# Patient Record
Sex: MALE | Race: BLACK OR AFRICAN AMERICAN | NOT HISPANIC OR LATINO | Employment: UNEMPLOYED | URBAN - METROPOLITAN AREA
[De-identification: names, ages, dates, MRNs, and addresses within clinical notes are randomized per-mention and may not be internally consistent; named-entity substitution may affect disease eponyms.]

---

## 2019-09-09 PROCEDURE — 96372 THER/PROPH/DIAG INJ SC/IM: CPT | Mod: 59

## 2019-09-09 PROCEDURE — 96374 THER/PROPH/DIAG INJ IV PUSH: CPT

## 2019-09-09 PROCEDURE — 99285 EMERGENCY DEPT VISIT HI MDM: CPT | Mod: 25

## 2019-09-10 ENCOUNTER — HOSPITAL ENCOUNTER (INPATIENT)
Facility: HOSPITAL | Age: 59
LOS: 6 days | Discharge: HOME | DRG: 280 | End: 2019-09-16
Attending: EMERGENCY MEDICINE | Admitting: EMERGENCY MEDICINE
Payer: MEDICAID

## 2019-09-10 DIAGNOSIS — R07.9 CHEST PAIN: ICD-10-CM

## 2019-09-10 DIAGNOSIS — I50.9 ACUTE HEART FAILURE: ICD-10-CM

## 2019-09-10 DIAGNOSIS — I48.92 ATRIAL FLUTTER: ICD-10-CM

## 2019-09-10 DIAGNOSIS — I42.0 DILATED CARDIOMYOPATHY: ICD-10-CM

## 2019-09-10 DIAGNOSIS — N17.9 ACUTE RENAL FAILURE: ICD-10-CM

## 2019-09-10 DIAGNOSIS — J96.01 ACUTE RESPIRATORY FAILURE WITH HYPOXIA: ICD-10-CM

## 2019-09-10 DIAGNOSIS — N17.9 AKI (ACUTE KIDNEY INJURY): ICD-10-CM

## 2019-09-10 DIAGNOSIS — N17.9 ACUTE RENAL FAILURE, UNSPECIFIED ACUTE RENAL FAILURE TYPE: ICD-10-CM

## 2019-09-10 DIAGNOSIS — I50.9 ACUTE ON CHRONIC CONGESTIVE HEART FAILURE, UNSPECIFIED HEART FAILURE TYPE: ICD-10-CM

## 2019-09-10 DIAGNOSIS — I21.4 NSTEMI (NON-ST ELEVATED MYOCARDIAL INFARCTION): Primary | ICD-10-CM

## 2019-09-10 DIAGNOSIS — R79.89 ELEVATED TROPONIN: ICD-10-CM

## 2019-09-10 DIAGNOSIS — I48.3 TYPICAL ATRIAL FLUTTER: ICD-10-CM

## 2019-09-10 PROBLEM — R06.00 DYSPNEA: Status: ACTIVE | Noted: 2019-09-10

## 2019-09-10 LAB
ALBUMIN SERPL BCP-MCNC: 3.5 G/DL (ref 3.5–5.2)
ALBUMIN SERPL BCP-MCNC: 3.7 G/DL (ref 3.5–5.2)
ALLENS TEST: ABNORMAL
ALP SERPL-CCNC: 59 U/L (ref 55–135)
ALP SERPL-CCNC: 69 U/L (ref 55–135)
ALT SERPL W/O P-5'-P-CCNC: 21 U/L (ref 10–44)
ALT SERPL W/O P-5'-P-CCNC: 24 U/L (ref 10–44)
ANION GAP SERPL CALC-SCNC: 11 MMOL/L (ref 8–16)
ANION GAP SERPL CALC-SCNC: 14 MMOL/L (ref 8–16)
AORTIC ROOT ANNULUS: 3.21 CM
AORTIC VALVE CUSP SEPERATION: 2.1 CM
APTT BLDCRRT: 64.4 SEC (ref 21–32)
ASCENDING AORTA: 3.17 CM
AST SERPL-CCNC: 26 U/L (ref 10–40)
AST SERPL-CCNC: 33 U/L (ref 10–40)
AV INDEX (PROSTH): 0.94
AV MEAN GRADIENT: 1 MMHG
AV PEAK GRADIENT: 1 MMHG
AV VALVE AREA: 3.27 CM2
AV VELOCITY RATIO: 0.86
BACTERIA #/AREA URNS HPF: ABNORMAL /HPF
BASOPHILS # BLD AUTO: 0.01 K/UL (ref 0–0.2)
BASOPHILS # BLD AUTO: 0.02 K/UL (ref 0–0.2)
BASOPHILS # BLD AUTO: 0.03 K/UL (ref 0–0.2)
BASOPHILS NFR BLD: 0.1 % (ref 0–1.9)
BASOPHILS NFR BLD: 0.1 % (ref 0–1.9)
BASOPHILS NFR BLD: 0.2 % (ref 0–1.9)
BILIRUB SERPL-MCNC: 0.9 MG/DL (ref 0.1–1)
BILIRUB SERPL-MCNC: 1 MG/DL (ref 0.1–1)
BILIRUB UR QL STRIP: NEGATIVE
BNP SERPL-MCNC: 445 PG/ML (ref 0–99)
BSA FOR ECHO PROCEDURE: 1.89 M2
BUN SERPL-MCNC: 25 MG/DL (ref 8–23)
BUN SERPL-MCNC: 28 MG/DL (ref 8–23)
CALCIUM SERPL-MCNC: 8.5 MG/DL (ref 8.7–10.5)
CALCIUM SERPL-MCNC: 8.8 MG/DL (ref 8.7–10.5)
CHLORIDE SERPL-SCNC: 102 MMOL/L (ref 95–110)
CHLORIDE SERPL-SCNC: 103 MMOL/L (ref 95–110)
CLARITY UR: CLEAR
CO2 SERPL-SCNC: 22 MMOL/L (ref 23–29)
CO2 SERPL-SCNC: 27 MMOL/L (ref 23–29)
COLOR UR: YELLOW
CREAT SERPL-MCNC: 1.4 MG/DL (ref 0.5–1.4)
CREAT SERPL-MCNC: 1.6 MG/DL (ref 0.5–1.4)
CREAT UR-MCNC: 66.5 MG/DL (ref 23–375)
CV ECHO LV RWT: 0.51 CM
D DIMER PPP IA.FEU-MCNC: 0.64 MG/L FEU
DELSYS: ABNORMAL
DIFFERENTIAL METHOD: ABNORMAL
DOP CALC AO PEAK VEL: 0.59 M/S
DOP CALC AO VTI: 6.76 CM
DOP CALC LVOT AREA: 3.5 CM2
DOP CALC LVOT DIAMETER: 2.11 CM
DOP CALC LVOT PEAK VEL: 0.51 M/S
DOP CALC LVOT STROKE VOLUME: 22.12 CM3
DOP CALCLVOT PEAK VEL VTI: 6.33 CM
ECHO LV POSTERIOR WALL: 1.27 CM (ref 0.6–1.1)
EOSINOPHIL # BLD AUTO: 0 K/UL (ref 0–0.5)
EOSINOPHIL NFR BLD: 0 % (ref 0–8)
EOSINOPHIL NFR BLD: 0.1 % (ref 0–8)
EOSINOPHIL NFR BLD: 0.1 % (ref 0–8)
EP: 5
ERYTHROCYTE [DISTWIDTH] IN BLOOD BY AUTOMATED COUNT: 14.2 % (ref 11.5–14.5)
ERYTHROCYTE [DISTWIDTH] IN BLOOD BY AUTOMATED COUNT: 14.4 % (ref 11.5–14.5)
ERYTHROCYTE [DISTWIDTH] IN BLOOD BY AUTOMATED COUNT: 14.5 % (ref 11.5–14.5)
ERYTHROCYTE [SEDIMENTATION RATE] IN BLOOD BY WESTERGREN METHOD: 42 MM/H
ERYTHROCYTE [SEDIMENTATION RATE] IN BLOOD BY WESTERGREN METHOD: 8 MM/H
ERYTHROCYTE [SEDIMENTATION RATE] IN BLOOD BY WESTERGREN METHOD: 8 MM/H
EST. GFR  (AFRICAN AMERICAN): 53 ML/MIN/1.73 M^2
EST. GFR  (AFRICAN AMERICAN): >60 ML/MIN/1.73 M^2
EST. GFR  (NON AFRICAN AMERICAN): 46 ML/MIN/1.73 M^2
EST. GFR  (NON AFRICAN AMERICAN): 54 ML/MIN/1.73 M^2
FIO2: 40
FIO2: 40
FIO2: 45
FRACTIONAL SHORTENING: 14 % (ref 28–44)
GLUCOSE SERPL-MCNC: 121 MG/DL (ref 70–110)
GLUCOSE SERPL-MCNC: 142 MG/DL (ref 70–110)
GLUCOSE UR QL STRIP: NEGATIVE
HCO3 UR-SCNC: 19.9 MMOL/L (ref 24–28)
HCO3 UR-SCNC: 23.4 MMOL/L (ref 24–28)
HCO3 UR-SCNC: 24.1 MMOL/L (ref 24–28)
HCT VFR BLD AUTO: 43.6 % (ref 40–54)
HCT VFR BLD AUTO: 44.3 % (ref 40–54)
HCT VFR BLD AUTO: 44.7 % (ref 40–54)
HGB BLD-MCNC: 14.6 G/DL (ref 14–18)
HGB BLD-MCNC: 14.7 G/DL (ref 14–18)
HGB BLD-MCNC: 14.7 G/DL (ref 14–18)
HGB UR QL STRIP: ABNORMAL
HYALINE CASTS #/AREA URNS LPF: 2 /LPF
INR PPP: 1.8 (ref 0.8–1.2)
INTERVENTRICULAR SEPTUM: 1.3 CM (ref 0.6–1.1)
IP: 10
IP: 12
IP: 12
IVRT: 0.07 MSEC
KETONES UR QL STRIP: NEGATIVE
LA MAJOR: 6.72 CM
LA MINOR: 7.15 CM
LA WIDTH: 4.86 CM
LEFT ATRIUM SIZE: 4.91 CM
LEFT ATRIUM VOLUME INDEX: 75.5 ML/M2
LEFT ATRIUM VOLUME: 140.53 CM3
LEFT INTERNAL DIMENSION IN SYSTOLE: 4.33 CM (ref 2.1–4)
LEFT VENTRICLE DIASTOLIC VOLUME INDEX: 63.85 ML/M2
LEFT VENTRICLE DIASTOLIC VOLUME: 118.88 ML
LEFT VENTRICLE MASS INDEX: 139 G/M2
LEFT VENTRICLE SYSTOLIC VOLUME INDEX: 45.2 ML/M2
LEFT VENTRICLE SYSTOLIC VOLUME: 84.23 ML
LEFT VENTRICULAR INTERNAL DIMENSION IN DIASTOLE: 5.01 CM (ref 3.5–6)
LEFT VENTRICULAR MASS: 258.33 G
LEUKOCYTE ESTERASE UR QL STRIP: NEGATIVE
LYMPHOCYTES # BLD AUTO: 2.6 K/UL (ref 1–4.8)
LYMPHOCYTES # BLD AUTO: 3.2 K/UL (ref 1–4.8)
LYMPHOCYTES # BLD AUTO: 3.2 K/UL (ref 1–4.8)
LYMPHOCYTES NFR BLD: 18.7 % (ref 18–48)
LYMPHOCYTES NFR BLD: 21.5 % (ref 18–48)
LYMPHOCYTES NFR BLD: 22.8 % (ref 18–48)
MAGNESIUM SERPL-MCNC: 1.3 MG/DL (ref 1.6–2.6)
MCH RBC QN AUTO: 32 PG (ref 27–31)
MCH RBC QN AUTO: 32.1 PG (ref 27–31)
MCH RBC QN AUTO: 32.2 PG (ref 27–31)
MCHC RBC AUTO-ENTMCNC: 32.9 G/DL (ref 32–36)
MCHC RBC AUTO-ENTMCNC: 33 G/DL (ref 32–36)
MCHC RBC AUTO-ENTMCNC: 33.7 G/DL (ref 32–36)
MCV RBC AUTO: 96 FL (ref 82–98)
MCV RBC AUTO: 97 FL (ref 82–98)
MCV RBC AUTO: 98 FL (ref 82–98)
MICROSCOPIC COMMENT: ABNORMAL
MIN VOL: 16
MIN VOL: 28.1
MIN VOL: 49
MODE: ABNORMAL
MONOCYTES # BLD AUTO: 0.8 K/UL (ref 0.3–1)
MONOCYTES # BLD AUTO: 1.1 K/UL (ref 0.3–1)
MONOCYTES # BLD AUTO: 1.4 K/UL (ref 0.3–1)
MONOCYTES NFR BLD: 6.5 % (ref 4–15)
MONOCYTES NFR BLD: 7.4 % (ref 4–15)
MONOCYTES NFR BLD: 8.3 % (ref 4–15)
MV PEAK E VEL: 1.1 M/S
NEUTROPHILS # BLD AUTO: 10.5 K/UL (ref 1.8–7.7)
NEUTROPHILS # BLD AUTO: 12.4 K/UL (ref 1.8–7.7)
NEUTROPHILS # BLD AUTO: 8.2 K/UL (ref 1.8–7.7)
NEUTROPHILS NFR BLD: 70.8 % (ref 38–73)
NEUTROPHILS NFR BLD: 70.8 % (ref 38–73)
NEUTROPHILS NFR BLD: 73.3 % (ref 38–73)
NITRITE UR QL STRIP: NEGATIVE
PCO2 BLDA: 36.2 MMHG (ref 35–45)
PCO2 BLDA: 36.6 MMHG (ref 35–45)
PCO2 BLDA: 38.2 MMHG (ref 35–45)
PH SMN: 7.34 [PH] (ref 7.35–7.45)
PH SMN: 7.41 [PH] (ref 7.35–7.45)
PH SMN: 7.42 [PH] (ref 7.35–7.45)
PH UR STRIP: 6 [PH] (ref 5–8)
PHOSPHATE SERPL-MCNC: 4.1 MG/DL (ref 2.7–4.5)
PISA TR MAX VEL: 2.52 M/S
PLATELET # BLD AUTO: 178 K/UL (ref 150–350)
PLATELET # BLD AUTO: 191 K/UL (ref 150–350)
PLATELET # BLD AUTO: 196 K/UL (ref 150–350)
PMV BLD AUTO: 10.8 FL (ref 9.2–12.9)
PMV BLD AUTO: 10.8 FL (ref 9.2–12.9)
PMV BLD AUTO: 11.1 FL (ref 9.2–12.9)
PO2 BLDA: 76 MMHG (ref 80–100)
PO2 BLDA: 83 MMHG (ref 80–100)
PO2 BLDA: 84 MMHG (ref 80–100)
POC BE: -1 MMOL/L
POC BE: -5 MMOL/L
POC BE: 0 MMOL/L
POC SATURATED O2: 95 % (ref 95–100)
POC SATURATED O2: 96 % (ref 95–100)
POC SATURATED O2: 96 % (ref 95–100)
POC TCO2: 21 MMOL/L (ref 23–27)
POC TCO2: 25 MMOL/L (ref 23–27)
POC TCO2: 25 MMOL/L (ref 23–27)
POTASSIUM SERPL-SCNC: 3.5 MMOL/L (ref 3.5–5.1)
POTASSIUM SERPL-SCNC: 3.6 MMOL/L (ref 3.5–5.1)
PROT SERPL-MCNC: 6.7 G/DL (ref 6–8.4)
PROT SERPL-MCNC: 7.1 G/DL (ref 6–8.4)
PROT UR QL STRIP: NEGATIVE
PROTHROMBIN TIME: 18.6 SEC (ref 9–12.5)
RA MAJOR: 5.6 CM
RA PRESSURE: 8 MMHG
RA WIDTH: 3.95 CM
RBC # BLD AUTO: 4.56 M/UL (ref 4.6–6.2)
RBC # BLD AUTO: 4.56 M/UL (ref 4.6–6.2)
RBC # BLD AUTO: 4.58 M/UL (ref 4.6–6.2)
RBC #/AREA URNS HPF: 4 /HPF (ref 0–4)
RIGHT VENTRICULAR END-DIASTOLIC DIMENSION: 2.88 CM
SAMPLE: ABNORMAL
SINUS: 2.62 CM
SITE: ABNORMAL
SODIUM SERPL-SCNC: 139 MMOL/L (ref 136–145)
SODIUM SERPL-SCNC: 140 MMOL/L (ref 136–145)
SODIUM UR-SCNC: 94 MMOL/L (ref 20–250)
SP GR UR STRIP: 1.01 (ref 1–1.03)
SP02: 96
SP02: 97
SP02: 99
SPONT RATE: 32
SPONT RATE: 47
STJ: 2.5 CM
TR MAX PG: 25 MMHG
TRICUSPID ANNULAR PLANE SYSTOLIC EXCURSION: 1.39 CM
TROPONIN I SERPL DL<=0.01 NG/ML-MCNC: 0.17 NG/ML (ref 0–0.03)
TROPONIN I SERPL DL<=0.01 NG/ML-MCNC: 0.19 NG/ML (ref 0–0.03)
TROPONIN I SERPL DL<=0.01 NG/ML-MCNC: 0.2 NG/ML (ref 0–0.03)
TROPONIN I SERPL DL<=0.01 NG/ML-MCNC: 0.21 NG/ML (ref 0–0.03)
TSH SERPL DL<=0.005 MIU/L-ACNC: 1.02 UIU/ML (ref 0.4–4)
TV REST PULMONARY ARTERY PRESSURE: 33 MMHG
URN SPEC COLLECT METH UR: ABNORMAL
UROBILINOGEN UR STRIP-ACNC: NEGATIVE EU/DL
WBC # BLD AUTO: 11.6 K/UL (ref 3.9–12.7)
WBC # BLD AUTO: 14.91 K/UL (ref 3.9–12.7)
WBC # BLD AUTO: 16.99 K/UL (ref 3.9–12.7)
WBC #/AREA URNS HPF: 5 /HPF (ref 0–5)

## 2019-09-10 PROCEDURE — 93005 ELECTROCARDIOGRAM TRACING: CPT

## 2019-09-10 PROCEDURE — 83970 ASSAY OF PARATHORMONE: CPT

## 2019-09-10 PROCEDURE — 27000221 HC OXYGEN, UP TO 24 HOURS

## 2019-09-10 PROCEDURE — 85730 THROMBOPLASTIN TIME PARTIAL: CPT

## 2019-09-10 PROCEDURE — 36600 WITHDRAWAL OF ARTERIAL BLOOD: CPT

## 2019-09-10 PROCEDURE — 36415 COLL VENOUS BLD VENIPUNCTURE: CPT

## 2019-09-10 PROCEDURE — 94761 N-INVAS EAR/PLS OXIMETRY MLT: CPT

## 2019-09-10 PROCEDURE — 83735 ASSAY OF MAGNESIUM: CPT

## 2019-09-10 PROCEDURE — 63600175 PHARM REV CODE 636 W HCPCS: Performed by: INTERNAL MEDICINE

## 2019-09-10 PROCEDURE — 27000190 HC CPAP FULL FACE MASK W/VALVE

## 2019-09-10 PROCEDURE — 93010 ELECTROCARDIOGRAM REPORT: CPT | Mod: ,,, | Performed by: INTERNAL MEDICINE

## 2019-09-10 PROCEDURE — 27100092 HC HIGH FLOW DELIVERY CANNULA

## 2019-09-10 PROCEDURE — 63600175 PHARM REV CODE 636 W HCPCS: Performed by: HOSPITALIST

## 2019-09-10 PROCEDURE — 85610 PROTHROMBIN TIME: CPT

## 2019-09-10 PROCEDURE — 84100 ASSAY OF PHOSPHORUS: CPT

## 2019-09-10 PROCEDURE — 84300 ASSAY OF URINE SODIUM: CPT

## 2019-09-10 PROCEDURE — 85025 COMPLETE CBC W/AUTO DIFF WBC: CPT

## 2019-09-10 PROCEDURE — 80053 COMPREHEN METABOLIC PANEL: CPT | Mod: 91

## 2019-09-10 PROCEDURE — 99900035 HC TECH TIME PER 15 MIN (STAT)

## 2019-09-10 PROCEDURE — 99291 CRITICAL CARE FIRST HOUR: CPT | Mod: 25,,, | Performed by: INTERNAL MEDICINE

## 2019-09-10 PROCEDURE — 80053 COMPREHEN METABOLIC PANEL: CPT

## 2019-09-10 PROCEDURE — 99291 CRITICAL CARE FIRST HOUR: CPT | Mod: ,,, | Performed by: NURSE PRACTITIONER

## 2019-09-10 PROCEDURE — 82652 VIT D 1 25-DIHYDROXY: CPT

## 2019-09-10 PROCEDURE — 25000003 PHARM REV CODE 250: Performed by: INTERNAL MEDICINE

## 2019-09-10 PROCEDURE — 85379 FIBRIN DEGRADATION QUANT: CPT

## 2019-09-10 PROCEDURE — 93010 EKG 12-LEAD: ICD-10-PCS | Mod: ,,, | Performed by: INTERNAL MEDICINE

## 2019-09-10 PROCEDURE — 82570 ASSAY OF URINE CREATININE: CPT

## 2019-09-10 PROCEDURE — 63600175 PHARM REV CODE 636 W HCPCS: Performed by: EMERGENCY MEDICINE

## 2019-09-10 PROCEDURE — 99291 PR CRITICAL CARE, E/M 30-74 MINUTES: ICD-10-PCS | Mod: 25,,, | Performed by: INTERNAL MEDICINE

## 2019-09-10 PROCEDURE — 99291 PR CRITICAL CARE, E/M 30-74 MINUTES: ICD-10-PCS | Mod: ,,, | Performed by: NURSE PRACTITIONER

## 2019-09-10 PROCEDURE — 82803 BLOOD GASES ANY COMBINATION: CPT

## 2019-09-10 PROCEDURE — 81000 URINALYSIS NONAUTO W/SCOPE: CPT

## 2019-09-10 PROCEDURE — 25000003 PHARM REV CODE 250: Performed by: EMERGENCY MEDICINE

## 2019-09-10 PROCEDURE — 27100171 HC OXYGEN HIGH FLOW UP TO 24 HOURS

## 2019-09-10 PROCEDURE — 83880 ASSAY OF NATRIURETIC PEPTIDE: CPT

## 2019-09-10 PROCEDURE — 84484 ASSAY OF TROPONIN QUANT: CPT | Mod: 91

## 2019-09-10 PROCEDURE — 20000000 HC ICU ROOM

## 2019-09-10 PROCEDURE — 84443 ASSAY THYROID STIM HORMONE: CPT

## 2019-09-10 PROCEDURE — 94660 CPAP INITIATION&MGMT: CPT

## 2019-09-10 RX ORDER — ENOXAPARIN SODIUM 100 MG/ML
1 INJECTION SUBCUTANEOUS
Status: COMPLETED | OUTPATIENT
Start: 2019-09-10 | End: 2019-09-10

## 2019-09-10 RX ORDER — MELOXICAM 15 MG/1
15 TABLET ORAL DAILY
Status: ON HOLD | COMMUNITY
End: 2019-09-16 | Stop reason: HOSPADM

## 2019-09-10 RX ORDER — ACETAMINOPHEN 500 MG
500 TABLET ORAL EVERY 6 HOURS PRN
Status: DISCONTINUED | OUTPATIENT
Start: 2019-09-10 | End: 2019-09-16 | Stop reason: HOSPADM

## 2019-09-10 RX ORDER — RAMELTEON 8 MG/1
8 TABLET ORAL NIGHTLY PRN
Status: DISCONTINUED | OUTPATIENT
Start: 2019-09-10 | End: 2019-09-16 | Stop reason: HOSPADM

## 2019-09-10 RX ORDER — AMOXICILLIN 250 MG
1 CAPSULE ORAL 2 TIMES DAILY PRN
Status: DISCONTINUED | OUTPATIENT
Start: 2019-09-10 | End: 2019-09-10

## 2019-09-10 RX ORDER — MAGNESIUM SULFATE HEPTAHYDRATE 40 MG/ML
2 INJECTION, SOLUTION INTRAVENOUS ONCE
Status: COMPLETED | OUTPATIENT
Start: 2019-09-10 | End: 2019-09-10

## 2019-09-10 RX ORDER — HYDROMORPHONE HYDROCHLORIDE 2 MG/ML
0.5 INJECTION, SOLUTION INTRAMUSCULAR; INTRAVENOUS; SUBCUTANEOUS ONCE
Status: COMPLETED | OUTPATIENT
Start: 2019-09-10 | End: 2019-09-10

## 2019-09-10 RX ORDER — FUROSEMIDE 10 MG/ML
40 INJECTION INTRAMUSCULAR; INTRAVENOUS
Status: COMPLETED | OUTPATIENT
Start: 2019-09-10 | End: 2019-09-10

## 2019-09-10 RX ORDER — AMOXICILLIN 250 MG
1 CAPSULE ORAL 2 TIMES DAILY
Status: DISCONTINUED | OUTPATIENT
Start: 2019-09-10 | End: 2019-09-16 | Stop reason: HOSPADM

## 2019-09-10 RX ORDER — MORPHINE SULFATE 10 MG/ML
1 INJECTION INTRAMUSCULAR; INTRAVENOUS; SUBCUTANEOUS ONCE
Status: COMPLETED | OUTPATIENT
Start: 2019-09-10 | End: 2019-09-10

## 2019-09-10 RX ORDER — ASPIRIN 325 MG
325 TABLET ORAL
Status: COMPLETED | OUTPATIENT
Start: 2019-09-10 | End: 2019-09-10

## 2019-09-10 RX ORDER — CYCLOBENZAPRINE HCL 10 MG
10 TABLET ORAL NIGHTLY PRN
Status: ON HOLD | COMMUNITY
End: 2019-09-16 | Stop reason: HOSPADM

## 2019-09-10 RX ORDER — FAMOTIDINE 20 MG/1
20 TABLET, FILM COATED ORAL 2 TIMES DAILY
Status: CANCELLED | OUTPATIENT
Start: 2019-09-10

## 2019-09-10 RX ORDER — SODIUM CHLORIDE 900 MG/100ML
500 INJECTION INTRAVENOUS ONCE
Status: DISCONTINUED | OUTPATIENT
Start: 2019-09-10 | End: 2019-09-10

## 2019-09-10 RX ORDER — ACETAMINOPHEN 325 MG/1
650 TABLET ORAL EVERY 8 HOURS PRN
Status: DISCONTINUED | OUTPATIENT
Start: 2019-09-10 | End: 2019-09-16 | Stop reason: HOSPADM

## 2019-09-10 RX ORDER — ENOXAPARIN SODIUM 100 MG/ML
1 INJECTION SUBCUTANEOUS
Status: DISCONTINUED | OUTPATIENT
Start: 2019-09-10 | End: 2019-09-10

## 2019-09-10 RX ORDER — FUROSEMIDE 10 MG/ML
60 INJECTION INTRAMUSCULAR; INTRAVENOUS 2 TIMES DAILY
Status: DISCONTINUED | OUTPATIENT
Start: 2019-09-10 | End: 2019-09-11

## 2019-09-10 RX ORDER — HEPARIN SODIUM,PORCINE/D5W 25000/250
12 INTRAVENOUS SOLUTION INTRAVENOUS CONTINUOUS
Status: DISCONTINUED | OUTPATIENT
Start: 2019-09-10 | End: 2019-09-15

## 2019-09-10 RX ORDER — FUROSEMIDE 10 MG/ML
20 INJECTION INTRAMUSCULAR; INTRAVENOUS
Status: DISCONTINUED | OUTPATIENT
Start: 2019-09-10 | End: 2019-09-10

## 2019-09-10 RX ORDER — CARVEDILOL 6.25 MG/1
6.25 TABLET ORAL 2 TIMES DAILY
Status: DISCONTINUED | OUTPATIENT
Start: 2019-09-10 | End: 2019-09-16 | Stop reason: HOSPADM

## 2019-09-10 RX ORDER — LORAZEPAM 2 MG/ML
1 INJECTION INTRAMUSCULAR
Status: COMPLETED | OUTPATIENT
Start: 2019-09-10 | End: 2019-09-10

## 2019-09-10 RX ORDER — SODIUM CHLORIDE 9 MG/ML
500 INJECTION, SOLUTION INTRAVENOUS ONCE
Status: COMPLETED | OUTPATIENT
Start: 2019-09-10 | End: 2019-09-10

## 2019-09-10 RX ORDER — HYDRALAZINE HYDROCHLORIDE 20 MG/ML
10 INJECTION INTRAMUSCULAR; INTRAVENOUS EVERY 6 HOURS PRN
Status: DISCONTINUED | OUTPATIENT
Start: 2019-09-10 | End: 2019-09-16 | Stop reason: HOSPADM

## 2019-09-10 RX ORDER — SODIUM CHLORIDE 0.9 % (FLUSH) 0.9 %
10 SYRINGE (ML) INJECTION
Status: CANCELLED | OUTPATIENT
Start: 2019-09-10

## 2019-09-10 RX ORDER — CLONIDINE HYDROCHLORIDE 0.1 MG/1
0.1 TABLET ORAL 3 TIMES DAILY PRN
Status: DISCONTINUED | OUTPATIENT
Start: 2019-09-10 | End: 2019-09-10

## 2019-09-10 RX ORDER — LISINOPRIL 10 MG/1
10 TABLET ORAL DAILY
Status: ON HOLD | COMMUNITY
End: 2019-09-16 | Stop reason: HOSPADM

## 2019-09-10 RX ORDER — ONDANSETRON 2 MG/ML
8 INJECTION INTRAMUSCULAR; INTRAVENOUS EVERY 8 HOURS PRN
Status: DISCONTINUED | OUTPATIENT
Start: 2019-09-10 | End: 2019-09-16 | Stop reason: HOSPADM

## 2019-09-10 RX ORDER — AMIODARONE HYDROCHLORIDE 400 MG/1
400 TABLET ORAL DAILY
Status: ON HOLD | COMMUNITY
End: 2019-09-16 | Stop reason: HOSPADM

## 2019-09-10 RX ADMIN — HYDROMORPHONE HYDROCHLORIDE 0.5 MG: 2 INJECTION, SOLUTION INTRAMUSCULAR; INTRAVENOUS; SUBCUTANEOUS at 11:09

## 2019-09-10 RX ADMIN — AMIODARONE HYDROCHLORIDE 0.5 MG/MIN: 1.8 INJECTION, SOLUTION INTRAVENOUS at 02:09

## 2019-09-10 RX ADMIN — AMIODARONE HYDROCHLORIDE 1 MG/MIN: 1.8 INJECTION, SOLUTION INTRAVENOUS at 09:09

## 2019-09-10 RX ADMIN — MAGNESIUM SULFATE 2 G: 2 INJECTION INTRAVENOUS at 11:09

## 2019-09-10 RX ADMIN — ASPIRIN 325 MG ORAL TABLET 325 MG: 325 PILL ORAL at 01:09

## 2019-09-10 RX ADMIN — FUROSEMIDE 60 MG: 10 INJECTION, SOLUTION INTRAMUSCULAR; INTRAVENOUS at 11:09

## 2019-09-10 RX ADMIN — CARVEDILOL 6.25 MG: 6.25 TABLET, FILM COATED ORAL at 08:09

## 2019-09-10 RX ADMIN — AMIODARONE HYDROCHLORIDE 150 MG: 1.5 INJECTION, SOLUTION INTRAVENOUS at 08:09

## 2019-09-10 RX ADMIN — LORAZEPAM 1 MG: 2 INJECTION INTRAMUSCULAR; INTRAVENOUS at 04:09

## 2019-09-10 RX ADMIN — ENOXAPARIN SODIUM 80 MG: 100 INJECTION SUBCUTANEOUS at 02:09

## 2019-09-10 RX ADMIN — HEPARIN SODIUM 12 UNITS/KG/HR: 10000 INJECTION, SOLUTION INTRAVENOUS at 07:09

## 2019-09-10 RX ADMIN — SODIUM CHLORIDE 500 ML: 0.9 INJECTION, SOLUTION INTRAVENOUS at 06:09

## 2019-09-10 RX ADMIN — FUROSEMIDE 40 MG: 10 INJECTION, SOLUTION INTRAVENOUS at 01:09

## 2019-09-10 RX ADMIN — MORPHINE SULFATE 1 MG: 10 INJECTION INTRAVENOUS at 10:09

## 2019-09-10 NOTE — CONSULTS
"[facility]  Cardiology  Consult Note    Patient Name: Marck Madison  MRN: [id number]  Admission Date: 9/10/2019  Hospital Length of Stay: 0 days  Code Status: Full Code   Attending Provider: Seble Acevedo MD   Consulting Provider: Jonathan Lopez MD  Primary Care Physician: Primary Doctor No  Principal Problem:Acute respiratory failure with hypoxia    Patient information was obtained from patient and ER records.     Consults  Subjective:     Chief Complaint:  A-flutter RVR     HPI:   This is a 59 y.o. male, with no known PMHx, who presents to the Emergency Department with a cc of SOB that began yesterday morning. Pt reports associated cough, productive cough, and fever. Pt denies back pain, neck pain, abdominal pain, CP, leg swelling, nausea, vomiting, and difficulty lying flat. Symptoms are worsened with exertion. No alleviating factors were reported. Pt reports a SHx of smoking and denies drug/alcohol use. Patient reports a prior history of similar symptoms that occurred two months ago. He states that he was treated at [facility] and was told that he has a "bad heart". He received breathing treatments, but they did not alleviate his symptoms.     Poor historian  Denies CP  Less SOB on BiPAP  EKG Atrial flutter 2: 1 AVB  Troponin 0.18 flat pattern    Cr 1.4    History reviewed. No pertinent past medical history.    History reviewed. No pertinent surgical history.    Review of patient's allergies indicates:  No Known Allergies    No current facility-administered medications on file prior to encounter.      Current Outpatient Medications on File Prior to Encounter   Medication Sig    ibuprofen (ADVIL,MOTRIN) 600 MG tablet Take 1 tablet (600 mg total) by mouth every 6 (six) hours as needed for Pain.     Family History     None        Tobacco Use    Smoking status: Former Smoker     Packs/day: 0.50     Years: 5.00     Pack years: 2.50     Types: Cigarettes   Substance and Sexual Activity    " Alcohol use: No    Drug use: No    Sexual activity: Not on file     Review of Systems   Constitution: Negative for decreased appetite.   HENT: Negative for ear discharge.    Eyes: Negative for blurred vision.   Endocrine: Negative for polyphagia.   Skin: Negative for nail changes.   Genitourinary: Negative for bladder incontinence.   Neurological: Negative for aphonia.   Psychiatric/Behavioral: Negative for hallucinations.   Allergic/Immunologic: Negative for hives.     Objective:     Vital Signs (Most Recent):  Temp: 98.6 °F (37 °C) (09/10/19 0710)  Pulse: (!) 113 (09/10/19 0800)  Resp: (!) 35 (09/10/19 0800)  BP: (!) 153/124 (09/10/19 0800)  SpO2: 100 % (09/10/19 0800) Vital Signs (24h Range):  Temp:  [98.6 °F (37 °C)-98.7 °F (37.1 °C)] 98.6 °F (37 °C)  Pulse:  [112-116] 113  Resp:  [26-49] 35  SpO2:  [88 %-100 %] 100 %  BP: (135-183)/() 153/124     Weight: 77 kg (169 lb 12.1 oz)  Body mass index is 27.4 kg/m².    SpO2: 100 %  O2 Device (Oxygen Therapy): BiPAP      Intake/Output Summary (Last 24 hours) at 9/10/2019 0820  Last data filed at 9/10/2019 0420  Gross per 24 hour   Intake --   Output 750 ml   Net -750 ml       Lines/Drains/Airways     Peripheral Intravenous Line                 Peripheral IV - Single Lumen 09/10/19 0035 20 G Left Wrist less than 1 day         Peripheral IV - Single Lumen 09/10/19 0410 18 G Right Hand less than 1 day                Physical Exam   Constitutional: He is oriented to person, place, and time. He appears well-developed and well-nourished.   HENT:   Head: Normocephalic and atraumatic.   Eyes: Pupils are equal, round, and reactive to light. Conjunctivae are normal.   Neck: Normal range of motion. Neck supple.   Cardiovascular: Regular rhythm, normal heart sounds and intact distal pulses. Tachycardia present.   Pulmonary/Chest: Effort normal and breath sounds normal.   Abdominal: Soft. Bowel sounds are normal.   Musculoskeletal: Normal range of motion.   Neurological: He  is alert and oriented to person, place, and time.   Skin: Skin is warm and dry.       Significant Labs: All pertinent lab results from the last 24 hours have been reviewed.    Significant Imaging: Echocardiogram: 2D echo with color flow doppler: No results found for this or any previous visit.    Assessment and Plan:     * Acute respiratory failure with hypoxia  Per primary    Atrial flutter  -120 - A-flutter 2:1 AVB. Unclear if this a new Dx. Check echo. Full dose lovenox. CHADS-vasc 2 (HTN,CHF). IV amiodarone - consider ESTEBAN/CV if A-flutter persists but patient currently refusing any invasive procedures    Elevated troponin  Likely demand ischemia from tachycardia and CHF. Consider ischemic evaluation once rhythm controlled if he agrees    Acute renal failure  Per primary        VTE Risk Mitigation (From admission, onward)        Ordered     enoxaparin injection 80 mg  Every 12 hours (non-standard times)      09/10/19 0409     IP VTE HIGH RISK PATIENT  Once      09/10/19 0409        35 minutes spent with patient in ICU    Thank you for your consult. I will follow-up with patient. Please contact us if you have any additional questions.    Jonathan Lopez MD  Cardiology   [facility]

## 2019-09-10 NOTE — PROGRESS NOTES
IPAP icreased to 12 per Dr Harmon. ABG in 25 minutes.     0522: Repeat ABG's reported to Dr Harmon. MD aware of RR in 30's.

## 2019-09-10 NOTE — HPI
Mr. Marck Madison is a 59 y.o. male with a vague cardiac medical history who presents to [facility] ED with complaints of dyspnea for the past day.  He reports that the dyspnea is independent of activities and is associated with a cough that is occasionally productive of yellow/green sputum.  He denies any wheezing but does admit to smoking quite heavily.  Denies any fevers, chills, chest pain, palpitations, diaphoresis, pleurisy, hemoptysis, nor any lower extremity pain or swelling he does report some nausea with vomiting any time he would try to eat.  Denies any sick contacts nor recent travel and has not experienced any PND nor orthopnea.  He does report a history of heart disease for which he has been seen at [facility], though he is unable to specify what disease he has.  His history is otherwise limited at this time due to his respiratory distress.

## 2019-09-10 NOTE — ASSESSMENT & PLAN NOTE
Likely demand ischemia from tachycardia and CHF. Consider ischemic evaluation once rhythm controlled if he agrees

## 2019-09-10 NOTE — CARE UPDATE
Pt. Placed on BIPAP 10/+5/.40 as per order MD Wilson.  Pt. Tolerated BIPAP placement well, NARN.  Will continue to monitor.

## 2019-09-10 NOTE — SUBJECTIVE & OBJECTIVE
History reviewed. No pertinent past medical history.    History reviewed. No pertinent surgical history.    Review of patient's allergies indicates:  No Known Allergies    Family History     None        Tobacco Use    Smoking status: Former Smoker     Packs/day: 0.50     Years: 5.00     Pack years: 2.50     Types: Cigarettes   Substance and Sexual Activity    Alcohol use: No    Drug use: No    Sexual activity: Not on file         Review of Systems   Constitutional: Positive for diaphoresis and fatigue. Negative for fever.   Respiratory: Positive for cough, chest tightness and shortness of breath. Negative for wheezing.    Cardiovascular: Negative for chest pain, palpitations and leg swelling.   Gastrointestinal: Negative for abdominal distention, diarrhea and nausea.   Genitourinary: Negative for dysuria.   Musculoskeletal: Negative for back pain and myalgias.   Neurological: Negative for dizziness, weakness and numbness.   Psychiatric/Behavioral: The patient is nervous/anxious.      Objective:     Vital Signs (Most Recent):  Temp: 98 °F (36.7 °C) (09/10/19 1136)  Pulse: 100 (09/10/19 1230)  Resp: (!) 37 (09/10/19 1230)  BP: 94/67 (09/10/19 1230)  SpO2: (!) 90 % (09/10/19 1230) Vital Signs (24h Range):  Temp:  [97.7 °F (36.5 °C)-98.7 °F (37.1 °C)] 98 °F (36.7 °C)  Pulse:  [100-116] 100  Resp:  [13-70] 37  SpO2:  [77 %-100 %] 90 %  BP: ()/() 94/67     Weight: 76.7 kg (169 lb)  Body mass index is 27.28 kg/m².      Intake/Output Summary (Last 24 hours) at 9/10/2019 1331  Last data filed at 9/10/2019 1321  Gross per 24 hour   Intake 717.09 ml   Output 995 ml   Net -277.91 ml       Physical Exam   Constitutional: He appears well-developed. He is cooperative. He has a sickly appearance. He appears distressed. Nasal cannula in place.   HENT:   Head: Normocephalic and atraumatic.   Eyes: Pupils are equal, round, and reactive to light. Conjunctivae are normal. Right eye exhibits no exudate. Left eye exhibits  no exudate. Right conjunctiva has no hemorrhage. Left conjunctiva has no hemorrhage. No scleral icterus. Right eye exhibits no nystagmus. Left eye exhibits no nystagmus.   Neck: Trachea normal. No neck rigidity. No tracheal deviation present.   Cardiovascular: Regular rhythm and normal heart sounds. Tachycardia present. PMI is not displaced. Exam reveals no gallop and no friction rub.   No murmur heard.  Pulses:       Radial pulses are 2+ on the right side, and 2+ on the left side.        Dorsalis pedis pulses are 2+ on the right side, and 2+ on the left side.   No edema to note   Pulmonary/Chest: Effort normal and breath sounds normal. No accessory muscle usage. No respiratory distress. He has no wheezes. He has no rhonchi. He has no rales.   Abdominal: Soft. Normal appearance and bowel sounds are normal. There is no tenderness.   Musculoskeletal: Normal range of motion.   Neurological: He is alert. No cranial nerve deficit or sensory deficit. GCS eye subscore is 4. GCS verbal subscore is 5. GCS motor subscore is 6.   Follows commands, ROCHA 4/5, no focal deficits to note   Skin: Skin is warm. Capillary refill takes 2 to 3 seconds. He is diaphoretic. No cyanosis. Nails show no clubbing.   Psychiatric: His speech is normal. His mood appears anxious. He is agitated.   Nursing note and vitals reviewed.      Vents:  Oxygen Concentration (%): 50 (09/10/19 1215)    Lines/Drains/Airways     Drain                 Urethral Catheter 09/10/19 1257 16 Fr. less than 1 day          Peripheral Intravenous Line                 Peripheral IV - Single Lumen 09/10/19 0035 20 G Left Wrist less than 1 day         Peripheral IV - Single Lumen 09/10/19 0410 18 G Right Hand less than 1 day                Significant Labs:    CBC/Anemia Profile:  Recent Labs   Lab 09/10/19  0032 09/10/19  0656   WBC 14.91* 11.60   HGB 14.7 14.6   HCT 43.6 44.3    196   MCV 96 97   RDW 14.5 14.2        Chemistries:  Recent Labs   Lab 09/10/19  0032  09/10/19  0656    140   K 3.6 3.5    102   CO2 22* 27   BUN 28* 25*   CREATININE 1.6* 1.4   CALCIUM 8.8 8.5*   ALBUMIN 3.7 3.5   PROT 7.1 6.7   BILITOT 0.9 1.0   ALKPHOS 69 59   ALT 24 21   AST 33 26   MG  --  1.3*   PHOS  --  4.1       All pertinent labs within the past 24 hours have been reviewed.    Significant Imaging:   I have reviewed all pertinent imaging results/findings within the past 24 hours.

## 2019-09-10 NOTE — NURSING
1555- last 1 hour of urine 12mL(No kinks noted in ha tubing), Dr. Acevedo notified. Bladder scan done per her request, 42mL in bladder, reported to MD.

## 2019-09-10 NOTE — NURSING
[id number]: Patient diaphoretic, oxygen sats dropping as low as 72%(Scott GAMBOA) aware. No obvious changes to 2 lead EKG. Patient saturated in urine. Condom cath applied. Patient drowsy.    1157- ECHO tech at bedside.     1220- Asked significant other to bring medications from home. Reviewed plan of care and fragile nature of patient with significant other.     1230- Updated Dr. Lopez on patient decline and that ECHO was completed.

## 2019-09-10 NOTE — NURSING
Scott Waters, NP at bedside reassessing patient. Updated her on UOP and BP trending down. Also updated Dr. Acevedo on BP

## 2019-09-10 NOTE — NURSING
1442- Dr. Gómez, Scott Waters, NP, and Dr. Acevedo at bedside reassessing patient. BP remains low. MAP currently above 65. Notified Dr. Acevedo of one episode of incontinence, 65cc initial ha output(1320), and 10cc from [id number]. Lasix 60mg was given this morning.

## 2019-09-10 NOTE — ASSESSMENT & PLAN NOTE
-120 - A-flutter 2:1 AVB. Unclear if this a new Dx. Check echo. Full dose lovenox. CHADS-vasc 2 (HTN,CHF). IV amiodarone - consider ESTEBAN/CV if A-flutter persists but patient currently refusing any invasive procedures

## 2019-09-10 NOTE — HPI
Mr. Marck Madison is a 59 y.o. male with a vague history of heart disease (seen at [facility]) who presents to [facility] ED with complaints of dyspnea for the past day. PER H&P, his dyspnea is independent of activities and is associated with a cough that is occasionally productive of yellow/green sputum. He denies any wheezing but does admit to smoking quite heavily. Denies any fevers, chills, chest pain, palpitations, diaphoresis, pleurisy, hemoptysis, nor any lower extremity pain or swelling he does report some nausea with vomiting any time he would try to eat. Denies any sick contacts nor recent travel and has not experienced any PND nor orthopnea.     Pulmonary consulted for acute hypoxic respiratory failure requiring continuous BiPAP.    He was admitted to ICU for suspected volume overload secondary to heart failure. Lasix given in ED with unclear urine output. Patient placed on BIPAP with improvement in oxygenation and dyspnea, does not tolerate Vapotherm as he becomes hypoxic and diaphoretic. TTE completed, awaiting read. Lasix continued for additional fluid removal.

## 2019-09-10 NOTE — CARE UPDATE
Pt seen and examined by Dr. schroeder this am. Admitted with aflutter and dyspnea. Cardiology consulted and started amiodarone infusion. Initially good UOP with 40mg IV lasix and then output declined after drop in BP (SBP 80-90s), MAP 64-67. Continuous Bipap. Pulmonology and cardiology consulted. Switched from lovenox to heparin infusion. Pt received IV lasix (100mg), ativan and dilaudid today. ECHO- EF 30% and DD. Renal u/s insignificant except enlarged prostate. Troponins increased.

## 2019-09-10 NOTE — H&P
[facility] Hospital Medicine  History & Physical    Patient Name: Marck Madison  MRN: [id number]  Admission Date: 9/10/2019  Attending Physician: Leydi Murphy MD   Primary Care Provider: Primary Doctor No         Patient information was obtained from patient.     Subjective:     Principal Problem:Acute respiratory failure with hypoxia    Chief Complaint:  Shortness of breath for one day.    HPI: Mr. Marck Madison is a 59 y.o. male with a vague cardiac medical history who presents to [facility] ED with complaints of dyspnea for the past day.  He reports that the dyspnea is independent of activities and is associated with a cough that is occasionally productive of yellow/green sputum.  He denies any wheezing but does admit to smoking quite heavily.  Denies any fevers, chills, chest pain, palpitations, diaphoresis, pleurisy, hemoptysis, nor any lower extremity pain or swelling he does report some nausea with vomiting any time he would try to eat.  Denies any sick contacts nor recent travel and has not experienced any PND nor orthopnea.  He does report a history of heart disease for which he has been seen at [facility], though he is unable to specify what disease he has.  His history is otherwise limited at this time due to his respiratory distress.    Chart Review:  Patient has not had any recent hospitalizations or outpatient clinic visits within the system.    No past medical history on file.    No past surgical history on file.    Review of patient's allergies indicates:  No Known Allergies    No current facility-administered medications on file prior to encounter.      Current Outpatient Medications on File Prior to Encounter   Medication Sig    ibuprofen (ADVIL,MOTRIN) 600 MG tablet Take 1 tablet (600 mg total) by mouth every 6 (six) hours as needed for Pain.     Family History     Noncontributory        Tobacco Use    Smoking status: Former Smoker     Packs/day: 0.50     Years:  5.00     Pack years: 2.50     Types: Cigarettes   Substance and Sexual Activity    Alcohol use: No    Drug use: No    Sexual activity: Not on file     Review of Systems   Constitutional: Negative for activity change, appetite change, chills, diaphoresis, fatigue, fever and unexpected weight change.   HENT: Negative.    Eyes: Negative.    Respiratory: Positive for cough and shortness of breath. Negative for chest tightness and wheezing.    Cardiovascular: Negative for chest pain, palpitations and leg swelling.   Gastrointestinal: Negative for abdominal distention, anal bleeding, blood in stool, constipation, diarrhea, nausea and vomiting.   Genitourinary: Negative for dysuria and hematuria.   Musculoskeletal: Negative.    Skin: Negative.    Neurological: Negative for dizziness, seizures, syncope, weakness and light-headedness.   Psychiatric/Behavioral: Negative.      Objective:     Vital Signs (Most Recent):  Temp: 98.7 °F (37.1 °C) (09/10/19 0001)  Pulse: (!) 113 (09/10/19 0445)  Resp: (!) 36 (09/10/19 0445)  BP: (!) 153/110 (09/10/19 0445)  SpO2: 99 % (09/10/19 0445) Vital Signs (24h Range):  Temp:  [98.7 °F (37.1 °C)] 98.7 °F (37.1 °C)  Pulse:  [112-116] 113  Resp:  [26-49] 36  SpO2:  [88 %-100 %] 99 %  BP: (135-183)/() 153/110     Weight: 79.4 kg (175 lb)  Body mass index is 28.25 kg/m².    Physical Exam   Constitutional: He is oriented to person, place, and time. He appears well-developed and well-nourished. He appears distressed.   HENT:   Head: Normocephalic and atraumatic.   Right Ear: External ear normal.   Left Ear: External ear normal.   Nose: Nose normal.   BPAP mask in place   Eyes: Right eye exhibits no discharge. Left eye exhibits no discharge.   Neck: Normal range of motion.   Cardiovascular:   Normal rate and rhythm with a S4 gallop, no murmurs   Pulmonary/Chest:   In moderate respiratory distress with tachypnea, on BPAP with inspiratory and expiratory wheezing, mild bibasilar crackles    Abdominal: Soft. Bowel sounds are normal. He exhibits no distension. There is no tenderness. There is no rebound and no guarding.   Musculoskeletal: Normal range of motion. He exhibits no edema.   Neurological: He is alert and oriented to person, place, and time.   Skin: Skin is warm and dry. He is not diaphoretic. No erythema.   Psychiatric: He has a normal mood and affect. His behavior is normal. Judgment and thought content normal.   Nursing note and vitals reviewed.          Significant Labs: All pertinent labs within the past 24 hours have been reviewed.    Significant Imaging: I have reviewed and interpreted all pertinent imaging results/findings within the past 24 hours.    Assessment/Plan:     * Acute respiratory failure with hypoxia  Patient presented with an initial ambient air oxygen saturation 88% which improved to 99% BPAP.  He does have evidence of volume overload.  Personally reviewed his plain chest radiograph which was significant for pulmonary edema with a probable left-sided pleural effusion and without infiltrates or consolidations.  He also has a equivocal BNP of 445 but in setting of volume overload is concerning for congestive heart failure.  He also has an elevated troponin 0.199 without chest pain. He does report a vague cardiac history for which she was evaluated at [facility]--we have no access to those records.  He has been placed on BPAP for respiratory distress and will be started on intravenous diuresis with furosemide.  Will therefore admit him to the ICU for acute respiratory failure.  Will obtain a TTE and TSH in the morning consult Cardiology for further recommendations.  His symptoms are not consistent was nephrotic syndrome.    Acute renal failure  Unfortunately did have previous lab work to which to compare to chronicity of his renal disease. His BUN/creatinine today is 28/1.6; urinalysis pending.  As he is volume overloaded we will elect to diurese as  opposed to provide IV fluids.  Will check an intact PTH, vitamin-D, and bilateral renal ultrasounds.  Will also check urine studies and recheck his renal functionin the morning.    Elevated troponin  He does have an elevated troponin of 0.199 without any chest pain. I personally reviewed his EKG and although the machine reads atrial flutter, I am able to discern P waves.  He continues to deny chest pain at this time.  Think the likely cause of his troponinemia is renal failure along with acute heart failure.  He has been started on aspirin and full-dose enoxaparin.  Will follow serial troponins and consult Cardiology for further recommendations.    VTE Risk Mitigation (From admission, onward)        Ordered     enoxaparin injection 80 mg  Every 12 hours (non-standard times)      09/10/19 0409     IP VTE HIGH RISK PATIENT  Once      09/10/19 0409             Critical care time spent on the evaluation and treatment of severe organ dysfunction, review of pertinent labs and imaging studies, discussions with consulting providers and discussions with patient/family: 60 minutes.               Sj Harmon M.D.  Staff Nocturnist  Department of Hospital Medicine  [facility]  Pager: [phone]              N.B.: Portions of this note was dictated using M*Modal Fluency Direct--there may be voice recognition errors occasionally missed on review.

## 2019-09-10 NOTE — CARE UPDATE
[facility]  ICU Multidisciplinary Bedside Rounds     UPDATE     Date: 9/10/2019      Plan of care reviewed with the following, Nurse, Charge Nurse, Physician, Resp. Therapist and Infection Prevention.       Needs/ Goals for the day: Wean Bipap, echo, diuretic, replace mag    Level of Care: Critical Care

## 2019-09-10 NOTE — HPI
"This is a 59 y.o. male, with no known PMHx, who presents to the Emergency Department with a cc of SOB that began yesterday morning. Pt reports associated cough, productive cough, and fever. Pt denies back pain, neck pain, abdominal pain, CP, leg swelling, nausea, vomiting, and difficulty lying flat. Symptoms are worsened with exertion. No alleviating factors were reported. Pt reports a SHx of smoking and denies drug/alcohol use. Patient reports a prior history of similar symptoms that occurred two months ago. He states that he was treated at [facility] and was told that he has a "bad heart". He received breathing treatments, but they did not alleviate his symptoms.     Poor historian  Denies CP  Less SOB on BiPAP  EKG Atrial flutter 2: 1 AVB  Troponin 0.18 flat pattern    Cr 1.4  "

## 2019-09-10 NOTE — PROGRESS NOTES
Results for GOPI JACOBO (MRN [id number]) as of 9/10/2019 05:29   Ref. Range 9/10/2019 05:22   POC PH Latest Ref Range: 7.35 - 7.45  7.407   POC PCO2 Latest Ref Range: 35 - 45 mmHg 38.2   POC PO2 Latest Ref Range: 80 - 100 mmHg 76 (L)   POC BE Latest Ref Range: -2 to 2 mmol/L 0   POC HCO3 Latest Ref Range: 24 - 28 mmol/L 24.1   POC SATURATED O2 Latest Ref Range: 95 - 100 % 95   POC TCO2 Latest Ref Range: 23 - 27 mmol/L 25   FiO2 Unknown 40   Sample Unknown ARTERIAL   DelSys Unknown CPAP/BiPAP   Allens Test Unknown Pass   Site Unknown RR   Mode Unknown BiPAP   Rate Unknown 32

## 2019-09-10 NOTE — CARE UPDATE
[facility]  ICU Multidisciplinary Bedside Rounds   SUMMARY     Date: 9/10/2019    Prehospitalization: Home  Admit Date / LOS : 9/10/2019/ 0 days    Diagnosis: Acute respiratory failure with hypoxia    Consults:        Active: Cardio       Needed: Pulm CC and SW     Code Status: Full Code   Advanced Directive: <no information>    LDA: BIPAP and PIV       Central Lines/Site/Justification:Patient Does Not Have Central Line       Urinary Cath/Order/Justification:Patient Does Not Have Urinary Catheter      CAM ICU: Positive  Pain Management: none       Pain Controlled: n/a    Rhythm: A-Flutter    Respiratory Device: Bipap    Oxygen Concentration (%):  [40] 40            VTE Prophylaxis: Pharm and Mechanical  Mobility: Bedrest  Stress Ulcer Prophylaxis: Yes    Dietary: NPO  Tolerance: not applicable  /  Advancement: no    Isolation: No active isolations    Restraints: No    Noteworthy Labs: Trop: 0.188    CBC/Anemia Labs: Coags:    Recent Labs   Lab 09/10/19  0032   WBC 14.91*   HGB 14.7   HCT 43.6      MCV 96   RDW 14.5    No results for input(s): PT, INR, APTT in the last 168 hours.     Chemistries:   Recent Labs   Lab 09/10/19  0032      K 3.6      CO2 22*   BUN 28*   CREATININE 1.6*   CALCIUM 8.8   PROT 7.1   BILITOT 0.9   ALKPHOS 69   ALT 24   AST 33        Cardiac Enzymes: Ejection Fractions:    Recent Labs     09/10/19  0032 09/10/19  0350   TROPONINI 0.199* 0.188*    No results found for: EF     POCT Glucose: HbA1c:    No results for input(s): POCTGLUCOSE in the last 168 hours. No results found for: HGBA1C     Needs from Care Team: none     ICU LOS 1h  Level of Care: OK to Transfer

## 2019-09-10 NOTE — NURSING
"Received pt to . Placed pt on monitor. Oriented to room, call light within reach, bed wheels locked and in lowest position. Pt arrived with no belongings. ER RN stated Pt's family member's were bedside but went home because they were tired, stated family took pt's belongings home with them. Pt arrived to ICU with "muscle shirt" under gown, shirt removed and placed in drawer. RRT placed back on Bipap. Dr. Harmon at bedside to assess pt. Will continue to monitor.  "

## 2019-09-10 NOTE — ASSESSMENT & PLAN NOTE
Likely secondary to heart failure exacerbation, possible PE, less likely pneumonia  --CXR with bilateral pulmonary edema and without focal consolidation  --increased D dimer  --continue BIPAP as unable to tolerate vapotherm  --continue Lasix for diuresis BID  --strict I/Os  --on therapeutic lovenox  --follow up BLE us for DVT eval  --CXR in am

## 2019-09-10 NOTE — ED PROVIDER NOTES
"Encounter Date: 9/9/2019    SCRIBE #1 NOTE: I, Rosana Pang, am scribing for, and in the presence of,  Margarito Wilson. I have scribed the following portions of the note - Other sections scribed: HPI, ROS, PE.       History     Chief Complaint   Patient presents with    Shortness of Breath     Pt reports SOB that started on lastnight and worsened today. Pt reports SOB is exacerbated with activity. Short winded when talking. Denies CP. Triage RA O2 sats 88-90% and pt is tachycardic with .      CC: SOB    HPI:  This is a 59 y.o. male, with no known PMHx, who presents to the Emergency Department with a cc of SOB that began yesterday morning. Pt reports associated cough, productive cough, and fever. Pt denies back pain, neck pain, abdominal pain, CP, leg swelling, nausea, vomiting, and difficulty lying flat. Symptoms are worsened with exertion. No alleviating factors were reported. Pt reports a SHx of smoking and denies drug/alcohol use. Patient reports a prior history of similar symptoms that occurred two months ago. He states that he was treated at [facility] and was told that he has a "bad heart". He received breathing treatments, but they did not alleviate his symptoms.           Review of patient's allergies indicates:  No Known Allergies  No past medical history on file.  No past surgical history on file.  No family history on file.  Social History     Tobacco Use    Smoking status: Former Smoker     Packs/day: 0.50     Years: 5.00     Pack years: 2.50     Types: Cigarettes   Substance Use Topics    Alcohol use: No    Drug use: No     Review of Systems   Constitutional: Positive for fever.   HENT: Negative for sinus pain and sore throat.    Respiratory: Positive for cough (productive) and shortness of breath.    Cardiovascular: Negative for chest pain and leg swelling.   Gastrointestinal: Negative for abdominal pain, nausea and vomiting.   Genitourinary: Negative for dysuria.   Musculoskeletal: Negative for " back pain and neck pain.   Skin: Negative for rash.   Neurological: Negative for weakness.   Hematological: Does not bruise/bleed easily.       Physical Exam     Initial Vitals [09/10/19 0001]   BP Pulse Resp Temp SpO2   (!) 135/101 (!) 116 (!) 26 98.7 °F (37.1 °C) (!) 88 %      MAP       --         Physical Exam    Nursing note and vitals reviewed.  Constitutional: He appears well-developed and well-nourished. He is not diaphoretic. No distress.   HENT:   Head: Normocephalic and atraumatic.   Right Ear: External ear normal.   Left Ear: External ear normal.   Eyes: Conjunctivae and EOM are normal. Pupils are equal, round, and reactive to light.   Neck: Normal range of motion. Neck supple.   Cardiovascular: Normal rate, regular rhythm, normal heart sounds and intact distal pulses. Exam reveals no gallop and no friction rub.    No murmur heard.  Pulmonary/Chest: Breath sounds normal. No stridor. Tachypnea noted. He is in respiratory distress (mild). He has no wheezes.   Bilateral inspiratory crackles.   Abdominal: Soft. Bowel sounds are normal.   Musculoskeletal: Normal range of motion. He exhibits no edema or tenderness.        Cervical back: Normal.        Thoracic back: Normal.        Lumbar back: Normal.   Neurological: He is alert and oriented to person, place, and time. He has normal strength. No cranial nerve deficit.   Skin: Skin is warm and dry. No pallor.   Psychiatric: He has a normal mood and affect. Thought content normal.         ED Course   Procedures  Labs Reviewed   CBC W/ AUTO DIFFERENTIAL - Abnormal; Notable for the following components:       Result Value    WBC 14.91 (*)     RBC 4.56 (*)     Mean Corpuscular Hemoglobin 32.2 (*)     Gran # (ANC) 10.5 (*)     Mono # 1.1 (*)     All other components within normal limits   COMPREHENSIVE METABOLIC PANEL - Abnormal; Notable for the following components:    CO2 22 (*)     Glucose 142 (*)     BUN, Bld 28 (*)     Creatinine 1.6 (*)     eGFR if African  American 53 (*)     eGFR if non  46 (*)     All other components within normal limits   TROPONIN I - Abnormal; Notable for the following components:    Troponin I 0.199 (*)     All other components within normal limits   B-TYPE NATRIURETIC PEPTIDE - Abnormal; Notable for the following components:     (*)     All other components within normal limits   D DIMER, QUANTITATIVE - Abnormal; Notable for the following components:    D-Dimer 0.64 (*)     All other components within normal limits   ISTAT PROCEDURE - Abnormal; Notable for the following components:    POC HCO3 23.4 (*)     All other components within normal limits   TROPONIN I   COMPREHENSIVE METABOLIC PANEL   MAGNESIUM   PHOSPHORUS   CBC W/ AUTO DIFFERENTIAL   TSH   CALCITRIOL   PTH, INTACT        ECG Results          EKG 12-lead (In process)  Result time 09/10/19 06:29:55    In process by Interface, Lab In [facility] (09/10/19 06:29:55)                 Narrative:    Test Reason : R07.9,    Vent. Rate : 115 BPM     Atrial Rate : 230 BPM     P-R Int : 000 ms          QRS Dur : 096 ms      QT Int : 368 ms       P-R-T Axes : 000 -08 114 degrees     QTc Int : 509 ms    Atrial flutter with 2:1 A-V conduction  Inferior infarct (cited on or before 10-SEP-2019)  Anterior infarct (cited on or before 10-SEP-2019)  Abnormal ECG  When compared with ECG of 10-SEP-2019 01:31,  No significant change was found    Referred By: [email or web address]   SELF           Confirmed By:                   In process by Interface, Lab In [facility] (09/10/19 06:28:08)                 Narrative:    Test Reason : R07.9,    Vent. Rate : 115 BPM     Atrial Rate : 230 BPM     P-R Int : 000 ms          QRS Dur : 096 ms      QT Int : 368 ms       P-R-T Axes : 000 -08 114 degrees     QTc Int : 509 ms    Atrial flutter with 2:1 A-V conduction  Inferior infarct (cited on or before 10-SEP-2019)  Anterior infarct (cited on or before 10-SEP-2019)  Abnormal ECG  When compared with ECG of  "10-SEP-2019 01:31,  No significant change was found    Referred By: [email or web address]   SELF           Confirmed By:                             Imaging Results          X-Ray Chest AP Portable (Final result)  Result time 09/10/19 01:03:20    Final result by Douglas Marshall MD (09/10/19 01:03:20)                 Impression:      Findings suggestive of CHF exacerbation.      Electronically signed by: Douglas Marshall MD  Date:    09/10/2019  Time:    01:03             Narrative:    EXAMINATION:  XR CHEST AP PORTABLE    CLINICAL HISTORY:  Provided history is "Chest Pain;  ".    TECHNIQUE:  One view of the chest.    COMPARISON:  None.    FINDINGS:  Cardiac wires overlie the chest.  Cardiac silhouette is enlarged.  Atherosclerotic calcifications overlie the aortic arch.  There is central vascular congestion, coarsened interstitial lung markings, and mild to moderate patchy perihilar and lower lung zone airspace disease.  Small bilateral pleural effusions.  No pneumothorax.                                 Medical Decision Making:   Initial Assessment:   59-year-old male with past medical history significant for heart problems presenting with shortness of breath, tachypnea, mild respiratory distress, hypoxia.  On exam, patient tachypneic.  Has wet sounding lungs with wheezing bilaterally. No significant lower extremity edema.  Some JVD noted. Suspect CHF exacerbation.  EKG shows possible a flutter, rate 115, no significant ST segment elevation or depression concerning for acute ischemia.  Patient started on BiPAP with improved breathing.  Ativan given for anxiety with improved respiratory rate.  Patient diuresed with Lasix.  Will admit for CHF exacerbation and ACS rule out.            Scribe Attestation:   Scribe #1: I performed the above scribed service and the documentation accurately describes the services I performed. I attest to the accuracy of the note.    Scribe attestation: I, Alexi Wilson, personally performed " the services described in this documentation. All medical record entries made by the scribe were at my direction and in my presence.  I have reviewed the chart and agree that the record reflects my personal performance and is accurate and complete.           Clinical Impression:     1. NSTEMI (non-ST elevated myocardial infarction)    2. Chest pain    3. Acute respiratory failure with hypoxia    4. Acute on chronic congestive heart failure, unspecified heart failure type    5. Acute heart failure        Disposition:   Disposition: Admitted  Condition: Stable                        Alexi Wilson MD  09/10/19 0636

## 2019-09-10 NOTE — ED NOTES
ED MD notified of pt's worsening SOB with SpO2 dropping to mid-80s. Pt titrated up to 3L NC and repositioned up in bed.

## 2019-09-10 NOTE — PLAN OF CARE
"To patient's room to discuss patient managing her care at home.      TN Role Explained.  Patient identified by using 2 identifiers:  Name and date of birth    Aileen Ferrari stated that  WILL HELP AT HOME WITH his RECOVERY.       TN reviewed with patient contents of "[employer]".      TN name and contact info placed on the communication board    Preferred Pharmacy:  [facility]       09/10/19 7988   Discharge Assessment   Assessment Type Discharge Planning Assessment   Confirmed/corrected address and phone number on facesheet? Yes   Assessment information obtained from? Caregiver  (Aileen ferrari)   Expected Length of Stay (days) 3   Communicated expected length of stay with patient/caregiver yes   Prior to hospitilization cognitive status: Alert/Oriented   Prior to hospitalization functional status: Needs Assistance  (In ICU)   Current cognitive status: Alert/Oriented   Current Functional Status: Independent   Lives With other relative(s)  (Aileen sky)   Able to Return to Prior Arrangements yes   Is patient able to care for self after discharge? Unable to determine at this time (comments)   Patient's perception of discharge disposition home or selfcare   Readmission Within the Last 30 Days no previous admission in last 30 days   Patient currently being followed by outpatient case management? No   Patient currently receives any other outside agency services? No   Equipment Currently Used at Home none   Do you have any problems affording any of your prescribed medications? No   Is the patient taking medications as prescribed? yes   Does the patient have transportation home? Yes   Transportation Anticipated family or friend will provide   Does the patient receive services at the [facility]? No   Discharge Plan A Home with family   Discharge Plan B Home Health   DME Needed Upon Discharge    (tbd)   Patient/Family in Agreement with Plan yes     "

## 2019-09-10 NOTE — ASSESSMENT & PLAN NOTE
Unclear baseline   --sCr slightly elevated 1.4 and uptrending 1.6  --monitor sCr for worsening in setting of diuresis

## 2019-09-10 NOTE — CONSULTS
[facility]  Pulmonology  Consult Note    Patient Name: Marck Madison  MRN: [id number]  Admission Date: 9/10/2019  Hospital Length of Stay: 0 days  Code Status: Full Code  Attending Physician: Seble Acevedo MD  Primary Care Provider: Primary Doctor No   Principal Problem: Acute respiratory failure with hypoxia    Inpatient consult to Pulmonology  Consult performed by: Scott Waters, NP  Consult ordered by: Seble Acevedo MD        Subjective:     HPI:  Mr. Marck Madison is a 59 y.o. male with a vague history of heart disease (seen at [facility]) who presents to [facility] ED with complaints of dyspnea for the past day. PER H&P, his dyspnea is independent of activities and is associated with a cough that is occasionally productive of yellow/green sputum. He denies any wheezing but does admit to smoking quite heavily. Denies any fevers, chills, chest pain, palpitations, diaphoresis, pleurisy, hemoptysis, nor any lower extremity pain or swelling he does report some nausea with vomiting any time he would try to eat. Denies any sick contacts nor recent travel and has not experienced any PND nor orthopnea.     Pulmonary consulted for acute hypoxic respiratory failure requiring continuous BiPAP.    He was admitted to ICU for suspected volume overload secondary to heart failure. Lasix given in ED with unclear urine output. Patient placed on BIPAP with improvement in oxygenation and dyspnea, does not tolerate Vapotherm as he becomes hypoxic and diaphoretic. TTE completed, awaiting read. Lasix continued for additional fluid removal.     History reviewed. No pertinent past medical history.    History reviewed. No pertinent surgical history.    Review of patient's allergies indicates:  No Known Allergies    Family History     None        Tobacco Use    Smoking status: Former Smoker     Packs/day: 0.50     Years: 5.00     Pack years: 2.50     Types: Cigarettes   Substance and Sexual Activity    Alcohol use: No     Drug use: No    Sexual activity: Not on file         Review of Systems   Constitutional: Positive for diaphoresis and fatigue. Negative for fever.   Respiratory: Positive for cough, chest tightness and shortness of breath. Negative for wheezing.    Cardiovascular: Negative for chest pain, palpitations and leg swelling.   Gastrointestinal: Negative for abdominal distention, diarrhea and nausea.   Genitourinary: Negative for dysuria.   Musculoskeletal: Negative for back pain and myalgias.   Neurological: Negative for dizziness, weakness and numbness.   Psychiatric/Behavioral: The patient is nervous/anxious.      Objective:     Vital Signs (Most Recent):  Temp: 98 °F (36.7 °C) (09/10/19 1136)  Pulse: 100 (09/10/19 1230)  Resp: (!) 37 (09/10/19 1230)  BP: 94/67 (09/10/19 1230)  SpO2: (!) 90 % (09/10/19 1230) Vital Signs (24h Range):  Temp:  [97.7 °F (36.5 °C)-98.7 °F (37.1 °C)] 98 °F (36.7 °C)  Pulse:  [100-116] 100  Resp:  [13-70] 37  SpO2:  [77 %-100 %] 90 %  BP: ()/() 94/67     Weight: 76.7 kg (169 lb)  Body mass index is 27.28 kg/m².      Intake/Output Summary (Last 24 hours) at 9/10/2019 1331  Last data filed at 9/10/2019 1321  Gross per 24 hour   Intake 717.09 ml   Output 995 ml   Net -277.91 ml       Physical Exam   Constitutional: He appears well-developed. He is cooperative. He has a sickly appearance. He appears distressed. Nasal cannula in place.   HENT:   Head: Normocephalic and atraumatic.   Eyes: Pupils are equal, round, and reactive to light. Conjunctivae are normal. Right eye exhibits no exudate. Left eye exhibits no exudate. Right conjunctiva has no hemorrhage. Left conjunctiva has no hemorrhage. No scleral icterus. Right eye exhibits no nystagmus. Left eye exhibits no nystagmus.   Neck: Trachea normal. No neck rigidity. No tracheal deviation present.   Cardiovascular: Regular rhythm and normal heart sounds. Tachycardia present. PMI is not displaced. Exam reveals no gallop and no friction  rub.   No murmur heard.  Pulses:       Radial pulses are 2+ on the right side, and 2+ on the left side.        Dorsalis pedis pulses are 2+ on the right side, and 2+ on the left side.   No edema to note   Pulmonary/Chest: Effort normal and breath sounds normal. No accessory muscle usage. No respiratory distress. He has no wheezes. He has no rhonchi. He has no rales.   Abdominal: Soft. Normal appearance and bowel sounds are normal. There is no tenderness.   Musculoskeletal: Normal range of motion.   Neurological: He is alert. No cranial nerve deficit or sensory deficit. GCS eye subscore is 4. GCS verbal subscore is 5. GCS motor subscore is 6.   Follows commands, ROCHA 4/5, no focal deficits to note   Skin: Skin is warm. Capillary refill takes 2 to 3 seconds. He is diaphoretic. No cyanosis. Nails show no clubbing.   Psychiatric: His speech is normal. His mood appears anxious. He is agitated.   Nursing note and vitals reviewed.      Vents:  Oxygen Concentration (%): 50 (09/10/19 1215)    Lines/Drains/Airways     Drain                 Urethral Catheter 09/10/19 1257 16 Fr. less than 1 day          Peripheral Intravenous Line                 Peripheral IV - Single Lumen 09/10/19 0035 20 G Left Wrist less than 1 day         Peripheral IV - Single Lumen 09/10/19 0410 18 G Right Hand less than 1 day                Significant Labs:    CBC/Anemia Profile:  Recent Labs   Lab 09/10/19  0032 09/10/19  0656   WBC 14.91* 11.60   HGB 14.7 14.6   HCT 43.6 44.3    196   MCV 96 97   RDW 14.5 14.2        Chemistries:  Recent Labs   Lab 09/10/19  0032 09/10/19  0656    140   K 3.6 3.5    102   CO2 22* 27   BUN 28* 25*   CREATININE 1.6* 1.4   CALCIUM 8.8 8.5*   ALBUMIN 3.7 3.5   PROT 7.1 6.7   BILITOT 0.9 1.0   ALKPHOS 69 59   ALT 24 21   AST 33 26   MG  --  1.3*   PHOS  --  4.1       All pertinent labs within the past 24 hours have been reviewed.    Significant Imaging:   I have reviewed all pertinent imaging  results/findings within the past 24 hours.    Assessment/Plan:     * Acute respiratory failure with hypoxia  Likely secondary to heart failure exacerbation, possible PE, less likely pneumonia  --CXR with bilateral pulmonary edema and without focal consolidation  --increased D dimer  --continue BIPAP as unable to tolerate vapotherm  --continue Lasix for diuresis BID  --strict I/Os  --on therapeutic lovenox  --follow up BLE us for DVT eval  --CXR in am      FARHAN (acute kidney injury)  Unclear baseline   --sCr slightly elevated 1.4 and uptrending 1.6  --monitor sCr for worsening in setting of diuresis    Atrial flutter  On amiodarone  --plan per cardiology recs    Elevated troponin  Peaked on admit, down trending  --cards consulted      PPI ppx: n/a  VTE: therapeutic lovenox      Critical Care time: 45 minutes    Thank you for your consult. I will follow-up with patient. Please contact us if you have any additional questions.     Above plan discussed with Dr. Gómez and Dr. Curtis Waters NP  Pulmonology  [facility] [place]

## 2019-09-10 NOTE — SUBJECTIVE & OBJECTIVE
No past medical history on file.    No past surgical history on file.    Review of patient's allergies indicates:  No Known Allergies    No current facility-administered medications on file prior to encounter.      Current Outpatient Medications on File Prior to Encounter   Medication Sig    ibuprofen (ADVIL,MOTRIN) 600 MG tablet Take 1 tablet (600 mg total) by mouth every 6 (six) hours as needed for Pain.     Family History     Noncontributory        Tobacco Use    Smoking status: Former Smoker     Packs/day: 0.50     Years: 5.00     Pack years: 2.50     Types: Cigarettes   Substance and Sexual Activity    Alcohol use: No    Drug use: No    Sexual activity: Not on file     Review of Systems   Constitutional: Negative for activity change, appetite change, chills, diaphoresis, fatigue, fever and unexpected weight change.   HENT: Negative.    Eyes: Negative.    Respiratory: Positive for cough and shortness of breath. Negative for chest tightness and wheezing.    Cardiovascular: Negative for chest pain, palpitations and leg swelling.   Gastrointestinal: Negative for abdominal distention, anal bleeding, blood in stool, constipation, diarrhea, nausea and vomiting.   Genitourinary: Negative for dysuria and hematuria.   Musculoskeletal: Negative.    Skin: Negative.    Neurological: Negative for dizziness, seizures, syncope, weakness and light-headedness.   Psychiatric/Behavioral: Negative.      Objective:     Vital Signs (Most Recent):  Temp: 98.7 °F (37.1 °C) (09/10/19 0001)  Pulse: (!) 113 (09/10/19 0445)  Resp: (!) 36 (09/10/19 0445)  BP: (!) 153/110 (09/10/19 0445)  SpO2: 99 % (09/10/19 0445) Vital Signs (24h Range):  Temp:  [98.7 °F (37.1 °C)] 98.7 °F (37.1 °C)  Pulse:  [112-116] 113  Resp:  [26-49] 36  SpO2:  [88 %-100 %] 99 %  BP: (135-183)/() 153/110     Weight: 79.4 kg (175 lb)  Body mass index is 28.25 kg/m².    Physical Exam   Constitutional: He is oriented to person, place, and time. He appears  well-developed and well-nourished. He appears distressed.   HENT:   Head: Normocephalic and atraumatic.   Right Ear: External ear normal.   Left Ear: External ear normal.   Nose: Nose normal.   BPAP mask in place   Eyes: Right eye exhibits no discharge. Left eye exhibits no discharge.   Neck: Normal range of motion.   Cardiovascular:   Normal rate and rhythm with a S4 gallop, no murmurs   Pulmonary/Chest:   In moderate respiratory distress with tachypnea, on BPAP with inspiratory and expiratory wheezing, mild bibasilar crackles   Abdominal: Soft. Bowel sounds are normal. He exhibits no distension. There is no tenderness. There is no rebound and no guarding.   Musculoskeletal: Normal range of motion. He exhibits no edema.   Neurological: He is alert and oriented to person, place, and time.   Skin: Skin is warm and dry. He is not diaphoretic. No erythema.   Psychiatric: He has a normal mood and affect. His behavior is normal. Judgment and thought content normal.   Nursing note and vitals reviewed.          Significant Labs: All pertinent labs within the past 24 hours have been reviewed.    Significant Imaging: I have reviewed and interpreted all pertinent imaging results/findings within the past 24 hours.

## 2019-09-10 NOTE — SUBJECTIVE & OBJECTIVE
History reviewed. No pertinent past medical history.    History reviewed. No pertinent surgical history.    Review of patient's allergies indicates:  No Known Allergies    No current facility-administered medications on file prior to encounter.      Current Outpatient Medications on File Prior to Encounter   Medication Sig    ibuprofen (ADVIL,MOTRIN) 600 MG tablet Take 1 tablet (600 mg total) by mouth every 6 (six) hours as needed for Pain.     Family History     None        Tobacco Use    Smoking status: Former Smoker     Packs/day: 0.50     Years: 5.00     Pack years: 2.50     Types: Cigarettes   Substance and Sexual Activity    Alcohol use: No    Drug use: No    Sexual activity: Not on file     Review of Systems   Constitution: Negative for decreased appetite.   HENT: Negative for ear discharge.    Eyes: Negative for blurred vision.   Endocrine: Negative for polyphagia.   Skin: Negative for nail changes.   Genitourinary: Negative for bladder incontinence.   Neurological: Negative for aphonia.   Psychiatric/Behavioral: Negative for hallucinations.   Allergic/Immunologic: Negative for hives.     Objective:     Vital Signs (Most Recent):  Temp: 98.6 °F (37 °C) (09/10/19 0710)  Pulse: (!) 113 (09/10/19 0800)  Resp: (!) 35 (09/10/19 0800)  BP: (!) 153/124 (09/10/19 0800)  SpO2: 100 % (09/10/19 0800) Vital Signs (24h Range):  Temp:  [98.6 °F (37 °C)-98.7 °F (37.1 °C)] 98.6 °F (37 °C)  Pulse:  [112-116] 113  Resp:  [26-49] 35  SpO2:  [88 %-100 %] 100 %  BP: (135-183)/() 153/124     Weight: 77 kg (169 lb 12.1 oz)  Body mass index is 27.4 kg/m².    SpO2: 100 %  O2 Device (Oxygen Therapy): BiPAP      Intake/Output Summary (Last 24 hours) at 9/10/2019 0820  Last data filed at 9/10/2019 0420  Gross per 24 hour   Intake --   Output 750 ml   Net -750 ml       Lines/Drains/Airways     Peripheral Intravenous Line                 Peripheral IV - Single Lumen 09/10/19 0035 20 G Left Wrist less than 1 day          Peripheral IV - Single Lumen 09/10/19 0410 18 G Right Hand less than 1 day                Physical Exam   Constitutional: He is oriented to person, place, and time. He appears well-developed and well-nourished.   HENT:   Head: Normocephalic and atraumatic.   Eyes: Pupils are equal, round, and reactive to light. Conjunctivae are normal.   Neck: Normal range of motion. Neck supple.   Cardiovascular: Regular rhythm, normal heart sounds and intact distal pulses. Tachycardia present.   Pulmonary/Chest: Effort normal and breath sounds normal.   Abdominal: Soft. Bowel sounds are normal.   Musculoskeletal: Normal range of motion.   Neurological: He is alert and oriented to person, place, and time.   Skin: Skin is warm and dry.       Significant Labs: All pertinent lab results from the last 24 hours have been reviewed.    Significant Imaging: Echocardiogram: 2D echo with color flow doppler: No results found for this or any previous visit.

## 2019-09-10 NOTE — CARE UPDATE
Pt. Transferred to ICU bed 262 on BIPAP 10/+5/.40.  Pt. Tolerated transport well, NARN.  Report given to Josefina Steele, CRT

## 2019-09-10 NOTE — ASSESSMENT & PLAN NOTE
Patient presented with an initial ambient air oxygen saturation 88% which improved to 99% BPAP.  He does have evidence of volume overload.  Personally reviewed his plain chest radiograph which was significant for pulmonary edema with a probable left-sided pleural effusion and without infiltrates or consolidations.  He also has a equivocal BNP of 445 but in setting of volume overload is concerning for congestive heart failure.  He also has an elevated troponin 0.199 without chest pain. He does report a vague cardiac history for which she was evaluated at [facility]--we have no access to those records.  He has been placed on BPAP for respiratory distress and will be started on intravenous diuresis with furosemide.  Will therefore admit him to the ICU for acute respiratory failure.  Will obtain a TTE and TSH in the morning consult Cardiology for further recommendations.  His symptoms are not consistent was nephrotic syndrome.

## 2019-09-10 NOTE — CARE UPDATE
Results for GOPI JACOBO (MRN [id number]) as of 9/10/2019 04:21   Ref. Range 9/10/2019 04:12   POC PH Latest Ref Range: 7.35 - 7.45  7.418   POC PCO2 Latest Ref Range: 35 - 45 mmHg 36.2   POC PO2 Latest Ref Range: 80 - 100 mmHg 83   POC BE Latest Ref Range: -2 to 2 mmol/L -1   POC HCO3 Latest Ref Range: 24 - 28 mmol/L 23.4 (L)   POC SATURATED O2 Latest Ref Range: 95 - 100 % 96   POC TCO2 Latest Ref Range: 23 - 27 mmol/L 25   FiO2 Unknown 40   Sample Unknown ARTERIAL   DelSys Unknown CPAP/BiPAP   Allens Test Unknown N/A   Site Unknown RR   Mode Unknown BiPAP   Rate Unknown 42     ABG Results reported to MD Wilson

## 2019-09-10 NOTE — PLAN OF CARE
Problem: Adult Inpatient Plan of Care  Goal: Plan of Care Review  Outcome: Ongoing (interventions implemented as appropriate)  Patient remains on BiPAP, attempted nasal cannula at 5L, sats would not stay above 80%. Vapotherm started because patient refused BiPAP. Eventually became lethargic enough to place BiPAP on him. Sats improved with BiPAP. Remains stable on 50% BiPAP but remains tachypneic. Renner placed for accurate I/Os and due to lethargy. Minimal urine output, MD aware. Heparin gtt ordered. At the time of this note, aPTT has not resulted yet (lab had issues drawing it, attempted 3x, finally drawn at 1740)

## 2019-09-10 NOTE — ASSESSMENT & PLAN NOTE
Unfortunately did have previous lab work to which to compare to chronicity of his renal disease. His BUN/creatinine today is 28/1.6; urinalysis pending.  As he is volume overloaded we will elect to diurese as opposed to provide IV fluids.  Will check an intact PTH, vitamin-D, and bilateral renal ultrasounds.  Will also check urine studies and recheck his renal functionin the morning.

## 2019-09-10 NOTE — PROGRESS NOTES
Pt was received on BiPAP 12/8@40% sats 97%. Pt was taken off BiPAP and put on 5L NC. Pt sats in 80's pt did not want to put BiPAP mask on OSCAR Waters NP at bedside. Put pt on vapotherm 25L at 45% FiO2 was increased to 50%. RN put pt back on BiPAP sats maintained.

## 2019-09-10 NOTE — ASSESSMENT & PLAN NOTE
He does have an elevated troponin of 0.199 without any chest pain. I personally reviewed his EKG and although the machine reads atrial flutter, I am able to discern P waves.  He continues to deny chest pain at this time.  Think the likely cause of his troponinemia is renal failure along with acute heart failure.  He has been started on aspirin and full-dose enoxaparin.  Will follow serial troponins and consult Cardiology for further recommendations.

## 2019-09-11 ENCOUNTER — ANESTHESIA (OUTPATIENT)
Dept: CARDIOLOGY | Facility: HOSPITAL | Age: 59
DRG: 280 | End: 2019-09-11
Payer: MEDICAID

## 2019-09-11 ENCOUNTER — ANESTHESIA EVENT (OUTPATIENT)
Dept: CARDIOLOGY | Facility: HOSPITAL | Age: 59
DRG: 280 | End: 2019-09-11
Payer: MEDICAID

## 2019-09-11 LAB
1,25(OH)2D3 SERPL-MCNC: 32 PG/ML (ref 20–79)
ALBUMIN SERPL BCP-MCNC: 2.9 G/DL (ref 3.5–5.2)
ALBUMIN SERPL BCP-MCNC: 3 G/DL (ref 3.5–5.2)
ALBUMIN SERPL BCP-MCNC: 3.3 G/DL (ref 3.5–5.2)
ALP SERPL-CCNC: 67 U/L (ref 55–135)
ALT SERPL W/O P-5'-P-CCNC: 45 U/L (ref 10–44)
ANION GAP SERPL CALC-SCNC: 11 MMOL/L (ref 8–16)
ANION GAP SERPL CALC-SCNC: 12 MMOL/L (ref 8–16)
ANION GAP SERPL CALC-SCNC: 12 MMOL/L (ref 8–16)
ANION GAP SERPL CALC-SCNC: 16 MMOL/L (ref 8–16)
APTT BLDCRRT: 48.2 SEC (ref 21–32)
APTT BLDCRRT: 59.9 SEC (ref 21–32)
APTT BLDCRRT: 61.3 SEC (ref 21–32)
APTT BLDCRRT: 90.2 SEC (ref 21–32)
AST SERPL-CCNC: 74 U/L (ref 10–40)
BASOPHILS # BLD AUTO: 0.01 K/UL (ref 0–0.2)
BASOPHILS NFR BLD: 0.1 % (ref 0–1.9)
BILIRUB SERPL-MCNC: 1 MG/DL (ref 0.1–1)
BUN SERPL-MCNC: 48 MG/DL (ref 8–23)
BUN SERPL-MCNC: 53 MG/DL (ref 8–23)
BUN SERPL-MCNC: 56 MG/DL (ref 8–23)
BUN SERPL-MCNC: 56 MG/DL (ref 8–23)
CALCIUM SERPL-MCNC: 8.1 MG/DL (ref 8.7–10.5)
CALCIUM SERPL-MCNC: 8.1 MG/DL (ref 8.7–10.5)
CALCIUM SERPL-MCNC: 8.2 MG/DL (ref 8.7–10.5)
CALCIUM SERPL-MCNC: 8.5 MG/DL (ref 8.7–10.5)
CHLORIDE SERPL-SCNC: 102 MMOL/L (ref 95–110)
CHLORIDE SERPL-SCNC: 98 MMOL/L (ref 95–110)
CHLORIDE SERPL-SCNC: 98 MMOL/L (ref 95–110)
CHLORIDE SERPL-SCNC: 99 MMOL/L (ref 95–110)
CK SERPL-CCNC: 239 U/L (ref 20–200)
CO2 SERPL-SCNC: 18 MMOL/L (ref 23–29)
CO2 SERPL-SCNC: 24 MMOL/L (ref 23–29)
CO2 SERPL-SCNC: 25 MMOL/L (ref 23–29)
CO2 SERPL-SCNC: 25 MMOL/L (ref 23–29)
CREAT SERPL-MCNC: 3.4 MG/DL (ref 0.5–1.4)
CREAT SERPL-MCNC: 3.6 MG/DL (ref 0.5–1.4)
CREAT SERPL-MCNC: 3.7 MG/DL (ref 0.5–1.4)
CREAT SERPL-MCNC: 3.7 MG/DL (ref 0.5–1.4)
DIFFERENTIAL METHOD: ABNORMAL
EOSINOPHIL # BLD AUTO: 0 K/UL (ref 0–0.5)
EOSINOPHIL NFR BLD: 0.1 % (ref 0–8)
ERYTHROCYTE [DISTWIDTH] IN BLOOD BY AUTOMATED COUNT: 14.6 % (ref 11.5–14.5)
EST. GFR  (AFRICAN AMERICAN): 19 ML/MIN/1.73 M^2
EST. GFR  (AFRICAN AMERICAN): 19 ML/MIN/1.73 M^2
EST. GFR  (AFRICAN AMERICAN): 20 ML/MIN/1.73 M^2
EST. GFR  (AFRICAN AMERICAN): 21 ML/MIN/1.73 M^2
EST. GFR  (NON AFRICAN AMERICAN): 17 ML/MIN/1.73 M^2
EST. GFR  (NON AFRICAN AMERICAN): 18 ML/MIN/1.73 M^2
GLUCOSE SERPL-MCNC: 124 MG/DL (ref 70–110)
GLUCOSE SERPL-MCNC: 145 MG/DL (ref 70–110)
GLUCOSE SERPL-MCNC: 179 MG/DL (ref 70–110)
GLUCOSE SERPL-MCNC: 180 MG/DL (ref 70–110)
HCT VFR BLD AUTO: 42.4 % (ref 40–54)
HGB BLD-MCNC: 14.1 G/DL (ref 14–18)
LYMPHOCYTES # BLD AUTO: 1.9 K/UL (ref 1–4.8)
LYMPHOCYTES NFR BLD: 12.9 % (ref 18–48)
MAGNESIUM SERPL-MCNC: 1.8 MG/DL (ref 1.6–2.6)
MAGNESIUM SERPL-MCNC: 1.8 MG/DL (ref 1.6–2.6)
MCH RBC QN AUTO: 32 PG (ref 27–31)
MCHC RBC AUTO-ENTMCNC: 33.3 G/DL (ref 32–36)
MCV RBC AUTO: 96 FL (ref 82–98)
MONOCYTES # BLD AUTO: 1.1 K/UL (ref 0.3–1)
MONOCYTES NFR BLD: 7.3 % (ref 4–15)
NEUTROPHILS # BLD AUTO: 11.4 K/UL (ref 1.8–7.7)
NEUTROPHILS NFR BLD: 79.6 % (ref 38–73)
PHOSPHATE SERPL-MCNC: 3.6 MG/DL (ref 2.7–4.5)
PHOSPHATE SERPL-MCNC: 5 MG/DL (ref 2.7–4.5)
PLATELET # BLD AUTO: 166 K/UL (ref 150–350)
PMV BLD AUTO: 10.8 FL (ref 9.2–12.9)
POTASSIUM SERPL-SCNC: 4 MMOL/L (ref 3.5–5.1)
POTASSIUM SERPL-SCNC: 4 MMOL/L (ref 3.5–5.1)
POTASSIUM SERPL-SCNC: 4.2 MMOL/L (ref 3.5–5.1)
POTASSIUM SERPL-SCNC: 5 MMOL/L (ref 3.5–5.1)
PROT SERPL-MCNC: 6.7 G/DL (ref 6–8.4)
PTH-INTACT SERPL-MCNC: 172.5 PG/ML (ref 9–77)
RBC # BLD AUTO: 4.41 M/UL (ref 4.6–6.2)
SODIUM SERPL-SCNC: 134 MMOL/L (ref 136–145)
SODIUM SERPL-SCNC: 135 MMOL/L (ref 136–145)
SODIUM SERPL-SCNC: 135 MMOL/L (ref 136–145)
SODIUM SERPL-SCNC: 136 MMOL/L (ref 136–145)
WBC # BLD AUTO: 14.41 K/UL (ref 3.9–12.7)

## 2019-09-11 PROCEDURE — 85025 COMPLETE CBC W/AUTO DIFF WBC: CPT

## 2019-09-11 PROCEDURE — 86255 FLUORESCENT ANTIBODY SCREEN: CPT

## 2019-09-11 PROCEDURE — 36415 COLL VENOUS BLD VENIPUNCTURE: CPT

## 2019-09-11 PROCEDURE — 99291 PR CRITICAL CARE, E/M 30-74 MINUTES: ICD-10-PCS | Mod: 25,,, | Performed by: NURSE PRACTITIONER

## 2019-09-11 PROCEDURE — 25000242 PHARM REV CODE 250 ALT 637 W/ HCPCS: Performed by: HOSPITALIST

## 2019-09-11 PROCEDURE — 20000000 HC ICU ROOM

## 2019-09-11 PROCEDURE — 94664 DEMO&/EVAL PT USE INHALER: CPT

## 2019-09-11 PROCEDURE — 93005 ELECTROCARDIOGRAM TRACING: CPT

## 2019-09-11 PROCEDURE — 99233 SBSQ HOSP IP/OBS HIGH 50: CPT | Mod: GT,,, | Performed by: INTERNAL MEDICINE

## 2019-09-11 PROCEDURE — 80048 BASIC METABOLIC PNL TOTAL CA: CPT

## 2019-09-11 PROCEDURE — 80069 RENAL FUNCTION PANEL: CPT | Mod: 91

## 2019-09-11 PROCEDURE — 93010 ELECTROCARDIOGRAM REPORT: CPT | Mod: ,,, | Performed by: INTERNAL MEDICINE

## 2019-09-11 PROCEDURE — 27000646 HC AEROBIKA DEVICE

## 2019-09-11 PROCEDURE — 94761 N-INVAS EAR/PLS OXIMETRY MLT: CPT

## 2019-09-11 PROCEDURE — 94660 CPAP INITIATION&MGMT: CPT

## 2019-09-11 PROCEDURE — 86431 RHEUMATOID FACTOR QUANT: CPT

## 2019-09-11 PROCEDURE — 94640 AIRWAY INHALATION TREATMENT: CPT

## 2019-09-11 PROCEDURE — 93010 EKG 12-LEAD: ICD-10-PCS | Mod: ,,, | Performed by: INTERNAL MEDICINE

## 2019-09-11 PROCEDURE — 27100092 HC HIGH FLOW DELIVERY CANNULA

## 2019-09-11 PROCEDURE — 63600175 PHARM REV CODE 636 W HCPCS: Performed by: NURSE PRACTITIONER

## 2019-09-11 PROCEDURE — 86592 SYPHILIS TEST NON-TREP QUAL: CPT

## 2019-09-11 PROCEDURE — 85730 THROMBOPLASTIN TIME PARTIAL: CPT | Mod: 91

## 2019-09-11 PROCEDURE — 25000003 PHARM REV CODE 250: Performed by: INTERNAL MEDICINE

## 2019-09-11 PROCEDURE — 90945 DIALYSIS ONE EVALUATION: CPT

## 2019-09-11 PROCEDURE — 27100171 HC OXYGEN HIGH FLOW UP TO 24 HOURS

## 2019-09-11 PROCEDURE — 80074 ACUTE HEPATITIS PANEL: CPT

## 2019-09-11 PROCEDURE — 36800 INSERTION OF CANNULA: CPT | Mod: ,,, | Performed by: NURSE PRACTITIONER

## 2019-09-11 PROCEDURE — 86160 COMPLEMENT ANTIGEN: CPT | Mod: 59

## 2019-09-11 PROCEDURE — 36800 PR INSERT CANNULA,VEIN-VEIN: ICD-10-PCS | Mod: ,,, | Performed by: NURSE PRACTITIONER

## 2019-09-11 PROCEDURE — 63600175 PHARM REV CODE 636 W HCPCS: Performed by: INTERNAL MEDICINE

## 2019-09-11 PROCEDURE — 99233 PR SUBSEQUENT HOSPITAL CARE,LEVL III: ICD-10-PCS | Mod: GT,,, | Performed by: INTERNAL MEDICINE

## 2019-09-11 PROCEDURE — 99900035 HC TECH TIME PER 15 MIN (STAT)

## 2019-09-11 PROCEDURE — 86703 HIV-1/HIV-2 1 RESULT ANTBDY: CPT

## 2019-09-11 PROCEDURE — 82550 ASSAY OF CK (CPK): CPT

## 2019-09-11 PROCEDURE — 83735 ASSAY OF MAGNESIUM: CPT | Mod: 91

## 2019-09-11 PROCEDURE — 86160 COMPLEMENT ANTIGEN: CPT

## 2019-09-11 PROCEDURE — 83735 ASSAY OF MAGNESIUM: CPT

## 2019-09-11 PROCEDURE — 86038 ANTINUCLEAR ANTIBODIES: CPT

## 2019-09-11 PROCEDURE — 80053 COMPREHEN METABOLIC PANEL: CPT

## 2019-09-11 PROCEDURE — 99291 CRITICAL CARE FIRST HOUR: CPT | Mod: 25,,, | Performed by: NURSE PRACTITIONER

## 2019-09-11 RX ORDER — METOLAZONE 5 MG/1
5 TABLET ORAL DAILY
Status: DISCONTINUED | OUTPATIENT
Start: 2019-09-11 | End: 2019-09-16 | Stop reason: HOSPADM

## 2019-09-11 RX ORDER — MAGNESIUM SULFATE HEPTAHYDRATE 40 MG/ML
2 INJECTION, SOLUTION INTRAVENOUS
Status: DISPENSED | OUTPATIENT
Start: 2019-09-11 | End: 2019-09-12

## 2019-09-11 RX ORDER — SODIUM CHLORIDE 0.9 % (FLUSH) 0.9 %
10 SYRINGE (ML) INJECTION
Status: DISCONTINUED | OUTPATIENT
Start: 2019-09-11 | End: 2019-09-16 | Stop reason: HOSPADM

## 2019-09-11 RX ORDER — FUROSEMIDE 10 MG/ML
180 INJECTION INTRAMUSCULAR; INTRAVENOUS ONCE
Status: COMPLETED | OUTPATIENT
Start: 2019-09-11 | End: 2019-09-11

## 2019-09-11 RX ORDER — FAMOTIDINE 20 MG/1
20 TABLET, FILM COATED ORAL DAILY
Status: DISCONTINUED | OUTPATIENT
Start: 2019-09-12 | End: 2019-09-16 | Stop reason: HOSPADM

## 2019-09-11 RX ORDER — FUROSEMIDE 10 MG/ML
120 INJECTION INTRAMUSCULAR; INTRAVENOUS 2 TIMES DAILY
Status: DISCONTINUED | OUTPATIENT
Start: 2019-09-11 | End: 2019-09-11

## 2019-09-11 RX ORDER — LEVALBUTEROL INHALATION SOLUTION 0.63 MG/3ML
0.63 SOLUTION RESPIRATORY (INHALATION) EVERY 8 HOURS
Status: DISCONTINUED | OUTPATIENT
Start: 2019-09-11 | End: 2019-09-16 | Stop reason: HOSPADM

## 2019-09-11 RX ADMIN — AMIODARONE HYDROCHLORIDE 0.5 MG/MIN: 1.8 INJECTION, SOLUTION INTRAVENOUS at 01:09

## 2019-09-11 RX ADMIN — VANCOMYCIN HYDROCHLORIDE 1250 MG: 1.25 INJECTION, POWDER, LYOPHILIZED, FOR SOLUTION INTRAVENOUS at 04:09

## 2019-09-11 RX ADMIN — FUROSEMIDE 120 MG: 10 INJECTION, SOLUTION INTRAMUSCULAR; INTRAVENOUS at 10:09

## 2019-09-11 RX ADMIN — AMIODARONE HYDROCHLORIDE 0.5 MG/MIN: 1.8 INJECTION, SOLUTION INTRAVENOUS at 02:09

## 2019-09-11 RX ADMIN — FUROSEMIDE 180 MG: 10 INJECTION, SOLUTION INTRAMUSCULAR; INTRAVENOUS at 02:09

## 2019-09-11 RX ADMIN — LEVALBUTEROL HYDROCHLORIDE 0.63 MG: 0.63 SOLUTION RESPIRATORY (INHALATION) at 04:09

## 2019-09-11 RX ADMIN — METOLAZONE 5 MG: 5 TABLET ORAL at 02:09

## 2019-09-11 RX ADMIN — PIPERACILLIN AND TAZOBACTAM 4.5 G: 4; .5 INJECTION, POWDER, LYOPHILIZED, FOR SOLUTION INTRAVENOUS; PARENTERAL at 03:09

## 2019-09-11 NOTE — SUBJECTIVE & OBJECTIVE
"Past Medical History:   Diagnosis Date    Arrhythmia 2017    aflutter    CAD (coronary artery disease)     CHF (congestive heart failure), NYHA class I 2017    Psychiatric disorder     h/o "schizophrena/bipolar"       Past Surgical History:   Procedure Laterality Date    LIVER SURGERY      abscess drainage; 1970s       Review of patient's allergies indicates:  No Known Allergies  Current Facility-Administered Medications   Medication Frequency    acetaminophen tablet 500 mg Q6H PRN    acetaminophen tablet 650 mg Q8H PRN    amiodarone 360 mg/200 mL (1.8 mg/mL) infusion Continuous    carvedilol tablet 6.25 mg BID    heparin 25,000 units in dextrose 5% (100 units/ml) IV bolus from bag - ADDITIONAL PRN BOLUS - 30 units/kg PRN    heparin 25,000 units in dextrose 5% (100 units/ml) IV bolus from bag - ADDITIONAL PRN BOLUS - 60 units/kg PRN    heparin 25,000 units in dextrose 5% (100 units/ml) IV bolus from bag INITIAL BOLUS Once    heparin 25,000 units in dextrose 5% 250 mL (100 units/mL) infusion LOW INTENSITY nomogram - [facility] Continuous    hydrALAZINE injection 10 mg Q6H PRN    levalbuterol nebulizer solution 0.63 mg Q8H    metOLazone tablet 5 mg Daily    ondansetron injection 8 mg Q8H PRN    promethazine (PHENERGAN) 6.25 mg in dextrose 5 % 50 mL IVPB Q6H PRN    ramelteon tablet 8 mg Nightly PRN    senna-docusate 8.6-50 mg per tablet 1 tablet BID    sodium chloride 0.9% flush 10 mL PRN     Family History     None        Tobacco Use    Smoking status: Former Smoker     Packs/day: 0.50     Years: 5.00     Pack years: 2.50     Types: Cigarettes   Substance and Sexual Activity    Alcohol use: No    Drug use: No    Sexual activity: Not on file     Review of Systems   Respiratory: Positive for shortness of breath.    Cardiovascular: Negative for leg swelling.   Genitourinary: Positive for decreased urine volume. Negative for hematuria.   Musculoskeletal: Negative for arthralgias and myalgias.   Skin: " Negative for rash.   All other systems reviewed and are negative.    Objective:     Vital Signs (Most Recent):  Temp: 97.2 °F (36.2 °C) (09/11/19 1111)  Pulse: 96 (09/11/19 1215)  Resp: 19 (09/11/19 1215)  BP: 103/75 (09/11/19 1215)  SpO2: 98 % (09/11/19 1215)  O2 Device (Oxygen Therapy): Vapotherm(bipap placed on stby ) (09/11/19 1111) Vital Signs (24h Range):  Temp:  [96 °F (35.6 °C)-99.2 °F (37.3 °C)] 97.2 °F (36.2 °C)  Pulse:  [93-97] 96  Resp:  [4-62] 19  SpO2:  [93 %-100 %] 98 %  BP: ()/(56-88) 103/75     Weight: 77.7 kg (171 lb 4.8 oz) (09/11/19 0530)  Body mass index is 27.65 kg/m².  Body surface area is 1.9 meters squared.    I/O last 3 completed shifts:  In: 1140.4 [P.O.:420; I.V.:620.4; IV Piggyback:100]  Out: 1067 [Urine:1067]    Physical Exam   Constitutional: He is oriented to person, place, and time. He appears well-developed and well-nourished.   HENT:   Head: Normocephalic and atraumatic.   Mouth/Throat: Oropharynx is clear and moist and mucous membranes are normal.   Eyes: Conjunctivae and EOM are normal.   Cardiovascular: Normal rate and regular rhythm.   Pulmonary/Chest: Effort normal. No accessory muscle usage. He has decreased breath sounds.   Abdominal: Soft. He exhibits no distension.   Musculoskeletal: He exhibits no deformity. Edema: no pitting edema.   Neurological: He is alert and oriented to person, place, and time.   Skin: Skin is warm and dry. He is not diaphoretic.       Significant Labs:  ABGs:   Recent Labs   Lab 09/10/19  2202   PH 7.344*   PCO2 36.6   HCO3 19.9*   POCSATURATED 96   BE -5     CBC:   Recent Labs   Lab 09/11/19  0143   WBC 14.41*   RBC 4.41*   HGB 14.1   HCT 42.4      MCV 96   MCH 32.0*   MCHC 33.3     CMP:   Recent Labs   Lab 09/11/19  0748   *   CALCIUM 8.5*   ALBUMIN 3.3*   PROT 6.7      K 5.0   CO2 18*      BUN 48*   CREATININE 3.6*   ALKPHOS 67   ALT 45*   AST 74*   BILITOT 1.0     Recent Labs   Lab 09/10/19  0420   COLORU Yellow    SPECGRAV 1.010   PHUR 6.0   PROTEINUA Negative   BACTERIA Many*   NITRITE Negative   LEUKOCYTESUR Negative   UROBILINOGEN Negative   HYALINECASTS 2*     All labs within the past 24 hours have been reviewed.    Significant Imaging:  Labs: Reviewed  ECG: Reviewed  X-Ray: Reviewed  US: Reviewed  Echo: Reviewed

## 2019-09-11 NOTE — CARE UPDATE
Results for GOPI JACOBO (MRN [id number]) as of 9/11/2019 03:36   Ref. Range 9/10/2019 22:02   POC PH Latest Ref Range: 7.35 - 7.45  7.344 (L)   POC PCO2 Latest Ref Range: 35 - 45 mmHg 36.6   POC PO2 Latest Ref Range: 80 - 100 mmHg 84   POC BE Latest Ref Range: -2 to 2 mmol/L -5   POC HCO3 Latest Ref Range: 24 - 28 mmol/L 19.9 (L)   POC SATURATED O2 Latest Ref Range: 95 - 100 % 96   POC TCO2 Latest Ref Range: 23 - 27 mmol/L 21 (L)   FiO2 Unknown 45   Sample Unknown ARTERIAL   DelSys Unknown CPAP/BiPAP   Allens Test Unknown Pass   Site Unknown RR   Mode Unknown BiPAP   Rate Unknown 8     ABG results reported to Dr. Harmon. No changes made at this time.

## 2019-09-11 NOTE — ASSESSMENT & PLAN NOTE
Improved on amiodarone  Pt has chronic afib  Heparin infusion   Cardioversion when anesthesiology comfortable with procedure

## 2019-09-11 NOTE — NURSING
"[id number]: Dr. Kincaid at bedside with patient. Explained FARHAN, increased kidney numbers, excess fluid, and potential need for HD. Also explained attempt to use diuretics to avoid HD. Patient verbalized understanding. She then discussed risks, benefits, and alternatives to HD. Patient was agreeable to HD and signed the consent (witnessed by myself) at 1415.     1459- Heparin gtt paused per Dr. Gómez request for R IJ trialysis placement.     1522- Vicki Thomas NP at bedside discussing right IJ trialysis placement with patient. Risks, benefits, and alternatives discussed. Patient is agreeable and signed his consent at 1522 witnessed by myself.     1535- Time out completed with eICU.    1555- Line placement completed. Spoke with Dr. Gómez about restarting heparin drip. Stated to wait until 1600 aPTT is resulted and then use the nomogram.    1616- Dr. Gómez visualized patient's post line placement chest xray. Verbal order given as "okay to use."  "

## 2019-09-11 NOTE — ASSESSMENT & PLAN NOTE
Likely secondary to heart failure exacerbation; also consider PE and pneumonia  --CXR with bilateral pulmonary edema  --increased D dimer, no DVT on BLE US, on heparin infusion  --BIPAP PRN & QHS / vapotherm; wean to maintain SpO2 >90%   --unresponsive to lasix 120mg   --strict I/Os  --consider CAP coverage with elevated WBC and sputum production

## 2019-09-11 NOTE — ASSESSMENT & PLAN NOTE
Unfortunately did have previous lab work to which to compare to chronicity of his renal disease. His BUN/creatinine today is 28/1.6; urinalysis pending.  As he is volume overloaded we will elect to diurese as opposed to provide IV fluids.  Will check an intact PTH, vitamin-D, and bilateral renal ultrasounds.  Will also check urine studies and recheck his renal functionin the morning.    HD cath placed, CRRT per nephrology

## 2019-09-11 NOTE — ASSESSMENT & PLAN NOTE
Patient presented with an initial ambient air oxygen saturation 88% which improved to 99% BPAP.  He does have evidence of volume overload.  Personally reviewed his plain chest radiograph which was significant for pulmonary edema with a probable left-sided pleural effusion and without infiltrates or consolidations.  He also has a equivocal BNP of 445 but in setting of volume overload is concerning for congestive heart failure.  He also has an elevated troponin 0.199 without chest pain. He does report a vague cardiac history for which she was evaluated at [facility]--we have no access to those records.  He has been placed on BPAP for respiratory distress and will be started on intravenous diuresis with furosemide.  Will therefore admit him to the ICU for acute respiratory failure.  Will obtain a TTE and TSH in the morning consult Cardiology for further recommendations.  His symptoms are not consistent was nephrotic syndrome.    -CHF- poor UOP on diuretics - see above  -treat for CAP

## 2019-09-11 NOTE — NURSING
2214- Dr. Kincaid updated with urine output totals post further diuresis. She stated she will place orders for CRRT and already spoke with dialysis nurse who will be here approximately 8pm. Charge nurse notified to adjust staffing. House supervisor notified.

## 2019-09-11 NOTE — CARE UPDATE
[facility]  ICU Multidisciplinary Bedside Rounds   SUMMARY     Date: 9/11/2019    Prehospitalization: Home  Admit Date / LOS : 9/10/2019/ 1 days    Diagnosis: Acute respiratory failure with hypoxia    Consults:        Active: Cardio and Pulm CC       Needed: SW     Code Status: Full Code   Advanced Directive: <no information>    LDA: BIPAP, Renner and PIV       Central Lines/Site/Justification:Patient Does Not Have Central Line       Urinary Cath/Order/Justification:Critically Ill in ICU    Vasopressors/Infusions:    amiodarone in dextrose 5% 0.5 mg/min (09/11/19 0500)    heparin (porcine) in D5W 12 Units/kg/hr (09/11/19 0500)          GOALS: Volume/ Hemodynamic: N/A                     RASS: N/A    CAM ICU: Positive  Pain Management: PO       Pain Controlled: yes     Rhythm: A-Flutter    Respiratory Device: Bipap    Oxygen Concentration (%):  [40-50] 45             Most Recent SBT/ SAT: N/A     VTE Prophylaxis: Pharm and Mechanical  Mobility: Bedrest  Stress Ulcer Prophylaxis: Yes    Dietary: NPO  Tolerance: not applicable      Isolation: No active isolations    Restraints: No      Noteworthy Labs:  none    CBC/Anemia Labs: Coags:    Recent Labs   Lab 09/10/19  0656 09/10/19  1746 09/11/19  0143   WBC 11.60 16.99* 14.41*   HGB 14.6 14.7 14.1   HCT 44.3 44.7 42.4    191 166   MCV 97 98 96   RDW 14.2 14.4 14.6*    Recent Labs   Lab 09/10/19  1746 09/11/19  0143   INR 1.8*  --    APTT 64.4* 61.3*        Chemistries:   Recent Labs   Lab 09/10/19  0032 09/10/19  0656    140   K 3.6 3.5    102   CO2 22* 27   BUN 28* 25*   CREATININE 1.6* 1.4   CALCIUM 8.8 8.5*   PROT 7.1 6.7   BILITOT 0.9 1.0   ALKPHOS 69 59   ALT 24 21   AST 33 26   MG  --  1.3*   PHOS  --  4.1        Cardiac Enzymes: Ejection Fractions:    Recent Labs     09/10/19  0350 09/10/19  0656 09/10/19  1335   TROPONINI 0.188* 0.174* 0.210*    No results found for: EF     POCT Glucose: HbA1c:    No results for input(s):  POCTGLUCOSE in the last 168 hours. No results found for: HGBA1C     Needs from Care Team: none     ICU LOS 1d 1h  Level of Care: Critical Care

## 2019-09-11 NOTE — PROGRESS NOTES
Decent response to diuretics with stabilization of Cr and improvement in electrolytes; however remains with hypoxia. Will start CVVH tonight. Will plan to wean once oxygenation improved. Trend labs. Dr. Baeza to assume care of the patient in AM.      Alie Kincaid MD  Nephrology  [facility], [facility]  Office: [phone]

## 2019-09-11 NOTE — CARE UPDATE
ESTEBAN/CV canceled by anesthesia due to unstable respiratory status.  Will keep NPO tonight for possible ESTEBAN/CV tomorrow

## 2019-09-11 NOTE — HOSPITAL COURSE
9/11 Still with A-flutter 2:1 on IV amiodarone - rates .   9/12: ESTEBAN/DCCV AF->NSR    Interval Hx: pt seen in ICU, case d/w RN.  No events overnight, remains on CRRT.  Denies cp/sob/palps/LH.  Complains of back pain, reported to RN staff.    Tele: SR 60s (personally reviewed and interpreted)

## 2019-09-11 NOTE — HOSPITAL COURSE
Pt admitted with aflutter and dyspnea/acute resp failure with hypoxia. Started on amiodarone infusion and coreg. HR improved. Heparin infusion for anticoagulation due to renal function worsening.  Cardioversion delayed due to anesthesiology requesting improved pulmonary status. Pt refused bipap and tolerating vapotherm. Pulmonology/CC, placed dialysis catheter. Lasix dose increased 120mg IV + metolazone, however continued to have poor UOP.  Plan to do CRRT per nephrology.  On CAP coverage with elevated WBC and productive cough.     9/12- successful cardioversion, switched to oral amiodarone, contact precautions while r/o TB  9/13- AFB negative, dc precautions, UOP picked up, CRRT resumed, wean O2     9/15- Cr plateau 1.9, good UOP, CRRT dc'd.  Weaning O2. Vitals stable and ok fro transfer to floor. Agnieszka, #5/7.  PT/OT consulted for dispo planning.   9/16- Pt was dc home, on amiodarone and eliquis. Completed 6/7 days zosyn and improved from aspiration pneumonia. Follow up PCP, nephrology and cardiology as scheduled.

## 2019-09-11 NOTE — PROGRESS NOTES
Pharmacokinetic Initial Assessment: IV Vancomycin    Assessment/Plan:    Initiate intravenous vancomycin with loading dose of 1250 mg once followed by a maintenance dose of vancomycin 500mg IV every 24 hours  Desired empiric serum trough concentration is 10 to 20 mcg/mL  Draw vancomycin trough level 30 min prior to third dose on 9/13 at approximately 1700   Pharmacy will continue to follow and monitor vancomycin.      Please contact pharmacy at extension [phone] with any questions regarding this assessment.     Thank you for the consult,   Daryl George       Patient brief summary:  Marck Madison is a 59 y.o. male initiated on antimicrobial therapy with IV Vancomycin for treatment of suspected lower respiratory infection    Drug Allergies:   Review of patient's allergies indicates:  No Known Allergies    Actual Body Weight:   77.7kg    Renal Function:   Estimated Creatinine Clearance: 21.2 mL/min (A) (based on SCr of 3.6 mg/dL (H)).,     Dialysis Method (if applicable):  no     CBC (last 72 hours):  Recent Labs   Lab Result Units 09/10/19  0032 09/10/19  0656 09/10/19  1746 09/11/19  0143   WBC K/uL 14.91* 11.60 16.99* 14.41*   Hemoglobin g/dL 14.7 14.6 14.7 14.1   Hematocrit % 43.6 44.3 44.7 42.4   Platelets K/uL 178 196 191 166   Gran% % 70.8 70.8 73.3* 79.6*   Lymph% % 21.5 22.8 18.7 12.9*   Mono% % 7.4 6.5 8.3 7.3   Eosinophil% % 0.1 0.1 0.0 0.1   Basophil% % 0.2 0.1 0.1 0.1   Differential Method  Automated Automated Automated Automated       Metabolic Panel (last 72 hours):  Recent Labs   Lab Result Units 09/10/19  0032 09/10/19  0420 09/10/19  0656 09/11/19  0748   Sodium mmol/L 139  --  140 136   Sodium Urine Random mmol/L  --  94  --   --    Potassium mmol/L 3.6  --  3.5 5.0   Chloride mmol/L 103  --  102 102   CO2 mmol/L 22*  --  27 18*   Glucose mg/dL 142*  --  121* 124*   Glucose, UA   --  Negative  --   --    BUN, Bld mg/dL 28*  --  25* 48*   Creatinine mg/dL 1.6*  --  1.4 3.6*   Creatinine, Random Ur mg/dL   --  66.5  --   --    Albumin g/dL 3.7  --  3.5 3.3*   Total Bilirubin mg/dL 0.9  --  1.0 1.0   Alkaline Phosphatase U/L 69  --  59 67   AST U/L 33  --  26 74*   ALT U/L 24  --  21 45*   Magnesium mg/dL  --   --  1.3*  --    Phosphorus mg/dL  --   --  4.1  --        Drug levels (last 3 results):  No results for input(s): VANCOMYCINRA, VANCOMYCINPE, VANCOMYCINTR in the last 72 hours.    Microbiologic Results:  Microbiology Results (last 7 days)       ** No results found for the last 168 hours. **

## 2019-09-11 NOTE — CARE UPDATE
Pt received on continuous BiPAP, 12/5, 8bpm, 45% FiO2. Pt on V60 with a Medium full face mask. NARN at this time, will continue to monitor and wean as tolerated.
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
## 2019-09-11 NOTE — PROGRESS NOTES
[facility] Hospital Medicine  Progress Note    Patient Name: Marck Madison  MRN: [id number]  Patient Class: IP- Inpatient   Admission Date: 9/10/2019  Length of Stay: 1 days  Attending Physician: Seble Acevedo MD  Primary Care Provider: Primary Doctor No        Subjective:     Principal Problem:Acute respiratory failure with hypoxia        HPI:  Mr. Marck Madison is a 59 y.o. male with a vague cardiac medical history who presents to [facility] ED with complaints of dyspnea for the past day.  He reports that the dyspnea is independent of activities and is associated with a cough that is occasionally productive of yellow/green sputum.  He denies any wheezing but does admit to smoking quite heavily.  Denies any fevers, chills, chest pain, palpitations, diaphoresis, pleurisy, hemoptysis, nor any lower extremity pain or swelling he does report some nausea with vomiting any time he would try to eat.  Denies any sick contacts nor recent travel and has not experienced any PND nor orthopnea.  He does report a history of heart disease for which he has been seen at [facility], though he is unable to specify what disease he has.  His history is otherwise limited at this time due to his respiratory distress.    Overview/Hospital Course:  Pt admitted with aflutter and dyspnea/acute resp failure with hypoxia. Started on amiodarone infusion and coreg. HR improved. Heparin infusion for anticoagulation due to renal function worsening.  Cardioversion delayed due to anesthesiology requesting improved pulmonary status. Pt refused bipap and tolerating vapotherm. Pulmonology/CC, placed dialysis catheter. Lasix dose increased 120mg IV + metolazone, however continued to have poor UOP.  Plan to do CRRT per nephrology.  On CAP coverage with elevated WBC and productive cough.     Interval History: pt reports breathing easier, blood streaked sputum     Review of Systems   Constitutional: Negative for  activity change, appetite change, chills, diaphoresis, fatigue, fever and unexpected weight change.   HENT: Negative.    Eyes: Negative.    Respiratory: Positive for cough and shortness of breath. Negative for chest tightness and wheezing.    Cardiovascular: Negative for chest pain, palpitations and leg swelling.   Gastrointestinal: Negative for abdominal distention, anal bleeding, blood in stool, constipation, diarrhea, nausea and vomiting.   Genitourinary: Negative for dysuria and hematuria.   Musculoskeletal: Negative.    Skin: Negative.    Neurological: Negative for dizziness, seizures, syncope, weakness and light-headedness.   Psychiatric/Behavioral: Negative.      Objective:     Vital Signs (Most Recent):  Temp: 97.5 °F (36.4 °C) (09/11/19 1515)  Pulse: 96 (09/11/19 1730)  Resp: (!) 36 (09/11/19 1730)  BP: 102/73 (09/11/19 1730)  SpO2: (!) 90 % (09/11/19 1730) Vital Signs (24h Range):  Temp:  [96.7 °F (35.9 °C)-99.2 °F (37.3 °C)] 97.5 °F (36.4 °C)  Pulse:  [93-98] 96  Resp:  [10-62] 36  SpO2:  [90 %-100 %] 90 %  BP: ()/(56-91) 102/73     Weight: 77.7 kg (171 lb 4.8 oz)  Body mass index is 27.65 kg/m².    Intake/Output Summary (Last 24 hours) at 9/11/2019 1845  Last data filed at 9/11/2019 1800  Gross per 24 hour   Intake 2791.1 ml   Output 455 ml   Net 2336.1 ml      Physical Exam   Constitutional: He is oriented to person, place, and time. He appears well-developed and well-nourished. He appears distressed.   HENT:   Head: Normocephalic and atraumatic.   Right Ear: External ear normal.   Left Ear: External ear normal.   Nose: Nose normal.   BPAP mask in place   Eyes: Right eye exhibits no discharge. Left eye exhibits no discharge.   Neck: Normal range of motion.   Cardiovascular:   Normal rate and rhythm with a S4 gallop, no murmurs   Pulmonary/Chest:   In moderate respiratory distress with tachypnea,  with inspiratory and expiratory wheezing, mild bibasilar crackles   Abdominal: Soft. Bowel sounds are  normal. He exhibits no distension. There is no tenderness. There is no rebound and no guarding.   Musculoskeletal: Normal range of motion. He exhibits no edema.   Neurological: He is alert and oriented to person, place, and time.   Skin: Skin is warm and dry. He is not diaphoretic. No erythema.   Psychiatric: He has a normal mood and affect. His behavior is normal. Judgment and thought content normal.   Nursing note and vitals reviewed.      Significant Labs:   ABGs:   Recent Labs   Lab 09/10/19  2202   PH 7.344*   PCO2 36.6   HCO3 19.9*   POCSATURATED 96   BE -5     Blood Culture: No results for input(s): LABBLOO in the last 48 hours.  BMP:   Recent Labs   Lab 09/11/19  1611   *  179*   *  135*   K 4.0  4.0   CL 99  98   CO2 24  25   BUN 56*  56*   CREATININE 3.7*  3.7*   CALCIUM 8.1*  8.1*   MG 1.8     CBC:   Recent Labs   Lab 09/10/19  0656 09/10/19  1746 09/11/19  0143   WBC 11.60 16.99* 14.41*   HGB 14.6 14.7 14.1   HCT 44.3 44.7 42.4    191 166     Cardiac Markers:   Recent Labs   Lab 09/10/19  0032   *     Magnesium:   Recent Labs   Lab 09/10/19  0656 09/11/19  1611   MG 1.3* 1.8     POCT Glucose: No results for input(s): POCTGLUCOSE in the last 48 hours.  Troponin:   Recent Labs   Lab 09/10/19  0350 09/10/19  0656 09/10/19  1335   TROPONINI 0.188* 0.174* 0.210*     Urine Studies:   Recent Labs   Lab 09/10/19  0420   COLORU Yellow   APPEARANCEUA Clear   PHUR 6.0   SPECGRAV 1.010   PROTEINUA Negative   GLUCUA Negative   KETONESU Negative   BILIRUBINUA Negative   OCCULTUA 1+*   NITRITE Negative   UROBILINOGEN Negative   LEUKOCYTESUR Negative   RBCUA 4   WBCUA 5   BACTERIA Many*   HYALINECASTS 2*       Significant Imaging: I have reviewed and interpreted all pertinent imaging results/findings within the past 24 hours.      Assessment/Plan:      * Acute respiratory failure with hypoxia  Patient presented with an initial ambient air oxygen saturation 88% which improved to 99%  BPAP.  He does have evidence of volume overload.  Personally reviewed his plain chest radiograph which was significant for pulmonary edema with a probable left-sided pleural effusion and without infiltrates or consolidations.  He also has a equivocal BNP of 445 but in setting of volume overload is concerning for congestive heart failure.  He also has an elevated troponin 0.199 without chest pain. He does report a vague cardiac history for which she was evaluated at [facility]--we have no access to those records.  He has been placed on BPAP for respiratory distress and will be started on intravenous diuresis with furosemide.  Will therefore admit him to the ICU for acute respiratory failure.  Will obtain a TTE and TSH in the morning consult Cardiology for further recommendations.  His symptoms are not consistent was nephrotic syndrome.    -CHF- poor UOP on diuretics - see above  -treat for CAP    Atrial flutter  Improved on amiodarone  Pt has chronic afib  Heparin infusion   Cardioversion when anesthesiology comfortable with procedure       Elevated troponin  He does have an elevated troponin of 0.199 without any chest pain. I personally reviewed his EKG and although the machine reads atrial flutter, I am able to discern P waves.  He continues to deny chest pain at this time.  Think the likely cause of his troponinemia is renal failure along with acute heart failure.  He has been started on aspirin and full-dose enoxaparin.  Will follow serial troponins and consult Cardiology for further recommendations.    Acute renal failure  Unfortunately did have previous lab work to which to compare to chronicity of his renal disease. His BUN/creatinine today is 28/1.6; urinalysis pending.  As he is volume overloaded we will elect to diurese as opposed to provide IV fluids.  Will check an intact PTH, vitamin-D, and bilateral renal ultrasounds.  Will also check urine studies and recheck his renal functionin  the morning.    HD cath placed, CRRT per nephrology      VTE Risk Mitigation (From admission, onward)        Ordered     heparin 25,000 units in dextrose 5% (100 units/ml) IV bolus from bag INITIAL BOLUS  Once      09/10/19 1557     heparin 25,000 units in dextrose 5% 250 mL (100 units/mL) infusion LOW INTENSITY nomogram - [facility]  Continuous      09/10/19 1557     heparin 25,000 units in dextrose 5% (100 units/ml) IV bolus from bag - ADDITIONAL PRN BOLUS - 60 units/kg  As needed (PRN)      09/10/19 1557     heparin 25,000 units in dextrose 5% (100 units/ml) IV bolus from bag - ADDITIONAL PRN BOLUS - 30 units/kg  As needed (PRN)      09/10/19 1557     IP VTE HIGH RISK PATIENT  Once      09/10/19 0409          Critical care time spent on the evaluation and treatment of severe organ dysfunction, review of pertinent labs and imaging studies, discussions with consulting providers and discussions with patient/family: 40 minutes.      Seble Acevedo MD  Department of Hospital Medicine   [facility]

## 2019-09-11 NOTE — PROGRESS NOTES
[facility]  Pulmonology  Progress Note    Patient Name: Marck Madison  MRN: [id number]  Admission Date: 9/10/2019  Hospital Length of Stay: 1 days  Code Status: Full Code  Attending Provider: Seble Acevedo MD  Primary Care Provider: Primary Doctor No   Principal Problem: Acute respiratory failure with hypoxia    Subjective:     Interval History: No acute events over night. Remained on BiPAP, transitioned to vapotherm around lunch. Plan for cardioversion today.     Review of Systems   Constitutional: Positive for diaphoresis.   Respiratory: Positive for cough, sputum production and shortness of breath.    Cardiovascular: Negative for chest pain, palpitations and leg swelling.   Gastrointestinal: Negative for abdominal pain and diarrhea.   Genitourinary: Negative for dysuria.   Neurological: Negative for weakness and headaches.   Psychiatric/Behavioral: The patient is nervous/anxious.          Objective:     Vital Signs (Most Recent):  Temp: 97.2 °F (36.2 °C) (09/11/19 1111)  Pulse: 96 (09/11/19 1215)  Resp: 19 (09/11/19 1215)  BP: 103/75 (09/11/19 1215)  SpO2: 98 % (09/11/19 1215) Vital Signs (24h Range):  Temp:  [96 °F (35.6 °C)-99.2 °F (37.3 °C)] 97.2 °F (36.2 °C)  Pulse:  [93-98] 96  Resp:  [4-62] 19  SpO2:  [93 %-100 %] 98 %  BP: ()/(49-88) 103/75     Weight: 77.7 kg (171 lb 4.8 oz)  Body mass index is 27.65 kg/m².      Intake/Output Summary (Last 24 hours) at 9/11/2019 1302  Last data filed at 9/11/2019 1200  Gross per 24 hour   Intake 535.96 ml   Output 214 ml   Net 321.96 ml       Physical Exam   Constitutional: He is oriented to person, place, and time. He appears well-developed and well-nourished. He is cooperative. No distress. Face mask in place.   HENT:   Head: Normocephalic and atraumatic.   Eyes: Pupils are equal, round, and reactive to light. Conjunctivae are normal. No scleral icterus.   Neck: Normal range of motion.   Cardiovascular: Normal rate.   Pulses:       Radial pulses  are 2+ on the right side, and 2+ on the left side.        Dorsalis pedis pulses are 2+ on the right side, and 2+ on the left side.   A. Flutter rate 90's   Pulmonary/Chest: Breath sounds normal. No accessory muscle usage. Tachypnea noted. No respiratory distress. He has no decreased breath sounds. He has no wheezes. He has no rhonchi. He has no rales.   Abdominal: Soft. Bowel sounds are normal. He exhibits no distension. There is no tenderness.   Genitourinary:   Genitourinary Comments: Renner in place with minimal output   Musculoskeletal: Normal range of motion. He exhibits no edema.   Neurological: He is alert and oriented to person, place, and time. No cranial nerve deficit. GCS eye subscore is 4. GCS verbal subscore is 5. GCS motor subscore is 6.   Skin: Skin is warm and dry. Capillary refill takes less than 2 seconds. No rash noted. He is not diaphoretic. No erythema.   Psychiatric: He has a normal mood and affect. His behavior is normal.   Nursing note and vitals reviewed.      Vents:  Oxygen Concentration (%): 45 (09/11/19 1111)    Lines/Drains/Airways     Drain                 Urethral Catheter 09/10/19 1257 16 Fr. 1 day          Peripheral Intravenous Line                 Peripheral IV - Single Lumen 09/10/19 0035 20 G Left Wrist 1 day         Peripheral IV - Single Lumen 09/10/19 0410 18 G Right Hand 1 day                Significant Labs:    CBC/Anemia Profile:  Recent Labs   Lab 09/10/19  0656 09/10/19  1746 09/11/19  0143   WBC 11.60 16.99* 14.41*   HGB 14.6 14.7 14.1   HCT 44.3 44.7 42.4    191 166   MCV 97 98 96   RDW 14.2 14.4 14.6*        Chemistries:  Recent Labs   Lab 09/10/19  0032 09/10/19  0656 09/11/19  0748    140 136   K 3.6 3.5 5.0    102 102   CO2 22* 27 18*   BUN 28* 25* 48*   CREATININE 1.6* 1.4 3.6*   CALCIUM 8.8 8.5* 8.5*   ALBUMIN 3.7 3.5 3.3*   PROT 7.1 6.7 6.7   BILITOT 0.9 1.0 1.0   ALKPHOS 69 59 67   ALT 24 21 45*   AST 33 26 74*   MG  --  1.3*  --    PHOS  --   4.1  --        All pertinent labs within the past 24 hours have been reviewed.    Significant Imaging:  I have reviewed all pertinent imaging results/findings within the past 24 hours.    Assessment/Plan:     * Acute respiratory failure with hypoxia  Likely secondary to heart failure exacerbation; also consider PE and pneumonia  --CXR with bilateral pulmonary edema  --increased D dimer, no DVT on BLE US, on heparin infusion  --BIPAP PRN & QHS / vapotherm; wean to maintain SpO2 >90%   --unresponsive to lasix 120mg   --strict I/Os  --consider CAP coverage with elevated WBC and sputum production       FARHAN (acute kidney injury)  Unclear baseline   --sCr slightly elevated 1.4 and uptrending 3.6  --unresponsive to lasix 120  --Renal US WNL  --Nephrology consulted  --Repeat BMP     Atrial flutter  --On amiodarone and heparin infusions  --cardiology following, plan for cardioversion today    Elevated troponin  --cards consulted, likely demand ischemia      Plan of care discussed with Dr. Gómez.      Critical Care time 60 minutes     Vicki Thomas NP  Pulmonology  [facility]

## 2019-09-11 NOTE — PROGRESS NOTES
[facility]  Cardiology  Progress Note    Patient Name: Marck Madison  MRN: [id number]  Admission Date: 9/10/2019  Hospital Length of Stay: 1 days  Code Status: Full Code   Attending Physician: Seble Acevedo MD   Primary Care Physician: Primary Doctor No  Expected Discharge Date:   Principal Problem:Acute respiratory failure with hypoxia    Subjective:     Hospital Course:   9-11 Still with A-flutter 2:1 on IV amiodarone - rates . On BiPAP since yesterday. Some hypotension and respiratory distress yesterday. Denies CP    Echo 9/10/19  · Moderately decreased left ventricular systolic function. The estimated ejection fraction is 30%  · Concentric left ventricular hypertrophy.  · Left ventricular diastolic dysfunction.  · Normal right ventricular systolic function.  · Severe left atrial enlargement.  · Moderate right atrial enlargement.  · Mild-to-moderate mitral regurgitation.  · Mild tricuspid regurgitation.  The estimated PA systolic pressure is 33 mm Hg    Interval History:     Review of Systems   Unable to perform ROS: acuity of condition     Objective:     Vital Signs (Most Recent):  Temp: 96.7 °F (35.9 °C) (09/11/19 0730)  Pulse: 95 (09/11/19 0745)  Resp: (!) 25 (09/11/19 0745)  BP: 103/76 (09/11/19 0745)  SpO2: 97 % (09/11/19 0745) Vital Signs (24h Range):  Temp:  [96 °F (35.6 °C)-99.2 °F (37.3 °C)] 96.7 °F (35.9 °C)  Pulse:  [] 95  Resp:  [4-70] 25  SpO2:  [77 %-100 %] 97 %  BP: ()/() 103/76     Weight: 77.7 kg (171 lb 4.8 oz)  Body mass index is 27.65 kg/m².     SpO2: 97 %  O2 Device (Oxygen Therapy): BiPAP      Intake/Output Summary (Last 24 hours) at 9/11/2019 0803  Last data filed at 9/11/2019 0700  Gross per 24 hour   Intake 1140.41 ml   Output 317 ml   Net 823.41 ml       Lines/Drains/Airways     Drain                 Urethral Catheter 09/10/19 1257 16 Fr. less than 1 day          Peripheral Intravenous Line                 Peripheral IV - Single Lumen 09/10/19  0035 20 G Left Wrist 1 day         Peripheral IV - Single Lumen 09/10/19 0410 18 G Right Hand 1 day                Physical Exam   Constitutional: He is oriented to person, place, and time. He appears well-developed and well-nourished.   HENT:   Head: Normocephalic and atraumatic.   Eyes: Pupils are equal, round, and reactive to light. Conjunctivae are normal.   Neck: Normal range of motion. Neck supple.   Cardiovascular: Regular rhythm, normal heart sounds and intact distal pulses. Tachycardia present.   Pulmonary/Chest: Effort normal and breath sounds normal.   Abdominal: Soft. Bowel sounds are normal.   Musculoskeletal: Normal range of motion.   Neurological: He is alert and oriented to person, place, and time.   Skin: Skin is warm and dry.       Significant Labs: All pertinent lab results from the last 24 hours have been reviewed.    Significant Imaging: Echocardiogram: 2D echo with color flow doppler: No results found for this or any previous visit.    Assessment and Plan:     Brief HPI:     * Acute respiratory failure with hypoxia  Per primary    Atrial flutter  -120 - A-flutter 2:1 AVB. Unclear if this a new Dx. Echo with EF 30% - possible tachycardic CM. ESTEBAN/CV today    Elevated troponin  Likely demand ischemia from tachycardia and CHF. Consider ischemic evaluation once rhythm controlled if he agrees    Acute renal failure  Per primary        VTE Risk Mitigation (From admission, onward)        Ordered     heparin 25,000 units in dextrose 5% (100 units/ml) IV bolus from bag INITIAL BOLUS  Once      09/10/19 1557     heparin 25,000 units in dextrose 5% 250 mL (100 units/mL) infusion LOW INTENSITY nomogram - [facility]  Continuous      09/10/19 1557     heparin 25,000 units in dextrose 5% (100 units/ml) IV bolus from bag - ADDITIONAL PRN BOLUS - 60 units/kg  As needed (PRN)      09/10/19 1557     heparin 25,000 units in dextrose 5% (100 units/ml) IV bolus from bag - ADDITIONAL PRN BOLUS - 30 units/kg  As needed  (PRN)      09/10/19 1557     IP VTE HIGH RISK PATIENT  Once      09/10/19 0409          Jonathan Lopez MD  Cardiology  [facility]

## 2019-09-11 NOTE — NURSING
Pt remains in ICU overnight on BiPAP. Heparin and Amio gtts continued. Renner with minimal output. Pt's chest and back shaved in anticipation for cardioversion today. Pt's home meds given to house sup. POC reviewed with pt, verbalized understanding. Pt remains free of any falls, injuries, or new skin breakdown during this shift.

## 2019-09-11 NOTE — CARE UPDATE
[facility]  ICU Multidisciplinary Bedside Rounds     UPDATE     Date: 9/11/2019      Plan of care reviewed with the following, Nurse, Charge Nurse, Physician, Pulm CC, Resp. Therapist, Infection Prevention and Rehab.       Needs/ Goals for the day: Renal cinsult? (creatinine increase, elevated potassium, and decreased bicarb reported to CC MD and ICU MD). Wean BiPAP as tolerated. ESTEBAN/Cardioversion    Level of Care: Critical Care

## 2019-09-11 NOTE — SUBJECTIVE & OBJECTIVE
Interval History:     Review of Systems   Unable to perform ROS: acuity of condition     Objective:     Vital Signs (Most Recent):  Temp: 96.7 °F (35.9 °C) (09/11/19 0730)  Pulse: 95 (09/11/19 0745)  Resp: (!) 25 (09/11/19 0745)  BP: 103/76 (09/11/19 0745)  SpO2: 97 % (09/11/19 0745) Vital Signs (24h Range):  Temp:  [96 °F (35.6 °C)-99.2 °F (37.3 °C)] 96.7 °F (35.9 °C)  Pulse:  [] 95  Resp:  [4-70] 25  SpO2:  [77 %-100 %] 97 %  BP: ()/() 103/76     Weight: 77.7 kg (171 lb 4.8 oz)  Body mass index is 27.65 kg/m².     SpO2: 97 %  O2 Device (Oxygen Therapy): BiPAP      Intake/Output Summary (Last 24 hours) at 9/11/2019 0803  Last data filed at 9/11/2019 0700  Gross per 24 hour   Intake 1140.41 ml   Output 317 ml   Net 823.41 ml       Lines/Drains/Airways     Drain                 Urethral Catheter 09/10/19 1257 16 Fr. less than 1 day          Peripheral Intravenous Line                 Peripheral IV - Single Lumen 09/10/19 0035 20 G Left Wrist 1 day         Peripheral IV - Single Lumen 09/10/19 0410 18 G Right Hand 1 day                Physical Exam   Constitutional: He is oriented to person, place, and time. He appears well-developed and well-nourished.   HENT:   Head: Normocephalic and atraumatic.   Eyes: Pupils are equal, round, and reactive to light. Conjunctivae are normal.   Neck: Normal range of motion. Neck supple.   Cardiovascular: Regular rhythm, normal heart sounds and intact distal pulses. Tachycardia present.   Pulmonary/Chest: Effort normal and breath sounds normal.   Abdominal: Soft. Bowel sounds are normal.   Musculoskeletal: Normal range of motion.   Neurological: He is alert and oriented to person, place, and time.   Skin: Skin is warm and dry.       Significant Labs: All pertinent lab results from the last 24 hours have been reviewed.    Significant Imaging: Echocardiogram: 2D echo with color flow doppler: No results found for this or any previous visit.

## 2019-09-11 NOTE — SUBJECTIVE & OBJECTIVE
Interval History: No acute events over night. Remained on BiPAP, transitioned to vapotherm around lunch. Plan for cardioversion today.     Review of Systems   Constitutional: Positive for diaphoresis.   Respiratory: Positive for cough, sputum production and shortness of breath.    Cardiovascular: Negative for chest pain, palpitations and leg swelling.   Gastrointestinal: Negative for abdominal pain and diarrhea.   Genitourinary: Negative for dysuria.   Neurological: Negative for weakness and headaches.   Psychiatric/Behavioral: The patient is nervous/anxious.          Objective:     Vital Signs (Most Recent):  Temp: 97.2 °F (36.2 °C) (09/11/19 1111)  Pulse: 96 (09/11/19 1215)  Resp: 19 (09/11/19 1215)  BP: 103/75 (09/11/19 1215)  SpO2: 98 % (09/11/19 1215) Vital Signs (24h Range):  Temp:  [96 °F (35.6 °C)-99.2 °F (37.3 °C)] 97.2 °F (36.2 °C)  Pulse:  [93-98] 96  Resp:  [4-62] 19  SpO2:  [93 %-100 %] 98 %  BP: ()/(49-88) 103/75     Weight: 77.7 kg (171 lb 4.8 oz)  Body mass index is 27.65 kg/m².      Intake/Output Summary (Last 24 hours) at 9/11/2019 1302  Last data filed at 9/11/2019 1200  Gross per 24 hour   Intake 535.96 ml   Output 214 ml   Net 321.96 ml       Physical Exam   Constitutional: He is oriented to person, place, and time. He appears well-developed and well-nourished. He is cooperative. No distress. Face mask in place.   HENT:   Head: Normocephalic and atraumatic.   Eyes: Pupils are equal, round, and reactive to light. Conjunctivae are normal. No scleral icterus.   Neck: Normal range of motion.   Cardiovascular: Normal rate.   Pulses:       Radial pulses are 2+ on the right side, and 2+ on the left side.        Dorsalis pedis pulses are 2+ on the right side, and 2+ on the left side.   A. Flutter rate 90's   Pulmonary/Chest: Breath sounds normal. No accessory muscle usage. Tachypnea noted. No respiratory distress. He has no decreased breath sounds. He has no wheezes. He has no rhonchi. He has no  rales.   Abdominal: Soft. Bowel sounds are normal. He exhibits no distension. There is no tenderness.   Genitourinary:   Genitourinary Comments: Renner in place with minimal output   Musculoskeletal: Normal range of motion. He exhibits no edema.   Neurological: He is alert and oriented to person, place, and time. No cranial nerve deficit. GCS eye subscore is 4. GCS verbal subscore is 5. GCS motor subscore is 6.   Skin: Skin is warm and dry. Capillary refill takes less than 2 seconds. No rash noted. He is not diaphoretic. No erythema.   Psychiatric: He has a normal mood and affect. His behavior is normal.   Nursing note and vitals reviewed.      Vents:  Oxygen Concentration (%): 45 (09/11/19 1111)    Lines/Drains/Airways     Drain                 Urethral Catheter 09/10/19 1257 16 Fr. 1 day          Peripheral Intravenous Line                 Peripheral IV - Single Lumen 09/10/19 0035 20 G Left Wrist 1 day         Peripheral IV - Single Lumen 09/10/19 0410 18 G Right Hand 1 day                Significant Labs:    CBC/Anemia Profile:  Recent Labs   Lab 09/10/19  0656 09/10/19  1746 09/11/19  0143   WBC 11.60 16.99* 14.41*   HGB 14.6 14.7 14.1   HCT 44.3 44.7 42.4    191 166   MCV 97 98 96   RDW 14.2 14.4 14.6*        Chemistries:  Recent Labs   Lab 09/10/19  0032 09/10/19  0656 09/11/19  0748    140 136   K 3.6 3.5 5.0    102 102   CO2 22* 27 18*   BUN 28* 25* 48*   CREATININE 1.6* 1.4 3.6*   CALCIUM 8.8 8.5* 8.5*   ALBUMIN 3.7 3.5 3.3*   PROT 7.1 6.7 6.7   BILITOT 0.9 1.0 1.0   ALKPHOS 69 59 67   ALT 24 21 45*   AST 33 26 74*   MG  --  1.3*  --    PHOS  --  4.1  --        All pertinent labs within the past 24 hours have been reviewed.    Significant Imaging:  I have reviewed all pertinent imaging results/findings within the past 24 hours.

## 2019-09-11 NOTE — ASSESSMENT & PLAN NOTE
-120 - A-flutter 2:1 AVB. Unclear if this a new Dx. Echo with EF 30% - possible tachycardic CM. ESTEBAN/CV today

## 2019-09-11 NOTE — NURSING
"2030: Pt's niece, Jessica, at bedside. States that Pt lives with her. Pt verbalized it is ok to discuss care with Jessica and that in the event of an emergency, he would like us to call Jessica. Jessica's phone number and a pass word has been added to the pt's chart. Pt states he does not have an advance directive or living will and declined information at this time.    Pt's niece brought pt's medications from home as requested by day shift. The medications are as follows: Amiodarone 400mg, Flexeril 10 mg, Lisinopril 10 mg, Meloxicam 15 mg, & xeralto 20 mg. All medications have been reviewed with charge nurse and placed in pt belonging bag. Belonging bag given to house sup to be placed in safe. Bag #: [id number]    2045: pt becoming more tachypneic and has increased work of breath. Pt removed BiPap stated "I might puke". Pt given an emesis bag but did not vomit. BiPap re-placed. Explained to pt and family importance of keeping the Bipap mask on, verbalized understanding. Pt and family requesting pt have something to drink. I explained the importance of the pt's NPO status at this time and possible risks of aspiration with pt's current respiratory status, pt and family verbalized understanding. Spoke with Dr. Harmon about increased RR and WOB, ABG's and CXR ordered per MD. MD agreed with holding pt's PO meds at this time and keeping the pt NPO until respiratory status improves. Will continue to monitor.  "

## 2019-09-11 NOTE — ASSESSMENT & PLAN NOTE
Unclear baseline   --sCr slightly elevated 1.4 and uptrending 3.6  --unresponsive to lasix 120  --Renal US WNL  --Nephrology consulted  --Repeat BMP

## 2019-09-11 NOTE — PLAN OF CARE
Problem: Adult Inpatient Plan of Care  Goal: Patient-Specific Goal (Individualization)  Outcome: Ongoing (interventions implemented as appropriate)  Pt received on bipap 12/5/45%. Was able to wean to vapotherm 25L/40%. Pt tolerated well. bipap remains on stby @ bedside. Started xopenex respiratory treatments with Aerobika therapy

## 2019-09-11 NOTE — SUBJECTIVE & OBJECTIVE
Interval History: pt reports breathing easier, blood streaked sputum     Review of Systems   Constitutional: Negative for activity change, appetite change, chills, diaphoresis, fatigue, fever and unexpected weight change.   HENT: Negative.    Eyes: Negative.    Respiratory: Positive for cough and shortness of breath. Negative for chest tightness and wheezing.    Cardiovascular: Negative for chest pain, palpitations and leg swelling.   Gastrointestinal: Negative for abdominal distention, anal bleeding, blood in stool, constipation, diarrhea, nausea and vomiting.   Genitourinary: Negative for dysuria and hematuria.   Musculoskeletal: Negative.    Skin: Negative.    Neurological: Negative for dizziness, seizures, syncope, weakness and light-headedness.   Psychiatric/Behavioral: Negative.      Objective:     Vital Signs (Most Recent):  Temp: 97.5 °F (36.4 °C) (09/11/19 1515)  Pulse: 96 (09/11/19 1730)  Resp: (!) 36 (09/11/19 1730)  BP: 102/73 (09/11/19 1730)  SpO2: (!) 90 % (09/11/19 1730) Vital Signs (24h Range):  Temp:  [96.7 °F (35.9 °C)-99.2 °F (37.3 °C)] 97.5 °F (36.4 °C)  Pulse:  [93-98] 96  Resp:  [10-62] 36  SpO2:  [90 %-100 %] 90 %  BP: ()/(56-91) 102/73     Weight: 77.7 kg (171 lb 4.8 oz)  Body mass index is 27.65 kg/m².    Intake/Output Summary (Last 24 hours) at 9/11/2019 1845  Last data filed at 9/11/2019 1800  Gross per 24 hour   Intake 2791.1 ml   Output 455 ml   Net 2336.1 ml      Physical Exam   Constitutional: He is oriented to person, place, and time. He appears well-developed and well-nourished. He appears distressed.   HENT:   Head: Normocephalic and atraumatic.   Right Ear: External ear normal.   Left Ear: External ear normal.   Nose: Nose normal.   BPAP mask in place   Eyes: Right eye exhibits no discharge. Left eye exhibits no discharge.   Neck: Normal range of motion.   Cardiovascular:   Normal rate and rhythm with a S4 gallop, no murmurs   Pulmonary/Chest:   In moderate respiratory  distress with tachypnea,  with inspiratory and expiratory wheezing, mild bibasilar crackles   Abdominal: Soft. Bowel sounds are normal. He exhibits no distension. There is no tenderness. There is no rebound and no guarding.   Musculoskeletal: Normal range of motion. He exhibits no edema.   Neurological: He is alert and oriented to person, place, and time.   Skin: Skin is warm and dry. He is not diaphoretic. No erythema.   Psychiatric: He has a normal mood and affect. His behavior is normal. Judgment and thought content normal.   Nursing note and vitals reviewed.      Significant Labs:   ABGs:   Recent Labs   Lab 09/10/19  2202   PH 7.344*   PCO2 36.6   HCO3 19.9*   POCSATURATED 96   BE -5     Blood Culture: No results for input(s): LABBLOO in the last 48 hours.  BMP:   Recent Labs   Lab 09/11/19  1611   *  179*   *  135*   K 4.0  4.0   CL 99  98   CO2 24  25   BUN 56*  56*   CREATININE 3.7*  3.7*   CALCIUM 8.1*  8.1*   MG 1.8     CBC:   Recent Labs   Lab 09/10/19  0656 09/10/19  1746 09/11/19  0143   WBC 11.60 16.99* 14.41*   HGB 14.6 14.7 14.1   HCT 44.3 44.7 42.4    191 166     Cardiac Markers:   Recent Labs   Lab 09/10/19  0032   *     Magnesium:   Recent Labs   Lab 09/10/19  0656 09/11/19  1611   MG 1.3* 1.8     POCT Glucose: No results for input(s): POCTGLUCOSE in the last 48 hours.  Troponin:   Recent Labs   Lab 09/10/19  0350 09/10/19  0656 09/10/19  1335   TROPONINI 0.188* 0.174* 0.210*     Urine Studies:   Recent Labs   Lab 09/10/19  0420   COLORU Yellow   APPEARANCEUA Clear   PHUR 6.0   SPECGRAV 1.010   PROTEINUA Negative   GLUCUA Negative   KETONESU Negative   BILIRUBINUA Negative   OCCULTUA 1+*   NITRITE Negative   UROBILINOGEN Negative   LEUKOCYTESUR Negative   RBCUA 4   WBCUA 5   BACTERIA Many*   HYALINECASTS 2*       Significant Imaging: I have reviewed and interpreted all pertinent imaging results/findings within the past 24 hours.

## 2019-09-11 NOTE — PLAN OF CARE
Problem: Adult Inpatient Plan of Care  Goal: Plan of Care Review  Outcome: Ongoing (interventions implemented as appropriate)  Patient tolerating vapotherm weaned to 25L and 40%. Cr worsening, renal consulted. Consented for HD and line placement. R IJ trialysis placed and confirmed per xray. Cardioversion postponed due to respiratory status (per anesthesia). Patient started on abx due to white count and thick brown/yellow sputum production. Acapella started with breathing treatments. Positioned independently, no skin breakdown. Lasix 120mg and 180 mg given, metolazone given. Minimal urine output. Heparin drip continued. Amio gtt continued. Remains in Atrial flutter.

## 2019-09-11 NOTE — PROCEDURES
"Marck Madison is a 59 y.o. male patient.    Temp: 97.5 °F (36.4 °C) (09/11/19 1515)  Pulse: 95 (09/11/19 1542)  Resp: (!) 37 (09/11/19 1542)  BP: 114/85 (09/11/19 1530)  SpO2: 99 % (09/11/19 1542)  Weight: 77.7 kg (171 lb 4.8 oz) (09/11/19 0530)  Height: 5' 6" (167.6 cm) (09/10/19 1230)       Central Line  Date/Time: 9/11/2019 4:07 PM  Location procedure was performed: [facility] ICU  Performed by: Vicki Thomas NP  Pre-operative Diagnosis: Acute Renal Failure  Post-operative diagnosis: Acute Renal Failure  Consent Done: Yes  Time out: Immediately prior to procedure a "time out" was called to verify the correct patient, procedure, equipment, support staff and site/side marked as required.  Indications: hemodialysis and vascular access  Anesthesia: local infiltration    Anesthesia:  Local Anesthetic: lidocaine 1% without epinephrine  Anesthetic total: 3 mL  Preparation: skin prepped with ChloraPrep  Skin prep agent dried: skin prep agent completely dried prior to procedure  Sterile barriers: all five maximum sterile barriers used - cap, mask, sterile gown, sterile gloves, and large sterile sheet  Hand hygiene: hand hygiene performed prior to central venous catheter insertion  Location details: right internal jugular  Catheter type: trialysis  Catheter Length: 16cm    Ultrasound guidance: yes  Vessel Caliber: large, patent, compressibility normal  Needle advanced into vessel with real time Ultrasound guidance.  Guidewire confirmed in vessel.  Sterile sheath used.  Manometry: Yes  Number of attempts: 1  Post-procedure assessment: Xray confirmation pending   Complications: none  Estimated blood loss (mL): 5  Post-procedure: line sutured,  chlorhexidine patch,  sterile dressing applied and blood return through all ports  Complications: No          Vicki Thomas  9/11/2019  "

## 2019-09-12 LAB
ALBUMIN SERPL BCP-MCNC: 2.9 G/DL (ref 3.5–5.2)
ALBUMIN SERPL BCP-MCNC: 3 G/DL (ref 3.5–5.2)
ALBUMIN SERPL BCP-MCNC: 3.1 G/DL (ref 3.5–5.2)
ALP SERPL-CCNC: 69 U/L (ref 55–135)
ALT SERPL W/O P-5'-P-CCNC: 102 U/L (ref 10–44)
ANA SER QL IF: NORMAL
ANION GAP SERPL CALC-SCNC: 12 MMOL/L (ref 8–16)
ANION GAP SERPL CALC-SCNC: 9 MMOL/L (ref 8–16)
ANION GAP SERPL CALC-SCNC: 9 MMOL/L (ref 8–16)
APTT BLDCRRT: 42.9 SEC (ref 21–32)
AST SERPL-CCNC: 158 U/L (ref 10–40)
BASOPHILS # BLD AUTO: 0.01 K/UL (ref 0–0.2)
BASOPHILS NFR BLD: 0.1 % (ref 0–1.9)
BILIRUB SERPL-MCNC: 0.9 MG/DL (ref 0.1–1)
BSA FOR ECHO PROCEDURE: 1.89 M2
BUN SERPL-MCNC: 33 MG/DL (ref 8–23)
BUN SERPL-MCNC: 37 MG/DL (ref 8–23)
BUN SERPL-MCNC: 46 MG/DL (ref 8–23)
C3 SERPL-MCNC: 112 MG/DL (ref 50–180)
C4 SERPL-MCNC: 34 MG/DL (ref 11–44)
CALCIUM SERPL-MCNC: 8.4 MG/DL (ref 8.7–10.5)
CALCIUM SERPL-MCNC: 8.5 MG/DL (ref 8.7–10.5)
CALCIUM SERPL-MCNC: 8.6 MG/DL (ref 8.7–10.5)
CHLORIDE SERPL-SCNC: 100 MMOL/L (ref 95–110)
CHLORIDE SERPL-SCNC: 97 MMOL/L (ref 95–110)
CHLORIDE SERPL-SCNC: 99 MMOL/L (ref 95–110)
CO2 SERPL-SCNC: 27 MMOL/L (ref 23–29)
CO2 SERPL-SCNC: 28 MMOL/L (ref 23–29)
CO2 SERPL-SCNC: 29 MMOL/L (ref 23–29)
CREAT SERPL-MCNC: 2.2 MG/DL (ref 0.5–1.4)
CREAT SERPL-MCNC: 2.2 MG/DL (ref 0.5–1.4)
CREAT SERPL-MCNC: 3 MG/DL (ref 0.5–1.4)
DIFFERENTIAL METHOD: ABNORMAL
EOSINOPHIL # BLD AUTO: 0 K/UL (ref 0–0.5)
EOSINOPHIL NFR BLD: 0.1 % (ref 0–8)
ERYTHROCYTE [DISTWIDTH] IN BLOOD BY AUTOMATED COUNT: 14.5 % (ref 11.5–14.5)
EST. GFR  (AFRICAN AMERICAN): 25 ML/MIN/1.73 M^2
EST. GFR  (AFRICAN AMERICAN): 36 ML/MIN/1.73 M^2
EST. GFR  (AFRICAN AMERICAN): 36 ML/MIN/1.73 M^2
EST. GFR  (NON AFRICAN AMERICAN): 21 ML/MIN/1.73 M^2
EST. GFR  (NON AFRICAN AMERICAN): 31 ML/MIN/1.73 M^2
EST. GFR  (NON AFRICAN AMERICAN): 31 ML/MIN/1.73 M^2
FLUAV AG SPEC QL IA: NEGATIVE
FLUBV AG SPEC QL IA: NEGATIVE
GLUCOSE SERPL-MCNC: 110 MG/DL (ref 70–110)
GLUCOSE SERPL-MCNC: 114 MG/DL (ref 70–110)
GLUCOSE SERPL-MCNC: 136 MG/DL (ref 70–110)
HAV IGM SERPL QL IA: NEGATIVE
HBV CORE IGM SERPL QL IA: NEGATIVE
HBV SURFACE AG SERPL QL IA: NEGATIVE
HCT VFR BLD AUTO: 43.7 % (ref 40–54)
HCV AB SERPL QL IA: POSITIVE
HGB BLD-MCNC: 14.8 G/DL (ref 14–18)
HIV 1+2 AB+HIV1 P24 AG SERPL QL IA: NEGATIVE
LYMPHOCYTES # BLD AUTO: 1.2 K/UL (ref 1–4.8)
LYMPHOCYTES NFR BLD: 8 % (ref 18–48)
MAGNESIUM SERPL-MCNC: 1.9 MG/DL (ref 1.6–2.6)
MAGNESIUM SERPL-MCNC: 2.7 MG/DL (ref 1.6–2.6)
MCH RBC QN AUTO: 32.2 PG (ref 27–31)
MCHC RBC AUTO-ENTMCNC: 33.9 G/DL (ref 32–36)
MCV RBC AUTO: 95 FL (ref 82–98)
MONOCYTES # BLD AUTO: 0.8 K/UL (ref 0.3–1)
MONOCYTES NFR BLD: 4.9 % (ref 4–15)
NEUTROPHILS # BLD AUTO: 13.3 K/UL (ref 1.8–7.7)
NEUTROPHILS NFR BLD: 87.2 % (ref 38–73)
PHOSPHATE SERPL-MCNC: 3 MG/DL (ref 2.7–4.5)
PHOSPHATE SERPL-MCNC: 5.3 MG/DL (ref 2.7–4.5)
PLATELET # BLD AUTO: 206 K/UL (ref 150–350)
PMV BLD AUTO: 11.5 FL (ref 9.2–12.9)
POTASSIUM SERPL-SCNC: 3.6 MMOL/L (ref 3.5–5.1)
POTASSIUM SERPL-SCNC: 4.2 MMOL/L (ref 3.5–5.1)
POTASSIUM SERPL-SCNC: 4.6 MMOL/L (ref 3.5–5.1)
PROT SERPL-MCNC: 7.2 G/DL (ref 6–8.4)
RBC # BLD AUTO: 4.6 M/UL (ref 4.6–6.2)
RHEUMATOID FACT SERPL-ACNC: 13 IU/ML (ref 0–15)
RPR SER QL: NORMAL
SODIUM SERPL-SCNC: 136 MMOL/L (ref 136–145)
SODIUM SERPL-SCNC: 137 MMOL/L (ref 136–145)
SODIUM SERPL-SCNC: 137 MMOL/L (ref 136–145)
SPECIMEN SOURCE: NORMAL
WBC # BLD AUTO: 15.27 K/UL (ref 3.9–12.7)

## 2019-09-12 PROCEDURE — 63600175 PHARM REV CODE 636 W HCPCS: Performed by: INTERNAL MEDICINE

## 2019-09-12 PROCEDURE — 25000003 PHARM REV CODE 250: Performed by: INTERNAL MEDICINE

## 2019-09-12 PROCEDURE — 63600175 PHARM REV CODE 636 W HCPCS: Performed by: NURSE ANESTHETIST, CERTIFIED REGISTERED

## 2019-09-12 PROCEDURE — 37000008 HC ANESTHESIA 1ST 15 MINUTES: Performed by: INTERNAL MEDICINE

## 2019-09-12 PROCEDURE — 87116 MYCOBACTERIA CULTURE: CPT

## 2019-09-12 PROCEDURE — 87040 BLOOD CULTURE FOR BACTERIA: CPT

## 2019-09-12 PROCEDURE — 94664 DEMO&/EVAL PT USE INHALER: CPT

## 2019-09-12 PROCEDURE — 87206 SMEAR FLUORESCENT/ACID STAI: CPT

## 2019-09-12 PROCEDURE — 87205 SMEAR GRAM STAIN: CPT

## 2019-09-12 PROCEDURE — 93010 ELECTROCARDIOGRAM REPORT: CPT | Mod: ,,, | Performed by: INTERNAL MEDICINE

## 2019-09-12 PROCEDURE — 63600175 PHARM REV CODE 636 W HCPCS: Performed by: HOSPITALIST

## 2019-09-12 PROCEDURE — 86706 HEP B SURFACE ANTIBODY: CPT

## 2019-09-12 PROCEDURE — 25000242 PHARM REV CODE 250 ALT 637 W/ HCPCS: Performed by: HOSPITALIST

## 2019-09-12 PROCEDURE — D9220A PRA ANESTHESIA: ICD-10-PCS | Mod: CRNA,,, | Performed by: NURSE ANESTHETIST, CERTIFIED REGISTERED

## 2019-09-12 PROCEDURE — 36415 COLL VENOUS BLD VENIPUNCTURE: CPT

## 2019-09-12 PROCEDURE — 25000003 PHARM REV CODE 250: Performed by: NURSE ANESTHETIST, CERTIFIED REGISTERED

## 2019-09-12 PROCEDURE — 63600175 PHARM REV CODE 636 W HCPCS: Performed by: NURSE PRACTITIONER

## 2019-09-12 PROCEDURE — 83735 ASSAY OF MAGNESIUM: CPT

## 2019-09-12 PROCEDURE — 99900035 HC TECH TIME PER 15 MIN (STAT)

## 2019-09-12 PROCEDURE — 80069 RENAL FUNCTION PANEL: CPT

## 2019-09-12 PROCEDURE — 86704 HEP B CORE ANTIBODY TOTAL: CPT

## 2019-09-12 PROCEDURE — 80100008 HC CRRT DAILY MAINTENANCE

## 2019-09-12 PROCEDURE — 25000003 PHARM REV CODE 250: Performed by: EMERGENCY MEDICINE

## 2019-09-12 PROCEDURE — 20000000 HC ICU ROOM

## 2019-09-12 PROCEDURE — D9220A PRA ANESTHESIA: Mod: CRNA,,, | Performed by: NURSE ANESTHETIST, CERTIFIED REGISTERED

## 2019-09-12 PROCEDURE — 86480 TB TEST CELL IMMUN MEASURE: CPT

## 2019-09-12 PROCEDURE — 37000009 HC ANESTHESIA EA ADD 15 MINS: Performed by: INTERNAL MEDICINE

## 2019-09-12 PROCEDURE — 87070 CULTURE OTHR SPECIMN AEROBIC: CPT

## 2019-09-12 PROCEDURE — 85730 THROMBOPLASTIN TIME PARTIAL: CPT | Mod: 91

## 2019-09-12 PROCEDURE — 83735 ASSAY OF MAGNESIUM: CPT | Mod: 91

## 2019-09-12 PROCEDURE — 27100092 HC HIGH FLOW DELIVERY CANNULA

## 2019-09-12 PROCEDURE — 94761 N-INVAS EAR/PLS OXIMETRY MLT: CPT

## 2019-09-12 PROCEDURE — D9220A PRA ANESTHESIA: Mod: ANES,,, | Performed by: ANESTHESIOLOGY

## 2019-09-12 PROCEDURE — 94640 AIRWAY INHALATION TREATMENT: CPT

## 2019-09-12 PROCEDURE — 87015 SPECIMEN INFECT AGNT CONCNTJ: CPT

## 2019-09-12 PROCEDURE — 93005 ELECTROCARDIOGRAM TRACING: CPT

## 2019-09-12 PROCEDURE — 87400 INFLUENZA A/B EACH AG IA: CPT

## 2019-09-12 PROCEDURE — 25000003 PHARM REV CODE 250: Performed by: HOSPITALIST

## 2019-09-12 PROCEDURE — 93010 EKG 12-LEAD: ICD-10-PCS | Mod: ,,, | Performed by: INTERNAL MEDICINE

## 2019-09-12 PROCEDURE — 80053 COMPREHEN METABOLIC PANEL: CPT

## 2019-09-12 PROCEDURE — 87340 HEPATITIS B SURFACE AG IA: CPT

## 2019-09-12 PROCEDURE — D9220A PRA ANESTHESIA: ICD-10-PCS | Mod: ANES,,, | Performed by: ANESTHESIOLOGY

## 2019-09-12 PROCEDURE — 27100171 HC OXYGEN HIGH FLOW UP TO 24 HOURS

## 2019-09-12 PROCEDURE — 85025 COMPLETE CBC W/AUTO DIFF WBC: CPT

## 2019-09-12 PROCEDURE — 94660 CPAP INITIATION&MGMT: CPT

## 2019-09-12 RX ORDER — LIDOCAINE HCL/PF 100 MG/5ML
SYRINGE (ML) INTRAVENOUS
Status: DISCONTINUED | OUTPATIENT
Start: 2019-09-12 | End: 2019-09-12

## 2019-09-12 RX ORDER — AMIODARONE HYDROCHLORIDE 200 MG/1
200 TABLET ORAL 2 TIMES DAILY
Status: DISCONTINUED | OUTPATIENT
Start: 2019-09-12 | End: 2019-09-16 | Stop reason: HOSPADM

## 2019-09-12 RX ORDER — SODIUM CHLORIDE 0.9 % (FLUSH) 0.9 %
10 SYRINGE (ML) INJECTION
Status: DISCONTINUED | OUTPATIENT
Start: 2019-09-12 | End: 2019-09-16 | Stop reason: HOSPADM

## 2019-09-12 RX ORDER — ETOMIDATE 2 MG/ML
INJECTION INTRAVENOUS
Status: DISCONTINUED | OUTPATIENT
Start: 2019-09-12 | End: 2019-09-12

## 2019-09-12 RX ORDER — MAGNESIUM SULFATE HEPTAHYDRATE 40 MG/ML
2 INJECTION, SOLUTION INTRAVENOUS
Status: ACTIVE | OUTPATIENT
Start: 2019-09-12 | End: 2019-09-13

## 2019-09-12 RX ORDER — OLANZAPINE 10 MG/2ML
5 INJECTION, POWDER, FOR SOLUTION INTRAMUSCULAR 3 TIMES DAILY PRN
Status: DISCONTINUED | OUTPATIENT
Start: 2019-09-12 | End: 2019-09-16 | Stop reason: HOSPADM

## 2019-09-12 RX ORDER — SODIUM CHLORIDE 9 MG/ML
INJECTION, SOLUTION INTRAVENOUS CONTINUOUS PRN
Status: DISCONTINUED | OUTPATIENT
Start: 2019-09-12 | End: 2019-09-12

## 2019-09-12 RX ADMIN — LEVALBUTEROL HYDROCHLORIDE 0.63 MG: 0.63 SOLUTION RESPIRATORY (INHALATION) at 07:09

## 2019-09-12 RX ADMIN — LEVALBUTEROL HYDROCHLORIDE 0.63 MG: 0.63 SOLUTION RESPIRATORY (INHALATION) at 03:09

## 2019-09-12 RX ADMIN — HEPARIN SODIUM 9 UNITS/KG/HR: 10000 INJECTION, SOLUTION INTRAVENOUS at 03:09

## 2019-09-12 RX ADMIN — PIPERACILLIN AND TAZOBACTAM 4.5 G: 4; .5 INJECTION, POWDER, LYOPHILIZED, FOR SOLUTION INTRAVENOUS; PARENTERAL at 04:09

## 2019-09-12 RX ADMIN — METOLAZONE 5 MG: 5 TABLET ORAL at 04:09

## 2019-09-12 RX ADMIN — AMIODARONE HYDROCHLORIDE 0.5 MG/MIN: 1.8 INJECTION, SOLUTION INTRAVENOUS at 01:09

## 2019-09-12 RX ADMIN — LEVALBUTEROL HYDROCHLORIDE 0.63 MG: 0.63 SOLUTION RESPIRATORY (INHALATION) at 12:09

## 2019-09-12 RX ADMIN — PIPERACILLIN AND TAZOBACTAM 4.5 G: 4; .5 INJECTION, POWDER, LYOPHILIZED, FOR SOLUTION INTRAVENOUS; PARENTERAL at 03:09

## 2019-09-12 RX ADMIN — SODIUM PHOSPHATE, MONOBASIC, MONOHYDRATE 20.01 MMOL: 276; 142 INJECTION, SOLUTION INTRAVENOUS at 12:09

## 2019-09-12 RX ADMIN — SODIUM CHLORIDE: 0.9 INJECTION, SOLUTION INTRAVENOUS at 01:09

## 2019-09-12 RX ADMIN — SENNOSIDES, DOCUSATE SODIUM 1 TABLET: 50; 8.6 TABLET, FILM COATED ORAL at 09:09

## 2019-09-12 RX ADMIN — MAGNESIUM SULFATE 2 G: 2 INJECTION INTRAVENOUS at 11:09

## 2019-09-12 RX ADMIN — AMIODARONE HYDROCHLORIDE 200 MG: 200 TABLET ORAL at 09:09

## 2019-09-12 RX ADMIN — FAMOTIDINE 20 MG: 20 TABLET ORAL at 09:09

## 2019-09-12 RX ADMIN — VANCOMYCIN HYDROCHLORIDE 500 MG: 500 INJECTION, POWDER, LYOPHILIZED, FOR SOLUTION INTRAVENOUS at 05:09

## 2019-09-12 RX ADMIN — RAMELTEON 8 MG: 8 TABLET, FILM COATED ORAL at 09:09

## 2019-09-12 RX ADMIN — AMIODARONE HYDROCHLORIDE 200 MG: 200 TABLET ORAL at 04:09

## 2019-09-12 RX ADMIN — LIDOCAINE HYDROCHLORIDE 75 MG: 20 INJECTION, SOLUTION INTRAVENOUS at 02:09

## 2019-09-12 RX ADMIN — ETOMIDATE 6 MG: 2 INJECTION, SOLUTION INTRAVENOUS at 02:09

## 2019-09-12 RX ADMIN — HEPARIN SODIUM 9 UNITS/KG/HR: 10000 INJECTION, SOLUTION INTRAVENOUS at 12:09

## 2019-09-12 RX ADMIN — ETOMIDATE 4 MG: 2 INJECTION, SOLUTION INTRAVENOUS at 02:09

## 2019-09-12 RX ADMIN — ETOMIDATE 2 MG: 2 INJECTION, SOLUTION INTRAVENOUS at 02:09

## 2019-09-12 NOTE — PLAN OF CARE
Problem: Adult Inpatient Plan of Care  Goal: Plan of Care Review  Outcome: Ongoing (interventions implemented as appropriate)  Pt remains on Vapotherm at 30 L/M at 70% FIO2 with bipap on standby at the [facility]. Continue duoneb svn tx and aerobika therapy as ordered. Continue O2 titration as tolerated. Will continue to monitor. LAVINIA Ritchie, RRT

## 2019-09-12 NOTE — PROCEDURES
Procedures     ESTEBAN/CV   Dr Lopez  Pre-op Dx A-flutter  Post-op Dx same  Specimen none  EBL < 50 cc    9/12/19 ESTEBAN/CV - no thrombus seen on ESTEBAN. CV 200J converted A-flutter to NSR      Change amiodarone to po  Would start eliquis once no further invasive procedures planned

## 2019-09-12 NOTE — NURSING
"0100: Net UF increased to 150 ml/hr per order to increase to Net UF goal of 200 ml/hr if pt can tolerate. Pt tolerating well. UO improving and BP stable. No signs of distress. Appears to be resting comfortably. Will continue to monitor.    0500: Net UF increased to 200 ml/hr per order to increase to goal Net UF of 200 ml/hr per pt's tolerance. Pt tolerating well. BP remains stable with no signs of distress. Pt stated " I already feel better". Will continue to monitor.    "

## 2019-09-12 NOTE — ANESTHESIA PREPROCEDURE EVALUATION
"                                                                                                             09/12/2019  Marck Madison is a 59 y.o., male.    Pre-operative evaluation for Procedure(s) (LRB):  Transesophageal echo (ESTEBAN) intra-procedure log documentation (N/A)    Marck Madison is a 59 y.o. male     Denies CP/GERD/MI/CVA.  METS > 4  NPO > 8  On Bipap and CRRT    Patient Active Problem List   Diagnosis    Acute respiratory failure with hypoxia    Acute renal failure    Elevated troponin    Atrial flutter    FARHAN (acute kidney injury)       Review of patient's allergies indicates:  No Known Allergies    No current facility-administered medications on file prior to encounter.      Current Outpatient Medications on File Prior to Encounter   Medication Sig Dispense Refill    amiodarone (PACERONE) 400 MG tablet Take 400 mg by mouth once daily.      cyclobenzaprine (FLEXERIL) 10 MG tablet Take 10 mg by mouth nightly as needed for Muscle spasms ("take 1/2 - 1 tab by mouth everynight at bedtime as needed. first dose at night").      lisinopril 10 MG tablet Take 10 mg by mouth once daily.      meloxicam (MOBIC) 15 MG tablet Take 15 mg by mouth once daily. Take one tablet by mouth everyday with food      rivaroxaban (XARELTO) 20 mg Tab Take 20 mg by mouth once daily.      ibuprofen (ADVIL,MOTRIN) 600 MG tablet Take 1 tablet (600 mg total) by mouth every 6 (six) hours as needed for Pain. 20 tablet 0       Past Surgical History:   Procedure Laterality Date    LIVER SURGERY      abscess drainage; 1970s       Social History     Socioeconomic History    Marital status: Single     Spouse name: Not on file    Number of children: Not on file    Years of education: Not on file    Highest education level: Not on file   Occupational History    Not on file   Social Needs    Financial resource strain: Not on file    Food insecurity:     Worry: Not on file     Inability: Not on file    Transportation needs:     " Medical: Not on file     Non-medical: Not on file   Tobacco Use    Smoking status: Former Smoker     Packs/day: 0.50     Years: 5.00     Pack years: 2.50     Types: Cigarettes   Substance and Sexual Activity    Alcohol use: No    Drug use: No    Sexual activity: Not on file   Lifestyle    Physical activity:     Days per week: Not on file     Minutes per session: Not on file    Stress: Not on file   Relationships    Social connections:     Talks on phone: Not on file     Gets together: Not on file     Attends Gnosticism service: Not on file     Active member of club or organization: Not on file     Attends meetings of clubs or organizations: Not on file     Relationship status: Not on file   Other Topics Concern    Not on file   Social History Narrative    Not on file         CBC:   Recent Labs     09/11/19  0143 09/12/19  0503   WBC 14.41* 15.27*   RBC 4.41* 4.60   HGB 14.1 14.8   HCT 42.4 43.7    206   MCV 96 95   MCH 32.0* 32.2*   MCHC 33.3 33.9       CMP:   Recent Labs     09/11/19  0748 09/11/19  1611 09/11/19 2241 09/12/19  0503    135*  135* 134* 136   K 5.0 4.0  4.0 4.2 4.6    99  98 98 97   CO2 18* 24  25 25 27   BUN 48* 56*  56* 53* 46*   CREATININE 3.6* 3.7*  3.7* 3.4* 3.0*   * 180*  179* 145* 114*   MG  --  1.8 1.8  --    PHOS  --  5.0* 3.6  --    CALCIUM 8.5* 8.1*  8.1* 8.2* 8.4*   ALBUMIN 3.3* 2.9* 3.0* 3.1*   PROT 6.7  --   --  7.2   ALKPHOS 67  --   --  69   ALT 45*  --   --  102*   AST 74*  --   --  158*   BILITOT 1.0  --   --  0.9       INR  Recent Labs     09/10/19  1746  09/11/19  1610 09/11/19  2241 09/12/19  0503   INR 1.8*  --   --   --   --    APTT 64.4*   < > 48.2* 59.9* 42.9*    < > = values in this interval not displayed.         Vitals:    09/12/19 0747   BP:    Pulse: 101   Resp: 15   Temp:      See nursing charting for additional vital signs      Diagnostic Studies:  Results for GOPI JACOBO (MRN [id number]) as of 9/12/2019 08:54   Ref. Range  9/12/2019 05:03   WBC Latest Ref Range: 3.90 - 12.70 K/uL 15.27 (H)   RBC Latest Ref Range: 4.60 - 6.20 M/uL 4.60   Hemoglobin Latest Ref Range: 14.0 - 18.0 g/dL 14.8   Hematocrit Latest Ref Range: 40.0 - 54.0 % 43.7   MCV Latest Ref Range: 82 - 98 fL 95   MCH Latest Ref Range: 27.0 - 31.0 pg 32.2 (H)   MCHC Latest Ref Range: 32.0 - 36.0 g/dL 33.9   RDW Latest Ref Range: 11.5 - 14.5 % 14.5   Platelets Latest Ref Range: 150 - 350 K/uL 206   MPV Latest Ref Range: 9.2 - 12.9 fL 11.5   Gran% Latest Ref Range: 38.0 - 73.0 % 87.2 (H)   Gran # (ANC) Latest Ref Range: 1.8 - 7.7 K/uL 13.3 (H)   Lymph% Latest Ref Range: 18.0 - 48.0 % 8.0 (L)   Lymph # Latest Ref Range: 1.0 - 4.8 K/uL 1.2   Mono% Latest Ref Range: 4.0 - 15.0 % 4.9   Mono # Latest Ref Range: 0.3 - 1.0 K/uL 0.8   Eosinophil% Latest Ref Range: 0.0 - 8.0 % 0.1   Eos # Latest Ref Range: 0.0 - 0.5 K/uL 0.0   Basophil% Latest Ref Range: 0.0 - 1.9 % 0.1   Baso # Latest Ref Range: 0.00 - 0.20 K/uL 0.01   Differential Method Unknown Automated   aPTT Latest Ref Range: 21.0 - 32.0 sec 42.9 (H)   Sodium Latest Ref Range: 136 - 145 mmol/L 136   Potassium Latest Ref Range: 3.5 - 5.1 mmol/L 4.6   Chloride Latest Ref Range: 95 - 110 mmol/L 97   CO2 Latest Ref Range: 23 - 29 mmol/L 27   Anion Gap Latest Ref Range: 8 - 16 mmol/L 12   BUN, Bld Latest Ref Range: 8 - 23 mg/dL 46 (H)   Creatinine Latest Ref Range: 0.5 - 1.4 mg/dL 3.0 (H)   eGFR if non African American Latest Ref Range: >60 mL/min/1.73 m^2 21 (A)   eGFR if African American Latest Ref Range: >60 mL/min/1.73 m^2 25 (A)   Glucose Latest Ref Range: 70 - 110 mg/dL 114 (H)   Calcium Latest Ref Range: 8.7 - 10.5 mg/dL 8.4 (L)   Alkaline Phosphatase Latest Ref Range: 55 - 135 U/L 69   PROTEIN TOTAL Latest Ref Range: 6.0 - 8.4 g/dL 7.2   Albumin Latest Ref Range: 3.5 - 5.2 g/dL 3.1 (L)   BILIRUBIN TOTAL Latest Ref Range: 0.1 - 1.0 mg/dL 0.9   AST Latest Ref Range: 10 - 40 U/L 158 (H)   ALT Latest Ref Range: 10 - 44 U/L 102  (H)     EKG (9/11/19):  Atrial flutter with 2 to 1 block  Nonspecific intraventricular block  Abnormal QRS-T angle, consider primary T wave abnormality  Abnormal ECG    TTE (9/10/19):  · Moderately decreased left ventricular systolic function. The estimated ejection fraction is 30%  · Concentric left ventricular hypertrophy.  · Left ventricular diastolic dysfunction.  · Normal right ventricular systolic function.  · Severe left atrial enlargement.  · Moderate right atrial enlargement.  · Mild-to-moderate mitral regurgitation.  · Mild tricuspid regurgitation.  · The estimated PA systolic pressure is 33 mm Hg    CXR (9/11/19):  Right-sided central venous catheter in the SVC.  No significant changes in the cardiopulmonary status as compared to the previous study earlier today    Anesthesia Evaluation    I have reviewed the Patient Summary Reports.    I have reviewed the Nursing Notes.      Review of Systems  Anesthesia Hx:  No problems with previous Anesthesia   Denies Personal Hx of Anesthesia complications.   Social:  No Alcohol Use, Former Smoker    Cardiovascular:   Exercise tolerance: good Hypertension, well controlled CAD  Dysrhythmias  CHF ECG has been reviewed. aflutter   Pulmonary:   Acute respiratory failure, requiring Bipap to maintain SpO2. Airborne precautions for TB r/o.    Renal/:   Chronic Renal Disease, ARF CRRT ongoing   Hepatic/GI:  Hepatic/GI Normal        Physical Exam  General:  Well nourished    Airway/Jaw/Neck:   MP2, TMD >3FB, edentulous     Chest/Lungs:  Chest/Lungs Clear    Heart/Vascular:  Heart Findings: Normal            Anesthesia Plan  Type of Anesthesia, risks & benefits discussed:  Anesthesia Type:  MAC, general  Patient's Preference:   Intra-op Monitoring Plan: standard ASA monitors  Intra-op Monitoring Plan Comments:   Post Op Pain Control Plan:   Post Op Pain Control Plan Comments:   Induction:   IV  Beta Blocker:  Patient is not currently on a Beta-Blocker (No further  documentation required).       Informed Consent: Patient understands risks and agrees with Anesthesia plan.  Questions answered. Anesthesia consent signed with patient.  ASA Score: 4  emergent   Day of Surgery Review of History & Physical: I have interviewed and examined the patient. I have reviewed the patient's H&P dated:  There are no significant changes.      Anesthesia Plan Notes: Spoke with cardiologist in regards to respiratory status of patient and procedure now emergent. Will perform ESTEBAN/DCCV in ICU as patient is on CRRT and requiring bipap for acute respiratory failure likely 2/2 fluid overload        Ready For Surgery From Anesthesia Perspective.

## 2019-09-12 NOTE — CARE UPDATE
[facility]  ICU Multidisciplinary Bedside Rounds   SUMMARY     Date: 9/12/2019    Prehospitalization: Home  Admit Date / LOS : 9/10/2019/ 2 days    Diagnosis: Acute respiratory failure with hypoxia    Consults:        Active: Cardio, Nephro and Pulm CC       Needed: N/A     Code Status: Full Code   Advanced Directive: <no information>    LDA: BIPAP, Renner, PIV and Trialysis       Central Lines/Site/Justification:Trialysis place for CRRT       Urinary Cath/Order/Justification:Critically Ill in ICU    Vasopressors/Infusions:    amiodarone in dextrose 5% 0.5 mg/min (09/12/19 0500)    heparin (porcine) in D5W 8.986 Units/kg/hr (09/12/19 0500)          GOALS: Volume/ Hemodynamic: N/A                     RASS: N/A    CAM ICU: Positive  Pain Management: none       Pain Controlled: not applicable     Rhythm: A-Flutter    Respiratory Device: Bipap    Oxygen Concentration (%):  [] 45               VTE Prophylaxis: Pharm and Mechanical  Mobility: Bedrest  Stress Ulcer Prophylaxis: Yes    Dietary: NPO  Tolerance: not applicable  /  Advancement: N/A    Isolation: No active isolations    Restraints: No    Noteworthy Labs:  WBC: 15.27, BUN: 46, creat: 3.0, AST: 158, ALT: 102    CBC/Anemia Labs: Coags:    Recent Labs   Lab 09/10/19  1746 09/11/19  0143 09/12/19  0503   WBC 16.99* 14.41* 15.27*   HGB 14.7 14.1 14.8   HCT 44.7 42.4 43.7    166 206   MCV 98 96 95   RDW 14.4 14.6* 14.5    Recent Labs   Lab 09/10/19  1746  09/11/19  0748 09/11/19  1610 09/11/19  2241   INR 1.8*  --   --   --   --    APTT 64.4*   < > 90.2* 48.2* 59.9*    < > = values in this interval not displayed.        Chemistries:   Recent Labs   Lab 09/10/19  0032 09/10/19  0656 09/11/19  0748 09/11/19  1611 09/11/19  2241    140 136 135*  135* 134*   K 3.6 3.5 5.0 4.0  4.0 4.2    102 102 99  98 98   CO2 22* 27 18* 24  25 25   BUN 28* 25* 48* 56*  56* 53*   CREATININE 1.6* 1.4 3.6* 3.7*  3.7* 3.4*   CALCIUM 8.8  8.5* 8.5* 8.1*  8.1* 8.2*   PROT 7.1 6.7 6.7  --   --    BILITOT 0.9 1.0 1.0  --   --    ALKPHOS 69 59 67  --   --    ALT 24 21 45*  --   --    AST 33 26 74*  --   --    MG  --  1.3*  --  1.8 1.8   PHOS  --  4.1  --  5.0* 3.6        Cardiac Enzymes: Ejection Fractions:    Recent Labs     09/10/19  0350 09/10/19  0656 09/10/19  1335 09/11/19  1611   CPK  --   --   --  239*   TROPONINI 0.188* 0.174* 0.210*  --     No results found for: EF     POCT Glucose: HbA1c:    No results for input(s): POCTGLUCOSE in the last 168 hours. No results found for: HGBA1C     Needs from Care Team: none     ICU LOS 2d  Level of Care: Critical Care

## 2019-09-12 NOTE — TRANSFER OF CARE
"Anesthesia Transfer of Care Note    Patient: Marck Madison    Procedure(s) Performed: Procedure(s) (LRB):  Transesophageal echo (ESTEBAN) intra-procedure log documentation (N/A)    Patient location: ICU (ICU procedure not transported)    Anesthesia Type: general    Post pain: adequate analgesia    Post assessment: no apparent anesthetic complications and tolerated procedure well    Post vital signs: stable    Level of consciousness: sedated and responds to stimulation    Nausea/Vomiting: no nausea/vomiting    Complications: none    Transfer of care protocol was followed      Last vitals:   Visit Vitals  /79 (BP Location: Right arm, Patient Position: Lying)   Pulse 79   Temp 36.8 °C (98.2 °F) (Oral)   Resp (!) 25   Ht 5' 6" (1.676 m)   Wt 77.7 kg (171 lb 4.8 oz)   SpO2 96%   BMI 27.65 kg/m²     "

## 2019-09-12 NOTE — CARE UPDATE
[facility]  ICU Multidisciplinary Bedside Rounds     UPDATE     Date: 9/12/2019      Plan of care reviewed with the following, Nurse, Charge Nurse, Physician, Pulm CC and Resp. Therapist.       Needs/ Goals for the day: continue CRRT, cultures ordered, monitor respiratory status, monitor labs, possible cardioversion today, precautions until results received from labs.      Level of Care: Critical Care

## 2019-09-12 NOTE — PLAN OF CARE
Problem: Adult Inpatient Plan of Care  Goal: Plan of Care Review  Outcome: Ongoing (interventions implemented as appropriate)  Lab values improving. Pt remains on CRRT. No falls/injuries this shift. Skin intact. Breathing improved. Pt cardioverted. Xray obtained. Afebrile. Diet advanced. Plan of care reviewed with patient.

## 2019-09-12 NOTE — PROGRESS NOTES
0900 Spoke with MD Lopez, asked if desired to hold coreg prior to cardioversion, also pt has order for metalozone unsure if will affect blood pressure, md states to hold currently.  1100 called and spoke with MD Baeza concerning, currently holding metalozone due to pending cardioversion, informed of pt's intake and output, new orders received to calculate output into net UF. Orders read back and verified.  1445 called and spoke with MD Gómez, informed of xray results, new order to keep pt npo.   1500 called and spoke with MD Baeza concerning order for metalozone, md states ok to give now.  1510 Pt apprehensive, refusing all pills and iv medications and lab draws. RN remained at bedside. MD Acevedo notified and came to bedside to discuss plan of care with pt. Pt states he desires to eat. New orders received to re-order diet for patient. MD Gómez notified.   1845 MD Baeza at bedside updated on labs, urine output, CRRT orders. No new orders. Will monitor.

## 2019-09-13 PROBLEM — I42.0 DILATED CARDIOMYOPATHY: Status: ACTIVE | Noted: 2019-09-13

## 2019-09-13 LAB
ALBUMIN SERPL BCP-MCNC: 2.5 G/DL (ref 3.5–5.2)
ALBUMIN SERPL BCP-MCNC: 2.7 G/DL (ref 3.5–5.2)
ALBUMIN SERPL BCP-MCNC: 2.8 G/DL (ref 3.5–5.2)
ALBUMIN SERPL BCP-MCNC: 2.9 G/DL (ref 3.5–5.2)
ALP SERPL-CCNC: 79 U/L (ref 55–135)
ALT SERPL W/O P-5'-P-CCNC: 100 U/L (ref 10–44)
ANCA AB TITR SER IF: NORMAL TITER
ANION GAP SERPL CALC-SCNC: 10 MMOL/L (ref 8–16)
ANION GAP SERPL CALC-SCNC: 7 MMOL/L (ref 8–16)
ANION GAP SERPL CALC-SCNC: 8 MMOL/L (ref 8–16)
ANION GAP SERPL CALC-SCNC: 9 MMOL/L (ref 8–16)
APTT BLDCRRT: 49.2 SEC (ref 21–32)
AST SERPL-CCNC: 83 U/L (ref 10–40)
BASOPHILS # BLD AUTO: 0.01 K/UL (ref 0–0.2)
BASOPHILS NFR BLD: 0.1 % (ref 0–1.9)
BILIRUB SERPL-MCNC: 0.9 MG/DL (ref 0.1–1)
BUN SERPL-MCNC: 31 MG/DL (ref 8–23)
BUN SERPL-MCNC: 33 MG/DL (ref 8–23)
BUN SERPL-MCNC: 33 MG/DL (ref 8–23)
BUN SERPL-MCNC: 34 MG/DL (ref 8–23)
CALCIUM SERPL-MCNC: 8 MG/DL (ref 8.7–10.5)
CALCIUM SERPL-MCNC: 8.1 MG/DL (ref 8.7–10.5)
CALCIUM SERPL-MCNC: 8.6 MG/DL (ref 8.7–10.5)
CALCIUM SERPL-MCNC: 8.9 MG/DL (ref 8.7–10.5)
CHLORIDE SERPL-SCNC: 100 MMOL/L (ref 95–110)
CHLORIDE SERPL-SCNC: 101 MMOL/L (ref 95–110)
CO2 SERPL-SCNC: 27 MMOL/L (ref 23–29)
CO2 SERPL-SCNC: 28 MMOL/L (ref 23–29)
CO2 SERPL-SCNC: 28 MMOL/L (ref 23–29)
CO2 SERPL-SCNC: 29 MMOL/L (ref 23–29)
CREAT SERPL-MCNC: 2 MG/DL (ref 0.5–1.4)
CREAT SERPL-MCNC: 2.3 MG/DL (ref 0.5–1.4)
CREAT SERPL-MCNC: 2.4 MG/DL (ref 0.5–1.4)
CREAT SERPL-MCNC: 2.4 MG/DL (ref 0.5–1.4)
DIFFERENTIAL METHOD: ABNORMAL
EOSINOPHIL # BLD AUTO: 0 K/UL (ref 0–0.5)
EOSINOPHIL NFR BLD: 0.2 % (ref 0–8)
ERYTHROCYTE [DISTWIDTH] IN BLOOD BY AUTOMATED COUNT: 14.3 % (ref 11.5–14.5)
EST. GFR  (AFRICAN AMERICAN): 32 ML/MIN/1.73 M^2
EST. GFR  (AFRICAN AMERICAN): 32 ML/MIN/1.73 M^2
EST. GFR  (AFRICAN AMERICAN): 34 ML/MIN/1.73 M^2
EST. GFR  (AFRICAN AMERICAN): 40 ML/MIN/1.73 M^2
EST. GFR  (NON AFRICAN AMERICAN): 28 ML/MIN/1.73 M^2
EST. GFR  (NON AFRICAN AMERICAN): 28 ML/MIN/1.73 M^2
EST. GFR  (NON AFRICAN AMERICAN): 30 ML/MIN/1.73 M^2
EST. GFR  (NON AFRICAN AMERICAN): 35 ML/MIN/1.73 M^2
GLUCOSE SERPL-MCNC: 127 MG/DL (ref 70–110)
GLUCOSE SERPL-MCNC: 133 MG/DL (ref 70–110)
GLUCOSE SERPL-MCNC: 151 MG/DL (ref 70–110)
GLUCOSE SERPL-MCNC: 161 MG/DL (ref 70–110)
HBV CORE AB SERPL QL IA: POSITIVE
HBV SURFACE AG SERPL QL IA: NEGATIVE
HCT VFR BLD AUTO: 42.6 % (ref 40–54)
HGB BLD-MCNC: 14.3 G/DL (ref 14–18)
LYMPHOCYTES # BLD AUTO: 1.2 K/UL (ref 1–4.8)
LYMPHOCYTES NFR BLD: 11 % (ref 18–48)
MAGNESIUM SERPL-MCNC: 2.4 MG/DL (ref 1.6–2.6)
MAGNESIUM SERPL-MCNC: 2.5 MG/DL (ref 1.6–2.6)
MAGNESIUM SERPL-MCNC: 2.5 MG/DL (ref 1.6–2.6)
MCH RBC QN AUTO: 32.2 PG (ref 27–31)
MCHC RBC AUTO-ENTMCNC: 33.6 G/DL (ref 32–36)
MCV RBC AUTO: 96 FL (ref 82–98)
MONOCYTES # BLD AUTO: 0.9 K/UL (ref 0.3–1)
MONOCYTES NFR BLD: 8 % (ref 4–15)
NEUTROPHILS # BLD AUTO: 8.8 K/UL (ref 1.8–7.7)
NEUTROPHILS NFR BLD: 81.2 % (ref 38–73)
P-ANCA TITR SER IF: NORMAL TITER
PHOSPHATE SERPL-MCNC: 2.8 MG/DL (ref 2.7–4.5)
PHOSPHATE SERPL-MCNC: 3.1 MG/DL (ref 2.7–4.5)
PHOSPHATE SERPL-MCNC: 3.9 MG/DL (ref 2.7–4.5)
PLATELET # BLD AUTO: 171 K/UL (ref 150–350)
PMV BLD AUTO: 11.5 FL (ref 9.2–12.9)
POTASSIUM SERPL-SCNC: 3.6 MMOL/L (ref 3.5–5.1)
POTASSIUM SERPL-SCNC: 3.8 MMOL/L (ref 3.5–5.1)
POTASSIUM SERPL-SCNC: 4 MMOL/L (ref 3.5–5.1)
POTASSIUM SERPL-SCNC: 4.1 MMOL/L (ref 3.5–5.1)
PROT SERPL-MCNC: 6.8 G/DL (ref 6–8.4)
RBC # BLD AUTO: 4.44 M/UL (ref 4.6–6.2)
SODIUM SERPL-SCNC: 136 MMOL/L (ref 136–145)
SODIUM SERPL-SCNC: 136 MMOL/L (ref 136–145)
SODIUM SERPL-SCNC: 137 MMOL/L (ref 136–145)
SODIUM SERPL-SCNC: 138 MMOL/L (ref 136–145)
VANCOMYCIN TROUGH SERPL-MCNC: 4.1 UG/ML (ref 10–22)
WBC # BLD AUTO: 10.85 K/UL (ref 3.9–12.7)

## 2019-09-13 PROCEDURE — 83735 ASSAY OF MAGNESIUM: CPT

## 2019-09-13 PROCEDURE — 25000003 PHARM REV CODE 250: Performed by: HOSPITALIST

## 2019-09-13 PROCEDURE — 99291 PR CRITICAL CARE, E/M 30-74 MINUTES: ICD-10-PCS | Mod: ,,, | Performed by: INTERNAL MEDICINE

## 2019-09-13 PROCEDURE — 27000221 HC OXYGEN, UP TO 24 HOURS

## 2019-09-13 PROCEDURE — 25000242 PHARM REV CODE 250 ALT 637 W/ HCPCS: Performed by: HOSPITALIST

## 2019-09-13 PROCEDURE — 94664 DEMO&/EVAL PT USE INHALER: CPT

## 2019-09-13 PROCEDURE — 94761 N-INVAS EAR/PLS OXIMETRY MLT: CPT

## 2019-09-13 PROCEDURE — 63600175 PHARM REV CODE 636 W HCPCS: Performed by: NURSE PRACTITIONER

## 2019-09-13 PROCEDURE — 99291 CRITICAL CARE FIRST HOUR: CPT | Mod: ,,, | Performed by: INTERNAL MEDICINE

## 2019-09-13 PROCEDURE — 63600175 PHARM REV CODE 636 W HCPCS: Performed by: INTERNAL MEDICINE

## 2019-09-13 PROCEDURE — 80202 ASSAY OF VANCOMYCIN: CPT

## 2019-09-13 PROCEDURE — 27202415 HC CARTRIDGE, CRRT

## 2019-09-13 PROCEDURE — 25000003 PHARM REV CODE 250: Performed by: EMERGENCY MEDICINE

## 2019-09-13 PROCEDURE — 99900035 HC TECH TIME PER 15 MIN (STAT)

## 2019-09-13 PROCEDURE — 63600175 PHARM REV CODE 636 W HCPCS: Performed by: HOSPITALIST

## 2019-09-13 PROCEDURE — 36415 COLL VENOUS BLD VENIPUNCTURE: CPT

## 2019-09-13 PROCEDURE — 80100008 HC CRRT DAILY MAINTENANCE

## 2019-09-13 PROCEDURE — 85730 THROMBOPLASTIN TIME PARTIAL: CPT

## 2019-09-13 PROCEDURE — 20000000 HC ICU ROOM

## 2019-09-13 PROCEDURE — 25000003 PHARM REV CODE 250: Performed by: INTERNAL MEDICINE

## 2019-09-13 PROCEDURE — 83735 ASSAY OF MAGNESIUM: CPT | Mod: 91

## 2019-09-13 PROCEDURE — 27100171 HC OXYGEN HIGH FLOW UP TO 24 HOURS

## 2019-09-13 PROCEDURE — 87206 SMEAR FLUORESCENT/ACID STAI: CPT

## 2019-09-13 PROCEDURE — 94640 AIRWAY INHALATION TREATMENT: CPT

## 2019-09-13 PROCEDURE — 85025 COMPLETE CBC W/AUTO DIFF WBC: CPT

## 2019-09-13 PROCEDURE — 80069 RENAL FUNCTION PANEL: CPT | Mod: 91

## 2019-09-13 PROCEDURE — 80053 COMPREHEN METABOLIC PANEL: CPT

## 2019-09-13 PROCEDURE — 27100092 HC HIGH FLOW DELIVERY CANNULA

## 2019-09-13 PROCEDURE — 80069 RENAL FUNCTION PANEL: CPT

## 2019-09-13 RX ORDER — OXYCODONE AND ACETAMINOPHEN 5; 325 MG/1; MG/1
1 TABLET ORAL EVERY 4 HOURS PRN
Status: DISCONTINUED | OUTPATIENT
Start: 2019-09-13 | End: 2019-09-16 | Stop reason: HOSPADM

## 2019-09-13 RX ORDER — MAGNESIUM SULFATE HEPTAHYDRATE 40 MG/ML
2 INJECTION, SOLUTION INTRAVENOUS
Status: DISCONTINUED | OUTPATIENT
Start: 2019-09-14 | End: 2019-09-14

## 2019-09-13 RX ORDER — HEPARIN SODIUM 5000 [USP'U]/ML
5000 INJECTION, SOLUTION INTRAVENOUS; SUBCUTANEOUS CONTINUOUS PRN
Status: DISCONTINUED | OUTPATIENT
Start: 2019-09-13 | End: 2019-09-16 | Stop reason: HOSPADM

## 2019-09-13 RX ORDER — VANCOMYCIN HCL IN 5 % DEXTROSE 1G/250ML
1000 PLASTIC BAG, INJECTION (ML) INTRAVENOUS
Status: DISCONTINUED | OUTPATIENT
Start: 2019-09-13 | End: 2019-09-15

## 2019-09-13 RX ADMIN — LEVALBUTEROL HYDROCHLORIDE 0.63 MG: 0.63 SOLUTION RESPIRATORY (INHALATION) at 03:09

## 2019-09-13 RX ADMIN — HEPARIN SODIUM 5000 UNITS: 5000 INJECTION, SOLUTION INTRAVENOUS; SUBCUTANEOUS at 08:09

## 2019-09-13 RX ADMIN — SENNOSIDES, DOCUSATE SODIUM 1 TABLET: 50; 8.6 TABLET, FILM COATED ORAL at 08:09

## 2019-09-13 RX ADMIN — METOLAZONE 5 MG: 5 TABLET ORAL at 09:09

## 2019-09-13 RX ADMIN — AMIODARONE HYDROCHLORIDE 200 MG: 200 TABLET ORAL at 08:09

## 2019-09-13 RX ADMIN — LEVALBUTEROL HYDROCHLORIDE 0.63 MG: 0.63 SOLUTION RESPIRATORY (INHALATION) at 11:09

## 2019-09-13 RX ADMIN — PIPERACILLIN AND TAZOBACTAM 4.5 G: 4; .5 INJECTION, POWDER, LYOPHILIZED, FOR SOLUTION INTRAVENOUS; PARENTERAL at 04:09

## 2019-09-13 RX ADMIN — SODIUM PHOSPHATE, MONOBASIC, MONOHYDRATE 20.01 MMOL: 276; 142 INJECTION, SOLUTION INTRAVENOUS at 01:09

## 2019-09-13 RX ADMIN — OXYCODONE HYDROCHLORIDE AND ACETAMINOPHEN 1 TABLET: 5; 325 TABLET ORAL at 06:09

## 2019-09-13 RX ADMIN — LEVALBUTEROL HYDROCHLORIDE 0.63 MG: 0.63 SOLUTION RESPIRATORY (INHALATION) at 07:09

## 2019-09-13 RX ADMIN — FAMOTIDINE 20 MG: 20 TABLET ORAL at 09:09

## 2019-09-13 RX ADMIN — CARVEDILOL 6.25 MG: 6.25 TABLET, FILM COATED ORAL at 08:09

## 2019-09-13 RX ADMIN — VANCOMYCIN HYDROCHLORIDE 1000 MG: 1 INJECTION, POWDER, LYOPHILIZED, FOR SOLUTION INTRAVENOUS at 06:09

## 2019-09-13 RX ADMIN — LEVALBUTEROL HYDROCHLORIDE 0.63 MG: 0.63 SOLUTION RESPIRATORY (INHALATION) at 12:09

## 2019-09-13 NOTE — PROGRESS NOTES
0800 CRRT arterial line kinked with pt self repositioning. Unable to return blood. Called and spoke with MD Baeza concerning CRRT on hold, asked if order to be continued, reported urine output 69 ml last shift. -4 Liters since admit. MD states to continue CRRT.   1151 CRRT restarted.  1255 Arterial access pod not filling, alarms sensitive to pt positioning. Lines reversed. Access pressure improved with line reversal. Will monitor.

## 2019-09-13 NOTE — ANESTHESIA POSTPROCEDURE EVALUATION
Anesthesia Post Evaluation    Patient: Marck Madison    Procedure(s) Performed: Procedure(s) (LRB):  Transesophageal echo (ESTEBAN) intra-procedure log documentation (N/A)  Cardioversion or Defibrillation    Final Anesthesia Type: MAC  Patient location during evaluation: ICU  Patient participation: Yes- Able to Participate  Level of consciousness: awake and alert and oriented  Post-procedure vital signs: reviewed and stable  Pain management: adequate  Airway patency: patent  PONV status at discharge: No PONV  Anesthetic complications: no      Cardiovascular status: hemodynamically stable and blood pressure returned to baseline  Respiratory status: spontaneous ventilation, room air and unassisted  Hydration status: euvolemic  Follow-up not needed.          Vitals Value Taken Time   /73 9/13/2019  8:02 AM   Temp 36.7 °C (98.1 °F) 9/13/2019  3:15 AM   Pulse 70 9/13/2019  9:01 AM   Resp 29 9/13/2019  9:01 AM   SpO2 99 % 9/13/2019  9:01 AM   Vitals shown include unvalidated device data.      No case tracking events are documented in the log.      Pain/Khris Score: Khris Score: 7 (9/12/2019  2:20 PM)

## 2019-09-13 NOTE — ASSESSMENT & PLAN NOTE
Improved on amiodarone  Pt has chronic afib  Heparin infusion until no further procedures indicated and can switch to oral anticoagulation   Successful CV  Oral amiodarone

## 2019-09-13 NOTE — SUBJECTIVE & OBJECTIVE
Interval History: Improvement in oxygenation today.     Review of Systems   Constitutional: Negative for activity change, appetite change, chills, diaphoresis, fatigue, fever and unexpected weight change.   HENT: Negative.    Eyes: Negative.    Respiratory: Positive for cough and shortness of breath. Negative for chest tightness and wheezing.    Cardiovascular: Negative for chest pain, palpitations and leg swelling.   Gastrointestinal: Negative for abdominal distention, anal bleeding, blood in stool, constipation, diarrhea, nausea and vomiting.   Genitourinary: Negative for dysuria and hematuria.   Musculoskeletal: Negative.    Skin: Negative.    Neurological: Negative for dizziness, seizures, syncope, weakness and light-headedness.   Psychiatric/Behavioral: Negative.      Objective:     Vital Signs (Most Recent):  Temp: 96.5 °F (35.8 °C) (09/13/19 1515)  Pulse: 69 (09/13/19 1602)  Resp: (!) 25 (09/13/19 1602)  BP: 119/77 (09/13/19 1603)  SpO2: 99 % (09/13/19 1602) Vital Signs (24h Range):  Temp:  [96.5 °F (35.8 °C)-98.3 °F (36.8 °C)] 96.5 °F (35.8 °C)  Pulse:  [62-98] 69  Resp:  [10-38] 25  SpO2:  [77 %-100 %] 99 %  BP: ()/() 119/77     Weight: 73.7 kg (162 lb 7.7 oz)  Body mass index is 26.22 kg/m².    Intake/Output Summary (Last 24 hours) at 9/13/2019 1608  Last data filed at 9/13/2019 1600  Gross per 24 hour   Intake 1879.2 ml   Output 3606 ml   Net -1726.8 ml      Physical Exam   Constitutional: He is oriented to person, place, and time. He appears well-developed and well-nourished.   HENT:   Head: Normocephalic and atraumatic.   Mouth/Throat: Oropharynx is clear and moist and mucous membranes are normal.   Eyes: Conjunctivae and EOM are normal.   Cardiovascular: Normal rate and regular rhythm.   Pulmonary/Chest: Effort normal. No accessory muscle usage. He has decreased breath sounds.   Abdominal: Soft. He exhibits no distension.   Musculoskeletal: He exhibits no deformity. Edema: no pitting edema.    Neurological: He is alert and oriented to person, place, and time.   Skin: Skin is warm and dry. No rash noted. He is not diaphoretic.   Nursing note and vitals reviewed.      Significant Labs:   BMP:   Recent Labs   Lab 09/13/19  0837   *      K 4.1      CO2 28   BUN 33*   CREATININE 2.4*   CALCIUM 8.9   MG 2.5     CBC:   Recent Labs   Lab 09/12/19  0503 09/13/19  0305   WBC 15.27* 10.85   HGB 14.8 14.3   HCT 43.7 42.6    171     Lactic Acid: No results for input(s): LACTATE in the last 48 hours.  Magnesium:   Recent Labs   Lab 09/12/19  1611 09/13/19  0008 09/13/19  0837   MG 2.7* 2.4 2.5     POCT Glucose: No results for input(s): POCTGLUCOSE in the last 48 hours.    Significant Imaging: I have reviewed and interpreted all pertinent imaging results/findings within the past 24 hours.

## 2019-09-13 NOTE — ASSESSMENT & PLAN NOTE
Patient presented with an initial ambient air oxygen saturation 88% which improved to 99% BPAP.  He does have evidence of volume overload.  Personally reviewed his plain chest radiograph which was significant for pulmonary edema with a probable left-sided pleural effusion and without infiltrates or consolidations.  He also has a equivocal BNP of 445 but in setting of volume overload is concerning for congestive heart failure.  He also has an elevated troponin 0.199 without chest pain. He does report a vague cardiac history for which she was evaluated at [facility]--we have no access to those records.  He has been placed on BPAP for respiratory distress and will be started on intravenous diuresis with furosemide.  Will therefore admit him to the ICU for acute respiratory failure.  Will obtain a TTE and TSH in the morning consult Cardiology for further recommendations.  His symptoms are not consistent was nephrotic syndrome.    -CHF- poor UOP on diuretics - see above  -treat for CAP  -reported h/o TB, placed on contact precautions until ruled out   AFB pending

## 2019-09-13 NOTE — PROGRESS NOTES
[facility]  Pulmonology  Progress Note    Patient Name: Marck Madison  MRN: [id number]  Admission Date: 9/10/2019  Hospital Length of Stay: 3 days  Code Status: Full Code  Attending Provider: Seble Acevedo MD  Primary Care Provider: Primary Doctor No   Principal Problem: Acute respiratory failure with hypoxia    Subjective:     Interval History: Improvement in oxygenation today.     Review of Systems   Constitutional: Negative for activity change, appetite change, chills, diaphoresis, fatigue, fever and unexpected weight change.   HENT: Negative.    Eyes: Negative.    Respiratory: Positive for cough and shortness of breath. Negative for chest tightness and wheezing.    Cardiovascular: Negative for chest pain, palpitations and leg swelling.   Gastrointestinal: Negative for abdominal distention, anal bleeding, blood in stool, constipation, diarrhea, nausea and vomiting.   Genitourinary: Negative for dysuria and hematuria.   Musculoskeletal: Negative.    Skin: Negative.    Neurological: Negative for dizziness, seizures, syncope, weakness and light-headedness.   Psychiatric/Behavioral: Negative.      Objective:     Vital Signs (Most Recent):  Temp: 96.5 °F (35.8 °C) (09/13/19 1515)  Pulse: 69 (09/13/19 1602)  Resp: (!) 25 (09/13/19 1602)  BP: 119/77 (09/13/19 1603)  SpO2: 99 % (09/13/19 1602) Vital Signs (24h Range):  Temp:  [96.5 °F (35.8 °C)-98.3 °F (36.8 °C)] 96.5 °F (35.8 °C)  Pulse:  [62-98] 69  Resp:  [10-38] 25  SpO2:  [77 %-100 %] 99 %  BP: ()/() 119/77     Weight: 73.7 kg (162 lb 7.7 oz)  Body mass index is 26.22 kg/m².    Intake/Output Summary (Last 24 hours) at 9/13/2019 1608  Last data filed at 9/13/2019 1600  Gross per 24 hour   Intake 1879.2 ml   Output 3606 ml   Net -1726.8 ml      Physical Exam   Constitutional: He is oriented to person, place, and time. He appears well-developed and well-nourished.   HENT:   Head: Normocephalic and atraumatic.   Mouth/Throat: Oropharynx is  clear and moist and mucous membranes are normal.   Eyes: Conjunctivae and EOM are normal.   Cardiovascular: Normal rate and regular rhythm.   Pulmonary/Chest: Effort normal. No accessory muscle usage. He has decreased breath sounds.   Abdominal: Soft. He exhibits no distension.   Musculoskeletal: He exhibits no deformity. Edema: no pitting edema.   Neurological: He is alert and oriented to person, place, and time.   Skin: Skin is warm and dry. No rash noted. He is not diaphoretic.   Nursing note and vitals reviewed.      Significant Labs:   BMP:   Recent Labs   Lab 09/13/19  0837   *      K 4.1      CO2 28   BUN 33*   CREATININE 2.4*   CALCIUM 8.9   MG 2.5     CBC:   Recent Labs   Lab 09/12/19  0503 09/13/19  0305   WBC 15.27* 10.85   HGB 14.8 14.3   HCT 43.7 42.6    171     Lactic Acid: No results for input(s): LACTATE in the last 48 hours.  Magnesium:   Recent Labs   Lab 09/12/19  1611 09/13/19  0008 09/13/19  0837   MG 2.7* 2.4 2.5     POCT Glucose: No results for input(s): POCTGLUCOSE in the last 48 hours.    Significant Imaging: I have reviewed and interpreted all pertinent imaging results/findings within the past 24 hours.    Assessment/Plan:     * Acute respiratory failure with hypoxia  Likely a combination of CHF and   --CXR with bilateral pulmonary edema  --increased D dimer, no DVT on BLE US, on heparin infusion  --BIPAP PRN & QHS / vapotherm; wean to maintain SpO2 >90%   --On CRRT now      FARHAN (acute kidney injury)  Unclear baseline   --CRRT per Renal    Atrial flutter  --Cardioverted    Critical Care time: 45 minutes    Critical Care time for the evaluation and treatment for severe organ dysfunction, review of pertinent labs and imaging studies discussions with primary team/consulting services and discussions with patient/family.      Continue ICU care.          Gomez Gómez MD  Pulmonology  [facility]

## 2019-09-13 NOTE — SUBJECTIVE & OBJECTIVE
Interval History: pt agitated and wants to leave, settled down after he was told his diet would be resumed     Review of Systems   Constitutional: Negative for activity change, appetite change, chills, diaphoresis, fatigue, fever and unexpected weight change.   HENT: Negative.    Eyes: Negative.    Respiratory: Positive for cough and shortness of breath. Negative for chest tightness and wheezing.    Cardiovascular: Negative for chest pain, palpitations and leg swelling.   Gastrointestinal: Negative for abdominal distention, anal bleeding, blood in stool, constipation, diarrhea, nausea and vomiting.   Genitourinary: Negative for dysuria and hematuria.   Musculoskeletal: Negative.    Skin: Negative.    Neurological: Negative for dizziness, seizures, syncope, weakness and light-headedness.   Psychiatric/Behavioral: Negative.      Objective:     Vital Signs (Most Recent):  Temp: 98.3 °F (36.8 °C) (09/12/19 1915)  Pulse: 72 (09/12/19 2130)  Resp: (!) 28 (09/12/19 2130)  BP: 108/66 (09/12/19 2130)  SpO2: 100 % (09/12/19 2130) Vital Signs (24h Range):  Temp:  [98.2 °F (36.8 °C)-98.6 °F (37 °C)] 98.3 °F (36.8 °C)  Pulse:  [] 72  Resp:  [10-41] 28  SpO2:  [77 %-100 %] 100 %  BP: ()/() 108/66     Weight: 77.7 kg (171 lb 4.8 oz)  Body mass index is 27.65 kg/m².    Intake/Output Summary (Last 24 hours) at 9/12/2019 2151  Last data filed at 9/12/2019 2100  Gross per 24 hour   Intake 1289.4 ml   Output 6223 ml   Net -4933.6 ml      Physical Exam   Constitutional: He is oriented to person, place, and time. He appears well-developed and well-nourished.   HENT:   Head: Normocephalic and atraumatic.   Mouth/Throat: Oropharynx is clear and moist and mucous membranes are normal.   Eyes: Conjunctivae and EOM are normal.   Cardiovascular: Normal rate and regular rhythm.   Pulmonary/Chest: Effort normal. No accessory muscle usage. He has decreased breath sounds.   Abdominal: Soft. He exhibits no distension.    Musculoskeletal: He exhibits no deformity. Edema: no pitting edema.   Neurological: He is alert and oriented to person, place, and time.   Skin: Skin is warm and dry. He is not diaphoretic.       Significant Labs:   BMP:   Recent Labs   Lab 09/12/19  1611   *      K 4.2      CO2 28   BUN 33*   CREATININE 2.2*   CALCIUM 8.6*   MG 2.7*     CBC:   Recent Labs   Lab 09/11/19  0143 09/12/19  0503   WBC 14.41* 15.27*   HGB 14.1 14.8   HCT 42.4 43.7    206     Lactic Acid: No results for input(s): LACTATE in the last 48 hours.  Magnesium:   Recent Labs   Lab 09/11/19  2241 09/12/19  0949 09/12/19  1611   MG 1.8 1.9 2.7*     POCT Glucose: No results for input(s): POCTGLUCOSE in the last 48 hours.    Significant Imaging: I have reviewed and interpreted all pertinent imaging results/findings within the past 24 hours.

## 2019-09-13 NOTE — PROGRESS NOTES
"Marck Madison is a 59 y.o. male patient.  Awake and alert- on CRRT  Making more urine  On isolation now for possible TB  Scheduled Meds:   amiodarone  200 mg Oral BID    carvedilol  6.25 mg Oral BID    famotidine  20 mg Oral Daily    heparin (PORCINE)  60 Units/kg (Adjusted) Intravenous Once    levalbuterol  0.63 mg Nebulization Q8H    metOLazone  5 mg Oral Daily    piperacillin-tazobactam 4.5 g in sodium chloride 0.9% 100 mL IVPB (ready to mix system)  4.5 g Intravenous Q12H    senna-docusate 8.6-50 mg  1 tablet Oral BID    vancomycin (VANCOCIN) IVPB  500 mg Intravenous Q24H       Review of patient's allergies indicates:  No Known Allergies    Past Medical History:   Diagnosis Date    Arrhythmia 2017    aflutter    CAD (coronary artery disease)     CHF (congestive heart failure), NYHA class I 2017    Psychiatric disorder     h/o "schizophrena/bipolar"     Past Surgical History:   Procedure Laterality Date    LIVER SURGERY      abscess drainage; 1970s      reports that he has quit smoking. His smoking use included cigarettes. He has a 2.50 pack-year smoking history. He does not have any smokeless tobacco history on file. He reports that he does not drink alcohol or use drugs.   History reviewed. No pertinent family history.       Vital Signs Range (Last 24H):  Temp:  [98.2 °F (36.8 °C)-98.6 °F (37 °C)]   Pulse:  []   Resp:  [10-41]   BP: ()/()   SpO2:  [77 %-100 %]     I & O (Last 24H):    Intake/Output Summary (Last 24 hours) at 9/12/2019 2242  Last data filed at 9/12/2019 2200  Gross per 24 hour   Intake 1272.7 ml   Output 6259 ml   Net -4986.3 ml           Physical Exam:  Constitutional: He is oriented to person, place, and time. He appears well-developed and well-nourished.   HENT:   Head: Normocephalic and atraumatic.   Mouth/Throat: Oropharynx is clear and moist and mucous membranes are normal.   Eyes: Conjunctivae and EOM are normal.   Cardiovascular: Normal rate and regular " rhythm.   Pulmonary/Chest: Effort normal. No accessory muscle usage. He has decreased breath sounds.   Abdominal: Soft. He exhibits no distension.   Musculoskeletal: He exhibits no deformity. Edema: no pitting edema.   Neurological: He is alert and oriented to person, place, and time.   Skin: Skin is warm and dry. He is not diaphoretic.   Laboratory:  CBC:   Recent Labs   Lab 09/12/19  0503   WBC 15.27*   RBC 4.60   HGB 14.8   HCT 43.7      MCV 95   MCH 32.2*   MCHC 33.9     BMP:   Recent Labs   Lab 09/12/19  1611   *      K 4.2      CO2 28   BUN 33*   CREATININE 2.2*   CALCIUM 8.6*   MG 2.7*     ABGs:   Recent Labs   Lab 09/10/19  2202   PH 7.344*   PCO2 36.6   PO2 84   HCO3 19.9*   POCSATURATED 96   BE -5         Diagnostic Results:     FARHAN  - baseline Cr unclear. PTH mildly elevated however renal US with normal appearing kidneys  - Cr 1.6 on arrival and up to 3.6 today. Associated with decreased UOP and hypoxia  - likely ischemic ATN from prolonged hypoxia; however cannot rule out other process. Will check CPK, hepatitis studies, and other basic serologies  - UOP mildly improved with lasix 120mg IV; however still suboptimal response  - will trend renal labs q6h  - continue strict I/Os; continue ha catheter  - renally dose medications for GFR <10 for now  - avoid nephrotoxic agents  More UOP today- continue crrt tonight for metabolic alklaosis  Consider hold in am     Hyperkalemia + metabolic acidosis  - 2/2 FARHAN  - planning for medical diuresis vs RRT as above  - labs q6h  K better today     Hypervolemia  - 2/2 CHF + FARHAN  - see above     Miscellaneous - review of [facility] clinic note reported h/o liver abscess s/p drainage, aflutter, +PPD. Will check hepatitis panel and quant TB test.          Kota Baeza  9/12/2019

## 2019-09-13 NOTE — ASSESSMENT & PLAN NOTE
Likely a combination of CHF and   --CXR with bilateral pulmonary edema  --increased D dimer, no DVT on BLE US, on heparin infusion  --BIPAP PRN & QHS / vapotherm; wean to maintain SpO2 >90%   --On CRRT now

## 2019-09-13 NOTE — PROGRESS NOTES
[facility] Hospital Medicine  Progress Note    Patient Name: Marck Madison  MRN: [id number]  Patient Class: IP- Inpatient   Admission Date: 9/10/2019  Length of Stay: 2 days  Attending Physician: Seble Acevedo MD  Primary Care Provider: Primary Doctor No        Subjective:     Principal Problem:Acute respiratory failure with hypoxia        HPI:  Mr. Marck Madison is a 59 y.o. male with a vague cardiac medical history who presents to [facility] ED with complaints of dyspnea for the past day.  He reports that the dyspnea is independent of activities and is associated with a cough that is occasionally productive of yellow/green sputum.  He denies any wheezing but does admit to smoking quite heavily.  Denies any fevers, chills, chest pain, palpitations, diaphoresis, pleurisy, hemoptysis, nor any lower extremity pain or swelling he does report some nausea with vomiting any time he would try to eat.  Denies any sick contacts nor recent travel and has not experienced any PND nor orthopnea.  He does report a history of heart disease for which he has been seen at [facility], though he is unable to specify what disease he has.  His history is otherwise limited at this time due to his respiratory distress.    Overview/Hospital Course:  Pt admitted with aflutter and dyspnea/acute resp failure with hypoxia. Started on amiodarone infusion and coreg. HR improved. Heparin infusion for anticoagulation due to renal function worsening.  Cardioversion delayed due to anesthesiology requesting improved pulmonary status. Pt refused bipap and tolerating vapotherm. Pulmonology/CC, placed dialysis catheter. Lasix dose increased 120mg IV + metolazone, however continued to have poor UOP.  Plan to do CRRT per nephrology.  On CAP coverage with elevated WBC and productive cough.     9/12- successful cardioversion, switched to oral amiodarone, contact precautions while r/o TB    Interval History: pt agitated  and wants to leave, settled down after he was told his diet would be resumed     Review of Systems   Constitutional: Negative for activity change, appetite change, chills, diaphoresis, fatigue, fever and unexpected weight change.   HENT: Negative.    Eyes: Negative.    Respiratory: Positive for cough and shortness of breath. Negative for chest tightness and wheezing.    Cardiovascular: Negative for chest pain, palpitations and leg swelling.   Gastrointestinal: Negative for abdominal distention, anal bleeding, blood in stool, constipation, diarrhea, nausea and vomiting.   Genitourinary: Negative for dysuria and hematuria.   Musculoskeletal: Negative.    Skin: Negative.    Neurological: Negative for dizziness, seizures, syncope, weakness and light-headedness.   Psychiatric/Behavioral: Negative.      Objective:     Vital Signs (Most Recent):  Temp: 98.3 °F (36.8 °C) (09/12/19 1915)  Pulse: 72 (09/12/19 2130)  Resp: (!) 28 (09/12/19 2130)  BP: 108/66 (09/12/19 2130)  SpO2: 100 % (09/12/19 2130) Vital Signs (24h Range):  Temp:  [98.2 °F (36.8 °C)-98.6 °F (37 °C)] 98.3 °F (36.8 °C)  Pulse:  [] 72  Resp:  [10-41] 28  SpO2:  [77 %-100 %] 100 %  BP: ()/() 108/66     Weight: 77.7 kg (171 lb 4.8 oz)  Body mass index is 27.65 kg/m².    Intake/Output Summary (Last 24 hours) at 9/12/2019 2151  Last data filed at 9/12/2019 2100  Gross per 24 hour   Intake 1289.4 ml   Output 6223 ml   Net -4933.6 ml      Physical Exam   Constitutional: He is oriented to person, place, and time. He appears well-developed and well-nourished.   HENT:   Head: Normocephalic and atraumatic.   Mouth/Throat: Oropharynx is clear and moist and mucous membranes are normal.   Eyes: Conjunctivae and EOM are normal.   Cardiovascular: Normal rate and regular rhythm.   Pulmonary/Chest: Effort normal. No accessory muscle usage. He has decreased breath sounds.   Abdominal: Soft. He exhibits no distension.   Musculoskeletal: He exhibits no  deformity. Edema: no pitting edema.   Neurological: He is alert and oriented to person, place, and time.   Skin: Skin is warm and dry. He is not diaphoretic.       Significant Labs:   BMP:   Recent Labs   Lab 09/12/19  1611   *      K 4.2      CO2 28   BUN 33*   CREATININE 2.2*   CALCIUM 8.6*   MG 2.7*     CBC:   Recent Labs   Lab 09/11/19  0143 09/12/19  0503   WBC 14.41* 15.27*   HGB 14.1 14.8   HCT 42.4 43.7    206     Lactic Acid: No results for input(s): LACTATE in the last 48 hours.  Magnesium:   Recent Labs   Lab 09/11/19  2241 09/12/19  0949 09/12/19  1611   MG 1.8 1.9 2.7*     POCT Glucose: No results for input(s): POCTGLUCOSE in the last 48 hours.    Significant Imaging: I have reviewed and interpreted all pertinent imaging results/findings within the past 24 hours.      Assessment/Plan:      * Acute respiratory failure with hypoxia  Patient presented with an initial ambient air oxygen saturation 88% which improved to 99% BPAP.  He does have evidence of volume overload.  Personally reviewed his plain chest radiograph which was significant for pulmonary edema with a probable left-sided pleural effusion and without infiltrates or consolidations.  He also has a equivocal BNP of 445 but in setting of volume overload is concerning for congestive heart failure.  He also has an elevated troponin 0.199 without chest pain. He does report a vague cardiac history for which she was evaluated at [facility]--we have no access to those records.  He has been placed on BPAP for respiratory distress and will be started on intravenous diuresis with furosemide.  Will therefore admit him to the ICU for acute respiratory failure.  Will obtain a TTE and TSH in the morning consult Cardiology for further recommendations.  His symptoms are not consistent was nephrotic syndrome.    -CHF- poor UOP on diuretics - see above  -treat for CAP  -reported h/o TB, placed on contact precautions  until ruled out   AFB pending     Atrial flutter  Improved on amiodarone  Pt has chronic afib  Heparin infusion until no further procedures indicated and can switch to oral anticoagulation   Successful CV  Oral amiodarone         Elevated troponin  He does have an elevated troponin of 0.199 without any chest pain. I personally reviewed his EKG and although the machine reads atrial flutter, I am able to discern P waves.  He continues to deny chest pain at this time.  Think the likely cause of his troponinemia is renal failure along with acute heart failure.  He has been started on aspirin and full-dose enoxaparin.  Will follow serial troponins and consult Cardiology for further recommendations.    Acute renal failure  Unfortunately did have previous lab work to which to compare to chronicity of his renal disease. His BUN/creatinine today is 28/1.6; urinalysis pending.  As he is volume overloaded we will elect to diurese as opposed to provide IV fluids.  Will check an intact PTH, vitamin-D, and bilateral renal ultrasounds.  Will also check urine studies and recheck his renal functionin the morning.    HD cath placed, CRRT per nephrology  Cr 2.2      VTE Risk Mitigation (From admission, onward)        Ordered     heparin 25,000 units in dextrose 5% (100 units/ml) IV bolus from bag INITIAL BOLUS  Once      09/10/19 1557     heparin 25,000 units in dextrose 5% 250 mL (100 units/mL) infusion LOW INTENSITY nomogram - [facility]  Continuous      09/10/19 1557     heparin 25,000 units in dextrose 5% (100 units/ml) IV bolus from bag - ADDITIONAL PRN BOLUS - 60 units/kg  As needed (PRN)      09/10/19 1557     heparin 25,000 units in dextrose 5% (100 units/ml) IV bolus from bag - ADDITIONAL PRN BOLUS - 30 units/kg  As needed (PRN)      09/10/19 1557     IP VTE HIGH RISK PATIENT  Once      09/10/19 4453          Critical care time spent on the evaluation and treatment of severe organ dysfunction, review of pertinent labs and imaging  studies, discussions with consulting providers and discussions with patient/family: 30 minutes.      Seble Acevedo MD  Department of Hospital Medicine   [facility]

## 2019-09-13 NOTE — ASSESSMENT & PLAN NOTE
Unfortunately did have previous lab work to which to compare to chronicity of his renal disease. His BUN/creatinine today is 28/1.6; urinalysis pending.  As he is volume overloaded we will elect to diurese as opposed to provide IV fluids.  Will check an intact PTH, vitamin-D, and bilateral renal ultrasounds.  Will also check urine studies and recheck his renal functionin the morning.    HD cath placed, CRRT per nephrology  Cr 2.2

## 2019-09-13 NOTE — ASSESSMENT & PLAN NOTE
Cont rhythm control.  Cont coreg.  Consider ACEi (or hydrala/imdur if renal recovery but continued CKD) if BP will allow.  Volume control per neph on CRRT.  Pt appears euvolemci on exam today.

## 2019-09-13 NOTE — CARE UPDATE
[facility]  ICU Multidisciplinary Bedside Rounds     UPDATE     Date: 9/13/2019      Plan of care reviewed with the following, Nurse, Charge Nurse, Physician, Pulm CC and Resp. Therapist.       Needs/ Goals for the day: Continue CRRT, monitor electrolytes and replace as needed, heparin gtt continued, weaning oxygen as tolerated.      Level of Care: Critical Care

## 2019-09-13 NOTE — CARE UPDATE
[facility]  ICU Multidisciplinary Bedside Rounds   SUMMARY     Date: 9/13/2019    Prehospitalization: Home  Admit Date / LOS : 9/10/2019/ 3 days    Diagnosis: Acute respiratory failure with hypoxia    Consults:        Active: Cardio, Nephro and Pulm CC       Needed: N/A     Code Status: Full Code   Advanced Directive: <no information>    LDA: Renner, PIV and Trialysis       Central Lines/Site/Justification:trialysis catheter for CRRT       Urinary Cath/Order/Justification:Critically Ill in ICU    Vasopressors/Infusions:   Heparin 9 units/hour       GOALS: Volume/ Hemodynamic: N/A                     RASS: N/A    CAM ICU: N/A  Pain Management: none       Pain Controlled: not applicable     Rhythm: NSR    Respiratory Device: Vapotherm    70%       Most Recent SBT/ SAT: N/A    VTE Prophylaxis: Pharm and Mechanical  Mobility: Bedrest  Stress Ulcer Prophylaxis: Yes    Dietary: PO  Tolerance: yes  /  Advancement: @ goal    Isolation: Airborne    Restraints: No    Significant Dates:  Post Op Date: 9/12/19 Cardioversion  Rescue Date: N/A  Imaging/ Diagnostics: N/A    Noteworthy Labs:  PTT 49.2, BUN 33,  CREAT 2.4, , AST 83    CBC/Anemia Labs: Coags:    Recent Labs   Lab 09/11/19  0143 09/12/19  0503 09/13/19  0305   WBC 14.41* 15.27* 10.85   HGB 14.1 14.8 14.3   HCT 42.4 43.7 42.6    206 171   MCV 96 95 96   RDW 14.6* 14.5 14.3    Recent Labs   Lab 09/10/19  1746  09/11/19  2241 09/12/19  0503 09/13/19  0305   INR 1.8*  --   --   --   --    APTT 64.4*   < > 59.9* 42.9* 49.2*    < > = values in this interval not displayed.        Chemistries:   Recent Labs   Lab 09/11/19  0748  09/12/19  0503 09/12/19  0948 09/12/19  0949 09/12/19  1611 09/13/19  0008 09/13/19  0305      < > 136 137  --  137 137 136   K 5.0   < > 4.6 3.6  --  4.2 3.8 4.0      < > 97 99  --  100 100 100   CO2 18*   < > 27 29  --  28 27 29   BUN 48*   < > 46* 37*  --  33* 34* 33*   CREATININE 3.6*   < > 3.0* 2.2*  --   2.2* 2.3* 2.4*   CALCIUM 8.5*   < > 8.4* 8.5*  --  8.6* 8.1* 8.6*   PROT 6.7  --  7.2  --   --   --   --  6.8   BILITOT 1.0  --  0.9  --   --   --   --  0.9   ALKPHOS 67  --  69  --   --   --   --  79   ALT 45*  --  102*  --   --   --   --  100*   AST 74*  --  158*  --   --   --   --  83*   MG  --    < >  --   --  1.9 2.7* 2.4  --    PHOS  --    < >  --  3.0  --  5.3* 3.1  --     < > = values in this interval not displayed.        Cardiac Enzymes: Ejection Fractions:    Recent Labs     09/10/19  0656 09/10/19  1335 09/11/19  1611   CPK  --   --  239*   TROPONINI 0.174* 0.210*  --     No results found for: EF     POCT Glucose: HbA1c:    No results for input(s): POCTGLUCOSE in the last 168 hours. No results found for: HGBA1C     Needs from Care Team: none     ICU LOS 3d  Level of Care: Critical Care

## 2019-09-13 NOTE — PROGRESS NOTES
[facility]  Cardiology  Progress Note    Patient Name: Marck Madison  MRN: [id number]  Admission Date: 9/10/2019  Hospital Length of Stay: 3 days  Code Status: Full Code   Attending Physician: Seble Acevedo MD   Primary Care Physician: Primary Doctor No  Expected Discharge Date:   Principal Problem:Acute respiratory failure with hypoxia    Subjective:     Hospital Course:   9/11 Still with A-flutter 2:1 on IV amiodarone - rates .   9/12: ESTEBAN/DCCV AFL->NSR    Interval Hx: pt seen in ICU, case d/w RN.  No events overnight, remains on CRRT.  Denies cp/sob/palps/LH.  Complains of back pain, reported to RN staff.    Tele: SR 60s (personally reviewed and interpreted)        Review of Systems   Gastrointestinal: Negative for melena.   Genitourinary: Negative for hematuria.     Objective:     Vital Signs (Most Recent):  Temp: 98.1 °F (36.7 °C) (09/13/19 0315)  Pulse: 71 (09/13/19 0630)  Resp: 20 (09/13/19 0630)  BP: 110/75 (09/13/19 0600)  SpO2: 100 % (09/13/19 0630) Vital Signs (24h Range):  Temp:  [98.1 °F (36.7 °C)-98.6 °F (37 °C)] 98.1 °F (36.7 °C)  Pulse:  [] 71  Resp:  [10-41] 20  SpO2:  [77 %-100 %] 100 %  BP: ()/() 110/75     Weight: 73.7 kg (162 lb 7.7 oz)  Body mass index is 26.22 kg/m².     SpO2: 100 %  O2 Device (Oxygen Therapy): Vapotherm      Intake/Output Summary (Last 24 hours) at 9/13/2019 0648  Last data filed at 9/13/2019 0600  Gross per 24 hour   Intake 1379.1 ml   Output 5720 ml   Net -4340.9 ml       Lines/Drains/Airways     Central Venous Catheter Line                 Trialysis (Dialysis) Catheter 09/11/19 1555 right internal jugular 1 day          Drain                 Urethral Catheter 09/10/19 1257 16 Fr. 2 days          Peripheral Intravenous Line                 Peripheral IV - Single Lumen 09/10/19 0035 20 G Left Wrist 3 days         Peripheral IV - Single Lumen 09/10/19 0410 18 G Right Hand 3 days                Physical Exam   Constitutional: He is  oriented to person, place, and time. He appears well-developed and well-nourished.   HENT:   Head: Normocephalic and atraumatic.   Eyes: Pupils are equal, round, and reactive to light. Conjunctivae and EOM are normal. No scleral icterus.   Neck: Normal range of motion. Neck supple. No JVD present. No tracheal deviation present. No thyromegaly present.   Cardiovascular: Normal rate, regular rhythm, S1 normal and S2 normal. Exam reveals distant heart sounds. Exam reveals no gallop and no friction rub.   No murmur heard.  Pulmonary/Chest: Effort normal and breath sounds normal. No respiratory distress. He has no wheezes. He has no rales. He exhibits no tenderness.   Abdominal: Soft. He exhibits no distension.   Musculoskeletal: He exhibits no edema.   Neurological: He is alert and oriented to person, place, and time. He has normal strength. No cranial nerve deficit.   Skin: Skin is warm and dry. No rash noted.   Psychiatric: He has a normal mood and affect. His behavior is normal.       Current Medications:   amiodarone  200 mg Oral BID    carvedilol  6.25 mg Oral BID    famotidine  20 mg Oral Daily    heparin (PORCINE)  60 Units/kg (Adjusted) Intravenous Once    levalbuterol  0.63 mg Nebulization Q8H    metOLazone  5 mg Oral Daily    piperacillin-tazobactam 4.5 g in sodium chloride 0.9% 100 mL IVPB (ready to mix system)  4.5 g Intravenous Q12H    senna-docusate 8.6-50 mg  1 tablet Oral BID    vancomycin (VANCOCIN) IVPB  500 mg Intravenous Q24H      heparin (porcine) in D5W 8.986 Units/kg/hr (09/13/19 0600)     acetaminophen, acetaminophen, heparin (PORCINE), heparin (PORCINE), hydrALAZINE, magnesium sulfate IVPB, OLANZapine, ondansetron, promethazine (PHENERGAN) IVPB, ramelteon, sodium chloride 0.9%, sodium chloride 0.9%, sodium phosphate IVPB, sodium phosphate IVPB, sodium phosphate IVPB    Laboratory (all labs reviewed):  CBC:  Recent Labs   Lab 09/10/19  0656 09/10/19  1746 09/11/19  0143 09/12/19  0503  09/13/19  0305   WBC 11.60 16.99 H 14.41 H 15.27 H 10.85   Hemoglobin 14.6 14.7 14.1 14.8 14.3   Hematocrit 44.3 44.7 42.4 43.7 42.6   Platelets 196 191 166 206 171       CHEMISTRIES:  Recent Labs   Lab 09/11/19  1611 09/11/19  2241 09/12/19  0503 09/12/19  0948 09/12/19  0949 09/12/19  1611 09/13/19  0008 09/13/19  0305   Glucose 180 H  179 H 145 H 114 H 110  --  136 H 161 H 151 H   Sodium 135 L  135 L 134 L 136 137  --  137 137 136   Potassium 4.0  4.0 4.2 4.6 3.6  --  4.2 3.8 4.0   BUN, Bld 56 H  56 H 53 H 46 H 37 H  --  33 H 34 H 33 H   Creatinine 3.7 H  3.7 H 3.4 H 3.0 H 2.2 H  --  2.2 H 2.3 H 2.4 H   eGFR if  19 A  19 A 21 A 25 A 36 A  --  36 A 34 A 32 A   eGFR if non  17 A  17 A 18 A 21 A 31 A  --  31 A 30 A 28 A   Calcium 8.1 L  8.1 L 8.2 L 8.4 L 8.5 L  --  8.6 L 8.1 L 8.6 L   Magnesium 1.8 1.8  --   --  1.9 2.7 H 2.4  --        CARDIAC BIOMARKERS:  Recent Labs   Lab 09/10/19  0032 09/10/19  0350 09/10/19  0656 09/10/19  1335 09/11/19  1611   CPK  --   --   --   --  239 H   Troponin I 0.199 H 0.188 H 0.174 H 0.210 H  --        COAGS:  Recent Labs   Lab 09/10/19  1746   INR 1.8 H       LIPIDS/LFTS:  Recent Labs   Lab 09/10/19  0032 09/10/19  0656 09/11/19  0748 09/12/19  0503 09/13/19  0305   AST 33 26 74 H 158 H 83 H   ALT 24 21 45 H 102 H 100 H       BNP:  Recent Labs   Lab 09/10/19  0032    H       TSH:  Recent Labs   Lab 09/10/19  0656   TSH 1.022       Free T4:        Diagnostic Results:  ECG (personally reviewed and interpreted tracing(s)):  9/12/19 1414 SR 82, PRWP, ?IMI-age indet, lat ST abnl ?isch    ESTEBAN/DCCV 9/12/19  · Moderately decreased left ventricular systolic function. The estimated ejection fraction is 30%  · Left ventricular diastolic dysfunction.  · Normal right ventricular systolic function.  · Moderate left atrial enlargement.  · Normal appearing left atrial appendage. No thrombus is present in the appendage.  · Moderate right atrial  enlargement.  · Mild mitral regurgitation.  · Mild tricuspid regurgitation.  · A synchronized cardioversion was successfully performed with restoration of normal sinus rhythm.    Echo: 9/10/19  · Moderately decreased left ventricular systolic function. The estimated ejection fraction is 30%  · Concentric left ventricular hypertrophy.  · Left ventricular diastolic dysfunction.  · Normal right ventricular systolic function.  · Severe left atrial enlargement.  · Moderate right atrial enlargement.  · Mild-to-moderate mitral regurgitation.  · Mild tricuspid regurgitation.  · The estimated PA systolic pressure is 33 mm Hg        Assessment and Plan:     * Acute respiratory failure with hypoxia  Per IM/pulm, now off CPAP.  Seems to be improving.    Acute renal failure  Per IM/neph, on CRRT.    Atrial flutter  In 2:1 AFL on admission, now s/p ESTEBAN/DCCV 9/12/19 and in SR on amio po.  Unclear if this a new Dx. Echo with EF 30% - possible tachycardic CM.  Cont amio po.  Cont heparin gtt until no further invasive procedures (?needs vascath), then switch to NOAC (eliquis 5mg bid).    Elevated troponin  Likely demand ischemia from tachycardia and CHF.   Consider ischemic evaluation as outpatient pending clinical course.    Dilated cardiomyopathy  Cont rhythm control.  Cont coreg.  Consider ACEi (or hydrala/imdur if renal recovery but continued CKD) if BP will allow.  Volume control per neph on CRRT.  Pt appears euvolemci on exam today.        VTE Risk Mitigation (From admission, onward)        Ordered     heparin 25,000 units in dextrose 5% (100 units/ml) IV bolus from bag INITIAL BOLUS  Once      09/10/19 1557     heparin 25,000 units in dextrose 5% 250 mL (100 units/mL) infusion LOW INTENSITY nomogram - [facility]  Continuous      09/10/19 1557     heparin 25,000 units in dextrose 5% (100 units/ml) IV bolus from bag - ADDITIONAL PRN BOLUS - 60 units/kg  As needed (PRN)      09/10/19 1557     heparin 25,000 units in dextrose 5% (100  units/ml) IV bolus from bag - ADDITIONAL PRN BOLUS - 30 units/kg  As needed (PRN)      09/10/19 1557     IP VTE HIGH RISK PATIENT  Once      09/10/19 0409        Pt seen in ICU, critical care time 30min.  Cardiology will sign off, pls call with questions.  Pt to follow up with Dr. Lopez after discharge.    Trevor Schwab MD  Cardiology  [facility]

## 2019-09-13 NOTE — PLAN OF CARE
Problem: Adult Inpatient Plan of Care  Goal: Plan of Care Review  Outcome: Ongoing (interventions implemented as appropriate)  Plan of care reviewed. No falls/injuries this shift. Skin intact. Shortness of breath improved. Afebrile. Tolerating diet. Weaning oxygen. Urine output decreased. CRRT continued.

## 2019-09-13 NOTE — PROGRESS NOTES
Pharmacokinetic Assessment Follow Up: IV Vancomycin    Vancomycin serum concentration assessment(s):    The trough level was drawn correctly and can be used to guide therapy at this time. The measurement is below the desired definitive target range of 10 to 20 mcg/mL.    Vancomycin Regimen Plan:    Change regimen to Vancomycin 1000 mg IV every 24 hours with next serum trough concentration measured at 18:00 prior to 3rd  dose on 9/15/19     Drug levels (last 3 results):  Recent Labs   Lab Result Units 09/13/19  1623   Vancomycin-Trough ug/mL 4.1*       Pharmacy will continue to follow and monitor vancomycin.    Please contact pharmacy at extension [phone] for questions regarding this assessment.    Thank you for the consult,   Heather Weaver       Patient brief summary:  Marck Madison is a 59 y.o. male initiated on antimicrobial therapy with IV Vancomycin for treatment of pneumonia         Drug Allergies:   Review of patient's allergies indicates:  No Known Allergies    Actual Body Weight:   73.7 kg    Renal Function:   Estimated Creatinine Clearance: 35 mL/min (A) (based on SCr of 2 mg/dL (H)).,     Dialysis Method (if applicable):      CBC (last 72 hours):  Recent Labs   Lab Result Units 09/11/19  0143 09/12/19  0503 09/13/19  0305   WBC K/uL 14.41* 15.27* 10.85   Hemoglobin g/dL 14.1 14.8 14.3   Hematocrit % 42.4 43.7 42.6   Platelets K/uL 166 206 171   Gran% % 79.6* 87.2* 81.2*   Lymph% % 12.9* 8.0* 11.0*   Mono% % 7.3 4.9 8.0   Eosinophil% % 0.1 0.1 0.2   Basophil% % 0.1 0.1 0.1   Differential Method  Automated Automated Automated       Metabolic Panel (last 72 hours):  Recent Labs   Lab Result Units 09/11/19  0748 09/11/19  1611 09/11/19  2241 09/12/19  0503 09/12/19  0948 09/12/19  0949 09/12/19  1611 09/13/19  0008 09/13/19  0305 09/13/19  0837 09/13/19  1623   Sodium mmol/L 136 135*  135* 134* 136 137  --  137 137 136 138 136   Potassium mmol/L 5.0 4.0  4.0 4.2 4.6 3.6  --  4.2 3.8 4.0 4.1 3.6   Chloride  mmol/L 102 99  98 98 97 99  --  100 100 100 101 100   CO2 mmol/L 18* 24  25 25 27 29  --  28 27 29 28 28   Glucose mg/dL 124* 180*  179* 145* 114* 110  --  136* 161* 151* 127* 133*   BUN, Bld mg/dL 48* 56*  56* 53* 46* 37*  --  33* 34* 33* 33* 31*   Creatinine mg/dL 3.6* 3.7*  3.7* 3.4* 3.0* 2.2*  --  2.2* 2.3* 2.4* 2.4* 2.0*   Albumin g/dL 3.3* 2.9* 3.0* 3.1* 2.9*  --  3.0* 2.7* 2.8* 2.9* 2.5*   Total Bilirubin mg/dL 1.0  --   --  0.9  --   --   --   --  0.9  --   --    Alkaline Phosphatase U/L 67  --   --  69  --   --   --   --  79  --   --    AST U/L 74*  --   --  158*  --   --   --   --  83*  --   --    ALT U/L 45*  --   --  102*  --   --   --   --  100*  --   --    Magnesium mg/dL  --  1.8 1.8  --   --  1.9 2.7* 2.4  --  2.5 2.5   Phosphorus mg/dL  --  5.0* 3.6  --  3.0  --  5.3* 3.1  --  2.8 3.9       Vancomycin Administrations:  vancomycin given in the last 96 hours                     vancomycin 500 mg in dextrose 5 % 100 mL IVPB (ready to mix system) (mg) 500 mg New Bag 09/12/19 1711                      Microbiologic Results:  Microbiology Results (last 7 days)       Procedure Component Value Units Date/Time    Direct AFB stain [[id number]] Collected:  09/13/19 1544    Order Status:  Sent Specimen:  Respiratory from Sputum, Expectorated Updated:  09/13/19 1627    AFB Culture & Smear [[id number]] Collected:  09/12/19 0934    Order Status:  Completed Specimen:  Sputum, Expectorated Updated:  09/13/19 1425     AFB CULTURE STAIN No acid fast bacilli seen.    Blood culture [[id number]] Collected:  09/12/19 1106    Order Status:  Completed Specimen:  Blood Updated:  09/13/19 1303     Blood Culture, Routine No Growth to date      No Growth to date    Culture, Respiratory with Gram Stain [[id number]] Collected:  09/12/19 0934    Order Status:  Completed Specimen:  Respiratory from Sputum, Expectorated Updated:  09/13/19 1010     Respiratory Culture Normal respiratory david     Gram Stain (Respiratory) >10  epithelial cells per low power field     Gram Stain (Respiratory) Few WBC's     Gram Stain (Respiratory) Moderate Gram positive cocci in clusters     Gram Stain (Respiratory) Rare Gram negative rods     Gram Stain (Respiratory) Few Gram positive rods    Blood culture [[id number]]     Order Status:  Canceled Specimen:  Blood

## 2019-09-13 NOTE — ASSESSMENT & PLAN NOTE
In 2:1 AFL on admission, now s/p ESTEBAN/DCCV 9/12/19 and in SR on amio po.  Unclear if this a new Dx. Echo with EF 30% - possible tachycardic CM.  Cont amio po.  Cont heparin gtt until no further invasive procedures (?needs vascath), then switch to NOAC (eliquis 5mg bid).

## 2019-09-13 NOTE — ASSESSMENT & PLAN NOTE
Likely demand ischemia from tachycardia and CHF.   Consider ischemic evaluation as outpatient pending clinical course.

## 2019-09-13 NOTE — PHYSICIAN QUERY
PT Name: Marck Madison  MR #: [id number]    Physician Query Form - Atrial Flutter Specificity Clarification     CDS/: Sierra Carrasco               Contact information: [email or web address]    This form is a permanent document in the medical record.     Query Date: September 13, 2019    By submitting this query, we are merely seeking further clarification of documentation. Please utilize your independent clinical judgment when addressing the question(s) below.    The medical record contains the following:   Indicators     Supporting Clinical Findings Location in Medical Record   x Atrial Flutter Atrial flutter  In 2:1 AFL on admission, now s/p ESTEBAN/DCCV 9/12/19 and in SR on amio po.  Unclear if this a new Dx. Echo with EF 30% - possible tachycardic CM.  Cont amio po.  Cont heparin gtt until no further invasive procedures (?needs vascath), then switch to NOAC (eliquis 5mg bid). Cards note 9/13    x EKG results Atrial flutter with 2 to 1 block  Nonspecific intraventricular block  Abnormal QRS-T angle, consider primary T wave abnormality  Abnormal ECG    Normal sinus rhythm with sinus arrhythmia  Possible Left atrial enlargement  Inferior infarct ,age undetermined  Anterior infarct ,age undetermined  T wave abnormality, consider lateral ischemia  Prolonged QT  Abnormal ECG EKG 9/11             EKG 9/12    x Medication amiodarone tablet 200 mg   Dose: 200 mg  Freq: 2 times daily Route: Oral  Start: 09/12/19 1430    amiodarone in dextrose 150 mg/100 mL (1.5 mg/mL) loading dose 150 mg   Dose: 150 mg  Freq: Once Route: IV  Start: 09/10/19 0830 End: 09/10/19 0902    amiodarone 360 mg/200 mL (1.8 mg/mL) infusion   Rate: 16.7 mL/hr Dose: 0.5 mg/min  Freq: Continuous Route: IV  Start: 09/10/19 1445 End: 09/12/19 1416 MAR           MAR             MAR    x Treatment 9/12/19 ESTEBAN/CV - no thrombus seen on ESTEBAN. CV 200J converted A-flutter to NSR Procedure note 9/12     Other       Provider, please further specify the Atrial Flutter  diagnosis.    [ x  ] Atypical   [   ] Type I   [   ] Type II   [   ] Typical   [   ] Other (please specify):   [  ] Clinically Undetermined         Please document in your progress notes daily for the duration of treatment until resolved, and include in your discharge summary.

## 2019-09-13 NOTE — SUBJECTIVE & OBJECTIVE
Review of Systems   Gastrointestinal: Negative for melena.   Genitourinary: Negative for hematuria.     Objective:     Vital Signs (Most Recent):  Temp: 98.1 °F (36.7 °C) (09/13/19 0315)  Pulse: 71 (09/13/19 0630)  Resp: 20 (09/13/19 0630)  BP: 110/75 (09/13/19 0600)  SpO2: 100 % (09/13/19 0630) Vital Signs (24h Range):  Temp:  [98.1 °F (36.7 °C)-98.6 °F (37 °C)] 98.1 °F (36.7 °C)  Pulse:  [] 71  Resp:  [10-41] 20  SpO2:  [77 %-100 %] 100 %  BP: ()/() 110/75     Weight: 73.7 kg (162 lb 7.7 oz)  Body mass index is 26.22 kg/m².     SpO2: 100 %  O2 Device (Oxygen Therapy): Vapotherm      Intake/Output Summary (Last 24 hours) at 9/13/2019 0648  Last data filed at 9/13/2019 0600  Gross per 24 hour   Intake 1379.1 ml   Output 5720 ml   Net -4340.9 ml       Lines/Drains/Airways     Central Venous Catheter Line                 Trialysis (Dialysis) Catheter 09/11/19 1555 right internal jugular 1 day          Drain                 Urethral Catheter 09/10/19 1257 16 Fr. 2 days          Peripheral Intravenous Line                 Peripheral IV - Single Lumen 09/10/19 0035 20 G Left Wrist 3 days         Peripheral IV - Single Lumen 09/10/19 0410 18 G Right Hand 3 days                Physical Exam   Constitutional: He is oriented to person, place, and time. He appears well-developed and well-nourished.   HENT:   Head: Normocephalic and atraumatic.   Eyes: Pupils are equal, round, and reactive to light. Conjunctivae and EOM are normal. No scleral icterus.   Neck: Normal range of motion. Neck supple. No JVD present. No tracheal deviation present. No thyromegaly present.   Cardiovascular: Normal rate, regular rhythm, S1 normal and S2 normal. Exam reveals distant heart sounds. Exam reveals no gallop and no friction rub.   No murmur heard.  Pulmonary/Chest: Effort normal and breath sounds normal. No respiratory distress. He has no wheezes. He has no rales. He exhibits no tenderness.   Abdominal: Soft. He exhibits  no distension.   Musculoskeletal: He exhibits no edema.   Neurological: He is alert and oriented to person, place, and time. He has normal strength. No cranial nerve deficit.   Skin: Skin is warm and dry. No rash noted.   Psychiatric: He has a normal mood and affect. His behavior is normal.       Current Medications:   amiodarone  200 mg Oral BID    carvedilol  6.25 mg Oral BID    famotidine  20 mg Oral Daily    heparin (PORCINE)  60 Units/kg (Adjusted) Intravenous Once    levalbuterol  0.63 mg Nebulization Q8H    metOLazone  5 mg Oral Daily    piperacillin-tazobactam 4.5 g in sodium chloride 0.9% 100 mL IVPB (ready to mix system)  4.5 g Intravenous Q12H    senna-docusate 8.6-50 mg  1 tablet Oral BID    vancomycin (VANCOCIN) IVPB  500 mg Intravenous Q24H      heparin (porcine) in D5W 8.986 Units/kg/hr (09/13/19 0600)     acetaminophen, acetaminophen, heparin (PORCINE), heparin (PORCINE), hydrALAZINE, magnesium sulfate IVPB, OLANZapine, ondansetron, promethazine (PHENERGAN) IVPB, ramelteon, sodium chloride 0.9%, sodium chloride 0.9%, sodium phosphate IVPB, sodium phosphate IVPB, sodium phosphate IVPB    Laboratory (all labs reviewed):  CBC:  Recent Labs   Lab 09/10/19  0656 09/10/19  1746 09/11/19  0143 09/12/19  0503 09/13/19  0305   WBC 11.60 16.99 H 14.41 H 15.27 H 10.85   Hemoglobin 14.6 14.7 14.1 14.8 14.3   Hematocrit 44.3 44.7 42.4 43.7 42.6   Platelets 196 191 166 206 171       CHEMISTRIES:  Recent Labs   Lab 09/11/19  1611 09/11/19  2241 09/12/19  0503 09/12/19  0948 09/12/19  0949 09/12/19  1611 09/13/19  0008 09/13/19  0305   Glucose 180 H  179 H 145 H 114 H 110  --  136 H 161 H 151 H   Sodium 135 L  135 L 134 L 136 137  --  137 137 136   Potassium 4.0  4.0 4.2 4.6 3.6  --  4.2 3.8 4.0   BUN, Bld 56 H  56 H 53 H 46 H 37 H  --  33 H 34 H 33 H   Creatinine 3.7 H  3.7 H 3.4 H 3.0 H 2.2 H  --  2.2 H 2.3 H 2.4 H   eGFR if  19 A  19 A 21 A 25 A 36 A  --  36 A 34 A 32 A   eGFR if  non  17 A  17 A 18 A 21 A 31 A  --  31 A 30 A 28 A   Calcium 8.1 L  8.1 L 8.2 L 8.4 L 8.5 L  --  8.6 L 8.1 L 8.6 L   Magnesium 1.8 1.8  --   --  1.9 2.7 H 2.4  --        CARDIAC BIOMARKERS:  Recent Labs   Lab 09/10/19  0032 09/10/19  0350 09/10/19  0656 09/10/19  1335 09/11/19  1611   CPK  --   --   --   --  239 H   Troponin I 0.199 H 0.188 H 0.174 H 0.210 H  --        COAGS:  Recent Labs   Lab 09/10/19  1746   INR 1.8 H       LIPIDS/LFTS:  Recent Labs   Lab 09/10/19  0032 09/10/19  0656 09/11/19  0748 09/12/19  0503 09/13/19  0305   AST 33 26 74 H 158 H 83 H   ALT 24 21 45 H 102 H 100 H       BNP:  Recent Labs   Lab 09/10/19  0032    H       TSH:  Recent Labs   Lab 09/10/19  0656   TSH 1.022       Free T4:        Diagnostic Results:  ECG (personally reviewed and interpreted tracing(s)):  9/12/19 1414 SR 82, PRWP, ?IMI-age indet, lat ST abnl ?isch    ESTEBAN/DCCV 9/12/19  · Moderately decreased left ventricular systolic function. The estimated ejection fraction is 30%  · Left ventricular diastolic dysfunction.  · Normal right ventricular systolic function.  · Moderate left atrial enlargement.  · Normal appearing left atrial appendage. No thrombus is present in the appendage.  · Moderate right atrial enlargement.  · Mild mitral regurgitation.  · Mild tricuspid regurgitation.  · A synchronized cardioversion was successfully performed with restoration of normal sinus rhythm.    Echo: 9/10/19  · Moderately decreased left ventricular systolic function. The estimated ejection fraction is 30%  · Concentric left ventricular hypertrophy.  · Left ventricular diastolic dysfunction.  · Normal right ventricular systolic function.  · Severe left atrial enlargement.  · Moderate right atrial enlargement.  · Mild-to-moderate mitral regurgitation.  · Mild tricuspid regurgitation.  · The estimated PA systolic pressure is 33 mm Hg

## 2019-09-13 NOTE — PLAN OF CARE
Problem: Adult Inpatient Plan of Care  Goal: Plan of Care Review  Outcome: Ongoing (interventions implemented as appropriate)  Remains in ICU w/ CRRT w/ 4K bath  in progress via R IJ trialysis cathetr, no complications. Heparin remains infusing at 9 units/hour, no change in rate due to PTT 49.2 , next check at 0900. Urinary output decreased overnight. Tolerated Vapotherm oxygen overnight. BM X 1 this morning. Chlorhexadine bath given. Skin intact. Remains free of falls and injuries.

## 2019-09-14 LAB
ACID FAST MOD KINY STN SPEC: NORMAL
ALBUMIN SERPL BCP-MCNC: 2.3 G/DL (ref 3.5–5.2)
ALBUMIN SERPL BCP-MCNC: 2.4 G/DL (ref 3.5–5.2)
ALBUMIN SERPL BCP-MCNC: 2.5 G/DL (ref 3.5–5.2)
ALP SERPL-CCNC: 71 U/L (ref 55–135)
ALT SERPL W/O P-5'-P-CCNC: 72 U/L (ref 10–44)
ANION GAP SERPL CALC-SCNC: 7 MMOL/L (ref 8–16)
ANION GAP SERPL CALC-SCNC: 8 MMOL/L (ref 8–16)
ANION GAP SERPL CALC-SCNC: 9 MMOL/L (ref 8–16)
APTT BLDCRRT: 52 SEC (ref 21–32)
AST SERPL-CCNC: 41 U/L (ref 10–40)
BACTERIA SPEC AEROBE CULT: NORMAL
BASOPHILS # BLD AUTO: 0.03 K/UL (ref 0–0.2)
BASOPHILS NFR BLD: 0.3 % (ref 0–1.9)
BILIRUB SERPL-MCNC: 0.6 MG/DL (ref 0.1–1)
BUN SERPL-MCNC: 28 MG/DL (ref 8–23)
BUN SERPL-MCNC: 28 MG/DL (ref 8–23)
BUN SERPL-MCNC: 30 MG/DL (ref 8–23)
CALCIUM SERPL-MCNC: 8.1 MG/DL (ref 8.7–10.5)
CALCIUM SERPL-MCNC: 8.2 MG/DL (ref 8.7–10.5)
CALCIUM SERPL-MCNC: 8.4 MG/DL (ref 8.7–10.5)
CHLORIDE SERPL-SCNC: 100 MMOL/L (ref 95–110)
CHLORIDE SERPL-SCNC: 101 MMOL/L (ref 95–110)
CHLORIDE SERPL-SCNC: 102 MMOL/L (ref 95–110)
CO2 SERPL-SCNC: 25 MMOL/L (ref 23–29)
CO2 SERPL-SCNC: 28 MMOL/L (ref 23–29)
CO2 SERPL-SCNC: 28 MMOL/L (ref 23–29)
CREAT SERPL-MCNC: 1.7 MG/DL (ref 0.5–1.4)
CREAT SERPL-MCNC: 1.8 MG/DL (ref 0.5–1.4)
CREAT SERPL-MCNC: 1.8 MG/DL (ref 0.5–1.4)
DIFFERENTIAL METHOD: ABNORMAL
EOSINOPHIL # BLD AUTO: 0.1 K/UL (ref 0–0.5)
EOSINOPHIL NFR BLD: 1.2 % (ref 0–8)
ERYTHROCYTE [DISTWIDTH] IN BLOOD BY AUTOMATED COUNT: 14.2 % (ref 11.5–14.5)
EST. GFR  (AFRICAN AMERICAN): 46 ML/MIN/1.73 M^2
EST. GFR  (AFRICAN AMERICAN): 46 ML/MIN/1.73 M^2
EST. GFR  (AFRICAN AMERICAN): 49 ML/MIN/1.73 M^2
EST. GFR  (NON AFRICAN AMERICAN): 40 ML/MIN/1.73 M^2
EST. GFR  (NON AFRICAN AMERICAN): 40 ML/MIN/1.73 M^2
EST. GFR  (NON AFRICAN AMERICAN): 43 ML/MIN/1.73 M^2
GLUCOSE SERPL-MCNC: 119 MG/DL (ref 70–110)
GLUCOSE SERPL-MCNC: 123 MG/DL (ref 70–110)
GLUCOSE SERPL-MCNC: 137 MG/DL (ref 70–110)
GRAM STN SPEC: NORMAL
HCT VFR BLD AUTO: 39.5 % (ref 40–54)
HGB BLD-MCNC: 13.1 G/DL (ref 14–18)
LYMPHOCYTES # BLD AUTO: 1.9 K/UL (ref 1–4.8)
LYMPHOCYTES NFR BLD: 19.9 % (ref 18–48)
MAGNESIUM SERPL-MCNC: 2.2 MG/DL (ref 1.6–2.6)
MAGNESIUM SERPL-MCNC: 2.4 MG/DL (ref 1.6–2.6)
MCH RBC QN AUTO: 31.6 PG (ref 27–31)
MCHC RBC AUTO-ENTMCNC: 33.2 G/DL (ref 32–36)
MCV RBC AUTO: 95 FL (ref 82–98)
MONOCYTES # BLD AUTO: 0.8 K/UL (ref 0.3–1)
MONOCYTES NFR BLD: 8.1 % (ref 4–15)
NEUTROPHILS # BLD AUTO: 6.7 K/UL (ref 1.8–7.7)
NEUTROPHILS NFR BLD: 70.5 % (ref 38–73)
PHOSPHATE SERPL-MCNC: 2.5 MG/DL (ref 2.7–4.5)
PHOSPHATE SERPL-MCNC: 3.8 MG/DL (ref 2.7–4.5)
PLATELET # BLD AUTO: 145 K/UL (ref 150–350)
PMV BLD AUTO: 11.3 FL (ref 9.2–12.9)
POTASSIUM SERPL-SCNC: 3.5 MMOL/L (ref 3.5–5.1)
POTASSIUM SERPL-SCNC: 3.6 MMOL/L (ref 3.5–5.1)
POTASSIUM SERPL-SCNC: 3.7 MMOL/L (ref 3.5–5.1)
PROT SERPL-MCNC: 6.4 G/DL (ref 6–8.4)
RBC # BLD AUTO: 4.15 M/UL (ref 4.6–6.2)
SODIUM SERPL-SCNC: 136 MMOL/L (ref 136–145)
WBC # BLD AUTO: 9.63 K/UL (ref 3.9–12.7)

## 2019-09-14 PROCEDURE — 63600175 PHARM REV CODE 636 W HCPCS: Performed by: HOSPITALIST

## 2019-09-14 PROCEDURE — 27100171 HC OXYGEN HIGH FLOW UP TO 24 HOURS

## 2019-09-14 PROCEDURE — 25000003 PHARM REV CODE 250: Performed by: INTERNAL MEDICINE

## 2019-09-14 PROCEDURE — 83735 ASSAY OF MAGNESIUM: CPT | Mod: 91

## 2019-09-14 PROCEDURE — 94761 N-INVAS EAR/PLS OXIMETRY MLT: CPT

## 2019-09-14 PROCEDURE — 85730 THROMBOPLASTIN TIME PARTIAL: CPT

## 2019-09-14 PROCEDURE — 36415 COLL VENOUS BLD VENIPUNCTURE: CPT

## 2019-09-14 PROCEDURE — 25000242 PHARM REV CODE 250 ALT 637 W/ HCPCS: Performed by: HOSPITALIST

## 2019-09-14 PROCEDURE — 83735 ASSAY OF MAGNESIUM: CPT

## 2019-09-14 PROCEDURE — 63600175 PHARM REV CODE 636 W HCPCS: Performed by: INTERNAL MEDICINE

## 2019-09-14 PROCEDURE — 25000003 PHARM REV CODE 250: Performed by: HOSPITALIST

## 2019-09-14 PROCEDURE — 85025 COMPLETE CBC W/AUTO DIFF WBC: CPT

## 2019-09-14 PROCEDURE — 80053 COMPREHEN METABOLIC PANEL: CPT

## 2019-09-14 PROCEDURE — 63600175 PHARM REV CODE 636 W HCPCS: Performed by: NURSE PRACTITIONER

## 2019-09-14 PROCEDURE — 80069 RENAL FUNCTION PANEL: CPT

## 2019-09-14 PROCEDURE — 20000000 HC ICU ROOM

## 2019-09-14 PROCEDURE — 80069 RENAL FUNCTION PANEL: CPT | Mod: 91

## 2019-09-14 PROCEDURE — 94640 AIRWAY INHALATION TREATMENT: CPT

## 2019-09-14 RX ADMIN — AMIODARONE HYDROCHLORIDE 200 MG: 200 TABLET ORAL at 09:09

## 2019-09-14 RX ADMIN — METOLAZONE 5 MG: 5 TABLET ORAL at 08:09

## 2019-09-14 RX ADMIN — HEPARIN SODIUM 5000 UNITS: 5000 INJECTION, SOLUTION INTRAVENOUS; SUBCUTANEOUS at 07:09

## 2019-09-14 RX ADMIN — SODIUM PHOSPHATE, MONOBASIC, MONOHYDRATE 30 MMOL: 276; 142 INJECTION, SOLUTION INTRAVENOUS at 02:09

## 2019-09-14 RX ADMIN — OXYCODONE HYDROCHLORIDE AND ACETAMINOPHEN 1 TABLET: 5; 325 TABLET ORAL at 08:09

## 2019-09-14 RX ADMIN — HEPARIN SODIUM 9 UNITS/KG/HR: 10000 INJECTION, SOLUTION INTRAVENOUS at 02:09

## 2019-09-14 RX ADMIN — LEVALBUTEROL HYDROCHLORIDE 0.63 MG: 0.63 SOLUTION RESPIRATORY (INHALATION) at 03:09

## 2019-09-14 RX ADMIN — VANCOMYCIN HYDROCHLORIDE 1000 MG: 1 INJECTION, POWDER, LYOPHILIZED, FOR SOLUTION INTRAVENOUS at 06:09

## 2019-09-14 RX ADMIN — OXYCODONE HYDROCHLORIDE AND ACETAMINOPHEN 1 TABLET: 5; 325 TABLET ORAL at 12:09

## 2019-09-14 RX ADMIN — PIPERACILLIN AND TAZOBACTAM 4.5 G: 4; .5 INJECTION, POWDER, LYOPHILIZED, FOR SOLUTION INTRAVENOUS; PARENTERAL at 05:09

## 2019-09-14 RX ADMIN — AMIODARONE HYDROCHLORIDE 200 MG: 200 TABLET ORAL at 08:09

## 2019-09-14 RX ADMIN — LEVALBUTEROL HYDROCHLORIDE 0.63 MG: 0.63 SOLUTION RESPIRATORY (INHALATION) at 07:09

## 2019-09-14 RX ADMIN — FAMOTIDINE 20 MG: 20 TABLET ORAL at 08:09

## 2019-09-14 RX ADMIN — OXYCODONE HYDROCHLORIDE AND ACETAMINOPHEN 1 TABLET: 5; 325 TABLET ORAL at 10:09

## 2019-09-14 RX ADMIN — CARVEDILOL 6.25 MG: 6.25 TABLET, FILM COATED ORAL at 09:09

## 2019-09-14 RX ADMIN — PIPERACILLIN AND TAZOBACTAM 4.5 G: 4; .5 INJECTION, POWDER, LYOPHILIZED, FOR SOLUTION INTRAVENOUS; PARENTERAL at 04:09

## 2019-09-14 NOTE — PLAN OF CARE
Problem: Adult Inpatient Plan of Care  Goal: Plan of Care Review  Outcome: Ongoing (interventions implemented as appropriate)  Pt remains in the ICU this shift. AOX4. CRRT stopped today, per Renal to reassess tomorrow. Vapotherm weaned to 12L 25%. Up to chair with minimal assistance. Tolerating diet. No falls, injuries or new skin breakdown, precautions maintained for all.

## 2019-09-14 NOTE — PROGRESS NOTES
"Marck Madison is a 59 y.o. male patient.  Doing well  Making urine  Off CRRT  Scheduled Meds:   amiodarone  200 mg Oral BID    carvedilol  6.25 mg Oral BID    famotidine  20 mg Oral Daily    heparin (PORCINE)  60 Units/kg (Adjusted) Intravenous Once    levalbuterol  0.63 mg Nebulization Q8H    metOLazone  5 mg Oral Daily    piperacillin-tazobactam 4.5 g in sodium chloride 0.9% 100 mL IVPB (ready to mix system)  4.5 g Intravenous Q12H    senna-docusate 8.6-50 mg  1 tablet Oral BID    vancomycin (VANCOCIN) IVPB  1,000 mg Intravenous Q24H       Review of patient's allergies indicates:  No Known Allergies    Past Medical History:   Diagnosis Date    Arrhythmia 2017    aflutter    CAD (coronary artery disease)     CHF (congestive heart failure), NYHA class I 2017    Psychiatric disorder     h/o "schizophrena/bipolar"     Past Surgical History:   Procedure Laterality Date    Cardioversion or Defibrillation  9/12/2019    Performed by Jonathan Lopez MD at [facility] CATH LAB    LIVER SURGERY      abscess drainage; 1970s    Transesophageal echo (ESTEBAN) intra-procedure log documentation N/A 9/12/2019    Performed by Jonathan Lopez MD at [facility] CATH LAB      reports that he has quit smoking. His smoking use included cigarettes. He has a 2.50 pack-year smoking history. He does not have any smokeless tobacco history on file. He reports that he does not drink alcohol or use drugs.   History reviewed. No pertinent family history.       Vital Signs Range (Last 24H):  Temp:  [96.5 °F (35.8 °C)-98.2 °F (36.8 °C)]   Pulse:  [59-75]   Resp:  [10-39]   BP: ()/(58-94)   SpO2:  [89 %-100 %]     I & O (Last 24H):    Intake/Output Summary (Last 24 hours) at 9/14/2019 1144  Last data filed at 9/14/2019 1000  Gross per 24 hour   Intake 1722.2 ml   Output 2351 ml   Net -628.8 ml           Physical Exam:  General appearance: well developed, well nourished, appears older than stated age  Lungs:  clear to auscultation bilaterally, " normal respiratory effort and normal percussion bilaterally  Heart: regular rate and rhythm, S1, S2 normal, no murmur, click, rub or gallop and normal apical impulse  Abdomen: soft, non-tender non-distented; bowel sounds normal; no masses,  no organomegaly  Extremities: no cyanosis or edema, or clubbing  Ha in place  Laboratory:  CBC:   Recent Labs   Lab 09/14/19  0210   WBC 9.63   RBC 4.15*   HGB 13.1*   HCT 39.5*   *   MCV 95   MCH 31.6*   MCHC 33.2     BMP:   Recent Labs   Lab 09/14/19  0750   *      K 3.7      CO2 25   BUN 28*   CREATININE 1.8*   CALCIUM 8.1*   MG 2.2           Diagnostic Results:      FARHAN- non oliguric now  Hold CRRT  Follow daily labs  Hopefully, will not need more HD  DC ha when possible      Kota Baeza  9/14/2019

## 2019-09-14 NOTE — PLAN OF CARE
Problem: Adult Inpatient Plan of Care  Goal: Patient-Specific Goal (Individualization)  Outcome: Ongoing (interventions implemented as appropriate)  Neb treatments given as ordered. No apparent signs of respiratory distress. On high flow O2 device saturations 97%

## 2019-09-14 NOTE — ASSESSMENT & PLAN NOTE
Patient presented with an initial ambient air oxygen saturation 88% which improved to 99% BPAP.  He does have evidence of volume overload.  Personally reviewed his plain chest radiograph which was significant for pulmonary edema with a probable left-sided pleural effusion and without infiltrates or consolidations.  He also has a equivocal BNP of 445 but in setting of volume overload is concerning for congestive heart failure.  He also has an elevated troponin 0.199 without chest pain. He does report a vague cardiac history for which she was evaluated at [facility]--we have no access to those records.  He has been placed on BPAP for respiratory distress and will be started on intravenous diuresis with furosemide.  Will therefore admit him to the ICU for acute respiratory failure.  Will obtain a TTE and TSH in the morning consult Cardiology for further recommendations.  His symptoms are not consistent was nephrotic syndrome.    -CHF- poor UOP on diuretics - see above  -treat for CAP  -reported h/o TB, placed on contact precautions until ruled out   AFB negative

## 2019-09-14 NOTE — SUBJECTIVE & OBJECTIVE
Interval History: no new complaints   Review of Systems   Constitutional: Negative for activity change, appetite change, chills, diaphoresis, fatigue, fever and unexpected weight change.   HENT: Negative.    Eyes: Negative.    Respiratory: Positive for cough and shortness of breath. Negative for chest tightness and wheezing.    Cardiovascular: Negative for chest pain, palpitations and leg swelling.   Gastrointestinal: Negative for abdominal distention, anal bleeding, blood in stool, constipation, diarrhea, nausea and vomiting.   Genitourinary: Negative for dysuria and hematuria.   Musculoskeletal: Negative.    Skin: Negative.    Neurological: Negative for dizziness, seizures, syncope, weakness and light-headedness.   Psychiatric/Behavioral: Negative.      Objective:     Vital Signs (Most Recent):  Temp: 97.2 °F (36.2 °C) (09/14/19 0301)  Pulse: 69 (09/14/19 0515)  Resp: 20 (09/14/19 0515)  BP: 123/89 (09/14/19 0515)  SpO2: 98 % (09/14/19 0515) Vital Signs (24h Range):  Temp:  [96.5 °F (35.8 °C)-97.9 °F (36.6 °C)] 97.2 °F (36.2 °C)  Pulse:  [59-75] 69  Resp:  [11-39] 20  SpO2:  [89 %-100 %] 98 %  BP: ()/(58-94) 123/89     Weight: 73.7 kg (162 lb 7.7 oz)  Body mass index is 26.22 kg/m².    Intake/Output Summary (Last 24 hours) at 9/14/2019 0534  Last data filed at 9/14/2019 0500  Gross per 24 hour   Intake 1967.6 ml   Output 2348 ml   Net -380.4 ml      Physical Exam   Constitutional: He is oriented to person, place, and time. He appears well-developed and well-nourished.   HENT:   Head: Normocephalic and atraumatic.   Mouth/Throat: Oropharynx is clear and moist and mucous membranes are normal.   Eyes: Conjunctivae and EOM are normal.   Cardiovascular: Normal rate and regular rhythm.   Pulmonary/Chest: Effort normal. No accessory muscle usage. He has decreased breath sounds.   Abdominal: Soft. He exhibits no distension.   Musculoskeletal: He exhibits no deformity. Edema: no pitting edema.   Neurological: He is  alert and oriented to person, place, and time.   Skin: Skin is warm and dry. No rash noted. He is not diaphoretic.   Nursing note and vitals reviewed.      Significant Labs: All pertinent labs within the past 24 hours have been reviewed.    Significant Imaging: I have reviewed and interpreted all pertinent imaging results/findings within the past 24 hours.

## 2019-09-14 NOTE — NURSING
0350 - Pt CRRT line clotted off. Spoke with MD Baeza concerning CRRT. MD states to restart CRRT  0430 - CRRT restarted. Will continue to monitor

## 2019-09-14 NOTE — ASSESSMENT & PLAN NOTE
Unfortunately did have previous lab work to which to compare to chronicity of his renal disease. His BUN/creatinine today is 28/1.6; urinalysis pending.  As he is volume overloaded we will elect to diurese as opposed to provide IV fluids.  Will check an intact PTH, vitamin-D, and bilateral renal ultrasounds.  Will also check urine studies and recheck his renal functionin the morning.    HD cath placed, CRRT per nephrology  Cr 2.2-2.4

## 2019-09-14 NOTE — PLAN OF CARE
Problem: Adult Inpatient Plan of Care  Goal: Plan of Care Review  Outcome: Ongoing (interventions implemented as appropriate)  Pt AAO x 4. On Vapotherm at 35%. NSR on the monitor. Afebrile. Heparin infusing at 9 units/kg/hr; aPTT therapeutic. CRRT restarted this shift. Pt tolerating well. Renner in place with minimal UO. Pt c/o back pain throughout shift; prn medication given with relief. No falls, injuries, or skin breakdown this shift. Pt updated on plan of care.

## 2019-09-14 NOTE — PLAN OF CARE
Problem: Adult Inpatient Plan of Care  Goal: Plan of Care Review  Pt on Vapotherm overnight. 20LPM, FIO2 35%. Tolerated well.

## 2019-09-14 NOTE — PLAN OF CARE
Problem: Device-Related Complication Risk (CRRT (Continuous Renal Replacement Therapy))  Goal: Safe, Effective Therapy Delivery    Intervention: Optimize Device Care and Function     09/13/19 1923   Prevent and Manage Risk of Hemorrhage   Bleeding Precautions blood pressure closely monitored;coagulation study results reviewed   Optimize Blood Flow   Circuit Management air detection alarms on;circuit line warming device in use;tubing repositioned;tubing/circuit/filter adjusted         Comments: ICU daily visit/ maintenance;  Restocked with 12 bags of 4K for the remainder of the night. Also left with 2 [id number]. DFR=2.0L/hr.

## 2019-09-14 NOTE — CARE UPDATE
[facility]  ICU Multidisciplinary Bedside Rounds   SUMMARY     Date: 9/14/2019    Prehospitalization: Home  Admit Date / LOS : 9/10/2019/ 4 days    Diagnosis: Acute respiratory failure with hypoxia    Consults:        Active: Nephro and Pulm CC       Needed: N/A     Code Status: Full Code   Advanced Directive: <no information>    LDA: Renner, PIV and Trialysis       Central Lines/Site/Justification:Patient Does Not Have Central Line       Urinary Cath/Order/Justification:Critically Ill in ICU    Vasopressors/Infusions:    heparin (porcine)      heparin (porcine) in D5W 8.986 Units/kg/hr (09/14/19 0400)          GOALS: Volume/ Hemodynamic: N/A                     RASS: 0  alert and calm    CAM ICU: Negative  Pain Management: PO       Pain Controlled: yes     Rhythm: NSR    Respiratory Device: Vapotherm    Oxygen Concentration (%):  [35-70] 35             Most Recent SBT/ SAT: N/A       MOVE Screen: PASS    VTE Prophylaxis: Pharm and Mechanical  Mobility: Bedrest  Stress Ulcer Prophylaxis: Yes    Dietary: PO  Tolerance: yes  /  Advancement: @ goal    Isolation: No active isolations    Restraints: No    Significant Dates:  Post Op Date: N/A  Rescue Date: N/A  Imaging/ Diagnostics: N/A    Noteworthy Labs:  none    CBC/Anemia Labs: Coags:    Recent Labs   Lab 09/12/19  0503 09/13/19  0305 09/14/19  0210   WBC 15.27* 10.85 9.63   HGB 14.8 14.3 13.1*   HCT 43.7 42.6 39.5*    171 145*   MCV 95 96 95   RDW 14.5 14.3 14.2    Recent Labs   Lab 09/10/19  1746  09/12/19  0503 09/13/19  0305 09/14/19  0210   INR 1.8*  --   --   --   --    APTT 64.4*   < > 42.9* 49.2* 52.0*    < > = values in this interval not displayed.        Chemistries:   Recent Labs   Lab 09/12/19  0503  09/13/19  0305 09/13/19  0837 09/13/19  1623 09/14/19  0025 09/14/19  0210      < > 136 138 136 136 136   K 4.6   < > 4.0 4.1 3.6 3.5 3.6   CL 97   < > 100 101 100 100 101   CO2 27   < > 29 28 28 28 28   BUN 46*   < > 33* 33*  31* 30* 28*   CREATININE 3.0*   < > 2.4* 2.4* 2.0* 1.8* 1.7*   CALCIUM 8.4*   < > 8.6* 8.9 8.0* 8.2* 8.4*   PROT 7.2  --  6.8  --   --   --  6.4   BILITOT 0.9  --  0.9  --   --   --  0.6   ALKPHOS 69  --  79  --   --   --  71   *  --  100*  --   --   --  72*   *  --  83*  --   --   --  41*   MG  --    < >  --  2.5 2.5 2.4  --    PHOS  --    < >  --  2.8 3.9 2.5*  --     < > = values in this interval not displayed.        Cardiac Enzymes: Ejection Fractions:    Recent Labs     09/11/19  1611   *    No results found for: EF     POCT Glucose: HbA1c:    No results for input(s): POCTGLUCOSE in the last 168 hours. No results found for: HGBA1C     Needs from Care Team: Wean oxygen as tolerated     ICU LOS 4d  Level of Care: Critical Care

## 2019-09-14 NOTE — PROGRESS NOTES
[facility] Hospital Medicine  Progress Note    Patient Name: Marck Madison  MRN: [id number]  Patient Class: IP- Inpatient   Admission Date: 9/10/2019  Length of Stay: 4 days  Attending Physician: Seble Acevedo MD  Primary Care Provider: Primary Doctor No        Subjective:     Principal Problem:Acute respiratory failure with hypoxia        HPI:  Mr. Marck Madison is a 59 y.o. male with a vague cardiac medical history who presents to [facility] ED with complaints of dyspnea for the past day.  He reports that the dyspnea is independent of activities and is associated with a cough that is occasionally productive of yellow/green sputum.  He denies any wheezing but does admit to smoking quite heavily.  Denies any fevers, chills, chest pain, palpitations, diaphoresis, pleurisy, hemoptysis, nor any lower extremity pain or swelling he does report some nausea with vomiting any time he would try to eat.  Denies any sick contacts nor recent travel and has not experienced any PND nor orthopnea.  He does report a history of heart disease for which he has been seen at [facility], though he is unable to specify what disease he has.  His history is otherwise limited at this time due to his respiratory distress.    Overview/Hospital Course:  Pt admitted with aflutter and dyspnea/acute resp failure with hypoxia. Started on amiodarone infusion and coreg. HR improved. Heparin infusion for anticoagulation due to renal function worsening.  Cardioversion delayed due to anesthesiology requesting improved pulmonary status. Pt refused bipap and tolerating vapotherm. Pulmonology/CC, placed dialysis catheter. Lasix dose increased 120mg IV + metolazone, however continued to have poor UOP.  Plan to do CRRT per nephrology.  On CAP coverage with elevated WBC and productive cough.     9/12- successful cardioversion, switched to oral amiodarone, contact precautions while r/o TB  9/13- AFB negative, dc  precautions, UOP picked up, CRRT resumed, wean O2     Interval History: no new complaints   Review of Systems   Constitutional: Negative for activity change, appetite change, chills, diaphoresis, fatigue, fever and unexpected weight change.   HENT: Negative.    Eyes: Negative.    Respiratory: Positive for cough and shortness of breath. Negative for chest tightness and wheezing.    Cardiovascular: Negative for chest pain, palpitations and leg swelling.   Gastrointestinal: Negative for abdominal distention, anal bleeding, blood in stool, constipation, diarrhea, nausea and vomiting.   Genitourinary: Negative for dysuria and hematuria.   Musculoskeletal: Negative.    Skin: Negative.    Neurological: Negative for dizziness, seizures, syncope, weakness and light-headedness.   Psychiatric/Behavioral: Negative.      Objective:     Vital Signs (Most Recent):  Temp: 97.2 °F (36.2 °C) (09/14/19 0301)  Pulse: 69 (09/14/19 0515)  Resp: 20 (09/14/19 0515)  BP: 123/89 (09/14/19 0515)  SpO2: 98 % (09/14/19 0515) Vital Signs (24h Range):  Temp:  [96.5 °F (35.8 °C)-97.9 °F (36.6 °C)] 97.2 °F (36.2 °C)  Pulse:  [59-75] 69  Resp:  [11-39] 20  SpO2:  [89 %-100 %] 98 %  BP: ()/(58-94) 123/89     Weight: 73.7 kg (162 lb 7.7 oz)  Body mass index is 26.22 kg/m².    Intake/Output Summary (Last 24 hours) at 9/14/2019 0534  Last data filed at 9/14/2019 0500  Gross per 24 hour   Intake 1967.6 ml   Output 2348 ml   Net -380.4 ml      Physical Exam   Constitutional: He is oriented to person, place, and time. He appears well-developed and well-nourished.   HENT:   Head: Normocephalic and atraumatic.   Mouth/Throat: Oropharynx is clear and moist and mucous membranes are normal.   Eyes: Conjunctivae and EOM are normal.   Cardiovascular: Normal rate and regular rhythm.   Pulmonary/Chest: Effort normal. No accessory muscle usage. He has decreased breath sounds.   Abdominal: Soft. He exhibits no distension.   Musculoskeletal: He exhibits no  deformity. Edema: no pitting edema.   Neurological: He is alert and oriented to person, place, and time.   Skin: Skin is warm and dry. No rash noted. He is not diaphoretic.   Nursing note and vitals reviewed.      Significant Labs: All pertinent labs within the past 24 hours have been reviewed.    Significant Imaging: I have reviewed and interpreted all pertinent imaging results/findings within the past 24 hours.      Assessment/Plan:      * Acute respiratory failure with hypoxia  Patient presented with an initial ambient air oxygen saturation 88% which improved to 99% BPAP.  He does have evidence of volume overload.  Personally reviewed his plain chest radiograph which was significant for pulmonary edema with a probable left-sided pleural effusion and without infiltrates or consolidations.  He also has a equivocal BNP of 445 but in setting of volume overload is concerning for congestive heart failure.  He also has an elevated troponin 0.199 without chest pain. He does report a vague cardiac history for which she was evaluated at [facility]--we have no access to those records.  He has been placed on BPAP for respiratory distress and will be started on intravenous diuresis with furosemide.  Will therefore admit him to the ICU for acute respiratory failure.  Will obtain a TTE and TSH in the morning consult Cardiology for further recommendations.  His symptoms are not consistent was nephrotic syndrome.    -CHF- poor UOP on diuretics - see above  -treat for CAP  -reported h/o TB, placed on contact precautions until ruled out   AFB negative     Atrial flutter  Improved on amiodarone  Pt has chronic afib  Heparin infusion until no further procedures indicated and can switch to oral anticoagulation   Successful CV  Oral amiodarone         Elevated troponin  He does have an elevated troponin of 0.199 without any chest pain. I personally reviewed his EKG and although the machine reads atrial flutter, I  am able to discern P waves.  He continues to deny chest pain at this time.  Think the likely cause of his troponinemia is renal failure along with acute heart failure.  He has been started on aspirin and full-dose enoxaparin.  Will follow serial troponins and consult Cardiology for further recommendations.    Acute renal failure  Unfortunately did have previous lab work to which to compare to chronicity of his renal disease. His BUN/creatinine today is 28/1.6; urinalysis pending.  As he is volume overloaded we will elect to diurese as opposed to provide IV fluids.  Will check an intact PTH, vitamin-D, and bilateral renal ultrasounds.  Will also check urine studies and recheck his renal functionin the morning.    HD cath placed, CRRT per nephrology  Cr 2.2-2.4      VTE Risk Mitigation (From admission, onward)        Ordered     heparin (porcine) injection 5,000 Units  Continuous PRN      09/13/19 0808     heparin 25,000 units in dextrose 5% (100 units/ml) IV bolus from bag INITIAL BOLUS  Once      09/10/19 1557     heparin 25,000 units in dextrose 5% 250 mL (100 units/mL) infusion LOW INTENSITY nomogram - [facility]  Continuous      09/10/19 1557     heparin 25,000 units in dextrose 5% (100 units/ml) IV bolus from bag - ADDITIONAL PRN BOLUS - 60 units/kg  As needed (PRN)      09/10/19 1557     heparin 25,000 units in dextrose 5% (100 units/ml) IV bolus from bag - ADDITIONAL PRN BOLUS - 30 units/kg  As needed (PRN)      09/10/19 1557     IP VTE HIGH RISK PATIENT  Once      09/10/19 0409          Critical care time spent on the evaluation and treatment of severe organ dysfunction, review of pertinent labs and imaging studies, discussions with consulting providers and discussions with patient/family: 30 minutes.      Seble Acevedo MD  Department of Hospital Medicine   [facility]

## 2019-09-14 NOTE — NURSING
0700 - Pt CRRT clotted off after restarting at 0430. MD Baeza notified and states to hold off on restarting CRRT. VSS. Will continue to monitor.

## 2019-09-15 PROBLEM — I21.4 NSTEMI (NON-ST ELEVATED MYOCARDIAL INFARCTION): Status: ACTIVE | Noted: 2019-09-15

## 2019-09-15 PROBLEM — I21.4 NSTEMI (NON-ST ELEVATED MYOCARDIAL INFARCTION): Status: RESOLVED | Noted: 2019-09-15 | Resolved: 2019-09-15

## 2019-09-15 LAB
ALBUMIN SERPL BCP-MCNC: 2.4 G/DL (ref 3.5–5.2)
ALBUMIN SERPL BCP-MCNC: 2.4 G/DL (ref 3.5–5.2)
ALP SERPL-CCNC: 66 U/L (ref 55–135)
ALT SERPL W/O P-5'-P-CCNC: 48 U/L (ref 10–44)
ANION GAP SERPL CALC-SCNC: 9 MMOL/L (ref 8–16)
ANION GAP SERPL CALC-SCNC: 9 MMOL/L (ref 8–16)
APTT BLDCRRT: 47.3 SEC (ref 21–32)
AST SERPL-CCNC: 28 U/L (ref 10–40)
BASOPHILS # BLD AUTO: 0.03 K/UL (ref 0–0.2)
BASOPHILS NFR BLD: 0.3 % (ref 0–1.9)
BILIRUB SERPL-MCNC: 0.4 MG/DL (ref 0.1–1)
BUN SERPL-MCNC: 28 MG/DL (ref 8–23)
BUN SERPL-MCNC: 28 MG/DL (ref 8–23)
CALCIUM SERPL-MCNC: 8.6 MG/DL (ref 8.7–10.5)
CALCIUM SERPL-MCNC: 8.6 MG/DL (ref 8.7–10.5)
CHLORIDE SERPL-SCNC: 100 MMOL/L (ref 95–110)
CHLORIDE SERPL-SCNC: 100 MMOL/L (ref 95–110)
CO2 SERPL-SCNC: 26 MMOL/L (ref 23–29)
CO2 SERPL-SCNC: 26 MMOL/L (ref 23–29)
CREAT SERPL-MCNC: 1.9 MG/DL (ref 0.5–1.4)
CREAT SERPL-MCNC: 1.9 MG/DL (ref 0.5–1.4)
DIFFERENTIAL METHOD: ABNORMAL
EOSINOPHIL # BLD AUTO: 0.2 K/UL (ref 0–0.5)
EOSINOPHIL NFR BLD: 2 % (ref 0–8)
ERYTHROCYTE [DISTWIDTH] IN BLOOD BY AUTOMATED COUNT: 13.7 % (ref 11.5–14.5)
EST. GFR  (AFRICAN AMERICAN): 43 ML/MIN/1.73 M^2
EST. GFR  (AFRICAN AMERICAN): 43 ML/MIN/1.73 M^2
EST. GFR  (NON AFRICAN AMERICAN): 37 ML/MIN/1.73 M^2
EST. GFR  (NON AFRICAN AMERICAN): 37 ML/MIN/1.73 M^2
GLUCOSE SERPL-MCNC: 127 MG/DL (ref 70–110)
GLUCOSE SERPL-MCNC: 127 MG/DL (ref 70–110)
HCT VFR BLD AUTO: 35.6 % (ref 40–54)
HGB BLD-MCNC: 11.8 G/DL (ref 14–18)
LYMPHOCYTES # BLD AUTO: 1.8 K/UL (ref 1–4.8)
LYMPHOCYTES NFR BLD: 17.1 % (ref 18–48)
MAGNESIUM SERPL-MCNC: 1.9 MG/DL (ref 1.6–2.6)
MCH RBC QN AUTO: 31.3 PG (ref 27–31)
MCHC RBC AUTO-ENTMCNC: 33.1 G/DL (ref 32–36)
MCV RBC AUTO: 94 FL (ref 82–98)
MONOCYTES # BLD AUTO: 1.1 K/UL (ref 0.3–1)
MONOCYTES NFR BLD: 10.5 % (ref 4–15)
NEUTROPHILS # BLD AUTO: 7.3 K/UL (ref 1.8–7.7)
NEUTROPHILS NFR BLD: 70.1 % (ref 38–73)
PHOSPHATE SERPL-MCNC: 2.8 MG/DL (ref 2.7–4.5)
PLATELET # BLD AUTO: 159 K/UL (ref 150–350)
PMV BLD AUTO: 11.2 FL (ref 9.2–12.9)
POTASSIUM SERPL-SCNC: 3.6 MMOL/L (ref 3.5–5.1)
POTASSIUM SERPL-SCNC: 3.6 MMOL/L (ref 3.5–5.1)
PROT SERPL-MCNC: 6 G/DL (ref 6–8.4)
RBC # BLD AUTO: 3.77 M/UL (ref 4.6–6.2)
SODIUM SERPL-SCNC: 135 MMOL/L (ref 136–145)
SODIUM SERPL-SCNC: 135 MMOL/L (ref 136–145)
WBC # BLD AUTO: 10.52 K/UL (ref 3.9–12.7)

## 2019-09-15 PROCEDURE — 99233 SBSQ HOSP IP/OBS HIGH 50: CPT | Mod: ,,, | Performed by: INTERNAL MEDICINE

## 2019-09-15 PROCEDURE — 25000003 PHARM REV CODE 250: Performed by: EMERGENCY MEDICINE

## 2019-09-15 PROCEDURE — 27100092 HC HIGH FLOW DELIVERY CANNULA

## 2019-09-15 PROCEDURE — 94761 N-INVAS EAR/PLS OXIMETRY MLT: CPT

## 2019-09-15 PROCEDURE — 85025 COMPLETE CBC W/AUTO DIFF WBC: CPT

## 2019-09-15 PROCEDURE — 80053 COMPREHEN METABOLIC PANEL: CPT

## 2019-09-15 PROCEDURE — 25000003 PHARM REV CODE 250: Performed by: HOSPITALIST

## 2019-09-15 PROCEDURE — 84100 ASSAY OF PHOSPHORUS: CPT

## 2019-09-15 PROCEDURE — 25000242 PHARM REV CODE 250 ALT 637 W/ HCPCS: Performed by: HOSPITALIST

## 2019-09-15 PROCEDURE — 25000003 PHARM REV CODE 250: Performed by: INTERNAL MEDICINE

## 2019-09-15 PROCEDURE — 63600175 PHARM REV CODE 636 W HCPCS: Performed by: NURSE PRACTITIONER

## 2019-09-15 PROCEDURE — 63600175 PHARM REV CODE 636 W HCPCS: Performed by: HOSPITALIST

## 2019-09-15 PROCEDURE — 83735 ASSAY OF MAGNESIUM: CPT

## 2019-09-15 PROCEDURE — 36415 COLL VENOUS BLD VENIPUNCTURE: CPT

## 2019-09-15 PROCEDURE — 94664 DEMO&/EVAL PT USE INHALER: CPT

## 2019-09-15 PROCEDURE — 94640 AIRWAY INHALATION TREATMENT: CPT

## 2019-09-15 PROCEDURE — 85730 THROMBOPLASTIN TIME PARTIAL: CPT

## 2019-09-15 PROCEDURE — 27100171 HC OXYGEN HIGH FLOW UP TO 24 HOURS

## 2019-09-15 PROCEDURE — 99233 PR SUBSEQUENT HOSPITAL CARE,LEVL III: ICD-10-PCS | Mod: ,,, | Performed by: INTERNAL MEDICINE

## 2019-09-15 PROCEDURE — 21400001 HC TELEMETRY ROOM

## 2019-09-15 PROCEDURE — 99900035 HC TECH TIME PER 15 MIN (STAT)

## 2019-09-15 RX ADMIN — LEVALBUTEROL HYDROCHLORIDE 0.63 MG: 0.63 SOLUTION RESPIRATORY (INHALATION) at 03:09

## 2019-09-15 RX ADMIN — LEVALBUTEROL HYDROCHLORIDE 0.63 MG: 0.63 SOLUTION RESPIRATORY (INHALATION) at 07:09

## 2019-09-15 RX ADMIN — PIPERACILLIN AND TAZOBACTAM 4.5 G: 4; .5 INJECTION, POWDER, LYOPHILIZED, FOR SOLUTION INTRAVENOUS; PARENTERAL at 04:09

## 2019-09-15 RX ADMIN — SENNOSIDES, DOCUSATE SODIUM 1 TABLET: 50; 8.6 TABLET, FILM COATED ORAL at 09:09

## 2019-09-15 RX ADMIN — FAMOTIDINE 20 MG: 20 TABLET ORAL at 08:09

## 2019-09-15 RX ADMIN — APIXABAN 5 MG: 5 TABLET, FILM COATED ORAL at 12:09

## 2019-09-15 RX ADMIN — CARVEDILOL 6.25 MG: 6.25 TABLET, FILM COATED ORAL at 09:09

## 2019-09-15 RX ADMIN — SENNOSIDES, DOCUSATE SODIUM 1 TABLET: 50; 8.6 TABLET, FILM COATED ORAL at 08:09

## 2019-09-15 RX ADMIN — APIXABAN 5 MG: 5 TABLET, FILM COATED ORAL at 09:09

## 2019-09-15 RX ADMIN — AMIODARONE HYDROCHLORIDE 200 MG: 200 TABLET ORAL at 09:09

## 2019-09-15 RX ADMIN — PIPERACILLIN AND TAZOBACTAM 4.5 G: 4; .5 INJECTION, POWDER, LYOPHILIZED, FOR SOLUTION INTRAVENOUS; PARENTERAL at 05:09

## 2019-09-15 RX ADMIN — LEVALBUTEROL HYDROCHLORIDE 0.63 MG: 0.63 SOLUTION RESPIRATORY (INHALATION) at 12:09

## 2019-09-15 RX ADMIN — AMIODARONE HYDROCHLORIDE 200 MG: 200 TABLET ORAL at 08:09

## 2019-09-15 RX ADMIN — METOLAZONE 5 MG: 5 TABLET ORAL at 08:09

## 2019-09-15 RX ADMIN — OXYCODONE HYDROCHLORIDE AND ACETAMINOPHEN 1 TABLET: 5; 325 TABLET ORAL at 10:09

## 2019-09-15 RX ADMIN — OXYCODONE HYDROCHLORIDE AND ACETAMINOPHEN 1 TABLET: 5; 325 TABLET ORAL at 08:09

## 2019-09-15 RX ADMIN — OXYCODONE HYDROCHLORIDE AND ACETAMINOPHEN 1 TABLET: 5; 325 TABLET ORAL at 12:09

## 2019-09-15 RX ADMIN — OXYCODONE HYDROCHLORIDE AND ACETAMINOPHEN 1 TABLET: 5; 325 TABLET ORAL at 05:09

## 2019-09-15 RX ADMIN — CARVEDILOL 6.25 MG: 6.25 TABLET, FILM COATED ORAL at 08:09

## 2019-09-15 NOTE — NURSING TRANSFER
Nursing Transfer Note      9/15/2019     Transfer From: ICU    Transfer via wheelchair    Transfer with O2, cardiac monitoring    Transported by ICU Nurse/Transport    Medicines sent: NO    Chart send with patient: Yes    Notified: Niece    Patient reassessed at: 09/15/2019, 1620    Upon arrival to floor: cardiac monitor applied, patient oriented to room, call bell in reach and bed in lowest position

## 2019-09-15 NOTE — ASSESSMENT & PLAN NOTE
Patient presented with an initial ambient air oxygen saturation 88% which improved to 99% BPAP.  He does have evidence of volume overload.  Personally reviewed his plain chest radiograph which was significant for pulmonary edema with a probable left-sided pleural effusion and without infiltrates or consolidations.  He also has a equivocal BNP of 445 but in setting of volume overload is concerning for congestive heart failure.  He also has an elevated troponin 0.199 without chest pain. He does report a vague cardiac history for which she was evaluated at [facility]--we have no access to those records.  He has been placed on BPAP for respiratory distress and will be started on intravenous diuresis with furosemide.  Will therefore admit him to the ICU for acute respiratory failure.  Will obtain a TTE and TSH in the morning consult Cardiology for further recommendations.  His symptoms are not consistent was nephrotic syndrome.    -CHF- poor UOP on diuretics - see above  -treat for CAP initially, then with repeat CXR appeared possible aspiration - on zosyn #5/7  -reported h/o TB, placed on contact precautions until ruled out   AFB negative - off precautions  - wean O2 as tolerated

## 2019-09-15 NOTE — NURSING
Reported off to oncoming nurse, patient up in chair. AAOx4. Patient can make needs known to staff, minimal assist required for adls and transfers, no acute distress noted, safety precautions maintained.    Chart check completed.

## 2019-09-15 NOTE — SUBJECTIVE & OBJECTIVE
Interval History: pt has no new complaints, reports sitting up in chair for long periods     Review of Systems   Constitutional: Negative for activity change, appetite change, chills, diaphoresis, fatigue, fever and unexpected weight change.   HENT: Negative.    Eyes: Negative.    Respiratory: Positive for cough. Negative for chest tightness, shortness of breath and wheezing.    Cardiovascular: Negative for chest pain, palpitations and leg swelling.   Gastrointestinal: Negative for abdominal distention, anal bleeding, blood in stool, constipation, diarrhea, nausea and vomiting.   Genitourinary: Negative for dysuria and hematuria.   Musculoskeletal: Negative.    Skin: Negative.    Neurological: Negative for dizziness, seizures, syncope, weakness and light-headedness.   Psychiatric/Behavioral: Negative.      Objective:     Vital Signs (Most Recent):  Temp: 97.9 °F (36.6 °C) (09/15/19 1200)  Pulse: 64 (09/15/19 1300)  Resp: 20 (09/15/19 1300)  BP: 120/73 (09/15/19 1300)  SpO2: 98 % (09/15/19 1300) Vital Signs (24h Range):  Temp:  [97.8 °F (36.6 °C)-98.5 °F (36.9 °C)] 97.9 °F (36.6 °C)  Pulse:  [56-87] 64  Resp:  [13-30] 20  SpO2:  [87 %-100 %] 98 %  BP: (100-152)/(55-85) 120/73     Weight: 73.7 kg (162 lb 7.7 oz)  Body mass index is 26.22 kg/m².    Intake/Output Summary (Last 24 hours) at 9/15/2019 1434  Last data filed at 9/15/2019 1300  Gross per 24 hour   Intake 1216.4 ml   Output 1565 ml   Net -348.6 ml      Physical Exam   Constitutional: He is oriented to person, place, and time. He appears well-developed and well-nourished.   HENT:   Head: Normocephalic and atraumatic.   Mouth/Throat: Oropharynx is clear and moist and mucous membranes are normal.   Eyes: Conjunctivae and EOM are normal.   Cardiovascular: Normal rate and regular rhythm.   Pulmonary/Chest: Effort normal. No accessory muscle usage. He has decreased breath sounds.   Abdominal: Soft. He exhibits no distension.   Musculoskeletal: He exhibits no  deformity. Edema: no pitting edema.   Neurological: He is alert and oriented to person, place, and time.   Skin: Skin is warm and dry. No rash noted. He is not diaphoretic.   Nursing note and vitals reviewed.      Significant Labs:   BMP:   Recent Labs   Lab 09/15/19  0300   *  127*   *  135*   K 3.6  3.6     100   CO2 26  26   BUN 28*  28*   CREATININE 1.9*  1.9*   CALCIUM 8.6*  8.6*   MG 1.9     CBC:   Recent Labs   Lab 09/14/19  0210 09/15/19  0300   WBC 9.63 10.52   HGB 13.1* 11.8*   HCT 39.5* 35.6*   * 159       Significant Imaging: I have reviewed and interpreted all pertinent imaging results/findings within the past 24 hours.

## 2019-09-15 NOTE — PLAN OF CARE
Problem: Fall Injury Risk  Goal: Absence of Fall and Fall-Related Injury    Intervention: Identify and Manage Contributors to Fall Injury Risk     09/15/19 1806   Manage Acute Allergic Reaction   Medication Review/Management medications reviewed   Identify and Manage Contributors to Fall Injury Risk   Self-Care Promotion independence encouraged     Intervention: Promote Injury-Free Environment     09/15/19 1806   Optimize [place] and Functional Mobility   Environmental Safety Modification assistive device/personal items within reach;clutter free environment maintained   Optimize Balance and Safe Activity   Safety Promotion/Fall Prevention assistive device/personal item within reach;bed alarm refused;medications reviewed;nonskid shoes/socks when out of bed;room near unit station;side rails raised x 2         Problem: Adult Inpatient Plan of Care  Goal: Plan of Care Review  Outcome: Ongoing (interventions implemented as appropriate)     09/15/19 1806   Plan of Care Review   Plan of Care Reviewed With patient      09/15/19 1806   Plan of Care Review   Plan of Care Reviewed With patient

## 2019-09-15 NOTE — PROGRESS NOTES
[facility]  Pulmonology  Progress Note    Patient Name: Marck Madison  MRN: [id number]  Admission Date: 9/10/2019  Hospital Length of Stay: 5 days  Code Status: Full Code  Attending Provider: Seble Acevedo MD  Primary Care Provider: Primary Doctor No   Principal Problem: Acute respiratory failure with hypoxia    Subjective:     Interval History: Improvement in oxygenation.     Review of Systems   Constitutional: Negative for activity change, appetite change, chills, diaphoresis, fatigue, fever and unexpected weight change.   HENT: Negative.    Eyes: Negative.    Respiratory: Positive for cough and shortness of breath. Negative for chest tightness and wheezing.    Cardiovascular: Negative for chest pain, palpitations and leg swelling.   Gastrointestinal: Negative for abdominal distention, anal bleeding, blood in stool, constipation, diarrhea, nausea and vomiting.   Genitourinary: Negative for dysuria and hematuria.   Musculoskeletal: Negative.    Skin: Negative.    Neurological: Negative for dizziness, seizures, syncope, weakness and light-headedness.   Psychiatric/Behavioral: Negative.      Objective:     Vital Signs (Most Recent):  Temp: 98.2 °F (36.8 °C) (09/15/19 0727)  Pulse: 63 (09/15/19 0900)  Resp: 19 (09/15/19 0900)  BP: (!) 106/55 (09/15/19 0900)  SpO2: 96 % (09/15/19 0900) Vital Signs (24h Range):  Temp:  [97.8 °F (36.6 °C)-98.5 °F (36.9 °C)] 98.2 °F (36.8 °C)  Pulse:  [56-87] 63  Resp:  [10-39] 19  SpO2:  [87 %-100 %] 96 %  BP: ()/(55-86) 106/55     Weight: 73.7 kg (162 lb 7.7 oz)  Body mass index is 26.22 kg/m².    Intake/Output Summary (Last 24 hours) at 9/15/2019 0954  Last data filed at 9/15/2019 0900  Gross per 24 hour   Intake 1149.65 ml   Output 1405 ml   Net -255.35 ml      Physical Exam   Constitutional: He is oriented to person, place, and time. He appears well-developed and well-nourished.   HENT:   Head: Normocephalic and atraumatic.   Mouth/Throat: Oropharynx is clear  and moist and mucous membranes are normal.   Eyes: Conjunctivae and EOM are normal.   Cardiovascular: Normal rate and regular rhythm.   Pulmonary/Chest: Effort normal. No accessory muscle usage. He has decreased breath sounds.   Abdominal: Soft. He exhibits no distension.   Musculoskeletal: He exhibits no deformity. Edema: no pitting edema.   Neurological: He is alert and oriented to person, place, and time.   Skin: Skin is warm and dry. No rash noted. He is not diaphoretic.   Nursing note and vitals reviewed.      Significant Labs: All pertinent labs within the past 24 hours have been reviewed.    Significant Imaging: I have reviewed and interpreted all pertinent imaging results/findings within the past 24 hours.    Assessment/Plan:     * Acute respiratory failure with hypoxia  Likely a combination of CHF and   --CXR with bilateral pulmonary edema  Some component of aspiration. Cultures negative. Complete 5-7 days of Abx.   Will d/c Vancomycin.     FARHAN (acute kidney injury)  Unclear baseline   --Dialysis per Renal    Atrial flutter  --Cardioverted      Will sign off. Please call with questions.      Gomez óGmez MD  Pulmonology  [facility]

## 2019-09-15 NOTE — CARE UPDATE
[facility]  ICU Multidisciplinary Bedside Rounds   SUMMARY     Date: 9/15/2019    Prehospitalization: Home  Admit Date / LOS : 9/10/2019/ 5 days    Diagnosis: Acute respiratory failure with hypoxia    Consults:        Active: Nephro and Pulm CC       Needed: Nephro and Pulm CC     Code Status: Full Code   Advanced Directive: <no information>    LDA: Renner, PIV and Trialysis       Central Lines/Site/Justification:Patient Does Not Have Central Line       Urinary Cath/Order/Justification:Critically Ill in ICU    Vasopressors/Infusions:    heparin (porcine)      heparin (porcine) in D5W 8.986 Units/kg/hr (09/15/19 0500)          GOALS: Volume/ Hemodynamic: N/A                     RASS: 0  alert and calm    CAM ICU: Negative  Pain Management: PO       Pain Controlled: yes     Rhythm: NSR    Respiratory Device: Vapotherm    Oxygen Concentration (%):  [25-35] 25             Most Recent SBT/ SAT: N/A       MOVE Screen: PASS    VTE Prophylaxis: Pharm  Mobility: OOB to Chair  Stress Ulcer Prophylaxis: Yes    Dietary: PO  Tolerance: yes  /  Advancement: @ goal    Isolation: No active isolations    Restraints: No    Significant Dates:  Post Op Date: N/A  Rescue Date: N/A  Imaging/ Diagnostics: N/A    Noteworthy Labs:  none    CBC/Anemia Labs: Coags:    Recent Labs   Lab 09/13/19  0305 09/14/19  0210 09/15/19  0300   WBC 10.85 9.63 10.52   HGB 14.3 13.1* 11.8*   HCT 42.6 39.5* 35.6*    145* 159   MCV 96 95 94   RDW 14.3 14.2 13.7    Recent Labs   Lab 09/10/19  1746  09/13/19  0305 09/14/19  0210 09/15/19  0300   INR 1.8*  --   --   --   --    APTT 64.4*   < > 49.2* 52.0* 47.3*    < > = values in this interval not displayed.        Chemistries:   Recent Labs   Lab 09/13/19  0305  09/14/19  0025 09/14/19  0210 09/14/19  0750 09/15/19  0300      < > 136 136 136 135*  135*   K 4.0   < > 3.5 3.6 3.7 3.6  3.6      < > 100 101 102 100  100   CO2 29   < > 28 28 25 26  26   BUN 33*   < > 30*  28* 28* 28*  28*   CREATININE 2.4*   < > 1.8* 1.7* 1.8* 1.9*  1.9*   CALCIUM 8.6*   < > 8.2* 8.4* 8.1* 8.6*  8.6*   PROT 6.8  --   --  6.4  --  6.0   BILITOT 0.9  --   --  0.6  --  0.4   ALKPHOS 79  --   --  71  --  66   *  --   --  72*  --  48*   AST 83*  --   --  41*  --  28   MG  --    < > 2.4  --  2.2 1.9   PHOS  --    < > 2.5*  --  3.8 2.8    < > = values in this interval not displayed.        Cardiac Enzymes: Ejection Fractions:    No results for input(s): CPK, CPKMB, MB, TROPONINI in the last 72 hours. No results found for: EF     POCT Glucose: HbA1c:    No results for input(s): POCTGLUCOSE in the last 168 hours. No results found for: HGBA1C     Needs from Care Team: Wean oxygen as tolerated     ICU LOS 5d 1h  Level of Care: Critical Care

## 2019-09-15 NOTE — NURSING TRANSFER
Nursing Transfer Note      9/15/2019     Transfer To: [address]    Transfer via wheelchair    Transfer with  to O2, cardiac monitoring    Transported by NURSE AND TRANSPORT  Medicines sent: YES    Chart send with patient: YES    Notified: SOL    Patient reassessed at: 9/15/19/1200  Upon arrival to floor: ORIENT TO [facility]

## 2019-09-15 NOTE — PROGRESS NOTES
[facility] Hospital Medicine  Progress Note    Patient Name: Marck Madison  MRN: [id number]  Patient Class: IP- Inpatient   Admission Date: 9/10/2019  Length of Stay: 5 days  Attending Physician: Seble Acevedo MD  Primary Care Provider: Primary Doctor No        Subjective:     Principal Problem:Acute respiratory failure with hypoxia        HPI:  Mr. Marck Madison is a 59 y.o. male with a vague cardiac medical history who presents to [facility] ED with complaints of dyspnea for the past day.  He reports that the dyspnea is independent of activities and is associated with a cough that is occasionally productive of yellow/green sputum.  He denies any wheezing but does admit to smoking quite heavily.  Denies any fevers, chills, chest pain, palpitations, diaphoresis, pleurisy, hemoptysis, nor any lower extremity pain or swelling he does report some nausea with vomiting any time he would try to eat.  Denies any sick contacts nor recent travel and has not experienced any PND nor orthopnea.  He does report a history of heart disease for which he has been seen at [facility], though he is unable to specify what disease he has.  His history is otherwise limited at this time due to his respiratory distress.    Overview/Hospital Course:  Pt admitted with aflutter and dyspnea/acute resp failure with hypoxia. Started on amiodarone infusion and coreg. HR improved. Heparin infusion for anticoagulation due to renal function worsening.  Cardioversion delayed due to anesthesiology requesting improved pulmonary status. Pt refused bipap and tolerating vapotherm. Pulmonology/CC, placed dialysis catheter. Lasix dose increased 120mg IV + metolazone, however continued to have poor UOP.  Plan to do CRRT per nephrology.  On CAP coverage with elevated WBC and productive cough.     9/12- successful cardioversion, switched to oral amiodarone, contact precautions while r/o TB  9/13- AFB negative, dc  precautions, UOP picked up, CRRT resumed, wean O2     No new subjective & objective note has been filed under this hospital service since the last note was generated.      Assessment/Plan:      * Acute respiratory failure with hypoxia  Patient presented with an initial ambient air oxygen saturation 88% which improved to 99% BPAP.  He does have evidence of volume overload.  Personally reviewed his plain chest radiograph which was significant for pulmonary edema with a probable left-sided pleural effusion and without infiltrates or consolidations.  He also has a equivocal BNP of 445 but in setting of volume overload is concerning for congestive heart failure.  He also has an elevated troponin 0.199 without chest pain. He does report a vague cardiac history for which she was evaluated at [facility]--we have no access to those records.  He has been placed on BPAP for respiratory distress and will be started on intravenous diuresis with furosemide.  Will therefore admit him to the ICU for acute respiratory failure.  Will obtain a TTE and TSH in the morning consult Cardiology for further recommendations.  His symptoms are not consistent was nephrotic syndrome.    -CHF- poor UOP on diuretics - see above  -treat for CAP  -reported h/o TB, placed on contact precautions until ruled out   AFB negative     Atrial flutter  Improved on amiodarone  Pt has chronic afib  Heparin infusion until no further procedures indicated and can switch to oral anticoagulation   Successful CV  Oral amiodarone         Elevated troponin  He does have an elevated troponin of 0.199 without any chest pain. I personally reviewed his EKG and although the machine reads atrial flutter, I am able to discern P waves.  He continues to deny chest pain at this time.  Think the likely cause of his troponinemia is renal failure along with acute heart failure.  He has been started on aspirin and full-dose enoxaparin.  Will follow serial  troponins and consult Cardiology for further recommendations.    Acute renal failure  Unfortunately did have previous lab work to which to compare to chronicity of his renal disease. His BUN/creatinine today is 28/1.6; urinalysis pending.  As he is volume overloaded we will elect to diurese as opposed to provide IV fluids.  Will check an intact PTH, vitamin-D, and bilateral renal ultrasounds.  Will also check urine studies and recheck his renal functionin the morning.    HD cath placed, CRRT per nephrology  Cr 2.2-2.4    VTE Risk Mitigation (From admission, onward)        Ordered     heparin (porcine) injection 5,000 Units  Continuous PRN      09/13/19 0808     heparin 25,000 units in dextrose 5% (100 units/ml) IV bolus from bag INITIAL BOLUS  Once      09/10/19 1557     heparin 25,000 units in dextrose 5% 250 mL (100 units/mL) infusion LOW INTENSITY nomogram - [facility]  Continuous      09/10/19 1557     heparin 25,000 units in dextrose 5% (100 units/ml) IV bolus from bag - ADDITIONAL PRN BOLUS - 60 units/kg  As needed (PRN)      09/10/19 1557     heparin 25,000 units in dextrose 5% (100 units/ml) IV bolus from bag - ADDITIONAL PRN BOLUS - 30 units/kg  As needed (PRN)      09/10/19 1557     IP VTE HIGH RISK PATIENT  Once      09/10/19 0409          Critical care time spent on the evaluation and treatment of severe organ dysfunction, review of pertinent labs and imaging studies, discussions with consulting providers and discussions with patient/family: 30 minutes.      Seble Acevedo MD  Department of Hospital Medicine   [facility]

## 2019-09-15 NOTE — PROGRESS NOTES
[facility] Hospital Medicine  Progress Note    Patient Name: Marck Madison  MRN: [id number]  Patient Class: IP- Inpatient   Admission Date: 9/10/2019  Length of Stay: 5 days  Attending Physician: Seble Acevedo MD  Primary Care Provider: Primary Doctor No        Subjective:     Principal Problem:Acute respiratory failure with hypoxia        HPI:  Mr. Marck Madison is a 59 y.o. male with a vague cardiac medical history who presents to [facility] ED with complaints of dyspnea for the past day.  He reports that the dyspnea is independent of activities and is associated with a cough that is occasionally productive of yellow/green sputum.  He denies any wheezing but does admit to smoking quite heavily.  Denies any fevers, chills, chest pain, palpitations, diaphoresis, pleurisy, hemoptysis, nor any lower extremity pain or swelling he does report some nausea with vomiting any time he would try to eat.  Denies any sick contacts nor recent travel and has not experienced any PND nor orthopnea.  He does report a history of heart disease for which he has been seen at [place], though he is unable to specify what disease he has.  His history is otherwise limited at this time due to his respiratory distress.    Overview/Hospital Course:  Pt admitted with aflutter and dyspnea/acute resp failure with hypoxia. Started on amiodarone infusion and coreg. HR improved. Heparin infusion for anticoagulation due to renal function worsening.  Cardioversion delayed due to anesthesiology requesting improved pulmonary status. Pt refused bipap and tolerating vapotherm. Pulmonology/CC, placed dialysis catheter. Lasix dose increased 120mg IV + metolazone, however continued to have poor UOP.  Plan to do CRRT per nephrology.  On CAP coverage with elevated WBC and productive cough.     9/12- successful cardioversion, switched to oral amiodarone, contact precautions while r/o TB  9/13- AFB negative, dc  precautions, UOP picked up, CRRT resumed, wean O2     9/15- Cr plateau 1.9, good UOP, CRRT dc'd.  Weaning O2. Vitals stable and ok fro transfer to floor. Agnieszka, #5/7.  PT/OT consulted for dispo planning.     Interval History: pt has no new complaints, reports sitting up in chair for long periods     Review of Systems   Constitutional: Negative for activity change, appetite change, chills, diaphoresis, fatigue, fever and unexpected weight change.   HENT: Negative.    Eyes: Negative.    Respiratory: Positive for cough. Negative for chest tightness, shortness of breath and wheezing.    Cardiovascular: Negative for chest pain, palpitations and leg swelling.   Gastrointestinal: Negative for abdominal distention, anal bleeding, blood in stool, constipation, diarrhea, nausea and vomiting.   Genitourinary: Negative for dysuria and hematuria.   Musculoskeletal: Negative.    Skin: Negative.    Neurological: Negative for dizziness, seizures, syncope, weakness and light-headedness.   Psychiatric/Behavioral: Negative.      Objective:     Vital Signs (Most Recent):  Temp: 97.9 °F (36.6 °C) (09/15/19 1200)  Pulse: 64 (09/15/19 1300)  Resp: 20 (09/15/19 1300)  BP: 120/73 (09/15/19 1300)  SpO2: 98 % (09/15/19 1300) Vital Signs (24h Range):  Temp:  [97.8 °F (36.6 °C)-98.5 °F (36.9 °C)] 97.9 °F (36.6 °C)  Pulse:  [56-87] 64  Resp:  [13-30] 20  SpO2:  [87 %-100 %] 98 %  BP: (100-152)/(55-85) 120/73     Weight: 73.7 kg (162 lb 7.7 oz)  Body mass index is 26.22 kg/m².    Intake/Output Summary (Last 24 hours) at 9/15/2019 1434  Last data filed at 9/15/2019 1300  Gross per 24 hour   Intake 1216.4 ml   Output 1565 ml   Net -348.6 ml      Physical Exam   Constitutional: He is oriented to person, place, and time. He appears well-developed and well-nourished.   HENT:   Head: Normocephalic and atraumatic.   Mouth/Throat: Oropharynx is clear and moist and mucous membranes are normal.   Eyes: Conjunctivae and EOM are normal.   Cardiovascular:  Normal rate and regular rhythm.   Pulmonary/Chest: Effort normal. No accessory muscle usage. He has decreased breath sounds.   Abdominal: Soft. He exhibits no distension.   Musculoskeletal: He exhibits no deformity. Edema: no pitting edema.   Neurological: He is alert and oriented to person, place, and time.   Skin: Skin is warm and dry. No rash noted. He is not diaphoretic.   Nursing note and vitals reviewed.      Significant Labs:   BMP:   Recent Labs   Lab 09/15/19  0300   *  127*   *  135*   K 3.6  3.6     100   CO2 26  26   BUN 28*  28*   CREATININE 1.9*  1.9*   CALCIUM 8.6*  8.6*   MG 1.9     CBC:   Recent Labs   Lab 09/14/19  0210 09/15/19  0300   WBC 9.63 10.52   HGB 13.1* 11.8*   HCT 39.5* 35.6*   * 159       Significant Imaging: I have reviewed and interpreted all pertinent imaging results/findings within the past 24 hours.      Assessment/Plan:      * Acute respiratory failure with hypoxia  Patient presented with an initial ambient air oxygen saturation 88% which improved to 99% BPAP.  He does have evidence of volume overload.  Personally reviewed his plain chest radiograph which was significant for pulmonary edema with a probable left-sided pleural effusion and without infiltrates or consolidations.  He also has a equivocal BNP of 445 but in setting of volume overload is concerning for congestive heart failure.  He also has an elevated troponin 0.199 without chest pain. He does report a vague cardiac history for which she was evaluated at [facility]--we have no access to those records.  He has been placed on BPAP for respiratory distress and will be started on intravenous diuresis with furosemide.  Will therefore admit him to the ICU for acute respiratory failure.  Will obtain a TTE and TSH in the morning consult Cardiology for further recommendations.  His symptoms are not consistent was nephrotic syndrome.    -CHF- poor UOP on diuretics - see  above  -treat for CAP initially, then with repeat CXR appeared possible aspiration - on zosyn #5/7  -reported h/o TB, placed on contact precautions until ruled out   AFB negative - off precautions  - wean O2 as tolerated     Dilated cardiomyopathy  ECHO 9/10  · Moderately decreased left ventricular systolic function. The estimated ejection fraction is 30%  · Concentric left ventricular hypertrophy.  · Left ventricular diastolic dysfunction.  · Normal right ventricular systolic function.  · Severe left atrial enlargement.  · Moderate right atrial enlargement.  · Mild-to-moderate mitral regurgitation.  · Mild tricuspid regurgitation.  · The estimated PA systolic pressure is 33 mm Hg    Pt on metolazone and BB  Acei and Arb not given due to worsening renal function     FARHAN (acute kidney injury)  See above       Atrial flutter  Improved on amiodarone  Pt has chronic afib  Heparin infusion until no further procedures indicated and can switch to oral anticoagulation   Successful CV  Oral amiodarone   eliquis BID        Elevated troponin  He does have an elevated troponin of 0.199 without any chest pain. I personally reviewed his EKG and although the machine reads atrial flutter, I am able to discern P waves.  He continues to deny chest pain at this time.  Think the likely cause of his troponinemia is renal failure along with acute heart failure.  He has been started on aspirin and full-dose enoxaparin.  Will follow serial troponins and consult Cardiology for further recommendations.    No further procedures     Acute renal failure  Unfortunately did have previous lab work to which to compare to chronicity of his renal disease. His BUN/creatinine today is 28/1.6; urinalysis pending.  As he is volume overloaded we will elect to diurese as opposed to provide IV fluids.  Will check an intact PTH, vitamin-D, and bilateral renal ultrasounds.  Will also check urine studies and recheck his renal functionin the morning.    HD cath  placed, CRRT per nephrology - CRRT dc'd   Cr 2.2-2.4 - down to 1.9      VTE Risk Mitigation (From admission, onward)        Ordered     apixaban tablet 5 mg  2 times daily      09/15/19 1113     heparin (porcine) injection 5,000 Units  Continuous PRN      09/13/19 0808     IP VTE HIGH RISK PATIENT  Once      09/10/19 0409          Critical care time spent on the evaluation and treatment of severe organ dysfunction, review of pertinent labs and imaging studies, discussions with consulting providers and discussions with patient/family: 30 minutes.      Seble Acevedo MD  Department of Hospital Medicine   [facility]

## 2019-09-15 NOTE — ASSESSMENT & PLAN NOTE
Improved on amiodarone  Pt has chronic afib  Heparin infusion until no further procedures indicated and can switch to oral anticoagulation   Successful CV  Oral amiodarone   eliquis BID

## 2019-09-15 NOTE — SUBJECTIVE & OBJECTIVE
Interval History: Improvement in oxygenation.     Review of Systems   Constitutional: Negative for activity change, appetite change, chills, diaphoresis, fatigue, fever and unexpected weight change.   HENT: Negative.    Eyes: Negative.    Respiratory: Positive for cough and shortness of breath. Negative for chest tightness and wheezing.    Cardiovascular: Negative for chest pain, palpitations and leg swelling.   Gastrointestinal: Negative for abdominal distention, anal bleeding, blood in stool, constipation, diarrhea, nausea and vomiting.   Genitourinary: Negative for dysuria and hematuria.   Musculoskeletal: Negative.    Skin: Negative.    Neurological: Negative for dizziness, seizures, syncope, weakness and light-headedness.   Psychiatric/Behavioral: Negative.      Objective:     Vital Signs (Most Recent):  Temp: 98.2 °F (36.8 °C) (09/15/19 0727)  Pulse: 63 (09/15/19 0900)  Resp: 19 (09/15/19 0900)  BP: (!) 106/55 (09/15/19 0900)  SpO2: 96 % (09/15/19 0900) Vital Signs (24h Range):  Temp:  [97.8 °F (36.6 °C)-98.5 °F (36.9 °C)] 98.2 °F (36.8 °C)  Pulse:  [56-87] 63  Resp:  [10-39] 19  SpO2:  [87 %-100 %] 96 %  BP: ()/(55-86) 106/55     Weight: 73.7 kg (162 lb 7.7 oz)  Body mass index is 26.22 kg/m².    Intake/Output Summary (Last 24 hours) at 9/15/2019 0954  Last data filed at 9/15/2019 0900  Gross per 24 hour   Intake 1149.65 ml   Output 1405 ml   Net -255.35 ml      Physical Exam   Constitutional: He is oriented to person, place, and time. He appears well-developed and well-nourished.   HENT:   Head: Normocephalic and atraumatic.   Mouth/Throat: Oropharynx is clear and moist and mucous membranes are normal.   Eyes: Conjunctivae and EOM are normal.   Cardiovascular: Normal rate and regular rhythm.   Pulmonary/Chest: Effort normal. No accessory muscle usage. He has decreased breath sounds.   Abdominal: Soft. He exhibits no distension.   Musculoskeletal: He exhibits no deformity. Edema: no pitting edema.    Neurological: He is alert and oriented to person, place, and time.   Skin: Skin is warm and dry. No rash noted. He is not diaphoretic.   Nursing note and vitals reviewed.      Significant Labs: All pertinent labs within the past 24 hours have been reviewed.    Significant Imaging: I have reviewed and interpreted all pertinent imaging results/findings within the past 24 hours.

## 2019-09-15 NOTE — ASSESSMENT & PLAN NOTE
Unfortunately did have previous lab work to which to compare to chronicity of his renal disease. His BUN/creatinine today is 28/1.6; urinalysis pending.  As he is volume overloaded we will elect to diurese as opposed to provide IV fluids.  Will check an intact PTH, vitamin-D, and bilateral renal ultrasounds.  Will also check urine studies and recheck his renal functionin the morning.    HD cath placed, CRRT per nephrology - CRRT dc'd   Cr 2.2-2.4 - down to 1.9

## 2019-09-15 NOTE — PLAN OF CARE
Problem: Adult Inpatient Plan of Care  Goal: Plan of Care Review  Outcome: Ongoing (interventions implemented as appropriate)  Patient awake and alert up in chair at bedside. Vital signs stable and 02 weaned to 3 liters high flow nasal cannula. Patient in Normal sinus and heparin drip turned off and started on po  Anticoag. Patient had ha discontinued and has voided per urinal without any problems. Patient ate well and steady on feet ambulating to rest room. No falls or injuries this shift and no skin breakdown noted this shift. Plan of care reviewed with niece and patient at bedside.    Problem: Noninvasive Ventilation Acute  Goal: Effective Unassisted Ventilation and Oxygenation  Outcome: Ongoing (interventions implemented as appropriate)  Patient weaned down to 3 liters nasal cannula high flow

## 2019-09-15 NOTE — ASSESSMENT & PLAN NOTE
He does have an elevated troponin of 0.199 without any chest pain. I personally reviewed his EKG and although the machine reads atrial flutter, I am able to discern P waves.  He continues to deny chest pain at this time.  Think the likely cause of his troponinemia is renal failure along with acute heart failure.  He has been started on aspirin and full-dose enoxaparin.  Will follow serial troponins and consult Cardiology for further recommendations.    No further procedures

## 2019-09-15 NOTE — ASSESSMENT & PLAN NOTE
ECHO 9/10  · Moderately decreased left ventricular systolic function. The estimated ejection fraction is 30%  · Concentric left ventricular hypertrophy.  · Left ventricular diastolic dysfunction.  · Normal right ventricular systolic function.  · Severe left atrial enlargement.  · Moderate right atrial enlargement.  · Mild-to-moderate mitral regurgitation.  · Mild tricuspid regurgitation.  · The estimated PA systolic pressure is 33 mm Hg    Pt on metolazone and BB  Acei and Arb not given due to worsening renal function

## 2019-09-15 NOTE — PLAN OF CARE
Problem: Adult Inpatient Plan of Care  Goal: Plan of Care Review  Outcome: Ongoing (interventions implemented as appropriate)  Pt AAO x 4. On Vapotherm overnight at 25% O2. NSR on the monitor with frequent PVC's. Afebrile. Heparin remains at 9 units/kg/hr; aPTT therapeutic this morning at 47.3 secs. UO WNL this shift. Pt given prn medication for back pain with relief. Pt updated on plan of care. Pt free of falls, injuries, and skin breakdown.

## 2019-09-15 NOTE — ASSESSMENT & PLAN NOTE
Likely a combination of CHF and   --CXR with bilateral pulmonary edema  Some component of aspiration. Cultures negative. Complete 5-7 days of Abx.   Will d/c Vancomycin.

## 2019-09-16 VITALS
HEART RATE: 68 BPM | SYSTOLIC BLOOD PRESSURE: 110 MMHG | DIASTOLIC BLOOD PRESSURE: 73 MMHG | TEMPERATURE: 99 F | OXYGEN SATURATION: 98 % | WEIGHT: 165.13 LBS | RESPIRATION RATE: 16 BRPM | BODY MASS INDEX: 26.54 KG/M2 | HEIGHT: 66 IN

## 2019-09-16 PROBLEM — J96.01 ACUTE RESPIRATORY FAILURE WITH HYPOXIA: Status: RESOLVED | Noted: 2019-09-10 | Resolved: 2019-09-16

## 2019-09-16 LAB
ALBUMIN SERPL BCP-MCNC: 2.3 G/DL (ref 3.5–5.2)
ALBUMIN SERPL BCP-MCNC: 2.3 G/DL (ref 3.5–5.2)
ALP SERPL-CCNC: 70 U/L (ref 55–135)
ALT SERPL W/O P-5'-P-CCNC: 34 U/L (ref 10–44)
ANION GAP SERPL CALC-SCNC: 8 MMOL/L (ref 8–16)
ANION GAP SERPL CALC-SCNC: 8 MMOL/L (ref 8–16)
AST SERPL-CCNC: 23 U/L (ref 10–40)
BASOPHILS # BLD AUTO: 0.01 K/UL (ref 0–0.2)
BASOPHILS NFR BLD: 0.1 % (ref 0–1.9)
BILIRUB SERPL-MCNC: 0.3 MG/DL (ref 0.1–1)
BUN SERPL-MCNC: 30 MG/DL (ref 8–23)
BUN SERPL-MCNC: 30 MG/DL (ref 8–23)
CALCIUM SERPL-MCNC: 8.6 MG/DL (ref 8.7–10.5)
CALCIUM SERPL-MCNC: 8.6 MG/DL (ref 8.7–10.5)
CHLORIDE SERPL-SCNC: 102 MMOL/L (ref 95–110)
CHLORIDE SERPL-SCNC: 102 MMOL/L (ref 95–110)
CO2 SERPL-SCNC: 27 MMOL/L (ref 23–29)
CO2 SERPL-SCNC: 27 MMOL/L (ref 23–29)
CREAT SERPL-MCNC: 1.8 MG/DL (ref 0.5–1.4)
CREAT SERPL-MCNC: 1.8 MG/DL (ref 0.5–1.4)
DIFFERENTIAL METHOD: ABNORMAL
EOSINOPHIL # BLD AUTO: 0.2 K/UL (ref 0–0.5)
EOSINOPHIL NFR BLD: 2.2 % (ref 0–8)
ERYTHROCYTE [DISTWIDTH] IN BLOOD BY AUTOMATED COUNT: 13.9 % (ref 11.5–14.5)
EST. GFR  (AFRICAN AMERICAN): 46 ML/MIN/1.73 M^2
EST. GFR  (AFRICAN AMERICAN): 46 ML/MIN/1.73 M^2
EST. GFR  (NON AFRICAN AMERICAN): 40 ML/MIN/1.73 M^2
EST. GFR  (NON AFRICAN AMERICAN): 40 ML/MIN/1.73 M^2
GLUCOSE SERPL-MCNC: 118 MG/DL (ref 70–110)
GLUCOSE SERPL-MCNC: 118 MG/DL (ref 70–110)
HBV SURFACE AB SER QL IA: POSITIVE
HBV SURFACE AB SERPL IA-ACNC: 47 MIU/ML
HCT VFR BLD AUTO: 33.3 % (ref 40–54)
HGB BLD-MCNC: 11.2 G/DL (ref 14–18)
LYMPHOCYTES # BLD AUTO: 1.9 K/UL (ref 1–4.8)
LYMPHOCYTES NFR BLD: 22.8 % (ref 18–48)
MAGNESIUM SERPL-MCNC: 1.9 MG/DL (ref 1.6–2.6)
MCH RBC QN AUTO: 31.9 PG (ref 27–31)
MCHC RBC AUTO-ENTMCNC: 33.6 G/DL (ref 32–36)
MCV RBC AUTO: 95 FL (ref 82–98)
MONOCYTES # BLD AUTO: 0.9 K/UL (ref 0.3–1)
MONOCYTES NFR BLD: 10.7 % (ref 4–15)
NEUTROPHILS # BLD AUTO: 5.3 K/UL (ref 1.8–7.7)
NEUTROPHILS NFR BLD: 65 % (ref 38–73)
PHOSPHATE SERPL-MCNC: 3.4 MG/DL (ref 2.7–4.5)
PLATELET # BLD AUTO: 176 K/UL (ref 150–350)
PMV BLD AUTO: 10.7 FL (ref 9.2–12.9)
POTASSIUM SERPL-SCNC: 3.5 MMOL/L (ref 3.5–5.1)
POTASSIUM SERPL-SCNC: 3.5 MMOL/L (ref 3.5–5.1)
PROT SERPL-MCNC: 5.8 G/DL (ref 6–8.4)
RBC # BLD AUTO: 3.51 M/UL (ref 4.6–6.2)
SODIUM SERPL-SCNC: 137 MMOL/L (ref 136–145)
SODIUM SERPL-SCNC: 137 MMOL/L (ref 136–145)
WBC # BLD AUTO: 8.3 K/UL (ref 3.9–12.7)

## 2019-09-16 PROCEDURE — 27100092 HC HIGH FLOW DELIVERY CANNULA

## 2019-09-16 PROCEDURE — 25000003 PHARM REV CODE 250: Performed by: HOSPITALIST

## 2019-09-16 PROCEDURE — 94664 DEMO&/EVAL PT USE INHALER: CPT

## 2019-09-16 PROCEDURE — 83735 ASSAY OF MAGNESIUM: CPT

## 2019-09-16 PROCEDURE — 97161 PT EVAL LOW COMPLEX 20 MIN: CPT

## 2019-09-16 PROCEDURE — 25000242 PHARM REV CODE 250 ALT 637 W/ HCPCS: Performed by: HOSPITALIST

## 2019-09-16 PROCEDURE — 97165 OT EVAL LOW COMPLEX 30 MIN: CPT

## 2019-09-16 PROCEDURE — 94761 N-INVAS EAR/PLS OXIMETRY MLT: CPT

## 2019-09-16 PROCEDURE — 63600175 PHARM REV CODE 636 W HCPCS: Performed by: HOSPITALIST

## 2019-09-16 PROCEDURE — 80053 COMPREHEN METABOLIC PANEL: CPT

## 2019-09-16 PROCEDURE — 94640 AIRWAY INHALATION TREATMENT: CPT

## 2019-09-16 PROCEDURE — 84100 ASSAY OF PHOSPHORUS: CPT

## 2019-09-16 PROCEDURE — 99900035 HC TECH TIME PER 15 MIN (STAT)

## 2019-09-16 PROCEDURE — 99232 SBSQ HOSP IP/OBS MODERATE 35: CPT | Mod: ,,, | Performed by: INTERNAL MEDICINE

## 2019-09-16 PROCEDURE — 27100171 HC OXYGEN HIGH FLOW UP TO 24 HOURS

## 2019-09-16 PROCEDURE — 85025 COMPLETE CBC W/AUTO DIFF WBC: CPT

## 2019-09-16 PROCEDURE — 27000646 HC AEROBIKA DEVICE

## 2019-09-16 PROCEDURE — 99232 PR SUBSEQUENT HOSPITAL CARE,LEVL II: ICD-10-PCS | Mod: ,,, | Performed by: INTERNAL MEDICINE

## 2019-09-16 PROCEDURE — 36415 COLL VENOUS BLD VENIPUNCTURE: CPT

## 2019-09-16 RX ORDER — CARVEDILOL 6.25 MG/1
6.25 TABLET ORAL 2 TIMES DAILY
Qty: 60 TABLET | Refills: 11 | Status: SHIPPED | OUTPATIENT
Start: 2019-09-16 | End: 2020-01-20 | Stop reason: ALTCHOICE

## 2019-09-16 RX ORDER — AMIODARONE HYDROCHLORIDE 200 MG/1
200 TABLET ORAL 2 TIMES DAILY
Qty: 60 TABLET | Refills: 11 | Status: ON HOLD | OUTPATIENT
Start: 2019-09-16 | End: 2020-01-30 | Stop reason: SDUPTHER

## 2019-09-16 RX ORDER — METOLAZONE 5 MG/1
5 TABLET ORAL DAILY
Qty: 30 TABLET | Refills: 11 | Status: ON HOLD | OUTPATIENT
Start: 2019-09-17 | End: 2020-01-30 | Stop reason: HOSPADM

## 2019-09-16 RX ADMIN — LEVALBUTEROL HYDROCHLORIDE 0.63 MG: 0.63 SOLUTION RESPIRATORY (INHALATION) at 08:09

## 2019-09-16 RX ADMIN — AMIODARONE HYDROCHLORIDE 200 MG: 200 TABLET ORAL at 08:09

## 2019-09-16 RX ADMIN — CARVEDILOL 6.25 MG: 6.25 TABLET, FILM COATED ORAL at 08:09

## 2019-09-16 RX ADMIN — PIPERACILLIN AND TAZOBACTAM 4.5 G: 4; .5 INJECTION, POWDER, LYOPHILIZED, FOR SOLUTION INTRAVENOUS; PARENTERAL at 03:09

## 2019-09-16 RX ADMIN — SENNOSIDES, DOCUSATE SODIUM 1 TABLET: 50; 8.6 TABLET, FILM COATED ORAL at 08:09

## 2019-09-16 RX ADMIN — LEVALBUTEROL HYDROCHLORIDE 0.63 MG: 0.63 SOLUTION RESPIRATORY (INHALATION) at 05:09

## 2019-09-16 RX ADMIN — LEVALBUTEROL HYDROCHLORIDE 0.63 MG: 0.63 SOLUTION RESPIRATORY (INHALATION) at 12:09

## 2019-09-16 RX ADMIN — FAMOTIDINE 20 MG: 20 TABLET ORAL at 08:09

## 2019-09-16 RX ADMIN — METOLAZONE 5 MG: 5 TABLET ORAL at 08:09

## 2019-09-16 RX ADMIN — OXYCODONE HYDROCHLORIDE AND ACETAMINOPHEN 1 TABLET: 5; 325 TABLET ORAL at 07:09

## 2019-09-16 RX ADMIN — APIXABAN 5 MG: 5 TABLET, FILM COATED ORAL at 08:09

## 2019-09-16 NOTE — PT/OT/SLP EVAL
Physical Therapy Evaluation and Discharge Note    Patient Name:  Marck Madison   MRN:  [id number]    Recommendations:     Discharge Recommendations:  home(home w/ niece supervision)   Discharge Equipment Recommendations: none   Barriers to discharge: None    Assessment:     Marck Madison is a 59 y.o. male admitted with a medical diagnosis of Acute respiratory failure with hypoxia. .  At this time, patient is functioning at their prior level of function and does not require further acute PT services.     Recent Surgery: Procedure(s) (LRB):  Transesophageal echo (ESTEBAN) intra-procedure log documentation (N/A)  Cardioversion or Defibrillation 4 Days Post-Op    Plan:     During this hospitalization, patient does not require further acute PT services.  Please re-consult if situation changes.      Subjective     Chief Complaint: fatigue  Patient/Family Comments/goals: would like to d/c home  Pain/Comfort:  · Pain Rating 1: 0/10    Patients cultural, spiritual, Worship conflicts given the current situation: no    Living Environment:  Pt lives in the second floor of an apartment complex, 1 flight of stairs to enter w/ railing bilat. Pt lives with his niece who provides transportation to appointments/ grocery  Prior to admission, patients level of function was independent w/ ADLs and amb.  Equipment used at home: none.  DME owned (not currently used): rollator, shower chair.  Upon discharge, patient will have assistance from his niece.    Objective:     Communicated with nurse Singh prior to session.  Patient found left sidelying   upon PT entry to room.    General Precautions: Standard,     Orthopedic Precautions:N/A   Braces: N/A     Exams:  · Cognitive Exam:  Patient is oriented to Person, Place, Time and Situation  · Postural Exam:  Patient presented with the following abnormalities:    · -       Forward head  · RLE ROM: WNL  · RLE Strength: WNL  · LLE ROM: WNL  · LLE Strength: WNL    Functional Mobility:  · Bed  "Mobility:     · Supine to Sit: modified independence  · Sit to Supine: modified independence  · Transfers:     · Sit to Stand:  independence with no AD  · Gait: Pt amb 300 ft w/ no AD and supervion  · Balance: Good + in sitting, good in standing    AM-PAC 6 CLICK MOBILITY  Total Score:24       Therapeutic Activities and Exercises:   Pt instructed on ankle pumps while laying in bed    AM-PAC 6 CLICK MOBILITY  Total Score:24     Patient left supine with nsg notified.    GOALS:   Multidisciplinary Problems     Physical Therapy Goals     Not on file          Multidisciplinary Problems (Resolved)        Problem: Physical Therapy Goal    Goal Priority Disciplines Outcome Goal Variances Interventions   Physical Therapy Goal   (Resolved)     PT, PT/OT Outcome(s) achieved                     History:     Past Medical History:   Diagnosis Date    Arrhythmia 2017    aflutter    CAD (coronary artery disease)     CHF (congestive heart failure), NYHA class I 2017    Psychiatric disorder     h/o "schizophrena/bipolar"       Past Surgical History:   Procedure Laterality Date    Cardioversion or Defibrillation  9/12/2019    Performed by [facility] at [facility] C[facility] LAB    LIVER SURGERY      abscess drainage; 1970s    Transesophageal echo (ESTEBAN) intra-procedure log documentation N/A 9/12/2019    Performed by [facility] at [facility] CATH LAB       Time Tracking:     PT Received On: 09/16/19  PT Start Time: 1220     PT Stop Time: 1231  PT Total Time (min): 11 min     Billable Minutes: Evaluation 11     (Co-treat w/ OT)    Valdemar Valdivia, PT  09/16/2019  "

## 2019-09-16 NOTE — PLAN OF CARE
Problem: Occupational Therapy Goal  Goal: Occupational Therapy Goal  Outcome: Outcome(s) achieved Date Met: 09/16/19  Patient tolerated evaluation well, patient with no need for skilled OT services at this time. HIRAL Heredia, MS

## 2019-09-16 NOTE — PLAN OF CARE
Problem: Adult Inpatient Plan of Care  Goal: Patient-Specific Goal (Individualization)  Outcome: Ongoing (interventions implemented as appropriate)  Maintain patent airway coughing and deep breathing

## 2019-09-16 NOTE — PLAN OF CARE
Problem: Physical Therapy Goal  Goal: Physical Therapy Goal  Outcome: Outcome(s) achieved Date Met: 09/16/19  PT eval completed today. Pt likely at mobility PLOF, pt does not required skilled therapy at this time. Please re-consult if there is any change in functional status

## 2019-09-16 NOTE — PROGRESS NOTES
"Marck Madison is a 59 y.o. male patient.  On floor now- no complaints  Making urine  Scheduled Meds:   amiodarone  200 mg Oral BID    apixaban  5 mg Oral BID    carvedilol  6.25 mg Oral BID    famotidine  20 mg Oral Daily    levalbuterol  0.63 mg Nebulization Q8H    metOLazone  5 mg Oral Daily    piperacillin-tazobactam 4.5 g in sodium chloride 0.9% 100 mL IVPB (ready to mix system)  4.5 g Intravenous Q12H    senna-docusate 8.6-50 mg  1 tablet Oral BID       Review of patient's allergies indicates:  No Known Allergies    Past Medical History:   Diagnosis Date    Arrhythmia 2017    aflutter    CAD (coronary artery disease)     CHF (congestive heart failure), NYHA class I 2017    Psychiatric disorder     h/o "schizophrena/bipolar"     Past Surgical History:   Procedure Laterality Date    Cardioversion or Defibrillation  9/12/2019    Performed by [facility] at [facility] CATH LAB    LIVER SURGERY      abscess drainage; 1970s    Transesophageal echo (ESTEBAN) intra-procedure log documentation N/A 9/12/2019    Performed by Jonathan Lopez MD at [facility] CATH LAB      reports that he has quit smoking. His smoking use included cigarettes. He has a 2.50 pack-year smoking history. He does not have any smokeless tobacco history on file. He reports that he does not drink alcohol or use drugs.   History reviewed. No pertinent family history.       Vital Signs Range (Last 24H):  Temp:  [97.9 °F (36.6 °C)-98.7 °F (37.1 °C)]   Pulse:  [42-73]   Resp:  [14-25]   BP: (103-152)/(55-80)   SpO2:  [92 %-99 %]     I & O (Last 24H):    Intake/Output Summary (Last 24 hours) at 9/16/2019 0942  Last data filed at 9/16/2019 0316  Gross per 24 hour   Intake 452.4 ml   Output 1465 ml   Net -1012.6 ml           Physical Exam:  General appearance: well developed, well nourished, appears older than stated age  Lungs:  clear to auscultation bilaterally, normal respiratory effort and normal percussion bilaterally  Heart: regular rate and " rhythm, S1, S2 normal, no murmur, click, rub or gallop  Abdomen: soft, non-tender non-distented; bowel sounds normal; no masses,  no organomegaly  Extremities: no cyanosis or edema, or clubbing    Laboratory:  CBC:   Recent Labs   Lab 09/16/19  0541   WBC 8.30   RBC 3.51*   HGB 11.2*   HCT 33.3*      MCV 95   MCH 31.9*   MCHC 33.6     BMP:   Recent Labs   Lab 09/16/19  0541   *  118*     137   K 3.5  3.5     102   CO2 27  27   BUN 30*  30*   CREATININE 1.8*  1.8*   CALCIUM 8.6*  8.6*   MG 1.9     ABGs:   Recent Labs   Lab 09/10/19  2202   PH 7.344*   PCO2 36.6   PO2 84   HCO3 19.9*   POCSATURATED 96   BE -5         Diagnostic Results:    FARHAN- improving  UOP improving  Follow electrolytes  Metabolic acidosis- improving        Kota Baeza  9/16/2019

## 2019-09-16 NOTE — PT/OT/SLP EVAL
"Occupational Therapy   Evaluation and Discharge Note    Name: Marck Madison  MRN: [id number]  Admitting Diagnosis:  Acute respiratory failure with hypoxia 4 Days Post-Op    Recommendations:     Discharge Recommendations: home(with niece assistance)  Discharge Equipment Recommendations:  none  Barriers to discharge:  None    Assessment:     Marck Madison is a 59 y.o. male with a medical diagnosis of Acute respiratory failure with hypoxia. At this time, patient is functioning at their prior level of function and does not require further acute OT services.     Plan:     During this hospitalization, patient does not require further acute OT services.  Please re-consult if situation changes.    · Plan of Care Reviewed with: patient    Subjective     Chief Complaint: "I'm ready to go home."  Patient/Family Comments/goals: to return home    Occupational Profile:  Living Environment: lives in a 2nd story apartment with his niece; 1 flight of stair with B HR  Previous level of function: independent  Equipment Used at home:  none  Assistance upon Discharge: from niece    Pain/Comfort:  · Pain Rating 1: 0/10    Patients cultural, spiritual, Yarsanism conflicts given the current situation:      Objective:     Communicated with: nurse Singh prior to session.  Patient found supine with   upon OT entry to room.    General Precautions: Standard,     Orthopedic Precautions:N/A   Braces: N/A     Occupational Performance:    Bed Mobility:    · Patient completed Rolling/Turning to Left with  independence  · Patient completed Scooting/Bridging with independence  · Patient completed Supine to Sit with independence    Functional Mobility/Transfers:  · Patient completed Sit <> Stand Transfer with independence  with  no assistive device   · Functional Mobility: ambulated in margarita guzman with PT did not use and AD    Activities of Daily Living:  · Feeding:  independence per patient report  · Grooming: modified independence per patient " "report  · Upper Body Dressing: independence to asha robe next to the bed  · Lower Body Dressing: independence to adjust socks while seated EOB  · Toileting: independence per patient report    Cognitive/Visual Perceptual:  Cognitive/Psychosocial Skills:     -       Oriented to: Person, Place and Situation   -       Follows Commands/attention:Follows two-step commands  -       Communication: clear/fluent  -       Memory: No Deficits noted  -       Safety awareness/insight to disability: intact   -       Mood/Affect/Coping skills/emotional control: Appropriate to situation    Physical Exam:  Balance:    -       sitting and standing balance good  Postural examination/scapula alignment:    -       No postural abnormalities identified  Skin integrity: Visible skin intact  Upper Extremity Range of Motion:     -       Right Upper Extremity: WFL  -       Left Upper Extremity: WFL  Upper Extremity Strength:    -       Right Upper Extremity: WNL  -       Left Upper Extremity: WNL    AMPAC 6 Click ADL:  AMPAC Total Score: 24    Treatment & Education:  Evaluation   Education:    Patient left standing next to his bed with all lines intact, call button in reach and nurse Samantha notified    GOALS:   Multidisciplinary Problems     Occupational Therapy Goals     Not on file          Multidisciplinary Problems (Resolved)        Problem: Occupational Therapy Goal    Goal Priority Disciplines Outcome Interventions   Occupational Therapy Goal   (Resolved)     OT, PT/OT Outcome(s) achieved                    History:     Past Medical History:   Diagnosis Date    Arrhythmia 2017    aflutter    CAD (coronary artery disease)     CHF (congestive heart failure), NYHA class I 2017    Psychiatric disorder     h/o "schizophrena/bipolar"       Past Surgical History:   Procedure Laterality Date    Cardioversion or Defibrillation  9/12/2019    Performed by [facility] at [facility] CATH LAB    LIVER SURGERY      abscess drainage; 1970s    " Transesophageal echo (ESTEBAN) intra-procedure log documentation N/A 9/12/2019    [facility] C[facility]       Time Tracking:     OT Date of Treatment: 09/16/19  OT Start Time: 1222  OT Stop Time: 1230  OT Total Time (min): 8 min    Billable Minutes:Evaluation 8 minutes    HIRAL Heredia, MS  9/16/2019

## 2019-09-17 LAB — BACTERIA BLD CULT: NORMAL

## 2019-09-30 NOTE — DISCHARGE SUMMARY
[facility] Hospital Medicine  Discharge Summary      Patient Name: Marck Madison  MRN: [id number]  Admission Date: 9/10/2019  Hospital Length of Stay: 6 days  Discharge Date and Time:9/16/2019  Attending Physician: No att. providers found   Discharging Provider: Seble Acevedo MD  Primary Care Provider: Primary Doctor No      HPI:   Mr. Marck Madison is a 59 y.o. male with a vague cardiac medical history who presents to [facility] ED with complaints of dyspnea for the past day.  He reports that the dyspnea is independent of activities and is associated with a cough that is occasionally productive of yellow/green sputum.  He denies any wheezing but does admit to smoking quite heavily.  Denies any fevers, chills, chest pain, palpitations, diaphoresis, pleurisy, hemoptysis, nor any lower extremity pain or swelling he does report some nausea with vomiting any time he would try to eat.  Denies any sick contacts nor recent travel and has not experienced any PND nor orthopnea.  He does report a history of heart disease for which he has been seen at [facility], though he is unable to specify what disease he has.  His history is otherwise limited at this time due to his respiratory distress.    Procedure(s) (LRB):  Transesophageal echo (ESTEBAN) intra-procedure log documentation (N/A)  Cardioversion or Defibrillation      Hospital Course:   Pt admitted with aflutter and dyspnea/acute resp failure with hypoxia. Started on amiodarone infusion and coreg. HR improved. Heparin infusion for anticoagulation due to renal function worsening.  Cardioversion delayed due to anesthesiology requesting improved pulmonary status. Pt refused bipap and tolerating vapotherm. Pulmonology/CC, placed dialysis catheter. Lasix dose increased 120mg IV + metolazone, however continued to have poor UOP.  Plan to do CRRT per nephrology.  On CAP coverage with elevated WBC and productive cough.     9/12- successful  cardioversion, switched to oral amiodarone, contact precautions while r/o TB  9/13- AFB negative, dc precautions, UOP picked up, CRRT resumed, wean O2     9/15- Cr plateau 1.9, good UOP, CRRT dc'd.  Weaning O2. Vitals stable and ok fro transfer to floor. Zosyn, #5/7.  PT/OT consulted for dispo planning.   9/16- Pt was dc home, on amiodarone and eliquis. Completed 6/7 days zosyn and improved from aspiration pneumonia. Follow up PCP, nephrology and cardiology as scheduled.      Consults:   Consults (From admission, onward)        Status Ordering Provider     Inpatient consult to Cardiology  Once     Provider:  Jonathan Lopez MD    Completed OLIVER [facility]I     Inpatient consult to Nephrology  Once     Provider:  Adalgisa Grant MD    Completed SUKHI TIRADO     Inpatient consult to Pulmonology  Once     Provider:  Gomez Gómez MD    Completed SUKHI TIRADO          No new Assessment & Plan notes have been filed under this hospital service since the last note was generated.  Service: Hospital Medicine    Final Active Diagnoses:    Diagnosis Date Noted POA    Dilated cardiomyopathy [I42.0] 09/13/2019 Yes    Acute renal failure [N17.9] 09/10/2019 Yes    Elevated troponin [R74.8] 09/10/2019 Yes    Atrial flutter [I48.92] 09/10/2019 Yes    FARHAN (acute kidney injury) [N17.9] 09/10/2019 Yes      Problems Resolved During this Admission:    Diagnosis Date Noted Date Resolved POA    PRINCIPAL PROBLEM:  Acute respiratory failure with hypoxia [J96.01] 09/10/2019 09/16/2019 Yes    NSTEMI (non-ST elevated myocardial infarction) [I21.4] 09/15/2019 09/15/2019 Yes    NSTEMI (non-ST elevated myocardial infarction) [I21.4] 09/10/2019 09/10/2019 Yes       Discharged Condition: good    Disposition: Home or Self Care    Follow Up:  Follow-up Information     Primary Doctor No.           Jonathan Lopez MD In 1 week.    Specialty:  Cardiology  Contact information:  [address]  [address]  [place]  [address]  [phone]                 Patient Instructions:      Diet renal     Diet Cardiac     Notify your health care provider if you experience any of the following:  temperature >100.4     Notify your health care provider if you experience any of the following:  difficulty breathing or increased cough     Notify your health care provider if you experience any of the following:  increased confusion or weakness     Activity as tolerated         Pending Diagnostic Studies:     None         Medications:  Reconciled Home Medications:      Medication List      START taking these medications    apixaban 5 mg Tab  Commonly known as:  ELIQUIS  Take 1 tablet (5 mg total) by mouth 2 (two) times daily.     carvedilol 6.25 MG tablet  Commonly known as:  COREG  Take 1 tablet (6.25 mg total) by mouth 2 (two) times daily.     metOLazone 5 MG tablet  Commonly known as:  ZAROXOLYN  Take 1 tablet (5 mg total) by mouth once daily.        CHANGE how you take these medications    amiodarone 200 MG Tab  Commonly known as:  PACERONE  Take 1 tablet (200 mg total) by mouth 2 (two) times daily.  What changed:    · medication strength  · how much to take  · when to take this        STOP taking these medications    cyclobenzaprine 10 MG tablet  Commonly known as:  FLEXERIL     ibuprofen 600 MG tablet  Commonly known as:  ADVIL,MOTRIN     lisinopril 10 MG tablet     meloxicam 15 MG tablet  Commonly known as:  MOBIC     XARELTO 20 mg Tab  Generic drug:  rivaroxaban            Indwelling Lines/Drains at time of discharge:   Lines/Drains/Airways     None                 Time spent on the discharge of patient: 40 minutes  Patient was seen and examined on the date of discharge and determined to be suitable for discharge.         Seble Acevedo MD  Department of Hospital Medicine  [facility]

## 2019-11-14 LAB
ACID FAST MOD KINY STN SPEC: NORMAL
MYCOBACTERIUM SPEC QL CULT: NORMAL

## 2020-01-20 ENCOUNTER — HOSPITAL ENCOUNTER (INPATIENT)
Facility: HOSPITAL | Age: 60
LOS: 10 days | Discharge: HOME | DRG: 291 | End: 2020-01-30
Attending: EMERGENCY MEDICINE | Admitting: HOSPITALIST
Payer: MEDICAID

## 2020-01-20 DIAGNOSIS — R07.9 CHEST PAIN: ICD-10-CM

## 2020-01-20 DIAGNOSIS — I50.21 ACUTE SYSTOLIC CONGESTIVE HEART FAILURE: Primary | ICD-10-CM

## 2020-01-20 DIAGNOSIS — R06.02 SHORTNESS OF BREATH: ICD-10-CM

## 2020-01-20 DIAGNOSIS — R00.0 SINUS TACHYCARDIA: ICD-10-CM

## 2020-01-20 DIAGNOSIS — I48.92 ATRIAL FLUTTER WITH RAPID VENTRICULAR RESPONSE: ICD-10-CM

## 2020-01-20 DIAGNOSIS — Z99.11 ON MECHANICALLY ASSISTED VENTILATION: ICD-10-CM

## 2020-01-20 DIAGNOSIS — I21.4: ICD-10-CM

## 2020-01-20 DIAGNOSIS — J96.01 ACUTE HYPOXEMIC RESPIRATORY FAILURE: ICD-10-CM

## 2020-01-20 DIAGNOSIS — Z01.818 ENCOUNTER FOR INTUBATION: ICD-10-CM

## 2020-01-20 DIAGNOSIS — I46.9 CARDIAC ARREST: ICD-10-CM

## 2020-01-20 PROBLEM — N18.9 CKD (CHRONIC KIDNEY DISEASE): Status: ACTIVE | Noted: 2020-01-20

## 2020-01-20 PROBLEM — I48.91 ATRIAL FIBRILLATION WITH RVR: Status: ACTIVE | Noted: 2020-01-20

## 2020-01-20 PROBLEM — R79.89 ELEVATED TROPONIN I LEVEL: Status: ACTIVE | Noted: 2020-01-20

## 2020-01-20 LAB
ALBUMIN SERPL BCP-MCNC: 3.9 G/DL (ref 3.5–5.2)
ALLENS TEST: ABNORMAL
ALLENS TEST: ABNORMAL
ALP SERPL-CCNC: 71 U/L (ref 55–135)
ALT SERPL W/O P-5'-P-CCNC: 42 U/L (ref 10–44)
ANION GAP SERPL CALC-SCNC: 13 MMOL/L (ref 8–16)
ANION GAP SERPL CALC-SCNC: 15 MMOL/L (ref 8–16)
AST SERPL-CCNC: 50 U/L (ref 10–40)
BASOPHILS # BLD AUTO: 0.05 K/UL (ref 0–0.2)
BASOPHILS NFR BLD: 0.5 % (ref 0–1.9)
BILIRUB SERPL-MCNC: 0.8 MG/DL (ref 0.1–1)
BNP SERPL-MCNC: 697 PG/ML (ref 0–99)
BUN SERPL-MCNC: 28 MG/DL (ref 8–23)
BUN SERPL-MCNC: 37 MG/DL (ref 6–30)
CALCIUM SERPL-MCNC: 9.5 MG/DL (ref 8.7–10.5)
CHLORIDE SERPL-SCNC: 106 MMOL/L (ref 95–110)
CHLORIDE SERPL-SCNC: 106 MMOL/L (ref 95–110)
CO2 SERPL-SCNC: 22 MMOL/L (ref 23–29)
CREAT SERPL-MCNC: 1.7 MG/DL (ref 0.5–1.4)
CREAT SERPL-MCNC: 2.1 MG/DL (ref 0.5–1.4)
DELSYS: ABNORMAL
DELSYS: ABNORMAL
DIFFERENTIAL METHOD: ABNORMAL
EOSINOPHIL # BLD AUTO: 0.1 K/UL (ref 0–0.5)
EOSINOPHIL NFR BLD: 0.5 % (ref 0–8)
ERYTHROCYTE [DISTWIDTH] IN BLOOD BY AUTOMATED COUNT: 13.5 % (ref 11.5–14.5)
ERYTHROCYTE [SEDIMENTATION RATE] IN BLOOD BY WESTERGREN METHOD: 16 MM/H
ERYTHROCYTE [SEDIMENTATION RATE] IN BLOOD BY WESTERGREN METHOD: 22 MM/H
EST. GFR  (AFRICAN AMERICAN): 49 ML/MIN/1.73 M^2
EST. GFR  (NON AFRICAN AMERICAN): 43 ML/MIN/1.73 M^2
FIO2: 100
FIO2: 100
GLUCOSE SERPL-MCNC: 126 MG/DL (ref 70–110)
GLUCOSE SERPL-MCNC: 246 MG/DL (ref 70–110)
HCO3 UR-SCNC: 15.2 MMOL/L (ref 24–28)
HCO3 UR-SCNC: 23.5 MMOL/L (ref 24–28)
HCT VFR BLD AUTO: 49.1 % (ref 40–54)
HCT VFR BLD CALC: 46 %PCV (ref 36–54)
HGB BLD-MCNC: 16.3 G/DL (ref 14–18)
IMM GRANULOCYTES # BLD AUTO: 0.04 K/UL (ref 0–0.04)
IMM GRANULOCYTES NFR BLD AUTO: 0.4 % (ref 0–0.5)
INR PPP: 1.2 (ref 0.8–1.2)
LACTATE SERPL-SCNC: 5.7 MMOL/L (ref 0.5–2.2)
LYMPHOCYTES # BLD AUTO: 2.3 K/UL (ref 1–4.8)
LYMPHOCYTES NFR BLD: 24.9 % (ref 18–48)
MAGNESIUM SERPL-MCNC: 2 MG/DL (ref 1.6–2.6)
MCH RBC QN AUTO: 32.3 PG (ref 27–31)
MCHC RBC AUTO-ENTMCNC: 33.2 G/DL (ref 32–36)
MCV RBC AUTO: 97 FL (ref 82–98)
MIN VOL: 16.2
MODE: ABNORMAL
MODE: ABNORMAL
MONOCYTES # BLD AUTO: 0.8 K/UL (ref 0.3–1)
MONOCYTES NFR BLD: 8.3 % (ref 4–15)
NEUTROPHILS # BLD AUTO: 6 K/UL (ref 1.8–7.7)
NEUTROPHILS NFR BLD: 65.4 % (ref 38–73)
NRBC BLD-RTO: 0 /100 WBC
PCO2 BLDA: 33.9 MMHG (ref 35–45)
PCO2 BLDA: 43.8 MMHG (ref 35–45)
PEEP: 10
PEEP: 5
PH SMN: 7.26 [PH] (ref 7.35–7.45)
PH SMN: 7.34 [PH] (ref 7.35–7.45)
PIP: 30
PLATELET # BLD AUTO: 212 K/UL (ref 150–350)
PMV BLD AUTO: 11 FL (ref 9.2–12.9)
PO2 BLDA: 68 MMHG (ref 80–100)
PO2 BLDA: 88 MMHG (ref 80–100)
POC BE: -11 MMOL/L
POC BE: -3 MMOL/L
POC IONIZED CALCIUM: 1.24 MMOL/L (ref 1.06–1.42)
POC SATURATED O2: 92 % (ref 95–100)
POC SATURATED O2: 95 % (ref 95–100)
POC TCO2 (MEASURED): 24 MMOL/L (ref 23–29)
POC TCO2: 16 MMOL/L (ref 23–27)
POC TCO2: 25 MMOL/L (ref 23–27)
POCT GLUCOSE: 275 MG/DL (ref 70–110)
POTASSIUM BLD-SCNC: 4 MMOL/L (ref 3.5–5.1)
POTASSIUM SERPL-SCNC: 4 MMOL/L (ref 3.5–5.1)
PROCALCITONIN SERPL IA-MCNC: 0.04 NG/ML
PROT SERPL-MCNC: 7.6 G/DL (ref 6–8.4)
PROTHROMBIN TIME: 12.8 SEC (ref 9–12.5)
RBC # BLD AUTO: 5.05 M/UL (ref 4.6–6.2)
SAMPLE: ABNORMAL
SITE: ABNORMAL
SITE: ABNORMAL
SODIUM BLD-SCNC: 140 MMOL/L (ref 136–145)
SODIUM SERPL-SCNC: 141 MMOL/L (ref 136–145)
SP02: 96
TROPONIN I SERPL DL<=0.01 NG/ML-MCNC: 0.33 NG/ML (ref 0–0.03)
TSH SERPL DL<=0.005 MIU/L-ACNC: 2.3 UIU/ML (ref 0.4–4)
VT: 450
VT: 500
WBC # BLD AUTO: 9.23 K/UL (ref 3.9–12.7)

## 2020-01-20 PROCEDURE — 93010 EKG 12-LEAD: ICD-10-PCS | Mod: ,,, | Performed by: INTERNAL MEDICINE

## 2020-01-20 PROCEDURE — 63600175 PHARM REV CODE 636 W HCPCS: Performed by: HOSPITALIST

## 2020-01-20 PROCEDURE — 20000000 HC ICU ROOM

## 2020-01-20 PROCEDURE — 94002 VENT MGMT INPAT INIT DAY: CPT

## 2020-01-20 PROCEDURE — 99900035 HC TECH TIME PER 15 MIN (STAT)

## 2020-01-20 PROCEDURE — 96375 TX/PRO/DX INJ NEW DRUG ADDON: CPT

## 2020-01-20 PROCEDURE — 25000003 PHARM REV CODE 250: Performed by: EMERGENCY MEDICINE

## 2020-01-20 PROCEDURE — 83605 ASSAY OF LACTIC ACID: CPT

## 2020-01-20 PROCEDURE — 93005 ELECTROCARDIOGRAM TRACING: CPT

## 2020-01-20 PROCEDURE — 85014 HEMATOCRIT: CPT

## 2020-01-20 PROCEDURE — 85025 COMPLETE CBC W/AUTO DIFF WBC: CPT

## 2020-01-20 PROCEDURE — 80053 COMPREHEN METABOLIC PANEL: CPT | Mod: 91

## 2020-01-20 PROCEDURE — 84443 ASSAY THYROID STIM HORMONE: CPT

## 2020-01-20 PROCEDURE — 82550 ASSAY OF CK (CPK): CPT

## 2020-01-20 PROCEDURE — 83735 ASSAY OF MAGNESIUM: CPT | Mod: 91

## 2020-01-20 PROCEDURE — 37799 UNLISTED PX VASCULAR SURGERY: CPT

## 2020-01-20 PROCEDURE — 82803 BLOOD GASES ANY COMBINATION: CPT

## 2020-01-20 PROCEDURE — 80053 COMPREHEN METABOLIC PANEL: CPT

## 2020-01-20 PROCEDURE — 99291 CRITICAL CARE FIRST HOUR: CPT | Mod: 25

## 2020-01-20 PROCEDURE — 27000221 HC OXYGEN, UP TO 24 HOURS

## 2020-01-20 PROCEDURE — 84295 ASSAY OF SERUM SODIUM: CPT

## 2020-01-20 PROCEDURE — 84484 ASSAY OF TROPONIN QUANT: CPT

## 2020-01-20 PROCEDURE — 36415 COLL VENOUS BLD VENIPUNCTURE: CPT

## 2020-01-20 PROCEDURE — 82800 BLOOD PH: CPT

## 2020-01-20 PROCEDURE — 85610 PROTHROMBIN TIME: CPT

## 2020-01-20 PROCEDURE — 83520 IMMUNOASSAY QUANT NOS NONAB: CPT

## 2020-01-20 PROCEDURE — 96365 THER/PROPH/DIAG IV INF INIT: CPT

## 2020-01-20 PROCEDURE — 87040 BLOOD CULTURE FOR BACTERIA: CPT | Mod: 59

## 2020-01-20 PROCEDURE — 94761 N-INVAS EAR/PLS OXIMETRY MLT: CPT

## 2020-01-20 PROCEDURE — 84100 ASSAY OF PHOSPHORUS: CPT

## 2020-01-20 PROCEDURE — 36600 WITHDRAWAL OF ARTERIAL BLOOD: CPT

## 2020-01-20 PROCEDURE — 82330 ASSAY OF CALCIUM: CPT

## 2020-01-20 PROCEDURE — 63600175 PHARM REV CODE 636 W HCPCS: Performed by: EMERGENCY MEDICINE

## 2020-01-20 PROCEDURE — 93010 ELECTROCARDIOGRAM REPORT: CPT | Mod: ,,, | Performed by: INTERNAL MEDICINE

## 2020-01-20 PROCEDURE — 84484 ASSAY OF TROPONIN QUANT: CPT | Mod: 91

## 2020-01-20 PROCEDURE — 25000003 PHARM REV CODE 250: Performed by: HOSPITALIST

## 2020-01-20 PROCEDURE — 84145 PROCALCITONIN (PCT): CPT

## 2020-01-20 PROCEDURE — 84132 ASSAY OF SERUM POTASSIUM: CPT

## 2020-01-20 PROCEDURE — 82565 ASSAY OF CREATININE: CPT

## 2020-01-20 PROCEDURE — 83735 ASSAY OF MAGNESIUM: CPT

## 2020-01-20 PROCEDURE — 83880 ASSAY OF NATRIURETIC PEPTIDE: CPT

## 2020-01-20 RX ORDER — POTASSIUM CHLORIDE 29.8 MG/ML
80 INJECTION INTRAVENOUS
Status: DISCONTINUED | OUTPATIENT
Start: 2020-01-20 | End: 2020-01-22

## 2020-01-20 RX ORDER — ACETAMINOPHEN 650 MG/20.3ML
650 LIQUID ORAL EVERY 6 HOURS
Status: DISCONTINUED | OUTPATIENT
Start: 2020-01-21 | End: 2020-01-23

## 2020-01-20 RX ORDER — MAGNESIUM SULFATE HEPTAHYDRATE 40 MG/ML
2 INJECTION, SOLUTION INTRAVENOUS
Status: DISCONTINUED | OUTPATIENT
Start: 2020-01-20 | End: 2020-01-22

## 2020-01-20 RX ORDER — SUCCINYLCHOLINE CHLORIDE 20 MG/ML
100 INJECTION INTRAMUSCULAR; INTRAVENOUS
Status: COMPLETED | OUTPATIENT
Start: 2020-01-20 | End: 2020-01-20

## 2020-01-20 RX ORDER — FENTANYL CITRATE 50 UG/ML
50 INJECTION, SOLUTION INTRAMUSCULAR; INTRAVENOUS
Status: DISCONTINUED | OUTPATIENT
Start: 2020-01-25 | End: 2020-01-27

## 2020-01-20 RX ORDER — MAGNESIUM SULFATE HEPTAHYDRATE 40 MG/ML
2 INJECTION, SOLUTION INTRAVENOUS
Status: COMPLETED | OUTPATIENT
Start: 2020-01-20 | End: 2020-01-21

## 2020-01-20 RX ORDER — PROPOFOL 10 MG/ML
20 INJECTION, EMULSION INTRAVENOUS
Status: COMPLETED | OUTPATIENT
Start: 2020-01-20 | End: 2020-01-20

## 2020-01-20 RX ORDER — SODIUM BICARBONATE 1 MEQ/ML
SYRINGE (ML) INTRAVENOUS CODE/TRAUMA/SEDATION MEDICATION
Status: COMPLETED | OUTPATIENT
Start: 2020-01-20 | End: 2020-01-20

## 2020-01-20 RX ORDER — DOPAMINE HYDROCHLORIDE 160 MG/100ML
5 INJECTION, SOLUTION INTRAVENOUS CONTINUOUS
Status: DISCONTINUED | OUTPATIENT
Start: 2020-01-20 | End: 2020-01-21

## 2020-01-20 RX ORDER — FENTANYL CITRATE-0.9 % NACL/PF 10 MCG/ML
25 PLASTIC BAG, INJECTION (ML) INTRAVENOUS CONTINUOUS
Status: DISCONTINUED | OUTPATIENT
Start: 2020-01-20 | End: 2020-01-22

## 2020-01-20 RX ORDER — FUROSEMIDE 10 MG/ML
80 INJECTION INTRAMUSCULAR; INTRAVENOUS
Status: COMPLETED | OUTPATIENT
Start: 2020-01-20 | End: 2020-01-20

## 2020-01-20 RX ORDER — MAGNESIUM SULFATE HEPTAHYDRATE 40 MG/ML
0.5 INJECTION, SOLUTION INTRAVENOUS CONTINUOUS
Status: DISCONTINUED | OUTPATIENT
Start: 2020-01-20 | End: 2020-01-21

## 2020-01-20 RX ORDER — ENOXAPARIN SODIUM 100 MG/ML
75 INJECTION SUBCUTANEOUS
Status: DISCONTINUED | OUTPATIENT
Start: 2020-01-20 | End: 2020-01-24

## 2020-01-20 RX ORDER — ETOMIDATE 2 MG/ML
20 INJECTION INTRAVENOUS
Status: COMPLETED | OUTPATIENT
Start: 2020-01-20 | End: 2020-01-20

## 2020-01-20 RX ORDER — DEXAMETHASONE SODIUM PHOSPHATE 4 MG/ML
8 INJECTION, SOLUTION INTRA-ARTICULAR; INTRALESIONAL; INTRAMUSCULAR; INTRAVENOUS; SOFT TISSUE
Status: COMPLETED | OUTPATIENT
Start: 2020-01-20 | End: 2020-01-20

## 2020-01-20 RX ORDER — CHLORHEXIDINE GLUCONATE ORAL RINSE 1.2 MG/ML
15 SOLUTION DENTAL 2 TIMES DAILY
Status: DISCONTINUED | OUTPATIENT
Start: 2020-01-20 | End: 2020-01-20

## 2020-01-20 RX ORDER — DIPHENHYDRAMINE HYDROCHLORIDE 50 MG/ML
50 INJECTION INTRAMUSCULAR; INTRAVENOUS
Status: COMPLETED | OUTPATIENT
Start: 2020-01-20 | End: 2020-01-20

## 2020-01-20 RX ORDER — POTASSIUM CHLORIDE 14.9 MG/ML
60 INJECTION INTRAVENOUS
Status: DISCONTINUED | OUTPATIENT
Start: 2020-01-20 | End: 2020-01-22

## 2020-01-20 RX ORDER — CHLORHEXIDINE GLUCONATE ORAL RINSE 1.2 MG/ML
15 SOLUTION DENTAL 2 TIMES DAILY
Status: DISCONTINUED | OUTPATIENT
Start: 2020-01-20 | End: 2020-01-27

## 2020-01-20 RX ORDER — MAGNESIUM SULFATE HEPTAHYDRATE 40 MG/ML
4 INJECTION, SOLUTION INTRAVENOUS
Status: DISCONTINUED | OUTPATIENT
Start: 2020-01-20 | End: 2020-01-22

## 2020-01-20 RX ORDER — LORAZEPAM 2 MG/ML
3 INJECTION INTRAMUSCULAR
Status: COMPLETED | OUTPATIENT
Start: 2020-01-20 | End: 2020-01-20

## 2020-01-20 RX ORDER — LORAZEPAM 2 MG/ML
1 INJECTION INTRAMUSCULAR
Status: DISCONTINUED | OUTPATIENT
Start: 2020-01-20 | End: 2020-01-21

## 2020-01-20 RX ORDER — METHYLPREDNISOLONE SOD SUCC 125 MG
125 VIAL (EA) INJECTION ONCE
Status: COMPLETED | OUTPATIENT
Start: 2020-01-20 | End: 2020-01-20

## 2020-01-20 RX ORDER — FAMOTIDINE 10 MG/ML
20 INJECTION INTRAVENOUS DAILY
Status: DISCONTINUED | OUTPATIENT
Start: 2020-01-21 | End: 2020-01-27

## 2020-01-20 RX ORDER — FENTANYL CITRATE 50 UG/ML
50 INJECTION, SOLUTION INTRAMUSCULAR; INTRAVENOUS
Status: ACTIVE | OUTPATIENT
Start: 2020-01-20 | End: 2020-01-24

## 2020-01-20 RX ORDER — CALCIUM CHLORIDE INJECTION 100 MG/ML
INJECTION, SOLUTION INTRAVENOUS CODE/TRAUMA/SEDATION MEDICATION
Status: COMPLETED | OUTPATIENT
Start: 2020-01-20 | End: 2020-01-20

## 2020-01-20 RX ORDER — MAGNESIUM SULFATE HEPTAHYDRATE 500 MG/ML
INJECTION, SOLUTION INTRAMUSCULAR; INTRAVENOUS CODE/TRAUMA/SEDATION MEDICATION
Status: COMPLETED | OUTPATIENT
Start: 2020-01-20 | End: 2020-01-20

## 2020-01-20 RX ORDER — SODIUM CHLORIDE 0.9 % (FLUSH) 0.9 %
10 SYRINGE (ML) INJECTION
Status: DISCONTINUED | OUTPATIENT
Start: 2020-01-20 | End: 2020-01-30 | Stop reason: HOSPADM

## 2020-01-20 RX ORDER — ASPIRIN 300 MG/1
300 SUPPOSITORY RECTAL DAILY
Status: DISCONTINUED | OUTPATIENT
Start: 2020-01-20 | End: 2020-01-21

## 2020-01-20 RX ORDER — EPINEPHRINE 0.1 MG/ML
INJECTION INTRAVENOUS CODE/TRAUMA/SEDATION MEDICATION
Status: COMPLETED | OUTPATIENT
Start: 2020-01-20 | End: 2020-01-20

## 2020-01-20 RX ORDER — DILTIAZEM HYDROCHLORIDE 5 MG/ML
20 INJECTION INTRAVENOUS
Status: COMPLETED | OUTPATIENT
Start: 2020-01-20 | End: 2020-01-20

## 2020-01-20 RX ORDER — POTASSIUM CHLORIDE 29.8 MG/ML
40 INJECTION INTRAVENOUS
Status: DISCONTINUED | OUTPATIENT
Start: 2020-01-20 | End: 2020-01-22

## 2020-01-20 RX ORDER — BUSPIRONE HYDROCHLORIDE 10 MG/1
30 TABLET ORAL ONCE
Status: DISCONTINUED | OUTPATIENT
Start: 2020-01-20 | End: 2020-01-24

## 2020-01-20 RX ORDER — PANTOPRAZOLE SODIUM 40 MG/10ML
40 INJECTION, POWDER, LYOPHILIZED, FOR SOLUTION INTRAVENOUS DAILY
Status: DISCONTINUED | OUTPATIENT
Start: 2020-01-20 | End: 2020-01-20

## 2020-01-20 RX ORDER — BUSPIRONE HYDROCHLORIDE 10 MG/1
30 TABLET ORAL EVERY 8 HOURS
Status: COMPLETED | OUTPATIENT
Start: 2020-01-20 | End: 2020-01-21

## 2020-01-20 RX ORDER — DILTIAZEM HCL 1 MG/ML
5 INJECTION, SOLUTION INTRAVENOUS
Status: DISCONTINUED | OUTPATIENT
Start: 2020-01-20 | End: 2020-01-21

## 2020-01-20 RX ADMIN — EPINEPHRINE 1 MG: 0.1 INJECTION, SOLUTION ENDOTRACHEAL; INTRACARDIAC; INTRAVENOUS at 09:01

## 2020-01-20 RX ADMIN — PROPOFOL 5 MCG/KG/MIN: 10 INJECTION, EMULSION INTRAVENOUS at 11:01

## 2020-01-20 RX ADMIN — CHLORHEXIDINE GLUCONATE 0.12% ORAL RINSE 15 ML: 1.2 LIQUID ORAL at 11:01

## 2020-01-20 RX ADMIN — METHYLPREDNISOLONE SODIUM SUCCINATE 125 MG: 125 INJECTION, POWDER, FOR SOLUTION INTRAMUSCULAR; INTRAVENOUS at 09:01

## 2020-01-20 RX ADMIN — FUROSEMIDE 80 MG: 10 INJECTION, SOLUTION INTRAMUSCULAR; INTRAVENOUS at 05:01

## 2020-01-20 RX ADMIN — SUCCINYLCHOLINE CHLORIDE 100 MG: 20 INJECTION, SOLUTION INTRAMUSCULAR; INTRAVENOUS; PARENTERAL at 06:01

## 2020-01-20 RX ADMIN — DOPAMINE HYDROCHLORIDE 5 MCG/KG/MIN: 160 INJECTION, SOLUTION INTRAVENOUS at 11:01

## 2020-01-20 RX ADMIN — PROPOFOL 50 MCG/KG/MIN: 10 INJECTION, EMULSION INTRAVENOUS at 07:01

## 2020-01-20 RX ADMIN — LORAZEPAM 3 MG: 2 INJECTION INTRAMUSCULAR; INTRAVENOUS at 07:01

## 2020-01-20 RX ADMIN — AZITHROMYCIN MONOHYDRATE 500 MG: 500 INJECTION, POWDER, LYOPHILIZED, FOR SOLUTION INTRAVENOUS at 08:01

## 2020-01-20 RX ADMIN — CEFTRIAXONE 1 G: 1 INJECTION, SOLUTION INTRAVENOUS at 08:01

## 2020-01-20 RX ADMIN — Medication 25 MCG/HR: at 11:01

## 2020-01-20 RX ADMIN — ACETAMINOPHEN ORAL SOLUTION 650 MG: 650 SOLUTION ORAL at 11:01

## 2020-01-20 RX ADMIN — AMIODARONE HYDROCHLORIDE 150 MG: 1.5 INJECTION, SOLUTION INTRAVENOUS at 05:01

## 2020-01-20 RX ADMIN — CALCIUM CHLORIDE 1 G: 100 INJECTION INTRAVENOUS; INTRAVENTRICULAR at 09:01

## 2020-01-20 RX ADMIN — ASPIRIN 300 MG: 300 SUPPOSITORY RECTAL at 11:01

## 2020-01-20 RX ADMIN — DEXTROSE MONOHYDRATE 25 G: 25 INJECTION, SOLUTION INTRAVENOUS at 09:01

## 2020-01-20 RX ADMIN — ETOMIDATE 20 MG: 2 INJECTION INTRAVENOUS at 06:01

## 2020-01-20 RX ADMIN — BUSPIRONE HYDROCHLORIDE 30 MG: 10 TABLET ORAL at 11:01

## 2020-01-20 RX ADMIN — DEXAMETHASONE SODIUM PHOSPHATE 8 MG: 4 INJECTION, SOLUTION INTRA-ARTICULAR; INTRALESIONAL; INTRAMUSCULAR; INTRAVENOUS; SOFT TISSUE at 06:01

## 2020-01-20 RX ADMIN — DIPHENHYDRAMINE HYDROCHLORIDE 50 MG: 50 INJECTION INTRAMUSCULAR; INTRAVENOUS at 06:01

## 2020-01-20 RX ADMIN — DILTIAZEM HYDROCHLORIDE 20 MG: 5 INJECTION INTRAVENOUS at 06:01

## 2020-01-20 RX ADMIN — MAGNESIUM SULFATE IN WATER 0.5 G/HR: 40 INJECTION, SOLUTION INTRAVENOUS at 11:01

## 2020-01-20 RX ADMIN — SODIUM BICARBONATE 50 MEQ: 84 INJECTION, SOLUTION INTRAVENOUS at 09:01

## 2020-01-20 RX ADMIN — MAGNESIUM SULFATE HEPTAHYDRATE 1 G: 500 INJECTION, SOLUTION INTRAMUSCULAR; INTRAVENOUS at 09:01

## 2020-01-20 NOTE — ED PROVIDER NOTES
"Encounter Date: 1/20/2020    SCRIBE #1 NOTE: I, Jim Chahal, am scribing for, and in the presence of,  William Arteaga MD. I have scribed the following portions of the note - Other sections scribed: HPI/ROS.       History     Chief Complaint   Patient presents with    Shortness of Breath     States he has SOB that started yesterday.  States this has happened before      This 59 y.o. male with a medical history of A-flutter, CAD, CHF, and schizophrenia presents to the ED for an emergent evaluation of SOB x 2 days. Pt reports SOB upon exertion noting he can only ambulate for a few steps. There is associated dull, heavy chest pain and a nonproductive cough. Pt is a former smoker. Pt denies a hx of asthma and COPD. Pt's daily medications includes Coreg, Amiodarone, and Metolazone. Pt states he is out of these medications. No alleviating factors. Otherwise, pt denies fever, chills, leg swelling, vomiting, abdominal pain, and any other associated symptoms.      The history is provided by the patient. No  was used.     Review of patient's allergies indicates:  No Known Allergies  Past Medical History:   Diagnosis Date    Arrhythmia 2017    aflutter    CAD (coronary artery disease)     CHF (congestive heart failure), NYHA class I 2017    Psychiatric disorder     h/o "schizophrena/bipolar"     Past Surgical History:   Procedure Laterality Date    LIVER SURGERY      abscess drainage; 1970s    TREATMENT OF CARDIAC ARRHYTHMIA  9/12/2019    Procedure: Cardioversion or Defibrillation;  Surgeon: Jonathan Lopez MD;  Location: [facility] CATH LAB;  Service: Cardiology;;     History reviewed. No pertinent family history.  Social History     Tobacco Use    Smoking status: Current Every Day Smoker     Packs/day: 0.50     Years: 5.00     Pack years: 2.50     Types: Cigarettes    Smokeless tobacco: Never Used   Substance Use Topics    Alcohol use: No    Drug use: No     Review of Systems "   Constitutional: Negative for chills and fever.   HENT: Negative for congestion, rhinorrhea and sore throat.    Eyes: Negative for visual disturbance.   Respiratory: Positive for cough and shortness of breath.    Cardiovascular: Positive for chest pain. Negative for leg swelling.   Gastrointestinal: Negative for abdominal pain, diarrhea, nausea and vomiting.   Genitourinary: Negative for dysuria.   Musculoskeletal: Negative for gait problem.   Skin: Negative for rash.   Neurological: Negative for headaches.       Physical Exam     Initial Vitals [01/20/20 1647]   BP Pulse Resp Temp SpO2   (!) 149/99 (!) 140 20 97.8 °F (36.6 °C) (!) 94 %      MAP       --         Physical Exam  The patient was examined specifically for the following:   General:No significant distress, Good color, Warm and dry. Head and neck:Scalp atraumatic, Neck supple. Neurological:Appropriate conversation, Gross motor deficits. Eyes:Conjugate gaze, Clear corneas. ENT: No epistaxis. Cardiac: Regular rate and rhythm, Grossly normal heart tones. Pulmonary: Wheezing, Rales. Gastrointestinal: Abdominal tenderness, Abdominal distention. Musculoskeletal: Extremity deformity, Apparent pain with range of motion of the joints. Skin: Rash.   The findings on examination were normal except for the following:  The patient has rales in his bases he has mild respiratory distress. Heart tones are remarkable for regular tachycardia.  The blood pressure is 149/99.  Patient is afebrile.  There is no pedal edema.  The abdomen is soft.  ED Course   Critical Care  Date/Time: 1/20/2020 8:06 PM  Performed by: William Arteaga MD  Authorized by: William Arteaga MD   Direct patient critical care time: 22 minutes  Additional history critical care time: 11 minutes  Ordering / reviewing critical care time: 11 minutes  Documentation critical care time: 11 minutes  Consulting other physicians critical care time: 9 minutes  Consult with family critical care time: 3  minutes  Total critical care time (exclusive of procedural time) : 67 minutes  Critical care was necessary to treat or prevent imminent or life-threatening deterioration of the following conditions: cardiac failure, respiratory failure and shock.  Critical care was time spent personally by me on the following activities: development of treatment plan with patient or surrogate, discussions with consultants, discussions with primary provider, evaluation of patient's response to treatment, examination of patient, obtaining history from patient or surrogate, ordering and performing treatments and interventions, ordering and review of laboratory studies, ordering and review of radiographic studies, pulse oximetry, re-evaluation of patient's condition, review of old charts and ventilator management.        Labs Reviewed   CBC W/ AUTO DIFFERENTIAL - Abnormal; Notable for the following components:       Result Value    Mean Corpuscular Hemoglobin 32.3 (*)     All other components within normal limits   COMPREHENSIVE METABOLIC PANEL - Abnormal; Notable for the following components:    CO2 22 (*)     Glucose 126 (*)     BUN, Bld 28 (*)     Creatinine 1.7 (*)     AST 50 (*)     eGFR if  49 (*)     eGFR if non  43 (*)     All other components within normal limits   TROPONIN I - Abnormal; Notable for the following components:    Troponin I 0.329 (*)     All other components within normal limits   B-TYPE NATRIURETIC PEPTIDE - Abnormal; Notable for the following components:     (*)     All other components within normal limits   PROTIME-INR - Abnormal; Notable for the following components:    Prothrombin Time 12.8 (*)     All other components within normal limits   ISTAT PROCEDURE - Abnormal; Notable for the following components:    POC PH 7.258 (*)     POC PCO2 33.9 (*)     POC HCO3 15.2 (*)     POC TCO2 16 (*)     All other components within normal limits   CULTURE, BLOOD   CULTURE, BLOOD    CULTURE, RESPIRATORY   TSH   MAGNESIUM   LACTIC ACID, PLASMA   URINALYSIS, REFLEX TO URINE CULTURE   PROCALCITONIN          Imaging Results          X-Ray Chest AP Portable (Final result)  Result time 01/20/20 19:32:24    Final result by Hank Palomino MD (01/20/20 19:32:24)                 Impression:      Endotracheal tube 4.1 cm from the rosmery.    Slight worsening of pulmonary edema in comparison to the prior exam of the same date.    Electronically signed by resident: Iron Ha  Date:    01/20/2020  Time:    19:15    Electronically signed by: Hank Palomino MD  Date:    01/20/2020  Time:    19:32             Narrative:    EXAMINATION:  XR CHEST AP PORTABLE    CLINICAL HISTORY:  Encounter for other preprocedural examination    TECHNIQUE:  Single frontal view of the chest was performed.    COMPARISON:  01/20/2020    FINDINGS:  ETT lies 4.1 cm from the rosmery.  Enteric tube courses below level of diaphragm.    Slight interval progression of increased interstitial attenuation in comparison to the prior exam possibly reflecting patient positioning, but with worsening pulmonary edema favored.    No pneumothorax, or large pleural effusion.    Redemonstration of cardiomegaly.  Aortic atherosclerosis again noted.    Mediastinal and hilar contours are unremarkable allowing for patient rotation.                               X-Ray Chest AP Portable (Final result)  Result time 01/20/20 17:53:56    Final result by Edwin Caicedo MD (01/20/20 17:53:56)                 Impression:      Cardiomegaly with bilateral perihilar and bibasilar infiltrates.  Findings may be associated with pneumonia.  Component of edema not excluded.  Follow-up recommended.      Electronically signed by: Edwin Caicedo  Date:    01/20/2020  Time:    17:53             Narrative:    EXAMINATION:  XR CHEST AP PORTABLE    CLINICAL HISTORY:  shortness of breath;    TECHNIQUE:  Single frontal view of the chest was performed.    COMPARISON:  September  12, 2019.    FINDINGS:  Heart is mildly enlarged.    Bibasilar and perihilar infiltrates.    Suboptimal inspiration.    No effusion or pneumothorax.    No mass or consolidation.    Aortic atherosclerosis.    Trachea is midline.  No acute osseous abnormality.                              Medical decision making:  Given the above this patient presents to the emergency room with shortness of breath chest pain.  The patient has an elevated troponin.  This may be a non ST segment elevation myocardial infarction.  I will treat with full-dose Lovenox.  The patient is in atrial flutter.  He was immediately treated with amiodarone arrival for rapid ventricular response.  He developed hives at the injection site.  The patient got worse during his evaluation in the emergency room.  He was further treated with Benadryl and Decadron.  His symptoms progressed.  He was intubated.  Chest x-ray showed good tube placement.  Chest x-ray also revealed a right infiltrate.  Both failure pneumonia considered.  Blood cultures were done.  The patient was treated with antibiotics.  Consultation was obtained with hospital medicine.  The patient's heart rate came down to 100.  He remains of flutter with 2-1 block.  He is on propofol and Ativan for sedation.  I will write orders on behalf of the ICU.                Scribe Attestation:   Scribe #1: I performed the above scribed service and the documentation accurately describes the services I performed. I attest to the accuracy of the note.                        I personally performed the services described in this documentation.  All medical record  entries made by the scribe are at my direction and in my presence.  Signed, Dr. Arteaga  Clinical Impression:       ICD-10-CM ICD-9-CM   1. Acute systolic congestive heart failure I50.21 428.21     428.0   2. Shortness of breath R06.02 786.05   3. Encounter for intubation Z01.818 V72.83   4. Chest pain R07.9 786.50   5. Atrial flutter with rapid  ventricular response I48.92 427.32   6. Acute non-ST-segment elevation myocardial infarction I21.4 410.70                             William Arteaga MD  01/20/20 2016

## 2020-01-21 PROBLEM — B17.9 ACUTE HEPATITIS: Status: ACTIVE | Noted: 2020-01-21

## 2020-01-21 PROBLEM — F17.200 TOBACCO USE DISORDER, SEVERE, DEPENDENCE: Status: ACTIVE | Noted: 2020-01-21

## 2020-01-21 PROBLEM — F10.20 ALCOHOL USE DISORDER, SEVERE, DEPENDENCE: Status: ACTIVE | Noted: 2020-01-21

## 2020-01-21 LAB
ALBUMIN SERPL BCP-MCNC: 3.3 G/DL (ref 3.5–5.2)
ALBUMIN SERPL BCP-MCNC: 3.4 G/DL (ref 3.5–5.2)
ALBUMIN SERPL BCP-MCNC: 3.5 G/DL (ref 3.5–5.2)
ALBUMIN SERPL BCP-MCNC: 3.7 G/DL (ref 3.5–5.2)
ALBUMIN SERPL BCP-MCNC: 3.8 G/DL (ref 3.5–5.2)
ALLENS TEST: ABNORMAL
ALP SERPL-CCNC: 62 U/L (ref 55–135)
ALP SERPL-CCNC: 64 U/L (ref 55–135)
ALP SERPL-CCNC: 66 U/L (ref 55–135)
ALP SERPL-CCNC: 66 U/L (ref 55–135)
ALP SERPL-CCNC: 70 U/L (ref 55–135)
ALT SERPL W/O P-5'-P-CCNC: 258 U/L (ref 10–44)
ALT SERPL W/O P-5'-P-CCNC: 265 U/L (ref 10–44)
ALT SERPL W/O P-5'-P-CCNC: 267 U/L (ref 10–44)
ALT SERPL W/O P-5'-P-CCNC: 269 U/L (ref 10–44)
ALT SERPL W/O P-5'-P-CCNC: 286 U/L (ref 10–44)
ANION GAP SERPL CALC-SCNC: 10 MMOL/L (ref 8–16)
ANION GAP SERPL CALC-SCNC: 11 MMOL/L (ref 8–16)
ANION GAP SERPL CALC-SCNC: 12 MMOL/L (ref 8–16)
ANION GAP SERPL CALC-SCNC: 12 MMOL/L (ref 8–16)
ANION GAP SERPL CALC-SCNC: 13 MMOL/L (ref 8–16)
ANION GAP SERPL CALC-SCNC: 14 MMOL/L (ref 8–16)
ANION GAP SERPL CALC-SCNC: 14 MMOL/L (ref 8–16)
AORTIC ROOT ANNULUS: 3.09 CM
AORTIC VALVE CUSP SEPERATION: 1.81 CM
APTT BLDCRRT: 38.2 SEC (ref 21–32)
ASCENDING AORTA: 3.01 CM
AST SERPL-CCNC: 312 U/L (ref 10–40)
AST SERPL-CCNC: 323 U/L (ref 10–40)
AST SERPL-CCNC: 331 U/L (ref 10–40)
AST SERPL-CCNC: 367 U/L (ref 10–40)
AST SERPL-CCNC: 370 U/L (ref 10–40)
AV INDEX (PROSTH): 1.09
AV MEAN GRADIENT: 2 MMHG
AV PEAK GRADIENT: 3 MMHG
AV VALVE AREA: 3.59 CM2
AV VELOCITY RATIO: 1.11
BASOPHILS # BLD AUTO: 0.05 K/UL (ref 0–0.2)
BASOPHILS NFR BLD: 0.4 % (ref 0–1.9)
BILIRUB SERPL-MCNC: 1.2 MG/DL (ref 0.1–1)
BILIRUB SERPL-MCNC: 1.3 MG/DL (ref 0.1–1)
BILIRUB SERPL-MCNC: 1.4 MG/DL (ref 0.1–1)
BILIRUB UR QL STRIP: NEGATIVE
BSA FOR ECHO PROCEDURE: 1.92 M2
BUN SERPL-MCNC: 27 MG/DL (ref 8–23)
BUN SERPL-MCNC: 29 MG/DL (ref 8–23)
BUN SERPL-MCNC: 30 MG/DL (ref 8–23)
CALCIUM SERPL-MCNC: 10.1 MG/DL (ref 8.7–10.5)
CALCIUM SERPL-MCNC: 10.2 MG/DL (ref 8.7–10.5)
CALCIUM SERPL-MCNC: 9.2 MG/DL (ref 8.7–10.5)
CALCIUM SERPL-MCNC: 9.2 MG/DL (ref 8.7–10.5)
CALCIUM SERPL-MCNC: 9.5 MG/DL (ref 8.7–10.5)
CALCIUM SERPL-MCNC: 9.6 MG/DL (ref 8.7–10.5)
CALCIUM SERPL-MCNC: 9.9 MG/DL (ref 8.7–10.5)
CHLORIDE SERPL-SCNC: 105 MMOL/L (ref 95–110)
CHLORIDE SERPL-SCNC: 106 MMOL/L (ref 95–110)
CHLORIDE SERPL-SCNC: 106 MMOL/L (ref 95–110)
CHLORIDE SERPL-SCNC: 107 MMOL/L (ref 95–110)
CHLORIDE SERPL-SCNC: 110 MMOL/L (ref 95–110)
CHLORIDE SERPL-SCNC: 112 MMOL/L (ref 95–110)
CHLORIDE SERPL-SCNC: 112 MMOL/L (ref 95–110)
CK SERPL-CCNC: 873 U/L (ref 20–200)
CLARITY UR: CLEAR
CO2 SERPL-SCNC: 18 MMOL/L (ref 23–29)
CO2 SERPL-SCNC: 18 MMOL/L (ref 23–29)
CO2 SERPL-SCNC: 19 MMOL/L (ref 23–29)
CO2 SERPL-SCNC: 20 MMOL/L (ref 23–29)
CO2 SERPL-SCNC: 22 MMOL/L (ref 23–29)
CO2 SERPL-SCNC: 23 MMOL/L (ref 23–29)
CO2 SERPL-SCNC: 23 MMOL/L (ref 23–29)
COLOR UR: ABNORMAL
CREAT SERPL-MCNC: 1.7 MG/DL (ref 0.5–1.4)
CREAT SERPL-MCNC: 1.8 MG/DL (ref 0.5–1.4)
CREAT SERPL-MCNC: 1.9 MG/DL (ref 0.5–1.4)
CREAT SERPL-MCNC: 2.1 MG/DL (ref 0.5–1.4)
CREAT SERPL-MCNC: 2.1 MG/DL (ref 0.5–1.4)
CV ECHO LV RWT: 1.03 CM
DELSYS: ABNORMAL
DIFFERENTIAL METHOD: ABNORMAL
DOP CALC AO PEAK VEL: 0.91 M/S
DOP CALC AO VTI: 14.68 CM
DOP CALC LVOT AREA: 3.3 CM2
DOP CALC LVOT DIAMETER: 2.05 CM
DOP CALC LVOT PEAK VEL: 1.01 M/S
DOP CALC LVOT STROKE VOLUME: 52.75 CM3
DOP CALCLVOT PEAK VEL VTI: 15.99 CM
E/E' RATIO: 12.91 M/S
ECHO LV POSTERIOR WALL: 1.76 CM (ref 0.6–1.1)
EOSINOPHIL # BLD AUTO: 0.2 K/UL (ref 0–0.5)
EOSINOPHIL NFR BLD: 1.5 % (ref 0–8)
ERYTHROCYTE [DISTWIDTH] IN BLOOD BY AUTOMATED COUNT: 13.2 % (ref 11.5–14.5)
ERYTHROCYTE [SEDIMENTATION RATE] IN BLOOD BY WESTERGREN METHOD: 22 MM/H
EST. GFR  (AFRICAN AMERICAN): 38 ML/MIN/1.73 M^2
EST. GFR  (AFRICAN AMERICAN): 38 ML/MIN/1.73 M^2
EST. GFR  (AFRICAN AMERICAN): 43 ML/MIN/1.73 M^2
EST. GFR  (AFRICAN AMERICAN): 46 ML/MIN/1.73 M^2
EST. GFR  (AFRICAN AMERICAN): 49 ML/MIN/1.73 M^2
EST. GFR  (NON AFRICAN AMERICAN): 33 ML/MIN/1.73 M^2
EST. GFR  (NON AFRICAN AMERICAN): 33 ML/MIN/1.73 M^2
EST. GFR  (NON AFRICAN AMERICAN): 37 ML/MIN/1.73 M^2
EST. GFR  (NON AFRICAN AMERICAN): 40 ML/MIN/1.73 M^2
EST. GFR  (NON AFRICAN AMERICAN): 43 ML/MIN/1.73 M^2
FIO2: 100
FRACTIONAL SHORTENING: 12 % (ref 28–44)
GLUCOSE SERPL-MCNC: 146 MG/DL (ref 70–110)
GLUCOSE SERPL-MCNC: 146 MG/DL (ref 70–110)
GLUCOSE SERPL-MCNC: 164 MG/DL (ref 70–110)
GLUCOSE SERPL-MCNC: 223 MG/DL (ref 70–110)
GLUCOSE SERPL-MCNC: 223 MG/DL (ref 70–110)
GLUCOSE SERPL-MCNC: 273 MG/DL (ref 70–110)
GLUCOSE SERPL-MCNC: 273 MG/DL (ref 70–110)
GLUCOSE SERPL-MCNC: 293 MG/DL (ref 70–110)
GLUCOSE SERPL-MCNC: 293 MG/DL (ref 70–110)
GLUCOSE SERPL-MCNC: 299 MG/DL (ref 70–110)
GLUCOSE SERPL-MCNC: 299 MG/DL (ref 70–110)
GLUCOSE UR QL STRIP: NEGATIVE
HCO3 UR-SCNC: 26.4 MMOL/L (ref 24–28)
HCT VFR BLD AUTO: 40.6 % (ref 40–54)
HGB BLD-MCNC: 13.9 G/DL (ref 14–18)
HGB UR QL STRIP: ABNORMAL
IMM GRANULOCYTES # BLD AUTO: 0.1 K/UL (ref 0–0.04)
IMM GRANULOCYTES NFR BLD AUTO: 0.8 % (ref 0–0.5)
INR PPP: 1.4 (ref 0.8–1.2)
INTERVENTRICULAR SEPTUM: 1.66 CM (ref 0.6–1.1)
IVRT: 0.07 MSEC
KETONES UR QL STRIP: NEGATIVE
LA MAJOR: 6.1 CM
LA MINOR: 6 CM
LA WIDTH: 3.1 CM
LEFT ATRIUM SIZE: 3.3 CM
LEFT ATRIUM VOLUME INDEX: 27.8 ML/M2
LEFT ATRIUM VOLUME: 52.6 CM3
LEFT INTERNAL DIMENSION IN SYSTOLE: 2.99 CM (ref 2.1–4)
LEFT VENTRICLE DIASTOLIC VOLUME INDEX: 25.18 ML/M2
LEFT VENTRICLE DIASTOLIC VOLUME: 47.62 ML
LEFT VENTRICLE MASS INDEX: 123 G/M2
LEFT VENTRICLE SYSTOLIC VOLUME INDEX: 18.3 ML/M2
LEFT VENTRICLE SYSTOLIC VOLUME: 34.7 ML
LEFT VENTRICULAR INTERNAL DIMENSION IN DIASTOLE: 3.41 CM (ref 3.5–6)
LEFT VENTRICULAR MASS: 232.69 G
LEUKOCYTE ESTERASE UR QL STRIP: NEGATIVE
LV LATERAL E/E' RATIO: 11.83 M/S
LV SEPTAL E/E' RATIO: 14.2 M/S
LYMPHOCYTES # BLD AUTO: 0.4 K/UL (ref 1–4.8)
LYMPHOCYTES NFR BLD: 2.9 % (ref 18–48)
MAGNESIUM SERPL-MCNC: 2.1 MG/DL (ref 1.6–2.6)
MAGNESIUM SERPL-MCNC: 2.3 MG/DL (ref 1.6–2.6)
MAGNESIUM SERPL-MCNC: 2.4 MG/DL (ref 1.6–2.6)
MAGNESIUM SERPL-MCNC: 3 MG/DL (ref 1.6–2.6)
MAGNESIUM SERPL-MCNC: 3.2 MG/DL (ref 1.6–2.6)
MAGNESIUM SERPL-MCNC: 3.3 MG/DL (ref 1.6–2.6)
MCH RBC QN AUTO: 32.9 PG (ref 27–31)
MCHC RBC AUTO-ENTMCNC: 34.2 G/DL (ref 32–36)
MCV RBC AUTO: 96 FL (ref 82–98)
MICROSCOPIC COMMENT: NORMAL
MODE: ABNORMAL
MONOCYTES # BLD AUTO: 0.2 K/UL (ref 0.3–1)
MONOCYTES NFR BLD: 1.1 % (ref 4–15)
MV PEAK E VEL: 0.71 M/S
NEUTROPHILS # BLD AUTO: 12.3 K/UL (ref 1.8–7.7)
NEUTROPHILS NFR BLD: 93.3 % (ref 38–73)
NITRITE UR QL STRIP: NEGATIVE
NRBC BLD-RTO: 0 /100 WBC
PCO2 BLDA: 51.1 MMHG (ref 35–45)
PEEP: 10
PH SMN: 7.32 [PH] (ref 7.35–7.45)
PH UR STRIP: 6 [PH] (ref 5–8)
PHOSPHATE SERPL-MCNC: 1.4 MG/DL (ref 2.7–4.5)
PHOSPHATE SERPL-MCNC: 1.5 MG/DL (ref 2.7–4.5)
PHOSPHATE SERPL-MCNC: 2 MG/DL (ref 2.7–4.5)
PHOSPHATE SERPL-MCNC: 2.3 MG/DL (ref 2.7–4.5)
PHOSPHATE SERPL-MCNC: 2.8 MG/DL (ref 2.7–4.5)
PHOSPHATE SERPL-MCNC: 3.9 MG/DL (ref 2.7–4.5)
PISA TR MAX VEL: 2.46 M/S
PLATELET # BLD AUTO: 155 K/UL (ref 150–350)
PMV BLD AUTO: 10.8 FL (ref 9.2–12.9)
PO2 BLDA: 76 MMHG (ref 80–100)
POC BE: -1 MMOL/L
POC SATURATED O2: 94 % (ref 95–100)
POC TCO2: 28 MMOL/L (ref 23–27)
POTASSIUM SERPL-SCNC: 3.2 MMOL/L (ref 3.5–5.1)
POTASSIUM SERPL-SCNC: 3.4 MMOL/L (ref 3.5–5.1)
POTASSIUM SERPL-SCNC: 3.4 MMOL/L (ref 3.5–5.1)
POTASSIUM SERPL-SCNC: 3.6 MMOL/L (ref 3.5–5.1)
POTASSIUM SERPL-SCNC: 4 MMOL/L (ref 3.5–5.1)
POTASSIUM SERPL-SCNC: 4.3 MMOL/L (ref 3.5–5.1)
POTASSIUM SERPL-SCNC: 4.7 MMOL/L (ref 3.5–5.1)
PROT SERPL-MCNC: 6.4 G/DL (ref 6–8.4)
PROT SERPL-MCNC: 6.8 G/DL (ref 6–8.4)
PROT SERPL-MCNC: 6.9 G/DL (ref 6–8.4)
PROT SERPL-MCNC: 7.2 G/DL (ref 6–8.4)
PROT SERPL-MCNC: 7.7 G/DL (ref 6–8.4)
PROT UR QL STRIP: NEGATIVE
PROTHROMBIN TIME: 15.1 SEC (ref 9–12.5)
PV PEAK VELOCITY: 0.87 CM/S
RA MAJOR: 5.46 CM
RA WIDTH: 2.43 CM
RBC # BLD AUTO: 4.23 M/UL (ref 4.6–6.2)
RBC #/AREA URNS HPF: 3 /HPF (ref 0–4)
RIGHT VENTRICULAR END-DIASTOLIC DIMENSION: 2.81 CM
RV TISSUE DOPPLER FREE WALL SYSTOLIC VELOCITY 1 (APICAL 4 CHAMBER VIEW): 11.46 CM/S
SAMPLE: ABNORMAL
SINUS: 3.07 CM
SITE: ABNORMAL
SODIUM SERPL-SCNC: 137 MMOL/L (ref 136–145)
SODIUM SERPL-SCNC: 140 MMOL/L (ref 136–145)
SODIUM SERPL-SCNC: 141 MMOL/L (ref 136–145)
SODIUM SERPL-SCNC: 142 MMOL/L (ref 136–145)
SODIUM SERPL-SCNC: 143 MMOL/L (ref 136–145)
SP GR UR STRIP: 1 (ref 1–1.03)
SQUAMOUS #/AREA URNS HPF: NORMAL /HPF
STJ: 2.22 CM
TDI LATERAL: 0.06 M/S
TDI SEPTAL: 0.05 M/S
TDI: 0.06 M/S
TR MAX PG: 24 MMHG
TRICUSPID ANNULAR PLANE SYSTOLIC EXCURSION: 1.03 CM
TROPONIN I SERPL DL<=0.01 NG/ML-MCNC: 0.31 NG/ML (ref 0–0.03)
TROPONIN I SERPL DL<=0.01 NG/ML-MCNC: 0.44 NG/ML (ref 0–0.03)
TROPONIN I SERPL DL<=0.01 NG/ML-MCNC: 0.45 NG/ML (ref 0–0.03)
URN SPEC COLLECT METH UR: ABNORMAL
UROBILINOGEN UR STRIP-ACNC: NEGATIVE EU/DL
VT: 450
WBC # BLD AUTO: 13.2 K/UL (ref 3.9–12.7)
WBC #/AREA URNS HPF: 2 /HPF (ref 0–5)

## 2020-01-21 PROCEDURE — 84484 ASSAY OF TROPONIN QUANT: CPT

## 2020-01-21 PROCEDURE — 99233 PR SUBSEQUENT HOSPITAL CARE,LEVL III: ICD-10-PCS | Mod: 25,,, | Performed by: INTERNAL MEDICINE

## 2020-01-21 PROCEDURE — 99291 PR CRITICAL CARE, E/M 30-74 MINUTES: ICD-10-PCS | Mod: ,,, | Performed by: NURSE PRACTITIONER

## 2020-01-21 PROCEDURE — 99900035 HC TECH TIME PER 15 MIN (STAT)

## 2020-01-21 PROCEDURE — 99233 SBSQ HOSP IP/OBS HIGH 50: CPT | Mod: 25,,, | Performed by: INTERNAL MEDICINE

## 2020-01-21 PROCEDURE — 25000003 PHARM REV CODE 250: Performed by: HOSPITALIST

## 2020-01-21 PROCEDURE — 37799 UNLISTED PX VASCULAR SURGERY: CPT

## 2020-01-21 PROCEDURE — 80053 COMPREHEN METABOLIC PANEL: CPT

## 2020-01-21 PROCEDURE — 99291 CRITICAL CARE FIRST HOUR: CPT | Mod: ,,, | Performed by: NURSE PRACTITIONER

## 2020-01-21 PROCEDURE — 82330 ASSAY OF CALCIUM: CPT

## 2020-01-21 PROCEDURE — 84100 ASSAY OF PHOSPHORUS: CPT | Mod: 91

## 2020-01-21 PROCEDURE — 36415 COLL VENOUS BLD VENIPUNCTURE: CPT

## 2020-01-21 PROCEDURE — S0028 INJECTION, FAMOTIDINE, 20 MG: HCPCS | Performed by: HOSPITALIST

## 2020-01-21 PROCEDURE — 87070 CULTURE OTHR SPECIMN AEROBIC: CPT

## 2020-01-21 PROCEDURE — 80048 BASIC METABOLIC PNL TOTAL CA: CPT

## 2020-01-21 PROCEDURE — 85025 COMPLETE CBC W/AUTO DIFF WBC: CPT

## 2020-01-21 PROCEDURE — 20000000 HC ICU ROOM

## 2020-01-21 PROCEDURE — 82803 BLOOD GASES ANY COMBINATION: CPT

## 2020-01-21 PROCEDURE — 87205 SMEAR GRAM STAIN: CPT

## 2020-01-21 PROCEDURE — 99900026 HC AIRWAY MAINTENANCE (STAT)

## 2020-01-21 PROCEDURE — 83735 ASSAY OF MAGNESIUM: CPT

## 2020-01-21 PROCEDURE — 63600175 PHARM REV CODE 636 W HCPCS: Performed by: INTERNAL MEDICINE

## 2020-01-21 PROCEDURE — 81000 URINALYSIS NONAUTO W/SCOPE: CPT

## 2020-01-21 PROCEDURE — 63600175 PHARM REV CODE 636 W HCPCS: Performed by: HOSPITALIST

## 2020-01-21 PROCEDURE — 63600175 PHARM REV CODE 636 W HCPCS: Performed by: EMERGENCY MEDICINE

## 2020-01-21 PROCEDURE — 25000003 PHARM REV CODE 250: Performed by: INTERNAL MEDICINE

## 2020-01-21 PROCEDURE — 94761 N-INVAS EAR/PLS OXIMETRY MLT: CPT

## 2020-01-21 PROCEDURE — 85730 THROMBOPLASTIN TIME PARTIAL: CPT

## 2020-01-21 PROCEDURE — 85610 PROTHROMBIN TIME: CPT

## 2020-01-21 RX ORDER — PROPOFOL 10 MG/ML
5 INJECTION, EMULSION INTRAVENOUS CONTINUOUS
Status: DISCONTINUED | OUTPATIENT
Start: 2020-01-20 | End: 2020-01-22

## 2020-01-21 RX ORDER — DOPAMINE HYDROCHLORIDE 160 MG/100ML
2 INJECTION, SOLUTION INTRAVENOUS CONTINUOUS
Status: DISCONTINUED | OUTPATIENT
Start: 2020-01-21 | End: 2020-01-24

## 2020-01-21 RX ORDER — INSULIN ASPART 100 [IU]/ML
1-10 INJECTION, SOLUTION INTRAVENOUS; SUBCUTANEOUS EVERY 6 HOURS PRN
Status: DISCONTINUED | OUTPATIENT
Start: 2020-01-21 | End: 2020-01-22 | Stop reason: ALTCHOICE

## 2020-01-21 RX ORDER — GLUCAGON 1 MG
1 KIT INJECTION
Status: DISCONTINUED | OUTPATIENT
Start: 2020-01-21 | End: 2020-01-22 | Stop reason: ALTCHOICE

## 2020-01-21 RX ORDER — ATROPINE SULFATE 0.1 MG/ML
INJECTION INTRAVENOUS
Status: COMPLETED
Start: 2020-01-21 | End: 2020-01-21

## 2020-01-21 RX ORDER — NAPROXEN SODIUM 220 MG/1
81 TABLET, FILM COATED ORAL DAILY
Status: DISCONTINUED | OUTPATIENT
Start: 2020-01-21 | End: 2020-01-30 | Stop reason: HOSPADM

## 2020-01-21 RX ADMIN — POTASSIUM CHLORIDE 20 MEQ: 14.9 INJECTION, SOLUTION INTRAVENOUS at 06:01

## 2020-01-21 RX ADMIN — PROPOFOL 20 MCG/KG/MIN: 10 INJECTION, EMULSION INTRAVENOUS at 12:01

## 2020-01-21 RX ADMIN — VANCOMYCIN HYDROCHLORIDE 2000 MG: 100 INJECTION, POWDER, LYOPHILIZED, FOR SOLUTION INTRAVENOUS at 06:01

## 2020-01-21 RX ADMIN — POTASSIUM CHLORIDE 40 MEQ: 29.8 INJECTION, SOLUTION INTRAVENOUS at 05:01

## 2020-01-21 RX ADMIN — ACETAMINOPHEN ORAL SOLUTION 650 MG: 650 SOLUTION ORAL at 11:01

## 2020-01-21 RX ADMIN — ACETAMINOPHEN ORAL SOLUTION 650 MG: 650 SOLUTION ORAL at 05:01

## 2020-01-21 RX ADMIN — CHLORHEXIDINE GLUCONATE 0.12% ORAL RINSE 15 ML: 1.2 LIQUID ORAL at 08:01

## 2020-01-21 RX ADMIN — PROPOFOL 50 MCG/KG/MIN: 10 INJECTION, EMULSION INTRAVENOUS at 01:01

## 2020-01-21 RX ADMIN — PIPERACILLIN AND TAZOBACTAM 4.5 G: 4; .5 INJECTION, POWDER, LYOPHILIZED, FOR SOLUTION INTRAVENOUS; PARENTERAL at 09:01

## 2020-01-21 RX ADMIN — ENOXAPARIN SODIUM 80 MG: 100 INJECTION SUBCUTANEOUS at 12:01

## 2020-01-21 RX ADMIN — ACETAMINOPHEN ORAL SOLUTION 650 MG: 650 SOLUTION ORAL at 12:01

## 2020-01-21 RX ADMIN — PIPERACILLIN AND TAZOBACTAM 4.5 G: 4; .5 INJECTION, POWDER, LYOPHILIZED, FOR SOLUTION INTRAVENOUS; PARENTERAL at 05:01

## 2020-01-21 RX ADMIN — SODIUM PHOSPHATE, MONOBASIC, MONOHYDRATE 30 MMOL: 276; 142 INJECTION, SOLUTION INTRAVENOUS at 12:01

## 2020-01-21 RX ADMIN — ASPIRIN 81 MG CHEWABLE TABLET 81 MG: 81 TABLET CHEWABLE at 10:01

## 2020-01-21 RX ADMIN — FAMOTIDINE 20 MG: 10 INJECTION INTRAVENOUS at 09:01

## 2020-01-21 RX ADMIN — DOPAMINE HYDROCHLORIDE IN DEXTROSE 4 MCG/KG/MIN: 1.6 INJECTION, SOLUTION INTRAVENOUS at 01:01

## 2020-01-21 RX ADMIN — ENOXAPARIN SODIUM 80 MG: 100 INJECTION SUBCUTANEOUS at 09:01

## 2020-01-21 RX ADMIN — CHLORHEXIDINE GLUCONATE 0.12% ORAL RINSE 15 ML: 1.2 LIQUID ORAL at 09:01

## 2020-01-21 RX ADMIN — BUSPIRONE HYDROCHLORIDE 30 MG: 10 TABLET ORAL at 01:01

## 2020-01-21 RX ADMIN — MAGNESIUM SULFATE 2 G: 2 INJECTION INTRAVENOUS at 12:01

## 2020-01-21 RX ADMIN — PIPERACILLIN AND TAZOBACTAM 4.5 G: 4; .5 INJECTION, POWDER, LYOPHILIZED, FOR SOLUTION INTRAVENOUS; PARENTERAL at 11:01

## 2020-01-21 RX ADMIN — Medication 125 MCG/HR: at 06:01

## 2020-01-21 RX ADMIN — PROPOFOL 50 MCG/KG/MIN: 10 INJECTION, EMULSION INTRAVENOUS at 06:01

## 2020-01-21 RX ADMIN — BUSPIRONE HYDROCHLORIDE 30 MG: 10 TABLET ORAL at 05:01

## 2020-01-21 NOTE — PLAN OF CARE
Recommendations     1. If unable to extubate pt w/in 24-48 hrs, recommend TF initiation of Glucerna 1.5 @ 10ml/hr, advance 10ml/hr Q4 hrs with goal rate of 50 ml/hr. Free water flush per MD discretion. TF to provide: 1800 kcal, 99 g pro, 911 ml free fl.       2. Additional free water per MD discretion at this time      Goals: TF initiation or diet advancement w/in 48 hrs  Nutrition Goal Status: new

## 2020-01-21 NOTE — PROGRESS NOTES
Pharmacokinetic Initial Assessment: IV Vancomycin    Assessment/Plan:    Initiate intravenous vancomycin with loading dose of 2000 mg once followed by a maintenance dose of vancomycin 1250 mg IV every 24 hours  Desired empiric serum trough concentration is 10 to 20 mcg/mL  Draw vancomycin trough level 30 min prior to third dose on 1/23 at approximately 0600   Pharmacy will continue to follow and monitor vancomycin.      Please contact pharmacy at extension [phone] with any questions regarding this assessment.     Thank you for the consult,   Ezequiel DOMITILA Marshal       Patient brief summary:  Marck Madison is a 59 y.o. male initiated on antimicrobial therapy with IV Vancomycin for treatment of suspected lower respiratory infection    Drug Allergies:   Review of patient's allergies indicates:  No Known Allergies    Actual Body Weight:   79.5 kg    Renal Function:   Estimated Creatinine Clearance: 40.5 mL/min (A) (based on SCr of 1.9 mg/dL (H)).,     Dialysis Method (if applicable):  N/A    CBC (last 72 hours):  Recent Labs   Lab Result Units 01/20/20  1731 01/21/20  0400   WBC K/uL 9.23 13.20*   Hemoglobin g/dL 16.3 13.9*   Hematocrit % 49.1 40.6   Platelets K/uL 212 155   Gran% % 65.4 93.3*   Lymph% % 24.9 2.9*   Mono% % 8.3 1.1*   Eosinophil% % 0.5 1.5   Basophil% % 0.5 0.4   Differential Method  Automated Automated       Metabolic Panel (last 72 hours):  Recent Labs   Lab Result Units 01/20/20  1731 01/20/20  2322 01/21/20  0046 01/21/20  0400   Sodium mmol/L 141 142  142  --  143   Potassium mmol/L 4.0 3.4*  3.4*  --  3.2*   Chloride mmol/L 106 112*  112*  --  106   CO2 mmol/L 22* 18*  18*  --  23   Glucose mg/dL 126* 164*  164*  164*  164*  --  146*  146*   Glucose, UA   --   --  Negative  --    BUN, Bld mg/dL 28* 30*  30*  --  30*   Creatinine mg/dL 1.7* 2.1*  2.1*  --  1.9*   Albumin g/dL 3.9 3.3*  --   --    Total Bilirubin mg/dL 0.8 1.4*  --   --    Alkaline Phosphatase U/L 71 70  --   --    AST U/L 50*  331*  --   --    ALT U/L 42 267*  --   --    Magnesium mg/dL 2.0 2.1  --  3.0*   Phosphorus mg/dL  --  2.3*  --  2.0*       Drug levels (last 3 results):  No results for input(s): VANCOMYCINRA, VANCOMYCINPE, VANCOMYCINTR in the last 72 hours.    Microbiologic Results:  Microbiology Results (last 7 days)       Procedure Component Value Units Date/Time    Culture, Respiratory with Gram Stain [[id number]] Collected:  01/21/20 0514    Order Status:  Sent Specimen:  Respiratory from Tracheal Aspirate Updated:  01/21/20 0517    Blood culture [[id number]] Collected:  01/20/20 1959    Order Status:  Completed Specimen:  Blood from Peripheral, Hand, Right Updated:  01/21/20 0312     Blood Culture, Routine No Growth to date    Blood culture [[id number]] Collected:  01/20/20 2004    Order Status:  Completed Specimen:  Blood from Peripheral, Hand, Right Updated:  01/21/20 0312     Blood Culture, Routine No Growth to date

## 2020-01-21 NOTE — CARE UPDATE
[facility]  ICU Multidisciplinary Bedside Rounds   SUMMARY     Date: 1/21/2020    Prehospitalization: Home  Admit Date / LOS : 1/20/2020/ 1 days    Diagnosis: Cardiac arrest    Consults:        Active: Cardio       Needed: N/A     Code Status: Full Code   Advanced Directive: <no information>    LDA: Art [place], Cooling [place], [place], TLC and Vent       Central Lines/Site/Justification:Pressors       Urinary Cath/Order/Justification:Critically Ill in ICU    Vasopressors/Infusions:    DOPamine 5 mcg/kg/min (01/21/20 0509)    fentanyl 100 mcg/hr (01/21/20 0230)    magnesium sulfate in water Stopped (01/21/20 0406)    propofol 50 mcg/kg/min (01/21/20 0150)          GOALS: Volume/ Hemodynamic: MAP > 65                     RASS: -5 unarousable, no response to voice or physical stimulation    CAM ICU: Negative  Pain Management: none       Pain Controlled: not applicable     Rhythm: A-Flutter    Respiratory Device: Vent    Vent Mode: PRVC  Oxygen Concentration (%):  [100] 100  Resp Rate Total:  [21 br/min-38 br/min] 22 br/min  Vt Set:  [450 mL-500 mL] 450 mL  PEEP/CPAP:  [5 cmH20-10 cmH20] 10 cmH20  Mean Airway Pressure:  [10.8 uyO68-60.4 cmH20] 14.5 cmH20             Most Recent SBT/ SAT: Did not perform       MOVE Screen: FAIL    VTE Prophylaxis: Pharm and Mechanical  Mobility: Bedrest  Stress Ulcer Prophylaxis: Yes    Dietary: NPO  Tolerance: no  /  Advancement: no    Isolation: No active isolations    Restraints: Yes    Significant Dates:  Post Op Date: N/A  Rescue Date: code 1/20/20  Imaging/ Diagnostics: N/A    Noteworthy Labs:   K+ 3.2    CBC/Anemia Labs: Coags:    Recent Labs   Lab 01/20/20  1731 01/20/20  2207 01/21/20  0400   WBC 9.23  --  13.20*   HGB 16.3  --  13.9*   HCT 49.1 46 40.6     --  155   MCV 97  --  96   RDW 13.5  --  13.2    Recent Labs   Lab 01/20/20  1731 01/21/20  0400   INR 1.2 1.4*   APTT  --  38.2*        Chemistries:   Recent Labs   Lab 01/20/20  1730  01/20/20 2322 01/21/20  0400    142  142 143   K 4.0 3.4*  3.4* 3.2*    112*  112* 106   CO2 22* 18*  18* 23   BUN 28* 30*  30* 30*   CREATININE 1.7* 2.1*  2.1* 1.9*   CALCIUM 9.5 9.2  9.2 10.2   PROT 7.6 6.4  --    BILITOT 0.8 1.4*  --    ALKPHOS 71 70  --    ALT 42 267*  --    AST 50* 331*  --    MG 2.0 2.1 3.0*   PHOS  --  2.3* 2.0*        Cardiac Enzymes: Ejection Fractions:    Recent Labs     01/20/20 2322 01/21/20  0400   *  --    TROPONINI 0.308* 0.446*    No results found for: EF     POCT Glucose: HbA1c:    Recent Labs   Lab 01/20/20  2157   POCTGLUCOSE 275*    No results found for: HGBA1C     Needs from Care Team: echo, cardiology to see     ICU LOS 9h  Level of Care: Critical Care

## 2020-01-21 NOTE — HPI
Marck Madison is a 59 y.o. male that (in part)  has a past medical history of Arrhythmia, CAD (coronary artery disease), CHF (congestive heart failure), NYHA class I, and Psychiatric disorder.  has a past surgical history that includes Liver surgery and Treatment of cardiac arrhythmia (9/12/2019). Presents to [facility] Emergency Department complaining of shortness of breath.  Subacute onset with progressive worsening.  Associated palpitations.  Severe dyspnea with exertion.  Denied fever chills.  Had a cough.  History of asthma and COPD.  Reports compliance with home medication regimen.  History is limited due to the current condition of the patient was intubated and sedated    In the emergency department routine laboratory studies and chest x-ray was obtained.  In EKG and cardiac enzymes as well.  He was found to be in atrial fibrillation rapid ventricular response.  Amiodarone infusion was initiated.  He became more hypoxemic and hypotensive.  It was noted he had erythema developing around the amiodarone infusion site.  This was discontinued.  He was started on diltiazem alternatively.  Remained hypoxemic despite increasing oxygen supplementation.  He underwent rapid sequence intubation.  He was brought to the ICU.      Shortly after transferring into the ICU bed he became bradycardic and pulse was lost.  ACLS protocol was initiated.  We performed approximately 6 rounds with epinephrine and started on a dobutamine infusion before obtaining return of spontaneous circulation.  Please see code sheet for details.  Central line and arterial lines were placed.  Bedside echo was performed.  Case was discussed with Cardiology in detail.  Dr. Brunner to see the patient in the morning.  Cooling protocol initiated.    Hospital medicine has been asked to admit to inpatient for further evaluation and treatment.

## 2020-01-21 NOTE — PROGRESS NOTES
0930  Called lab re: unresulted 0800 labs.  Per lab, speciment is hemolyzed,  at bedside for redraw.    1000  Rec'd call from lab, specimen is hemolyzed.  Redraw #2 done by RN.    1040 Called lab re: outstanding results.    1046  Lab will check machines for specimens and return call to RN.    1100 Rec'd call from lab; labs are running; will be complete in ~ 10 minutes per lab.    1200 Converted to Sinus Willi in 40's.  MD aware.  Atropine at bedside.    1245  Called lab to re-time Q4 labs.

## 2020-01-21 NOTE — PROGRESS NOTES
"eICU Note    Subjective: Starte seeing patient on video while CPR in progress and code in progress with bedside team managing. History notable for bradycardia on ICU arrival from ED. Had hives with amiodarone injection at site of IV? given for Aflutter with 2:1 block. Intubated in ED. CHF on XR and ? Pneumonia.   Got epi in code with ROSC.and  exited camera at that time. D/w Dr Romero on camera; to consider dopamine if bradycardia trigger. Workup with echo suggested; labs pending.       Objective:BP (!) 150/66   Pulse (!) 193   Temp 97.8 °F (36.6 °C) (Oral)   Resp (!) 34   Ht 5' 6" (1.676 m)   Wt 79.5 kg (175 lb 4.3 oz)   SpO2 (!) 81%   BMI 28.29 kg/m²     Data review done     Video review done intubated    Assessment:Postcode ; A flutter; initial SOB     Plan:  1 Supportive vent care   2Hemodynamic support- dopamine  3 Assess labs; assess for infection; assess for new MI     DVT prophylaxis enoxaparin full dose  GI prophylaxis ppi  Ventilator settings ac  Chlorhexidine ordered    Communication with RN, MD    4910 HR ok ; on dopamine- BP dropped when weaned per RN; may need to reaseess if levophed can be used this AM if needed in place of dopamine    "

## 2020-01-21 NOTE — CONSULTS
"[facility]  Pulmonology  Consult Note    Patient Name: Marck Madison  MRN: [id number]  Admission Date: 1/20/2020  Hospital Length of Stay: 1 days  Code Status: Full Code  Attending Physician: Winifred Lopez MD  Primary Care Provider: Primary Doctor No   Principal Problem: Cardiac arrest    Consults  Subjective:     HPI:  Per H&P, Mr Madison is a 59 yr old male with PMH arrhythmia, asthm COPD, CAD, and CHF who presented to [facility] with SOB and palpitations. He was found to be in a-fib RVR and started on amiodarone. His hypoxia and work of breathing continued to worsen and he was subsequently intubated. Amiodarone infusion switched to cardizem  when "hives" were observed near the IV infusion site and infiltration was suspected. Upon arrival to ICU, he became bradycardic, then pulseless and suffered a PEA arrest that lasted approximately 22 minutes. Vital sign review does not report an O2 sat with otherwise stable VS just prior to his arrest; however, CXR clear. He was placed on a dopamine gtt and TTM was initiated with goal temp of 33C. Broad spectrum abx were started and Pulmonary consulted for vent management.             Past Medical History:   Diagnosis Date    Arrhythmia 2017    aflutter    CAD (coronary artery disease)     CHF (congestive heart failure), NYHA class I 2017    Psychiatric disorder     h/o "schizophrena/bipolar"       Past Surgical History:   Procedure Laterality Date    LIVER SURGERY      abscess drainage; 1970s    TREATMENT OF CARDIAC ARRHYTHMIA  9/12/2019    Procedure: Cardioversion or Defibrillation;  Surgeon: Jonathan Lopez MD;  Location: [facility] CATH LAB;  Service: Cardiology;;       Review of patient's allergies indicates:  No Known Allergies    Family History     None        Tobacco Use    Smoking status: Current Every Day Smoker     Packs/day: 0.50     Years: 5.00     Pack years: 2.50     Types: Cigarettes    Smokeless tobacco: Never Used   Substance and Sexual " "Activity    Alcohol use: Yes     Alcohol/week: 14.0 standard drinks     Types: 14 Shots of liquor per week     Comment: "[employer]" everyday    Drug use: No    Sexual activity: Not on file         Review of Systems   Unable to perform ROS: Intubated     Objective:     Vital Signs (Most Recent):  Temp: (!) 91 °F (32.8 °C) (01/21/20 1600)  Pulse: (!) 57 (01/21/20 1600)  Resp: (!) 22 (01/21/20 1600)  BP: (!) 153/95 (01/21/20 1600)  SpO2: 99 % (01/21/20 1600) Vital Signs (24h Range):  Temp:  [89.4 °F (31.9 °C)-97.8 °F (36.6 °C)] 91 °F (32.8 °C)  Pulse:  [] 57  Resp:  [14-40] 22  SpO2:  [78 %-100 %] 99 %  BP: ()/() 153/95  Arterial Line BP: ()/() 144/89     Weight: 79.5 kg (175 lb 4.3 oz)  Body mass index is 28.29 kg/m².      Intake/Output Summary (Last 24 hours) at 1/21/2020 1629  Last data filed at 1/21/2020 1600  Gross per 24 hour   Intake 1426.78 ml   Output 3150 ml   Net -1723.22 ml       Physical Exam   Constitutional: He appears well-developed. He has a sickly appearance. No distress. He is sedated, intubated and restrained.   Sudhir inplace   HENT:   Head: Normocephalic and atraumatic.   Eyes: Pupils are equal, round, and reactive to light. Conjunctivae are normal. Right eye exhibits no exudate. Left eye exhibits no exudate. Right conjunctiva has no hemorrhage. Left conjunctiva has no hemorrhage. No scleral icterus. Right eye exhibits no nystagmus. Left eye exhibits no nystagmus.   Neck: Trachea normal. No neck rigidity. No tracheal deviation present.   Cardiovascular: Normal rate, regular rhythm and normal heart sounds. PMI is not displaced. Exam reveals no gallop and no friction rub.   No murmur heard.  Pulses:       Radial pulses are 1+ on the right side, and 1+ on the left side.        Dorsalis pedis pulses are 1+ on the right side, and 1+ on the left side.   Pulmonary/Chest: Effort normal and breath sounds normal. No accessory muscle usage. He is intubated. No " respiratory distress. He has no wheezes. He has no rhonchi. He has no rales.   Abdominal:   Wrapped in [employer] temperature management equipment   Musculoskeletal: Normal range of motion.   Neurological: He is unresponsive. A cranial nerve deficit and sensory deficit is present. GCS eye subscore is 1. GCS verbal subscore is 1. GCS motor subscore is 1.   Pupillary reflex reactive. No cough or gag reflex noted, limited neuro exam in setting of hypothermia protocol in process   Skin: Skin is warm and dry. Capillary refill takes 2 to 3 seconds. He is not diaphoretic. No cyanosis. Nails show no clubbing.   Nursing note and vitals reviewed.      Vents:  Vent Mode: PRVC (01/21/20 1528)  Ventilator Initiated: Yes (01/20/20 2100)  Set Rate: 22 bmp (01/21/20 1528)  Vt Set: 450 mL (01/21/20 1528)  PEEP/CPAP: 8 cmH20 (01/21/20 1528)  Oxygen Concentration (%): 50 (01/21/20 1600)  Peak Airway Pressure: 21.8 cmH2O (01/21/20 1528)  Total Ve: 10.3 mL (01/21/20 1528)  F/VT Ratio<105 (RSBI): (!) 47.41 (01/21/20 1528)    Lines/Drains/Airways     Central Venous Catheter Line                 Percutaneous Central Line Insertion/Assessment - triple lumen  01/20/20 2330 right femoral vein;right femoral less than 1 day          Drain                 NG/OG Tube 01/20/20 1906 [place] sump 18 Fr. Right mouth less than 1 day         Urethral Catheter 01/20/20 1925 Coude 16 Fr. less than 1 day          Airway                 Airway - Non-Surgical 01/20/20 1857 Endotracheal Tube less than 1 day          Arterial Line                 Arterial Line 01/20/20 2345 Left Radial less than 1 day          Peripheral Intravenous Line                 Peripheral IV - Single Lumen 01/20/20 1710 20 G Right Antecubital less than 1 day         Peripheral IV - Single Lumen 01/20/20 1730 20 G Left Antecubital less than 1 day                Significant Labs:    CBC/Anemia Profile:  Recent Labs   Lab 01/20/20  1731 01/20/20  2207 01/21/20  0400   WBC 9.23  --  13.20*    HGB 16.3  --  13.9*   HCT 49.1 46 40.6     --  155   MCV 97  --  96   RDW 13.5  --  13.2        Chemistries:  Recent Labs   Lab 01/20/20  2322 01/21/20  0400 01/21/20  1000 01/21/20  1143     142 143 137 140   K 3.4*  3.4* 3.2* 4.0 4.3   *  112* 106 105 106   CO2 18*  18* 23 19* 20*   BUN 30*  30* 30* 29* 27*   CREATININE 2.1*  2.1* 1.9* 1.9* 1.9*   CALCIUM 9.2  9.2 10.2 9.9 10.1   ALBUMIN 3.3*  --  3.7 3.8   PROT 6.4  --  7.2 7.7   BILITOT 1.4*  --  1.2* 1.3*   ALKPHOS 70  --  64 66   *  --  258* 269*   *  --  367* 370*   MG 2.1 3.0* 2.3 2.4   PHOS 2.3* 2.0* 1.4* 1.5*       All pertinent labs within the past 24 hours have been reviewed.    Significant Imaging:   I have reviewed all pertinent imaging results/findings within the past 24 hours.    Assessment/Plan:     * Cardiac arrest  Suspect a result of hypoxia as no SpO2 reading available with otherwise stable vital signs just prior to arrest   --ROSC following 22 min ACLS  --currently under TTM  --rewarming planned of 1/22  --low dose dopamine gtt post arrest to keep MAP >65  --cardiology consulted    Atrial fibrillation with RVR  On cardizem  --mgmt per cardiology    Acute systolic congestive heart failure  Echo from 1/21 with EF 35% and moderate dysfunction in mitral and tricuspid valves with LV hypertrophy  --continue diuresis as needed      Acute hypoxemic respiratory failure  Suspect secondary to volume overload in setting of a-fib RVR vs possible pneumonia  --CXR with pulmonary edema, no focal consolidation, procal 0.04  --intubated in ED for hypoxia and work of breathing  --sputum culture with gram positive rods and yeast...  --weaning FiO2 as patient diuresed well following lasix administration  --continue lung protective ventilation and SpO2 88-92%  --continue broad spectrum abx for now        PPI ppx: famotidine  VTE ppx: enoxaparin    Above plan discussed with Dr. Garcia and Dr. Lopez    Critical Care time  50 minutes    Thank you for your consult. I will follow-up with patient. Please contact us if you have any additional questions.     Scott Waters NP  Pulmonology  [facility]

## 2020-01-21 NOTE — HPI
Marck Madison is a 59 y.o. male who presented with shorntess of breath. Progressive in nature. EKG showed AFL c RVR. Patient was started on amiodarone. Patients hypoxemia persisted. Patient b/c hypotensive. According to notes his IV infiltrated. His respiratory status worsened. Subsequently required intubation. In transfer patient b/c bradycardic, PEA. ACLS initiated. Prior EF noted to be 30%. Echo today 35%. Moderate MR. EKG does not appear to be ischemic. LFTs elevated. Troponin abnormal.    Echo 1/21/2020:    · Concentric left ventricular hypertrophy.  · Atrial fibrillation observed.  · Moderately decreased left ventricular systolic function. The estimated ejection fraction is 35%.  · Normal right ventricular systolic function.  · Moderate mitral regurgitation.  · Moderate tricuspid regurgitation.  · No pulmonary hypertension present.     Echo 9/12/19:    · Moderately decreased left ventricular systolic function. The estimated ejection fraction is 30%  · Left ventricular diastolic dysfunction.  · Normal right ventricular systolic function.  · Moderate left atrial enlargement.  · Normal appearing left atrial appendage. No thrombus is present in the appendage.  · Moderate right atrial enlargement.  · Mild mitral regurgitation.  · Mild tricuspid regurgitation.  · A synchronized cardioversion was successfully performed with restoration of normal sinus rhythm.

## 2020-01-21 NOTE — PROGRESS NOTES
At this time, the following changes were made to the Ventilator settings:  PEEP was increased to 10; Rate was increased to 22; and Tidal Volume was decreased to 450 per Dr. Romero.

## 2020-01-21 NOTE — ASSESSMENT & PLAN NOTE
2/2 non compliance and/or alcohol use??  Rate controlled currently  Is on dopamine infusion  Cardiac monitoring  Cardiology on board  High CHADS VASc score. On anticoagulation

## 2020-01-21 NOTE — SUBJECTIVE & OBJECTIVE
"Past Medical History:   Diagnosis Date    Arrhythmia 2017    aflutter    CAD (coronary artery disease)     CHF (congestive heart failure), NYHA class I 2017    Psychiatric disorder     h/o "schizophrena/bipolar"       Past Surgical History:   Procedure Laterality Date    LIVER SURGERY      abscess drainage; 1970s    TREATMENT OF CARDIAC ARRHYTHMIA  9/12/2019    Procedure: Cardioversion or Defibrillation;  Surgeon: Jonathan Lopez MD;  Location: [facility] CATH LAB;  Service: Cardiology;;       Review of patient's allergies indicates:  No Known Allergies    No current facility-administered medications on file prior to encounter.      Current Outpatient Medications on File Prior to Encounter   Medication Sig    amiodarone (PACERONE) 200 MG Tab Take 1 tablet (200 mg total) by mouth 2 (two) times daily.    metOLazone (ZAROXOLYN) 5 MG tablet Take 1 tablet (5 mg total) by mouth once daily.     Family History     None        Tobacco Use    Smoking status: Current Every Day Smoker     Packs/day: 0.50     Years: 5.00     Pack years: 2.50     Types: Cigarettes    Smokeless tobacco: Never Used   Substance and Sexual Activity    Alcohol use: Yes     Alcohol/week: 14.0 standard drinks     Types: 14 Shots of liquor per week     Comment: "[employer]" everyday    Drug use: No    Sexual activity: Not on file     Review of Systems   Unable to perform ROS: intubated     Objective:     Vital Signs (Most Recent):  Temp: (!) 90.3 °F (32.4 °C) (01/21/20 1220)  Pulse: (!) 49 (01/21/20 1148)  Resp: (!) 22 (01/21/20 1148)  BP: (!) 150/96 (01/21/20 0500)  SpO2: 100 % (01/21/20 1148) Vital Signs (24h Range):  Temp:  [89.4 °F (31.9 °C)-97.8 °F (36.6 °C)] 90.3 °F (32.4 °C)  Pulse:  [] 49  Resp:  [14-40] 22  SpO2:  [78 %-100 %] 100 %  BP: ()/() 150/96  Arterial Line BP: ()/() 121/89     Weight: 79.5 kg (175 lb 4.3 oz)  Body mass index is 28.29 kg/m².    SpO2: 100 %  O2 Device (Oxygen Therapy): " ventilator      Intake/Output Summary (Last 24 hours) at 1/21/2020 1248  Last data filed at 1/21/2020 0607  Gross per 24 hour   Intake 370 ml   Output 3150 ml   Net -2780 ml       Lines/Drains/Airways     Central Venous Catheter Line                 Percutaneous Central Line Insertion/Assessment - triple lumen  01/20/20 2330 right femoral vein;right femoral less than 1 day          Drain                 NG/OG Tube 01/20/20 1906 [place] sump 18 Fr. Right mouth less than 1 day         Urethral Catheter 01/20/20 1925 Coude 16 Fr. less than 1 day          Airway                 Airway - Non-Surgical 01/20/20 1857 Endotracheal Tube less than 1 day          Arterial Line                 Arterial Line 01/20/20 2345 Left Radial less than 1 day          Peripheral Intravenous Line                 Peripheral IV - Single Lumen 01/20/20 1710 20 G Right Antecubital less than 1 day         Peripheral IV - Single Lumen 01/20/20 1730 20 G Left Antecubital less than 1 day                Physical Exam   Constitutional: He is sedated and intubated.   Cardiovascular: Tachycardia present.   Murmur heard.  High-pitched blowing holosystolic murmur is present with a grade of 2/6 at the apex.  Pulmonary/Chest: Breath sounds normal. He is intubated.   Abdominal: Soft.   Musculoskeletal:        Right lower leg: He exhibits no edema.        Left lower leg: He exhibits no edema.   Vitals reviewed.      Significant Labs:   CMP   Recent Labs   Lab 01/20/20  2322 01/21/20  0400 01/21/20  1000 01/21/20  1143     142 143 137 140   K 3.4*  3.4* 3.2* 4.0 4.3   *  112* 106 105 106   CO2 18*  18* 23 19* 20*   *  164*  164*  164* 146*  146* 299*  299* 273*  273*   BUN 30*  30* 30* 29* 27*   CREATININE 2.1*  2.1* 1.9* 1.9* 1.9*   CALCIUM 9.2  9.2 10.2 9.9 10.1   PROT 6.4  --  7.2 7.7   ALBUMIN 3.3*  --  3.7 3.8   BILITOT 1.4*  --  1.2* 1.3*   ALKPHOS 70  --  64 66   *  --  367* 370*   *  --  258* 269*   ANIONGAP  12  12 14 13 14   ESTGFRAFRICA 38*  38* 43* 43* 43*   EGFRNONAA 33*  33* 37* 37* 37*   , CBC   Recent Labs   Lab 01/20/20  1731  01/21/20  0400   WBC 9.23  --  13.20*   HGB 16.3  --  13.9*   HCT 49.1   < > 40.6     --  155    < > = values in this interval not displayed.   , Lipid Panel No results for input(s): CHOL, HDL, LDLCALC, TRIG, CHOLHDL in the last 48 hours. and Troponin   Recent Labs   Lab 01/20/20  2322 01/21/20  0400 01/21/20  1000   TROPONINI 0.308* 0.446* 0.444*       Significant Imaging: Echocardiogram:   Transthoracic echo (TTE) complete (Cupid Only):   Results for orders placed or performed during the hospital encounter of 01/20/20   Echo Color Flow Doppler? Yes   Result Value Ref Range    BSA 1.92 m2    TDI SEPTAL 0.05 m/s    LV LATERAL E/E' RATIO 11.83 m/s    LV SEPTAL E/E' RATIO 14.20 m/s    LA WIDTH 3.10 cm    AORTIC VALVE CUSP SEPERATION 1.81 cm    TDI LATERAL 0.06 m/s    PV PEAK VELOCITY 0.87 cm/s    LVIDD 3.41 (A) 3.5 - 6.0 cm    IVS 1.66 (A) 0.6 - 1.1 cm    PW 1.76 (A) 0.6 - 1.1 cm    Ao root annulus 3.09 cm    LVIDS 2.99 2.1 - 4.0 cm    FS 12 28 - 44 %    LA volume 52.60 cm3    Sinus 3.07 cm    STJ 2.22 cm    Ascending aorta 3.01 cm    LV mass 232.69 g    LA size 3.30 cm    RVDD 2.81 cm    TAPSE 1.03 cm    RV S' 11.46 cm/s    Left Ventricle Relative Wall Thickness 1.03 cm    AV mean gradient 2 mmHg    AV valve area 3.59 cm2    AV Velocity Ratio 1.11     AV index (prosthetic) 1.09     Mean e' 0.06 m/s    IVRT 0.07 msec    LVOT diameter 2.05 cm    LVOT area 3.3 cm2    LVOT peak chinmay 1.01 m/s    LVOT peak VTI 15.99 cm    Ao peak chinmay 0.91 m/s    Ao VTI 14.68 cm    LVOT stroke volume 52.75 cm3    AV peak gradient 3 mmHg    E/E' ratio 12.91 m/s    MV Peak E Chinmay 0.71 m/s    TR Max Chinmay 2.46 m/s    LV Systolic Volume 34.70 mL    LV Systolic Volume Index 18.3 mL/m2    LV Diastolic Volume 47.62 mL    LV Diastolic Volume Index 25.18 mL/m2    LA Volume Index 27.8 mL/m2    LV Mass Index 123 g/m2     RA Major Axis 5.46 cm    Left Atrium Minor Axis 6.00 cm    Left Atrium Major Axis 6.10 cm    Triscuspid Valve Regurgitation Peak Gradient 24 mmHg    RA Width 2.43 cm    Narrative    · Concentric left ventricular hypertrophy.  · Atrial fibrillation observed.  · Moderately decreased left ventricular systolic function. The estimated   ejection fraction is 35%.  · Normal right ventricular systolic function.  · Moderate mitral regurgitation.  · Moderate tricuspid regurgitation.  · No pulmonary hypertension present.

## 2020-01-21 NOTE — HPI
"Per H&P, Mr Madison is a 59 yr old male with PMH arrhythmia, asthm COPD, CAD, and CHF who presented to [facility] with SOB and palpitations. He was found to be in a-fib RVR and started on amiodarone. His hypoxia and work of breathing continued to worsen and he was subsequently intubated. Amiodarone infusion switched to cardizem  when "hives" were observed near the IV infusion site and infiltration was suspected. Upon arrival to ICU, he became bradycardic, then pulseless and suffered a PEA arrest that lasted approximately 22 minutes. Vital sign review does not report an O2 sat with otherwise stable VS just prior to his arrest; however, CXR clear. He was placed on a dopamine gtt and TTM was initiated with goal temp of 33C. Broad spectrum abx were started and Pulmonary consulted for vent management.           "

## 2020-01-21 NOTE — PROCEDURES
Arterial line placement    Emergency consent was obtained; failed attempt to contact family. This is an emergent procedure. The patient is hypotensive requires careful titration of vasopressor medications.    A timeout was performed.    The left volar aspect of the patient's wrist was cleaned with Chloroprep and dressed in sterile towels. Local anesthetic was given. Ultrasound was used to identify the radial artery. A fine gauge needle was placed into the radial artery using ultrasound guidance.  A wire was placed into the radial artery and a catheter was placed over the wire and inserted into the artery using the Seldinger technique . The aterial line was then connected to the transducer. It was sutured in place and covered with a sterile dressing. Armboard was placed over the wrist.  A good waveform was observed.    The patient had good circulation in their hand post procedure with less than 2 second capillary refill.   No complications occurred.     Trevor Romero MD, MPH  [facility] Medicine

## 2020-01-21 NOTE — PROCEDURES
Central Venous Catheter Insertion Procedure Note    Procedure: Insertion of Central Venous Catheter    Indications:  Cardiac arrest     Procedure Details   Informed consent was obtained for the procedure, including sedation.  Risks of lung perforation, hemorrhage, arrhythmia, and adverse drug reaction were discussed.     Maximum sterile technique was used including full drape, personal protective gear for everyone in room, and chlorprep application x 2.    Under sterile conditions the skin above the on the right femoralvein was prepped with chlorhexidine x 2 and covered with a sterile drape. Local anesthesia was applied to the skin and subcutaneous tissues. . An 18-gauge needle was then inserted into the vein. A guide wire was then passed easily through the catheter. There were no arrhythmias. The catheter was then withdrawn. A 8.0 Danish triple-lumen was then inserted into the vessel over the guide wire. The catheter was sutured into place.  Biopatch and sterile adhesive dressing were applied.    Findings:  There were no changes to vital signs. Catheter was flushed easily with 30 cc NS. Patient tolerated the procedure well.    Recommendations:  KUB ordered to verify OG placement; tip of central catheter visualized as well.  Personally viewed.   Okay to use.    Trevor Romero MD, MPH  Hospital Medicine  Pager: [phone]

## 2020-01-21 NOTE — NURSING
House supervisor, Nick, attempted to updated pt's family on decline in condition. Called home number and sister's cell number. No answer on either number and could not leave a message

## 2020-01-21 NOTE — PROGRESS NOTES
2125 Patient received from ER via stretcher intubated [facility] with FIO2 100%, rate 30,  and peep 5. Hands and feet cold to touch. O2 sat difficult to obtain. He is on propofol at 10mcg. Cough and gag reflex intact and patient withdraws to pain.  Skin is intact. Renner with minimal output at this time. He is in atrial flutter with rate .    2145 Patient HR dropped to 45 and we were unable to obtain a pulse. Chest compressions were started and code blue initiated. Patient was given epi, bicarb, calcium and solumedrol during code.    2157 ROSC at this time.  2230 Dr. Romero at bedside and preparing to put in TLC and art line. Changes made to the ventilator settings. Patient now putting out large quantity of urine.    2315 Right femoral TLC inserted by Dr. Romero. Propfol and fentanyl started. Dopamine started.    2330 left arterial line inserted with good blood return noted. BP stable. CVP reading 12. Cooling blanket applied and start for target temp of 33 degrees. Continuous mag sulfate drip started.  Labs obtained from art line. CXR and Abdominal xray done to confirm placement of OG and ET tube placement.

## 2020-01-21 NOTE — PT/OT/SLP PROGRESS
Physical Therapy      Patient Name:  Marck Madison   MRN:  [id number]    Patient not seen today for PT eval for ROM while intubated secondary to (hold per nurse Nancy, pt not appropriate for ROM protocol at this time). Will follow-up as appropriate.    Sarai Austin, PT

## 2020-01-21 NOTE — PLAN OF CARE
1730 VSS, converted to sinus bradycardia at 1200, withdraws to pain, pupils sluggish but reactive, no shivering noted on cooling protocol, BP stable on dopamine, electrolytes replaced as ordered per protocol, skin intact, voiding adequate clear yellow urine to Renner catheter to gravity, no BM this shift, turned Q2, bed in low, locked position, in view of nurses station, no needs at this time.

## 2020-01-21 NOTE — PLAN OF CARE
Patient remains in ICU on ventilator PRVC with 100% FIO2, 10 peep,  and rate 22. Dopamine drip on hold for BP map of 115. Propofol is going at 50mcg and Fentanyl at 100mg for sedation and shivering due to targeted temperature management in progress. Patient reached target temp at 0300 of 33 degrees C.  Patient has art line to left radial and TLC to right femoral. K+ was 3.2 and potassium chloride 60meq started. Patient also started on vancomycin IVPB. CVP pressures running 9-12. Renner with large amount of clear yellow urine since admission. Patient has remained in atrial flutter post cardiac arrest with rate in the  range. He withdraws to painful stimuli and cough and gag intact.

## 2020-01-21 NOTE — ED NOTES
Dr Arteaga called to bedside. Pt in visible distress with O2 sats dropping to 70's. Pt diaphoretic and restless, refusing to keep non-re breather mask on. Hives noted to left arm where IV amiodarone bolus was administered, MD made aware. Respiratory contacted for initiation of BiPAP.

## 2020-01-21 NOTE — SUBJECTIVE & OBJECTIVE
Interval History: undergoing hypothermia protocol    Review of Systems   Unable to perform ROS: Intubated     Objective:     Vital Signs (Most Recent):  Temp: (!) 91 °F (32.8 °C) (01/21/20 1600)  Pulse: (!) 57 (01/21/20 1600)  Resp: (!) 22 (01/21/20 1600)  BP: (!) 153/95 (01/21/20 1600)  SpO2: 99 % (01/21/20 1600) Vital Signs (24h Range):  Temp:  [89.4 °F (31.9 °C)-97.8 °F (36.6 °C)] 91 °F (32.8 °C)  Pulse:  [] 57  Resp:  [14-40] 22  SpO2:  [78 %-100 %] 99 %  BP: ()/() 153/95  Arterial Line BP: ()/() 144/89     Weight: 79.5 kg (175 lb 4.3 oz)  Body mass index is 28.29 kg/m².    Intake/Output Summary (Last 24 hours) at 1/21/2020 1643  Last data filed at 1/21/2020 1600  Gross per 24 hour   Intake 1426.78 ml   Output 3150 ml   Net -1723.22 ml      Physical Exam   Constitutional: He appears well-developed. No distress.   HENT:   ETT in place   Cardiovascular: Regular rhythm and intact distal pulses.   Sinus bradycardia 50s  Goes up to 60s   Pulmonary/Chest:   On mechanical ventilation   Abdominal: Soft. He exhibits no distension.   Neurological:   sedated   Skin: He is not diaphoretic.   cooled   Nursing note and vitals reviewed.      Significant Labs: All pertinent labs within the past 24 hours have been reviewed.    Significant Imaging: I have reviewed all pertinent imaging results/findings within the past 24 hours.  I have reviewed and interpreted all pertinent imaging results/findings within the past 24 hours.

## 2020-01-21 NOTE — SIGNIFICANT EVENT
Code Blue called.  Upon arrival to patient's room, CPR was in progress.  Patient was taken of of the Servo I Ventilator and AMBU bagged with 100% oxygen.  ROSC was obtained at 2157, at that time, patient was placed back on the Servo I Ventilator with previous settings.

## 2020-01-21 NOTE — ASSESSMENT & PLAN NOTE
Trop 0.4 with flat pattern  Echo pending  On ASA  Cannot do ACE-I, BB or statin due to hypotension, bradycardia and transaminitis respectively  Is on full dose lovenox  Cardiology on board for co-management

## 2020-01-21 NOTE — ASSESSMENT & PLAN NOTE
Suspect secondary to volume overload in setting of a-fib RVR vs possible pneumonia  --CXR with pulmonary edema, no focal consolidation, procal 0.04  --intubated in ED for hypoxia and work of breathing  --sputum culture with gram positive rods and yeast...  --weaning FiO2 as patient diuresed well following lasix administration  --continue lung protective ventilation and SpO2 88-92%  --continue broad spectrum abx for now

## 2020-01-21 NOTE — ASSESSMENT & PLAN NOTE
Likely shock liver vs alcoholic hepatitis vs both  Will consider viral hep panel and imaging if not better by tomorrow

## 2020-01-21 NOTE — PROGRESS NOTES
[facility] Hospital Medicine  Progress Note    Patient Name: Marck Madison  MRN: [id number]  Patient Class: IP- Inpatient   Admission Date: 1/20/2020  Length of Stay: 1 days  Attending Physician: Winifred Lopez MD  Primary Care Provider: Primary Doctor No        Subjective:     Principal Problem:Cardiac arrest        HPI:  Marck Madison is a 59 y.o. male that (in part)  has a past medical history of Arrhythmia, CAD (coronary artery disease), CHF (congestive heart failure), NYHA class I, and Psychiatric disorder.  has a past surgical history that includes Liver surgery and Treatment of cardiac arrhythmia (9/12/2019). Presents to [facility] Emergency Department complaining of shortness of breath.  Subacute onset with progressive worsening.  Associated palpitations.  Severe dyspnea with exertion.  Denied fever chills.  Had a cough.  History of asthma and COPD.  Reports compliance with home medication regimen.  History is limited due to the current condition of the patient was intubated and sedated    In the emergency department routine laboratory studies and chest x-ray was obtained.  In EKG and cardiac enzymes as well.  He was found to be in atrial fibrillation rapid ventricular response.  Amiodarone infusion was initiated.  He became more hypoxemic and hypotensive.  It was noted he had erythema developing around the amiodarone infusion site.  This was discontinued.  He was started on diltiazem alternatively.  Remained hypoxemic despite increasing oxygen supplementation.  He underwent rapid sequence intubation.  He was brought to the ICU.      Shortly after transferring into the ICU bed he became bradycardic and pulse was lost.  ACLS protocol was initiated.  We performed approximately 6 rounds with epinephrine and started on a dobutamine infusion before obtaining return of spontaneous circulation.  Please see code sheet for details.  Central line and arterial lines were  placed.  Bedside echo was performed.  Case was discussed with Cardiology in detail.  Dr. Brunner to see the patient in the morning.  Cooling protocol initiated.    Hospital medicine has been asked to admit to inpatient for further evaluation and treatment.     Overview/Hospital Course:  No notes on file    Interval History: undergoing hypothermia protocol    Review of Systems   Unable to perform ROS: Intubated     Objective:     Vital Signs (Most Recent):  Temp: (!) 91 °F (32.8 °C) (01/21/20 1600)  Pulse: (!) 57 (01/21/20 1600)  Resp: (!) 22 (01/21/20 1600)  BP: (!) 153/95 (01/21/20 1600)  SpO2: 99 % (01/21/20 1600) Vital Signs (24h Range):  Temp:  [89.4 °F (31.9 °C)-97.8 °F (36.6 °C)] 91 °F (32.8 °C)  Pulse:  [] 57  Resp:  [14-40] 22  SpO2:  [78 %-100 %] 99 %  BP: ()/() 153/95  Arterial Line BP: ()/() 144/89     Weight: 79.5 kg (175 lb 4.3 oz)  Body mass index is 28.29 kg/m².    Intake/Output Summary (Last 24 hours) at 1/21/2020 1643  Last data filed at 1/21/2020 1600  Gross per 24 hour   Intake 1426.78 ml   Output 3150 ml   Net -1723.22 ml      Physical Exam   Constitutional: He appears well-developed. No distress.   HENT:   ETT in place   Cardiovascular: Regular rhythm and intact distal pulses.   Sinus bradycardia 50s  Goes up to 60s   Pulmonary/Chest:   On mechanical ventilation   Abdominal: Soft. He exhibits no distension.   Neurological:   sedated   Skin: He is not diaphoretic.   cooled   Nursing note and vitals reviewed.      Significant Labs: All pertinent labs within the past 24 hours have been reviewed.    Significant Imaging: I have reviewed all pertinent imaging results/findings within the past 24 hours.  I have reviewed and interpreted all pertinent imaging results/findings within the past 24 hours.      Assessment/Plan:      * Cardiac arrest  Unknown cause but suspected to be due to profound hypoxia although not documented  ROSC achieved after 20 minutes of ACLS  Anoxic  brain injury highly suspected  Currently under hypothermia protocol  Reassess neuro status after rewarmed  Frequent labs and monitor for bleeding  Pulm on board for vent co-management      Acute hepatitis  Likely shock liver vs alcoholic hepatitis vs both  Will consider viral hep panel and imaging if not better by tomorrow      Alcohol use disorder, severe, dependence  Reported by niece  States he drinks alcohol on a daily basis  Will discuss with patient when appropriate  Watch for seizures/withdrawals      Tobacco use disorder, severe, dependence  As reported by his niece and main caregiver  Will discuss with patient when appropriate      Atrial fibrillation with RVR  2/2 non compliance and/or alcohol use??  Rate controlled currently  Is on dopamine infusion  Cardiac monitoring  Cardiology on board  High CHADS VASc score. On anticoagulation       CKD (chronic kidney disease)  Surprisingly at baseline  Monitor closely   Strict I/Os      Elevated troponin I level  Trop 0.4 with flat pattern  Echo pending  On ASA  Cannot do ACE-I, BB or statin due to hypotension, bradycardia and transaminitis respectively  Is on full dose lovenox  Cardiology on board for co-management    Atrial flutter        Acute hypoxemic respiratory failure  2/2 cardiac arrest        VTE Risk Mitigation (From admission, onward)         Ordered     enoxaparin injection 80 mg  Every 12 hours (non-standard times)      01/20/20 2119     IP VTE HIGH RISK PATIENT  Once      01/20/20 2119                Critical care time spent on the evaluation and treatment of severe organ dysfunction, review of pertinent labs and imaging studies, discussions with consulting providers and discussions with patient/family: > 35 minutes.      Winifred Vences MD  Department of Hospital Medicine   [facility]

## 2020-01-21 NOTE — PROGRESS NOTES
"[facility]  Adult Nutrition  Progress Note    SUMMARY       Recommendations    1. If unable to extubate pt w/in 24-48 hrs, recommend TF initiation of Glucerna 1.5 @ 10ml/hr, advance 10ml/hr Q4 hrs with goal rate of 50 ml/hr. Free water flush per MD discretion. TF to provide: 1800 kcal, 99 g pro, 911 ml free fl.      2. Additional free water per MD discretion at this time     Goals: TF initiation or diet advancement w/in 48 hrs  Nutrition Goal Status: new  Communication of ELEANOR Recs: discussed on rounds    Reason for Assessment    Reason For Assessment: consult  Diagnosis: other (see comments)(cardiac arrest)  Relevant Medical History: CAD, CHF, arrhythmia  Interdisciplinary Rounds: attended  General Information Comments: Pt seen for consult s/p cardiac arrest. Pt intubated, sedated, & on pressor suppor. NPO x 1 day. NFPE not performed as pt appears well nourished w no evidence of wt loss.  Nutrition Discharge Planning: Too soon to determine    Nutrition Risk Screen    Nutrition Risk Screen: no indicators present    Nutrition/Diet History    Food Allergies: NKFA  Factors Affecting Nutritional Intake: NPO, on mechanical ventilation    Anthropometrics    Temp: (!) 90.1 °F (32.3 °C)  Height Method: Stated  Height: 5' 6" (167.6 cm)  Height (inches): 66 in  Weight Method: Bed Scale  Weight: 79.5 kg (175 lb 4.3 oz)  Weight (lb): 175.27 lb  Ideal Body Weight (IBW), Male: 142 lb  % Ideal Body Weight, Male (lb): 123.43 %  BMI (Calculated): 28.3  BMI Grade: 25 - 29.9 - overweight       Lab/Procedures/Meds    Pertinent Labs Reviewed: reviewed  Pertinent Labs Comments: K 3.2, BUN 30, Crt 1.9, GLu 146, Phos 2.0, Mg 3.0  Pertinent Medications Reviewed: reviewed  Pertinent Medications Comments: enoxaparin, famotidine, propofol      Estimated/Assessed Needs    Weight Used For Calorie Calculations: 79.5 kg (175 lb 4.3 oz)  Energy Calorie Requirements (kcal): 1687  Energy Need Method: [facility]  Protein Requirements: " 80-96 g (1-1.2 g/kg)(1.3g/kg)  Weight Used For Protein Calculations: 79.5 kg (175 lb 4.3 oz)  Fluid Requirements (mL): 1764ml or per MD  Estimated Fluid Requirement Method: RDA Method  RDA Method (mL): 1687         Nutrition Prescription Ordered    Current Diet Order: NPO    Evaluation of Received Nutrient/Fluid Intake    I/O: 370/3150  Energy Calories Required: not meeting needs  Protein Required: not meeting needs  Fluid Required: (per MD)  Comments: LBM 1/21  Tolerance: (pt NPO)  % Intake of Estimated Energy Needs: 0 - 25 %  % Meal Intake: NPO    Nutrition Risk    Level of Risk/Frequency of Follow-up: (f/u 2x/ weekly)     Assessment and Plan    Nutrition Problem  Predicted Inadequate Energy Intake    Related to (etiology):   Mechanical ventilation    Signs and Symptoms (as evidenced by):   NPO    Interventions:  Collaboration with other providers    Nutrition Diagnosis Status:   New        Monitor and Evaluation    Food and Nutrient Intake: energy intake  Food and Nutrient Adminstration: enteral and parenteral nutrition administration, diet order  Anthropometric Measurements: weight, weight change, body mass index  Biochemical Data, Medical Tests and Procedures: electrolyte and renal panel, lipid profile, glucose/endocrine profile  Nutrition-Focused Physical Findings: overall appearance                   Nutrition Follow-Up    RD Follow-up?: Yes

## 2020-01-21 NOTE — ASSESSMENT & PLAN NOTE
Unknown cause but suspected to be due to profound hypoxia although not documented  ROSC achieved after 20 minutes of ACLS  Anoxic brain injury highly suspected  Currently under hypothermia protocol  Reassess neuro status after rewarmed  Frequent labs and monitor for bleeding  Pulm on board for vent co-management

## 2020-01-21 NOTE — NURSING
Called patient contact( Niece) Heidy for update on her uncle [phone]. Let message on voicemail.

## 2020-01-21 NOTE — ASSESSMENT & PLAN NOTE
Echo from 1/21 with EF 35% and moderate dysfunction in mitral and tricuspid valves with LV hypertrophy  --continue diuresis as needed

## 2020-01-21 NOTE — ASSESSMENT & PLAN NOTE
Suspect a result of hypoxia as no SpO2 reading available with otherwise stable vital signs just prior to arrest   --ROSC following 22 min ACLS  --currently under TTM  --rewarming planned of 1/22  --low dose dopamine gtt post arrest to keep MAP >65  --cardiology consulted

## 2020-01-21 NOTE — PROGRESS NOTES
Propofol and fentanyl maxed out and patient continues to shiver. Dr. Romero notified and target temperature changed to 35 degrees. Sarahbree is now at bedside and Dr. Romero informed.

## 2020-01-21 NOTE — CARE UPDATE
Pt was intubated by Dr. Arteaga 7.5 ett 23@ lip and put on vent settings PRVC 16/500/ +5/ FIO2 50%.All alarms are set and working properly. Ambu bag is at HOB.

## 2020-01-21 NOTE — ASSESSMENT & PLAN NOTE
Rate better today. His EF in 35%. Requiring dopamine right now. If HR b/c elevated again would use amiodarone.

## 2020-01-21 NOTE — ASSESSMENT & PLAN NOTE
Reported by vega  States he drinks alcohol on a daily basis  Will discuss with patient when appropriate  Watch for seizures/withdrawals

## 2020-01-21 NOTE — PLAN OF CARE
01/21/20 1128   Discharge Assessment   Assessment Type Discharge Planning Assessment   Confirmed/corrected address and phone number on facesheet? Yes   Assessment information obtained from? Caregiver  (Jessica)   Prior to hospitilization cognitive status: Coma/Sedated/Intubated   Prior to hospitalization functional status: Independent   Current cognitive status: Coma/Sedated/Intubated   Facility Arrived From: home   Lives With other relative(s)   Able to Return to Prior Arrangements no   Is patient able to care for self after discharge? Yes   Who are your caregiver(s) and their phone number(s)? Jessica [phone]   Patient's perception of discharge disposition home or selfcare   Readmission Within the Last 30 Days no previous admission in last 30 days   Patient currently being followed by outpatient case management? No   Patient currently receives any other outside agency services? No   Equipment Currently Used at Home none   Do you have any problems affording any of your prescribed medications? No   Is the patient taking medications as prescribed? yes   Does the patient have transportation home? Yes   Transportation Anticipated family or friend will provide   Dialysis Name and Scheduled days N/A   Does the patient receive services at the Coumadin Clinic? No   Discharge Plan A Home with family   Discharge Plan B Home with family;Home Health   DME Needed Upon Discharge  none   Patient/Family in Agreement with Plan yes         [id number] - [place] - [address] AT [facility] [address]  [address]  [place] [address]  Phone: [phone] Fax: [phone]

## 2020-01-21 NOTE — PROGRESS NOTES
Received patient orally intubated and on a Servo I Ventilator with settings as follows:  PRVC 16/ 500/ +5 PEEP/ 100%.  Patient has a size 7.5 ET tube in place which is secured at the 23 cm lydia at the lips.  The Ventilator alarms are set and functional and the AMBU bag is at the HOB.

## 2020-01-21 NOTE — SUBJECTIVE & OBJECTIVE
"Past Medical History:   Diagnosis Date    Arrhythmia 2017    aflutter    CAD (coronary artery disease)     CHF (congestive heart failure), NYHA class I 2017    Psychiatric disorder     h/o "schizophrena/bipolar"       Past Surgical History:   Procedure Laterality Date    LIVER SURGERY      abscess drainage; 1970s    TREATMENT OF CARDIAC ARRHYTHMIA  9/12/2019    Procedure: Cardioversion or Defibrillation;  Surgeon: Jonathan Lopez MD;  Location: [facility] CATH LAB;  Service: Cardiology;;       Review of patient's allergies indicates:  No Known Allergies    Family History     None        Tobacco Use    Smoking status: Current Every Day Smoker     Packs/day: 0.50     Years: 5.00     Pack years: 2.50     Types: Cigarettes    Smokeless tobacco: Never Used   Substance and Sexual Activity    Alcohol use: Yes     Alcohol/week: 14.0 standard drinks     Types: 14 Shots of liquor per week     Comment: "[employer]" everyday    Drug use: No    Sexual activity: Not on file         Review of Systems   Unable to perform ROS: Intubated     Objective:     Vital Signs (Most Recent):  Temp: (!) 91 °F (32.8 °C) (01/21/20 1600)  Pulse: (!) 57 (01/21/20 1600)  Resp: (!) 22 (01/21/20 1600)  BP: (!) 153/95 (01/21/20 1600)  SpO2: 99 % (01/21/20 1600) Vital Signs (24h Range):  Temp:  [89.4 °F (31.9 °C)-97.8 °F (36.6 °C)] 91 °F (32.8 °C)  Pulse:  [] 57  Resp:  [14-40] 22  SpO2:  [78 %-100 %] 99 %  BP: ()/() 153/95  Arterial Line BP: ()/() 144/89     Weight: 79.5 kg (175 lb 4.3 oz)  Body mass index is 28.29 kg/m².      Intake/Output Summary (Last 24 hours) at 1/21/2020 1629  Last data filed at 1/21/2020 1600  Gross per 24 hour   Intake 1426.78 ml   Output 3150 ml   Net -1723.22 ml       Physical Exam   Constitutional: He appears well-developed. He has a sickly appearance. No distress. He is sedated, intubated and restrained.   [place] inplace   HENT:   Head: Normocephalic and atraumatic.   Eyes: Pupils " are equal, round, and reactive to light. Conjunctivae are normal. Right eye exhibits no exudate. Left eye exhibits no exudate. Right conjunctiva has no hemorrhage. Left conjunctiva has no hemorrhage. No scleral icterus. Right eye exhibits no nystagmus. Left eye exhibits no nystagmus.   Neck: Trachea normal. No neck rigidity. No tracheal deviation present.   Cardiovascular: Normal rate, regular rhythm and normal heart sounds. PMI is not displaced. Exam reveals no gallop and no friction rub.   No murmur heard.  Pulses:       Radial pulses are 1+ on the right side, and 1+ on the left side.        Dorsalis pedis pulses are 1+ on the right side, and 1+ on the left side.   Pulmonary/Chest: Effort normal and breath sounds normal. No accessory muscle usage. He is intubated. No respiratory distress. He has no wheezes. He has no rhonchi. He has no rales.   Abdominal:   Wrapped in [employer] temperature management equipment   Musculoskeletal: Normal range of motion.   Neurological: He is unresponsive. A cranial nerve deficit and sensory deficit is present. GCS eye subscore is 1. GCS verbal subscore is 1. GCS motor subscore is 1.   Pupillary reflex reactive. No cough or gag reflex noted, limited neuro exam in setting of hypothermia protocol in process   Skin: Skin is warm and dry. Capillary refill takes 2 to 3 seconds. He is not diaphoretic. No cyanosis. Nails show no clubbing.   Nursing note and vitals reviewed.      Vents:  Vent Mode: PRVC (01/21/20 1528)  Ventilator Initiated: Yes (01/20/20 2100)  Set Rate: 22 bmp (01/21/20 1528)  Vt Set: 450 mL (01/21/20 1528)  PEEP/CPAP: 8 cmH20 (01/21/20 1528)  Oxygen Concentration (%): 50 (01/21/20 1600)  Peak Airway Pressure: 21.8 cmH2O (01/21/20 1528)  Total Ve: 10.3 mL (01/21/20 1528)  F/VT Ratio<105 (RSBI): (!) 47.41 (01/21/20 1528)    Lines/Drains/Airways     Central Venous Catheter Line                 Percutaneous Central Line Insertion/Assessment - triple lumen  01/20/20 2330 right  femoral vein;right femoral less than 1 day          Drain                 NG/OG Tube 01/20/20 1906 [place] sump 18 Fr. Right mouth less than 1 day         Urethral Catheter 01/20/20 1925 [place] 16 Fr. less than 1 day          Airway                 Airway - Non-Surgical 01/20/20 1857 Endotracheal Tube less than 1 day          Arterial Line                 Arterial Line 01/20/20 2345 Left Radial less than 1 day          Peripheral Intravenous Line                 Peripheral IV - Single Lumen 01/20/20 1710 20 G Right Antecubital less than 1 day         Peripheral IV - Single Lumen 01/20/20 1730 20 G Left Antecubital less than 1 day                Significant Labs:    CBC/Anemia Profile:  Recent Labs   Lab 01/20/20  1731 01/20/20  2207 01/21/20  0400   WBC 9.23  --  13.20*   HGB 16.3  --  13.9*   HCT 49.1 46 40.6     --  155   MCV 97  --  96   RDW 13.5  --  13.2        Chemistries:  Recent Labs   Lab 01/20/20  2322 01/21/20  0400 01/21/20  1000 01/21/20  1143     142 143 137 140   K 3.4*  3.4* 3.2* 4.0 4.3   *  112* 106 105 106   CO2 18*  18* 23 19* 20*   BUN 30*  30* 30* 29* 27*   CREATININE 2.1*  2.1* 1.9* 1.9* 1.9*   CALCIUM 9.2  9.2 10.2 9.9 10.1   ALBUMIN 3.3*  --  3.7 3.8   PROT 6.4  --  7.2 7.7   BILITOT 1.4*  --  1.2* 1.3*   ALKPHOS 70  --  64 66   *  --  258* 269*   *  --  367* 370*   MG 2.1 3.0* 2.3 2.4   PHOS 2.3* 2.0* 1.4* 1.5*       All pertinent labs within the past 24 hours have been reviewed.    Significant Imaging:   I have reviewed all pertinent imaging results/findings within the past 24 hours.

## 2020-01-21 NOTE — H&P
[facility] Hospital Medicine  History & Physical    Patient Name: Marck Madison  MRN: [id number]  Admission Date: 01/20/2020  Attending Physician: Trevor Romero MD, MPH      PCP:     Primary Doctor No    CC:     Chief Complaint   Patient presents with    Shortness of Breath     States he has SOB that started yesterday.  States this has happened before        HISTORY OF PRESENT ILLNESS:     Marck Madison is a 59 y.o. male that (in part)  has a past medical history of Arrhythmia, CAD (coronary artery disease), CHF (congestive heart failure), NYHA class I, and Psychiatric disorder.  has a past surgical history that includes Liver surgery and Treatment of cardiac arrhythmia (9/12/2019). Presents to [facility] Emergency Department complaining of shortness of breath.  Subacute onset with progressive worsening.  Associated palpitations.  Severe dyspnea with exertion.  Denied fever chills.  Had a cough.  History of asthma and COPD.  Reports compliance with home medication regimen.  History is limited due to the current condition of the patient was intubated and sedated    In the emergency department routine laboratory studies and chest x-ray was obtained.  In EKG and cardiac enzymes as well.  He was found to be in atrial fibrillation rapid ventricular response.  Amiodarone infusion was initiated.  He became more hypoxemic and hypotensive.  It was noted he had erythema developing around the amiodarone infusion site.  This was discontinued.  He was started on diltiazem alternatively.  Remained hypoxemic despite increasing oxygen supplementation.  He underwent rapid sequence intubation.  He was brought to the ICU.      Shortly after transferring into the ICU bed he became bradycardic and pulse was lost.  ACLS protocol was initiated.  We performed approximately 6 rounds with epinephrine and started on a dobutamine infusion before obtaining return of spontaneous circulation.  Please  "see code sheet for details.  Central line and arterial lines were placed.  Bedside echo was performed.  Case was discussed with Cardiology in detail.  Dr. Brunner to see the patient in the morning.  Cooling protocol initiated.    Hospital medicine has been asked to admit to inpatient for further evaluation and treatment.       REVIEW OF SYSTEMS:     Unable to obtain due to being intubated      PAST MEDICAL / SURGICAL HISTORY:     Past Medical History:   Diagnosis Date    Arrhythmia 2017    aflutter    CAD (coronary artery disease)     CHF (congestive heart failure), NYHA class I 2017    Psychiatric disorder     h/o "schizophrena/bipolar"     Past Surgical History:   Procedure Laterality Date    LIVER SURGERY      abscess drainage; 1970s    TREATMENT OF CARDIAC ARRHYTHMIA  9/12/2019    Procedure: Cardioversion or Defibrillation;  Surgeon: Jonathan Lopez MD;  Location: [facility] CATH LAB;  Service: Cardiology;;         FAMILY HISTORY:     Unable to obtain due to being intubated and sedated      SOCIAL HISTORY:     Social History     Socioeconomic History    Marital status: Single     Spouse name: Not on file    Number of children: Not on file    Years of education: Not on file    Highest education level: Not on file   Occupational History    Not on file   Social Needs    Financial resource strain: Not on file    Food insecurity:     Worry: Not on file     Inability: Not on file    Transportation needs:     Medical: Not on file     Non-medical: Not on file   Tobacco Use    Smoking status: Current Every Day Smoker     Packs/day: 0.50     Years: 5.00     Pack years: 2.50     Types: Cigarettes    Smokeless tobacco: Never Used   Substance and Sexual Activity    Alcohol use: No    Drug use: No    Sexual activity: Not on file   Lifestyle    Physical activity:     Days per week: Not on file     Minutes per session: Not on file    Stress: Not on file   Relationships    Social connections:     Talks on phone: " Not on file     Gets together: Not on file     Attends Yarsanism service: Not on file     Active member of club or organization: Not on file     Attends meetings of clubs or organizations: Not on file     Relationship status: Not on file   Other Topics Concern    Not on file   Social History Narrative    Not on file         ALLERGIES:       Review of patient's allergies indicates:  No Known Allergies        HOME MEDICATIONS:     Prior to Admission medications    Medication Sig Start Date End Date Taking? Authorizing Provider   amiodarone (PACERONE) 200 MG Tab Take 1 tablet (200 mg total) by mouth 2 (two) times daily. 9/16/19 9/15/20 Yes Seble Acevedo MD   metOLazone (ZAROXOLYN) 5 MG tablet Take 1 tablet (5 mg total) by mouth once daily. 9/17/19 9/16/20 Yes Seble Acevedo MD   carvedilol (COREG) 6.25 MG tablet Take 1 tablet (6.25 mg total) by mouth 2 (two) times daily. 9/16/19 1/20/20 Yes Seble Acevedo MD          HOSPITAL MEDICATIONS:     Scheduled Meds:    [START ON 1/21/2020] acetaminophen  650 mg Per NG tube Q6H    aspirin  300 mg Rectal Daily    busPIRone  30 mg Per NG tube Once    busPIRone  30 mg Per NG tube Q8H    chlorhexidine  15 mL Mouth/Throat BID    dilTIAZem  5 mg/hr Intravenous ED 1 Time    enoxparin  80 mg Subcutaneous Q12H    [START ON 1/21/2020] famotidine (PF)  20 mg Intravenous Daily    LORazepam  1 mg Intravenous ED 1 Time     Continuous Infusions:    DOPamine      fentanyl      magnesium sulfate in water 0.5 g/hr (01/20/20 2308)     PRN Meds: calcium chloride IVPB, calcium chloride IVPB, calcium chloride IVPB, calcium chloride, calcium gluconate IVPB, calcium gluconate IVPB, calcium gluconate IVPB, dextrose 50%, EPINEPHrine, fentaNYL **FOLLOWED BY** [START ON 1/25/2020] fentaNYL, magnesium sulfate IVPB, magnesium sulfate IVPB, magnesium sulfate IVPB, magnesium sulfate, potassium chloride in water **AND** potassium chloride in water **AND** potassium chloride in water,  sodium bicarbonate, sodium chloride 0.9%, sodium phosphate IVPB, sodium phosphate IVPB, sodium phosphate IVPB      PHYSICAL EXAM:     Wt Readings from Last 1 Encounters:   01/20/20 2126 79.5 kg (175 lb 4.3 oz)   01/20/20 1647 74.8 kg (165 lb)     Body mass index is 28.29 kg/m².  Vitals:    01/20/20 2158 01/20/20 2219 01/20/20 2246 01/20/20 2307   BP:  (!) 150/66 (!) 124/59 (!) 114/59   BP Location:       Patient Position:       Pulse: 95 (!) 193 98 103   Resp:  (!) 34 (!) 25 (!) 25   Temp:       TempSrc:       SpO2:  (!) 81% (!) 91% (!) 90%   Weight:       Height:              -- General appearance:  Critically ill-appearing male who is lying in bed.  well developed. appears stated age   -- Head: normocephalic, atraumatic   -- Eyes: conjunctivae clear.  No spontaneous eye opening  -- Nose: Nares normal. Septum midline.   -- Mouth/Throat:  ET tube in place and secured.  Positive gag reflex.    -- Neck: supple, symmetrical, trachea midline, no JVD and thyroid not grossly enlarged, appears symmetric  -- Lungs:  Crackles to auscultation bilaterally. normal respiratory effort. No use of accessory muscles.   -- Chest wall: no tenderness. equal bilateral chest rise   -- Heart:  Rapid irregularly irregular heart  rate and rhythm. S1, S2 normal.  no click, rub or gallop   -- Abdomen: soft, non-tender, non-distended, non-tympanic; bowel sounds normal; no masses  -- Extremities: no cyanosis, clubbing or edema.   -- Pulses: 2+ and symmetric   -- Skin:  Turgor normal. Color normal. Texture normal. No rashes or lesions.   -- Neurologic: Normal strength and tone. No focal numbness or weakness. CNII-XII intact. [place] coma scale: eyes open spontaneously-4, oriented & converses-5, obeys commands-6.      LABORATORY STUDIES:     Recent Results (from the past 36 hour(s))   CBC auto differential    Collection Time: 01/20/20  5:31 PM   Result Value Ref Range    WBC 9.23 3.90 - 12.70 K/uL    RBC 5.05 4.60 - 6.20 M/uL    Hemoglobin 16.3  14.0 - 18.0 g/dL    Hematocrit 49.1 40.0 - 54.0 %    Mean Corpuscular Volume 97 82 - 98 fL    Mean Corpuscular Hemoglobin 32.3 (H) 27.0 - 31.0 pg    Mean Corpuscular Hemoglobin Conc 33.2 32.0 - 36.0 g/dL    RDW 13.5 11.5 - 14.5 %    Platelets 212 150 - 350 K/uL    MPV 11.0 9.2 - 12.9 fL    Immature Granulocytes 0.4 0.0 - 0.5 %    Gran # (ANC) 6.0 1.8 - 7.7 K/uL    Immature Grans (Abs) 0.04 0.00 - 0.04 K/uL    Lymph # 2.3 1.0 - 4.8 K/uL    Mono # 0.8 0.3 - 1.0 K/uL    Eos # 0.1 0.0 - 0.5 K/uL    Baso # 0.05 0.00 - 0.20 K/uL    nRBC 0 0 /100 WBC    Gran% 65.4 38.0 - 73.0 %    Lymph% 24.9 18.0 - 48.0 %    Mono% 8.3 4.0 - 15.0 %    Eosinophil% 0.5 0.0 - 8.0 %    Basophil% 0.5 0.0 - 1.9 %    Differential Method Automated    Comprehensive metabolic panel    Collection Time: 01/20/20  5:31 PM   Result Value Ref Range    Sodium 141 136 - 145 mmol/L    Potassium 4.0 3.5 - 5.1 mmol/L    Chloride 106 95 - 110 mmol/L    CO2 22 (L) 23 - 29 mmol/L    Glucose 126 (H) 70 - 110 mg/dL    BUN, Bld 28 (H) 8 - 23 mg/dL    Creatinine 1.7 (H) 0.5 - 1.4 mg/dL    Calcium 9.5 8.7 - 10.5 mg/dL    Total Protein 7.6 6.0 - 8.4 g/dL    Albumin 3.9 3.5 - 5.2 g/dL    Total Bilirubin 0.8 0.1 - 1.0 mg/dL    Alkaline Phosphatase 71 55 - 135 U/L    AST 50 (H) 10 - 40 U/L    ALT 42 10 - 44 U/L    Anion Gap 13 8 - 16 mmol/L    eGFR if African American 49 (A) >60 mL/min/1.73 m^2    eGFR if non African American 43 (A) >60 mL/min/1.73 m^2   Troponin I    Collection Time: 01/20/20  5:31 PM   Result Value Ref Range    Troponin I 0.329 (H) 0.000 - 0.026 ng/mL   Brain natriuretic peptide    Collection Time: 01/20/20  5:31 PM   Result Value Ref Range     (H) 0 - 99 pg/mL   Protime-INR    Collection Time: 01/20/20  5:31 PM   Result Value Ref Range    Prothrombin Time 12.8 (H) 9.0 - 12.5 sec    INR 1.2 0.8 - 1.2   TSH    Collection Time: 01/20/20  5:31 PM   Result Value Ref Range    TSH 2.296 0.400 - 4.000 uIU/mL   Magnesium    Collection Time: 01/20/20   5:31 PM   Result Value Ref Range    Magnesium 2.0 1.6 - 2.6 mg/dL   ISTAT PROCEDURE    Collection Time: 01/20/20  7:50 PM   Result Value Ref Range    POC PH 7.258 (LL) 7.35 - 7.45    POC PCO2 33.9 (L) 35 - 45 mmHg    POC PO2 88 80 - 100 mmHg    POC HCO3 15.2 (L) 24 - 28 mmol/L    POC BE -11 -2 to 2 mmol/L    POC SATURATED O2 95 95 - 100 %    POC TCO2 16 (L) 23 - 27 mmol/L    Rate 16     Sample ARTERIAL     Site LR     Allens Test Pass     DelSys Adult Vent     Mode AC/PRVC     Vt 500     PEEP 5     PiP 30     FiO2 100     Min Vol 16.2     Sp02 96    Lactic acid, plasma    Collection Time: 01/20/20  8:05 PM   Result Value Ref Range    Lactate (Lactic Acid) 5.7 (HH) 0.5 - 2.2 mmol/L   Procalcitonin    Collection Time: 01/20/20  8:10 PM   Result Value Ref Range    Procalcitonin 0.04 <0.25 ng/mL   POCT glucose    Collection Time: 01/20/20  9:57 PM   Result Value Ref Range    POCT Glucose 275 (H) 70 - 110 mg/dL   ISTAT PROCEDURE    Collection Time: 01/20/20 10:07 PM   Result Value Ref Range    POC Glucose 246 (H) 70 - 110 mg/dL    POC BUN 37 (H) 6 - 30 mg/dL    POC Creatinine 2.1 (H) 0.5 - 1.4 mg/dL    POC Sodium 140 136 - 145 mmol/L    POC Potassium 4.0 3.5 - 5.1 mmol/L    POC Chloride 106 95 - 110 mmol/L    POC TCO2 (MEASURED) 24 23 - 29 mmol/L    POC Anion Gap 15 8 - 16 mmol/L    POC Ionized Calcium 1.24 1.06 - 1.42 mmol/L    POC Hematocrit 46 36 - 54 %PCV    Sample VENOUS    ISTAT PROCEDURE    Collection Time: 01/20/20 11:07 PM   Result Value Ref Range    POC PH 7.337 (L) 7.35 - 7.45    POC PCO2 43.8 35 - 45 mmHg    POC PO2 68 (L) 80 - 100 mmHg    POC HCO3 23.5 (L) 24 - 28 mmol/L    POC BE -3 -2 to 2 mmol/L    POC SATURATED O2 92 (L) 95 - 100 %    POC TCO2 25 23 - 27 mmol/L    Rate 22     Sample ARTERIAL     Site Martina/UAC     Allens Test N/A     DelSys Adult Vent     Mode AC/PRVC     Vt 450     PEEP 10     FiO2 100        Lab Results   Component Value Date    INR 1.2 01/20/2020    INR 1.8 (H) 09/10/2019     No  results found for: HGBA1C  Recent Labs     01/20/20  2157   POCTGLUCOSE 275*           MICROBIOLOGY DATA:     AFB Culture & Smear   Date Value Ref Range Status   09/12/2019 No growth after 8 weeks.   Final       Microbiology x 7d:   Microbiology Results (last 7 days)     Procedure Component Value Units Date/Time    Blood culture [[id number]] Collected:  01/20/20 1959    Order Status:  Sent Specimen:  Blood from Peripheral, Hand, Right Updated:  01/20/20 2012    Blood culture [[id number]] Collected:  01/20/20 2004    Order Status:  Sent Specimen:  Blood from Peripheral, Hand, Right Updated:  01/20/20 2011    Culture, Respiratory with Gram Stain [[id number]]     Order Status:  No result Specimen:  Respiratory from Tracheal Aspirate             IMAGING:     Imaging Results          X-Ray Chest AP Portable (Final result)  Result time 01/20/20 19:32:24    Final result by Hank Palomino MD (01/20/20 19:32:24)                 Impression:      Endotracheal tube 4.1 cm from the rosmery.    Slight worsening of pulmonary edema in comparison to the prior exam of the same date.    Electronically signed by resident: Iron Ha  Date:    01/20/2020  Time:    19:15    Electronically signed by: Hank Palomino MD  Date:    01/20/2020  Time:    19:32             Narrative:    EXAMINATION:  XR CHEST AP PORTABLE    CLINICAL HISTORY:  Encounter for other preprocedural examination    TECHNIQUE:  Single frontal view of the chest was performed.    COMPARISON:  01/20/2020    FINDINGS:  ETT lies 4.1 cm from the rosmery.  Enteric tube courses below level of diaphragm.    Slight interval progression of increased interstitial attenuation in comparison to the prior exam possibly reflecting patient positioning, but with worsening pulmonary edema favored.    No pneumothorax, or large pleural effusion.    Redemonstration of cardiomegaly.  Aortic atherosclerosis again noted.    Mediastinal and hilar contours are unremarkable allowing for patient  rotation.                               X-Ray Chest AP Portable (Final result)  Result time 01/20/20 17:53:56    Final result by Edwin Caicedo MD (01/20/20 17:53:56)                 Impression:      Cardiomegaly with bilateral perihilar and bibasilar infiltrates.  Findings may be associated with pneumonia.  Component of edema not excluded.  Follow-up recommended.      Electronically signed by: Edwin Caicedo  Date:    01/20/2020  Time:    17:53             Narrative:    EXAMINATION:  XR CHEST AP PORTABLE    CLINICAL HISTORY:  shortness of breath;    TECHNIQUE:  Single frontal view of the chest was performed.    COMPARISON:  September 12, 2019.    FINDINGS:  Heart is mildly enlarged.    Bibasilar and perihilar infiltrates.    Suboptimal inspiration.    No effusion or pneumothorax.    No mass or consolidation.    Aortic atherosclerosis.    Trachea is midline.  No acute osseous abnormality.                                  CONSULTS:     IP CONSULT TO REGISTERED DIETITIAN/NUTRITIONIST       ASSESSMENT & PLAN:     Primary Diagnosis:  Cardiac arrest    Active Hospital Problems    Diagnosis  POA    *Cardiac arrest [I46.9]  Yes     Priority: 1 - High    Atrial fibrillation with RVR [I48.91]  Yes     Priority: 2     Atrial flutter [I48.92]  Yes     Priority: 3     Elevated troponin I level [R79.89]  Yes    CKD (chronic kidney disease) [N18.9]  Yes    Acute systolic congestive heart failure [I50.21]  Yes      Resolved Hospital Problems   No resolved problems to display.         Bradycardia with Cardiac arrest after treatment for atrial fibrillation with rapid ventricular response  Patient also has evidence of heart block.  Concern about underlying cardiovascular disease. Patient was persistently hypoxemic in the ED most likely due to volume overload and pulmonary edema, although pneumonia still remains the differential based upon a chest x-ray.  However he does not have a fever, leukocytosis, and the procalcitonin  level is negative.  · Currently patient is on dobutamine due to the episode of bradycardia and arterial hypotension  · Maintain mean arterial pressure greater than 65 mm Hg  · Maintain with beta-blocker with hold parameters  · Monitor on telemetry  · Maintain magnesium around 2.0  · Maintain potassium around 4.0  · Cardiology consult    Acute hypoxemic respiratory failure  Likely due to pulmonary edema secondary to heart failure and atrial fibrillation with RVR as noted above  Intubated and sedated. Will continue with bolus IV sedation.  · Daily sedation vacation.  · Goal = RASS -1 to 0.    · Ventilator bundle orders utilized  · Attempt to wean PEEP    Probable Acute CHF exacerbation  · Evidenced by history, elevated BNP, pulmonary edema, peripheral edema  · Provide diuresis w/ IV medication  · ACE inhibitor or ARB if GFR allows and remains stable  · If 1) Patient cannot tolerate ACEi or 2) NYHA class III or above, add spironolactone (or eplerenone) and hydralazine-isosorbide dinitrate   · Daily Weights  · Strict I/O  · Fluid restriction to 1,500cc daily  · Low-sodium cardiac diet  · 2D echocardiogram pending  · Chest X-ray shows evidence of pulmonary edema  · Check TSH, albumin, UA, and renal function  · EKG and cardiac enzymes PRN  · DVT prophylaxis w/ pharmacological and/or mechanical measures  · Oxygen supplementation support PRN    Instructions given to patient/family:  Monitor daily weight.  Regular activity within patient's limitations.  Low salt, low fat and low choleterol diet and restrict fluid < 2L per day.  Call MD if SOB, chest pain, weight gain > 2-3 lbs per day and/or 5-6 lbs per week.   No smoking. Annual influenza vaccine required.      Chronic Kidney Disease  · Renal dose medications  · Avoid nephrotoxic agents  · Maintain euvolemic state        VTE Risk Mitigation (From admission, onward)         Ordered     enoxaparin injection 80 mg  Every 12 hours (non-standard times)      01/20/20 2119     IP  VTE HIGH RISK PATIENT  Once      01/20/20 2119                  Adult PRN medications available   DVT prophylaxis given       DISPOSITION:     Will admit to the Hospital Medicine service for further evaluation and treatment.    Chart reviewed and updated where applicable.    High Risk Conditions:  Patient has a condition that poses threat to life and bodily function:  Bradycardia with Cardiac arrest status post atrial fibrillation with RVR      ===============================================================    Trevor Romero MD, MPH  Department of Hospital Medicine   [facility]  [phone] pg  (7pm - 6am)    Critical care was given for Marck Madison who has a condition that poses threat to life and bodily function, including:  Cardiac arrest, atrial fibrillation with RVR     Critical care was time spent personally by me on the following activities: development of treatment plan with patient or surrogate and bedside caregivers, discussions with consultants, evaluation of patient's response to treatment, examination of patient, ordering and performing treatments and interventions, ordering and review of laboratory studies, ordering and review of radiographic studies, pulse oximetry, re-evaluation of patient's condition. This critical care time did not overlap with that of any other provider or involve time for any procedures.    Reviewed: previous chart and vitals  Reviewed previous: labs, x-ray, EKG  Interpretation: labs, x-ray, EKG, ABG    Total Critical Care time: 95 minutes. This excludes time spent performing separately reportable procedures and services.  Counseling/Conference Time: 25 minutes      This note is dictated using [employer] voice recognition software.  There are word recognition mistakes that are occasionally missed on review.

## 2020-01-22 LAB
ALBUMIN SERPL BCP-MCNC: 3.1 G/DL (ref 3.5–5.2)
ALBUMIN SERPL BCP-MCNC: 3.2 G/DL (ref 3.5–5.2)
ALBUMIN SERPL BCP-MCNC: 3.2 G/DL (ref 3.5–5.2)
ALBUMIN SERPL BCP-MCNC: 3.3 G/DL (ref 3.5–5.2)
ALBUMIN SERPL BCP-MCNC: 3.4 G/DL (ref 3.5–5.2)
ALLENS TEST: ABNORMAL
ALP SERPL-CCNC: 52 U/L (ref 55–135)
ALP SERPL-CCNC: 52 U/L (ref 55–135)
ALP SERPL-CCNC: 57 U/L (ref 55–135)
ALP SERPL-CCNC: 59 U/L (ref 55–135)
ALP SERPL-CCNC: 60 U/L (ref 55–135)
ALT SERPL W/O P-5'-P-CCNC: 256 U/L (ref 10–44)
ALT SERPL W/O P-5'-P-CCNC: 261 U/L (ref 10–44)
ALT SERPL W/O P-5'-P-CCNC: 278 U/L (ref 10–44)
ALT SERPL W/O P-5'-P-CCNC: 303 U/L (ref 10–44)
ALT SERPL W/O P-5'-P-CCNC: 305 U/L (ref 10–44)
ANION GAP SERPL CALC-SCNC: 11 MMOL/L (ref 8–16)
ANION GAP SERPL CALC-SCNC: 12 MMOL/L (ref 8–16)
ANION GAP SERPL CALC-SCNC: 13 MMOL/L (ref 8–16)
APTT BLDCRRT: 45.1 SEC (ref 21–32)
AST SERPL-CCNC: 156 U/L (ref 10–40)
AST SERPL-CCNC: 186 U/L (ref 10–40)
AST SERPL-CCNC: 245 U/L (ref 10–40)
AST SERPL-CCNC: 305 U/L (ref 10–40)
AST SERPL-CCNC: 327 U/L (ref 10–40)
BASOPHILS # BLD AUTO: 0.02 K/UL (ref 0–0.2)
BASOPHILS NFR BLD: 0.1 % (ref 0–1.9)
BILIRUB SERPL-MCNC: 0.7 MG/DL (ref 0.1–1)
BILIRUB SERPL-MCNC: 0.7 MG/DL (ref 0.1–1)
BILIRUB SERPL-MCNC: 1 MG/DL (ref 0.1–1)
BILIRUB SERPL-MCNC: 1.1 MG/DL (ref 0.1–1)
BILIRUB SERPL-MCNC: 1.2 MG/DL (ref 0.1–1)
BUN SERPL-MCNC: 28 MG/DL (ref 8–23)
BUN SERPL-MCNC: 29 MG/DL (ref 8–23)
BUN SERPL-MCNC: 29 MG/DL (ref 8–23)
BUN SERPL-MCNC: 30 MG/DL (ref 8–23)
BUN SERPL-MCNC: 32 MG/DL (ref 8–23)
CA-I BLDV-SCNC: 1.19 MMOL/L (ref 1.06–1.42)
CALCIUM SERPL-MCNC: 8.6 MG/DL (ref 8.7–10.5)
CALCIUM SERPL-MCNC: 8.7 MG/DL (ref 8.7–10.5)
CALCIUM SERPL-MCNC: 8.7 MG/DL (ref 8.7–10.5)
CALCIUM SERPL-MCNC: 9.2 MG/DL (ref 8.7–10.5)
CALCIUM SERPL-MCNC: 9.5 MG/DL (ref 8.7–10.5)
CHLORIDE SERPL-SCNC: 107 MMOL/L (ref 95–110)
CHLORIDE SERPL-SCNC: 109 MMOL/L (ref 95–110)
CHLORIDE SERPL-SCNC: 109 MMOL/L (ref 95–110)
CHLORIDE SERPL-SCNC: 110 MMOL/L (ref 95–110)
CHLORIDE SERPL-SCNC: 111 MMOL/L (ref 95–110)
CO2 SERPL-SCNC: 20 MMOL/L (ref 23–29)
CO2 SERPL-SCNC: 21 MMOL/L (ref 23–29)
CO2 SERPL-SCNC: 22 MMOL/L (ref 23–29)
CO2 SERPL-SCNC: 22 MMOL/L (ref 23–29)
CO2 SERPL-SCNC: 23 MMOL/L (ref 23–29)
CREAT SERPL-MCNC: 1.8 MG/DL (ref 0.5–1.4)
CREAT SERPL-MCNC: 1.8 MG/DL (ref 0.5–1.4)
CREAT SERPL-MCNC: 1.9 MG/DL (ref 0.5–1.4)
CREAT SERPL-MCNC: 2 MG/DL (ref 0.5–1.4)
CREAT SERPL-MCNC: 2 MG/DL (ref 0.5–1.4)
DELSYS: ABNORMAL
DIFFERENTIAL METHOD: ABNORMAL
EOSINOPHIL # BLD AUTO: 0 K/UL (ref 0–0.5)
EOSINOPHIL NFR BLD: 0 % (ref 0–8)
ERYTHROCYTE [DISTWIDTH] IN BLOOD BY AUTOMATED COUNT: 13.2 % (ref 11.5–14.5)
ERYTHROCYTE [SEDIMENTATION RATE] IN BLOOD BY WESTERGREN METHOD: 22 MM/H
EST. GFR  (AFRICAN AMERICAN): 40 ML/MIN/1.73 M^2
EST. GFR  (AFRICAN AMERICAN): 40 ML/MIN/1.73 M^2
EST. GFR  (AFRICAN AMERICAN): 43 ML/MIN/1.73 M^2
EST. GFR  (AFRICAN AMERICAN): 46 ML/MIN/1.73 M^2
EST. GFR  (AFRICAN AMERICAN): 46 ML/MIN/1.73 M^2
EST. GFR  (NON AFRICAN AMERICAN): 35 ML/MIN/1.73 M^2
EST. GFR  (NON AFRICAN AMERICAN): 35 ML/MIN/1.73 M^2
EST. GFR  (NON AFRICAN AMERICAN): 37 ML/MIN/1.73 M^2
EST. GFR  (NON AFRICAN AMERICAN): 40 ML/MIN/1.73 M^2
EST. GFR  (NON AFRICAN AMERICAN): 40 ML/MIN/1.73 M^2
FIO2: 50
GLUCOSE SERPL-MCNC: 124 MG/DL (ref 70–110)
GLUCOSE SERPL-MCNC: 124 MG/DL (ref 70–110)
GLUCOSE SERPL-MCNC: 132 MG/DL (ref 70–110)
GLUCOSE SERPL-MCNC: 132 MG/DL (ref 70–110)
GLUCOSE SERPL-MCNC: 135 MG/DL (ref 70–110)
GLUCOSE SERPL-MCNC: 135 MG/DL (ref 70–110)
GLUCOSE SERPL-MCNC: 204 MG/DL (ref 70–110)
HCO3 UR-SCNC: 23.4 MMOL/L (ref 24–28)
HCT VFR BLD AUTO: 45.6 % (ref 40–54)
HGB BLD-MCNC: 15.7 G/DL (ref 14–18)
IMM GRANULOCYTES # BLD AUTO: 0.12 K/UL (ref 0–0.04)
IMM GRANULOCYTES NFR BLD AUTO: 0.7 % (ref 0–0.5)
INR PPP: 1.4 (ref 0.8–1.2)
LYMPHOCYTES # BLD AUTO: 0.6 K/UL (ref 1–4.8)
LYMPHOCYTES NFR BLD: 3.4 % (ref 18–48)
MAGNESIUM SERPL-MCNC: 2.7 MG/DL (ref 1.6–2.6)
MAGNESIUM SERPL-MCNC: 2.8 MG/DL (ref 1.6–2.6)
MAGNESIUM SERPL-MCNC: 2.9 MG/DL (ref 1.6–2.6)
MAGNESIUM SERPL-MCNC: 3.1 MG/DL (ref 1.6–2.6)
MAGNESIUM SERPL-MCNC: 3.2 MG/DL (ref 1.6–2.6)
MCH RBC QN AUTO: 32.5 PG (ref 27–31)
MCHC RBC AUTO-ENTMCNC: 34.4 G/DL (ref 32–36)
MCV RBC AUTO: 94 FL (ref 82–98)
MIN VOL: 10.2
MODE: ABNORMAL
MONOCYTES # BLD AUTO: 0.4 K/UL (ref 0.3–1)
MONOCYTES NFR BLD: 2 % (ref 4–15)
NEUTROPHILS # BLD AUTO: 17 K/UL (ref 1.8–7.7)
NEUTROPHILS NFR BLD: 93.8 % (ref 38–73)
NRBC BLD-RTO: 0 /100 WBC
NSE SERPL-MCNC: NORMAL UG/L
PCO2 BLDA: 36.4 MMHG (ref 35–45)
PEEP: 8
PH SMN: 7.42 [PH] (ref 7.35–7.45)
PHOSPHATE SERPL-MCNC: 3.1 MG/DL (ref 2.7–4.5)
PHOSPHATE SERPL-MCNC: 5.1 MG/DL (ref 2.7–4.5)
PHOSPHATE SERPL-MCNC: 5.9 MG/DL (ref 2.7–4.5)
PHOSPHATE SERPL-MCNC: 6.1 MG/DL (ref 2.7–4.5)
PHOSPHATE SERPL-MCNC: 6.2 MG/DL (ref 2.7–4.5)
PIP: 24
PLATELET # BLD AUTO: 180 K/UL (ref 150–350)
PMV BLD AUTO: 11.1 FL (ref 9.2–12.9)
PO2 BLDA: 116 MMHG (ref 80–100)
POC BE: -1 MMOL/L
POC SATURATED O2: 99 % (ref 95–100)
POC TCO2: 24 MMOL/L (ref 23–27)
POCT GLUCOSE: 210 MG/DL (ref 70–110)
POCT GLUCOSE: 238 MG/DL (ref 70–110)
POCT GLUCOSE: 248 MG/DL (ref 70–110)
POTASSIUM SERPL-SCNC: 4 MMOL/L (ref 3.5–5.1)
POTASSIUM SERPL-SCNC: 4.2 MMOL/L (ref 3.5–5.1)
POTASSIUM SERPL-SCNC: 4.3 MMOL/L (ref 3.5–5.1)
POTASSIUM SERPL-SCNC: 4.4 MMOL/L (ref 3.5–5.1)
POTASSIUM SERPL-SCNC: 4.4 MMOL/L (ref 3.5–5.1)
PROT SERPL-MCNC: 6.1 G/DL (ref 6–8.4)
PROT SERPL-MCNC: 6.2 G/DL (ref 6–8.4)
PROT SERPL-MCNC: 6.3 G/DL (ref 6–8.4)
PROT SERPL-MCNC: 6.8 G/DL (ref 6–8.4)
PROT SERPL-MCNC: 6.8 G/DL (ref 6–8.4)
PROTHROMBIN TIME: 14.9 SEC (ref 9–12.5)
RBC # BLD AUTO: 4.83 M/UL (ref 4.6–6.2)
SAMPLE: ABNORMAL
SITE: ABNORMAL
SODIUM SERPL-SCNC: 140 MMOL/L (ref 136–145)
SODIUM SERPL-SCNC: 141 MMOL/L (ref 136–145)
SODIUM SERPL-SCNC: 143 MMOL/L (ref 136–145)
SODIUM SERPL-SCNC: 144 MMOL/L (ref 136–145)
SODIUM SERPL-SCNC: 144 MMOL/L (ref 136–145)
SP02: 100
VT: 450
WBC # BLD AUTO: 18.11 K/UL (ref 3.9–12.7)

## 2020-01-22 PROCEDURE — 99900035 HC TECH TIME PER 15 MIN (STAT)

## 2020-01-22 PROCEDURE — 99900026 HC AIRWAY MAINTENANCE (STAT)

## 2020-01-22 PROCEDURE — 63600175 PHARM REV CODE 636 W HCPCS: Performed by: HOSPITALIST

## 2020-01-22 PROCEDURE — 83735 ASSAY OF MAGNESIUM: CPT | Mod: 91

## 2020-01-22 PROCEDURE — S0028 INJECTION, FAMOTIDINE, 20 MG: HCPCS | Performed by: HOSPITALIST

## 2020-01-22 PROCEDURE — 99291 CRITICAL CARE FIRST HOUR: CPT | Mod: ,,, | Performed by: NURSE PRACTITIONER

## 2020-01-22 PROCEDURE — 25000003 PHARM REV CODE 250: Performed by: INTERNAL MEDICINE

## 2020-01-22 PROCEDURE — 99232 PR SUBSEQUENT HOSPITAL CARE,LEVL II: ICD-10-PCS | Mod: ,,, | Performed by: INTERNAL MEDICINE

## 2020-01-22 PROCEDURE — 85025 COMPLETE CBC W/AUTO DIFF WBC: CPT

## 2020-01-22 PROCEDURE — 85730 THROMBOPLASTIN TIME PARTIAL: CPT

## 2020-01-22 PROCEDURE — 99291 PR CRITICAL CARE, E/M 30-74 MINUTES: ICD-10-PCS | Mod: ,,, | Performed by: NURSE PRACTITIONER

## 2020-01-22 PROCEDURE — 83520 IMMUNOASSAY QUANT NOS NONAB: CPT

## 2020-01-22 PROCEDURE — 63600175 PHARM REV CODE 636 W HCPCS: Performed by: INTERNAL MEDICINE

## 2020-01-22 PROCEDURE — 80053 COMPREHEN METABOLIC PANEL: CPT | Mod: 91

## 2020-01-22 PROCEDURE — 31500 INSERT EMERGENCY AIRWAY: CPT

## 2020-01-22 PROCEDURE — 82803 BLOOD GASES ANY COMBINATION: CPT

## 2020-01-22 PROCEDURE — 94003 VENT MGMT INPAT SUBQ DAY: CPT

## 2020-01-22 PROCEDURE — 84100 ASSAY OF PHOSPHORUS: CPT

## 2020-01-22 PROCEDURE — 20000000 HC ICU ROOM

## 2020-01-22 PROCEDURE — 25000003 PHARM REV CODE 250: Performed by: HOSPITALIST

## 2020-01-22 PROCEDURE — 99232 SBSQ HOSP IP/OBS MODERATE 35: CPT | Mod: ,,, | Performed by: INTERNAL MEDICINE

## 2020-01-22 PROCEDURE — 37799 UNLISTED PX VASCULAR SURGERY: CPT

## 2020-01-22 PROCEDURE — 51702 INSERT TEMP BLADDER CATH: CPT

## 2020-01-22 PROCEDURE — 27200188 HC TRANSDUCER, ART ADULT/PEDS

## 2020-01-22 PROCEDURE — 36556 INSERT NON-TUNNEL CV CATH: CPT

## 2020-01-22 PROCEDURE — 36620 INSERTION CATHETER ARTERY: CPT

## 2020-01-22 PROCEDURE — 36415 COLL VENOUS BLD VENIPUNCTURE: CPT

## 2020-01-22 PROCEDURE — 94761 N-INVAS EAR/PLS OXIMETRY MLT: CPT

## 2020-01-22 PROCEDURE — 85610 PROTHROMBIN TIME: CPT

## 2020-01-22 PROCEDURE — 27000221 HC OXYGEN, UP TO 24 HOURS

## 2020-01-22 RX ORDER — DEXMEDETOMIDINE HYDROCHLORIDE 4 UG/ML
0.2 INJECTION, SOLUTION INTRAVENOUS CONTINUOUS
Status: DISCONTINUED | OUTPATIENT
Start: 2020-01-22 | End: 2020-01-27

## 2020-01-22 RX ADMIN — ENOXAPARIN SODIUM 80 MG: 100 INJECTION SUBCUTANEOUS at 10:01

## 2020-01-22 RX ADMIN — DEXMEDETOMIDINE HYDROCHLORIDE 1 MCG/KG/HR: 4 INJECTION, SOLUTION INTRAVENOUS at 06:01

## 2020-01-22 RX ADMIN — DEXMEDETOMIDINE HYDROCHLORIDE 1.2 MCG/KG/HR: 4 INJECTION, SOLUTION INTRAVENOUS at 10:01

## 2020-01-22 RX ADMIN — CHLORHEXIDINE GLUCONATE 0.12% ORAL RINSE 15 ML: 1.2 LIQUID ORAL at 08:01

## 2020-01-22 RX ADMIN — PROPOFOL 20 MCG/KG/MIN: 10 INJECTION, EMULSION INTRAVENOUS at 03:01

## 2020-01-22 RX ADMIN — CHLORHEXIDINE GLUCONATE 0.12% ORAL RINSE 15 ML: 1.2 LIQUID ORAL at 10:01

## 2020-01-22 RX ADMIN — PROPOFOL 15 MCG/KG/MIN: 10 INJECTION, EMULSION INTRAVENOUS at 11:01

## 2020-01-22 RX ADMIN — ENOXAPARIN SODIUM 80 MG: 100 INJECTION SUBCUTANEOUS at 08:01

## 2020-01-22 RX ADMIN — ACETAMINOPHEN ORAL SOLUTION 650 MG: 650 SOLUTION ORAL at 05:01

## 2020-01-22 RX ADMIN — PIPERACILLIN AND TAZOBACTAM 4.5 G: 4; .5 INJECTION, POWDER, LYOPHILIZED, FOR SOLUTION INTRAVENOUS; PARENTERAL at 08:01

## 2020-01-22 RX ADMIN — ASPIRIN 81 MG CHEWABLE TABLET 81 MG: 81 TABLET CHEWABLE at 08:01

## 2020-01-22 RX ADMIN — PIPERACILLIN AND TAZOBACTAM 4.5 G: 4; .5 INJECTION, POWDER, LYOPHILIZED, FOR SOLUTION INTRAVENOUS; PARENTERAL at 05:01

## 2020-01-22 RX ADMIN — VANCOMYCIN HYDROCHLORIDE 1250 MG: 1.25 INJECTION, POWDER, LYOPHILIZED, FOR SOLUTION INTRAVENOUS at 05:01

## 2020-01-22 RX ADMIN — FAMOTIDINE 20 MG: 10 INJECTION INTRAVENOUS at 08:01

## 2020-01-22 RX ADMIN — DOPAMINE HYDROCHLORIDE IN DEXTROSE 4 MCG/KG/MIN: 1.6 INJECTION, SOLUTION INTRAVENOUS at 11:01

## 2020-01-22 NOTE — PHYSICIAN QUERY
PT Name: Marck Madison  MR #: [id number]    Physician Query Form - Atrial Flutter Specificity Clarification     CDS/: Cesia Ulloa               Contact information:  This form is a permanent document in the medical record.     Query Date: January 22, 2020    By submitting this query, we are merely seeking further clarification of documentation. Please utilize your independent clinical judgment when addressing the question(s) below.    The medical record contains the following:   Indicators     Supporting Clinical Findings Location in Medical Record   x Atrial Flutter The patient is in atrial flutter. He was immediately treated with amiodarone arrival for rapid ventricular response. He developed hives at the injection site.   ED Provider note 1/20   x EKG results Atrial flutter with variable A-V block with premature ventricular or  aberrantly conducted complexes  Possible Inferior infarct (cited on or before 12-SEP-2019)  Cannot rule out Anterior infarct (cited on or before 12-SEP-2019)  Abnormal ECG    EKG 1/20   x Medication Atrial flutter   Rate better today. His EF in 35%. Requiring dopamine right now. If HR b/c elevated again would use amiodarone.    Consult 1/21       Cardiology    Treatment     x Other Atrial flutter   In sinus rhythm.    Progress Note 1/22       Cardiology     Provider, please further specify the Atrial Flutter diagnosis.    [   ] Atypical     [ x ] Typical     [   ] Other (please specify):     [  ] Clinically Undetermined         Please document in your progress notes daily for the duration of treatment until resolved, and include in your discharge summary.

## 2020-01-22 NOTE — PHYSICIAN QUERY
PT Name: Marck Madison  MR #: [id number]    Physician Query Form - Atrial Fibrillation Specificity     CDS/: Cesia Ulloa RN              Contact information:[phone]     This form is a permanent document in the medical record.     Query Date: January 22, 2020    By submitting this query, we are merely seeking further clarification of documentation. Please utilize your independent clinical judgment when addressing the question(s) below.    The medical record contains the following:   Indicators     Supporting Clinical Findings Location in Medical Record   x Atrial Fibrillation Bradycardia with Cardiac arrest after treatment for atrial fibrillation with rapid ventricular response     Atrial fibrillation with RVR   On cardizem   --mgmt per cardiology     H&P 1/20      Consult 1/21       Pulmonology   x EKG results Atrial flutter with variable A-V block with premature ventricular or  aberrantly conducted complexes  Possible Inferior infarct (cited on or before 12-SEP-2019)  Cannot rule out Anterior infarct (cited on or before 12-SEP-2019)  Abnormal ECG      Concentric left ventricular hypertrophy.  · Atrial fibrillation observed.  · Moderately decreased left ventricular systolic function.The estimated ejection fraction is 35%.  · Normal right ventricular systolic function.  · Moderate mitral regurgitation.  · Moderate tricuspid regurgitation.  · No pulmonary hypertension present.     EKG 1/20                  ECHO 1/21   x Medication On cardizem  Consult 1/21       Pulmonology    Treatment     x Other 1/22/2020: Patient in sinus rhythm    Progress Note 1/22       Cardioloyg       Provider, please further specify the Atrial Fibrillation diagnosis.    [  ]  Paroxysmal atrial fibrillation     [  ]  Other (please specify):     [  ]  Clinically Undetermined         Please document in your progress notes daily for the duration of treatment until resolved, and include in your discharge summary.    Please send this query  to Cardiologist

## 2020-01-22 NOTE — PHYSICIAN QUERY
PT Name: Marck Madison  MR #: [id number]  Physician Query Form - CKD Clarification     CDS/: Cesia Ulloa RN             Contact information:[phone]  This form is a permanent document in the medical record.     Query Date: January 22, 2020    By submitting this query, we are merely seeking further clarification of documentation. Please utilize your independent clinical judgment when addressing the question(s) below.    The Medical record contains the following:     Indicators   Supporting Clinical Findings   Location in Medical Record   x CKD or Chronic Kidney (Renal) Failure / Disease Active Hospital Problems:  CKD (chronic kidney disease)   H&P 1/20     Hospital Medicine   x BUN/Creatinine                          GFR BUN   28--> 30--> 29    Cr      1.7-->1.9-->1.8    GFR   49-->43--> 46   Lab 1/20, 1/21, 1/22    Dehydration      Nausea / Vomiting      Dialysis / CRRT      Medication     x Treatment Chronic Kidney Disease    Renal dose medications    Avoid nephrotoxic agents    Maintain euvolemic state    H&P 1/20     Hospital Medicine    Other Chronic Conditions      Other       Provider, please further specify the stage of CKD.    [   ] Chronic Kidney Disease (CKD) (please specify stage* below)      [employer] Definitions     Stage Description eGFR (mL/min)   [   ]    I Slight kidney damage with normal or increased filtration 90+   [   ]   II Mildly reduced kidney function 60-89   [ x  ]    III Moderately reduced kidney function 30-59   [   ] Other (please specify): ____________    [   ]  Clinically Undetermined          Please document in your progress notes daily for the duration of treatment until resolved and include in your discharge summary.

## 2020-01-22 NOTE — CARE UPDATE
[facility]  ICU Multidisciplinary Bedside Rounds     UPDATE     Date: 1/22/2020      Plan of care reviewed with the following, Nurse, Charge Nurse, Physician, Pulm CC, Resp. Therapist, Infection Prevention and Dietician.       Needs/ Goals for the day: + neuro, + abx for g+ rods, + diflucan for yeast, + free H20 flush, change labs to daily, warm, monitor neuro status, wean dopamine and sedatives.      Level of Care: Critical Care

## 2020-01-22 NOTE — PHYSICIAN QUERY
PT Name: Marck Madison  MR #: [id number]     Physician Query Form - Diagnosis Clarification      CDS/: Cesia Ulloa RN             Contact information:[phone]    This form is a permanent document in the medical record.     Query Date: January 22, 2020    By submitting this query, we are merely seeking further clarification of documentation.  Please utilize your independent clinical judgment when addressing the question(s) below.     The medical record contains the following:      Findings Supporting Clinical Information Location in Medical Record   Acute non-ST-segment elevation myocardial infarction    Clinical Impression:  6.Acute non-ST-segment elevation myocardial infarction       Elevated troponin I level   In light of hypotension. LFTs elevated. Troponins appear flat.      Troponin I  0.329-> 0.308-> 0.446-> 0.444   ED Provider Note 1/20          Consult 1/21        Cardiology        Lab 1/20, 1/20, 1/21, 1/21     Please clarify if the _Acute non-ST-segment elevation myocardial infarction __ diagnosis has been:    [  ] Ruled In     [x  ] Ruled Out     [  ] Other/Clarification of findings (please specify):       [  ] Clinically undetermined     Please document in your progress notes daily for the duration of treatment, until resolved, and include in your discharge summary.

## 2020-01-22 NOTE — PROGRESS NOTES
[facility]  Cardiology  Progress Note    Patient Name: Marck Madison  MRN: [id number]  Admission Date: 1/20/2020  Hospital Length of Stay: 2 days  Code Status: Full Code   Attending Physician: Winifred Lopez MD   Primary Care Physician: Primary Doctor No  Expected Discharge Date:   Principal Problem:Cardiac arrest    Subjective:     Hospital Course:   1/22/2020: Patient in sinus rhythm        Review of Systems   Unable to perform ROS: intubated     Objective:     Vital Signs (Most Recent):  Temp: (!) 95.4 °F (35.2 °C) (01/22/20 1000)  Pulse: 75 (01/22/20 1000)  Resp: (!) 22 (01/22/20 1000)  BP: 123/83 (01/22/20 0900)  SpO2: 97 % (01/22/20 1000) Vital Signs (24h Range):  Temp:  [88.7 °F (31.5 °C)-95.4 °F (35.2 °C)] 95.4 °F (35.2 °C)  Pulse:  [47-81] 75  Resp:  [22-37] 22  SpO2:  [85 %-100 %] 97 %  BP: (111-177)/() 123/83  Arterial Line BP: (103-148)/(68-98) 120/81     Weight: 79.5 kg (175 lb 4.3 oz)  Body mass index is 28.29 kg/m².     SpO2: 97 %  O2 Device (Oxygen Therapy): ventilator      Intake/Output Summary (Last 24 hours) at 1/22/2020 1012  Last data filed at 1/22/2020 0900  Gross per 24 hour   Intake 1777.87 ml   Output 1810 ml   Net -32.13 ml       Lines/Drains/Airways     Central Venous Catheter Line                 Percutaneous Central Line Insertion/Assessment - triple lumen  01/20/20 2330 right femoral vein;right femoral 1 day          Drain                 NG/OG Tube 01/20/20 1906 [place] sump 18 Fr. Right mouth 1 day         Urethral Catheter 01/20/20 1925 [place] 16 Fr. 1 day          Airway                 Airway - Non-Surgical 01/20/20 1857 Endotracheal Tube 1 day          Arterial Line                 Arterial Line 01/20/20 2345 Left Radial 1 day          Peripheral Intravenous Line                 Peripheral IV - Single Lumen 01/20/20 1710 20 G Right Antecubital 1 day         Peripheral IV - Single Lumen 01/20/20 1730 20 G Left Antecubital 1 day         Peripheral IV - Single  Lumen 01/21/20 0600 20 G Posterior;Right Hand 1 day                Physical Exam   Constitutional: He is sedated and intubated.   Cardiovascular: Normal rate and regular rhythm.   Pulmonary/Chest: Breath sounds normal. He is intubated.   Abdominal: Soft.   Musculoskeletal:        Right lower leg: He exhibits no edema.        Left lower leg: He exhibits no edema.   Vitals reviewed.      Significant Labs:   BMP:   Recent Labs   Lab 01/21/20  2344 01/22/20  0345 01/22/20  0738   *  204* 124*  124* 204*  204*    144 140   K 4.4 4.2 4.0    111* 107   CO2 21* 22* 22*   BUN 28* 29* 29*   CREATININE 1.8* 1.8* 1.9*   CALCIUM 9.5 9.2 8.7   MG 3.2* 3.1* 2.8*   , CBC   Recent Labs   Lab 01/20/20  1731  01/21/20  0400 01/22/20  0345   WBC 9.23  --  13.20* 18.11*   HGB 16.3  --  13.9* 15.7   HCT 49.1   < > 40.6 45.6     --  155 180    < > = values in this interval not displayed.    and Troponin   Recent Labs   Lab 01/20/20  2322 01/21/20  0400 01/21/20  1000   TROPONINI 0.308* 0.446* 0.444*       Significant Imaging: Echocardiogram:   Transthoracic echo (TTE) complete (Cupid Only):   Results for orders placed or performed during the hospital encounter of 01/20/20   Echo Color Flow Doppler? Yes   Result Value Ref Range    BSA 1.92 m2    TDI SEPTAL 0.05 m/s    LV LATERAL E/E' RATIO 11.83 m/s    LV SEPTAL E/E' RATIO 14.20 m/s    LA WIDTH 3.10 cm    AORTIC VALVE CUSP SEPERATION 1.81 cm    TDI LATERAL 0.06 m/s    PV PEAK VELOCITY 0.87 cm/s    LVIDD 3.41 (A) 3.5 - 6.0 cm    IVS 1.66 (A) 0.6 - 1.1 cm    PW 1.76 (A) 0.6 - 1.1 cm    Ao root annulus 3.09 cm    LVIDS 2.99 2.1 - 4.0 cm    FS 12 28 - 44 %    LA volume 52.60 cm3    Sinus 3.07 cm    STJ 2.22 cm    Ascending aorta 3.01 cm    LV mass 232.69 g    LA size 3.30 cm    RVDD 2.81 cm    TAPSE 1.03 cm    RV S' 11.46 cm/s    Left Ventricle Relative Wall Thickness 1.03 cm    AV mean gradient 2 mmHg    AV valve area 3.59 cm2    AV Velocity Ratio 1.11     AV  index (prosthetic) 1.09     Mean e' 0.06 m/s    IVRT 0.07 msec    LVOT diameter 2.05 cm    LVOT area 3.3 cm2    LVOT peak [place] 1.01 m/s    LVOT peak VTI 15.99 cm    Ao peak [place] 0.91 m/s    Ao VTI 14.68 cm    LVOT stroke volume 52.75 cm3    AV peak gradient 3 mmHg    E/E' ratio 12.91 m/s    MV Peak E [place] 0.71 m/s    TR Max [place] 2.46 m/s    LV Systolic Volume 34.70 mL    LV Systolic Volume Index 18.3 mL/m2    LV Diastolic Volume 47.62 mL    LV Diastolic Volume Index 25.18 mL/m2    LA Volume Index 27.8 mL/m2    LV Mass Index 123 g/m2    RA Major Axis 5.46 cm    Left Atrium Minor Axis 6.00 cm    Left Atrium Major Axis 6.10 cm    Triscuspid Valve Regurgitation Peak Gradient 24 mmHg    RA Width 2.43 cm    Narrative    · Concentric left ventricular hypertrophy.  · Atrial fibrillation observed.  · Moderately decreased left ventricular systolic function. The estimated   ejection fraction is 35%.  · Normal right ventricular systolic function.  · Moderate mitral regurgitation.  · Moderate tricuspid regurgitation.  · No pulmonary hypertension present.        Assessment and Plan:         * Cardiac arrest   Report of hypoxemia, bradycardia leading to PEA. Hypothermia - rewarming    Alcohol use disorder, severe, dependence  Monitor for withdrawal, tx as per primary    Acute systolic congestive heart failure  Requiring dopamine. Wean as tolerated.     CKD (chronic kidney disease)  Creatinine 1.9. Monitor in light of hypotension, PEA.    Elevated troponin I level  In light of hypotension. RVR.    Dilated cardiomyopathy  EF 35%. Long hx of EtOH. Reported remote hx of CAD. Supportive care. Current on dopamine.    Atrial flutter  In sinus rhythm.       VTE Risk Mitigation (From admission, onward)         Ordered     enoxaparin injection 80 mg  Every 12 hours (non-standard times)      01/20/20 2119     IP VTE HIGH RISK PATIENT  Once      01/20/20 2119                Patrice Brunner MD  Cardiology  [facility]

## 2020-01-22 NOTE — PROGRESS NOTES
[facility]  Pulmonology  Progress Note    Patient Name: Marck Madison  MRN: [id number]  Admission Date: 1/20/2020  Hospital Length of Stay: 2 days  Code Status: Full Code  Attending Provider: Winifred Lopez MD  Primary Care Provider: Primary Doctor No   Principal Problem: Cardiac arrest    Subjective:     Interval History: rewarming currently, plan to be normothermic this afternoon. Will assess neurologic function following. Plan for SAT/SBT in am if neurologic recovery noted    Objective:     Vital Signs (Most Recent):  Temp: 96.4 °F (35.8 °C) (01/22/20 1300)  Pulse: 80 (01/22/20 1300)  Resp: (!) 22 (01/22/20 1300)  BP: 122/72 (01/22/20 1300)  SpO2: 98 % (01/22/20 1300) Vital Signs (24h Range):  Temp:  [88.7 °F (31.5 °C)-96.6 °F (35.9 °C)] 96.4 °F (35.8 °C)  Pulse:  [51-81] 80  Resp:  [22-37] 22  SpO2:  [85 %-100 %] 98 %  BP: ()/() 122/72  Arterial Line BP: ()/(66-98) 105/72     Weight: 79.5 kg (175 lb 4.3 oz)  Body mass index is 28.29 kg/m².      Intake/Output Summary (Last 24 hours) at 1/22/2020 1647  Last data filed at 1/22/2020 1300  Gross per 24 hour   Intake 1482.09 ml   Output 860 ml   Net 622.09 ml       Physical Exam   Constitutional: He appears well-developed. He has a sickly appearance. No distress. He is sedated, intubated and restrained.   [place] inplace   HENT:   Head: Normocephalic and atraumatic.   Eyes: Pupils are equal, round, and reactive to light. Conjunctivae are normal. Right eye exhibits no exudate. Left eye exhibits no exudate. Right conjunctiva has no hemorrhage. Left conjunctiva has no hemorrhage. No scleral icterus. Right eye exhibits no nystagmus. Left eye exhibits no nystagmus.   Neck: Trachea normal. No neck rigidity. No tracheal deviation present.   Cardiovascular: Normal rate, regular rhythm and normal heart sounds. PMI is not displaced. Exam reveals no gallop and no friction rub.   No murmur heard.  Pulses:       Radial pulses are 1+ on the right  side, and 1+ on the left side.        Dorsalis pedis pulses are 1+ on the right side, and 1+ on the left side.   Pulmonary/Chest: Effort normal and breath sounds normal. No accessory muscle usage. He is intubated. No respiratory distress. He has no wheezes. He has no rhonchi. He has no rales.   Abdominal:   Wrapped in [employer] temperature management equipment   Musculoskeletal: Normal range of motion.   Neurological: He is unresponsive. A cranial nerve deficit and sensory deficit is present. GCS eye subscore is 1. GCS verbal subscore is 1. GCS motor subscore is 1.   Pupillary reflex reactive. No cough or gag reflex noted, limited neuro exam in setting of hypothermia protocol in process   Skin: Skin is warm and dry. Capillary refill takes 2 to 3 seconds. He is not diaphoretic. No cyanosis. Nails show no clubbing.   Nursing note and vitals reviewed.      Vents:  Vent Mode: PRVC (01/22/20 1513)  Ventilator Initiated: Yes (01/20/20 2100)  Set Rate: 22 bmp (01/22/20 1513)  Vt Set: 450 mL (01/22/20 1513)  PEEP/CPAP: 8 cmH20 (01/22/20 1513)  Oxygen Concentration (%): 40 (01/22/20 1513)  Peak Airway Pressure: 23.5 cmH2O (01/22/20 1513)  Total Ve: 10.2 mL (01/22/20 1513)  F/VT Ratio<105 (RSBI): (!) 46.91 (01/22/20 0827)    Lines/Drains/Airways     Central Venous Catheter Line                 Percutaneous Central Line Insertion/Assessment - triple lumen  01/20/20 2330 right femoral vein;right femoral 1 day          Drain                 NG/OG Tube 01/20/20 1906 [place] sump 18 Fr. Right mouth 1 day         Urethral Catheter 01/20/20 1925 [place] 16 Fr. 1 day          Airway                 Airway - Non-Surgical 01/20/20 1857 Endotracheal Tube 1 day          Arterial Line                 Arterial Line 01/20/20 2345 Left Radial 1 day          Peripheral Intravenous Line                 Peripheral IV - Single Lumen 01/20/20 1710 20 G Right Antecubital 1 day         Peripheral IV - Single Lumen 01/20/20 1730 20 G Left Antecubital 1  day         Peripheral IV - Single Lumen 01/21/20 0600 20 G Posterior;Right Hand 1 day                Significant Labs:    CBC/Anemia Profile:  Recent Labs   Lab 01/20/20  1731 01/20/20  2207 01/21/20  0400 01/22/20  0345   WBC 9.23  --  13.20* 18.11*   HGB 16.3  --  13.9* 15.7   HCT 49.1 46 40.6 45.6     --  155 180   MCV 97  --  96 94   RDW 13.5  --  13.2 13.2        Chemistries:  Recent Labs   Lab 01/22/20  0345 01/22/20  0738 01/22/20  1301    140 143   K 4.2 4.0 4.4   * 107 110   CO2 22* 22* 20*   BUN 29* 29* 30*   CREATININE 1.8* 1.9* 2.0*   CALCIUM 9.2 8.7 8.6*   ALBUMIN 3.3* 3.2* 3.1*   PROT 6.8 6.2 6.1   BILITOT 1.1* 1.0 0.7   ALKPHOS 59 52* 52*   * 278* 261*   * 245* 186*   MG 3.1* 2.8* 2.7*   PHOS 6.2* 5.1* 6.1*       All pertinent labs within the past 24 hours have been reviewed.    Significant Imaging:  I have reviewed all pertinent imaging results/findings within the past 24 hours.    Assessment/Plan:     * Cardiac arrest  Suspect a result of hypoxia as no SpO2 reading available with otherwise stable vital signs just prior to arrest   --ROSC following 22 min ACLS  --TTM rewarming to be completed this afternoon  --low dose dopamine gtt post arrest to keep MAP >65  --cardiology consulted    Atrial fibrillation with RVR  On cardizem  --mgmt per cardiology    Acute systolic congestive heart failure  Echo from 1/21 with EF 35% and moderate dysfunction in mitral and tricuspid valves with LV hypertrophy  --continue diuresis as needed      Acute hypoxemic respiratory failure  Suspect secondary to volume overload in setting of a-fib RVR vs possible pneumonia  --CXR with pulmonary edema, no focal consolidation, procal 0.04  --intubated in ED for hypoxia and work of breathing  --sputum culture with gram positive rods and yeast...  --weaning FiO2 as patient diuresed well following lasix administration  --continue lung protective ventilation and SpO2 88-92%  --continue broad  spectrum abx for now  --SAT/SBT following return to normothermia  --extubation dependent on neurologic prognostication        PPI ppx: famotidine  VTE ppx: enoxaparin    Above plan discussed with Dr. Garcia    Critical Care time 40 minutes         Scott Waters NP  Pulmonology  [facility]

## 2020-01-22 NOTE — PHYSICIAN QUERY
PT Name: Marck Madison  MR #: [id number]     PHYSICIAN QUERY -  ELECTROLYTE CLARIFICATION      CDS/: Cesia Ulloa RN              Contact information:[phone]  This form is a permanent document in the medical record.     Query Date: January 22, 2020    By submitting this query, we are merely seeking further clarification of documentation to reflect the severity of illness of your patient. Please utilize your independent clinical judgment when addressing the question(s) below.    The Medical record reflects the following:     Indicators   Supporting Clinical Findings Location in Medical Record   x Lab Value(s) Potassium 3.4-> 3.2-> 4.0    Phosphorus 2.0->1.4-> 3.9 Labs 1/20, 1/21, 1/21    Labs 1/21, 1/21, 1/21     x Treatment                                 Medication Potassium chloride 40 mEq IV     Sodium phosphate 30 mmol IV MAR 1/21    MAR 1/21    Other       Provider, please specify the diagnosis or diagnoses that correspond(s) to the above indicators.     [place] all that apply:    [x   ] Hypokalemia     [  x ] Hypophosphatemia     [   ] Other electrolyte disturbance (please specify): _______     [   ]  Clinically Undetermined       Please document in your progress notes daily for the duration of treatment until resolved, and include in your discharge summary.

## 2020-01-22 NOTE — ASSESSMENT & PLAN NOTE
Suspect secondary to volume overload in setting of a-fib RVR vs possible pneumonia  --CXR with pulmonary edema, no focal consolidation, procal 0.04  --intubated in ED for hypoxia and work of breathing  --sputum culture with gram positive rods and yeast...  --weaning FiO2 as patient diuresed well following lasix administration  --continue lung protective ventilation and SpO2 88-92%  --continue broad spectrum abx for now  --SAT/SBT following return to normothermia  --extubation dependent on neurologic prognostication

## 2020-01-22 NOTE — SUBJECTIVE & OBJECTIVE
Interval History: rewarming currently, plan to be normothermic this afternoon. Will assess neurologic function following.    Objective:     Vital Signs (Most Recent):  Temp: 96.4 °F (35.8 °C) (01/22/20 1300)  Pulse: 80 (01/22/20 1300)  Resp: (!) 22 (01/22/20 1300)  BP: 122/72 (01/22/20 1300)  SpO2: 98 % (01/22/20 1300) Vital Signs (24h Range):  Temp:  [88.7 °F (31.5 °C)-96.6 °F (35.9 °C)] 96.4 °F (35.8 °C)  Pulse:  [51-81] 80  Resp:  [22-37] 22  SpO2:  [85 %-100 %] 98 %  BP: ()/() 122/72  Arterial Line BP: ()/(66-98) 105/72     Weight: 79.5 kg (175 lb 4.3 oz)  Body mass index is 28.29 kg/m².      Intake/Output Summary (Last 24 hours) at 1/22/2020 1647  Last data filed at 1/22/2020 1300  Gross per 24 hour   Intake 1482.09 ml   Output 860 ml   Net 622.09 ml       Physical Exam   Constitutional: He appears well-developed. He has a sickly appearance. No distress. He is sedated, intubated and restrained.   [place] inplace   HENT:   Head: Normocephalic and atraumatic.   Eyes: Pupils are equal, round, and reactive to light. Conjunctivae are normal. Right eye exhibits no exudate. Left eye exhibits no exudate. Right conjunctiva has no hemorrhage. Left conjunctiva has no hemorrhage. No scleral icterus. Right eye exhibits no nystagmus. Left eye exhibits no nystagmus.   Neck: Trachea normal. No neck rigidity. No tracheal deviation present.   Cardiovascular: Normal rate, regular rhythm and normal heart sounds. PMI is not displaced. Exam reveals no gallop and no friction rub.   No murmur heard.  Pulses:       Radial pulses are 1+ on the right side, and 1+ on the left side.        Dorsalis pedis pulses are 1+ on the right side, and 1+ on the left side.   Pulmonary/Chest: Effort normal and breath sounds normal. No accessory muscle usage. He is intubated. No respiratory distress. He has no wheezes. He has no rhonchi. He has no rales.   Abdominal:   Wrapped in [employer] temperature management equipment    Musculoskeletal: Normal range of motion.   Neurological: He is unresponsive. A cranial nerve deficit and sensory deficit is present. GCS eye subscore is 1. GCS verbal subscore is 1. GCS motor subscore is 1.   Pupillary reflex reactive. No cough or gag reflex noted, limited neuro exam in setting of hypothermia protocol in process   Skin: Skin is warm and dry. Capillary refill takes 2 to 3 seconds. He is not diaphoretic. No cyanosis. Nails show no clubbing.   Nursing note and vitals reviewed.      Vents:  Vent Mode: PRVC (01/22/20 1513)  Ventilator Initiated: Yes (01/20/20 2100)  Set Rate: 22 bmp (01/22/20 1513)  Vt Set: 450 mL (01/22/20 1513)  PEEP/CPAP: 8 cmH20 (01/22/20 1513)  Oxygen Concentration (%): 40 (01/22/20 1513)  Peak Airway Pressure: 23.5 cmH2O (01/22/20 1513)  Total Ve: 10.2 mL (01/22/20 1513)  F/VT Ratio<105 (RSBI): (!) 46.91 (01/22/20 0827)    Lines/Drains/Airways     Central Venous Catheter Line                 Percutaneous Central Line Insertion/Assessment - triple lumen  01/20/20 2330 right femoral vein;right femoral 1 day          Drain                 NG/OG Tube 01/20/20 1906 [place] sump 18 Fr. Right mouth 1 day         Urethral Catheter 01/20/20 1925 [place] 16 Fr. 1 day          Airway                 Airway - Non-Surgical 01/20/20 1857 Endotracheal Tube 1 day          Arterial Line                 Arterial Line 01/20/20 2345 Left Radial 1 day          Peripheral Intravenous Line                 Peripheral IV - Single Lumen 01/20/20 1710 20 G Right Antecubital 1 day         Peripheral IV - Single Lumen 01/20/20 1730 20 G Left Antecubital 1 day         Peripheral IV - Single Lumen 01/21/20 0600 20 G Posterior;Right Hand 1 day                Significant Labs:    CBC/Anemia Profile:  Recent Labs   Lab 01/20/20  1731 01/20/20  2207 01/21/20  0400 01/22/20  0345   WBC 9.23  --  13.20* 18.11*   HGB 16.3  --  13.9* 15.7   HCT 49.1 46 40.6 45.6     --  155 180   MCV 97  --  96 94   RDW 13.5  --   13.2 13.2        Chemistries:  Recent Labs   Lab 01/22/20  0345 01/22/20  0738 01/22/20  1301    140 143   K 4.2 4.0 4.4   * 107 110   CO2 22* 22* 20*   BUN 29* 29* 30*   CREATININE 1.8* 1.9* 2.0*   CALCIUM 9.2 8.7 8.6*   ALBUMIN 3.3* 3.2* 3.1*   PROT 6.8 6.2 6.1   BILITOT 1.1* 1.0 0.7   ALKPHOS 59 52* 52*   * 278* 261*   * 245* 186*   MG 3.1* 2.8* 2.7*   PHOS 6.2* 5.1* 6.1*       All pertinent labs within the past 24 hours have been reviewed.    Significant Imaging:  I have reviewed all pertinent imaging results/findings within the past 24 hours.

## 2020-01-22 NOTE — CARE UPDATE
[facility]  ICU Multidisciplinary Bedside Rounds   SUMMARY     Date: 1/22/2020    Prehospitalization: Home  Admit Date / LOS : 1/20/2020/ 2 days    Diagnosis: Cardiac arrest    Consults:        Active: Cardio and Pulm CC       Needed: Neuro     Code Status: Full Code   Advanced Directive: <no information>    LDA: Art Line, Cooling [place], Renner, OG, PIV, TLC and Vent       Central Lines/Site/Justification:Pressors       Urinary Cath/Order/Justification:Critically Ill in ICU    Vasopressors/Infusions:    DOPamine 3.992 mcg/kg/min (01/22/20 0400)    fentanyl 125 mcg/hr (01/22/20 0400)    propofol 19.916 mcg/kg/min (01/22/20 0400)          GOALS: Volume/ Hemodynamic: MAP >65                     RASS: -4 deep sedation; no response to physical stimulation    CAM ICU: N/A  Pain Management: none       Pain Controlled: not applicable     Rhythm: SB    Respiratory Device: Vent    Vent Mode: PRVC  Oxygen Concentration (%):  [] 50  Resp Rate Total:  [22 br/min-29 br/min] 23 br/min  Vt Set:  [450 mL] 450 mL  PEEP/CPAP:  [8 cmH20-10 cmH20] 8 cmH20  Mean Airway Pressure:  [11.8 gsY19-18.5 cmH20] 13.5 cmH20             Most Recent SBT/ SAT: Did not perform       MOVE Screen: FAIL    VTE Prophylaxis: Pharm  Mobility: Bedrest  Stress Ulcer Prophylaxis: Yes    Dietary: TF  Tolerance: yes  /  Advancement:Just started    Isolation: No active isolations    Restraints: No    Significant Dates:  Post Op Date: N/A  Rescue Date: N/A  Imaging/ Diagnostics: N/A    Noteworthy Labs:  None    CBC/Anemia Labs: Coags:    Recent Labs   Lab 01/21/20  0400 01/22/20  0345   WBC 13.20* 18.11*   HGB 13.9* 15.7   HCT 40.6 45.6    180   MCV 96 94   RDW 13.2 13.2    Recent Labs   Lab 01/21/20  0400 01/22/20  0345   INR 1.4* 1.4*   APTT 38.2* 45.1*        Chemistries:   Recent Labs   Lab 01/21/20  2344 01/22/20  0345    144   K 4.4 4.2    111*   CO2 21* 22*   BUN 28* 29*   CREATININE 1.8* 1.8*   CALCIUM 9.5  9.2   PROT 6.8 6.8   BILITOT 1.2* 1.1*   ALKPHOS 60 59   * 305*   * 305*   MG 3.2* 3.1*   PHOS 3.1 6.2*        Cardiac Enzymes: Ejection Fractions:    Recent Labs     01/20/20  2322 01/21/20  0400 01/21/20  1000   *  --   --    TROPONINI 0.308* 0.446* 0.444*    No results found for: EF     POCT Glucose: HbA1c:    Recent Labs   Lab 01/20/20  2157   POCTGLUCOSE 275*    No results found for: HGBA1C     Needs from Care Team: Consult neuro     ICU LOS 1d 7h  Level of Care: Critical Care

## 2020-01-22 NOTE — SUBJECTIVE & OBJECTIVE
Review of Systems   Unable to perform ROS: intubated     Objective:     Vital Signs (Most Recent):  Temp: (!) 95.4 °F (35.2 °C) (01/22/20 1000)  Pulse: 75 (01/22/20 1000)  Resp: (!) 22 (01/22/20 1000)  BP: 123/83 (01/22/20 0900)  SpO2: 97 % (01/22/20 1000) Vital Signs (24h Range):  Temp:  [88.7 °F (31.5 °C)-95.4 °F (35.2 °C)] 95.4 °F (35.2 °C)  Pulse:  [47-81] 75  Resp:  [22-37] 22  SpO2:  [85 %-100 %] 97 %  BP: (111-177)/() 123/83  Arterial Line BP: (103-148)/(68-98) 120/81     Weight: 79.5 kg (175 lb 4.3 oz)  Body mass index is 28.29 kg/m².     SpO2: 97 %  O2 Device (Oxygen Therapy): ventilator      Intake/Output Summary (Last 24 hours) at 1/22/2020 1012  Last data filed at 1/22/2020 0900  Gross per 24 hour   Intake 1777.87 ml   Output 1810 ml   Net -32.13 ml       Lines/Drains/Airways     Central Venous Catheter Line                 Percutaneous Central Line Insertion/Assessment - triple lumen  01/20/20 2330 right femoral vein;right femoral 1 day          Drain                 NG/OG Tube 01/20/20 1906 Damon sump 18 Fr. Right mouth 1 day         Urethral Catheter 01/20/20 1925 Coude 16 Fr. 1 day          Airway                 Airway - Non-Surgical 01/20/20 1857 Endotracheal Tube 1 day          Arterial Line                 Arterial Line 01/20/20 2345 Left Radial 1 day          Peripheral Intravenous Line                 Peripheral IV - Single Lumen 01/20/20 1710 20 G Right Antecubital 1 day         Peripheral IV - Single Lumen 01/20/20 1730 20 G Left Antecubital 1 day         Peripheral IV - Single Lumen 01/21/20 0600 20 G Posterior;Right Hand 1 day                Physical Exam   Constitutional: He is sedated and intubated.   Cardiovascular: Normal rate and regular rhythm.   Pulmonary/Chest: Breath sounds normal. He is intubated.   Abdominal: Soft.   Musculoskeletal:        Right lower leg: He exhibits no edema.        Left lower leg: He exhibits no edema.   Vitals reviewed.      Significant Labs:    BMP:   Recent Labs   Lab 01/21/20  2344 01/22/20  0345 01/22/20  0738   *  204* 124*  124* 204*  204*    144 140   K 4.4 4.2 4.0    111* 107   CO2 21* 22* 22*   BUN 28* 29* 29*   CREATININE 1.8* 1.8* 1.9*   CALCIUM 9.5 9.2 8.7   MG 3.2* 3.1* 2.8*   , CBC   Recent Labs   Lab 01/20/20  1731  01/21/20  0400 01/22/20  0345   WBC 9.23  --  13.20* 18.11*   HGB 16.3  --  13.9* 15.7   HCT 49.1   < > 40.6 45.6     --  155 180    < > = values in this interval not displayed.    and Troponin   Recent Labs   Lab 01/20/20  2322 01/21/20  0400 01/21/20  1000   TROPONINI 0.308* 0.446* 0.444*       Significant Imaging: Echocardiogram:   Transthoracic echo (TTE) complete (Cupid Only):   Results for orders placed or performed during the hospital encounter of 01/20/20   Echo Color Flow Doppler? Yes   Result Value Ref Range    BSA 1.92 m2    TDI SEPTAL 0.05 m/s    LV LATERAL E/E' RATIO 11.83 m/s    LV SEPTAL E/E' RATIO 14.20 m/s    LA WIDTH 3.10 cm    AORTIC VALVE CUSP SEPERATION 1.81 cm    TDI LATERAL 0.06 m/s    PV PEAK VELOCITY 0.87 cm/s    LVIDD 3.41 (A) 3.5 - 6.0 cm    IVS 1.66 (A) 0.6 - 1.1 cm    PW 1.76 (A) 0.6 - 1.1 cm    Ao root annulus 3.09 cm    LVIDS 2.99 2.1 - 4.0 cm    FS 12 28 - 44 %    LA volume 52.60 cm3    Sinus 3.07 cm    STJ 2.22 cm    Ascending aorta 3.01 cm    LV mass 232.69 g    LA size 3.30 cm    RVDD 2.81 cm    TAPSE 1.03 cm    RV S' 11.46 cm/s    Left Ventricle Relative Wall Thickness 1.03 cm    AV mean gradient 2 mmHg    AV valve area 3.59 cm2    AV Velocity Ratio 1.11     AV index (prosthetic) 1.09     Mean e' 0.06 m/s    IVRT 0.07 msec    LVOT diameter 2.05 cm    LVOT area 3.3 cm2    LVOT peak chinmay 1.01 m/s    LVOT peak VTI 15.99 cm    Ao peak chinmay 0.91 m/s    Ao VTI 14.68 cm    LVOT stroke volume 52.75 cm3    AV peak gradient 3 mmHg    E/E' ratio 12.91 m/s    MV Peak E Chinmay 0.71 m/s    TR Max Chinmay 2.46 m/s    LV Systolic Volume 34.70 mL    LV Systolic Volume Index 18.3 mL/m2    LV  Diastolic Volume 47.62 mL    LV Diastolic Volume Index 25.18 mL/m2    LA Volume Index 27.8 mL/m2    LV Mass Index 123 g/m2    RA Major Axis 5.46 cm    Left Atrium Minor Axis 6.00 cm    Left Atrium Major Axis 6.10 cm    Triscuspid Valve Regurgitation Peak Gradient 24 mmHg    RA Width 2.43 cm    Narrative    · Concentric left ventricular hypertrophy.  · Atrial fibrillation observed.  · Moderately decreased left ventricular systolic function. The estimated   ejection fraction is 35%.  · Normal right ventricular systolic function.  · Moderate mitral regurgitation.  · Moderate tricuspid regurgitation.  · No pulmonary hypertension present.

## 2020-01-22 NOTE — PROGRESS NOTES
1605  Approach pt, touched lightly and chest, spoke his name, pt opened eyes, followed commands, squeezing hand, wiggling toes, nodding appropriately.  Paused sedation; no significant change in VS.  Alerted Dr. Vences, COOPER Lee, and Dr. Garcia; no plans to extubated today; Dr. Vences will change sedation to precedex.  RN attempted to call vega Moody no working numbers on file.

## 2020-01-22 NOTE — PLAN OF CARE
Pt received on Servo I as documented. All alarms set and functional. Ambu bag and mask at bedside. 7.5 ETT secured at 23cm with BBS heard. Will wean as tolerated.

## 2020-01-22 NOTE — PLAN OF CARE
Remains in ICU on vent, dopamine, fentanyl, and propofol.  Re-warming started at 3 AM; two core temps monitored throughout process.  BMP q 4 hours per orders.  Renner draining adequate urine.  Pupils 2+, sluggish; pulses 2+.  Withdraws from pain.  Art line in place with appropriate wave-form; line leveled and zeroed. BP labile 120s-150s SBP. HR 40s-70s.  TF initiated per orders.  POC reviewed with family; expressed understanding.    Problem: Adult Inpatient Plan of Care  Goal: Plan of Care Review  Outcome: Ongoing, Not Progressing     Problem: Adult Inpatient Plan of Care  Goal: Patient-Specific Goal (Individualization)  Outcome: Ongoing, Not Progressing

## 2020-01-22 NOTE — PROGRESS NOTES
Pt. Remains on vent. At this time.  Fi02 was wean to 40%.  Vent. Settings remain the same and listed as followed.  PRVC rate 22,  Vt. 450 peep +8,  Fi02 40. No adverse reactions noticed at this time.

## 2020-01-22 NOTE — ASSESSMENT & PLAN NOTE
Suspect a result of hypoxia as no SpO2 reading available with otherwise stable vital signs just prior to arrest   --ROSC following 22 min ACLS  --TTM rewarming to be completed this afternoon  --low dose dopamine gtt post arrest to keep MAP >65  --cardiology consulted

## 2020-01-22 NOTE — PLAN OF CARE
Pt is on vent settings PRVC 22/450/+8/FIO2 50% Ambu bag is at HOB and all alarms set and working properly. Will continue to monitor.

## 2020-01-23 LAB
ALBUMIN SERPL BCP-MCNC: 2.9 G/DL (ref 3.5–5.2)
ALLENS TEST: ABNORMAL
ALLENS TEST: ABNORMAL
ALP SERPL-CCNC: 51 U/L (ref 55–135)
ALT SERPL W/O P-5'-P-CCNC: 193 U/L (ref 10–44)
ANION GAP SERPL CALC-SCNC: 8 MMOL/L (ref 8–16)
APTT BLDCRRT: 36 SEC (ref 21–32)
AST SERPL-CCNC: 77 U/L (ref 10–40)
BACTERIA SPEC AEROBE CULT: NORMAL
BASOPHILS # BLD AUTO: 0.03 K/UL (ref 0–0.2)
BASOPHILS NFR BLD: 0.2 % (ref 0–1.9)
BILIRUB SERPL-MCNC: 0.5 MG/DL (ref 0.1–1)
BUN SERPL-MCNC: 38 MG/DL (ref 8–23)
CALCIUM SERPL-MCNC: 8.7 MG/DL (ref 8.7–10.5)
CHLORIDE SERPL-SCNC: 107 MMOL/L (ref 95–110)
CO2 SERPL-SCNC: 28 MMOL/L (ref 23–29)
CREAT SERPL-MCNC: 2.1 MG/DL (ref 0.5–1.4)
DELSYS: ABNORMAL
DELSYS: ABNORMAL
DIFFERENTIAL METHOD: ABNORMAL
EOSINOPHIL # BLD AUTO: 0 K/UL (ref 0–0.5)
EOSINOPHIL NFR BLD: 0 % (ref 0–8)
ERYTHROCYTE [DISTWIDTH] IN BLOOD BY AUTOMATED COUNT: 14.6 % (ref 11.5–14.5)
ERYTHROCYTE [SEDIMENTATION RATE] IN BLOOD BY WESTERGREN METHOD: 22 MM/H
ERYTHROCYTE [SEDIMENTATION RATE] IN BLOOD BY WESTERGREN METHOD: 22 MM/H
EST. GFR  (AFRICAN AMERICAN): 38 ML/MIN/1.73 M^2
EST. GFR  (NON AFRICAN AMERICAN): 33 ML/MIN/1.73 M^2
FIO2: 30
FIO2: 40
GLUCOSE SERPL-MCNC: 173 MG/DL (ref 70–110)
GRAM STN SPEC: NORMAL
HCO3 UR-SCNC: 24.1 MMOL/L (ref 24–28)
HCO3 UR-SCNC: 25.6 MMOL/L (ref 24–28)
HCT VFR BLD AUTO: 42.4 % (ref 40–54)
HGB BLD-MCNC: 14.4 G/DL (ref 14–18)
IMM GRANULOCYTES # BLD AUTO: 0.29 K/UL (ref 0–0.04)
IMM GRANULOCYTES NFR BLD AUTO: 1.5 % (ref 0–0.5)
INR PPP: 1.3 (ref 0.8–1.2)
LYMPHOCYTES # BLD AUTO: 1.1 K/UL (ref 1–4.8)
LYMPHOCYTES NFR BLD: 6 % (ref 18–48)
MCH RBC QN AUTO: 32.8 PG (ref 27–31)
MCHC RBC AUTO-ENTMCNC: 34 G/DL (ref 32–36)
MCV RBC AUTO: 97 FL (ref 82–98)
MIN VOL: 10.3
MODE: ABNORMAL
MODE: ABNORMAL
MONOCYTES # BLD AUTO: 0.8 K/UL (ref 0.3–1)
MONOCYTES NFR BLD: 4.3 % (ref 4–15)
NEUTROPHILS # BLD AUTO: 16.7 K/UL (ref 1.8–7.7)
NEUTROPHILS NFR BLD: 88 % (ref 38–73)
NRBC BLD-RTO: 0 /100 WBC
NSE SERPL-MCNC: NORMAL UG/L
PCO2 BLDA: 31.4 MMHG (ref 35–45)
PCO2 BLDA: 35.5 MMHG (ref 35–45)
PEEP: 5
PEEP: 5
PH SMN: 7.44 [PH] (ref 7.35–7.45)
PH SMN: 7.52 [PH] (ref 7.35–7.45)
PIP: 21
PLATELET # BLD AUTO: 175 K/UL (ref 150–350)
PMV BLD AUTO: 12.9 FL (ref 9.2–12.9)
PO2 BLDA: 60 MMHG (ref 80–100)
PO2 BLDA: 68 MMHG (ref 80–100)
POC BE: 0 MMOL/L
POC BE: 3 MMOL/L
POC SATURATED O2: 92 % (ref 95–100)
POC SATURATED O2: 95 % (ref 95–100)
POC TCO2: 25 MMOL/L (ref 23–27)
POC TCO2: 27 MMOL/L (ref 23–27)
POTASSIUM SERPL-SCNC: 4.5 MMOL/L (ref 3.5–5.1)
PROT SERPL-MCNC: 6 G/DL (ref 6–8.4)
PROTHROMBIN TIME: 13.6 SEC (ref 9–12.5)
RBC # BLD AUTO: 4.39 M/UL (ref 4.6–6.2)
SAMPLE: ABNORMAL
SAMPLE: ABNORMAL
SITE: ABNORMAL
SITE: ABNORMAL
SODIUM SERPL-SCNC: 143 MMOL/L (ref 136–145)
SP02: 91
SP02: 96
VANCOMYCIN TROUGH SERPL-MCNC: 15.1 UG/ML (ref 10–22)
VT: 450
VT: 450
WBC # BLD AUTO: 18.93 K/UL (ref 3.9–12.7)

## 2020-01-23 PROCEDURE — 63600175 PHARM REV CODE 636 W HCPCS: Performed by: HOSPITALIST

## 2020-01-23 PROCEDURE — 99291 PR CRITICAL CARE, E/M 30-74 MINUTES: ICD-10-PCS | Mod: ,,, | Performed by: NURSE PRACTITIONER

## 2020-01-23 PROCEDURE — 80053 COMPREHEN METABOLIC PANEL: CPT

## 2020-01-23 PROCEDURE — S0028 INJECTION, FAMOTIDINE, 20 MG: HCPCS | Performed by: HOSPITALIST

## 2020-01-23 PROCEDURE — 25000003 PHARM REV CODE 250: Performed by: INTERNAL MEDICINE

## 2020-01-23 PROCEDURE — 85025 COMPLETE CBC W/AUTO DIFF WBC: CPT

## 2020-01-23 PROCEDURE — 99232 PR SUBSEQUENT HOSPITAL CARE,LEVL II: ICD-10-PCS | Mod: ,,, | Performed by: INTERNAL MEDICINE

## 2020-01-23 PROCEDURE — 80202 ASSAY OF VANCOMYCIN: CPT

## 2020-01-23 PROCEDURE — 63600175 PHARM REV CODE 636 W HCPCS: Performed by: INTERNAL MEDICINE

## 2020-01-23 PROCEDURE — 83520 IMMUNOASSAY QUANT NOS NONAB: CPT

## 2020-01-23 PROCEDURE — 37799 UNLISTED PX VASCULAR SURGERY: CPT

## 2020-01-23 PROCEDURE — 25000003 PHARM REV CODE 250: Performed by: HOSPITALIST

## 2020-01-23 PROCEDURE — 85610 PROTHROMBIN TIME: CPT

## 2020-01-23 PROCEDURE — 27000221 HC OXYGEN, UP TO 24 HOURS

## 2020-01-23 PROCEDURE — 99900035 HC TECH TIME PER 15 MIN (STAT)

## 2020-01-23 PROCEDURE — 27200966 HC CLOSED SUCTION SYSTEM

## 2020-01-23 PROCEDURE — 85730 THROMBOPLASTIN TIME PARTIAL: CPT

## 2020-01-23 PROCEDURE — 99232 SBSQ HOSP IP/OBS MODERATE 35: CPT | Mod: ,,, | Performed by: INTERNAL MEDICINE

## 2020-01-23 PROCEDURE — 20000000 HC ICU ROOM

## 2020-01-23 PROCEDURE — 99900026 HC AIRWAY MAINTENANCE (STAT)

## 2020-01-23 PROCEDURE — 94761 N-INVAS EAR/PLS OXIMETRY MLT: CPT

## 2020-01-23 PROCEDURE — 99291 CRITICAL CARE FIRST HOUR: CPT | Mod: ,,, | Performed by: NURSE PRACTITIONER

## 2020-01-23 PROCEDURE — 94003 VENT MGMT INPAT SUBQ DAY: CPT

## 2020-01-23 PROCEDURE — 36415 COLL VENOUS BLD VENIPUNCTURE: CPT

## 2020-01-23 PROCEDURE — 82803 BLOOD GASES ANY COMBINATION: CPT

## 2020-01-23 RX ORDER — LORAZEPAM 2 MG/ML
1 INJECTION INTRAMUSCULAR
Status: DISCONTINUED | OUTPATIENT
Start: 2020-01-23 | End: 2020-01-27

## 2020-01-23 RX ORDER — LORAZEPAM 2 MG/ML
1 CONCENTRATE ORAL
Status: DISCONTINUED | OUTPATIENT
Start: 2020-01-23 | End: 2020-01-23

## 2020-01-23 RX ADMIN — PIPERACILLIN AND TAZOBACTAM 4.5 G: 4; .5 INJECTION, POWDER, LYOPHILIZED, FOR SOLUTION INTRAVENOUS; PARENTERAL at 12:01

## 2020-01-23 RX ADMIN — ENOXAPARIN SODIUM 80 MG: 100 INJECTION SUBCUTANEOUS at 08:01

## 2020-01-23 RX ADMIN — PIPERACILLIN AND TAZOBACTAM 4.5 G: 4; .5 INJECTION, POWDER, LYOPHILIZED, FOR SOLUTION INTRAVENOUS; PARENTERAL at 08:01

## 2020-01-23 RX ADMIN — ASPIRIN 81 MG CHEWABLE TABLET 81 MG: 81 TABLET CHEWABLE at 08:01

## 2020-01-23 RX ADMIN — DEXMEDETOMIDINE HYDROCHLORIDE 1.4 MCG/KG/HR: 4 INJECTION, SOLUTION INTRAVENOUS at 01:01

## 2020-01-23 RX ADMIN — DEXMEDETOMIDINE HYDROCHLORIDE 1.4 MCG/KG/HR: 4 INJECTION, SOLUTION INTRAVENOUS at 11:01

## 2020-01-23 RX ADMIN — FAMOTIDINE 20 MG: 10 INJECTION INTRAVENOUS at 08:01

## 2020-01-23 RX ADMIN — DEXMEDETOMIDINE HYDROCHLORIDE 1.4 MCG/KG/HR: 4 INJECTION, SOLUTION INTRAVENOUS at 08:01

## 2020-01-23 RX ADMIN — ACETAMINOPHEN ORAL SOLUTION 650 MG: 650 SOLUTION ORAL at 12:01

## 2020-01-23 RX ADMIN — CHLORHEXIDINE GLUCONATE 0.12% ORAL RINSE 15 ML: 1.2 LIQUID ORAL at 08:01

## 2020-01-23 RX ADMIN — DOPAMINE HYDROCHLORIDE IN DEXTROSE 0.5 MCG/KG/MIN: 1.6 INJECTION, SOLUTION INTRAVENOUS at 09:01

## 2020-01-23 RX ADMIN — DOPAMINE HYDROCHLORIDE IN DEXTROSE 2 MCG/KG/MIN: 1.6 INJECTION, SOLUTION INTRAVENOUS at 08:01

## 2020-01-23 RX ADMIN — LORAZEPAM 1 MG: 2 INJECTION INTRAMUSCULAR at 03:01

## 2020-01-23 RX ADMIN — ENOXAPARIN SODIUM 80 MG: 100 INJECTION SUBCUTANEOUS at 09:01

## 2020-01-23 RX ADMIN — LORAZEPAM 1 MG: 2 INJECTION INTRAMUSCULAR at 06:01

## 2020-01-23 RX ADMIN — DEXMEDETOMIDINE HYDROCHLORIDE 1 MCG/KG/HR: 4 INJECTION, SOLUTION INTRAVENOUS at 04:01

## 2020-01-23 RX ADMIN — LORAZEPAM 1 MG: 2 INJECTION INTRAMUSCULAR at 08:01

## 2020-01-23 RX ADMIN — VANCOMYCIN HYDROCHLORIDE 1250 MG: 1.25 INJECTION, POWDER, LYOPHILIZED, FOR SOLUTION INTRAVENOUS at 06:01

## 2020-01-23 RX ADMIN — DOPAMINE HYDROCHLORIDE IN DEXTROSE 2 MCG/KG/MIN: 1.6 INJECTION, SOLUTION INTRAVENOUS at 09:01

## 2020-01-23 RX ADMIN — PIPERACILLIN AND TAZOBACTAM 4.5 G: 4; .5 INJECTION, POWDER, LYOPHILIZED, FOR SOLUTION INTRAVENOUS; PARENTERAL at 04:01

## 2020-01-23 RX ADMIN — DEXMEDETOMIDINE HYDROCHLORIDE 1.4 MCG/KG/HR: 4 INJECTION, SOLUTION INTRAVENOUS at 04:01

## 2020-01-23 NOTE — SUBJECTIVE & OBJECTIVE
Interval History: Following return to normothermia, he is more alert and following commands. SAT/SBT today, however, patient became severely hypertensive (SBP 230s/120s), tachy 100s, and slightly hypoxia ~89%. He was placed back on a rate and will reattempt tomorrow. Continuing to wean dopamine as well.     Objective:     Vital Signs (Most Recent):  Temp: 98.7 °F (37.1 °C) (01/23/20 1200)  Pulse: 88 (01/23/20 1400)  Resp: (!) 22 (01/23/20 1400)  BP: (!) 144/98 (01/23/20 1400)  SpO2: (!) 90 % (01/23/20 1400) Vital Signs (24h Range):  Temp:  [96.6 °F (35.9 °C)-98.7 °F (37.1 °C)] 98.7 °F (37.1 °C)  Pulse:  [] 88  Resp:  [21-34] 22  SpO2:  [90 %-100 %] 90 %  BP: ()/() 144/98  Arterial Line BP: ()/() 167/99     Weight: 79.5 kg (175 lb 4.3 oz)  Body mass index is 28.29 kg/m².      Intake/Output Summary (Last 24 hours) at 1/23/2020 1508  Last data filed at 1/23/2020 1400  Gross per 24 hour   Intake 1740.37 ml   Output 545 ml   Net 1195.37 ml       Physical Exam   Constitutional: He appears well-developed. He appears lethargic. He has a sickly appearance. No distress. He is sedated, intubated and restrained.   HENT:   Head: Normocephalic and atraumatic.   Eyes: Pupils are equal, round, and reactive to light. Conjunctivae are normal. Right eye exhibits no exudate. Left eye exhibits no exudate. Right conjunctiva has no hemorrhage. Left conjunctiva has no hemorrhage. No scleral icterus. Right eye exhibits no nystagmus. Left eye exhibits no nystagmus.   Neck: Trachea normal. No neck rigidity. No tracheal deviation present.   Cardiovascular: Normal rate, regular rhythm and normal heart sounds. PMI is not displaced. Exam reveals no gallop and no friction rub.   No murmur heard.  Pulses:       Radial pulses are 1+ on the right side, and 1+ on the left side.        Dorsalis pedis pulses are 1+ on the right side, and 1+ on the left side.   Pulmonary/Chest: Effort normal and breath sounds normal. No  accessory muscle usage. He is intubated. No respiratory distress. He has no wheezes. He has no rhonchi. He has no rales.   Abdominal: Soft. Normal appearance and bowel sounds are normal. There is no tenderness.   Musculoskeletal: Normal range of motion.   Neurological: He appears lethargic. A cranial nerve deficit and sensory deficit is present. GCS eye subscore is 3. GCS verbal subscore is 1. GCS motor subscore is 6.   Follows commands, answers Y/N questions, spontaneously moves BUE 3/5, BLE 2/5, no focal deficits to note.      Skin: Skin is warm and dry. Capillary refill takes 2 to 3 seconds. He is not diaphoretic. No cyanosis. Nails show no clubbing.   Nursing note and vitals reviewed.      Vents:  Vent Mode: PS/CPAP (01/23/20 1321)  Ventilator Initiated: Yes (01/20/20 2100)  Set Rate: 22 bmp (01/23/20 1141)  Vt Set: 450 mL (01/23/20 1141)  Pressure Support: 5 cmH20 (01/23/20 1321)  PEEP/CPAP: 5 cmH20 (01/23/20 1321)  Oxygen Concentration (%): 30 (01/23/20 1400)  Peak Airway Pressure: 10.6 cmH2O (01/23/20 1321)  Total Ve: 8.2 mL (01/23/20 1321)  F/VT Ratio<105 (RSBI): (!) 51.34 (01/23/20 1321)    Lines/Drains/Airways     Central Venous Catheter Line                 Percutaneous Central Line Insertion/Assessment - triple lumen  01/20/20 2330 right femoral vein;right femoral 2 days          Drain                 NG/OG Tube 01/20/20 1906 Brent sump 18 Fr. Right mouth 2 days         Urethral Catheter 01/20/20 1925 Coude 16 Fr. 2 days          Airway                 Airway - Non-Surgical 01/20/20 1857 Endotracheal Tube 2 days          Arterial Line                 Arterial Line 01/20/20 2345 Left Radial 2 days          Peripheral Intravenous Line                 Peripheral IV - Single Lumen 01/20/20 1710 20 G Right Antecubital 2 days         Peripheral IV - Single Lumen 01/21/20 0600 20 G Posterior;Right Hand 2 days                Significant Labs:    CBC/Anemia Profile:  Recent Labs   Lab 01/22/20  2620  01/23/20  0529   WBC 18.11* 18.93*   HGB 15.7 14.4   HCT 45.6 42.4    175   MCV 94 97   RDW 13.2 14.6*        Chemistries:  Recent Labs   Lab 01/22/20  0738 01/22/20  1301 01/22/20  1643 01/23/20  0551    143 144 143   K 4.0 4.4 4.3 4.5    110 109 107   CO2 22* 20* 23 28   BUN 29* 30* 32* 38*   CREATININE 1.9* 2.0* 2.0* 2.1*   CALCIUM 8.7 8.6* 8.7 8.7   ALBUMIN 3.2* 3.1* 3.2* 2.9*   PROT 6.2 6.1 6.3 6.0   BILITOT 1.0 0.7 0.7 0.5   ALKPHOS 52* 52* 57 51*   * 261* 256* 193*   * 186* 156* 77*   MG 2.8* 2.7* 2.9*  --    PHOS 5.1* 6.1* 5.9*  --        All pertinent labs within the past 24 hours have been reviewed.    Significant Imaging:  I have reviewed all pertinent imaging results/findings within the past 24 hours.

## 2020-01-23 NOTE — PLAN OF CARE
1800 VSS, comfortable on precedex,responds to voice and touch, follows commands,  breath sounds clear in upper fields, diminished in lower fields,  tolerating AC mode on vent well, BS active and audible, warm, normothermic, voiding minimally adequate yellow urine to Renner catheter to gravity, no BM this shift, turned Q2, bed in low, locked position, in view of nurses station, no needs at this time.

## 2020-01-23 NOTE — ASSESSMENT & PLAN NOTE
Suspect a result of hypoxia as no SpO2 reading available with otherwise stable vital signs just prior to arrest   --ROSC following 22 min ACLS  --TTM completed and now awake and alert on SAT  --low dose dopamine gtt post arrest to keep MAP >65  --cardiology following

## 2020-01-23 NOTE — PLAN OF CARE
Pt failed SBT today due to increased agitation and inability to calm down/follow commands. Continue mechanical ventilation. Repeat SBT tomorrow. Pt remains on PRVC rt 22, vt 450, +5, 30% FIO2. 7.5 ETT/23 at the [facility]. AMBU Bag and mask at the [facility]. LAVINIA Ritchie, RRT

## 2020-01-23 NOTE — PROGRESS NOTES
[facility]  Cardiology  Progress Note    Patient Name: Marck Madison  MRN: [id number]  Admission Date: 1/20/2020  Hospital Length of Stay: 3 days  Code Status: Full Code   Attending Physician: Winifred Lopez MD   Primary Care Physician: Primary Doctor No  Expected Discharge Date:   Principal Problem:Cardiac arrest    Subjective:     Hospital Course:   1/22/2020: Patient in sinus rhythm  1/23/2020: ST        Review of Systems   Unable to perform ROS: intubated     Objective:     Vital Signs (Most Recent):  Temp: 97.5 °F (36.4 °C) (01/23/20 0301)  Pulse: 85 (01/23/20 0750)  Resp: (!) 22 (01/23/20 0750)  BP: (!) 150/111 (01/23/20 0820)  SpO2: 96 % (01/23/20 0750) Vital Signs (24h Range):  Temp:  [94.6 °F (34.8 °C)-97.7 °F (36.5 °C)] 97.5 °F (36.4 °C)  Pulse:  [] 85  Resp:  [22-34] 22  SpO2:  [93 %-100 %] 96 %  BP: ()/() 150/111  Arterial Line BP: ()/() 168/102     Weight: 79.5 kg (175 lb 4.3 oz)  Body mass index is 28.29 kg/m².     SpO2: 96 %  O2 Device (Oxygen Therapy): ventilator      Intake/Output Summary (Last 24 hours) at 1/23/2020 0837  Last data filed at 1/23/2020 0730  Gross per 24 hour   Intake 1462.79 ml   Output 615 ml   Net 847.79 ml       Lines/Drains/Airways     Central Venous Catheter Line                 Percutaneous Central Line Insertion/Assessment - triple lumen  01/20/20 2330 right femoral vein;right femoral 2 days          Drain                 NG/OG Tube 01/20/20 1906 Harnett sump 18 Fr. Right mouth 2 days         Urethral Catheter 01/20/20 1925 Coude 16 Fr. 2 days          Airway                 Airway - Non-Surgical 01/20/20 1857 Endotracheal Tube 2 days          Arterial Line                 Arterial Line 01/20/20 2345 Left Radial 2 days          Peripheral Intravenous Line                 Peripheral IV - Single Lumen 01/20/20 1710 20 G Right Antecubital 2 days         Peripheral IV - Single Lumen 01/21/20 0600 20 G Posterior;Right Hand 2 days                 Physical Exam   Constitutional: He is sedated and intubated.   Cardiovascular: Tachycardia present.   Pulmonary/Chest: Breath sounds normal. He is intubated.   Abdominal: Soft.   Musculoskeletal:        Right lower leg: He exhibits no edema.        Left lower leg: He exhibits no edema.   Vitals reviewed.      Significant Labs:   BMP:   Recent Labs   Lab 01/22/20  0738 01/22/20  1301 01/22/20  1643 01/23/20  0551   *  204* 132*  132* 135*  135* 173*    143 144 143   K 4.0 4.4 4.3 4.5    110 109 107   CO2 22* 20* 23 28   BUN 29* 30* 32* 38*   CREATININE 1.9* 2.0* 2.0* 2.1*   CALCIUM 8.7 8.6* 8.7 8.7   MG 2.8* 2.7* 2.9*  --     and CBC   Recent Labs   Lab 01/22/20  0345 01/23/20  0529   WBC 18.11* 18.93*   HGB 15.7 14.4   HCT 45.6 42.4    175       Significant Imaging: Echocardiogram:   Transthoracic echo (TTE) complete (Cupid Only):   Results for orders placed or performed during the hospital encounter of 01/20/20   Echo Color Flow Doppler? Yes   Result Value Ref Range    BSA 1.92 m2    TDI SEPTAL 0.05 m/s    LV LATERAL E/E' RATIO 11.83 m/s    LV SEPTAL E/E' RATIO 14.20 m/s    LA WIDTH 3.10 cm    AORTIC VALVE CUSP SEPERATION 1.81 cm    TDI LATERAL 0.06 m/s    PV PEAK VELOCITY 0.87 cm/s    LVIDD 3.41 (A) 3.5 - 6.0 cm    IVS 1.66 (A) 0.6 - 1.1 cm    PW 1.76 (A) 0.6 - 1.1 cm    Ao root annulus 3.09 cm    LVIDS 2.99 2.1 - 4.0 cm    FS 12 28 - 44 %    LA volume 52.60 cm3    Sinus 3.07 cm    STJ 2.22 cm    Ascending aorta 3.01 cm    LV mass 232.69 g    LA size 3.30 cm    RVDD 2.81 cm    TAPSE 1.03 cm    RV S' 11.46 cm/s    Left Ventricle Relative Wall Thickness 1.03 cm    AV mean gradient 2 mmHg    AV valve area 3.59 cm2    AV Velocity Ratio 1.11     AV index (prosthetic) 1.09     Mean e' 0.06 m/s    IVRT 0.07 msec    LVOT diameter 2.05 cm    LVOT area 3.3 cm2    LVOT peak lita 1.01 m/s    LVOT peak VTI 15.99 cm    Ao peak lita 0.91 m/s    Ao VTI 14.68 cm    LVOT stroke volume 52.75 cm3     AV peak gradient 3 mmHg    E/E' ratio 12.91 m/s    MV Peak E Chinmay 0.71 m/s    TR Max Chinmay 2.46 m/s    LV Systolic Volume 34.70 mL    LV Systolic Volume Index 18.3 mL/m2    LV Diastolic Volume 47.62 mL    LV Diastolic Volume Index 25.18 mL/m2    LA Volume Index 27.8 mL/m2    LV Mass Index 123 g/m2    RA Major Axis 5.46 cm    Left Atrium Minor Axis 6.00 cm    Left Atrium Major Axis 6.10 cm    Triscuspid Valve Regurgitation Peak Gradient 24 mmHg    RA Width 2.43 cm    Narrative    · Concentric left ventricular hypertrophy.  · Atrial fibrillation observed.  · Moderately decreased left ventricular systolic function. The estimated   ejection fraction is 35%.  · Normal right ventricular systolic function.  · Moderate mitral regurgitation.  · Moderate tricuspid regurgitation.  · No pulmonary hypertension present.        Assessment and Plan:         * Cardiac arrest  Wean vent as tolerated. Patient was reportedly awake.    Alcohol use disorder, severe, dependence  Monitor for withdrawal, tx as per primary    Acute systolic congestive heart failure  Requiring dopamine. Wean as tolerated.     CKD (chronic kidney disease)  Monitor.    Elevated troponin I level  In light of hypotension. RVR.    Dilated cardiomyopathy  EF 35%. Long hx of EtOH. Reported remote hx of CAD. Supportive care.     Atrial flutter  SR/ST      VTE Risk Mitigation (From admission, onward)         Ordered     enoxaparin injection 80 mg  Every 12 hours (non-standard times)      01/20/20 2119     IP VTE HIGH RISK PATIENT  Once      01/20/20 2119                Patrice Brunner MD  Cardiology  [facility]

## 2020-01-23 NOTE — PLAN OF CARE
Remains in ICU on vent, precedex, and dopamine.  A-line to left radial, leveled and zeroed.  Femoral TLC in place.  Pt maintaining temp on own.  T-max 97.8.  Alert and follows commands.  Pt noted anxious and attempting to talk over ETT. Attempt to wean dopamine, but MAP drops below 60, remains at 2 mcg.  Nods head appropriately.  POC reviewed with pt; expressed understanding.   Problem: Adult Inpatient Plan of Care  Goal: Plan of Care Review  Outcome: Ongoing, Progressing     Problem: Adult Inpatient Plan of Care  Goal: Patient-Specific Goal (Individualization)  Outcome: Ongoing, Progressing

## 2020-01-23 NOTE — NURSING
Scott GAMBOA and Dr. Vences at bedside, pt placed back on ventilator rate 22, , FiO2 30%, PEEP 5; RASS +3, precedex drip restarted.         Implemented All Universal Safety Interventions:  [place] to call system. Call bell, personal items and telephone within reach. Instruct patient to call for assistance. Room bathroom lighting operational. Non-slip footwear when patient is off stretcher. Physically safe environment: no spills, clutter or unnecessary equipment. Stretcher in lowest position, wheels locked, appropriate side rails in place.

## 2020-01-23 NOTE — ASSESSMENT & PLAN NOTE
Unknown cause but suspected to be due to profound hypoxia although not documented  ROSC achieved after 20 minutes of ACLS  Anoxic brain injury highly suspected  Currently under hypothermia protocol  Reassess neuro status after rewarmed, which is today  Frequent labs and monitor for bleeding  Pulm on board for vent co-management

## 2020-01-23 NOTE — ASSESSMENT & PLAN NOTE
Suspect secondary to volume overload in setting of a-fib RVR vs possible pneumonia  --CXR with pulmonary edema, no focal consolidation, procal 0.04  --intubated in ED for hypoxia and work of breathing  --sputum culture with normal respiratory david and few WBCs  --weaning FiO2 as patient diuresed well following lasix administration  --continue lung protective ventilation and SpO2 88-92%  --continue broad spectrum abx for now  --failed SAT/SBT SAT/SBT 1/23, as patient became severely hypertensive (SBP 230s/120s), tachy 100s, and slightly hypoxia ~89%. He was placed back on a rate and will reattempt tomorrow

## 2020-01-23 NOTE — PROGRESS NOTES
Pharmacokinetic Assessment Follow Up: IV Vancomycin    Vancomycin serum concentration assessment(s):    The trough level was drawn correctly and can be used to guide therapy at this time. The measurement is within the desired definitive target range of 10 to 20 mcg/mL.    Vancomycin Regimen Plan:    Continue regimen to Vancomycin 1250 mg IV every 24 hours with next serum trough concentration measured at 0600 prior to 3rd dose on 1/25/2020     Drug levels (last 3 results):  Recent Labs   Lab Result Units 01/23/20  0551   Vancomycin-Trough ug/mL 15.1       Pharmacy will continue to follow and monitor vancomycin.    Please contact pharmacy at extension [phone] for questions regarding this assessment.    Thank you for the consult,   Luis Mendez Jr       Patient brief summary:  Marck Madison is a 59 y.o. male initiated on antimicrobial therapy with IV Vancomycin for treatment of lower respiratory infection    Drug Allergies:   Review of patient's allergies indicates:  No Known Allergies    Actual Body Weight:   79.5 kg    Renal Function:   Estimated Creatinine Clearance: 36.6 mL/min (A) (based on SCr of 2.1 mg/dL (H)).,     Dialysis Method (if applicable):  N/A    CBC (last 72 hours):  Recent Labs   Lab Result Units 01/20/20  1731 01/21/20  0400 01/22/20  0345 01/23/20  0529   WBC K/uL 9.23 13.20* 18.11* 18.93*   Hemoglobin g/dL 16.3 13.9* 15.7 14.4   Hematocrit % 49.1 40.6 45.6 42.4   Platelets K/uL 212 155 180 175   Gran% % 65.4 93.3* 93.8* 88.0*   Lymph% % 24.9 2.9* 3.4* 6.0*   Mono% % 8.3 1.1* 2.0* 4.3   Eosinophil% % 0.5 1.5 0.0 0.0   Basophil% % 0.5 0.4 0.1 0.2   Differential Method  Automated Automated Automated Automated       Metabolic Panel (last 72 hours):  Recent Labs   Lab Result Units 01/20/20  1731 01/20/20  2322 01/21/20  0046 01/21/20  0400 01/21/20  1000 01/21/20  1143 01/21/20  1613 01/21/20  2002 01/21/20  2344 01/22/20  0345 01/22/20  0738 01/22/20  1301 01/22/20  1643 01/23/20  0551   Sodium  mmol/L 141 142  142  --  143 137 140 141 142 141 144 140 143 144 143   Potassium mmol/L 4.0 3.4*  3.4*  --  3.2* 4.0 4.3 3.6 4.7 4.4 4.2 4.0 4.4 4.3 4.5   Chloride mmol/L 106 112*  112*  --  106 105 106 107 110 109 111* 107 110 109 107   CO2 mmol/L 22* 18*  18*  --  23 19* 20* 23 22* 21* 22* 22* 20* 23 28   Glucose mg/dL 126* 164*  164*  164*  164*  --  146*  146* 299*  299* 273*  273* 293*  293* 223*  223* 204*  204* 124*  124* 204*  204* 132*  132* 135*  135* 173*   Glucose, UA   --   --  Negative  --   --   --   --   --   --   --   --   --   --   --    BUN, Bld mg/dL 28* 30*  30*  --  30* 29* 27* 27* 27* 28* 29* 29* 30* 32* 38*   Creatinine mg/dL 1.7* 2.1*  2.1*  --  1.9* 1.9* 1.9* 1.8* 1.7* 1.8* 1.8* 1.9* 2.0* 2.0* 2.1*   Albumin g/dL 3.9 3.3*  --   --  3.7 3.8 3.5 3.4* 3.4* 3.3* 3.2* 3.1* 3.2* 2.9*   Total Bilirubin mg/dL 0.8 1.4*  --   --  1.2* 1.3* 1.2* 1.2* 1.2* 1.1* 1.0 0.7 0.7 0.5   Alkaline Phosphatase U/L 71 70  --   --  64 66 66 62 60 59 52* 52* 57 51*   AST U/L 50* 331*  --   --  367* 370* 312* 323* 327* 305* 245* 186* 156* 77*   ALT U/L 42 267*  --   --  258* 269* 265* 286* 303* 305* 278* 261* 256* 193*   Magnesium mg/dL 2.0 2.1  --  3.0* 2.3 2.4 3.2* 3.3* 3.2* 3.1* 2.8* 2.7* 2.9*  --    Phosphorus mg/dL  --  2.3*  --  2.0* 1.4* 1.5* 3.9 2.8 3.1 6.2* 5.1* 6.1* 5.9*  --        Vancomycin Administrations:  vancomycin given in the last 96 hours                     vancomycin 1.25 g in dextrose 5% 250 mL IVPB (ready to mix) (mg) 1,250 mg New Bag 01/23/20 0654     1,250 mg New Bag 01/22/20 0530    vancomycin (VANCOCIN) 2,000 mg in dextrose 5 % 500 mL IVPB (mg) 2,000 mg New Bag 01/21/20 0622                      Microbiologic Results:  Microbiology Results (last 7 days)       Procedure Component Value Units Date/Time    Blood culture [[id number]] Collected:  01/20/20 1959    Order Status:  Completed Specimen:  Blood from Peripheral, Hand, Right Updated:  01/22/20 2103     Blood Culture,  Routine No Growth to date      No Growth to date      No Growth to date    Blood culture [[id number]] Collected:  01/20/20 2004    Order Status:  Completed Specimen:  Blood from Peripheral, Hand, Right Updated:  01/22/20 2103     Blood Culture, Routine No Growth to date      No Growth to date      No Growth to date    Culture, Respiratory with Gram Stain [[id number]] Collected:  01/21/20 0514    Order Status:  Completed Specimen:  Respiratory from Tracheal Aspirate Updated:  01/22/20 1043     Respiratory Culture Normal respiratory david     Gram Stain (Respiratory) <10 epithelial cells per low power field.     Gram Stain (Respiratory) Few WBC's     Gram Stain (Respiratory) Gram positive rods     Gram Stain (Respiratory) yeast

## 2020-01-23 NOTE — SUBJECTIVE & OBJECTIVE
Review of Systems   Unable to perform ROS: intubated     Objective:     Vital Signs (Most Recent):  Temp: 97.5 °F (36.4 °C) (01/23/20 0301)  Pulse: 85 (01/23/20 0750)  Resp: (!) 22 (01/23/20 0750)  BP: (!) 150/111 (01/23/20 0820)  SpO2: 96 % (01/23/20 0750) Vital Signs (24h Range):  Temp:  [94.6 °F (34.8 °C)-97.7 °F (36.5 °C)] 97.5 °F (36.4 °C)  Pulse:  [] 85  Resp:  [22-34] 22  SpO2:  [93 %-100 %] 96 %  BP: ()/() 150/111  Arterial Line BP: ()/() 168/102     Weight: 79.5 kg (175 lb 4.3 oz)  Body mass index is 28.29 kg/m².     SpO2: 96 %  O2 Device (Oxygen Therapy): ventilator      Intake/Output Summary (Last 24 hours) at 1/23/2020 0837  Last data filed at 1/23/2020 0730  Gross per 24 hour   Intake 1462.79 ml   Output 615 ml   Net 847.79 ml       Lines/Drains/Airways     Central Venous Catheter Line                 Percutaneous Central Line Insertion/Assessment - triple lumen  01/20/20 2330 right femoral vein;right femoral 2 days          Drain                 NG/OG Tube 01/20/20 1906 [place] sump 18 Fr. Right mouth 2 days         Urethral Catheter 01/20/20 1925 Coude 16 Fr. 2 days          Airway                 Airway - Non-Surgical 01/20/20 1857 Endotracheal Tube 2 days          Arterial Line                 Arterial Line 01/20/20 2345 Left Radial 2 days          Peripheral Intravenous Line                 Peripheral IV - Single Lumen 01/20/20 1710 20 G Right Antecubital 2 days         Peripheral IV - Single Lumen 01/21/20 0600 20 G Posterior;Right Hand 2 days                Physical Exam   Constitutional: He is sedated and intubated.   Cardiovascular: Tachycardia present.   Pulmonary/Chest: Breath sounds normal. He is intubated.   Abdominal: Soft.   Musculoskeletal:        Right lower leg: He exhibits no edema.        Left lower leg: He exhibits no edema.   Vitals reviewed.      Significant Labs:   BMP:   Recent Labs   Lab 01/22/20  0738 01/22/20  1301 01/22/20  1643 01/23/20  0551    *  204* 132*  132* 135*  135* 173*    143 144 143   K 4.0 4.4 4.3 4.5    110 109 107   CO2 22* 20* 23 28   BUN 29* 30* 32* 38*   CREATININE 1.9* 2.0* 2.0* 2.1*   CALCIUM 8.7 8.6* 8.7 8.7   MG 2.8* 2.7* 2.9*  --     and CBC   Recent Labs   Lab 01/22/20  0345 01/23/20  0529   WBC 18.11* 18.93*   HGB 15.7 14.4   HCT 45.6 42.4    175       Significant Imaging: Echocardiogram:   Transthoracic echo (TTE) complete (Cupid Only):   Results for orders placed or performed during the hospital encounter of 01/20/20   Echo Color Flow Doppler? Yes   Result Value Ref Range    BSA 1.92 m2    TDI SEPTAL 0.05 m/s    LV LATERAL E/E' RATIO 11.83 m/s    LV SEPTAL E/E' RATIO 14.20 m/s    LA WIDTH 3.10 cm    AORTIC VALVE CUSP SEPERATION 1.81 cm    TDI LATERAL 0.06 m/s    PV PEAK VELOCITY 0.87 cm/s    LVIDD 3.41 (A) 3.5 - 6.0 cm    IVS 1.66 (A) 0.6 - 1.1 cm    PW 1.76 (A) 0.6 - 1.1 cm    Ao root annulus 3.09 cm    LVIDS 2.99 2.1 - 4.0 cm    FS 12 28 - 44 %    LA volume 52.60 cm3    Sinus 3.07 cm    STJ 2.22 cm    Ascending aorta 3.01 cm    LV mass 232.69 g    LA size 3.30 cm    RVDD 2.81 cm    TAPSE 1.03 cm    RV S' 11.46 cm/s    Left Ventricle Relative Wall Thickness 1.03 cm    AV mean gradient 2 mmHg    AV valve area 3.59 cm2    AV Velocity Ratio 1.11     AV index (prosthetic) 1.09     Mean e' 0.06 m/s    IVRT 0.07 msec    LVOT diameter 2.05 cm    LVOT area 3.3 cm2    LVOT peak chinmay 1.01 m/s    LVOT peak VTI 15.99 cm    Ao peak chinmay 0.91 m/s    Ao VTI 14.68 cm    LVOT stroke volume 52.75 cm3    AV peak gradient 3 mmHg    E/E' ratio 12.91 m/s    MV Peak E Chinmay 0.71 m/s    TR Max Chinmay 2.46 m/s    LV Systolic Volume 34.70 mL    LV Systolic Volume Index 18.3 mL/m2    LV Diastolic Volume 47.62 mL    LV Diastolic Volume Index 25.18 mL/m2    LA Volume Index 27.8 mL/m2    LV Mass Index 123 g/m2    RA Major Axis 5.46 cm    Left Atrium Minor Axis 6.00 cm    Left Atrium Major Axis 6.10 cm    Triscuspid Valve Regurgitation  Peak Gradient 24 mmHg    RA Width 2.43 cm    Narrative    · Concentric left ventricular hypertrophy.  · Atrial fibrillation observed.  · Moderately decreased left ventricular systolic function. The estimated   ejection fraction is 35%.  · Normal right ventricular systolic function.  · Moderate mitral regurgitation.  · Moderate tricuspid regurgitation.  · No pulmonary hypertension present.

## 2020-01-23 NOTE — PROGRESS NOTES
[facility] Hospital Medicine  Progress Note    Patient Name: Marck Madison  MRN: [id number]  Patient Class: IP- Inpatient   Admission Date: 1/20/2020  Length of Stay: 2 days  Attending Physician: Winifred Lopez MD  Primary Care Provider: Primary Doctor No        Subjective:     Principal Problem:Cardiac arrest        HPI:  Marck Madison is a 59 y.o. male that (in part)  has a past medical history of Arrhythmia, CAD (coronary artery disease), CHF (congestive heart failure), NYHA class I, and Psychiatric disorder.  has a past surgical history that includes Liver surgery and Treatment of cardiac arrhythmia (9/12/2019). Presents to [facility] Emergency Department complaining of shortness of breath.  Subacute onset with progressive worsening.  Associated palpitations.  Severe dyspnea with exertion.  Denied fever chills.  Had a cough.  History of asthma and COPD.  Reports compliance with home medication regimen.  History is limited due to the current condition of the patient was intubated and sedated    In the emergency department routine laboratory studies and chest x-ray was obtained.  In EKG and cardiac enzymes as well.  He was found to be in atrial fibrillation rapid ventricular response.  Amiodarone infusion was initiated.  He became more hypoxemic and hypotensive.  It was noted he had erythema developing around the amiodarone infusion site.  This was discontinued.  He was started on diltiazem alternatively.  Remained hypoxemic despite increasing oxygen supplementation.  He underwent rapid sequence intubation.  He was brought to the ICU.      Shortly after transferring into the ICU bed he became bradycardic and pulse was lost.  ACLS protocol was initiated.  We performed approximately 6 rounds with epinephrine and started on a dobutamine infusion before obtaining return of spontaneous circulation.  Please see code sheet for details.  Central line and arterial lines were  placed.  Bedside echo was performed.  Case was discussed with Cardiology in detail.  Dr. Brunner to see the patient in the morning.  Cooling protocol initiated.    Hospital medicine has been asked to admit to inpatient for further evaluation and treatment.     Overview/Hospital Course:  No notes on file    Interval History: rewarming today    Review of Systems   Unable to perform ROS: Intubated     Objective:     Vital Signs (Most Recent):  Temp: 96.4 °F (35.8 °C) (01/22/20 1300)  Pulse: 80 (01/22/20 1300)  Resp: (!) 22 (01/22/20 1300)  BP: 122/72 (01/22/20 1300)  SpO2: 98 % (01/22/20 1300) Vital Signs (24h Range):  Temp:  [88.7 °F (31.5 °C)-96.6 °F (35.9 °C)] 96.4 °F (35.8 °C)  Pulse:  [51-81] 80  Resp:  [22-37] 22  SpO2:  [85 %-100 %] 98 %  BP: ()/() 122/72  Arterial Line BP: ()/(66-98) 105/72     Weight: 79.5 kg (175 lb 4.3 oz)  Body mass index is 28.29 kg/m².    Intake/Output Summary (Last 24 hours) at 1/22/2020 1818  Last data filed at 1/22/2020 1800  Gross per 24 hour   Intake 1439.89 ml   Output 785 ml   Net 654.89 ml      Physical Exam   Constitutional: He appears well-developed. No distress.   HENT:   ETT in place   Cardiovascular: Normal rate, regular rhythm and intact distal pulses.   Pulmonary/Chest:   On mechanical ventilation   Abdominal: Soft. He exhibits no distension.   Neurological:   sedated   Skin: Skin is warm and dry. Capillary refill takes less than 2 seconds. He is not diaphoretic.   Nursing note and vitals reviewed.      Significant Labs: All pertinent labs within the past 24 hours have been reviewed.    Significant Imaging: I have reviewed all pertinent imaging results/findings within the past 24 hours.  I have reviewed and interpreted all pertinent imaging results/findings within the past 24 hours.      Assessment/Plan:      * Cardiac arrest  Unknown cause but suspected to be due to profound hypoxia although not documented  ROSC achieved after 20 minutes of ACLS  Anoxic  brain injury highly suspected  Currently under hypothermia protocol  Reassess neuro status after rewarmed, which is today  Frequent labs and monitor for bleeding  Pulm on board for vent co-management      Acute hepatitis  Likely shock liver vs alcoholic hepatitis vs both  Improving  Will consider viral hep panel and imaging if not better by tomorrow      Alcohol use disorder, severe, dependence  Reported by niece  States he drinks alcohol on a daily basis  Will discuss with patient when appropriate  Watch for seizures/withdrawals      Tobacco use disorder, severe, dependence  As reported by his niece and main caregiver  Will discuss with patient when appropriate      Atrial fibrillation with RVR  2/2 non compliance and/or alcohol use??  Rate controlled currently  Is on dopamine infusion  Cardiac monitoring  Cardiology on board  High CHADS VASc score. On anticoagulation       CKD (chronic kidney disease)  Surprisingly at baseline  Monitor closely   Strict I/Os      Elevated troponin I level  Trop 0.4 with flat pattern  Echo pending  On ASA  Cannot do ACE-I, BB or statin due to hypotension, bradycardia and transaminitis respectively  Is on full dose lovenox  Cardiology on board for co-management    Atrial flutter        Acute hypoxemic respiratory failure  2/2 cardiac arrest      VTE Risk Mitigation (From admission, onward)         Ordered     enoxaparin injection 80 mg  Every 12 hours (non-standard times)      01/20/20 2119     IP VTE HIGH RISK PATIENT  Once      01/20/20 2119                Critical care time spent on the evaluation and treatment of severe organ dysfunction, review of pertinent labs and imaging studies, discussions with consulting providers and discussions with patient/family: > 35 minutes.      Winifred Vences MD  Department of Hospital Medicine   [facility]

## 2020-01-23 NOTE — PROGRESS NOTES
[facility]  Pulmonology  Progress Note    Patient Name: Marck Madison  MRN: [id number]  Admission Date: 1/20/2020  Hospital Length of Stay: 3 days  Code Status: Full Code  Attending Provider: Winifred Lopez MD  Primary Care Provider: Primary Doctor No   Principal Problem: Cardiac arrest    Subjective:     Interval History: Following return to normothermia, he is more alert and following commands. SAT/SBT today, however, patient became severely hypertensive (SBP 230s/120s), tachy 100s, and slightly hypoxia ~89%. He was placed back on a rate and will reattempt tomorrow. Continuing to wean dopamine as well.     Objective:     Vital Signs (Most Recent):  Temp: 98.7 °F (37.1 °C) (01/23/20 1200)  Pulse: 88 (01/23/20 1400)  Resp: (!) 22 (01/23/20 1400)  BP: (!) 144/98 (01/23/20 1400)  SpO2: (!) 90 % (01/23/20 1400) Vital Signs (24h Range):  Temp:  [96.6 °F (35.9 °C)-98.7 °F (37.1 °C)] 98.7 °F (37.1 °C)  Pulse:  [] 88  Resp:  [21-34] 22  SpO2:  [90 %-100 %] 90 %  BP: ()/() 144/98  Arterial Line BP: ()/() 167/99     Weight: 79.5 kg (175 lb 4.3 oz)  Body mass index is 28.29 kg/m².      Intake/Output Summary (Last 24 hours) at 1/23/2020 1508  Last data filed at 1/23/2020 1400  Gross per 24 hour   Intake 1740.37 ml   Output 545 ml   Net 1195.37 ml       Physical Exam   Constitutional: He appears well-developed. He appears lethargic. He has a sickly appearance. No distress. He is sedated, intubated and restrained.   HENT:   Head: Normocephalic and atraumatic.   Eyes: Pupils are equal, round, and reactive to light. Conjunctivae are normal. Right eye exhibits no exudate. Left eye exhibits no exudate. Right conjunctiva has no hemorrhage. Left conjunctiva has no hemorrhage. No scleral icterus. Right eye exhibits no nystagmus. Left eye exhibits no nystagmus.   Neck: Trachea normal. No neck rigidity. No tracheal deviation present.   Cardiovascular: Normal rate, regular rhythm and normal  heart sounds. PMI is not displaced. Exam reveals no gallop and no friction rub.   No murmur heard.  Pulses:       Radial pulses are 1+ on the right side, and 1+ on the left side.        Dorsalis pedis pulses are 1+ on the right side, and 1+ on the left side.   Pulmonary/Chest: Effort normal and breath sounds normal. No accessory muscle usage. He is intubated. No respiratory distress. He has no wheezes. He has no rhonchi. He has no rales.   Abdominal: Soft. Normal appearance and bowel sounds are normal. There is no tenderness.   Musculoskeletal: Normal range of motion.   Neurological: He appears lethargic. A cranial nerve deficit and sensory deficit is present. GCS eye subscore is 3. GCS verbal subscore is 1. GCS motor subscore is 6.   Follows commands, answers Y/N questions, spontaneously moves BUE 3/5, BLE 2/5, no focal deficits to note.      Skin: Skin is warm and dry. Capillary refill takes 2 to 3 seconds. He is not diaphoretic. No cyanosis. Nails show no clubbing.   Nursing note and vitals reviewed.      Vents:  Vent Mode: PS/CPAP (01/23/20 1321)  Ventilator Initiated: Yes (01/20/20 2100)  Set Rate: 22 bmp (01/23/20 1141)  Vt Set: 450 mL (01/23/20 1141)  Pressure Support: 5 cmH20 (01/23/20 1321)  PEEP/CPAP: 5 cmH20 (01/23/20 1321)  Oxygen Concentration (%): 30 (01/23/20 1400)  Peak Airway Pressure: 10.6 cmH2O (01/23/20 1321)  Total Ve: 8.2 mL (01/23/20 1321)  F/VT Ratio<105 (RSBI): (!) 51.34 (01/23/20 1321)    Lines/Drains/Airways     Central Venous Catheter Line                 Percutaneous Central Line Insertion/Assessment - triple lumen  01/20/20 2330 right femoral vein;right femoral 2 days          Drain                 NG/OG Tube 01/20/20 1906 [place] sump 18 Fr. Right mouth 2 days         Urethral Catheter 01/20/20 1925 Coude 16 Fr. 2 days          Airway                 Airway - Non-Surgical 01/20/20 1857 Endotracheal Tube 2 days          Arterial Line                 Arterial Line 01/20/20 5316 Left Radial  2 days          Peripheral Intravenous Line                 Peripheral IV - Single Lumen 01/20/20 1710 20 G Right Antecubital 2 days         Peripheral IV - Single Lumen 01/21/20 0600 20 G Posterior;Right Hand 2 days                Significant Labs:    CBC/Anemia Profile:  Recent Labs   Lab 01/22/20  0345 01/23/20  0529   WBC 18.11* 18.93*   HGB 15.7 14.4   HCT 45.6 42.4    175   MCV 94 97   RDW 13.2 14.6*        Chemistries:  Recent Labs   Lab 01/22/20  0738 01/22/20  1301 01/22/20  1643 01/23/20  0551    143 144 143   K 4.0 4.4 4.3 4.5    110 109 107   CO2 22* 20* 23 28   BUN 29* 30* 32* 38*   CREATININE 1.9* 2.0* 2.0* 2.1*   CALCIUM 8.7 8.6* 8.7 8.7   ALBUMIN 3.2* 3.1* 3.2* 2.9*   PROT 6.2 6.1 6.3 6.0   BILITOT 1.0 0.7 0.7 0.5   ALKPHOS 52* 52* 57 51*   * 261* 256* 193*   * 186* 156* 77*   MG 2.8* 2.7* 2.9*  --    PHOS 5.1* 6.1* 5.9*  --        All pertinent labs within the past 24 hours have been reviewed.    Significant Imaging:  I have reviewed all pertinent imaging results/findings within the past 24 hours.    Assessment/Plan:     * Cardiac arrest  Suspect a result of hypoxia as no SpO2 reading available with otherwise stable vital signs just prior to arrest   --ROSC following 22 min ACLS  --TTM completed and now awake and alert on SAT  --low dose dopamine gtt post arrest to keep MAP >65  --cardiology following    Atrial fibrillation with RVR  --mgmt per cardiology    Acute systolic congestive heart failure  Echo from 1/21 with EF 35% and moderate dysfunction in mitral and tricuspid valves with LV hypertrophy  --continue diuresis as needed      Acute hypoxemic respiratory failure  Suspect secondary to volume overload in setting of a-fib RVR vs possible pneumonia  --CXR with pulmonary edema, no focal consolidation, procal 0.04  --intubated in ED for hypoxia and work of breathing  --sputum culture with normal respiratory david and few WBCs  --weaning FiO2 as patient diuresed  well following lasix administration  --continue lung protective ventilation and SpO2 88-92%  --continue broad spectrum abx for now  --failed SAT/SBT SAT/SBT 1/23, as patient became severely hypertensive (SBP 230s/120s), tachy 100s, and slightly hypoxia ~89%. He was placed back on a rate and will reattempt tomorrow    PPI ppx: famotidine  VTE ppx: enoxaparin    Above plan discussed with Dr. Garcia    Critical Care time 45 minutes         Scott Waters NP  Pulmonology  [facility]

## 2020-01-23 NOTE — SUBJECTIVE & OBJECTIVE
Interval History: rewarming today    Review of Systems   Unable to perform ROS: Intubated     Objective:     Vital Signs (Most Recent):  Temp: 96.4 °F (35.8 °C) (01/22/20 1300)  Pulse: 80 (01/22/20 1300)  Resp: (!) 22 (01/22/20 1300)  BP: 122/72 (01/22/20 1300)  SpO2: 98 % (01/22/20 1300) Vital Signs (24h Range):  Temp:  [88.7 °F (31.5 °C)-96.6 °F (35.9 °C)] 96.4 °F (35.8 °C)  Pulse:  [51-81] 80  Resp:  [22-37] 22  SpO2:  [85 %-100 %] 98 %  BP: ()/() 122/72  Arterial Line BP: ()/(66-98) 105/72     Weight: 79.5 kg (175 lb 4.3 oz)  Body mass index is 28.29 kg/m².    Intake/Output Summary (Last 24 hours) at 1/22/2020 1818  Last data filed at 1/22/2020 1800  Gross per 24 hour   Intake 1439.89 ml   Output 785 ml   Net 654.89 ml      Physical Exam   Constitutional: He appears well-developed. No distress.   HENT:   ETT in place   Cardiovascular: Normal rate, regular rhythm and intact distal pulses.   Pulmonary/Chest:   On mechanical ventilation   Abdominal: Soft. He exhibits no distension.   Neurological:   sedated   Skin: Skin is warm and dry. Capillary refill takes less than 2 seconds. He is not diaphoretic.   Nursing note and vitals reviewed.      Significant Labs: All pertinent labs within the past 24 hours have been reviewed.    Significant Imaging: I have reviewed all pertinent imaging results/findings within the past 24 hours.  I have reviewed and interpreted all pertinent imaging results/findings within the past 24 hours.

## 2020-01-23 NOTE — NURSING
Pt keeps attempting to talk over vent and is very restless.  Redirection attempted multiple times, but pt is anxious. Precedex is maxed @1.4 mcg/hr.  Dr. Romero paged, awaiting return call.

## 2020-01-23 NOTE — NURSING
Attempted to wean off dopamine infusion; a-line 84/58 (64), cuff 80/55 (63); infusion rate increased to 1.5 mcg/kg/min; will continue to monitor BP and titrate dopamine infusion as ordered to maintain MAP >65.

## 2020-01-23 NOTE — ASSESSMENT & PLAN NOTE
Likely shock liver vs alcoholic hepatitis vs both  Improving  Will consider viral hep panel and imaging if not better by tomorrow

## 2020-01-23 NOTE — CARE UPDATE
[facility]  ICU Multidisciplinary Bedside Rounds   SUMMARY     Date: 1/23/2020    Prehospitalization: Home  Admit Date / LOS : 1/20/2020/ 3 days    Diagnosis: Cardiac arrest    Consults:        Active: Cardio and Pulm CC       Needed: Nephro     Code Status: Full Code   Advanced Directive: <no information>    LDA: Art Line, Renner, OG, PIV, TLC and Vent       Central Lines/Site/Justification:Pressors       Urinary Cath/Order/Justification:Critically Ill in ICU    Vasopressors/Infusions:    dexmedetomidine (PRECEDEX) infusion 1.399 mcg/kg/hr (01/23/20 0600)    DOPamine 2 mcg/kg/min (01/23/20 0600)          GOALS: Volume/ Hemodynamic: MAP > 65                     RASS: -2  light sedation, briefly awakes to voice (eye opening)    CAM ICU: Positive  Pain Management: none       Pain Controlled: yes     Rhythm: NSR    Respiratory Device: Vent    Vent Mode: PRVC  Oxygen Concentration (%):  [] 40  Resp Rate Total:  [22 br/min-36 br/min] 22 br/min  Vt Set:  [450 mL] 450 mL  PEEP/CPAP:  [5 cmH20-8 cmH20] 5 cmH20  Mean Airway Pressure:  [9.1 gkO67-30.2 cmH20] 14.2 cmH20             Most Recent SBT/ SAT: Pass       MOVE Screen: PASS    VTE Prophylaxis: Pharm and Mechanical  Mobility: Bedrest  Stress Ulcer Prophylaxis: Yes    Dietary: NPO  Tolerance: not applicable      Isolation: No active isolations    Restraints: Yes    Significant Dates:  Post Op Date: N/A  Rescue Date: Code 1/20/20  Imaging/ Diagnostics: N/A    Noteworthy Labs:  WBC 18.93,     CBC/Anemia Labs: Coags:    Recent Labs   Lab 01/22/20  0345 01/23/20  0529   WBC 18.11* 18.93*   HGB 15.7 14.4   HCT 45.6 42.4    175   MCV 94 97   RDW 13.2 14.6*    Recent Labs   Lab 01/22/20  0345 01/23/20  0529   INR 1.4* 1.3*   APTT 45.1* 36.0*        Chemistries:   Recent Labs   Lab 01/22/20  1301 01/22/20  1643    144   K 4.4 4.3    109   CO2 20* 23   BUN 30* 32*   CREATININE 2.0* 2.0*   CALCIUM 8.6* 8.7   PROT 6.1 6.3   BILITOT 0.7  0.7   ALKPHOS 52* 57   * 256*   * 156*   MG 2.7* 2.9*   PHOS 6.1* 5.9*        Cardiac Enzymes: Ejection Fractions:    Recent Labs     01/20/20  2322 01/21/20  0400 01/21/20  1000   *  --   --    TROPONINI 0.308* 0.446* 0.444*    No results found for: EF     POCT Glucose: HbA1c:    Recent Labs   Lab 01/21/20  1103 01/21/20  1615 01/21/20  1753   POCTGLUCOSE 238* 248* 210*    No results found for: HGBA1C     Needs from Care Team: UOP cont to be less than adequate, Nephro consult?      ICU LOS 2d 9h  Level of Care: Critical Care

## 2020-01-23 NOTE — ASSESSMENT & PLAN NOTE
Unknown cause but suspected to be due to profound hypoxia although not documented  ROSC achieved after 20 minutes of ACLS  Anoxic brain injury highly suspected  Completed hypothermia protocol and seems to have acceptable neuro recovery  Sedation vacation and SBT in [facility] on board for vent co-management

## 2020-01-23 NOTE — PROGRESS NOTES
[facility] Hospital Medicine  Progress Note    Patient Name: Marck Madison  MRN: [id number]  Patient Class: IP- Inpatient   Admission Date: 1/20/2020  Length of Stay: 3 days  Attending Physician: Winifred Lopez MD  Primary Care Provider: Primary Doctor No        Subjective:     Principal Problem:Cardiac arrest        HPI:  Marck Madison is a 59 y.o. male that (in part)  has a past medical history of Arrhythmia, CAD (coronary artery disease), CHF (congestive heart failure), NYHA class I, and Psychiatric disorder.  has a past surgical history that includes Liver surgery and Treatment of cardiac arrhythmia (9/12/2019). Presents to [facility] Emergency Department complaining of shortness of breath.  Subacute onset with progressive worsening.  Associated palpitations.  Severe dyspnea with exertion.  Denied fever chills.  Had a cough.  History of asthma and COPD.  Reports compliance with home medication regimen.  History is limited due to the current condition of the patient was intubated and sedated    In the emergency department routine laboratory studies and chest x-ray was obtained.  In EKG and cardiac enzymes as well.  He was found to be in atrial fibrillation rapid ventricular response.  Amiodarone infusion was initiated.  He became more hypoxemic and hypotensive.  It was noted he had erythema developing around the amiodarone infusion site.  This was discontinued.  He was started on diltiazem alternatively.  Remained hypoxemic despite increasing oxygen supplementation.  He underwent rapid sequence intubation.  He was brought to the ICU.      Shortly after transferring into the ICU bed he became bradycardic and pulse was lost.  ACLS protocol was initiated.  We performed approximately 6 rounds with epinephrine and started on a dobutamine infusion before obtaining return of spontaneous circulation.  Please see code sheet for details.  Central line and arterial lines were  placed.  Bedside echo was performed.  Case was discussed with Cardiology in detail.  Dr. Brunner to see the patient in the morning.  Cooling protocol initiated.    Hospital medicine has been asked to admit to inpatient for further evaluation and treatment.     Overview/Hospital Course:  No notes on file    Interval History: following commands but got too hypertensive and then hypoxic during CPAP trial. Placed back on sedation and rate    Review of Systems   Unable to perform ROS: Intubated     Objective:     Vital Signs (Most Recent):  Temp: 98 °F (36.7 °C) (01/23/20 1600)  Pulse: 76 (01/23/20 1732)  Resp: (!) 22 (01/23/20 1732)  BP: 92/69 (01/23/20 1732)  SpO2: 97 % (01/23/20 1732) Vital Signs (24h Range):  Temp:  [96.6 °F (35.9 °C)-98.7 °F (37.1 °C)] 98 °F (36.7 °C)  Pulse:  [] 76  Resp:  [21-34] 22  SpO2:  [90 %-100 %] 97 %  BP: ()/() 92/69  Arterial Line BP: ()/() 123/83     Weight: 79.5 kg (175 lb 4.3 oz)  Body mass index is 28.29 kg/m².    Intake/Output Summary (Last 24 hours) at 1/23/2020 1757  Last data filed at 1/23/2020 1700  Gross per 24 hour   Intake 1879.46 ml   Output 595 ml   Net 1284.46 ml      Physical Exam   Constitutional: He appears well-developed. No distress.   HENT:   ETT in place   Cardiovascular: Normal rate, regular rhythm and intact distal pulses.   Pulmonary/Chest:   On mechanical ventilation   Abdominal: Soft. He exhibits no distension.   Neurological:   sedated   Skin: Skin is warm and dry. Capillary refill takes less than 2 seconds. He is not diaphoretic.   Nursing note and vitals reviewed.      Significant Labs: All pertinent labs within the past 24 hours have been reviewed.    Significant Imaging: I have reviewed all pertinent imaging results/findings within the past 24 hours.  I have reviewed and interpreted all pertinent imaging results/findings within the past 24 hours.      Assessment/Plan:      * Cardiac arrest  Unknown cause but suspected to be due  to profound hypoxia although not documented  ROSC achieved after 20 minutes of ACLS  Anoxic brain injury highly suspected  Completed hypothermia protocol and seems to have acceptable neuro recovery  Sedation vacation and SBT in AM  Pulm on board for vent co-management      Acute hepatitis  Likely shock liver vs alcoholic hepatitis vs both  Improving  Will consider viral hep panel and imaging if not better by tomorrow      Alcohol use disorder, severe, dependence  Reported by niece  States he drinks alcohol on a daily basis  Will discuss with patient when appropriate  Watch for seizures/withdrawals      Tobacco use disorder, severe, dependence  As reported by his niece and main caregiver  Will discuss with patient when appropriate      Atrial fibrillation with RVR  2/2 non compliance and/or alcohol use??  Rate controlled currently  Is on dopamine infusion  Cardiac monitoring  Cardiology on board  High CHADS VASc score. On anticoagulation       CKD (chronic kidney disease)  Surprisingly at baseline  Monitor closely   Strict I/Os      Elevated troponin I level  Trop 0.4 with flat pattern  Echo pending  On ASA  Cannot do ACE-I, BB or statin due to hypotension, bradycardia and transaminitis respectively  Is on full dose lovenox  Cardiology on board for co-management    Atrial flutter        Acute hypoxemic respiratory failure  2/2 cardiac arrest      VTE Risk Mitigation (From admission, onward)         Ordered     enoxaparin injection 80 mg  Every 12 hours (non-standard times)      01/20/20 2119     IP VTE HIGH RISK PATIENT  Once      01/20/20 2119                Critical care time spent on the evaluation and treatment of severe organ dysfunction, review of pertinent labs and imaging studies, discussions with consulting providers and discussions with patient/family: > 35 minutes.      Winifred Vences MD  Department of Hospital Medicine   [facility]

## 2020-01-24 LAB
ALBUMIN SERPL BCP-MCNC: 2.8 G/DL (ref 3.5–5.2)
ALLENS TEST: ABNORMAL
ALP SERPL-CCNC: 41 U/L (ref 55–135)
ALT SERPL W/O P-5'-P-CCNC: 125 U/L (ref 10–44)
ANION GAP SERPL CALC-SCNC: 7 MMOL/L (ref 8–16)
AST SERPL-CCNC: 36 U/L (ref 10–40)
BASOPHILS # BLD AUTO: 0.02 K/UL (ref 0–0.2)
BASOPHILS NFR BLD: 0.1 % (ref 0–1.9)
BILIRUB SERPL-MCNC: 0.5 MG/DL (ref 0.1–1)
BUN SERPL-MCNC: 46 MG/DL (ref 8–23)
CALCIUM SERPL-MCNC: 8.1 MG/DL (ref 8.7–10.5)
CHLORIDE SERPL-SCNC: 111 MMOL/L (ref 95–110)
CO2 SERPL-SCNC: 28 MMOL/L (ref 23–29)
CREAT SERPL-MCNC: 2.5 MG/DL (ref 0.5–1.4)
DELSYS: ABNORMAL
DIFFERENTIAL METHOD: ABNORMAL
EOSINOPHIL # BLD AUTO: 0 K/UL (ref 0–0.5)
EOSINOPHIL NFR BLD: 0 % (ref 0–8)
ERYTHROCYTE [DISTWIDTH] IN BLOOD BY AUTOMATED COUNT: 14.5 % (ref 11.5–14.5)
ERYTHROCYTE [SEDIMENTATION RATE] IN BLOOD BY WESTERGREN METHOD: 22 MM/H
EST. GFR  (AFRICAN AMERICAN): 31 ML/MIN/1.73 M^2
EST. GFR  (NON AFRICAN AMERICAN): 27 ML/MIN/1.73 M^2
FIO2: 40
GLUCOSE SERPL-MCNC: 144 MG/DL (ref 70–110)
HCO3 UR-SCNC: 20.6 MMOL/L (ref 24–28)
HCT VFR BLD AUTO: 41.9 % (ref 40–54)
HGB BLD-MCNC: 13.6 G/DL (ref 14–18)
IMM GRANULOCYTES # BLD AUTO: 0.21 K/UL (ref 0–0.04)
IMM GRANULOCYTES NFR BLD AUTO: 1.4 % (ref 0–0.5)
LYMPHOCYTES # BLD AUTO: 2.1 K/UL (ref 1–4.8)
LYMPHOCYTES NFR BLD: 13.8 % (ref 18–48)
MCH RBC QN AUTO: 32.5 PG (ref 27–31)
MCHC RBC AUTO-ENTMCNC: 32.5 G/DL (ref 32–36)
MCV RBC AUTO: 100 FL (ref 82–98)
MIN VOL: 10.3
MODE: ABNORMAL
MONOCYTES # BLD AUTO: 1 K/UL (ref 0.3–1)
MONOCYTES NFR BLD: 6.7 % (ref 4–15)
NEUTROPHILS # BLD AUTO: 12 K/UL (ref 1.8–7.7)
NEUTROPHILS NFR BLD: 78 % (ref 38–73)
NRBC BLD-RTO: 1 /100 WBC
PCO2 BLDA: 28.9 MMHG (ref 35–45)
PEEP: 5
PH SMN: 7.46 [PH] (ref 7.35–7.45)
PIP: 22
PLATELET # BLD AUTO: 155 K/UL (ref 150–350)
PMV BLD AUTO: 12.1 FL (ref 9.2–12.9)
PO2 BLDA: 66 MMHG (ref 80–100)
POC BE: -2 MMOL/L
POC SATURATED O2: 94 % (ref 95–100)
POC TCO2: 21 MMOL/L (ref 23–27)
POTASSIUM SERPL-SCNC: 4.1 MMOL/L (ref 3.5–5.1)
PROT SERPL-MCNC: 5.8 G/DL (ref 6–8.4)
RBC # BLD AUTO: 4.19 M/UL (ref 4.6–6.2)
SAMPLE: ABNORMAL
SITE: ABNORMAL
SODIUM SERPL-SCNC: 146 MMOL/L (ref 136–145)
SP02: 97
VT: 450
WBC # BLD AUTO: 15.44 K/UL (ref 3.9–12.7)

## 2020-01-24 PROCEDURE — 25000003 PHARM REV CODE 250: Performed by: HOSPITALIST

## 2020-01-24 PROCEDURE — 80053 COMPREHEN METABOLIC PANEL: CPT

## 2020-01-24 PROCEDURE — 99900026 HC AIRWAY MAINTENANCE (STAT)

## 2020-01-24 PROCEDURE — 94003 VENT MGMT INPAT SUBQ DAY: CPT

## 2020-01-24 PROCEDURE — 99291 PR CRITICAL CARE, E/M 30-74 MINUTES: ICD-10-PCS | Mod: ,,, | Performed by: CLINICAL NURSE SPECIALIST

## 2020-01-24 PROCEDURE — 99233 SBSQ HOSP IP/OBS HIGH 50: CPT | Mod: ,,, | Performed by: INTERNAL MEDICINE

## 2020-01-24 PROCEDURE — 63600175 PHARM REV CODE 636 W HCPCS: Performed by: INTERNAL MEDICINE

## 2020-01-24 PROCEDURE — 20000000 HC ICU ROOM

## 2020-01-24 PROCEDURE — 99900035 HC TECH TIME PER 15 MIN (STAT)

## 2020-01-24 PROCEDURE — 63600175 PHARM REV CODE 636 W HCPCS: Performed by: HOSPITALIST

## 2020-01-24 PROCEDURE — 85025 COMPLETE CBC W/AUTO DIFF WBC: CPT

## 2020-01-24 PROCEDURE — 36600 WITHDRAWAL OF ARTERIAL BLOOD: CPT

## 2020-01-24 PROCEDURE — 37799 UNLISTED PX VASCULAR SURGERY: CPT

## 2020-01-24 PROCEDURE — 99233 PR SUBSEQUENT HOSPITAL CARE,LEVL III: ICD-10-PCS | Mod: ,,, | Performed by: INTERNAL MEDICINE

## 2020-01-24 PROCEDURE — 82803 BLOOD GASES ANY COMBINATION: CPT

## 2020-01-24 PROCEDURE — 25000003 PHARM REV CODE 250: Performed by: INTERNAL MEDICINE

## 2020-01-24 PROCEDURE — 99291 CRITICAL CARE FIRST HOUR: CPT | Mod: ,,, | Performed by: CLINICAL NURSE SPECIALIST

## 2020-01-24 PROCEDURE — S0028 INJECTION, FAMOTIDINE, 20 MG: HCPCS | Performed by: HOSPITALIST

## 2020-01-24 RX ORDER — ENOXAPARIN SODIUM 100 MG/ML
75 INJECTION SUBCUTANEOUS
Status: DISCONTINUED | OUTPATIENT
Start: 2020-01-25 | End: 2020-01-27

## 2020-01-24 RX ORDER — FUROSEMIDE 10 MG/ML
80 INJECTION INTRAMUSCULAR; INTRAVENOUS ONCE
Status: COMPLETED | OUTPATIENT
Start: 2020-01-24 | End: 2020-01-24

## 2020-01-24 RX ORDER — HYDRALAZINE HYDROCHLORIDE 20 MG/ML
10 INJECTION INTRAMUSCULAR; INTRAVENOUS EVERY 8 HOURS PRN
Status: DISCONTINUED | OUTPATIENT
Start: 2020-01-25 | End: 2020-01-30 | Stop reason: HOSPADM

## 2020-01-24 RX ORDER — ACETAMINOPHEN 325 MG/1
650 TABLET ORAL EVERY 6 HOURS PRN
Status: DISCONTINUED | OUTPATIENT
Start: 2020-01-25 | End: 2020-01-30 | Stop reason: HOSPADM

## 2020-01-24 RX ADMIN — FAMOTIDINE 20 MG: 10 INJECTION INTRAVENOUS at 10:01

## 2020-01-24 RX ADMIN — LORAZEPAM 1 MG: 2 INJECTION INTRAMUSCULAR at 08:01

## 2020-01-24 RX ADMIN — DEXMEDETOMIDINE HYDROCHLORIDE 1.4 MCG/KG/HR: 4 INJECTION, SOLUTION INTRAVENOUS at 02:01

## 2020-01-24 RX ADMIN — CHLORHEXIDINE GLUCONATE 0.12% ORAL RINSE 15 ML: 1.2 LIQUID ORAL at 10:01

## 2020-01-24 RX ADMIN — DEXMEDETOMIDINE HYDROCHLORIDE 1.3 MCG/KG/HR: 4 INJECTION, SOLUTION INTRAVENOUS at 11:01

## 2020-01-24 RX ADMIN — DEXMEDETOMIDINE HYDROCHLORIDE 1.3 MCG/KG/HR: 4 INJECTION, SOLUTION INTRAVENOUS at 05:01

## 2020-01-24 RX ADMIN — DEXMEDETOMIDINE HYDROCHLORIDE 1.4 MCG/KG/HR: 4 INJECTION, SOLUTION INTRAVENOUS at 03:01

## 2020-01-24 RX ADMIN — LORAZEPAM 1 MG: 2 INJECTION INTRAMUSCULAR at 12:01

## 2020-01-24 RX ADMIN — LORAZEPAM 1 MG: 2 INJECTION INTRAMUSCULAR at 05:01

## 2020-01-24 RX ADMIN — DEXMEDETOMIDINE HYDROCHLORIDE 1.4 MCG/KG/HR: 4 INJECTION, SOLUTION INTRAVENOUS at 06:01

## 2020-01-24 RX ADMIN — DEXMEDETOMIDINE HYDROCHLORIDE 1.3 MCG/KG/HR: 4 INJECTION, SOLUTION INTRAVENOUS at 07:01

## 2020-01-24 RX ADMIN — CHLORHEXIDINE GLUCONATE 0.12% ORAL RINSE 15 ML: 1.2 LIQUID ORAL at 08:01

## 2020-01-24 RX ADMIN — DEXMEDETOMIDINE HYDROCHLORIDE 1.4 MCG/KG/HR: 4 INJECTION, SOLUTION INTRAVENOUS at 09:01

## 2020-01-24 RX ADMIN — FUROSEMIDE 80 MG: 10 INJECTION, SOLUTION INTRAVENOUS at 10:01

## 2020-01-24 RX ADMIN — ENOXAPARIN SODIUM 80 MG: 100 INJECTION SUBCUTANEOUS at 10:01

## 2020-01-24 RX ADMIN — ASPIRIN 81 MG CHEWABLE TABLET 81 MG: 81 TABLET CHEWABLE at 10:01

## 2020-01-24 NOTE — NURSING
Assumed care at this time pt  Pulse ox 86%-87% pt suction and given 100% O2 at this time pt now has sat's of 91. Will continue to monitor.

## 2020-01-24 NOTE — ASSESSMENT & PLAN NOTE
Unknown cause but suspected to be due to profound hypoxia although not documented  ROSC achieved after 20 minutes of ACLS  Anoxic brain injury highly suspected  Completed hypothermia protocol and seems to have acceptable neuro recovery  Better assessment once extubated   Dose of IV lasix for pulmonary edema  Sedation vacation and SBT in AM  Pulm on board for vent co-management

## 2020-01-24 NOTE — PROGRESS NOTES
[facility] Hospital Medicine  Progress Note    Patient Name: Marck Madison  MRN: [id number]  Patient Class: IP- Inpatient   Admission Date: 1/20/2020  Length of Stay: 4 days  Attending Physician: Winifred Lopez MD  Primary Care Provider: Primary Doctor No        Subjective:     Principal Problem:Cardiac arrest        HPI:  Marck Madison is a 59 y.o. male that (in part)  has a past medical history of Arrhythmia, CAD (coronary artery disease), CHF (congestive heart failure), NYHA class I, and Psychiatric disorder.  has a past surgical history that includes Liver surgery and Treatment of cardiac arrhythmia (9/12/2019). Presents to [facility] Emergency Department complaining of shortness of breath.  Subacute onset with progressive worsening.  Associated palpitations.  Severe dyspnea with exertion.  Denied fever chills.  Had a cough.  History of asthma and COPD.  Reports compliance with home medication regimen.  History is limited due to the current condition of the patient was intubated and sedated    In the emergency department routine laboratory studies and chest x-ray was obtained.  In EKG and cardiac enzymes as well.  He was found to be in atrial fibrillation rapid ventricular response.  Amiodarone infusion was initiated.  He became more hypoxemic and hypotensive.  It was noted he had erythema developing around the amiodarone infusion site.  This was discontinued.  He was started on diltiazem alternatively.  Remained hypoxemic despite increasing oxygen supplementation.  He underwent rapid sequence intubation.  He was brought to the ICU.      Shortly after transferring into the ICU bed he became bradycardic and pulse was lost.  ACLS protocol was initiated.  We performed approximately 6 rounds with epinephrine and started on a dobutamine infusion before obtaining return of spontaneous circulation.  Please see code sheet for details.  Central line and arterial lines were  placed.  Bedside echo was performed.  Case was discussed with Cardiology in detail.  Dr. Brunner to see the patient in the morning.  Cooling protocol initiated.    Hospital medicine has been asked to admit to inpatient for further evaluation and treatment.     Overview/Hospital Course:  No notes on file    Interval History: following commands. Failed SBT due to hypoxia and tachypnea.     Review of Systems   Unable to perform ROS: Intubated     Objective:     Vital Signs (Most Recent):  Temp: 99.4 °F (37.4 °C) (01/24/20 1200)  Pulse: 72 (01/24/20 1700)  Resp: (!) 22 (01/24/20 1700)  BP: (!) 169/111 (01/24/20 1700)  SpO2: 99 % (01/24/20 1700) Vital Signs (24h Range):  Temp:  [98.7 °F (37.1 °C)-99.4 °F (37.4 °C)] 99.4 °F (37.4 °C)  Pulse:  [68-93] 72  Resp:  [4-44] 22  SpO2:  [80 %-100 %] 99 %  BP: ()/() 169/111  Arterial Line BP: ()/() 175/102     Weight: 79.5 kg (175 lb 4.3 oz)  Body mass index is 28.29 kg/m².    Intake/Output Summary (Last 24 hours) at 1/24/2020 1738  Last data filed at 1/24/2020 1700  Gross per 24 hour   Intake 993.62 ml   Output 2465 ml   Net -1471.38 ml      Physical Exam   Constitutional: He appears well-developed. No distress.   HENT:   ETT in place   Cardiovascular: Normal rate, regular rhythm and intact distal pulses.   Pulmonary/Chest:   On mechanical ventilation   Abdominal: Soft. He exhibits no distension.   Neurological:   sedated   Skin: Skin is warm and dry. Capillary refill takes less than 2 seconds. He is not diaphoretic.   Nursing note and vitals reviewed.      Significant Labs: All pertinent labs within the past 24 hours have been reviewed.    Significant Imaging: I have reviewed all pertinent imaging results/findings within the past 24 hours.  I have reviewed and interpreted all pertinent imaging results/findings within the past 24 hours.      Assessment/Plan:      * Cardiac arrest  Unknown cause but suspected to be due to profound hypoxia although not  documented  ROSC achieved after 20 minutes of ACLS  Anoxic brain injury highly suspected  Completed hypothermia protocol and seems to have acceptable neuro recovery  Better assessment once extubated   Dose of IV lasix for pulmonary edema  Sedation vacation and SBT in AM  Pulm on board for vent co-management      Acute hepatitis  Likely shock liver vs alcoholic hepatitis vs both  Improving        Alcohol use disorder, severe, dependence  Reported by niece  States he drinks alcohol on a daily basis  Will discuss with patient when appropriate  Watch for seizures/withdrawals      Tobacco use disorder, severe, dependence  As reported by his niece and main caregiver  Will discuss with patient when appropriate      Atrial fibrillation with RVR  2/2 non compliance and/or alcohol use??  Rate controlled currently  Is on dopamine infusion  Cardiac monitoring  Cardiology on board  High CHADS VASc score. On anticoagulation       Acute systolic congestive heart failure  Trial of IV furosemide to prepare for extubation      CKD (chronic kidney disease)  Surprisingly at baseline  Monitor closely   Strict I/Os      Elevated troponin I level  Trop 0.4 with flat pattern  Echo pending  On ASA  Cannot do ACE-I, BB or statin due to hypotension, bradycardia and transaminitis respectively  Is on full dose lovenox  Cardiology on board for co-management    Atrial flutter        Acute hypoxemic respiratory failure  2/2 cardiac arrest      VTE Risk Mitigation (From admission, onward)         Ordered     enoxaparin injection 80 mg  Every 12 hours (non-standard times)      01/20/20 2119     IP VTE HIGH RISK PATIENT  Once      01/20/20 2119              Discussed with patient's niece at bedside.     Critical care time spent on the evaluation and treatment of severe organ dysfunction, review of pertinent labs and imaging studies, discussions with consulting providers and discussions with patient/family: > 35 minutes.      Winifred Vences,  MD  Department of Hospital Medicine   [facility]

## 2020-01-24 NOTE — NURSING
Pt's /99  with noted RASS +1, precedex infusing at 1.4 mcg/kg/min, administered 1mg ativan.    1305 Notified Dr. Garcia of pt's SBP 160s-170s, no new orders at this time.

## 2020-01-24 NOTE — PLAN OF CARE
Pt remains on Precedex at 1.2mcg/kg/hr will wean slowly. Pt gets very anxious if weaned to fast . Pt was wide awake trying to get out of bed last night on 1.4mcg. TF off at this time anticipating extubation this am. If this will not occur pt needs TF restarted at 40cc/hr which is to goal.

## 2020-01-24 NOTE — PLAN OF CARE
Recommendations     1. Continue w/ current TF order of Glucerna 1.5 w/ a goal rate of 50 mL/hr as this is appropriate to meet EEN/EPN; additional free fl per MD discretion at this time    2. Consider ordering folic acid, thiamine, MVI daily considering h/o EtOH abuse pta     Goals: TF initiation or diet advancement w/in 48 hrs  Nutrition Goal Status: goal met

## 2020-01-24 NOTE — ASSESSMENT & PLAN NOTE
Wean vent as tolerated. Patient was reportedly awake. Consider ischemic evaluation if he makes a neurologic recovery.

## 2020-01-24 NOTE — ASSESSMENT & PLAN NOTE
Suspect a result of hypoxia as no SpO2 reading available with otherwise stable vital signs just prior to arrest   --ROSC following 22 min ACLS  --TTM completed and now awake and alert on SAT  --low dose dopamine dc'd  --cardiology following

## 2020-01-24 NOTE — SUBJECTIVE & OBJECTIVE
Interval History:     Review of Systems   Unable to perform ROS: intubated     Objective:     Vital Signs (Most Recent):  Temp: 99.1 °F (37.3 °C) (01/24/20 0330)  Pulse: 73 (01/24/20 0724)  Resp: (!) 22 (01/24/20 0724)  BP: 125/89 (01/24/20 0724)  SpO2: (!) 94 % (01/24/20 0724) Vital Signs (24h Range):  Temp:  [98 °F (36.7 °C)-99.2 °F (37.3 °C)] 99.1 °F (37.3 °C)  Pulse:  [69-93] 73  Resp:  [4-44] 22  SpO2:  [80 %-98 %] 94 %  BP: ()/() 125/89  Arterial Line BP: ()/() 134/86     Weight: 79.5 kg (175 lb 4.3 oz)  Body mass index is 28.29 kg/m².     SpO2: (!) 94 %  O2 Device (Oxygen Therapy): ventilator      Intake/Output Summary (Last 24 hours) at 1/24/2020 0830  Last data filed at 1/24/2020 0700  Gross per 24 hour   Intake 886.59 ml   Output 765 ml   Net 121.59 ml       Lines/Drains/Airways     Central Venous Catheter Line                 Percutaneous Central Line Insertion/Assessment - triple lumen  01/20/20 2330 right femoral vein;right femoral 3 days          Drain                 NG/OG Tube 01/20/20 1906 Holladay sump 18 Fr. Right mouth 3 days         Urethral Catheter 01/20/20 1925 Coude 16 Fr. 3 days          Airway                 Airway - Non-Surgical 01/20/20 1857 Endotracheal Tube 3 days          Arterial Line                 Arterial Line 01/20/20 2345 Left Radial 3 days          Peripheral Intravenous Line                 Peripheral IV - Single Lumen 01/21/20 0600 20 G Posterior;Right Hand 3 days                Physical Exam   Constitutional: He is sedated and intubated.   Cardiovascular: Tachycardia present.   Pulmonary/Chest: Breath sounds normal. He is intubated.   Abdominal: Soft.   Musculoskeletal:        Right lower leg: He exhibits no edema.        Left lower leg: He exhibits no edema.   Vitals reviewed.      Significant Labs: All pertinent lab results from the last 24 hours have been reviewed.    Significant Imaging: Echocardiogram: 2D echo with color flow doppler: No results  found for this or any previous visit.

## 2020-01-24 NOTE — PROGRESS NOTES
[facility]  Cardiology  Progress Note    Patient Name: Marck Madison  MRN: [id number]  Admission Date: 1/20/2020  Hospital Length of Stay: 4 days  Code Status: Full Code   Attending Physician: Winifred Lopez MD   Primary Care Physician: Primary Doctor No  Expected Discharge Date:   Principal Problem:Cardiac arrest    Subjective:     Hospital Course:   1/22/2020: Patient in sinus rhythm  1/23/2020: ST  1/24 Intubated. NSR    Interval History:     Review of Systems   Unable to perform ROS: intubated     Objective:     Vital Signs (Most Recent):  Temp: 99.1 °F (37.3 °C) (01/24/20 0330)  Pulse: 73 (01/24/20 0724)  Resp: (!) 22 (01/24/20 0724)  BP: 125/89 (01/24/20 0724)  SpO2: (!) 94 % (01/24/20 0724) Vital Signs (24h Range):  Temp:  [98 °F (36.7 °C)-99.2 °F (37.3 °C)] 99.1 °F (37.3 °C)  Pulse:  [69-93] 73  Resp:  [4-44] 22  SpO2:  [80 %-98 %] 94 %  BP: ()/() 125/89  Arterial Line BP: ()/() 134/86     Weight: 79.5 kg (175 lb 4.3 oz)  Body mass index is 28.29 kg/m².     SpO2: (!) 94 %  O2 Device (Oxygen Therapy): ventilator      Intake/Output Summary (Last 24 hours) at 1/24/2020 0830  Last data filed at 1/24/2020 0700  Gross per 24 hour   Intake 886.59 ml   Output 765 ml   Net 121.59 ml       Lines/Drains/Airways     Central Venous Catheter Line                 Percutaneous Central Line Insertion/Assessment - triple lumen  01/20/20 2330 right femoral vein;right femoral 3 days          Drain                 NG/OG Tube 01/20/20 1906 [place] sump 18 Fr. Right mouth 3 days         Urethral Catheter 01/20/20 1925 Coude 16 Fr. 3 days          Airway                 Airway - Non-Surgical 01/20/20 1857 Endotracheal Tube 3 days          Arterial Line                 Arterial Line 01/20/20 2345 Left Radial 3 days          Peripheral Intravenous Line                 Peripheral IV - Single Lumen 01/21/20 0600 20 G Posterior;Right Hand 3 days                Physical Exam   Constitutional: He is  sedated and intubated.   Cardiovascular: Tachycardia present.   Pulmonary/Chest: Breath sounds normal. He is intubated.   Abdominal: Soft.   Musculoskeletal:        Right lower leg: He exhibits no edema.        Left lower leg: He exhibits no edema.   Vitals reviewed.      Significant Labs: All pertinent lab results from the last 24 hours have been reviewed.    Significant Imaging: Echocardiogram: 2D echo with color flow doppler: No results found for this or any previous visit.    Assessment and Plan:     Brief HPI:     * Cardiac arrest  Wean vent as tolerated. Patient was reportedly awake. Consider ischemic evaluation if he makes a neurologic recovery.     Alcohol use disorder, severe, dependence  Monitor for withdrawal, tx as per primary    Acute systolic congestive heart failure  Requiring dopamine. Wean as tolerated.     CKD (chronic kidney disease)  Monitor.    Elevated troponin I level  In light of hypotension. RVR.    Dilated cardiomyopathy  EF 35%. Long hx of EtOH. Reported remote hx of CAD. Supportive care.     Atrial flutter  SR/ST        VTE Risk Mitigation (From admission, onward)         Ordered     enoxaparin injection 80 mg  Every 12 hours (non-standard times)      01/20/20 2119     IP VTE HIGH RISK PATIENT  Once      01/20/20 2119                Will f/u prn    Jonathan Lopez MD  Cardiology  [facility]

## 2020-01-24 NOTE — SUBJECTIVE & OBJECTIVE
Interval History: No acute events overnight. Failed SBT today due to tachpnea    Objective:     Vital Signs (Most Recent):  Temp: 99.4 °F (37.4 °C) (01/24/20 1200)  Pulse: 84 (01/24/20 1416)  Resp: (!) 22 (01/24/20 1416)  BP: (!) 160/105 (01/24/20 1200)  SpO2: (!) 93 % (01/24/20 1416) Vital Signs (24h Range):  Temp:  [98 °F (36.7 °C)-99.4 °F (37.4 °C)] 99.4 °F (37.4 °C)  Pulse:  [68-93] 84  Resp:  [4-44] 22  SpO2:  [80 %-98 %] 93 %  BP: ()/() 160/105  Arterial Line BP: ()/() 172/99     Weight: 79.5 kg (175 lb 4.3 oz)  Body mass index is 28.29 kg/m².      Intake/Output Summary (Last 24 hours) at 1/24/2020 1434  Last data filed at 1/24/2020 1239  Gross per 24 hour   Intake 915.01 ml   Output 1315 ml   Net -399.99 ml       Physical Exam   Constitutional: He appears well-developed. He is cooperative. He is intubated.   HENT:   Head: Normocephalic.   Eyes: Pupils are equal, round, and reactive to light.   Cardiovascular: Normal rate and intact distal pulses.   Pulmonary/Chest: Tachypnea noted. He is intubated.   Abdominal: Soft. Bowel sounds are normal. There is no tenderness.   Neurological: GCS eye subscore is 4. GCS verbal subscore is 5. GCS motor subscore is 6.   Skin: Skin is warm and dry.   Nursing note and vitals reviewed.      Vents:  Vent Mode: PRVC (01/24/20 1416)  Ventilator Initiated: Yes (01/20/20 2100)  Set Rate: 22 bmp (01/24/20 1416)  Vt Set: 450 mL (01/24/20 1416)  Pressure Support: 5 cmH20 (01/23/20 1321)  PEEP/CPAP: 5 cmH20 (01/24/20 1416)  Oxygen Concentration (%): 40 (01/24/20 1416)  Peak Airway Pressure: 20.5 cmH2O (01/24/20 1416)  Total Ve: 10.3 mL (01/24/20 1416)  F/VT Ratio<105 (RSBI): (!) 47.21 (01/24/20 1416)    Lines/Drains/Airways     Central Venous Catheter Line                 Percutaneous Central Line Insertion/Assessment - triple lumen  01/20/20 2330 right femoral vein;right femoral 3 days          Drain                 NG/OG Tube 01/20/20 1906 [place] sump 18 Fr.  Right mouth 3 days         Urethral Catheter 01/20/20 1925 [place] 16 Fr. 3 days          Airway                 Airway - Non-Surgical 01/20/20 1857 Endotracheal Tube 3 days          Arterial Line                 Arterial Line 01/20/20 2345 Left Radial 3 days          Peripheral Intravenous Line                 Peripheral IV - Single Lumen 01/21/20 0600 20 G Posterior;Right Hand 3 days                Significant Labs:    CBC/Anemia Profile:  Recent Labs   Lab 01/23/20  0529 01/24/20  0330   WBC 18.93* 15.44*   HGB 14.4 13.6*   HCT 42.4 41.9    155   MCV 97 100*   RDW 14.6* 14.5        Chemistries:  Recent Labs   Lab 01/22/20  1643 01/23/20  0551 01/24/20  0330    143 146*   K 4.3 4.5 4.1    107 111*   CO2 23 28 28   BUN 32* 38* 46*   CREATININE 2.0* 2.1* 2.5*   CALCIUM 8.7 8.7 8.1*   ALBUMIN 3.2* 2.9* 2.8*   PROT 6.3 6.0 5.8*   BILITOT 0.7 0.5 0.5   ALKPHOS 57 51* 41*   * 193* 125*   * 77* 36   MG 2.9*  --   --    PHOS 5.9*  --   --        All pertinent labs within the past 24 hours have been reviewed.    Significant Imaging:  I have reviewed and interpreted all pertinent imaging results/findings within the past 24 hours.

## 2020-01-24 NOTE — PT/OT/SLP PROGRESS
Physical Therapy      Patient Name:  Marck Madison   MRN:  [id number]    Patient not seen today for PT eval for ROM while intubated secondary to (hold per nurse Boni pt with agitation off sedation and increased BP). Will follow-up on Monday 1/27/20.    Sarai Austin, PT

## 2020-01-24 NOTE — CARE UPDATE
Pt received orally intubated, 7.5 ET tube, secured at 23 cm at the lips. Pt on servo I ventilator on documented settings. Alarms are set and functional, Ambu bag and mask at the bedside. NARN at this time, will continue to monitor and wean as tolerated.

## 2020-01-24 NOTE — NURSING
Sedation paused for SBT, RT at bedside.    0920 Pt failed SBT, Dr. Garcia at bedside, pt placed back on [facility] vent rate 22, FiO2 40%, , PEEP 5; pt with RASS +3, precedex restarted at previous rate of 1.3 mcg/kg/hr.

## 2020-01-24 NOTE — SUBJECTIVE & OBJECTIVE
Interval History: following commands. Failed SBT due to hypoxia and tachypnea.     Review of Systems   Unable to perform ROS: Intubated     Objective:     Vital Signs (Most Recent):  Temp: 99.4 °F (37.4 °C) (01/24/20 1200)  Pulse: 72 (01/24/20 1700)  Resp: (!) 22 (01/24/20 1700)  BP: (!) 169/111 (01/24/20 1700)  SpO2: 99 % (01/24/20 1700) Vital Signs (24h Range):  Temp:  [98.7 °F (37.1 °C)-99.4 °F (37.4 °C)] 99.4 °F (37.4 °C)  Pulse:  [68-93] 72  Resp:  [4-44] 22  SpO2:  [80 %-100 %] 99 %  BP: ()/() 169/111  Arterial Line BP: ()/() 175/102     Weight: 79.5 kg (175 lb 4.3 oz)  Body mass index is 28.29 kg/m².    Intake/Output Summary (Last 24 hours) at 1/24/2020 1738  Last data filed at 1/24/2020 1700  Gross per 24 hour   Intake 993.62 ml   Output 2465 ml   Net -1471.38 ml      Physical Exam   Constitutional: He appears well-developed. No distress.   HENT:   ETT in place   Cardiovascular: Normal rate, regular rhythm and intact distal pulses.   Pulmonary/Chest:   On mechanical ventilation   Abdominal: Soft. He exhibits no distension.   Neurological:   sedated   Skin: Skin is warm and dry. Capillary refill takes less than 2 seconds. He is not diaphoretic.   Nursing note and vitals reviewed.      Significant Labs: All pertinent labs within the past 24 hours have been reviewed.    Significant Imaging: I have reviewed all pertinent imaging results/findings within the past 24 hours.  I have reviewed and interpreted all pertinent imaging results/findings within the past 24 hours.

## 2020-01-24 NOTE — NURSING
Pt very agitated and is currently at the max limit of prece dex. Pt threw both legs over side rails and raising up in bed trying to get out of bed.  Lorazepam 1mg IVP given at this time. Pt calmed down significantly.

## 2020-01-24 NOTE — NURSING
[facility]  ICU Multidisciplinary Bedside Rounds   SUMMARY     Date: 1/24/2020    Prehospitalization: {[facility] PreHosp:17721}  Admit Date / LOS : 1/20/2020/ 4 days    Diagnosis: Cardiac arrest    Consults:        Active: Cardio       Needed: Cardio     Code Status: Full Code   Advanced Directive: <no information>    LDA: Art Line       Central Lines/Site/Justification:Pressors       Urinary Cath/Order/Justification:Critically Ill in ICU    Vasopressors/Infusions:    dexmedetomidine (PRECEDEX) infusion 1.3 mcg/kg/hr (01/24/20 0537)    DOPamine Stopped (01/23/20 2107)          GOALS: Volume/ Hemodynamic: MAP >                     RASS: -2  light sedation, briefly awakes to voice (eye opening)    CAM ICU: Positive  Pain Management: IV       Pain Controlled: yes     Rhythm: NSR    Respiratory Device: Vent    Vent Mode: PRVC  Oxygen Concentration (%):  [] 40  Resp Rate Total:  [19 br/min-31 br/min] 22 br/min  Vt Set:  [450 mL] 450 mL  PEEP/CPAP:  [5 cmH20] 5 cmH20  Pressure Support:  [5 cmH20] 5 cmH20  Mean Airway Pressure:  [6.6 cmH20-10.2 cmH20] 9.8 cmH20             Most Recent SBT/ SAT: Did not perform       MOVE Screen: {MOVE Screen:95294}    VTE Prophylaxis: Pharm  Mobility: Bedrest  Stress Ulcer Prophylaxis: No    Dietary: TF  Tolerance: yes  /  Advancement: {yes/no/goal:25247}    Isolation: No active isolations    Restraints: Yes    Significant Dates:  Post Op Date: N/A  Rescue Date: N/A  Imaging/ Diagnostics: N/A    Noteworthy Labs:  {NONE:11402}    CBC/Anemia Labs: Coags:    Recent Labs   Lab 01/23/20  0529 01/24/20  0330   WBC 18.93* 15.44*   HGB 14.4 13.6*   HCT 42.4 41.9    155   MCV 97 100*   RDW 14.6* 14.5    Recent Labs   Lab 01/22/20  0345 01/23/20  0529   INR 1.4* 1.3*   APTT 45.1* 36.0*        Chemistries:   Recent Labs   Lab 01/22/20  1301 01/22/20  1643 01/23/20  0551 01/24/20  0330    144 143 146*   K 4.4 4.3 4.5 4.1    109 107 111*   CO2 20* 23 28 28   BUN  30* 32* 38* 46*   CREATININE 2.0* 2.0* 2.1* 2.5*   CALCIUM 8.6* 8.7 8.7 8.1*   PROT 6.1 6.3 6.0 5.8*   BILITOT 0.7 0.7 0.5 0.5   ALKPHOS 52* 57 51* 41*   * 256* 193* 125*   * 156* 77* 36   MG 2.7* 2.9*  --   --    PHOS 6.1* 5.9*  --   --         Cardiac Enzymes: Ejection Fractions:    Recent Labs     01/21/20  1000   TROPONINI 0.444*    No results found for: EF     POCT Glucose: HbA1c:    Recent Labs   Lab 01/21/20  1103 01/21/20  1615 01/21/20  1753   POCTGLUCOSE 238* 248* 210*    No results found for: HGBA1C     Needs from Care Team: {NONE:15919}     ICU LOS 3d 8h  Level of Care: Critical Care

## 2020-01-24 NOTE — PROGRESS NOTES
"[facility]  Adult Nutrition  Progress Note    SUMMARY       Recommendations    1. Continue w/ current TF order of Glucerna 1.5 w/ a goal rate of 50 mL/hr as this is appropriate to meet EEN/EPN; additional free fl per MD discretion at this time    2. Consider ordering folic acid, thiamine, MVI daily considering h/o EtOH abuse pta    Goals: TF initiation or diet advancement w/in 48 hrs  Nutrition Goal Status: goal met  Communication of RD Recs: discussed on rounds    Reason for Assessment    Reason For Assessment: RD follow-up  Diagnosis: other (see comments)(cardiac arrest)  Relevant Medical History: CAD, CHF, arrhythmia  Interdisciplinary Rounds: attended    General Information Comments: Pt remains intubated & sedated. Failed SBT this AM. No longer requiring pressor support. Pt had been tolerating TF well @ a rate of 45 mL/hr (goal rate is 50 mL). TF to run @ 40 mL/hr for today only per MD request as they are trying to diuresis pt; lasix ordered today. H/o EtOH use disorder noted. NFPE performed today & is wnl. Per wt hx review in Epic, pt w/ no wt loss pta    Nutrition Discharge Planning: Too soon to determine    Nutrition Risk Screen    Nutrition Risk Screen: no indicators present    Nutrition/Diet History    Spiritual, Cultural Beliefs, Taoism Practices, Values that Affect Care: no  Food Allergies: NKFA  Factors Affecting Nutritional Intake: NPO, on mechanical ventilation    Anthropometrics    Temp: 99.1 °F (37.3 °C)  Height Method: Stated  Height: 5' 6" (167.6 cm)  Height (inches): 66 in  Weight Method: Bed Scale  Weight: 79.5 kg (175 lb 4.3 oz)  Weight (lb): 175.27 lb  Ideal Body Weight (IBW), Male: 142 lb  % Ideal Body Weight, Male (lb): 123.43 %  BMI (Calculated): 28.3  BMI Grade: 25 - 29.9 - overweight       Lab/Procedures/Meds    Pertinent Labs Reviewed: reviewed  Pertinent Labs Comments: Na 146, Cl 111, BUN 46, Crt 2.5, Glu 144, Alb 2.8  Pertinent Medications Reviewed: " reviewed  Pertinent Medications Comments: precedex, lasix, famotidine    Estimated/Assessed Needs    Weight Used For Calorie Calculations: 79.5 kg (175 lb 4.3 oz)  Energy Calorie Requirements (kcal): 1826  Energy Need Method: [facility]     Protein Requirements: 80-96 g (1-1.2 g/kg)  Weight Used For Protein Calculations: 79.5 kg (175 lb 4.3 oz)     Fluid Requirements (mL): 1764ml or per MD  Estimated Fluid Requirement Method: RDA Method  RDA Method (mL): 1826       Nutrition Prescription Ordered    Current Diet Order: NPO  Current Nutrition Support Formula Ordered: Glucerna 1.5(to run @ 40 mL/hr x 1 day)  Current Nutrition Support Rate Ordered: 50 (ml)  Current Nutrition Support Frequency Ordered: mL/hr    Evaluation of Received Nutrient/Fluid Intake    Enteral Calories (kcal): 1800  Enteral Protein (gm): 99  Enteral (Free Water) Fluid (mL): 910  % Kcal Needs: 99  % Protein Needs: 97  I/O: +594 mL x 24 hrs; -2.385 L since admit  Energy Calories Required: meeting needs  Protein Required: meeting needs  Fluid Required: (per MD)  Comments: LBM 1/23; good uop  Tolerance: tolerating  % Intake of Estimated Energy Needs: 75 - 100 % when TF is at goal  % Meal Intake: NPO    Nutrition Risk    Level of Risk/Frequency of Follow-up: (f/u 2x/ weekly)     Assessment and Plan    Nutrition Problem  Predicted Inadequate Energy Intake     Related to (etiology):   Mechanical ventilation     Signs and Symptoms (as evidenced by):   NPO     Interventions:  Collaboration with other providers  Enteral nutrition      Nutrition Diagnosis Status:   Resolved    Monitor and Evaluation    Food and Nutrient Intake: energy intake, enteral nutrition intake  Food and Nutrient Adminstration: enteral and parenteral nutrition administration, diet order  Anthropometric Measurements: weight, weight change, body mass index  Biochemical Data, Medical Tests and Procedures: electrolyte and renal panel, lipid profile, glucose/endocrine  profile  Nutrition-Focused Physical Findings: overall appearance     Malnutrition Assessment     Skin (Micronutrient): none                   Edema (Fluid Accumulation): 0-->no edema present   Subcutaneous Fat Loss (Final Summary): well nourished  Muscle Loss Evaluation (Final Summary): well nourished         Nutrition Follow-Up    RD Follow-up?: Yes

## 2020-01-24 NOTE — NURSING
Large Bm noted pt saturated the bed. Hibiclens bath given at this time. Site care to right groin TLC site completed because dressing was moist and soiled with old blood to area. Tube feedings restarted at this time also.

## 2020-01-24 NOTE — PROGRESS NOTES
[facility]  Pulmonology  Progress Note    Patient Name: Marck Madison  MRN: [id number]  Admission Date: 1/20/2020  Hospital Length of Stay: 4 days  Code Status: Full Code  Attending Provider: Winifred Lopez MD  Primary Care Provider: Primary Doctor No   Principal Problem: Cardiac arrest    Subjective:     Interval History: No acute events overnight. Failed SBT today due to tachpnea    Objective:     Vital Signs (Most Recent):  Temp: 99.4 °F (37.4 °C) (01/24/20 1200)  Pulse: 84 (01/24/20 1416)  Resp: (!) 22 (01/24/20 1416)  BP: (!) 160/105 (01/24/20 1200)  SpO2: (!) 93 % (01/24/20 1416) Vital Signs (24h Range):  Temp:  [98 °F (36.7 °C)-99.4 °F (37.4 °C)] 99.4 °F (37.4 °C)  Pulse:  [68-93] 84  Resp:  [4-44] 22  SpO2:  [80 %-98 %] 93 %  BP: ()/() 160/105  Arterial Line BP: ()/() 172/99     Weight: 79.5 kg (175 lb 4.3 oz)  Body mass index is 28.29 kg/m².      Intake/Output Summary (Last 24 hours) at 1/24/2020 1434  Last data filed at 1/24/2020 1239  Gross per 24 hour   Intake 915.01 ml   Output 1315 ml   Net -399.99 ml       Physical Exam   Constitutional: He appears well-developed. He is cooperative. He is intubated.   HENT:   Head: Normocephalic.   Eyes: Pupils are equal, round, and reactive to light.   Cardiovascular: Normal rate and intact distal pulses.   Pulmonary/Chest: Tachypnea noted. He is intubated.   Abdominal: Soft. Bowel sounds are normal. There is no tenderness.   Neurological: GCS eye subscore is 4. GCS verbal subscore is 5. GCS motor subscore is 6.   Skin: Skin is warm and dry.   Nursing note and vitals reviewed.      Vents:  Vent Mode: PRVC (01/24/20 1416)  Ventilator Initiated: Yes (01/20/20 2100)  Set Rate: 22 bmp (01/24/20 1416)  Vt Set: 450 mL (01/24/20 1416)  Pressure Support: 5 cmH20 (01/23/20 1321)  PEEP/CPAP: 5 cmH20 (01/24/20 1416)  Oxygen Concentration (%): 40 (01/24/20 1416)  Peak Airway Pressure: 20.5 cmH2O (01/24/20 1416)  Total Ve: 10.3 mL  (01/24/20 1416)  F/VT Ratio<105 (RSBI): (!) 47.21 (01/24/20 1416)    Lines/Drains/Airways     Central Venous Catheter Line                 Percutaneous Central Line Insertion/Assessment - triple lumen  01/20/20 2330 right femoral vein;right femoral 3 days          Drain                 NG/OG Tube 01/20/20 1906 [place] sump 18 Fr. Right mouth 3 days         Urethral Catheter 01/20/20 1925 Coude 16 Fr. 3 days          Airway                 Airway - Non-Surgical 01/20/20 1857 Endotracheal Tube 3 days          Arterial Line                 Arterial Line 01/20/20 2345 Left Radial 3 days          Peripheral Intravenous Line                 Peripheral IV - Single Lumen 01/21/20 0600 20 G Posterior;Right Hand 3 days                Significant Labs:    CBC/Anemia Profile:  Recent Labs   Lab 01/23/20  0529 01/24/20  0330   WBC 18.93* 15.44*   HGB 14.4 13.6*   HCT 42.4 41.9    155   MCV 97 100*   RDW 14.6* 14.5        Chemistries:  Recent Labs   Lab 01/22/20  1643 01/23/20  0551 01/24/20  0330    143 146*   K 4.3 4.5 4.1    107 111*   CO2 23 28 28   BUN 32* 38* 46*   CREATININE 2.0* 2.1* 2.5*   CALCIUM 8.7 8.7 8.1*   ALBUMIN 3.2* 2.9* 2.8*   PROT 6.3 6.0 5.8*   BILITOT 0.7 0.5 0.5   ALKPHOS 57 51* 41*   * 193* 125*   * 77* 36   MG 2.9*  --   --    PHOS 5.9*  --   --        All pertinent labs within the past 24 hours have been reviewed.    Significant Imaging:  I have reviewed and interpreted all pertinent imaging results/findings within the past 24 hours.    Assessment/Plan:     * Cardiac arrest  Suspect a result of hypoxia as no SpO2 reading available with otherwise stable vital signs just prior to arrest   --ROSC following 22 min ACLS  --TTM completed and now awake and alert on SAT  --low dose dopamine dc'd  --cardiology following    Atrial fibrillation with RVR  --mgmt per cardiology    Acute systolic congestive heart failure  Echo from 1/21 with EF 35% and moderate dysfunction in mitral and  tricuspid valves with LV hypertrophy  --continue diuresis as needed      Acute hypoxemic respiratory failure  Suspect secondary to volume overload in setting of a-fib RVR    --CXR with pulmonary edema, no focal consolidation, procal 0.04  --intubated in ED for hypoxia and work of breathing  --sputum culture with normal respiratory david and few WBCs  --weaning FiO2 as patient diuresed well following lasix administration  --continue lung protective ventilation and SpO2 88-92%  --continue broad spectrum abx for now  --failed SAT/SBT 1/23, as patient became severely hypertensive (SBP 230s/120s), tachy 100s, and slightly hypoxia ~89%.   --failed SAT/SBT 1/24, as patient became tachypneic and agitated  --Will reattempt tomorrow      Critical Care Time:   35  minutes  Critical care was time spent personally by me on the following activities: development of treatment plan with patient or surrogate and bedside caregivers, discussions with consultants, evaluation of patient's response to treatment, examination of patient, ordering and performing treatments and interventions, ordering and review of laboratory studies, ordering and review of radiographic studies, pulse oximetry, re-evaluation of patient's condition. This critical care time did not overlap with that of any other provider or involve time for any procedures.    Fiona Winterbottom APRN, CNS  Critical Care Medicine  [phone]         Fiona Winterbottom, APRN, CNS  Pulmonology  [facility]

## 2020-01-24 NOTE — ASSESSMENT & PLAN NOTE
Suspect secondary to volume overload in setting of a-fib RVR    --CXR with pulmonary edema, no focal consolidation, procal 0.04  --intubated in ED for hypoxia and work of breathing  --sputum culture with normal respiratory david and few WBCs  --weaning FiO2 as patient diuresed well following lasix administration  --continue lung protective ventilation and SpO2 88-92%  --continue broad spectrum abx for now  --failed SAT/SBT 1/23, as patient became severely hypertensive (SBP 230s/120s), tachy 100s, and slightly hypoxia ~89%.   --failed SAT/SBT 1/24, as patient became tachypneic and agitated  --Will reattempt tomorrow

## 2020-01-24 NOTE — PHYSICIAN QUERY
PT Name: Marck Madison  MR #: [id number]    Physician Query Form - Atrial Fibrillation Specificity     CDS/: Cesia Ulloa RN              Contact information:[phone]     This form is a permanent document in the medical record.     Query Date: January 24, 2020    By submitting this query, we are merely seeking further clarification of documentation. Please utilize your independent clinical judgment when addressing the question(s) below.    The medical record contains the following:   Indicators     Supporting Clinical Findings Location in Medical Record   x Atrial Fibrillation Bradycardia with Cardiac arrest after treatment for atrial fibrillation with rapid ventricular response     Atrial fibrillation with RVR   On cardizem   --mgmt per cardiology     H&P 1/20      Consult 1/21       Pulmonology   x EKG results Atrial flutter with variable A-V block with premature ventricular or  aberrantly conducted complexes  Possible Inferior infarct (cited on or before 12-SEP-2019)  Cannot rule out Anterior infarct (cited on or before 12-SEP-2019)  Abnormal ECG      Concentric left ventricular hypertrophy.  · Atrial fibrillation observed.  · Moderately decreased left ventricular systolic function.The estimated ejection fraction is 35%.  · Normal right ventricular systolic function.  · Moderate mitral regurgitation.  · Moderate tricuspid regurgitation.  · No pulmonary hypertension present.     EKG 1/20                  ECHO 1/21   x Medication On cardizem  Consult 1/21       Pulmonology    Treatment     x Other 1/22/2020: Patient in sinus rhythm    Progress Note 1/22       Cardioloyg       Provider, please further specify the Atrial Fibrillation diagnosis.    [x  ]  Paroxysmal atrial fibrillation     [  ]  Other (please specify):     [  ]  Clinically Undetermined         Please document in your progress notes daily for the duration of treatment until resolved, and include in your discharge summary.

## 2020-01-25 LAB
ALBUMIN SERPL BCP-MCNC: 3 G/DL (ref 3.5–5.2)
ALLENS TEST: ABNORMAL
ALP SERPL-CCNC: 47 U/L (ref 55–135)
ALT SERPL W/O P-5'-P-CCNC: 90 U/L (ref 10–44)
ANION GAP SERPL CALC-SCNC: 11 MMOL/L (ref 8–16)
AST SERPL-CCNC: 36 U/L (ref 10–40)
BACTERIA BLD CULT: NORMAL
BACTERIA BLD CULT: NORMAL
BASOPHILS # BLD AUTO: 0.03 K/UL (ref 0–0.2)
BASOPHILS NFR BLD: 0.2 % (ref 0–1.9)
BILIRUB SERPL-MCNC: 0.6 MG/DL (ref 0.1–1)
BUN SERPL-MCNC: 46 MG/DL (ref 8–23)
CALCIUM SERPL-MCNC: 8.5 MG/DL (ref 8.7–10.5)
CHLORIDE SERPL-SCNC: 113 MMOL/L (ref 95–110)
CO2 SERPL-SCNC: 24 MMOL/L (ref 23–29)
CREAT SERPL-MCNC: 2.1 MG/DL (ref 0.5–1.4)
DELSYS: ABNORMAL
DIFFERENTIAL METHOD: ABNORMAL
EOSINOPHIL # BLD AUTO: 0 K/UL (ref 0–0.5)
EOSINOPHIL NFR BLD: 0.1 % (ref 0–8)
ERYTHROCYTE [DISTWIDTH] IN BLOOD BY AUTOMATED COUNT: 13.7 % (ref 11.5–14.5)
ERYTHROCYTE [SEDIMENTATION RATE] IN BLOOD BY WESTERGREN METHOD: 22 MM/H
EST. GFR  (AFRICAN AMERICAN): 38 ML/MIN/1.73 M^2
EST. GFR  (NON AFRICAN AMERICAN): 33 ML/MIN/1.73 M^2
FIO2: 40
GLUCOSE SERPL-MCNC: 193 MG/DL (ref 70–110)
HCO3 UR-SCNC: 26.5 MMOL/L (ref 24–28)
HCT VFR BLD AUTO: 42.9 % (ref 40–54)
HGB BLD-MCNC: 14.2 G/DL (ref 14–18)
IMM GRANULOCYTES # BLD AUTO: 0.15 K/UL (ref 0–0.04)
IMM GRANULOCYTES NFR BLD AUTO: 1.2 % (ref 0–0.5)
LYMPHOCYTES # BLD AUTO: 2.2 K/UL (ref 1–4.8)
LYMPHOCYTES NFR BLD: 17.3 % (ref 18–48)
MCH RBC QN AUTO: 32.6 PG (ref 27–31)
MCHC RBC AUTO-ENTMCNC: 33.1 G/DL (ref 32–36)
MCV RBC AUTO: 99 FL (ref 82–98)
MIN VOL: 15.1
MODE: ABNORMAL
MONOCYTES # BLD AUTO: 1.1 K/UL (ref 0.3–1)
MONOCYTES NFR BLD: 8.5 % (ref 4–15)
NEUTROPHILS # BLD AUTO: 9 K/UL (ref 1.8–7.7)
NEUTROPHILS NFR BLD: 72.7 % (ref 38–73)
NRBC BLD-RTO: 1 /100 WBC
NSE SERPL-MCNC: 9.4 NG/ML
PCO2 BLDA: 34.5 MMHG (ref 35–45)
PEEP: 5
PH SMN: 7.49 [PH] (ref 7.35–7.45)
PIP: 26
PLATELET # BLD AUTO: 148 K/UL (ref 150–350)
PMV BLD AUTO: 12.1 FL (ref 9.2–12.9)
PO2 BLDA: 105 MMHG (ref 80–100)
POC BE: 3 MMOL/L
POC SATURATED O2: 99 % (ref 95–100)
POC TCO2: 28 MMOL/L (ref 23–27)
POTASSIUM SERPL-SCNC: 3.7 MMOL/L (ref 3.5–5.1)
PROT SERPL-MCNC: 6.2 G/DL (ref 6–8.4)
RBC # BLD AUTO: 4.35 M/UL (ref 4.6–6.2)
SAMPLE: ABNORMAL
SITE: ABNORMAL
SODIUM SERPL-SCNC: 148 MMOL/L (ref 136–145)
SP02: 100
VT: 450
WBC # BLD AUTO: 12.41 K/UL (ref 3.9–12.7)

## 2020-01-25 PROCEDURE — 20000000 HC ICU ROOM

## 2020-01-25 PROCEDURE — 63600175 PHARM REV CODE 636 W HCPCS: Performed by: INTERNAL MEDICINE

## 2020-01-25 PROCEDURE — 99900035 HC TECH TIME PER 15 MIN (STAT)

## 2020-01-25 PROCEDURE — 80053 COMPREHEN METABOLIC PANEL: CPT

## 2020-01-25 PROCEDURE — 82803 BLOOD GASES ANY COMBINATION: CPT

## 2020-01-25 PROCEDURE — S0028 INJECTION, FAMOTIDINE, 20 MG: HCPCS | Performed by: HOSPITALIST

## 2020-01-25 PROCEDURE — 37799 UNLISTED PX VASCULAR SURGERY: CPT

## 2020-01-25 PROCEDURE — 25000003 PHARM REV CODE 250: Performed by: INTERNAL MEDICINE

## 2020-01-25 PROCEDURE — 27200966 HC CLOSED SUCTION SYSTEM

## 2020-01-25 PROCEDURE — 25000003 PHARM REV CODE 250: Performed by: HOSPITALIST

## 2020-01-25 PROCEDURE — 85025 COMPLETE CBC W/AUTO DIFF WBC: CPT

## 2020-01-25 PROCEDURE — 63600175 PHARM REV CODE 636 W HCPCS: Performed by: HOSPITALIST

## 2020-01-25 PROCEDURE — 94003 VENT MGMT INPAT SUBQ DAY: CPT

## 2020-01-25 RX ORDER — FUROSEMIDE 10 MG/ML
60 INJECTION INTRAMUSCULAR; INTRAVENOUS 2 TIMES DAILY
Status: COMPLETED | OUTPATIENT
Start: 2020-01-25 | End: 2020-01-26

## 2020-01-25 RX ORDER — FUROSEMIDE 10 MG/ML
40 INJECTION INTRAMUSCULAR; INTRAVENOUS ONCE
Status: COMPLETED | OUTPATIENT
Start: 2020-01-25 | End: 2020-01-25

## 2020-01-25 RX ADMIN — HYDRALAZINE HYDROCHLORIDE 10 MG: 20 INJECTION INTRAMUSCULAR; INTRAVENOUS at 09:01

## 2020-01-25 RX ADMIN — ENOXAPARIN SODIUM 80 MG: 100 INJECTION SUBCUTANEOUS at 10:01

## 2020-01-25 RX ADMIN — DEXMEDETOMIDINE HYDROCHLORIDE 0.9 MCG/KG/HR: 4 INJECTION, SOLUTION INTRAVENOUS at 08:01

## 2020-01-25 RX ADMIN — DEXMEDETOMIDINE HYDROCHLORIDE 0.2 MCG/KG/HR: 4 INJECTION, SOLUTION INTRAVENOUS at 08:01

## 2020-01-25 RX ADMIN — LORAZEPAM 1 MG: 2 INJECTION INTRAMUSCULAR at 07:01

## 2020-01-25 RX ADMIN — LORAZEPAM 1 MG: 2 INJECTION INTRAMUSCULAR at 11:01

## 2020-01-25 RX ADMIN — FENTANYL CITRATE 50 MCG: 50 INJECTION, SOLUTION INTRAMUSCULAR; INTRAVENOUS at 09:01

## 2020-01-25 RX ADMIN — CHLORHEXIDINE GLUCONATE 0.12% ORAL RINSE 15 ML: 1.2 LIQUID ORAL at 08:01

## 2020-01-25 RX ADMIN — FUROSEMIDE 60 MG: 10 INJECTION, SOLUTION INTRAMUSCULAR; INTRAVENOUS at 05:01

## 2020-01-25 RX ADMIN — LORAZEPAM 1 MG: 2 INJECTION INTRAMUSCULAR at 02:01

## 2020-01-25 RX ADMIN — FAMOTIDINE 20 MG: 10 INJECTION INTRAVENOUS at 08:01

## 2020-01-25 RX ADMIN — LORAZEPAM 1 MG: 2 INJECTION INTRAMUSCULAR at 05:01

## 2020-01-25 RX ADMIN — DEXMEDETOMIDINE HYDROCHLORIDE 0.6 MCG/KG/HR: 4 INJECTION, SOLUTION INTRAVENOUS at 03:01

## 2020-01-25 RX ADMIN — ASPIRIN 81 MG CHEWABLE TABLET 81 MG: 81 TABLET CHEWABLE at 08:01

## 2020-01-25 RX ADMIN — DEXMEDETOMIDINE HYDROCHLORIDE 1.4 MCG/KG/HR: 4 INJECTION, SOLUTION INTRAVENOUS at 04:01

## 2020-01-25 RX ADMIN — HYDRALAZINE HYDROCHLORIDE 10 MG: 20 INJECTION INTRAMUSCULAR; INTRAVENOUS at 02:01

## 2020-01-25 RX ADMIN — FUROSEMIDE 40 MG: 10 INJECTION, SOLUTION INTRAMUSCULAR; INTRAVENOUS at 10:01

## 2020-01-25 NOTE — CARE UPDATE
[facility]  ICU Multidisciplinary Bedside Rounds   SUMMARY     Date: 1/25/2020    Prehospitalization: Home  Admit Date / LOS : 1/20/2020/ 5 days    Diagnosis: Cardiac arrest    Consults:        Active: Cardio and Pulm CC       Needed: Nephro     Code Status: Full Code   Advanced Directive: <no information>    LDA: Art Line, Renner, PIV, TLC and Vent       Central Lines/Site/Justification:Patient Does Not Have Central Line       Urinary Cath/Order/Justification:Critically Ill in ICU    Vasopressors/Infusions:    dexmedetomidine (PRECEDEX) infusion 1.4 mcg/kg/hr (01/25/20 0500)          GOALS: Volume/ Hemodynamic: SBP < 200                     RASS: -2  light sedation, briefly awakes to voice (eye opening)    CAM ICU: Positive  Pain Management: none       Pain Controlled: not applicable     Rhythm: NSR    Respiratory Device: Vent    Vent Mode: PRVC  Oxygen Concentration (%):  [40] 40  Resp Rate Total:  [22 br/min-35 br/min] 22 br/min  Vt Set:  [450 mL] 450 mL  PEEP/CPAP:  [5 cmH20] 5 cmH20  Mean Airway Pressure:  [9.2 ngP44-89.4 cmH20] 13.4 cmH20             Most Recent SBT/ SAT: Fail       MOVE Screen:     VTE Prophylaxis: Mechanical  Mobility: Bedrest  Stress Ulcer Prophylaxis: Yes    Dietary: TF  Tolerance: yes  /  Advancement: yes goal at 50ml/hr    Isolation: No active isolations    Restraints: Yes    Significant Dates:  Post Op Date: N/A  Rescue Date: N/A  Imaging/ Diagnostics: N/A    Noteworthy Labs:     CBC/Anemia Labs: Coags:    Recent Labs   Lab 01/24/20  0330 01/25/20  0335   WBC 15.44* 12.41   HGB 13.6* 14.2   HCT 41.9 42.9    148*   * 99*   RDW 14.5 13.7    Recent Labs   Lab 01/22/20  0345 01/23/20  0529   INR 1.4* 1.3*   APTT 45.1* 36.0*        Chemistries:   Recent Labs   Lab 01/22/20  1301 01/22/20  1643  01/24/20  0330 01/25/20  0335    144   < > 146* 148*   K 4.4 4.3   < > 4.1 3.7    109   < > 111* 113*   CO2 20* 23   < > 28 24   BUN 30* 32*   < > 46* 46*    CREATININE 2.0* 2.0*   < > 2.5* 2.1*   CALCIUM 8.6* 8.7   < > 8.1* 8.5*   PROT 6.1 6.3   < > 5.8* 6.2   BILITOT 0.7 0.7   < > 0.5 0.6   ALKPHOS 52* 57   < > 41* 47*   * 256*   < > 125* 90*   * 156*   < > 36 36   MG 2.7* 2.9*  --   --   --    PHOS 6.1* 5.9*  --   --   --     < > = values in this interval not displayed.        Cardiac Enzymes: Ejection Fractions:    No results for input(s): CPK, CPKMB, MB, TROPONINI in the last 72 hours. No results found for: EF     POCT Glucose: HbA1c:    Recent Labs   Lab 01/21/20  1103 01/21/20  1615 01/21/20  1753   POCTGLUCOSE 238* 248* 210*    No results found for: HGBA1C     Needs from Care Team: neuro and psych     ICU LOS 4d 8h  Level of Care: Critical Care

## 2020-01-25 NOTE — PLAN OF CARE
Pt's niece that he lives with at bedside, reviewed today's POC with her.  Awaiting physician rounds.

## 2020-01-25 NOTE — SUBJECTIVE & OBJECTIVE
Interval History: following commands but easily agitated. Still with pulmonary edema.     Review of Systems   Unable to perform ROS: Intubated     Objective:     Vital Signs (Most Recent):  Temp: 100.1 °F (37.8 °C) (01/25/20 0715)  Pulse: 75 (01/25/20 1100)  Resp: (!) 22 (01/25/20 1100)  BP: 117/63 (01/25/20 1100)  SpO2: 99 % (01/25/20 1100) Vital Signs (24h Range):  Temp:  [99 °F (37.2 °C)-100.5 °F (38.1 °C)] 100.1 °F (37.8 °C)  Pulse:  [64-84] 75  Resp:  [0-24] 22  SpO2:  [91 %-100 %] 99 %  BP: (116-182)/() 117/63  Arterial Line BP: ()/() 95/48     Weight: 77.5 kg (170 lb 13.7 oz)  Body mass index is 27.58 kg/m².    Intake/Output Summary (Last 24 hours) at 1/25/2020 1129  Last data filed at 1/25/2020 1059  Gross per 24 hour   Intake 1474.21 ml   Output 2530 ml   Net -1055.79 ml      Physical Exam   Constitutional: He appears well-developed. No distress.   HENT:   ETT in place   Cardiovascular: Normal rate, regular rhythm and intact distal pulses.   Pulmonary/Chest: He has no rales.   On mechanical ventilation   Abdominal: Soft. He exhibits no distension.   Neurological:   Drowsy but easily awakens with verbal command and following commands   Skin: Skin is warm and dry. Capillary refill takes less than 2 seconds. He is not diaphoretic.   Nursing note and vitals reviewed.      Significant Labs: All pertinent labs within the past 24 hours have been reviewed.    Significant Imaging: I have reviewed all pertinent imaging results/findings within the past 24 hours.  I have reviewed and interpreted all pertinent imaging results/findings within the past 24 hours.

## 2020-01-25 NOTE — NURSING
"Patient temperature is currently 100.5 and blood pressure reading from arterial line readings are consistently abnormal and increasing SBP range from 169-181. Called EICU doctor for interventions. EICU nurse Luana stated, "usually cultures are done before tylenol and art line blood pressures seems ok."    Dr. Bernard camera in on the patient and observed blood pressure readings and medical interventions implemented. See orders. No cultures needed at this time.   "

## 2020-01-25 NOTE — ASSESSMENT & PLAN NOTE
Unknown cause but suspected to be due to profound hypoxia although not documented  ROSC achieved after 20 minutes of ACLS  Anoxic brain injury highly suspected at the time  Completed hypothermia protocol and seems to have acceptable neuro recovery  Better assessment once extubated   Still with pulmonary edema. Will continue with IV furosemide to hopefully extubate soon.   Monitor renal function closely  Sedation vacation and SBT in AM  Pulm on board for vent co-management

## 2020-01-25 NOTE — PLAN OF CARE
Pt remains on vent settings PRVC 22/450/+5/ FIO2 40% Ambu bag is at HOB and all alarms are set and working properly. Will continue to monitor.

## 2020-01-25 NOTE — EICU
"Call received from bedside RN to notify Dr Bernard of Temperature 100.5 F and "no parameters for temperature and no prn med for temp".  RN also said she called to notify Dr Bernard of pt's SBP "anywhere from 160's to 180's and also no prn med".  RN was asked if art line correlating w/ NIBP due to about  20 points difference between NIBP and art line at the time.  RN replied that pt had just been turned "his BP is higher because I just turned him but I need parameters and prn med for blood pressure too".   RN also was asked about whether or not  blood cultures were drawn and she was also informed  that usually blood cultures are drawn prior to tylenol order is given.  RN informed that Dr Bernard would be notified as soon as he becomes available and that RN was also requesting a call back from MD.  "

## 2020-01-25 NOTE — PROGRESS NOTES
[facility] Hospital Medicine  Progress Note    Patient Name: Marck Madison  MRN: [id number]  Patient Class: IP- Inpatient   Admission Date: 1/20/2020  Length of Stay: 5 days  Attending Physician: Winifred Lopez MD  Primary Care Provider: Primary Doctor No        Subjective:     Principal Problem:Cardiac arrest        HPI:  Marck Madison is a 59 y.o. male that (in part)  has a past medical history of Arrhythmia, CAD (coronary artery disease), CHF (congestive heart failure), NYHA class I, and Psychiatric disorder.  has a past surgical history that includes Liver surgery and Treatment of cardiac arrhythmia (9/12/2019). Presents to [facility] Emergency Department complaining of shortness of breath.  Subacute onset with progressive worsening.  Associated palpitations.  Severe dyspnea with exertion.  Denied fever chills.  Had a cough.  History of asthma and COPD.  Reports compliance with home medication regimen.  History is limited due to the current condition of the patient was intubated and sedated    In the emergency department routine laboratory studies and chest x-ray was obtained.  In EKG and cardiac enzymes as well.  He was found to be in atrial fibrillation rapid ventricular response.  Amiodarone infusion was initiated.  He became more hypoxemic and hypotensive.  It was noted he had erythema developing around the amiodarone infusion site.  This was discontinued.  He was started on diltiazem alternatively.  Remained hypoxemic despite increasing oxygen supplementation.  He underwent rapid sequence intubation.  He was brought to the ICU.      Shortly after transferring into the ICU bed he became bradycardic and pulse was lost.  ACLS protocol was initiated.  We performed approximately 6 rounds with epinephrine and started on a dobutamine infusion before obtaining return of spontaneous circulation.  Please see code sheet for details.  Central line and arterial lines were  placed.  Bedside echo was performed.  Case was discussed with Cardiology in detail.  Dr. Brunner to see the patient in the morning.  Cooling protocol initiated.    Hospital medicine has been asked to admit to inpatient for further evaluation and treatment.     Overview/Hospital Course:  No notes on file    Interval History: following commands but easily agitated. Still with pulmonary edema.     Review of Systems   Unable to perform ROS: Intubated     Objective:     Vital Signs (Most Recent):  Temp: 100.1 °F (37.8 °C) (01/25/20 0715)  Pulse: 75 (01/25/20 1100)  Resp: (!) 22 (01/25/20 1100)  BP: 117/63 (01/25/20 1100)  SpO2: 99 % (01/25/20 1100) Vital Signs (24h Range):  Temp:  [99 °F (37.2 °C)-100.5 °F (38.1 °C)] 100.1 °F (37.8 °C)  Pulse:  [64-84] 75  Resp:  [0-24] 22  SpO2:  [91 %-100 %] 99 %  BP: (116-182)/() 117/63  Arterial Line BP: ()/() 95/48     Weight: 77.5 kg (170 lb 13.7 oz)  Body mass index is 27.58 kg/m².    Intake/Output Summary (Last 24 hours) at 1/25/2020 1129  Last data filed at 1/25/2020 1059  Gross per 24 hour   Intake 1474.21 ml   Output 2530 ml   Net -1055.79 ml      Physical Exam   Constitutional: He appears well-developed. No distress.   HENT:   ETT in place   Cardiovascular: Normal rate, regular rhythm and intact distal pulses.   Pulmonary/Chest: He has no rales.   On mechanical ventilation   Abdominal: Soft. He exhibits no distension.   Neurological:   Drowsy but easily awakens with verbal command and following commands   Skin: Skin is warm and dry. Capillary refill takes less than 2 seconds. He is not diaphoretic.   Nursing note and vitals reviewed.      Significant Labs: All pertinent labs within the past 24 hours have been reviewed.    Significant Imaging: I have reviewed all pertinent imaging results/findings within the past 24 hours.  I have reviewed and interpreted all pertinent imaging results/findings within the past 24 hours.      Assessment/Plan:      * Cardiac  arrest  Unknown cause but suspected to be due to profound hypoxia although not documented  ROSC achieved after 20 minutes of ACLS  Anoxic brain injury highly suspected at the time  Completed hypothermia protocol and seems to have acceptable neuro recovery  Better assessment once extubated   Still with pulmonary edema. Will continue with IV furosemide to hopefully extubate soon.   Monitor renal function closely  Sedation vacation and SBT in AM  Pulm on board for vent co-management      Acute hepatitis  Likely shock liver vs alcoholic hepatitis vs both  Improving        Alcohol use disorder, severe, dependence  Reported by niece  States he drinks alcohol on a daily basis  Will discuss with patient when appropriate  Watch for seizures/withdrawals      Tobacco use disorder, severe, dependence  As reported by his niece and main caregiver  Will discuss with patient when appropriate      Atrial fibrillation with RVR  2/2 non compliance and/or alcohol use??  Rate controlled currently  Is on dopamine infusion  Cardiac monitoring  Cardiology on board  High CHADS VASc score. On anticoagulation       Acute systolic congestive heart failure  Trial of IV furosemide to prepare for extubation      CKD (chronic kidney disease)  Surprisingly at baseline  Monitor closely   Strict I/Os      Elevated troponin I level  Trop 0.4 with flat pattern  Echo pending  On ASA  Cannot do ACE-I, BB or statin due to hypotension, bradycardia and transaminitis respectively  Is on full dose lovenox  Cardiology on board for co-management    Atrial flutter        Acute hypoxemic respiratory failure  2/2 cardiac arrest      VTE Risk Mitigation (From admission, onward)         Ordered     enoxaparin injection 80 mg  Every 24 hours (non-standard times)      01/24/20 1757     IP VTE HIGH RISK PATIENT  Once      01/20/20 8828                Critical care time spent on the evaluation and treatment of severe organ dysfunction, review of pertinent labs and  imaging studies, discussions with consulting providers and discussions with patient/family: > 35 minutes.      Winifred Vences MD  Department of Hospital Medicine   [facility]

## 2020-01-26 LAB
ALBUMIN SERPL BCP-MCNC: 3 G/DL (ref 3.5–5.2)
ALLENS TEST: ABNORMAL
ALLENS TEST: ABNORMAL
ALP SERPL-CCNC: 68 U/L (ref 55–135)
ALT SERPL W/O P-5'-P-CCNC: 72 U/L (ref 10–44)
ANION GAP SERPL CALC-SCNC: 10 MMOL/L (ref 8–16)
AST SERPL-CCNC: 31 U/L (ref 10–40)
BASOPHILS # BLD AUTO: 0.03 K/UL (ref 0–0.2)
BASOPHILS NFR BLD: 0.3 % (ref 0–1.9)
BILIRUB SERPL-MCNC: 0.5 MG/DL (ref 0.1–1)
BUN SERPL-MCNC: 38 MG/DL (ref 8–23)
CALCIUM SERPL-MCNC: 8.9 MG/DL (ref 8.7–10.5)
CHLORIDE SERPL-SCNC: 110 MMOL/L (ref 95–110)
CO2 SERPL-SCNC: 30 MMOL/L (ref 23–29)
CREAT SERPL-MCNC: 1.9 MG/DL (ref 0.5–1.4)
DELSYS: ABNORMAL
DELSYS: ABNORMAL
DIFFERENTIAL METHOD: ABNORMAL
EOSINOPHIL # BLD AUTO: 0.2 K/UL (ref 0–0.5)
EOSINOPHIL NFR BLD: 1.6 % (ref 0–8)
ERYTHROCYTE [DISTWIDTH] IN BLOOD BY AUTOMATED COUNT: 14 % (ref 11.5–14.5)
ERYTHROCYTE [SEDIMENTATION RATE] IN BLOOD BY WESTERGREN METHOD: 18 MM/H
ERYTHROCYTE [SEDIMENTATION RATE] IN BLOOD BY WESTERGREN METHOD: 8 MM/H
EST. GFR  (AFRICAN AMERICAN): 43 ML/MIN/1.73 M^2
EST. GFR  (NON AFRICAN AMERICAN): 37 ML/MIN/1.73 M^2
FIO2: 30
FIO2: 38
FLOW: 8
GLUCOSE SERPL-MCNC: 170 MG/DL (ref 70–110)
HCO3 UR-SCNC: 28.7 MMOL/L (ref 24–28)
HCO3 UR-SCNC: 29.8 MMOL/L (ref 24–28)
HCT VFR BLD AUTO: 42.8 % (ref 40–54)
HGB BLD-MCNC: 13.9 G/DL (ref 14–18)
IMM GRANULOCYTES # BLD AUTO: 0.13 K/UL (ref 0–0.04)
IMM GRANULOCYTES NFR BLD AUTO: 1.4 % (ref 0–0.5)
LYMPHOCYTES # BLD AUTO: 1.2 K/UL (ref 1–4.8)
LYMPHOCYTES NFR BLD: 12.5 % (ref 18–48)
MCH RBC QN AUTO: 32.6 PG (ref 27–31)
MCHC RBC AUTO-ENTMCNC: 32.5 G/DL (ref 32–36)
MCV RBC AUTO: 100 FL (ref 82–98)
MIN VOL: 8.5
MODE: ABNORMAL
MODE: ABNORMAL
MONOCYTES # BLD AUTO: 0.9 K/UL (ref 0.3–1)
MONOCYTES NFR BLD: 9.5 % (ref 4–15)
NEUTROPHILS # BLD AUTO: 7.1 K/UL (ref 1.8–7.7)
NEUTROPHILS NFR BLD: 74.7 % (ref 38–73)
NRBC BLD-RTO: 1 /100 WBC
PCO2 BLDA: 35.4 MMHG (ref 35–45)
PCO2 BLDA: 40.8 MMHG (ref 35–45)
PEEP: 5
PH SMN: 7.47 [PH] (ref 7.35–7.45)
PH SMN: 7.52 [PH] (ref 7.35–7.45)
PIP: 17
PLATELET # BLD AUTO: 139 K/UL (ref 150–350)
PMV BLD AUTO: 12.6 FL (ref 9.2–12.9)
PO2 BLDA: 71 MMHG (ref 80–100)
PO2 BLDA: 91 MMHG (ref 80–100)
POC BE: 6 MMOL/L
POC BE: 6 MMOL/L
POC SATURATED O2: 96 % (ref 95–100)
POC SATURATED O2: 98 % (ref 95–100)
POC TCO2: 30 MMOL/L (ref 23–27)
POC TCO2: 31 MMOL/L (ref 23–27)
POTASSIUM SERPL-SCNC: 3.5 MMOL/L (ref 3.5–5.1)
PROT SERPL-MCNC: 6.2 G/DL (ref 6–8.4)
RBC # BLD AUTO: 4.27 M/UL (ref 4.6–6.2)
SAMPLE: ABNORMAL
SAMPLE: ABNORMAL
SITE: ABNORMAL
SITE: ABNORMAL
SODIUM SERPL-SCNC: 150 MMOL/L (ref 136–145)
SP02: 98
SP02: 99
VT: 450
WBC # BLD AUTO: 9.51 K/UL (ref 3.9–12.7)

## 2020-01-26 PROCEDURE — 25000003 PHARM REV CODE 250: Performed by: INTERNAL MEDICINE

## 2020-01-26 PROCEDURE — 82803 BLOOD GASES ANY COMBINATION: CPT

## 2020-01-26 PROCEDURE — 99900035 HC TECH TIME PER 15 MIN (STAT)

## 2020-01-26 PROCEDURE — 99291 CRITICAL CARE FIRST HOUR: CPT | Mod: ,,, | Performed by: INTERNAL MEDICINE

## 2020-01-26 PROCEDURE — 94761 N-INVAS EAR/PLS OXIMETRY MLT: CPT

## 2020-01-26 PROCEDURE — 63600175 PHARM REV CODE 636 W HCPCS: Performed by: INTERNAL MEDICINE

## 2020-01-26 PROCEDURE — 25000003 PHARM REV CODE 250: Performed by: HOSPITALIST

## 2020-01-26 PROCEDURE — 27000221 HC OXYGEN, UP TO 24 HOURS

## 2020-01-26 PROCEDURE — 63600175 PHARM REV CODE 636 W HCPCS: Performed by: HOSPITALIST

## 2020-01-26 PROCEDURE — 99291 PR CRITICAL CARE, E/M 30-74 MINUTES: ICD-10-PCS | Mod: ,,, | Performed by: INTERNAL MEDICINE

## 2020-01-26 PROCEDURE — 80053 COMPREHEN METABOLIC PANEL: CPT

## 2020-01-26 PROCEDURE — 37799 UNLISTED PX VASCULAR SURGERY: CPT

## 2020-01-26 PROCEDURE — 20000000 HC ICU ROOM

## 2020-01-26 PROCEDURE — S0028 INJECTION, FAMOTIDINE, 20 MG: HCPCS | Performed by: HOSPITALIST

## 2020-01-26 PROCEDURE — 85025 COMPLETE CBC W/AUTO DIFF WBC: CPT

## 2020-01-26 PROCEDURE — 94003 VENT MGMT INPAT SUBQ DAY: CPT

## 2020-01-26 PROCEDURE — 36415 COLL VENOUS BLD VENIPUNCTURE: CPT

## 2020-01-26 RX ADMIN — DEXMEDETOMIDINE HYDROCHLORIDE 1.2 MCG/KG/HR: 4 INJECTION, SOLUTION INTRAVENOUS at 04:01

## 2020-01-26 RX ADMIN — DEXMEDETOMIDINE HYDROCHLORIDE 1.4 MCG/KG/HR: 4 INJECTION, SOLUTION INTRAVENOUS at 07:01

## 2020-01-26 RX ADMIN — FUROSEMIDE 60 MG: 10 INJECTION, SOLUTION INTRAMUSCULAR; INTRAVENOUS at 08:01

## 2020-01-26 RX ADMIN — FENTANYL CITRATE 50 MCG: 50 INJECTION, SOLUTION INTRAMUSCULAR; INTRAVENOUS at 07:01

## 2020-01-26 RX ADMIN — FENTANYL CITRATE 50 MCG: 50 INJECTION, SOLUTION INTRAMUSCULAR; INTRAVENOUS at 04:01

## 2020-01-26 RX ADMIN — FAMOTIDINE 20 MG: 10 INJECTION INTRAVENOUS at 08:01

## 2020-01-26 RX ADMIN — FENTANYL CITRATE 50 MCG: 50 INJECTION, SOLUTION INTRAMUSCULAR; INTRAVENOUS at 02:01

## 2020-01-26 RX ADMIN — LORAZEPAM 1 MG: 2 INJECTION INTRAMUSCULAR at 03:01

## 2020-01-26 RX ADMIN — LORAZEPAM 1 MG: 2 INJECTION INTRAMUSCULAR at 06:01

## 2020-01-26 RX ADMIN — CHLORHEXIDINE GLUCONATE 0.12% ORAL RINSE 15 ML: 1.2 LIQUID ORAL at 08:01

## 2020-01-26 RX ADMIN — ENOXAPARIN SODIUM 80 MG: 100 INJECTION SUBCUTANEOUS at 10:01

## 2020-01-26 RX ADMIN — DEXMEDETOMIDINE HYDROCHLORIDE 1.4 MCG/KG/HR: 4 INJECTION, SOLUTION INTRAVENOUS at 10:01

## 2020-01-26 RX ADMIN — DEXMEDETOMIDINE HYDROCHLORIDE 1.3 MCG/KG/HR: 4 INJECTION, SOLUTION INTRAVENOUS at 12:01

## 2020-01-26 RX ADMIN — ASPIRIN 81 MG CHEWABLE TABLET 81 MG: 81 TABLET CHEWABLE at 08:01

## 2020-01-26 RX ADMIN — DEXMEDETOMIDINE HYDROCHLORIDE 1.1 MCG/KG/HR: 4 INJECTION, SOLUTION INTRAVENOUS at 04:01

## 2020-01-26 RX ADMIN — LORAZEPAM 1 MG: 2 INJECTION INTRAMUSCULAR at 09:01

## 2020-01-26 RX ADMIN — DEXMEDETOMIDINE HYDROCHLORIDE 0.8 MCG/KG/HR: 4 INJECTION, SOLUTION INTRAVENOUS at 10:01

## 2020-01-26 NOTE — SUBJECTIVE & OBJECTIVE
Interval History: failed T piece during SBT    Review of Systems   Unable to perform ROS: Intubated     Objective:     Vital Signs (Most Recent):  Temp: 98 °F (36.7 °C) (01/26/20 1511)  Pulse: 80 (01/26/20 1615)  Resp: (!) 21 (01/26/20 1615)  BP: (!) 150/83 (01/26/20 1500)  SpO2: 100 % (01/26/20 1615) Vital Signs (24h Range):  Temp:  [98 °F (36.7 °C)-99.2 °F (37.3 °C)] 98 °F (36.7 °C)  Pulse:  [57-83] 80  Resp:  [16-29] 21  SpO2:  [94 %-100 %] 100 %  BP: (101-150)/(58-87) 150/83  Arterial Line BP: ()/(47-81) 161/81     Weight: 77.5 kg (170 lb 13.7 oz)  Body mass index is 27.58 kg/m².    Intake/Output Summary (Last 24 hours) at 1/26/2020 1646  Last data filed at 1/26/2020 1617  Gross per 24 hour   Intake 1407.8 ml   Output 3910 ml   Net -2502.2 ml      Physical Exam   Constitutional: He appears well-developed. No distress.   HENT:   ETT in place   Cardiovascular: Normal rate, regular rhythm and intact distal pulses.   Pulmonary/Chest: He has no rales.   On mechanical ventilation   Abdominal: Soft. He exhibits no distension.   Neurological:   Drowsy but easily awakens with verbal command and following commands   Skin: Skin is warm and dry. Capillary refill takes less than 2 seconds. He is not diaphoretic.   Nursing note and vitals reviewed.      Significant Labs: All pertinent labs within the past 24 hours have been reviewed.    Significant Imaging: I have reviewed all pertinent imaging results/findings within the past 24 hours.  I have reviewed and interpreted all pertinent imaging results/findings within the past 24 hours.

## 2020-01-26 NOTE — PLAN OF CARE
Pt is on vent settings PRVC 18/450/+5/ FIO2 30% Ambu bag is at HOB and all alarms are set and working properly. Will continue to monitor.

## 2020-01-26 NOTE — PROGRESS NOTES
[facility] Hospital Medicine  Progress Note    Patient Name: Marck Madison  MRN: [id number]  Patient Class: IP- Inpatient   Admission Date: 1/20/2020  Length of Stay: 6 days  Attending Physician: Winifred Lopez MD  Primary Care Provider: Primary Doctor No        Subjective:     Principal Problem:Cardiac arrest        HPI:  Marck Madison is a 59 y.o. male that (in part)  has a past medical history of Arrhythmia, CAD (coronary artery disease), CHF (congestive heart failure), NYHA class I, and Psychiatric disorder.  has a past surgical history that includes Liver surgery and Treatment of cardiac arrhythmia (9/12/2019). Presents to [facility] Emergency Department complaining of shortness of breath.  Subacute onset with progressive worsening.  Associated palpitations.  Severe dyspnea with exertion.  Denied fever chills.  Had a cough.  History of asthma and COPD.  Reports compliance with home medication regimen.  History is limited due to the current condition of the patient was intubated and sedated    In the emergency department routine laboratory studies and chest x-ray was obtained.  In EKG and cardiac enzymes as well.  He was found to be in atrial fibrillation rapid ventricular response.  Amiodarone infusion was initiated.  He became more hypoxemic and hypotensive.  It was noted he had erythema developing around the amiodarone infusion site.  This was discontinued.  He was started on diltiazem alternatively.  Remained hypoxemic despite increasing oxygen supplementation.  He underwent rapid sequence intubation.  He was brought to the ICU.      Shortly after transferring into the ICU bed he became bradycardic and pulse was lost.  ACLS protocol was initiated.  We performed approximately 6 rounds with epinephrine and started on a dobutamine infusion before obtaining return of spontaneous circulation.  Please see code sheet for details.  Central line and arterial lines were  placed.  Bedside echo was performed.  Case was discussed with Cardiology in detail.  Dr. Brunner to see the patient in the morning.  Cooling protocol initiated.    Hospital medicine has been asked to admit to inpatient for further evaluation and treatment.     Overview/Hospital Course:  No notes on file    Interval History: failed T piece during SBT    Review of Systems   Unable to perform ROS: Intubated     Objective:     Vital Signs (Most Recent):  Temp: 98 °F (36.7 °C) (01/26/20 1511)  Pulse: 80 (01/26/20 1615)  Resp: (!) 21 (01/26/20 1615)  BP: (!) 150/83 (01/26/20 1500)  SpO2: 100 % (01/26/20 1615) Vital Signs (24h Range):  Temp:  [98 °F (36.7 °C)-99.2 °F (37.3 °C)] 98 °F (36.7 °C)  Pulse:  [57-83] 80  Resp:  [16-29] 21  SpO2:  [94 %-100 %] 100 %  BP: (101-150)/(58-87) 150/83  Arterial Line BP: ()/(47-81) 161/81     Weight: 77.5 kg (170 lb 13.7 oz)  Body mass index is 27.58 kg/m².    Intake/Output Summary (Last 24 hours) at 1/26/2020 1646  Last data filed at 1/26/2020 1617  Gross per 24 hour   Intake 1407.8 ml   Output 3910 ml   Net -2502.2 ml      Physical Exam   Constitutional: He appears well-developed. No distress.   HENT:   ETT in place   Cardiovascular: Normal rate, regular rhythm and intact distal pulses.   Pulmonary/Chest: He has no rales.   On mechanical ventilation   Abdominal: Soft. He exhibits no distension.   Neurological:   Drowsy but easily awakens with verbal command and following commands   Skin: Skin is warm and dry. Capillary refill takes less than 2 seconds. He is not diaphoretic.   Nursing note and vitals reviewed.      Significant Labs: All pertinent labs within the past 24 hours have been reviewed.    Significant Imaging: I have reviewed all pertinent imaging results/findings within the past 24 hours.  I have reviewed and interpreted all pertinent imaging results/findings within the past 24 hours.      Assessment/Plan:      * Cardiac arrest  Unknown cause but suspected to be due to  profound hypoxia although not documented  ROSC achieved after 20 minutes of ACLS  Anoxic brain injury highly suspected at the time  Completed hypothermia protocol and seems to have acceptable neuro recovery  Better assessment once extubated   Pulmonary edema better and net negative after aggressive diuresis  Failed T-piece during SBT this AM  Sedation vacation and SBT in AM  Resume tube feeds and add free water for hypernatremia  Pulm on board for vent co-management      Acute hepatitis  Likely shock liver vs alcoholic hepatitis vs both  Improving        Alcohol use disorder, severe, dependence  Reported by niece  States he drinks alcohol on a daily basis  Will discuss with patient when appropriate  Watch for seizures/withdrawals      Tobacco use disorder, severe, dependence  As reported by his niece and main caregiver  Will discuss with patient when appropriate      Atrial fibrillation with RVR  2/2 non compliance and/or alcohol use??  Rate controlled currently  Is on dopamine infusion  Cardiac monitoring  Cardiology on board  High CHADS VASc score. On anticoagulation       Acute systolic congestive heart failure  Trial of IV furosemide to prepare for extubation      CKD (chronic kidney disease)  Surprisingly at baseline  Monitor closely   Strict I/Os      Elevated troponin I level  Trop 0.4 with flat pattern  Echo pending  On ASA  Cannot do ACE-I, BB or statin due to hypotension, bradycardia and transaminitis respectively  Is on full dose lovenox  Cardiology on board for co-management    Atrial flutter        Acute hypoxemic respiratory failure  2/2 cardiac arrest      VTE Risk Mitigation (From admission, onward)         Ordered     enoxaparin injection 80 mg  Every 24 hours (non-standard times)      01/24/20 1757     IP VTE HIGH RISK PATIENT  Once      01/20/20 8098                Critical care time spent on the evaluation and treatment of severe organ dysfunction, review of pertinent labs and imaging studies,  discussions with consulting providers and discussions with patient/family: > 35 minutes.      Winifred Vences MD  Department of Hospital Medicine   [facility]

## 2020-01-26 NOTE — PLAN OF CARE
Plan of care reviewed. No falls/injuires this shift. Skin assessed. T-piece breathing trial attempted, pt failed. Education provided to family at bedside. Sedation titrated for agitation. Precedex at 1 mcg/kg/h, prn medication also provided. Tolerating Tube feeding. H2O flushes added. Tubings changed. X2 piv's intact. Afebrile.

## 2020-01-26 NOTE — ASSESSMENT & PLAN NOTE
Unknown cause but suspected to be due to profound hypoxia although not documented  ROSC achieved after 20 minutes of ACLS  Anoxic brain injury highly suspected at the time  Completed hypothermia protocol and seems to have acceptable neuro recovery  Better assessment once extubated   Pulmonary edema better and net negative after aggressive diuresis  Failed T-piece during SBT this AM  Sedation vacation and SBT in AM  Resume tube feeds and add free water for hypernatremia  Pulm on board for vent co-management

## 2020-01-26 NOTE — SUBJECTIVE & OBJECTIVE
Interval History: ***    Review of Systems  Objective:     Vital Signs (Most Recent):  Temp: 98.2 °F (36.8 °C) (01/26/20 1115)  Pulse: 75 (01/26/20 1333)  Resp: 20 (01/26/20 1333)  BP: (!) 147/81 (01/26/20 1300)  SpO2: 97 % (01/26/20 1333) Vital Signs (24h Range):  Temp:  [98 °F (36.7 °C)-99.9 °F (37.7 °C)] 98.2 °F (36.8 °C)  Pulse:  [57-83] 75  Resp:  [18-29] 20  SpO2:  [94 %-100 %] 97 %  BP: (101-147)/(58-87) 147/81  Arterial Line BP: ()/(47-81) 135/74     Weight: 77.5 kg (170 lb 13.7 oz)  Body mass index is 27.58 kg/m².    Intake/Output Summary (Last 24 hours) at 1/26/2020 1410  Last data filed at 1/26/2020 1300  Gross per 24 hour   Intake 808.49 ml   Output 4630 ml   Net -3821.51 ml      Physical Exam    Significant Labs: {Results:56036}    Significant Imaging: {Imaging Review:41040}

## 2020-01-26 NOTE — PROGRESS NOTES
1300 SAT/SBT initiated on t-piece. PT became Tachypnic rr 40's and then RR began to decrease to 7 rr/min, period of apnea, and then improved to 7 then 11 rr/min. RT and RN remained at bedside throughout trial. RR shallow and using abdominal muscles. Niece at bedside as well for trial, education provided on breathing patterns.  1350 MD Leroy on unit and updated of results of trial and ABG.

## 2020-01-26 NOTE — CARE UPDATE
[facility]  ICU Multidisciplinary Bedside Rounds   SUMMARY     Date: 1/26/2020    Prehospitalization: Home  Admit Date / LOS : 1/20/2020/ 6 days    Diagnosis: Cardiac arrest    Consults:        Active: Cardio, Pulm CC and RD       Needed: SW     Code Status: Full Code   Advanced Directive: <no information>    LDA: Renner, OG, PICC and Vent       Central Lines/Site/Justification:Patient Does Not Have Central Line       Urinary Cath/Order/Justification:Critically Ill in ICU    Vasopressors/Infusions:    dexmedetomidine (PRECEDEX) infusion 1.203 mcg/kg/hr (01/26/20 0600)          GOALS: Volume/ Hemodynamic: SBP <200                     RASS: -2  light sedation, briefly awakes to voice (eye opening)    CAM ICU: Positive  Pain Management: none       Pain Controlled: not applicable     Rhythm: NSR    Respiratory Device: Vent    Vent Mode: PRVC  Oxygen Concentration (%):  [] 30  Resp Rate Total:  [17 br/min-43 br/min] 18 br/min  Vt Set:  [450 mL] 450 mL  PEEP/CPAP:  [5 cmH20] 5 cmH20  Pressure Support:  [10 cmH20] 10 cmH20  Mean Airway Pressure:  [7.9 bmD22-58.3 cmH20] 7.9 cmH20             Most Recent SBT/ SAT: Did not perform       MOVE Screen: PASS    VTE Prophylaxis: Pharm and Mechanical  Mobility: Bedrest  Stress Ulcer Prophylaxis: Yes    Dietary: TF  Tolerance: yes  /  Advancement: @ goal    Isolation: No active isolations    Restraints: Yes    Noteworthy Labs:  BUN 38, Creat 1.9, ALT 72, Na+ 150    CBC/Anemia Labs: Coags:    Recent Labs   Lab 01/25/20  0335 01/26/20  0430   WBC 12.41 9.51   HGB 14.2 13.9*   HCT 42.9 42.8   * 139*   MCV 99* 100*   RDW 13.7 14.0    Recent Labs   Lab 01/22/20  0345 01/23/20  0529   INR 1.4* 1.3*   APTT 45.1* 36.0*        Chemistries:   Recent Labs   Lab 01/22/20  1301 01/22/20  1643  01/25/20  0335 01/26/20  0424    144   < > 148* 150*   K 4.4 4.3   < > 3.7 3.5    109   < > 113* 110   CO2 20* 23   < > 24 30*   BUN 30* 32*   < > 46* 38*    CREATININE 2.0* 2.0*   < > 2.1* 1.9*   CALCIUM 8.6* 8.7   < > 8.5* 8.9   PROT 6.1 6.3   < > 6.2 6.2   BILITOT 0.7 0.7   < > 0.6 0.5   ALKPHOS 52* 57   < > 47* 68   * 256*   < > 90* 72*   * 156*   < > 36 31   MG 2.7* 2.9*  --   --   --    PHOS 6.1* 5.9*  --   --   --     < > = values in this interval not displayed.        Cardiac Enzymes: Ejection Fractions:    No results for input(s): CPK, CPKMB, MB, TROPONINI in the last 72 hours. No results found for: EF     POCT Glucose: HbA1c:    Recent Labs   Lab 01/21/20  1103 01/21/20  1615 01/21/20  1753   POCTGLUCOSE 238* 248* 210*    No results found for: HGBA1C     Needs from Care Team: SAT/SBT every AM, SW consult, LAPOST when extubated?, Repeat CXR? Free water flushes?     ICU LOS 5d 9h  Level of Care: Critical Care

## 2020-01-26 NOTE — CARE UPDATE
[facility]  ICU Multidisciplinary Bedside Rounds     UPDATE     Date: 1/26/2020      Plan of care reviewed with the following, Nurse, Charge Nurse, Physician and Resp. Therapist.       Needs/ Goals for the day: Breathing trial today. Goals of care discussion in future once pt extubated. Na 150.       Level of Care: Critical Care

## 2020-01-27 PROBLEM — E87.0 HYPERNATREMIA: Status: ACTIVE | Noted: 2020-01-27

## 2020-01-27 LAB
ALBUMIN SERPL BCP-MCNC: 3 G/DL (ref 3.5–5.2)
ALLENS TEST: ABNORMAL
ALP SERPL-CCNC: 67 U/L (ref 55–135)
ALT SERPL W/O P-5'-P-CCNC: 55 U/L (ref 10–44)
ANION GAP SERPL CALC-SCNC: 9 MMOL/L (ref 8–16)
AST SERPL-CCNC: 28 U/L (ref 10–40)
BASOPHILS # BLD AUTO: 0.02 K/UL (ref 0–0.2)
BASOPHILS NFR BLD: 0.2 % (ref 0–1.9)
BILIRUB SERPL-MCNC: 0.4 MG/DL (ref 0.1–1)
BUN SERPL-MCNC: 32 MG/DL (ref 8–23)
CALCIUM SERPL-MCNC: 9 MG/DL (ref 8.7–10.5)
CHLORIDE SERPL-SCNC: 111 MMOL/L (ref 95–110)
CO2 SERPL-SCNC: 30 MMOL/L (ref 23–29)
CREAT SERPL-MCNC: 1.6 MG/DL (ref 0.5–1.4)
DELSYS: ABNORMAL
DIFFERENTIAL METHOD: ABNORMAL
EOSINOPHIL # BLD AUTO: 0.2 K/UL (ref 0–0.5)
EOSINOPHIL NFR BLD: 1.7 % (ref 0–8)
ERYTHROCYTE [DISTWIDTH] IN BLOOD BY AUTOMATED COUNT: 14.1 % (ref 11.5–14.5)
ERYTHROCYTE [SEDIMENTATION RATE] IN BLOOD BY WESTERGREN METHOD: 18 MM/H
EST. GFR  (AFRICAN AMERICAN): 53 ML/MIN/1.73 M^2
EST. GFR  (NON AFRICAN AMERICAN): 46 ML/MIN/1.73 M^2
FIO2: 30
GLUCOSE SERPL-MCNC: 181 MG/DL (ref 70–110)
HCO3 UR-SCNC: 29.3 MMOL/L (ref 24–28)
HCT VFR BLD AUTO: 40.9 % (ref 40–54)
HGB BLD-MCNC: 13.1 G/DL (ref 14–18)
IMM GRANULOCYTES # BLD AUTO: 0.09 K/UL (ref 0–0.04)
IMM GRANULOCYTES NFR BLD AUTO: 1 % (ref 0–0.5)
LYMPHOCYTES # BLD AUTO: 1.2 K/UL (ref 1–4.8)
LYMPHOCYTES NFR BLD: 12.9 % (ref 18–48)
MCH RBC QN AUTO: 32.4 PG (ref 27–31)
MCHC RBC AUTO-ENTMCNC: 32 G/DL (ref 32–36)
MCV RBC AUTO: 101 FL (ref 82–98)
MIN VOL: 9
MODE: ABNORMAL
MONOCYTES # BLD AUTO: 1 K/UL (ref 0.3–1)
MONOCYTES NFR BLD: 10.2 % (ref 4–15)
NEUTROPHILS # BLD AUTO: 6.9 K/UL (ref 1.8–7.7)
NEUTROPHILS NFR BLD: 74 % (ref 38–73)
NRBC BLD-RTO: 0 /100 WBC
PCO2 BLDA: 35.1 MMHG (ref 35–45)
PEEP: 5
PH SMN: 7.53 [PH] (ref 7.35–7.45)
PIP: 19
PLATELET # BLD AUTO: 138 K/UL (ref 150–350)
PMV BLD AUTO: 12.5 FL (ref 9.2–12.9)
PO2 BLDA: 84 MMHG (ref 80–100)
POC BE: 6 MMOL/L
POC SATURATED O2: 97 % (ref 95–100)
POC TCO2: 30 MMOL/L (ref 23–27)
POTASSIUM SERPL-SCNC: 3.5 MMOL/L (ref 3.5–5.1)
PROT SERPL-MCNC: 6.2 G/DL (ref 6–8.4)
RBC # BLD AUTO: 4.04 M/UL (ref 4.6–6.2)
SAMPLE: ABNORMAL
SITE: ABNORMAL
SODIUM SERPL-SCNC: 150 MMOL/L (ref 136–145)
SP02: 96
VT: 450
WBC # BLD AUTO: 9.31 K/UL (ref 3.9–12.7)

## 2020-01-27 PROCEDURE — 20000000 HC ICU ROOM

## 2020-01-27 PROCEDURE — 63600175 PHARM REV CODE 636 W HCPCS: Performed by: INTERNAL MEDICINE

## 2020-01-27 PROCEDURE — 99900017 HC EXTUBATION W/PARAMETERS (STAT)

## 2020-01-27 PROCEDURE — 63600175 PHARM REV CODE 636 W HCPCS: Performed by: HOSPITALIST

## 2020-01-27 PROCEDURE — 94660 CPAP INITIATION&MGMT: CPT

## 2020-01-27 PROCEDURE — 25000003 PHARM REV CODE 250: Performed by: INTERNAL MEDICINE

## 2020-01-27 PROCEDURE — 37799 UNLISTED PX VASCULAR SURGERY: CPT

## 2020-01-27 PROCEDURE — S0028 INJECTION, FAMOTIDINE, 20 MG: HCPCS | Performed by: HOSPITALIST

## 2020-01-27 PROCEDURE — 25000003 PHARM REV CODE 250: Performed by: HOSPITALIST

## 2020-01-27 PROCEDURE — 27000221 HC OXYGEN, UP TO 24 HOURS

## 2020-01-27 PROCEDURE — 36415 COLL VENOUS BLD VENIPUNCTURE: CPT

## 2020-01-27 PROCEDURE — 99291 CRITICAL CARE FIRST HOUR: CPT | Mod: ,,, | Performed by: PHYSICIAN ASSISTANT

## 2020-01-27 PROCEDURE — 94761 N-INVAS EAR/PLS OXIMETRY MLT: CPT

## 2020-01-27 PROCEDURE — 85025 COMPLETE CBC W/AUTO DIFF WBC: CPT

## 2020-01-27 PROCEDURE — 82803 BLOOD GASES ANY COMBINATION: CPT

## 2020-01-27 PROCEDURE — 99900035 HC TECH TIME PER 15 MIN (STAT)

## 2020-01-27 PROCEDURE — 99291 PR CRITICAL CARE, E/M 30-74 MINUTES: ICD-10-PCS | Mod: ,,, | Performed by: PHYSICIAN ASSISTANT

## 2020-01-27 PROCEDURE — 80053 COMPREHEN METABOLIC PANEL: CPT

## 2020-01-27 RX ORDER — ENOXAPARIN SODIUM 100 MG/ML
75 INJECTION SUBCUTANEOUS
Status: DISCONTINUED | OUTPATIENT
Start: 2020-01-27 | End: 2020-01-30

## 2020-01-27 RX ORDER — NICARDIPINE HYDROCHLORIDE 0.2 MG/ML
2.5 INJECTION INTRAVENOUS CONTINUOUS
Status: DISCONTINUED | OUTPATIENT
Start: 2020-01-27 | End: 2020-01-28

## 2020-01-27 RX ADMIN — ASPIRIN 81 MG CHEWABLE TABLET 81 MG: 81 TABLET CHEWABLE at 10:01

## 2020-01-27 RX ADMIN — NICARDIPINE HYDROCHLORIDE 2.5 MG/HR: 0.2 INJECTION, SOLUTION INTRAVENOUS at 11:01

## 2020-01-27 RX ADMIN — DEXMEDETOMIDINE HYDROCHLORIDE 1.4 MCG/KG/HR: 4 INJECTION, SOLUTION INTRAVENOUS at 02:01

## 2020-01-27 RX ADMIN — ENOXAPARIN SODIUM 80 MG: 100 INJECTION SUBCUTANEOUS at 11:01

## 2020-01-27 RX ADMIN — NICARDIPINE HYDROCHLORIDE 5 MG/HR: 0.2 INJECTION, SOLUTION INTRAVENOUS at 11:01

## 2020-01-27 RX ADMIN — FENTANYL CITRATE 50 MCG: 50 INJECTION, SOLUTION INTRAMUSCULAR; INTRAVENOUS at 01:01

## 2020-01-27 RX ADMIN — LORAZEPAM 1 MG: 2 INJECTION INTRAMUSCULAR at 02:01

## 2020-01-27 RX ADMIN — CHLORHEXIDINE GLUCONATE 0.12% ORAL RINSE 15 ML: 1.2 LIQUID ORAL at 10:01

## 2020-01-27 RX ADMIN — DEXMEDETOMIDINE HYDROCHLORIDE 1.4 MCG/KG/HR: 4 INJECTION, SOLUTION INTRAVENOUS at 05:01

## 2020-01-27 RX ADMIN — DEXMEDETOMIDINE HYDROCHLORIDE 0.7 MCG/KG/HR: 4 INJECTION, SOLUTION INTRAVENOUS at 10:01

## 2020-01-27 RX ADMIN — FAMOTIDINE 20 MG: 10 INJECTION INTRAVENOUS at 10:01

## 2020-01-27 NOTE — PLAN OF CARE
Pt recieved intubated on Servo i ventilator with the following settings;. PRVC/ 450 TV/ 18 RR/ +5 Peep/ 30% FI02. Alarms are set and functioning, Ambu bag at bedside, Pt without distress RT will continue to monitor and wean as tolerated.     10:11 Pt placed on CPAP 5/5 30% FI02 spontaneous breathing trial    12:12 RT called to extubate Pt to BiPap 15/5 30% FI02    15:45 RT called to place pt on NC

## 2020-01-27 NOTE — PT/OT/SLP PROGRESS
Physical Therapy      Patient Name:  Marck Madison   MRN:  [id number]    PT orders for ROM protocol while intubated cancelled per MD.     Sarai Austin, PT

## 2020-01-27 NOTE — HOSPITAL COURSE
1/26 =>  Mr. Madison remains critically ill and requires ongoing life support for acute respiratory failure.  He is responsive and gestures toward the endotracheal tube to request extubation.  He is tolerating decreased levels of ventilatory support, but on CPAP he develops bradypnea/apnea that triggers the ventilator to resume control ventilation.  To further assess his readiness (or not) for extubation, we attempted T piece without ventilatory support.  After about 26 minutes, he developed apnea and some distress.  POCT arterial blood gas showed acceptable gas exchange nonetheless.  Given these findings, I elected to continue with SIMV (5) and PSV 10 in hopes of preparing for near-term extubation.  I was notified by the bedside team of ventilator alarming due to low minute ventilation.  We will resume PRVC support overnight.    Ventilator settings => On sedation with dexmedetomidine (Precedex) at 0.8 mcg/kg/hour.    Intubation Date Vent Day PBW Mode Set Rate Pt Rate TV (ml/kg) FiO2 PEEP Ppeak Pplat   1/20 at 19:00 pm 6 63.8 kg PRVC 18  450 (7.1) 30% 5         Cumulative Fluid Balance is - 7.0 liters since ICU admission.    Culture Results:  Negative    · Present CXR shows some haziness in the right base consistent with edema, atelectasis, or infiltrate.

## 2020-01-27 NOTE — ASSESSMENT & PLAN NOTE
Suspect a result of hypoxia as no SpO2 reading available with otherwise stable vital signs just prior to arrest     · ROSC following 22 min ACLS on 1/20/2020 around 19:00 pm  · Targeted temperature management completed and now awake and alert on spontaneous awakening trials.

## 2020-01-27 NOTE — ASSESSMENT & PLAN NOTE
Suspect a result of hypoxia as no SpO2 reading available with otherwise stable vital signs just prior to arrest     --ROSC following 22 min ACLS on 1/20/2020 around 19:00 pm  --Targeted temperature management completed and now awake and alert on spontaneous awakening trials.

## 2020-01-27 NOTE — PROGRESS NOTES
[facility] Hospital Medicine  Progress Note    Patient Name: Marck Madison  MRN: [id number]  Patient Class: IP- Inpatient   Admission Date: 1/20/2020  Length of Stay: 7 days  Attending Physician: Winifred Lopez MD  Primary Care Provider: Primary Doctor No        Subjective:     Principal Problem:Cardiac arrest        HPI:  Marck Madison is a 59 y.o. male that (in part)  has a past medical history of Arrhythmia, CAD (coronary artery disease), CHF (congestive heart failure), NYHA class I, and Psychiatric disorder.  has a past surgical history that includes Liver surgery and Treatment of cardiac arrhythmia (9/12/2019). Presents to [facility] Emergency Department complaining of shortness of breath.  Subacute onset with progressive worsening.  Associated palpitations.  Severe dyspnea with exertion.  Denied fever chills.  Had a cough.  History of asthma and COPD.  Reports compliance with home medication regimen.  History is limited due to the current condition of the patient was intubated and sedated    In the emergency department routine laboratory studies and chest x-ray was obtained.  In EKG and cardiac enzymes as well.  He was found to be in atrial fibrillation rapid ventricular response.  Amiodarone infusion was initiated.  He became more hypoxemic and hypotensive.  It was noted he had erythema developing around the amiodarone infusion site.  This was discontinued.  He was started on diltiazem alternatively.  Remained hypoxemic despite increasing oxygen supplementation.  He underwent rapid sequence intubation.  He was brought to the ICU.      Shortly after transferring into the ICU bed he became bradycardic and pulse was lost.  ACLS protocol was initiated.  We performed approximately 6 rounds with epinephrine and started on a dobutamine infusion before obtaining return of spontaneous circulation.  Please see code sheet for details.  Central line and arterial lines were  placed.  Bedside echo was performed.  Case was discussed with Cardiology in detail.  Dr. Brunner to see the patient in the morning.  Cooling protocol initiated.    Hospital medicine has been asked to admit to inpatient for further evaluation and treatment.     Overview/Hospital Course:  Mr Madison presented with acute hypoxemic respiratory failure and atrial fibrillation with RVR which is a recurrent issue for patient. He was initiated on amiodarone infusion in the emergency department but was dc'd due to erythema around infusion site. Diltiazem infusion initiated instead. Patient became progressively more hypoxemic despite adjusting O2 delivery. Required rapid sequence intubation and transferred to ICU. Shortly after arriving to ICU, patient became bradycardic which progressed to cardiac arrest. Required 6 rounds of epinephrine and about 20 minutes of ACLS prior to achieving ROSC. Had poor neuro response therefore initiated on hypothermia protocol. Further laboratory studies revealed acute hepatitis, lactic acid 5.7, acute renal failure and new systolic heart failure with pulmonary edema. Patient's TBil and liver enzymes improved with supportive treatment. Based on niece's reports, this is likely alcohol induced since patient drink heavy alcohol on a daily basis. Renal function also improved with IV diuresis. Patient alert, awake and following commands once rewarmed. Extubation difficult due to  increased work of breathing during spontaneous trials previously.  Currently with evidence of periodic breathing (not quite typical for [place]) with episodes of bradypnea/apnea that limit weaning efforts. Failed T-piece trial on 1/26.     Interval History: renal function better. Good UOP. Sodium still 150 however on just a bit of water via OG tube. Is net negative 6.6 L. Hypertensive, especially during awakening trials.     Review of Systems   Unable to perform ROS: Intubated     Objective:     Vital Signs (Most  Recent):  Temp: 99.5 °F (37.5 °C) (01/27/20 0305)  Pulse: 68 (01/27/20 0900)  Resp: 18 (01/27/20 0900)  BP: (!) 163/93 (01/27/20 0900)  SpO2: 100 % (01/27/20 0900) Vital Signs (24h Range):  Temp:  [98 °F (36.7 °C)-99.5 °F (37.5 °C)] 99.5 °F (37.5 °C)  Pulse:  [64-80] 68  Resp:  [14-26] 18  SpO2:  [91 %-100 %] 100 %  BP: (116-163)/(67-93) 163/93  Arterial Line BP: (128-168)/(66-83) 155/75     Weight: 77.5 kg (170 lb 13.7 oz)  Body mass index is 27.58 kg/m².    Intake/Output Summary (Last 24 hours) at 1/27/2020 1037  Last data filed at 1/27/2020 0957  Gross per 24 hour   Intake 1917.11 ml   Output 1765 ml   Net 152.11 ml      Physical Exam   Constitutional: He appears well-developed. No distress.   HENT:   ETT in place   Cardiovascular: Normal rate, regular rhythm and intact distal pulses.   Pulmonary/Chest: He has no rales.   On mechanical ventilation   Abdominal: Soft. He exhibits no distension.   Neurological:   Drowsy but easily awakens with verbal command and following commands   Skin: Skin is warm and dry. Capillary refill takes less than 2 seconds. He is not diaphoretic.   Nursing note and vitals reviewed.      Significant Labs: All pertinent labs within the past 24 hours have been reviewed.    Significant Imaging: I have reviewed all pertinent imaging results/findings within the past 24 hours.  I have reviewed and interpreted all pertinent imaging results/findings within the past 24 hours.      Assessment/Plan:      * Cardiac arrest  Unknown cause but suspected to be due to profound hypoxia although not documented  ROSC achieved after 20 minutes of ACLS  Anoxic brain injury highly suspected at the time  Completed hypothermia protocol and seems to have acceptable neuro recovery  Better assessment once extubated   Pulmonary edema better and about 6.6L net negative after aggressive diuresis   Failed T-piece during SBT yesterday in AM  Sedation vacation and SBT today  Resume tube feeds and increase free water for  hypernatremia if fails SBT again today  Pulm on board for vent co-management      Atrial fibrillation with RVR  2/2 non compliance and/or alcohol use??  Rate controlled currently  Is on dopamine infusion  Cardiac monitoring  Cardiology on board  High CHADS VASc score. On anticoagulation       Acute systolic congestive heart failure  Net negative 6.6 L with aggressive intermittent IV diuresis  Good UOP on his own and renal function better  On minimal O2 requirements and no rales on exam today  Cardene drip for better BP control as HTN can results with flash pulm edema      Acute hypoxemic respiratory failure  2/2 cardiac arrest      On mechanically assisted ventilation  On day # 8      Elevated troponin I level  Trop 0.4 with flat pattern  Echo with systolic HF which is new for patient  On ASA  Cannot do ACE-I due to renal dysfunction, BB due to medication induced bradycardia (precedex) or statin due to transaminitis (improving)  Is on full dose lovenox  Cardiology on board for co-management    Acute hepatitis  Likely shock liver vs alcoholic hepatitis vs both  Improving        Alcohol use disorder, severe, dependence  Reported by niece  States he drinks alcohol on a daily basis  Will discuss with patient when appropriate  Watch for seizures/withdrawals      Tobacco use disorder, severe, dependence  As reported by his niece and main caregiver  Will discuss with patient when appropriate      CKD (chronic kidney disease)  Surprisingly at baseline  Monitor closely   Strict I/Os      VTE Risk Mitigation (From admission, onward)         Ordered     enoxaparin injection 80 mg  Every 12 hours (non-standard times)      01/27/20 1023     IP VTE HIGH RISK PATIENT  Once      01/20/20 4719                Critical care time spent on the evaluation and treatment of severe organ dysfunction, review of pertinent labs and imaging studies, discussions with consulting providers and discussions with patient/family: > 35  minutes.      Winifred Vences MD  Department of Hospital Medicine   [facility]

## 2020-01-27 NOTE — CARE UPDATE
[facility]  ICU Multidisciplinary Bedside Rounds   SUMMARY     Date: 1/27/2020    Prehospitalization: Home  Admit Date / LOS : 1/20/2020/ 7 days    Diagnosis: Cardiac arrest    Consults:        Active: Cardio, Pulm CC and RD       Needed: SW     Code Status: Full Code   Advanced Directive: <no information>    LDA: Renner, OG, PIV and Vent       Central Lines/Site/Justification:Patient Does Not Have Central Line       Urinary Cath/Order/Justification:Critically Ill in ICU    Vasopressors/Infusions:    dexmedetomidine (PRECEDEX) infusion 1.399 mcg/kg/hr (01/27/20 0400)          GOALS: Volume/ Hemodynamic: SBP <200                     RASS: -2  light sedation, briefly awakes to voice (eye opening)    CAM ICU: Positive  Pain Management: IV       Pain Controlled: yes     Rhythm: NSR    Respiratory Device: Vent    Vent Mode: PRVC  Oxygen Concentration (%):  [30] 30  Resp Rate Total:  [9 br/min-29 br/min] 18 br/min  Vt Set:  [370 mL-450 mL] 450 mL  PEEP/CPAP:  [5 cmH20] 5 cmH20  Pressure Support:  [10 cmH20] 10 cmH20  Mean Airway Pressure:  [2.6 cmH20-9.6 cmH20] 8.2 cmH20             Most Recent SBT/ SAT: Did not perform       MOVE Screen: PASS    VTE Prophylaxis: Pharm and Mechanical  Mobility: Bedrest  Stress Ulcer Prophylaxis: Yes    Dietary: TF  Tolerance: yes  /  Advancement: @ goal    Isolation: No active isolations    Restraints: Yes    Noteworthy Labs:  Na 150, Glucose 181    CBC/Anemia Labs: Coags:    Recent Labs   Lab 01/26/20  0430 01/27/20  0325   WBC 9.51 9.31   HGB 13.9* 13.1*   HCT 42.8 40.9   * 138*   * 101*   RDW 14.0 14.1    Recent Labs   Lab 01/22/20  0345 01/23/20  0529   INR 1.4* 1.3*   APTT 45.1* 36.0*        Chemistries:   Recent Labs   Lab 01/22/20  1301 01/22/20  1643  01/26/20  0424 01/27/20  0325    144   < > 150* 150*   K 4.4 4.3   < > 3.5 3.5    109   < > 110 111*   CO2 20* 23   < > 30* 30*   BUN 30* 32*   < > 38* 32*   CREATININE 2.0* 2.0*   < >  1.9* 1.6*   CALCIUM 8.6* 8.7   < > 8.9 9.0   PROT 6.1 6.3   < > 6.2 6.2   BILITOT 0.7 0.7   < > 0.5 0.4   ALKPHOS 52* 57   < > 68 67   * 256*   < > 72* 55*   * 156*   < > 31 28   MG 2.7* 2.9*  --   --   --    PHOS 6.1* 5.9*  --   --   --     < > = values in this interval not displayed.        Cardiac Enzymes: Ejection Fractions:    No results for input(s): CPK, CPKMB, MB, TROPONINI in the last 72 hours. No results found for: EF     POCT Glucose: HbA1c:    Recent Labs   Lab 01/21/20  1103 01/21/20  1615 01/21/20  1753   POCTGLUCOSE 238* 248* 210*    No results found for: HGBA1C     Needs from Care Team: accu checks/ SSI, Daily SAT/SBT, wean precedex, wean vent     ICU LOS 6d 7h  Level of Care: Critical Care

## 2020-01-27 NOTE — ASSESSMENT & PLAN NOTE
Unknown cause but suspected to be due to profound hypoxia although not documented  ROSC achieved after 20 minutes of ACLS  Anoxic brain injury highly suspected at the time  Completed hypothermia protocol and seems to have acceptable neuro recovery  Better assessment once extubated   Pulmonary edema better and about 6.6L net negative after aggressive diuresis   Failed T-piece during SBT yesterday in AM  Sedation vacation and SBT today  Resume tube feeds and increase free water for hypernatremia if fails SBT again today  Pulm on board for vent co-management

## 2020-01-27 NOTE — PROGRESS NOTES
[facility]  Pulmonology  Progress Note    Patient Name: Marck Madison  MRN: [id number]  Admission Date: 1/20/2020  Hospital Length of Stay: 7 days  Code Status: Full Code  Attending Provider: Winifred Lopez MD  Primary Care Provider: Primary Doctor No   Principal Problem: Cardiac arrest    Subjective:     Interval History: No acute events overnight. Remains on minimal mechanical ventilator support. Sedated with precedex 1.4 mcg/kg/min. Passed SAT/SBT and extubated to BIPAP this AM.     Objective:     Vital Signs (Most Recent):  Temp: 99.5 °F (37.5 °C) (01/27/20 0305)  Pulse: 68 (01/27/20 0900)  Resp: 18 (01/27/20 0900)  BP: (!) 163/93 (01/27/20 0900)  SpO2: 100 % (01/27/20 0900) Vital Signs (24h Range):  Temp:  [98 °F (36.7 °C)-99.5 °F (37.5 °C)] 99.5 °F (37.5 °C)  Pulse:  [64-80] 68  Resp:  [14-26] 18  SpO2:  [91 %-100 %] 100 %  BP: (116-163)/(67-93) 163/93  Arterial Line BP: (128-168)/(66-83) 155/75     Weight: 77.5 kg (170 lb 13.7 oz)  Body mass index is 27.58 kg/m².      Intake/Output Summary (Last 24 hours) at 1/27/2020 1020  Last data filed at 1/27/2020 0957  Gross per 24 hour   Intake 1917.11 ml   Output 1765 ml   Net 152.11 ml       Physical Exam   Constitutional: He appears well-developed and well-nourished. He is intubated and restrained.   HENT:   Head: Normocephalic and atraumatic.   Eyes: Pupils are equal, round, and reactive to light. EOM are normal.   Neck: No tracheal deviation present. No thyromegaly present.   Cardiovascular: Normal rate and regular rhythm. Exam reveals no gallop and no friction rub.   No murmur heard.  Pulmonary/Chest: Effort normal and breath sounds normal. He is intubated. He has no decreased breath sounds. He has no wheezes. He has no rhonchi. He has no rales.   Abdominal: Soft. Bowel sounds are normal. There is no tenderness.   Musculoskeletal: Normal range of motion.   Neurological: No sensory deficit.   Spontaneously opens eyes to voice. Following commands.  Intermittently answering yes/no questions. PERRLA. Non-focal.    Skin: Skin is warm and dry.       Vents:  Vent Mode: PRVC (01/27/20 0842)  Ventilator Initiated: Yes (01/20/20 2100)  Set Rate: 18 bmp (01/27/20 0842)  Vt Set: 450 mL (01/27/20 0842)  Pressure Support: 10 cmH20 (01/26/20 1721)  PEEP/CPAP: 5 cmH20 (01/27/20 0842)  Oxygen Concentration (%): 30 (01/27/20 0900)  Peak Airway Pressure: 18.9 cmH2O (01/27/20 0842)  Total Ve: 8.5 mL (01/27/20 0842)  F/VT Ratio<105 (RSBI): (!) 38.14 (01/27/20 0842)    Lines/Drains/Airways     Drain                 NG/OG Tube 01/20/20 1906 Collier sump 18 Fr. Right mouth 6 days         Urethral Catheter 01/20/20 1925 Coude 16 Fr. 6 days          Airway                 Airway - Non-Surgical 01/20/20 1857 Endotracheal Tube 6 days          Arterial Line                 Arterial Line 01/20/20 2345 Left Radial 6 days          Peripheral Intravenous Line                 Peripheral IV - Single Lumen 01/25/20 1800 20 G Posterior;Right Forearm 1 day         Peripheral IV - Single Lumen 01/26/20 1500 20 G Left;Posterior Hand less than 1 day                Significant Labs:    CBC/Anemia Profile:  Recent Labs   Lab 01/26/20  0430 01/27/20  0325   WBC 9.51 9.31   HGB 13.9* 13.1*   HCT 42.8 40.9   * 138*   * 101*   RDW 14.0 14.1        Chemistries:  Recent Labs   Lab 01/26/20  0424 01/27/20  0325   * 150*   K 3.5 3.5    111*   CO2 30* 30*   BUN 38* 32*   CREATININE 1.9* 1.6*   CALCIUM 8.9 9.0   ALBUMIN 3.0* 3.0*   PROT 6.2 6.2   BILITOT 0.5 0.4   ALKPHOS 68 67   ALT 72* 55*   AST 31 28       All pertinent labs within the past 24 hours have been reviewed.    Significant Imaging:  I have reviewed and interpreted all pertinent imaging results/findings within the past 24 hours.    Assessment/Plan:     * Cardiac arrest  Suspect a result of hypoxia as no SpO2 reading available with otherwise stable vital signs just prior to arrest     --ROSC following 22 min ACLS on  1/20/2020 around 19:00 pm  --Targeted temperature management completed and now awake and alert on spontaneous awakening trials.    On mechanically assisted ventilation  See respiratory failure    Acute systolic congestive heart failure  Echo from 1/21 with EF 35% and moderate dysfunction in mitral and tricuspid valves with LV hypertrophy    --Continue diuresis as needed    CKD (chronic kidney disease)  Renal function is returning to normal baseline of creatinine 1.8    Acute hypoxemic respiratory failure  Intubated for worsening hypoxia on admission followed by PEA arrest. CXR with persistent bilateral haziness of the lower lung fields. Patient with a mildly elevated BNP in 600s on admission with known HFrEF (35%).    --Received aggressive diuresis; net negative 7L  --Passed SAT/SBT this AM  --Extubated to bipap 1/27/2020  --Bipap qhs and PRN for respiratory distress      Discussed with Dr. Pleitez.     Family and patient updated at the bedside.     Critical Care Time: 45 minutes  Critical care was time spent personally by me on the following activities: evaluating this patient's organ dysfunction, development of treatment plan, discussing treatment plan with patient or surrogate and bedside caregivers, discussions with consultants, evaluation of patient's response to treatment, examination of patient, ordering and performing treatments and interventions, ordering and review of laboratory studies, ordering and review of radiographic studies, re-evaluation of patient's condition. This critical care time did not overlap with that of any other provider or involve time for any procedures.       Kell Rivera PA-C  Pulmonology  [facility]

## 2020-01-27 NOTE — HOSPITAL COURSE
Mr. Madison presented with acute hypoxemic respiratory failure and atrial fibrillation with RVR which is a recurrent issue for the patient. He was initiated on amiodarone infusion in the emergency department but this was dc'd due to erythema around infusion site. Diltiazem infusion initiated instead. Patient became progressively more hypoxemic despite adjusting O2 delivery. Required rapid sequence intubation and transferred to ICU. Shortly after arriving to ICU, patient became bradycardic which progressed to cardiac arrest. Required 6 rounds of epinephrine and about 20 minutes of ACLS prior to achieving ROSC. Had poor neurological response therefore initiated on hypothermia protocol. Further laboratory studies revealed acute hepatitis, lactic acid 5.7, acute renal failure and new systolic heart failure with pulmonary edema. Patient's TBil and liver enzymes improved with supportive treatment. Based on niece's reports, this is likely alcohol induced since patient drinks heavy alcohol on a daily basis. Renal function also improved with IV diuresis. Patient was alert, awake and following commands once rewarmed. Extubation was difficult due to increased work of breathing during spontaneous trials previously.  Extubated on 1/27/20 to NC.    Transferred to the Med/Surg floor on 1/28/2020  Remained stable overnight   Continued treatment for Afib, Cardiomyopathy  Worked with PT and OT

## 2020-01-27 NOTE — SUBJECTIVE & OBJECTIVE
Interval History: renal function better. Good UOP. Sodium still 150 however on just a bit of water via OG tube. Is net negative 6.6 L. Hypertensive, especially during awakening trials.     Review of Systems   Unable to perform ROS: Intubated     Objective:     Vital Signs (Most Recent):  Temp: 99.5 °F (37.5 °C) (01/27/20 0305)  Pulse: 68 (01/27/20 0900)  Resp: 18 (01/27/20 0900)  BP: (!) 163/93 (01/27/20 0900)  SpO2: 100 % (01/27/20 0900) Vital Signs (24h Range):  Temp:  [98 °F (36.7 °C)-99.5 °F (37.5 °C)] 99.5 °F (37.5 °C)  Pulse:  [64-80] 68  Resp:  [14-26] 18  SpO2:  [91 %-100 %] 100 %  BP: (116-163)/(67-93) 163/93  Arterial Line BP: (128-168)/(66-83) 155/75     Weight: 77.5 kg (170 lb 13.7 oz)  Body mass index is 27.58 kg/m².    Intake/Output Summary (Last 24 hours) at 1/27/2020 1037  Last data filed at 1/27/2020 0957  Gross per 24 hour   Intake 1917.11 ml   Output 1765 ml   Net 152.11 ml      Physical Exam   Constitutional: He appears well-developed. No distress.   HENT:   ETT in place   Cardiovascular: Normal rate, regular rhythm and intact distal pulses.   Pulmonary/Chest: He has no rales.   On mechanical ventilation   Abdominal: Soft. He exhibits no distension.   Neurological:   Drowsy but easily awakens with verbal command and following commands   Skin: Skin is warm and dry. Capillary refill takes less than 2 seconds. He is not diaphoretic.   Nursing note and vitals reviewed.      Significant Labs: All pertinent labs within the past 24 hours have been reviewed.    Significant Imaging: I have reviewed all pertinent imaging results/findings within the past 24 hours.  I have reviewed and interpreted all pertinent imaging results/findings within the past 24 hours.

## 2020-01-27 NOTE — SUBJECTIVE & OBJECTIVE
Interval History:   1/26 =>  Mr. Madison remains critically ill and requires ongoing life support for acute respiratory failure.  He is responsive and gestures toward the endotracheal tube to request extubation.  He is tolerating decreased levels of ventilatory support, but on CPAP he develops bradypnea/apnea that triggers the ventilator to resume control ventilation.  To further assess his readiness (or not) for extubation, we attempted T piece without ventilatory support.  After about 26 minutes, he developed apnea and some distress.  POCT arterial blood gas showed acceptable gas exchange nonetheless.  Given these findings, I elected to continue with SIMV (5) and PSV 10 in hopes of preparing for near-term extubation.  I was notified by the bedside team of ventilator alarming due to low minute ventilation.  We will resume PRVC support overnight.    Ventilator settings => On sedation with dexmedetomidine (Precedex) at 0.8 mcg/kg/hour.    Intubation Date Vent Day PBW Mode Set Rate Pt Rate TV (ml/kg) FiO2 PEEP Ppeak Pplat   1/20 at 19:00 pm 6 63.8 kg PRVC 18  450 (7.1) 30% 5         Cumulative Fluid Balance is - 7.0 liters since ICU admission.    Culture Results:  Negative    · Present CXR shows some haziness in the right base consistent with edema, atelectasis, or infiltrate.         Objective:     Vital Signs (Most Recent):  Temp: 98.6 °F (37 °C) (01/26/20 1915)  Pulse: 66 (01/26/20 2000)  Resp: 18 (01/26/20 2000)  BP: (!) 156/84 (01/26/20 2000)  SpO2: 100 % (01/26/20 2000) Vital Signs (24h Range):  Temp:  [98 °F (36.7 °C)-99.2 °F (37.3 °C)] 98.6 °F (37 °C)  Pulse:  [57-83] 66  Resp:  [14-29] 18  SpO2:  [94 %-100 %] 100 %  BP: (115-162)/(65-87) 156/84  Arterial Line BP: (105-168)/(61-83) 149/76     Weight: 77.5 kg (170 lb 13.7 oz)  Body mass index is 27.58 kg/m².      Intake/Output Summary (Last 24 hours) at 1/26/2020 2041  Last data filed at 1/26/2020 2000  Gross per 24 hour   Intake 1823.08 ml   Output 2820 ml   Net  -996.92 ml       Physical Exam   HENT:   Orally intubated   Cardiovascular: Normal rate, regular rhythm and normal heart sounds. Exam reveals no gallop.   No murmur heard.  Pulmonary/Chest: No respiratory distress. He has no wheezes. He has no rales.   Abdominal: Soft. Bowel sounds are normal. He exhibits no distension. There is no tenderness. There is no rebound.   Musculoskeletal: He exhibits no edema.   Neurological: He is alert.   On sedation.   Nursing note and vitals reviewed.      Vents:  Vent Mode: PRVC (01/26/20 1938)  Ventilator Initiated: Yes (01/20/20 2100)  Set Rate: 18 bmp (01/26/20 1938)  Vt Set: 450 mL (01/26/20 1938)  Pressure Support: 10 cmH20 (01/26/20 1721)  PEEP/CPAP: 5 cmH20 (01/26/20 1938)  Oxygen Concentration (%): 30 (01/26/20 2000)  Peak Airway Pressure: 18.3 cmH2O (01/26/20 1938)  Total Ve: 8.4 mL (01/26/20 1938)  F/VT Ratio<105 (RSBI): (!) 38.22 (01/26/20 1938)    Lines/Drains/Airways     Drain                 NG/OG Tube 01/20/20 1906 [place] sump 18 Fr. Right mouth 6 days         Urethral Catheter 01/20/20 1925 [place] 16 Fr. 6 days          Airway                 Airway - Non-Surgical 01/20/20 1857 Endotracheal Tube 6 days          Arterial Line                 Arterial Line 01/20/20 2345 Left Radial 5 days          Peripheral Intravenous Line                 Peripheral IV - Single Lumen 01/25/20 1800 20 G Posterior;Right Forearm 1 day         Peripheral IV - Single Lumen 01/26/20 1500 20 G Left;Posterior Hand less than 1 day                Significant Labs:    CBC/Anemia Profile:  Recent Labs   Lab 01/25/20  0335 01/26/20  0430   WBC 12.41 9.51   HGB 14.2 13.9*   HCT 42.9 42.8   * 139*   MCV 99* 100*   RDW 13.7 14.0        Chemistries:  Recent Labs   Lab 01/25/20  0335 01/26/20  0424   * 150*   K 3.7 3.5   * 110   CO2 24 30*   BUN 46* 38*   CREATININE 2.1* 1.9*   CALCIUM 8.5* 8.9   ALBUMIN 3.0* 3.0*   PROT 6.2 6.2   BILITOT 0.6 0.5   ALKPHOS 47* 68   ALT 90* 72*   AST 36  31       Results for GOPI JACOBO (MRN [id number])    1/25/2020 04:40 1/26/2020 05:00 1/26/2020 13:26   POC pH 7.493 (H) 7.471 (H) 7.516 (H)   POC PCO2 34.5 (L) 40.8 35.4   POC PO2 105 (H) 91 71 (L)   POC BE 3 6 6   POC HCO3 26.5 29.8 (H) 28.7 (H)   POC SAO2 99 98 96   POC TCO2 28 (H) 31 (H) 30 (H)   FiO2 40 30 38   Vt 450 450    PiP 26 17    PEEP 5 5    Flow   8   Mode AC/PRVC AC/PRVC T-Piece   Rate 22 18 8       All pertinent labs within the past 24 hours have been reviewed.    Significant Imaging:  I have reviewed all pertinent imaging results/findings within the past 24 hours.

## 2020-01-27 NOTE — NURSING
Spoke to Bruce Guzmán RN informed Dr. Kaufman of pt's Troponin of 1.4. Md says no cards consult needed at this time. Repeat Lactic added for AM labs. Will continue to monitor pt

## 2020-01-27 NOTE — PLAN OF CARE
Recommendations     1. Increase free water flushes to 250 mL QID to provide a total of 1910 mL free fl b/w free water in TF & flushes.    2. Continue w/ Glucerna 1.5 @ 50 mL/hr    3. Advance diet when medically able s/p extubation (if no swallowing difficulty detected) to Cardiac/Consistent Carbohydrate     Goals: TF initiation or diet advancement w/in 48 hrs  Nutrition Goal Status: goal met

## 2020-01-27 NOTE — ASSESSMENT & PLAN NOTE
Continued ventilator dependence with increased work of breathing during spontaneous trials previously.  Currently with evidence of periodic breathing (not quite typical for Cheyne-Dixon) with episodes of bradypnea/apnea that limit weaning efforts.  T-piece trial today with acceptable gas exchange but inconsistent respiratory effort that resulted in early termination of the trial after 26 minutes.    · Continue diuresis efforts.  · Full support ventilation overnight with goal of spontaneous trial in am.  · Consider repeat T-piece of longer duration with direct Pulmonary supervision.

## 2020-01-27 NOTE — PROGRESS NOTES
[facility]  Pulmonology  Progress Note    Patient Name: Marck Madison  MRN: [id number]  Admission Date: 1/20/2020  Hospital Length of Stay: 6 days  Code Status: Full Code  Attending Provider: Winifred Lopez MD  Primary Care Provider: Primary Doctor No   Principal Problem: Cardiac arrest    Subjective:     Interval History:   1/26 =>  Mr. Madison remains critically ill and requires ongoing life support for acute respiratory failure.  He is responsive and gestures toward the endotracheal tube to request extubation.  He is tolerating decreased levels of ventilatory support, but on CPAP he develops bradypnea/apnea that triggers the ventilator to resume control ventilation.  To further assess his readiness (or not) for extubation, we attempted T piece without ventilatory support.  After about 26 minutes, he developed apnea and some distress.  POCT arterial blood gas showed acceptable gas exchange nonetheless.  Given these findings, I elected to continue with SIMV (5) and PSV 10 in hopes of preparing for near-term extubation.  I was notified by the bedside team of ventilator alarming due to low minute ventilation.  We will resume PRVC support overnight.    Ventilator settings => On sedation with dexmedetomidine (Precedex) at 0.8 mcg/kg/hour.    Intubation Date Vent Day PBW Mode Set Rate Pt Rate TV (ml/kg) FiO2 PEEP Ppeak Pplat   1/20 at 19:00 pm 6 63.8 kg PRVC 18  450 (7.1) 30% 5         Cumulative Fluid Balance is - 7.0 liters since ICU admission.    Culture Results:  Negative    · Present CXR shows some haziness in the right base consistent with edema, atelectasis, or infiltrate.         Objective:     Vital Signs (Most Recent):  Temp: 98.6 °F (37 °C) (01/26/20 1915)  Pulse: 66 (01/26/20 2000)  Resp: 18 (01/26/20 2000)  BP: (!) 156/84 (01/26/20 2000)  SpO2: 100 % (01/26/20 2000) Vital Signs (24h Range):  Temp:  [98 °F (36.7 °C)-99.2 °F (37.3 °C)] 98.6 °F (37 °C)  Pulse:  [57-83] 66  Resp:  [14-29]  18  SpO2:  [94 %-100 %] 100 %  BP: (115-162)/(65-87) 156/84  Arterial Line BP: (105-168)/(61-83) 149/76     Weight: 77.5 kg (170 lb 13.7 oz)  Body mass index is 27.58 kg/m².      Intake/Output Summary (Last 24 hours) at 1/26/2020 2041  Last data filed at 1/26/2020 2000  Gross per 24 hour   Intake 1823.08 ml   Output 2820 ml   Net -996.92 ml       Physical Exam   HENT:   Orally intubated   Cardiovascular: Normal rate, regular rhythm and normal heart sounds. Exam reveals no gallop.   No murmur heard.  Pulmonary/Chest: No respiratory distress. He has no wheezes. He has no rales.   Abdominal: Soft. Bowel sounds are normal. He exhibits no distension. There is no tenderness. There is no rebound.   Musculoskeletal: He exhibits no edema.   Neurological: He is alert.   On sedation.   Nursing note and vitals reviewed.      Vents:  Vent Mode: PRVC (01/26/20 1938)  Ventilator Initiated: Yes (01/20/20 2100)  Set Rate: 18 bmp (01/26/20 1938)  Vt Set: 450 mL (01/26/20 1938)  Pressure Support: 10 cmH20 (01/26/20 1721)  PEEP/CPAP: 5 cmH20 (01/26/20 1938)  Oxygen Concentration (%): 30 (01/26/20 2000)  Peak Airway Pressure: 18.3 cmH2O (01/26/20 1938)  Total Ve: 8.4 mL (01/26/20 1938)  F/VT Ratio<105 (RSBI): (!) 38.22 (01/26/20 1938)    Lines/Drains/Airways     Drain                 NG/OG Tube 01/20/20 1906 [place] sump 18 Fr. Right mouth 6 days         Urethral Catheter 01/20/20 1925 Coude 16 Fr. 6 days          Airway                 Airway - Non-Surgical 01/20/20 1857 Endotracheal Tube 6 days          Arterial Line                 Arterial Line 01/20/20 2345 Left Radial 5 days          Peripheral Intravenous Line                 Peripheral IV - Single Lumen 01/25/20 1800 20 G Posterior;Right Forearm 1 day         Peripheral IV - Single Lumen 01/26/20 1500 20 G Left;Posterior Hand less than 1 day                Significant Labs:    CBC/Anemia Profile:  Recent Labs   Lab 01/25/20  0335 01/26/20  0430   WBC 12.41 9.51   HGB 14.2 13.9*    HCT 42.9 42.8   * 139*   MCV 99* 100*   RDW 13.7 14.0        Chemistries:  Recent Labs   Lab 01/25/20  0335 01/26/20  0424   * 150*   K 3.7 3.5   * 110   CO2 24 30*   BUN 46* 38*   CREATININE 2.1* 1.9*   CALCIUM 8.5* 8.9   ALBUMIN 3.0* 3.0*   PROT 6.2 6.2   BILITOT 0.6 0.5   ALKPHOS 47* 68   ALT 90* 72*   AST 36 31       Results for GOPI JACOBO (MRN [id number])    1/25/2020 04:40 1/26/2020 05:00 1/26/2020 13:26   POC pH 7.493 (H) 7.471 (H) 7.516 (H)   POC PCO2 34.5 (L) 40.8 35.4   POC PO2 105 (H) 91 71 (L)   POC BE 3 6 6   POC HCO3 26.5 29.8 (H) 28.7 (H)   POC SAO2 99 98 96   POC TCO2 28 (H) 31 (H) 30 (H)   FiO2 40 30 38   Vt 450 450    PiP 26 17    PEEP 5 5    Flow   8   Mode AC/PRVC AC/PRVC T-Piece   Rate 22 18 8       All pertinent labs within the past 24 hours have been reviewed.    Significant Imaging:  I have reviewed all pertinent imaging results/findings within the past 24 hours.    Assessment/Plan:     * Cardiac arrest  Suspect a result of hypoxia as no SpO2 reading available with otherwise stable vital signs just prior to arrest     · ROSC following 22 min ACLS on 1/20/2020 around 19:00 pm  · Targeted temperature management completed and now awake and alert on spontaneous awakening trials.    Acute systolic congestive heart failure  Echo from 1/21 with EF 35% and moderate dysfunction in mitral and tricuspid valves with LV hypertrophy    · Continue diuresis as needed      CKD (chronic kidney disease)  Renal function is returning to normal baseline of creatinine 1.8    Acute hypoxemic respiratory failure  Continued ventilator dependence with increased work of breathing during spontaneous trials previously.  Currently with evidence of periodic breathing (not quite typical for Cheyne-Dixon) with episodes of bradypnea/apnea that limit weaning efforts.  T-piece trial today with acceptable gas exchange but inconsistent respiratory effort that resulted in early termination of the trial after  26 minutes.    · Continue diuresis efforts.  · Full support ventilation overnight with goal of spontaneous trial in am.  · Consider repeat T-piece of longer duration with direct Pulmonary supervision.    Discussed with the patient's niece and Dr. Vences on ICU rounds.        Time spent providing bedside critical care for life-threatening illness (not including procedure time) = 35 minutes.    Thang Leroy MD  Pulmonology  [facility]

## 2020-01-27 NOTE — ASSESSMENT & PLAN NOTE
Trop 0.4 with flat pattern  Echo with systolic HF which is new for patient  On ASA  Cannot do ACE-I due to renal dysfunction, BB due to medication induced bradycardia (precedex) or statin due to transaminitis (improving)  Is on full dose lovenox  Cardiology on board for co-management

## 2020-01-27 NOTE — ASSESSMENT & PLAN NOTE
Echo from 1/21 with EF 35% and moderate dysfunction in mitral and tricuspid valves with LV hypertrophy    · Continue diuresis as needed

## 2020-01-27 NOTE — SUBJECTIVE & OBJECTIVE
Interval History: No acute events overnight. Remains on minimal mechanical ventilator support. Sedated with precedex 1.4 mcg/kg/min. Passed SAT/SBT and extubated to BIPAP this AM.     Objective:     Vital Signs (Most Recent):  Temp: 99.5 °F (37.5 °C) (01/27/20 0305)  Pulse: 68 (01/27/20 0900)  Resp: 18 (01/27/20 0900)  BP: (!) 163/93 (01/27/20 0900)  SpO2: 100 % (01/27/20 0900) Vital Signs (24h Range):  Temp:  [98 °F (36.7 °C)-99.5 °F (37.5 °C)] 99.5 °F (37.5 °C)  Pulse:  [64-80] 68  Resp:  [14-26] 18  SpO2:  [91 %-100 %] 100 %  BP: (116-163)/(67-93) 163/93  Arterial Line BP: (128-168)/(66-83) 155/75     Weight: 77.5 kg (170 lb 13.7 oz)  Body mass index is 27.58 kg/m².      Intake/Output Summary (Last 24 hours) at 1/27/2020 1020  Last data filed at 1/27/2020 0957  Gross per 24 hour   Intake 1917.11 ml   Output 1765 ml   Net 152.11 ml       Physical Exam   Constitutional: He appears well-developed and well-nourished. He is intubated and restrained.   HENT:   Head: Normocephalic and atraumatic.   Eyes: Pupils are equal, round, and reactive to light. EOM are normal.   Neck: No tracheal deviation present. No thyromegaly present.   Cardiovascular: Normal rate and regular rhythm. Exam reveals no gallop and no friction rub.   No murmur heard.  Pulmonary/Chest: Effort normal and breath sounds normal. He is intubated. He has no decreased breath sounds. He has no wheezes. He has no rhonchi. He has no rales.   Abdominal: Soft. Bowel sounds are normal. There is no tenderness.   Musculoskeletal: Normal range of motion.   Neurological: No sensory deficit.   Spontaneously opens eyes to voice. Following commands. Intermittently answering yes/no questions. PERRLA. Non-focal.    Skin: Skin is warm and dry.       Vents:  Vent Mode: PRVC (01/27/20 0842)  Ventilator Initiated: Yes (01/20/20 2100)  Set Rate: 18 bmp (01/27/20 0842)  Vt Set: 450 mL (01/27/20 0842)  Pressure Support: 10 cmH20 (01/26/20 1721)  PEEP/CPAP: 5 cmH20 (01/27/20  0842)  Oxygen Concentration (%): 30 (01/27/20 0900)  Peak Airway Pressure: 18.9 cmH2O (01/27/20 0842)  Total Ve: 8.5 mL (01/27/20 0842)  F/VT Ratio<105 (RSBI): (!) 38.14 (01/27/20 0842)    Lines/Drains/Airways     Drain                 NG/OG Tube 01/20/20 1906 [place] sump 18 Fr. Right mouth 6 days         Urethral Catheter 01/20/20 1925 Coude 16 Fr. 6 days          Airway                 Airway - Non-Surgical 01/20/20 1857 Endotracheal Tube 6 days          Arterial Line                 Arterial Line 01/20/20 2345 Left Radial 6 days          Peripheral Intravenous Line                 Peripheral IV - Single Lumen 01/25/20 1800 20 G Posterior;Right Forearm 1 day         Peripheral IV - Single Lumen 01/26/20 1500 20 G Left;Posterior Hand less than 1 day                Significant Labs:    CBC/Anemia Profile:  Recent Labs   Lab 01/26/20  0430 01/27/20  0325   WBC 9.51 9.31   HGB 13.9* 13.1*   HCT 42.8 40.9   * 138*   * 101*   RDW 14.0 14.1        Chemistries:  Recent Labs   Lab 01/26/20  0424 01/27/20  0325   * 150*   K 3.5 3.5    111*   CO2 30* 30*   BUN 38* 32*   CREATININE 1.9* 1.6*   CALCIUM 8.9 9.0   ALBUMIN 3.0* 3.0*   PROT 6.2 6.2   BILITOT 0.5 0.4   ALKPHOS 68 67   ALT 72* 55*   AST 31 28       All pertinent labs within the past 24 hours have been reviewed.    Significant Imaging:  I have reviewed and interpreted all pertinent imaging results/findings within the past 24 hours.

## 2020-01-27 NOTE — ASSESSMENT & PLAN NOTE
Intubated for worsening hypoxia on admission followed by PEA arrest. CXR with persistent bilateral haziness of the lower lung fields. Patient with a mildly elevated BNP in 600s on admission with known HFrEF (35%).    --Received aggressive diuresis; net negative 7L  --Passed SAT/SBT this AM  --Extubated to bipap 1/27/2020  --Bipap qhs and PRN for respiratory distress

## 2020-01-27 NOTE — CARE UPDATE
Pt was found on vent settings simv/ps  rr 6 vt 370 peep 5 ps 10 [id number]%.  Pt did not tolerate vent settings and dr garnett was called and pt was placed back on previous settings prvc rr 18 vt 450 peep 5 fio2 30%.  Pt is now without distress and tolerating vent settings fine.  All alarms are on and working.  Ambu bag and mask at [facility].  Will continue to monitor.

## 2020-01-27 NOTE — ASSESSMENT & PLAN NOTE
Net negative 6.6 L with aggressive intermittent IV diuresis  Good UOP on his own and renal function better  On minimal O2 requirements and no rales on exam today  Cardene drip for better BP control as HTN can results with flash pulm edema

## 2020-01-27 NOTE — PROGRESS NOTES
[place]  [place] Note    SUMMARY       Recommendations    1. Increase free water flushes to 250 mL QID to provide a total of 1910 mL free fl b/w free water in TF & flushes.    2. Continue w/ Glucerna 1.5 @ 50 mL/hr    3. Advance diet when medically able s/p extubation (if no swallowing difficulty detected) to Cardiac/Consistent Carbohydrate    Goals: TF initiation or diet advancement w/in 48 hrs  Nutrition Goal Status: goal met  Communication of RD Recs: discussed on rounds    Reason for Assessment    Reason For Assessment: RD follow-up  Diagnosis: other (see comments)(cardiac arrest)  Relevant Medical History: CAD, CHF, arrhythmia  Interdisciplinary Rounds: attended    General Information Comments: Pt remains intubated, sedated. TF held this AM for possible extubation today. Pt developed hypernatremia s/p diruesis. Free water flushes initiated over the wkend & will be increased today per my recommendation to MD. H/o EtOH abuse noted; pt on appropriate vitamin supplementation. NFPE performed 1/24 & was wnl.     Nutrition Discharge Planning: Too soon to determine    Nutrition Risk Screen    Nutrition Risk Screen: no indicators present    Nutrition/Diet History    Spiritual, Cultural Beliefs, Nondenominational Practices, Values that Affect Care: no  Food Allergies: NKFA  Factors Affecting Nutritional Intake: on mechanical ventilation    Anthropometrics    Temp: 99.5 °F (37.5 °C)  Height Method: Stated  Height: 5' 6" (167.6 cm)  Height (inches): 66 in  Weight Method: Bed Scale  Weight: 77.5 kg (170 lb 13.7 oz)  Weight (lb): 170.86 lb  Ideal Body Weight (IBW), Male: 142 lb  % Ideal Body Weight, Male (lb): 123.43 %  BMI (Calculated): 27.6  BMI Grade: 25 - 29.9 - overweight       Lab/Procedures/Meds    Pertinent Labs Reviewed: reviewed  Pertinent Labs Comments: Na 150, Cl 111, CO2 30, Crt 1.6, BUN 32, Glu 181, Alb 3.0  Pertinent Medications Reviewed: reviewed  Pertinent Medications " Comments: famotidine, precedex, lasix    Estimated/Assessed Needs    Weight Used For Calorie Calculations: 77.5 kg (170 lb 13.7 oz)  Energy Calorie Requirements (kcal): 1776  Energy Need Method: [place]     Protein Requirements: 78-93 g (1-1.2 g/kg)  Weight Used For Protein Calculations: 77.5 kg (170 lb 13.7 oz)     Fluid Requirements (mL): 1764ml or per MD  Estimated Fluid Requirement Method: RDA Method  RDA Method (mL): 1776       Nutrition Prescription Ordered    Current Diet Order: NPO  Current Nutrition Support Formula Ordered: Glucerna 1.5(to run @ 40 mL/hr x 1 day)  Current Nutrition Support Rate Ordered: 50 (ml)  Current Nutrition Support Frequency Ordered: mL/hr    Evaluation of Received Nutrient/Fluid Intake    Enteral Calories (kcal): 1800  Enteral Protein (gm): 99  Enteral (Free Water) Fluid (mL): 910  Free Water Flush Fluid (mL): 600(200 mL TID)  % Kcal Needs: 101  % Protein Needs: 103  Total Fluid Intake (mL): 1510  I/O: -6.48L since admit; - 81mL x 24 hrs  Energy Calories Required: meeting needs  Protein Required: meeting needs  Fluid Required: not meeting needs  Comments: LBM 1/27; good uop  Tolerance: tolerating  % Intake of Estimated Energy Needs: 75 - 100 %  % Meal Intake: NPO    Nutrition Risk    Level of Risk/Frequency of Follow-up: (f/u 2x/ weekly)     Assessment and Plan    Nutrition Problem  Predicted Inadequate Energy Intake     Related to (etiology):   Mechanical ventilation     Signs and Symptoms (as evidenced by):   NPO     Interventions:  Collaboration with other providers  Enteral nutrition      Nutrition Diagnosis Status:   Resolved     Monitor and Evaluation    Food and Nutrient Intake: energy intake, enteral nutrition intake  Food and Nutrient Adminstration: enteral and parenteral nutrition administration, diet order  Anthropometric Measurements: weight, weight change, body mass index  Biochemical Data, Medical Tests and Procedures: electrolyte and renal panel, lipid profile,  glucose/endocrine profile  Nutrition-Focused Physical Findings: overall appearance     Malnutrition Assessment     Skin (Micronutrient): none                   Edema (Fluid Accumulation): 1-->trace   Subcutaneous Fat Loss (Final Summary): well nourished  Muscle Loss Evaluation (Final Summary): well nourished         Nutrition Follow-Up    RD Follow-up?: Yes

## 2020-01-28 PROBLEM — E87.6 HYPOKALEMIA: Status: ACTIVE | Noted: 2020-01-28

## 2020-01-28 LAB
ALBUMIN SERPL BCP-MCNC: 3.4 G/DL (ref 3.5–5.2)
ALP SERPL-CCNC: 62 U/L (ref 55–135)
ALT SERPL W/O P-5'-P-CCNC: 57 U/L (ref 10–44)
ANION GAP SERPL CALC-SCNC: 15 MMOL/L (ref 8–16)
AST SERPL-CCNC: 33 U/L (ref 10–40)
BASOPHILS # BLD AUTO: 0.04 K/UL (ref 0–0.2)
BASOPHILS NFR BLD: 0.3 % (ref 0–1.9)
BILIRUB SERPL-MCNC: 1.2 MG/DL (ref 0.1–1)
BUN SERPL-MCNC: 21 MG/DL (ref 8–23)
CALCIUM SERPL-MCNC: 9.6 MG/DL (ref 8.7–10.5)
CHLORIDE SERPL-SCNC: 108 MMOL/L (ref 95–110)
CO2 SERPL-SCNC: 24 MMOL/L (ref 23–29)
CREAT SERPL-MCNC: 1.2 MG/DL (ref 0.5–1.4)
DIFFERENTIAL METHOD: ABNORMAL
EOSINOPHIL # BLD AUTO: 0.2 K/UL (ref 0–0.5)
EOSINOPHIL NFR BLD: 1.2 % (ref 0–8)
ERYTHROCYTE [DISTWIDTH] IN BLOOD BY AUTOMATED COUNT: 13.5 % (ref 11.5–14.5)
EST. GFR  (AFRICAN AMERICAN): >60 ML/MIN/1.73 M^2
EST. GFR  (NON AFRICAN AMERICAN): >60 ML/MIN/1.73 M^2
GLUCOSE SERPL-MCNC: 107 MG/DL (ref 70–110)
HCT VFR BLD AUTO: 44.9 % (ref 40–54)
HGB BLD-MCNC: 14.7 G/DL (ref 14–18)
IMM GRANULOCYTES # BLD AUTO: 0.12 K/UL (ref 0–0.04)
IMM GRANULOCYTES NFR BLD AUTO: 0.8 % (ref 0–0.5)
LYMPHOCYTES # BLD AUTO: 2.1 K/UL (ref 1–4.8)
LYMPHOCYTES NFR BLD: 14.5 % (ref 18–48)
MAGNESIUM SERPL-MCNC: 1.9 MG/DL (ref 1.6–2.6)
MCH RBC QN AUTO: 32.6 PG (ref 27–31)
MCHC RBC AUTO-ENTMCNC: 32.7 G/DL (ref 32–36)
MCV RBC AUTO: 100 FL (ref 82–98)
MONOCYTES # BLD AUTO: 1 K/UL (ref 0.3–1)
MONOCYTES NFR BLD: 7 % (ref 4–15)
NEUTROPHILS # BLD AUTO: 11.1 K/UL (ref 1.8–7.7)
NEUTROPHILS NFR BLD: 76.2 % (ref 38–73)
NRBC BLD-RTO: 0 /100 WBC
PLATELET # BLD AUTO: 141 K/UL (ref 150–350)
PMV BLD AUTO: 12.7 FL (ref 9.2–12.9)
POTASSIUM SERPL-SCNC: 3 MMOL/L (ref 3.5–5.1)
PROT SERPL-MCNC: 7.2 G/DL (ref 6–8.4)
RBC # BLD AUTO: 4.51 M/UL (ref 4.6–6.2)
SODIUM SERPL-SCNC: 147 MMOL/L (ref 136–145)
WBC # BLD AUTO: 14.61 K/UL (ref 3.9–12.7)

## 2020-01-28 PROCEDURE — 83735 ASSAY OF MAGNESIUM: CPT

## 2020-01-28 PROCEDURE — 25000003 PHARM REV CODE 250: Performed by: HOSPITALIST

## 2020-01-28 PROCEDURE — 94660 CPAP INITIATION&MGMT: CPT

## 2020-01-28 PROCEDURE — 36415 COLL VENOUS BLD VENIPUNCTURE: CPT

## 2020-01-28 PROCEDURE — 94761 N-INVAS EAR/PLS OXIMETRY MLT: CPT

## 2020-01-28 PROCEDURE — 21400001 HC TELEMETRY ROOM

## 2020-01-28 PROCEDURE — 80053 COMPREHEN METABOLIC PANEL: CPT

## 2020-01-28 PROCEDURE — 25000003 PHARM REV CODE 250: Performed by: INTERNAL MEDICINE

## 2020-01-28 PROCEDURE — 63600175 PHARM REV CODE 636 W HCPCS: Performed by: INTERNAL MEDICINE

## 2020-01-28 PROCEDURE — 27000221 HC OXYGEN, UP TO 24 HOURS

## 2020-01-28 PROCEDURE — 99900035 HC TECH TIME PER 15 MIN (STAT)

## 2020-01-28 PROCEDURE — 85025 COMPLETE CBC W/AUTO DIFF WBC: CPT

## 2020-01-28 RX ORDER — AMIODARONE HYDROCHLORIDE 200 MG/1
200 TABLET ORAL 2 TIMES DAILY
Status: DISCONTINUED | OUTPATIENT
Start: 2020-01-28 | End: 2020-01-30 | Stop reason: HOSPADM

## 2020-01-28 RX ORDER — TALC
9 POWDER (GRAM) TOPICAL NIGHTLY PRN
Status: DISCONTINUED | OUTPATIENT
Start: 2020-01-28 | End: 2020-01-30 | Stop reason: HOSPADM

## 2020-01-28 RX ORDER — POTASSIUM CHLORIDE 20 MEQ/15ML
20 SOLUTION ORAL ONCE
Status: COMPLETED | OUTPATIENT
Start: 2020-01-28 | End: 2020-01-28

## 2020-01-28 RX ORDER — AMLODIPINE BESYLATE 5 MG/1
5 TABLET ORAL DAILY
Status: DISCONTINUED | OUTPATIENT
Start: 2020-01-28 | End: 2020-01-30 | Stop reason: HOSPADM

## 2020-01-28 RX ORDER — POTASSIUM CHLORIDE 20 MEQ/15ML
40 SOLUTION ORAL ONCE
Status: COMPLETED | OUTPATIENT
Start: 2020-01-28 | End: 2020-01-28

## 2020-01-28 RX ADMIN — POTASSIUM CHLORIDE 40 MEQ: 20 SOLUTION ORAL at 06:01

## 2020-01-28 RX ADMIN — AMIODARONE HYDROCHLORIDE 200 MG: 200 TABLET ORAL at 09:01

## 2020-01-28 RX ADMIN — Medication 9 MG: at 11:01

## 2020-01-28 RX ADMIN — ENOXAPARIN SODIUM 80 MG: 100 INJECTION SUBCUTANEOUS at 09:01

## 2020-01-28 RX ADMIN — ASPIRIN 81 MG CHEWABLE TABLET 81 MG: 81 TABLET CHEWABLE at 09:01

## 2020-01-28 RX ADMIN — POTASSIUM CHLORIDE 20 MEQ: 20 SOLUTION ORAL at 05:01

## 2020-01-28 RX ADMIN — ENOXAPARIN SODIUM 80 MG: 100 INJECTION SUBCUTANEOUS at 10:01

## 2020-01-28 RX ADMIN — AMLODIPINE BESYLATE 5 MG: 5 TABLET ORAL at 09:01

## 2020-01-28 RX ADMIN — POTASSIUM CHLORIDE 40 MEQ: 20 SOLUTION ORAL at 10:01

## 2020-01-28 NOTE — ASSESSMENT & PLAN NOTE
Secondary to cardiac arrest  Extubated on 1/27 to nasal cannula  Appreciated pulmonary input  Continue to wean off oxygen, almost to room air  Resolved

## 2020-01-28 NOTE — PLAN OF CARE
01/28/20 1039   Discharge Reassessment   Assessment Type Discharge Planning Reassessment   Discharge Plan A Home with family   Discharge Plan B   (TBD)   Patient choice form signed by patient/caregiver N/A   Anticipated Discharge Disposition Home   Can the patient answer the patient profile reliably? Yes, cognitively intact   How does the patient rate their overall health at the present time? Fair   How often would a person be available to care for the patient? Whenever needed

## 2020-01-28 NOTE — CARE UPDATE
[facility]  ICU Multidisciplinary Bedside Rounds     UPDATE     Date: 1/28/2020      Plan of care reviewed with the following, Nurse, Charge Nurse, Physician, Pulm CC, , Resp. Therapist and Infection Prevention.       Needs/ Goals for the day: Patient up in chair eating breakfast. Cardene gtt weaned off, normal sats and WOB on RA. PT/OT & SW to be consulted. Okay to transfer.     Level of Care: OK to Transfer

## 2020-01-28 NOTE — CARE UPDATE
[facility]  ICU Multidisciplinary Bedside Rounds   SUMMARY     Date: 1/28/2020    Prehospitalization: Home  Admit Date / LOS : 1/20/2020/ 8 days    Diagnosis: Cardiac arrest    Consults:        Active: Cardio and Pulm CC       Needed: SW     Code Status: Full Code   Advanced Directive: <no information>    LDA: BIPAP and PIV       Central Lines/Site/Justification:Patient Does Not Have Central Line       Urinary Cath/Order/Justification:Patient Does Not Have Urinary Catheter    Vasopressors/Infusions:    niCARdipine Stopped (01/28/20 0600)          GOALS: Volume/ Hemodynamic: Map <120                     RASS: 0  alert and calm    CAM ICU: Positive  Pain Management: none       Pain Controlled: not applicable     Rhythm: NSR    Respiratory Device: Nasal Cannula and Bipap    Vent Mode: NIV PS  Oxygen Concentration (%):  [30] 30  Resp Rate Total:  [7 br/min-37 br/min] 13 br/min  Vt Set:  [450 mL] 450 mL  PEEP/CPAP:  [5 cmH20] 5 cmH20  Pressure Support:  [5 cmH20-10 cmH20] 10 cmH20  Mean Airway Pressure:  [3.2 cmH20-8.2 cmH20] 6.1 cmH20            VTE Prophylaxis: Pharm and Mechanical  Mobility: Bedrest  Stress Ulcer Prophylaxis: Yes    Dietary: PO  Tolerance: yes  /  Advancement: @ goal    Isolation: No active isolations    Restraints: No    Noteworthy Labs:  K+ 3, ALT 57    CBC/Anemia Labs: Coags:    Recent Labs   Lab 01/27/20  0325 01/28/20  0258   WBC 9.31 14.61*   HGB 13.1* 14.7   HCT 40.9 44.9   * 141*   * 100*   RDW 14.1 13.5    Recent Labs   Lab 01/22/20  0345 01/23/20  0529   INR 1.4* 1.3*   APTT 45.1* 36.0*        Chemistries:   Recent Labs   Lab 01/22/20  1301 01/22/20  1643  01/27/20  0325 01/28/20  0258    144   < > 150* 147*   K 4.4 4.3   < > 3.5 3.0*    109   < > 111* 108   CO2 20* 23   < > 30* 24   BUN 30* 32*   < > 32* 21   CREATININE 2.0* 2.0*   < > 1.6* 1.2   CALCIUM 8.6* 8.7   < > 9.0 9.6   PROT 6.1 6.3   < > 6.2 7.2   BILITOT 0.7 0.7   < > 0.4 1.2*    ALKPHOS 52* 57   < > 67 62   * 256*   < > 55* 57*   * 156*   < > 28 33   MG 2.7* 2.9*  --   --   --    PHOS 6.1* 5.9*  --   --   --     < > = values in this interval not displayed.        Cardiac Enzymes: Ejection Fractions:    No results for input(s): CPK, CPKMB, MB, TROPONINI in the last 72 hours. No results found for: EF     POCT Glucose: HbA1c:    Recent Labs   Lab 01/21/20  1103 01/21/20  1615 01/21/20  1753   POCTGLUCOSE 238* 248* 210*    No results found for: HGBA1C     Needs from Care Team: SW consult, Maintain BP WNL with gtt off     ICU LOS 7d 8h  Level of Care: OK to Transfer

## 2020-01-28 NOTE — ASSESSMENT & PLAN NOTE
Likely shock liver vs alcoholic hepatitis vs both  Improving with supportive care alone  Will continue monitor

## 2020-01-28 NOTE — NURSING
1110 Received patient from ICU. Alert and oriented times 4. Skin warm and dry. Respirations unlabored on room air. Denies any pain. Room orientation.

## 2020-01-28 NOTE — PROGRESS NOTES
PT orders received. Uptrending troponins, ECGs indicate ischemia, Cardiology note indicates ongoing w/u for ischemia, pending stress test in a.m. Will f/u tomorrow.

## 2020-01-28 NOTE — ASSESSMENT & PLAN NOTE
Surprisingly was at baseline  Monitor closely and strict I/Os  Creatinine better than baseline   Will continue monitor

## 2020-01-28 NOTE — PT/OT/SLP PROGRESS
Occupational Therapy      Patient Name:  Marck Madison   MRN:  [id number]    Patient not seen today secondary to Other (Comment)(The patient is s/p ICU transfer. OT will hold today pending possible cardiac testing. ). Will follow-up tomorrow.    Lauren Gabriel, OT  1/28/2020

## 2020-01-28 NOTE — ASSESSMENT & PLAN NOTE
Trop 0.4 with flat pattern  Echo with systolic CHF which is new for patient  On ASA, but could not ACE-I due to renal dysfunction, BB due to medication induced bradycardia (precedex) or statin due to transaminitis (improving)  Continue full dose Lovenox for anticoagulation  Cardiology following and plans on stress test in a.m.

## 2020-01-28 NOTE — ASSESSMENT & PLAN NOTE
Unknown cause but suspected to be due to profound hypoxia although not documented  ROSC achieved after 20 minutes of ACLS  Anoxic brain injury was highly suspected at the time  Completed hypothermia protocol and seems to have acceptable neurological recovery  Pulmonary edema better and approximately 6.6L net negative after aggressive diuresis   2D echo with new onset CHF with EF of 35%  Successfully extubated on 1/27 to nasal cannula, now almost stable on room air  Appreciated pulmonary input  Cardiology following with plans for stress test in the morning  Vitals stable for transfer to the floor today  Will consult PT and OT, and  for discharge planning

## 2020-01-28 NOTE — CARE UPDATE
Extubated yesterday - up in chair - alert. Denies CP    Echo 1/21/18  · Concentric left ventricular hypertrophy.  · Atrial fibrillation observed.  · Moderately decreased left ventricular systolic function. The estimated ejection fraction is 35%.  · Normal right ventricular systolic function.  · Moderate mitral regurgitation.  · Moderate tricuspid regurgitation.  · No pulmonary hypertension present.    Stress test in AM if stable

## 2020-01-28 NOTE — SUBJECTIVE & OBJECTIVE
Interval History:  No acute events.  Doing well on nasal cannula.  Reports feeling much better.    Review of Systems   Constitutional: Negative for chills and fever.   Respiratory: Negative for shortness of breath.    Cardiovascular: Negative for chest pain.     Objective:     Vital Signs (Most Recent):  Temp: 97.9 °F (36.6 °C) (01/28/20 1143)  Pulse: 100 (01/28/20 1143)  Resp: 18 (01/28/20 1143)  BP: (!) 139/92 (01/28/20 1143)  SpO2: 96 % (01/28/20 1143) Vital Signs (24h Range):  Temp:  [97.9 °F (36.6 °C)-98.8 °F (37.1 °C)] 97.9 °F (36.6 °C)  Pulse:  [] 100  Resp:  [10-63] 18  SpO2:  [95 %-100 %] 96 %  BP: (128-196)/(63-99) 139/92     Weight: 77.5 kg (170 lb 13.7 oz)  Body mass index is 27.58 kg/m².    Intake/Output Summary (Last 24 hours) at 1/28/2020 1313  Last data filed at 1/28/2020 0700  Gross per 24 hour   Intake 264.8 ml   Output --   Net 264.8 ml      Physical Exam   Constitutional: He is oriented to person, place, and time. He appears well-developed. No distress.   Sitting up in chair   HENT:   Head: Normocephalic and atraumatic.   Eyes: Conjunctivae are normal.   Neck: Normal range of motion. Neck supple.   Cardiovascular: Normal rate, regular rhythm and intact distal pulses.   Pulmonary/Chest: Effort normal. He has no rales.   Coarse breath sounds with slightly prolonged expiratory time, no appreciable wheezing   Abdominal: Soft. Bowel sounds are normal. He exhibits no distension and no mass. There is no tenderness. There is no guarding.   Musculoskeletal: Normal range of motion. He exhibits no edema.   Neurological: He is alert and oriented to person, place, and time.   Following commands   Skin: Skin is warm and dry. Capillary refill takes less than 2 seconds. He is not diaphoretic.   Psychiatric: He has a normal mood and affect. His behavior is normal. Thought content normal.   Nursing note and vitals reviewed.      Significant Labs: All pertinent labs within the past 24 hours have been  reviewed.    Significant Imaging: I have reviewed all pertinent imaging results/findings within the past 24 hours.

## 2020-01-28 NOTE — ASSESSMENT & PLAN NOTE
As reported by his niece and main caregiver  Counseled for greater than 3 min on smoking cessation  Currently declines nicotine patch at this time

## 2020-01-28 NOTE — ASSESSMENT & PLAN NOTE
Secondary to non compliance and/or alcohol use  Rate controlled currently  Dopamine infusion stopped  High CHADS VASc score. On anticoagulation with full-dose Lovenox  Will resume home regimen of p.o. amiodarone 200 mg b.i.d. today

## 2020-01-28 NOTE — ASSESSMENT & PLAN NOTE
Net negative 6.6 L with aggressive intermittent IV diuresis  Good UOP on his own and renal function better  On minimal O2 requirements, almost stable on room air  New Echo with EF = 35%  Cardene drip for better BP control as HTN   Has been weaned off Cardene drip and started on p.o. regiment with amlodipine 5 p.o. Daily  Continue hydralazine p.r.n.  Will consider further titration of blood pressure medication as needed  Cardiology plans for stress test in a.m.

## 2020-01-28 NOTE — PROGRESS NOTES
[facility] Hospital Medicine  Progress Note    Patient Name: Marck Madison  MRN: [id number]  Patient Class: IP- Inpatient   Admission Date: 1/20/2020  Length of Stay: 8 days  Attending Physician: Leydi Murphy MD  Primary Care Provider: Primary Doctor No        Subjective:     Principal Problem:Cardiac arrest        HPI:  Marck Madison is a 59 y.o. male that (in part)  has a past medical history of Arrhythmia, CAD (coronary artery disease), CHF (congestive heart failure), NYHA class I, and Psychiatric disorder.  has a past surgical history that includes Liver surgery and Treatment of cardiac arrhythmia (9/12/2019). Presents to [facility] Emergency Department complaining of shortness of breath.  Subacute onset with progressive worsening.  Associated palpitations.  Severe dyspnea with exertion.  Denied fever chills.  Had a cough.  History of asthma and COPD.  Reports compliance with home medication regimen.  History is limited due to the current condition of the patient was intubated and sedated    In the emergency department routine laboratory studies and chest x-ray was obtained.  In EKG and cardiac enzymes as well.  He was found to be in atrial fibrillation rapid ventricular response.  Amiodarone infusion was initiated.  He became more hypoxemic and hypotensive.  It was noted he had erythema developing around the amiodarone infusion site.  This was discontinued.  He was started on diltiazem alternatively.  Remained hypoxemic despite increasing oxygen supplementation.  He underwent rapid sequence intubation.  He was brought to the ICU.      Shortly after transferring into the ICU bed he became bradycardic and pulse was lost.  ACLS protocol was initiated.  We performed approximately 6 rounds with epinephrine and started on a dobutamine infusion before obtaining return of spontaneous circulation.  Please see code sheet for details.  Central line and arterial lines were placed.   Bedside echo was performed.  Case was discussed with Cardiology in detail.  Dr. Brunner to see the patient in the morning.  Cooling protocol initiated.    Hospital medicine has been asked to admit to inpatient for further evaluation and treatment.     Overview/Hospital Course:  Mr. Madison presented with acute hypoxemic respiratory failure and atrial fibrillation with RVR which is a recurrent issue for the patient. He was initiated on amiodarone infusion in the emergency department but this was dc'd due to erythema around infusion site. Diltiazem infusion initiated instead. Patient became progressively more hypoxemic despite adjusting O2 delivery. Required rapid sequence intubation and transferred to ICU. Shortly after arriving to ICU, patient became bradycardic which progressed to cardiac arrest. Required 6 rounds of epinephrine and about 20 minutes of ACLS prior to achieving ROSC. Had poor neurological response therefore initiated on hypothermia protocol. Further laboratory studies revealed acute hepatitis, lactic acid 5.7, acute renal failure and new systolic heart failure with pulmonary edema. Patient's TBil and liver enzymes improved with supportive treatment. Based on niece's reports, this is likely alcohol induced since patient drinks heavy alcohol on a daily basis. Renal function also improved with IV diuresis. Patient was alert, awake and following commands once rewarmed. Extubation was difficult due to increased work of breathing during spontaneous trials previously.   Extubated on 1/27/20 to NC.    Interval History:  No acute events.  Doing well on nasal cannula.  Reports feeling much better.    Review of Systems   Constitutional: Negative for chills and fever.   Respiratory: Negative for shortness of breath.    Cardiovascular: Negative for chest pain.     Objective:     Vital Signs (Most Recent):  Temp: 97.9 °F (36.6 °C) (01/28/20 1143)  Pulse: 100 (01/28/20 1143)  Resp: 18 (01/28/20 1143)  BP: (!) 139/92  (01/28/20 1143)  SpO2: 96 % (01/28/20 1143) Vital Signs (24h Range):  Temp:  [97.9 °F (36.6 °C)-98.8 °F (37.1 °C)] 97.9 °F (36.6 °C)  Pulse:  [] 100  Resp:  [10-63] 18  SpO2:  [95 %-100 %] 96 %  BP: (128-196)/(63-99) 139/92     Weight: 77.5 kg (170 lb 13.7 oz)  Body mass index is 27.58 kg/m².    Intake/Output Summary (Last 24 hours) at 1/28/2020 1313  Last data filed at 1/28/2020 0700  Gross per 24 hour   Intake 264.8 ml   Output --   Net 264.8 ml      Physical Exam   Constitutional: He is oriented to person, place, and time. He appears well-developed. No distress.   Sitting up in chair   HENT:   Head: Normocephalic and atraumatic.   Eyes: Conjunctivae are normal.   Neck: Normal range of motion. Neck supple.   Cardiovascular: Normal rate, regular rhythm and intact distal pulses.   Pulmonary/Chest: Effort normal. He has no rales.   Coarse breath sounds with slightly prolonged expiratory time, no appreciable wheezing   Abdominal: Soft. Bowel sounds are normal. He exhibits no distension and no mass. There is no tenderness. There is no guarding.   Musculoskeletal: Normal range of motion. He exhibits no edema.   Neurological: He is alert and oriented to person, place, and time.   Following commands   Skin: Skin is warm and dry. Capillary refill takes less than 2 seconds. He is not diaphoretic.   Psychiatric: He has a normal mood and affect. His behavior is normal. Thought content normal.   Nursing note and vitals reviewed.      Significant Labs: All pertinent labs within the past 24 hours have been reviewed.    Significant Imaging: I have reviewed all pertinent imaging results/findings within the past 24 hours.        Assessment/Plan:      * Cardiac arrest  Unknown cause but suspected to be due to profound hypoxia although not documented  ROSC achieved after 20 minutes of ACLS  Anoxic brain injury was highly suspected at the time  Completed hypothermia protocol and seems to have acceptable neurological  recovery  Pulmonary edema better and approximately 6.6L net negative after aggressive diuresis   2D echo with new onset CHF with EF of 35%  Successfully extubated on 1/27 to nasal cannula, now almost stable on room air  Appreciated pulmonary input  Cardiology following with plans for stress test in the morning  Vitals stable for transfer to the floor today  Will consult PT and OT, and  for discharge planning    Atrial fibrillation with RVR  Secondary to non compliance and/or alcohol use  Rate controlled currently  Dopamine infusion stopped  High CHADS VASc score. On anticoagulation with full-dose Lovenox  Will resume home regimen of p.o. amiodarone 200 mg b.i.d. today    Acute systolic congestive heart failure  Net negative 6.6 L with aggressive intermittent IV diuresis  Good UOP on his own and renal function better  On minimal O2 requirements, almost stable on room air  New Echo with EF = 35%  Cardene drip for better BP control as HTN   Has been weaned off Cardene drip and started on p.o. regiment with amlodipine 5 p.o. Daily  Continue hydralazine p.r.n.  Will consider further titration of blood pressure medication as needed  Cardiology plans for stress test in a.m.    Acute hypoxemic respiratory failure  Secondary to cardiac arrest  Extubated on 1/27 to nasal cannula  Appreciated pulmonary input  Continue to wean off oxygen, almost to room air  Resolved    Acute hepatitis  Likely shock liver vs alcoholic hepatitis vs both  Improving with supportive care alone  Will continue monitor    Hypokalemia  Will replete as needed and monitor    Hypernatremia  Due to aggressive diuresis  Currently improving   Will continue monitor    On mechanically assisted ventilation  Extubated on 1/27  Resolved    Alcohol use disorder, severe, dependence  Reported by niece  States he drinks alcohol on a daily basis  No signs for seizures/withdrawals  Will supplement with folic acid and vitamin B12  Counseled on  cessation    Tobacco use disorder, severe, dependence  As reported by his niece and main caregiver  Counseled for greater than 3 min on smoking cessation  Currently declines nicotine patch at this time    CKD (chronic kidney disease)  Surprisingly was at baseline  Monitor closely and strict I/Os  Creatinine better than baseline   Will continue monitor    Elevated troponin I level  Trop 0.4 with flat pattern  Echo with systolic CHF which is new for patient  On ASA, but could not ACE-I due to renal dysfunction, BB due to medication induced bradycardia (precedex) or statin due to transaminitis (improving)  Continue full dose Lovenox for anticoagulation  Cardiology following and plans on stress test in a.m.    Dilated cardiomyopathy  Seen on echo  Workup as discussed above per Cardiology    VTE Risk Mitigation (From admission, onward)         Ordered     enoxaparin injection 80 mg  Every 12 hours (non-standard times)      01/27/20 1023     IP VTE HIGH RISK PATIENT  Once      01/20/20 2119                Critical care time spent on the evaluation and treatment of severe organ dysfunction, review of pertinent labs and imaging studies, discussions with consulting providers and discussions with patient/family: > 45 minutes      Leydi Murphy MD  Department of Hospital Medicine   [facility]

## 2020-01-28 NOTE — ASSESSMENT & PLAN NOTE
Reported by niece  States he drinks alcohol on a daily basis  No signs for seizures/withdrawals  Will supplement with folic acid and vitamin B12  Counseled on cessation

## 2020-01-29 PROBLEM — N17.9 ACUTE RENAL FAILURE: Status: RESOLVED | Noted: 2019-09-10 | Resolved: 2020-01-29

## 2020-01-29 PROBLEM — E87.0 HYPERNATREMIA: Status: RESOLVED | Noted: 2020-01-27 | Resolved: 2020-01-29

## 2020-01-29 PROBLEM — N18.9 CKD (CHRONIC KIDNEY DISEASE): Status: RESOLVED | Noted: 2020-01-20 | Resolved: 2020-01-29

## 2020-01-29 PROBLEM — J96.01 ACUTE HYPOXEMIC RESPIRATORY FAILURE: Status: RESOLVED | Noted: 2019-09-10 | Resolved: 2020-01-29

## 2020-01-29 PROBLEM — F10.20 ALCOHOL USE DISORDER, SEVERE, DEPENDENCE: Status: RESOLVED | Noted: 2020-01-21 | Resolved: 2020-01-29

## 2020-01-29 PROBLEM — N17.9 AKI (ACUTE KIDNEY INJURY): Status: RESOLVED | Noted: 2019-09-10 | Resolved: 2020-01-29

## 2020-01-29 PROBLEM — F17.200 TOBACCO USE DISORDER, SEVERE, DEPENDENCE: Status: RESOLVED | Noted: 2020-01-21 | Resolved: 2020-01-29

## 2020-01-29 PROBLEM — R79.89 ELEVATED TROPONIN: Status: RESOLVED | Noted: 2019-09-10 | Resolved: 2020-01-29

## 2020-01-29 PROBLEM — E87.6 HYPOKALEMIA: Status: RESOLVED | Noted: 2020-01-28 | Resolved: 2020-01-29

## 2020-01-29 PROBLEM — R79.89 ELEVATED TROPONIN I LEVEL: Status: RESOLVED | Noted: 2020-01-20 | Resolved: 2020-01-29

## 2020-01-29 PROBLEM — I50.21 ACUTE SYSTOLIC CONGESTIVE HEART FAILURE: Status: RESOLVED | Noted: 2020-01-20 | Resolved: 2020-01-29

## 2020-01-29 LAB
ALBUMIN SERPL BCP-MCNC: 3.2 G/DL (ref 3.5–5.2)
ALP SERPL-CCNC: 63 U/L (ref 55–135)
ALT SERPL W/O P-5'-P-CCNC: 46 U/L (ref 10–44)
ANION GAP SERPL CALC-SCNC: 11 MMOL/L (ref 8–16)
AST SERPL-CCNC: 33 U/L (ref 10–40)
BASOPHILS # BLD AUTO: 0.05 K/UL (ref 0–0.2)
BASOPHILS NFR BLD: 0.3 % (ref 0–1.9)
BILIRUB SERPL-MCNC: 0.8 MG/DL (ref 0.1–1)
BUN SERPL-MCNC: 17 MG/DL (ref 8–23)
CALCIUM SERPL-MCNC: 9.4 MG/DL (ref 8.7–10.5)
CHLORIDE SERPL-SCNC: 108 MMOL/L (ref 95–110)
CO2 SERPL-SCNC: 23 MMOL/L (ref 23–29)
CREAT SERPL-MCNC: 1.2 MG/DL (ref 0.5–1.4)
CV STRESS BASE HR: 102 BPM
DIASTOLIC BLOOD PRESSURE: 99 MMHG
DIFFERENTIAL METHOD: ABNORMAL
EOSINOPHIL # BLD AUTO: 0.2 K/UL (ref 0–0.5)
EOSINOPHIL NFR BLD: 1.1 % (ref 0–8)
ERYTHROCYTE [DISTWIDTH] IN BLOOD BY AUTOMATED COUNT: 13.4 % (ref 11.5–14.5)
EST. GFR  (AFRICAN AMERICAN): >60 ML/MIN/1.73 M^2
EST. GFR  (NON AFRICAN AMERICAN): >60 ML/MIN/1.73 M^2
GLUCOSE SERPL-MCNC: 125 MG/DL (ref 70–110)
HCT VFR BLD AUTO: 43.1 % (ref 40–54)
HGB BLD-MCNC: 14 G/DL (ref 14–18)
IMM GRANULOCYTES # BLD AUTO: 0.09 K/UL (ref 0–0.04)
IMM GRANULOCYTES NFR BLD AUTO: 0.6 % (ref 0–0.5)
LYMPHOCYTES # BLD AUTO: 2.1 K/UL (ref 1–4.8)
LYMPHOCYTES NFR BLD: 13.3 % (ref 18–48)
MCH RBC QN AUTO: 32.3 PG (ref 27–31)
MCHC RBC AUTO-ENTMCNC: 32.5 G/DL (ref 32–36)
MCV RBC AUTO: 100 FL (ref 82–98)
MONOCYTES # BLD AUTO: 1.1 K/UL (ref 0.3–1)
MONOCYTES NFR BLD: 7.2 % (ref 4–15)
NEUTROPHILS # BLD AUTO: 12.3 K/UL (ref 1.8–7.7)
NEUTROPHILS NFR BLD: 77.5 % (ref 38–73)
NRBC BLD-RTO: 0 /100 WBC
OHS CV CPX 85 PERCENT MAX PREDICTED HEART RATE MALE: 135
OHS CV CPX MAX PREDICTED HEART RATE: 159
OHS CV CPX PATIENT IS FEMALE: 0
OHS CV CPX PATIENT IS MALE: 1
OHS CV CPX PEAK DIASTOLIC BLOOD PRESSURE: 89 MMHG
OHS CV CPX PEAK HEAR RATE: 116 BPM
OHS CV CPX PEAK RATE PRESSURE PRODUCT: NORMAL
OHS CV CPX PEAK SYSTOLIC BLOOD PRESSURE: 139 MMHG
OHS CV CPX PERCENT MAX PREDICTED HEART RATE ACHIEVED: 73
OHS CV CPX RATE PRESSURE PRODUCT PRESENTING: NORMAL
PLATELET # BLD AUTO: 155 K/UL (ref 150–350)
PMV BLD AUTO: 12.9 FL (ref 9.2–12.9)
POTASSIUM SERPL-SCNC: 3.8 MMOL/L (ref 3.5–5.1)
PROT SERPL-MCNC: 6.8 G/DL (ref 6–8.4)
RBC # BLD AUTO: 4.33 M/UL (ref 4.6–6.2)
SODIUM SERPL-SCNC: 142 MMOL/L (ref 136–145)
STRESS ECHO TARGET HR: 135 BPM
SYSTOLIC BLOOD PRESSURE: 141 MMHG
WBC # BLD AUTO: 15.79 K/UL (ref 3.9–12.7)

## 2020-01-29 PROCEDURE — 21400001 HC TELEMETRY ROOM

## 2020-01-29 PROCEDURE — 25000003 PHARM REV CODE 250: Performed by: INTERNAL MEDICINE

## 2020-01-29 PROCEDURE — 94660 CPAP INITIATION&MGMT: CPT

## 2020-01-29 PROCEDURE — 85025 COMPLETE CBC W/AUTO DIFF WBC: CPT

## 2020-01-29 PROCEDURE — 63600175 PHARM REV CODE 636 W HCPCS: Performed by: INTERNAL MEDICINE

## 2020-01-29 PROCEDURE — 36415 COLL VENOUS BLD VENIPUNCTURE: CPT

## 2020-01-29 PROCEDURE — 99900035 HC TECH TIME PER 15 MIN (STAT)

## 2020-01-29 PROCEDURE — 97165 OT EVAL LOW COMPLEX 30 MIN: CPT

## 2020-01-29 PROCEDURE — 93010 ELECTROCARDIOGRAM REPORT: CPT | Mod: ,,, | Performed by: INTERNAL MEDICINE

## 2020-01-29 PROCEDURE — 93005 ELECTROCARDIOGRAM TRACING: CPT

## 2020-01-29 PROCEDURE — 25000003 PHARM REV CODE 250: Performed by: HOSPITALIST

## 2020-01-29 PROCEDURE — 93010 EKG 12-LEAD: ICD-10-PCS | Mod: ,,, | Performed by: INTERNAL MEDICINE

## 2020-01-29 PROCEDURE — 80053 COMPREHEN METABOLIC PANEL: CPT

## 2020-01-29 PROCEDURE — 97161 PT EVAL LOW COMPLEX 20 MIN: CPT

## 2020-01-29 RX ORDER — REGADENOSON 0.08 MG/ML
0.4 INJECTION, SOLUTION INTRAVENOUS ONCE
Status: COMPLETED | OUTPATIENT
Start: 2020-01-29 | End: 2020-01-29

## 2020-01-29 RX ADMIN — AMLODIPINE BESYLATE 5 MG: 5 TABLET ORAL at 11:01

## 2020-01-29 RX ADMIN — AMIODARONE HYDROCHLORIDE 200 MG: 200 TABLET ORAL at 11:01

## 2020-01-29 RX ADMIN — REGADENOSON 0.4 MG: 0.08 INJECTION, SOLUTION INTRAVENOUS at 09:01

## 2020-01-29 RX ADMIN — ENOXAPARIN SODIUM 80 MG: 100 INJECTION SUBCUTANEOUS at 11:01

## 2020-01-29 RX ADMIN — AMIODARONE HYDROCHLORIDE 200 MG: 200 TABLET ORAL at 09:01

## 2020-01-29 RX ADMIN — ASPIRIN 81 MG CHEWABLE TABLET 81 MG: 81 TABLET CHEWABLE at 11:01

## 2020-01-29 RX ADMIN — Medication 9 MG: at 09:01

## 2020-01-29 RX ADMIN — ENOXAPARIN SODIUM 80 MG: 100 INJECTION SUBCUTANEOUS at 09:01

## 2020-01-29 NOTE — PT/OT/SLP EVAL
Occupational Therapy   Evaluation    Name: Marck Maidson  MRN: [id number]  Admitting Diagnosis:  Cardiac arrest      Recommendations:     Discharge Recommendations: home(with family)  Discharge Equipment Recommendations:  cane, straight  Barriers to discharge:       Assessment:     Marck Madison is a 59 y.o. male with a medical diagnosis of Cardiac arrest.   Performance deficits affecting function: weakness, impaired endurance, impaired self care skills, impaired functional mobilty, decreased safety awareness, impaired balance, impaired cardiopulmonary response to activity.    The patient was able to perform self care and functional mobility with SBA/CGA. The patient with slight LOB while amb without AD. The patient's HR was 102 with activity with no SOB. The patient will benefit from OT to address functional deficits.    Rehab Prognosis: Good; patient would benefit from acute skilled OT services to address these deficits and reach maximum level of function.       Plan:     Patient to be seen 2 x/week to address the above listed problems via self-care/home management, therapeutic activities, therapeutic exercises  · Plan of Care Expires: 02/12/20  · Plan of Care Reviewed with: patient(niece and nephew)    Subjective     Chief Complaint: none  Patient/Family Comments/goals: agreeable to OT    Occupational Profile:  Living Environment: The patient lives with his niece and nephew and 5 children in a 1st floor apt with no steps.  Previous level of function: (I) with self care and amb without AD  Roles and Routines: The patient is able to drive but only drives occasionally.  Equipment Used at Home:  none  Assistance upon Discharge: family    Pain/Comfort:  · Pain Rating 1: 0/10    Patients cultural, spiritual, Restorationist conflicts given the current situation: no    Objective:     Communicated with: Clemencia khanna prior to session.  Patient found HOB elevated with peripheral IV, telemetry upon OT entry to room.    General  Precautions: Standard, fall   Orthopedic Precautions:N/A   Braces: N/A     Occupational Performance:    Bed Mobility:    · Patient completed Scooting/Bridging with stand by assistance  · Patient completed Supine to Sit with stand by assistance    Functional Mobility/Transfers:  · Patient completed Sit <> Stand Transfer with stand by assistance and contact guard assistance  with  no assistive device and from the bed and chair   · Functional Mobility: The patient amb without AD from chair<>sink with SBA/CGA. The patient was able to retrieve dropped item from the floor x3 with CGA and no LOB.    Activities of Daily Living:  · Grooming: stand by assistance to stand at the sink to brush his teeth and wash his hands and face  · Upper Body Dressing: contact guard assistance to don back gown    Cognitive/Visual Perceptual:  Cognitive/Psychosocial Skills:     -       Oriented to: Person, Place and Situation   -       Follows Commands/attention:Follows multistep  commands  -       Communication: clear/fluent  -       Memory: No Deficits noted  -       Safety awareness/insight to disability: impaired   -       Mood/Affect/Coping skills/emotional control: Appropriate to situation    Physical Exam:  Balance: -       fair+/good  Postural examination/scapula alignment:    -       No postural abnormalities identified  Skin integrity: Visible skin intact  Edema:  None noted  Upper Extremity Range of Motion:     -       Right Upper Extremity: WFL  -       Left Upper Extremity: WFL  Upper Extremity Strength:    -       Right Upper Extremity: WFL  -       Left Upper Extremity: WFL    AMPAC 6 Click ADL:  AMPAC Total Score: 24    Treatment & Education:  · The patient participated in OT eval and grooming at the sink. Pt educated on OT role/POC.   · Importance of OOB activity with staff assistance.  · Importance of sitting up in the chair throughout the day as tolerated, especially for meals   · Safety during functional t/f and mobility  "with nursing (S)  · White board updated   · Multiple self-care tasks/functional mobility completed- assistance level noted above     Education:    Patient left up in chair with all lines intact, call button in reach, nurseClemencia notified and family present    GOALS:   Multidisciplinary Problems     Occupational Therapy Goals        Problem: Occupational Therapy Goal    Goal Priority Disciplines Outcome Interventions   Occupational Therapy Goal     OT, PT/OT Ongoing, Progressing    Description:  Goals to be met by: 2/12/20     Patient will increase functional independence with ADLs by performing:    UE Dressing with Modified [place].  LE Dressing with Modified [place].  Grooming while standing at sink with Modified [place] and Assistive Devices as needed.  Supine to sit with Modified [place].  Step transfer with Modified [place]  Toilet transfer to toilet with Modified [place].  Upper extremity exercise program x15 reps per handout, with assistance as needed.  Educate the patient re: home safety and Energy Conservation Techniques                      History:     Past Medical History:   Diagnosis Date    Arrhythmia 2017    aflutter    CAD (coronary artery disease)     CHF (congestive heart failure), NYHA class I 2017    Psychiatric disorder     h/o "schizophrena/bipolar"       Past Surgical History:   Procedure Laterality Date    LIVER SURGERY      abscess drainage; 1970s    TREATMENT OF CARDIAC ARRHYTHMIA  9/12/2019    Procedure: Cardioversion or Defibrillation;  Surgeon: Jonathan Lopez MD;  Location: [facility] CATH LAB;  Service: Cardiology;;       Time Tracking:     OT Date of Treatment: 01/29/20  OT Start Time: 1344  OT Stop Time: 1403  OT Total Time (min): 19 min    Billable Minutes:Evaluation 19 (with PT)    Lauren Gabriel, OT  1/29/2020    "

## 2020-01-29 NOTE — PLAN OF CARE
Problem: Physical Therapy Goal  Goal: Physical Therapy Goal  Description  Goals to be met by: /     Patient will increase functional independence with mobility by performin. Supine to sit with Modified [place]  2. Sit to supine with Modified [place]  3. Sit to stand transfer with Modified [place]  4. Bed to chair transfer with Modified [place]   5. Gait  x 300 feet with Modified [place] using Single-point Cane .   6. Lower extremity exercise program x15 reps per handout, with supervision (standing)     Outcome: Ongoing, Progressing    Recommend single point cane for d/c (pt inquired and PT agrees) for balance impairment. Cont 5x/week. Home c/ family assist prn.      79

## 2020-01-29 NOTE — NURSING
Pt has elevated heart rate 130s- 170s. ST/SVT on monitor. Pt denies any chest pain or discomfort. MD notified and  STAT12 lead EKG ordered. Will continue to monitor.

## 2020-01-29 NOTE — PLAN OF CARE
Problem: Occupational Therapy Goal  Goal: Occupational Therapy Goal  Description  Goals to be met by: 2/12/20     Patient will increase functional independence with ADLs by performing:    UE Dressing with Modified [place].  LE Dressing with Modified [place].  Grooming while standing at sink with Modified [place] and Assistive Devices as needed.  Supine to sit with Modified [place].  Step transfer with Modified [place]  Toilet transfer to toilet with Modified [place].  Upper extremity exercise program x15 reps per handout, with assistance as needed.  Educate the patient re: home safety and Energy Conservation Techniques     Outcome: Ongoing, Progressing   The patient tolerated OT eval and was able to amb to the sink for grooming tasks with CGA/SBA. The patient will benefit from OT to address the above goals.

## 2020-01-29 NOTE — PROVIDER TRANSFER
58 y/o M presented with acute hypoxemic respiratory failure and atrial fibrillation with RVR. He was initiated on amiodarone infusion in the emergency department but this was dc'd due to erythema around infusion site. Diltiazem infusion initiated instead. Patient became progressively more hypoxemic and required rapid sequence intubation and transferred to ICU. Shortly after arriving to ICU, patient became bradycardic which progressed to cardiac arrest. Required 6 rounds of epinephrine and about 20 minutes of ACLS prior to achieving ROSC. Had poor neurological response therefore initiated on hypothermia protocol. Further laboratory studies revealed acute hepatitis, lactic acid 5.7, acute renal failure and new systolic heart failure with pulmonary edema. Patient's TBil and liver enzymes improved with supportive treatment. Based on niece's reports, this is likely alcohol induced since patient drinks heavy alcohol on a daily basis. Renal function also improved with IV diuresis. Patient was alert, awake and following commands once rewarmed. Extubation was difficult due to increased work of breathing during spontaneous trials previously, but he was able to be extubated on 1/27/20 to NC. ECHO with new CHF with EF=35%, Cardiology plans on stress test in am for further workup. Will need to consider change to DOAC for anticoagulation from full dose lovenox for Afib after discussion with Cardiology. PT/OT with increase activity and assess for needs. Possible d/c in 1-2 days depending on Cardiology recs and needs assessed by therapy.    KELSEA Murphy MD

## 2020-01-30 VITALS
BODY MASS INDEX: 25.9 KG/M2 | SYSTOLIC BLOOD PRESSURE: 134 MMHG | HEART RATE: 77 BPM | DIASTOLIC BLOOD PRESSURE: 96 MMHG | OXYGEN SATURATION: 97 % | RESPIRATION RATE: 18 BRPM | WEIGHT: 161.19 LBS | TEMPERATURE: 98 F | HEIGHT: 66 IN

## 2020-01-30 LAB
ALBUMIN SERPL BCP-MCNC: 3.2 G/DL (ref 3.5–5.2)
ALP SERPL-CCNC: 73 U/L (ref 55–135)
ALT SERPL W/O P-5'-P-CCNC: 42 U/L (ref 10–44)
ANION GAP SERPL CALC-SCNC: 10 MMOL/L (ref 8–16)
AST SERPL-CCNC: 29 U/L (ref 10–40)
BASOPHILS # BLD AUTO: 0.05 K/UL (ref 0–0.2)
BASOPHILS NFR BLD: 0.4 % (ref 0–1.9)
BILIRUB SERPL-MCNC: 0.5 MG/DL (ref 0.1–1)
BUN SERPL-MCNC: 18 MG/DL (ref 8–23)
CALCIUM SERPL-MCNC: 9.3 MG/DL (ref 8.7–10.5)
CHLORIDE SERPL-SCNC: 106 MMOL/L (ref 95–110)
CO2 SERPL-SCNC: 23 MMOL/L (ref 23–29)
CREAT SERPL-MCNC: 1.2 MG/DL (ref 0.5–1.4)
DIFFERENTIAL METHOD: ABNORMAL
EOSINOPHIL # BLD AUTO: 0.2 K/UL (ref 0–0.5)
EOSINOPHIL NFR BLD: 1.1 % (ref 0–8)
ERYTHROCYTE [DISTWIDTH] IN BLOOD BY AUTOMATED COUNT: 13.1 % (ref 11.5–14.5)
EST. GFR  (AFRICAN AMERICAN): >60 ML/MIN/1.73 M^2
EST. GFR  (NON AFRICAN AMERICAN): >60 ML/MIN/1.73 M^2
GLUCOSE SERPL-MCNC: 139 MG/DL (ref 70–110)
HCT VFR BLD AUTO: 42.3 % (ref 40–54)
HGB BLD-MCNC: 13.7 G/DL (ref 14–18)
IMM GRANULOCYTES # BLD AUTO: 0.07 K/UL (ref 0–0.04)
IMM GRANULOCYTES NFR BLD AUTO: 0.5 % (ref 0–0.5)
LYMPHOCYTES # BLD AUTO: 2.9 K/UL (ref 1–4.8)
LYMPHOCYTES NFR BLD: 20.3 % (ref 18–48)
MCH RBC QN AUTO: 32 PG (ref 27–31)
MCHC RBC AUTO-ENTMCNC: 32.4 G/DL (ref 32–36)
MCV RBC AUTO: 99 FL (ref 82–98)
MONOCYTES # BLD AUTO: 1 K/UL (ref 0.3–1)
MONOCYTES NFR BLD: 7.2 % (ref 4–15)
NEUTROPHILS # BLD AUTO: 10.1 K/UL (ref 1.8–7.7)
NEUTROPHILS NFR BLD: 70.5 % (ref 38–73)
NRBC BLD-RTO: 0 /100 WBC
PLATELET # BLD AUTO: 183 K/UL (ref 150–350)
PMV BLD AUTO: 12.7 FL (ref 9.2–12.9)
POTASSIUM SERPL-SCNC: 3.7 MMOL/L (ref 3.5–5.1)
PROT SERPL-MCNC: 6.7 G/DL (ref 6–8.4)
RBC # BLD AUTO: 4.28 M/UL (ref 4.6–6.2)
SODIUM SERPL-SCNC: 139 MMOL/L (ref 136–145)
WBC # BLD AUTO: 14.26 K/UL (ref 3.9–12.7)

## 2020-01-30 PROCEDURE — 25000003 PHARM REV CODE 250: Performed by: HOSPITALIST

## 2020-01-30 PROCEDURE — 99900035 HC TECH TIME PER 15 MIN (STAT)

## 2020-01-30 PROCEDURE — 36415 COLL VENOUS BLD VENIPUNCTURE: CPT

## 2020-01-30 PROCEDURE — 25000003 PHARM REV CODE 250: Performed by: INTERNAL MEDICINE

## 2020-01-30 PROCEDURE — 80053 COMPREHEN METABOLIC PANEL: CPT

## 2020-01-30 PROCEDURE — 63600175 PHARM REV CODE 636 W HCPCS: Performed by: INTERNAL MEDICINE

## 2020-01-30 PROCEDURE — 97110 THERAPEUTIC EXERCISES: CPT

## 2020-01-30 PROCEDURE — 85025 COMPLETE CBC W/AUTO DIFF WBC: CPT

## 2020-01-30 PROCEDURE — 97530 THERAPEUTIC ACTIVITIES: CPT

## 2020-01-30 RX ORDER — AMIODARONE HYDROCHLORIDE 200 MG/1
200 TABLET ORAL 2 TIMES DAILY
Qty: 180 TABLET | Refills: 3 | Status: SHIPPED | OUTPATIENT
Start: 2020-01-30 | End: 2021-01-29

## 2020-01-30 RX ORDER — NAPROXEN SODIUM 220 MG/1
81 TABLET, FILM COATED ORAL DAILY
Qty: 90 TABLET | Refills: 3 | Status: SHIPPED | OUTPATIENT
Start: 2020-01-31 | End: 2021-01-30

## 2020-01-30 RX ORDER — AMLODIPINE BESYLATE 5 MG/1
5 TABLET ORAL DAILY
Qty: 90 TABLET | Refills: 3 | Status: ON HOLD | OUTPATIENT
Start: 2020-01-31 | End: 2021-07-05 | Stop reason: HOSPADM

## 2020-01-30 RX ADMIN — ASPIRIN 81 MG CHEWABLE TABLET 81 MG: 81 TABLET CHEWABLE at 08:01

## 2020-01-30 RX ADMIN — AMLODIPINE BESYLATE 5 MG: 5 TABLET ORAL at 08:01

## 2020-01-30 RX ADMIN — AMIODARONE HYDROCHLORIDE 200 MG: 200 TABLET ORAL at 08:01

## 2020-01-30 RX ADMIN — ENOXAPARIN SODIUM 80 MG: 100 INJECTION SUBCUTANEOUS at 10:01

## 2020-01-30 NOTE — PLAN OF CARE
Problem: Fall Injury Risk  Goal: Absence of Fall and Fall-Related Injury  Outcome: Ongoing, Progressing     Problem: Restraint, Nonbehavioral (Nonviolent)  Goal: Discontinuation Criteria Achieved  Outcome: Ongoing, Progressing  Goal: Personal Dignity and Safety Maintained  Outcome: Ongoing, Progressing     Problem: Adult Inpatient Plan of Care  Goal: Patient-Specific Goal (Individualization)  Outcome: Ongoing, Progressing  Goal: Absence of Hospital-Acquired Illness or Injury  Outcome: Ongoing, Progressing  Goal: Optimal Comfort and Wellbeing  Outcome: Ongoing, Progressing  Goal: Readiness for Transition of Care  Outcome: Ongoing, Progressing  Goal: Rounds/Family Conference  Outcome: Ongoing, Progressing     Problem: Infection  Goal: Infection Symptom Resolution  Outcome: Ongoing, Progressing     Problem: Communication Impairment (Mechanical Ventilation, Invasive)  Goal: Effective Communication  Outcome: Ongoing, Progressing     Problem: Device-Related Complication Risk (Mechanical Ventilation, Invasive)  Goal: Optimal Device Function  Outcome: Ongoing, Progressing     Problem: Inability to Wean (Mechanical Ventilation, Invasive)  Goal: Mechanical Ventilation Liberation  Outcome: Ongoing, Progressing     Problem: Nutrition Impairment (Mechanical Ventilation, Invasive)  Goal: Optimal Nutrition Delivery  Outcome: Ongoing, Progressing     Problem: Skin and Tissue Injury (Mechanical Ventilation, Invasive)  Goal: Absence of Device-Related Skin and Tissue Injury  Outcome: Ongoing, Progressing     Problem: Ventilator-Induced Lung Injury (Mechanical Ventilation, Invasive)  Goal: Absence of Ventilator-Induced Lung Injury  Outcome: Ongoing, Progressing     Problem: Communication Impairment (Artificial Airway)  Goal: Effective Communication  Outcome: Ongoing, Progressing     Problem: Device-Related Complication Risk (Artificial Airway)  Goal: Optimal Device Function  Outcome: Ongoing, Progressing     Problem: Skin and Tissue  Injury (Artificial Airway)  Goal: Absence of Device-Related Skin or Tissue Injury  Outcome: Ongoing, Progressing     Problem: Noninvasive Ventilation Acute  Goal: Effective Unassisted Ventilation and Oxygenation  Outcome: Ongoing, Progressing     Problem: Skin Injury Risk Increased  Goal: Skin Health and Integrity  Outcome: Ongoing, Progressing     Problem: Adjustment to Illness (Therapeutic Hypothermia)  Goal: Optimal Response to Life-Threatening Event  Outcome: Ongoing, Progressing     Problem: Arrhythmia/Dysrhythmia (Therapeutic Hypothermia)  Goal: Stable Cardiac Rate and Rhythm  Outcome: Ongoing, Progressing     Problem: Bleeding (Therapeutic Hypothermia)  Goal: Absence of Bleeding  Outcome: Ongoing, Progressing     Problem: Body Temperature Regulation (Therapeutic Hypothermia)  Goal: Target Body Temperature Maintained  Outcome: Ongoing, Progressing     Problem: Hemodynamic Instability (Therapeutic Hypothermia)  Goal: Effective Tissue Perfusion  Outcome: Ongoing, Progressing     Problem: Infection (Therapeutic Hypothermia)  Goal: Absence of Infection Signs/Symptoms  Outcome: Ongoing, Progressing     Problem: Pain (Therapeutic Hypothermia)  Goal: Acceptable Pain Control  Outcome: Ongoing, Progressing     Problem: Seizure Risk (Therapeutic Hypothermia)  Goal: Absence of Seizures/Seizure-Related Injury  Outcome: Ongoing, Progressing     Problem: Wound  Goal: Optimal Wound Healing  Outcome: Ongoing, Progressing

## 2020-01-30 NOTE — ASSESSMENT & PLAN NOTE
Secondary to non compliance and/or alcohol use  Rate controlled with Amiodarone; BB were on hold in the ICU for bradycardia.   High CHADS VASc score.  On anticoagulation with full-dose Lovenox transition to oral anticoagulant, can be started on Apixaban since studied in CKD with better outcomes; awaiting cardiology recommendations.   Continue amiodarone 200 mg b.i.d. Today

## 2020-01-30 NOTE — PROGRESS NOTES
[facility] CASE MANAGEMENT                  WRITTEN DISCHARGE INFORMATION      APPOINTMENTS AND RESOURCES TO HELP YOU MANAGE YOUR CARE AT HOME BASED ON YOUR PREFERENCES:  (If an appointment is not scheduled for you when you leave the hospital, call your doctor to schedule a follow up visit within a week)    Follow-up Information     [facility] In 1 week.    Why:  for Cardiology follow up  Contact information:  [address] [address]  [phone]                   Healthy Living Instructions to HELP MANAGE YOUR CARE AT HOME:  Things You are responsible for:  1.    Getting your prescriptions filled   2.    Taking your medications as directed, DO NOT MISS ANY DOSES!  3.    Following the diet and exercise recommended by your doctor  4.    Going to your follow-up doctor appointment. This is important because it allows the doctor to monitor your progress and determine if any changes need to made to your treatment plan.  5. If you have any questions about MANAGING YOUR CARE AT HOME Call the Nurse Care Line for 24/7 Assistance [phone]       Please answer any calls you may receive from [facility]. We want to continue to support you as you manage your healthcare needs. [facility] is happy to have the opportunity to serve you.      Thank you for choosing [facility] for your healthcare needs!  Your [facility] Case Management Team,

## 2020-01-30 NOTE — PT/OT/SLP DISCHARGE
Physical Therapy Discharge Summary    Name: Marck Madison  MRN: [id number]   Principal Problem: Cardiac arrest     Patient Discharged from acute Physical Therapy on .  Please refer to prior PT noted date on  for functional status.     Assessment:     Goals partially met.    Objective:     GOALS:   Multidisciplinary Problems     Physical Therapy Goals     Not on file          Multidisciplinary Problems (Resolved)        Problem: Physical Therapy Goal    Goal Priority Disciplines Outcome Goal Variances Interventions   Physical Therapy Goal   (Resolved)     PT, PT/OT Met     Description:  Goals to be met by: /     Patient will increase functional independence with mobility by performin. Supine to sit with Modified [place]  2. Sit to supine with Modified [place]  3. Sit to stand transfer with Modified [place]  4. Bed to chair transfer with Modified [place]   5. Gait  x 300 feet with Modified [place] using Single-point Cane .   6. Lower extremity exercise program x15 reps per handout, with supervision (standing)                      Reasons for Discontinuation of Therapy Services  Transfer to alternate level of care.      Plan:     Patient Discharged to: Home no PT services needed.    Arabella Fletcher, PT  2020

## 2020-01-30 NOTE — PLAN OF CARE
Problem: Occupational Therapy Goal  Goal: Occupational Therapy Goal  Description  Goals to be met by: 2/12/20     Patient will increase functional independence with ADLs by performing:    UE Dressing with Modified [place].  LE Dressing with Modified [place].  Grooming while standing at sink with Modified [place] and Assistive Devices as needed.  Supine to sit with Modified [place].  Step transfer with Modified [place]  Toilet transfer to toilet with Modified [place].  Upper extremity exercise program x15 reps per handout, with assistance as needed.  Educate the patient re: home safety and Energy Conservation Techniques     Outcome: Met     Patient was pleasant and actively participated in OT treatment today. Patient amb w/ no AD SBA ~15 ft x2 chair>bathroom>sink. Patient has met all goals and is ready for D/C.

## 2020-01-30 NOTE — NURSING
Written and verbal instructions given to patient and niece. Both verbalized understanding. Also instructions to take apixaban 10 pm tonight and starting tomorrow 1 tablet each morning and 1 each night. Instructed not take any aspirin products and to report any bleeding.

## 2020-01-30 NOTE — SUBJECTIVE & OBJECTIVE
Interval History: Transferred out of the ICU after extubated on 1/27/2020  The patient denied CP,SOB or palpitations this morning.       Review of Systems   Constitutional: Positive for chills.   HENT: Negative.    Respiratory: Negative.    Cardiovascular: Negative.      Objective:     Vital Signs (Most Recent):  Temp: 97.9 °F (36.6 °C) (01/29/20 1551)  Pulse: 85 (01/29/20 1551)  Resp: 16 (01/29/20 1551)  BP: 139/79 (01/29/20 1551)  SpO2: (!) 94 % (01/29/20 1551) Vital Signs (24h Range):  Temp:  [97.7 °F (36.5 °C)-99.1 °F (37.3 °C)] 97.9 °F (36.6 °C)  Pulse:  [] 85  Resp:  [16-18] 16  SpO2:  [92 %-97 %] 94 %  BP: (139-179)/(79-99) 139/79     Weight: 77.5 kg (170 lb 13.7 oz)  Body mass index is 27.58 kg/m².  No intake or output data in the 24 hours ending 01/29/20 1818   Physical Exam   Constitutional: He is oriented to person, place, and time. He appears well-developed and well-nourished.   HENT:   Head: Normocephalic and atraumatic.   Nose: Nose normal.   Eyes: Pupils are equal, round, and reactive to light. Conjunctivae are normal. Right eye exhibits no discharge. Left eye exhibits no discharge. No scleral icterus.   Neck: Normal range of motion. Neck supple. No JVD present. No thyromegaly present.   Cardiovascular: Normal rate. Exam reveals no friction rub.   No murmur heard.  Irregular rhythm    Pulmonary/Chest: Effort normal and breath sounds normal.   Abdominal: Soft. Bowel sounds are normal. He exhibits no distension and no mass. No hernia.   Musculoskeletal: Normal range of motion. He exhibits no tenderness or deformity.   Lymphadenopathy:     He has no cervical adenopathy.   Neurological: He is alert and oriented to person, place, and time.   Skin: Skin is warm and dry. Capillary refill takes less than 2 seconds. No erythema. No pallor.   Psychiatric: He has a normal mood and affect. Judgment and thought content normal.       Significant Labs:   BMP:   Recent Labs   Lab 01/28/20  0258 01/29/20  0340     125*   * 142   K 3.0* 3.8    108   CO2 24 23   BUN 21 17   CREATININE 1.2 1.2   CALCIUM 9.6 9.4   MG 1.9  --      CBC:   Recent Labs   Lab 01/28/20  0258 01/29/20  0349   WBC 14.61* 15.79*   HGB 14.7 14.0   HCT 44.9 43.1   * 155       Significant Imaging: I have reviewed all pertinent imaging results/findings within the past 24 hours.

## 2020-01-30 NOTE — PT/OT/SLP PROGRESS
Occupational Therapy   Treatment/Discharge    Name: Marck Madison  MRN: [id number]  Admitting Diagnosis:  Cardiac arrest       Recommendations:     Discharge Recommendations: home  Discharge Equipment Recommendations:  cane, straight  Barriers to discharge:  None    Assessment:     Marck Madison is a 59 y.o. male with a medical diagnosis of Cardiac arrest. Performance deficits affecting function are gait instability, impaired endurance, decreased safety awareness. Patient was pleasant and actively participated in OT treatment today. Patient has met all goals and is ready for D/C.     Rehab Prognosis:  Good; patient would benefit from acute skilled OT services to address these deficits and reach maximum level of function.       Plan:     Patient to be seen 2 x/week to address the above listed problems via self-care/home management, therapeutic activities, therapeutic exercises  · Plan of Care Expires: 02/12/20  · Plan of Care Reviewed with: patient    Subjective     Pain/Comfort:  · Pain Rating 1: 0/10    Objective:     Communicated with: Clemencia khanna prior to session.  Patient found up in chair with telemetry upon OT entry to room.    General Precautions: Standard, fall   Orthopedic Precautions:N/A   Braces: N/A     Occupational Performance:     Bed Mobility:    · Not tested today 2* patient being in chair upon arrival      Functional Mobility/Transfers:  · Patient completed Sit <> Stand Transfer with stand by assistance  with  no assistive device   · Patient completed Toilet Transfer Step Transfer technique with stand by assistance with  no AD  · Functional Mobility: Patient amb w/ no AD SBA ~15 ft x2 chair>bathroom>sink.     Activities of Daily Living:  · Grooming: stand by assistance for hand washing while standing at sink  · Lower Body Dressing: stand by assistance to don socks      [facility] 6 Click ADL: 24    Treatment & Education:  Patient completed functional mobility and self-care tasks SBA as noted above. Patient  educated on OT role and POC. Patient educated on home safety and energy conservation techniques via handout. Patient verbalized understanding. Handouts placed in blue folder.      Patient completed BUE exercises x10 with yellow theraband:  -shoulder flex/ext   -shoulder horizontal ab/adduction   -shoulder ab/adduction   -elbow flex  -elbow ext   Patient educated to continue BUE exercises at home 10-15x with yellow theraband 2x a day and take rest breaks as needed. BUE exercises handout and theraband placed in blue folder.       Patient left up in chair with all lines intact, call button in reach and nurseClemencia notifiedEducation:      GOALS:   Multidisciplinary Problems     Occupational Therapy Goals     Not on file          Multidisciplinary Problems (Resolved)        Problem: Occupational Therapy Goal    Goal Priority Disciplines Outcome Interventions   Occupational Therapy Goal   (Resolved)     OT, PT/OT Met    Description:  Goals to be met by: 2/12/20     Patient will increase functional independence with ADLs by performing:    UE Dressing with Modified [place].  LE Dressing with Modified [place].  Grooming while standing at sink with Modified [place] and Assistive Devices as needed.  Supine to sit with Modified [place].  Step transfer with Modified [place]  Toilet transfer to toilet with Modified [place].  Upper extremity exercise program x15 reps per handout, with assistance as needed.  Educate the patient re: home safety and Energy Conservation Techniques                      Time Tracking:     OT Date of Treatment: 01/30/20  OT Start Time: 1116  OT Stop Time: 1140  OT Total Time (min): 24 min    Billable Minutes:Therapeutic Activity 14  Therapeutic Exercise 10  Total Time 24    KATRINA Rivera  1/30/2020

## 2020-01-30 NOTE — DISCHARGE SUMMARY
[facility] Hospital Medicine  Discharge Summary      Patient Name: Marck Madison  MRN: [id number]  Admission Date: 1/20/2020  Hospital Length of Stay: 10 days  Discharge Date and Time: 1/30/2020  2:01 PM  Attending Physician: Jeanine att. providers found   Discharging Provider: Maciej Gómez MD  Primary Care Provider: Primary Doctor Jeanine        HPI:   Marck Madison is a 59 y.o. male that (in part)  has a past medical history of Arrhythmia, CAD (coronary artery disease), CHF (congestive heart failure), NYHA class I, and Psychiatric disorder.  has a past surgical history that includes Liver surgery and Treatment of cardiac arrhythmia (9/12/2019). Presents to [facility] Emergency Department complaining of shortness of breath.  Subacute onset with progressive worsening.  Associated palpitations.  Severe dyspnea with exertion.  Denied fever chills.  Had a cough.  History of asthma and COPD.  Reports compliance with home medication regimen.  History is limited due to the current condition of the patient was intubated and sedated     In the emergency department routine laboratory studies and chest x-ray was obtained.  In EKG and cardiac enzymes as well.  He was found to be in atrial fibrillation rapid ventricular response.  Amiodarone infusion was initiated.  He became more hypoxemic and hypotensive.  It was noted he had erythema developing around the amiodarone infusion site.  This was discontinued.  He was started on diltiazem alternatively.  Remained hypoxemic despite increasing oxygen supplementation.  He underwent rapid sequence intubation.  He was brought to the ICU.       Shortly after transferring into the ICU bed he became bradycardic and pulse was lost.  ACLS protocol was initiated.  We performed approximately 6 rounds with epinephrine and started on a dobutamine infusion before obtaining return of spontaneous circulation.  Please see code sheet for details.  Central line and  arterial lines were placed.  Bedside echo was performed.  Case was discussed with Cardiology in detail.  Dr. Brunner to see the patient in the morning.  Cooling protocol initiated.     Hospital medicine has been asked to admit to inpatient for further evaluation and treatment.        * No surgery found *      Hospital Course: ICU   Mr. Madison presented with acute hypoxemic respiratory failure and atrial fibrillation with RVR which is a recurrent issue for the patient. He was initiated on amiodarone infusion in the emergency department but this was dc'd due to erythema around infusion site. Diltiazem infusion initiated instead. Patient became progressively more hypoxemic despite adjusting O2 delivery. Required rapid sequence intubation and transferred to ICU. Shortly after arriving to ICU, patient became bradycardic which progressed to cardiac arrest. Required 6 rounds of epinephrine and about 20 minutes of ACLS prior to achieving ROSC. Had poor neurological response therefore initiated on hypothermia protocol. Further laboratory studies revealed acute hepatitis, lactic acid 5.7, acute renal failure and new systolic heart failure with pulmonary edema. Patient's TBil and liver enzymes improved with supportive treatment. Based on niece's reports, this is likely alcohol induced since patient drinks heavy alcohol on a daily basis. Renal function also improved with IV diuresis. Patient was alert, awake and following commands once rewarmed. Extubation was difficult due to increased work of breathing during spontaneous trials previously.   Extubated on 1/27/20 to NC.  MED/SURG Floor     Remained stable,  Worked with PT, Full dose anticoagulation was transitioned to oral   Follow up with Cardiologist for CV as well as life vest.     Consults:   Consults (From admission, onward)        Status Ordering Provider     Inpatient consult to Cardiology  Once     Provider:  Patrice Brunner MD    Completed ALTHEA TURNER      Inpatient consult to Pulmonology  Once     Provider:  Michael Garcia MD    Completed TONY MUNOZ     Inpatient consult to Social Work/Case Management  Once     Provider:  (Not yet assigned)    DELORES Lester     IP consult to dietary  Once     Provider:  (Not yet assigned)    Completed ALTHEA TURNER          Final Active Diagnoses:    Diagnosis Date Noted POA    PRINCIPAL PROBLEM:  Cardiac arrest [I46.9] 01/20/2020 Yes    Acute hepatitis [B17.9] 01/21/2020 Yes    Atrial fibrillation with RVR [I48.91] 01/20/2020 Yes    Dilated cardiomyopathy [I42.0] 09/13/2019 Yes      Problems Resolved During this Admission:    Diagnosis Date Noted Date Resolved POA    Elevated troponin I level [R79.89] 01/20/2020 01/29/2020 Yes    CKD (chronic kidney disease) [N18.9] 01/20/2020 01/29/2020 Yes    Acute hypoxemic respiratory failure [J96.01] 09/10/2019 01/29/2020 Yes      Discharged Condition: stable    Disposition: Home or Self Care    Follow Up:  Follow-up Information     [facility].    Why:  for Cardiology follow up  Contact information:  [address] [place] [address]  [phone]                 Patient Instructions:      Diet Cardiac     Notify your health care provider if you experience any of the following:  difficulty breathing or increased cough     Notify your health care provider if you experience any of the following:  persistent nausea and vomiting or diarrhea     Notify your health care provider if you experience any of the following:  persistent dizziness, light-headedness, or visual disturbances     Activity as tolerated     Medications:  Reconciled Home Medications:      Medication List      START taking these medications    amLODIPine 5 MG tablet  Commonly known as:  NORVASC  Take 1 tablet (5 mg total) by mouth once daily.  Start taking on:  January 31, 2020     apixaban 5 mg Tab  Commonly known as:  ELIQUIS  Take 1 tablet (5 mg total) by mouth 2  (two) times daily.     aspirin 81 MG Chew  Take 1 tablet (81 mg total) by mouth once daily.  Start taking on:  January 31, 2020        CONTINUE taking these medications    amiodarone 200 MG Tab  Commonly known as:  PACERONE  Take 1 tablet (200 mg total) by mouth 2 (two) times daily.        STOP taking these medications    metOLazone 5 MG tablet  Commonly known as:  ZAROXOLYN            Significant Diagnostic Studies: Labs:   CMP   Recent Labs   Lab 01/29/20  0349 01/30/20  0321    139   K 3.8 3.7    106   CO2 23 23   * 139*   BUN 17 18   CREATININE 1.2 1.2   CALCIUM 9.4 9.3   PROT 6.8 6.7   ALBUMIN 3.2* 3.2*   BILITOT 0.8 0.5   ALKPHOS 63 73   AST 33 29   ALT 46* 42   ANIONGAP 11 10   ESTGFRAFRICA >60 >60   EGFRNONAA >60 >60       Pending Diagnostic Studies:     None        Indwelling Lines/Drains at time of discharge:   Lines/Drains/Airways     None                 Time spent on the discharge of patient: 30 minutes  Patient was seen and examined on the date of discharge and determined to be suitable for discharge.         Maciej Gómez MD  Department of Hospital Medicine  [facility]

## 2020-01-30 NOTE — ASSESSMENT & PLAN NOTE
Latest echo 35 % as of recent echo.  Newly diagnosed  Ischemia work up with chemical stress test negative for ischemia.   Continue management per cardiology recommendations.   -Will start on unloading agent, Lisinopril 10 mg and up-titrate as per card. Improved renal fxn,   -Can be initiated on low dose beta blockers as well.   -Will be discussing the case with cardiology

## 2020-01-30 NOTE — PROGRESS NOTES
"[facility] [place]  Adult Nutrition  Progress Note    SUMMARY       Recommendations    1. Encourage continued adequate intake as tolerated.     2. If meal intake <50%, recommend Boost Plus TID    Goals: Maintain meal intake >/=75% daily  Nutrition Goal Status: new  Communication of RD Recs: other (comment)(POC)    Reason for Assessment    Reason For Assessment: RD follow-up  Diagnosis: other (see comments)(cardiac arrest)  Relevant Medical History: CAD, CHF, arrhythmia  Interdisciplinary Rounds: did not attend  General Information Comments: Pt seen for f/u eval. Pt extubated, diet advanced. Pt reports good appetite and 100% intake of breakfast. Pt denies N/V/C/D. NFPE performed 1/24: WNL for muscle and fat stores  Nutrition Discharge Planning: Discharge on cardiac diet    Nutrition Risk Screen    Nutrition Risk Screen: no indicators present    Nutrition/Diet History    Spiritual, Cultural Beliefs, Sikhism Practices, Values that Affect Care: no  Food Allergies: NKFA  Factors Affecting Nutritional Intake: None identified at this time    Anthropometrics    Temp: 98 °F (36.7 °C)  Height Method: Stated  Height: 5' 6" (167.6 cm)  Height (inches): 66 in  Weight Method: Bed Scale  Weight: 73.1 kg (161 lb 2.5 oz)  Weight (lb): 161.16 lb  Ideal Body Weight (IBW), Male: 142 lb  % Ideal Body Weight, Male (lb): 113.49 %  BMI (Calculated): 26  BMI Grade: 25 - 29.9 - overweight       Lab/Procedures/Meds    Pertinent Labs Reviewed: reviewed  Pertinent Labs Comments: Glu 139, lytes WNL  Pertinent Medications Reviewed: reviewed  Pertinent Medications Comments: amiodarone, amlodipine, enoxaparin      Estimated/Assessed Needs    Weight Used For Calorie Calculations: 73.1 kg (161 lb 2.5 oz)  Energy Calorie Requirements (kcal): 1920kcal/d  Energy Need Method: Stanley-St Jeor(x PAL 1.3)  Protein Requirements: 73-87g/d(1.0-1.2g/kg)  Weight Used For Protein Calculations: 73.1 kg (161 lb 2.5 oz)  Fluid Requirements (mL): 1920ml " or per MD  Estimated Fluid Requirement Method: RDA Method  RDA Method (mL): 1920         Nutrition Prescription Ordered    Current Diet Order: Cardiac  Current Nutrition Support Formula Ordered: Glucerna 1.5(to run @ 40 mL/hr x 1 day)  Current Nutrition Support Rate Ordered: 50 (ml)  Current Nutrition Support Frequency Ordered: mL/hr    Evaluation of Received Nutrient/Fluid Intake    Enteral Calories (kcal): 0  Enteral Protein (gm): 0  Enteral (Free Water) Fluid (mL): 0  Free Water Flush Fluid (mL): 0  % Kcal Needs: 0  % Protein Needs: 0  Total Fluid Intake (mL): 0  I/O: -400  Energy Calories Required: meeting needs  Protein Required: meeting needs  Fluid Required: meeting needs  Comments: LBM 1/28  Tolerance: tolerating  % Intake of Estimated Energy Needs: 75 - 100 %  % Meal Intake: 75 - 100 %    Nutrition Risk    Level of Risk/Frequency of Follow-up: (f/u 1x/ weekly)     Assessment and Plan    Nutrition Problem  Predicted Inadequate Energy Intake     Related to (etiology):   Mechanical ventilation     Signs and Symptoms (as evidenced by):   NPO     Interventions:  Collaboration with other providers  Enteral nutrition      Nutrition Diagnosis Status:   Resolved        Monitor and Evaluation    Food and Nutrient Intake: energy intake, food and beverage intake  Food and Nutrient Adminstration: diet order  Anthropometric Measurements: weight, weight change, body mass index  Biochemical Data, Medical Tests and Procedures: electrolyte and renal panel, glucose/endocrine profile, lipid profile  Nutrition-Focused Physical Findings: overall appearance     Malnutrition Assessment     Skin (Micronutrient): none                   Edema (Fluid Accumulation): 1-->trace   Subcutaneous Fat Loss (Final Summary): well nourished  Muscle Loss Evaluation (Final Summary): well nourished         Nutrition Follow-Up    RD Follow-up?: Yes

## 2020-01-30 NOTE — PROGRESS NOTES
[facility] Hospital Medicine  Progress Note    Patient Name: Marck Madison  MRN: [id number]  Patient Class: IP- Inpatient   Admission Date: 1/20/2020  Length of Stay: 9 days  Attending Physician: Maciej Gómez MD  Primary Care Provider: Primary Doctor No        Subjective:     Principal Problem:Cardiac arrest        HPI:  Marck Madison is a 59 y.o. male that (in part)  has a past medical history of Arrhythmia, CAD (coronary artery disease), CHF (congestive heart failure), NYHA class I, and Psychiatric disorder.  has a past surgical history that includes Liver surgery and Treatment of cardiac arrhythmia (9/12/2019). Presents to [facility] Emergency Department complaining of shortness of breath.  Subacute onset with progressive worsening.  Associated palpitations.  Severe dyspnea with exertion.  Denied fever chills.  Had a cough.  History of asthma and COPD.  Reports compliance with home medication regimen.  History is limited due to the current condition of the patient was intubated and sedated    In the emergency department routine laboratory studies and chest x-ray was obtained.  In EKG and cardiac enzymes as well.  He was found to be in atrial fibrillation rapid ventricular response.  Amiodarone infusion was initiated.  He became more hypoxemic and hypotensive.  It was noted he had erythema developing around the amiodarone infusion site.  This was discontinued.  He was started on diltiazem alternatively.  Remained hypoxemic despite increasing oxygen supplementation.  He underwent rapid sequence intubation.  He was brought to the ICU.      Shortly after transferring into the ICU bed he became bradycardic and pulse was lost.  ACLS protocol was initiated.  We performed approximately 6 rounds with epinephrine and started on a dobutamine infusion before obtaining return of spontaneous circulation.  Please see code sheet for details.  Central line and arterial lines were placed.   Bedside echo was performed.  Case was discussed with Cardiology in detail.  Dr. Brunner to see the patient in the morning.  Cooling protocol initiated.    Hospital medicine has been asked to admit to inpatient for further evaluation and treatment.     Overview/Hospital Course:  Mr. Madison presented with acute hypoxemic respiratory failure and atrial fibrillation with RVR which is a recurrent issue for the patient. He was initiated on amiodarone infusion in the emergency department but this was dc'd due to erythema around infusion site. Diltiazem infusion initiated instead. Patient became progressively more hypoxemic despite adjusting O2 delivery. Required rapid sequence intubation and transferred to ICU. Shortly after arriving to ICU, patient became bradycardic which progressed to cardiac arrest. Required 6 rounds of epinephrine and about 20 minutes of ACLS prior to achieving ROSC. Had poor neurological response therefore initiated on hypothermia protocol. Further laboratory studies revealed acute hepatitis, lactic acid 5.7, acute renal failure and new systolic heart failure with pulmonary edema. Patient's TBil and liver enzymes improved with supportive treatment. Based on niece's reports, this is likely alcohol induced since patient drinks heavy alcohol on a daily basis. Renal function also improved with IV diuresis. Patient was alert, awake and following commands once rewarmed. Extubation was difficult due to increased work of breathing during spontaneous trials previously.   Extubated on 1/27/20 to NC.    Transferred to the Med/Surg floor on 1/28/2020  Remained stable overnight   Continued treatment for Afib, Cardiomyopathy  Worked with PT and OT      Interval History: Transferred out of the ICU after extubated on 1/27/2020  The patient denied CP,SOB or palpitations this morning.       Review of Systems   Constitutional: Positive for chills.   HENT: Negative.    Respiratory: Negative.    Cardiovascular: Negative.       Objective:     Vital Signs (Most Recent):  Temp: 97.9 °F (36.6 °C) (01/29/20 1551)  Pulse: 85 (01/29/20 1551)  Resp: 16 (01/29/20 1551)  BP: 139/79 (01/29/20 1551)  SpO2: (!) 94 % (01/29/20 1551) Vital Signs (24h Range):  Temp:  [97.7 °F (36.5 °C)-99.1 °F (37.3 °C)] 97.9 °F (36.6 °C)  Pulse:  [] 85  Resp:  [16-18] 16  SpO2:  [92 %-97 %] 94 %  BP: (139-179)/(79-99) 139/79     Weight: 77.5 kg (170 lb 13.7 oz)  Body mass index is 27.58 kg/m².  No intake or output data in the 24 hours ending 01/29/20 1818   Physical Exam   Constitutional: He is oriented to person, place, and time. He appears well-developed and well-nourished.   HENT:   Head: Normocephalic and atraumatic.   Nose: Nose normal.   Eyes: Pupils are equal, round, and reactive to light. Conjunctivae are normal. Right eye exhibits no discharge. Left eye exhibits no discharge. No scleral icterus.   Neck: Normal range of motion. Neck supple. No JVD present. No thyromegaly present.   Cardiovascular: Normal rate. Exam reveals no friction rub.   No murmur heard.  Irregular rhythm    Pulmonary/Chest: Effort normal and breath sounds normal.   Abdominal: Soft. Bowel sounds are normal. He exhibits no distension and no mass. No hernia.   Musculoskeletal: Normal range of motion. He exhibits no tenderness or deformity.   Lymphadenopathy:     He has no cervical adenopathy.   Neurological: He is alert and oriented to person, place, and time.   Skin: Skin is warm and dry. Capillary refill takes less than 2 seconds. No erythema. No pallor.   Psychiatric: He has a normal mood and affect. Judgment and thought content normal.       Significant Labs:   BMP:   Recent Labs   Lab 01/28/20  0258 01/29/20  0349    125*   * 142   K 3.0* 3.8    108   CO2 24 23   BUN 21 17   CREATININE 1.2 1.2   CALCIUM 9.6 9.4   MG 1.9  --      CBC:   Recent Labs   Lab 01/28/20  0258 01/29/20  0349   WBC 14.61* 15.79*   HGB 14.7 14.0   HCT 44.9 43.1   * 155        Significant Imaging: I have reviewed all pertinent imaging results/findings within the past 24 hours.      Assessment/Plan:      * Cardiac arrest  Cause unknown, EF down to 35 %, may need a repeat echo prior to discharge considering no past cardiac history   Ischemia work up upto the chemical stress level - negative thus far.   Currently doing well.  Worked with PT/OT   Discharge home with follow up with Cardiology for systolic HF in the post cardiac arrest.     Acute hepatitis  Likely in the setting of congestive hepatopathy  Resolving.     Atrial fibrillation with RVR  Secondary to non compliance and/or alcohol use  Rate controlled with Amiodarone; BB were on hold in the ICU for bradycardia.   High CHADS VASc score.  On anticoagulation with full-dose Lovenox transition to oral anticoagulant, can be started on Apixaban since studied in CKD with better outcomes; awaiting cardiology recommendations.   Continue amiodarone 200 mg b.i.d. Today      Dilated cardiomyopathy  Latest echo 35 % as of recent echo.  Newly diagnosed  Ischemia work up with chemical stress test negative for ischemia.   Continue management per cardiology recommendations.   -Will start on unloading agent, Lisinopril 10 mg and up-titrate as per card. Improved renal fxn,   -Can be initiated on low dose beta blockers as well.   -Will be discussing the case with cardiology         VTE Risk Mitigation (From admission, onward)         Ordered     enoxaparin injection 80 mg  Every 12 hours (non-standard times)      01/27/20 1023     IP VTE HIGH RISK PATIENT  Once      01/20/20 0200                      Maciej Gómez MD  Department of Hospital Medicine   [facility] [place]

## 2020-01-30 NOTE — ASSESSMENT & PLAN NOTE
Cause unknown, EF down to 35 %, may need a repeat echo prior to discharge considering no past cardiac history   Ischemia work up upto the chemical stress level - negative thus far.   Currently doing well.  Worked with PT/OT   Discharge home with follow up with Cardiology for systolic HF in the post cardiac arrest.

## 2020-01-30 NOTE — PLAN OF CARE
"   01/30/20 1418   Final Note   Assessment Type Final Discharge Note   Anticipated Discharge Disposition Home   Hospital Follow Up  Appt(s) scheduled? Yes   Discharge plans and expectations educations in teach back method with documentation complete? Yes   Right Care Referral Info   Post Acute Recommendation No Care   Post-Acute Status   Post-Acute Authorization Other   Other Status No Post-Acute Service Needs   Discharge Delays None known at this time   Patient requested a cane but did not want to wait for the order to be written and processing.  He stated he will get one out in the community.  EDUCATION:  Mr Madison provided with educational information on CHF.  Information reviewed and placed in :My Healthcare Packet" to be brought home for him to use as resource after discharge.  Information included:  signs and symptoms to look for and call the doctor if experiencing, and symptoms that may indicate a medical emergency: CALL 911.      All questions answered.  Teach back method used.    Patient stated, "CP, SOB call 911".    Michael BRUCE notified that all AM needs ae met        "

## 2020-02-03 ENCOUNTER — PATIENT OUTREACH (OUTPATIENT)
Dept: ADMINISTRATIVE | Facility: CLINIC | Age: 60
End: 2020-02-03

## 2020-02-03 DIAGNOSIS — I46.9 CARDIAC ARREST: Primary | ICD-10-CM

## 2020-02-03 NOTE — PATIENT INSTRUCTIONS
Apixaban oral tablets  What is this medicine?  APIXABAN (a PIX a ban) is an anticoagulant (blood thinner). It is used to lower the chance of stroke in people with a medical condition called atrial fibrillation. It is also used to treat or prevent blood clots in the lungs or in the veins.  How should I use this medicine?  Take this medicine by mouth with a glass of water. Follow the directions on the prescription label. You can take it with or without food. If it upsets your stomach, take it with food. Take your medicine at regular intervals. Do not take it more often than directed. Do not stop taking except on your doctor's advice. Stopping this medicine may increase your risk of a blot clot. Be sure to refill your prescription before you run out of medicine.  Talk to your pediatrician regarding the use of this medicine in children. Special care may be needed.  What side effects may I notice from receiving this medicine?  Side effects that you should report to your doctor or health care professional as soon as possible:  · allergic reactions like skin rash, itching or hives, swelling of the face, lips, or tongue  · signs and symptoms of bleeding such as bloody or black, tarry stools; red or dark-brown urine; spitting up blood or brown material that looks like coffee grounds; red spots on the skin; unusual bruising or bleeding from the eye, gums, or nose  What may interact with this medicine?  This medicine may interact with the following:  · aspirin and aspirin-like medicines  · certain medicines for fungal infections like ketoconazole and itraconazole  · certain medicines for seizures like carbamazepine and phenytoin  · certain medicines that treat or prevent blood clots like warfarin, enoxaparin, and dalteparin  · clarithromycin  · NSAIDs, medicines for pain and inflammation, like ibuprofen or naproxen  · rifampin  · ritonavir  · [employer]'s wort  What if I miss a dose?  If you miss a dose, take it as soon as you  can. If it is almost time for your next dose, take only that dose. Do not take double or extra doses.  Where should I keep my medicine?  Keep out of the reach of children.  Store at room temperature between 20 and 25 degrees C (68 and 77 degrees F). Throw away any unused medicine after the expiration date.  What should I tell my health care provider before I take this medicine?  They need to know if you have any of these conditions:  · bleeding disorders  · bleeding in the brain  · blood in your stools (black or tarry stools) or if you have blood in your vomit  · history of stomach bleeding  · kidney disease  · liver disease  · mechanical heart valve  · an unusual or allergic reaction to apixaban, other medicines, foods, dyes, or preservatives  · pregnant or trying to get pregnant  · breast-feeding  What should I watch for while using this medicine?  Notify your doctor or health care professional and seek emergency treatment if you develop breathing problems; changes in vision; chest pain; severe, sudden headache; pain, swelling, warmth in the leg; trouble speaking; sudden numbness or weakness of the face, arm, or leg. These can be signs that your condition has gotten worse.  If you are going to have surgery, tell your doctor or health care professional that you are taking this medicine.  Tell your health care professional that you use this medicine before you have a spinal or epidural procedure. Sometimes people who take this medicine have bleeding problems around the spine when they have a spinal or epidural procedure. This bleeding is very rare. If you have a spinal or epidural procedure while on this medicine, call your health care professional immediately if you have back pain, numbness or tingling (especially in your legs and feet), muscle weakness, paralysis, or loss of bladder or bowel control.  Avoid sports and activities that might cause injury while you are using this medicine. Severe falls or injuries  can cause unseen bleeding. Be careful when using sharp tools or knives. Consider using an electric razor. Take special care brushing or flossing your teeth. Report any injuries, bruising, or red spots on the skin to your doctor or health care professional.  NOTE:This sheet is a summary. It may not cover all possible information. If you have questions about this medicine, talk to your doctor, pharmacist, or health care provider. Copyright© 2017 [employer]

## 2020-02-11 ENCOUNTER — CARE AT HOME (OUTPATIENT)
Dept: HOME HEALTH SERVICES | Facility: CLINIC | Age: 60
End: 2020-02-11
Payer: MEDICAID

## 2020-02-11 VITALS
DIASTOLIC BLOOD PRESSURE: 104 MMHG | OXYGEN SATURATION: 98 % | RESPIRATION RATE: 16 BRPM | SYSTOLIC BLOOD PRESSURE: 174 MMHG | TEMPERATURE: 98 F | HEART RATE: 77 BPM

## 2020-02-11 DIAGNOSIS — I42.0 DILATED CARDIOMYOPATHY: Primary | ICD-10-CM

## 2020-02-11 DIAGNOSIS — Z87.891 SMOKER WITHIN LAST 12 MONTHS: ICD-10-CM

## 2020-02-11 DIAGNOSIS — I11.0 HYPERTENSIVE HEART DISEASE WITH CHRONIC DIASTOLIC CONGESTIVE HEART FAILURE: ICD-10-CM

## 2020-02-11 DIAGNOSIS — I50.32 HYPERTENSIVE HEART DISEASE WITH CHRONIC DIASTOLIC CONGESTIVE HEART FAILURE: ICD-10-CM

## 2020-02-11 DIAGNOSIS — I48.20 CHRONIC A-FIB: ICD-10-CM

## 2020-02-11 PROCEDURE — 99495 TCM SERVICES (MODERATE COMPLEXITY): ICD-10-PCS | Mod: S$GLB,,, | Performed by: NURSE PRACTITIONER

## 2020-02-11 PROCEDURE — 99495 TRANSJ CARE MGMT MOD F2F 14D: CPT | Mod: S$GLB,,, | Performed by: NURSE PRACTITIONER

## 2020-02-11 NOTE — PROGRESS NOTES
[facility]  Transition of Care Home Visit    Visit Date: 2/11/2020  Encounter Provider: Katerina Garcia NP  PCP:  Primary Doctor No    PRESENTING HISTORY      Patient ID: Marck Madison is a 60 y.o. male.    Consult Requested By:  Katerina Garcia  Reason for Consult:  Hospital Follow Up    Marck is being seen at home due to transportation issues    Chief Complaint: Transitional Care; Shortness of Breath; and Hypertension      History of Present Illness: Mr. Marck Madison is a 60 y.o. male who was recently admitted to the hospital.  Admit: 1/20/2020    Discharge: 1/30/2020  Hospital Course: ICU   Mr. Madison presented with acute hypoxemic respiratory failure and atrial fibrillation with RVR which is a recurrent issue for the patient. He was initiated on amiodarone infusion in the emergency department but this was dc'd due to erythema around infusion site. Diltiazem infusion initiated instead. Patient became progressively more hypoxemic despite adjusting O2 delivery. Required rapid sequence intubation and transferred to ICU. Shortly after arriving to ICU, patient became bradycardic which progressed to cardiac arrest. Required 6 rounds of epinephrine and about 20 minutes of ACLS prior to achieving ROSC. Had poor neurological response therefore initiated on hypothermia protocol. Further laboratory studies revealed acute hepatitis, lactic acid 5.7, acute renal failure and new systolic heart failure with pulmonary edema. Patient's TBil and liver enzymes improved with supportive treatment. Based on niece's reports, this is likely alcohol induced since patient drinks heavy alcohol on a daily basis. Renal function also improved with IV diuresis. Patient was alert, awake and following commands once rewarmed. Extubation was difficult due to increased work of breathing during spontaneous trials previously.   Extubated on 1/27/20 to NC.  MED/SURG Floor      Remained stable,  Worked with PT, Full dose anticoagulation was  "transitioned to oral   Follow up with Cardiologist for CV as well as life vest.  ___________________________________________________________________    Today:    HPI:  Today, pt is being seen for hospital discharge after a recent admit. See hospital course.  He denies any complaints today and reports compliance with his medications.      Review of Systems   Constitutional: Negative.    HENT: Negative.    Eyes: Negative.    Respiratory: Negative.    Cardiovascular: Negative.    Gastrointestinal: Negative.    Endocrine: Negative.    Genitourinary: Negative.    Musculoskeletal: Negative.    Skin: Negative.    Neurological: Negative.    Psychiatric/Behavioral: Negative.        Assessments:  · Environmental: first floor apartment, clean, minimal furniture, mattress on floor, adequate lighting and temp  · Functional Status: independent  · Safety: no issues noted  · Nutritional: adequate food in home  · Home Health/DME/Supplies: none    PAST HISTORY:     Past Medical History:   Diagnosis Date    Arrhythmia 2017    aflutter    CAD (coronary artery disease)     CHF (congestive heart failure), NYHA class I 2017    Psychiatric disorder     h/o "schizophrena/bipolar"       Past Surgical History:   Procedure Laterality Date    LIVER SURGERY      abscess drainage; 1970s    TREATMENT OF CARDIAC ARRHYTHMIA  9/12/2019    Procedure: Cardioversion or Defibrillation;  Surgeon: Jonathan Lopez MD;  Location: [facility] CATH LAB;  Service: Cardiology;;       No family history on file.    Social History     Socioeconomic History    Marital status: Single     Spouse name: Not on file    Number of children: Not on file    Years of education: Not on file    Highest education level: Not on file   Occupational History    Not on file   Social Needs    Financial resource strain: Not on file    Food insecurity:     Worry: Not on file     Inability: Not on file    Transportation needs:     Medical: Not on file     Non-medical: Not on file " "  Tobacco Use    Smoking status: Former Smoker     Packs/day: 0.50     Years: 5.00     Pack years: 2.50     Types: Cigarettes    Smokeless tobacco: Never Used   Substance and Sexual Activity    Alcohol use: Not Currently     Alcohol/week: 14.0 standard drinks     Types: 14 Shots of liquor per week     Comment: "[employer]" everyday    Drug use: No    Sexual activity: Not on file   Lifestyle    Physical activity:     Days per week: Not on file     Minutes per session: Not on file    Stress: Not on file   Relationships    Social connections:     Talks on phone: Not on file     Gets together: Not on file     Attends Jew service: Not on file     Active member of club or organization: Not on file     Attends meetings of clubs or organizations: Not on file     Relationship status: Not on file   Other Topics Concern    Not on file   Social History Narrative    Not on file       MEDICATIONS & ALLERGIES:     Current Outpatient Medications on File Prior to Visit   Medication Sig Dispense Refill    amiodarone (PACERONE) 200 MG Tab Take 1 tablet (200 mg total) by mouth 2 (two) times daily. 180 tablet 3    amLODIPine (NORVASC) 5 MG tablet Take 1 tablet (5 mg total) by mouth once daily. 90 tablet 3    apixaban (ELIQUIS) 5 mg Tab Take 1 tablet (5 mg total) by mouth 2 (two) times daily. 60 tablet 2    aspirin 81 MG Chew Take 1 tablet (81 mg total) by mouth once daily. 90 tablet 3     No current facility-administered medications on file prior to visit.         Review of patient's allergies indicates:  No Known Allergies    OBJECTIVE:     Vital Signs:  Vitals:    02/11/20 1543   BP: (!) 174/104   Pulse: 77   Resp: 16   Temp: 97.8 °F (36.6 °C)     There is no height or weight on file to calculate BMI.     Physical Exam:  Physical Exam   Constitutional: He is oriented to person, place, and time. He appears well-developed and well-nourished. No distress.   HENT:   Head: Normocephalic and atraumatic.   Eyes: " Pupils are equal, round, and reactive to light. EOM are normal.   Neck: Normal range of motion. Neck supple.   Cardiovascular: Normal rate, regular rhythm and normal heart sounds.   Pulmonary/Chest: Effort normal and breath sounds normal.   Abdominal: Soft. Bowel sounds are normal.   Musculoskeletal: Normal range of motion. He exhibits no edema.   Neurological: He is alert and oriented to person, place, and time. No cranial nerve deficit.   Skin: Skin is warm and dry.   Psychiatric: He has a normal mood and affect. His behavior is normal. Judgment and thought content normal.   Vitals reviewed.      Laboratory  Lab Results   Component Value Date    WBC 14.26 (H) 01/30/2020    HGB 13.7 (L) 01/30/2020    HCT 42.3 01/30/2020    MCV 99 (H) 01/30/2020     01/30/2020     Lab Results   Component Value Date    INR 1.3 (H) 01/23/2020    INR 1.4 (H) 01/22/2020    INR 1.4 (H) 01/21/2020     No results found for: HGBA1C  No results for input(s): POCTGLUCOSE in the last 72 hours.    Diagnostic Results:  Results for orders placed during the hospital encounter of 01/20/20   Echo Color Flow Doppler? Yes    Narrative · Concentric left ventricular hypertrophy.  · Atrial fibrillation observed.  · Moderately decreased left ventricular systolic function. The estimated   ejection fraction is 35%.  · Normal right ventricular systolic function.  · Moderate mitral regurgitation.  · Moderate tricuspid regurgitation.  · No pulmonary hypertension present.            TRANSITION OF CARE:     [facility] Contact Note: 2/3/2020    Family and/or Caretaker present at visit?  Yes.  Diagnostic tests reviewed/disposition: No diagnosic tests pending after this hospitalization.  Disease/illness education: Cardiomyopathy, CHF, Low sodium diet  Home health/community services discussion/referrals: Patient does not have home health established from hospital visit.  They do need home health.  If needed, we will set up home health for the patient.    Establishment or re-establishment of referral orders for community resources: No other necessary community resources.   Discussion with other health care providers: No discussion with other health care providers necessary.     Transition of Care Visit:     I have reviewed and updated the history and problem list.  I have reconciled the medication list.  I have discussed the hospitalization and current medical issues, prognosis and plans with the patient/family.  I  spent more than 50% of time discussing the care with the patient/family.  Total Face-to-Face Encounter: 60 minutes.    Medications Reconciliation:   I have reconciled the patient's home medications and discharge medications with the patient/family. I have updated all changes.  Refer to After-Visit Medication List.    ASSESSMENT & PLAN:     HIGH RISK CONDITION(S):  CHF/Afib/Cardiomyopathy    Marck was seen today for transitional care, shortness of breath and hypertension.    Diagnoses and all orders for this visit:    Dilated cardiomyopathy  -     Ambulatory referral/consult to Home Health; Future  Stable, no edema or SOB today.  Reviewed with pt the importance of keeping b/p WNL to avoid further damage as well as continued smoking cessation.  Pt does not have a scale to weigh daily-niece will get him one this week.  Reviewed 2 gram NA diet with pt and niece.    Special Patient Instructions: The following was discussed with the patient/family. Instructions were given to patient/family.    CHF Instructions    Weigh daily and record.  Regular activity within patient's limitations.  Low salt, low fat and low choleterol diet and restrict fluid < 2L per day.  Contact for SOB, chest pain, weight gain > 2-3 lbs per day and/or 5-6 lbs per week.   No smoking. Annual influenza vaccine required. Take all medications as prescribed.  Reinforced importance of medication and diet compliance.    Chronic a-fib  -     Ambulatory referral/consult to Home Health;  Future  Stable, RRR today. Denies any SOB or palpitations  Amiodarone and Eliquis in place  Pepito has scheduled a cardiology appt next week for pt on 2/19-encouraged compliance with appt.  Continue to monitor  Hypertensive heart disease with chronic diastolic congestive heart failure  B/P elevated today. Reports he has taken his meds.  He has no means to check his b/p daily.  Instructed to increase Norvasc from 5mg daily to 10mg daily.  Pt took extra dose during visit.    Home health orders sent for nursing to assist with vital sign monitoring and disease education on meds and diet    Smoker within last 12 months  Has not smoked since discharge. Encouraged continued cessation    Time allowed for questions, all questions answered. Had pt review instructions and he understands all instructions today  Contact info given for any questions, changes or concerns      Were controlled substances prescribed?  No    Instructions for the patient:  Increase Norvasc to tablets every morning  Adhere to Low Sodium diet  Take all medications as prescribed  Continued smoking and alcohol cessation    Scheduled Follow-up :  No future appointments.    After Visit Medication List :     Medication List           Accurate as of February 11, 2020  3:50 PM. If you have any questions, ask your nurse or doctor.               CONTINUE taking these medications    amiodarone 200 MG Tab  Commonly known as:  PACERONE  Take 1 tablet (200 mg total) by mouth 2 (two) times daily.     amLODIPine 5 MG tablet  Commonly known as:  NORVASC  Take 1 tablet (5 mg total) by mouth once daily.     apixaban 5 mg Tab  Commonly known as:  ELIQUIS  Take 1 tablet (5 mg total) by mouth 2 (two) times daily.     aspirin 81 MG Chew  Take 1 tablet (81 mg total) by mouth once daily.            Signature:  Katerina Garcia NP    Patient consent obtained prior to treatment

## 2020-06-03 ENCOUNTER — HOSPITAL ENCOUNTER (EMERGENCY)
Facility: HOSPITAL | Age: 60
Discharge: HOME | End: 2020-06-03
Attending: EMERGENCY MEDICINE
Payer: MEDICAID

## 2020-06-03 VITALS
RESPIRATION RATE: 18 BRPM | BODY MASS INDEX: 28.12 KG/M2 | TEMPERATURE: 98 F | DIASTOLIC BLOOD PRESSURE: 107 MMHG | HEIGHT: 66 IN | HEART RATE: 78 BPM | WEIGHT: 175 LBS | OXYGEN SATURATION: 96 % | SYSTOLIC BLOOD PRESSURE: 211 MMHG

## 2020-06-03 DIAGNOSIS — M79.606 LEG PAIN: ICD-10-CM

## 2020-06-03 DIAGNOSIS — M79.604 ACUTE PAIN OF RIGHT LOWER EXTREMITY: Primary | ICD-10-CM

## 2020-06-03 DIAGNOSIS — Z76.0 MEDICATION REFILL: ICD-10-CM

## 2020-06-03 PROCEDURE — 25000003 PHARM REV CODE 250: Performed by: PHYSICIAN ASSISTANT

## 2020-06-03 PROCEDURE — 99284 EMERGENCY DEPT VISIT MOD MDM: CPT | Mod: 25

## 2020-06-03 RX ORDER — AMIODARONE HYDROCHLORIDE 200 MG/1
200 TABLET ORAL DAILY
Qty: 30 TABLET | Refills: 0 | Status: ON HOLD | OUTPATIENT
Start: 2020-06-03 | End: 2021-07-05 | Stop reason: HOSPADM

## 2020-06-03 RX ORDER — DICLOFENAC SODIUM 10 MG/G
2 GEL TOPICAL 3 TIMES DAILY
Qty: 100 G | Refills: 0 | Status: ON HOLD | OUTPATIENT
Start: 2020-06-03 | End: 2021-06-15 | Stop reason: HOSPADM

## 2020-06-03 RX ORDER — AMLODIPINE BESYLATE 5 MG/1
5 TABLET ORAL
Status: COMPLETED | OUTPATIENT
Start: 2020-06-03 | End: 2020-06-03

## 2020-06-03 RX ORDER — HYDROCODONE BITARTRATE AND ACETAMINOPHEN 5; 325 MG/1; MG/1
1 TABLET ORAL
Status: COMPLETED | OUTPATIENT
Start: 2020-06-03 | End: 2020-06-03

## 2020-06-03 RX ORDER — NAPROXEN SODIUM 220 MG/1
81 TABLET, FILM COATED ORAL DAILY
Qty: 30 TABLET | Refills: 0 | Status: SHIPPED | OUTPATIENT
Start: 2020-06-03 | End: 2022-06-30

## 2020-06-03 RX ORDER — AMLODIPINE BESYLATE 5 MG/1
5 TABLET ORAL DAILY
Qty: 30 TABLET | Refills: 0 | Status: SHIPPED | OUTPATIENT
Start: 2020-06-03 | End: 2020-07-03

## 2020-06-03 RX ADMIN — HYDROCODONE BITARTRATE AND ACETAMINOPHEN 1 TABLET: 5; 325 TABLET ORAL at 05:06

## 2020-06-03 RX ADMIN — AMLODIPINE BESYLATE 5 MG: 5 TABLET ORAL at 06:06

## 2020-06-03 NOTE — ED TRIAGE NOTES
Pt presents to the ED via personal transportation reporting right leg pain x 3 days. Denies any recent trauma or falls. Also denies hx of blood clots.

## 2020-06-03 NOTE — PROVIDER PROGRESS NOTES - EMERGENCY DEPT.
Emergency Department TeleTRIAGE Encounter Note      CHIEF COMPLAINT    Chief Complaint   Patient presents with    Leg Pain     right leg pain x 3 days.denies fall or trauma       VITAL SIGNS   Initial Vitals [06/03/20 1458]   BP Pulse Resp Temp SpO2   (!) 181/99 85 18 98 °F (36.7 °C) 98 %      MAP       --            ALLERGIES    Review of patient's allergies indicates:  No Known Allergies    PROVIDER TRIAGE NOTE  Patient with past medical history CHF, arrhythmia, CAD presents to the ED for evaluation of right lower leg pain.  Patient reports pain from his knee to his ankle. He reports swelling and tenderness to the posterior calf. He denies erythema, trauma or injury.  Pain is been constant for 3 days.  He has not taken any medications for the pain today.        ORDERS  Labs Reviewed - No data to display    ED Orders (720h ago, onward)    Start Ordered     Status Ordering Provider    06/03/20 1504 06/03/20 1503  US Lower Extremity Veins Right  1 time imaging      Ordered KACEY GREGORY            Virtual Visit Note: The provider triage portion of this emergency department evaluation and documentation was performed via [employer], a HIPAA-compliant telemedicine application, in concert with a tele-presenter in the room. A face to face patient evaluation with one of my colleagues will occur once the patient is placed in an emergency department room.      DISCLAIMER: This note was prepared with Qeexo voice recognition transcription software. Garbled syntax, mangled pronouns, and other bizarre constructions may be attributed to that software system.

## 2020-06-04 NOTE — ED PROVIDER NOTES
"Encounter Date: 6/3/2020    SCRIBE #1 NOTE: I, Jim Chahal, am scribing for, and in the presence of,  Maged Garg PA-C. I have scribed the following portions of the note - Other sections scribed: HPI/ROS.       History     Chief Complaint   Patient presents with    Leg Pain     right leg pain x 3 days.denies fall or trauma     Pt seen by provider at 5:37PM    This 60 y.o. male with a medical history of CAD and CHF presents to the ED for an emergent evaluation of R, anterior leg pain x 3 days. No recent falls, injuries, or traumas. No recent changes to daily activities or routines. Leg pain is exacerbated with LE movement and ambulation. No alleviating factors. No hx of cancer. Otherwise, pt denies fever, chills, SOB, numbness, weakness, and any other associated symptoms. No further complaints at this time.     The history is provided by the patient. No  was used.     Review of patient's allergies indicates:  No Known Allergies  Past Medical History:   Diagnosis Date    Arrhythmia 2017    aflutter    CAD (coronary artery disease)     CHF (congestive heart failure), NYHA class I 2017    Psychiatric disorder     h/o "schizophrena/bipolar"     Past Surgical History:   Procedure Laterality Date    LIVER SURGERY      abscess drainage; 1970s    TREATMENT OF CARDIAC ARRHYTHMIA  9/12/2019    Procedure: Cardioversion or Defibrillation;  Surgeon: Jonathan Lopez MD;  Location: [facility] CATH LAB;  Service: Cardiology;;     History reviewed. No pertinent family history.  Social History     Tobacco Use    Smoking status: Former Smoker     Packs/day: 0.50     Years: 5.00     Pack years: 2.50     Types: Cigarettes    Smokeless tobacco: Never Used   Substance Use Topics    Alcohol use: Not Currently     Alcohol/week: 14.0 standard drinks     Types: 14 Shots of liquor per week     Comment: "[employer]" everyday    Drug use: No     Review of Systems   Constitutional: Negative for chills and " fever.   Respiratory: Negative for cough and shortness of breath.    Cardiovascular: Negative for chest pain.   Gastrointestinal: Negative for nausea and vomiting.   Musculoskeletal: Positive for myalgias. Negative for arthralgias and gait problem.   Skin: Negative for color change, rash and wound.   Neurological: Negative for weakness and numbness.   All other systems reviewed and are negative.      Physical Exam     Initial Vitals [06/03/20 1458]   BP Pulse Resp Temp SpO2   (!) 181/99 85 18 98 °F (36.7 °C) 98 %      MAP       --         Physical Exam    Nursing note and vitals reviewed.  Constitutional: He appears well-developed and well-nourished. He is not diaphoretic. No distress.   HENT:   Head: Normocephalic and atraumatic.   Nose: Nose normal.   Eyes: Conjunctivae and EOM are normal. Pupils are equal, round, and reactive to light. Right eye exhibits no discharge. Left eye exhibits no discharge.   Neck: Normal range of motion. No tracheal deviation present. No JVD present.   Cardiovascular: Normal rate, regular rhythm and normal heart sounds. Exam reveals no friction rub.    No murmur heard.  Pulmonary/Chest: Breath sounds normal. No stridor. No respiratory distress. He has no wheezes. He has no rhonchi. He has no rales. He exhibits no tenderness.   Musculoskeletal: Normal range of motion.   Very mild tenderness with associated swelling over the right shin.  There is also milder reproducible tenderness to the right calf.  No popliteal space tenderness.  No hip, knee, or ankle tenderness.  No erythema.  Distal lower extremity pulses 2+ and equal.  Full ROM of lower extremities.  Ambulatory.   Neurological: He is alert and oriented to person, place, and time. He has normal strength. He displays no tremor. He displays no seizure activity. Coordination and gait normal.   Skin: Skin is warm and dry. No rash and no abscess noted. No erythema. No pallor.         ED Course   Procedures  Labs Reviewed - No data to  display       Imaging Results          US Lower Extremity Veins Right (Final result)  Result time 06/03/20 16:16:21    Final result by Edwin Caicedo MD (06/03/20 16:16:21)                 Impression:      No evidence of deep venous thrombosis in the right lower extremity.      Electronically signed by: Edwin Caicedo  Date:    06/03/2020  Time:    16:16             Narrative:    EXAMINATION:  US LOWER EXTREMITY VEINS RIGHT    CLINICAL HISTORY:  Pain in leg, unspecified    TECHNIQUE:  Duplex and color flow Doppler evaluation and graded compression of the right lower extremity veins was performed.    COMPARISON:  None    FINDINGS:  Right thigh veins: The common femoral, femoral, popliteal, upper greater saphenous, and deep femoral veins are patent and free of thrombus. The veins are normally compressible and have normal phasic flow and augmentation response.    Right calf veins: The visualized calf veins are patent.    Contralateral CFV: The contralateral (left) common femoral vein is patent and free of thrombus.    Miscellaneous: None                                 Medical Decision Making:   History:   Old Medical Records: I decided to obtain old medical records.  Clinical Tests:   Radiological Study: Ordered and Reviewed  ED Management:  I am unsure of the etiology of this patient's symptoms, however, I do not believe they are of emergent/life threatening etiology at this time.  No DVT, septic joint, or vascular compromise. No compartment syndrome. No overlying cellulitis.  No history of trauma.  No shortness of breath or other symptoms suggestive of heart failure.  Will send home with supportive care and advised follow-up with PCP for re-evaluation.  Also refill patient's routine medications at his request.  Strict return precautions discussed with patient who is agreeable to the plan.            Scribe Attestation:   Scribe #1: I performed the above scribed service and the documentation accurately describes the  services I performed. I attest to the accuracy of the note.                          Clinical Impression:       ICD-10-CM ICD-9-CM   1. Acute pain of right lower extremity M79.604 729.5   2. Leg pain M79.606 729.5   3. Medication refill Z76.0 V68.1     Scribe Attestation: I, Maged Garg PA-C, personally performed the services described in this documentation. All medical record entries made by the scribe were at my direction and in my presence. I have reviewed the chart and agree that the record reflects my personal performance and is accurate and complete.        ED Disposition Condition    Discharge Stable        ED Prescriptions     Medication Sig Dispense Start Date End Date Auth. Provider    diclofenac sodium (VOLTAREN) 1 % Gel Apply 2 g topically 3 (three) times daily. for 10 days 100 g 6/3/2020 6/13/2020 Maged Garg PA-C    amLODIPine (NORVASC) 5 MG tablet Take 1 tablet (5 mg total) by mouth once daily. 30 tablet 6/3/2020 7/3/2020 Maged Garg PA-C    amiodarone (PACERONE) 200 MG Tab Take 1 tablet (200 mg total) by mouth once daily. 30 tablet 6/3/2020 6/3/2021 Maged Garg PA-C    aspirin 81 MG Chew Take 1 tablet (81 mg total) by mouth once daily. 30 tablet 6/3/2020 6/3/2021 Maged Garg PA-C        Follow-up Information     Follow up With Specialties Details Why Contact [facility]  Schedule an appointment as soon as possible for a visit in 1 day For reevaluation [address] [place] [address]  [phone]      [facility] Surgical Oncology, Orthopedic Surgery, Genetics, Physical Medicine and Rehabilitation, Occupational Therapy, Radiology Go in 1 day For further evaluation, For more definitive management of your symptoms [address] [facility] [address]  [phone]      [facility] Emergency Medicine Go to  If symptoms worsen [address]  [facility] [address] [phone]                                      Maged Garg PA-C  06/03/20 1918

## 2020-10-27 NOTE — ED TRIAGE NOTES
"Can you write an "ok to return to work" note for patient?  " Pt arrived ED via personal with c/o SOB, chest tightness and cough beginning last night. Pt denies fever or chills. He is AAO x 4

## 2021-06-09 ENCOUNTER — HOSPITAL ENCOUNTER (INPATIENT)
Facility: HOSPITAL | Age: 61
LOS: 6 days | Discharge: HOME | DRG: 177 | End: 2021-06-15
Attending: EMERGENCY MEDICINE | Admitting: EMERGENCY MEDICINE
Payer: MEDICAID

## 2021-06-09 DIAGNOSIS — I48.92 ATRIAL FLUTTER WITH RAPID VENTRICULAR RESPONSE: Primary | ICD-10-CM

## 2021-06-09 DIAGNOSIS — N17.9 ACUTE RENAL FAILURE, UNSPECIFIED ACUTE RENAL FAILURE TYPE: ICD-10-CM

## 2021-06-09 DIAGNOSIS — R06.02 SHORTNESS OF BREATH: ICD-10-CM

## 2021-06-09 DIAGNOSIS — U07.1 COVID-19: ICD-10-CM

## 2021-06-09 DIAGNOSIS — Z20.822 SUSPECTED COVID-19 VIRUS INFECTION: ICD-10-CM

## 2021-06-09 DIAGNOSIS — I48.91 A-FIB: ICD-10-CM

## 2021-06-09 DIAGNOSIS — E87.5 HYPERKALEMIA: ICD-10-CM

## 2021-06-09 PROBLEM — Z72.0 TOBACCO ABUSE: Status: ACTIVE | Noted: 2021-06-09

## 2021-06-09 PROBLEM — I50.43 ACUTE ON CHRONIC COMBINED SYSTOLIC AND DIASTOLIC HEART FAILURE: Status: ACTIVE | Noted: 2021-06-09

## 2021-06-09 PROBLEM — I10 ESSENTIAL HYPERTENSION: Status: ACTIVE | Noted: 2021-06-09

## 2021-06-09 PROBLEM — J12.82 PNEUMONIA DUE TO COVID-19 VIRUS: Status: ACTIVE | Noted: 2021-06-09

## 2021-06-09 LAB
ALBUMIN SERPL BCP-MCNC: 3.9 G/DL (ref 3.5–5.2)
ALP SERPL-CCNC: 67 U/L (ref 55–135)
ALT SERPL W/O P-5'-P-CCNC: 42 U/L (ref 10–44)
ANION GAP SERPL CALC-SCNC: 12 MMOL/L (ref 8–16)
AST SERPL-CCNC: 48 U/L (ref 10–40)
BACTERIA #/AREA URNS HPF: NORMAL /HPF
BASOPHILS # BLD AUTO: 0.04 K/UL (ref 0–0.2)
BASOPHILS NFR BLD: 0.4 % (ref 0–1.9)
BILIRUB SERPL-MCNC: 0.8 MG/DL (ref 0.1–1)
BILIRUB UR QL STRIP: NEGATIVE
BNP SERPL-MCNC: 232 PG/ML (ref 0–99)
BUN SERPL-MCNC: 18 MG/DL (ref 8–23)
CALCIUM SERPL-MCNC: 8.8 MG/DL (ref 8.7–10.5)
CHLORIDE SERPL-SCNC: 107 MMOL/L (ref 95–110)
CK SERPL-CCNC: 733 U/L (ref 20–200)
CLARITY UR: CLEAR
CO2 SERPL-SCNC: 21 MMOL/L (ref 23–29)
COLOR UR: YELLOW
CREAT SERPL-MCNC: 1.2 MG/DL (ref 0.5–1.4)
CRP SERPL-MCNC: 3.3 MG/L (ref 0–8.2)
CTP QC/QA: YES
D DIMER PPP IA.FEU-MCNC: 0.69 MG/L FEU
DIFFERENTIAL METHOD: ABNORMAL
EOSINOPHIL # BLD AUTO: 0 K/UL (ref 0–0.5)
EOSINOPHIL NFR BLD: 0.2 % (ref 0–8)
ERYTHROCYTE [DISTWIDTH] IN BLOOD BY AUTOMATED COUNT: 14.8 % (ref 11.5–14.5)
ERYTHROCYTE [SEDIMENTATION RATE] IN BLOOD BY WESTERGREN METHOD: 3 MM/HR (ref 0–10)
EST. GFR  (AFRICAN AMERICAN): >60 ML/MIN/1.73 M^2
EST. GFR  (NON AFRICAN AMERICAN): >60 ML/MIN/1.73 M^2
FERRITIN SERPL-MCNC: 152 NG/ML (ref 20–300)
GLUCOSE SERPL-MCNC: 114 MG/DL (ref 70–110)
GLUCOSE UR QL STRIP: NEGATIVE
HCT VFR BLD AUTO: 43.5 % (ref 40–54)
HGB BLD-MCNC: 15.3 G/DL (ref 14–18)
HGB UR QL STRIP: NEGATIVE
HYALINE CASTS #/AREA URNS LPF: 0 /LPF
IMM GRANULOCYTES # BLD AUTO: 0.04 K/UL (ref 0–0.04)
IMM GRANULOCYTES NFR BLD AUTO: 0.4 % (ref 0–0.5)
KETONES UR QL STRIP: NEGATIVE
LACTATE SERPL-SCNC: 1.6 MMOL/L (ref 0.5–2.2)
LDH SERPL L TO P-CCNC: 449 U/L (ref 110–260)
LEUKOCYTE ESTERASE UR QL STRIP: NEGATIVE
LYMPHOCYTES # BLD AUTO: 2.6 K/UL (ref 1–4.8)
LYMPHOCYTES NFR BLD: 29.2 % (ref 18–48)
MCH RBC QN AUTO: 33 PG (ref 27–31)
MCHC RBC AUTO-ENTMCNC: 35.2 G/DL (ref 32–36)
MCV RBC AUTO: 94 FL (ref 82–98)
MICROSCOPIC COMMENT: NORMAL
MONOCYTES # BLD AUTO: 0.7 K/UL (ref 0.3–1)
MONOCYTES NFR BLD: 8.1 % (ref 4–15)
NEUTROPHILS # BLD AUTO: 5.5 K/UL (ref 1.8–7.7)
NEUTROPHILS NFR BLD: 61.7 % (ref 38–73)
NITRITE UR QL STRIP: NEGATIVE
NRBC BLD-RTO: 0 /100 WBC
PH UR STRIP: 6 [PH] (ref 5–8)
PLATELET # BLD AUTO: 194 K/UL (ref 150–450)
PMV BLD AUTO: 10.7 FL (ref 9.2–12.9)
POTASSIUM SERPL-SCNC: 4.3 MMOL/L (ref 3.5–5.1)
PROCALCITONIN SERPL IA-MCNC: 0.02 NG/ML
PROT SERPL-MCNC: 6.9 G/DL (ref 6–8.4)
PROT UR QL STRIP: ABNORMAL
RBC # BLD AUTO: 4.63 M/UL (ref 4.6–6.2)
RBC #/AREA URNS HPF: 1 /HPF (ref 0–4)
SARS-COV-2 RDRP RESP QL NAA+PROBE: NEGATIVE
SODIUM SERPL-SCNC: 140 MMOL/L (ref 136–145)
SP GR UR STRIP: 1.02 (ref 1–1.03)
SQUAMOUS #/AREA URNS HPF: 2 /HPF
TROPONIN I SERPL DL<=0.01 NG/ML-MCNC: 0.23 NG/ML (ref 0–0.03)
TROPONIN I SERPL DL<=0.01 NG/ML-MCNC: 0.26 NG/ML (ref 0–0.03)
URN SPEC COLLECT METH UR: ABNORMAL
UROBILINOGEN UR STRIP-ACNC: ABNORMAL EU/DL
WBC # BLD AUTO: 8.9 K/UL (ref 3.9–12.7)
WBC #/AREA URNS HPF: 5 /HPF (ref 0–5)

## 2021-06-09 PROCEDURE — 87040 BLOOD CULTURE FOR BACTERIA: CPT | Performed by: PHYSICIAN ASSISTANT

## 2021-06-09 PROCEDURE — 93005 ELECTROCARDIOGRAM TRACING: CPT

## 2021-06-09 PROCEDURE — 96365 THER/PROPH/DIAG IV INF INIT: CPT

## 2021-06-09 PROCEDURE — 11000001 HC ACUTE MED/SURG PRIVATE ROOM

## 2021-06-09 PROCEDURE — U0002 COVID-19 LAB TEST NON-CDC: HCPCS | Performed by: EMERGENCY MEDICINE

## 2021-06-09 PROCEDURE — 63600175 PHARM REV CODE 636 W HCPCS: Performed by: PHYSICIAN ASSISTANT

## 2021-06-09 PROCEDURE — 82728 ASSAY OF FERRITIN: CPT | Performed by: PHYSICIAN ASSISTANT

## 2021-06-09 PROCEDURE — 25000003 PHARM REV CODE 250: Performed by: EMERGENCY MEDICINE

## 2021-06-09 PROCEDURE — 96376 TX/PRO/DX INJ SAME DRUG ADON: CPT

## 2021-06-09 PROCEDURE — 85652 RBC SED RATE AUTOMATED: CPT | Performed by: PHYSICIAN ASSISTANT

## 2021-06-09 PROCEDURE — 82550 ASSAY OF CK (CPK): CPT | Performed by: PHYSICIAN ASSISTANT

## 2021-06-09 PROCEDURE — 93010 EKG 12-LEAD: ICD-10-PCS | Mod: 76,,, | Performed by: INTERNAL MEDICINE

## 2021-06-09 PROCEDURE — 86140 C-REACTIVE PROTEIN: CPT | Performed by: PHYSICIAN ASSISTANT

## 2021-06-09 PROCEDURE — 99291 CRITICAL CARE FIRST HOUR: CPT | Mod: 25

## 2021-06-09 PROCEDURE — 25000003 PHARM REV CODE 250: Performed by: PHYSICIAN ASSISTANT

## 2021-06-09 PROCEDURE — 84484 ASSAY OF TROPONIN QUANT: CPT | Mod: 91 | Performed by: PHYSICIAN ASSISTANT

## 2021-06-09 PROCEDURE — 82306 VITAMIN D 25 HYDROXY: CPT | Performed by: PHYSICIAN ASSISTANT

## 2021-06-09 PROCEDURE — 93010 ELECTROCARDIOGRAM REPORT: CPT | Mod: ,,, | Performed by: INTERNAL MEDICINE

## 2021-06-09 PROCEDURE — 96375 TX/PRO/DX INJ NEW DRUG ADDON: CPT

## 2021-06-09 PROCEDURE — 25000242 PHARM REV CODE 250 ALT 637 W/ HCPCS: Performed by: PHYSICIAN ASSISTANT

## 2021-06-09 PROCEDURE — 94640 AIRWAY INHALATION TREATMENT: CPT

## 2021-06-09 PROCEDURE — 99214 PR OFFICE/OUTPT VISIT, EST, LEVL IV, 30-39 MIN: ICD-10-PCS | Mod: 25,,, | Performed by: INTERNAL MEDICINE

## 2021-06-09 PROCEDURE — 83615 LACTATE (LD) (LDH) ENZYME: CPT | Performed by: PHYSICIAN ASSISTANT

## 2021-06-09 PROCEDURE — 99214 OFFICE O/P EST MOD 30 MIN: CPT | Mod: 25,,, | Performed by: INTERNAL MEDICINE

## 2021-06-09 PROCEDURE — 85379 FIBRIN DEGRADATION QUANT: CPT | Performed by: PHYSICIAN ASSISTANT

## 2021-06-09 PROCEDURE — 84145 PROCALCITONIN (PCT): CPT | Performed by: PHYSICIAN ASSISTANT

## 2021-06-09 PROCEDURE — 80053 COMPREHEN METABOLIC PANEL: CPT | Performed by: PHYSICIAN ASSISTANT

## 2021-06-09 PROCEDURE — 81000 URINALYSIS NONAUTO W/SCOPE: CPT | Performed by: PHYSICIAN ASSISTANT

## 2021-06-09 PROCEDURE — 83605 ASSAY OF LACTIC ACID: CPT | Performed by: PHYSICIAN ASSISTANT

## 2021-06-09 PROCEDURE — 85025 COMPLETE CBC W/AUTO DIFF WBC: CPT | Performed by: PHYSICIAN ASSISTANT

## 2021-06-09 PROCEDURE — 93010 ELECTROCARDIOGRAM REPORT: CPT | Mod: 76,,, | Performed by: INTERNAL MEDICINE

## 2021-06-09 PROCEDURE — 83880 ASSAY OF NATRIURETIC PEPTIDE: CPT | Performed by: EMERGENCY MEDICINE

## 2021-06-09 PROCEDURE — 84484 ASSAY OF TROPONIN QUANT: CPT | Performed by: PHYSICIAN ASSISTANT

## 2021-06-09 RX ORDER — AMOXICILLIN 250 MG
1 CAPSULE ORAL DAILY PRN
Status: DISCONTINUED | OUTPATIENT
Start: 2021-06-09 | End: 2021-06-15 | Stop reason: HOSPADM

## 2021-06-09 RX ORDER — HYDRALAZINE HYDROCHLORIDE 25 MG/1
25 TABLET, FILM COATED ORAL EVERY 8 HOURS PRN
Status: DISCONTINUED | OUTPATIENT
Start: 2021-06-09 | End: 2021-06-13

## 2021-06-09 RX ORDER — FUROSEMIDE 10 MG/ML
20 INJECTION INTRAMUSCULAR; INTRAVENOUS ONCE
Status: COMPLETED | OUTPATIENT
Start: 2021-06-09 | End: 2021-06-09

## 2021-06-09 RX ORDER — AMLODIPINE BESYLATE 5 MG/1
5 TABLET ORAL DAILY
Status: DISCONTINUED | OUTPATIENT
Start: 2021-06-10 | End: 2021-06-10

## 2021-06-09 RX ORDER — IBUPROFEN 200 MG
1 TABLET ORAL DAILY
Status: DISCONTINUED | OUTPATIENT
Start: 2021-06-10 | End: 2021-06-15 | Stop reason: HOSPADM

## 2021-06-09 RX ORDER — SODIUM CHLORIDE 0.9 % (FLUSH) 0.9 %
10 SYRINGE (ML) INJECTION
Status: DISCONTINUED | OUTPATIENT
Start: 2021-06-09 | End: 2021-06-15 | Stop reason: HOSPADM

## 2021-06-09 RX ORDER — IBUPROFEN 200 MG
24 TABLET ORAL
Status: DISCONTINUED | OUTPATIENT
Start: 2021-06-09 | End: 2021-06-15 | Stop reason: HOSPADM

## 2021-06-09 RX ORDER — DILTIAZEM HYDROCHLORIDE 5 MG/ML
20 INJECTION INTRAVENOUS
Status: COMPLETED | OUTPATIENT
Start: 2021-06-09 | End: 2021-06-09

## 2021-06-09 RX ORDER — TALC
6 POWDER (GRAM) TOPICAL NIGHTLY PRN
Status: DISCONTINUED | OUTPATIENT
Start: 2021-06-09 | End: 2021-06-15 | Stop reason: HOSPADM

## 2021-06-09 RX ORDER — ALBUTEROL SULFATE 90 UG/1
2 AEROSOL, METERED RESPIRATORY (INHALATION) EVERY 6 HOURS
Status: DISCONTINUED | OUTPATIENT
Start: 2021-06-09 | End: 2021-06-10

## 2021-06-09 RX ORDER — ASCORBIC ACID 500 MG
500 TABLET ORAL 2 TIMES DAILY
Status: DISCONTINUED | OUTPATIENT
Start: 2021-06-09 | End: 2021-06-15 | Stop reason: HOSPADM

## 2021-06-09 RX ORDER — DILTIAZEM HYDROCHLORIDE 30 MG/1
60 TABLET, FILM COATED ORAL
Status: COMPLETED | OUTPATIENT
Start: 2021-06-09 | End: 2021-06-09

## 2021-06-09 RX ORDER — ACETAMINOPHEN 500 MG
500 TABLET ORAL EVERY 6 HOURS PRN
Status: DISCONTINUED | OUTPATIENT
Start: 2021-06-09 | End: 2021-06-09

## 2021-06-09 RX ORDER — ACETAMINOPHEN 500 MG
500 TABLET ORAL EVERY 6 HOURS PRN
Status: DISCONTINUED | OUTPATIENT
Start: 2021-06-09 | End: 2021-06-14

## 2021-06-09 RX ORDER — BENZONATATE 100 MG/1
100 CAPSULE ORAL 3 TIMES DAILY PRN
Status: DISCONTINUED | OUTPATIENT
Start: 2021-06-09 | End: 2021-06-15 | Stop reason: HOSPADM

## 2021-06-09 RX ORDER — GLUCAGON 1 MG
1 KIT INJECTION
Status: DISCONTINUED | OUTPATIENT
Start: 2021-06-09 | End: 2021-06-15 | Stop reason: HOSPADM

## 2021-06-09 RX ORDER — IBUPROFEN 200 MG
16 TABLET ORAL
Status: DISCONTINUED | OUTPATIENT
Start: 2021-06-09 | End: 2021-06-15 | Stop reason: HOSPADM

## 2021-06-09 RX ADMIN — FUROSEMIDE 20 MG: 10 INJECTION, SOLUTION INTRAMUSCULAR; INTRAVENOUS at 05:06

## 2021-06-09 RX ADMIN — BENZONATATE 100 MG: 100 CAPSULE ORAL at 10:06

## 2021-06-09 RX ADMIN — REMDESIVIR 200 MG: 100 INJECTION, POWDER, LYOPHILIZED, FOR SOLUTION INTRAVENOUS at 06:06

## 2021-06-09 RX ADMIN — DILTIAZEM HYDROCHLORIDE 60 MG: 30 TABLET, FILM COATED ORAL at 01:06

## 2021-06-09 RX ADMIN — DILTIAZEM HYDROCHLORIDE 20 MG: 5 INJECTION INTRAVENOUS at 12:06

## 2021-06-09 RX ADMIN — ACETAMINOPHEN 500 MG: 500 TABLET, FILM COATED ORAL at 04:06

## 2021-06-09 RX ADMIN — ACETAMINOPHEN 500 MG: 500 TABLET, FILM COATED ORAL at 11:06

## 2021-06-09 RX ADMIN — HYDRALAZINE HYDROCHLORIDE 25 MG: 25 TABLET, FILM COATED ORAL at 10:06

## 2021-06-09 RX ADMIN — OXYCODONE HYDROCHLORIDE AND ACETAMINOPHEN 500 MG: 500 TABLET ORAL at 10:06

## 2021-06-09 RX ADMIN — APIXABAN 5 MG: 5 TABLET, FILM COATED ORAL at 10:06

## 2021-06-09 RX ADMIN — ALBUTEROL SULFATE 2 PUFF: 90 AEROSOL, METERED RESPIRATORY (INHALATION) at 08:06

## 2021-06-09 RX ADMIN — DEXAMETHASONE 6 MG: 4 TABLET ORAL at 04:06

## 2021-06-09 RX ADMIN — DILTIAZEM HYDROCHLORIDE 20 MG: 5 INJECTION INTRAVENOUS at 02:06

## 2021-06-10 LAB
25(OH)D3+25(OH)D2 SERPL-MCNC: 13 NG/ML (ref 30–96)
ALBUMIN SERPL BCP-MCNC: 3.6 G/DL (ref 3.5–5.2)
ALP SERPL-CCNC: 67 U/L (ref 55–135)
ALT SERPL W/O P-5'-P-CCNC: 31 U/L (ref 10–44)
ANION GAP SERPL CALC-SCNC: 12 MMOL/L (ref 8–16)
AST SERPL-CCNC: 31 U/L (ref 10–40)
BASOPHILS # BLD AUTO: 0.01 K/UL (ref 0–0.2)
BASOPHILS NFR BLD: 0.1 % (ref 0–1.9)
BILIRUB SERPL-MCNC: 0.7 MG/DL (ref 0.1–1)
BUN SERPL-MCNC: 22 MG/DL (ref 8–23)
CALCIUM SERPL-MCNC: 8.6 MG/DL (ref 8.7–10.5)
CHLORIDE SERPL-SCNC: 106 MMOL/L (ref 95–110)
CO2 SERPL-SCNC: 21 MMOL/L (ref 23–29)
CREAT SERPL-MCNC: 1.3 MG/DL (ref 0.5–1.4)
DIFFERENTIAL METHOD: ABNORMAL
EOSINOPHIL # BLD AUTO: 0 K/UL (ref 0–0.5)
EOSINOPHIL NFR BLD: 0 % (ref 0–8)
ERYTHROCYTE [DISTWIDTH] IN BLOOD BY AUTOMATED COUNT: 14.8 % (ref 11.5–14.5)
EST. GFR  (AFRICAN AMERICAN): >60 ML/MIN/1.73 M^2
EST. GFR  (NON AFRICAN AMERICAN): 58 ML/MIN/1.73 M^2
GLUCOSE SERPL-MCNC: 138 MG/DL (ref 70–110)
HCT VFR BLD AUTO: 42.5 % (ref 40–54)
HGB BLD-MCNC: 14.6 G/DL (ref 14–18)
IMM GRANULOCYTES # BLD AUTO: 0.05 K/UL (ref 0–0.04)
IMM GRANULOCYTES NFR BLD AUTO: 0.6 % (ref 0–0.5)
LYMPHOCYTES # BLD AUTO: 0.9 K/UL (ref 1–4.8)
LYMPHOCYTES NFR BLD: 11.5 % (ref 18–48)
MAGNESIUM SERPL-MCNC: 1.5 MG/DL (ref 1.6–2.6)
MCH RBC QN AUTO: 32.5 PG (ref 27–31)
MCHC RBC AUTO-ENTMCNC: 34.4 G/DL (ref 32–36)
MCV RBC AUTO: 95 FL (ref 82–98)
MONOCYTES # BLD AUTO: 0.3 K/UL (ref 0.3–1)
MONOCYTES NFR BLD: 3.5 % (ref 4–15)
NEUTROPHILS # BLD AUTO: 6.7 K/UL (ref 1.8–7.7)
NEUTROPHILS NFR BLD: 84.3 % (ref 38–73)
NRBC BLD-RTO: 0 /100 WBC
PHOSPHATE SERPL-MCNC: 1.6 MG/DL (ref 2.7–4.5)
PLATELET # BLD AUTO: 203 K/UL (ref 150–450)
PMV BLD AUTO: 11.5 FL (ref 9.2–12.9)
POTASSIUM SERPL-SCNC: 4 MMOL/L (ref 3.5–5.1)
PROT SERPL-MCNC: 6.6 G/DL (ref 6–8.4)
RBC # BLD AUTO: 4.49 M/UL (ref 4.6–6.2)
SODIUM SERPL-SCNC: 139 MMOL/L (ref 136–145)
T4 FREE SERPL-MCNC: 1.02 NG/DL (ref 0.71–1.51)
TROPONIN I SERPL DL<=0.01 NG/ML-MCNC: 0.2 NG/ML (ref 0–0.03)
TSH SERPL DL<=0.005 MIU/L-ACNC: 0.18 UIU/ML (ref 0.4–4)
WBC # BLD AUTO: 7.9 K/UL (ref 3.9–12.7)

## 2021-06-10 PROCEDURE — 83735 ASSAY OF MAGNESIUM: CPT | Performed by: PHYSICIAN ASSISTANT

## 2021-06-10 PROCEDURE — 94640 AIRWAY INHALATION TREATMENT: CPT

## 2021-06-10 PROCEDURE — 63600175 PHARM REV CODE 636 W HCPCS

## 2021-06-10 PROCEDURE — 84443 ASSAY THYROID STIM HORMONE: CPT | Performed by: PHYSICIAN ASSISTANT

## 2021-06-10 PROCEDURE — 25000003 PHARM REV CODE 250: Performed by: INTERNAL MEDICINE

## 2021-06-10 PROCEDURE — 85025 COMPLETE CBC W/AUTO DIFF WBC: CPT | Performed by: PHYSICIAN ASSISTANT

## 2021-06-10 PROCEDURE — 25000003 PHARM REV CODE 250: Performed by: PHYSICIAN ASSISTANT

## 2021-06-10 PROCEDURE — 84100 ASSAY OF PHOSPHORUS: CPT | Performed by: PHYSICIAN ASSISTANT

## 2021-06-10 PROCEDURE — 36415 COLL VENOUS BLD VENIPUNCTURE: CPT | Performed by: PHYSICIAN ASSISTANT

## 2021-06-10 PROCEDURE — 80053 COMPREHEN METABOLIC PANEL: CPT | Performed by: PHYSICIAN ASSISTANT

## 2021-06-10 PROCEDURE — 25000242 PHARM REV CODE 250 ALT 637 W/ HCPCS: Performed by: PHYSICIAN ASSISTANT

## 2021-06-10 PROCEDURE — 84439 ASSAY OF FREE THYROXINE: CPT | Performed by: PHYSICIAN ASSISTANT

## 2021-06-10 PROCEDURE — 63600175 PHARM REV CODE 636 W HCPCS: Performed by: INTERNAL MEDICINE

## 2021-06-10 PROCEDURE — 94761 N-INVAS EAR/PLS OXIMETRY MLT: CPT

## 2021-06-10 PROCEDURE — S4991 NICOTINE PATCH NONLEGEND: HCPCS | Performed by: PHYSICIAN ASSISTANT

## 2021-06-10 PROCEDURE — 20000000 HC ICU ROOM

## 2021-06-10 PROCEDURE — 63600175 PHARM REV CODE 636 W HCPCS: Performed by: PHYSICIAN ASSISTANT

## 2021-06-10 RX ORDER — METOPROLOL TARTRATE 1 MG/ML
5 INJECTION, SOLUTION INTRAVENOUS ONCE
Status: COMPLETED | OUTPATIENT
Start: 2021-06-10 | End: 2021-06-10

## 2021-06-10 RX ORDER — METOPROLOL TARTRATE 1 MG/ML
5 INJECTION, SOLUTION INTRAVENOUS EVERY 4 HOURS PRN
Status: DISCONTINUED | OUTPATIENT
Start: 2021-06-10 | End: 2021-06-14

## 2021-06-10 RX ORDER — METOPROLOL SUCCINATE 50 MG/1
50 TABLET, EXTENDED RELEASE ORAL 2 TIMES DAILY
Status: DISCONTINUED | OUTPATIENT
Start: 2021-06-10 | End: 2021-06-11

## 2021-06-10 RX ORDER — METOPROLOL SUCCINATE 25 MG/1
25 TABLET, EXTENDED RELEASE ORAL DAILY
Status: DISCONTINUED | OUTPATIENT
Start: 2021-06-10 | End: 2021-06-10

## 2021-06-10 RX ORDER — DILTIAZEM HYDROCHLORIDE 5 MG/ML
10 INJECTION INTRAVENOUS ONCE
Status: COMPLETED | OUTPATIENT
Start: 2021-06-10 | End: 2021-06-10

## 2021-06-10 RX ORDER — DIGOXIN 125 MCG
0.12 TABLET ORAL DAILY
Status: DISCONTINUED | OUTPATIENT
Start: 2021-06-10 | End: 2021-06-12

## 2021-06-10 RX ORDER — METOPROLOL SUCCINATE 25 MG/1
25 TABLET, EXTENDED RELEASE ORAL ONCE
Status: COMPLETED | OUTPATIENT
Start: 2021-06-10 | End: 2021-06-10

## 2021-06-10 RX ADMIN — THERA TABS 1 TABLET: TAB at 09:06

## 2021-06-10 RX ADMIN — METOPROLOL SUCCINATE 50 MG: 50 TABLET, EXTENDED RELEASE ORAL at 08:06

## 2021-06-10 RX ADMIN — DILTIAZEM HYDROCHLORIDE 10 MG: 5 INJECTION INTRAVENOUS at 06:06

## 2021-06-10 RX ADMIN — METOROPROLOL TARTRATE 5 MG: 5 INJECTION, SOLUTION INTRAVENOUS at 05:06

## 2021-06-10 RX ADMIN — REMDESIVIR 100 MG: 100 INJECTION, POWDER, LYOPHILIZED, FOR SOLUTION INTRAVENOUS at 05:06

## 2021-06-10 RX ADMIN — METOPROLOL SUCCINATE 25 MG: 25 TABLET, EXTENDED RELEASE ORAL at 09:06

## 2021-06-10 RX ADMIN — AMIODARONE HYDROCHLORIDE 0.5 MG/MIN: 1.8 INJECTION, SOLUTION INTRAVENOUS at 09:06

## 2021-06-10 RX ADMIN — OXYCODONE HYDROCHLORIDE AND ACETAMINOPHEN 500 MG: 500 TABLET ORAL at 08:06

## 2021-06-10 RX ADMIN — ALBUTEROL SULFATE 2 PUFF: 90 AEROSOL, METERED RESPIRATORY (INHALATION) at 09:06

## 2021-06-10 RX ADMIN — METOPROLOL SUCCINATE 12.5 MG: 25 TABLET, EXTENDED RELEASE ORAL at 12:06

## 2021-06-10 RX ADMIN — METOROPROLOL TARTRATE 5 MG: 5 INJECTION, SOLUTION INTRAVENOUS at 10:06

## 2021-06-10 RX ADMIN — DIGOXIN 0.12 MG: 125 TABLET ORAL at 10:06

## 2021-06-10 RX ADMIN — OXYCODONE HYDROCHLORIDE AND ACETAMINOPHEN 500 MG: 500 TABLET ORAL at 09:06

## 2021-06-10 RX ADMIN — METOPROLOL SUCCINATE 25 MG: 25 TABLET, EXTENDED RELEASE ORAL at 10:06

## 2021-06-10 RX ADMIN — ALBUTEROL SULFATE 2 PUFF: 90 AEROSOL, METERED RESPIRATORY (INHALATION) at 12:06

## 2021-06-10 RX ADMIN — APIXABAN 5 MG: 5 TABLET, FILM COATED ORAL at 08:06

## 2021-06-10 RX ADMIN — NICOTINE 1 PATCH: 21 PATCH, EXTENDED RELEASE TRANSDERMAL at 09:06

## 2021-06-10 RX ADMIN — DEXAMETHASONE 6 MG: 4 TABLET ORAL at 09:06

## 2021-06-10 RX ADMIN — ALBUTEROL SULFATE 2 PUFF: 90 AEROSOL, METERED RESPIRATORY (INHALATION) at 01:06

## 2021-06-10 RX ADMIN — Medication 6 MG: at 10:06

## 2021-06-10 RX ADMIN — APIXABAN 5 MG: 5 TABLET, FILM COATED ORAL at 09:06

## 2021-06-10 RX ADMIN — METOROPROLOL TARTRATE 5 MG: 5 INJECTION, SOLUTION INTRAVENOUS at 02:06

## 2021-06-11 PROBLEM — R06.03 SIGNS AND SYMPTOMS OF SEVERE RESPIRATORY DISTRESS: Status: ACTIVE | Noted: 2021-06-11

## 2021-06-11 LAB
ALBUMIN SERPL BCP-MCNC: 3.4 G/DL (ref 3.5–5.2)
ALBUMIN SERPL BCP-MCNC: 3.8 G/DL (ref 3.5–5.2)
ALLENS TEST: ABNORMAL
ALLENS TEST: ABNORMAL
ALP SERPL-CCNC: 51 U/L (ref 55–135)
ALP SERPL-CCNC: 81 U/L (ref 55–135)
ALT SERPL W/O P-5'-P-CCNC: 38 U/L (ref 10–44)
ALT SERPL W/O P-5'-P-CCNC: 41 U/L (ref 10–44)
AMMONIA PLAS-SCNC: 65 UMOL/L (ref 10–50)
ANION GAP SERPL CALC-SCNC: 14 MMOL/L (ref 8–16)
ANION GAP SERPL CALC-SCNC: 19 MMOL/L (ref 8–16)
AST SERPL-CCNC: 44 U/L (ref 10–40)
AST SERPL-CCNC: 49 U/L (ref 10–40)
AV INDEX (PROSTH): 0.5
AV MEAN GRADIENT: 3 MMHG
AV PEAK GRADIENT: 5 MMHG
AV VALVE AREA: 1.21 CM2
AV VELOCITY RATIO: 0.51
BACTERIA #/AREA URNS HPF: ABNORMAL /HPF
BASOPHILS # BLD AUTO: 0.02 K/UL (ref 0–0.2)
BASOPHILS # BLD AUTO: 0.07 K/UL (ref 0–0.2)
BASOPHILS NFR BLD: 0.1 % (ref 0–1.9)
BASOPHILS NFR BLD: 0.3 % (ref 0–1.9)
BILIRUB SERPL-MCNC: 0.5 MG/DL (ref 0.1–1)
BILIRUB SERPL-MCNC: 0.6 MG/DL (ref 0.1–1)
BILIRUB UR QL STRIP: NEGATIVE
BNP SERPL-MCNC: 585 PG/ML (ref 0–99)
BSA FOR ECHO PROCEDURE: 1.95 M2
BUN SERPL-MCNC: 31 MG/DL (ref 8–23)
BUN SERPL-MCNC: 35 MG/DL (ref 8–23)
CALCIUM SERPL-MCNC: 7.7 MG/DL (ref 8.7–10.5)
CALCIUM SERPL-MCNC: 8.6 MG/DL (ref 8.7–10.5)
CHLORIDE SERPL-SCNC: 102 MMOL/L (ref 95–110)
CHLORIDE SERPL-SCNC: 103 MMOL/L (ref 95–110)
CLARITY UR: CLEAR
CO2 SERPL-SCNC: 13 MMOL/L (ref 23–29)
CO2 SERPL-SCNC: 19 MMOL/L (ref 23–29)
COLOR UR: YELLOW
CREAT SERPL-MCNC: 1.7 MG/DL (ref 0.5–1.4)
CREAT SERPL-MCNC: 2.3 MG/DL (ref 0.5–1.4)
CRP SERPL-MCNC: 4.2 MG/L (ref 0–8.2)
CV ECHO LV RWT: 0.71 CM
D DIMER PPP IA.FEU-MCNC: 0.88 MG/L FEU
DELSYS: ABNORMAL
DELSYS: ABNORMAL
DIFFERENTIAL METHOD: ABNORMAL
DIFFERENTIAL METHOD: ABNORMAL
DOP CALC AO PEAK VEL: 1.08 M/S
DOP CALC AO VTI: 17.2 CM
DOP CALC LVOT AREA: 2.4 CM2
DOP CALC LVOT DIAMETER: 1.76 CM
DOP CALC LVOT PEAK VEL: 0.55 M/S
DOP CALC LVOT STROKE VOLUME: 20.74 CM3
DOP CALCLVOT PEAK VEL VTI: 8.53 CM
ECHO LV POSTERIOR WALL: 1.54 CM (ref 0.6–1.1)
EJECTION FRACTION: 50 %
EOSINOPHIL # BLD AUTO: 0 K/UL (ref 0–0.5)
EOSINOPHIL # BLD AUTO: 0 K/UL (ref 0–0.5)
EOSINOPHIL NFR BLD: 0 % (ref 0–8)
EOSINOPHIL NFR BLD: 0 % (ref 0–8)
EP: 7
ERYTHROCYTE [DISTWIDTH] IN BLOOD BY AUTOMATED COUNT: 15.3 % (ref 11.5–14.5)
ERYTHROCYTE [DISTWIDTH] IN BLOOD BY AUTOMATED COUNT: 15.5 % (ref 11.5–14.5)
ERYTHROCYTE [SEDIMENTATION RATE] IN BLOOD BY WESTERGREN METHOD: 8 MM/H
EST. GFR  (AFRICAN AMERICAN): 34 ML/MIN/1.73 M^2
EST. GFR  (AFRICAN AMERICAN): 49 ML/MIN/1.73 M^2
EST. GFR  (NON AFRICAN AMERICAN): 29 ML/MIN/1.73 M^2
EST. GFR  (NON AFRICAN AMERICAN): 42 ML/MIN/1.73 M^2
FIO2: 0.35
FIO2: 40
FLOW: 5
FRACTIONAL SHORTENING: 34 % (ref 28–44)
GLUCOSE SERPL-MCNC: 163 MG/DL (ref 70–110)
GLUCOSE SERPL-MCNC: 240 MG/DL (ref 70–110)
GLUCOSE UR QL STRIP: NEGATIVE
HCO3 UR-SCNC: 10.8 MMOL/L (ref 24–28)
HCO3 UR-SCNC: 19.6 MMOL/L (ref 24–28)
HCT VFR BLD AUTO: 45.7 % (ref 40–54)
HCT VFR BLD AUTO: 47.3 % (ref 40–54)
HGB BLD-MCNC: 15.2 G/DL (ref 14–18)
HGB BLD-MCNC: 15.2 G/DL (ref 14–18)
HGB UR QL STRIP: NEGATIVE
HYALINE CASTS #/AREA URNS LPF: 20 /LPF
IMM GRANULOCYTES # BLD AUTO: 0.17 K/UL (ref 0–0.04)
IMM GRANULOCYTES # BLD AUTO: 0.26 K/UL (ref 0–0.04)
IMM GRANULOCYTES NFR BLD AUTO: 0.9 % (ref 0–0.5)
IMM GRANULOCYTES NFR BLD AUTO: 1.3 % (ref 0–0.5)
INTERVENTRICULAR SEPTUM: 1.34 CM (ref 0.6–1.1)
IP: 12
IVRT: 106.11 MSEC
KETONES UR QL STRIP: ABNORMAL
LA MAJOR: 5.99 CM
LA MINOR: 6.45 CM
LA WIDTH: 4.37 CM
LEFT ATRIUM SIZE: 4.57 CM
LEFT ATRIUM VOLUME INDEX: 55.2 ML/M2
LEFT ATRIUM VOLUME: 105.44 CM3
LEFT INTERNAL DIMENSION IN SYSTOLE: 2.86 CM (ref 2.1–4)
LEFT VENTRICLE DIASTOLIC VOLUME INDEX: 45.03 ML/M2
LEFT VENTRICLE DIASTOLIC VOLUME: 86 ML
LEFT VENTRICLE MASS INDEX: 130 G/M2
LEFT VENTRICLE SYSTOLIC VOLUME INDEX: 16.3 ML/M2
LEFT VENTRICLE SYSTOLIC VOLUME: 31.12 ML
LEFT VENTRICULAR INTERNAL DIMENSION IN DIASTOLE: 4.36 CM (ref 3.5–6)
LEFT VENTRICULAR MASS: 247.39 G
LEUKOCYTE ESTERASE UR QL STRIP: NEGATIVE
LYMPHOCYTES # BLD AUTO: 2.1 K/UL (ref 1–4.8)
LYMPHOCYTES # BLD AUTO: 3 K/UL (ref 1–4.8)
LYMPHOCYTES NFR BLD: 10.4 % (ref 18–48)
LYMPHOCYTES NFR BLD: 15.4 % (ref 18–48)
MAGNESIUM SERPL-MCNC: 1.8 MG/DL (ref 1.6–2.6)
MCH RBC QN AUTO: 32.6 PG (ref 27–31)
MCH RBC QN AUTO: 32.8 PG (ref 27–31)
MCHC RBC AUTO-ENTMCNC: 32.1 G/DL (ref 32–36)
MCHC RBC AUTO-ENTMCNC: 33.3 G/DL (ref 32–36)
MCV RBC AUTO: 102 FL (ref 82–98)
MCV RBC AUTO: 98 FL (ref 82–98)
MICROSCOPIC COMMENT: ABNORMAL
MIN VOL: 8.9
MODE: ABNORMAL
MODE: ABNORMAL
MONOCYTES # BLD AUTO: 1.2 K/UL (ref 0.3–1)
MONOCYTES # BLD AUTO: 1.4 K/UL (ref 0.3–1)
MONOCYTES NFR BLD: 6 % (ref 4–15)
MONOCYTES NFR BLD: 7.2 % (ref 4–15)
MV MEAN GRADIENT: 0 MMHG
MV PEAK GRADIENT: 5 MMHG
NEUTROPHILS # BLD AUTO: 14.7 K/UL (ref 1.8–7.7)
NEUTROPHILS # BLD AUTO: 16.7 K/UL (ref 1.8–7.7)
NEUTROPHILS NFR BLD: 76.4 % (ref 38–73)
NEUTROPHILS NFR BLD: 82 % (ref 38–73)
NITRITE UR QL STRIP: NEGATIVE
NRBC BLD-RTO: 0 /100 WBC
NRBC BLD-RTO: 0 /100 WBC
PCO2 BLDA: 17.2 MMHG (ref 35–45)
PCO2 BLDA: 37.8 MMHG (ref 35–45)
PH SMN: 7.32 [PH] (ref 7.35–7.45)
PH SMN: 7.41 [PH] (ref 7.35–7.45)
PH UR STRIP: 6 [PH] (ref 5–8)
PHOSPHATE SERPL-MCNC: 2.6 MG/DL (ref 2.7–4.5)
PISA MRMAX VEL: 0.03 M/S
PISA TR MAX VEL: 3.21 M/S
PLATELET # BLD AUTO: 187 K/UL (ref 150–450)
PLATELET # BLD AUTO: 200 K/UL (ref 150–450)
PMV BLD AUTO: 11 FL (ref 9.2–12.9)
PMV BLD AUTO: 11.6 FL (ref 9.2–12.9)
PO2 BLDA: 165 MMHG (ref 80–100)
PO2 BLDA: 91 MMHG (ref 80–100)
POC BE: -11 MMOL/L
POC BE: -6 MMOL/L
POC SATURATED O2: 100 % (ref 95–100)
POC SATURATED O2: 96 % (ref 95–100)
POC TCO2: 11 MMOL/L (ref 23–27)
POC TCO2: 21 MMOL/L (ref 23–27)
POTASSIUM SERPL-SCNC: 3.8 MMOL/L (ref 3.5–5.1)
POTASSIUM SERPL-SCNC: 4.6 MMOL/L (ref 3.5–5.1)
PROT SERPL-MCNC: 6.3 G/DL (ref 6–8.4)
PROT SERPL-MCNC: 7 G/DL (ref 6–8.4)
PROT UR QL STRIP: ABNORMAL
RA MAJOR: 5.73 CM
RA PRESSURE: 8 MMHG
RA WIDTH: 4.72 CM
RBC # BLD AUTO: 4.63 M/UL (ref 4.6–6.2)
RBC # BLD AUTO: 4.66 M/UL (ref 4.6–6.2)
RBC #/AREA URNS HPF: 1 /HPF (ref 0–4)
RV TISSUE DOPPLER FREE WALL SYSTOLIC VELOCITY 1 (APICAL 4 CHAMBER VIEW): 8.13 CM/S
SAMPLE: ABNORMAL
SAMPLE: ABNORMAL
SINUS: 2.88 CM
SITE: ABNORMAL
SITE: ABNORMAL
SODIUM SERPL-SCNC: 135 MMOL/L (ref 136–145)
SODIUM SERPL-SCNC: 135 MMOL/L (ref 136–145)
SP GR UR STRIP: 1.02 (ref 1–1.03)
SP02: 97
SPONT RATE: 8
SQUAMOUS #/AREA URNS HPF: 2 /HPF
STJ: 2.56 CM
TDI LATERAL: 0.08 M/S
TDI SEPTAL: 0.05 M/S
TDI: 0.07 M/S
TR MAX PG: 41 MMHG
TRICUSPID ANNULAR PLANE SYSTOLIC EXCURSION: 1.91 CM
TROPONIN I SERPL DL<=0.01 NG/ML-MCNC: 0.14 NG/ML (ref 0–0.03)
TV REST PULMONARY ARTERY PRESSURE: 49 MMHG
URN SPEC COLLECT METH UR: ABNORMAL
UROBILINOGEN UR STRIP-ACNC: NEGATIVE EU/DL
WBC # BLD AUTO: 19.27 K/UL (ref 3.9–12.7)
WBC # BLD AUTO: 20.36 K/UL (ref 3.9–12.7)
WBC #/AREA URNS HPF: 3 /HPF (ref 0–5)
WBC CLUMPS URNS QL MICRO: ABNORMAL

## 2021-06-11 PROCEDURE — 63600175 PHARM REV CODE 636 W HCPCS: Performed by: INTERNAL MEDICINE

## 2021-06-11 PROCEDURE — 99233 PR SUBSEQUENT HOSPITAL CARE,LEVL III: ICD-10-PCS | Mod: ,,, | Performed by: INTERNAL MEDICINE

## 2021-06-11 PROCEDURE — 99900035 HC TECH TIME PER 15 MIN (STAT)

## 2021-06-11 PROCEDURE — 82803 BLOOD GASES ANY COMBINATION: CPT

## 2021-06-11 PROCEDURE — 87899 AGENT NOS ASSAY W/OPTIC: CPT | Performed by: INTERNAL MEDICINE

## 2021-06-11 PROCEDURE — 27000221 HC OXYGEN, UP TO 24 HOURS

## 2021-06-11 PROCEDURE — 99499 UNLISTED E&M SERVICE: CPT | Mod: ,,, | Performed by: INTERNAL MEDICINE

## 2021-06-11 PROCEDURE — 25000003 PHARM REV CODE 250: Performed by: INTERNAL MEDICINE

## 2021-06-11 PROCEDURE — 87205 SMEAR GRAM STAIN: CPT | Performed by: INTERNAL MEDICINE

## 2021-06-11 PROCEDURE — 87449 NOS EACH ORGANISM AG IA: CPT | Performed by: INTERNAL MEDICINE

## 2021-06-11 PROCEDURE — 25000003 PHARM REV CODE 250: Performed by: PHYSICIAN ASSISTANT

## 2021-06-11 PROCEDURE — 85379 FIBRIN DEGRADATION QUANT: CPT | Performed by: INTERNAL MEDICINE

## 2021-06-11 PROCEDURE — 20000000 HC ICU ROOM

## 2021-06-11 PROCEDURE — 84100 ASSAY OF PHOSPHORUS: CPT | Performed by: INTERNAL MEDICINE

## 2021-06-11 PROCEDURE — 84484 ASSAY OF TROPONIN QUANT: CPT | Performed by: INTERNAL MEDICINE

## 2021-06-11 PROCEDURE — 82140 ASSAY OF AMMONIA: CPT | Performed by: INTERNAL MEDICINE

## 2021-06-11 PROCEDURE — 99233 SBSQ HOSP IP/OBS HIGH 50: CPT | Mod: ,,, | Performed by: INTERNAL MEDICINE

## 2021-06-11 PROCEDURE — 87070 CULTURE OTHR SPECIMN AEROBIC: CPT | Performed by: INTERNAL MEDICINE

## 2021-06-11 PROCEDURE — 80053 COMPREHEN METABOLIC PANEL: CPT | Performed by: INTERNAL MEDICINE

## 2021-06-11 PROCEDURE — 83735 ASSAY OF MAGNESIUM: CPT | Performed by: INTERNAL MEDICINE

## 2021-06-11 PROCEDURE — 25000242 PHARM REV CODE 250 ALT 637 W/ HCPCS: Performed by: PHYSICIAN ASSISTANT

## 2021-06-11 PROCEDURE — 86140 C-REACTIVE PROTEIN: CPT | Performed by: PHYSICIAN ASSISTANT

## 2021-06-11 PROCEDURE — 27000190 HC CPAP FULL FACE MASK W/VALVE

## 2021-06-11 PROCEDURE — 36415 COLL VENOUS BLD VENIPUNCTURE: CPT | Performed by: INTERNAL MEDICINE

## 2021-06-11 PROCEDURE — 36415 COLL VENOUS BLD VENIPUNCTURE: CPT | Performed by: PHYSICIAN ASSISTANT

## 2021-06-11 PROCEDURE — 85025 COMPLETE CBC W/AUTO DIFF WBC: CPT | Mod: 91 | Performed by: INTERNAL MEDICINE

## 2021-06-11 PROCEDURE — C1751 CATH, INF, PER/CENT/MIDLINE: HCPCS

## 2021-06-11 PROCEDURE — 83880 ASSAY OF NATRIURETIC PEPTIDE: CPT | Performed by: INTERNAL MEDICINE

## 2021-06-11 PROCEDURE — 36569 INSJ PICC 5 YR+ W/O IMAGING: CPT

## 2021-06-11 PROCEDURE — 99499 NO LOS: ICD-10-PCS | Mod: ,,, | Performed by: INTERNAL MEDICINE

## 2021-06-11 PROCEDURE — 94660 CPAP INITIATION&MGMT: CPT

## 2021-06-11 PROCEDURE — 85025 COMPLETE CBC W/AUTO DIFF WBC: CPT | Performed by: INTERNAL MEDICINE

## 2021-06-11 PROCEDURE — 36600 WITHDRAWAL OF ARTERIAL BLOOD: CPT

## 2021-06-11 PROCEDURE — 80053 COMPREHEN METABOLIC PANEL: CPT | Mod: 91 | Performed by: INTERNAL MEDICINE

## 2021-06-11 PROCEDURE — 63600175 PHARM REV CODE 636 W HCPCS: Performed by: PHYSICIAN ASSISTANT

## 2021-06-11 PROCEDURE — 81000 URINALYSIS NONAUTO W/SCOPE: CPT | Performed by: INTERNAL MEDICINE

## 2021-06-11 PROCEDURE — 94761 N-INVAS EAR/PLS OXIMETRY MLT: CPT

## 2021-06-11 PROCEDURE — S4991 NICOTINE PATCH NONLEGEND: HCPCS | Performed by: PHYSICIAN ASSISTANT

## 2021-06-11 RX ORDER — FUROSEMIDE 10 MG/ML
40 INJECTION INTRAMUSCULAR; INTRAVENOUS DAILY
Status: DISCONTINUED | OUTPATIENT
Start: 2021-06-11 | End: 2021-06-11

## 2021-06-11 RX ORDER — MUPIROCIN 20 MG/G
OINTMENT TOPICAL 2 TIMES DAILY
Status: DISCONTINUED | OUTPATIENT
Start: 2021-06-11 | End: 2021-06-15 | Stop reason: HOSPADM

## 2021-06-11 RX ORDER — ENOXAPARIN SODIUM 100 MG/ML
1 INJECTION SUBCUTANEOUS
Status: DISCONTINUED | OUTPATIENT
Start: 2021-06-11 | End: 2021-06-12

## 2021-06-11 RX ORDER — LORAZEPAM 2 MG/ML
1 INJECTION INTRAMUSCULAR EVERY 4 HOURS PRN
Status: DISCONTINUED | OUTPATIENT
Start: 2021-06-11 | End: 2021-06-15 | Stop reason: HOSPADM

## 2021-06-11 RX ORDER — CEFEPIME HYDROCHLORIDE 1 G/50ML
1 INJECTION, SOLUTION INTRAVENOUS
Status: DISCONTINUED | OUTPATIENT
Start: 2021-06-11 | End: 2021-06-12

## 2021-06-11 RX ORDER — DEXMEDETOMIDINE HYDROCHLORIDE 4 UG/ML
0-1.4 INJECTION, SOLUTION INTRAVENOUS CONTINUOUS
Status: DISCONTINUED | OUTPATIENT
Start: 2021-06-11 | End: 2021-06-12

## 2021-06-11 RX ORDER — EPINEPHRINE 0.1 MG/ML
INJECTION INTRAVENOUS
Status: DISPENSED
Start: 2021-06-11 | End: 2021-06-12

## 2021-06-11 RX ORDER — SODIUM CHLORIDE 0.9 % (FLUSH) 0.9 %
10 SYRINGE (ML) INJECTION EVERY 6 HOURS
Status: DISCONTINUED | OUTPATIENT
Start: 2021-06-12 | End: 2021-06-15 | Stop reason: HOSPADM

## 2021-06-11 RX ORDER — METOPROLOL SUCCINATE 50 MG/1
50 TABLET, EXTENDED RELEASE ORAL 2 TIMES DAILY
Status: DISCONTINUED | OUTPATIENT
Start: 2021-06-11 | End: 2021-06-12

## 2021-06-11 RX ORDER — METOPROLOL SUCCINATE 50 MG/1
100 TABLET, EXTENDED RELEASE ORAL 2 TIMES DAILY
Status: DISCONTINUED | OUTPATIENT
Start: 2021-06-11 | End: 2021-06-11

## 2021-06-11 RX ORDER — DILTIAZEM HYDROCHLORIDE 5 MG/ML
10 INJECTION INTRAVENOUS 4 TIMES DAILY PRN
Status: DISCONTINUED | OUTPATIENT
Start: 2021-06-11 | End: 2021-06-14

## 2021-06-11 RX ORDER — SODIUM CHLORIDE 0.9 % (FLUSH) 0.9 %
10 SYRINGE (ML) INJECTION
Status: DISCONTINUED | OUTPATIENT
Start: 2021-06-12 | End: 2021-06-15 | Stop reason: HOSPADM

## 2021-06-11 RX ORDER — METOPROLOL TARTRATE 1 MG/ML
10 INJECTION, SOLUTION INTRAVENOUS ONCE
Status: COMPLETED | OUTPATIENT
Start: 2021-06-11 | End: 2021-06-11

## 2021-06-11 RX ORDER — ONDANSETRON 4 MG/1
4 TABLET, ORALLY DISINTEGRATING ORAL EVERY 6 HOURS PRN
Status: DISCONTINUED | OUTPATIENT
Start: 2021-06-11 | End: 2021-06-15 | Stop reason: HOSPADM

## 2021-06-11 RX ORDER — ASPIRIN 300 MG/1
300 SUPPOSITORY RECTAL ONCE
Status: COMPLETED | OUTPATIENT
Start: 2021-06-11 | End: 2021-06-11

## 2021-06-11 RX ADMIN — DEXAMETHASONE 6 MG: 4 TABLET ORAL at 08:06

## 2021-06-11 RX ADMIN — MUPIROCIN: 20 OINTMENT TOPICAL at 09:06

## 2021-06-11 RX ADMIN — PIPERACILLIN SODIUM AND TAZOBACTAM SODIUM 4.5 G: 4; .5 INJECTION, POWDER, FOR SOLUTION INTRAVENOUS at 03:06

## 2021-06-11 RX ADMIN — FUROSEMIDE 40 MG: 10 INJECTION, SOLUTION INTRAVENOUS at 06:06

## 2021-06-11 RX ADMIN — REMDESIVIR 100 MG: 100 INJECTION, POWDER, LYOPHILIZED, FOR SOLUTION INTRAVENOUS at 05:06

## 2021-06-11 RX ADMIN — METOROPROLOL TARTRATE 10 MG: 5 INJECTION, SOLUTION INTRAVENOUS at 12:06

## 2021-06-11 RX ADMIN — FUROSEMIDE 40 MG: 10 INJECTION, SOLUTION INTRAVENOUS at 11:06

## 2021-06-11 RX ADMIN — MUPIROCIN: 20 OINTMENT TOPICAL at 08:06

## 2021-06-11 RX ADMIN — DEXMEDETOMIDINE HYDROCHLORIDE 0.2 MCG/KG/HR: 4 INJECTION, SOLUTION INTRAVENOUS at 07:06

## 2021-06-11 RX ADMIN — ONDANSETRON 4 MG: 4 TABLET, ORALLY DISINTEGRATING ORAL at 08:06

## 2021-06-11 RX ADMIN — APIXABAN 5 MG: 5 TABLET, FILM COATED ORAL at 08:06

## 2021-06-11 RX ADMIN — AMIODARONE HYDROCHLORIDE 0.5 MG/MIN: 1.8 INJECTION, SOLUTION INTRAVENOUS at 10:06

## 2021-06-11 RX ADMIN — OXYCODONE HYDROCHLORIDE AND ACETAMINOPHEN 500 MG: 500 TABLET ORAL at 08:06

## 2021-06-11 RX ADMIN — DEXMEDETOMIDINE HYDROCHLORIDE 0.6 MCG/KG/HR: 4 INJECTION, SOLUTION INTRAVENOUS at 05:06

## 2021-06-11 RX ADMIN — DEXMEDETOMIDINE HYDROCHLORIDE 1 MCG/KG/HR: 4 INJECTION, SOLUTION INTRAVENOUS at 10:06

## 2021-06-11 RX ADMIN — ASPIRIN 300 MG: 300 SUPPOSITORY RECTAL at 09:06

## 2021-06-11 RX ADMIN — AMIODARONE HYDROCHLORIDE 0.5 MG/MIN: 1.8 INJECTION, SOLUTION INTRAVENOUS at 07:06

## 2021-06-11 RX ADMIN — PIPERACILLIN SODIUM AND TAZOBACTAM SODIUM 4.5 G: 4; .5 INJECTION, POWDER, FOR SOLUTION INTRAVENOUS at 11:06

## 2021-06-11 RX ADMIN — METOPROLOL SUCCINATE 100 MG: 50 TABLET, EXTENDED RELEASE ORAL at 08:06

## 2021-06-11 RX ADMIN — VANCOMYCIN HYDROCHLORIDE 1750 MG: 10 INJECTION, POWDER, LYOPHILIZED, FOR SOLUTION INTRAVENOUS at 03:06

## 2021-06-11 RX ADMIN — THERA TABS 1 TABLET: TAB at 08:06

## 2021-06-11 RX ADMIN — DIGOXIN 0.12 MG: 125 TABLET ORAL at 08:06

## 2021-06-11 RX ADMIN — NICOTINE 1 PATCH: 21 PATCH, EXTENDED RELEASE TRANSDERMAL at 08:06

## 2021-06-11 RX ADMIN — CEFEPIME HYDROCHLORIDE 1 G: 1 INJECTION, SOLUTION INTRAVENOUS at 11:06

## 2021-06-11 RX ADMIN — CEFEPIME HYDROCHLORIDE 1 G: 1 INJECTION, SOLUTION INTRAVENOUS at 03:06

## 2021-06-11 RX ADMIN — LORAZEPAM 1 MG: 2 INJECTION INTRAMUSCULAR; INTRAVENOUS at 01:06

## 2021-06-11 RX ADMIN — LORAZEPAM 1 MG: 2 INJECTION INTRAMUSCULAR; INTRAVENOUS at 07:06

## 2021-06-11 RX ADMIN — ENOXAPARIN SODIUM 80 MG: 80 INJECTION SUBCUTANEOUS at 09:06

## 2021-06-12 PROBLEM — E87.20 METABOLIC ACIDOSIS: Status: ACTIVE | Noted: 2021-06-12

## 2021-06-12 PROBLEM — D72.829 LEUKOCYTOSIS: Status: ACTIVE | Noted: 2021-06-12

## 2021-06-12 PROBLEM — R79.89 ELEVATED LFTS: Status: ACTIVE | Noted: 2021-06-12

## 2021-06-12 PROBLEM — E87.5 HYPERKALEMIA: Status: ACTIVE | Noted: 2021-06-12

## 2021-06-12 LAB
ALBUMIN SERPL BCP-MCNC: 3.1 G/DL (ref 3.5–5.2)
ALBUMIN SERPL BCP-MCNC: 3.2 G/DL (ref 3.5–5.2)
ALBUMIN SERPL BCP-MCNC: 3.4 G/DL (ref 3.5–5.2)
ALLENS TEST: ABNORMAL
ALP SERPL-CCNC: 49 U/L (ref 55–135)
ALT SERPL W/O P-5'-P-CCNC: 293 U/L (ref 10–44)
ANION GAP SERPL CALC-SCNC: 15 MMOL/L (ref 8–16)
ANION GAP SERPL CALC-SCNC: 15 MMOL/L (ref 8–16)
ANION GAP SERPL CALC-SCNC: 16 MMOL/L (ref 8–16)
ANION GAP SERPL CALC-SCNC: 28 MMOL/L (ref 8–16)
APTT BLDCRRT: 36.9 SEC (ref 21–32)
AST SERPL-CCNC: 405 U/L (ref 10–40)
BASOPHILS # BLD AUTO: 0.1 K/UL (ref 0–0.2)
BASOPHILS NFR BLD: 0.7 % (ref 0–1.9)
BILIRUB SERPL-MCNC: 1.5 MG/DL (ref 0.1–1)
BNP SERPL-MCNC: 774 PG/ML (ref 0–99)
BUN SERPL-MCNC: 57 MG/DL (ref 8–23)
BUN SERPL-MCNC: 62 MG/DL (ref 8–23)
BUN SERPL-MCNC: 62 MG/DL (ref 8–23)
BUN SERPL-MCNC: 64 MG/DL (ref 8–23)
CALCIUM SERPL-MCNC: 6.9 MG/DL (ref 8.7–10.5)
CALCIUM SERPL-MCNC: 6.9 MG/DL (ref 8.7–10.5)
CALCIUM SERPL-MCNC: 7.4 MG/DL (ref 8.7–10.5)
CALCIUM SERPL-MCNC: 8.9 MG/DL (ref 8.7–10.5)
CHLORIDE SERPL-SCNC: 100 MMOL/L (ref 95–110)
CHLORIDE SERPL-SCNC: 98 MMOL/L (ref 95–110)
CO2 SERPL-SCNC: 10 MMOL/L (ref 23–29)
CO2 SERPL-SCNC: 21 MMOL/L (ref 23–29)
CO2 SERPL-SCNC: 21 MMOL/L (ref 23–29)
CO2 SERPL-SCNC: 24 MMOL/L (ref 23–29)
CREAT SERPL-MCNC: 2.9 MG/DL (ref 0.5–1.4)
CREAT SERPL-MCNC: 3.3 MG/DL (ref 0.5–1.4)
CREAT SERPL-MCNC: 3.3 MG/DL (ref 0.5–1.4)
CREAT SERPL-MCNC: 4 MG/DL (ref 0.5–1.4)
DELSYS: ABNORMAL
DIFFERENTIAL METHOD: ABNORMAL
EOSINOPHIL # BLD AUTO: 0 K/UL (ref 0–0.5)
EOSINOPHIL NFR BLD: 0 % (ref 0–8)
ERYTHROCYTE [DISTWIDTH] IN BLOOD BY AUTOMATED COUNT: 16.1 % (ref 11.5–14.5)
EST. GFR  (AFRICAN AMERICAN): 17 ML/MIN/1.73 M^2
EST. GFR  (AFRICAN AMERICAN): 22 ML/MIN/1.73 M^2
EST. GFR  (AFRICAN AMERICAN): 22 ML/MIN/1.73 M^2
EST. GFR  (AFRICAN AMERICAN): 25 ML/MIN/1.73 M^2
EST. GFR  (NON AFRICAN AMERICAN): 15 ML/MIN/1.73 M^2
EST. GFR  (NON AFRICAN AMERICAN): 19 ML/MIN/1.73 M^2
EST. GFR  (NON AFRICAN AMERICAN): 19 ML/MIN/1.73 M^2
EST. GFR  (NON AFRICAN AMERICAN): 22 ML/MIN/1.73 M^2
FLOW: 2
GLUCOSE SERPL-MCNC: 165 MG/DL (ref 70–110)
GLUCOSE SERPL-MCNC: 258 MG/DL (ref 70–110)
GLUCOSE SERPL-MCNC: 258 MG/DL (ref 70–110)
GLUCOSE SERPL-MCNC: 37 MG/DL (ref 70–110)
HCO3 UR-SCNC: 6.6 MMOL/L (ref 24–28)
HCT VFR BLD AUTO: 49.4 % (ref 40–54)
HGB BLD-MCNC: 15.8 G/DL (ref 14–18)
IMM GRANULOCYTES # BLD AUTO: 0.37 K/UL (ref 0–0.04)
IMM GRANULOCYTES NFR BLD AUTO: 2.4 % (ref 0–0.5)
INR PPP: 2.5 (ref 0.8–1.2)
LACTATE SERPL-SCNC: 11 MMOL/L (ref 0.5–2.2)
LACTATE SERPL-SCNC: 8.8 MMOL/L (ref 0.5–2.2)
LYMPHOCYTES # BLD AUTO: 1.3 K/UL (ref 1–4.8)
LYMPHOCYTES NFR BLD: 8.8 % (ref 18–48)
MAGNESIUM SERPL-MCNC: 2.2 MG/DL (ref 1.6–2.6)
MCH RBC QN AUTO: 33.5 PG (ref 27–31)
MCHC RBC AUTO-ENTMCNC: 32 G/DL (ref 32–36)
MCV RBC AUTO: 105 FL (ref 82–98)
MODE: ABNORMAL
MONOCYTES # BLD AUTO: 1 K/UL (ref 0.3–1)
MONOCYTES NFR BLD: 6.7 % (ref 4–15)
NEUTROPHILS # BLD AUTO: 12.4 K/UL (ref 1.8–7.7)
NEUTROPHILS NFR BLD: 81.4 % (ref 38–73)
NRBC BLD-RTO: 1 /100 WBC
PCO2 BLDA: 21 MMHG (ref 35–45)
PH SMN: 7.1 [PH] (ref 7.35–7.45)
PHOSPHATE SERPL-MCNC: 13.4 MG/DL (ref 2.7–4.5)
PHOSPHATE SERPL-MCNC: 4.8 MG/DL (ref 2.7–4.5)
PHOSPHATE SERPL-MCNC: 7.1 MG/DL (ref 2.7–4.5)
PLATELET # BLD AUTO: 166 K/UL (ref 150–450)
PMV BLD AUTO: 11.5 FL (ref 9.2–12.9)
PO2 BLDA: 117 MMHG (ref 80–100)
POC BE: -21 MMOL/L
POC SATURATED O2: 97 % (ref 95–100)
POC TCO2: 7 MMOL/L (ref 23–27)
POCT GLUCOSE: 203 MG/DL (ref 70–110)
POCT GLUCOSE: 381 MG/DL (ref 70–110)
POTASSIUM SERPL-SCNC: 3.9 MMOL/L (ref 3.5–5.1)
POTASSIUM SERPL-SCNC: 4 MMOL/L (ref 3.5–5.1)
POTASSIUM SERPL-SCNC: 4 MMOL/L (ref 3.5–5.1)
POTASSIUM SERPL-SCNC: 6.6 MMOL/L (ref 3.5–5.1)
PROCALCITONIN SERPL IA-MCNC: 1.2 NG/ML
PROT SERPL-MCNC: 6.1 G/DL (ref 6–8.4)
PROTHROMBIN TIME: 24.7 SEC (ref 9–12.5)
RBC # BLD AUTO: 4.72 M/UL (ref 4.6–6.2)
SAMPLE: ABNORMAL
SITE: ABNORMAL
SODIUM SERPL-SCNC: 136 MMOL/L (ref 136–145)
SODIUM SERPL-SCNC: 136 MMOL/L (ref 136–145)
SODIUM SERPL-SCNC: 138 MMOL/L (ref 136–145)
SODIUM SERPL-SCNC: 138 MMOL/L (ref 136–145)
TROPONIN I SERPL DL<=0.01 NG/ML-MCNC: 0.25 NG/ML (ref 0–0.03)
TSH SERPL DL<=0.005 MIU/L-ACNC: 2.25 UIU/ML (ref 0.4–4)
WBC # BLD AUTO: 15.22 K/UL (ref 3.9–12.7)

## 2021-06-12 PROCEDURE — 84100 ASSAY OF PHOSPHORUS: CPT | Performed by: PHYSICIAN ASSISTANT

## 2021-06-12 PROCEDURE — 83605 ASSAY OF LACTIC ACID: CPT | Performed by: INTERNAL MEDICINE

## 2021-06-12 PROCEDURE — 27000221 HC OXYGEN, UP TO 24 HOURS

## 2021-06-12 PROCEDURE — 80069 RENAL FUNCTION PANEL: CPT | Performed by: HOSPITALIST

## 2021-06-12 PROCEDURE — 25000003 PHARM REV CODE 250: Performed by: INTERNAL MEDICINE

## 2021-06-12 PROCEDURE — 99900035 HC TECH TIME PER 15 MIN (STAT)

## 2021-06-12 PROCEDURE — A4216 STERILE WATER/SALINE, 10 ML: HCPCS | Performed by: INTERNAL MEDICINE

## 2021-06-12 PROCEDURE — 80053 COMPREHEN METABOLIC PANEL: CPT | Performed by: PHYSICIAN ASSISTANT

## 2021-06-12 PROCEDURE — 63600175 PHARM REV CODE 636 W HCPCS: Performed by: INTERNAL MEDICINE

## 2021-06-12 PROCEDURE — 80069 RENAL FUNCTION PANEL: CPT | Mod: 91 | Performed by: INTERNAL MEDICINE

## 2021-06-12 PROCEDURE — 85730 THROMBOPLASTIN TIME PARTIAL: CPT | Performed by: INTERNAL MEDICINE

## 2021-06-12 PROCEDURE — 99499 UNLISTED E&M SERVICE: CPT | Mod: ,,, | Performed by: INTERNAL MEDICINE

## 2021-06-12 PROCEDURE — 84443 ASSAY THYROID STIM HORMONE: CPT | Performed by: INTERNAL MEDICINE

## 2021-06-12 PROCEDURE — 99233 PR SUBSEQUENT HOSPITAL CARE,LEVL III: ICD-10-PCS | Mod: ,,, | Performed by: INTERNAL MEDICINE

## 2021-06-12 PROCEDURE — 36600 WITHDRAWAL OF ARTERIAL BLOOD: CPT

## 2021-06-12 PROCEDURE — 83735 ASSAY OF MAGNESIUM: CPT | Performed by: PHYSICIAN ASSISTANT

## 2021-06-12 PROCEDURE — 25000003 PHARM REV CODE 250: Performed by: HOSPITALIST

## 2021-06-12 PROCEDURE — 99233 SBSQ HOSP IP/OBS HIGH 50: CPT | Mod: ,,, | Performed by: INTERNAL MEDICINE

## 2021-06-12 PROCEDURE — 85025 COMPLETE CBC W/AUTO DIFF WBC: CPT | Performed by: PHYSICIAN ASSISTANT

## 2021-06-12 PROCEDURE — 25000242 PHARM REV CODE 250 ALT 637 W/ HCPCS: Performed by: PHYSICIAN ASSISTANT

## 2021-06-12 PROCEDURE — 99499 NO LOS: ICD-10-PCS | Mod: ,,, | Performed by: INTERNAL MEDICINE

## 2021-06-12 PROCEDURE — 82803 BLOOD GASES ANY COMBINATION: CPT

## 2021-06-12 PROCEDURE — 20000000 HC ICU ROOM

## 2021-06-12 PROCEDURE — S4991 NICOTINE PATCH NONLEGEND: HCPCS | Performed by: PHYSICIAN ASSISTANT

## 2021-06-12 PROCEDURE — 83605 ASSAY OF LACTIC ACID: CPT | Mod: 91 | Performed by: INTERNAL MEDICINE

## 2021-06-12 PROCEDURE — 84145 PROCALCITONIN (PCT): CPT | Performed by: INTERNAL MEDICINE

## 2021-06-12 PROCEDURE — 94660 CPAP INITIATION&MGMT: CPT

## 2021-06-12 PROCEDURE — 25000003 PHARM REV CODE 250: Performed by: PHYSICIAN ASSISTANT

## 2021-06-12 PROCEDURE — 84484 ASSAY OF TROPONIN QUANT: CPT | Performed by: INTERNAL MEDICINE

## 2021-06-12 PROCEDURE — 63600175 PHARM REV CODE 636 W HCPCS: Performed by: PHYSICIAN ASSISTANT

## 2021-06-12 PROCEDURE — 83880 ASSAY OF NATRIURETIC PEPTIDE: CPT | Performed by: INTERNAL MEDICINE

## 2021-06-12 PROCEDURE — 85610 PROTHROMBIN TIME: CPT | Performed by: INTERNAL MEDICINE

## 2021-06-12 RX ORDER — HEPARIN SODIUM,PORCINE/D5W 25000/250
0-40 INTRAVENOUS SOLUTION INTRAVENOUS CONTINUOUS
Status: DISCONTINUED | OUTPATIENT
Start: 2021-06-12 | End: 2021-06-12

## 2021-06-12 RX ORDER — SODIUM CHLORIDE 9 MG/ML
INJECTION, SOLUTION INTRAVENOUS CONTINUOUS
Status: DISCONTINUED | OUTPATIENT
Start: 2021-06-12 | End: 2021-06-15 | Stop reason: HOSPADM

## 2021-06-12 RX ORDER — LACTULOSE 10 G/15ML
20 SOLUTION ORAL 3 TIMES DAILY
Status: DISCONTINUED | OUTPATIENT
Start: 2021-06-12 | End: 2021-06-13

## 2021-06-12 RX ORDER — INDOMETHACIN 25 MG/1
50 CAPSULE ORAL ONCE
Status: COMPLETED | OUTPATIENT
Start: 2021-06-12 | End: 2021-06-12

## 2021-06-12 RX ORDER — CEFEPIME HYDROCHLORIDE 1 G/50ML
1 INJECTION, SOLUTION INTRAVENOUS
Status: DISCONTINUED | OUTPATIENT
Start: 2021-06-12 | End: 2021-06-15 | Stop reason: HOSPADM

## 2021-06-12 RX ORDER — SODIUM CHLORIDE 9 MG/ML
INJECTION, SOLUTION INTRAVENOUS CONTINUOUS
Status: DISCONTINUED | OUTPATIENT
Start: 2021-06-12 | End: 2021-06-13

## 2021-06-12 RX ORDER — METOPROLOL SUCCINATE 25 MG/1
25 TABLET, EXTENDED RELEASE ORAL DAILY
Status: DISCONTINUED | OUTPATIENT
Start: 2021-06-13 | End: 2021-06-13

## 2021-06-12 RX ORDER — ASPIRIN 81 MG/1
81 TABLET ORAL DAILY
Status: DISCONTINUED | OUTPATIENT
Start: 2021-06-12 | End: 2021-06-15 | Stop reason: HOSPADM

## 2021-06-12 RX ORDER — AMIODARONE HYDROCHLORIDE 200 MG/1
200 TABLET ORAL DAILY
Status: DISCONTINUED | OUTPATIENT
Start: 2021-06-12 | End: 2021-06-15 | Stop reason: HOSPADM

## 2021-06-12 RX ADMIN — MUPIROCIN: 20 OINTMENT TOPICAL at 08:06

## 2021-06-12 RX ADMIN — LORAZEPAM 1 MG: 2 INJECTION INTRAMUSCULAR; INTRAVENOUS at 12:06

## 2021-06-12 RX ADMIN — Medication 10 ML: at 06:06

## 2021-06-12 RX ADMIN — MUPIROCIN: 20 OINTMENT TOPICAL at 09:06

## 2021-06-12 RX ADMIN — DEXAMETHASONE 6 MG: 4 TABLET ORAL at 09:06

## 2021-06-12 RX ADMIN — THERA TABS 1 TABLET: TAB at 09:06

## 2021-06-12 RX ADMIN — OXYCODONE HYDROCHLORIDE AND ACETAMINOPHEN 500 MG: 500 TABLET ORAL at 08:06

## 2021-06-12 RX ADMIN — CALCIUM GLUCONATE 1 G: 98 INJECTION, SOLUTION INTRAVENOUS at 06:06

## 2021-06-12 RX ADMIN — INSULIN HUMAN 8.16 UNITS: 100 INJECTION, SOLUTION PARENTERAL at 06:06

## 2021-06-12 RX ADMIN — SODIUM CHLORIDE: 0.9 INJECTION, SOLUTION INTRAVENOUS at 09:06

## 2021-06-12 RX ADMIN — DEXTROSE MONOHYDRATE 25 G: 25 INJECTION, SOLUTION INTRAVENOUS at 06:06

## 2021-06-12 RX ADMIN — DEXTROSE MONOHYDRATE 25 G: 25 INJECTION, SOLUTION INTRAVENOUS at 04:06

## 2021-06-12 RX ADMIN — SODIUM CHLORIDE: 0.9 INJECTION, SOLUTION INTRAVENOUS at 10:06

## 2021-06-12 RX ADMIN — VANCOMYCIN HYDROCHLORIDE 1250 MG: 1.25 INJECTION, POWDER, LYOPHILIZED, FOR SOLUTION INTRAVENOUS at 04:06

## 2021-06-12 RX ADMIN — OXYCODONE HYDROCHLORIDE AND ACETAMINOPHEN 500 MG: 500 TABLET ORAL at 09:06

## 2021-06-12 RX ADMIN — CEFEPIME HYDROCHLORIDE 1 G: 1 INJECTION, SOLUTION INTRAVENOUS at 11:06

## 2021-06-12 RX ADMIN — LACTULOSE 20 G: 10 SOLUTION ORAL at 08:06

## 2021-06-12 RX ADMIN — CALCIUM CHLORIDE 2 G: 100 INJECTION, SOLUTION INTRAVENOUS at 03:06

## 2021-06-12 RX ADMIN — SODIUM BICARBONATE: 84 INJECTION, SOLUTION INTRAVENOUS at 06:06

## 2021-06-12 RX ADMIN — SODIUM BICARBONATE 50 MEQ: 84 INJECTION, SOLUTION INTRAVENOUS at 05:06

## 2021-06-12 RX ADMIN — ASPIRIN 81 MG: 81 TABLET, COATED ORAL at 11:06

## 2021-06-12 RX ADMIN — Medication 10 ML: at 11:06

## 2021-06-12 RX ADMIN — SODIUM CHLORIDE 500 ML: 0.9 INJECTION, SOLUTION INTRAVENOUS at 05:06

## 2021-06-12 RX ADMIN — SODIUM BICARBONATE: 84 INJECTION, SOLUTION INTRAVENOUS at 09:06

## 2021-06-12 RX ADMIN — AMIODARONE HYDROCHLORIDE 200 MG: 200 TABLET ORAL at 11:06

## 2021-06-12 RX ADMIN — NICOTINE 1 PATCH: 21 PATCH, EXTENDED RELEASE TRANSDERMAL at 09:06

## 2021-06-12 RX ADMIN — LACTULOSE 20 G: 10 SOLUTION ORAL at 03:06

## 2021-06-13 LAB
ALBUMIN SERPL BCP-MCNC: 2.7 G/DL (ref 3.5–5.2)
ALP SERPL-CCNC: 101 U/L (ref 55–135)
ALT SERPL W/O P-5'-P-CCNC: 1308 U/L (ref 10–44)
ANION GAP SERPL CALC-SCNC: 11 MMOL/L (ref 8–16)
AST SERPL-CCNC: 1766 U/L (ref 10–40)
BACTERIA SPEC AEROBE CULT: NORMAL
BASOPHILS # BLD AUTO: 0.04 K/UL (ref 0–0.2)
BASOPHILS NFR BLD: 0.3 % (ref 0–1.9)
BILIRUB SERPL-MCNC: 0.6 MG/DL (ref 0.1–1)
BUN SERPL-MCNC: 58 MG/DL (ref 8–23)
CALCIUM SERPL-MCNC: 7.9 MG/DL (ref 8.7–10.5)
CHLORIDE SERPL-SCNC: 102 MMOL/L (ref 95–110)
CO2 SERPL-SCNC: 24 MMOL/L (ref 23–29)
CREAT SERPL-MCNC: 2.3 MG/DL (ref 0.5–1.4)
CRP SERPL-MCNC: 22.6 MG/L (ref 0–8.2)
DIFFERENTIAL METHOD: ABNORMAL
EOSINOPHIL # BLD AUTO: 0 K/UL (ref 0–0.5)
EOSINOPHIL NFR BLD: 0 % (ref 0–8)
ERYTHROCYTE [DISTWIDTH] IN BLOOD BY AUTOMATED COUNT: 14.7 % (ref 11.5–14.5)
EST. GFR  (AFRICAN AMERICAN): 34 ML/MIN/1.73 M^2
EST. GFR  (NON AFRICAN AMERICAN): 29 ML/MIN/1.73 M^2
GLUCOSE SERPL-MCNC: 243 MG/DL (ref 70–110)
GRAM STN SPEC: NORMAL
HCT VFR BLD AUTO: 38.7 % (ref 40–54)
HGB BLD-MCNC: 13.6 G/DL (ref 14–18)
IMM GRANULOCYTES # BLD AUTO: 0.21 K/UL (ref 0–0.04)
IMM GRANULOCYTES NFR BLD AUTO: 1.4 % (ref 0–0.5)
INR PPP: 1.7 (ref 0.8–1.2)
LYMPHOCYTES # BLD AUTO: 0.5 K/UL (ref 1–4.8)
LYMPHOCYTES NFR BLD: 2.9 % (ref 18–48)
MAGNESIUM SERPL-MCNC: 1.6 MG/DL (ref 1.6–2.6)
MCH RBC QN AUTO: 33.3 PG (ref 27–31)
MCHC RBC AUTO-ENTMCNC: 35.1 G/DL (ref 32–36)
MCV RBC AUTO: 95 FL (ref 82–98)
MONOCYTES # BLD AUTO: 0.8 K/UL (ref 0.3–1)
MONOCYTES NFR BLD: 5 % (ref 4–15)
NEUTROPHILS # BLD AUTO: 13.8 K/UL (ref 1.8–7.7)
NEUTROPHILS NFR BLD: 90.4 % (ref 38–73)
NRBC BLD-RTO: 5 /100 WBC
PHOSPHATE SERPL-MCNC: 2.8 MG/DL (ref 2.7–4.5)
PLATELET # BLD AUTO: 153 K/UL (ref 150–450)
PMV BLD AUTO: 11.9 FL (ref 9.2–12.9)
POTASSIUM SERPL-SCNC: 3.6 MMOL/L (ref 3.5–5.1)
PROT SERPL-MCNC: 5 G/DL (ref 6–8.4)
PROTHROMBIN TIME: 18 SEC (ref 9–12.5)
RBC # BLD AUTO: 4.09 M/UL (ref 4.6–6.2)
SODIUM SERPL-SCNC: 137 MMOL/L (ref 136–145)
WBC # BLD AUTO: 15.29 K/UL (ref 3.9–12.7)

## 2021-06-13 PROCEDURE — 20000000 HC ICU ROOM

## 2021-06-13 PROCEDURE — 63600175 PHARM REV CODE 636 W HCPCS: Performed by: PHYSICIAN ASSISTANT

## 2021-06-13 PROCEDURE — 86140 C-REACTIVE PROTEIN: CPT | Performed by: PHYSICIAN ASSISTANT

## 2021-06-13 PROCEDURE — 84100 ASSAY OF PHOSPHORUS: CPT | Performed by: HOSPITALIST

## 2021-06-13 PROCEDURE — 25000003 PHARM REV CODE 250: Performed by: INTERNAL MEDICINE

## 2021-06-13 PROCEDURE — 25000003 PHARM REV CODE 250: Performed by: HOSPITALIST

## 2021-06-13 PROCEDURE — 63600175 PHARM REV CODE 636 W HCPCS: Performed by: HOSPITALIST

## 2021-06-13 PROCEDURE — 85610 PROTHROMBIN TIME: CPT | Performed by: INTERNAL MEDICINE

## 2021-06-13 PROCEDURE — S4991 NICOTINE PATCH NONLEGEND: HCPCS | Performed by: PHYSICIAN ASSISTANT

## 2021-06-13 PROCEDURE — 85025 COMPLETE CBC W/AUTO DIFF WBC: CPT | Performed by: HOSPITALIST

## 2021-06-13 PROCEDURE — 25000242 PHARM REV CODE 250 ALT 637 W/ HCPCS: Performed by: PHYSICIAN ASSISTANT

## 2021-06-13 PROCEDURE — 94761 N-INVAS EAR/PLS OXIMETRY MLT: CPT

## 2021-06-13 PROCEDURE — 99499 UNLISTED E&M SERVICE: CPT | Mod: ,,, | Performed by: INTERNAL MEDICINE

## 2021-06-13 PROCEDURE — 63600175 PHARM REV CODE 636 W HCPCS: Performed by: INTERNAL MEDICINE

## 2021-06-13 PROCEDURE — 99900035 HC TECH TIME PER 15 MIN (STAT)

## 2021-06-13 PROCEDURE — 99499 NO LOS: ICD-10-PCS | Mod: ,,, | Performed by: INTERNAL MEDICINE

## 2021-06-13 PROCEDURE — 80053 COMPREHEN METABOLIC PANEL: CPT | Performed by: HOSPITALIST

## 2021-06-13 PROCEDURE — 83735 ASSAY OF MAGNESIUM: CPT | Performed by: HOSPITALIST

## 2021-06-13 PROCEDURE — A4216 STERILE WATER/SALINE, 10 ML: HCPCS | Performed by: INTERNAL MEDICINE

## 2021-06-13 PROCEDURE — 25000003 PHARM REV CODE 250: Performed by: PHYSICIAN ASSISTANT

## 2021-06-13 RX ORDER — MAGNESIUM SULFATE 1 G/100ML
1 INJECTION INTRAVENOUS ONCE
Status: COMPLETED | OUTPATIENT
Start: 2021-06-13 | End: 2021-06-13

## 2021-06-13 RX ORDER — ENOXAPARIN SODIUM 100 MG/ML
80 INJECTION SUBCUTANEOUS
Status: DISCONTINUED | OUTPATIENT
Start: 2021-06-13 | End: 2021-06-13

## 2021-06-13 RX ORDER — HYDRALAZINE HYDROCHLORIDE 20 MG/ML
10 INJECTION INTRAMUSCULAR; INTRAVENOUS EVERY 8 HOURS PRN
Status: DISCONTINUED | OUTPATIENT
Start: 2021-06-13 | End: 2021-06-15 | Stop reason: HOSPADM

## 2021-06-13 RX ORDER — ENOXAPARIN SODIUM 100 MG/ML
80 INJECTION SUBCUTANEOUS
Status: DISCONTINUED | OUTPATIENT
Start: 2021-06-13 | End: 2021-06-14

## 2021-06-13 RX ORDER — METOPROLOL SUCCINATE 50 MG/1
50 TABLET, EXTENDED RELEASE ORAL DAILY
Status: DISCONTINUED | OUTPATIENT
Start: 2021-06-14 | End: 2021-06-15 | Stop reason: HOSPADM

## 2021-06-13 RX ADMIN — DEXAMETHASONE 6 MG: 4 TABLET ORAL at 08:06

## 2021-06-13 RX ADMIN — Medication 10 ML: at 05:06

## 2021-06-13 RX ADMIN — OXYCODONE HYDROCHLORIDE AND ACETAMINOPHEN 500 MG: 500 TABLET ORAL at 08:06

## 2021-06-13 RX ADMIN — MUPIROCIN: 20 OINTMENT TOPICAL at 08:06

## 2021-06-13 RX ADMIN — CEFEPIME HYDROCHLORIDE 1 G: 1 INJECTION, SOLUTION INTRAVENOUS at 11:06

## 2021-06-13 RX ADMIN — ENOXAPARIN SODIUM 80 MG: 80 INJECTION SUBCUTANEOUS at 12:06

## 2021-06-13 RX ADMIN — Medication 10 ML: at 11:06

## 2021-06-13 RX ADMIN — SODIUM CHLORIDE: 0.9 INJECTION, SOLUTION INTRAVENOUS at 10:06

## 2021-06-13 RX ADMIN — NICOTINE 1 PATCH: 21 PATCH, EXTENDED RELEASE TRANSDERMAL at 08:06

## 2021-06-13 RX ADMIN — HYDRALAZINE HYDROCHLORIDE 10 MG: 20 INJECTION INTRAMUSCULAR; INTRAVENOUS at 07:06

## 2021-06-13 RX ADMIN — ENOXAPARIN SODIUM 80 MG: 80 INJECTION SUBCUTANEOUS at 11:06

## 2021-06-13 RX ADMIN — THERA TABS 1 TABLET: TAB at 08:06

## 2021-06-13 RX ADMIN — ASPIRIN 81 MG: 81 TABLET, COATED ORAL at 08:06

## 2021-06-13 RX ADMIN — METOPROLOL SUCCINATE 25 MG: 25 TABLET, EXTENDED RELEASE ORAL at 08:06

## 2021-06-13 RX ADMIN — MAGNESIUM SULFATE 1 G: 1 INJECTION INTRAVENOUS at 01:06

## 2021-06-13 RX ADMIN — AMIODARONE HYDROCHLORIDE 200 MG: 200 TABLET ORAL at 08:06

## 2021-06-14 PROBLEM — E87.20 METABOLIC ACIDOSIS: Status: RESOLVED | Noted: 2021-06-12 | Resolved: 2021-06-14

## 2021-06-14 PROBLEM — E87.5 HYPERKALEMIA: Status: RESOLVED | Noted: 2021-06-12 | Resolved: 2021-06-14

## 2021-06-14 LAB
ALBUMIN SERPL BCP-MCNC: 3.2 G/DL (ref 3.5–5.2)
ALLENS TEST: ABNORMAL
ALP SERPL-CCNC: 87 U/L (ref 55–135)
ALT SERPL W/O P-5'-P-CCNC: 986 U/L (ref 10–44)
ANION GAP SERPL CALC-SCNC: 8 MMOL/L (ref 8–16)
AST SERPL-CCNC: 553 U/L (ref 10–40)
BACTERIA BLD CULT: NORMAL
BACTERIA BLD CULT: NORMAL
BASOPHILS # BLD AUTO: 0.07 K/UL (ref 0–0.2)
BASOPHILS NFR BLD: 0.4 % (ref 0–1.9)
BILIRUB SERPL-MCNC: 0.5 MG/DL (ref 0.1–1)
BUN SERPL-MCNC: 33 MG/DL (ref 8–23)
CALCIUM SERPL-MCNC: 8.1 MG/DL (ref 8.7–10.5)
CHLORIDE SERPL-SCNC: 102 MMOL/L (ref 95–110)
CO2 SERPL-SCNC: 28 MMOL/L (ref 23–29)
CREAT SERPL-MCNC: 1.2 MG/DL (ref 0.5–1.4)
DELSYS: ABNORMAL
DIFFERENTIAL METHOD: ABNORMAL
EOSINOPHIL # BLD AUTO: 0 K/UL (ref 0–0.5)
EOSINOPHIL NFR BLD: 0 % (ref 0–8)
ERYTHROCYTE [DISTWIDTH] IN BLOOD BY AUTOMATED COUNT: 14.9 % (ref 11.5–14.5)
EST. GFR  (AFRICAN AMERICAN): >60 ML/MIN/1.73 M^2
EST. GFR  (NON AFRICAN AMERICAN): >60 ML/MIN/1.73 M^2
GLUCOSE SERPL-MCNC: 192 MG/DL (ref 70–110)
HCO3 UR-SCNC: 30.7 MMOL/L (ref 24–28)
HCT VFR BLD AUTO: 40.2 % (ref 40–54)
HGB BLD-MCNC: 14.2 G/DL (ref 14–18)
IMM GRANULOCYTES # BLD AUTO: 0.38 K/UL (ref 0–0.04)
IMM GRANULOCYTES NFR BLD AUTO: 2.2 % (ref 0–0.5)
L PNEUMO AG UR QL IA: NEGATIVE
LACTATE SERPL-SCNC: 1.9 MMOL/L (ref 0.5–2.2)
LYMPHOCYTES # BLD AUTO: 0.9 K/UL (ref 1–4.8)
LYMPHOCYTES NFR BLD: 5.2 % (ref 18–48)
MAGNESIUM SERPL-MCNC: 2 MG/DL (ref 1.6–2.6)
MCH RBC QN AUTO: 33.6 PG (ref 27–31)
MCHC RBC AUTO-ENTMCNC: 35.3 G/DL (ref 32–36)
MCV RBC AUTO: 95 FL (ref 82–98)
MONOCYTES # BLD AUTO: 0.8 K/UL (ref 0.3–1)
MONOCYTES NFR BLD: 4.7 % (ref 4–15)
NEUTROPHILS # BLD AUTO: 14.9 K/UL (ref 1.8–7.7)
NEUTROPHILS NFR BLD: 87.5 % (ref 38–73)
NRBC BLD-RTO: 1 /100 WBC
PCO2 BLDA: 45.1 MMHG (ref 35–45)
PH SMN: 7.44 [PH] (ref 7.35–7.45)
PHOSPHATE SERPL-MCNC: 2 MG/DL (ref 2.7–4.5)
PLATELET # BLD AUTO: 158 K/UL (ref 150–450)
PMV BLD AUTO: 11.4 FL (ref 9.2–12.9)
PO2 BLDA: 34 MMHG (ref 40–60)
POC BE: 6 MMOL/L
POC SATURATED O2: 67 % (ref 95–100)
POC TCO2: 32 MMOL/L (ref 24–29)
POTASSIUM SERPL-SCNC: 3.8 MMOL/L (ref 3.5–5.1)
PROT SERPL-MCNC: 5.9 G/DL (ref 6–8.4)
RBC # BLD AUTO: 4.23 M/UL (ref 4.6–6.2)
SAMPLE: ABNORMAL
SITE: ABNORMAL
SODIUM SERPL-SCNC: 138 MMOL/L (ref 136–145)
WBC # BLD AUTO: 17.08 K/UL (ref 3.9–12.7)

## 2021-06-14 PROCEDURE — A4216 STERILE WATER/SALINE, 10 ML: HCPCS | Performed by: HOSPITALIST

## 2021-06-14 PROCEDURE — 25000003 PHARM REV CODE 250: Performed by: HOSPITALIST

## 2021-06-14 PROCEDURE — 85025 COMPLETE CBC W/AUTO DIFF WBC: CPT | Performed by: HOSPITALIST

## 2021-06-14 PROCEDURE — 63600175 PHARM REV CODE 636 W HCPCS: Performed by: PHYSICIAN ASSISTANT

## 2021-06-14 PROCEDURE — 25000003 PHARM REV CODE 250: Performed by: INTERNAL MEDICINE

## 2021-06-14 PROCEDURE — A4216 STERILE WATER/SALINE, 10 ML: HCPCS | Performed by: INTERNAL MEDICINE

## 2021-06-14 PROCEDURE — 63600175 PHARM REV CODE 636 W HCPCS: Performed by: HOSPITALIST

## 2021-06-14 PROCEDURE — 83735 ASSAY OF MAGNESIUM: CPT | Performed by: HOSPITALIST

## 2021-06-14 PROCEDURE — 94761 N-INVAS EAR/PLS OXIMETRY MLT: CPT

## 2021-06-14 PROCEDURE — 11000001 HC ACUTE MED/SURG PRIVATE ROOM

## 2021-06-14 PROCEDURE — 25000242 PHARM REV CODE 250 ALT 637 W/ HCPCS: Performed by: PHYSICIAN ASSISTANT

## 2021-06-14 PROCEDURE — 25000003 PHARM REV CODE 250: Performed by: PHYSICIAN ASSISTANT

## 2021-06-14 PROCEDURE — 82803 BLOOD GASES ANY COMBINATION: CPT

## 2021-06-14 PROCEDURE — 63600175 PHARM REV CODE 636 W HCPCS: Performed by: INTERNAL MEDICINE

## 2021-06-14 PROCEDURE — 83605 ASSAY OF LACTIC ACID: CPT | Performed by: INTERNAL MEDICINE

## 2021-06-14 PROCEDURE — 80053 COMPREHEN METABOLIC PANEL: CPT | Performed by: HOSPITALIST

## 2021-06-14 PROCEDURE — 99900035 HC TECH TIME PER 15 MIN (STAT)

## 2021-06-14 PROCEDURE — 84100 ASSAY OF PHOSPHORUS: CPT | Performed by: HOSPITALIST

## 2021-06-14 PROCEDURE — S4991 NICOTINE PATCH NONLEGEND: HCPCS | Performed by: PHYSICIAN ASSISTANT

## 2021-06-14 RX ORDER — HYDRALAZINE HYDROCHLORIDE 25 MG/1
25 TABLET, FILM COATED ORAL EVERY 8 HOURS
Status: DISCONTINUED | OUTPATIENT
Start: 2021-06-14 | End: 2021-06-15 | Stop reason: HOSPADM

## 2021-06-14 RX ORDER — ACETAMINOPHEN 325 MG/1
650 TABLET ORAL EVERY 4 HOURS PRN
Status: DISCONTINUED | OUTPATIENT
Start: 2021-06-14 | End: 2021-06-15 | Stop reason: HOSPADM

## 2021-06-14 RX ORDER — SODIUM,POTASSIUM PHOSPHATES 280-250MG
2 POWDER IN PACKET (EA) ORAL ONCE
Status: COMPLETED | OUTPATIENT
Start: 2021-06-14 | End: 2021-06-14

## 2021-06-14 RX ADMIN — MUPIROCIN: 20 OINTMENT TOPICAL at 09:06

## 2021-06-14 RX ADMIN — DEXAMETHASONE 6 MG: 4 TABLET ORAL at 08:06

## 2021-06-14 RX ADMIN — THERA TABS 1 TABLET: TAB at 08:06

## 2021-06-14 RX ADMIN — HYDRALAZINE HYDROCHLORIDE 25 MG: 25 TABLET, FILM COATED ORAL at 03:06

## 2021-06-14 RX ADMIN — APIXABAN 5 MG: 5 TABLET, FILM COATED ORAL at 09:06

## 2021-06-14 RX ADMIN — HYDRALAZINE HYDROCHLORIDE 25 MG: 25 TABLET, FILM COATED ORAL at 09:06

## 2021-06-14 RX ADMIN — Medication 10 ML: at 12:06

## 2021-06-14 RX ADMIN — MUPIROCIN: 20 OINTMENT TOPICAL at 08:06

## 2021-06-14 RX ADMIN — ASPIRIN 81 MG: 81 TABLET, COATED ORAL at 08:06

## 2021-06-14 RX ADMIN — Medication 10 ML: at 05:06

## 2021-06-14 RX ADMIN — NICOTINE 1 PATCH: 21 PATCH, EXTENDED RELEASE TRANSDERMAL at 08:06

## 2021-06-14 RX ADMIN — CEFEPIME HYDROCHLORIDE 1 G: 1 INJECTION, SOLUTION INTRAVENOUS at 11:06

## 2021-06-14 RX ADMIN — HYDRALAZINE HYDROCHLORIDE 10 MG: 20 INJECTION INTRAMUSCULAR; INTRAVENOUS at 06:06

## 2021-06-14 RX ADMIN — CEFEPIME HYDROCHLORIDE 1 G: 1 INJECTION, SOLUTION INTRAVENOUS at 10:06

## 2021-06-14 RX ADMIN — APIXABAN 5 MG: 5 TABLET, FILM COATED ORAL at 10:06

## 2021-06-14 RX ADMIN — AMIODARONE HYDROCHLORIDE 200 MG: 200 TABLET ORAL at 08:06

## 2021-06-14 RX ADMIN — HYDRALAZINE HYDROCHLORIDE 25 MG: 25 TABLET, FILM COATED ORAL at 05:06

## 2021-06-14 RX ADMIN — SODIUM CHLORIDE: 0.9 INJECTION, SOLUTION INTRAVENOUS at 11:06

## 2021-06-14 RX ADMIN — HYDRALAZINE HYDROCHLORIDE 10 MG: 20 INJECTION INTRAMUSCULAR; INTRAVENOUS at 12:06

## 2021-06-14 RX ADMIN — Medication 10 ML: at 11:06

## 2021-06-14 RX ADMIN — OXYCODONE HYDROCHLORIDE AND ACETAMINOPHEN 500 MG: 500 TABLET ORAL at 08:06

## 2021-06-14 RX ADMIN — OXYCODONE HYDROCHLORIDE AND ACETAMINOPHEN 500 MG: 500 TABLET ORAL at 09:06

## 2021-06-14 RX ADMIN — POTASSIUM & SODIUM PHOSPHATES POWDER PACK 280-160-250 MG 2 PACKET: 280-160-250 PACK at 10:06

## 2021-06-14 RX ADMIN — HYDRALAZINE HYDROCHLORIDE 25 MG: 25 TABLET, FILM COATED ORAL at 12:06

## 2021-06-14 RX ADMIN — METOPROLOL SUCCINATE 50 MG: 50 TABLET, EXTENDED RELEASE ORAL at 08:06

## 2021-06-14 RX ADMIN — Medication 10 ML: at 06:06

## 2021-06-15 VITALS
HEIGHT: 66 IN | HEART RATE: 86 BPM | SYSTOLIC BLOOD PRESSURE: 176 MMHG | BODY MASS INDEX: 30.16 KG/M2 | TEMPERATURE: 98 F | OXYGEN SATURATION: 93 % | RESPIRATION RATE: 17 BRPM | WEIGHT: 187.63 LBS | DIASTOLIC BLOOD PRESSURE: 107 MMHG

## 2021-06-15 LAB
ALBUMIN SERPL BCP-MCNC: 3.2 G/DL (ref 3.5–5.2)
ALP SERPL-CCNC: 97 U/L (ref 55–135)
ALT SERPL W/O P-5'-P-CCNC: 629 U/L (ref 10–44)
ANION GAP SERPL CALC-SCNC: 9 MMOL/L (ref 8–16)
AST SERPL-CCNC: 129 U/L (ref 10–40)
BASOPHILS # BLD AUTO: 0.09 K/UL (ref 0–0.2)
BASOPHILS NFR BLD: 0.6 % (ref 0–1.9)
BILIRUB SERPL-MCNC: 0.7 MG/DL (ref 0.1–1)
BUN SERPL-MCNC: 32 MG/DL (ref 8–23)
CALCIUM SERPL-MCNC: 8.4 MG/DL (ref 8.7–10.5)
CHLORIDE SERPL-SCNC: 102 MMOL/L (ref 95–110)
CO2 SERPL-SCNC: 29 MMOL/L (ref 23–29)
CREAT SERPL-MCNC: 1.1 MG/DL (ref 0.5–1.4)
DIFFERENTIAL METHOD: ABNORMAL
EOSINOPHIL # BLD AUTO: 0 K/UL (ref 0–0.5)
EOSINOPHIL NFR BLD: 0 % (ref 0–8)
ERYTHROCYTE [DISTWIDTH] IN BLOOD BY AUTOMATED COUNT: 15.1 % (ref 11.5–14.5)
EST. GFR  (AFRICAN AMERICAN): >60 ML/MIN/1.73 M^2
EST. GFR  (NON AFRICAN AMERICAN): >60 ML/MIN/1.73 M^2
GLUCOSE SERPL-MCNC: 272 MG/DL (ref 70–110)
HCT VFR BLD AUTO: 39.6 % (ref 40–54)
HGB BLD-MCNC: 13.5 G/DL (ref 14–18)
IMM GRANULOCYTES # BLD AUTO: 0.47 K/UL (ref 0–0.04)
IMM GRANULOCYTES NFR BLD AUTO: 3.1 % (ref 0–0.5)
LYMPHOCYTES # BLD AUTO: 1 K/UL (ref 1–4.8)
LYMPHOCYTES NFR BLD: 6.4 % (ref 18–48)
MAGNESIUM SERPL-MCNC: 1.9 MG/DL (ref 1.6–2.6)
MCH RBC QN AUTO: 32.5 PG (ref 27–31)
MCHC RBC AUTO-ENTMCNC: 34.1 G/DL (ref 32–36)
MCV RBC AUTO: 95 FL (ref 82–98)
MONOCYTES # BLD AUTO: 1 K/UL (ref 0.3–1)
MONOCYTES NFR BLD: 6.4 % (ref 4–15)
NEUTROPHILS # BLD AUTO: 12.6 K/UL (ref 1.8–7.7)
NEUTROPHILS NFR BLD: 83.5 % (ref 38–73)
NRBC BLD-RTO: 1 /100 WBC
PHOSPHATE SERPL-MCNC: 1.7 MG/DL (ref 2.7–4.5)
PLATELET # BLD AUTO: 146 K/UL (ref 150–450)
PMV BLD AUTO: 12.5 FL (ref 9.2–12.9)
POTASSIUM SERPL-SCNC: 4 MMOL/L (ref 3.5–5.1)
PROT SERPL-MCNC: 5.9 G/DL (ref 6–8.4)
RBC # BLD AUTO: 4.16 M/UL (ref 4.6–6.2)
SODIUM SERPL-SCNC: 140 MMOL/L (ref 136–145)
WBC # BLD AUTO: 15.11 K/UL (ref 3.9–12.7)

## 2021-06-15 PROCEDURE — 85025 COMPLETE CBC W/AUTO DIFF WBC: CPT | Performed by: HOSPITALIST

## 2021-06-15 PROCEDURE — 83735 ASSAY OF MAGNESIUM: CPT | Performed by: HOSPITALIST

## 2021-06-15 PROCEDURE — 25000003 PHARM REV CODE 250: Performed by: HOSPITALIST

## 2021-06-15 PROCEDURE — 63600175 PHARM REV CODE 636 W HCPCS: Performed by: HOSPITALIST

## 2021-06-15 PROCEDURE — A4216 STERILE WATER/SALINE, 10 ML: HCPCS | Performed by: HOSPITALIST

## 2021-06-15 PROCEDURE — 25000003 PHARM REV CODE 250: Performed by: PHYSICIAN ASSISTANT

## 2021-06-15 PROCEDURE — S4991 NICOTINE PATCH NONLEGEND: HCPCS | Performed by: HOSPITALIST

## 2021-06-15 PROCEDURE — 84100 ASSAY OF PHOSPHORUS: CPT | Performed by: HOSPITALIST

## 2021-06-15 PROCEDURE — 80053 COMPREHEN METABOLIC PANEL: CPT | Performed by: HOSPITALIST

## 2021-06-15 PROCEDURE — 25000242 PHARM REV CODE 250 ALT 637 W/ HCPCS: Performed by: HOSPITALIST

## 2021-06-15 RX ORDER — HYDRALAZINE HYDROCHLORIDE 25 MG/1
50 TABLET, FILM COATED ORAL EVERY 8 HOURS
Qty: 180 TABLET | Refills: 11 | Status: ON HOLD | OUTPATIENT
Start: 2021-06-15 | End: 2021-07-05 | Stop reason: HOSPADM

## 2021-06-15 RX ORDER — DEXAMETHASONE 6 MG/1
6 TABLET ORAL DAILY
Qty: 3 TABLET | Refills: 0 | Status: SHIPPED | OUTPATIENT
Start: 2021-06-16 | End: 2021-06-19

## 2021-06-15 RX ORDER — METOPROLOL SUCCINATE 50 MG/1
50 TABLET, EXTENDED RELEASE ORAL DAILY
Qty: 30 TABLET | Refills: 11 | Status: ON HOLD | OUTPATIENT
Start: 2021-06-16 | End: 2021-07-05 | Stop reason: HOSPADM

## 2021-06-15 RX ADMIN — THERA TABS 1 TABLET: TAB at 09:06

## 2021-06-15 RX ADMIN — Medication 10 ML: at 11:06

## 2021-06-15 RX ADMIN — ASPIRIN 81 MG: 81 TABLET, COATED ORAL at 09:06

## 2021-06-15 RX ADMIN — OXYCODONE HYDROCHLORIDE AND ACETAMINOPHEN 500 MG: 500 TABLET ORAL at 09:06

## 2021-06-15 RX ADMIN — METOPROLOL SUCCINATE 50 MG: 50 TABLET, EXTENDED RELEASE ORAL at 09:06

## 2021-06-15 RX ADMIN — SODIUM PHOSPHATE, MONOBASIC, MONOHYDRATE 20.01 MMOL: 276; 142 INJECTION, SOLUTION INTRAVENOUS at 11:06

## 2021-06-15 RX ADMIN — HYDRALAZINE HYDROCHLORIDE 10 MG: 20 INJECTION INTRAMUSCULAR; INTRAVENOUS at 11:06

## 2021-06-15 RX ADMIN — APIXABAN 5 MG: 5 TABLET, FILM COATED ORAL at 09:06

## 2021-06-15 RX ADMIN — NICOTINE 1 PATCH: 21 PATCH, EXTENDED RELEASE TRANSDERMAL at 09:06

## 2021-06-15 RX ADMIN — HYDRALAZINE HYDROCHLORIDE 25 MG: 25 TABLET, FILM COATED ORAL at 05:06

## 2021-06-15 RX ADMIN — MUPIROCIN: 20 OINTMENT TOPICAL at 09:06

## 2021-06-15 RX ADMIN — DEXAMETHASONE 6 MG: 4 TABLET ORAL at 09:06

## 2021-06-15 RX ADMIN — AMIODARONE HYDROCHLORIDE 200 MG: 200 TABLET ORAL at 09:06

## 2021-06-15 RX ADMIN — Medication 10 ML: at 05:06

## 2021-06-16 ENCOUNTER — NURSE TRIAGE (OUTPATIENT)
Dept: ADMINISTRATIVE | Facility: CLINIC | Age: 61
End: 2021-06-16

## 2021-06-16 ENCOUNTER — PATIENT OUTREACH (OUTPATIENT)
Dept: ADMINISTRATIVE | Facility: CLINIC | Age: 61
End: 2021-06-16

## 2021-06-17 LAB — S PNEUM AG UR QL: NOT DETECTED

## 2021-06-30 ENCOUNTER — HOSPITAL ENCOUNTER (INPATIENT)
Facility: HOSPITAL | Age: 61
LOS: 5 days | Discharge: HOME | DRG: 871 | End: 2021-07-05
Attending: EMERGENCY MEDICINE | Admitting: HOSPITALIST
Payer: MEDICAID

## 2021-06-30 DIAGNOSIS — I48.91 A-FIB: ICD-10-CM

## 2021-06-30 DIAGNOSIS — R53.83 FATIGUE: ICD-10-CM

## 2021-06-30 DIAGNOSIS — R00.0 TACHYCARDIA: ICD-10-CM

## 2021-06-30 DIAGNOSIS — A41.9 SEPSIS, DUE TO UNSPECIFIED ORGANISM, UNSPECIFIED WHETHER ACUTE ORGAN DYSFUNCTION PRESENT: Primary | ICD-10-CM

## 2021-06-30 DIAGNOSIS — I48.92 ATRIAL FLUTTER: ICD-10-CM

## 2021-06-30 DIAGNOSIS — E13.10 DIABETIC KETOACIDOSIS WITHOUT COMA ASSOCIATED WITH OTHER SPECIFIED DIABETES MELLITUS: ICD-10-CM

## 2021-06-30 DIAGNOSIS — R10.11 RIGHT UPPER QUADRANT PAIN: ICD-10-CM

## 2021-06-30 PROBLEM — E11.10 DKA (DIABETIC KETOACIDOSES): Status: ACTIVE | Noted: 2021-06-30

## 2021-06-30 LAB
ALBUMIN SERPL BCP-MCNC: 2.9 G/DL (ref 3.5–5.2)
ALLENS TEST: ABNORMAL
ALP SERPL-CCNC: 140 U/L (ref 55–135)
ALT SERPL W/O P-5'-P-CCNC: 24 U/L (ref 10–44)
ANION GAP SERPL CALC-SCNC: 14 MMOL/L (ref 8–16)
ANION GAP SERPL CALC-SCNC: 14 MMOL/L (ref 8–16)
AST SERPL-CCNC: 16 U/L (ref 10–40)
B-OH-BUTYR BLD STRIP-SCNC: 2.1 MMOL/L (ref 0–0.5)
BACTERIA #/AREA URNS HPF: NORMAL /HPF
BASOPHILS # BLD AUTO: 0.05 K/UL (ref 0–0.2)
BASOPHILS NFR BLD: 0.4 % (ref 0–1.9)
BILIRUB SERPL-MCNC: 0.9 MG/DL (ref 0.1–1)
BILIRUB UR QL STRIP: NEGATIVE
BUN SERPL-MCNC: 22 MG/DL (ref 8–23)
BUN SERPL-MCNC: 25 MG/DL (ref 8–23)
CALCIUM SERPL-MCNC: 9.6 MG/DL (ref 8.7–10.5)
CALCIUM SERPL-MCNC: 9.9 MG/DL (ref 8.7–10.5)
CHLORIDE SERPL-SCNC: 107 MMOL/L (ref 95–110)
CHLORIDE SERPL-SCNC: 98 MMOL/L (ref 95–110)
CLARITY UR: CLEAR
CO2 SERPL-SCNC: 21 MMOL/L (ref 23–29)
CO2 SERPL-SCNC: 21 MMOL/L (ref 23–29)
COLOR UR: YELLOW
CREAT SERPL-MCNC: 1.8 MG/DL (ref 0.5–1.4)
CREAT SERPL-MCNC: 2 MG/DL (ref 0.5–1.4)
CTP QC/QA: YES
CTP QC/QA: YES
DIFFERENTIAL METHOD: ABNORMAL
EOSINOPHIL # BLD AUTO: 0 K/UL (ref 0–0.5)
EOSINOPHIL NFR BLD: 0.3 % (ref 0–8)
ERYTHROCYTE [DISTWIDTH] IN BLOOD BY AUTOMATED COUNT: 13.2 % (ref 11.5–14.5)
EST. GFR  (AFRICAN AMERICAN): 40 ML/MIN/1.73 M^2
EST. GFR  (AFRICAN AMERICAN): 45 ML/MIN/1.73 M^2
EST. GFR  (NON AFRICAN AMERICAN): 34 ML/MIN/1.73 M^2
EST. GFR  (NON AFRICAN AMERICAN): 39 ML/MIN/1.73 M^2
GLUCOSE SERPL-MCNC: 123 MG/DL (ref 70–110)
GLUCOSE SERPL-MCNC: 171 MG/DL (ref 70–110)
GLUCOSE SERPL-MCNC: 172 MG/DL (ref 70–110)
GLUCOSE SERPL-MCNC: 227 MG/DL (ref 70–110)
GLUCOSE SERPL-MCNC: 458 MG/DL (ref 70–110)
GLUCOSE SERPL-MCNC: 773 MG/DL (ref 70–110)
GLUCOSE UR QL STRIP: ABNORMAL
HCO3 UR-SCNC: 29.8 MMOL/L (ref 24–28)
HCT VFR BLD AUTO: 46.6 % (ref 40–54)
HGB BLD-MCNC: 15.8 G/DL (ref 14–18)
HGB UR QL STRIP: ABNORMAL
IMM GRANULOCYTES # BLD AUTO: 0.15 K/UL (ref 0–0.04)
IMM GRANULOCYTES NFR BLD AUTO: 1.2 % (ref 0–0.5)
INR PPP: 1.2 (ref 0.8–1.2)
KETONES UR QL STRIP: ABNORMAL
LACTATE SERPL-SCNC: 5.1 MMOL/L (ref 0.5–2.2)
LEUKOCYTE ESTERASE UR QL STRIP: NEGATIVE
LYMPHOCYTES # BLD AUTO: 1.4 K/UL (ref 1–4.8)
LYMPHOCYTES NFR BLD: 11.6 % (ref 18–48)
MCH RBC QN AUTO: 32.5 PG (ref 27–31)
MCHC RBC AUTO-ENTMCNC: 33.9 G/DL (ref 32–36)
MCV RBC AUTO: 96 FL (ref 82–98)
MICROSCOPIC COMMENT: NORMAL
MONOCYTES # BLD AUTO: 0.9 K/UL (ref 0.3–1)
MONOCYTES NFR BLD: 7.3 % (ref 4–15)
NEUTROPHILS # BLD AUTO: 9.6 K/UL (ref 1.8–7.7)
NEUTROPHILS NFR BLD: 79.2 % (ref 38–73)
NITRITE UR QL STRIP: NEGATIVE
NON-SQ EPI CELLS #/AREA URNS HPF: 0 /HPF
NRBC BLD-RTO: 0 /100 WBC
PCO2 BLDA: 58.4 MMHG (ref 35–45)
PH SMN: 7.32 [PH] (ref 7.35–7.45)
PH UR STRIP: 6 [PH] (ref 5–8)
PLATELET # BLD AUTO: 178 K/UL (ref 150–450)
PMV BLD AUTO: 11.9 FL (ref 9.2–12.9)
PO2 BLDA: 16 MMHG (ref 40–60)
POC BE: 2 MMOL/L
POC MOLECULAR INFLUENZA A AGN: NEGATIVE
POC MOLECULAR INFLUENZA B AGN: NEGATIVE
POC SATURATED O2: 18 % (ref 95–100)
POC TCO2: 32 MMOL/L (ref 24–29)
POCT GLUCOSE: 123 MG/DL (ref 70–110)
POCT GLUCOSE: 154 MG/DL (ref 70–110)
POCT GLUCOSE: 172 MG/DL (ref 70–110)
POCT GLUCOSE: 227 MG/DL (ref 70–110)
POCT GLUCOSE: 451 MG/DL (ref 70–110)
POCT GLUCOSE: 458 MG/DL (ref 70–110)
POCT GLUCOSE: >500 MG/DL (ref 70–110)
POTASSIUM SERPL-SCNC: 3.6 MMOL/L (ref 3.5–5.1)
POTASSIUM SERPL-SCNC: 5 MMOL/L (ref 3.5–5.1)
PROCALCITONIN SERPL IA-MCNC: 13.6 NG/ML
PROT SERPL-MCNC: 7.7 G/DL (ref 6–8.4)
PROT UR QL STRIP: ABNORMAL
PROTHROMBIN TIME: 12.8 SEC (ref 9–12.5)
RBC # BLD AUTO: 4.86 M/UL (ref 4.6–6.2)
RBC #/AREA URNS HPF: 4 /HPF (ref 0–4)
SAMPLE: ABNORMAL
SARS-COV-2 RDRP RESP QL NAA+PROBE: NEGATIVE
SITE: ABNORMAL
SODIUM SERPL-SCNC: 133 MMOL/L (ref 136–145)
SODIUM SERPL-SCNC: 142 MMOL/L (ref 136–145)
SP GR UR STRIP: >1.03 (ref 1–1.03)
SQUAMOUS #/AREA URNS HPF: 2 /HPF
TROPONIN I SERPL DL<=0.01 NG/ML-MCNC: 0.08 NG/ML (ref 0–0.03)
TROPONIN I SERPL DL<=0.01 NG/ML-MCNC: 0.33 NG/ML (ref 0–0.03)
URN SPEC COLLECT METH UR: ABNORMAL
UROBILINOGEN UR STRIP-ACNC: NEGATIVE EU/DL
WBC # BLD AUTO: 12.18 K/UL (ref 3.9–12.7)
YEAST URNS QL MICRO: NORMAL

## 2021-06-30 PROCEDURE — 12000002 HC ACUTE/MED SURGE SEMI-PRIVATE ROOM

## 2021-06-30 PROCEDURE — 83605 ASSAY OF LACTIC ACID: CPT | Performed by: INTERNAL MEDICINE

## 2021-06-30 PROCEDURE — 83036 HEMOGLOBIN GLYCOSYLATED A1C: CPT | Performed by: HOSPITALIST

## 2021-06-30 PROCEDURE — 84484 ASSAY OF TROPONIN QUANT: CPT | Mod: 91 | Performed by: INTERNAL MEDICINE

## 2021-06-30 PROCEDURE — 96365 THER/PROPH/DIAG IV INF INIT: CPT | Mod: 59

## 2021-06-30 PROCEDURE — 85610 PROTHROMBIN TIME: CPT | Performed by: INTERNAL MEDICINE

## 2021-06-30 PROCEDURE — 36569 INSJ PICC 5 YR+ W/O IMAGING: CPT

## 2021-06-30 PROCEDURE — 99900035 HC TECH TIME PER 15 MIN (STAT)

## 2021-06-30 PROCEDURE — 80048 BASIC METABOLIC PNL TOTAL CA: CPT | Performed by: HOSPITALIST

## 2021-06-30 PROCEDURE — 25000003 PHARM REV CODE 250: Performed by: HOSPITALIST

## 2021-06-30 PROCEDURE — 81000 URINALYSIS NONAUTO W/SCOPE: CPT | Performed by: PHYSICIAN ASSISTANT

## 2021-06-30 PROCEDURE — 25000003 PHARM REV CODE 250: Performed by: INTERNAL MEDICINE

## 2021-06-30 PROCEDURE — 80053 COMPREHEN METABOLIC PANEL: CPT | Performed by: PHYSICIAN ASSISTANT

## 2021-06-30 PROCEDURE — 85025 COMPLETE CBC W/AUTO DIFF WBC: CPT | Performed by: PHYSICIAN ASSISTANT

## 2021-06-30 PROCEDURE — 96366 THER/PROPH/DIAG IV INF ADDON: CPT | Mod: 59

## 2021-06-30 PROCEDURE — 84484 ASSAY OF TROPONIN QUANT: CPT | Performed by: PHYSICIAN ASSISTANT

## 2021-06-30 PROCEDURE — 93010 ELECTROCARDIOGRAM REPORT: CPT | Mod: ,,, | Performed by: INTERNAL MEDICINE

## 2021-06-30 PROCEDURE — 25000003 PHARM REV CODE 250: Performed by: PHYSICIAN ASSISTANT

## 2021-06-30 PROCEDURE — 93010 EKG 12-LEAD: ICD-10-PCS | Mod: ,,, | Performed by: INTERNAL MEDICINE

## 2021-06-30 PROCEDURE — 82010 KETONE BODYS QUAN: CPT | Performed by: PHYSICIAN ASSISTANT

## 2021-06-30 PROCEDURE — C1751 CATH, INF, PER/CENT/MIDLINE: HCPCS

## 2021-06-30 PROCEDURE — 82272 OCCULT BLD FECES 1-3 TESTS: CPT

## 2021-06-30 PROCEDURE — 82803 BLOOD GASES ANY COMBINATION: CPT

## 2021-06-30 PROCEDURE — 82962 GLUCOSE BLOOD TEST: CPT

## 2021-06-30 PROCEDURE — 93005 ELECTROCARDIOGRAM TRACING: CPT

## 2021-06-30 PROCEDURE — 63600175 PHARM REV CODE 636 W HCPCS: Performed by: INTERNAL MEDICINE

## 2021-06-30 PROCEDURE — U0002 COVID-19 LAB TEST NON-CDC: HCPCS | Performed by: PHYSICIAN ASSISTANT

## 2021-06-30 PROCEDURE — S5010 5% DEXTROSE AND 0.45% SALINE: HCPCS | Performed by: HOSPITALIST

## 2021-06-30 PROCEDURE — 96375 TX/PRO/DX INJ NEW DRUG ADDON: CPT

## 2021-06-30 PROCEDURE — 63600175 PHARM REV CODE 636 W HCPCS: Performed by: PHYSICIAN ASSISTANT

## 2021-06-30 PROCEDURE — 20000000 HC ICU ROOM

## 2021-06-30 PROCEDURE — 87502 INFLUENZA DNA AMP PROBE: CPT

## 2021-06-30 PROCEDURE — 84145 PROCALCITONIN (PCT): CPT | Performed by: INTERNAL MEDICINE

## 2021-06-30 PROCEDURE — 99285 EMERGENCY DEPT VISIT HI MDM: CPT | Mod: 25

## 2021-06-30 PROCEDURE — 87040 BLOOD CULTURE FOR BACTERIA: CPT | Performed by: INTERNAL MEDICINE

## 2021-06-30 PROCEDURE — 63600175 PHARM REV CODE 636 W HCPCS: Performed by: HOSPITALIST

## 2021-06-30 RX ORDER — DILTIAZEM HYDROCHLORIDE 5 MG/ML
10 INJECTION INTRAVENOUS ONCE
Status: COMPLETED | OUTPATIENT
Start: 2021-06-30 | End: 2021-06-30

## 2021-06-30 RX ORDER — DEXTROSE MONOHYDRATE AND SODIUM CHLORIDE 5; .45 G/100ML; G/100ML
125 INJECTION, SOLUTION INTRAVENOUS CONTINUOUS PRN
Status: DISCONTINUED | OUTPATIENT
Start: 2021-06-30 | End: 2021-07-01

## 2021-06-30 RX ORDER — ACETAMINOPHEN 325 MG/1
650 TABLET ORAL EVERY 4 HOURS PRN
Status: DISCONTINUED | OUTPATIENT
Start: 2021-06-30 | End: 2021-07-05 | Stop reason: HOSPADM

## 2021-06-30 RX ORDER — ACETAMINOPHEN 500 MG
500 TABLET ORAL
Status: COMPLETED | OUTPATIENT
Start: 2021-06-30 | End: 2021-06-30

## 2021-06-30 RX ORDER — METOPROLOL SUCCINATE 50 MG/1
50 TABLET, EXTENDED RELEASE ORAL DAILY
Status: DISCONTINUED | OUTPATIENT
Start: 2021-07-01 | End: 2021-07-02

## 2021-06-30 RX ORDER — HEPARIN SODIUM 5000 [USP'U]/ML
5000 INJECTION, SOLUTION INTRAVENOUS; SUBCUTANEOUS
Status: DISCONTINUED | OUTPATIENT
Start: 2021-06-30 | End: 2021-06-30

## 2021-06-30 RX ORDER — SODIUM CHLORIDE 0.9 % (FLUSH) 0.9 %
10 SYRINGE (ML) INJECTION
Status: DISCONTINUED | OUTPATIENT
Start: 2021-06-30 | End: 2021-07-05 | Stop reason: HOSPADM

## 2021-06-30 RX ORDER — ONDANSETRON 2 MG/ML
4 INJECTION INTRAMUSCULAR; INTRAVENOUS EVERY 6 HOURS PRN
Status: DISCONTINUED | OUTPATIENT
Start: 2021-06-30 | End: 2021-07-05 | Stop reason: HOSPADM

## 2021-06-30 RX ORDER — NAPROXEN SODIUM 220 MG/1
81 TABLET, FILM COATED ORAL DAILY
Status: DISCONTINUED | OUTPATIENT
Start: 2021-07-01 | End: 2021-07-05 | Stop reason: HOSPADM

## 2021-06-30 RX ORDER — LORAZEPAM 2 MG/ML
1 INJECTION INTRAMUSCULAR
Status: COMPLETED | OUTPATIENT
Start: 2021-06-30 | End: 2021-06-30

## 2021-06-30 RX ORDER — ACETAMINOPHEN 500 MG
1000 TABLET ORAL
Status: COMPLETED | OUTPATIENT
Start: 2021-06-30 | End: 2021-06-30

## 2021-06-30 RX ORDER — SODIUM CHLORIDE 9 MG/ML
125 INJECTION, SOLUTION INTRAVENOUS CONTINUOUS
Status: DISCONTINUED | OUTPATIENT
Start: 2021-06-30 | End: 2021-07-01

## 2021-06-30 RX ORDER — AMOXICILLIN 250 MG
1 CAPSULE ORAL 2 TIMES DAILY
Status: DISCONTINUED | OUTPATIENT
Start: 2021-06-30 | End: 2021-07-02

## 2021-06-30 RX ORDER — TALC
6 POWDER (GRAM) TOPICAL NIGHTLY PRN
Status: DISCONTINUED | OUTPATIENT
Start: 2021-06-30 | End: 2021-07-05 | Stop reason: HOSPADM

## 2021-06-30 RX ADMIN — ACETAMINOPHEN 1000 MG: 500 TABLET, FILM COATED ORAL at 06:06

## 2021-06-30 RX ADMIN — AMIODARONE HYDROCHLORIDE 1 MG/MIN: 1.8 INJECTION, SOLUTION INTRAVENOUS at 11:06

## 2021-06-30 RX ADMIN — VANCOMYCIN HYDROCHLORIDE 1500 MG: 1.5 INJECTION, POWDER, LYOPHILIZED, FOR SOLUTION INTRAVENOUS at 08:06

## 2021-06-30 RX ADMIN — AMIODARONE HYDROCHLORIDE 150 MG: 1.5 INJECTION, SOLUTION INTRAVENOUS at 11:06

## 2021-06-30 RX ADMIN — INSULIN HUMAN 10 UNITS: 100 INJECTION, SOLUTION PARENTERAL at 04:06

## 2021-06-30 RX ADMIN — DEXTROSE AND SODIUM CHLORIDE 125 ML/HR: 5; .45 INJECTION, SOLUTION INTRAVENOUS at 09:06

## 2021-06-30 RX ADMIN — LORAZEPAM 1 MG: 2 INJECTION INTRAMUSCULAR; INTRAVENOUS at 06:06

## 2021-06-30 RX ADMIN — SODIUM CHLORIDE 125 ML/HR: 0.9 INJECTION, SOLUTION INTRAVENOUS at 06:06

## 2021-06-30 RX ADMIN — SODIUM CHLORIDE 7 UNITS/HR: 9 INJECTION, SOLUTION INTRAVENOUS at 06:06

## 2021-06-30 RX ADMIN — DOCUSATE SODIUM 50 MG AND SENNOSIDES 8.6 MG 1 TABLET: 8.6; 5 TABLET, FILM COATED ORAL at 11:06

## 2021-06-30 RX ADMIN — DILTIAZEM HYDROCHLORIDE 10 MG: 5 INJECTION INTRAVENOUS at 06:06

## 2021-06-30 RX ADMIN — ACETAMINOPHEN 500 MG: 500 TABLET, FILM COATED ORAL at 08:06

## 2021-06-30 RX ADMIN — CEFTRIAXONE 2 G: 2 INJECTION, SOLUTION INTRAVENOUS at 04:06

## 2021-06-30 RX ADMIN — HEPARIN SODIUM 5000 UNITS: 5000 INJECTION INTRAVENOUS; SUBCUTANEOUS at 06:06

## 2021-06-30 RX ADMIN — APIXABAN 5 MG: 5 TABLET, FILM COATED ORAL at 11:06

## 2021-06-30 RX ADMIN — SODIUM CHLORIDE 1000 ML: 9 INJECTION, SOLUTION INTRAVENOUS at 04:06

## 2021-07-01 ENCOUNTER — ANESTHESIA EVENT (OUTPATIENT)
Dept: CARDIOLOGY | Facility: HOSPITAL | Age: 61
DRG: 871 | End: 2021-07-01
Payer: MEDICAID

## 2021-07-01 ENCOUNTER — ANESTHESIA (OUTPATIENT)
Dept: CARDIOLOGY | Facility: HOSPITAL | Age: 61
DRG: 871 | End: 2021-07-01
Payer: MEDICAID

## 2021-07-01 PROBLEM — A41.9 SEVERE SEPSIS: Status: ACTIVE | Noted: 2021-07-01

## 2021-07-01 PROBLEM — N17.9 ARF (ACUTE RENAL FAILURE): Status: RESOLVED | Noted: 2019-09-10 | Resolved: 2021-07-01

## 2021-07-01 PROBLEM — I15.0 RENOVASCULAR HYPERTENSION: Status: ACTIVE | Noted: 2021-06-09

## 2021-07-01 PROBLEM — R06.03 SIGNS AND SYMPTOMS OF SEVERE RESPIRATORY DISTRESS: Status: RESOLVED | Noted: 2021-06-11 | Resolved: 2021-07-01

## 2021-07-01 PROBLEM — B17.9 ACUTE HEPATITIS: Status: RESOLVED | Noted: 2020-01-21 | Resolved: 2021-07-01

## 2021-07-01 PROBLEM — R53.83 FATIGUE: Status: ACTIVE | Noted: 2021-07-01

## 2021-07-01 PROBLEM — R65.10 SIRS (SYSTEMIC INFLAMMATORY RESPONSE SYNDROME): Status: ACTIVE | Noted: 2021-07-01

## 2021-07-01 PROBLEM — R00.0 TACHYCARDIA: Status: RESOLVED | Noted: 2021-07-01 | Resolved: 2021-07-01

## 2021-07-01 PROBLEM — R65.20 SEVERE SEPSIS: Status: ACTIVE | Noted: 2021-07-01

## 2021-07-01 PROBLEM — I21.4 NSTEMI (NON-ST ELEVATION MYOCARDIAL INFARCTION): Status: ACTIVE | Noted: 2019-09-10

## 2021-07-01 PROBLEM — D72.829 LEUKOCYTOSIS: Status: RESOLVED | Noted: 2021-06-12 | Resolved: 2021-07-01

## 2021-07-01 PROBLEM — I48.92 ATRIAL FLUTTER WITH RAPID VENTRICULAR RESPONSE: Status: RESOLVED | Noted: 2021-06-09 | Resolved: 2021-07-01

## 2021-07-01 PROBLEM — R53.83 FATIGUE: Status: RESOLVED | Noted: 2021-07-01 | Resolved: 2021-07-01

## 2021-07-01 PROBLEM — R00.0 TACHYCARDIA: Status: ACTIVE | Noted: 2021-07-01

## 2021-07-01 PROBLEM — R79.89 ELEVATED LFTS: Status: RESOLVED | Noted: 2021-06-12 | Resolved: 2021-07-01

## 2021-07-01 PROBLEM — I21.4 NSTEMI (NON-ST ELEVATED MYOCARDIAL INFARCTION): Status: RESOLVED | Noted: 2019-09-15 | Resolved: 2021-07-01

## 2021-07-01 PROBLEM — A41.9 SEPSIS: Status: RESOLVED | Noted: 2021-06-30 | Resolved: 2021-07-01

## 2021-07-01 PROBLEM — I50.32 CHRONIC DIASTOLIC HEART FAILURE: Status: ACTIVE | Noted: 2021-06-09

## 2021-07-01 LAB
ALBUMIN SERPL BCP-MCNC: 2.2 G/DL (ref 3.5–5.2)
ALLENS TEST: ABNORMAL
ALP SERPL-CCNC: 99 U/L (ref 55–135)
ALT SERPL W/O P-5'-P-CCNC: 28 U/L (ref 10–44)
ANION GAP SERPL CALC-SCNC: 8 MMOL/L (ref 8–16)
ANION GAP SERPL CALC-SCNC: 9 MMOL/L (ref 8–16)
ANISOCYTOSIS BLD QL SMEAR: SLIGHT
ASCENDING AORTA: 2.85 CM
AST SERPL-CCNC: 45 U/L (ref 10–40)
AV INDEX (PROSTH): 0.88
AV MEAN GRADIENT: 3 MMHG
AV PEAK GRADIENT: 6 MMHG
AV VALVE AREA: 2.83 CM2
AV VELOCITY RATIO: 0.81
B-OH-BUTYR BLD STRIP-SCNC: 0.1 MMOL/L (ref 0–0.5)
BASOPHILS # BLD AUTO: ABNORMAL K/UL (ref 0–0.2)
BASOPHILS NFR BLD: 0 % (ref 0–1.9)
BILIRUB DIRECT SERPL-MCNC: 0.3 MG/DL (ref 0.1–0.3)
BILIRUB SERPL-MCNC: 0.3 MG/DL (ref 0.1–1)
BNP SERPL-MCNC: 481 PG/ML (ref 0–99)
BSA FOR ECHO PROCEDURE: 1.89 M2
BSA FOR ECHO PROCEDURE: 1.89 M2
BUN SERPL-MCNC: 26 MG/DL (ref 8–23)
BUN SERPL-MCNC: 28 MG/DL (ref 8–23)
C DIFF GDH STL QL: NEGATIVE
C DIFF TOX A+B STL QL IA: NEGATIVE
CALCIUM SERPL-MCNC: 8.9 MG/DL (ref 8.7–10.5)
CALCIUM SERPL-MCNC: 9 MG/DL (ref 8.7–10.5)
CHLORIDE SERPL-SCNC: 106 MMOL/L (ref 95–110)
CHLORIDE SERPL-SCNC: 107 MMOL/L (ref 95–110)
CO2 SERPL-SCNC: 21 MMOL/L (ref 23–29)
CO2 SERPL-SCNC: 22 MMOL/L (ref 23–29)
CREAT SERPL-MCNC: 2.1 MG/DL (ref 0.5–1.4)
CREAT SERPL-MCNC: 2.2 MG/DL (ref 0.5–1.4)
CREAT UR-MCNC: 350.5 MG/DL (ref 23–375)
CV ECHO LV RWT: 0.74 CM
DACRYOCYTES BLD QL SMEAR: ABNORMAL
DELSYS: ABNORMAL
DIFFERENTIAL METHOD: ABNORMAL
DOP CALC AO PEAK VEL: 1.18 M/S
DOP CALC AO VTI: 14.13 CM
DOP CALC LVOT AREA: 3.2 CM2
DOP CALC LVOT DIAMETER: 2.03 CM
DOP CALC LVOT PEAK VEL: 0.95 M/S
DOP CALC LVOT STROKE VOLUME: 40.02 CM3
DOP CALCLVOT PEAK VEL VTI: 12.37 CM
E WAVE DECELERATION TIME: 145.52 MSEC
E/A RATIO: 2.18
E/E' RATIO: 8.71 M/S
ECHO LV POSTERIOR WALL: 1.4 CM (ref 0.6–1.1)
EJECTION FRACTION: 50 %
EJECTION FRACTION: 55 %
EOSINOPHIL # BLD AUTO: ABNORMAL K/UL (ref 0–0.5)
EOSINOPHIL NFR BLD: 0 % (ref 0–8)
ERYTHROCYTE [DISTWIDTH] IN BLOOD BY AUTOMATED COUNT: 13.2 % (ref 11.5–14.5)
EST. GFR  (AFRICAN AMERICAN): 36 ML/MIN/1.73 M^2
EST. GFR  (AFRICAN AMERICAN): 38 ML/MIN/1.73 M^2
EST. GFR  (NON AFRICAN AMERICAN): 31 ML/MIN/1.73 M^2
EST. GFR  (NON AFRICAN AMERICAN): 33 ML/MIN/1.73 M^2
ESTIMATED AVG GLUCOSE: 255 MG/DL (ref 68–131)
FLOW: 3
FRACTIONAL SHORTENING: 24 % (ref 28–44)
GLUCOSE SERPL-MCNC: 216 MG/DL (ref 70–110)
GLUCOSE SERPL-MCNC: 290 MG/DL (ref 70–110)
HBA1C MFR BLD: 10.5 % (ref 4–5.6)
HCO3 UR-SCNC: 25 MMOL/L (ref 24–28)
HCT VFR BLD AUTO: 40 % (ref 40–54)
HGB BLD-MCNC: 13.7 G/DL (ref 14–18)
IMM GRANULOCYTES # BLD AUTO: ABNORMAL K/UL (ref 0–0.04)
IMM GRANULOCYTES NFR BLD AUTO: ABNORMAL % (ref 0–0.5)
INTERVENTRICULAR SEPTUM: 1.48 CM (ref 0.6–1.1)
IVRT: 102.76 MSEC
LA MAJOR: 6.51 CM
LA MINOR: 5.82 CM
LA WIDTH: 5.52 CM
LACTATE SERPL-SCNC: 2.1 MMOL/L (ref 0.5–2.2)
LACTATE SERPL-SCNC: 2.3 MMOL/L (ref 0.5–2.2)
LEFT ATRIUM SIZE: 3.18 CM
LEFT ATRIUM VOLUME INDEX: 49 ML/M2
LEFT ATRIUM VOLUME: 91.7 CM3
LEFT INTERNAL DIMENSION IN SYSTOLE: 2.88 CM (ref 2.1–4)
LEFT VENTRICLE DIASTOLIC VOLUME INDEX: 32.46 ML/M2
LEFT VENTRICLE DIASTOLIC VOLUME: 60.7 ML
LEFT VENTRICLE MASS INDEX: 107 G/M2
LEFT VENTRICLE SYSTOLIC VOLUME INDEX: 16.9 ML/M2
LEFT VENTRICLE SYSTOLIC VOLUME: 31.66 ML
LEFT VENTRICULAR INTERNAL DIMENSION IN DIASTOLE: 3.77 CM (ref 3.5–6)
LEFT VENTRICULAR MASS: 200.69 G
LV LATERAL E/E' RATIO: 8.71 M/S
LV SEPTAL E/E' RATIO: 8.71 M/S
LYMPHOCYTES # BLD AUTO: ABNORMAL K/UL (ref 1–4.8)
LYMPHOCYTES NFR BLD: 17 % (ref 18–48)
MAGNESIUM SERPL-MCNC: 1.9 MG/DL (ref 1.6–2.6)
MAGNESIUM SERPL-MCNC: 1.9 MG/DL (ref 1.6–2.6)
MCH RBC QN AUTO: 32.6 PG (ref 27–31)
MCHC RBC AUTO-ENTMCNC: 34.3 G/DL (ref 32–36)
MCV RBC AUTO: 95 FL (ref 82–98)
MODE: ABNORMAL
MONOCYTES # BLD AUTO: ABNORMAL K/UL (ref 0.3–1)
MONOCYTES NFR BLD: 3 % (ref 4–15)
MV PEAK A VEL: 0.28 M/S
MV PEAK E VEL: 0.61 M/S
MV STENOSIS PRESSURE HALF TIME: 49.23 MS
MV VALVE AREA P 1/2 METHOD: 4.47 CM2
NEUTROPHILS NFR BLD: 75 % (ref 38–73)
NEUTS BAND NFR BLD MANUAL: 5 %
NRBC BLD-RTO: 0 /100 WBC
PCO2 BLDA: 44.5 MMHG (ref 35–45)
PH SMN: 7.36 [PH] (ref 7.35–7.45)
PHOSPHATE SERPL-MCNC: 2.3 MG/DL (ref 2.7–4.5)
PISA TR MAX VEL: 3.34 M/S
PLATELET # BLD AUTO: 193 K/UL (ref 150–450)
PLATELET BLD QL SMEAR: ABNORMAL
PMV BLD AUTO: 12.1 FL (ref 9.2–12.9)
PO2 BLDA: 25 MMHG (ref 40–60)
POC BE: -1 MMOL/L
POC SATURATED O2: 43 % (ref 95–100)
POC TCO2: 26 MMOL/L (ref 24–29)
POCT GLUCOSE: 196 MG/DL (ref 70–110)
POCT GLUCOSE: 212 MG/DL (ref 70–110)
POCT GLUCOSE: 235 MG/DL (ref 70–110)
POCT GLUCOSE: 297 MG/DL (ref 70–110)
POCT GLUCOSE: 318 MG/DL (ref 70–110)
POCT GLUCOSE: 354 MG/DL (ref 70–110)
POTASSIUM SERPL-SCNC: 3.8 MMOL/L (ref 3.5–5.1)
POTASSIUM SERPL-SCNC: 4.5 MMOL/L (ref 3.5–5.1)
PROT SERPL-MCNC: 6 G/DL (ref 6–8.4)
PV PEAK VELOCITY: 1.17 CM/S
RA MAJOR: 5.9 CM
RA PRESSURE: 3 MMHG
RA WIDTH: 4.81 CM
RBC # BLD AUTO: 4.2 M/UL (ref 4.6–6.2)
RIGHT VENTRICULAR END-DIASTOLIC DIMENSION: 4.12 CM
RV TISSUE DOPPLER FREE WALL SYSTOLIC VELOCITY 1 (APICAL 4 CHAMBER VIEW): 8.58 CM/S
SAMPLE: ABNORMAL
SINUS: 3 CM
SITE: ABNORMAL
SODIUM SERPL-SCNC: 136 MMOL/L (ref 136–145)
SODIUM SERPL-SCNC: 137 MMOL/L (ref 136–145)
SODIUM UR-SCNC: 33 MMOL/L (ref 20–250)
SP02: 95
STJ: 2.61 CM
TDI LATERAL: 0.07 M/S
TDI SEPTAL: 0.07 M/S
TDI: 0.07 M/S
TR MAX PG: 45 MMHG
TRICUSPID ANNULAR PLANE SYSTOLIC EXCURSION: 1.24 CM
TROPONIN I SERPL DL<=0.01 NG/ML-MCNC: 0.35 NG/ML (ref 0–0.03)
TV REST PULMONARY ARTERY PRESSURE: 48 MMHG
WBC # BLD AUTO: 9.17 K/UL (ref 3.9–12.7)

## 2021-07-01 PROCEDURE — 99291 CRITICAL CARE FIRST HOUR: CPT | Mod: 25,,, | Performed by: INTERNAL MEDICINE

## 2021-07-01 PROCEDURE — C9399 UNCLASSIFIED DRUGS OR BIOLOG: HCPCS | Performed by: INTERNAL MEDICINE

## 2021-07-01 PROCEDURE — 87324 CLOSTRIDIUM AG IA: CPT | Performed by: INTERNAL MEDICINE

## 2021-07-01 PROCEDURE — 84300 ASSAY OF URINE SODIUM: CPT | Performed by: HOSPITALIST

## 2021-07-01 PROCEDURE — 25000003 PHARM REV CODE 250: Performed by: HOSPITALIST

## 2021-07-01 PROCEDURE — 87070 CULTURE OTHR SPECIMN AEROBIC: CPT | Performed by: HOSPITALIST

## 2021-07-01 PROCEDURE — 27000221 HC OXYGEN, UP TO 24 HOURS

## 2021-07-01 PROCEDURE — 84100 ASSAY OF PHOSPHORUS: CPT | Performed by: HOSPITALIST

## 2021-07-01 PROCEDURE — D9220A PRA ANESTHESIA: ICD-10-PCS | Mod: CRNA,,, | Performed by: NURSE ANESTHETIST, CERTIFIED REGISTERED

## 2021-07-01 PROCEDURE — 25000003 PHARM REV CODE 250: Performed by: INTERNAL MEDICINE

## 2021-07-01 PROCEDURE — D9220A PRA ANESTHESIA: Mod: CRNA,,, | Performed by: NURSE ANESTHETIST, CERTIFIED REGISTERED

## 2021-07-01 PROCEDURE — 63600175 PHARM REV CODE 636 W HCPCS: Performed by: HOSPITALIST

## 2021-07-01 PROCEDURE — 92960 PR CARDIOVERSION, ELECTIVE;EXTERN: ICD-10-PCS | Mod: ,,, | Performed by: INTERNAL MEDICINE

## 2021-07-01 PROCEDURE — 83605 ASSAY OF LACTIC ACID: CPT | Performed by: INTERNAL MEDICINE

## 2021-07-01 PROCEDURE — 92960 CARDIOVERSION ELECTRIC EXT: CPT | Mod: ,,, | Performed by: INTERNAL MEDICINE

## 2021-07-01 PROCEDURE — 82803 BLOOD GASES ANY COMBINATION: CPT

## 2021-07-01 PROCEDURE — 80048 BASIC METABOLIC PNL TOTAL CA: CPT | Performed by: HOSPITALIST

## 2021-07-01 PROCEDURE — 83605 ASSAY OF LACTIC ACID: CPT | Mod: 91 | Performed by: INTERNAL MEDICINE

## 2021-07-01 PROCEDURE — 94761 N-INVAS EAR/PLS OXIMETRY MLT: CPT

## 2021-07-01 PROCEDURE — 25000003 PHARM REV CODE 250: Performed by: NURSE ANESTHETIST, CERTIFIED REGISTERED

## 2021-07-01 PROCEDURE — 87205 SMEAR GRAM STAIN: CPT | Performed by: HOSPITALIST

## 2021-07-01 PROCEDURE — 85007 BL SMEAR W/DIFF WBC COUNT: CPT | Performed by: HOSPITALIST

## 2021-07-01 PROCEDURE — 84484 ASSAY OF TROPONIN QUANT: CPT | Performed by: INTERNAL MEDICINE

## 2021-07-01 PROCEDURE — 92960 CARDIOVERSION ELECTRIC EXT: CPT | Performed by: INTERNAL MEDICINE

## 2021-07-01 PROCEDURE — D9220A PRA ANESTHESIA: ICD-10-PCS | Mod: ANES,,, | Performed by: ANESTHESIOLOGY

## 2021-07-01 PROCEDURE — 63600175 PHARM REV CODE 636 W HCPCS: Performed by: INTERNAL MEDICINE

## 2021-07-01 PROCEDURE — D9220A PRA ANESTHESIA: Mod: ANES,,, | Performed by: ANESTHESIOLOGY

## 2021-07-01 PROCEDURE — 20000000 HC ICU ROOM

## 2021-07-01 PROCEDURE — 37000009 HC ANESTHESIA EA ADD 15 MINS: Performed by: ANESTHESIOLOGY

## 2021-07-01 PROCEDURE — 83880 ASSAY OF NATRIURETIC PEPTIDE: CPT | Performed by: INTERNAL MEDICINE

## 2021-07-01 PROCEDURE — 87449 NOS EACH ORGANISM AG IA: CPT | Performed by: INTERNAL MEDICINE

## 2021-07-01 PROCEDURE — 63600175 PHARM REV CODE 636 W HCPCS: Performed by: NURSE ANESTHETIST, CERTIFIED REGISTERED

## 2021-07-01 PROCEDURE — 99291 PR CRITICAL CARE, E/M 30-74 MINUTES: ICD-10-PCS | Mod: 25,,, | Performed by: INTERNAL MEDICINE

## 2021-07-01 PROCEDURE — 85027 COMPLETE CBC AUTOMATED: CPT | Performed by: HOSPITALIST

## 2021-07-01 PROCEDURE — 83735 ASSAY OF MAGNESIUM: CPT | Performed by: HOSPITALIST

## 2021-07-01 PROCEDURE — 82570 ASSAY OF URINE CREATININE: CPT | Performed by: HOSPITALIST

## 2021-07-01 PROCEDURE — 37000008 HC ANESTHESIA 1ST 15 MINUTES: Performed by: ANESTHESIOLOGY

## 2021-07-01 PROCEDURE — 80076 HEPATIC FUNCTION PANEL: CPT | Performed by: INTERNAL MEDICINE

## 2021-07-01 PROCEDURE — 99900035 HC TECH TIME PER 15 MIN (STAT)

## 2021-07-01 PROCEDURE — 82010 KETONE BODYS QUAN: CPT | Performed by: INTERNAL MEDICINE

## 2021-07-01 RX ORDER — INSULIN ASPART 100 [IU]/ML
0-5 INJECTION, SOLUTION INTRAVENOUS; SUBCUTANEOUS EVERY 6 HOURS PRN
Status: DISCONTINUED | OUTPATIENT
Start: 2021-07-01 | End: 2021-07-02

## 2021-07-01 RX ORDER — GLUCAGON 1 MG
1 KIT INJECTION
Status: DISCONTINUED | OUTPATIENT
Start: 2021-07-01 | End: 2021-07-03

## 2021-07-01 RX ORDER — LIDOCAINE HYDROCHLORIDE 20 MG/ML
INJECTION INTRAVENOUS
Status: DISCONTINUED | OUTPATIENT
Start: 2021-07-01 | End: 2021-07-01

## 2021-07-01 RX ORDER — ATORVASTATIN CALCIUM 40 MG/1
40 TABLET, FILM COATED ORAL NIGHTLY
Status: DISCONTINUED | OUTPATIENT
Start: 2021-07-01 | End: 2021-07-05 | Stop reason: HOSPADM

## 2021-07-01 RX ORDER — PROPOFOL 10 MG/ML
VIAL (ML) INTRAVENOUS
Status: DISCONTINUED | OUTPATIENT
Start: 2021-07-01 | End: 2021-07-01

## 2021-07-01 RX ORDER — PHENYLEPHRINE HYDROCHLORIDE 10 MG/ML
INJECTION INTRAVENOUS
Status: DISCONTINUED | OUTPATIENT
Start: 2021-07-01 | End: 2021-07-01

## 2021-07-01 RX ADMIN — DOCUSATE SODIUM 50 MG AND SENNOSIDES 8.6 MG 1 TABLET: 8.6; 5 TABLET, FILM COATED ORAL at 08:07

## 2021-07-01 RX ADMIN — AMIODARONE HYDROCHLORIDE 0.5 MG/MIN: 1.8 INJECTION, SOLUTION INTRAVENOUS at 05:07

## 2021-07-01 RX ADMIN — APIXABAN 5 MG: 5 TABLET, FILM COATED ORAL at 08:07

## 2021-07-01 RX ADMIN — PROPOFOL 80 MG: 10 INJECTION, EMULSION INTRAVENOUS at 11:07

## 2021-07-01 RX ADMIN — PHENYLEPHRINE HYDROCHLORIDE 200 MCG: 10 INJECTION INTRAVENOUS at 11:07

## 2021-07-01 RX ADMIN — INSULIN DETEMIR 20 UNITS: 100 INJECTION, SOLUTION SUBCUTANEOUS at 04:07

## 2021-07-01 RX ADMIN — SODIUM CHLORIDE: 0.9 INJECTION, SOLUTION INTRAVENOUS at 11:07

## 2021-07-01 RX ADMIN — ATORVASTATIN CALCIUM 40 MG: 40 TABLET, FILM COATED ORAL at 09:07

## 2021-07-01 RX ADMIN — INSULIN ASPART 5 UNITS: 100 INJECTION, SOLUTION INTRAVENOUS; SUBCUTANEOUS at 12:07

## 2021-07-01 RX ADMIN — SODIUM CHLORIDE 500 ML: 0.9 INJECTION, SOLUTION INTRAVENOUS at 12:07

## 2021-07-01 RX ADMIN — INSULIN ASPART 4 UNITS: 100 INJECTION, SOLUTION INTRAVENOUS; SUBCUTANEOUS at 06:07

## 2021-07-01 RX ADMIN — METOPROLOL SUCCINATE 50 MG: 50 TABLET, EXTENDED RELEASE ORAL at 08:07

## 2021-07-01 RX ADMIN — PIPERACILLIN SODIUM AND TAZOBACTAM SODIUM 4.5 G: 4; .5 INJECTION, POWDER, FOR SOLUTION INTRAVENOUS at 04:07

## 2021-07-01 RX ADMIN — SODIUM CHLORIDE 1000 ML: 0.9 INJECTION, SOLUTION INTRAVENOUS at 06:07

## 2021-07-01 RX ADMIN — ASPIRIN 81 MG CHEWABLE TABLET 81 MG: 81 TABLET CHEWABLE at 08:07

## 2021-07-01 RX ADMIN — APIXABAN 5 MG: 5 TABLET, FILM COATED ORAL at 09:07

## 2021-07-01 RX ADMIN — SODIUM CHLORIDE 1000 ML: 0.9 INJECTION, SOLUTION INTRAVENOUS at 03:07

## 2021-07-01 RX ADMIN — VANCOMYCIN HYDROCHLORIDE 1250 MG: 1.25 INJECTION, POWDER, LYOPHILIZED, FOR SOLUTION INTRAVENOUS at 09:07

## 2021-07-01 RX ADMIN — INSULIN ASPART 2 UNITS: 100 INJECTION, SOLUTION INTRAVENOUS; SUBCUTANEOUS at 06:07

## 2021-07-01 RX ADMIN — PIPERACILLIN SODIUM AND TAZOBACTAM SODIUM 4.5 G: 4; .5 INJECTION, POWDER, FOR SOLUTION INTRAVENOUS at 06:07

## 2021-07-01 RX ADMIN — PIPERACILLIN SODIUM AND TAZOBACTAM SODIUM 4.5 G: 4; .5 INJECTION, POWDER, FOR SOLUTION INTRAVENOUS at 12:07

## 2021-07-01 RX ADMIN — LIDOCAINE HYDROCHLORIDE 100 MG: 20 INJECTION, SOLUTION INTRAVENOUS at 11:07

## 2021-07-02 PROBLEM — A41.9 SEPSIS: Status: ACTIVE | Noted: 2021-07-02

## 2021-07-02 PROBLEM — K80.10 CHOLELITHIASIS WITH CHRONIC CHOLECYSTITIS: Status: ACTIVE | Noted: 2021-07-02

## 2021-07-02 LAB
ALBUMIN SERPL BCP-MCNC: 2 G/DL (ref 3.5–5.2)
ALP SERPL-CCNC: 66 U/L (ref 55–135)
ALT SERPL W/O P-5'-P-CCNC: 20 U/L (ref 10–44)
ANION GAP SERPL CALC-SCNC: 8 MMOL/L (ref 8–16)
AST SERPL-CCNC: 21 U/L (ref 10–40)
BASOPHILS # BLD AUTO: 0.09 K/UL (ref 0–0.2)
BASOPHILS NFR BLD: 1.1 % (ref 0–1.9)
BILIRUB SERPL-MCNC: 0.3 MG/DL (ref 0.1–1)
BUN SERPL-MCNC: 33 MG/DL (ref 8–23)
CALCIUM SERPL-MCNC: 8.5 MG/DL (ref 8.7–10.5)
CHLORIDE SERPL-SCNC: 108 MMOL/L (ref 95–110)
CO2 SERPL-SCNC: 22 MMOL/L (ref 23–29)
CREAT SERPL-MCNC: 1.9 MG/DL (ref 0.5–1.4)
DIFFERENTIAL METHOD: ABNORMAL
EOSINOPHIL # BLD AUTO: 0.3 K/UL (ref 0–0.5)
EOSINOPHIL NFR BLD: 4 % (ref 0–8)
ERYTHROCYTE [DISTWIDTH] IN BLOOD BY AUTOMATED COUNT: 13.9 % (ref 11.5–14.5)
EST. GFR  (AFRICAN AMERICAN): 42 ML/MIN/1.73 M^2
EST. GFR  (NON AFRICAN AMERICAN): 37 ML/MIN/1.73 M^2
GLUCOSE SERPL-MCNC: 137 MG/DL (ref 70–110)
HCT VFR BLD AUTO: 37.3 % (ref 40–54)
HGB BLD-MCNC: 12.8 G/DL (ref 14–18)
IMM GRANULOCYTES # BLD AUTO: 0.12 K/UL (ref 0–0.04)
IMM GRANULOCYTES NFR BLD AUTO: 1.5 % (ref 0–0.5)
LYMPHOCYTES # BLD AUTO: 1.1 K/UL (ref 1–4.8)
LYMPHOCYTES NFR BLD: 13 % (ref 18–48)
MAGNESIUM SERPL-MCNC: 2 MG/DL (ref 1.6–2.6)
MCH RBC QN AUTO: 32.7 PG (ref 27–31)
MCHC RBC AUTO-ENTMCNC: 34.3 G/DL (ref 32–36)
MCV RBC AUTO: 95 FL (ref 82–98)
MONOCYTES # BLD AUTO: 0.8 K/UL (ref 0.3–1)
MONOCYTES NFR BLD: 9.1 % (ref 4–15)
NEUTROPHILS # BLD AUTO: 5.9 K/UL (ref 1.8–7.7)
NEUTROPHILS NFR BLD: 71.3 % (ref 38–73)
NRBC BLD-RTO: 0 /100 WBC
PHOSPHATE SERPL-MCNC: 2.8 MG/DL (ref 2.7–4.5)
PLATELET # BLD AUTO: 189 K/UL (ref 150–450)
PMV BLD AUTO: 11.7 FL (ref 9.2–12.9)
POCT GLUCOSE: 141 MG/DL (ref 70–110)
POCT GLUCOSE: 149 MG/DL (ref 70–110)
POCT GLUCOSE: 156 MG/DL (ref 70–110)
POCT GLUCOSE: 209 MG/DL (ref 70–110)
POCT GLUCOSE: 272 MG/DL (ref 70–110)
POTASSIUM SERPL-SCNC: 3.6 MMOL/L (ref 3.5–5.1)
PROT SERPL-MCNC: 5.6 G/DL (ref 6–8.4)
RBC # BLD AUTO: 3.92 M/UL (ref 4.6–6.2)
SODIUM SERPL-SCNC: 138 MMOL/L (ref 136–145)
VANCOMYCIN TROUGH SERPL-MCNC: 11.9 UG/ML (ref 10–22)
WBC # BLD AUTO: 8.25 K/UL (ref 3.9–12.7)

## 2021-07-02 PROCEDURE — 63600175 PHARM REV CODE 636 W HCPCS: Performed by: STUDENT IN AN ORGANIZED HEALTH CARE EDUCATION/TRAINING PROGRAM

## 2021-07-02 PROCEDURE — 84100 ASSAY OF PHOSPHORUS: CPT | Performed by: HOSPITALIST

## 2021-07-02 PROCEDURE — 80053 COMPREHEN METABOLIC PANEL: CPT | Performed by: HOSPITALIST

## 2021-07-02 PROCEDURE — 25000003 PHARM REV CODE 250: Performed by: INTERNAL MEDICINE

## 2021-07-02 PROCEDURE — 80202 ASSAY OF VANCOMYCIN: CPT | Performed by: HOSPITALIST

## 2021-07-02 PROCEDURE — 63600175 PHARM REV CODE 636 W HCPCS: Performed by: INTERNAL MEDICINE

## 2021-07-02 PROCEDURE — 21400001 HC TELEMETRY ROOM

## 2021-07-02 PROCEDURE — 85025 COMPLETE CBC W/AUTO DIFF WBC: CPT | Performed by: HOSPITALIST

## 2021-07-02 PROCEDURE — 99232 SBSQ HOSP IP/OBS MODERATE 35: CPT | Mod: ,,, | Performed by: INTERNAL MEDICINE

## 2021-07-02 PROCEDURE — 25000003 PHARM REV CODE 250: Performed by: HOSPITALIST

## 2021-07-02 PROCEDURE — 63600175 PHARM REV CODE 636 W HCPCS: Performed by: HOSPITALIST

## 2021-07-02 PROCEDURE — 83735 ASSAY OF MAGNESIUM: CPT | Performed by: HOSPITALIST

## 2021-07-02 PROCEDURE — A4216 STERILE WATER/SALINE, 10 ML: HCPCS | Performed by: HOSPITALIST

## 2021-07-02 PROCEDURE — 99232 PR SUBSEQUENT HOSPITAL CARE,LEVL II: ICD-10-PCS | Mod: ,,, | Performed by: INTERNAL MEDICINE

## 2021-07-02 RX ORDER — SODIUM CHLORIDE 9 MG/ML
INJECTION, SOLUTION INTRAVENOUS
Status: DISCONTINUED | OUTPATIENT
Start: 2021-07-02 | End: 2021-07-05 | Stop reason: HOSPADM

## 2021-07-02 RX ORDER — INSULIN ASPART 100 [IU]/ML
0-5 INJECTION, SOLUTION INTRAVENOUS; SUBCUTANEOUS
Status: DISCONTINUED | OUTPATIENT
Start: 2021-07-02 | End: 2021-07-03

## 2021-07-02 RX ORDER — METOPROLOL SUCCINATE 50 MG/1
100 TABLET, EXTENDED RELEASE ORAL DAILY
Status: DISCONTINUED | OUTPATIENT
Start: 2021-07-02 | End: 2021-07-05 | Stop reason: HOSPADM

## 2021-07-02 RX ORDER — MORPHINE SULFATE 4 MG/ML
4 INJECTION, SOLUTION INTRAMUSCULAR; INTRAVENOUS
Status: DISCONTINUED | OUTPATIENT
Start: 2021-07-02 | End: 2021-07-04

## 2021-07-02 RX ADMIN — VANCOMYCIN HYDROCHLORIDE 1250 MG: 1.25 INJECTION, POWDER, LYOPHILIZED, FOR SOLUTION INTRAVENOUS at 09:07

## 2021-07-02 RX ADMIN — Medication 10 ML: at 10:07

## 2021-07-02 RX ADMIN — ATORVASTATIN CALCIUM 40 MG: 40 TABLET, FILM COATED ORAL at 08:07

## 2021-07-02 RX ADMIN — MORPHINE SULFATE 3 MG: 4 INJECTION INTRAVENOUS at 11:07

## 2021-07-02 RX ADMIN — SODIUM CHLORIDE: 0.9 INJECTION, SOLUTION INTRAVENOUS at 08:07

## 2021-07-02 RX ADMIN — METOPROLOL SUCCINATE 100 MG: 50 TABLET, EXTENDED RELEASE ORAL at 10:07

## 2021-07-02 RX ADMIN — APIXABAN 5 MG: 5 TABLET, FILM COATED ORAL at 08:07

## 2021-07-02 RX ADMIN — PIPERACILLIN SODIUM AND TAZOBACTAM SODIUM 4.5 G: 4; .5 INJECTION, POWDER, FOR SOLUTION INTRAVENOUS at 02:07

## 2021-07-02 RX ADMIN — INSULIN ASPART 3 UNITS: 100 INJECTION, SOLUTION INTRAVENOUS; SUBCUTANEOUS at 08:07

## 2021-07-02 RX ADMIN — PIPERACILLIN SODIUM AND TAZOBACTAM SODIUM 4.5 G: 4; .5 INJECTION, POWDER, FOR SOLUTION INTRAVENOUS at 08:07

## 2021-07-02 RX ADMIN — PIPERACILLIN SODIUM AND TAZOBACTAM SODIUM 4.5 G: 4; .5 INJECTION, POWDER, FOR SOLUTION INTRAVENOUS at 12:07

## 2021-07-02 RX ADMIN — INSULIN ASPART 1 UNITS: 100 INJECTION, SOLUTION INTRAVENOUS; SUBCUTANEOUS at 12:07

## 2021-07-03 PROBLEM — R53.81 DEBILITY: Status: ACTIVE | Noted: 2021-07-03

## 2021-07-03 PROBLEM — A41.9 SEPSIS: Status: RESOLVED | Noted: 2021-07-02 | Resolved: 2021-07-03

## 2021-07-03 LAB
ALBUMIN SERPL BCP-MCNC: 2 G/DL (ref 3.5–5.2)
ALP SERPL-CCNC: 78 U/L (ref 55–135)
ALT SERPL W/O P-5'-P-CCNC: 21 U/L (ref 10–44)
ANION GAP SERPL CALC-SCNC: 8 MMOL/L (ref 8–16)
AST SERPL-CCNC: 18 U/L (ref 10–40)
BACTERIA SPEC AEROBE CULT: NORMAL
BASOPHILS # BLD AUTO: 0.04 K/UL (ref 0–0.2)
BASOPHILS NFR BLD: 0.7 % (ref 0–1.9)
BILIRUB SERPL-MCNC: 0.2 MG/DL (ref 0.1–1)
BUN SERPL-MCNC: 32 MG/DL (ref 8–23)
CALCIUM SERPL-MCNC: 8.2 MG/DL (ref 8.7–10.5)
CHLORIDE SERPL-SCNC: 107 MMOL/L (ref 95–110)
CO2 SERPL-SCNC: 24 MMOL/L (ref 23–29)
CREAT SERPL-MCNC: 1.6 MG/DL (ref 0.5–1.4)
DIFFERENTIAL METHOD: ABNORMAL
EOSINOPHIL # BLD AUTO: 0.3 K/UL (ref 0–0.5)
EOSINOPHIL NFR BLD: 4.7 % (ref 0–8)
ERYTHROCYTE [DISTWIDTH] IN BLOOD BY AUTOMATED COUNT: 14 % (ref 11.5–14.5)
EST. GFR  (AFRICAN AMERICAN): 52 ML/MIN/1.73 M^2
EST. GFR  (NON AFRICAN AMERICAN): 45 ML/MIN/1.73 M^2
GLUCOSE SERPL-MCNC: 285 MG/DL (ref 70–110)
GRAM STN SPEC: NORMAL
HCT VFR BLD AUTO: 36.2 % (ref 40–54)
HGB BLD-MCNC: 12.1 G/DL (ref 14–18)
IMM GRANULOCYTES # BLD AUTO: 0.11 K/UL (ref 0–0.04)
IMM GRANULOCYTES NFR BLD AUTO: 1.8 % (ref 0–0.5)
LYMPHOCYTES # BLD AUTO: 1.2 K/UL (ref 1–4.8)
LYMPHOCYTES NFR BLD: 20.4 % (ref 18–48)
MAGNESIUM SERPL-MCNC: 1.8 MG/DL (ref 1.6–2.6)
MCH RBC QN AUTO: 32.3 PG (ref 27–31)
MCHC RBC AUTO-ENTMCNC: 33.4 G/DL (ref 32–36)
MCV RBC AUTO: 97 FL (ref 82–98)
MONOCYTES # BLD AUTO: 0.8 K/UL (ref 0.3–1)
MONOCYTES NFR BLD: 13.9 % (ref 4–15)
NEUTROPHILS # BLD AUTO: 3.5 K/UL (ref 1.8–7.7)
NEUTROPHILS NFR BLD: 58.5 % (ref 38–73)
NRBC BLD-RTO: 0 /100 WBC
PHOSPHATE SERPL-MCNC: 3.3 MG/DL (ref 2.7–4.5)
PLATELET # BLD AUTO: 208 K/UL (ref 150–450)
PMV BLD AUTO: 12.6 FL (ref 9.2–12.9)
POCT GLUCOSE: 233 MG/DL (ref 70–110)
POCT GLUCOSE: 284 MG/DL (ref 70–110)
POCT GLUCOSE: 324 MG/DL (ref 70–110)
POCT GLUCOSE: 363 MG/DL (ref 70–110)
POTASSIUM SERPL-SCNC: 3.7 MMOL/L (ref 3.5–5.1)
PROT SERPL-MCNC: 5.6 G/DL (ref 6–8.4)
RBC # BLD AUTO: 3.75 M/UL (ref 4.6–6.2)
SODIUM SERPL-SCNC: 139 MMOL/L (ref 136–145)
WBC # BLD AUTO: 5.97 K/UL (ref 3.9–12.7)

## 2021-07-03 PROCEDURE — 25000003 PHARM REV CODE 250: Performed by: INTERNAL MEDICINE

## 2021-07-03 PROCEDURE — 80053 COMPREHEN METABOLIC PANEL: CPT | Performed by: HOSPITALIST

## 2021-07-03 PROCEDURE — 99232 SBSQ HOSP IP/OBS MODERATE 35: CPT | Mod: ,,, | Performed by: INTERNAL MEDICINE

## 2021-07-03 PROCEDURE — 63600175 PHARM REV CODE 636 W HCPCS: Performed by: HOSPITALIST

## 2021-07-03 PROCEDURE — 94761 N-INVAS EAR/PLS OXIMETRY MLT: CPT

## 2021-07-03 PROCEDURE — 99232 PR SUBSEQUENT HOSPITAL CARE,LEVL II: ICD-10-PCS | Mod: ,,, | Performed by: INTERNAL MEDICINE

## 2021-07-03 PROCEDURE — 83735 ASSAY OF MAGNESIUM: CPT | Performed by: HOSPITALIST

## 2021-07-03 PROCEDURE — 25000003 PHARM REV CODE 250: Performed by: HOSPITALIST

## 2021-07-03 PROCEDURE — 63600175 PHARM REV CODE 636 W HCPCS: Performed by: INTERNAL MEDICINE

## 2021-07-03 PROCEDURE — 85025 COMPLETE CBC W/AUTO DIFF WBC: CPT | Performed by: HOSPITALIST

## 2021-07-03 PROCEDURE — 84100 ASSAY OF PHOSPHORUS: CPT | Performed by: HOSPITALIST

## 2021-07-03 PROCEDURE — 21400001 HC TELEMETRY ROOM

## 2021-07-03 RX ORDER — HYDRALAZINE HYDROCHLORIDE 20 MG/ML
15 INJECTION INTRAMUSCULAR; INTRAVENOUS EVERY 4 HOURS PRN
Status: DISCONTINUED | OUTPATIENT
Start: 2021-07-03 | End: 2021-07-05 | Stop reason: HOSPADM

## 2021-07-03 RX ORDER — IBUPROFEN 200 MG
24 TABLET ORAL
Status: DISCONTINUED | OUTPATIENT
Start: 2021-07-03 | End: 2021-07-05 | Stop reason: HOSPADM

## 2021-07-03 RX ORDER — INSULIN ASPART 100 [IU]/ML
1-10 INJECTION, SOLUTION INTRAVENOUS; SUBCUTANEOUS
Status: DISCONTINUED | OUTPATIENT
Start: 2021-07-03 | End: 2021-07-05 | Stop reason: HOSPADM

## 2021-07-03 RX ORDER — IBUPROFEN 200 MG
16 TABLET ORAL
Status: DISCONTINUED | OUTPATIENT
Start: 2021-07-03 | End: 2021-07-05 | Stop reason: HOSPADM

## 2021-07-03 RX ORDER — GLUCAGON 1 MG
1 KIT INJECTION
Status: DISCONTINUED | OUTPATIENT
Start: 2021-07-03 | End: 2021-07-05 | Stop reason: HOSPADM

## 2021-07-03 RX ORDER — INSULIN ASPART 100 [IU]/ML
8 INJECTION, SOLUTION INTRAVENOUS; SUBCUTANEOUS
Status: DISCONTINUED | OUTPATIENT
Start: 2021-07-03 | End: 2021-07-04

## 2021-07-03 RX ORDER — HYDRALAZINE HYDROCHLORIDE 25 MG/1
25 TABLET, FILM COATED ORAL EVERY 12 HOURS
Status: DISCONTINUED | OUTPATIENT
Start: 2021-07-03 | End: 2021-07-04

## 2021-07-03 RX ORDER — L. ACIDOPHILUS/L.BULGARICUS 100MM CELL
1 GRANULES IN PACKET (EA) ORAL 2 TIMES DAILY
Status: DISCONTINUED | OUTPATIENT
Start: 2021-07-03 | End: 2021-07-05 | Stop reason: HOSPADM

## 2021-07-03 RX ORDER — MUPIROCIN 20 MG/G
OINTMENT TOPICAL 2 TIMES DAILY
Status: DISCONTINUED | OUTPATIENT
Start: 2021-07-03 | End: 2021-07-05 | Stop reason: HOSPADM

## 2021-07-03 RX ADMIN — LACTOBACILLUS ACIDOPHILUS / LACTOBACILLUS BULGARICUS 1 EACH: 100 MILLION CFU STRENGTH GRANULES at 09:07

## 2021-07-03 RX ADMIN — APIXABAN 5 MG: 5 TABLET, FILM COATED ORAL at 10:07

## 2021-07-03 RX ADMIN — PIPERACILLIN SODIUM AND TAZOBACTAM SODIUM 4.5 G: 4; .5 INJECTION, POWDER, FOR SOLUTION INTRAVENOUS at 10:07

## 2021-07-03 RX ADMIN — HYDRALAZINE HYDROCHLORIDE 25 MG: 25 TABLET, FILM COATED ORAL at 09:07

## 2021-07-03 RX ADMIN — ASPIRIN 81 MG CHEWABLE TABLET 81 MG: 81 TABLET CHEWABLE at 10:07

## 2021-07-03 RX ADMIN — ATORVASTATIN CALCIUM 40 MG: 40 TABLET, FILM COATED ORAL at 09:07

## 2021-07-03 RX ADMIN — INSULIN ASPART 8 UNITS: 100 INJECTION, SOLUTION INTRAVENOUS; SUBCUTANEOUS at 05:07

## 2021-07-03 RX ADMIN — METOPROLOL SUCCINATE 100 MG: 50 TABLET, EXTENDED RELEASE ORAL at 10:07

## 2021-07-03 RX ADMIN — MUPIROCIN: 20 OINTMENT TOPICAL at 09:07

## 2021-07-03 RX ADMIN — PIPERACILLIN SODIUM AND TAZOBACTAM SODIUM 4.5 G: 4; .5 INJECTION, POWDER, FOR SOLUTION INTRAVENOUS at 06:07

## 2021-07-03 RX ADMIN — INSULIN ASPART 10 UNITS: 100 INJECTION, SOLUTION INTRAVENOUS; SUBCUTANEOUS at 05:07

## 2021-07-03 RX ADMIN — PIPERACILLIN SODIUM AND TAZOBACTAM SODIUM 4.5 G: 4; .5 INJECTION, POWDER, FOR SOLUTION INTRAVENOUS at 02:07

## 2021-07-03 RX ADMIN — APIXABAN 5 MG: 5 TABLET, FILM COATED ORAL at 09:07

## 2021-07-03 RX ADMIN — INSULIN ASPART 4 UNITS: 100 INJECTION, SOLUTION INTRAVENOUS; SUBCUTANEOUS at 09:07

## 2021-07-04 PROBLEM — I47.29 NSVT (NONSUSTAINED VENTRICULAR TACHYCARDIA): Status: ACTIVE | Noted: 2021-07-04

## 2021-07-04 LAB
ALBUMIN SERPL BCP-MCNC: 2.3 G/DL (ref 3.5–5.2)
ALP SERPL-CCNC: 95 U/L (ref 55–135)
ALT SERPL W/O P-5'-P-CCNC: 18 U/L (ref 10–44)
ANION GAP SERPL CALC-SCNC: 9 MMOL/L (ref 8–16)
AST SERPL-CCNC: 18 U/L (ref 10–40)
BASOPHILS # BLD AUTO: 0.08 K/UL (ref 0–0.2)
BASOPHILS NFR BLD: 1.4 % (ref 0–1.9)
BILIRUB SERPL-MCNC: 0.2 MG/DL (ref 0.1–1)
BUN SERPL-MCNC: 22 MG/DL (ref 8–23)
CALCIUM SERPL-MCNC: 8.9 MG/DL (ref 8.7–10.5)
CHLORIDE SERPL-SCNC: 106 MMOL/L (ref 95–110)
CO2 SERPL-SCNC: 24 MMOL/L (ref 23–29)
CREAT SERPL-MCNC: 1.3 MG/DL (ref 0.5–1.4)
DIFFERENTIAL METHOD: ABNORMAL
EOSINOPHIL # BLD AUTO: 0.3 K/UL (ref 0–0.5)
EOSINOPHIL NFR BLD: 5.3 % (ref 0–8)
ERYTHROCYTE [DISTWIDTH] IN BLOOD BY AUTOMATED COUNT: 13.6 % (ref 11.5–14.5)
EST. GFR  (AFRICAN AMERICAN): >60 ML/MIN/1.73 M^2
EST. GFR  (NON AFRICAN AMERICAN): 58 ML/MIN/1.73 M^2
GLUCOSE SERPL-MCNC: 243 MG/DL (ref 70–110)
HCT VFR BLD AUTO: 36.6 % (ref 40–54)
HGB BLD-MCNC: 12.3 G/DL (ref 14–18)
IMM GRANULOCYTES # BLD AUTO: 0.21 K/UL (ref 0–0.04)
IMM GRANULOCYTES NFR BLD AUTO: 3.6 % (ref 0–0.5)
LYMPHOCYTES # BLD AUTO: 1.8 K/UL (ref 1–4.8)
LYMPHOCYTES NFR BLD: 29.8 % (ref 18–48)
MAGNESIUM SERPL-MCNC: 1.3 MG/DL (ref 1.6–2.6)
MCH RBC QN AUTO: 31.8 PG (ref 27–31)
MCHC RBC AUTO-ENTMCNC: 33.6 G/DL (ref 32–36)
MCV RBC AUTO: 95 FL (ref 82–98)
MONOCYTES # BLD AUTO: 0.8 K/UL (ref 0.3–1)
MONOCYTES NFR BLD: 13.2 % (ref 4–15)
NEUTROPHILS # BLD AUTO: 2.8 K/UL (ref 1.8–7.7)
NEUTROPHILS NFR BLD: 46.7 % (ref 38–73)
NRBC BLD-RTO: 0 /100 WBC
PHOSPHATE SERPL-MCNC: 2.9 MG/DL (ref 2.7–4.5)
PLATELET # BLD AUTO: 222 K/UL (ref 150–450)
PMV BLD AUTO: 12.4 FL (ref 9.2–12.9)
POCT GLUCOSE: 301 MG/DL (ref 70–110)
POCT GLUCOSE: 308 MG/DL (ref 70–110)
POCT GLUCOSE: 327 MG/DL (ref 70–110)
POCT GLUCOSE: 354 MG/DL (ref 70–110)
POTASSIUM SERPL-SCNC: 3.3 MMOL/L (ref 3.5–5.1)
PROT SERPL-MCNC: 6 G/DL (ref 6–8.4)
RBC # BLD AUTO: 3.87 M/UL (ref 4.6–6.2)
SODIUM SERPL-SCNC: 139 MMOL/L (ref 136–145)
WBC # BLD AUTO: 5.9 K/UL (ref 3.9–12.7)

## 2021-07-04 PROCEDURE — 25000003 PHARM REV CODE 250: Performed by: HOSPITALIST

## 2021-07-04 PROCEDURE — 83735 ASSAY OF MAGNESIUM: CPT | Performed by: HOSPITALIST

## 2021-07-04 PROCEDURE — 25000003 PHARM REV CODE 250: Performed by: INTERNAL MEDICINE

## 2021-07-04 PROCEDURE — 21400001 HC TELEMETRY ROOM

## 2021-07-04 PROCEDURE — C9399 UNCLASSIFIED DRUGS OR BIOLOG: HCPCS | Performed by: HOSPITALIST

## 2021-07-04 PROCEDURE — 99232 PR SUBSEQUENT HOSPITAL CARE,LEVL II: ICD-10-PCS | Mod: ,,, | Performed by: INTERNAL MEDICINE

## 2021-07-04 PROCEDURE — 80053 COMPREHEN METABOLIC PANEL: CPT | Performed by: HOSPITALIST

## 2021-07-04 PROCEDURE — 63600175 PHARM REV CODE 636 W HCPCS: Performed by: INTERNAL MEDICINE

## 2021-07-04 PROCEDURE — 85025 COMPLETE CBC W/AUTO DIFF WBC: CPT | Performed by: HOSPITALIST

## 2021-07-04 PROCEDURE — 97165 OT EVAL LOW COMPLEX 30 MIN: CPT

## 2021-07-04 PROCEDURE — 99232 SBSQ HOSP IP/OBS MODERATE 35: CPT | Mod: ,,, | Performed by: INTERNAL MEDICINE

## 2021-07-04 PROCEDURE — 97110 THERAPEUTIC EXERCISES: CPT | Performed by: PHYSICAL THERAPIST

## 2021-07-04 PROCEDURE — 84100 ASSAY OF PHOSPHORUS: CPT | Performed by: HOSPITALIST

## 2021-07-04 PROCEDURE — 97161 PT EVAL LOW COMPLEX 20 MIN: CPT | Performed by: PHYSICAL THERAPIST

## 2021-07-04 PROCEDURE — 63600175 PHARM REV CODE 636 W HCPCS: Performed by: HOSPITALIST

## 2021-07-04 RX ORDER — LABETALOL HYDROCHLORIDE 5 MG/ML
5 INJECTION, SOLUTION INTRAVENOUS ONCE
Status: COMPLETED | OUTPATIENT
Start: 2021-07-04 | End: 2021-07-04

## 2021-07-04 RX ORDER — HYDRALAZINE HYDROCHLORIDE 25 MG/1
100 TABLET, FILM COATED ORAL EVERY 12 HOURS
Status: DISCONTINUED | OUTPATIENT
Start: 2021-07-04 | End: 2021-07-05 | Stop reason: HOSPADM

## 2021-07-04 RX ORDER — INSULIN ASPART 100 [IU]/ML
12 INJECTION, SOLUTION INTRAVENOUS; SUBCUTANEOUS
Status: DISCONTINUED | OUTPATIENT
Start: 2021-07-04 | End: 2021-07-05 | Stop reason: HOSPADM

## 2021-07-04 RX ORDER — MAGNESIUM SULFATE HEPTAHYDRATE 40 MG/ML
2 INJECTION, SOLUTION INTRAVENOUS ONCE
Status: COMPLETED | OUTPATIENT
Start: 2021-07-04 | End: 2021-07-04

## 2021-07-04 RX ORDER — INSULIN ASPART 100 [IU]/ML
10 INJECTION, SOLUTION INTRAVENOUS; SUBCUTANEOUS
Status: DISCONTINUED | OUTPATIENT
Start: 2021-07-04 | End: 2021-07-04

## 2021-07-04 RX ORDER — POTASSIUM CHLORIDE 20 MEQ/1
40 TABLET, EXTENDED RELEASE ORAL ONCE
Status: COMPLETED | OUTPATIENT
Start: 2021-07-04 | End: 2021-07-04

## 2021-07-04 RX ADMIN — LABETALOL HYDROCHLORIDE 5 MG: 5 INJECTION INTRAVENOUS at 03:07

## 2021-07-04 RX ADMIN — POTASSIUM CHLORIDE 40 MEQ: 1500 TABLET, EXTENDED RELEASE ORAL at 11:07

## 2021-07-04 RX ADMIN — LACTOBACILLUS ACIDOPHILUS / LACTOBACILLUS BULGARICUS 1 EACH: 100 MILLION CFU STRENGTH GRANULES at 09:07

## 2021-07-04 RX ADMIN — ASPIRIN 81 MG CHEWABLE TABLET 81 MG: 81 TABLET CHEWABLE at 11:07

## 2021-07-04 RX ADMIN — MAGNESIUM SULFATE 2 G: 2 INJECTION INTRAVENOUS at 11:07

## 2021-07-04 RX ADMIN — APIXABAN 5 MG: 5 TABLET, FILM COATED ORAL at 09:07

## 2021-07-04 RX ADMIN — ATORVASTATIN CALCIUM 40 MG: 40 TABLET, FILM COATED ORAL at 09:07

## 2021-07-04 RX ADMIN — PIPERACILLIN SODIUM AND TAZOBACTAM SODIUM 4.5 G: 4; .5 INJECTION, POWDER, FOR SOLUTION INTRAVENOUS at 01:07

## 2021-07-04 RX ADMIN — INSULIN ASPART 8 UNITS: 100 INJECTION, SOLUTION INTRAVENOUS; SUBCUTANEOUS at 04:07

## 2021-07-04 RX ADMIN — LACTOBACILLUS ACIDOPHILUS / LACTOBACILLUS BULGARICUS 1 EACH: 100 MILLION CFU STRENGTH GRANULES at 11:07

## 2021-07-04 RX ADMIN — PIPERACILLIN SODIUM AND TAZOBACTAM SODIUM 4.5 G: 4; .5 INJECTION, POWDER, FOR SOLUTION INTRAVENOUS at 07:07

## 2021-07-04 RX ADMIN — INSULIN ASPART 12 UNITS: 100 INJECTION, SOLUTION INTRAVENOUS; SUBCUTANEOUS at 04:07

## 2021-07-04 RX ADMIN — METOPROLOL SUCCINATE 100 MG: 50 TABLET, EXTENDED RELEASE ORAL at 11:07

## 2021-07-04 RX ADMIN — MUPIROCIN: 20 OINTMENT TOPICAL at 09:07

## 2021-07-04 RX ADMIN — INSULIN DETEMIR 10 UNITS: 100 INJECTION, SOLUTION SUBCUTANEOUS at 11:07

## 2021-07-04 RX ADMIN — INSULIN ASPART 4 UNITS: 100 INJECTION, SOLUTION INTRAVENOUS; SUBCUTANEOUS at 09:07

## 2021-07-04 RX ADMIN — INSULIN ASPART 10 UNITS: 100 INJECTION, SOLUTION INTRAVENOUS; SUBCUTANEOUS at 11:07

## 2021-07-04 RX ADMIN — APIXABAN 5 MG: 5 TABLET, FILM COATED ORAL at 11:07

## 2021-07-04 RX ADMIN — PIPERACILLIN SODIUM AND TAZOBACTAM SODIUM 4.5 G: 4; .5 INJECTION, POWDER, FOR SOLUTION INTRAVENOUS at 03:07

## 2021-07-04 RX ADMIN — HYDRALAZINE HYDROCHLORIDE 100 MG: 25 TABLET, FILM COATED ORAL at 09:07

## 2021-07-05 VITALS
TEMPERATURE: 99 F | WEIGHT: 177.5 LBS | SYSTOLIC BLOOD PRESSURE: 151 MMHG | RESPIRATION RATE: 18 BRPM | HEART RATE: 84 BPM | DIASTOLIC BLOOD PRESSURE: 75 MMHG | BODY MASS INDEX: 28.53 KG/M2 | OXYGEN SATURATION: 96 % | HEIGHT: 66 IN

## 2021-07-05 LAB
ALBUMIN SERPL BCP-MCNC: 2.4 G/DL (ref 3.5–5.2)
ALP SERPL-CCNC: 101 U/L (ref 55–135)
ALT SERPL W/O P-5'-P-CCNC: 25 U/L (ref 10–44)
ANION GAP SERPL CALC-SCNC: 11 MMOL/L (ref 8–16)
AST SERPL-CCNC: 27 U/L (ref 10–40)
BASOPHILS # BLD AUTO: 0.11 K/UL (ref 0–0.2)
BASOPHILS NFR BLD: 1.7 % (ref 0–1.9)
BILIRUB SERPL-MCNC: 0.3 MG/DL (ref 0.1–1)
BUN SERPL-MCNC: 17 MG/DL (ref 8–23)
CALCIUM SERPL-MCNC: 8.9 MG/DL (ref 8.7–10.5)
CHLORIDE SERPL-SCNC: 106 MMOL/L (ref 95–110)
CO2 SERPL-SCNC: 25 MMOL/L (ref 23–29)
CREAT SERPL-MCNC: 1.2 MG/DL (ref 0.5–1.4)
DIFFERENTIAL METHOD: ABNORMAL
EOSINOPHIL # BLD AUTO: 0.4 K/UL (ref 0–0.5)
EOSINOPHIL NFR BLD: 5.6 % (ref 0–8)
ERYTHROCYTE [DISTWIDTH] IN BLOOD BY AUTOMATED COUNT: 13.5 % (ref 11.5–14.5)
EST. GFR  (AFRICAN AMERICAN): >60 ML/MIN/1.73 M^2
EST. GFR  (NON AFRICAN AMERICAN): >60 ML/MIN/1.73 M^2
GLUCOSE SERPL-MCNC: 332 MG/DL (ref 70–110)
HCT VFR BLD AUTO: 38.9 % (ref 40–54)
HGB BLD-MCNC: 13.2 G/DL (ref 14–18)
IMM GRANULOCYTES # BLD AUTO: 0.31 K/UL (ref 0–0.04)
IMM GRANULOCYTES NFR BLD AUTO: 4.7 % (ref 0–0.5)
LYMPHOCYTES # BLD AUTO: 2.3 K/UL (ref 1–4.8)
LYMPHOCYTES NFR BLD: 34.6 % (ref 18–48)
MAGNESIUM SERPL-MCNC: 1.5 MG/DL (ref 1.6–2.6)
MCH RBC QN AUTO: 31.9 PG (ref 27–31)
MCHC RBC AUTO-ENTMCNC: 33.9 G/DL (ref 32–36)
MCV RBC AUTO: 94 FL (ref 82–98)
MONOCYTES # BLD AUTO: 1 K/UL (ref 0.3–1)
MONOCYTES NFR BLD: 14.5 % (ref 4–15)
NEUTROPHILS # BLD AUTO: 2.6 K/UL (ref 1.8–7.7)
NEUTROPHILS NFR BLD: 38.9 % (ref 38–73)
NRBC BLD-RTO: 0 /100 WBC
PHOSPHATE SERPL-MCNC: 2.9 MG/DL (ref 2.7–4.5)
PLATELET # BLD AUTO: 260 K/UL (ref 150–450)
PMV BLD AUTO: 12 FL (ref 9.2–12.9)
POCT GLUCOSE: 345 MG/DL (ref 70–110)
POCT GLUCOSE: 350 MG/DL (ref 70–110)
POCT GLUCOSE: 480 MG/DL (ref 70–110)
POTASSIUM SERPL-SCNC: 3.9 MMOL/L (ref 3.5–5.1)
PROT SERPL-MCNC: 6.2 G/DL (ref 6–8.4)
RBC # BLD AUTO: 4.14 M/UL (ref 4.6–6.2)
SODIUM SERPL-SCNC: 142 MMOL/L (ref 136–145)
WBC # BLD AUTO: 6.57 K/UL (ref 3.9–12.7)

## 2021-07-05 PROCEDURE — 85025 COMPLETE CBC W/AUTO DIFF WBC: CPT | Performed by: HOSPITALIST

## 2021-07-05 PROCEDURE — 25000003 PHARM REV CODE 250: Performed by: INTERNAL MEDICINE

## 2021-07-05 PROCEDURE — 80053 COMPREHEN METABOLIC PANEL: CPT | Performed by: HOSPITALIST

## 2021-07-05 PROCEDURE — C9399 UNCLASSIFIED DRUGS OR BIOLOG: HCPCS | Performed by: HOSPITALIST

## 2021-07-05 PROCEDURE — 84100 ASSAY OF PHOSPHORUS: CPT | Performed by: HOSPITALIST

## 2021-07-05 PROCEDURE — 83735 ASSAY OF MAGNESIUM: CPT | Performed by: HOSPITALIST

## 2021-07-05 PROCEDURE — 63600175 PHARM REV CODE 636 W HCPCS: Performed by: INTERNAL MEDICINE

## 2021-07-05 PROCEDURE — 25000003 PHARM REV CODE 250: Performed by: HOSPITALIST

## 2021-07-05 PROCEDURE — 63600175 PHARM REV CODE 636 W HCPCS: Performed by: HOSPITALIST

## 2021-07-05 RX ORDER — HYDRALAZINE HYDROCHLORIDE 100 MG/1
100 TABLET, FILM COATED ORAL EVERY 8 HOURS
Qty: 90 TABLET | Refills: 1 | Status: ON HOLD | OUTPATIENT
Start: 2021-07-05 | End: 2022-07-02 | Stop reason: SDUPTHER

## 2021-07-05 RX ORDER — AMOXICILLIN AND CLAVULANATE POTASSIUM 875; 125 MG/1; MG/1
1 TABLET, FILM COATED ORAL EVERY 12 HOURS
Status: DISCONTINUED | OUTPATIENT
Start: 2021-07-05 | End: 2021-07-05 | Stop reason: HOSPADM

## 2021-07-05 RX ORDER — NIFEDIPINE 30 MG/1
30 TABLET, EXTENDED RELEASE ORAL DAILY
Status: DISCONTINUED | OUTPATIENT
Start: 2021-07-05 | End: 2021-07-05 | Stop reason: HOSPADM

## 2021-07-05 RX ORDER — INSULIN ASPART 100 [IU]/ML
10 INJECTION, SOLUTION INTRAVENOUS; SUBCUTANEOUS ONCE
Status: COMPLETED | OUTPATIENT
Start: 2021-07-05 | End: 2021-07-05

## 2021-07-05 RX ORDER — INSULIN ASPART 100 [IU]/ML
13 INJECTION, SOLUTION INTRAVENOUS; SUBCUTANEOUS 3 TIMES DAILY
Qty: 11.7 ML | Refills: 1 | Status: ON HOLD | OUTPATIENT
Start: 2021-07-05 | End: 2022-02-05 | Stop reason: SDUPTHER

## 2021-07-05 RX ORDER — ATORVASTATIN CALCIUM 40 MG/1
40 TABLET, FILM COATED ORAL NIGHTLY
Qty: 30 TABLET | Refills: 1 | Status: SHIPPED | OUTPATIENT
Start: 2021-07-05 | End: 2022-07-05

## 2021-07-05 RX ORDER — INSULIN PUMP SYRINGE, 3 ML
EACH MISCELLANEOUS
Qty: 1 EACH | Refills: 0 | Status: SHIPPED | OUTPATIENT
Start: 2021-07-05 | End: 2022-07-05

## 2021-07-05 RX ORDER — AMOXICILLIN AND CLAVULANATE POTASSIUM 875; 125 MG/1; MG/1
1 TABLET, FILM COATED ORAL EVERY 12 HOURS
Qty: 6 TABLET | Refills: 0 | Status: SHIPPED | OUTPATIENT
Start: 2021-07-05 | End: 2021-07-08

## 2021-07-05 RX ORDER — NIFEDIPINE 30 MG/1
30 TABLET, EXTENDED RELEASE ORAL DAILY
Qty: 30 TABLET | Refills: 1 | Status: SHIPPED | OUTPATIENT
Start: 2021-07-05 | End: 2022-06-30 | Stop reason: CLARIF

## 2021-07-05 RX ORDER — METOPROLOL SUCCINATE 100 MG/1
100 TABLET, EXTENDED RELEASE ORAL DAILY
Qty: 30 TABLET | Refills: 1 | Status: ON HOLD | OUTPATIENT
Start: 2021-07-05 | End: 2022-07-02 | Stop reason: SDUPTHER

## 2021-07-05 RX ORDER — HYDRALAZINE HYDROCHLORIDE 100 MG/1
100 TABLET, FILM COATED ORAL EVERY 12 HOURS
Qty: 60 TABLET | Refills: 1 | Status: CANCELLED | OUTPATIENT
Start: 2021-07-05 | End: 2022-07-05

## 2021-07-05 RX ORDER — L. ACIDOPHILUS/L.BULGARICUS 100MM CELL
1 GRANULES IN PACKET (EA) ORAL 2 TIMES DAILY
Qty: 20 PACKET | Refills: 0 | Status: SHIPPED | OUTPATIENT
Start: 2021-07-05 | End: 2021-07-15

## 2021-07-05 RX ADMIN — INSULIN ASPART 12 UNITS: 100 INJECTION, SOLUTION INTRAVENOUS; SUBCUTANEOUS at 09:07

## 2021-07-05 RX ADMIN — INSULIN DETEMIR 10 UNITS: 100 INJECTION, SOLUTION SUBCUTANEOUS at 01:07

## 2021-07-05 RX ADMIN — AMOXICILLIN AND CLAVULANATE POTASSIUM 1 TABLET: 875; 125 TABLET, FILM COATED ORAL at 09:07

## 2021-07-05 RX ADMIN — APIXABAN 5 MG: 5 TABLET, FILM COATED ORAL at 09:07

## 2021-07-05 RX ADMIN — NIFEDIPINE 30 MG: 30 TABLET, FILM COATED, EXTENDED RELEASE ORAL at 09:07

## 2021-07-05 RX ADMIN — HYDRALAZINE HYDROCHLORIDE 15 MG: 20 INJECTION INTRAMUSCULAR; INTRAVENOUS at 05:07

## 2021-07-05 RX ADMIN — METOPROLOL SUCCINATE 100 MG: 50 TABLET, EXTENDED RELEASE ORAL at 09:07

## 2021-07-05 RX ADMIN — ASPIRIN 81 MG CHEWABLE TABLET 81 MG: 81 TABLET CHEWABLE at 09:07

## 2021-07-05 RX ADMIN — PIPERACILLIN SODIUM AND TAZOBACTAM SODIUM 4.5 G: 4; .5 INJECTION, POWDER, FOR SOLUTION INTRAVENOUS at 03:07

## 2021-07-05 RX ADMIN — LACTOBACILLUS ACIDOPHILUS / LACTOBACILLUS BULGARICUS 1 EACH: 100 MILLION CFU STRENGTH GRANULES at 09:07

## 2021-07-05 RX ADMIN — HYDRALAZINE HYDROCHLORIDE 100 MG: 25 TABLET, FILM COATED ORAL at 09:07

## 2021-07-05 RX ADMIN — INSULIN ASPART 10 UNITS: 100 INJECTION, SOLUTION INTRAVENOUS; SUBCUTANEOUS at 01:07

## 2021-07-05 RX ADMIN — INSULIN ASPART 12 UNITS: 100 INJECTION, SOLUTION INTRAVENOUS; SUBCUTANEOUS at 11:07

## 2021-07-06 ENCOUNTER — PATIENT OUTREACH (OUTPATIENT)
Dept: ADMINISTRATIVE | Facility: CLINIC | Age: 61
End: 2021-07-06

## 2021-07-06 LAB
BACTERIA BLD CULT: NORMAL
BACTERIA BLD CULT: NORMAL

## 2021-07-28 ENCOUNTER — HOSPITAL ENCOUNTER (EMERGENCY)
Facility: HOSPITAL | Age: 61
Discharge: HOME | End: 2021-07-29
Attending: EMERGENCY MEDICINE
Payer: MEDICAID

## 2021-07-28 DIAGNOSIS — I48.92 ATRIAL FLUTTER WITH RAPID VENTRICULAR RESPONSE: Primary | ICD-10-CM

## 2021-07-28 DIAGNOSIS — E83.42 HYPOMAGNESEMIA: ICD-10-CM

## 2021-07-28 DIAGNOSIS — R06.02 SHORTNESS OF BREATH: ICD-10-CM

## 2021-07-28 DIAGNOSIS — R00.0 TACHYCARDIA: ICD-10-CM

## 2021-07-28 LAB
ALBUMIN SERPL BCP-MCNC: 4.1 G/DL (ref 3.5–5.2)
ALP SERPL-CCNC: 86 U/L (ref 55–135)
ALT SERPL W/O P-5'-P-CCNC: 16 U/L (ref 10–44)
ANION GAP SERPL CALC-SCNC: 12 MMOL/L (ref 8–16)
AST SERPL-CCNC: 24 U/L (ref 10–40)
BASOPHILS # BLD AUTO: 0.03 K/UL (ref 0–0.2)
BASOPHILS NFR BLD: 0.4 % (ref 0–1.9)
BILIRUB SERPL-MCNC: 0.3 MG/DL (ref 0.1–1)
BNP SERPL-MCNC: 94 PG/ML (ref 0–99)
BUN SERPL-MCNC: 22 MG/DL (ref 8–23)
CALCIUM SERPL-MCNC: 9.6 MG/DL (ref 8.7–10.5)
CHLORIDE SERPL-SCNC: 109 MMOL/L (ref 95–110)
CO2 SERPL-SCNC: 24 MMOL/L (ref 23–29)
CREAT SERPL-MCNC: 1.3 MG/DL (ref 0.5–1.4)
CTP QC/QA: YES
DIFFERENTIAL METHOD: ABNORMAL
EOSINOPHIL # BLD AUTO: 0.1 K/UL (ref 0–0.5)
EOSINOPHIL NFR BLD: 0.9 % (ref 0–8)
ERYTHROCYTE [DISTWIDTH] IN BLOOD BY AUTOMATED COUNT: 12.7 % (ref 11.5–14.5)
EST. GFR  (AFRICAN AMERICAN): >60 ML/MIN/1.73 M^2
EST. GFR  (NON AFRICAN AMERICAN): 58 ML/MIN/1.73 M^2
GLUCOSE SERPL-MCNC: 86 MG/DL (ref 70–110)
HCT VFR BLD AUTO: 40.2 % (ref 40–54)
HGB BLD-MCNC: 13.9 G/DL (ref 14–18)
IMM GRANULOCYTES # BLD AUTO: 0.03 K/UL (ref 0–0.04)
IMM GRANULOCYTES NFR BLD AUTO: 0.4 % (ref 0–0.5)
LYMPHOCYTES # BLD AUTO: 3.5 K/UL (ref 1–4.8)
LYMPHOCYTES NFR BLD: 43.7 % (ref 18–48)
MAGNESIUM SERPL-MCNC: 1.1 MG/DL (ref 1.6–2.6)
MCH RBC QN AUTO: 32 PG (ref 27–31)
MCHC RBC AUTO-ENTMCNC: 34.6 G/DL (ref 32–36)
MCV RBC AUTO: 93 FL (ref 82–98)
MONOCYTES # BLD AUTO: 0.9 K/UL (ref 0.3–1)
MONOCYTES NFR BLD: 11.4 % (ref 4–15)
NEUTROPHILS # BLD AUTO: 3.5 K/UL (ref 1.8–7.7)
NEUTROPHILS NFR BLD: 43.2 % (ref 38–73)
NRBC BLD-RTO: 0 /100 WBC
PLATELET # BLD AUTO: 155 K/UL (ref 150–450)
PMV BLD AUTO: 10.3 FL (ref 9.2–12.9)
POTASSIUM SERPL-SCNC: 3.6 MMOL/L (ref 3.5–5.1)
PROT SERPL-MCNC: 7.5 G/DL (ref 6–8.4)
RBC # BLD AUTO: 4.34 M/UL (ref 4.6–6.2)
SARS-COV-2 RDRP RESP QL NAA+PROBE: NEGATIVE
SODIUM SERPL-SCNC: 145 MMOL/L (ref 136–145)
TROPONIN I SERPL DL<=0.01 NG/ML-MCNC: 0.07 NG/ML (ref 0–0.03)
TSH SERPL DL<=0.005 MIU/L-ACNC: 0.83 UIU/ML (ref 0.4–4)
WBC # BLD AUTO: 8.08 K/UL (ref 3.9–12.7)

## 2021-07-28 PROCEDURE — 80053 COMPREHEN METABOLIC PANEL: CPT | Performed by: EMERGENCY MEDICINE

## 2021-07-28 PROCEDURE — 99285 EMERGENCY DEPT VISIT HI MDM: CPT | Mod: 25

## 2021-07-28 PROCEDURE — 93010 ELECTROCARDIOGRAM REPORT: CPT | Mod: ,,, | Performed by: INTERNAL MEDICINE

## 2021-07-28 PROCEDURE — U0002 COVID-19 LAB TEST NON-CDC: HCPCS | Performed by: EMERGENCY MEDICINE

## 2021-07-28 PROCEDURE — 83735 ASSAY OF MAGNESIUM: CPT | Performed by: EMERGENCY MEDICINE

## 2021-07-28 PROCEDURE — 84484 ASSAY OF TROPONIN QUANT: CPT | Performed by: EMERGENCY MEDICINE

## 2021-07-28 PROCEDURE — 84443 ASSAY THYROID STIM HORMONE: CPT | Performed by: EMERGENCY MEDICINE

## 2021-07-28 PROCEDURE — 93005 ELECTROCARDIOGRAM TRACING: CPT

## 2021-07-28 PROCEDURE — 93010 EKG 12-LEAD: ICD-10-PCS | Mod: ,,, | Performed by: INTERNAL MEDICINE

## 2021-07-28 PROCEDURE — 63600175 PHARM REV CODE 636 W HCPCS: Performed by: EMERGENCY MEDICINE

## 2021-07-28 PROCEDURE — 96375 TX/PRO/DX INJ NEW DRUG ADDON: CPT

## 2021-07-28 PROCEDURE — 96376 TX/PRO/DX INJ SAME DRUG ADON: CPT

## 2021-07-28 PROCEDURE — 25000003 PHARM REV CODE 250: Performed by: EMERGENCY MEDICINE

## 2021-07-28 PROCEDURE — 96365 THER/PROPH/DIAG IV INF INIT: CPT

## 2021-07-28 PROCEDURE — 83880 ASSAY OF NATRIURETIC PEPTIDE: CPT | Performed by: EMERGENCY MEDICINE

## 2021-07-28 PROCEDURE — 85025 COMPLETE CBC W/AUTO DIFF WBC: CPT | Performed by: EMERGENCY MEDICINE

## 2021-07-28 RX ORDER — METOPROLOL SUCCINATE 50 MG/1
50 TABLET, EXTENDED RELEASE ORAL
Status: COMPLETED | OUTPATIENT
Start: 2021-07-28 | End: 2021-07-29

## 2021-07-28 RX ORDER — MAGNESIUM SULFATE 1 G/100ML
1 INJECTION INTRAVENOUS
Status: COMPLETED | OUTPATIENT
Start: 2021-07-28 | End: 2021-07-28

## 2021-07-28 RX ORDER — METOPROLOL TARTRATE 1 MG/ML
5 INJECTION, SOLUTION INTRAVENOUS
Status: COMPLETED | OUTPATIENT
Start: 2021-07-28 | End: 2021-07-28

## 2021-07-28 RX ADMIN — MAGNESIUM SULFATE 1 G: 1 INJECTION INTRAVENOUS at 10:07

## 2021-07-28 RX ADMIN — METOROPROLOL TARTRATE 5 MG: 5 INJECTION, SOLUTION INTRAVENOUS at 09:07

## 2021-07-28 RX ADMIN — METOROPROLOL TARTRATE 5 MG: 5 INJECTION, SOLUTION INTRAVENOUS at 11:07

## 2021-07-29 VITALS
HEART RATE: 91 BPM | DIASTOLIC BLOOD PRESSURE: 89 MMHG | OXYGEN SATURATION: 96 % | SYSTOLIC BLOOD PRESSURE: 129 MMHG | WEIGHT: 175 LBS | RESPIRATION RATE: 19 BRPM | BODY MASS INDEX: 28.25 KG/M2 | TEMPERATURE: 99 F

## 2021-07-29 PROCEDURE — 25000003 PHARM REV CODE 250: Performed by: EMERGENCY MEDICINE

## 2021-07-29 RX ADMIN — METOPROLOL SUCCINATE 50 MG: 50 TABLET, EXTENDED RELEASE ORAL at 12:07

## 2021-11-20 PROCEDURE — 96365 THER/PROPH/DIAG IV INF INIT: CPT

## 2021-11-20 PROCEDURE — 96375 TX/PRO/DX INJ NEW DRUG ADDON: CPT

## 2021-11-20 PROCEDURE — 99285 EMERGENCY DEPT VISIT HI MDM: CPT | Mod: 25

## 2021-11-20 PROCEDURE — 96366 THER/PROPH/DIAG IV INF ADDON: CPT

## 2021-11-21 ENCOUNTER — HOSPITAL ENCOUNTER (EMERGENCY)
Facility: HOSPITAL | Age: 61
Discharge: LEFT BEFORE DISCHARGE | DRG: 189 | End: 2021-11-21
Attending: EMERGENCY MEDICINE | Admitting: INTERNAL MEDICINE
Payer: MEDICAID

## 2021-11-21 VITALS
DIASTOLIC BLOOD PRESSURE: 85 MMHG | OXYGEN SATURATION: 97 % | WEIGHT: 175 LBS | BODY MASS INDEX: 28.25 KG/M2 | HEART RATE: 95 BPM | SYSTOLIC BLOOD PRESSURE: 139 MMHG | TEMPERATURE: 98 F | RESPIRATION RATE: 20 BRPM

## 2021-11-21 DIAGNOSIS — R06.02 SHORTNESS OF BREATH: ICD-10-CM

## 2021-11-21 DIAGNOSIS — R07.9 CHEST PAIN: ICD-10-CM

## 2021-11-21 DIAGNOSIS — I21.4 NSTEMI (NON-ST ELEVATED MYOCARDIAL INFARCTION): Primary | ICD-10-CM

## 2021-11-21 PROBLEM — F17.210 MODERATE CIGARETTE SMOKER: Status: ACTIVE | Noted: 2021-11-21

## 2021-11-21 PROBLEM — A41.9 SEVERE SEPSIS: Status: RESOLVED | Noted: 2021-07-01 | Resolved: 2021-11-21

## 2021-11-21 PROBLEM — J44.9 COPD (CHRONIC OBSTRUCTIVE PULMONARY DISEASE): Status: ACTIVE | Noted: 2021-11-21

## 2021-11-21 PROBLEM — R65.20 SEVERE SEPSIS: Status: RESOLVED | Noted: 2021-07-01 | Resolved: 2021-11-21

## 2021-11-21 PROBLEM — J20.9 ACUTE BRONCHITIS WITH BRONCHOSPASM: Status: ACTIVE | Noted: 2021-11-21

## 2021-11-21 PROBLEM — Z86.79 PERSONAL HISTORY OF ATRIAL FLUTTER: Status: ACTIVE | Noted: 2021-06-09

## 2021-11-21 LAB
ALBUMIN SERPL BCP-MCNC: 3.6 G/DL (ref 3.5–5.2)
ALBUMIN SERPL BCP-MCNC: 3.7 G/DL (ref 3.5–5.2)
ALP SERPL-CCNC: 74 U/L (ref 55–135)
ALP SERPL-CCNC: 82 U/L (ref 55–135)
ALT SERPL W/O P-5'-P-CCNC: 25 U/L (ref 10–44)
ALT SERPL W/O P-5'-P-CCNC: 26 U/L (ref 10–44)
ANION GAP SERPL CALC-SCNC: 11 MMOL/L (ref 8–16)
ANION GAP SERPL CALC-SCNC: 12 MMOL/L (ref 8–16)
AST SERPL-CCNC: 39 U/L (ref 10–40)
AST SERPL-CCNC: 39 U/L (ref 10–40)
BASOPHILS # BLD AUTO: 0.02 K/UL (ref 0–0.2)
BASOPHILS # BLD AUTO: 0.07 K/UL (ref 0–0.2)
BASOPHILS NFR BLD: 0.2 % (ref 0–1.9)
BASOPHILS NFR BLD: 0.6 % (ref 0–1.9)
BILIRUB SERPL-MCNC: 0.4 MG/DL (ref 0.1–1)
BILIRUB SERPL-MCNC: 0.5 MG/DL (ref 0.1–1)
BNP SERPL-MCNC: 390 PG/ML (ref 0–99)
BUN SERPL-MCNC: 26 MG/DL (ref 8–23)
BUN SERPL-MCNC: 26 MG/DL (ref 8–23)
CALCIUM SERPL-MCNC: 9.2 MG/DL (ref 8.7–10.5)
CALCIUM SERPL-MCNC: 9.4 MG/DL (ref 8.7–10.5)
CHLORIDE SERPL-SCNC: 106 MMOL/L (ref 95–110)
CHLORIDE SERPL-SCNC: 109 MMOL/L (ref 95–110)
CO2 SERPL-SCNC: 19 MMOL/L (ref 23–29)
CO2 SERPL-SCNC: 22 MMOL/L (ref 23–29)
CREAT SERPL-MCNC: 1.4 MG/DL (ref 0.5–1.4)
CREAT SERPL-MCNC: 1.5 MG/DL (ref 0.5–1.4)
CTP QC/QA: YES
DIFFERENTIAL METHOD: ABNORMAL
DIFFERENTIAL METHOD: ABNORMAL
EOSINOPHIL # BLD AUTO: 0 K/UL (ref 0–0.5)
EOSINOPHIL # BLD AUTO: 0.1 K/UL (ref 0–0.5)
EOSINOPHIL NFR BLD: 0 % (ref 0–8)
EOSINOPHIL NFR BLD: 0.5 % (ref 0–8)
ERYTHROCYTE [DISTWIDTH] IN BLOOD BY AUTOMATED COUNT: 13.6 % (ref 11.5–14.5)
ERYTHROCYTE [DISTWIDTH] IN BLOOD BY AUTOMATED COUNT: 13.6 % (ref 11.5–14.5)
EST. GFR  (AFRICAN AMERICAN): 56 ML/MIN/1.73 M^2
EST. GFR  (AFRICAN AMERICAN): >60 ML/MIN/1.73 M^2
EST. GFR  (NON AFRICAN AMERICAN): 49 ML/MIN/1.73 M^2
EST. GFR  (NON AFRICAN AMERICAN): 53 ML/MIN/1.73 M^2
GLUCOSE SERPL-MCNC: 136 MG/DL (ref 70–110)
GLUCOSE SERPL-MCNC: 244 MG/DL (ref 70–110)
GLUCOSE SERPL-MCNC: 266 MG/DL (ref 70–110)
HCT VFR BLD AUTO: 41.6 % (ref 40–54)
HCT VFR BLD AUTO: 43 % (ref 40–54)
HGB BLD-MCNC: 14.4 G/DL (ref 14–18)
HGB BLD-MCNC: 14.5 G/DL (ref 14–18)
IMM GRANULOCYTES # BLD AUTO: 0.04 K/UL (ref 0–0.04)
IMM GRANULOCYTES # BLD AUTO: 0.04 K/UL (ref 0–0.04)
IMM GRANULOCYTES NFR BLD AUTO: 0.3 % (ref 0–0.5)
IMM GRANULOCYTES NFR BLD AUTO: 0.4 % (ref 0–0.5)
LYMPHOCYTES # BLD AUTO: 0.4 K/UL (ref 1–4.8)
LYMPHOCYTES # BLD AUTO: 3.7 K/UL (ref 1–4.8)
LYMPHOCYTES NFR BLD: 29.6 % (ref 18–48)
LYMPHOCYTES NFR BLD: 4.6 % (ref 18–48)
MAGNESIUM SERPL-MCNC: 2.1 MG/DL (ref 1.6–2.6)
MCH RBC QN AUTO: 33.3 PG (ref 27–31)
MCH RBC QN AUTO: 33.3 PG (ref 27–31)
MCHC RBC AUTO-ENTMCNC: 33.7 G/DL (ref 32–36)
MCHC RBC AUTO-ENTMCNC: 34.6 G/DL (ref 32–36)
MCV RBC AUTO: 96 FL (ref 82–98)
MCV RBC AUTO: 99 FL (ref 82–98)
MONOCYTES # BLD AUTO: 0.2 K/UL (ref 0.3–1)
MONOCYTES # BLD AUTO: 1 K/UL (ref 0.3–1)
MONOCYTES NFR BLD: 2.3 % (ref 4–15)
MONOCYTES NFR BLD: 8.2 % (ref 4–15)
NEUTROPHILS # BLD AUTO: 7.5 K/UL (ref 1.8–7.7)
NEUTROPHILS # BLD AUTO: 8.9 K/UL (ref 1.8–7.7)
NEUTROPHILS NFR BLD: 60.8 % (ref 38–73)
NEUTROPHILS NFR BLD: 92.5 % (ref 38–73)
NRBC BLD-RTO: 0 /100 WBC
NRBC BLD-RTO: 0 /100 WBC
PHOSPHATE SERPL-MCNC: 1.8 MG/DL (ref 2.7–4.5)
PLATELET # BLD AUTO: 201 K/UL (ref 150–450)
PLATELET # BLD AUTO: 219 K/UL (ref 150–450)
PMV BLD AUTO: 10.1 FL (ref 9.2–12.9)
PMV BLD AUTO: 10.7 FL (ref 9.2–12.9)
POCT GLUCOSE: 266 MG/DL (ref 70–110)
POTASSIUM SERPL-SCNC: 3.6 MMOL/L (ref 3.5–5.1)
POTASSIUM SERPL-SCNC: 3.7 MMOL/L (ref 3.5–5.1)
PROT SERPL-MCNC: 6.7 G/DL (ref 6–8.4)
PROT SERPL-MCNC: 6.7 G/DL (ref 6–8.4)
RBC # BLD AUTO: 4.32 M/UL (ref 4.6–6.2)
RBC # BLD AUTO: 4.35 M/UL (ref 4.6–6.2)
SARS-COV-2 RDRP RESP QL NAA+PROBE: NEGATIVE
SODIUM SERPL-SCNC: 139 MMOL/L (ref 136–145)
SODIUM SERPL-SCNC: 140 MMOL/L (ref 136–145)
TROPONIN I SERPL DL<=0.01 NG/ML-MCNC: 0.15 NG/ML (ref 0–0.03)
TROPONIN I SERPL DL<=0.01 NG/ML-MCNC: 0.16 NG/ML (ref 0–0.03)
WBC # BLD AUTO: 12.37 K/UL (ref 3.9–12.7)
WBC # BLD AUTO: 9.57 K/UL (ref 3.9–12.7)

## 2021-11-21 PROCEDURE — 25000003 PHARM REV CODE 250: Performed by: EMERGENCY MEDICINE

## 2021-11-21 PROCEDURE — 93010 ELECTROCARDIOGRAM REPORT: CPT | Mod: ,,, | Performed by: INTERNAL MEDICINE

## 2021-11-21 PROCEDURE — 12000002 HC ACUTE/MED SURGE SEMI-PRIVATE ROOM

## 2021-11-21 PROCEDURE — 94640 AIRWAY INHALATION TREATMENT: CPT

## 2021-11-21 PROCEDURE — 85025 COMPLETE CBC W/AUTO DIFF WBC: CPT | Mod: 91 | Performed by: INTERNAL MEDICINE

## 2021-11-21 PROCEDURE — 83880 ASSAY OF NATRIURETIC PEPTIDE: CPT | Performed by: EMERGENCY MEDICINE

## 2021-11-21 PROCEDURE — 85025 COMPLETE CBC W/AUTO DIFF WBC: CPT | Performed by: EMERGENCY MEDICINE

## 2021-11-21 PROCEDURE — 25000003 PHARM REV CODE 250: Performed by: INTERNAL MEDICINE

## 2021-11-21 PROCEDURE — 63600175 PHARM REV CODE 636 W HCPCS: Performed by: EMERGENCY MEDICINE

## 2021-11-21 PROCEDURE — 93010 EKG 12-LEAD: ICD-10-PCS | Mod: ,,, | Performed by: INTERNAL MEDICINE

## 2021-11-21 PROCEDURE — 80053 COMPREHEN METABOLIC PANEL: CPT | Mod: 91 | Performed by: INTERNAL MEDICINE

## 2021-11-21 PROCEDURE — 84100 ASSAY OF PHOSPHORUS: CPT | Performed by: INTERNAL MEDICINE

## 2021-11-21 PROCEDURE — U0002 COVID-19 LAB TEST NON-CDC: HCPCS | Performed by: EMERGENCY MEDICINE

## 2021-11-21 PROCEDURE — 80053 COMPREHEN METABOLIC PANEL: CPT | Performed by: EMERGENCY MEDICINE

## 2021-11-21 PROCEDURE — 84484 ASSAY OF TROPONIN QUANT: CPT | Mod: 91 | Performed by: INTERNAL MEDICINE

## 2021-11-21 PROCEDURE — 84484 ASSAY OF TROPONIN QUANT: CPT | Performed by: EMERGENCY MEDICINE

## 2021-11-21 PROCEDURE — 25000242 PHARM REV CODE 250 ALT 637 W/ HCPCS: Performed by: EMERGENCY MEDICINE

## 2021-11-21 PROCEDURE — 83735 ASSAY OF MAGNESIUM: CPT | Performed by: INTERNAL MEDICINE

## 2021-11-21 PROCEDURE — 63600175 PHARM REV CODE 636 W HCPCS: Performed by: INTERNAL MEDICINE

## 2021-11-21 PROCEDURE — 93005 ELECTROCARDIOGRAM TRACING: CPT

## 2021-11-21 RX ORDER — ACETAMINOPHEN 325 MG/1
650 TABLET ORAL EVERY 4 HOURS PRN
Status: DISCONTINUED | OUTPATIENT
Start: 2021-11-21 | End: 2021-11-21 | Stop reason: HOSPADM

## 2021-11-21 RX ORDER — IPRATROPIUM BROMIDE AND ALBUTEROL SULFATE 2.5; .5 MG/3ML; MG/3ML
3 SOLUTION RESPIRATORY (INHALATION)
Status: COMPLETED | OUTPATIENT
Start: 2021-11-21 | End: 2021-11-21

## 2021-11-21 RX ORDER — GLUCAGON 1 MG
1 KIT INJECTION
Status: DISCONTINUED | OUTPATIENT
Start: 2021-11-21 | End: 2021-11-21 | Stop reason: HOSPADM

## 2021-11-21 RX ORDER — MAG HYDROX/ALUMINUM HYD/SIMETH 200-200-20
30 SUSPENSION, ORAL (FINAL DOSE FORM) ORAL 4 TIMES DAILY PRN
Status: DISCONTINUED | OUTPATIENT
Start: 2021-11-21 | End: 2021-11-21 | Stop reason: HOSPADM

## 2021-11-21 RX ORDER — PROMETHAZINE HYDROCHLORIDE 25 MG/1
25 TABLET ORAL EVERY 6 HOURS PRN
Status: DISCONTINUED | OUTPATIENT
Start: 2021-11-21 | End: 2021-11-21 | Stop reason: HOSPADM

## 2021-11-21 RX ORDER — HYDROCODONE BITARTRATE AND ACETAMINOPHEN 5; 325 MG/1; MG/1
1 TABLET ORAL EVERY 6 HOURS PRN
Status: DISCONTINUED | OUTPATIENT
Start: 2021-11-21 | End: 2021-11-21 | Stop reason: HOSPADM

## 2021-11-21 RX ORDER — ATORVASTATIN CALCIUM 10 MG/1
40 TABLET, FILM COATED ORAL NIGHTLY
Status: DISCONTINUED | OUTPATIENT
Start: 2021-11-21 | End: 2021-11-21 | Stop reason: HOSPADM

## 2021-11-21 RX ORDER — PREDNISONE 20 MG/1
40 TABLET ORAL DAILY
Status: DISCONTINUED | OUTPATIENT
Start: 2021-11-21 | End: 2021-11-21 | Stop reason: HOSPADM

## 2021-11-21 RX ORDER — ENOXAPARIN SODIUM 100 MG/ML
1 INJECTION SUBCUTANEOUS ONCE
Status: COMPLETED | OUTPATIENT
Start: 2021-11-21 | End: 2021-11-21

## 2021-11-21 RX ORDER — AMOXICILLIN 250 MG
1 CAPSULE ORAL 2 TIMES DAILY PRN
Status: DISCONTINUED | OUTPATIENT
Start: 2021-11-21 | End: 2021-11-21 | Stop reason: HOSPADM

## 2021-11-21 RX ORDER — HYDRALAZINE HYDROCHLORIDE 25 MG/1
100 TABLET, FILM COATED ORAL
Status: COMPLETED | OUTPATIENT
Start: 2021-11-21 | End: 2021-11-21

## 2021-11-21 RX ORDER — NAPROXEN SODIUM 220 MG/1
81 TABLET, FILM COATED ORAL DAILY
Status: DISCONTINUED | OUTPATIENT
Start: 2021-11-21 | End: 2021-11-21 | Stop reason: HOSPADM

## 2021-11-21 RX ORDER — FUROSEMIDE 10 MG/ML
20 INJECTION INTRAMUSCULAR; INTRAVENOUS ONCE
Status: COMPLETED | OUTPATIENT
Start: 2021-11-21 | End: 2021-11-21

## 2021-11-21 RX ORDER — CLONIDINE HYDROCHLORIDE 0.1 MG/1
0.2 TABLET ORAL EVERY 4 HOURS PRN
Status: DISCONTINUED | OUTPATIENT
Start: 2021-11-21 | End: 2021-11-21 | Stop reason: HOSPADM

## 2021-11-21 RX ORDER — METOPROLOL TARTRATE 1 MG/ML
5 INJECTION, SOLUTION INTRAVENOUS
Status: COMPLETED | OUTPATIENT
Start: 2021-11-21 | End: 2021-11-21

## 2021-11-21 RX ORDER — HYDRALAZINE HYDROCHLORIDE 25 MG/1
100 TABLET, FILM COATED ORAL EVERY 8 HOURS
Status: DISCONTINUED | OUTPATIENT
Start: 2021-11-21 | End: 2021-11-21 | Stop reason: HOSPADM

## 2021-11-21 RX ORDER — INSULIN ASPART 100 [IU]/ML
0-5 INJECTION, SOLUTION INTRAVENOUS; SUBCUTANEOUS EVERY 6 HOURS PRN
Status: DISCONTINUED | OUTPATIENT
Start: 2021-11-21 | End: 2021-11-21 | Stop reason: HOSPADM

## 2021-11-21 RX ORDER — MAGNESIUM SULFATE HEPTAHYDRATE 40 MG/ML
2 INJECTION, SOLUTION INTRAVENOUS ONCE
Status: COMPLETED | OUTPATIENT
Start: 2021-11-21 | End: 2021-11-21

## 2021-11-21 RX ORDER — LEVALBUTEROL INHALATION SOLUTION 0.63 MG/3ML
0.63 SOLUTION RESPIRATORY (INHALATION) EVERY 8 HOURS
Status: DISCONTINUED | OUTPATIENT
Start: 2021-11-21 | End: 2021-11-21 | Stop reason: HOSPADM

## 2021-11-21 RX ORDER — METOPROLOL SUCCINATE 50 MG/1
100 TABLET, EXTENDED RELEASE ORAL
Status: COMPLETED | OUTPATIENT
Start: 2021-11-21 | End: 2021-11-21

## 2021-11-21 RX ORDER — NIFEDIPINE 30 MG/1
30 TABLET, EXTENDED RELEASE ORAL DAILY
Status: DISCONTINUED | OUTPATIENT
Start: 2021-11-21 | End: 2021-11-21 | Stop reason: HOSPADM

## 2021-11-21 RX ORDER — METOPROLOL SUCCINATE 50 MG/1
100 TABLET, EXTENDED RELEASE ORAL DAILY
Status: DISCONTINUED | OUTPATIENT
Start: 2021-11-21 | End: 2021-11-21 | Stop reason: HOSPADM

## 2021-11-21 RX ORDER — GUAIFENESIN/DEXTROMETHORPHAN 100-10MG/5
10 SYRUP ORAL EVERY 6 HOURS
Status: DISCONTINUED | OUTPATIENT
Start: 2021-11-21 | End: 2021-11-21 | Stop reason: HOSPADM

## 2021-11-21 RX ORDER — SODIUM CHLORIDE 0.9 % (FLUSH) 0.9 %
10 SYRINGE (ML) INJECTION EVERY 12 HOURS PRN
Status: DISCONTINUED | OUTPATIENT
Start: 2021-11-21 | End: 2021-11-21 | Stop reason: HOSPADM

## 2021-11-21 RX ORDER — LEVOFLOXACIN 500 MG/1
500 TABLET, FILM COATED ORAL DAILY
Status: DISCONTINUED | OUTPATIENT
Start: 2021-11-21 | End: 2021-11-21 | Stop reason: HOSPADM

## 2021-11-21 RX ORDER — BENZONATATE 100 MG/1
100 CAPSULE ORAL 3 TIMES DAILY PRN
Status: DISCONTINUED | OUTPATIENT
Start: 2021-11-21 | End: 2021-11-21 | Stop reason: HOSPADM

## 2021-11-21 RX ORDER — IPRATROPIUM BROMIDE 0.5 MG/2.5ML
0.5 SOLUTION RESPIRATORY (INHALATION) EVERY 8 HOURS
Status: DISCONTINUED | OUTPATIENT
Start: 2021-11-21 | End: 2021-11-21 | Stop reason: HOSPADM

## 2021-11-21 RX ORDER — ONDANSETRON 2 MG/ML
4 INJECTION INTRAMUSCULAR; INTRAVENOUS EVERY 8 HOURS PRN
Status: DISCONTINUED | OUTPATIENT
Start: 2021-11-21 | End: 2021-11-21 | Stop reason: HOSPADM

## 2021-11-21 RX ORDER — NALOXONE HCL 0.4 MG/ML
0.02 VIAL (ML) INJECTION
Status: DISCONTINUED | OUTPATIENT
Start: 2021-11-21 | End: 2021-11-21 | Stop reason: HOSPADM

## 2021-11-21 RX ORDER — SIMETHICONE 80 MG
1 TABLET,CHEWABLE ORAL 4 TIMES DAILY PRN
Status: DISCONTINUED | OUTPATIENT
Start: 2021-11-21 | End: 2021-11-21 | Stop reason: HOSPADM

## 2021-11-21 RX ORDER — ASPIRIN 325 MG
325 TABLET ORAL
Status: COMPLETED | OUTPATIENT
Start: 2021-11-21 | End: 2021-11-21

## 2021-11-21 RX ORDER — METHYLPREDNISOLONE SOD SUCC 125 MG
125 VIAL (EA) INJECTION
Status: COMPLETED | OUTPATIENT
Start: 2021-11-21 | End: 2021-11-21

## 2021-11-21 RX ORDER — TALC
6 POWDER (GRAM) TOPICAL NIGHTLY PRN
Status: DISCONTINUED | OUTPATIENT
Start: 2021-11-21 | End: 2021-11-21 | Stop reason: HOSPADM

## 2021-11-21 RX ADMIN — ENOXAPARIN SODIUM 80 MG: 80 INJECTION SUBCUTANEOUS at 04:11

## 2021-11-21 RX ADMIN — NITROGLYCERIN 1 INCH: 20 OINTMENT TOPICAL at 01:11

## 2021-11-21 RX ADMIN — MAGNESIUM SULFATE 2 G: 2 INJECTION INTRAVENOUS at 12:11

## 2021-11-21 RX ADMIN — GUAIFENESIN AND DEXTROMETHORPHAN 10 ML: 100; 10 SYRUP ORAL at 04:11

## 2021-11-21 RX ADMIN — FUROSEMIDE 20 MG: 10 INJECTION, SOLUTION INTRAMUSCULAR; INTRAVENOUS at 04:11

## 2021-11-21 RX ADMIN — IPRATROPIUM BROMIDE AND ALBUTEROL SULFATE 3 ML: 2.5; .5 SOLUTION RESPIRATORY (INHALATION) at 12:11

## 2021-11-21 RX ADMIN — HYDRALAZINE HYDROCHLORIDE 100 MG: 25 TABLET ORAL at 12:11

## 2021-11-21 RX ADMIN — LEVOFLOXACIN 500 MG: 500 TABLET, FILM COATED ORAL at 04:11

## 2021-11-21 RX ADMIN — METOPROLOL SUCCINATE 100 MG: 50 TABLET, EXTENDED RELEASE ORAL at 02:11

## 2021-11-21 RX ADMIN — HYDRALAZINE HYDROCHLORIDE 100 MG: 25 TABLET ORAL at 05:11

## 2021-11-21 RX ADMIN — POTASSIUM BICARBONATE 25 MEQ: 978 TABLET, EFFERVESCENT ORAL at 04:11

## 2021-11-21 RX ADMIN — METHYLPREDNISOLONE SODIUM SUCCINATE 125 MG: 125 INJECTION, POWDER, FOR SOLUTION INTRAMUSCULAR; INTRAVENOUS at 12:11

## 2021-11-21 RX ADMIN — METOROPROLOL TARTRATE 5 MG: 5 INJECTION, SOLUTION INTRAVENOUS at 02:11

## 2021-11-21 RX ADMIN — ASPIRIN 325 MG ORAL TABLET 325 MG: 325 PILL ORAL at 01:11

## 2022-01-26 ENCOUNTER — HOSPITAL ENCOUNTER (INPATIENT)
Facility: HOSPITAL | Age: 62
LOS: 10 days | Discharge: HOME | DRG: 871 | End: 2022-02-05
Attending: EMERGENCY MEDICINE | Admitting: INTERNAL MEDICINE
Payer: MEDICAID

## 2022-01-26 DIAGNOSIS — I48.91 A-FIB: ICD-10-CM

## 2022-01-26 DIAGNOSIS — I48.92 ATRIAL FLUTTER WITH RAPID VENTRICULAR RESPONSE: Primary | ICD-10-CM

## 2022-01-26 DIAGNOSIS — J96.01 ACUTE RESPIRATORY FAILURE WITH HYPOXIA: ICD-10-CM

## 2022-01-26 DIAGNOSIS — I48.91 ATRIAL FIBRILLATION WITH RVR: ICD-10-CM

## 2022-01-26 DIAGNOSIS — A41.9 SEPSIS: ICD-10-CM

## 2022-01-26 DIAGNOSIS — R06.02 SOB (SHORTNESS OF BREATH): ICD-10-CM

## 2022-01-26 DIAGNOSIS — J96.01 ACUTE HYPOXEMIC RESPIRATORY FAILURE: ICD-10-CM

## 2022-01-26 DIAGNOSIS — R00.0 TACHYCARDIA: ICD-10-CM

## 2022-01-26 DIAGNOSIS — R07.9 CHEST PAIN: ICD-10-CM

## 2022-01-26 PROBLEM — I50.33 ACUTE ON CHRONIC DIASTOLIC CONGESTIVE HEART FAILURE: Status: ACTIVE | Noted: 2021-06-09

## 2022-01-26 PROBLEM — J43.1 PANLOBULAR EMPHYSEMA: Status: ACTIVE | Noted: 2022-01-26

## 2022-01-26 PROBLEM — Z79.4 TYPE 2 DIABETES MELLITUS, WITH LONG-TERM CURRENT USE OF INSULIN: Status: ACTIVE | Noted: 2022-01-26

## 2022-01-26 PROBLEM — E11.9 TYPE 2 DIABETES MELLITUS, WITH LONG-TERM CURRENT USE OF INSULIN: Status: ACTIVE | Noted: 2022-01-26

## 2022-01-26 LAB
ALBUMIN SERPL BCP-MCNC: 3.8 G/DL (ref 3.5–5.2)
ALP SERPL-CCNC: 83 U/L (ref 55–135)
ALT SERPL W/O P-5'-P-CCNC: 32 U/L (ref 10–44)
AMPHET+METHAMPHET UR QL: ABNORMAL
ANION GAP SERPL CALC-SCNC: 11 MMOL/L (ref 8–16)
AORTIC ROOT ANNULUS: 2.99 CM
AORTIC VALVE CUSP SEPERATION: 1.33 CM
ASCENDING AORTA: 3.23 CM
AST SERPL-CCNC: 47 U/L (ref 10–40)
AV INDEX (PROSTH): 1.08
AV MEAN GRADIENT: 2 MMHG
AV PEAK GRADIENT: 2 MMHG
AV VALVE AREA: 3.29 CM2
AV VELOCITY RATIO: 0.96
BARBITURATES UR QL SCN>200 NG/ML: NEGATIVE
BASOPHILS # BLD AUTO: 0.05 K/UL (ref 0–0.2)
BASOPHILS NFR BLD: 0.4 % (ref 0–1.9)
BENZODIAZ UR QL SCN>200 NG/ML: NEGATIVE
BILIRUB SERPL-MCNC: 0.9 MG/DL (ref 0.1–1)
BILIRUB UR QL STRIP: NEGATIVE
BNP SERPL-MCNC: 434 PG/ML (ref 0–99)
BSA FOR ECHO PROCEDURE: 1.91 M2
BUN SERPL-MCNC: 37 MG/DL (ref 8–23)
BZE UR QL SCN: NEGATIVE
CALCIUM SERPL-MCNC: 9.4 MG/DL (ref 8.7–10.5)
CANNABINOIDS UR QL SCN: NEGATIVE
CHLORIDE SERPL-SCNC: 104 MMOL/L (ref 95–110)
CLARITY UR: CLEAR
CO2 SERPL-SCNC: 24 MMOL/L (ref 23–29)
COLOR UR: YELLOW
CREAT SERPL-MCNC: 1.9 MG/DL (ref 0.5–1.4)
CREAT UR-MCNC: 60.9 MG/DL (ref 23–375)
CREAT UR-MCNC: 60.9 MG/DL (ref 23–375)
CTP QC/QA: YES
CTP QC/QA: YES
CV ECHO LV RWT: 0.46 CM
DIFFERENTIAL METHOD: ABNORMAL
DOP CALC AO PEAK VEL: 0.76 M/S
DOP CALC AO VTI: 9.81 CM
DOP CALC LVOT AREA: 3 CM2
DOP CALC LVOT DIAMETER: 1.97 CM
DOP CALC LVOT PEAK VEL: 0.73 M/S
DOP CALC LVOT STROKE VOLUME: 32.29 CM3
DOP CALCLVOT PEAK VEL VTI: 10.6 CM
ECHO LV POSTERIOR WALL: 1.13 CM (ref 0.6–1.1)
EJECTION FRACTION: 60 %
EOSINOPHIL # BLD AUTO: 0 K/UL (ref 0–0.5)
EOSINOPHIL NFR BLD: 0.2 % (ref 0–8)
ERYTHROCYTE [DISTWIDTH] IN BLOOD BY AUTOMATED COUNT: 13 % (ref 11.5–14.5)
EST. GFR  (AFRICAN AMERICAN): 42 ML/MIN/1.73 M^2
EST. GFR  (NON AFRICAN AMERICAN): 37 ML/MIN/1.73 M^2
FRACTIONAL SHORTENING: 32 % (ref 28–44)
GLUCOSE SERPL-MCNC: 130 MG/DL (ref 70–110)
GLUCOSE UR QL STRIP: NEGATIVE
HCT VFR BLD AUTO: 46.3 % (ref 40–54)
HGB BLD-MCNC: 15.4 G/DL (ref 14–18)
HGB UR QL STRIP: NEGATIVE
IMM GRANULOCYTES # BLD AUTO: 0.07 K/UL (ref 0–0.04)
IMM GRANULOCYTES NFR BLD AUTO: 0.6 % (ref 0–0.5)
INTERVENTRICULAR SEPTUM: 1.03 CM (ref 0.6–1.1)
IVRT: 115.34 MSEC
KETONES UR QL STRIP: NEGATIVE
LA MAJOR: 6.77 CM
LA MINOR: 6.34 CM
LA WIDTH: 4.94 CM
LACTATE SERPL-SCNC: 3.2 MMOL/L (ref 0.5–2.2)
LACTATE SERPL-SCNC: 6.6 MMOL/L (ref 0.5–2.2)
LACTATE SERPL-SCNC: 6.8 MMOL/L (ref 0.5–2.2)
LEFT ATRIUM SIZE: 5.3 CM
LEFT ATRIUM VOLUME INDEX: 77.9 ML/M2
LEFT ATRIUM VOLUME: 145.72 CM3
LEFT INTERNAL DIMENSION IN SYSTOLE: 3.38 CM (ref 2.1–4)
LEFT VENTRICLE DIASTOLIC VOLUME INDEX: 61.53 ML/M2
LEFT VENTRICLE DIASTOLIC VOLUME: 115.06 ML
LEFT VENTRICLE MASS INDEX: 106 G/M2
LEFT VENTRICLE SYSTOLIC VOLUME INDEX: 25 ML/M2
LEFT VENTRICLE SYSTOLIC VOLUME: 46.79 ML
LEFT VENTRICULAR INTERNAL DIMENSION IN DIASTOLE: 4.94 CM (ref 3.5–6)
LEFT VENTRICULAR MASS: 198.08 G
LEUKOCYTE ESTERASE UR QL STRIP: NEGATIVE
LYMPHOCYTES # BLD AUTO: 2.5 K/UL (ref 1–4.8)
LYMPHOCYTES NFR BLD: 20.9 % (ref 18–48)
MCH RBC QN AUTO: 32.9 PG (ref 27–31)
MCHC RBC AUTO-ENTMCNC: 33.3 G/DL (ref 32–36)
MCV RBC AUTO: 99 FL (ref 82–98)
METHADONE UR QL SCN>300 NG/ML: NEGATIVE
MONOCYTES # BLD AUTO: 1.2 K/UL (ref 0.3–1)
MONOCYTES NFR BLD: 9.5 % (ref 4–15)
NEUTROPHILS # BLD AUTO: 8.3 K/UL (ref 1.8–7.7)
NEUTROPHILS NFR BLD: 68.4 % (ref 38–73)
NITRITE UR QL STRIP: NEGATIVE
NRBC BLD-RTO: 0 /100 WBC
OPIATES UR QL SCN: NEGATIVE
PCP UR QL SCN>25 NG/ML: NEGATIVE
PH UR STRIP: 7 [PH] (ref 5–8)
PISA TR MAX VEL: 2.56 M/S
PLATELET # BLD AUTO: 188 K/UL (ref 150–450)
PMV BLD AUTO: 10.7 FL (ref 9.2–12.9)
POC MOLECULAR INFLUENZA A AGN: NEGATIVE
POC MOLECULAR INFLUENZA B AGN: NEGATIVE
POCT GLUCOSE: 151 MG/DL (ref 70–110)
POCT GLUCOSE: 165 MG/DL (ref 70–110)
POTASSIUM SERPL-SCNC: 4.6 MMOL/L (ref 3.5–5.1)
PROCALCITONIN SERPL IA-MCNC: 0.06 NG/ML
PROT SERPL-MCNC: 7 G/DL (ref 6–8.4)
PROT UR QL STRIP: ABNORMAL
PV PEAK VELOCITY: 0.76 CM/S
RA MAJOR: 6.16 CM
RA PRESSURE: 8 MMHG
RA WIDTH: 4.63 CM
RBC # BLD AUTO: 4.68 M/UL (ref 4.6–6.2)
RIGHT VENTRICULAR END-DIASTOLIC DIMENSION: 4.07 CM
RV TISSUE DOPPLER FREE WALL SYSTOLIC VELOCITY 1 (APICAL 4 CHAMBER VIEW): 8.4 CM/S
SARS-COV-2 RDRP RESP QL NAA+PROBE: NEGATIVE
SARS-COV-2 RNA RESP QL NAA+PROBE: NOT DETECTED
SARS-COV-2- CYCLE NUMBER: NORMAL
SINUS: 3.04 CM
SODIUM SERPL-SCNC: 139 MMOL/L (ref 136–145)
SODIUM UR-SCNC: 114 MMOL/L (ref 20–250)
SP GR UR STRIP: 1.01 (ref 1–1.03)
STJ: 2.62 CM
TDI LATERAL: 0.09 M/S
TDI SEPTAL: 0.01 M/S
TDI: 0.05 M/S
TOXICOLOGY INFORMATION: ABNORMAL
TR MAX PG: 26 MMHG
TRICUSPID ANNULAR PLANE SYSTOLIC EXCURSION: 1.73 CM
TROPONIN I SERPL DL<=0.01 NG/ML-MCNC: 0.17 NG/ML (ref 0–0.03)
TSH SERPL DL<=0.005 MIU/L-ACNC: 1.09 UIU/ML (ref 0.4–4)
TV REST PULMONARY ARTERY PRESSURE: 34 MMHG
URN SPEC COLLECT METH UR: ABNORMAL
UROBILINOGEN UR STRIP-ACNC: NEGATIVE EU/DL
WBC # BLD AUTO: 12.07 K/UL (ref 3.9–12.7)

## 2022-01-26 PROCEDURE — 84145 PROCALCITONIN (PCT): CPT | Performed by: EMERGENCY MEDICINE

## 2022-01-26 PROCEDURE — 94645 CONT INHLJ TX EACH ADDL HOUR: CPT

## 2022-01-26 PROCEDURE — 93010 EKG 12-LEAD: ICD-10-PCS | Mod: ,,, | Performed by: INTERNAL MEDICINE

## 2022-01-26 PROCEDURE — 25000242 PHARM REV CODE 250 ALT 637 W/ HCPCS: Performed by: EMERGENCY MEDICINE

## 2022-01-26 PROCEDURE — U0003 INFECTIOUS AGENT DETECTION BY NUCLEIC ACID (DNA OR RNA); SEVERE ACUTE RESPIRATORY SYNDROME CORONAVIRUS 2 (SARS-COV-2) (CORONAVIRUS DISEASE [COVID-19]), AMPLIFIED PROBE TECHNIQUE, MAKING USE OF HIGH THROUGHPUT TECHNOLOGIES AS DESCRIBED BY CMS-2020-01-R: HCPCS | Performed by: EMERGENCY MEDICINE

## 2022-01-26 PROCEDURE — 94761 N-INVAS EAR/PLS OXIMETRY MLT: CPT

## 2022-01-26 PROCEDURE — U0002 COVID-19 LAB TEST NON-CDC: HCPCS | Performed by: EMERGENCY MEDICINE

## 2022-01-26 PROCEDURE — 63600175 PHARM REV CODE 636 W HCPCS: Performed by: INTERNAL MEDICINE

## 2022-01-26 PROCEDURE — 94660 CPAP INITIATION&MGMT: CPT

## 2022-01-26 PROCEDURE — C9399 UNCLASSIFIED DRUGS OR BIOLOG: HCPCS | Performed by: INTERNAL MEDICINE

## 2022-01-26 PROCEDURE — 93005 ELECTROCARDIOGRAM TRACING: CPT

## 2022-01-26 PROCEDURE — 99900035 HC TECH TIME PER 15 MIN (STAT)

## 2022-01-26 PROCEDURE — 63600175 PHARM REV CODE 636 W HCPCS: Performed by: EMERGENCY MEDICINE

## 2022-01-26 PROCEDURE — 85025 COMPLETE CBC W/AUTO DIFF WBC: CPT | Performed by: PHYSICIAN ASSISTANT

## 2022-01-26 PROCEDURE — 84300 ASSAY OF URINE SODIUM: CPT | Performed by: INTERNAL MEDICINE

## 2022-01-26 PROCEDURE — 96375 TX/PRO/DX INJ NEW DRUG ADDON: CPT

## 2022-01-26 PROCEDURE — 27000190 HC CPAP FULL FACE MASK W/VALVE

## 2022-01-26 PROCEDURE — 94640 AIRWAY INHALATION TREATMENT: CPT

## 2022-01-26 PROCEDURE — 82962 GLUCOSE BLOOD TEST: CPT | Mod: 59

## 2022-01-26 PROCEDURE — 99900031 HC PATIENT EDUCATION (STAT)

## 2022-01-26 PROCEDURE — 94644 CONT INHLJ TX 1ST HOUR: CPT

## 2022-01-26 PROCEDURE — 87502 INFLUENZA DNA AMP PROBE: CPT

## 2022-01-26 PROCEDURE — 84443 ASSAY THYROID STIM HORMONE: CPT | Performed by: PHYSICIAN ASSISTANT

## 2022-01-26 PROCEDURE — 96366 THER/PROPH/DIAG IV INF ADDON: CPT

## 2022-01-26 PROCEDURE — 27100171 HC OXYGEN HIGH FLOW UP TO 24 HOURS

## 2022-01-26 PROCEDURE — 99291 CRITICAL CARE FIRST HOUR: CPT | Mod: 25,,, | Performed by: INTERNAL MEDICINE

## 2022-01-26 PROCEDURE — 99291 PR CRITICAL CARE, E/M 30-74 MINUTES: ICD-10-PCS | Mod: 25,,, | Performed by: INTERNAL MEDICINE

## 2022-01-26 PROCEDURE — 84484 ASSAY OF TROPONIN QUANT: CPT | Performed by: PHYSICIAN ASSISTANT

## 2022-01-26 PROCEDURE — 93010 ELECTROCARDIOGRAM REPORT: CPT | Mod: ,,, | Performed by: INTERNAL MEDICINE

## 2022-01-26 PROCEDURE — 80307 DRUG TEST PRSMV CHEM ANLYZR: CPT | Performed by: INTERNAL MEDICINE

## 2022-01-26 PROCEDURE — 12000002 HC ACUTE/MED SURGE SEMI-PRIVATE ROOM

## 2022-01-26 PROCEDURE — 87040 BLOOD CULTURE FOR BACTERIA: CPT | Mod: 59 | Performed by: EMERGENCY MEDICINE

## 2022-01-26 PROCEDURE — S4991 NICOTINE PATCH NONLEGEND: HCPCS | Performed by: INTERNAL MEDICINE

## 2022-01-26 PROCEDURE — A4216 STERILE WATER/SALINE, 10 ML: HCPCS | Performed by: INTERNAL MEDICINE

## 2022-01-26 PROCEDURE — 80053 COMPREHEN METABOLIC PANEL: CPT | Performed by: PHYSICIAN ASSISTANT

## 2022-01-26 PROCEDURE — 25000003 PHARM REV CODE 250: Performed by: INTERNAL MEDICINE

## 2022-01-26 PROCEDURE — U0005 INFEC AGEN DETEC AMPLI PROBE: HCPCS | Performed by: EMERGENCY MEDICINE

## 2022-01-26 PROCEDURE — 83605 ASSAY OF LACTIC ACID: CPT | Performed by: EMERGENCY MEDICINE

## 2022-01-26 PROCEDURE — 81003 URINALYSIS AUTO W/O SCOPE: CPT | Mod: 59 | Performed by: INTERNAL MEDICINE

## 2022-01-26 PROCEDURE — 96365 THER/PROPH/DIAG IV INF INIT: CPT

## 2022-01-26 PROCEDURE — 84540 ASSAY OF URINE/UREA-N: CPT | Performed by: INTERNAL MEDICINE

## 2022-01-26 PROCEDURE — 99291 CRITICAL CARE FIRST HOUR: CPT | Mod: 25

## 2022-01-26 PROCEDURE — 83880 ASSAY OF NATRIURETIC PEPTIDE: CPT | Performed by: PHYSICIAN ASSISTANT

## 2022-01-26 PROCEDURE — 25000003 PHARM REV CODE 250: Performed by: EMERGENCY MEDICINE

## 2022-01-26 RX ORDER — NALOXONE HCL 0.4 MG/ML
0.02 VIAL (ML) INJECTION
Status: DISCONTINUED | OUTPATIENT
Start: 2022-01-26 | End: 2022-02-05 | Stop reason: HOSPADM

## 2022-01-26 RX ORDER — IBUPROFEN 200 MG
24 TABLET ORAL
Status: DISCONTINUED | OUTPATIENT
Start: 2022-01-26 | End: 2022-02-05 | Stop reason: HOSPADM

## 2022-01-26 RX ORDER — ALBUTEROL SULFATE 2.5 MG/.5ML
2.5 SOLUTION RESPIRATORY (INHALATION) EVERY 4 HOURS PRN
Status: DISCONTINUED | OUTPATIENT
Start: 2022-01-26 | End: 2022-01-26

## 2022-01-26 RX ORDER — ALBUTEROL SULFATE 2.5 MG/.5ML
10 SOLUTION RESPIRATORY (INHALATION)
Status: COMPLETED | OUTPATIENT
Start: 2022-01-26 | End: 2022-01-26

## 2022-01-26 RX ORDER — METOPROLOL SUCCINATE 50 MG/1
100 TABLET, EXTENDED RELEASE ORAL 2 TIMES DAILY
Status: DISCONTINUED | OUTPATIENT
Start: 2022-01-26 | End: 2022-01-27

## 2022-01-26 RX ORDER — INSULIN ASPART 100 [IU]/ML
0-5 INJECTION, SOLUTION INTRAVENOUS; SUBCUTANEOUS
Status: DISCONTINUED | OUTPATIENT
Start: 2022-01-26 | End: 2022-02-05 | Stop reason: HOSPADM

## 2022-01-26 RX ORDER — HYDROMORPHONE HYDROCHLORIDE 2 MG/ML
0.5 INJECTION, SOLUTION INTRAMUSCULAR; INTRAVENOUS; SUBCUTANEOUS
Status: COMPLETED | OUTPATIENT
Start: 2022-01-26 | End: 2022-01-26

## 2022-01-26 RX ORDER — IPRATROPIUM BROMIDE AND ALBUTEROL SULFATE 2.5; .5 MG/3ML; MG/3ML
3 SOLUTION RESPIRATORY (INHALATION)
Status: COMPLETED | OUTPATIENT
Start: 2022-01-26 | End: 2022-01-26

## 2022-01-26 RX ORDER — METOPROLOL SUCCINATE 50 MG/1
100 TABLET, EXTENDED RELEASE ORAL 2 TIMES DAILY
Status: DISCONTINUED | OUTPATIENT
Start: 2022-01-26 | End: 2022-01-26

## 2022-01-26 RX ORDER — IBUPROFEN 200 MG
1 TABLET ORAL DAILY
Status: DISCONTINUED | OUTPATIENT
Start: 2022-01-26 | End: 2022-02-05 | Stop reason: HOSPADM

## 2022-01-26 RX ORDER — METOPROLOL SUCCINATE 50 MG/1
100 TABLET, EXTENDED RELEASE ORAL DAILY
Status: DISCONTINUED | OUTPATIENT
Start: 2022-01-26 | End: 2022-01-26

## 2022-01-26 RX ORDER — IBUPROFEN 200 MG
16 TABLET ORAL
Status: DISCONTINUED | OUTPATIENT
Start: 2022-01-26 | End: 2022-02-05 | Stop reason: HOSPADM

## 2022-01-26 RX ORDER — ATORVASTATIN CALCIUM 40 MG/1
40 TABLET, FILM COATED ORAL NIGHTLY
Status: DISCONTINUED | OUTPATIENT
Start: 2022-01-26 | End: 2022-01-28

## 2022-01-26 RX ORDER — IPRATROPIUM BROMIDE 0.5 MG/2.5ML
500 SOLUTION RESPIRATORY (INHALATION)
Status: COMPLETED | OUTPATIENT
Start: 2022-01-26 | End: 2022-01-26

## 2022-01-26 RX ORDER — DULAGLUTIDE 1.5 MG/.5ML
INJECTION, SOLUTION SUBCUTANEOUS
COMMUNITY
Start: 2021-11-13 | End: 2022-06-30 | Stop reason: CLARIF

## 2022-01-26 RX ORDER — METFORMIN HYDROCHLORIDE 1000 MG/1
1000 TABLET ORAL 2 TIMES DAILY
Status: ON HOLD | COMMUNITY
Start: 2022-01-24 | End: 2022-02-05 | Stop reason: HOSPADM

## 2022-01-26 RX ORDER — GLUCAGON 1 MG
1 KIT INJECTION
Status: DISCONTINUED | OUTPATIENT
Start: 2022-01-26 | End: 2022-02-05 | Stop reason: HOSPADM

## 2022-01-26 RX ORDER — METHYLPREDNISOLONE SOD SUCC 125 MG
125 VIAL (EA) INJECTION
Status: COMPLETED | OUTPATIENT
Start: 2022-01-26 | End: 2022-01-26

## 2022-01-26 RX ORDER — NAPROXEN SODIUM 220 MG/1
81 TABLET, FILM COATED ORAL DAILY
Status: DISCONTINUED | OUTPATIENT
Start: 2022-01-27 | End: 2022-02-05 | Stop reason: HOSPADM

## 2022-01-26 RX ORDER — SODIUM CHLORIDE 0.9 % (FLUSH) 0.9 %
10 SYRINGE (ML) INJECTION EVERY 8 HOURS
Status: DISCONTINUED | OUTPATIENT
Start: 2022-01-26 | End: 2022-01-31

## 2022-01-26 RX ORDER — TALC
6 POWDER (GRAM) TOPICAL NIGHTLY PRN
Status: DISCONTINUED | OUTPATIENT
Start: 2022-01-26 | End: 2022-02-05 | Stop reason: HOSPADM

## 2022-01-26 RX ORDER — AMOXICILLIN 250 MG
1 CAPSULE ORAL 2 TIMES DAILY
Status: DISCONTINUED | OUTPATIENT
Start: 2022-01-26 | End: 2022-02-05 | Stop reason: HOSPADM

## 2022-01-26 RX ORDER — METHYLPREDNISOLONE SOD SUCC 125 MG
40 VIAL (EA) INJECTION EVERY 6 HOURS
Status: DISCONTINUED | OUTPATIENT
Start: 2022-01-26 | End: 2022-01-27

## 2022-01-26 RX ADMIN — Medication 1 PATCH: at 06:01

## 2022-01-26 RX ADMIN — IPRATROPIUM BROMIDE 500 MCG: 0.5 SOLUTION RESPIRATORY (INHALATION) at 07:01

## 2022-01-26 RX ADMIN — IPRATROPIUM BROMIDE AND ALBUTEROL SULFATE 3 ML: 2.5; .5 SOLUTION RESPIRATORY (INHALATION) at 12:01

## 2022-01-26 RX ADMIN — ALBUTEROL SULFATE 10 MG: 2.5 SOLUTION RESPIRATORY (INHALATION) at 07:01

## 2022-01-26 RX ADMIN — SODIUM CHLORIDE 1000 ML: 0.9 INJECTION, SOLUTION INTRAVENOUS at 12:01

## 2022-01-26 RX ADMIN — HYDROMORPHONE HYDROCHLORIDE 0.5 MG: 2 INJECTION, SOLUTION INTRAMUSCULAR; INTRAVENOUS; SUBCUTANEOUS at 11:01

## 2022-01-26 RX ADMIN — AMIODARONE HYDROCHLORIDE 1 MG/MIN: 1.8 INJECTION, SOLUTION INTRAVENOUS at 05:01

## 2022-01-26 RX ADMIN — METHYLPREDNISOLONE SODIUM SUCCINATE 125 MG: 125 INJECTION, POWDER, FOR SOLUTION INTRAMUSCULAR; INTRAVENOUS at 12:01

## 2022-01-26 RX ADMIN — SODIUM CHLORIDE, PRESERVATIVE FREE 10 ML: 5 INJECTION INTRAVENOUS at 10:01

## 2022-01-26 RX ADMIN — SENNOSIDES AND DOCUSATE SODIUM 1 TABLET: 50; 8.6 TABLET ORAL at 09:01

## 2022-01-26 RX ADMIN — METOPROLOL SUCCINATE 100 MG: 50 TABLET, EXTENDED RELEASE ORAL at 04:01

## 2022-01-26 RX ADMIN — METOPROLOL SUCCINATE 100 MG: 50 TABLET, EXTENDED RELEASE ORAL at 09:01

## 2022-01-26 RX ADMIN — VANCOMYCIN HYDROCHLORIDE 1500 MG: 1.5 INJECTION, POWDER, LYOPHILIZED, FOR SOLUTION INTRAVENOUS at 03:01

## 2022-01-26 RX ADMIN — SODIUM CHLORIDE 1000 ML: 9 INJECTION, SOLUTION INTRAVENOUS at 02:01

## 2022-01-26 RX ADMIN — AMIODARONE HYDROCHLORIDE 150 MG: 1.5 INJECTION, SOLUTION INTRAVENOUS at 04:01

## 2022-01-26 RX ADMIN — INSULIN DETEMIR 5 UNITS: 100 INJECTION, SOLUTION SUBCUTANEOUS at 04:01

## 2022-01-26 RX ADMIN — ATORVASTATIN CALCIUM 40 MG: 40 TABLET, FILM COATED ORAL at 09:01

## 2022-01-26 RX ADMIN — METHYLPREDNISOLONE SODIUM SUCCINATE 40 MG: 125 INJECTION, POWDER, FOR SOLUTION INTRAMUSCULAR; INTRAVENOUS at 06:01

## 2022-01-26 RX ADMIN — SODIUM CHLORIDE, PRESERVATIVE FREE 10 ML: 5 INJECTION INTRAVENOUS at 04:01

## 2022-01-26 NOTE — ASSESSMENT & PLAN NOTE
Patient is currently in atrial fibrillation with RVR. Has history of atrial flutter. Cardiology on board. Echo with mitral/tricuspid valve regurg but no systolic dysfunction. Amiodarone infusion and oral metoprolol. [id number] Score: 3. Anticoagulation indicated. Anticoagulation done with enoxaparin while NPO. Cardiac monitoring. Waiting for ICU bed    TSH WNL. Tox screen positive for amphetamine. Patient admits to taking Adderall. This is not prescribed.

## 2022-01-26 NOTE — HPI
61 year old male with diastolic heart failure, atrial flutter, pulmonary hypertension, mitral and tricuspid valve regurgitation, emphysema and current every day smoker who presented with shortness of breath, body aches, bilateral flank pain and weakness since last night. Associated symptoms include cough. Admits to smoking. Denied nausea, vomiting, abdominal pain, dysuria, diarrhea. In the ED, patient was hypotensive, tachycardic to 120s, diffuse expiratory wheezing and with a lactic acid of 3. Given one liter of fluids, continuous bronchodilators, methylprednisolone and broad spectrum antibiotics. O2 sats stable at room air. COVID and flu negative. Procalcitonin negative. Remains tachycardic. Cardiology consulted. BP stable. Admit to ICU.

## 2022-01-26 NOTE — ED NOTES
Still unable to get an oral temp. Pt. Provided with a few more warm blankets. Provider was informed. Will order bear peter.

## 2022-01-26 NOTE — ED TRIAGE NOTES
Pt. Complains of shortness of breath, body aches, bilateral flank pain, and weakness that started last night. Pt. Complains of chest soreness from coughing. Pt. Rates his pain at a 10/10 on the pain scale. Pt. Is coughing white sputum.

## 2022-01-26 NOTE — Clinical Note
Is this patient a high probability for COVID-19?: No   Diagnosis: A-fib [443406]   Admitting Provider:: TONY MUNOZ [[id number]]   Future Attending Provider: TONY MUNOZ [[id number]]   Reason for IP Medical Treatment  (Clinical interventions that can only be accomplished in the IP setting? ) :: afib   Estimated Length of Stay:: 2 midnights   I certify that Inpatient services for greater than or equal to 2 midnights are medically necessary:: Yes   Plans for Post-Acute care--if anticipated (pick the single best option):: A. No post acute care anticipated at this time

## 2022-01-26 NOTE — ASSESSMENT & PLAN NOTE
With wheezing, AFib with RVR and elevated BNP  Got 1 L of fluids. Will hold off on any further fluids for now and ok to hold off on lasix since he is ok at room air  Echocardiogram pending. Amiodarone infusion for AFib  Cardiac diet. Continue metoprolol and statin. BP on lower end of normal therefore hold on antihypertensives

## 2022-01-26 NOTE — CONSULTS
[facility] - Emergency Dept  Cardiology  Consult Note    Patient Name: Marck Madison  MRN: [id number]  Admission Date: 1/26/2022  Hospital Length of Stay: 0 days  Code Status: Full Code   Attending Provider: Gabrielle Arroyo MD   Consulting Provider: Jonathan Lopez MD  Primary Care Physician: [facility]  Principal Problem:<principal problem not specified>    Patient information was obtained from patient and ER records.     Consults  Subjective:     Chief Complaint:  A-fib, elevated troponin     HPI:   61 y.o. male with past medical history of COPD, DM, and CHF presenting with 1 day history of shortness of breath with associated chest pain, cough and generalized weakness. Patient reports he has been out of his medications for weeks. He denies fever, chills, vomiting, diarrhea or further complaints. He has no known drug allergies.        Denies CP, mild SOB  EKG A-fib 100 PRWP. Currently A-flutter 126   Troponin 0.17 - similar to previous    Cr 1.9    Echo 7/1/21  · The estimated ejection fraction is 55%.  · The left ventricle is normal in size with moderate concentric hypertrophy and normal systolic function.  · Grade III left ventricular diastolic dysfunction.  · Severe left atrial enlargement.  · Moderate mitral regurgitation.  · Mild pulmonic regurgitation.  · Normal right ventricular size with normal right ventricular systolic function.  · Mild right atrial enlargement.  · Normal central venous pressure (3 mmHg).  · The estimated PA systolic pressure is 48 mmHg.  · There is pulmonary hypertension.  · Moderate tricuspid regurgitation.     Admitted 11/21/21  Mr. Madison is a 62yo man well known to me from admitting him twice in the recent past.  He has a past medical history of CAD, COVID, tobacco abuse, COPD, DCHF, DM2, Aflutter on Eliquis, and chronic cholelithiasis.  He has been covid vaccinated, but is overdue for his booster.  Last echo showed EF 55%, grade III diastolic dysfunction, severe  "LAE, mod MR, and mod TR.     He was admitted 6/9/2021 to 6/15/2021, initially to MICU for iv amiodarone.  At discharge, Dr. Ritchie noted, "Mr. Madison was admitted for Afib w/ RVR and COVID. He was put on an amiodarone drip with subsequent cardioversion. He required BiPAP briefly which was later weaned to nasal cannula and then room air. His hospital course was complicated by episodes of hypotension leading to FARHAN and shock liver. His creatinine returned to baseline prior to discharge, and his liver function was steadily improving. By discharge he was on room air at rest. He desaturated to 88% with ambulation, but refused home oxygen. He requested discharge and that wish was granted. He was enrolled in the COVID surveillance program and will follow up with his PCP in the outpatient setting."     He was most recently admitted again on 6/30/21 for 5 days again with Aflutter with RVR, severe sepsis thought due to cholecystitis, and DKA.    On that stay he was admitted to ICU and was treated with on IV Amio and Eliquis.  Cardiology was consulted and he underwent successful ESTEBAN/DCCV on 7/2. CHADS-VASc score of 2.  He was continued on  Eliquis and Metoprolol for rate control at discharge.     He now comes to the ED with acute SOB while smoking.  This led to paroxysms of coughing and post-tussive episode of emesis.  He denies any chest pain.  He has been out of all of his medications as well for the past 2 weeks.  On arrival to triage, he was found to have an oxygen saturation of 74% on RA.  He tells me he was sitting outside smoking, "when the weather changed and it got a little cold.  That set me off to coughing and I couldn't catch my breath.  I feel great now."  He states his nephew "who is a little mental" threw all of his medicines in the trash 2 weeks ago, and he has not been able to get them filled.     In the ED his VS's showed RR 34, /128, P 120 with no fever on arrival.  He was initially 74% on RA, placed on " "15L NRB at 100%, then weaned to 2L at 97% currently.  Labs showed WBC 12.3, Cr 1.4, Gluc 136, , Trop 0.161 and negative covid.  CXR showed increased interstitial attenuation with bilateral airspace opacification.  Findings are nonspecific but may reflect moderate pulmonary edema.  Atypical pneumonia could present with similar appearance in the right clinical setting.     In the ED he was treated with duonebs, ASA 325mg, hydralazine 100mg, solumedrol 125mg iv x 1, NTG 1" to CW, Lopressor 5mg iv x 1, MgSO4 2g iv x 1.  At admission he was given Lasix 20mg iv x 1, xopenex, atrovent nebs, and Levaquin 500mg po qday.       Hospital Course:   Mr. Madison is a 60yo man well known to me from admitting him twice in the recent past.  He has a past medical history of CAD, COVID, tobacco abuse, COPD, DCHF, DM2, Aflutter on Eliquis, and chronic cholelithiasis.  He has been covid vaccinated, but is overdue for his booster.  Last echo showed EF 55%, grade III diastolic dysfunction, severe LAE, mod MR, and mod TR.     He was admitted 6/9/2021 to 6/15/2021, initially to MICU for iv amiodarone.  At discharge, Dr. Ritchie noted, "Mr. Madison was admitted for Afib w/ RVR and COVID. He was put on an amiodarone drip with subsequent cardioversion. He required BiPAP briefly which was later weaned to nasal cannula and then room air. His hospital course was complicated by episodes of hypotension leading to FARHAN and shock liver. His creatinine returned to baseline prior to discharge, and his liver function was steadily improving. By discharge he was on room air at rest. He desaturated to 88% with ambulation, but refused home oxygen. He requested discharge and that wish was granted. He was enrolled in the COVID surveillance program and will follow up with his PCP in the outpatient setting."     He was most recently admitted again on 6/30/21 for 5 days again with Aflutter with RVR, severe sepsis thought due to cholecystitis, and DKA.    On that " "stay he was admitted to ICU and was treated with on IV Amio and Eliquis.  Cardiology was consulted and he underwent successful ESTEBAN/DCCV on 7/2. CHADS-VASc score of 2.  He was continued on  Eliquis and Metoprolol for rate control at discharge.     He now comes to the ED with acute SOB while smoking.  This led to paroxysms of coughing and post-tussive episode of emesis.  He denies any chest pain.  He has been out of all of his medications as well for the past 2 weeks.  On arrival to triage, he was found to have an oxygen saturation of 74% on RA.  He tells me he was sitting outside smoking, "when the weather changed and it got a little cold.  That set me off to coughing and I couldn't catch my breath.  I feel great now."  He states his nephew "who is a little mental" threw all of his medicines in the trash 2 weeks ago, and he has not been able to get them filled.     In the ED his VS's showed RR 34, /128, P 120 with no fever on arrival.  He was initially 74% on RA, placed on 15L NRB at 100%, then weaned to 2L at 97% currently.  Labs showed WBC 12.3, Cr 1.4, Gluc 136, , Trop 0.161 and negative covid.  CXR showed increased interstitial attenuation with bilateral airspace opacification.  Findings are nonspecific but may reflect moderate pulmonary edema.  Atypical pneumonia could present with similar appearance in the right clinical setting.     In the ED he was treated with duonebs, ASA 325mg, hydralazine 100mg, solumedrol 125mg iv x 1, NTG 1" to CW, Lopressor 5mg iv x 1, MgSO4 2g iv x 1.  At admission he was given Lasix 20mg iv x 1, xopenex, atrovent nebs, and Levaquin 500mg po qday.  Unfortunately,patient decided leave AMA.         Past Medical History:   Diagnosis Date    Arrhythmia 2017    aflutter    Atrial flutter     CAD (coronary artery disease)     CHF (congestive heart failure), NYHA class I 2017    Cholelithiasis with chronic cholecystitis     Chronic diastolic heart failure     Cigarette " "smoker     COPD (chronic obstructive pulmonary disease)     COVID-19 06/01/2021    DKA (diabetic ketoacidosis)     NSTEMI (non-ST elevation myocardial infarction)     NSVT (nonsustained ventricular tachycardia)     Psychiatric disorder     h/o "schizophrena/bipolar"    Schizoaffective disorder     Severe sepsis        Past Surgical History:   Procedure Laterality Date    LIVER SURGERY      abscess drainage; 1970s    TREATMENT OF CARDIAC ARRHYTHMIA  9/12/2019    Procedure: Cardioversion or Defibrillation;  Surgeon: Jonathan Lopez MD;  Location: [facility] CATH LAB;  Service: Cardiology;;       Review of patient's allergies indicates:  No Known Allergies    No current facility-administered medications on file prior to encounter.     Current Outpatient Medications on File Prior to Encounter   Medication Sig    apixaban (ELIQUIS) 5 mg Tab Take 1 tablet (5 mg total) by mouth 2 (two) times daily.    aspirin 81 MG Chew Take 1 tablet (81 mg total) by mouth once daily.    atorvastatin (LIPITOR) 40 MG tablet Take 1 tablet (40 mg total) by mouth every evening.    blood-glucose meter kit Use as instructed    hydrALAZINE (APRESOLINE) 100 MG tablet Take 1 tablet (100 mg total) by mouth every 8 (eight) hours.    insulin aspart U-100 (NOVOLOG) 100 unit/mL (3 mL) InPn pen Inject 13 Units into the skin 3 (three) times daily.    insulin detemir U-100 (LEVEMIR FLEXTOUCH) 100 unit/mL (3 mL) SubQ InPn pen Inject 40 Units into the skin once daily.    metoprolol succinate (TOPROL-XL) 100 MG 24 hr tablet Take 1 tablet (100 mg total) by mouth once daily.    NIFEdipine (PROCARDIA-XL) 30 MG (OSM) 24 hr tablet Take 1 tablet (30 mg total) by mouth once daily.     Family History    None       Tobacco Use    Smoking status: Current Every Day Smoker     Packs/day: 0.50     Years: 5.00     Pack years: 2.50     Types: Cigarettes    Smokeless tobacco: Never Used   Substance and Sexual Activity    Alcohol use: Not Currently     " "Alcohol/week: 14.0 standard drinks     Types: 14 Shots of liquor per week     Comment: 6/9/21:  "Crystal Rk" everyday, last drank on 6/8/21     Drug use: No    Sexual activity: Not on file     Review of Systems   Constitutional: Negative for decreased appetite.   HENT: Negative for ear discharge.    Eyes: Negative for blurred vision.   Endocrine: Negative for polyphagia.   Skin: Negative for nail changes.   Genitourinary: Negative for bladder incontinence.   Neurological: Negative for aphonia.   Psychiatric/Behavioral: Negative for hallucinations.   Allergic/Immunologic: Negative for hives.     Objective:     Vital Signs (Most Recent):  Temp: 96.4 °F (35.8 °C) (01/26/22 1125)  Pulse: (!) 120 (01/26/22 1306)  Resp: (!) 27 (01/26/22 1306)  BP: 104/81 (01/26/22 1306)  SpO2: 96 % (01/26/22 1232) Vital Signs (24h Range):  Temp:  [96.4 °F (35.8 °C)] 96.4 °F (35.8 °C)  Pulse:  [] 120  Resp:  [19-31] 27  SpO2:  [88 %-100 %] 96 %  BP: ()/(66-81) 104/81     Weight: 79.4 kg (175 lb)  Body mass index is 29.12 kg/m².    SpO2: 96 %  O2 Device (Oxygen Therapy): room air    No intake or output data in the 24 hours ending 01/26/22 1428    Lines/Drains/Airways     Peripheral Intravenous Line                 Peripheral IV - Single Lumen 01/26/22 1212 Anterior;Left;Proximal Forearm <1 day                Physical Exam  Constitutional:       Appearance: He is well-developed and well-nourished.   HENT:      Head: Normocephalic and atraumatic.   Eyes:      Conjunctiva/sclera: Conjunctivae normal.      Pupils: Pupils are equal, round, and reactive to light.   Cardiovascular:      Rate and Rhythm: Regular rhythm. Tachycardia present.      Pulses: Intact distal pulses.      Heart sounds: Normal heart sounds.   Pulmonary:      Effort: Pulmonary effort is normal.      Breath sounds: Normal breath sounds.   Abdominal:      General: Bowel sounds are normal.      Palpations: Abdomen is soft.   Musculoskeletal:         General: " Normal range of motion.      Cervical back: Normal range of motion and neck supple.   Skin:     General: Skin is warm and dry.   Neurological:      Mental Status: He is alert and oriented to person, place, and time.         Significant Labs: All pertinent lab results from the last 24 hours have been reviewed.    Significant Imaging: Echocardiogram: 2D echo with color flow doppler: No results found for this or any previous visit.    Assessment and Plan:     Acute on chronic diastolic congestive heart failure  Diuresis and afterload reduction as tolerated    Echo Saline Bubble? No    Interpretation Summary  · The estimated ejection fraction is 55%.  · The left ventricle is normal in size with moderate concentric hypertrophy and normal systolic function.  · Grade III left ventricular diastolic dysfunction.  · Severe left atrial enlargement.  · Moderate mitral regurgitation.  · Mild pulmonic regurgitation.  · Normal right ventricular size with normal right ventricular systolic function.  · Mild right atrial enlargement.  · Normal central venous pressure (3 mmHg).  · The estimated PA systolic pressure is 48 mmHg.  · There is pulmonary hypertension.  · Moderate tricuspid regurgitation.    Recent Labs   Lab 01/26/22  1225   *   .      Atrial fibrillation with RVR  Currently in A-flutter 126. Admits to medical noncompliance. Start IV amiodarone. Consider ESTEBAN/CV if A-fib/flutter persists. Repeat echo    Elevated troponin  Suspect demand ischemia from tachycardia. Trend troponin - if stable then ok for out patient ischemic evaluation        VTE Risk Mitigation (From admission, onward)         Ordered     apixaban tablet 5 mg  2 times daily         01/26/22 1409     IP VTE HIGH RISK PATIENT  Once         01/26/22 1409     Place sequential compression device  Until discontinued         01/26/22 1409              35 minutes spent with patient in ICU    Thank you for your consult. I will follow-up with patient. Please  contact us if you have any additional questions.    Jonathan Lopez MD  Cardiology   [facility] - Emergency Dept

## 2022-01-26 NOTE — ED PROVIDER NOTES
"Encounter Date: 1/26/2022    SCRIBE #1 NOTE: I, Adrienneroopa Yancey, am scribing for, and in the presence of, Gabrielle Arroyo MD.       History     Chief Complaint   Patient presents with    flu like symptoms     The patient reports sob, a cough, generalized weakness, and back pain x 2 days.      61 y.o. male with past medical history of COPD, DM, and CHF presenting with 1 day history of shortness of breath with associated chest pain, cough and generalized weakness. Patient reports he has been out of his medications for weeks. He denies fever, chills, vomiting, diarrhea or further complaints. He has no known drug allergies.       The history is provided by the patient. No  was used.     Review of patient's allergies indicates:  No Known Allergies  Past Medical History:   Diagnosis Date    Arrhythmia 2017    aflutter    Atrial flutter     CAD (coronary artery disease)     CHF (congestive heart failure), NYHA class I 2017    Cholelithiasis with chronic cholecystitis     Chronic diastolic heart failure     Cigarette smoker     COPD (chronic obstructive pulmonary disease)     COVID-19 06/01/2021    DKA (diabetic ketoacidosis)     NSTEMI (non-ST elevation myocardial infarction)     NSVT (nonsustained ventricular tachycardia)     Psychiatric disorder     h/o "schizophrena/bipolar"    Schizoaffective disorder     Severe sepsis      Past Surgical History:   Procedure Laterality Date    LIVER SURGERY      abscess drainage; 1970s    TREATMENT OF CARDIAC ARRHYTHMIA  9/12/2019    Procedure: Cardioversion or Defibrillation;  Surgeon: Jonathan Lopez MD;  Location: [facility] CATH LAB;  Service: Cardiology;;     History reviewed. No pertinent family history.  Social History     Tobacco Use    Smoking status: Current Every Day Smoker     Packs/day: 0.50     Years: 5.00     Pack years: 2.50     Types: Cigarettes    Smokeless tobacco: Never Used   Substance Use Topics    Alcohol use: Not Currently     " "Alcohol/week: 14.0 standard drinks     Types: 14 Shots of liquor per week     Comment: 6/9/21:  "[employer]" everyday, last drank on 6/8/21     Drug use: No     Review of Systems   Constitutional: Negative for chills and fever.   HENT: Negative for congestion and sore throat.    Eyes: Negative for visual disturbance.   Respiratory: Positive for cough and shortness of breath.    Cardiovascular: Positive for chest pain.   Gastrointestinal: Negative for abdominal pain, nausea and vomiting.   Genitourinary: Negative for dysuria.   Skin: Negative for rash.   Neurological: Positive for weakness. Negative for headaches.   Psychiatric/Behavioral: Negative for confusion.       Physical Exam     Initial Vitals   BP Pulse Resp Temp SpO2   01/26/22 1009 01/26/22 1009 01/26/22 1009 01/26/22 1125 01/26/22 1009   93/68 90 20 96.4 °F (35.8 °C) 95 %      MAP       --                Physical Exam    Nursing note and vitals reviewed.  Constitutional: He appears well-developed.   HENT:   Head: Normocephalic.   Eyes: Conjunctivae are normal.   Neck: Neck supple.   Cardiovascular: Normal heart sounds. An irregularly irregular rhythm present.  Tachycardia present.    Pulmonary/Chest: Breath sounds normal. Tachypnea noted. He has no wheezes.   Mild respiratory distress.   Abdominal: He exhibits no distension.   Musculoskeletal:         General: Normal range of motion.      Cervical back: Neck supple.      Comments: No lower extremity edema.      Neurological: He is alert.   Skin: Skin is warm and dry.   Psychiatric: He has a normal mood and affect.         ED Course   Critical Care    Date/Time: 1/26/2022 3:17 PM  Performed by: Gabrielle Arroyo MD  Authorized by: Gabrielle Arroyo MD   Direct patient critical care time: 30 minutes  Additional history critical care time: 10 minutes  Ordering / reviewing critical care time: 8 minutes  Documentation critical care time: 7 minutes  Consulting other physicians critical care time: 10 minutes  Total " critical care time (exclusive of procedural time) : 65 minutes  Critical care time was exclusive of separately billable procedures and treating other patients and teaching time.  Critical care was necessary to treat or prevent imminent or life-threatening deterioration of the following conditions: respiratory failure and sepsis.  Critical care was time spent personally by me on the following activities: blood draw for specimens, discussions with primary provider, evaluation of patient's response to treatment, ordering and performing treatments and interventions, pulse oximetry, examination of patient, development of treatment plan with patient or surrogate, ordering and review of laboratory studies, re-evaluation of patient's condition, discussions with consultants, obtaining history from patient or surrogate, ordering and review of radiographic studies and review of old charts.        Labs Reviewed   CBC W/ AUTO DIFFERENTIAL - Abnormal; Notable for the following components:       Result Value    MCV 99 (*)     MCH 32.9 (*)     Immature Granulocytes 0.6 (*)     Gran # (ANC) 8.3 (*)     Immature Grans (Abs) 0.07 (*)     Mono # 1.2 (*)     All other components within normal limits   COMPREHENSIVE METABOLIC PANEL - Abnormal; Notable for the following components:    Glucose 130 (*)     BUN 37 (*)     Creatinine 1.9 (*)     AST 47 (*)     eGFR if  42 (*)     eGFR if non  37 (*)     All other components within normal limits   TROPONIN I - Abnormal; Notable for the following components:    Troponin I 0.172 (*)     All other components within normal limits   B-TYPE NATRIURETIC PEPTIDE - Abnormal; Notable for the following components:     (*)     All other components within normal limits   LACTIC ACID, PLASMA - Abnormal; Notable for the following components:    Lactate (Lactic Acid) 3.2 (*)     All other components within normal limits   POCT GLUCOSE - Abnormal; Notable for the  following components:    POCT Glucose 151 (*)     All other components within normal limits   CULTURE, BLOOD   CULTURE, BLOOD   PROCALCITONIN   SARS-COV-2 (COVID-19) QUALITATIVE PCR   URINALYSIS, REFLEX TO URINE CULTURE   LACTIC ACID, PLASMA   DRUG SCREEN PANEL, URINE EMERGENCY   ALCOHOL,URINE MEDICAL (ETHANOL)   SODIUM, URINE, RANDOM   CREATININE, URINE, RANDOM   UREA NITROGEN, URINE, RANDOM   POCT INFLUENZA A/B MOLECULAR   SARS-COV-2 RDRP GENE          Imaging Results          X-Ray Chest 1 View (Final result)  Result time 01/26/22 11:14:31    Final result by Trudy Boyer MD (01/26/22 11:14:31)                 Impression:      Cardiomegaly with mild increased pulmonary venous pressure, overall better aerated compared to the prior exam.      Electronically signed by: Trudy Boyer MD  Date:    01/26/2022  Time:    11:14             Narrative:    EXAMINATION:  XR CHEST 1 VIEW    CLINICAL HISTORY:  shortness of breath;    TECHNIQUE:  Single frontal view of the chest was performed.    COMPARISON:  11/21/2021    FINDINGS:  The cardiac silhouette is enlarged.  The pulmonary vascularity is mildly increased centrally.    The lungs are overall better aerated compared to the prior exam.  No acute focal area of airspace consolidation.  No pleural effusion.  No pneumothorax.  The osseous structures appear within normal limits.                                 Medications   piperacillin-tazobactam 4.5 g in dextrose 5 % 100 mL IVPB (ready to mix system) (4.5 g Intravenous New Bag 1/26/22 1227)   vancomycin 1.5 g in dextrose 5 % 250 mL IVPB (ready to mix) (has no administration in time range)   apixaban tablet 5 mg (has no administration in time range)   atorvastatin tablet 40 mg (has no administration in time range)   sodium chloride 0.9% flush 10 mL (has no administration in time range)   senna-docusate 8.6-50 mg per tablet 1 tablet (has no administration in time range)   glucose chewable tablet 16 g (has no  administration in time range)   glucose chewable tablet 24 g (has no administration in time range)   dextrose 50% injection 12.5 g (has no administration in time range)   dextrose 50% injection 25 g (has no administration in time range)   glucagon (human recombinant) injection 1 mg (has no administration in time range)   melatonin tablet 6 mg (has no administration in time range)   naloxone 0.4 mg/mL injection 0.02 mg (has no administration in time range)   insulin aspart U-100 pen 0-5 Units (has no administration in time range)   nicotine 21 mg/24 hr 1 patch (has no administration in time range)   aspirin chewable tablet 81 mg (has no administration in time range)   metoprolol succinate (TOPROL-XL) 24 hr tablet 100 mg (has no administration in time range)   methylPREDNISolone sodium succinate injection 40 mg (has no administration in time range)   amiodarone in dextrose 150 mg/100 mL (1.5 mg/mL) loading dose 150 mg (has no administration in time range)   amiodarone 360 mg/200 mL (1.8 mg/mL) infusion (has no administration in time range)   amiodarone 360 mg/200 mL (1.8 mg/mL) infusion (has no administration in time range)   insulin detemir U-100 pen 5 Units (has no administration in time range)   albuterol-ipratropium 2.5 mg-0.5 mg/3 mL nebulizer solution 3 mL (3 mLs Nebulization Given 1/26/22 1231)   methylPREDNISolone sodium succinate injection 125 mg (125 mg Intravenous Given 1/26/22 1223)   sodium chloride 0.9% bolus 1,000 mL (1,000 mLs Intravenous New Bag 1/26/22 1227)     Medical Decision Making:   History:   Old Medical Records: I decided to obtain old medical records.  Initial Assessment:   This is an emergent evaluation of a 61-year-old man who presented to the emergency department today secondary to shortness of breath and weakness.   Differential Diagnosis:   Differential diagnosis includes COPD exacerbation, ACS, CHF exacerbation, sepsis, amongst others.  Independently Interpreted Test(s):   I have  ordered and independently interpreted EKG Reading(s) - see prior notes  Clinical Tests:   Lab Tests: Ordered and Reviewed  Radiological Study: Ordered and Reviewed  Medical Tests: Reviewed  ED Management:  On physical examination, patient was initially dyspneic and tachypneic with significant wheezing on examination.  He was given DuoNebs and Solu-Medrol.  His blood pressure was hypotensive.  He does have a history of congestive heart failure however review of records shows that he had an EF of 55 percent on his last echocardiogram.  I therefore ordered IV fluids and reassessed him in between each fluid bolus.  He did not develop crackles or signs of fluid overload.  His blood pressure improved significantly from a systolic of 88 to 104.  He did remain persistently tachycardic however.  Lactic was 3.2.  White blood cell count was 12.07.-blood cultures were obtained and vancomycin and Zosyn were ordered for presumed sepsis.  Chest x-ray however showed no obvious infiltrates.  He has not produced urine yet.  BUN and creatinine are elevated at 37 and 1.9.  Troponin is 0.172 however he has had a significantly elevated troponin in the past.  EKG initially showed atrial fibrillation however repeat tracing on monitor appears more consistent with a sinus tachycardia with PVCs.  On re-evaluation, patient has continued wheezing.  A repeat albuterol inhalation treatment has been ordered.  Repeat EKG has also been ordered.  Patient has been consulted to Dr. Lopez with Cardiology as well as Dr. Vences with Internal Medicine who agreed to admit this patient at this time.    Gabrielle Arroyo MD  3:17 PM  1/26/2022           Scribe Attestation:   Scribe #1: I performed the above scribed service and the documentation accurately describes the services I performed. I attest to the accuracy of the note.                 Clinical Impression:   Final diagnoses:  [R06.02] SOB (shortness of breath)  [R00.0] Tachycardia  [A41.9] Sepsis  [I48.91]  A-fib          ED Disposition Condition    Admit             I, Gabrielle Addison , personally performed the services described in this documentation. All medical record entries made by the scribe were at my direction and in my presence. I have reviewed the chart and agree that the record reflects my personal performance and is accurate and complete.     Gabrielle Addison MD  01/26/22 [id number]

## 2022-01-26 NOTE — ASSESSMENT & PLAN NOTE
Patient is currently in atrial fibrillation with RVR. Has history of atrial flutter. Cardiology on board. Echo pending. Amiodarone infusion ordered. [id number] Score: 3. Anticoagulation indicated. Anticoagulation done with apixaban. Cardiac monitoring. Admit to ICU

## 2022-01-26 NOTE — ASSESSMENT & PLAN NOTE
Suspect demand ischemia from tachycardia. Trend troponin - if stable then ok for out patient ischemic evaluation

## 2022-01-26 NOTE — HPI
"61 y.o. male with past medical history of COPD, DM, and CHF presenting with 1 day history of shortness of breath with associated chest pain, cough and generalized weakness. Patient reports he has been out of his medications for weeks. He denies fever, chills, vomiting, diarrhea or further complaints. He has no known drug allergies.        Denies CP, mild SOB  EKG A-fib 100 PRWP. Currently A-flutter 126   Troponin 0.17 - similar to previous    Cr 1.9    Echo 7/1/21  The estimated ejection fraction is 55%.  The left ventricle is normal in size with moderate concentric hypertrophy and normal systolic function.  Grade III left ventricular diastolic dysfunction.  Severe left atrial enlargement.  Moderate mitral regurgitation.  Mild pulmonic regurgitation.  Normal right ventricular size with normal right ventricular systolic function.  Mild right atrial enlargement.  Normal central venous pressure (3 mmHg).  The estimated PA systolic pressure is 48 mmHg.  There is pulmonary hypertension.  Moderate tricuspid regurgitation.     Admitted 11/21/21  Mr. Madison is a 60yo man well known to me from admitting him twice in the recent past.  He has a past medical history of CAD, COVID, tobacco abuse, COPD, DCHF, DM2, Aflutter on Eliquis, and chronic cholelithiasis.  He has been covid vaccinated, but is overdue for his booster.  Last echo showed EF 55%, grade III diastolic dysfunction, severe LAE, mod MR, and mod TR.     He was admitted 6/9/2021 to 6/15/2021, initially to MICU for iv amiodarone.  At discharge, Dr. Ritchie noted, "Mr. Madison was admitted for Afib w/ RVR and COVID. He was put on an amiodarone drip with subsequent cardioversion. He required BiPAP briefly which was later weaned to nasal cannula and then room air. His hospital course was complicated by episodes of hypotension leading to FARHAN and shock liver. His creatinine returned to baseline prior to discharge, and his liver function was steadily improving. By " "discharge he was on room air at rest. He desaturated to 88% with ambulation, but refused home oxygen. He requested discharge and that wish was granted. He was enrolled in the COVID surveillance program and will follow up with his PCP in the outpatient setting."     He was most recently admitted again on 6/30/21 for 5 days again with Aflutter with RVR, severe sepsis thought due to cholecystitis, and DKA.    On that stay he was admitted to ICU and was treated with on IV Amio and Eliquis.  Cardiology was consulted and he underwent successful ESTEBAN/DCCV on 7/2. CHADS-VASc score of 2.  He was continued on  Eliquis and Metoprolol for rate control at discharge.     He now comes to the ED with acute SOB while smoking.  This led to paroxysms of coughing and post-tussive episode of emesis.  He denies any chest pain.  He has been out of all of his medications as well for the past 2 weeks.  On arrival to triage, he was found to have an oxygen saturation of 74% on RA.  He tells me he was sitting outside smoking, "when the weather changed and it got a little cold.  That set me off to coughing and I couldn't catch my breath.  I feel great now."  He states his nephew "who is a little mental" threw all of his medicines in the trash 2 weeks ago, and he has not been able to get them filled.     In the ED his VS's showed RR 34, /128, P 120 with no fever on arrival.  He was initially 74% on RA, placed on 15L NRB at 100%, then weaned to 2L at 97% currently.  Labs showed WBC 12.3, Cr 1.4, Gluc 136, , Trop 0.161 and negative covid.  CXR showed increased interstitial attenuation with bilateral airspace opacification.  Findings are nonspecific but may reflect moderate pulmonary edema.  Atypical pneumonia could present with similar appearance in the right clinical setting.     In the ED he was treated with duonebs, ASA 325mg, hydralazine 100mg, solumedrol 125mg iv x 1, NTG 1" to CW, Lopressor 5mg iv x 1, MgSO4 2g iv x 1.  At " "admission he was given Lasix 20mg iv x 1, xopenex, atrovent nebs, and Levaquin 500mg po qday.       Hospital Course:   Mr. Madison is a 62yo man well known to me from admitting him twice in the recent past.  He has a past medical history of CAD, COVID, tobacco abuse, COPD, DCHF, DM2, Aflutter on Eliquis, and chronic cholelithiasis.  He has been covid vaccinated, but is overdue for his booster.  Last echo showed EF 55%, grade III diastolic dysfunction, severe LAE, mod MR, and mod TR.     He was admitted 6/9/2021 to 6/15/2021, initially to MICU for iv amiodarone.  At discharge, Dr. Ritchie noted, "Mr. Madison was admitted for Afib w/ RVR and COVID. He was put on an amiodarone drip with subsequent cardioversion. He required BiPAP briefly which was later weaned to nasal cannula and then room air. His hospital course was complicated by episodes of hypotension leading to FARHAN and shock liver. His creatinine returned to baseline prior to discharge, and his liver function was steadily improving. By discharge he was on room air at rest. He desaturated to 88% with ambulation, but refused home oxygen. He requested discharge and that wish was granted. He was enrolled in the COVID surveillance program and will follow up with his PCP in the outpatient setting."     He was most recently admitted again on 6/30/21 for 5 days again with Aflutter with RVR, severe sepsis thought due to cholecystitis, and DKA.    On that stay he was admitted to ICU and was treated with on IV Amio and Eliquis.  Cardiology was consulted and he underwent successful ESETBAN/DCCV on 7/2. CHADS-VASc score of 2.  He was continued on  Eliquis and Metoprolol for rate control at discharge.     He now comes to the ED with acute SOB while smoking.  This led to paroxysms of coughing and post-tussive episode of emesis.  He denies any chest pain.  He has been out of all of his medications as well for the past 2 weeks.  On arrival to triage, he was found to have an oxygen " "saturation of 74% on RA.  He tells me he was sitting outside smoking, "when the weather changed and it got a little cold.  That set me off to coughing and I couldn't catch my breath.  I feel great now."  He states his nephew "who is a little mental" threw all of his medicines in the trash 2 weeks ago, and he has not been able to get them filled.     In the ED his VS's showed RR 34, /128, P 120 with no fever on arrival.  He was initially 74% on RA, placed on 15L NRB at 100%, then weaned to 2L at 97% currently.  Labs showed WBC 12.3, Cr 1.4, Gluc 136, , Trop 0.161 and negative covid.  CXR showed increased interstitial attenuation with bilateral airspace opacification.  Findings are nonspecific but may reflect moderate pulmonary edema.  Atypical pneumonia could present with similar appearance in the right clinical setting.     In the ED he was treated with duonebs, ASA 325mg, hydralazine 100mg, solumedrol 125mg iv x 1, NTG 1" to CW, Lopressor 5mg iv x 1, MgSO4 2g iv x 1.  At admission he was given Lasix 20mg iv x 1, xopenex, atrovent nebs, and Levaquin 500mg po qday.  Unfortunately,patient decided leave AMA.     "

## 2022-01-26 NOTE — ASSESSMENT & PLAN NOTE
Diuresis and afterload reduction as tolerated    Echo Saline Bubble? No    Interpretation Summary  · The estimated ejection fraction is 55%.  · The left ventricle is normal in size with moderate concentric hypertrophy and normal systolic function.  · Grade III left ventricular diastolic dysfunction.  · Severe left atrial enlargement.  · Moderate mitral regurgitation.  · Mild pulmonic regurgitation.  · Normal right ventricular size with normal right ventricular systolic function.  · Mild right atrial enlargement.  · Normal central venous pressure (3 mmHg).  · The estimated PA systolic pressure is 48 mmHg.  · There is pulmonary hypertension.  · Moderate tricuspid regurgitation.    Recent Labs   Lab 01/26/22  1225   *   .

## 2022-01-26 NOTE — ASSESSMENT & PLAN NOTE
Is wheezing but unsure if this is from cardiogenic edema. No consolidation. No fever and no leukocytosis. Hold off on antibiotics for now. Agree with steroids. Hold off on albuterol given AFib with RVR. Speaking full sentences at room air

## 2022-01-26 NOTE — SUBJECTIVE & OBJECTIVE
"Past Medical History:   Diagnosis Date    Arrhythmia 2017    aflutter    Atrial flutter     CAD (coronary artery disease)     CHF (congestive heart failure), NYHA class I 2017    Cholelithiasis with chronic cholecystitis     Chronic diastolic heart failure     Cigarette smoker     COPD (chronic obstructive pulmonary disease)     COVID-19 06/01/2021    DKA (diabetic ketoacidosis)     NSTEMI (non-ST elevation myocardial infarction)     NSVT (nonsustained ventricular tachycardia)     Psychiatric disorder     h/o "schizophrena/bipolar"    Schizoaffective disorder     Severe sepsis        Past Surgical History:   Procedure Laterality Date    LIVER SURGERY      abscess drainage; 1970s    TREATMENT OF CARDIAC ARRHYTHMIA  9/12/2019    Procedure: Cardioversion or Defibrillation;  Surgeon: Jonathan Lopez MD;  Location: [facility] CATH LAB;  Service: Cardiology;;       Review of patient's allergies indicates:  No Known Allergies    No current facility-administered medications on file prior to encounter.     Current Outpatient Medications on File Prior to Encounter   Medication Sig    apixaban (ELIQUIS) 5 mg Tab Take 1 tablet (5 mg total) by mouth 2 (two) times daily.    aspirin 81 MG Chew Take 1 tablet (81 mg total) by mouth once daily.    atorvastatin (LIPITOR) 40 MG tablet Take 1 tablet (40 mg total) by mouth every evening.    blood-glucose meter kit Use as instructed    hydrALAZINE (APRESOLINE) 100 MG tablet Take 1 tablet (100 mg total) by mouth every 8 (eight) hours.    insulin aspart U-100 (NOVOLOG) 100 unit/mL (3 mL) InPn pen Inject 13 Units into the skin 3 (three) times daily.    insulin detemir U-100 (LEVEMIR FLEXTOUCH) 100 unit/mL (3 mL) SubQ InPn pen Inject 40 Units into the skin once daily.    metoprolol succinate (TOPROL-XL) 100 MG 24 hr tablet Take 1 tablet (100 mg total) by mouth once daily.    NIFEdipine (PROCARDIA-XL) 30 MG (OSM) 24 hr tablet Take 1 tablet (30 mg total) by mouth once " "daily.     Family History    None       Tobacco Use    Smoking status: Current Every Day Smoker     Packs/day: 0.50     Years: 5.00     Pack years: 2.50     Types: Cigarettes    Smokeless tobacco: Never Used   Substance and Sexual Activity    Alcohol use: Not Currently     Alcohol/week: 14.0 standard drinks     Types: 14 Shots of liquor per week     Comment: 6/9/21:  "[employer]" everyday, last drank on 6/8/21     Drug use: No    Sexual activity: Not on file     Review of Systems   Constitutional: Negative for decreased appetite.   HENT: Negative for ear discharge.    Eyes: Negative for blurred vision.   Endocrine: Negative for polyphagia.   Skin: Negative for nail changes.   Genitourinary: Negative for bladder incontinence.   Neurological: Negative for aphonia.   Psychiatric/Behavioral: Negative for hallucinations.   Allergic/Immunologic: Negative for hives.     Objective:     Vital Signs (Most Recent):  Temp: 96.4 °F (35.8 °C) (01/26/22 1125)  Pulse: (!) 120 (01/26/22 1306)  Resp: (!) 27 (01/26/22 1306)  BP: 104/81 (01/26/22 1306)  SpO2: 96 % (01/26/22 1232) Vital Signs (24h Range):  Temp:  [96.4 °F (35.8 °C)] 96.4 °F (35.8 °C)  Pulse:  [] 120  Resp:  [19-31] 27  SpO2:  [88 %-100 %] 96 %  BP: ()/(66-81) 104/81     Weight: 79.4 kg (175 lb)  Body mass index is 29.12 kg/m².    SpO2: 96 %  O2 Device (Oxygen Therapy): room air    No intake or output data in the 24 hours ending 01/26/22 1428    Lines/Drains/Airways     Peripheral Intravenous Line                 Peripheral IV - Single Lumen 01/26/22 1212 Anterior;Left;Proximal Forearm <1 day                Physical Exam  Constitutional:       Appearance: He is well-developed and well-nourished.   HENT:      Head: Normocephalic and atraumatic.   Eyes:      Conjunctiva/sclera: Conjunctivae normal.      Pupils: Pupils are equal, round, and reactive to light.   Cardiovascular:      Rate and Rhythm: Regular rhythm. Tachycardia present.      Pulses: " Intact distal pulses.      Heart sounds: Normal heart sounds.   Pulmonary:      Effort: Pulmonary effort is normal.      Breath sounds: Normal breath sounds.   Abdominal:      General: Bowel sounds are normal.      Palpations: Abdomen is soft.   Musculoskeletal:         General: Normal range of motion.      Cervical back: Normal range of motion and neck supple.   Skin:     General: Skin is warm and dry.   Neurological:      Mental Status: He is alert and oriented to person, place, and time.         Significant Labs: All pertinent lab results from the last 24 hours have been reviewed.    Significant Imaging: Echocardiogram: 2D echo with color flow doppler: No results found for this or any previous visit.

## 2022-01-26 NOTE — ASSESSMENT & PLAN NOTE
Patient with acute kidney injury likely d/t Pre-renal azotemia . Urine electrolytes pending. S/p fluids. Repeat BMP in AM. Monitor urine output and adjust therapy as needed. Avoid nephrotoxins and renally dose meds for GFR listed above.

## 2022-01-26 NOTE — ASSESSMENT & PLAN NOTE
Currently in A-flutter 126. Admits to medical noncompliance. Start IV amiodarone. Consider ESTEBAN/CV if A-fib/flutter persists. Repeat echo

## 2022-01-26 NOTE — SUBJECTIVE & OBJECTIVE
"Past Medical History:   Diagnosis Date    Arrhythmia 2017    aflutter    Atrial flutter     CAD (coronary artery disease)     CHF (congestive heart failure), NYHA class I 2017    Cholelithiasis with chronic cholecystitis     Chronic diastolic heart failure     Cigarette smoker     COPD (chronic obstructive pulmonary disease)     COVID-19 06/01/2021    DKA (diabetic ketoacidosis)     NSTEMI (non-ST elevation myocardial infarction)     NSVT (nonsustained ventricular tachycardia)     Psychiatric disorder     h/o "schizophrena/bipolar"    Schizoaffective disorder     Severe sepsis        Past Surgical History:   Procedure Laterality Date    LIVER SURGERY      abscess drainage; 1970s    TREATMENT OF CARDIAC ARRHYTHMIA  9/12/2019    Procedure: Cardioversion or Defibrillation;  Surgeon: Jonathan Lopez MD;  Location: [facility] CATH LAB;  Service: Cardiology;;       Review of patient's allergies indicates:  No Known Allergies    No current facility-administered medications on file prior to encounter.     Current Outpatient Medications on File Prior to Encounter   Medication Sig    apixaban (ELIQUIS) 5 mg Tab Take 1 tablet (5 mg total) by mouth 2 (two) times daily.    aspirin 81 MG Chew Take 1 tablet (81 mg total) by mouth once daily.    atorvastatin (LIPITOR) 40 MG tablet Take 1 tablet (40 mg total) by mouth every evening.    blood-glucose meter kit Use as instructed    hydrALAZINE (APRESOLINE) 100 MG tablet Take 1 tablet (100 mg total) by mouth every 8 (eight) hours.    insulin aspart U-100 (NOVOLOG) 100 unit/mL (3 mL) InPn pen Inject 13 Units into the skin 3 (three) times daily.    insulin detemir U-100 (LEVEMIR FLEXTOUCH) 100 unit/mL (3 mL) SubQ InPn pen Inject 40 Units into the skin once daily.    metoprolol succinate (TOPROL-XL) 100 MG 24 hr tablet Take 1 tablet (100 mg total) by mouth once daily.    NIFEdipine (PROCARDIA-XL) 30 MG (OSM) 24 hr tablet Take 1 tablet (30 mg total) by mouth once " "daily.     Family History    None       Tobacco Use    Smoking status: Current Every Day Smoker     Packs/day: 0.50     Years: 5.00     Pack years: 2.50     Types: Cigarettes    Smokeless tobacco: Never Used   Substance and Sexual Activity    Alcohol use: Not Currently     Alcohol/week: 14.0 standard drinks     Types: 14 Shots of liquor per week     Comment: 6/9/21:  "[employer]" everyday, last drank on 6/8/21     Drug use: No    Sexual activity: Not on file     Review of Systems   Constitutional: Negative.    HENT: Negative.    Respiratory: Positive for cough and shortness of breath.    Cardiovascular: Negative.    Gastrointestinal: Negative.    Endocrine: Negative.    Genitourinary: Negative.    Musculoskeletal: Negative.    Skin: Negative.    Neurological: Positive for weakness.   Hematological: Negative.    Psychiatric/Behavioral: Negative.      Objective:     Vital Signs (Most Recent):  Temp: 96.4 °F (35.8 °C) (01/26/22 1125)  Pulse: (!) 120 (01/26/22 1306)  Resp: (!) 27 (01/26/22 1306)  BP: 104/81 (01/26/22 1306)  SpO2: 96 % (01/26/22 1232) Vital Signs (24h Range):  Temp:  [96.4 °F (35.8 °C)] 96.4 °F (35.8 °C)  Pulse:  [] 120  Resp:  [19-31] 27  SpO2:  [88 %-100 %] 96 %  BP: ()/(66-81) 104/81     Weight: 79.4 kg (175 lb)  Body mass index is 29.12 kg/m².    Physical Exam  Vitals and nursing note reviewed.   Constitutional:       General: He is not in acute distress.     Appearance: He is not toxic-appearing.   HENT:      Head: Normocephalic.      Mouth/Throat:      Mouth: Mucous membranes are moist.   Eyes:      Extraocular Movements: Extraocular movements intact.   Cardiovascular:      Rate and Rhythm: Tachycardia present. Rhythm irregular.   Pulmonary:      Effort: Pulmonary effort is normal.      Breath sounds: Wheezing present. No rales.      Comments: Speaking full sentences at room air  Musculoskeletal:      Cervical back: Normal range of motion.      Right lower leg: No edema.      " Left lower leg: No edema.   Skin:     General: Skin is warm.      Capillary Refill: Capillary refill takes less than 2 seconds.   Neurological:      Mental Status: He is alert and oriented to person, place, and time.   Psychiatric:         Mood and Affect: Mood normal.         Behavior: Behavior normal.         Thought Content: Thought content normal.         Judgment: Judgment normal.             Significant Labs: All pertinent labs within the past 24 hours have been reviewed.    Significant Imaging: I have reviewed all pertinent imaging results/findings within the past 24 hours.  I have reviewed and interpreted all pertinent imaging results/findings within the past 24 hours.

## 2022-01-26 NOTE — FIRST PROVIDER EVALUATION
" Emergency Department TeleTriage Encounter Note      CHIEF COMPLAINT    Chief Complaint   Patient presents with    flu like symptoms     The patient reports sob, a cough, generalized weakness, and back pain x 2 days.        VITAL SIGNS   Initial Vitals [01/26/22 1009]   BP Pulse Resp Temp SpO2   93/68 90 20 -- 95 %      MAP       --            ALLERGIES    Review of patient's allergies indicates:  No Known Allergies    PROVIDER TRIAGE NOTE  This is a teletriage evaluation of a 61 y.o. male presenting to the ED complaining of chest pain. Patient reports chest pain and shortness of breath with a  cough for the past 2 days. He also states "my kidneys hurt" and he has frequent urination.     Initial orders will be placed and care will be transferred to an alternate provider when patient is roomed for a full evaluation. Any additional orders and the final disposition will be determined by that provider.           ORDERS  Labs Reviewed   SARS-COV-2 (COVID-19) QUALITATIVE PCR   CBC W/ AUTO DIFFERENTIAL   COMPREHENSIVE METABOLIC PANEL   TROPONIN I   B-TYPE NATRIURETIC PEPTIDE   URINALYSIS, REFLEX TO URINE CULTURE   POCT INFLUENZA A/B MOLECULAR       ED Orders (720h ago, onward)    Start Ordered     Status Ordering Provider    01/26/22 1020 01/26/22 1019  CBC Auto Differential  STAT         Ordered KACEY GREGORY    01/26/22 1020 01/26/22 1019  Comprehensive Metabolic Panel  STAT         Ordered KACEY GREGORY    01/26/22 1020 01/26/22 1019  Pulse Oximetry Continuous  Continuous         Ordered KACEY GREGORY    01/26/22 1020 01/26/22 1019  Cardiac Monitoring - Adult  Continuous         Ordered KACEY GREGORY    01/26/22 1020 01/26/22 1019  X-Ray Chest 1 View  1 time imaging         Ordered KACEY GREGORY    01/26/22 1020 01/26/22 1019  Troponin I  STAT         Ordered KACEY GREGORY    01/26/22 1020 01/26/22 1019  Brain Natriuretic Peptide  STAT         Ordered KACEY GREGORY    01/26/22 1020 01/26/22 1019  " Urinalysis, Reflex to Urine Culture Urine, Clean Catch  STAT         Ordered KACEY GREGORY.    01/26/22 1019 01/26/22 1019  Airborne and Contact and Droplet Isolation Status  Continuous         Ordered KACEY GREGORY.    01/26/22 1013 01/26/22 1013  EKG 12-lead  Once         Ordered NELSON ASH.    01/26/22 1013 01/26/22 1013  COVID-19 Routine Screening  STAT         Ordered NELSON ASH.    01/26/22 1013 01/26/22 1013  POCT Influenza A/B Molecular  Once         Ordered NELSON ASH            Virtual Visit Note: The provider triage portion of this emergency department evaluation and documentation was performed via NPM, a HIPAA-compliant telemedicine application, in concert with a tele-presenter in the room. A face to face patient evaluation with one of my colleagues will occur once the patient is placed in an emergency department room.      DISCLAIMER: This note was prepared with [employer] voice recognition transcription software. Garbled syntax, mangled pronouns, and other bizarre constructions may be attributed to that software system.

## 2022-01-26 NOTE — H&P
"[facility] [place] Emergency Dept  [facility] Medicine  History & Physical    Patient Name: Marck Madison  MRN: [id number]  Patient Class: IP- Inpatient  Admission Date: 1/26/2022  Attending Physician: Gabrielle Arroyo MD   Primary Care Provider: [facility]         Patient information was obtained from patient, past medical records and ER records.     Subjective:     Principal Problem:Atrial fibrillation with RVR    Chief Complaint:   Chief Complaint   Patient presents with    flu like symptoms     The patient reports sob, a cough, generalized weakness, and back pain x 2 days.         HPI: 61 year old male with diastolic heart failure, atrial flutter, pulmonary hypertension, mitral and tricuspid valve regurgitation, emphysema and current every day smoker who presented with shortness of breath, body aches, bilateral flank pain and weakness since last night. Associated symptoms include cough. Admits to smoking. Denied nausea, vomiting, abdominal pain, dysuria, diarrhea. In the ED, patient was hypotensive, tachycardic to 120s, diffuse expiratory wheezing and with a lactic acid of 3. Given one liter of fluids, continuous bronchodilators, methylprednisolone and broad spectrum antibiotics. O2 sats stable at room air. COVID and flu negative. Procalcitonin negative. Remains tachycardic. Cardiology consulted. BP stable. Admit to ICU.       Past Medical History:   Diagnosis Date    Arrhythmia 2017    aflutter    Atrial flutter     CAD (coronary artery disease)     CHF (congestive heart failure), NYHA class I 2017    Cholelithiasis with chronic cholecystitis     Chronic diastolic heart failure     Cigarette smoker     COPD (chronic obstructive pulmonary disease)     COVID-19 06/01/2021    DKA (diabetic ketoacidosis)     NSTEMI (non-ST elevation myocardial infarction)     NSVT (nonsustained ventricular tachycardia)     Psychiatric disorder     h/o "schizophrena/bipolar"    Schizoaffective disorder     Severe " "sepsis        Past Surgical History:   Procedure Laterality Date    LIVER SURGERY      abscess drainage; 1970s    TREATMENT OF CARDIAC ARRHYTHMIA  9/12/2019    Procedure: Cardioversion or Defibrillation;  Surgeon: Jonathan Lopez MD;  Location: [facility] CATH LAB;  Service: Cardiology;;       Review of patient's allergies indicates:  No Known Allergies    No current facility-administered medications on file prior to encounter.     Current Outpatient Medications on File Prior to Encounter   Medication Sig    apixaban (ELIQUIS) 5 mg Tab Take 1 tablet (5 mg total) by mouth 2 (two) times daily.    aspirin 81 MG Chew Take 1 tablet (81 mg total) by mouth once daily.    atorvastatin (LIPITOR) 40 MG tablet Take 1 tablet (40 mg total) by mouth every evening.    blood-glucose meter kit Use as instructed    hydrALAZINE (APRESOLINE) 100 MG tablet Take 1 tablet (100 mg total) by mouth every 8 (eight) hours.    insulin aspart U-100 (NOVOLOG) 100 unit/mL (3 mL) InPn pen Inject 13 Units into the skin 3 (three) times daily.    insulin detemir U-100 (LEVEMIR FLEXTOUCH) 100 unit/mL (3 mL) SubQ InPn pen Inject 40 Units into the skin once daily.    metoprolol succinate (TOPROL-XL) 100 MG 24 hr tablet Take 1 tablet (100 mg total) by mouth once daily.    NIFEdipine (PROCARDIA-XL) 30 MG (OSM) 24 hr tablet Take 1 tablet (30 mg total) by mouth once daily.     Family History    None       Tobacco Use    Smoking status: Current Every Day Smoker     Packs/day: 0.50     Years: 5.00     Pack years: 2.50     Types: Cigarettes    Smokeless tobacco: Never Used   Substance and Sexual Activity    Alcohol use: Not Currently     Alcohol/week: 14.0 standard drinks     Types: 14 Shots of liquor per week     Comment: 6/9/21:  "[employer]" everyday, last drank on 6/8/21     Drug use: No    Sexual activity: Not on file     Review of Systems   Constitutional: Negative.    HENT: Negative.    Respiratory: Positive for cough and shortness of " breath.    Cardiovascular: Negative.    Gastrointestinal: Negative.    Endocrine: Negative.    Genitourinary: Negative.    Musculoskeletal: Negative.    Skin: Negative.    Neurological: Positive for weakness.   Hematological: Negative.    Psychiatric/Behavioral: Negative.      Objective:     Vital Signs (Most Recent):  Temp: 96.4 °F (35.8 °C) (01/26/22 1125)  Pulse: (!) 120 (01/26/22 1306)  Resp: (!) 27 (01/26/22 1306)  BP: 104/81 (01/26/22 1306)  SpO2: 96 % (01/26/22 1232) Vital Signs (24h Range):  Temp:  [96.4 °F (35.8 °C)] 96.4 °F (35.8 °C)  Pulse:  [] 120  Resp:  [19-31] 27  SpO2:  [88 %-100 %] 96 %  BP: ()/(66-81) 104/81     Weight: 79.4 kg (175 lb)  Body mass index is 29.12 kg/m².    Physical Exam  Vitals and nursing note reviewed.   Constitutional:       General: He is not in acute distress.     Appearance: He is not toxic-appearing.   HENT:      Head: Normocephalic.      Mouth/Throat:      Mouth: Mucous membranes are moist.   Eyes:      Extraocular Movements: Extraocular movements intact.   Cardiovascular:      Rate and Rhythm: Tachycardia present. Rhythm irregular.   Pulmonary:      Effort: Pulmonary effort is normal.      Breath sounds: Wheezing present. No rales.      Comments: Speaking full sentences at room air  Musculoskeletal:      Cervical back: Normal range of motion.      Right lower leg: No edema.      Left lower leg: No edema.   Skin:     General: Skin is warm.      Capillary Refill: Capillary refill takes less than 2 seconds.   Neurological:      Mental Status: He is alert and oriented to person, place, and time.   Psychiatric:         Mood and Affect: Mood normal.         Behavior: Behavior normal.         Thought Content: Thought content normal.         Judgment: Judgment normal.             Significant Labs: All pertinent labs within the past 24 hours have been reviewed.    Significant Imaging: I have reviewed all pertinent imaging results/findings within the past 24 hours.  I  have reviewed and interpreted all pertinent imaging results/findings within the past 24 hours.    Assessment/Plan:     * Atrial fibrillation with RVR   Patient is currently in atrial fibrillation with RVR. Has history of atrial flutter. Cardiology on board. Echo pending. Amiodarone infusion ordered. [id number] Score: 3. Anticoagulation indicated. Anticoagulation done with apixaban. Cardiac monitoring. Admit to ICU    Add TSH and urine drug screen to labwork        Acute on chronic diastolic congestive heart failure  With wheezing, AFib with RVR and elevated BNP  Got 1 L of fluids. Will hold off on any further fluids for now and ok to hold off on lasix since he is ok at room air  Echocardiogram pending. Amiodarone infusion for AFib  Cardiac diet. Continue metoprolol and statin. BP on lower end of normal therefore hold on antihypertensives        Elevated troponin  Likely demand. Echo pending. Aspirin and statin. Rate control      Type 2 diabetes mellitus, with long-term current use of insulin  HgbA1c pending. Start insulin and adjust as needed for goal glucose 140-180      Panlobular emphysema  Is wheezing but unsure if this is from cardiogenic edema. No consolidation. No fever and no leukocytosis. Hold off on antibiotics for now. Agree with steroids. Hold off on albuterol given AFib with RVR. Speaking full sentences at room air      Tobacco use disorder, severe, dependence  Spent > 5 minutes discussing cessation.   Wants nicotine patch. Ordered.       FARHAN (acute kidney injury)  Patient with acute kidney injury likely d/t Pre-renal azotemia . Urine electrolytes pending. S/p fluids. Repeat BMP in AM. Monitor urine output and adjust therapy as needed. Avoid nephrotoxins and renally dose meds for GFR listed above.       VTE Risk Mitigation (From admission, onward)         Ordered     apixaban tablet 5 mg  2 times daily         01/26/22 1409     IP VTE HIGH RISK PATIENT  Once         01/26/22 1409     Place sequential  compression device  Until discontinued         01/26/22 1400               Time spent: > 35 minutes in patient's care    Winifred Vences MD  Department of Hospital Medicine   [facility] - Emergency Dept

## 2022-01-27 ENCOUNTER — ANESTHESIA (OUTPATIENT)
Dept: INTENSIVE CARE | Facility: HOSPITAL | Age: 62
DRG: 871 | End: 2022-01-27
Payer: MEDICAID

## 2022-01-27 ENCOUNTER — ANESTHESIA EVENT (OUTPATIENT)
Dept: INTENSIVE CARE | Facility: HOSPITAL | Age: 62
DRG: 871 | End: 2022-01-27
Payer: MEDICAID

## 2022-01-27 PROBLEM — R57.9 SHOCK, UNSPECIFIED: Status: ACTIVE | Noted: 2022-01-27

## 2022-01-27 LAB
ALBUMIN SERPL BCP-MCNC: 3.7 G/DL (ref 3.5–5.2)
ALLENS TEST: ABNORMAL
ALP SERPL-CCNC: 70 U/L (ref 55–135)
ALT SERPL W/O P-5'-P-CCNC: 33 U/L (ref 10–44)
ANION GAP SERPL CALC-SCNC: 15 MMOL/L (ref 8–16)
ANION GAP SERPL CALC-SCNC: 17 MMOL/L (ref 8–16)
AST SERPL-CCNC: 40 U/L (ref 10–40)
BASOPHILS # BLD AUTO: 0.02 K/UL (ref 0–0.2)
BASOPHILS NFR BLD: 0.1 % (ref 0–1.9)
BILIRUB SERPL-MCNC: 0.4 MG/DL (ref 0.1–1)
BUN SERPL-MCNC: 50 MG/DL (ref 8–23)
BUN SERPL-MCNC: 64 MG/DL (ref 8–23)
CALCIUM SERPL-MCNC: 8.4 MG/DL (ref 8.7–10.5)
CALCIUM SERPL-MCNC: 9.3 MG/DL (ref 8.7–10.5)
CHLORIDE SERPL-SCNC: 101 MMOL/L (ref 95–110)
CHLORIDE SERPL-SCNC: 105 MMOL/L (ref 95–110)
CO2 SERPL-SCNC: 16 MMOL/L (ref 23–29)
CO2 SERPL-SCNC: 20 MMOL/L (ref 23–29)
CREAT SERPL-MCNC: 2.8 MG/DL (ref 0.5–1.4)
CREAT SERPL-MCNC: 3.8 MG/DL (ref 0.5–1.4)
DELSYS: ABNORMAL
DIFFERENTIAL METHOD: ABNORMAL
EOSINOPHIL # BLD AUTO: 0 K/UL (ref 0–0.5)
EOSINOPHIL NFR BLD: 0 % (ref 0–8)
EP: 10
ERYTHROCYTE [DISTWIDTH] IN BLOOD BY AUTOMATED COUNT: 13.3 % (ref 11.5–14.5)
ERYTHROCYTE [SEDIMENTATION RATE] IN BLOOD BY WESTERGREN METHOD: 12 MM/H
ERYTHROCYTE [SEDIMENTATION RATE] IN BLOOD BY WESTERGREN METHOD: 24 MM/H
ERYTHROCYTE [SEDIMENTATION RATE] IN BLOOD BY WESTERGREN METHOD: 35 MM/H
ERYTHROCYTE [SEDIMENTATION RATE] IN BLOOD BY WESTERGREN METHOD: 35 MM/H
EST. GFR  (AFRICAN AMERICAN): 18 ML/MIN/1.73 M^2
EST. GFR  (AFRICAN AMERICAN): 27 ML/MIN/1.73 M^2
EST. GFR  (NON AFRICAN AMERICAN): 16 ML/MIN/1.73 M^2
EST. GFR  (NON AFRICAN AMERICAN): 23 ML/MIN/1.73 M^2
ETHANOL UR-MCNC: <10 MG/DL
FIO2: 100
GLUCOSE SERPL-MCNC: 173 MG/DL (ref 70–110)
GLUCOSE SERPL-MCNC: 196 MG/DL (ref 70–110)
HCO3 UR-SCNC: 13.3 MMOL/L (ref 24–28)
HCO3 UR-SCNC: 18 MMOL/L (ref 24–28)
HCO3 UR-SCNC: 20 MMOL/L (ref 24–28)
HCO3 UR-SCNC: 21.4 MMOL/L (ref 24–28)
HCO3 UR-SCNC: 25.2 MMOL/L (ref 24–28)
HCT VFR BLD AUTO: 46.5 % (ref 40–54)
HGB BLD-MCNC: 15.5 G/DL (ref 14–18)
IMM GRANULOCYTES # BLD AUTO: 0.1 K/UL (ref 0–0.04)
IMM GRANULOCYTES NFR BLD AUTO: 0.7 % (ref 0–0.5)
IP: 16
LACTATE SERPL-SCNC: 2.5 MMOL/L (ref 0.5–2.2)
LYMPHOCYTES # BLD AUTO: 1.1 K/UL (ref 1–4.8)
LYMPHOCYTES NFR BLD: 7.4 % (ref 18–48)
MCH RBC QN AUTO: 32.7 PG (ref 27–31)
MCHC RBC AUTO-ENTMCNC: 33.3 G/DL (ref 32–36)
MCV RBC AUTO: 98 FL (ref 82–98)
MIN VOL: 26.8
MODE: ABNORMAL
MONOCYTES # BLD AUTO: 0.4 K/UL (ref 0.3–1)
MONOCYTES NFR BLD: 3 % (ref 4–15)
NEUTROPHILS # BLD AUTO: 12.8 K/UL (ref 1.8–7.7)
NEUTROPHILS NFR BLD: 88.8 % (ref 38–73)
NRBC BLD-RTO: 0 /100 WBC
PCO2 BLDA: 24.4 MMHG (ref 35–45)
PCO2 BLDA: 34.7 MMHG (ref 35–45)
PCO2 BLDA: 67.3 MMHG (ref 35–45)
PCO2 BLDA: 74.6 MMHG (ref 35–45)
PCO2 BLDA: 76.2 MMHG (ref 35–45)
PEEP: 15
PEEP: 18
PEEP: 20
PH SMN: 7.07 [PH] (ref 7.35–7.45)
PH SMN: 7.08 [PH] (ref 7.35–7.45)
PH SMN: 7.13 [PH] (ref 7.35–7.45)
PH SMN: 7.32 [PH] (ref 7.35–7.45)
PH SMN: 7.34 [PH] (ref 7.35–7.45)
PIP: 34
PLATELET # BLD AUTO: 192 K/UL (ref 150–450)
PMV BLD AUTO: 10.7 FL (ref 9.2–12.9)
PO2 BLDA: 121 MMHG (ref 80–100)
PO2 BLDA: 194 MMHG (ref 80–100)
PO2 BLDA: 50 MMHG (ref 40–60)
PO2 BLDA: 52 MMHG (ref 80–100)
PO2 BLDA: 67 MMHG (ref 80–100)
POC BE: -10 MMOL/L
POC BE: -11 MMOL/L
POC BE: -12 MMOL/L
POC BE: -6 MMOL/L
POC BE: -7 MMOL/L
POC SATURATED O2: 71 % (ref 95–100)
POC SATURATED O2: 84 % (ref 95–100)
POC SATURATED O2: 92 % (ref 95–100)
POC SATURATED O2: 96 % (ref 95–100)
POC SATURATED O2: 99 % (ref 95–100)
POC TCO2: 14 MMOL/L (ref 23–27)
POC TCO2: 19 MMOL/L (ref 23–27)
POC TCO2: 22 MMOL/L (ref 23–27)
POC TCO2: 24 MMOL/L (ref 23–27)
POC TCO2: 27 MMOL/L (ref 24–29)
POCT GLUCOSE: 191 MG/DL (ref 70–110)
POCT GLUCOSE: 192 MG/DL (ref 70–110)
POCT GLUCOSE: 337 MG/DL (ref 70–110)
POTASSIUM SERPL-SCNC: 5.1 MMOL/L (ref 3.5–5.1)
POTASSIUM SERPL-SCNC: 7.4 MMOL/L (ref 3.5–5.1)
PROT SERPL-MCNC: 7 G/DL (ref 6–8.4)
RBC # BLD AUTO: 4.74 M/UL (ref 4.6–6.2)
SAMPLE: ABNORMAL
SITE: ABNORMAL
SODIUM SERPL-SCNC: 136 MMOL/L (ref 136–145)
SODIUM SERPL-SCNC: 138 MMOL/L (ref 136–145)
SPONT RATE: 24
UUN UR-MCNC: 288 MG/DL (ref 140–1050)
VT: 300
VT: 340
VT: 400
VT: 400
WBC # BLD AUTO: 14.41 K/UL (ref 3.9–12.7)

## 2022-01-27 PROCEDURE — 94799 UNLISTED PULMONARY SVC/PX: CPT

## 2022-01-27 PROCEDURE — 36569 INSJ PICC 5 YR+ W/O IMAGING: CPT

## 2022-01-27 PROCEDURE — 63600175 PHARM REV CODE 636 W HCPCS

## 2022-01-27 PROCEDURE — 63600175 PHARM REV CODE 636 W HCPCS: Performed by: INTERNAL MEDICINE

## 2022-01-27 PROCEDURE — 94761 N-INVAS EAR/PLS OXIMETRY MLT: CPT

## 2022-01-27 PROCEDURE — 25000003 PHARM REV CODE 250: Performed by: INTERNAL MEDICINE

## 2022-01-27 PROCEDURE — 99900035 HC TECH TIME PER 15 MIN (STAT)

## 2022-01-27 PROCEDURE — 94640 AIRWAY INHALATION TREATMENT: CPT

## 2022-01-27 PROCEDURE — 99291 CRITICAL CARE FIRST HOUR: CPT | Mod: 25,,, | Performed by: NURSE PRACTITIONER

## 2022-01-27 PROCEDURE — 99233 PR SUBSEQUENT HOSPITAL CARE,LEVL III: ICD-10-PCS | Mod: ,,, | Performed by: INTERNAL MEDICINE

## 2022-01-27 PROCEDURE — 83605 ASSAY OF LACTIC ACID: CPT | Performed by: INTERNAL MEDICINE

## 2022-01-27 PROCEDURE — 63600175 PHARM REV CODE 636 W HCPCS: Performed by: NURSE PRACTITIONER

## 2022-01-27 PROCEDURE — 25000003 PHARM REV CODE 250: Performed by: NURSE PRACTITIONER

## 2022-01-27 PROCEDURE — 36600 WITHDRAWAL OF ARTERIAL BLOOD: CPT

## 2022-01-27 PROCEDURE — 25000242 PHARM REV CODE 250 ALT 637 W/ HCPCS: Performed by: INTERNAL MEDICINE

## 2022-01-27 PROCEDURE — 82803 BLOOD GASES ANY COMBINATION: CPT

## 2022-01-27 PROCEDURE — 99292 CRITICAL CARE ADDL 30 MIN: CPT | Mod: 25,,, | Performed by: NURSE PRACTITIONER

## 2022-01-27 PROCEDURE — 99233 SBSQ HOSP IP/OBS HIGH 50: CPT | Mod: ,,, | Performed by: INTERNAL MEDICINE

## 2022-01-27 PROCEDURE — 51702 INSERT TEMP BLADDER CATH: CPT

## 2022-01-27 PROCEDURE — 25000003 PHARM REV CODE 250

## 2022-01-27 PROCEDURE — 36556 CENTRAL LINE: ICD-10-PCS | Mod: ,,, | Performed by: ANESTHESIOLOGY

## 2022-01-27 PROCEDURE — 94660 CPAP INITIATION&MGMT: CPT

## 2022-01-27 PROCEDURE — 27100092 HC HIGH FLOW DELIVERY CANNULA

## 2022-01-27 PROCEDURE — 99900031 HC PATIENT EDUCATION (STAT)

## 2022-01-27 PROCEDURE — 27000249 HC VAPOTHERM CIRCUIT

## 2022-01-27 PROCEDURE — 20000000 HC ICU ROOM

## 2022-01-27 PROCEDURE — 31500 INTUBATION: ICD-10-PCS | Mod: ,,, | Performed by: INTERNAL MEDICINE

## 2022-01-27 PROCEDURE — 31500 INSERT EMERGENCY AIRWAY: CPT | Mod: ,,, | Performed by: INTERNAL MEDICINE

## 2022-01-27 PROCEDURE — 36415 COLL VENOUS BLD VENIPUNCTURE: CPT | Performed by: NURSE PRACTITIONER

## 2022-01-27 PROCEDURE — 27100171 HC OXYGEN HIGH FLOW UP TO 24 HOURS

## 2022-01-27 PROCEDURE — 76937 CENTRAL LINE: ICD-10-PCS | Mod: 26,,, | Performed by: ANESTHESIOLOGY

## 2022-01-27 PROCEDURE — 80053 COMPREHEN METABOLIC PANEL: CPT | Performed by: INTERNAL MEDICINE

## 2022-01-27 PROCEDURE — 99292 PR CRITICAL CARE, ADDL 30 MIN: ICD-10-PCS | Mod: 25,,, | Performed by: NURSE PRACTITIONER

## 2022-01-27 PROCEDURE — C1751 CATH, INF, PER/CENT/MIDLINE: HCPCS

## 2022-01-27 PROCEDURE — A4216 STERILE WATER/SALINE, 10 ML: HCPCS | Performed by: INTERNAL MEDICINE

## 2022-01-27 PROCEDURE — S4991 NICOTINE PATCH NONLEGEND: HCPCS | Performed by: INTERNAL MEDICINE

## 2022-01-27 PROCEDURE — 94760 N-INVAS EAR/PLS OXIMETRY 1: CPT

## 2022-01-27 PROCEDURE — 80048 BASIC METABOLIC PNL TOTAL CA: CPT | Performed by: NURSE PRACTITIONER

## 2022-01-27 PROCEDURE — 76937 US GUIDE VASCULAR ACCESS: CPT | Performed by: ANESTHESIOLOGY

## 2022-01-27 PROCEDURE — 85025 COMPLETE CBC W/AUTO DIFF WBC: CPT | Performed by: INTERNAL MEDICINE

## 2022-01-27 PROCEDURE — 99291 PR CRITICAL CARE, E/M 30-74 MINUTES: ICD-10-PCS | Mod: 25,,, | Performed by: NURSE PRACTITIONER

## 2022-01-27 PROCEDURE — 94002 VENT MGMT INPAT INIT DAY: CPT

## 2022-01-27 PROCEDURE — 36556 INSERT NON-TUNNEL CV CATH: CPT | Mod: ,,, | Performed by: ANESTHESIOLOGY

## 2022-01-27 RX ORDER — FUROSEMIDE 10 MG/ML
120 INJECTION INTRAMUSCULAR; INTRAVENOUS ONCE
Status: COMPLETED | OUTPATIENT
Start: 2022-01-27 | End: 2022-01-27

## 2022-01-27 RX ORDER — ROCURONIUM BROMIDE 10 MG/ML
INJECTION, SOLUTION INTRAVENOUS
Status: COMPLETED
Start: 2022-01-27 | End: 2022-01-27

## 2022-01-27 RX ORDER — PHENYLEPHRINE HCL IN 0.9% NACL 1 MG/10 ML
100 SYRINGE (ML) INTRAVENOUS ONCE AS NEEDED
Status: DISCONTINUED | OUTPATIENT
Start: 2022-01-27 | End: 2022-01-31

## 2022-01-27 RX ORDER — HYDROCODONE BITARTRATE AND ACETAMINOPHEN 500; 5 MG/1; MG/1
TABLET ORAL CONTINUOUS
Status: ACTIVE | OUTPATIENT
Start: 2022-01-27 | End: 2022-01-28

## 2022-01-27 RX ORDER — FUROSEMIDE 10 MG/ML
120 INJECTION INTRAMUSCULAR; INTRAVENOUS EVERY 6 HOURS
Status: DISCONTINUED | OUTPATIENT
Start: 2022-01-28 | End: 2022-01-29

## 2022-01-27 RX ORDER — INDOMETHACIN 25 MG/1
CAPSULE ORAL
Status: COMPLETED
Start: 2022-01-27 | End: 2022-01-27

## 2022-01-27 RX ORDER — ETOMIDATE 2 MG/ML
INJECTION INTRAVENOUS
Status: COMPLETED
Start: 2022-01-27 | End: 2022-01-27

## 2022-01-27 RX ORDER — FENTANYL CITRATE-0.9 % NACL/PF 10 MCG/ML
0-200 PLASTIC BAG, INJECTION (ML) INTRAVENOUS CONTINUOUS
Status: DISCONTINUED | OUTPATIENT
Start: 2022-01-27 | End: 2022-01-29

## 2022-01-27 RX ORDER — SODIUM CHLORIDE 0.9 % (FLUSH) 0.9 %
10 SYRINGE (ML) INJECTION
Status: DISCONTINUED | OUTPATIENT
Start: 2022-01-27 | End: 2022-02-05 | Stop reason: HOSPADM

## 2022-01-27 RX ORDER — INDOMETHACIN 25 MG/1
50 CAPSULE ORAL ONCE
Status: COMPLETED | OUTPATIENT
Start: 2022-01-27 | End: 2022-01-27

## 2022-01-27 RX ORDER — PROPOFOL 10 MG/ML
0-50 INJECTION, EMULSION INTRAVENOUS CONTINUOUS
Status: DISCONTINUED | OUTPATIENT
Start: 2022-01-27 | End: 2022-01-29

## 2022-01-27 RX ORDER — HEPARIN SODIUM 5000 [USP'U]/ML
5000 INJECTION, SOLUTION INTRAVENOUS; SUBCUTANEOUS
Status: DISCONTINUED | OUTPATIENT
Start: 2022-01-27 | End: 2022-01-27

## 2022-01-27 RX ORDER — LORAZEPAM 2 MG/ML
2 INJECTION INTRAMUSCULAR EVERY 4 HOURS PRN
Status: DISCONTINUED | OUTPATIENT
Start: 2022-01-27 | End: 2022-01-27

## 2022-01-27 RX ORDER — ALBUTEROL SULFATE 2.5 MG/.5ML
10 SOLUTION RESPIRATORY (INHALATION) ONCE
Status: COMPLETED | OUTPATIENT
Start: 2022-01-27 | End: 2022-01-27

## 2022-01-27 RX ORDER — MAGNESIUM SULFATE HEPTAHYDRATE 40 MG/ML
2 INJECTION, SOLUTION INTRAVENOUS
Status: ACTIVE | OUTPATIENT
Start: 2022-01-27 | End: 2022-01-28

## 2022-01-27 RX ORDER — LORAZEPAM 2 MG/ML
1 INJECTION INTRAMUSCULAR EVERY 4 HOURS PRN
Status: DISCONTINUED | OUTPATIENT
Start: 2022-01-27 | End: 2022-02-05 | Stop reason: HOSPADM

## 2022-01-27 RX ORDER — ETOMIDATE 2 MG/ML
25 INJECTION INTRAVENOUS ONCE
Status: COMPLETED | OUTPATIENT
Start: 2022-01-27 | End: 2022-01-27

## 2022-01-27 RX ORDER — SODIUM CHLORIDE 0.9 % (FLUSH) 0.9 %
10 SYRINGE (ML) INJECTION EVERY 6 HOURS
Status: DISCONTINUED | OUTPATIENT
Start: 2022-01-28 | End: 2022-02-05 | Stop reason: HOSPADM

## 2022-01-27 RX ORDER — IPRATROPIUM BROMIDE AND ALBUTEROL SULFATE 2.5; .5 MG/3ML; MG/3ML
3 SOLUTION RESPIRATORY (INHALATION) EVERY 6 HOURS PRN
Status: DISCONTINUED | OUTPATIENT
Start: 2022-01-27 | End: 2022-02-05 | Stop reason: HOSPADM

## 2022-01-27 RX ORDER — ROCURONIUM BROMIDE 10 MG/ML
80 INJECTION, SOLUTION INTRAVENOUS ONCE
Status: COMPLETED | OUTPATIENT
Start: 2022-01-27 | End: 2022-01-27

## 2022-01-27 RX ORDER — NOREPINEPHRINE BITARTRATE/D5W 4MG/250ML
0-3 PLASTIC BAG, INJECTION (ML) INTRAVENOUS CONTINUOUS PRN
Status: DISCONTINUED | OUTPATIENT
Start: 2022-01-27 | End: 2022-01-27

## 2022-01-27 RX ORDER — INDOMETHACIN 25 MG/1
100 CAPSULE ORAL ONCE
Status: DISCONTINUED | OUTPATIENT
Start: 2022-01-27 | End: 2022-01-27

## 2022-01-27 RX ORDER — CHLORHEXIDINE GLUCONATE ORAL RINSE 1.2 MG/ML
15 SOLUTION DENTAL 2 TIMES DAILY
Status: DISCONTINUED | OUTPATIENT
Start: 2022-01-27 | End: 2022-02-01

## 2022-01-27 RX ORDER — FAMOTIDINE 10 MG/ML
20 INJECTION INTRAVENOUS DAILY
Status: DISCONTINUED | OUTPATIENT
Start: 2022-01-27 | End: 2022-02-04

## 2022-01-27 RX ORDER — ENOXAPARIN SODIUM 100 MG/ML
1 INJECTION SUBCUTANEOUS EVERY 24 HOURS
Status: DISCONTINUED | OUTPATIENT
Start: 2022-01-28 | End: 2022-01-30

## 2022-01-27 RX ORDER — FUROSEMIDE 10 MG/ML
INJECTION INTRAMUSCULAR; INTRAVENOUS
Status: COMPLETED
Start: 2022-01-27 | End: 2022-01-27

## 2022-01-27 RX ORDER — HEPARIN SODIUM 5000 [USP'U]/ML
5000 INJECTION, SOLUTION INTRAVENOUS; SUBCUTANEOUS
Status: DISCONTINUED | OUTPATIENT
Start: 2022-01-27 | End: 2022-02-05 | Stop reason: HOSPADM

## 2022-01-27 RX ORDER — FUROSEMIDE 10 MG/ML
60 INJECTION INTRAMUSCULAR; INTRAVENOUS ONCE
Status: COMPLETED | OUTPATIENT
Start: 2022-01-27 | End: 2022-01-27

## 2022-01-27 RX ORDER — DEXMEDETOMIDINE HYDROCHLORIDE 4 UG/ML
0-1.4 INJECTION, SOLUTION INTRAVENOUS CONTINUOUS
Status: DISCONTINUED | OUTPATIENT
Start: 2022-01-27 | End: 2022-01-27

## 2022-01-27 RX ORDER — PROPOFOL 10 MG/ML
INJECTION, EMULSION INTRAVENOUS
Status: COMPLETED
Start: 2022-01-27 | End: 2022-01-27

## 2022-01-27 RX ORDER — LORAZEPAM 2 MG/ML
1 INJECTION INTRAMUSCULAR ONCE
Status: COMPLETED | OUTPATIENT
Start: 2022-01-27 | End: 2022-01-27

## 2022-01-27 RX ADMIN — AMIODARONE HYDROCHLORIDE 0.5 MG/MIN: 1.8 INJECTION, SOLUTION INTRAVENOUS at 11:01

## 2022-01-27 RX ADMIN — ATORVASTATIN CALCIUM 40 MG: 40 TABLET, FILM COATED ORAL at 08:01

## 2022-01-27 RX ADMIN — PROPOFOL 40 MCG/KG/MIN: 10 INJECTION, EMULSION INTRAVENOUS at 09:01

## 2022-01-27 RX ADMIN — LORAZEPAM 1 MG: 2 INJECTION INTRAMUSCULAR; INTRAVENOUS at 03:01

## 2022-01-27 RX ADMIN — PHENYLEPHRINE HYDROCHLORIDE 2 MCG/KG/MIN: 10 INJECTION INTRAVENOUS at 10:01

## 2022-01-27 RX ADMIN — Medication 0.02 MCG/KG/MIN: at 04:01

## 2022-01-27 RX ADMIN — CEFTRIAXONE 1 G: 1 INJECTION, SOLUTION INTRAVENOUS at 08:01

## 2022-01-27 RX ADMIN — INSULIN HUMAN 7.94 UNITS: 100 INJECTION, SOLUTION PARENTERAL at 09:01

## 2022-01-27 RX ADMIN — FAMOTIDINE 20 MG: 10 INJECTION INTRAVENOUS at 08:01

## 2022-01-27 RX ADMIN — SODIUM BICARBONATE 50 MEQ: 84 INJECTION, SOLUTION INTRAVENOUS at 08:01

## 2022-01-27 RX ADMIN — DEXMEDETOMIDINE HYDROCHLORIDE IN 0.9% SODIUM CHLORIDE 0.4 MCG/KG/HR: 4 INJECTION INTRAVENOUS at 01:01

## 2022-01-27 RX ADMIN — FUROSEMIDE 120 MG: 10 INJECTION INTRAMUSCULAR; INTRAVENOUS at 04:01

## 2022-01-27 RX ADMIN — HEPARIN SODIUM 5000 UNITS: 5000 INJECTION, SOLUTION INTRAVENOUS; SUBCUTANEOUS at 09:01

## 2022-01-27 RX ADMIN — ETOMIDATE INJECTION 25 MG: 2 SOLUTION INTRAVENOUS at 04:01

## 2022-01-27 RX ADMIN — INSULIN DETEMIR 5 UNITS: 100 INJECTION, SOLUTION SUBCUTANEOUS at 08:01

## 2022-01-27 RX ADMIN — FUROSEMIDE 60 MG: 10 INJECTION, SOLUTION INTRAMUSCULAR; INTRAVENOUS at 09:01

## 2022-01-27 RX ADMIN — ETOMIDATE 25 MG: 2 INJECTION INTRAVENOUS at 04:01

## 2022-01-27 RX ADMIN — PROPOFOL 15 MCG/KG/MIN: 10 INJECTION, EMULSION INTRAVENOUS at 04:01

## 2022-01-27 RX ADMIN — METHYLPREDNISOLONE SODIUM SUCCINATE 40 MG: 125 INJECTION, POWDER, FOR SOLUTION INTRAMUSCULAR; INTRAVENOUS at 12:01

## 2022-01-27 RX ADMIN — FENTANYL CITRATE 25 MCG/HR: 50 INJECTION, SOLUTION INTRAMUSCULAR; INTRAVENOUS at 05:01

## 2022-01-27 RX ADMIN — METHYLPREDNISOLONE SODIUM SUCCINATE 40 MG: 125 INJECTION, POWDER, FOR SOLUTION INTRAMUSCULAR; INTRAVENOUS at 06:01

## 2022-01-27 RX ADMIN — HEPARIN SODIUM 5000 UNITS: 5000 INJECTION, SOLUTION INTRAVENOUS; SUBCUTANEOUS at 10:01

## 2022-01-27 RX ADMIN — ROCURONIUM BROMIDE 80 MG: 10 INJECTION, SOLUTION INTRAVENOUS at 04:01

## 2022-01-27 RX ADMIN — DEXMEDETOMIDINE HYDROCHLORIDE IN 0.9% SODIUM CHLORIDE 0.2 MCG/KG/HR: 4 INJECTION INTRAVENOUS at 11:01

## 2022-01-27 RX ADMIN — METOPROLOL SUCCINATE 100 MG: 50 TABLET, EXTENDED RELEASE ORAL at 08:01

## 2022-01-27 RX ADMIN — Medication 1 PATCH: at 08:01

## 2022-01-27 RX ADMIN — CALCIUM GLUCONATE 1 G: 98 INJECTION, SOLUTION INTRAVENOUS at 09:01

## 2022-01-27 RX ADMIN — AMIODARONE HYDROCHLORIDE 0.5 MG/MIN: 1.8 INJECTION, SOLUTION INTRAVENOUS at 10:01

## 2022-01-27 RX ADMIN — AZITHROMYCIN MONOHYDRATE 500 MG: 500 INJECTION, POWDER, LYOPHILIZED, FOR SOLUTION INTRAVENOUS at 09:01

## 2022-01-27 RX ADMIN — ASPIRIN 81 MG CHEWABLE TABLET 81 MG: 81 TABLET CHEWABLE at 08:01

## 2022-01-27 RX ADMIN — SODIUM ZIRCONIUM CYCLOSILICATE 10 G: 10 POWDER, FOR SUSPENSION ORAL at 09:01

## 2022-01-27 RX ADMIN — CISATRACURIUM BESYLATE 3 MCG/KG/MIN: 200 INJECTION INTRAVENOUS at 07:01

## 2022-01-27 RX ADMIN — INDOMETHACIN 50 MEQ: 25 CAPSULE ORAL at 08:01

## 2022-01-27 RX ADMIN — APIXABAN 5 MG: 5 TABLET, FILM COATED ORAL at 02:01

## 2022-01-27 RX ADMIN — SENNOSIDES AND DOCUSATE SODIUM 1 TABLET: 50; 8.6 TABLET ORAL at 08:01

## 2022-01-27 RX ADMIN — AMIODARONE HYDROCHLORIDE 0.5 MG/MIN: 1.8 INJECTION, SOLUTION INTRAVENOUS at 12:01

## 2022-01-27 RX ADMIN — SODIUM CHLORIDE, PRESERVATIVE FREE 10 ML: 5 INJECTION INTRAVENOUS at 10:01

## 2022-01-27 RX ADMIN — IPRATROPIUM BROMIDE AND ALBUTEROL SULFATE 3 ML: 2.5; .5 SOLUTION RESPIRATORY (INHALATION) at 05:01

## 2022-01-27 RX ADMIN — DEXMEDETOMIDINE HYDROCHLORIDE IN 0.9% SODIUM CHLORIDE 0.3 MCG/KG/HR: 4 INJECTION INTRAVENOUS at 12:01

## 2022-01-27 RX ADMIN — METHYLPREDNISOLONE SODIUM SUCCINATE 40 MG: 40 INJECTION, POWDER, FOR SOLUTION INTRAMUSCULAR; INTRAVENOUS at 03:01

## 2022-01-27 RX ADMIN — NOREPINEPHRINE BITARTRATE 0.02 MCG/KG/MIN: 1 INJECTION, SOLUTION, CONCENTRATE INTRAVENOUS at 10:01

## 2022-01-27 RX ADMIN — PHENYLEPHRINE HYDROCHLORIDE 0.5 MCG/KG/MIN: 10 INJECTION INTRAVENOUS at 07:01

## 2022-01-27 RX ADMIN — FUROSEMIDE 120 MG: 10 INJECTION, SOLUTION INTRAVENOUS at 04:01

## 2022-01-27 RX ADMIN — APIXABAN 5 MG: 5 TABLET, FILM COATED ORAL at 09:01

## 2022-01-27 RX ADMIN — CHLORHEXIDINE GLUCONATE 0.12% ORAL RINSE 15 ML: 1.2 LIQUID ORAL at 08:01

## 2022-01-27 RX ADMIN — SODIUM BICARBONATE 50 MEQ: 84 INJECTION, SOLUTION INTRAVENOUS at 07:01

## 2022-01-27 RX ADMIN — ALBUTEROL SULFATE 10 MG: 2.5 SOLUTION RESPIRATORY (INHALATION) at 11:01

## 2022-01-27 NOTE — CONSULTS
Reason for consultation:  Acute renal failure    HPI:  62 yo AA man with h/o HTN, DM type 2, COPD, A flutter, pulmonary HTN presented with h/o SOB, cough, back pain for 2 days.  He was found with A fib w/ RVR and hypotensive.  He was admitted to the hospital.  His serum creatinine is also elevated, nephrology is consulted for evaluation of acute renal failure.    PMH:  As above.    Scheduled Meds:   apixaban  5 mg Oral BID    aspirin  81 mg Oral Daily    atorvastatin  40 mg Oral QHS    insulin detemir U-100  5 Units Subcutaneous Daily    methylPREDNISolone sodium succinate injection  40 mg Intravenous Q6H    metoprolol succinate  100 mg Oral BID    nicotine  1 patch Transdermal Daily    senna-docusate 8.6-50 mg  1 tablet Oral BID    sodium chloride 0.9%  10 mL Intravenous Q8H       Review of patient's allergies indicates:  No Known Allergies    Family history:  Non contirbutory    Social history:  (+) tobacco, no alcohol    Vital Signs Range (Last 24H):  Temp:  [96.4 °F (35.8 °C)-97.8 °F (36.6 °C)]   Pulse:  [103-129]   Resp:  [18-45]   BP: ()/(66-94)   SpO2:  [80 %-100 %]     I & O (Last 24H):    Intake/Output Summary (Last 24 hours) at 1/27/2022 1054  Last data filed at 1/27/2022 0828  Gross per 24 hour   Intake 2400.14 ml   Output 300 ml   Net 2100.14 ml           Physical Exam:  General appearance: well developed, well nourished, mild distress  Lungs:  diminished breath sounds bilaterally and wheezes bilaterally  Heart: irregularly irregular rhythm  Abdomen: soft, non-tender non-distented; bowel sounds normal; no masses,  no organomegaly  Extremities: edema (+)    Laboratory:  I have reviewed all pertinent lab results within the past 24 hours.  CBC:   Recent Labs   Lab 01/27/22  0615   WBC 14.41*   RBC 4.74   HGB 15.5   HCT 46.5      MCV 98   MCH 32.7*   MCHC 33.3     CMP:   Recent Labs   Lab 01/27/22  0615   *   CALCIUM 9.3   ALBUMIN 3.7   PROT 7.0      K 5.1   CO2 16*   CL  105   BUN 50*   CREATININE 2.8*   ALKPHOS 70   ALT 33   AST 40   BILITOT 0.4     ABGs:   Recent Labs   Lab 01/27/22  0839   PH 7.324*   PCO2 34.7*   PO2 52*   HCO3 18.0*   POCSATURATED 84*   BE -7     Recent Labs   Lab 01/26/22  1606   COLORU Yellow   SPECGRAV 1.015   PHUR 7.0   PROTEINUA Trace*   NITRITE Negative   LEUKOCYTESUR Negative   UROBILINOGEN Negative       Impression:    FARHAN - ATN related to hypotension, cardiac decompensation   A fib with RVR - cardiology following  HTN  DM type 2  COPD    Discussion/Recommendations:    Renal function should improve with restoration if circulation and BP.  Patient at this time appears euvolumic.  Will not give IVF or diuretic.  Continue present Rx.  Watch renal function/electrolytes.  We'll follow.    Thank you for the courtesy of the consultation      Marivel Recinos  1/27/2022

## 2022-01-27 NOTE — ED NOTES
resp distress noted Sat's dropping to low 80's macular degeneration at bedside  Non Rebreather replaced incresing sat's to 90-92%.  RT called fpr Bi-pap

## 2022-01-27 NOTE — PROCEDURES
"Marck Madison is a 61 y.o. male patient.    Temp: 99.8 °F (37.7 °C) (01/27/22 1436)  Pulse: 94 (01/27/22 1646)  Resp: (!) 23 (01/27/22 1646)  BP: (!) 129/91 (01/27/22 1646)  SpO2: (!) 94 % (01/27/22 1631)  Weight: 79.4 kg (175 lb) (01/26/22 1009)  Height: 5' 5" (165.1 cm) (01/26/22 1009)       Intubation    Date/Time: 1/27/2022 4:48 PM  Location procedure was performed: [facility] ICU  Performed by: Bartolome Callaway MD  Authorized by: Bartolome Callaway MD   Assisting provider: Vicki Thomas  Consent Done: Emergent Situation  Indications: respiratory distress  Intubation method: video-assisted  Patient status: paralyzed (RSI)  Preoxygenation: bag valve mask  Sedatives: etomidate  Paralytic: rocuronium  Laryngoscope size: Glide 4  Tube size: 7.5 mm  Tube type: cuffed  Number of attempts: 1  Cricoid pressure: yes  Cords visualized: yes  Post-procedure assessment: CO2 detector  Breath sounds: clear  Cuff inflated: yes  ETT to lip: 22 cm  Tube secured with: ETT wiley  Chest x-ray interpreted by me.  Chest x-ray findings: endotracheal tube in appropriate position  Complications: No  Specimens: No  Implants: No          1/27/2022  "

## 2022-01-27 NOTE — ED NOTES
Pt moved from ER 14 to ER room #4 for better visual view.  Belonging are w patient and placed in plastic bag w name tag

## 2022-01-27 NOTE — ASSESSMENT & PLAN NOTE
Urine lytes are pending. Renal function is worse despite fluids. Will need lasix challenge. Nephrology consulted. Will order a ha. Strict I/Os

## 2022-01-27 NOTE — SUBJECTIVE & OBJECTIVE
Interval History: O2 requirements increased overnight. Renal function is worse. Patient states he wants to take BiPAP off.     Review of Systems   Constitutional: Negative.    Respiratory: Positive for shortness of breath. Negative for cough.    Cardiovascular: Negative.    Gastrointestinal: Negative.      Objective:     Vital Signs (Most Recent):  Temp: 97.8 °F (36.6 °C) (01/26/22 1552)  Pulse: 105 (01/27/22 1102)  Resp: (!) 29 (01/27/22 1102)  BP: (!) 125/90 (01/27/22 1102)  SpO2: (!) 92 % (01/27/22 1102) Vital Signs (24h Range):  Temp:  [97.8 °F (36.6 °C)] 97.8 °F (36.6 °C)  Pulse:  [103-129] 105  Resp:  [18-45] 29  SpO2:  [80 %-100 %] 92 %  BP: (100-133)/(70-94) 125/90     Weight: 79.4 kg (175 lb)  Body mass index is 29.12 kg/m².    Intake/Output Summary (Last 24 hours) at 1/27/2022 1348  Last data filed at 1/27/2022 0828  Gross per 24 hour   Intake 1301.14 ml   Output 300 ml   Net 1001.14 ml      Physical Exam  Vitals and nursing note reviewed.   Constitutional:       Appearance: He is ill-appearing. He is not toxic-appearing.   Cardiovascular:      Rate and Rhythm: Tachycardia present. Rhythm irregular.   Pulmonary:      Breath sounds: Rales present. No wheezing.      Comments: On BiPAP  Abdominal:      Palpations: Abdomen is soft.   Musculoskeletal:      Right lower leg: No edema.      Left lower leg: No edema.   Skin:     Capillary Refill: Capillary refill takes less than 2 seconds.   Neurological:      Mental Status: He is alert and oriented to person, place, and time.         Significant Labs: All pertinent labs within the past 24 hours have been reviewed.    Significant Imaging: I have reviewed all pertinent imaging results/findings within the past 24 hours.  I have reviewed and interpreted all pertinent imaging results/findings within the past 24 hours.

## 2022-01-27 NOTE — HOSPITAL COURSE
Mr Madison presented with acute exacerbation of diastolic heart failure and atrial fibrillation with RVR. Also with acute renal failure. He was given fluids in the ED. O2 requirements increased significantly. Tox screen positive for amphetamines. Placed on BiPAP but non compliant with this. He is AAO x 3 and with no tremors. Required precedex to comply with BiPAP. Amiodarone infusion and anticoagulation. Nephrology, cardiology and pulmonology consulted. While still in the ED waiting for an ICU bed patient experienced alcohol withdrawals. Give lorazepam and more diuresis but poor UOP and remained in respiratory distress despite lorazepam. Patient was intubated. Required significant vent support. Renal function continued to deteriorate. Started on CRRT.   Pt initially poorly responsive to CRRT, but eventually improved, hypoxia decreased. Sedation was stopped but he was poorly responsive. His mental status improved and he was extubated to nasal cannula. Slowly improved  in mentation but heart rate difficult to control.was on IV amiodarone and metoprolol and still with aflutter to 130s at time. Cardiology consulted, patient underwent ESTEBAN/CV on 2/3/22. Back to sinus,was started on PO amiodarone.transfered to Tele.  Removed leland,PT,OT is consulted,alert and oriented,respiratory status is much improved.did well with PT,OT,did not want rehab.he was stable on RA,patient was discharged home with out patient PT,OT and DME and follow up with PCP as out patient.

## 2022-01-27 NOTE — PHARMACY MED REC
"    Admission Medication History     The home medication history was taken by Jaz Leach CPhT.      You may go to "Admission" then "Reconcile Home Medications" tabs to review and/or act upon these items.      The home medication list has been updated by the Pharmacy department.    Please read ALL comments highlighted in yellow.    Please address this information as you see fit.     Feel free to contact us if you have any questions or require assistance.      The medications listed below were removed from the home medication list. Please reorder if appropriate:  Patient reports no longer taking the following medication(s):               Potential issues to be addressed PRIOR TO DISCHARGE  Patient requires education regarding drug therapies        Patient verified that he took Hydralazine, Toprolol XL, Metformin and Procardia but has not taken insulin in 2 months.    Patient is no longer taking Eliquis which was d/c on 11-21-21.  Novolog was d/c on 11-21-21 also due to patient discharge.           Jaz Leach CPhT.  [phone]              .          "

## 2022-01-27 NOTE — SUBJECTIVE & OBJECTIVE
Interval History:     Review of Systems   Constitutional: Negative for decreased appetite.   HENT: Negative for ear discharge.    Eyes: Negative for blurred vision.   Endocrine: Negative for polyphagia.   Skin: Negative for nail changes.   Genitourinary: Negative for bladder incontinence.   Neurological: Negative for aphonia.   Psychiatric/Behavioral: Negative for hallucinations.   Allergic/Immunologic: Negative for hives.     Objective:     Vital Signs (Most Recent):  Temp: 97.8 °F (36.6 °C) (01/26/22 1552)  Pulse: 104 (01/27/22 0832)  Resp: (!) 34 (01/27/22 0832)  BP: 128/89 (01/27/22 0836)  SpO2: (!) 87 % (01/27/22 0832) Vital Signs (24h Range):  Temp:  [96.4 °F (35.8 °C)-97.8 °F (36.6 °C)] 97.8 °F (36.6 °C)  Pulse:  [] 104  Resp:  [18-45] 34  SpO2:  [80 %-100 %] 87 %  BP: ()/(66-94) 128/89     Weight: 79.4 kg (175 lb)  Body mass index is 29.12 kg/m².     SpO2: (!) 87 %  O2 Device (Oxygen Therapy): BiPAP      Intake/Output Summary (Last 24 hours) at 1/27/2022 0859  Last data filed at 1/27/2022 0828  Gross per 24 hour   Intake 2400.14 ml   Output 300 ml   Net 2100.14 ml       Lines/Drains/Airways     Peripheral Intravenous Line                 Peripheral IV - Single Lumen 01/26/22 1000 Left;Posterior Hand <1 day         Peripheral IV - Single Lumen 01/26/22 1212 Anterior;Left;Proximal Forearm <1 day         Peripheral IV - Single Lumen 01/26/22 1300 20 G Posterior;Right Hand <1 day                Physical Exam  Constitutional:       Appearance: He is well-developed.   HENT:      Head: Normocephalic and atraumatic.   Eyes:      Conjunctiva/sclera: Conjunctivae normal.      Pupils: Pupils are equal, round, and reactive to light.   Cardiovascular:      Rate and Rhythm: Regular rhythm. Tachycardia present.      Pulses: Intact distal pulses.      Heart sounds: Normal heart sounds.   Pulmonary:      Effort: Pulmonary effort is normal.      Breath sounds: Normal breath sounds.   Abdominal:      General: Bowel  sounds are normal.      Palpations: Abdomen is soft.   Musculoskeletal:         General: Normal range of motion.      Cervical back: Normal range of motion and neck supple.   Skin:     General: Skin is warm and dry.   Neurological:      Mental Status: He is alert and oriented to person, place, and time.         Significant Labs: All pertinent lab results from the last 24 hours have been reviewed.    Significant Imaging: Echocardiogram: 2D echo with color flow doppler: No results found for this or any previous visit.

## 2022-01-27 NOTE — ASSESSMENT & PLAN NOTE
No consolidation. No fever and no leukocytosis. Hold off on antibiotics for now. Agree with steroids. Hold off on albuterol given AFib with RVR. Is now requiring BiPAP. Furosemide challenge. Pulmonology consulted for BiPAP

## 2022-01-27 NOTE — PROGRESS NOTES
[facility] Emergency Dept  [facility] Medicine  Progress Note    Patient Name: Marck Madison  MRN: [id number]  Patient Class: IP- Inpatient   Admission Date: 1/26/2022  Length of Stay: 1 days  Attending Physician: Winifred Lopez MD  Primary Care Provider: [facility]        Subjective:     Principal Problem:Atrial fibrillation with RVR        HPI:  61 year old male with diastolic heart failure, atrial flutter, pulmonary hypertension, mitral and tricuspid valve regurgitation, emphysema and current every day smoker who presented with shortness of breath, body aches, bilateral flank pain and weakness since last night. Associated symptoms include cough. Admits to smoking. Denied nausea, vomiting, abdominal pain, dysuria, diarrhea. In the ED, patient was hypotensive, tachycardic to 120s, diffuse expiratory wheezing and with a lactic acid of 3. Given one liter of fluids, continuous bronchodilators, methylprednisolone and broad spectrum antibiotics. O2 sats stable at room air. COVID and flu negative. Procalcitonin negative. Remains tachycardic. Cardiology consulted. BP stable. Admit to ICU.       Overview/Hospital Course:  Mr Madison presented with acute exacerbation of diastolic heart failure and atrial fibrillation with RVR. Also with acute renal failure. He was given fluids in the ED. O2 requirements increased significantly. Tox screen positive for amphetamines. Placed on BiPAP but non compliant with this. He is AAO x 3 and with no tremors. Required precedex to comply with BiPAP. Amiodarone infusion and anticoagulation. Nephrology, cardiology and pulmonology consulted.       Interval History: O2 requirements increased overnight. Renal function is worse. Patient states he wants to take BiPAP off.     Review of Systems   Constitutional: Negative.    Respiratory: Positive for shortness of breath. Negative for cough.    Cardiovascular: Negative.    Gastrointestinal: Negative.      Objective:     Vital Signs  (Most Recent):  Temp: 97.8 °F (36.6 °C) (01/26/22 1552)  Pulse: 105 (01/27/22 1102)  Resp: (!) 29 (01/27/22 1102)  BP: (!) 125/90 (01/27/22 1102)  SpO2: (!) 92 % (01/27/22 1102) Vital Signs (24h Range):  Temp:  [97.8 °F (36.6 °C)] 97.8 °F (36.6 °C)  Pulse:  [103-129] 105  Resp:  [18-45] 29  SpO2:  [80 %-100 %] 92 %  BP: (100-133)/(70-94) 125/90     Weight: 79.4 kg (175 lb)  Body mass index is 29.12 kg/m².    Intake/Output Summary (Last 24 hours) at 1/27/2022 1348  Last data filed at 1/27/2022 0828  Gross per 24 hour   Intake 1301.14 ml   Output 300 ml   Net 1001.14 ml      Physical Exam  Vitals and nursing note reviewed.   Constitutional:       Appearance: He is ill-appearing. He is not toxic-appearing.   Cardiovascular:      Rate and Rhythm: Tachycardia present. Rhythm irregular.   Pulmonary:      Breath sounds: Rales present. No wheezing.      Comments: On BiPAP  Abdominal:      Palpations: Abdomen is soft.   Musculoskeletal:      Right lower leg: No edema.      Left lower leg: No edema.   Skin:     Capillary Refill: Capillary refill takes less than 2 seconds.   Neurological:      Mental Status: He is alert and oriented to person, place, and time.         Significant Labs: All pertinent labs within the past 24 hours have been reviewed.    Significant Imaging: I have reviewed all pertinent imaging results/findings within the past 24 hours.  I have reviewed and interpreted all pertinent imaging results/findings within the past 24 hours.      Assessment/Plan:      * Atrial fibrillation with RVR  Patient is currently in atrial fibrillation with RVR. Has history of atrial flutter. Cardiology on board. Echo with mitral/tricuspid valve regurg but no systolic dysfunction. Amiodarone infusion and oral metoprolol. [id number] Score: 3. Anticoagulation indicated. Anticoagulation done with enoxaparin while NPO. Cardiac monitoring. Waiting for ICU bed    TSH WNL. Tox screen positive for amphetamine. Patient admits to taking  Adderall. This is not prescribed.         Acute on chronic diastolic congestive heart failure  No wheezing but with rales post fluids yesterday. Renal function is worse. Will need furosemide challenge as he is on high O2 requirements on BiPAP. Discussed with nephrology. Amiodarone infusion for AFib. NPO while on BiPAP. Will need precedex to comply with BiPAP as he continues to take it off. Continue metoprolol and statin.         Elevated troponin  Likely demand. Echo pending. Aspirin and statin. Rate control      Type 2 diabetes mellitus, with long-term current use of insulin  HgbA1c pending. Start insulin and adjust as needed for goal glucose 140-180      Panlobular emphysema  No consolidation. No fever and no leukocytosis. Hold off on antibiotics for now. Agree with steroids. Hold off on albuterol given AFib with RVR. Is now requiring BiPAP. Furosemide challenge. Pulmonology consulted for BiPAP      Tobacco use disorder, severe, dependence  Spent > 5 minutes discussing cessation.   Wants nicotine patch. Ordered.       FARHAN (acute kidney injury)  Urine lytes are pending. Renal function is worse despite fluids. Will need lasix challenge. Nephrology consulted. Will order a ha. Strict I/Os      VTE Risk Mitigation (From admission, onward)         Ordered     enoxaparin injection 80 mg  Daily         01/27/22 1350     IP VTE HIGH RISK PATIENT  Once         01/26/22 1409     Place sequential compression device  Until discontinued         01/26/22 1409                Discharge Planning   BALDO:      Code Status: Full Code   Is the patient medically ready for discharge?:     Reason for patient still in hospital (select all that apply): Treatment           Time spent: > 45 minutes in patient's care  Discussed with patient at bedside.     Discussed with Jessica lópez) who is emergency contact for patient. Questions answered.     Winifred Vences MD  Department of Hospital Medicine   [facility] [place] - Emergency Dept

## 2022-01-27 NOTE — ED NOTES
Live chat sent to Dr. Arroyo who was informed about the pt's low O2 sats and is his struggling to breath. She is going to order breathing treatments. Pt. Placed on 4L of O2 at this time.l

## 2022-01-27 NOTE — PROGRESS NOTES
[facility] Emergency Dept  Cardiology  Progress Note    Patient Name: Marck Madison  MRN: [id number]  Admission Date: 1/26/2022  Hospital Length of Stay: 1 days  Code Status: Full Code   Attending Physician: Winifred Lopez MD   Primary Care Physician: [facility] [place]  Expected Discharge Date:   Principal Problem:Atrial fibrillation with RVR    Subjective:     Hospital Course:   1/27/22 On BiPAP. Still with A-flutter 2:1  at 102 bpm on IV amioarone. SOB improved. Denies CP. Cr 2.8. Lactate 6.8 to 2.5    Echo 1/26/22  · The left ventricle is normal in size with concentric hypertrophy and normal systolic function.  · The estimated ejection fraction is 60%.  · Indeterminate left ventricular diastolic function.  · Normal right ventricular size with normal right ventricular systolic function.  · Moderate left atrial enlargement.  · Moderate right atrial enlargement.  · Moderate mitral regurgitation.  · Moderate tricuspid regurgitation.  · Intermediate central venous pressure (8 mmHg).  · The estimated PA systolic pressure is 34 mmHg.        Interval History:     Review of Systems   Constitutional: Negative for decreased appetite.   HENT: Negative for ear discharge.    Eyes: Negative for blurred vision.   Endocrine: Negative for polyphagia.   Skin: Negative for nail changes.   Genitourinary: Negative for bladder incontinence.   Neurological: Negative for aphonia.   Psychiatric/Behavioral: Negative for hallucinations.   Allergic/Immunologic: Negative for hives.     Objective:     Vital Signs (Most Recent):  Temp: 97.8 °F (36.6 °C) (01/26/22 1552)  Pulse: 104 (01/27/22 0832)  Resp: (!) 34 (01/27/22 0832)  BP: 128/89 (01/27/22 0836)  SpO2: (!) 87 % (01/27/22 0832) Vital Signs (24h Range):  Temp:  [96.4 °F (35.8 °C)-97.8 °F (36.6 °C)] 97.8 °F (36.6 °C)  Pulse:  [] 104  Resp:  [18-45] 34  SpO2:  [80 %-100 %] 87 %  BP: ()/(66-94) 128/89     Weight: 79.4 kg (175 lb)  Body mass index is 29.12 kg/m².      SpO2: (!) 87 %  O2 Device (Oxygen Therapy): BiPAP      Intake/Output Summary (Last 24 hours) at 1/27/2022 0859  Last data filed at 1/27/2022 0828  Gross per 24 hour   Intake 2400.14 ml   Output 300 ml   Net 2100.14 ml       Lines/Drains/Airways     Peripheral Intravenous Line                 Peripheral IV - Single Lumen 01/26/22 1000 Left;Posterior Hand <1 day         Peripheral IV - Single Lumen 01/26/22 1212 Anterior;Left;Proximal Forearm <1 day         Peripheral IV - Single Lumen 01/26/22 1300 20 G Posterior;Right Hand <1 day                Physical Exam  Constitutional:       Appearance: He is well-developed.   HENT:      Head: Normocephalic and atraumatic.   Eyes:      Conjunctiva/sclera: Conjunctivae normal.      Pupils: Pupils are equal, round, and reactive to light.   Cardiovascular:      Rate and Rhythm: Regular rhythm. Tachycardia present.      Pulses: Intact distal pulses.      Heart sounds: Normal heart sounds.   Pulmonary:      Effort: Pulmonary effort is normal.      Breath sounds: Normal breath sounds.   Abdominal:      General: Bowel sounds are normal.      Palpations: Abdomen is soft.   Musculoskeletal:         General: Normal range of motion.      Cervical back: Normal range of motion and neck supple.   Skin:     General: Skin is warm and dry.   Neurological:      Mental Status: He is alert and oriented to person, place, and time.         Significant Labs: All pertinent lab results from the last 24 hours have been reviewed.    Significant Imaging: Echocardiogram: 2D echo with color flow doppler: No results found for this or any previous visit.    Assessment and Plan:     Brief HPI:     * Atrial fibrillation with RVR  Currently in A-flutter 126. Admits to medical noncompliance. Start IV amiodarone. Consider ESTEBAN/CV if A-fib/flutter persists. Repeat echo    1/27/22 Echo with EF 55% and moderate MR. HR improved on IV amiodarone but still with A-flutter. Will hold off on ESTEBAN/CV until respiratory  status improved - on BiPAP this AM. NPO after MN    Acute on chronic diastolic congestive heart failure  Diuresis and afterload reduction as tolerated    Echo Saline Bubble? No    Interpretation Summary  · The estimated ejection fraction is 55%.  · The left ventricle is normal in size with moderate concentric hypertrophy and normal systolic function.  · Grade III left ventricular diastolic dysfunction.  · Severe left atrial enlargement.  · Moderate mitral regurgitation.  · Mild pulmonic regurgitation.  · Normal right ventricular size with normal right ventricular systolic function.  · Mild right atrial enlargement.  · Normal central venous pressure (3 mmHg).  · The estimated PA systolic pressure is 48 mmHg.  · There is pulmonary hypertension.  · Moderate tricuspid regurgitation.    Recent Labs   Lab 01/26/22  1225   *   .      Elevated troponin  Suspect demand ischemia from tachycardia. Trend troponin - if stable then ok for out patient ischemic evaluation        VTE Risk Mitigation (From admission, onward)         Ordered     apixaban tablet 5 mg  2 times daily         01/26/22 1409     IP VTE HIGH RISK PATIENT  Once         01/26/22 1409     Place sequential compression device  Until discontinued         01/26/22 1409                Jonathan Lopez MD  Cardiology  [facility] [place] - Emergency Dept

## 2022-01-27 NOTE — CARE UPDATE
Pt intubated with 7.5 ETT secured 23 cm @ gums. Pt has chest rise, bilateral BS on ascultation and positive color change. Pt placed on PRVC 24/400/+10/100%. CXR ordered.

## 2022-01-27 NOTE — ED NOTES
Pt request to be on n/c and take mask off.  MD notified and pt can try N/C.  Pt given sandwich and milk per request.  Pt states he feel much better on the N/C instead of mask for his O2.  O2 sat's 85-88% on 10/L N/C

## 2022-01-27 NOTE — HOSPITAL COURSE
Interval Hx: pt seen in ICU, case d/w Dr. Nesbitt.  MS improved.  Will plan DCCV today.  RN at bedside to cosign consent.    Tele: AFL 60s, last documented in SR 1/30/22 at 1922 on tele (personally reviewed and interpreted)

## 2022-01-27 NOTE — CARE UPDATE
I evaluated patient again this afternoon. Is developing anxiety, high blood pressure and fine tremors despite precedex. He now admits to drinking daily and last drink was recently. ETOH level is negative. I am concerned about withdrawals. Discussed with nurse. Will increase dose of precedex and give a dose of lorazepam. Awaiting ICU bed. Discussed with patient and niece at bedside.

## 2022-01-27 NOTE — ASSESSMENT & PLAN NOTE
No wheezing but with rales post fluids yesterday. Renal function is worse. Will need furosemide challenge as he is on high O2 requirements on BiPAP. Discussed with nephrology. Amiodarone infusion for AFib. NPO while on BiPAP. Will need precedex to comply with BiPAP as he continues to take it off. Continue metoprolol and statin.

## 2022-01-27 NOTE — HPI
Mr. Marck Madison is a 61 year old male with diastolic heart failure, atrial flutter, pulmonary hypertension, mitral and tricuspid valve regurgitation, emphysema and current every day smoker who presented with shortness of breath, body aches, bilateral flank pain and weakness since last night. Associated symptoms include cough. Admits to smoking. Denied nausea, vomiting, abdominal pain, dysuria, diarrhea. In the ED, patient was hypotensive, tachycardic to 120s, diffuse expiratory wheezing and with a lactic acid of 3. Given one liter of fluids, continuous bronchodilators, methylprednisolone and broad spectrum antibiotics. COVID and flu negative. Procalcitonin negative. Remains tachycardic. Cardiology consulted.     He was admitted for acute exacerbation of diastolic heart failure and atrial fibrillation with RVR. Also with acute renal failure. He was given additional fluids in the ED and his O2 requirements increased significantly. Tox screen positive for amphetamines. Placed on BiPAP but required precedex to comply with BiPAP. Started on Amiodarone infusion and anticoagulation. Nephrology, cardiology and pulmonology consulted.

## 2022-01-27 NOTE — ASSESSMENT & PLAN NOTE
Currently in A-flutter 126. Admits to medical noncompliance. Start IV amiodarone. Consider ESTEBAN/CV if A-fib/flutter persists. Repeat echo    1/27/22 Echo with EF 55% and moderate MR. HR improved on IV amiodarone but still with A-flutter. Will hold off on ESTEBAN/CV until respiratory status improved - on BiPAP this AM. NPO after MN

## 2022-01-27 NOTE — ED NOTES
"Pt restless taking off bi-pap attempt to get out of bed, Stating his and abd is  hurting and needs something for abd pain Sat's 84% RA. Resp 40's. MD"s at bedside. New orders noted.  Assist pt to put non rebreather back on. Pt anxious.  SR up x2 Pt placed back in bed. CB in easy reach  "

## 2022-01-27 NOTE — ED NOTES
"Pt taking bipap mask off. States "too much oxygen" and doesn't want to wear mask. Pt placed on 15L NC and RT aware. RT at bedside and bipap placed back on. COT Dr Vences.   "

## 2022-01-28 ENCOUNTER — ANESTHESIA (OUTPATIENT)
Dept: INTENSIVE CARE | Facility: HOSPITAL | Age: 62
DRG: 871 | End: 2022-01-28
Payer: MEDICAID

## 2022-01-28 ENCOUNTER — ANESTHESIA EVENT (OUTPATIENT)
Dept: INTENSIVE CARE | Facility: HOSPITAL | Age: 62
DRG: 871 | End: 2022-01-28
Payer: MEDICAID

## 2022-01-28 PROBLEM — F10.931 ALCOHOL WITHDRAWAL SYNDROME, WITH DELIRIUM: Status: ACTIVE | Noted: 2022-01-28

## 2022-01-28 PROBLEM — Z71.89 GOALS OF CARE, COUNSELING/DISCUSSION: Status: ACTIVE | Noted: 2022-01-28

## 2022-01-28 LAB
ALBUMIN SERPL BCP-MCNC: 2.7 G/DL (ref 3.5–5.2)
ALBUMIN SERPL BCP-MCNC: 2.9 G/DL (ref 3.5–5.2)
ALBUMIN SERPL BCP-MCNC: 3 G/DL (ref 3.5–5.2)
ALBUMIN SERPL BCP-MCNC: 3 G/DL (ref 3.5–5.2)
ALLENS TEST: ABNORMAL
ALLENS TEST: ABNORMAL
ALP SERPL-CCNC: 66 U/L (ref 55–135)
ALT SERPL W/O P-5'-P-CCNC: 150 U/L (ref 10–44)
ANION GAP SERPL CALC-SCNC: 14 MMOL/L (ref 8–16)
ANION GAP SERPL CALC-SCNC: 16 MMOL/L (ref 8–16)
ANION GAP SERPL CALC-SCNC: 18 MMOL/L (ref 8–16)
ANION GAP SERPL CALC-SCNC: 19 MMOL/L (ref 8–16)
ANION GAP SERPL CALC-SCNC: 19 MMOL/L (ref 8–16)
ANION GAP SERPL CALC-SCNC: 22 MMOL/L (ref 8–16)
AST SERPL-CCNC: 217 U/L (ref 10–40)
AV INDEX (PROSTH): 0.96
AV MEAN GRADIENT: 5 MMHG
AV PEAK GRADIENT: 11 MMHG
AV VALVE AREA: 3.16 CM2
AV VELOCITY RATIO: 0.82
BASOPHILS # BLD AUTO: 0.07 K/UL (ref 0–0.2)
BASOPHILS NFR BLD: 0.4 % (ref 0–1.9)
BILIRUB SERPL-MCNC: 0.7 MG/DL (ref 0.1–1)
BSA FOR ECHO PROCEDURE: 1.93 M2
BUN SERPL-MCNC: 39 MG/DL (ref 8–23)
BUN SERPL-MCNC: 41 MG/DL (ref 8–23)
BUN SERPL-MCNC: 43 MG/DL (ref 8–23)
BUN SERPL-MCNC: 44 MG/DL (ref 8–23)
BUN SERPL-MCNC: 48 MG/DL (ref 8–23)
BUN SERPL-MCNC: 51 MG/DL (ref 8–23)
BUN SERPL-MCNC: 66 MG/DL (ref 8–23)
CALCIUM SERPL-MCNC: 7.8 MG/DL (ref 8.7–10.5)
CALCIUM SERPL-MCNC: 8.1 MG/DL (ref 8.7–10.5)
CALCIUM SERPL-MCNC: 8.2 MG/DL (ref 8.7–10.5)
CALCIUM SERPL-MCNC: 8.3 MG/DL (ref 8.7–10.5)
CALCIUM SERPL-MCNC: 8.4 MG/DL (ref 8.7–10.5)
CALCIUM SERPL-MCNC: 8.7 MG/DL (ref 8.7–10.5)
CHLORIDE SERPL-SCNC: 100 MMOL/L (ref 95–110)
CHLORIDE SERPL-SCNC: 101 MMOL/L (ref 95–110)
CHLORIDE SERPL-SCNC: 101 MMOL/L (ref 95–110)
CHLORIDE SERPL-SCNC: 98 MMOL/L (ref 95–110)
CHLORIDE SERPL-SCNC: 99 MMOL/L (ref 95–110)
CHLORIDE SERPL-SCNC: 99 MMOL/L (ref 95–110)
CO2 SERPL-SCNC: 13 MMOL/L (ref 23–29)
CO2 SERPL-SCNC: 13 MMOL/L (ref 23–29)
CO2 SERPL-SCNC: 14 MMOL/L (ref 23–29)
CO2 SERPL-SCNC: 18 MMOL/L (ref 23–29)
CO2 SERPL-SCNC: 19 MMOL/L (ref 23–29)
CO2 SERPL-SCNC: 19 MMOL/L (ref 23–29)
CO2 SERPL-SCNC: 20 MMOL/L (ref 23–29)
CREAT SERPL-MCNC: 2.9 MG/DL (ref 0.5–1.4)
CREAT SERPL-MCNC: 3 MG/DL (ref 0.5–1.4)
CREAT SERPL-MCNC: 3.1 MG/DL (ref 0.5–1.4)
CREAT SERPL-MCNC: 3.2 MG/DL (ref 0.5–1.4)
CREAT SERPL-MCNC: 3.2 MG/DL (ref 0.5–1.4)
CREAT SERPL-MCNC: 3.3 MG/DL (ref 0.5–1.4)
CREAT SERPL-MCNC: 4.1 MG/DL (ref 0.5–1.4)
CV ECHO LV RWT: 0.53 CM
DELSYS: ABNORMAL
DELSYS: ABNORMAL
DIFFERENTIAL METHOD: ABNORMAL
DOP CALC AO PEAK VEL: 1.68 M/S
DOP CALC AO VTI: 19.15 CM
DOP CALC LVOT AREA: 3.3 CM2
DOP CALC LVOT DIAMETER: 2.05 CM
DOP CALC LVOT PEAK VEL: 1.37 M/S
DOP CALC LVOT STROKE VOLUME: 60.6 CM3
DOP CALCLVOT PEAK VEL VTI: 18.37 CM
E WAVE DECELERATION TIME: 155.5 MSEC
E/A RATIO: 1.68
E/E' RATIO: 8.12 M/S
ECHO LV POSTERIOR WALL: 1.11 CM (ref 0.6–1.1)
EJECTION FRACTION: 55 %
EOSINOPHIL # BLD AUTO: 0 K/UL (ref 0–0.5)
EOSINOPHIL NFR BLD: 0 % (ref 0–8)
ERYTHROCYTE [DISTWIDTH] IN BLOOD BY AUTOMATED COUNT: 13.7 % (ref 11.5–14.5)
ERYTHROCYTE [SEDIMENTATION RATE] IN BLOOD BY WESTERGREN METHOD: 35 MM/H
ERYTHROCYTE [SEDIMENTATION RATE] IN BLOOD BY WESTERGREN METHOD: 35 MM/H
EST. GFR  (AFRICAN AMERICAN): 17 ML/MIN/1.73 M^2
EST. GFR  (AFRICAN AMERICAN): 22 ML/MIN/1.73 M^2
EST. GFR  (AFRICAN AMERICAN): 23 ML/MIN/1.73 M^2
EST. GFR  (AFRICAN AMERICAN): 24 ML/MIN/1.73 M^2
EST. GFR  (AFRICAN AMERICAN): 25 ML/MIN/1.73 M^2
EST. GFR  (NON AFRICAN AMERICAN): 14 ML/MIN/1.73 M^2
EST. GFR  (NON AFRICAN AMERICAN): 19 ML/MIN/1.73 M^2
EST. GFR  (NON AFRICAN AMERICAN): 20 ML/MIN/1.73 M^2
EST. GFR  (NON AFRICAN AMERICAN): 21 ML/MIN/1.73 M^2
EST. GFR  (NON AFRICAN AMERICAN): 22 ML/MIN/1.73 M^2
FIO2: 40
FIO2: 70
FRACTIONAL SHORTENING: 31 % (ref 28–44)
GLUCOSE SERPL-MCNC: 111 MG/DL (ref 70–110)
GLUCOSE SERPL-MCNC: 111 MG/DL (ref 70–110)
GLUCOSE SERPL-MCNC: 112 MG/DL (ref 70–110)
GLUCOSE SERPL-MCNC: 119 MG/DL (ref 70–110)
GLUCOSE SERPL-MCNC: 145 MG/DL (ref 70–110)
GLUCOSE SERPL-MCNC: 214 MG/DL (ref 70–110)
GLUCOSE SERPL-MCNC: 220 MG/DL (ref 70–110)
GLUCOSE SERPL-MCNC: 250 MG/DL (ref 70–110)
GLUCOSE SERPL-MCNC: 87 MG/DL (ref 70–110)
HCO3 UR-SCNC: 18.5 MMOL/L (ref 24–28)
HCO3 UR-SCNC: 21.7 MMOL/L (ref 24–28)
HCT VFR BLD AUTO: 49.9 % (ref 40–54)
HGB BLD-MCNC: 16.1 G/DL (ref 14–18)
IMM GRANULOCYTES # BLD AUTO: 0.19 K/UL (ref 0–0.04)
IMM GRANULOCYTES NFR BLD AUTO: 1 % (ref 0–0.5)
INTERVENTRICULAR SEPTUM: 1.17 CM (ref 0.6–1.1)
IVRT: 102.76 MSEC
LA MAJOR: 7.12 CM
LACTATE SERPL-SCNC: 7.7 MMOL/L (ref 0.5–2.2)
LEFT ATRIUM SIZE: 3.51 CM
LEFT INTERNAL DIMENSION IN SYSTOLE: 2.93 CM (ref 2.1–4)
LEFT VENTRICLE DIASTOLIC VOLUME INDEX: 42.15 ML/M2
LEFT VENTRICLE DIASTOLIC VOLUME: 79.67 ML
LEFT VENTRICLE MASS INDEX: 88 G/M2
LEFT VENTRICLE SYSTOLIC VOLUME INDEX: 17.5 ML/M2
LEFT VENTRICLE SYSTOLIC VOLUME: 33.09 ML
LEFT VENTRICULAR INTERNAL DIMENSION IN DIASTOLE: 4.22 CM (ref 3.5–6)
LEFT VENTRICULAR MASS: 166.56 G
LV LATERAL E/E' RATIO: 6.9 M/S
LV SEPTAL E/E' RATIO: 9.86 M/S
LYMPHOCYTES # BLD AUTO: 0.9 K/UL (ref 1–4.8)
LYMPHOCYTES NFR BLD: 4.7 % (ref 18–48)
MAGNESIUM SERPL-MCNC: 1.7 MG/DL (ref 1.6–2.6)
MAGNESIUM SERPL-MCNC: 1.7 MG/DL (ref 1.6–2.6)
MAGNESIUM SERPL-MCNC: 1.8 MG/DL (ref 1.6–2.6)
MAGNESIUM SERPL-MCNC: 2.3 MG/DL (ref 1.6–2.6)
MCH RBC QN AUTO: 33.6 PG (ref 27–31)
MCHC RBC AUTO-ENTMCNC: 32.3 G/DL (ref 32–36)
MCV RBC AUTO: 104 FL (ref 82–98)
MIN VOL: 12.3
MIN VOL: 15.1
MODE: ABNORMAL
MODE: ABNORMAL
MONOCYTES # BLD AUTO: 0.4 K/UL (ref 0.3–1)
MONOCYTES NFR BLD: 2.2 % (ref 4–15)
MV PEAK A VEL: 0.41 M/S
MV PEAK E VEL: 0.69 M/S
MV STENOSIS PRESSURE HALF TIME: 45.09 MS
MV VALVE AREA P 1/2 METHOD: 4.88 CM2
NEUTROPHILS # BLD AUTO: 16.7 K/UL (ref 1.8–7.7)
NEUTROPHILS NFR BLD: 91.7 % (ref 38–73)
NRBC BLD-RTO: 2 /100 WBC
PCO2 BLDA: 64.3 MMHG (ref 35–45)
PCO2 BLDA: 77.4 MMHG (ref 35–45)
PEEP: 12
PEEP: 18
PH SMN: 7.05 [PH] (ref 7.35–7.45)
PH SMN: 7.07 [PH] (ref 7.35–7.45)
PHOSPHATE SERPL-MCNC: 4.9 MG/DL (ref 2.7–4.5)
PHOSPHATE SERPL-MCNC: 5.4 MG/DL (ref 2.7–4.5)
PHOSPHATE SERPL-MCNC: 5.8 MG/DL (ref 2.7–4.5)
PHOSPHATE SERPL-MCNC: 7 MG/DL (ref 2.7–4.5)
PIP: 29
PIP: 33
PISA TR MAX VEL: 3.52 M/S
PLATELET # BLD AUTO: 104 K/UL (ref 150–450)
PLATELET BLD QL SMEAR: ABNORMAL
PMV BLD AUTO: 12.1 FL (ref 9.2–12.9)
PO2 BLDA: 232 MMHG (ref 80–100)
PO2 BLDA: 58 MMHG (ref 40–60)
POC BE: -11 MMOL/L
POC BE: -13 MMOL/L
POC SATURATED O2: 76 % (ref 95–100)
POC SATURATED O2: 99 % (ref 95–100)
POC TCO2: 20 MMOL/L (ref 24–29)
POC TCO2: 24 MMOL/L (ref 23–27)
POCT GLUCOSE: 118 MG/DL (ref 70–110)
POCT GLUCOSE: 129 MG/DL (ref 70–110)
POCT GLUCOSE: 196 MG/DL (ref 70–110)
POCT GLUCOSE: 223 MG/DL (ref 70–110)
POCT GLUCOSE: 245 MG/DL (ref 70–110)
POCT GLUCOSE: 84 MG/DL (ref 70–110)
POTASSIUM SERPL-SCNC: 5.2 MMOL/L (ref 3.5–5.1)
POTASSIUM SERPL-SCNC: 5.3 MMOL/L (ref 3.5–5.1)
POTASSIUM SERPL-SCNC: 5.4 MMOL/L (ref 3.5–5.1)
POTASSIUM SERPL-SCNC: 5.6 MMOL/L (ref 3.5–5.1)
POTASSIUM SERPL-SCNC: 7.3 MMOL/L (ref 3.5–5.1)
POTASSIUM SERPL-SCNC: 7.3 MMOL/L (ref 3.5–5.1)
PROT SERPL-MCNC: 7.2 G/DL (ref 6–8.4)
PV PEAK VELOCITY: 1.08 CM/S
RA MAJOR: 5.44 CM
RA WIDTH: 5.46 CM
RBC # BLD AUTO: 4.79 M/UL (ref 4.6–6.2)
RIGHT VENTRICULAR END-DIASTOLIC DIMENSION: 4.68 CM
RV TISSUE DOPPLER FREE WALL SYSTOLIC VELOCITY 1 (APICAL 4 CHAMBER VIEW): 7.82 CM/S
SAMPLE: ABNORMAL
SAMPLE: ABNORMAL
SINUS: 3.3 CM
SITE: ABNORMAL
SITE: ABNORMAL
SODIUM SERPL-SCNC: 131 MMOL/L (ref 136–145)
SODIUM SERPL-SCNC: 132 MMOL/L (ref 136–145)
SODIUM SERPL-SCNC: 134 MMOL/L (ref 136–145)
SODIUM SERPL-SCNC: 135 MMOL/L (ref 136–145)
SODIUM SERPL-SCNC: 135 MMOL/L (ref 136–145)
SODIUM SERPL-SCNC: 137 MMOL/L (ref 136–145)
SP02: 100
SP02: 98
STJ: 2.41 CM
TDI LATERAL: 0.1 M/S
TDI SEPTAL: 0.07 M/S
TDI: 0.09 M/S
TR MAX PG: 50 MMHG
TRICUSPID ANNULAR PLANE SYSTOLIC EXCURSION: 1.56 CM
TROPONIN I SERPL DL<=0.01 NG/ML-MCNC: 0.16 NG/ML (ref 0–0.03)
VANCOMYCIN SERPL-MCNC: 7.5 UG/ML
VT: 360
VT: 450
WBC # BLD AUTO: 18.22 K/UL (ref 3.9–12.7)

## 2022-01-28 PROCEDURE — 36620 ARTERIAL: ICD-10-PCS | Mod: ,,, | Performed by: ANESTHESIOLOGY

## 2022-01-28 PROCEDURE — 85025 COMPLETE CBC W/AUTO DIFF WBC: CPT | Performed by: INTERNAL MEDICINE

## 2022-01-28 PROCEDURE — 82803 BLOOD GASES ANY COMBINATION: CPT

## 2022-01-28 PROCEDURE — 25000003 PHARM REV CODE 250: Performed by: NURSE PRACTITIONER

## 2022-01-28 PROCEDURE — 80202 ASSAY OF VANCOMYCIN: CPT | Performed by: INTERNAL MEDICINE

## 2022-01-28 PROCEDURE — 27000221 HC OXYGEN, UP TO 24 HOURS

## 2022-01-28 PROCEDURE — 63600175 PHARM REV CODE 636 W HCPCS: Performed by: NURSE PRACTITIONER

## 2022-01-28 PROCEDURE — 99291 PR CRITICAL CARE, E/M 30-74 MINUTES: ICD-10-PCS | Mod: ,,, | Performed by: NURSE PRACTITIONER

## 2022-01-28 PROCEDURE — 84100 ASSAY OF PHOSPHORUS: CPT | Performed by: STUDENT IN AN ORGANIZED HEALTH CARE EDUCATION/TRAINING PROGRAM

## 2022-01-28 PROCEDURE — 83735 ASSAY OF MAGNESIUM: CPT | Mod: 91 | Performed by: INTERNAL MEDICINE

## 2022-01-28 PROCEDURE — 36620 INSERTION CATHETER ARTERY: CPT

## 2022-01-28 PROCEDURE — 83605 ASSAY OF LACTIC ACID: CPT | Performed by: NURSE PRACTITIONER

## 2022-01-28 PROCEDURE — 80069 RENAL FUNCTION PANEL: CPT | Mod: 91 | Performed by: NURSE PRACTITIONER

## 2022-01-28 PROCEDURE — 76937 ARTERIAL: ICD-10-PCS | Mod: 26,,, | Performed by: ANESTHESIOLOGY

## 2022-01-28 PROCEDURE — 99292 PR CRITICAL CARE, ADDL 30 MIN: ICD-10-PCS | Mod: ,,, | Performed by: NURSE PRACTITIONER

## 2022-01-28 PROCEDURE — 80074 ACUTE HEPATITIS PANEL: CPT | Performed by: INTERNAL MEDICINE

## 2022-01-28 PROCEDURE — 99900026 HC AIRWAY MAINTENANCE (STAT)

## 2022-01-28 PROCEDURE — 80069 RENAL FUNCTION PANEL: CPT | Performed by: INTERNAL MEDICINE

## 2022-01-28 PROCEDURE — 99900035 HC TECH TIME PER 15 MIN (STAT)

## 2022-01-28 PROCEDURE — 83735 ASSAY OF MAGNESIUM: CPT | Performed by: INTERNAL MEDICINE

## 2022-01-28 PROCEDURE — 36620 INSERTION CATHETER ARTERY: CPT | Mod: ,,, | Performed by: ANESTHESIOLOGY

## 2022-01-28 PROCEDURE — 90945 DIALYSIS ONE EVALUATION: CPT

## 2022-01-28 PROCEDURE — 80048 BASIC METABOLIC PNL TOTAL CA: CPT | Performed by: INTERNAL MEDICINE

## 2022-01-28 PROCEDURE — 76937 US GUIDE VASCULAR ACCESS: CPT | Performed by: ANESTHESIOLOGY

## 2022-01-28 PROCEDURE — A4216 STERILE WATER/SALINE, 10 ML: HCPCS | Performed by: INTERNAL MEDICINE

## 2022-01-28 PROCEDURE — 80048 BASIC METABOLIC PNL TOTAL CA: CPT | Mod: 91 | Performed by: STUDENT IN AN ORGANIZED HEALTH CARE EDUCATION/TRAINING PROGRAM

## 2022-01-28 PROCEDURE — 25000003 PHARM REV CODE 250: Performed by: INTERNAL MEDICINE

## 2022-01-28 PROCEDURE — 83735 ASSAY OF MAGNESIUM: CPT | Mod: 91 | Performed by: STUDENT IN AN ORGANIZED HEALTH CARE EDUCATION/TRAINING PROGRAM

## 2022-01-28 PROCEDURE — 94761 N-INVAS EAR/PLS OXIMETRY MLT: CPT

## 2022-01-28 PROCEDURE — 25000003 PHARM REV CODE 250

## 2022-01-28 PROCEDURE — 37799 UNLISTED PX VASCULAR SURGERY: CPT

## 2022-01-28 PROCEDURE — 99292 CRITICAL CARE ADDL 30 MIN: CPT | Mod: ,,, | Performed by: NURSE PRACTITIONER

## 2022-01-28 PROCEDURE — 25000242 PHARM REV CODE 250 ALT 637 W/ HCPCS: Performed by: STUDENT IN AN ORGANIZED HEALTH CARE EDUCATION/TRAINING PROGRAM

## 2022-01-28 PROCEDURE — 63600175 PHARM REV CODE 636 W HCPCS: Performed by: STUDENT IN AN ORGANIZED HEALTH CARE EDUCATION/TRAINING PROGRAM

## 2022-01-28 PROCEDURE — 99291 CRITICAL CARE FIRST HOUR: CPT | Mod: ,,, | Performed by: INTERNAL MEDICINE

## 2022-01-28 PROCEDURE — 99291 PR CRITICAL CARE, E/M 30-74 MINUTES: ICD-10-PCS | Mod: ,,, | Performed by: INTERNAL MEDICINE

## 2022-01-28 PROCEDURE — 80053 COMPREHEN METABOLIC PANEL: CPT | Performed by: INTERNAL MEDICINE

## 2022-01-28 PROCEDURE — 43752 NASAL/OROGASTRIC W/TUBE PLMT: CPT

## 2022-01-28 PROCEDURE — 25000003 PHARM REV CODE 250: Performed by: STUDENT IN AN ORGANIZED HEALTH CARE EDUCATION/TRAINING PROGRAM

## 2022-01-28 PROCEDURE — 94640 AIRWAY INHALATION TREATMENT: CPT

## 2022-01-28 PROCEDURE — 63600175 PHARM REV CODE 636 W HCPCS: Performed by: INTERNAL MEDICINE

## 2022-01-28 PROCEDURE — 84484 ASSAY OF TROPONIN QUANT: CPT | Performed by: NURSE PRACTITIONER

## 2022-01-28 PROCEDURE — 20000000 HC ICU ROOM

## 2022-01-28 PROCEDURE — 99291 CRITICAL CARE FIRST HOUR: CPT | Mod: ,,, | Performed by: NURSE PRACTITIONER

## 2022-01-28 PROCEDURE — 80048 BASIC METABOLIC PNL TOTAL CA: CPT | Mod: 91 | Performed by: INTERNAL MEDICINE

## 2022-01-28 RX ORDER — CEFEPIME HYDROCHLORIDE 1 G/50ML
2 INJECTION, SOLUTION INTRAVENOUS
Status: COMPLETED | OUTPATIENT
Start: 2022-01-28 | End: 2022-02-01

## 2022-01-28 RX ORDER — INDOMETHACIN 25 MG/1
CAPSULE ORAL
Status: COMPLETED
Start: 2022-01-28 | End: 2022-01-28

## 2022-01-28 RX ORDER — EPINEPHRINE 1 MG/ML
INJECTION, SOLUTION INTRACARDIAC; INTRAMUSCULAR; INTRAVENOUS; SUBCUTANEOUS
Status: DISPENSED
Start: 2022-01-28 | End: 2022-01-28

## 2022-01-28 RX ORDER — INDOMETHACIN 25 MG/1
50 CAPSULE ORAL ONCE
Status: COMPLETED | OUTPATIENT
Start: 2022-01-28 | End: 2022-01-28

## 2022-01-28 RX ORDER — ALBUTEROL SULFATE 2.5 MG/.5ML
10 SOLUTION RESPIRATORY (INHALATION) ONCE
Status: COMPLETED | OUTPATIENT
Start: 2022-01-28 | End: 2022-01-28

## 2022-01-28 RX ADMIN — INDOMETHACIN 50 MEQ: 25 CAPSULE ORAL at 01:01

## 2022-01-28 RX ADMIN — Medication 10 ML: at 06:01

## 2022-01-28 RX ADMIN — EPINEPHRINE 0.02 MCG/KG/MIN: 1 INJECTION INTRAMUSCULAR; INTRAVENOUS; SUBCUTANEOUS at 03:01

## 2022-01-28 RX ADMIN — FAMOTIDINE 20 MG: 10 INJECTION INTRAVENOUS at 08:01

## 2022-01-28 RX ADMIN — FUROSEMIDE 120 MG: 10 INJECTION, SOLUTION INTRAVENOUS at 12:01

## 2022-01-28 RX ADMIN — ALBUTEROL SULFATE 10 MG: 2.5 SOLUTION RESPIRATORY (INHALATION) at 01:01

## 2022-01-28 RX ADMIN — CEFEPIME HYDROCHLORIDE 2 G: 2 INJECTION, SOLUTION INTRAVENOUS at 10:01

## 2022-01-28 RX ADMIN — PROPOFOL 40 MCG/KG/MIN: 10 INJECTION, EMULSION INTRAVENOUS at 02:01

## 2022-01-28 RX ADMIN — PROPOFOL 40 MCG/KG/MIN: 10 INJECTION, EMULSION INTRAVENOUS at 07:01

## 2022-01-28 RX ADMIN — PROPOFOL 40 MCG/KG/MIN: 10 INJECTION, EMULSION INTRAVENOUS at 10:01

## 2022-01-28 RX ADMIN — FUROSEMIDE 120 MG: 10 INJECTION, SOLUTION INTRAVENOUS at 05:01

## 2022-01-28 RX ADMIN — HYDROCORTISONE SODIUM SUCCINATE 100 MG: 100 INJECTION, POWDER, FOR SOLUTION INTRAMUSCULAR; INTRAVENOUS at 10:01

## 2022-01-28 RX ADMIN — PROPOFOL 40 MCG/KG/MIN: 10 INJECTION, EMULSION INTRAVENOUS at 12:01

## 2022-01-28 RX ADMIN — CHLORHEXIDINE GLUCONATE 0.12% ORAL RINSE 15 ML: 1.2 LIQUID ORAL at 08:01

## 2022-01-28 RX ADMIN — SODIUM CHLORIDE, PRESERVATIVE FREE 10 ML: 5 INJECTION INTRAVENOUS at 10:01

## 2022-01-28 RX ADMIN — ENOXAPARIN SODIUM 80 MG: 80 INJECTION SUBCUTANEOUS at 09:01

## 2022-01-28 RX ADMIN — CALCIUM GLUCONATE 1 G: 98 INJECTION, SOLUTION INTRAVENOUS at 12:01

## 2022-01-28 RX ADMIN — METHYLPREDNISOLONE SODIUM SUCCINATE 40 MG: 40 INJECTION, POWDER, FOR SOLUTION INTRAMUSCULAR; INTRAVENOUS at 12:01

## 2022-01-28 RX ADMIN — Medication 10 ML: at 12:01

## 2022-01-28 RX ADMIN — INSULIN HUMAN 7.94 UNITS: 100 INJECTION, SOLUTION PARENTERAL at 01:01

## 2022-01-28 RX ADMIN — Medication 10 ML: at 11:01

## 2022-01-28 RX ADMIN — SODIUM CHLORIDE, PRESERVATIVE FREE 10 ML: 5 INJECTION INTRAVENOUS at 06:01

## 2022-01-28 RX ADMIN — EPINEPHRINE 0.18 MCG/KG/MIN: 1 INJECTION INTRAMUSCULAR; INTRAVENOUS; SUBCUTANEOUS at 11:01

## 2022-01-28 RX ADMIN — EPINEPHRINE 0.18 MCG/KG/MIN: 1 INJECTION INTRAMUSCULAR; INTRAVENOUS; SUBCUTANEOUS at 05:01

## 2022-01-28 RX ADMIN — EPINEPHRINE 0.18 MCG/KG/MIN: 1 INJECTION INTRAMUSCULAR; INTRAVENOUS; SUBCUTANEOUS at 10:01

## 2022-01-28 RX ADMIN — AZITHROMYCIN MONOHYDRATE 500 MG: 500 INJECTION, POWDER, LYOPHILIZED, FOR SOLUTION INTRAVENOUS at 08:01

## 2022-01-28 RX ADMIN — CISATRACURIUM BESYLATE 4 MCG/KG/MIN: 200 INJECTION INTRAVENOUS at 02:01

## 2022-01-28 RX ADMIN — PROPOFOL 40 MCG/KG/MIN: 10 INJECTION, EMULSION INTRAVENOUS at 05:01

## 2022-01-28 RX ADMIN — SODIUM BICARBONATE 50 MEQ: 84 INJECTION, SOLUTION INTRAVENOUS at 01:01

## 2022-01-28 RX ADMIN — ASPIRIN 81 MG CHEWABLE TABLET 81 MG: 81 TABLET CHEWABLE at 08:01

## 2022-01-28 RX ADMIN — FUROSEMIDE 120 MG: 10 INJECTION, SOLUTION INTRAVENOUS at 11:01

## 2022-01-28 RX ADMIN — Medication 10 ML: at 05:01

## 2022-01-28 RX ADMIN — SENNOSIDES AND DOCUSATE SODIUM 1 TABLET: 50; 8.6 TABLET ORAL at 08:01

## 2022-01-28 RX ADMIN — NOREPINEPHRINE BITARTRATE 0.16 MCG/KG/MIN: 1 INJECTION, SOLUTION, CONCENTRATE INTRAVENOUS at 05:01

## 2022-01-28 RX ADMIN — SODIUM CHLORIDE, PRESERVATIVE FREE 10 ML: 5 INJECTION INTRAVENOUS at 01:01

## 2022-01-28 RX ADMIN — METHYLPREDNISOLONE SODIUM SUCCINATE 40 MG: 40 INJECTION, POWDER, FOR SOLUTION INTRAMUSCULAR; INTRAVENOUS at 05:01

## 2022-01-28 RX ADMIN — HYDROCORTISONE SODIUM SUCCINATE 100 MG: 100 INJECTION, POWDER, FOR SOLUTION INTRAMUSCULAR; INTRAVENOUS at 09:01

## 2022-01-28 NOTE — ASSESSMENT & PLAN NOTE
Suspect this is 2/2 cardiogenic pulmonary edema. He was initially admitted on room air with no respiratory distress and decompensated to the point of needing intubation the following day. BNP elevated at 439 prior to receiving several liters of fluid. CXR with worsening bilateral opacities; significant amount of pink frothy sputum noted on intubation. Known diastolic dysfunction at baseline and a history of systolic dysfunction with an EF of 35% which has since recovered. Requiring 100% FiO2 and a PEEP of 20+ to maintain adequate saturations. Known COPD with wheezing reported though no obstruction evident on ventilator.     -- aggressive diuresis     -- Elevated WBC but procal WNL. Will culture and start abx given need for vasopressor support  -- continue nebs + steroids   -- Maintain LPV. Goal plateau pressure <30  -- Wean FiO2 and PEEP for SpO2 >92  -- daily assessment for SAT/SBT

## 2022-01-28 NOTE — PLAN OF CARE
TN spoke with patient's primary caregiver, vega Pandya.  She stated that the doctor has updated her on patient's prognosis.  She is open to hospice if suggested.  [facility] - Intensive Care  Initial Discharge Assessment       Primary Care Provider: [facility]    Admission Diagnosis: A-fib [I48.91]  SOB (shortness of breath) [R06.02]  Tachycardia [R00.0]  Chest pain [R07.9]  Sepsis [A41.9]    Admission Date: 1/26/2022  Expected Discharge Date:     Discharge Barriers Identified: None    Payor: MEDICAID / Plan: [facility] COMMUNITY PLAN [facility] [employer] ([place] MEDICAID) / Product Type: Managed Medicaid /     Extended Emergency Contact Information  Primary Emergency Contact: HarveyShaeJessica  Mobile Phone: [phone]  Relation: Relative  Preferred language: English   needed? No    Discharge Plan A:  (tbd)  Discharge Plan B:  (tbd)      [employer] [id number] [place] [facility] [address] [facility]  [address]  [address]  [facility] [address]  Phone: [phone] Fax: [phone]      Initial Assessment (most recent)     Adult Discharge Assessment - 01/28/22 1700        Discharge Assessment    Assessment Type Discharge Planning Assessment     Confirmed/corrected address, phone number and insurance Yes     Confirmed Demographics Correct on Facesheet     Source of Information family     If unable to respond/provide information was family/caregiver contacted? Yes     Contact Name/Number vega Pandya     When was your last doctors appointment? --   Niece does not recall because patient starting to refuse to go    Reason For Admission side pain, breathing too heavy     Lives With other (see comments)   vega Pandya    Do you expect to return to your current living situation? Yes     Do you have help at home or someone to help you manage your care at home? Yes     Who are your caregiver(s) and their phone number(s)? vega Pandya  (and her children assist)      Prior to hospitilization cognitive status: Alert/Oriented     Current cognitive status: --   unable to assess, asleep    Walking or Climbing Stairs Difficulty none     Dressing/Bathing Difficulty none     Home Layout Able to live on 1st floor     Equipment Currently Used at Home none     Readmission within 30 days? No     Patient currently being followed by outpatient case management? No     Do you currently have service(s) that help you manage your care at home? No     Do you take prescription medications? Yes     Do you have prescription coverage? Yes     Do you have any problems affording any of your prescribed medications? No     Is the patient taking medications as prescribed? yes     Who is going to help you get home at discharge? vega Pandya     How do you get to doctors appointments? family or friend will provide     Are you on dialysis? No     Do you take coumadin? No     Discharge Plan A --   tbd    Discharge Plan B --   tbd    DME Needed Upon Discharge  --   tbd    Discharge Plan discussed with: --   vega Pandya    Discharge Barriers Identified None

## 2022-01-28 NOTE — ASSESSMENT & PLAN NOTE
Suspect demand ischemia from tachycardia.   EF preserved  Consider outpat isch eval when he recovers from current presentation

## 2022-01-28 NOTE — SUBJECTIVE & OBJECTIVE
Interval History: continued to decline overnight. Not marcelo despite CRRT.     Review of Systems   Unable to perform ROS: Intubated     Objective:     Vital Signs (Most Recent):  Temp: 97.16 °F (36.2 °C) (01/28/22 1700)  Pulse: 72 (01/28/22 1700)  Resp: (!) 35 (01/28/22 1700)  BP: 135/60 (01/28/22 1700)  SpO2: 99 % (01/28/22 1700) Vital Signs (24h Range):  Temp:  [91.04 °F (32.8 °C)-98.2 °F (36.8 °C)] 97.16 °F (36.2 °C)  Pulse:  [39-94] 72  Resp:  [21-35] 35  SpO2:  [88 %-100 %] 99 %  BP: ()/() 135/60  Arterial Line BP: ()/() 120/63     Weight: 81.2 kg (179 lb)  Body mass index is 29.79 kg/m².    Intake/Output Summary (Last 24 hours) at 1/28/2022 1754  Last data filed at 1/28/2022 1700  Gross per 24 hour   Intake 2344.27 ml   Output 2386 ml   Net -41.73 ml      Physical Exam  Vitals and nursing note reviewed.   Constitutional:       Appearance: He is ill-appearing and toxic-appearing.      Comments: Sedate    Cardiovascular:      Rate and Rhythm: Normal rate and regular rhythm.   Pulmonary:      Breath sounds: No wheezing or rales.      Comments: On vent support   Abdominal:      General: There is no distension.   Musculoskeletal:      Right lower leg: No edema.      Left lower leg: No edema.   Skin:     Capillary Refill: Capillary refill takes 2 to 3 seconds.   Neurological:      Comments: Sedated    Psychiatric:      Comments: Unable to assess          Significant Labs: All pertinent labs within the past 24 hours have been reviewed.    Significant Imaging: I have reviewed all pertinent imaging results/findings within the past 24 hours.  I have reviewed and interpreted all pertinent imaging results/findings within the past 24 hours.

## 2022-01-28 NOTE — SUBJECTIVE & OBJECTIVE
Interval History: Continued decompensation overnight; now on multiple vasopressors and CRRT with persistent severe acidosis. NMB discontinued. Antibiotics broadened. Trajectory and prognosis is very poor.     Review of Systems   Unable to perform ROS: Intubated     Objective:     Vital Signs (Most Recent):  Temp: (!) 94.64 °F (34.8 °C) (22 1133)  Pulse: 65 (22 1200)  Resp: (!) 35 (22 1200)  BP: (!) 119/58 (22 1200)  SpO2: 97 % (22 1200) Vital Signs (24h Range):  Temp:  [91.04 °F (32.8 °C)-99.8 °F (37.7 °C)] 94.64 °F (34.8 °C)  Pulse:  [] 65  Resp:  [10-75] 35  SpO2:  [74 %-100 %] 97 %  BP: ()/() 119/58  Arterial Line BP: ()/() 113/61     Weight: 81.6 kg (179 lb 14.3 oz)  Body mass index is 29.94 kg/m².      Intake/Output Summary (Last 24 hours) at 2022 1338  Last data filed at 2022 1300  Gross per 24 hour   Intake 2194.98 ml   Output 2397 ml   Net -202.02 ml       Physical Exam  Vitals and nursing note reviewed.   Constitutional:       Appearance: He is ill-appearing and toxic-appearing.      Interventions: He is sedated and intubated.   HENT:      Head: Normocephalic and atraumatic.   Eyes:      General: No scleral icterus.     Pupils: Pupils are equal, round, and reactive to light.   Cardiovascular:      Rate and Rhythm: Regular rhythm. Bradycardia present.      Pulses: Decreased pulses.   Pulmonary:      Effort: No tachypnea, accessory muscle usage or respiratory distress. He is intubated.      Breath sounds: Rales (diffuse, inspiratory ) present. No wheezing or rhonchi.   Abdominal:      General: Bowel sounds are normal. There is no distension.      Palpations: Abdomen is soft.   Genitourinary:     Comments: Renner in place   Musculoskeletal:      Cervical back: Normal range of motion. No rigidity.      Right lower le+ Edema present.      Left lower le+ Edema present.   Skin:     General: Skin is cool.      Capillary Refill: Capillary  refill takes more than 3 seconds.   Neurological:      Comments: Sedated and paralyzed on my exam this morning.        Vents:  Vent Mode: PRVC (01/28/22 1133)  Ventilator Initiated: Yes (01/27/22 1631)  Set Rate: 35 BPM (01/28/22 1133)  Vt Set: 450 mL (01/28/22 1133)  PEEP/CPAP: 12 cmH20 (01/28/22 1133)  Oxygen Concentration (%): 40 (01/28/22 1200)  Peak Airway Pressure: 28.8 cmH2O (01/28/22 1133)  Total Ve: 15.11 mL (01/28/22 1133)  F/VT Ratio<105 (RSBI): (!) 80.61 (01/28/22 1133)    Lines/Drains/Airways     Peripherally Inserted Central Catheter Line            PICC Triple Lumen 01/27/22 1915 right brachial <1 day          Central Venous Catheter Line            Percutaneous Central Line Insertion/Assessment - Triple Lumen  01/27/22 2130 right internal jugular <1 day          Drain                 NG/OG Tube 01/27/22 Left mouth 1 day         Urethral Catheter 01/27/22 0916 Double-lumen 18 Fr. 1 day          Airway                 Airway - Non-Surgical 01/27/22 1632 Endotracheal Tube <1 day          Arterial Line            Arterial Line 01/28/22 0015 Left Radial <1 day          Peripheral Intravenous Line                 Peripheral IV - Single Lumen 01/26/22 1000 Left;Posterior Hand 2 days         Peripheral IV - Single Lumen 01/27/22 20 G Anterior;Right Upper Arm 1 day                Significant Labs:    CBC/Anemia Profile:  Recent Labs   Lab 01/27/22  0615 01/28/22  0344   WBC 14.41* 18.22*   HGB 15.5 16.1   HCT 46.5 49.9    104*   MCV 98 104*   RDW 13.3 13.7        Chemistries:  Recent Labs   Lab 01/27/22  0615 01/27/22  1948 01/28/22  0017 01/28/22  0017 01/28/22  0344 01/28/22  0803 01/28/22  1150      < > 134*   < > 134*  134*  135* 137 135*   K 5.1   < > 7.3*  7.3*   < > 5.2*  5.2*  5.2*  5.2*  5.2* 5.3*  5.3*  5.3* 5.6*      < > 99   < > 100  100  101 101 100   CO2 16*   < > 19*   < > 18*  18*  20* 18* 19*   BUN 50*   < > 66*   < > 51*  51*  51* 48* 44*   CREATININE 2.8*    < > 4.1*   < > 2.9*  2.9*  3.0* 2.9* 3.1*   CALCIUM 9.3   < > 8.7   < > 8.2*  8.2*  8.2* 8.4* 8.3*   ALBUMIN 3.7  --   --   --  2.9*  3.0*  --   --    PROT 7.0  --   --   --  7.2  --   --    BILITOT 0.4  --   --   --  0.7  --   --    ALKPHOS 70  --   --   --  66  --   --    ALT 33  --   --   --  150*  --   --    AST 40  --   --   --  217*  --   --    MG  --   --  2.3  --   --  1.8  --    PHOS  --   --   --   --  7.0*  --   --     < > = values in this interval not displayed.       All pertinent labs within the past 24 hours have been reviewed.    Significant Imaging:  I have reviewed all pertinent imaging results/findings within the past 24 hours.

## 2022-01-28 NOTE — ASSESSMENT & PLAN NOTE
"1/27 - Dr. Callaway spoke with niece, Jessica Gabriel. Per Ms. Gabriel, patient does not have spouse or children.  Ms. Gabriel is in agreement with current POC.  She does not want CPR or cardioversion in the case of cardiac arrest.  Code status to DNR.       1/28 - Along with Dr. Vences, I updated Ms. Gabriel by phone regarding Mr. Madison's continued decline. She verbalized understanding the severity of his condition and realizes that he may not survive through today. She stated that she is not ready to "take him off of the machines until tomorrow". I assured her that we would continue with the current plan of care and continue conversations tomorrow if he survives until then.   "

## 2022-01-28 NOTE — ASSESSMENT & PLAN NOTE
A fib RVR noted on admission, possibly 2/2 amphetamines as his tox screen was positive. RVR noted on multiple previous admissions      -- was on amiodarone infusion, but now bradycardic. Hold for now  -- full dose AC  -- cardiology following, appreciate assistance

## 2022-01-28 NOTE — ASSESSMENT & PLAN NOTE
FARHAN noted on admission and worsening. Cardiorenal vs iATN     -- Avoid ACEI/ARB/NSAIDS/Nephrotoxins.   -- Renally dose all meds  -- Renner in place, strict I&Os  -- Nephrology consulted, appreciate assistance

## 2022-01-28 NOTE — ANESTHESIA PROCEDURE NOTES
Arterial    Diagnosis: hypotension    Patient location during procedure: ICU  Procedure start time: 1/28/2022 12:15 AM  Procedure end time: 1/28/2022 12:20 AM    Staffing  Authorizing Provider: Dwain Olivai MD  Performing Provider: Dwain Olivia MD    Anesthesiologist was present at the time of the procedure.    Preanesthetic Checklist  Completed: patient identified, IV checked and monitors and equipment checkedArterial  Skin Prep: chlorhexidine gluconate  Local Infiltration: lidocaine  Orientation: left  Location: radial    Catheter Size: 20 G  Catheter placement by Ultrasound guidance. Heme positive aspiration all ports.  Vessel Caliber: small, patent, compressibility normal  Vascular Doppler:  not done  Needle advanced into vessel with real time Ultrasound guidance.  Guidewire confirmed in vessel.  Sterile sheath used.  Image recorded and saved.Insertion Attempts: 1  Assessment  Dressing: secured with tape and tegaderm  Patient: Tolerated well

## 2022-01-28 NOTE — ASSESSMENT & PLAN NOTE
A fib RVR noted on admission, possibly 2/2 amphetamines as his tox screen was positive. RVR noted on multiple previous admissions      -- continue amiodarone infusion  -- full dose AC  -- cardiology following, appreciate assistance

## 2022-01-28 NOTE — PROGRESS NOTES
Date of Admission:1/26/2022    SUBJECTIVE: sedated, on vent    Current Facility-Administered Medications   Medication    0.9%  NaCl infusion (CRRT USE ONLY)    albuterol sulfate nebulizer solution 10 mg    albuterol-ipratropium 2.5 mg-0.5 mg/3 mL nebulizer solution 3 mL    amiodarone 360 mg/200 mL (1.8 mg/mL) infusion    aspirin chewable tablet 81 mg    atorvastatin tablet 40 mg    azithromycin 500 mg in dextrose 5 % 250 mL IVPB (ready to mix system)    calcium gluconate 1g in dextrose 5% 100mL (ready to mix system)    cefTRIAXone (ROCEPHIN) 1 g/50 mL D5W IVPB    chlorhexidine 0.12 % solution 15 mL    cisatracurium (NIMBEX) 200 mg in dextrose 5 % 100 mL infusion    dextrose 10% bolus 250 mL    And    dextrose 10% bolus 250 mL    dextrose 50% injection 12.5 g    dextrose 50% injection 25 g    [START ON 1/28/2022] enoxaparin injection 80 mg    famotidine (PF) injection 20 mg    fentaNYL 2500 mcg in 0.9% sodium chloride 250 mL infusion premix (titrating)    [START ON 1/28/2022] furosemide injection 120 mg    glucagon (human recombinant) injection 1 mg    glucose chewable tablet 16 g    glucose chewable tablet 24 g    insulin aspart U-100 pen 0-5 Units    insulin detemir U-100 pen 5 Units    lorazepam injection 1 mg    magnesium sulfate 2g in water 50mL IVPB (premix)    melatonin tablet 6 mg    methylPREDNISolone sodium succinate injection 40 mg    naloxone 0.4 mg/mL injection 0.02 mg    nicotine 21 mg/24 hr 1 patch    NORepinephrine 4 mg in dextrose 5% 250 mL infusion (premix) (titrating)    phenylephrine (PRIYA-SYNEPHRINE) 20 mg in sodium chloride 0.9% 250 mL infusion    phenylephrine HCl in 0.9% NaCl 1 mg/10 mL (100 mcg/mL) syringe 100 mcg    propofol (DIPRIVAN) 10 mg/mL infusion    senna-docusate 8.6-50 mg per tablet 1 tablet    sodium chloride 0.9% flush 10 mL    [START ON 1/28/2022] sodium chloride 0.9% flush 10 mL    And    sodium chloride 0.9% flush 10 mL    sodium phosphate  20.01 mmol in dextrose 5 % 250 mL IVPB    sodium phosphate 30 mmol in dextrose 5 % 250 mL IVPB    sodium phosphate 39.99 mmol in dextrose 5 % 250 mL IVPB       Wt Readings from Last 3 Encounters:   01/26/22 79.4 kg (175 lb)   11/21/21 79.4 kg (175 lb)   07/28/21 79.4 kg (175 lb)     Temp Readings from Last 3 Encounters:   01/27/22 98.2 °F (36.8 °C) (Oral)   11/21/21 98.2 °F (36.8 °C) (Oral)   07/29/21 98.7 °F (37.1 °C)     BP Readings from Last 3 Encounters:   01/27/22 (!) 86/69   11/21/21 139/85   07/29/21 129/89     Pulse Readings from Last 3 Encounters:   01/27/22 94   11/21/21 95   07/29/21 91       Intake/Output Summary (Last 24 hours) at 1/27/2022 2146  Last data filed at 1/27/2022 2130  Gross per 24 hour   Intake 945.37 ml   Output 510 ml   Net 435.37 ml       PE:  GEN:wd  Male in nad  HEENT:ncat,eyes closed, ett  CVS:s1s2 tachy  PULM:no rales  ABD:soft, nd  EXT:no leg edema  NEURO:sedated    Recent Labs   Lab 01/27/22 1948   *      K 7.4*      CO2 20*   BUN 64*   CREATININE 3.8*   CALCIUM 8.4*       Lab Results   Component Value Date    .5 (H) 09/10/2019    CALCIUM 8.4 (L) 01/27/2022    CAION 1.19 01/21/2020    PHOS 1.8 (L) 11/21/2021       Recent Labs   Lab 01/27/22  0615   WBC 14.41*   RBC 4.74   HGB 15.5   HCT 46.5      MCV 98   MCH 32.7*   MCHC 33.3           A/P:  1.alireza.  K worse. Pt worse. Consented by family. Start emergent hd. CRRT to start. BP poor despite pressors. Will concentrate gtts.   2.hyperkalemia. 2k bath with crrt.   3.lactic acidosis.  Bicarb with hd.  4.acute resp failure. Not doing well. Try to pull. Oxygenation poor. Cont paralytics. ?covid??   5.hypophospatemia. lamar level.  6.afib. rate up. Following.  7.acute on diast hf. Volume an issue. Try uf some with crrt.  Pt is dnr. Pt is unstable. Try crrt. Poor prognosis.  Discussed with pulm. 50mins critical care time. Discussed plan with nurse and family done.

## 2022-01-28 NOTE — NURSING
[facility]  Nurses Note -- 4 Eyes    Patient Name: Marck Madison  MRN: [id number]  Attending Provider:  Winifred Lopez MD  1/28/2022       Wounds on : Q Shift    [x] No   [x]Prevention Measures Documented    [] Yes    []LDA's Charted   []Wound Care Consulted (optional)   []Photo Taken (optional)   []MD Notified    Attending RN:  Carmita Vital RN     Second RN:  Amy Granger RN

## 2022-01-28 NOTE — CONSULTS
[facility] - Intensive Care  Adult Nutrition  Consult Note    SUMMARY     Recommendations    1) Initiate Novasource @ 10 mL adv by 10 mL until goal rate of 30 mL/hr is met, Hold for residuals > 400 mL.  mL H20 q4h, additional fluids to be determined by MD   Provides 1440 kcal, 65g P, 132g C, 1716 mL total H20  Propofol providing 504 kcal     2) Monitor TF tolerance  3) Hyperkalemia, Renal Labs    Goals:   1) Pt to have means of nutrition initiated within 48 hours.  2) Pt to tolerate TF initiation and advancement to meet 74% EEN/61% EPN by next visit  3) Monitored labs to trend toward target ranges    Nutrition Goal Status: new  Communication of RD Recs:  (POC)    Assessment and Plan  Nutrition Problem  Inadequate Energy Intake     Related to (etiology):   Intubated, Sedated    Signs and Symptoms (as evidenced by):   NPO    Interventions/Recommendations (treatment strategy):  Enteral Nutrition (Novasource)   Collaboration of care with other providers    Nutrition Diagnosis Status:   New    Reason for Assessment    Reason For Assessment: consult,new tube feeding  Diagnosis:  (Acute Respiratory Failure)  Relevant Medical History: FARHAN, T2DM, CHF, Afib w/RVR, HTN, NSTEMI, COPD, ETOH Abuse, Substance Abuse    General Information Comments: Pt reported to ED with SOB, cough,  generalized weakness, body aches and back pain x 2 days. Tox-screen was positive for amphetamines and pt at one point started showing some signs of anxiety and elevated BP with tremors of which he admitted to being a daily drinker.  Upon admit to ICU pt was intubated and is sedated.  Nutrition Discharge Planning: Pending Medical Course    Nutrition Risk Screen    Nutrition Risk Screen: tube feeding or parenteral nutrition    Nutrition/Diet History    Spiritual, Cultural Beliefs, Restorationist Practices, Values that Affect Care: no  Food Allergies: NKFA  Factors Affecting Nutritional Intake: NPO,on mechanical ventilation    Anthropometrics    Temp:  "(!) 95.36 °F (35.2 °C)  Height Method: Stated  Height: 5' 5" (165.1 cm)  Height (inches): 65 in  Weight Method: Bed Scale  Weight: 81.6 kg (179 lb 14.3 oz)  Weight (lb): 179.9 lb  Ideal Body Weight (IBW), Male: 136 lb  % Ideal Body Weight, Male (lb): 128.68 %  BMI (Calculated): 29.9  BMI Grade: 25 - 29.9 - overweight       Lab/Procedures/Meds    Pertinent Labs Reviewed: reviewed  CBC:  Recent Labs   Lab 01/28/22 0344   WBC 18.22*   HGB 16.1   HCT 49.9   *     CMP:  Recent Labs   Lab 01/28/22 0344 01/28/22 0344 01/28/22  0803   CALCIUM 8.2*  8.2*  8.2*   < > 8.4*   ALBUMIN 2.9*  3.0*  --   --    PROT 7.2  --   --    *  134*  135*   < > 137   K 5.2*  5.2*  5.2*  5.2*  5.2*   < > 5.3*  5.3*  5.3*   CO2 18*  18*  20*   < > 18*     100  101   < > 101   BUN 51*  51*  51*   < > 48*   CREATININE 2.9*  2.9*  3.0*   < > 2.9*   ALKPHOS 66  --   --    *  --   --    *  --   --    BILITOT 0.7  --   --     < > = values in this interval not displayed.     Pertinent Medications Reviewed: reviewed  Scheduled Meds:   aspirin  81 mg Oral Daily    atorvastatin  40 mg Oral QHS    azithromycin  500 mg Intravenous Q24H    ceFEPime (MAXIPIME) IVPB  2 g Intravenous Q12H    chlorhexidine  15 mL Mouth/Throat BID    dextrose 10%  25 g Intravenous Once    enoxaparin  1 mg/kg Subcutaneous Daily    EPINEPHrine        EPINEPHrine        famotidine (PF)  20 mg Intravenous Daily    furosemide (LASIX) injection  120 mg Intravenous Q6H    hydrocortisone sodium succinate  100 mg Intravenous Q8H    insulin detemir U-100  5 Units Subcutaneous Daily    nicotine  1 patch Transdermal Daily    senna-docusate 8.6-50 mg  1 tablet Oral BID    sodium chloride 0.9%  10 mL Intravenous Q8H    sodium chloride 0.9%  10 mL Intravenous Q6H     Continuous Infusions:   sodium chloride 0.9%      amiodarone in dextrose 5% Stopped (01/27/22 9473)    EPINEPHrine 0.18 mcg/kg/min (01/28/22 1200)    " fentanyl 25 mcg/hr (01/28/22 1200)    NORepinephrine bitartrate-D5W 0.16 mcg/kg/min (01/28/22 1200)    propofoL 40 mcg/kg/min (01/28/22 1200)    vasopressin Stopped (01/28/22 1115)     PRN Meds:.albuterol-ipratropium, [COMPLETED] calcium gluconate IVPB **AND** calcium gluconate IVPB, dextrose 10%, dextrose 10% **AND** dextrose 10% **AND** [COMPLETED] insulin regular, dextrose 10%, dextrose 50%, dextrose 50%, glucagon (human recombinant), glucose, glucose, heparin (porcine), heparin (porcine), heparin (porcine), insulin aspart U-100, lorazepam, magnesium sulfate IVPB, melatonin, naloxone, NORepinephrine bitartrate-D5W, phenylephrine HCl in 0.9% NaCl, Flushing PICC Protocol **AND** sodium chloride 0.9% **AND** sodium chloride 0.9%, sodium phosphate IVPB, sodium phosphate IVPB, sodium phosphate IVPB, Pharmacy to dose Vancomycin consult **AND** vancomycin - pharmacy to dose      Estimated/Assessed Needs    Weight Used For Calorie Calculations: 81.6 kg (179 lb 14.3 oz)  Energy Calorie Requirements (kcal): 1947  Energy Need Method: [place] (per CC non-obese ventilated BMI < 30)  Protein Requirements: 106 - 164g (1.2 - 2.0g/kg per cc, ventilated BMI < 30)  Weight Used For Protein Calculations: 81.6 kg (179 lb 14.3 oz)     Estimated Fluid Requirement Method: RDA Method (or per MD)  RDA Method (mL): 1947  CHO Requirement: 243g      Nutrition Prescription Ordered    Current Diet Order: NPO    Evaluation of Received Nutrient/Fluid Intake    Other Calories (kcal): 504 (Propofol drip @ 19.1 mL/hr)  Energy Calories Required: not meeting needs  Protein Required: not meeting needs  Fluid Required: not meeting needs  % Intake of Estimated Energy Needs: 25 - 50 %  % Meal Intake: NPO    Nutrition Risk    Level of Risk/Frequency of Follow-up:  (2x/week)     Monitor and Evaluation    Food and Nutrient Intake: enteral nutrition intake  Food and Nutrient Adminstration: enteral and parenteral nutrition administration  Anthropometric  Measurements: weight,weight change,body mass index  Biochemical Data, Medical Tests and Procedures: electrolyte and renal panel,gastrointestinal profile,glucose/endocrine profile,inflammatory profile,lipid profile  Nutrition-Focused Physical Findings: overall appearance,extremities, muscles and bones,head and eyes,skin     Nutrition Follow-Up    RD Follow-up?: Yes

## 2022-01-28 NOTE — PLAN OF CARE
Patient remains in ICU intubated and sedated.  Paralytics stopped today this afternoon.  SB/accel ventricular rhythm on monitor.  EKG done and MD aware of fluctuating rhythm.  Art line in place with goal MAP>65, on epi and levo, vasopressin ordered but yet started.  Propofol and fentanyl infusing for sedation.  No urine output this shift, physician aware.  CRRT running with no issues to R IJ trialysis catheter.  Hypothermic this shift, CRRT warmer on and arsalan hugger in place with temp now increased to 97 with core rectal monitor.  Pepito Lopez visited at bedside and updated by physicians.  Addressed questions/concerns.

## 2022-01-28 NOTE — PROGRESS NOTES
[facility] - Intensive Care  Cardiology  Progress Note    Patient Name: Marck Madison  MRN: [id number]  Admission Date: 1/26/2022  Hospital Length of Stay: 2 days  Code Status: DNR   Attending Physician: Winifred Lopez MD   Primary Care Physician: [facility]  Expected Discharge Date:   Principal Problem:Acute hypoxemic respiratory failure    Subjective:     Interval Hx: pt seen in ICU, case d/w RN and OSCAR Thomas NP.  Intubated and sedated.  Hypothermic.    Tele: SR 60s (personally reviewed and interpreted)          Review of Systems   Unable to perform ROS: intubated     Objective:     Vital Signs (Most Recent):  Temp: (!) 95.72 °F (35.4 °C) (01/28/22 0930)  Pulse: (!) 58 (01/28/22 1045)  Resp: (!) 35 (01/28/22 1045)  BP: 108/75 (01/28/22 1000)  SpO2: 100 % (01/28/22 1045) Vital Signs (24h Range):  Temp:  [91.04 °F (32.8 °C)-99.8 °F (37.7 °C)] 95.72 °F (35.4 °C)  Pulse:  [] 58  Resp:  [10-75] 35  SpO2:  [74 %-100 %] 100 %  BP: ()/() 108/75  Arterial Line BP: ()/() 111/65     Weight: 81.6 kg (179 lb 14.3 oz)  Body mass index is 29.94 kg/m².     SpO2: 100 %  O2 Device (Oxygen Therapy): ventilator      Intake/Output Summary (Last 24 hours) at 1/28/2022 1054  Last data filed at 1/28/2022 1000  Gross per 24 hour   Intake 1954.46 ml   Output 1965 ml   Net -10.54 ml       Lines/Drains/Airways     Peripherally Inserted Central Catheter Line            PICC Triple Lumen 01/27/22 1915 right brachial <1 day          Central Venous Catheter Line            Percutaneous Central Line Insertion/Assessment - Triple Lumen  01/27/22 2130 right internal jugular <1 day          Drain                 NG/OG Tube 01/27/22 Left mouth 1 day         Urethral Catheter 01/27/22 0916 Double-lumen 18 Fr. 1 day          Airway                 Airway - Non-Surgical 01/27/22 1632 Endotracheal Tube <1 day          Arterial Line            Arterial Line 01/28/22 0015 Left Radial <1 day          Peripheral  Intravenous Line                 Peripheral IV - Single Lumen 01/26/22 1000 Left;Posterior Hand 2 days         Peripheral IV - Single Lumen 01/27/22 20 G Anterior;Right Upper Arm 1 day                Physical Exam  Constitutional:       General: He is not in acute distress.     Appearance: He is not ill-appearing, toxic-appearing or diaphoretic.   HENT:      Head: Normocephalic and atraumatic.   Eyes:      General:         Right eye: No discharge.         Left eye: No discharge.   Cardiovascular:      Rate and Rhythm: Normal rate and regular rhythm.      Heart sounds: S1 normal and S2 normal. Heart sounds are distant. No murmur heard.      Pulmonary:      Effort: No respiratory distress.      Breath sounds: No stridor.      Comments: intubated  Abdominal:      General: There is no distension.      Palpations: Abdomen is soft.   Musculoskeletal:         General: No swelling or tenderness.      Cervical back: No rigidity.      Right lower leg: No edema.      Left lower leg: No edema.   Skin:     General: Skin is warm and dry.      Coloration: Skin is not jaundiced.   Neurological:      Comments: intubated   Psychiatric:      Comments: intubated         Current Medications:   aspirin  81 mg Oral Daily    atorvastatin  40 mg Oral QHS    azithromycin  500 mg Intravenous Q24H    ceFEPime (MAXIPIME) IVPB  2 g Intravenous Q12H    chlorhexidine  15 mL Mouth/Throat BID    dextrose 10%  25 g Intravenous Once    enoxaparin  1 mg/kg Subcutaneous Daily    EPINEPHrine        EPINEPHrine        famotidine (PF)  20 mg Intravenous Daily    furosemide (LASIX) injection  120 mg Intravenous Q6H    hydrocortisone sodium succinate  100 mg Intravenous Q8H    insulin detemir U-100  5 Units Subcutaneous Daily    nicotine  1 patch Transdermal Daily    senna-docusate 8.6-50 mg  1 tablet Oral BID    sodium chloride 0.9%  10 mL Intravenous Q8H    sodium chloride 0.9%  10 mL Intravenous Q6H      sodium chloride 0.9%       amiodarone in dextrose 5% Stopped (01/27/22 2243)    cisatracurium (NIMBEX) infusion 4 mcg/kg/min (01/28/22 1000)    EPINEPHrine      fentanyl 25 mcg/hr (01/28/22 1000)    NORepinephrine bitartrate-D5W 0.12 mcg/kg/min (01/28/22 1000)    propofoL 40 mcg/kg/min (01/28/22 1000)    vasopressin Stopped (01/28/22 1115)     albuterol-ipratropium, [COMPLETED] calcium gluconate IVPB **AND** calcium gluconate IVPB, dextrose 10%, dextrose 10% **AND** dextrose 10% **AND** [COMPLETED] insulin regular, dextrose 10%, dextrose 50%, dextrose 50%, glucagon (human recombinant), glucose, glucose, heparin (porcine), heparin (porcine), heparin (porcine), insulin aspart U-100, lorazepam, magnesium sulfate IVPB, melatonin, naloxone, NORepinephrine bitartrate-D5W, phenylephrine HCl in 0.9% NaCl, Flushing PICC Protocol **AND** sodium chloride 0.9% **AND** sodium chloride 0.9%, sodium phosphate IVPB, sodium phosphate IVPB, sodium phosphate IVPB, Pharmacy to dose Vancomycin consult **AND** vancomycin - pharmacy to dose    Laboratory (all labs reviewed):  CBC:  Recent Labs   Lab 11/21/21  0024 11/21/21  0418 01/26/22  1225 01/27/22  0615 01/28/22  0344   WBC 12.37 9.57 12.07 14.41 H 18.22 H   Hemoglobin 14.4 14.5 15.4 15.5 16.1   Hematocrit 41.6 43.0 46.3 46.5 49.9   Platelets 219 201 188 192 104 L       CHEMISTRIES:  Recent Labs   Lab 07/05/21  0400 07/05/21  0400 07/28/21  2105 11/21/21  0024 11/21/21  0418 01/26/22  1225 01/27/22  0615 01/27/22  1948 01/28/22  0017 01/28/22  0344 01/28/22  0803   Glucose 332 H   < > 86   < > 244 H   < > 173 H 196 H 119 H 111 H  111 H  112 H 87   Sodium 142   < > 145   < > 139   < > 138 136 134 L 134 L  134 L  135 L 137   Potassium 3.9   < > 3.6   < > 3.7   < > 5.1 7.4 HH 7.3 HH  7.3 HH 5.2 H  5.2 H  5.2 H  5.2 H  5.2 H 5.3 H  5.3 H  5.3 H   BUN 17   < > 22   < > 26 H   < > 50 H 64 H 66 H 51 H  51 H  51 H 48 H   Creatinine 1.2   < > 1.3   < > 1.5 H   < > 2.8 H 3.8 H 4.1 H 2.9 H  2.9 H   3.0 H 2.9 H   eGFR if  >60   < > >60   < > 56 A   < > 27 A 18 A 17 A 25 A  25 A  24 A 25 A   eGFR if non  >60   < > 58 A   < > 49 A   < > 23 A 16 A 14 A 22 A  22 A  21 A 22 A   Calcium 8.9   < > 9.6   < > 9.4   < > 9.3 8.4 L 8.7 8.2 L  8.2 L  8.2 L 8.4 L   Magnesium 1.5 L  --  1.1 L  --  2.1  --   --   --  2.3  --  1.8    < > = values in this interval not displayed.       CARDIAC BIOMARKERS:  Recent Labs   Lab 09/11/19  1611 01/20/20  1731 01/20/20  2322 01/21/20  0400 06/09/21  1220 06/09/21  1748 07/01/21  0208 07/28/21  2105 11/21/21  0024 11/21/21  0418 01/26/22  1225    H  --  873 H  --  733 H  --   --   --   --   --   --    Troponin I  --    < > 0.308 H   < > 0.235 H   < > 0.347 H 0.068 H 0.161 H 0.149 H 0.172 H    < > = values in this interval not displayed.       COAGS:  Recent Labs   Lab 01/22/20  0345 01/23/20  0529 06/12/21  1201 06/13/21  0159 06/30/21  2207   INR 1.4 H 1.3 H 2.5 H 1.7 H 1.2       LIPIDS/LFTS:  Recent Labs   Lab 11/21/21  0024 11/21/21  0418 01/26/22  1225 01/27/22  0615 01/28/22  0344   AST 39 39 47 H 40 217 H   ALT 26 25 32 33 150 H       BNP:  Recent Labs   Lab 06/12/21  0538 07/01/21  0608 07/28/21  2105 11/21/21  0024 01/26/22  1225    H 481 H 94 390 H 434 H       TSH:  Recent Labs   Lab 01/20/20  1731 06/10/21  0433 06/12/21  0326 07/28/21  2105 01/26/22  1225   TSH 2.296 0.176 L 2.253 0.831 1.086       Free T4:  Recent Labs   Lab 06/10/21  0433   Free T4 1.02       Diagnostic Results:  ECG (personally reviewed and interpreted tracing(s)):  1/27/22 1838 SR 93, ?inf isch    Echo: 1/26/22  · The left ventricle is normal in size with concentric hypertrophy and normal systolic function.  · The estimated ejection fraction is 60%.  · Indeterminate left ventricular diastolic function.  · Normal right ventricular size with normal right ventricular systolic function.  · Moderate left atrial enlargement.  · Moderate right atrial  enlargement.  · Moderate mitral regurgitation.  · Moderate tricuspid regurgitation.  · Intermediate central venous pressure (8 mmHg).  · The estimated PA systolic pressure is 34 mmHg.    Stress Test: L MPI 1/29/20    The perfusion scan is free of evidence from myocardial ischemia or injury.    There is a  mild intensity fixed defect in the inferior wall of the left ventricle secondary to diaphragm attenuation.    Visually estimated ejection fraction is mildly depressed at stress.    The EKG portion of this study is negative for ischemia.    The patient reported no chest pain during the stress test.    Arrhythmias during stress: occasional PACs, occasional PVCs.    The blood pressure response to stress was normal.          Assessment and Plan:     * Acute hypoxemic respiratory failure  Mgmt per IM    Acute on chronic diastolic congestive heart failure  Appears euvolemic    Atrial fibrillation with RVR  In SR  On amio gtt, transition to PO when able to take meds  OK to stop if LFTs continue to rise  Eventual DOAC when able to take PO meds    Elevated troponin  Suspect demand ischemia from tachycardia.   EF preserved  Consider outpat isch eval when he recovers from current presentation    FARHAN (acute kidney injury)  Mgmt per IM/renal        VTE Risk Mitigation (From admission, onward)         Ordered     enoxaparin injection 80 mg  Daily         01/27/22 1350     heparin (porcine) injection 5,000 Units  Use PRN         01/27/22 2149     heparin (porcine) injection 5,000 Units  Use PRN         01/27/22 2158     heparin (porcine) injection 5,000 Units  Use PRN         01/27/22 2149     IP VTE HIGH RISK PATIENT  Once         01/26/22 1409     Place sequential compression device  Until discontinued         01/26/22 1409              Pt seen in ICU, critical care time 35min  Cardiology will sign off, pls call with questions.    Trevor Schwab MD  Cardiology  [facility] - Intensive Care

## 2022-01-28 NOTE — PROGRESS NOTES
[facility]  Pulmonology  Progress Note    Patient Name: Marck Madison  MRN: [id number]  Admission Date: 1/26/2022  Hospital Length of Stay: 2 days  Code Status: DNR  Attending Provider: Winifred Lopez MD  Primary Care Provider: [facility]   Principal Problem: Acute hypoxemic respiratory failure    Subjective:     Interval History: Continued decompensation overnight; now on multiple vasopressors and CRRT with persistent severe acidosis. NMB discontinued. Antibiotics broadened. Trajectory and prognosis are very poor.     Review of Systems   Unable to perform ROS: Intubated     Objective:     Vital Signs (Most Recent):  Temp: (!) 94.64 °F (34.8 °C) (01/28/22 1133)  Pulse: 65 (01/28/22 1200)  Resp: (!) 35 (01/28/22 1200)  BP: (!) 119/58 (01/28/22 1200)  SpO2: 97 % (01/28/22 1200) Vital Signs (24h Range):  Temp:  [91.04 °F (32.8 °C)-99.8 °F (37.7 °C)] 94.64 °F (34.8 °C)  Pulse:  [] 65  Resp:  [10-75] 35  SpO2:  [74 %-100 %] 97 %  BP: ()/() 119/58  Arterial Line BP: ()/() 113/61     Weight: 81.6 kg (179 lb 14.3 oz)  Body mass index is 29.94 kg/m².      Intake/Output Summary (Last 24 hours) at 1/28/2022 1338  Last data filed at 1/28/2022 1300  Gross per 24 hour   Intake 2194.98 ml   Output 2397 ml   Net -202.02 ml       Physical Exam  Vitals and nursing note reviewed.   Constitutional:       Appearance: He is ill-appearing and toxic-appearing.      Interventions: He is sedated and intubated.   HENT:      Head: Normocephalic and atraumatic.   Eyes:      General: No scleral icterus.     Pupils: Pupils are equal, round, and reactive to light.   Cardiovascular:      Rate and Rhythm: Regular rhythm. Bradycardia present.      Pulses: Decreased pulses.   Pulmonary:      Effort: No tachypnea, accessory muscle usage or respiratory distress. He is intubated.      Breath sounds: Rales (diffuse, inspiratory ) present. No wheezing or rhonchi.   Abdominal:      General: Bowel  sounds are normal. There is no distension.      Palpations: Abdomen is soft.   Genitourinary:     Comments: Renner in place   Musculoskeletal:      Cervical back: Normal range of motion. No rigidity.      Right lower le+ Edema present.      Left lower le+ Edema present.   Skin:     General: Skin is cool.      Capillary Refill: Capillary refill takes more than 3 seconds.   Neurological:      Comments: Sedated and paralyzed on my exam this morning.        Vents:  Vent Mode: PRVC (22 1133)  Ventilator Initiated: Yes (22 1631)  Set Rate: 35 BPM (22 1133)  Vt Set: 450 mL (22 1133)  PEEP/CPAP: 12 cmH20 (22 1133)  Oxygen Concentration (%): 40 (22 1200)  Peak Airway Pressure: 28.8 cmH2O (22 1133)  Total Ve: 15.11 mL (22 1133)  F/VT Ratio<105 (RSBI): (!) 80.61 (22 1133)    Lines/Drains/Airways     Peripherally Inserted Central Catheter Line            PICC Triple Lumen 22 1915 right brachial <1 day          Central Venous Catheter Line            Percutaneous Central Line Insertion/Assessment - Triple Lumen  22 2130 right internal jugular <1 day          Drain                 NG/OG Tube 22 Left mouth 1 day         Urethral Catheter 22 0916 Double-lumen 18 Fr. 1 day          Airway                 Airway - Non-Surgical 22 1632 Endotracheal Tube <1 day          Arterial Line            Arterial Line 22 0015 Left Radial <1 day          Peripheral Intravenous Line                 Peripheral IV - Single Lumen 22 1000 Left;Posterior Hand 2 days         Peripheral IV - Single Lumen 22 20 G Anterior;Right Upper Arm 1 day                Significant Labs:    CBC/Anemia Profile:  Recent Labs   Lab 22  0615 22  0344   WBC 14.41* 18.22*   HGB 15.5 16.1   HCT 46.5 49.9    104*   MCV 98 104*   RDW 13.3 13.7        Chemistries:  Recent Labs   Lab 22  0615 22  1948 22  0017 22  0017  01/28/22  0344 01/28/22  0803 01/28/22  1150      < > 134*   < > 134*  134*  135* 137 135*   K 5.1   < > 7.3*  7.3*   < > 5.2*  5.2*  5.2*  5.2*  5.2* 5.3*  5.3*  5.3* 5.6*      < > 99   < > 100  100  101 101 100   CO2 16*   < > 19*   < > 18*  18*  20* 18* 19*   BUN 50*   < > 66*   < > 51*  51*  51* 48* 44*   CREATININE 2.8*   < > 4.1*   < > 2.9*  2.9*  3.0* 2.9* 3.1*   CALCIUM 9.3   < > 8.7   < > 8.2*  8.2*  8.2* 8.4* 8.3*   ALBUMIN 3.7  --   --   --  2.9*  3.0*  --   --    PROT 7.0  --   --   --  7.2  --   --    BILITOT 0.4  --   --   --  0.7  --   --    ALKPHOS 70  --   --   --  66  --   --    ALT 33  --   --   --  150*  --   --    AST 40  --   --   --  217*  --   --    MG  --   --  2.3  --   --  1.8  --    PHOS  --   --   --   --  7.0*  --   --     < > = values in this interval not displayed.       All pertinent labs within the past 24 hours have been reviewed.    Significant Imaging:  I have reviewed all pertinent imaging results/findings within the past 24 hours.      [facility]  Recent Labs   Lab 01/28/22  1339   PH 7.066*   PO2 58   PCO2 64.3*   HCO3 18.5*   BE -13     Assessment/Plan:     * Acute hypoxemic respiratory failure  Suspect this is 2/2 cardiogenic pulmonary edema. He was initially admitted on room air with no respiratory distress and decompensated to the point of needing intubation the following day. BNP elevated at 439 prior to receiving several liters of fluid. CXR with worsening bilateral opacities; significant amount of pink frothy sputum noted on intubation. Known diastolic dysfunction at baseline and a history of systolic dysfunction with an EF of 35% which has since recovered. Requiring 100% FiO2 and a PEEP of 20+ to maintain adequate saturations; extremely difficult to ventilate and oxygenate. Known COPD with wheezing reported though no obstruction evident on ventilator.     -- Repeat TTE pending   -- Fluid removal with RRT     -- Elevated WBC but procal WNL. Will  "culture and start broad spectrum abx given need for vasopressor support  -- continue nebs + steroids   -- Maintain LPV. Goal plateau pressure <30  -- Wean FiO2 and PEEP for SpO2 >92  -- daily assessment for SAT/SBT    Goals of care, counseling/discussion  1/27 - Dr. Callaway spoke with niece, Jessica Gabriel. Per Ms. Gabriel, patient does not have spouse or children.  Ms. Gabriel is in agreement with current POC.  She does not want CPR or cardioversion in the case of cardiac arrest.  Code status to DNR.       1/28 - Along with Dr. Vences, I updated Ms. Gabriel by phone regarding Mr. Madison's continued decline. She verbalized understanding the severity of his condition and realizes that he may not survive through today. She stated that she is not ready to "take him off of the machines until tomorrow". I assured her that we would continue with the current plan of care and continue conversations tomorrow if he survives until then.     Shock, unspecified  Unclear cause. Suspect cardiogenic vs septic. No source of infection identified    -- repeat TTE  -- pan cultured, f/u results  -- continue broad spectrum abx  -- too unstable at this point for CT imaging. Consider pan scan if/when able. RUQ US for now  -- titrate vasopressors for MAP >65  -- Stress dose steroids      Panlobular emphysema  See respiratory failure     Acute on chronic diastolic congestive heart failure  -- fluid removal with RRT      Atrial fibrillation with RVR  A fib RVR noted on admission, possibly 2/2 amphetamines as his tox screen was positive. RVR noted on multiple previous admissions      -- was on amiodarone infusion, but now bradycardic. Hold for now  -- full dose AC  -- cardiology following, appreciate assistance      FARHAN (acute kidney injury)  FARHAN noted on admission and worsening. Cardiorenal vs iATN     -- Avoid ACEI/ARB/NSAIDS/Nephrotoxins.   -- Renally dose all meds  -- Renner in place, strict I&Os  -- Nephrology consulted, appreciate assistance   -- " Emergent RRT started overnight for severe acidosis and hyperkalemia     Plan discussed with Dr. Callaway.  Pepito, Jessica Gabriel, updated by phone.     Critical Care Time: 85 minutes  Critical care was time spent personally by me on the following activities: development of treatment plan with patient or surrogate and bedside caregivers, discussions with consultants, evaluation of patient's response to treatment, examination of patient, ordering and performing treatments and interventions, ordering and review of laboratory studies, ordering and review of radiographic studies, pulse oximetry, re-evaluation of patient's condition. This critical care time did not overlap with that of any other provider or involve time for any procedures.    Vicki Thomas, COOPER  Pulmonology  [facility] [place] - Intensive Care

## 2022-01-28 NOTE — SUBJECTIVE & OBJECTIVE
"Past Medical History:   Diagnosis Date    Arrhythmia 2017    aflutter    Atrial flutter     CAD (coronary artery disease)     CHF (congestive heart failure), NYHA class I 2017    Cholelithiasis with chronic cholecystitis     Chronic diastolic heart failure     Cigarette smoker     COPD (chronic obstructive pulmonary disease)     COVID-19 06/01/2021    DKA (diabetic ketoacidosis)     NSTEMI (non-ST elevation myocardial infarction)     NSVT (nonsustained ventricular tachycardia)     Psychiatric disorder     h/o "schizophrena/bipolar"    Schizoaffective disorder     Severe sepsis        Past Surgical History:   Procedure Laterality Date    LIVER SURGERY      abscess drainage; 1970s    TREATMENT OF CARDIAC ARRHYTHMIA  9/12/2019    Procedure: Cardioversion or Defibrillation;  Surgeon: Jonathan Lopez MD;  Location: [facility] CATH LAB;  Service: Cardiology;;       Review of patient's allergies indicates:  No Known Allergies    Family History    None       Tobacco Use    Smoking status: Current Every Day Smoker     Packs/day: 0.50     Years: 5.00     Pack years: 2.50     Types: Cigarettes    Smokeless tobacco: Never Used   Substance and Sexual Activity    Alcohol use: Not Currently     Alcohol/week: 14.0 standard drinks     Types: 14 Shots of liquor per week     Comment: 6/9/21:  "[employer]" everyday, last drank on 6/8/21     Drug use: No    Sexual activity: Not on file         Review of Systems   Unable to perform ROS: Acuity of condition     Objective:     Vital Signs (Most Recent):  Temp: 98 °F (36.7 °C) (01/27/22 1620)  Pulse: 88 (01/27/22 1830)  Resp: (!) 35 (01/27/22 1830)  BP: (!) 139/101 (01/27/22 1830)  SpO2: 97 % (01/27/22 1830) Vital Signs (24h Range):  Temp:  [98 °F (36.7 °C)-99.8 °F (37.7 °C)] 98 °F (36.7 °C)  Pulse:  [] 88  Resp:  [10-75] 35  SpO2:  [74 %-100 %] 97 %  BP: ()/() 139/101     Weight: 79.4 kg (175 lb)  Body mass index is 29.12 kg/m².      Intake/Output " Summary (Last 24 hours) at 2022 192  Last data filed at 2022 1800  Gross per 24 hour   Intake 622.17 ml   Output 500 ml   Net 122.17 ml       Physical Exam  Vitals and nursing note reviewed.   Constitutional:       Appearance: He is ill-appearing. He is not toxic-appearing.   HENT:      Head: Normocephalic and atraumatic.   Eyes:      General: No scleral icterus.     Pupils: Pupils are equal, round, and reactive to light.   Cardiovascular:      Rate and Rhythm: Normal rate. Rhythm irregular.      Pulses: Decreased pulses.   Pulmonary:      Effort: Tachypnea, accessory muscle usage and respiratory distress present.      Breath sounds: Rales (diffuse ) present. No wheezing or rhonchi.   Abdominal:      General: Bowel sounds are normal. There is no distension.      Palpations: Abdomen is soft.   Genitourinary:     Comments: Renner in place   Musculoskeletal:      Cervical back: Normal range of motion. No rigidity.      Right lower le+ Edema present.      Left lower le+ Edema present.   Skin:     General: Skin is cool.      Capillary Refill: Capillary refill takes more than 3 seconds.   Neurological:      Mental Status: He is lethargic.      Comments: Lethargic on my exam, though shortly after ativan administration. Able to nod yes and no appropriately          Vents:  Vent Mode: PRVC (22)  Ventilator Initiated: Yes (22)  Set Rate: 35 BPM (22)  Vt Set: 300 mL (22)  PEEP/CPAP: 20 cmH20 (22)  Oxygen Concentration (%): 100 (22)  Peak Airway Pressure: 35.4 cmH2O (22)  Total Ve: 10.45 mL (22)  F/VT Ratio<105 (RSBI): 118.72 (22)    Lines/Drains/Airways     Drain                 Urethral Catheter 22 0916 Double-lumen 18 Fr. <1 day          Airway                 Airway - Non-Surgical 22 163 Endotracheal Tube <1 day          Peripheral Intravenous Line                 Peripheral IV - Single Lumen  01/26/22 1000 Left;Posterior Hand 1 day         Peripheral IV - Single Lumen 01/26/22 1212 Anterior;Left;Proximal Forearm 1 day         Peripheral IV - Single Lumen 01/26/22 1300 20 G Posterior;Right Hand 1 day                Significant Labs:    CBC/Anemia Profile:  Recent Labs   Lab 01/26/22  1225 01/27/22  0615   WBC 12.07 14.41*   HGB 15.4 15.5   HCT 46.3 46.5    192   MCV 99* 98   RDW 13.0 13.3        Chemistries:  Recent Labs   Lab 01/26/22  1225 01/27/22  0615    138   K 4.6 5.1    105   CO2 24 16*   BUN 37* 50*   CREATININE 1.9* 2.8*   CALCIUM 9.4 9.3   ALBUMIN 3.8 3.7   PROT 7.0 7.0   BILITOT 0.9 0.4   ALKPHOS 83 70   ALT 32 33   AST 47* 40       All pertinent labs within the past 24 hours have been reviewed.    Significant Imaging:   I have reviewed all pertinent imaging results/findings within the past 24 hours.

## 2022-01-28 NOTE — PLAN OF CARE
Recommendations    1) Initiate Novasource @ 10 mL adv by 10 mL until goal rate of 30 mL/hr is met, Hold for residuals > 400 mL.  mL H20 q4h, additional fluids to be determined by MD   Provides 1440 kcal, 65g P, 132g C, 1716 mL total H20  Propofol providing 504 kcal     2) Monitor TF tolerance  3) Hyperkalemia, Renal Labs    Goals:   1) Pt to have means of nutrition initiated within 48 hours.  2) Pt to tolerate TF initiation and advancement to meet 74% EEN/61% EPN by next visit  3) Monitored labs to trend toward target ranges    Nutrition Goal Status: new  Communication of RD Recs:  (POC)

## 2022-01-28 NOTE — ASSESSMENT & PLAN NOTE
Suspect this is 2/2 cardiogenic pulmonary edema. He was initially admitted on room air with no respiratory distress and decompensated to the point of needing intubation the following day. BNP elevated at 439 prior to receiving several liters of fluid. CXR with worsening bilateral opacities; significant amount of pink frothy sputum noted on intubation. Known diastolic dysfunction at baseline and a history of systolic dysfunction with an EF of 35% which has since recovered. Requiring 100% FiO2 and a PEEP of 20+ to maintain adequate saturations; extremely difficult to ventilate and oxygenate. Known COPD with wheezing reported though no obstruction evident on ventilator.     -- Repeat TTE pending   -- Fluid removal with RRT     -- Elevated WBC but procal WNL. Will culture and start broad spectrum abx given need for vasopressor support  -- continue nebs + steroids   -- Maintain LPV. Goal plateau pressure <30  -- Wean FiO2 and PEEP for SpO2 >92  -- daily assessment for SAT/SBT

## 2022-01-28 NOTE — NURSING
Admit from ED to . Dr. Callaway and Vicki Thomas with Pulmonary Critical Care at bedside for intubation on admit to ICU. Premedicated with Etomidate and Rocuronium.  Intubated with  #7.5 ETT secured 22cm at the lip. Positive color change. BBS diminished but present. Stat portable CXR to confirm placement. OGT placed and connected to low wall intermittent suction with small amount brown bile returned. NSR noted on cardiac monitor. Amiodarone gtt at 0.5 mg/min to (L)hand 20g PIV. (L) forearm PIV infiltrated and removed. (R) upper arm 20g PIV placed under ultrasound. PICC team consulted for line placement. Propofol and Fentanyl gtts initiated. See Critical Care flowsheet for complete assessment, VS and I/o's.

## 2022-01-28 NOTE — EICU
Rounding (Video Assessment):  Yes  Intervention Initiated From:  [facility] / EICU    Doctor Notified:  Yes  Comments: Pt's HR on webviewer noted to be 49 & SBP in the low 80's. Notified Dr Kaufman about pt's VS & requested for her to check on pt & replied she will review.

## 2022-01-28 NOTE — ASSESSMENT & PLAN NOTE
Unclear cause. Suspect cardiogenic vs septic. No source of infection identified    -- repeat TTE  -- pan cultured, f/u results  -- continue broad spectrum abx  -- too unstable at this point for CT imaging. Consider pan scan if/when able. RUQ US for now  -- titrate vasopressors for MAP >65  -- Stress dose steroids

## 2022-01-28 NOTE — ASSESSMENT & PLAN NOTE
In SR  On amio gtt, transition to PO when able to take meds  OK to stop if LFTs continue to rise  Eventual DOAC when able to take PO meds

## 2022-01-28 NOTE — ANESTHESIA PROCEDURE NOTES
Central Line    Diagnosis: hyperkalemia  Doctor requesting consult: mir  Patient location during procedure: ICU  Procedure start time: 1/27/2022 9:30 PM  Timeout: 1/27/2022 9:30 PM  Procedure end time: 1/27/2022 9:50 PM    Staffing  Authorizing Provider: Dwain Olivia MD  Performing Provider: Dwain Olivia MD    Staffing  Performed: anesthesiologist   Anesthesiologist: Dwain Olivia MD  Anesthesiologist was present at the time of the procedure.  Preanesthetic Checklist  Completed: patient identified, IV checked, risks and benefits discussed, monitors and equipment checked, pre-op evaluation, timeout performed and anesthesia consent given  Indication   Indication: hemodialysis     Anesthesia   local infiltration    Central Line   Skin Prep: skin prepped with ChloraPrep, skin prep agent completely dried prior to procedure  Sterile Barriers Followed: Yes    All five maximal barriers used- gloves, gown, cap, mask, and large sterile sheet    hand hygiene performed prior to central venous catheter insertion  Location: right internal jugular.   Catheter type: triple lumen  Catheter Size: 12 Fr  Ultrasound: vascular probe with ultrasound  Vessel Caliber: large, patent  Vascular Doppler:  not done, compressibility normal  Needle advanced into vessel with real time Ultrasound guidance.  Guidewire confirmed in vessel.  Image recorded and saved.  sterile gel and probe cover used in ultrasound-guided central venous catheter insertion  Manometry: none  Insertion Attempts: 1   Securement:line sutured, chlorhexidine patch, sterile dressing applied and blood return through all ports    Post-Procedure   X-Ray: no pneumothorax on x-ray, tip termination and successful placement  Tip termination comments: just above right atrium   Adverse Events:none      Guidewire Guidewire removed intact, verified with nurse.  Medications:  Medication Administration Time: 1/27/2022 9:30 PM

## 2022-01-28 NOTE — ASSESSMENT & PLAN NOTE
FARHAN noted on admission and worsening. Cardiorenal vs iATN     -- Avoid ACEI/ARB/NSAIDS/Nephrotoxins.   -- Renally dose all meds  -- Renner in place, strict I&Os  -- Nephrology consulted, appreciate assistance   -- Emergent RRT started overnight for severe acidosis and hyperkalemia

## 2022-01-28 NOTE — PROGRESS NOTES
Pharmacokinetic Initial Assessment: IV Vancomycin    Assessment/Plan:    Initiate intravenous vancomycin with loading dose of 750 mg once with subsequent doses when random concentrations are less than 20 mcg/mL  Desired empiric serum trough concentration is 10 to 20 mcg/mL  Draw vancomycin random level on 11/29 at 0300.  Pharmacy will continue to follow and monitor vancomycin.      Please contact pharmacy at extension [phone] with any questions regarding this assessment.     Thank you for the consult,   Hugo YEIMY Suresh       Patient brief summary:  Marck Madison is a 61 y.o. male initiated on antimicrobial therapy with IV Vancomycin for treatment of suspected sepsis    Drug Allergies:   Review of patient's allergies indicates:  No Known Allergies    Actual Body Weight:   81.6    Renal Function:   Estimated Creatinine Clearance: 24 mL/min (A) (based on SCr of 3.1 mg/dL (H)).,     Dialysis Method (if applicable):  CRRT    CBC (last 72 hours):  Recent Labs   Lab Result Units 01/26/22  1225 01/27/22  0615 01/28/22  0344   WBC K/uL 12.07 14.41* 18.22*   Hemoglobin g/dL 15.4 15.5 16.1   Hematocrit % 46.3 46.5 49.9   Platelets K/uL 188 192 104*   Gran % % 68.4 88.8* 91.7*   Lymph % % 20.9 7.4* 4.7*   Mono % % 9.5 3.0* 2.2*   Eosinophil % % 0.2 0.0 0.0   Basophil % % 0.4 0.1 0.4   Differential Method  Automated Automated Automated       Metabolic Panel (last 72 hours):  Recent Labs   Lab Result Units 01/26/22  1225 01/26/22  1606 01/27/22  0615 01/27/22  1948 01/28/22  0017 01/28/22  0344 01/28/22  0803 01/28/22  1150   Sodium mmol/L 139  --  138 136 134* 134*  134*  135* 137 135*   Sodium, Urine mmol/L  --  114  --   --   --   --   --   --    Potassium mmol/L 4.6  --  5.1 7.4* 7.3*  7.3* 5.2*  5.2*  5.2*  5.2*  5.2* 5.3*  5.3*  5.3* 5.6*   Chloride mmol/L 104  --  105 101 99 100  100  101 101 100   CO2 mmol/L 24  --  16* 20* 19* 18*  18*  20* 18* 19*   Glucose mg/dL 130*  --  173* 196* 119* 111*  111*  112* 87  145*   Glucose, UA   --  Negative  --   --   --   --   --   --    BUN mg/dL 37*  --  50* 64* 66* 51*  51*  51* 48* 44*   Creatinine mg/dL 1.9*  --  2.8* 3.8* 4.1* 2.9*  2.9*  3.0* 2.9* 3.1*   Creatinine, Urine mg/dL  --  60.9  60.9  --   --   --   --   --   --    Albumin g/dL 3.8  --  3.7  --   --  2.9*  3.0*  --   --    Total Bilirubin mg/dL 0.9  --  0.4  --   --  0.7  --   --    Alkaline Phosphatase U/L 83  --  70  --   --  66  --   --    AST U/L 47*  --  40  --   --  217*  --   --    ALT U/L 32  --  33  --   --  150*  --   --    Magnesium mg/dL  --   --   --   --  2.3  --  1.8  --    Phosphorus mg/dL  --   --   --   --   --  7.0*  --   --        Drug levels (last 3 results):  Recent Labs   Lab Result Units 01/28/22  1031   Vancomycin, Random ug/mL 7.5       Microbiologic Results:  Microbiology Results (last 7 days)       Procedure Component Value Units Date/Time    Blood culture x two cultures. Draw prior to antibiotics. [[id number]] Collected: 01/26/22 1221    Order Status: Completed Specimen: Blood from Peripheral, Forearm, Left Updated: 01/28/22 1303     Blood Culture, Routine No Growth to date      No Growth to date      No Growth to date    Narrative:      Aerobic and anaerobic    Blood culture x two cultures. Draw prior to antibiotics. [[id number]] Collected: 01/26/22 1225    Order Status: Completed Specimen: Blood from Peripheral, Forearm, Left Updated: 01/28/22 1303     Blood Culture, Routine No Growth to date      No Growth to date      No Growth to date    Narrative:      Aerobic and anaerobic    Culture, Respiratory with Gram Stain [[id number]]     Order Status: No result Specimen: Respiratory from Endotracheal Aspirate

## 2022-01-28 NOTE — PROCEDURES
"Marck Madison is a 61 y.o. male patient.    Temp: 98 °F (36.7 °C) (01/27/22 1620)  Pulse: 88 (01/27/22 1830)  Resp: (!) 35 (01/27/22 1830)  BP: (!) 139/101 (01/27/22 1830)  SpO2: 97 % (01/27/22 1830)  Weight: 79.4 kg (175 lb) (01/26/22 1009)  Height: 5' 5" (165.1 cm) (01/26/22 1009)    PICC  Date/Time: 1/27/2022 7:15 PM  Performed by: Bean Lynch RN  Consent Done: Yes  Time out: Immediately prior to procedure a time out was called to verify the correct patient, procedure, equipment, support staff and site/side marked as required  Indications: med administration and vascular access  Anesthesia: local infiltration  Local anesthetic: lidocaine 1% without epinephrine  Anesthetic Total (mL): 2  Preparation: skin prepped with chlorhexidine (without alcohol)  Skin prep agent dried: skin prep agent completely dried prior to procedure  Sterile barriers: all five maximum sterile barriers used - cap, mask, sterile gown, sterile gloves, and large sterile sheet  Hand hygiene: hand hygiene performed prior to central venous catheter insertion  Location details: right brachial  Catheter type: triple lumen  Catheter size: 5 Fr  Catheter Length: 35cm    Ultrasound guidance: yes  Vessel Caliber: large and patent, compressibility normal  Vascular Doppler: not done  Needle advanced into vessel with real time Ultrasound guidance.  Guidewire confirmed in vessel.  Sterile sheath used.  no esophageal manometryNumber of attempts: 1  Post-procedure: blood return through all ports, chlorhexidine patch and sterile dressing applied            Name Bean Lynch RN  1/27/2022  "

## 2022-01-28 NOTE — EICU
Notified of hypotension and bradycardia    K 7.4 (slightly hemolyzed)  Nephrology made aware and was given calcium, bicarb, D50/Insulin  CRRT to be iniitated      Called again for hypotension and bradycardia  Maximum on vineet and levo  CRRT placed on hold    · Ordered calcium gluconate, bicarb push, D50/insulin, albuterol  · CRRT initiated and is so far tolerating  · Epinephrine drip ordered for back up but at this time able to come down on pressors

## 2022-01-28 NOTE — SUBJECTIVE & OBJECTIVE
Review of Systems   Unable to perform ROS: intubated     Objective:     Vital Signs (Most Recent):  Temp: (!) 95.72 °F (35.4 °C) (01/28/22 0930)  Pulse: (!) 58 (01/28/22 1045)  Resp: (!) 35 (01/28/22 1045)  BP: 108/75 (01/28/22 1000)  SpO2: 100 % (01/28/22 1045) Vital Signs (24h Range):  Temp:  [91.04 °F (32.8 °C)-99.8 °F (37.7 °C)] 95.72 °F (35.4 °C)  Pulse:  [] 58  Resp:  [10-75] 35  SpO2:  [74 %-100 %] 100 %  BP: ()/() 108/75  Arterial Line BP: ()/() 111/65     Weight: 81.6 kg (179 lb 14.3 oz)  Body mass index is 29.94 kg/m².     SpO2: 100 %  O2 Device (Oxygen Therapy): ventilator      Intake/Output Summary (Last 24 hours) at 1/28/2022 1054  Last data filed at 1/28/2022 1000  Gross per 24 hour   Intake 1954.46 ml   Output 1965 ml   Net -10.54 ml       Lines/Drains/Airways     Peripherally Inserted Central Catheter Line            PICC Triple Lumen 01/27/22 1915 right brachial <1 day          Central Venous Catheter Line            Percutaneous Central Line Insertion/Assessment - Triple Lumen  01/27/22 2130 right internal jugular <1 day          Drain                 NG/OG Tube 01/27/22 Left mouth 1 day         Urethral Catheter 01/27/22 0916 Double-lumen 18 Fr. 1 day          Airway                 Airway - Non-Surgical 01/27/22 1632 Endotracheal Tube <1 day          Arterial Line            Arterial Line 01/28/22 0015 Left Radial <1 day          Peripheral Intravenous Line                 Peripheral IV - Single Lumen 01/26/22 1000 Left;Posterior Hand 2 days         Peripheral IV - Single Lumen 01/27/22 20 G Anterior;Right Upper Arm 1 day                Physical Exam  Constitutional:       General: He is not in acute distress.     Appearance: He is not ill-appearing, toxic-appearing or diaphoretic.   HENT:      Head: Normocephalic and atraumatic.   Eyes:      General:         Right eye: No discharge.         Left eye: No discharge.   Cardiovascular:      Rate and Rhythm: Normal  rate and regular rhythm.      Heart sounds: S1 normal and S2 normal. Heart sounds are distant. No murmur heard.      Pulmonary:      Effort: No respiratory distress.      Breath sounds: No stridor.      Comments: intubated  Abdominal:      General: There is no distension.      Palpations: Abdomen is soft.   Musculoskeletal:         General: No swelling or tenderness.      Cervical back: No rigidity.      Right lower leg: No edema.      Left lower leg: No edema.   Skin:     General: Skin is warm and dry.      Coloration: Skin is not jaundiced.   Neurological:      Comments: intubated   Psychiatric:      Comments: intubated         Current Medications:   aspirin  81 mg Oral Daily    atorvastatin  40 mg Oral QHS    azithromycin  500 mg Intravenous Q24H    ceFEPime (MAXIPIME) IVPB  2 g Intravenous Q12H    chlorhexidine  15 mL Mouth/Throat BID    dextrose 10%  25 g Intravenous Once    enoxaparin  1 mg/kg Subcutaneous Daily    EPINEPHrine        EPINEPHrine        famotidine (PF)  20 mg Intravenous Daily    furosemide (LASIX) injection  120 mg Intravenous Q6H    hydrocortisone sodium succinate  100 mg Intravenous Q8H    insulin detemir U-100  5 Units Subcutaneous Daily    nicotine  1 patch Transdermal Daily    senna-docusate 8.6-50 mg  1 tablet Oral BID    sodium chloride 0.9%  10 mL Intravenous Q8H    sodium chloride 0.9%  10 mL Intravenous Q6H      sodium chloride 0.9%      amiodarone in dextrose 5% Stopped (01/27/22 2243)    cisatracurium (NIMBEX) infusion 4 mcg/kg/min (01/28/22 1000)    EPINEPHrine      fentanyl 25 mcg/hr (01/28/22 1000)    NORepinephrine bitartrate-D5W 0.12 mcg/kg/min (01/28/22 1000)    propofoL 40 mcg/kg/min (01/28/22 1000)    vasopressin Stopped (01/28/22 1115)     albuterol-ipratropium, [COMPLETED] calcium gluconate IVPB **AND** calcium gluconate IVPB, dextrose 10%, dextrose 10% **AND** dextrose 10% **AND** [COMPLETED] insulin regular, dextrose 10%, dextrose 50%, dextrose  50%, glucagon (human recombinant), glucose, glucose, heparin (porcine), heparin (porcine), heparin (porcine), insulin aspart U-100, lorazepam, magnesium sulfate IVPB, melatonin, naloxone, NORepinephrine bitartrate-D5W, phenylephrine HCl in 0.9% NaCl, Flushing PICC Protocol **AND** sodium chloride 0.9% **AND** sodium chloride 0.9%, sodium phosphate IVPB, sodium phosphate IVPB, sodium phosphate IVPB, Pharmacy to dose Vancomycin consult **AND** vancomycin - pharmacy to dose    Laboratory (all labs reviewed):  CBC:  Recent Labs   Lab 11/21/21  0024 11/21/21  0418 01/26/22  1225 01/27/22  0615 01/28/22  0344   WBC 12.37 9.57 12.07 14.41 H 18.22 H   Hemoglobin 14.4 14.5 15.4 15.5 16.1   Hematocrit 41.6 43.0 46.3 46.5 49.9   Platelets 219 201 188 192 104 L       CHEMISTRIES:  Recent Labs   Lab 07/05/21  0400 07/05/21  0400 07/28/21  2105 11/21/21  0024 11/21/21  0418 01/26/22  1225 01/27/22  0615 01/27/22  1948 01/28/22  0017 01/28/22  0344 01/28/22  0803   Glucose 332 H   < > 86   < > 244 H   < > 173 H 196 H 119 H 111 H  111 H  112 H 87   Sodium 142   < > 145   < > 139   < > 138 136 134 L 134 L  134 L  135 L 137   Potassium 3.9   < > 3.6   < > 3.7   < > 5.1 7.4 HH 7.3 HH  7.3 HH 5.2 H  5.2 H  5.2 H  5.2 H  5.2 H 5.3 H  5.3 H  5.3 H   BUN 17   < > 22   < > 26 H   < > 50 H 64 H 66 H 51 H  51 H  51 H 48 H   Creatinine 1.2   < > 1.3   < > 1.5 H   < > 2.8 H 3.8 H 4.1 H 2.9 H  2.9 H  3.0 H 2.9 H   eGFR if  >60   < > >60   < > 56 A   < > 27 A 18 A 17 A 25 A  25 A  24 A 25 A   eGFR if non  >60   < > 58 A   < > 49 A   < > 23 A 16 A 14 A 22 A  22 A  21 A 22 A   Calcium 8.9   < > 9.6   < > 9.4   < > 9.3 8.4 L 8.7 8.2 L  8.2 L  8.2 L 8.4 L   Magnesium 1.5 L  --  1.1 L  --  2.1  --   --   --  2.3  --  1.8    < > = values in this interval not displayed.       CARDIAC BIOMARKERS:  Recent Labs   Lab 09/11/19  1611 01/20/20  1731 01/20/20  2322 01/21/20  0400 06/09/21  1220  06/09/21  1748 07/01/21  0208 07/28/21  2105 11/21/21  0024 11/21/21  0418 01/26/22  1225    H  --  873 H  --  733 H  --   --   --   --   --   --    Troponin I  --    < > 0.308 H   < > 0.235 H   < > 0.347 H 0.068 H 0.161 H 0.149 H 0.172 H    < > = values in this interval not displayed.       COAGS:  Recent Labs   Lab 01/22/20  0345 01/23/20  0529 06/12/21  1201 06/13/21  0159 06/30/21  2207   INR 1.4 H 1.3 H 2.5 H 1.7 H 1.2       LIPIDS/LFTS:  Recent Labs   Lab 11/21/21  0024 11/21/21  0418 01/26/22  1225 01/27/22  0615 01/28/22  0344   AST 39 39 47 H 40 217 H   ALT 26 25 32 33 150 H       BNP:  Recent Labs   Lab 06/12/21  0538 07/01/21  0608 07/28/21  2105 11/21/21  0024 01/26/22  1225    H 481 H 94 390 H 434 H       TSH:  Recent Labs   Lab 01/20/20  1731 06/10/21  0433 06/12/21  0326 07/28/21  2105 01/26/22  1225   TSH 2.296 0.176 L 2.253 0.831 1.086       Free T4:  Recent Labs   Lab 06/10/21  0433   Free T4 1.02       Diagnostic Results:  ECG (personally reviewed and interpreted tracing(s)):  1/27/22 1838 SR 93, ?inf isch    Echo: 1/26/22  · The left ventricle is normal in size with concentric hypertrophy and normal systolic function.  · The estimated ejection fraction is 60%.  · Indeterminate left ventricular diastolic function.  · Normal right ventricular size with normal right ventricular systolic function.  · Moderate left atrial enlargement.  · Moderate right atrial enlargement.  · Moderate mitral regurgitation.  · Moderate tricuspid regurgitation.  · Intermediate central venous pressure (8 mmHg).  · The estimated PA systolic pressure is 34 mmHg.    Stress Test: L MPI 1/29/20    The perfusion scan is free of evidence from myocardial ischemia or injury.    There is a  mild intensity fixed defect in the inferior wall of the left ventricle secondary to diaphragm attenuation.    Visually estimated ejection fraction is mildly depressed at stress.    The EKG portion of this study is negative for  ischemia.    The patient reported no chest pain during the stress test.    Arrhythmias during stress: occasional PACs, occasional PVCs.    The blood pressure response to stress was normal.

## 2022-01-28 NOTE — CARE UPDATE
[facility]  ICU Shift Summary  Date: 1/28/2022      Prehospitalization: Home  Admit Date / LOS : 1/26/2022/ 2 days    Diagnosis: Acute hypoxemic respiratory failure    Consults:        Active: Cardio, Nephro, Palliative and Pulm CC       Needed: N/A     Code Status: DNR   Advanced Directive: <no information>    LDA: Art Line, Blaze Hugger, Renner, PICC, PIV, Trialysis and Vent       Central Lines/Site/Justification:Pressors       Urinary Cath/Order/Justification:Critically Ill in the ICU and requiring intensive monitoring    Vasopressors/Infusions:    sodium chloride 0.9%      amiodarone in dextrose 5% Stopped (01/27/22 2243)    EPINEPHrine 0.18 mcg/kg/min (01/28/22 1700)    fentanyl 25 mcg/hr (01/28/22 1700)    NORepinephrine bitartrate-D5W 0.16 mcg/kg/min (01/28/22 1700)    propofoL 40 mcg/kg/min (01/28/22 1700)    vasopressin Stopped (01/28/22 1115)          GOALS: Volume/ Hemodynamic: MAP >                     RASS: -5 unarousable, no response to voice or physical stimulation    Pain Management: IV       Pain Controlled: yes     Rhythm: SB    Respiratory Device: Vent    Vent Mode: PRVC  Oxygen Concentration (%):  [] 40  Vt Set:  [300 mL-500 mL] 500 mL  PEEP/CPAP:  [12 sfM83-50 cmH20] 12 cmH20  Mean Airway Pressure:  [18.1 sbQ27-12.3 cmH20] 19.3 cmH20             Most Recent SBT/ SAT: Did not perform       MOVE Screen: FAIL  ICU Liberation: no    VTE Prophylaxis: Pharm  Mobility: Bedrest  Stress Ulcer Prophylaxis: Yes    Isolation: No active isolations  Dietary: NPO  Tolerance: not applicable  Advancement: no    I & O (24h):    Intake/Output Summary (Last 24 hours) at 1/28/2022 1709  Last data filed at 1/28/2022 1700  Gross per 24 hour   Intake 2344.27 ml   Output 1897 ml   Net 447.27 ml        Restraints: Yes    Significant Dates:  Post Op Date: N/A  Rescue Date: N/A  Imaging/ Diagnostics: ECHO, Abd US    Noteworthy Labs:  Q8 Renal panels see below, ABG    COVID Test: (--)  CBC/Anemia  Labs: Coags:    Recent Labs   Lab 01/27/22  0615 01/28/22  0344   WBC 14.41* 18.22*   HGB 15.5 16.1   HCT 46.5 49.9    104*   MCV 98 104*   RDW 13.3 13.7    No results for input(s): PT, INR, APTT in the last 168 hours.     Chemistries:   Recent Labs   Lab 01/27/22  0615 01/27/22  1948 01/28/22  0344 01/28/22  0344 01/28/22  0803 01/28/22  0803 01/28/22  1150 01/28/22  1531      < > 134*  134*  135*   < > 137   < > 135* 134*   K 5.1   < > 5.2*  5.2*  5.2*  5.2*  5.2*   < > 5.3*  5.3*  5.3*   < > 5.6* 5.4*      < > 100  100  101   < > 101   < > 100 98   CO2 16*   < > 18*  18*  20*   < > 18*   < > 19* 14*   BUN 50*   < > 51*  51*  51*   < > 48*   < > 44* 43*   CREATININE 2.8*   < > 2.9*  2.9*  3.0*   < > 2.9*   < > 3.1* 3.2*   CALCIUM 9.3   < > 8.2*  8.2*  8.2*   < > 8.4*   < > 8.3* 8.2*   PROT 7.0  --  7.2  --   --   --   --   --    BILITOT 0.4  --  0.7  --   --   --   --   --    ALKPHOS 70  --  66  --   --   --   --   --    ALT 33  --  150*  --   --   --   --   --    AST 40  --  217*  --   --   --   --   --    MG  --    < >  --    < > 1.8  --   --  1.7   PHOS  --   --  7.0*  --   --   --   --  5.8*    < > = values in this interval not displayed.        Cardiac Enzymes: Ejection Fractions:    Recent Labs     01/26/22  1225 01/28/22  1150   TROPONINI 0.172* 0.161*    EF   Date Value Ref Range Status   01/28/2022 55 % Final        POCT Glucose: HbA1c:    Recent Labs   Lab 01/28/22  0214 01/28/22  0348 01/28/22  0809   POCTGLUCOSE 118* 196* 84    Hemoglobin A1C   Date Value Ref Range Status   06/30/2021 10.5 (H) 4.0 - 5.6 % Final     Comment:     ADA Screening Guidelines:  5.7-6.4%  Consistent with prediabetes  >or=6.5%  Consistent with diabetes    High levels of fetal hemoglobin interfere with the HbA1C  assay. Heterozygous hemoglobin variants (HbS, HgC, etc)do  not significantly interfere with this assay.   However, presence of multiple variants may affect accuracy.             ICU  LOS 1d  Level of Care: Critical Care    Chart Check: 12 HR Done  Shift Summary/Plan for the shift: See care plan note

## 2022-01-28 NOTE — PROGRESS NOTES
Marck Madison is a 61 y.o. male patient.    Follow for FARHAN    Overnight event noted  Patient seen on CRRT  Sedated, intubated on vent support    Scheduled Meds:   aspirin  81 mg Oral Daily    atorvastatin  40 mg Oral QHS    azithromycin  500 mg Intravenous Q24H    cefTRIAXone (ROCEPHIN) IVPB  1 g Intravenous Q24H    chlorhexidine  15 mL Mouth/Throat BID    dextrose 10%  25 g Intravenous Once    enoxaparin  1 mg/kg Subcutaneous Daily    EPINEPHrine        EPINEPHrine        famotidine (PF)  20 mg Intravenous Daily    furosemide (LASIX) injection  120 mg Intravenous Q6H    insulin detemir U-100  5 Units Subcutaneous Daily    methylPREDNISolone sodium succinate injection  40 mg Intravenous Q6H    nicotine  1 patch Transdermal Daily    senna-docusate 8.6-50 mg  1 tablet Oral BID    sodium chloride 0.9%  10 mL Intravenous Q8H    sodium chloride 0.9%  10 mL Intravenous Q6H       Review of patient's allergies indicates:  No Known Allergies      Vital Signs Range (Last 24H):  Temp:  [91.04 °F (32.8 °C)-99.8 °F (37.7 °C)]   Pulse:  []   Resp:  [10-75]   BP: ()/()   SpO2:  [74 %-100 %]   Arterial Line BP: ()/()     I & O (Last 24H):    Intake/Output Summary (Last 24 hours) at 1/28/2022 0933  Last data filed at 1/28/2022 0900  Gross per 24 hour   Intake 1876.15 ml   Output 1311 ml   Net 565.15 ml           Physical Exam:  General appearance: well developed, no distress  Lungs:  rhonchi bilaterally  Heart: regular rhythm and rate  Abdomen: distended, hypoactive bowel sounds  Extremities: no cyanosis or edema, or clubbing    Laboratory:  I have reviewed all pertinent lab results within the past 24 hours.  CBC:   Recent Labs   Lab 01/28/22  0344   WBC 18.22*   RBC 4.79   HGB 16.1   HCT 49.9   *   *   MCH 33.6*   MCHC 32.3     CMP:   Recent Labs   Lab 01/28/22  0344 01/28/22  0344 01/28/22  0803   *  111*  112*   < > 87   CALCIUM 8.2*  8.2*  8.2*   < >  8.4*   ALBUMIN 2.9*  3.0*  --   --    PROT 7.2  --   --    *  134*  135*   < > 137   K 5.2*  5.2*  5.2*  5.2*  5.2*   < > 5.3*  5.3*  5.3*   CO2 18*  18*  20*   < > 18*     100  101   < > 101   BUN 51*  51*  51*   < > 48*   CREATININE 2.9*  2.9*  3.0*   < > 2.9*   ALKPHOS 66  --   --    *  --   --    *  --   --    BILITOT 0.7  --   --     < > = values in this interval not displayed.     ABGs:   Recent Labs   Lab 01/27/22  1950   PH 7.127*   PCO2 76.2*   PO2 50   HCO3 25.2   POCSATURATED 71*   BE -6       Imp/Plan    FARHAN - on CRRT, tolerated well  Hyperkalemia  A fib with RVR - NSR on monitor at this time  HTN  Acute respiratory failure  DM type 2  COPD    Continue CRRT as ordered  We'll follow    Marivel Recinos  1/28/2022

## 2022-01-29 LAB
ALBUMIN SERPL BCP-MCNC: 2.7 G/DL (ref 3.5–5.2)
ALBUMIN SERPL BCP-MCNC: 2.8 G/DL (ref 3.5–5.2)
ALBUMIN SERPL BCP-MCNC: 2.8 G/DL (ref 3.5–5.2)
ALBUMIN SERPL BCP-MCNC: 2.9 G/DL (ref 3.5–5.2)
ALLENS TEST: ABNORMAL
ALP SERPL-CCNC: 63 U/L (ref 55–135)
ALT SERPL W/O P-5'-P-CCNC: 1621 U/L (ref 10–44)
ANION GAP SERPL CALC-SCNC: 12 MMOL/L (ref 8–16)
ANION GAP SERPL CALC-SCNC: 13 MMOL/L (ref 8–16)
ANION GAP SERPL CALC-SCNC: 16 MMOL/L (ref 8–16)
ANION GAP SERPL CALC-SCNC: 16 MMOL/L (ref 8–16)
AST SERPL-CCNC: 3307 U/L (ref 10–40)
BASOPHILS # BLD AUTO: 0.03 K/UL (ref 0–0.2)
BASOPHILS NFR BLD: 0.2 % (ref 0–1.9)
BILIRUB SERPL-MCNC: 0.9 MG/DL (ref 0.1–1)
BUN SERPL-MCNC: 40 MG/DL (ref 8–23)
BUN SERPL-MCNC: 41 MG/DL (ref 8–23)
BUN SERPL-MCNC: 46 MG/DL (ref 8–23)
BUN SERPL-MCNC: 46 MG/DL (ref 8–23)
CALCIUM SERPL-MCNC: 8 MG/DL (ref 8.7–10.5)
CALCIUM SERPL-MCNC: 8 MG/DL (ref 8.7–10.5)
CALCIUM SERPL-MCNC: 8.1 MG/DL (ref 8.7–10.5)
CALCIUM SERPL-MCNC: 8.3 MG/DL (ref 8.7–10.5)
CHLORIDE SERPL-SCNC: 100 MMOL/L (ref 95–110)
CHLORIDE SERPL-SCNC: 101 MMOL/L (ref 95–110)
CHLORIDE SERPL-SCNC: 101 MMOL/L (ref 95–110)
CHLORIDE SERPL-SCNC: 102 MMOL/L (ref 95–110)
CO2 SERPL-SCNC: 16 MMOL/L (ref 23–29)
CO2 SERPL-SCNC: 16 MMOL/L (ref 23–29)
CO2 SERPL-SCNC: 19 MMOL/L (ref 23–29)
CO2 SERPL-SCNC: 19 MMOL/L (ref 23–29)
CREAT SERPL-MCNC: 3 MG/DL (ref 0.5–1.4)
CREAT SERPL-MCNC: 3.1 MG/DL (ref 0.5–1.4)
CREAT SERPL-MCNC: 3.7 MG/DL (ref 0.5–1.4)
CREAT SERPL-MCNC: 3.7 MG/DL (ref 0.5–1.4)
DELSYS: ABNORMAL
DIFFERENTIAL METHOD: ABNORMAL
EOSINOPHIL # BLD AUTO: 0 K/UL (ref 0–0.5)
EOSINOPHIL NFR BLD: 0 % (ref 0–8)
ERYTHROCYTE [DISTWIDTH] IN BLOOD BY AUTOMATED COUNT: 13 % (ref 11.5–14.5)
ERYTHROCYTE [SEDIMENTATION RATE] IN BLOOD BY WESTERGREN METHOD: 35 MM/H
EST. GFR  (AFRICAN AMERICAN): 19 ML/MIN/1.73 M^2
EST. GFR  (AFRICAN AMERICAN): 19 ML/MIN/1.73 M^2
EST. GFR  (AFRICAN AMERICAN): 23 ML/MIN/1.73 M^2
EST. GFR  (AFRICAN AMERICAN): 24 ML/MIN/1.73 M^2
EST. GFR  (NON AFRICAN AMERICAN): 16 ML/MIN/1.73 M^2
EST. GFR  (NON AFRICAN AMERICAN): 16 ML/MIN/1.73 M^2
EST. GFR  (NON AFRICAN AMERICAN): 20 ML/MIN/1.73 M^2
EST. GFR  (NON AFRICAN AMERICAN): 21 ML/MIN/1.73 M^2
FIO2: 40
GLUCOSE SERPL-MCNC: 113 MG/DL (ref 70–110)
GLUCOSE SERPL-MCNC: 119 MG/DL (ref 70–110)
GLUCOSE SERPL-MCNC: 119 MG/DL (ref 70–110)
GLUCOSE SERPL-MCNC: 177 MG/DL (ref 70–110)
HCO3 UR-SCNC: 21.8 MMOL/L (ref 24–28)
HCT VFR BLD AUTO: 42.1 % (ref 40–54)
HGB BLD-MCNC: 14.6 G/DL (ref 14–18)
IMM GRANULOCYTES # BLD AUTO: 0.15 K/UL (ref 0–0.04)
IMM GRANULOCYTES NFR BLD AUTO: 0.8 % (ref 0–0.5)
LACTATE SERPL-SCNC: 2.3 MMOL/L (ref 0.5–2.2)
LYMPHOCYTES # BLD AUTO: 0.7 K/UL (ref 1–4.8)
LYMPHOCYTES NFR BLD: 4 % (ref 18–48)
MAGNESIUM SERPL-MCNC: 1.7 MG/DL (ref 1.6–2.6)
MAGNESIUM SERPL-MCNC: 1.8 MG/DL (ref 1.6–2.6)
MAGNESIUM SERPL-MCNC: 1.9 MG/DL (ref 1.6–2.6)
MCH RBC QN AUTO: 33.6 PG (ref 27–31)
MCHC RBC AUTO-ENTMCNC: 34.7 G/DL (ref 32–36)
MCV RBC AUTO: 97 FL (ref 82–98)
MIN VOL: 16.7
MODE: ABNORMAL
MONOCYTES # BLD AUTO: 0.6 K/UL (ref 0.3–1)
MONOCYTES NFR BLD: 3.1 % (ref 4–15)
NEUTROPHILS # BLD AUTO: 17 K/UL (ref 1.8–7.7)
NEUTROPHILS NFR BLD: 91.9 % (ref 38–73)
NRBC BLD-RTO: 8 /100 WBC
PCO2 BLDA: 36.6 MMHG (ref 35–45)
PEEP: 12
PH SMN: 7.38 [PH] (ref 7.35–7.45)
PHOSPHATE SERPL-MCNC: 4.3 MG/DL (ref 2.7–4.5)
PHOSPHATE SERPL-MCNC: 4.7 MG/DL (ref 2.7–4.5)
PIP: 32
PLATELET # BLD AUTO: 84 K/UL (ref 150–450)
PMV BLD AUTO: 11.1 FL (ref 9.2–12.9)
PO2 BLDA: 180 MMHG (ref 80–100)
POC BE: -3 MMOL/L
POC SATURATED O2: 100 % (ref 95–100)
POC TCO2: 23 MMOL/L (ref 23–27)
POCT GLUCOSE: 130 MG/DL (ref 70–110)
POTASSIUM SERPL-SCNC: 3.8 MMOL/L (ref 3.5–5.1)
POTASSIUM SERPL-SCNC: 4 MMOL/L (ref 3.5–5.1)
POTASSIUM SERPL-SCNC: 5.1 MMOL/L (ref 3.5–5.1)
POTASSIUM SERPL-SCNC: 5.1 MMOL/L (ref 3.5–5.1)
PROT SERPL-MCNC: 6.2 G/DL (ref 6–8.4)
RBC # BLD AUTO: 4.35 M/UL (ref 4.6–6.2)
SAMPLE: ABNORMAL
SITE: ABNORMAL
SODIUM SERPL-SCNC: 132 MMOL/L (ref 136–145)
SODIUM SERPL-SCNC: 133 MMOL/L (ref 136–145)
SP02: 98
VANCOMYCIN SERPL-MCNC: 18.2 UG/ML
VT: 500
WBC # BLD AUTO: 18.53 K/UL (ref 3.9–12.7)

## 2022-01-29 PROCEDURE — 80202 ASSAY OF VANCOMYCIN: CPT | Performed by: INTERNAL MEDICINE

## 2022-01-29 PROCEDURE — 63600175 PHARM REV CODE 636 W HCPCS: Performed by: INTERNAL MEDICINE

## 2022-01-29 PROCEDURE — 99291 PR CRITICAL CARE, E/M 30-74 MINUTES: ICD-10-PCS | Mod: ,,, | Performed by: INTERNAL MEDICINE

## 2022-01-29 PROCEDURE — 83605 ASSAY OF LACTIC ACID: CPT | Performed by: INTERNAL MEDICINE

## 2022-01-29 PROCEDURE — 83735 ASSAY OF MAGNESIUM: CPT | Mod: 91 | Performed by: INTERNAL MEDICINE

## 2022-01-29 PROCEDURE — 94003 VENT MGMT INPAT SUBQ DAY: CPT

## 2022-01-29 PROCEDURE — 20000000 HC ICU ROOM

## 2022-01-29 PROCEDURE — 99900026 HC AIRWAY MAINTENANCE (STAT)

## 2022-01-29 PROCEDURE — 80100008 HC CRRT DAILY MAINTENANCE

## 2022-01-29 PROCEDURE — 25000003 PHARM REV CODE 250: Performed by: INTERNAL MEDICINE

## 2022-01-29 PROCEDURE — 80053 COMPREHEN METABOLIC PANEL: CPT | Performed by: STUDENT IN AN ORGANIZED HEALTH CARE EDUCATION/TRAINING PROGRAM

## 2022-01-29 PROCEDURE — 99900035 HC TECH TIME PER 15 MIN (STAT)

## 2022-01-29 PROCEDURE — A4216 STERILE WATER/SALINE, 10 ML: HCPCS | Performed by: INTERNAL MEDICINE

## 2022-01-29 PROCEDURE — 94761 N-INVAS EAR/PLS OXIMETRY MLT: CPT

## 2022-01-29 PROCEDURE — 85025 COMPLETE CBC W/AUTO DIFF WBC: CPT | Performed by: STUDENT IN AN ORGANIZED HEALTH CARE EDUCATION/TRAINING PROGRAM

## 2022-01-29 PROCEDURE — 25000003 PHARM REV CODE 250: Performed by: NURSE PRACTITIONER

## 2022-01-29 PROCEDURE — C9399 UNCLASSIFIED DRUGS OR BIOLOG: HCPCS | Performed by: INTERNAL MEDICINE

## 2022-01-29 PROCEDURE — 27000221 HC OXYGEN, UP TO 24 HOURS

## 2022-01-29 PROCEDURE — 25000003 PHARM REV CODE 250: Performed by: STUDENT IN AN ORGANIZED HEALTH CARE EDUCATION/TRAINING PROGRAM

## 2022-01-29 PROCEDURE — 37799 UNLISTED PX VASCULAR SURGERY: CPT

## 2022-01-29 PROCEDURE — 99291 CRITICAL CARE FIRST HOUR: CPT | Mod: ,,, | Performed by: INTERNAL MEDICINE

## 2022-01-29 PROCEDURE — 80069 RENAL FUNCTION PANEL: CPT | Performed by: NURSE PRACTITIONER

## 2022-01-29 PROCEDURE — 82803 BLOOD GASES ANY COMBINATION: CPT

## 2022-01-29 PROCEDURE — S4991 NICOTINE PATCH NONLEGEND: HCPCS | Performed by: INTERNAL MEDICINE

## 2022-01-29 PROCEDURE — 63600175 PHARM REV CODE 636 W HCPCS: Performed by: NURSE PRACTITIONER

## 2022-01-29 RX ORDER — MUPIROCIN 20 MG/G
OINTMENT TOPICAL 2 TIMES DAILY
Status: COMPLETED | OUTPATIENT
Start: 2022-01-29 | End: 2022-02-02

## 2022-01-29 RX ORDER — FUROSEMIDE 10 MG/ML
120 INJECTION INTRAMUSCULAR; INTRAVENOUS
Status: DISCONTINUED | OUTPATIENT
Start: 2022-01-29 | End: 2022-01-29

## 2022-01-29 RX ORDER — DEXMEDETOMIDINE HYDROCHLORIDE 4 UG/ML
0-1.4 INJECTION, SOLUTION INTRAVENOUS CONTINUOUS
Status: DISCONTINUED | OUTPATIENT
Start: 2022-01-29 | End: 2022-01-30

## 2022-01-29 RX ADMIN — DEXMEDETOMIDINE HYDROCHLORIDE IN 0.9% SODIUM CHLORIDE 1 MCG/KG/HR: 4 INJECTION INTRAVENOUS at 04:01

## 2022-01-29 RX ADMIN — PROPOFOL 10 MCG/KG/MIN: 10 INJECTION, EMULSION INTRAVENOUS at 06:01

## 2022-01-29 RX ADMIN — SENNOSIDES AND DOCUSATE SODIUM 1 TABLET: 50; 8.6 TABLET ORAL at 08:01

## 2022-01-29 RX ADMIN — CHLORHEXIDINE GLUCONATE 0.12% ORAL RINSE 15 ML: 1.2 LIQUID ORAL at 08:01

## 2022-01-29 RX ADMIN — CEFEPIME HYDROCHLORIDE 2 G: 2 INJECTION, SOLUTION INTRAVENOUS at 10:01

## 2022-01-29 RX ADMIN — MUPIROCIN: 20 OINTMENT TOPICAL at 10:01

## 2022-01-29 RX ADMIN — FUROSEMIDE 120 MG: 10 INJECTION, SOLUTION INTRAVENOUS at 05:01

## 2022-01-29 RX ADMIN — FAMOTIDINE 20 MG: 10 INJECTION INTRAVENOUS at 08:01

## 2022-01-29 RX ADMIN — ENOXAPARIN SODIUM 80 MG: 80 INJECTION SUBCUTANEOUS at 04:01

## 2022-01-29 RX ADMIN — Medication 10 ML: at 12:01

## 2022-01-29 RX ADMIN — Medication 10 ML: at 05:01

## 2022-01-29 RX ADMIN — DEXMEDETOMIDINE HYDROCHLORIDE IN 0.9% SODIUM CHLORIDE 0.4 MCG/KG/HR: 4 INJECTION INTRAVENOUS at 07:01

## 2022-01-29 RX ADMIN — SODIUM CHLORIDE, PRESERVATIVE FREE 10 ML: 5 INJECTION INTRAVENOUS at 10:01

## 2022-01-29 RX ADMIN — INSULIN DETEMIR 5 UNITS: 100 INJECTION, SOLUTION SUBCUTANEOUS at 08:01

## 2022-01-29 RX ADMIN — Medication 1 PATCH: at 08:01

## 2022-01-29 RX ADMIN — HYDROCORTISONE SODIUM SUCCINATE 50 MG: 100 INJECTION, POWDER, FOR SOLUTION INTRAMUSCULAR; INTRAVENOUS at 09:01

## 2022-01-29 RX ADMIN — SODIUM CHLORIDE, PRESERVATIVE FREE 10 ML: 5 INJECTION INTRAVENOUS at 06:01

## 2022-01-29 RX ADMIN — HYDROCORTISONE SODIUM SUCCINATE 100 MG: 100 INJECTION, POWDER, FOR SOLUTION INTRAMUSCULAR; INTRAVENOUS at 05:01

## 2022-01-29 RX ADMIN — MUPIROCIN: 20 OINTMENT TOPICAL at 08:01

## 2022-01-29 RX ADMIN — HYDROCORTISONE SODIUM SUCCINATE 50 MG: 100 INJECTION, POWDER, FOR SOLUTION INTRAMUSCULAR; INTRAVENOUS at 01:01

## 2022-01-29 RX ADMIN — ASPIRIN 81 MG CHEWABLE TABLET 81 MG: 81 TABLET CHEWABLE at 08:01

## 2022-01-29 RX ADMIN — DEXMEDETOMIDINE HYDROCHLORIDE IN 0.9% SODIUM CHLORIDE 0.8 MCG/KG/HR: 4 INJECTION INTRAVENOUS at 11:01

## 2022-01-29 RX ADMIN — Medication 10 ML: at 11:01

## 2022-01-29 RX ADMIN — DEXMEDETOMIDINE HYDROCHLORIDE IN 0.9% SODIUM CHLORIDE 0.6 MCG/KG/HR: 4 INJECTION INTRAVENOUS at 05:01

## 2022-01-29 RX ADMIN — Medication 10 ML: at 06:01

## 2022-01-29 RX ADMIN — AZITHROMYCIN MONOHYDRATE 500 MG: 500 INJECTION, POWDER, LYOPHILIZED, FOR SOLUTION INTRAVENOUS at 08:01

## 2022-01-29 NOTE — ASSESSMENT & PLAN NOTE
Patient with Hypercapnic and Hypoxic Respiratory failure which is Acute.  he is not on home oxygen. Supplemental oxygen was provided and noted- Vent Mode: PRVC  Oxygen Concentration (%):  [] 40  Vt Set:  [300 mL-500 mL] 500 mL  PEEP/CPAP:  [12 ikT73-55 cmH20] 12 cmH20  Mean Airway Pressure:  [18.1 kjM17-16.3 cmH20] 19.3 cmH20.   Signs/symptoms of respiratory failure include- increased work of breathing, respiratory distress and wheezing. Contributing diagnoses includes - CHF Labs and images were reviewed. Patient Has recent ABG, which has been reviewed. Will treat underlying causes and adjust management of respiratory failure as follows. Continue CRRT, ventilatory support and empiric antibiotics for now. Is not clinically improving despite life support. Discussed with niece (Jessica). Currently DNR and prognosis is poor. Would like to pan-scan but too unstable to do so. US liver suggests cirrhosis. Has heart failure, valvular disease and renal failure. Amphetamine use, tobacco use and alcohol use disorder with withdrawals. Appreciate further pulm recs

## 2022-01-29 NOTE — ASSESSMENT & PLAN NOTE
Likely exacerbated further by fluids received in the ED for lactic acidosis. Did not respond to IV furosemide and renal function is worse. Now on CRRT and vasopressor support. Repeat echo with preserved EF

## 2022-01-29 NOTE — ASSESSMENT & PLAN NOTE
Unclear cause. Suspect cardiogenic vs septic. No source of infection identified    -- repeat TTE  -- pan cultured, f/u results  -- continue broad spectrum abx  -- too unstable at this point for CT imaging. Consider pan scan if/when able. RUQ US for now  -- titrate vasopressors for MAP >65  -- Stress dose steroids 1/28/22.  Will wean to off

## 2022-01-29 NOTE — ASSESSMENT & PLAN NOTE
Improved lactate likely due to CRRT. Unclear etiology of acidosis - did not improve after improved oxygenation, no refractory shock, no culprit medications.   - ctm  - CRRT

## 2022-01-29 NOTE — ASSESSMENT & PLAN NOTE
Patient is currently in atrial fibrillation with RVR. Has history of atrial flutter. Cardiology on board. Echo with mitral/tricuspid valve regurg but no systolic dysfunction. Amiodarone infusion and vasopressor support for shock. [id number] Score: 3. Anticoagulation indicated. Anticoagulation done with enoxaparin. Watch for bleeding. Cardiac monitoring. Waiting for ICU bed    TSH WNL. Tox screen positive for amphetamine. Patient admits to taking Adderall and drinking daily.

## 2022-01-29 NOTE — ASSESSMENT & PLAN NOTE
-etiology unclear - may relate to hypoperfusion state a/w respiratory failure and shock.     -abdominal exam benight  -acid status are improving.  Will repeat lactic today

## 2022-01-29 NOTE — ASSESSMENT & PLAN NOTE
"1/27 - I spoke with niece, Jessica Gabriel. Per Ms. Gabriel, patient does not have spouse or children.  Ms. Gabriel is in agreement with current POC.  She does not want CPR or cardioversion in the case of cardiac arrest.  Code status to DNR.       1/28 - Vicki Thomas "Along with Dr. Vences, I updated Ms. Gabriel by phone regarding Mr. Madison's continued decline. She verbalized understanding the severity of his condition and realizes that he may not survive through today. She stated that she is not ready to "take him off of the machines until tomorrow". I assured her that we would continue with the current plan of care and continue conversations tomorrow if he survives until then."  "

## 2022-01-29 NOTE — SUBJECTIVE & OBJECTIVE
Interval History: Improving w/ time and CRRT    Review of Systems   Unable to perform ROS: Intubated     Objective:     Vital Signs (Most Recent):  Temp: 98 °F (36.7 °C) (01/29/22 1100)  Pulse: 69 (01/29/22 1300)  Resp: (!) 35 (01/29/22 1300)  BP: 130/79 (01/29/22 1300)  SpO2: 95 % (01/29/22 1300) Vital Signs (24h Range):  Temp:  [96.08 °F (35.6 °C)-98 °F (36.7 °C)] 98 °F (36.7 °C)  Pulse:  [49-77] 69  Resp:  [35-36] 35  SpO2:  [95 %-100 %] 95 %  BP: ()/(53-96) 130/79  Arterial Line BP: ()/(58-86) 117/68     Weight: 80.8 kg (178 lb 2.1 oz)  Body mass index is 29.64 kg/m².    Intake/Output Summary (Last 24 hours) at 1/29/2022 1406  Last data filed at 1/29/2022 1300  Gross per 24 hour   Intake 1274.2 ml   Output 2759 ml   Net -1484.8 ml      Physical Exam  Constitutional:       General: He is not in acute distress.     Appearance: He is ill-appearing and toxic-appearing. He is not diaphoretic.   Cardiovascular:      Rate and Rhythm: Normal rate and regular rhythm.      Heart sounds: No murmur heard.  No gallop.    Pulmonary:      Comments: Diffuse rales, symmetric expansion, no wheezing, spontaneous respirations  Abdominal:      General: Bowel sounds are normal. There is no distension.      Palpations: Abdomen is soft.      Tenderness: There is no abdominal tenderness.         Significant Labs: All pertinent labs within the past 24 hours have been reviewed.    Significant Imaging: I have reviewed all pertinent imaging results/findings within the past 24 hours.

## 2022-01-29 NOTE — PLAN OF CARE
Problem: Infection  Goal: Absence of Infection Signs and Symptoms  Outcome: Ongoing, Progressing     Problem: Adult Inpatient Plan of Care  Goal: Plan of Care Review  Outcome: Ongoing, Progressing  Goal: Optimal Comfort and Wellbeing  Outcome: Ongoing, Progressing     Problem: Restraint, Nonbehavioral (Nonviolent)  Goal: Absence of Harm or Injury  Outcome: Ongoing, Progressing     Problem: Communication Impairment (Mechanical Ventilation, Invasive)  Goal: Effective Communication  Outcome: Ongoing, Not Progressing     Problem: Nutrition Impairment (Mechanical Ventilation, Invasive)  Goal: Optimal Nutrition Delivery  Outcome: Ongoing, Not Progressing

## 2022-01-29 NOTE — SUBJECTIVE & OBJECTIVE
Interval History: acidosis improved.  Bradycardia also improved.  No acute issue.      Review of Systems   Unable to perform ROS: Intubated     Objective:     Vital Signs (Most Recent):  Temp: 98 °F (36.7 °C) (22 1100)  Pulse: 70 (22 1100)  Resp: (!) 35 (22 1100)  BP: 106/76 (22 1100)  SpO2: 99 % (22) Vital Signs (24h Range):  Temp:  [94.64 °F (34.8 °C)-98 °F (36.7 °C)] 98 °F (36.7 °C)  Pulse:  [49-77] 70  Resp:  [35-36] 35  SpO2:  [97 %-100 %] 99 %  BP: ()/(53-96) 106/76  Arterial Line BP: ()/(55-86) 105/61     Weight: 80.8 kg (178 lb 2.1 oz)  Body mass index is 29.64 kg/m².      Intake/Output Summary (Last 24 hours) at 2022 1110  Last data filed at 2022 1100  Gross per 24 hour   Intake 1343.9 ml   Output 2945 ml   Net -1601.1 ml       Physical Exam  Vitals and nursing note reviewed.   Constitutional:       Appearance: He is ill-appearing and toxic-appearing.      Interventions: He is sedated and intubated.   HENT:      Head: Normocephalic and atraumatic.   Eyes:      General: No scleral icterus.     Pupils: Pupils are equal, round, and reactive to light.   Cardiovascular:      Rate and Rhythm: Regular rhythm.      Pulses: Decreased pulses.   Pulmonary:      Effort: No tachypnea, accessory muscle usage or respiratory distress. He is intubated.      Breath sounds: Rales (diffuse, inspiratory ) present. No wheezing or rhonchi.   Abdominal:      General: Bowel sounds are normal. There is no distension.      Palpations: Abdomen is soft.   Genitourinary:     Comments: Renner in place   Musculoskeletal:      Cervical back: Normal range of motion. No rigidity.      Right lower le+ Edema present.      Left lower le+ Edema present.   Skin:     General: Skin is cool.      Capillary Refill: Capillary refill takes more than 3 seconds.   Neurological:      Comments: Sedated and paralyzed on my exam this morning.        Vents:  Vent Mode: PRVC (22  0759)  Ventilator Initiated: Yes (01/27/22 1631)  Set Rate: 35 BPM (01/29/22 0759)  Vt Set: 500 mL (01/29/22 0759)  PEEP/CPAP: 12 cmH20 (01/29/22 0759)  Oxygen Concentration (%): 40 (01/29/22 1100)  Peak Airway Pressure: 32 cmH2O (01/29/22 0759)  Total Ve: 16.86 mL (01/29/22 0759)  F/VT Ratio<105 (RSBI): (!) 72.73 (01/29/22 0759)    Lines/Drains/Airways     Peripherally Inserted Central Catheter Line            PICC Triple Lumen 01/27/22 1915 right brachial 1 day          Central Venous Catheter Line            Percutaneous Central Line Insertion/Assessment - Triple Lumen  01/27/22 2130 right internal jugular 1 day          Drain                 NG/OG Tube 01/27/22 Left mouth 2 days         Urethral Catheter 01/27/22 0916 Double-lumen 18 Fr. 2 days          Airway                 Airway - Non-Surgical 01/27/22 1632 Endotracheal Tube 1 day          Arterial Line            Arterial Line 01/28/22 0015 Left Radial 1 day          Peripheral Intravenous Line                 Peripheral IV - Single Lumen 01/26/22 1000 Left;Posterior Hand 3 days         Peripheral IV - Single Lumen 01/27/22 20 G Anterior;Right Upper Arm 2 days                Significant Labs:    CBC/Anemia Profile:  Recent Labs   Lab 01/28/22 0344 01/29/22 0312   WBC 18.22* 18.53*   HGB 16.1 14.6   HCT 49.9 42.1   * 84*   * 97   RDW 13.7 13.0        Chemistries:  Recent Labs   Lab 01/28/22  0344 01/28/22  0803 01/28/22  1531 01/28/22  1531 01/28/22  1947 01/28/22  2104 01/28/22  2353 01/29/22  0312 01/29/22  0828   *  134*  135*   < > 134*   < > 132* 131*  --  133*  133*  --    K 5.2*  5.2*  5.2*  5.2*  5.2*   < > 5.4*   < > 5.4* 5.4*  --  5.1  5.1  --      100  101   < > 98   < > 100 99  --  101  101  --    CO2 18*  18*  20*   < > 14*   < > 13* 13*  --  16*  16*  --    BUN 51*  51*  51*   < > 43*   < > 41* 39*  --  46*  46*  --    CREATININE 2.9*  2.9*  3.0*   < > 3.2*   < > 3.3* 3.2*  --  3.7*  3.7*  --     CALCIUM 8.2*  8.2*  8.2*   < > 8.2*   < > 8.1* 7.8*  --  8.0*  8.0*  --    ALBUMIN 2.9*  3.0*   < > 3.0*  --   --  2.7*  --  2.8*  2.8*  --    PROT 7.2  --   --   --   --   --   --  6.2  --    BILITOT 0.7  --   --   --   --   --   --  0.9  --    ALKPHOS 66  --   --   --   --   --   --  63  --    *  --   --   --   --   --   --  1,621*  --    *  --   --   --   --   --   --  3,307*  --    MG  --    < > 1.7   < > 1.7  --  1.7  --  1.8   PHOS 7.0*   < > 5.8*   < > 5.4* 4.9*  --  4.7*  4.7*  --     < > = values in this interval not displayed.       All pertinent labs within the past 24 hours have been reviewed.    Significant Imaging:  I have reviewed all pertinent imaging results/findings within the past 24 hours.

## 2022-01-29 NOTE — ASSESSMENT & PLAN NOTE
[facility] 1st Floor Internal Medicine    [address]    [address]    [facility] [address]    Phone:  [phone]    Fax:  [phone]       Thank You for choosing us for your health care visit. We are glad to serve you and happy to provide you with this summary of your visit. Please help us to ensure we have accurate records. If you find anything that needs to be changed, please let our staff know as soon as possible.          Your Demographic Information     Patient Name Sex Leandra Zapata Female 1960       Ethnic Group Patient Race    Not of  or  Origin White      Your Visit Details     Date & Time Provider Department    2017 1:30 PM Jo Sanchez DO [address] Internal Medicine      Your Upcoming Appointment*(Max 10)     Friday May 05, 2017  2:00 PM CDT   General Diagnostic X-Ray with AMB XRAY 1ST FLOOR   [facility] 1st Floor Radiology ([facility] PROF OFFICE BLDG)    [address]  [address]  [facility] [address] [phone]            2017  7:30 AM CDT   Complete Physical Exam with Jo Sanchez DO   [facility] 1st Floor Internal Medicine ([facility] PROF OFFICE BLDG)    [address]  [address]  [facility] [address] [phone]              Your To Do List     Future Orders Please Complete On or Around Expires    XR FINGER MIN 2 VW LEFT  May 05, 2017 Aug 03, 2017    Follow-Up    Return if symptoms worsen or fail to improve.      Conditions Discussed Today or Order-Related Diagnoses        Comments    Pain in finger of left hand    -  Primary       Your Vitals Were     BP Pulse Height Weight BMI Smoking Status    100/62 80 5' 4\" (1.626 m) 120 lb 3.2 oz (54.5 kg) 20.63 kg/m2 Never Smoker      Medications Prescribed or Re-Ordered Today     None      Your Current Medications Are        Disp Refills Start End    fluticasone (FLONASE) 50 MCG/ACT nasal spray 1 Bottle 1 2016     Sig -  Is now on CRRT  - nephrology     Route: Spray 1 spray in each nostril 2 times daily. - Nasal    Class: Eprescribe    alendronate (FOSAMAX) 70 MG tablet 12 tablet 3 10/17/2016     Sig: TAKE 1 TABLET BY MOUTH EVERY  7 DAYS. TAKE IN THE AM 30 MIN BEFORE EATI NG WITH A FULL GLASS OF WATER.    Class: Eprescribe      Allergies     Levaquin Other (See Comments)      Immunizations History as of 5/5/2017     Name Date    Influenza 10/4/2016, 11/10/2014    Pneumococcal Conjugate 13 Valent 5/14/2014    Pneumococcal Polysaccharide Adult 9/24/2014    Td:Adult type tetanus/diphtheria 1/1/2006    Tdap 7/12/2016      Problem List as of 5/5/2017     Interstitial pneumonitis- nonspecific            Patient Instructions     None

## 2022-01-29 NOTE — ASSESSMENT & PLAN NOTE
FARHAN noted on admission and worsening. Cardiorenal vs iATN     -- Avoid ACEI/ARB/NSAIDS/Nephrotoxins.   -- Renally dose all meds  -- Renner in place, strict I&Os  -- Nephrology consulted, appreciate assistance   -- Emergent RRT started overnight for severe acidosis and hyperkalemia   -- numbers are better

## 2022-01-29 NOTE — PROGRESS NOTES
Renal ICU Progress Note    Date of Admission:  1/26/2022 11:00 AM    Length of Stay: 3  Days    Subjective: n/a    Objective:    Current Facility-Administered Medications   Medication    albuterol-ipratropium 2.5 mg-0.5 mg/3 mL nebulizer solution 3 mL    amiodarone 360 mg/200 mL (1.8 mg/mL) infusion    aspirin chewable tablet 81 mg    azithromycin 500 mg in dextrose 5 % 250 mL IVPB (ready to mix system)    calcium gluconate 1g in dextrose 5% 100mL (ready to mix system)    cefepime in dextrose 5 % IVPB 2 g    chlorhexidine 0.12 % solution 15 mL    dextrose 10% bolus 125 mL    dextrose 10% bolus 250 mL    dextrose 10% bolus 250 mL    dextrose 50% injection 12.5 g    dextrose 50% injection 25 g    enoxaparin injection 80 mg    EPINEPHrine (ADRENALIN) 10 mg in dextrose 5 % 250 mL infusion    famotidine (PF) injection 20 mg    fentaNYL 2500 mcg in 0.9% sodium chloride 250 mL infusion premix (titrating)    furosemide injection 120 mg    glucagon (human recombinant) injection 1 mg    glucose chewable tablet 16 g    glucose chewable tablet 24 g    heparin (porcine) injection 5,000 Units    heparin (porcine) injection 5,000 Units    heparin (porcine) injection 5,000 Units    hydrocortisone sodium succinate injection 100 mg    insulin aspart U-100 pen 0-5 Units    insulin detemir U-100 pen 5 Units    lorazepam injection 1 mg    melatonin tablet 6 mg    naloxone 0.4 mg/mL injection 0.02 mg    nicotine 21 mg/24 hr 1 patch    NORepinephrine 32 mg in dextrose 5 % 250 mL infusion    phenylephrine HCl in 0.9% NaCl 1 mg/10 mL (100 mcg/mL) syringe 100 mcg    propofol (DIPRIVAN) 10 mg/mL infusion    senna-docusate 8.6-50 mg per tablet 1 tablet    sodium chloride 0.9% flush 10 mL    sodium chloride 0.9% flush 10 mL    And    sodium chloride 0.9% flush 10 mL    vancomycin - pharmacy to dose    vasopressin (PITRESSIN) 0.2 Units/mL in dextrose 5 % 100 mL infusion       Vitals:     01/29/22 0430 01/29/22 0445 01/29/22 0500 01/29/22 0515   BP:   117/71    BP Location:       Patient Position:       Pulse: 63 62 61 62   Resp: (!) 35 (!) 35 (!) 35 (!) 35   Temp:       TempSrc:       SpO2: 100% 99% 99% 99%   Weight:       Height:           I/O last 3 completed shifts:  In: 2833.9 [I.V.:1984; IV Piggyback:849.9]  Out: 3122 [Urine:510; Other:2612]  I/O this shift:  In: 574.8 [I.V.:288.4; IV Piggyback:286.4]  Out: 1214 [Other:1214]      Physical Exam:     General: On the Vent.  Neck: supple  Heart: RRR  Lungs: unlabored breathing  Abdomen: n/a  Limbs: n/a  Neurologic: Sedated      Laboratories:    Recent Labs   Lab 01/29/22 0312   WBC 18.53*   RBC 4.35*   HGB 14.6   HCT 42.1   PLT 84*   MCV 97   MCH 33.6*   MCHC 34.7       Recent Labs   Lab 01/29/22 0312   CALCIUM 8.0*  8.0*   PROT 6.2   *  133*   K 5.1  5.1   CO2 16*  16*     101   BUN 46*  46*   CREATININE 3.7*  3.7*   ALKPHOS 63   ALT 1,621*   AST 3,307*   BILITOT 0.9       No results for input(s): COLORU, CLARITYU, SPECGRAV, PHUR, PROTEINUA, GLUCOSEU, BLOODU, WBCU, RBCU, BACTERIA, MUCUS in the last 24 hours.    Invalid input(s):  BILIRUBINCON    Microbiology Results (last 7 days)     Procedure Component Value Units Date/Time    Blood culture x two cultures. Draw prior to antibiotics. [[id number]] Collected: 01/26/22 1221    Order Status: Completed Specimen: Blood from Peripheral, Forearm, Left Updated: 01/28/22 1303     Blood Culture, Routine No Growth to date      No Growth to date      No Growth to date    Narrative:      Aerobic and anaerobic    Blood culture x two cultures. Draw prior to antibiotics. [[id number]] Collected: 01/26/22 1225    Order Status: Completed Specimen: Blood from Peripheral, Forearm, Left Updated: 01/28/22 1303     Blood Culture, Routine No Growth to date      No Growth to date      No Growth to date    Narrative:      Aerobic and anaerobic    Culture, Respiratory with Gram Stain [[id number]]      Order Status: No result Specimen: Respiratory from Endotracheal Aspirate             Diagnostic Tests:     CXR:    FINDINGS:   Right IJ central venous catheter is visualized with distal tip over the SVC.  Remaining tubes and lines are in stable position.  No significant change in cardiopulmonary status from earlier exam.  No pneumothorax.    Impression:       As above.       Electronically signed by: Dimas Barlow MD   Date: 01/27/2022   Time: 22:02             Assessment:    60 y/o male admitted with:    Acute hypoxemic respiratory failure on the Vent.  Acute on chronic diastolic congestive heart failure  Atrial fibrillation with RVR  Alcohol withdrawal syndrome, with delirium  Acute renal failure on CRRT  Elevated troponin likely S/D  Type 2 diabetes mellitus  Cholelithiasis  Hypoalbuminemia  Panlobular emphysema  Tobacco abuse        Plan:    - Cont CRRT  - Vent. Support  - Pressors  - Stop Lasix while on CRRT  - Antibiotics per admitting  - Will follow Adr-nftsl-zkjsf-acid/base  - Other problems per admitting      30 min ICU time spent in Pte.'s care.

## 2022-01-29 NOTE — PROGRESS NOTES
Pharmacokinetic Assessment Follow Up: IV Vancomycin    Vancomycin serum concentration assessment(s):    The random level was drawn correctly and can be used to guide therapy at this time. The measurement is within the desired definitive target range of 10 to 20 mcg/mL.    Vancomycin Regimen Plan:    Dose 750 mg x 1 today 1/29    Re-dose when the random level is less than 20 mcg/mL, next level to be drawn at 0400 on 1/30    Drug levels (last 3 results):  Recent Labs   Lab Result Units 01/28/22  1031 01/29/22  0312   Vancomycin, Random ug/mL 7.5 18.2       Pharmacy will continue to follow and monitor vancomycin.    Please contact pharmacy at extension [phone] for questions regarding this assessment.    Thank you for the consult,   Ezequiel Araya       Patient brief summary:  Marck Madison is a 61 y.o. male initiated on antimicrobial therapy with IV Vancomycin for treatment of sepsis      Drug Allergies:   Review of patient's allergies indicates:  No Known Allergies    Actual Body Weight:   80.8 kg    Renal Function:   Estimated Creatinine Clearance: 20 mL/min (A) (based on SCr of 3.7 mg/dL (H)).,     Dialysis Method (if applicable):  CRRT    CBC (last 72 hours):  Recent Labs   Lab Result Units 01/26/22  1225 01/27/22  0615 01/28/22  0344 01/29/22  0312   WBC K/uL 12.07 14.41* 18.22* 18.53*   Hemoglobin g/dL 15.4 15.5 16.1 14.6   Hematocrit % 46.3 46.5 49.9 42.1   Platelets K/uL 188 192 104* 84*   Gran % % 68.4 88.8* 91.7* 91.9*   Lymph % % 20.9 7.4* 4.7* 4.0*   Mono % % 9.5 3.0* 2.2* 3.1*   Eosinophil % % 0.2 0.0 0.0 0.0   Basophil % % 0.4 0.1 0.4 0.2   Differential Method  Automated Automated Automated Automated       Metabolic Panel (last 72 hours):  Recent Labs   Lab Result Units 01/26/22  1225 01/26/22  1606 01/27/22  0615 01/27/22  1948 01/28/22  0017 01/28/22  0344 01/28/22  0803 01/28/22  1150 01/28/22  1531 01/28/22  1947 01/28/22  2104 01/28/22  2353 01/29/22  0312   Sodium mmol/L 139  --  138 136 134* 134*   134*  135* 137 135* 134* 132* 131*  --  133*  133*   Sodium, Urine mmol/L  --  114  --   --   --   --   --   --   --   --   --   --   --    Potassium mmol/L 4.6  --  5.1 7.4* 7.3*  7.3* 5.2*  5.2*  5.2*  5.2*  5.2* 5.3*  5.3*  5.3* 5.6* 5.4* 5.4* 5.4*  --  5.1  5.1   Chloride mmol/L 104  --  105 101 99 100  100  101 101 100 98 100 99  --  101  101   CO2 mmol/L 24  --  16* 20* 19* 18*  18*  20* 18* 19* 14* 13* 13*  --  16*  16*   Glucose mg/dL 130*  --  173* 196* 119* 111*  111*  112* 87 145* 220* 214* 250*  --  119*  119*   Glucose, UA   --  Negative  --   --   --   --   --   --   --   --   --   --   --    BUN mg/dL 37*  --  50* 64* 66* 51*  51*  51* 48* 44* 43* 41* 39*  --  46*  46*   Creatinine mg/dL 1.9*  --  2.8* 3.8* 4.1* 2.9*  2.9*  3.0* 2.9* 3.1* 3.2* 3.3* 3.2*  --  3.7*  3.7*   Creatinine, Urine mg/dL  --  60.9  60.9  --   --   --   --   --   --   --   --   --   --   --    Albumin g/dL 3.8  --  3.7  --   --  2.9*  3.0*  --   --  3.0*  --  2.7*  --  2.8*  2.8*   Total Bilirubin mg/dL 0.9  --  0.4  --   --  0.7  --   --   --   --   --   --  0.9   Alkaline Phosphatase U/L 83  --  70  --   --  66  --   --   --   --   --   --  63   AST U/L 47*  --  40  --   --  217*  --   --   --   --   --   --  3,307*   ALT U/L 32  --  33  --   --  150*  --   --   --   --   --   --  1,621*   Magnesium mg/dL  --   --   --   --  2.3  --  1.8  --  1.7 1.7  --  1.7  --    Phosphorus mg/dL  --   --   --   --   --  7.0*  --   --  5.8* 5.4* 4.9*  --  4.7*  4.7*       Vancomycin Administrations:  vancomycin given in the last 96 hours                     vancomycin 750 mg in dextrose 5 % 250 mL IVPB (ready to mix system) (mg) 750 mg New Bag 01/28/22 1324    vancomycin 1.5 g in dextrose 5 % 250 mL IVPB (ready to mix) (mg) 1,500 mg New Bag 01/26/22 1500                    Microbiologic Results:  Microbiology Results (last 7 days)       Procedure Component Value Units Date/Time    Blood culture x two cultures.  Draw prior to antibiotics. [[id number]] Collected: 01/26/22 1221    Order Status: Completed Specimen: Blood from Peripheral, Forearm, Left Updated: 01/28/22 1303     Blood Culture, Routine No Growth to date      No Growth to date      No Growth to date    Narrative:      Aerobic and anaerobic    Blood culture x two cultures. Draw prior to antibiotics. [[id number]] Collected: 01/26/22 1225    Order Status: Completed Specimen: Blood from Peripheral, Forearm, Left Updated: 01/28/22 1303     Blood Culture, Routine No Growth to date      No Growth to date      No Growth to date    Narrative:      Aerobic and anaerobic    Culture, Respiratory with Gram Stain [[id number]]     Order Status: No result Specimen: Respiratory from Endotracheal Aspirate

## 2022-01-29 NOTE — ASSESSMENT & PLAN NOTE
Pt w/ acute onset pulmonary edema, likely cardiogenic. Significant difficulties oxygenating patient initially despite MV, however now improved.   - CRRT for volume removal  - vent management per pulmonology

## 2022-01-29 NOTE — ASSESSMENT & PLAN NOTE
-Suspect this is 2/2 cardiogenic pulmonary edema. He was initially admitted on room air with no respiratory distress and decompensated to the point of needing intubation the following day. BNP elevated at 439 prior to receiving several liters of fluid. CXR with worsening bilateral opacities; significant amount of pink frothy sputum noted on intubation. Known diastolic dysfunction at baseline and a history of systolic dysfunction with an EF of 35% which has since recovered. Requiring 100% FiO2 and a PEEP of 20+ to maintain adequate saturations; extremely difficult to ventilate and oxygenate. Known COPD with wheezing reported though no obstruction evident on ventilator.     -- Repeat TTE with EF 55% and hypokinesis of inferior wall.  -- Fluid removal with RRT     -- Elevated WBC but procal WNL.   -- empiric cefepime and vanco 1/28/22  -- continue nebs + steroids   -- Maintain LPV. Goal plateau pressure <30  -- Wean FiO2 and PEEP for SpO2 >92  -- will hold sedation for sat and sbt

## 2022-01-29 NOTE — PROGRESS NOTES
[facility] [place] [facility]ensive [facility]  Pulmonology  Progress Note    Patient Name: Marck Madison  MRN: [id number]  Admission Date: 1/26/2022  Hospital Length of Stay: 3 days  Code Status: DNR  Attending Provider: Virgil Ritchie MD  Primary Care Provider: [facility]   Principal Problem: Acute hypoxemic respiratory failure    Subjective:     Interval History: acidosis improved.  Bradycardia also improved.  No acute issue.      Review of Systems   Unable to perform ROS: Intubated     Objective:     Vital Signs (Most Recent):  Temp: 98 °F (36.7 °C) (01/29/22 1100)  Pulse: 70 (01/29/22 1100)  Resp: (!) 35 (01/29/22 1100)  BP: 106/76 (01/29/22 1100)  SpO2: 99 % (01/29/22 1100) Vital Signs (24h Range):  Temp:  [94.64 °F (34.8 °C)-98 °F (36.7 °C)] 98 °F (36.7 °C)  Pulse:  [49-77] 70  Resp:  [35-36] 35  SpO2:  [97 %-100 %] 99 %  BP: ()/(53-96) 106/76  Arterial Line BP: ()/(55-86) 105/61     Weight: 80.8 kg (178 lb 2.1 oz)  Body mass index is 29.64 kg/m².      Intake/Output Summary (Last 24 hours) at 1/29/2022 1110  Last data filed at 1/29/2022 1100  Gross per 24 hour   Intake 1343.9 ml   Output 2945 ml   Net -1601.1 ml       Physical Exam  Vitals and nursing note reviewed.   Constitutional:       Appearance: He is ill-appearing and toxic-appearing.      Interventions: He is sedated and intubated.   HENT:      Head: Normocephalic and atraumatic.   Eyes:      General: No scleral icterus.     Pupils: Pupils are equal, round, and reactive to light.   Cardiovascular:      Rate and Rhythm: Regular rhythm.      Pulses: Decreased pulses.   Pulmonary:      Effort: No tachypnea, accessory muscle usage or respiratory distress. He is intubated.      Breath sounds: Rales (diffuse, inspiratory ) present. No wheezing or rhonchi.   Abdominal:      General: Bowel sounds are normal. There is no distension.      Palpations: Abdomen is soft.   Genitourinary:     Comments: Renner in place   Musculoskeletal:      Cervical back:  Normal range of motion. No rigidity.      Right lower le+ Edema present.      Left lower le+ Edema present.   Skin:     General: Skin is cool.      Capillary Refill: Capillary refill takes more than 3 seconds.   Neurological:      Comments: Sedated and paralyzed on my exam this morning.        Vents:  Vent Mode: PRVC (22 075)  Ventilator Initiated: Yes (22 1631)  Set Rate: 35 BPM (22 075)  Vt Set: 500 mL (22)  PEEP/CPAP: 12 cmH20 (22)  Oxygen Concentration (%): 40 (22 1100)  Peak Airway Pressure: 32 cmH2O (22)  Total Ve: 16.86 mL (22)  F/VT Ratio<105 (RSBI): (!) 72.73 (22)    Lines/Drains/Airways     Peripherally Inserted Central Catheter Line            PICC Triple Lumen 22 1915 right brachial 1 day          Central Venous Catheter Line            Percutaneous Central Line Insertion/Assessment - Triple Lumen  22 2130 right internal jugular 1 day          Drain                 NG/OG Tube 22 Left mouth 2 days         Urethral Catheter 22 0916 Double-lumen 18 Fr. 2 days          Airway                 Airway - Non-Surgical 22 1632 Endotracheal Tube 1 day          Arterial Line            Arterial Line 22 0015 Left Radial 1 day          Peripheral Intravenous Line                 Peripheral IV - Single Lumen 22 1000 Left;Posterior Hand 3 days         Peripheral IV - Single Lumen 22 20 G Anterior;Right Upper Arm 2 days                Significant Labs:    CBC/Anemia Profile:  Recent Labs   Lab 22  0344 22   WBC 18.22* 18.53*   HGB 16.1 14.6   HCT 49.9 42.1   * 84*   * 97   RDW 13.7 13.0        Chemistries:  Recent Labs   Lab 22  0344 22  0803 22  1531 22  1531 22  1947 22  2104 22  2353 22  0312 22  0828   *  134*  135*   < > 134*   < > 132* 131*  --  133*  133*  --    K 5.2*  5.2*  5.2*   5.2*  5.2*   < > 5.4*   < > 5.4* 5.4*  --  5.1  5.1  --      100  101   < > 98   < > 100 99  --  101  101  --    CO2 18*  18*  20*   < > 14*   < > 13* 13*  --  16*  16*  --    BUN 51*  51*  51*   < > 43*   < > 41* 39*  --  46*  46*  --    CREATININE 2.9*  2.9*  3.0*   < > 3.2*   < > 3.3* 3.2*  --  3.7*  3.7*  --    CALCIUM 8.2*  8.2*  8.2*   < > 8.2*   < > 8.1* 7.8*  --  8.0*  8.0*  --    ALBUMIN 2.9*  3.0*   < > 3.0*  --   --  2.7*  --  2.8*  2.8*  --    PROT 7.2  --   --   --   --   --   --  6.2  --    BILITOT 0.7  --   --   --   --   --   --  0.9  --    ALKPHOS 66  --   --   --   --   --   --  63  --    *  --   --   --   --   --   --  1,621*  --    *  --   --   --   --   --   --  3,307*  --    MG  --    < > 1.7   < > 1.7  --  1.7  --  1.8   PHOS 7.0*   < > 5.8*   < > 5.4* 4.9*  --  4.7*  4.7*  --     < > = values in this interval not displayed.       All pertinent labs within the past 24 hours have been reviewed.    Significant Imaging:  I have reviewed all pertinent imaging results/findings within the past 24 hours.      [facility]  Recent Labs   Lab 01/29/22  0948   PH 7.384   PO2 180*   PCO2 36.6   HCO3 21.8*   BE -3     Assessment/Plan:     * Acute hypoxemic respiratory failure  -Suspect this is 2/2 cardiogenic pulmonary edema. He was initially admitted on room air with no respiratory distress and decompensated to the point of needing intubation the following day. BNP elevated at 439 prior to receiving several liters of fluid. CXR with worsening bilateral opacities; significant amount of pink frothy sputum noted on intubation. Known diastolic dysfunction at baseline and a history of systolic dysfunction with an EF of 35% which has since recovered. Requiring 100% FiO2 and a PEEP of 20+ to maintain adequate saturations; extremely difficult to ventilate and oxygenate. Known COPD with wheezing reported though no obstruction evident on ventilator.     -- Repeat TTE with EF 55% and  "hypokinesis of inferior wall.  -- Fluid removal with RRT     -- Elevated WBC but procal WNL.   -- empiric cefepime and vanco 1/28/22  -- continue nebs + steroids   -- Maintain LPV. Goal plateau pressure <30  -- Wean FiO2 and PEEP for SpO2 >92  -- will hold sedation for sat and sbt    Goals of care, counseling/discussion  1/27 - I spoke with vega Jessica Gabriel. Per Ms. Gabriel, patient does not have spouse or children.  Ms. Gabriel is in agreement with current POC.  She does not want CPR or cardioversion in the case of cardiac arrest.  Code status to DNR.       1/28 - Vicki Thomas "Along with Dr. Vences, I updated Ms. Gabriel by phone regarding Mr. Madison's continued decline. She verbalized understanding the severity of his condition and realizes that he may not survive through today. She stated that she is not ready to "take him off of the machines until tomorrow". I assured her that we would continue with the current plan of care and continue conversations tomorrow if he survives until then."    Shock, unspecified  Unclear cause. Suspect cardiogenic vs septic. No source of infection identified    -- repeat TTE  -- pan cultured, f/u results  -- continue broad spectrum abx  -- too unstable at this point for CT imaging. Consider pan scan if/when able. RUQ US for now  -- titrate vasopressors for MAP >65  -- Stress dose steroids 1/28/22.  Will wean to off    Panlobular emphysema  See respiratory failure     Lactic acidosis  -etiology unclear - may relate to hypoperfusion state a/w respiratory failure and shock.   Along with transaminitis  -abdominal exam benight  -acid status are improving.  Will repeat lactic today    Acute on chronic diastolic congestive heart failure  -- fluid removal with RRT      Atrial fibrillation with RVR  A fib RVR noted on admission, possibly 2/2 amphetamines as his tox screen was positive. RVR noted on multiple previous admissions      -- was on amiodarone infusion, but now bradycardic. Hold for " now  -- full dose AC  -- cardiology following, appreciate assistance      Acute renal failure  FARHAN noted on admission and worsening. Cardiorenal vs iATN     -- Avoid ACEI/ARB/NSAIDS/Nephrotoxins.   -- Renally dose all meds  -- Renner in place, strict I&Os  -- Nephrology consulted, appreciate assistance   -- Emergent RRT started overnight for severe acidosis and hyperkalemia   -- numbers are better           Bartolome Callaway MD  Pulmonology  [facility] [place] - Intensive Care    Critical Care Time: 45  minutes  Critical secondary to respiratory failure and shock     Critical care was time spent personally by me on the following activities: development of treatment plan with patient or surrogate and bedside caregivers, discussions with consultants, evaluation of patient's response to treatment, examination of patient, ordering and performing treatments and interventions, ordering and review of laboratory studies, ordering and review of radiographic studies, pulse oximetry, re-evaluation of patient's condition.  This critical care time did not overlap with that of any other provider or involve time for any procedures.

## 2022-01-29 NOTE — PROGRESS NOTES
[facility] Medicine  Progress Note    Patient Name: Marck Madison  MRN: [id number]  Patient Class: IP- Inpatient   Admission Date: 1/26/2022  Length of Stay: 3 days  Attending Physician: Virgil Ritchie MD  Primary Care Provider: [facility]        Subjective:     Principal Problem:Acute hypoxemic respiratory failure        HPI:  61 year old male with diastolic heart failure, atrial flutter, pulmonary hypertension, mitral and tricuspid valve regurgitation, emphysema and current every day smoker who presented with shortness of breath, body aches, bilateral flank pain and weakness since last night. Associated symptoms include cough. Admits to smoking. Denied nausea, vomiting, abdominal pain, dysuria, diarrhea. In the ED, patient was hypotensive, tachycardic to 120s, diffuse expiratory wheezing and with a lactic acid of 3. Given one liter of fluids, continuous bronchodilators, methylprednisolone and broad spectrum antibiotics. O2 sats stable at room air. COVID and flu negative. Procalcitonin negative. Remains tachycardic. Cardiology consulted. BP stable. Admit to ICU.       Overview/Hospital Course:  Mr Madison presented with acute exacerbation of diastolic heart failure and atrial fibrillation with RVR. Also with acute renal failure. He was given fluids in the ED. O2 requirements increased significantly. Tox screen positive for amphetamines. Placed on BiPAP but non compliant with this. He is AAO x 3 and with no tremors. Required precedex to comply with BiPAP. Amiodarone infusion and anticoagulation. Nephrology, cardiology and pulmonology consulted. While still in the ED waiting for an ICU bed patient experienced alcohol withdrawals. Give lorazepam and more diuresis but poor UOP and remained in respiratory distress despite lorazepam. Patient was intubated. Required significant vent support. Renal function continued to deteriorate. Started on CRRT.       Interval History: Improving  w/ time and CRRT    Review of Systems   Unable to perform ROS: Intubated     Objective:     Vital Signs (Most Recent):  Temp: 98 °F (36.7 °C) (01/29/22 1100)  Pulse: 69 (01/29/22 1300)  Resp: (!) 35 (01/29/22 1300)  BP: 130/79 (01/29/22 1300)  SpO2: 95 % (01/29/22 1300) Vital Signs (24h Range):  Temp:  [96.08 °F (35.6 °C)-98 °F (36.7 °C)] 98 °F (36.7 °C)  Pulse:  [49-77] 69  Resp:  [35-36] 35  SpO2:  [95 %-100 %] 95 %  BP: ()/(53-96) 130/79  Arterial Line BP: ()/(58-86) 117/68     Weight: 80.8 kg (178 lb 2.1 oz)  Body mass index is 29.64 kg/m².    Intake/Output Summary (Last 24 hours) at 1/29/2022 1406  Last data filed at 1/29/2022 1300  Gross per 24 hour   Intake 1274.2 ml   Output 2759 ml   Net -1484.8 ml      Physical Exam  Constitutional:       General: He is not in acute distress.     Appearance: He is ill-appearing and toxic-appearing. He is not diaphoretic.   Cardiovascular:      Rate and Rhythm: Normal rate and regular rhythm.      Heart sounds: No murmur heard.  No gallop.    Pulmonary:      Comments: Diffuse rales, symmetric expansion, no wheezing, spontaneous respirations  Abdominal:      General: Bowel sounds are normal. There is no distension.      Palpations: Abdomen is soft.      Tenderness: There is no abdominal tenderness.         Significant Labs: All pertinent labs within the past 24 hours have been reviewed.    Significant Imaging: I have reviewed all pertinent imaging results/findings within the past 24 hours.      Assessment/Plan:      * Acute hypoxemic respiratory failure  Pt w/ acute onset pulmonary edema, likely cardiogenic. Significant difficulties oxygenating patient initially despite MV, however now improved.   - CRRT for volume removal  - vent management per pulmonology    Alcohol withdrawal syndrome, with delirium  Controlled on sedation for vent support    Goals of care, counseling/discussion  Pt DNR status (see prior notes for details). Continuing current care for  now.    Shock, unspecified  Unclear etiology of shock, TTE essentially rules out obstructive and cardiogenic shock, clinical picture argues strongly against hypovolemic shock, no evidence of hemorrhage. C/f septic shock however no clear source of infection noted  - weaned from pressors, continue to monitor      Type 2 diabetes mellitus, with long-term current use of insulin  HgbA1c pending. Start insulin and adjust as needed for goal glucose 140-180      Panlobular emphysema  Now on vent support      Lactic acidosis  Improved lactate likely due to CRRT. Unclear etiology of acidosis - did not improve after improved oxygenation, no refractory shock, no culprit medications.   - ctm  - CRRT      Acute on chronic diastolic congestive heart failure  Initially non-responsive to lasix, now improving w/ CRRT  - volume removal w/ CRRT      Tobacco use disorder, severe, dependence  Spent > 5 minutes discussing cessation.   Wants nicotine patch. Ordered.       Atrial fibrillation with RVR  Hx of flutter, presented in Afib w/ RVR. Now in NSR after amiodarone  - on enoxaparin      Elevated troponin  Likely demand. Echo pending. Aspirin and statin. Rate control      Acute renal failure  Is now on CRRT  - nephrology        VTE Risk Mitigation (From admission, onward)         Ordered     enoxaparin injection 80 mg  Daily         01/27/22 1350     heparin (porcine) injection 5,000 Units  Use PRN         01/27/22 2149     heparin (porcine) injection 5,000 Units  Use PRN         01/27/22 2158     heparin (porcine) injection 5,000 Units  Use PRN         01/27/22 2149     IP VTE HIGH RISK PATIENT  Once         01/26/22 1409     Place sequential compression device  Until discontinued         01/26/22 1409                Discharge Planning   BALDO:      Code Status: DNR   Is the patient medically ready for discharge?:     Reason for patient still in hospital (select all that apply): Patient trending condition, Laboratory test, Treatment,  Consult recommendations and Pending disposition  Discharge Plan A:  (tbd)            Critical care time spent on the evaluation and treatment of severe organ dysfunction, review of pertinent labs and imaging studies, discussions with consulting providers and discussions with patient/family: 45 minutes.      Virgil Ritchie MD  Department of Hospital Medicine   [facility] [place] - Intensive Care

## 2022-01-29 NOTE — ASSESSMENT & PLAN NOTE
Unclear etiology of shock, TTE essentially rules out obstructive and cardiogenic shock, clinical picture argues strongly against hypovolemic shock, no evidence of hemorrhage. C/f septic shock however no clear source of infection noted  - weaned from pressors, continue to monitor

## 2022-01-29 NOTE — PROGRESS NOTES
[facility] Medicine  Progress Note    Patient Name: Marck Madison  MRN: [id number]  Patient Class: IP- Inpatient   Admission Date: 1/26/2022  Length of Stay: 2 days  Attending Physician: Winifred Lopez MD  Primary Care Provider: [facility]        Subjective:     Principal Problem:Acute hypoxemic respiratory failure        HPI:  61 year old male with diastolic heart failure, atrial flutter, pulmonary hypertension, mitral and tricuspid valve regurgitation, emphysema and current every day smoker who presented with shortness of breath, body aches, bilateral flank pain and weakness since last night. Associated symptoms include cough. Admits to smoking. Denied nausea, vomiting, abdominal pain, dysuria, diarrhea. In the ED, patient was hypotensive, tachycardic to 120s, diffuse expiratory wheezing and with a lactic acid of 3. Given one liter of fluids, continuous bronchodilators, methylprednisolone and broad spectrum antibiotics. O2 sats stable at room air. COVID and flu negative. Procalcitonin negative. Remains tachycardic. Cardiology consulted. BP stable. Admit to ICU.       Overview/Hospital Course:  Mr Madison presented with acute exacerbation of diastolic heart failure and atrial fibrillation with RVR. Also with acute renal failure. He was given fluids in the ED. O2 requirements increased significantly. Tox screen positive for amphetamines. Placed on BiPAP but non compliant with this. He is AAO x 3 and with no tremors. Required precedex to comply with BiPAP. Amiodarone infusion and anticoagulation. Nephrology, cardiology and pulmonology consulted. While still in the ED waiting for an ICU bed patient experienced alcohol withdrawals. Give lorazepam and more diuresis but poor UOP and remained in respiratory distress despite lorazepam. Patient was intubated. Required significant vent support. Renal function continued to deteriorate. Started on CRRT.       Interval History: continued  to decline overnight. Not marcelo despite CRRT.     Review of Systems   Unable to perform ROS: Intubated     Objective:     Vital Signs (Most Recent):  Temp: 97.16 °F (36.2 °C) (01/28/22 1700)  Pulse: 72 (01/28/22 1700)  Resp: (!) 35 (01/28/22 1700)  BP: 135/60 (01/28/22 1700)  SpO2: 99 % (01/28/22 1700) Vital Signs (24h Range):  Temp:  [91.04 °F (32.8 °C)-98.2 °F (36.8 °C)] 97.16 °F (36.2 °C)  Pulse:  [39-94] 72  Resp:  [21-35] 35  SpO2:  [88 %-100 %] 99 %  BP: ()/() 135/60  Arterial Line BP: ()/() 120/63     Weight: 81.2 kg (179 lb)  Body mass index is 29.79 kg/m².    Intake/Output Summary (Last 24 hours) at 1/28/2022 1754  Last data filed at 1/28/2022 1700  Gross per 24 hour   Intake 2344.27 ml   Output 2386 ml   Net -41.73 ml      Physical Exam  Vitals and nursing note reviewed.   Constitutional:       Appearance: He is ill-appearing and toxic-appearing.      Comments: Sedate    Cardiovascular:      Rate and Rhythm: Normal rate and regular rhythm.   Pulmonary:      Breath sounds: No wheezing or rales.      Comments: On vent support   Abdominal:      General: There is no distension.   Musculoskeletal:      Right lower leg: No edema.      Left lower leg: No edema.   Skin:     Capillary Refill: Capillary refill takes 2 to 3 seconds.   Neurological:      Comments: Sedated    Psychiatric:      Comments: Unable to assess          Significant Labs: All pertinent labs within the past 24 hours have been reviewed.    Significant Imaging: I have reviewed all pertinent imaging results/findings within the past 24 hours.  I have reviewed and interpreted all pertinent imaging results/findings within the past 24 hours.      Assessment/Plan:      * Acute hypoxemic respiratory failure  Patient with Hypercapnic and Hypoxic Respiratory failure which is Acute.  he is not on home oxygen. Supplemental oxygen was provided and noted- Vent Mode: PRVC  Oxygen Concentration (%):  [] 40  Vt Set:  [300 mL-500 mL]  500 mL  PEEP/CPAP:  [12 lfF08-45 cmH20] 12 cmH20  Mean Airway Pressure:  [18.1 lcR86-84.3 cmH20] 19.3 cmH20.   Signs/symptoms of respiratory failure include- increased work of breathing, respiratory distress and wheezing. Contributing diagnoses includes - CHF Labs and images were reviewed. Patient Has recent ABG, which has been reviewed. Will treat underlying causes and adjust management of respiratory failure as follows. Continue CRRT, ventilatory support and empiric antibiotics for now. Is not clinically improving despite life support. Discussed with niece (Jessica). Currently DNR and prognosis is poor. Would like to pan-scan but too unstable to do so. US liver suggests cirrhosis. Has heart failure, valvular disease and renal failure. Amphetamine use, tobacco use and alcohol use disorder with withdrawals. Appreciate further pulm recs    Acute on chronic diastolic congestive heart failure  Likely exacerbated further by fluids received in the ED for lactic acidosis. Did not respond to IV furosemide and renal function is worse. Now on CRRT and vasopressor support. Repeat echo with preserved EF      Atrial fibrillation with RVR  Patient is currently in atrial fibrillation with RVR. Has history of atrial flutter. Cardiology on board. Echo with mitral/tricuspid valve regurg but no systolic dysfunction. Amiodarone infusion and vasopressor support for shock. [id number] Score: 3. Anticoagulation indicated. Anticoagulation done with enoxaparin. Watch for bleeding. Cardiac monitoring. Waiting for ICU bed    TSH WNL. Tox screen positive for amphetamine. Patient admits to taking Adderall and drinking daily.         Elevated troponin  Likely demand. Echo pending. Aspirin and statin. Rate control      Alcohol withdrawal syndrome, with delirium  Controlled on sedation for vent support    Type 2 diabetes mellitus, with long-term current use of insulin  HgbA1c pending. Start insulin and adjust as needed for goal glucose  140-180      Panlobular emphysema  Now on vent support      Tobacco use disorder, severe, dependence  Spent > 5 minutes discussing cessation.   Wants nicotine patch. Ordered.       Acute renal failure  Is now on CRRT. Nephrology on board.       VTE Risk Mitigation (From admission, onward)         Ordered     enoxaparin injection 80 mg  Daily         01/27/22 1350     heparin (porcine) injection 5,000 Units  Use PRN         01/27/22 2149     heparin (porcine) injection 5,000 Units  Use PRN         01/27/22 2158     heparin (porcine) injection 5,000 Units  Use PRN         01/27/22 2149     IP VTE HIGH RISK PATIENT  Once         01/26/22 1409     Place sequential compression device  Until discontinued         01/26/22 1409                Discharge Planning   BALDO:      Code Status: DNR   Is the patient medically ready for discharge?:     Reason for patient still in hospital (select all that apply): Treatment  Discharge Plan A:  (tbd)      Discussed with Jessica over the phone. Prognosis is poor      Critical care time spent on the evaluation and treatment of severe organ dysfunction, review of pertinent labs and imaging studies, discussions with consulting providers and discussions with patient/family: > 35 minutes.      Winifred Vences MD  Department of Hospital Medicine   [facility] [place] [facility] Int[place] [facility]

## 2022-01-30 PROBLEM — G93.41 ENCEPHALOPATHY, METABOLIC: Status: ACTIVE | Noted: 2022-01-30

## 2022-01-30 LAB
ALBUMIN SERPL BCP-MCNC: 2.7 G/DL (ref 3.5–5.2)
ALBUMIN SERPL BCP-MCNC: 2.7 G/DL (ref 3.5–5.2)
ALLENS TEST: ABNORMAL
ALP SERPL-CCNC: 61 U/L (ref 55–135)
ALT SERPL W/O P-5'-P-CCNC: 1207 U/L (ref 10–44)
ANION GAP SERPL CALC-SCNC: 13 MMOL/L (ref 8–16)
ANION GAP SERPL CALC-SCNC: 13 MMOL/L (ref 8–16)
AST SERPL-CCNC: 956 U/L (ref 10–40)
BACTERIA BLD CULT: NORMAL
BACTERIA BLD CULT: NORMAL
BASOPHILS # BLD AUTO: 0.02 K/UL (ref 0–0.2)
BASOPHILS NFR BLD: 0.1 % (ref 0–1.9)
BILIRUB SERPL-MCNC: 0.8 MG/DL (ref 0.1–1)
BUN SERPL-MCNC: 36 MG/DL (ref 8–23)
BUN SERPL-MCNC: 36 MG/DL (ref 8–23)
CALCIUM SERPL-MCNC: 8.5 MG/DL (ref 8.7–10.5)
CALCIUM SERPL-MCNC: 8.5 MG/DL (ref 8.7–10.5)
CHLORIDE SERPL-SCNC: 102 MMOL/L (ref 95–110)
CHLORIDE SERPL-SCNC: 102 MMOL/L (ref 95–110)
CO2 SERPL-SCNC: 20 MMOL/L (ref 23–29)
CO2 SERPL-SCNC: 20 MMOL/L (ref 23–29)
CREAT SERPL-MCNC: 2.7 MG/DL (ref 0.5–1.4)
CREAT SERPL-MCNC: 2.7 MG/DL (ref 0.5–1.4)
DELSYS: ABNORMAL
DIFFERENTIAL METHOD: ABNORMAL
EOSINOPHIL # BLD AUTO: 0 K/UL (ref 0–0.5)
EOSINOPHIL NFR BLD: 0 % (ref 0–8)
ERYTHROCYTE [DISTWIDTH] IN BLOOD BY AUTOMATED COUNT: 13.2 % (ref 11.5–14.5)
ERYTHROCYTE [SEDIMENTATION RATE] IN BLOOD BY WESTERGREN METHOD: 16 MM/H
EST. GFR  (AFRICAN AMERICAN): 28 ML/MIN/1.73 M^2
EST. GFR  (AFRICAN AMERICAN): 28 ML/MIN/1.73 M^2
EST. GFR  (NON AFRICAN AMERICAN): 24 ML/MIN/1.73 M^2
EST. GFR  (NON AFRICAN AMERICAN): 24 ML/MIN/1.73 M^2
FIO2: 40
GLUCOSE SERPL-MCNC: 143 MG/DL (ref 70–110)
GLUCOSE SERPL-MCNC: 143 MG/DL (ref 70–110)
HCO3 UR-SCNC: 24.2 MMOL/L (ref 24–28)
HCT VFR BLD AUTO: 40.9 % (ref 40–54)
HGB BLD-MCNC: 14.2 G/DL (ref 14–18)
IMM GRANULOCYTES # BLD AUTO: 0.23 K/UL (ref 0–0.04)
IMM GRANULOCYTES NFR BLD AUTO: 1.4 % (ref 0–0.5)
LYMPHOCYTES # BLD AUTO: 0.7 K/UL (ref 1–4.8)
LYMPHOCYTES NFR BLD: 4.5 % (ref 18–48)
MAGNESIUM SERPL-MCNC: 1.9 MG/DL (ref 1.6–2.6)
MAGNESIUM SERPL-MCNC: 1.9 MG/DL (ref 1.6–2.6)
MCH RBC QN AUTO: 32.8 PG (ref 27–31)
MCHC RBC AUTO-ENTMCNC: 34.7 G/DL (ref 32–36)
MCV RBC AUTO: 95 FL (ref 82–98)
MIN VOL: 11.7
MODE: ABNORMAL
MONOCYTES # BLD AUTO: 0.4 K/UL (ref 0.3–1)
MONOCYTES NFR BLD: 2.7 % (ref 4–15)
MPV, BLUE TOP: ABNORMAL FL (ref 9.2–12.9)
MPV, BLUE TOP: NORMAL FL (ref 9.2–12.9)
NEUTROPHILS # BLD AUTO: 14.8 K/UL (ref 1.8–7.7)
NEUTROPHILS NFR BLD: 91.3 % (ref 38–73)
NRBC BLD-RTO: 1 /100 WBC
PCO2 BLDA: 38.9 MMHG (ref 35–45)
PEEP: 8
PH SMN: 7.4 [PH] (ref 7.35–7.45)
PHOSPHATE SERPL-MCNC: 4.4 MG/DL (ref 2.7–4.5)
PHOSPHATE SERPL-MCNC: 4.4 MG/DL (ref 2.7–4.5)
PIP: 25
PLATELET # BLD AUTO: 55 K/UL (ref 150–450)
PLATELET, BLUE TOP: 51 K/UL (ref 150–450)
PLATELET, BLUE TOP: NORMAL K/UL (ref 150–450)
PMV BLD AUTO: 12.8 FL (ref 9.2–12.9)
PO2 BLDA: 116 MMHG (ref 80–100)
POC BE: 0 MMOL/L
POC SATURATED O2: 99 % (ref 95–100)
POC TCO2: 25 MMOL/L (ref 23–27)
POCT GLUCOSE: 105 MG/DL (ref 70–110)
POCT GLUCOSE: 189 MG/DL (ref 70–110)
POTASSIUM SERPL-SCNC: 3.8 MMOL/L (ref 3.5–5.1)
POTASSIUM SERPL-SCNC: 3.8 MMOL/L (ref 3.5–5.1)
PROT SERPL-MCNC: 5.8 G/DL (ref 6–8.4)
RBC # BLD AUTO: 4.33 M/UL (ref 4.6–6.2)
SAMPLE: ABNORMAL
SITE: ABNORMAL
SODIUM SERPL-SCNC: 135 MMOL/L (ref 136–145)
SODIUM SERPL-SCNC: 135 MMOL/L (ref 136–145)
SP02: 96
VANCOMYCIN SERPL-MCNC: 14.3 UG/ML
VT: 500
WBC # BLD AUTO: 16.27 K/UL (ref 3.9–12.7)

## 2022-01-30 PROCEDURE — 85025 COMPLETE CBC W/AUTO DIFF WBC: CPT | Performed by: STUDENT IN AN ORGANIZED HEALTH CARE EDUCATION/TRAINING PROGRAM

## 2022-01-30 PROCEDURE — 25000003 PHARM REV CODE 250: Performed by: INTERNAL MEDICINE

## 2022-01-30 PROCEDURE — 37799 UNLISTED PX VASCULAR SURGERY: CPT

## 2022-01-30 PROCEDURE — 99222 1ST HOSP IP/OBS MODERATE 55: CPT | Mod: ,,, | Performed by: PSYCHIATRY & NEUROLOGY

## 2022-01-30 PROCEDURE — 63600175 PHARM REV CODE 636 W HCPCS: Performed by: STUDENT IN AN ORGANIZED HEALTH CARE EDUCATION/TRAINING PROGRAM

## 2022-01-30 PROCEDURE — 99291 CRITICAL CARE FIRST HOUR: CPT | Mod: ,,, | Performed by: INTERNAL MEDICINE

## 2022-01-30 PROCEDURE — A4216 STERILE WATER/SALINE, 10 ML: HCPCS | Performed by: INTERNAL MEDICINE

## 2022-01-30 PROCEDURE — 83735 ASSAY OF MAGNESIUM: CPT | Performed by: STUDENT IN AN ORGANIZED HEALTH CARE EDUCATION/TRAINING PROGRAM

## 2022-01-30 PROCEDURE — 63600175 PHARM REV CODE 636 W HCPCS: Performed by: INTERNAL MEDICINE

## 2022-01-30 PROCEDURE — 85049 AUTOMATED PLATELET COUNT: CPT | Mod: 91 | Performed by: STUDENT IN AN ORGANIZED HEALTH CARE EDUCATION/TRAINING PROGRAM

## 2022-01-30 PROCEDURE — 25000003 PHARM REV CODE 250: Performed by: STUDENT IN AN ORGANIZED HEALTH CARE EDUCATION/TRAINING PROGRAM

## 2022-01-30 PROCEDURE — 99900017 HC EXTUBATION W/PARAMETERS (STAT)

## 2022-01-30 PROCEDURE — 80053 COMPREHEN METABOLIC PANEL: CPT | Performed by: STUDENT IN AN ORGANIZED HEALTH CARE EDUCATION/TRAINING PROGRAM

## 2022-01-30 PROCEDURE — 99900035 HC TECH TIME PER 15 MIN (STAT)

## 2022-01-30 PROCEDURE — 99291 PR CRITICAL CARE, E/M 30-74 MINUTES: ICD-10-PCS | Mod: ,,, | Performed by: INTERNAL MEDICINE

## 2022-01-30 PROCEDURE — 80069 RENAL FUNCTION PANEL: CPT | Performed by: NURSE PRACTITIONER

## 2022-01-30 PROCEDURE — 27000221 HC OXYGEN, UP TO 24 HOURS

## 2022-01-30 PROCEDURE — 82803 BLOOD GASES ANY COMBINATION: CPT

## 2022-01-30 PROCEDURE — 94761 N-INVAS EAR/PLS OXIMETRY MLT: CPT

## 2022-01-30 PROCEDURE — 99222 PR INITIAL HOSPITAL CARE,LEVL II: ICD-10-PCS | Mod: ,,, | Performed by: PSYCHIATRY & NEUROLOGY

## 2022-01-30 PROCEDURE — S4991 NICOTINE PATCH NONLEGEND: HCPCS | Performed by: INTERNAL MEDICINE

## 2022-01-30 PROCEDURE — 80202 ASSAY OF VANCOMYCIN: CPT | Performed by: INTERNAL MEDICINE

## 2022-01-30 PROCEDURE — 25000003 PHARM REV CODE 250: Performed by: NURSE PRACTITIONER

## 2022-01-30 PROCEDURE — 94003 VENT MGMT INPAT SUBQ DAY: CPT

## 2022-01-30 PROCEDURE — 99900026 HC AIRWAY MAINTENANCE (STAT)

## 2022-01-30 PROCEDURE — 63600175 PHARM REV CODE 636 W HCPCS: Performed by: NURSE PRACTITIONER

## 2022-01-30 PROCEDURE — 20000000 HC ICU ROOM

## 2022-01-30 RX ADMIN — CHLORHEXIDINE GLUCONATE 0.12% ORAL RINSE 15 ML: 1.2 LIQUID ORAL at 09:01

## 2022-01-30 RX ADMIN — Medication 10 ML: at 12:01

## 2022-01-30 RX ADMIN — AZITHROMYCIN MONOHYDRATE 500 MG: 500 INJECTION, POWDER, LYOPHILIZED, FOR SOLUTION INTRAVENOUS at 08:01

## 2022-01-30 RX ADMIN — HEPARIN SODIUM 5000 UNITS: 5000 INJECTION, SOLUTION INTRAVENOUS; SUBCUTANEOUS at 10:01

## 2022-01-30 RX ADMIN — SODIUM CHLORIDE, PRESERVATIVE FREE 10 ML: 5 INJECTION INTRAVENOUS at 10:01

## 2022-01-30 RX ADMIN — MUPIROCIN: 20 OINTMENT TOPICAL at 08:01

## 2022-01-30 RX ADMIN — Medication 10 ML: at 05:01

## 2022-01-30 RX ADMIN — INSULIN DETEMIR 5 UNITS: 100 INJECTION, SOLUTION SUBCUTANEOUS at 09:01

## 2022-01-30 RX ADMIN — CEFEPIME HYDROCHLORIDE 2 G: 2 INJECTION, SOLUTION INTRAVENOUS at 10:01

## 2022-01-30 RX ADMIN — SODIUM CHLORIDE, PRESERVATIVE FREE 10 ML: 5 INJECTION INTRAVENOUS at 05:01

## 2022-01-30 RX ADMIN — MUPIROCIN: 20 OINTMENT TOPICAL at 09:01

## 2022-01-30 RX ADMIN — DEXMEDETOMIDINE HYDROCHLORIDE IN 0.9% SODIUM CHLORIDE 0.8 MCG/KG/HR: 4 INJECTION INTRAVENOUS at 05:01

## 2022-01-30 RX ADMIN — LORAZEPAM 1 MG: 2 INJECTION INTRAMUSCULAR; INTRAVENOUS at 03:01

## 2022-01-30 RX ADMIN — ASPIRIN 81 MG CHEWABLE TABLET 81 MG: 81 TABLET CHEWABLE at 09:01

## 2022-01-30 RX ADMIN — SENNOSIDES AND DOCUSATE SODIUM 1 TABLET: 50; 8.6 TABLET ORAL at 08:01

## 2022-01-30 RX ADMIN — LORAZEPAM 1 MG: 2 INJECTION INTRAMUSCULAR; INTRAVENOUS at 11:01

## 2022-01-30 RX ADMIN — AMIODARONE HYDROCHLORIDE 0.5 MG/MIN: 50 INJECTION, SOLUTION INTRAVENOUS at 08:01

## 2022-01-30 RX ADMIN — SENNOSIDES AND DOCUSATE SODIUM 1 TABLET: 50; 8.6 TABLET ORAL at 09:01

## 2022-01-30 RX ADMIN — HYDROCORTISONE SODIUM SUCCINATE 50 MG: 100 INJECTION, POWDER, FOR SOLUTION INTRAMUSCULAR; INTRAVENOUS at 05:01

## 2022-01-30 RX ADMIN — CHLORHEXIDINE GLUCONATE 0.12% ORAL RINSE 15 ML: 1.2 LIQUID ORAL at 08:01

## 2022-01-30 RX ADMIN — FAMOTIDINE 20 MG: 10 INJECTION INTRAVENOUS at 09:01

## 2022-01-30 RX ADMIN — Medication 1 PATCH: at 09:01

## 2022-01-30 RX ADMIN — CEFEPIME HYDROCHLORIDE 2 G: 2 INJECTION, SOLUTION INTRAVENOUS at 11:01

## 2022-01-30 NOTE — ASSESSMENT & PLAN NOTE
Pt w/ acute onset pulmonary edema, likely cardiogenic. Significant difficulties oxygenating patient initially despite MV, however now improved w/ cRRT  - vent management per pulmonology   - mental status is the barrier to ventilator liberation, holding sedation

## 2022-01-30 NOTE — SUBJECTIVE & OBJECTIVE
Interval History:  Off pressors.  Grimace to nailbed pressure    Review of Systems   Unable to perform ROS: Intubated     Objective:     Vital Signs (Most Recent):  Temp: 96.1 °F (35.6 °C) (22 0800)  Pulse: (!) 51 (22 0830)  Resp: (!) 30 (22 08)  BP: (!) 156/87 (22 0809)  SpO2: 100 % (22) Vital Signs (24h Range):  Temp:  [96.1 °F (35.6 °C)-98.7 °F (37.1 °C)] 96.1 °F (35.6 °C)  Pulse:  [48-81] 51  Resp:  [29-35] 30  SpO2:  [95 %-100 %] 100 %  BP: (106-173)/() 156/87  Arterial Line BP: ()/(58-94) 149/92     Weight: 78.8 kg (173 lb 11.6 oz)  Body mass index is 28.91 kg/m².      Intake/Output Summary (Last 24 hours) at 2022 0938  Last data filed at 2022 0800  Gross per 24 hour   Intake 832.06 ml   Output 2651 ml   Net -1818.94 ml       Physical Exam  Vitals and nursing note reviewed.   Constitutional:       Appearance: He is ill-appearing and toxic-appearing.      Interventions: He is sedated and intubated.   HENT:      Head: Normocephalic and atraumatic.   Eyes:      General: No scleral icterus.     Comments: Pupils nonreactive.   Cardiovascular:      Rate and Rhythm: Regular rhythm.      Pulses: Decreased pulses.   Pulmonary:      Effort: No tachypnea, accessory muscle usage or respiratory distress. He is intubated.      Breath sounds: Rales (diffuse, inspiratory ) present. No wheezing or rhonchi.   Abdominal:      General: Bowel sounds are normal. There is no distension.      Palpations: Abdomen is soft.   Genitourinary:     Comments: Renner in place   Musculoskeletal:      Cervical back: Normal range of motion. No rigidity.      Right lower le+ Edema present.      Left lower le+ Edema present.   Skin:     General: Skin is cool.      Capillary Refill: Capillary refill takes more than 3 seconds.   Neurological:      Comments: Sedated and non-responsive.  Grimace to nailbed pressure.       Vents:  Vent Mode: [facility] (22 0809)  Ventilator Initiated:  Yes (01/27/22 1631)  Set Rate: 30 BPM (01/30/22 0809)  Vt Set: 500 mL (01/30/22 0809)  PEEP/CPAP: 12 cmH20 (01/30/22 0809)  Oxygen Concentration (%): 40 (01/30/22 0830)  Peak Airway Pressure: 30.1 cmH2O (01/30/22 0809)  Total Ve: 14.58 mL (01/30/22 0809)  F/VT Ratio<105 (RSBI): (!) 63.42 (01/30/22 0809)    Lines/Drains/Airways     Peripherally Inserted Central Catheter Line            PICC Triple Lumen 01/27/22 1915 right brachial 2 days          Central Venous Catheter Line            Percutaneous Central Line Insertion/Assessment - Triple Lumen  01/27/22 2130 right internal jugular 2 days          Drain                 NG/OG Tube 01/27/22 Left mouth 3 days         Urethral Catheter 01/27/22 0916 Double-lumen 18 Fr. 3 days          Airway                 Airway - Non-Surgical 01/27/22 1632 Endotracheal Tube 2 days          Arterial Line            Arterial Line 01/28/22 0015 Left Radial 2 days          Peripheral Intravenous Line                 Peripheral IV - Single Lumen 01/26/22 1000 Left;Posterior Hand 3 days         Peripheral IV - Single Lumen 01/27/22 20 G Anterior;Right Upper Arm 3 days                Significant Labs:    CBC/Anemia Profile:  Recent Labs   Lab 01/29/22 0312 01/30/22  0301   WBC 18.53* 16.27*   HGB 14.6 14.2   HCT 42.1 40.9   PLT 84* 55*   MCV 97 95   RDW 13.0 13.2        Chemistries:  Recent Labs   Lab 01/29/22  0312 01/29/22  0828 01/29/22  1522 01/29/22  2120 01/29/22  2330 01/30/22  0301   *  133*   < > 133* 132*  --  135*  135*   K 5.1  5.1   < > 4.0 3.8  --  3.8  3.8     101   < > 102 100  --  102  102   CO2 16*  16*   < > 19* 19*  --  20*  20*   BUN 46*  46*   < > 41* 40*  --  36*  36*   CREATININE 3.7*  3.7*   < > 3.1* 3.0*  --  2.7*  2.7*   CALCIUM 8.0*  8.0*   < > 8.3* 8.1*  --  8.5*  8.5*   ALBUMIN 2.8*  2.8*   < > 2.9* 2.7*  --  2.7*  2.7*   PROT 6.2  --   --   --   --  5.8*   BILITOT 0.9  --   --   --   --  0.8   ALKPHOS 63  --   --   --   --  61    ALT 1,621*  --   --   --   --  1,207*   AST 3,307*  --   --   --   --  956*   MG  --    < > 1.9  --  1.9 1.9   PHOS 4.7*  4.7*   < > 4.7* 4.3  --  4.4  4.4    < > = values in this interval not displayed.       All pertinent labs within the past 24 hours have been reviewed.    Significant Imaging:  I have reviewed all pertinent imaging results/findings within the past 24 hours.

## 2022-01-30 NOTE — PROGRESS NOTES
[facility] Intensive Care  Pulmonology  Progress Note    Patient Name: Marck Madison  MRN: [id number]  Admission Date: 1/26/2022  Hospital Length of Stay: 4 days  Code Status: DNR  Attending Provider: Virgil Ritchie MD  Primary Care Provider: [facility]   Principal Problem: Acute hypoxemic respiratory failure    Subjective:     Interval History:  Off pressors.  Grimace to nailbed pressure    Review of Systems   Unable to perform ROS: Intubated     Objective:     Vital Signs (Most Recent):  Temp: 96.1 °F (35.6 °C) (01/30/22 0800)  Pulse: (!) 51 (01/30/22 0830)  Resp: (!) 30 (01/30/22 0830)  BP: (!) 156/87 (01/30/22 0809)  SpO2: 100 % (01/30/22 0830) Vital Signs (24h Range):  Temp:  [96.1 °F (35.6 °C)-98.7 °F (37.1 °C)] 96.1 °F (35.6 °C)  Pulse:  [48-81] 51  Resp:  [29-35] 30  SpO2:  [95 %-100 %] 100 %  BP: (106-173)/() 156/87  Arterial Line BP: ()/(58-94) 149/92     Weight: 78.8 kg (173 lb 11.6 oz)  Body mass index is 28.91 kg/m².      Intake/Output Summary (Last 24 hours) at 1/30/2022 0938  Last data filed at 1/30/2022 0800  Gross per 24 hour   Intake 832.06 ml   Output 2651 ml   Net -1818.94 ml       Physical Exam  Vitals and nursing note reviewed.   Constitutional:       Appearance: He is ill-appearing and toxic-appearing.      Interventions: He is sedated and intubated.   HENT:      Head: Normocephalic and atraumatic.   Eyes:      General: No scleral icterus.     Comments: Pupils nonreactive.   Cardiovascular:      Rate and Rhythm: Regular rhythm.      Pulses: Decreased pulses.   Pulmonary:      Effort: No tachypnea, accessory muscle usage or respiratory distress. He is intubated.      Breath sounds: Rales (diffuse, inspiratory ) present. No wheezing or rhonchi.   Abdominal:      General: Bowel sounds are normal. There is no distension.      Palpations: Abdomen is soft.   Genitourinary:     Comments: Rennre in place   Musculoskeletal:      Cervical back: Normal range of motion. No  rigidity.      Right lower le+ Edema present.      Left lower le+ Edema present.   Skin:     General: Skin is cool.      Capillary Refill: Capillary refill takes more than 3 seconds.   Neurological:      Comments: Sedated and non-responsive.  Grimace to nailbed pressure.       Vents:  Vent Mode: PRVC (22)  Ventilator Initiated: Yes (22 1631)  Set Rate: 30 BPM (22)  Vt Set: 500 mL (22)  PEEP/CPAP: 12 cmH20 (22)  Oxygen Concentration (%): 40 (22)  Peak Airway Pressure: 30.1 cmH2O (22)  Total Ve: 14.58 mL (22)  F/VT Ratio<105 (RSBI): (!) 63.42 (22)    Lines/Drains/Airways     Peripherally Inserted Central Catheter Line            PICC Triple Lumen 22 1915 right brachial 2 days          Central Venous Catheter Line            Percutaneous Central Line Insertion/Assessment - Triple Lumen  22 2130 right internal jugular 2 days          Drain                 NG/OG Tube 22 Left mouth 3 days         Urethral Catheter 22 0916 Double-lumen 18 Fr. 3 days          Airway                 Airway - Non-Surgical 22 1632 Endotracheal Tube 2 days          Arterial Line            Arterial Line 22 0015 Left Radial 2 days          Peripheral Intravenous Line                 Peripheral IV - Single Lumen 22 1000 Left;Posterior Hand 3 days         Peripheral IV - Single Lumen 22 20 G Anterior;Right Upper Arm 3 days                Significant Labs:    CBC/Anemia Profile:  Recent Labs   Lab 22  0312 22  0301   WBC 18.53* 16.27*   HGB 14.6 14.2   HCT 42.1 40.9   PLT 84* 55*   MCV 97 95   RDW 13.0 13.2        Chemistries:  Recent Labs   Lab 22  0312 22  0828 22  1522 22  2120 22  2330 22  0301   *  133*   < > 133* 132*  --  135*  135*   K 5.1  5.1   < > 4.0 3.8  --  3.8  3.8     101   < > 102 100  --  102  102   CO2 16*  16*   < >  19* 19*  --  20*  20*   BUN 46*  46*   < > 41* 40*  --  36*  36*   CREATININE 3.7*  3.7*   < > 3.1* 3.0*  --  2.7*  2.7*   CALCIUM 8.0*  8.0*   < > 8.3* 8.1*  --  8.5*  8.5*   ALBUMIN 2.8*  2.8*   < > 2.9* 2.7*  --  2.7*  2.7*   PROT 6.2  --   --   --   --  5.8*   BILITOT 0.9  --   --   --   --  0.8   ALKPHOS 63  --   --   --   --  61   ALT 1,621*  --   --   --   --  1,207*   AST 3,307*  --   --   --   --  956*   MG  --    < > 1.9  --  1.9 1.9   PHOS 4.7*  4.7*   < > 4.7* 4.3  --  4.4  4.4    < > = values in this interval not displayed.       All pertinent labs within the past 24 hours have been reviewed.    Significant Imaging:  I have reviewed all pertinent imaging results/findings within the past 24 hours.      ABG  Recent Labs   Lab 01/30/22  0923   PH 7.401   PO2 116*   PCO2 38.9   HCO3 24.2   BE 0     Assessment/Plan:     * Acute hypoxemic respiratory failure  -Suspect this is 2/2 cardiogenic pulmonary edema. He was initially admitted on room air with no respiratory distress and decompensated to the point of needing intubation the following day. BNP elevated at 439 prior to receiving several liters of fluid. CXR with worsening bilateral opacities; significant amount of pink frothy sputum noted on intubation. Known diastolic dysfunction at baseline and a history of systolic dysfunction with an EF of 35% which has since recovered.     --Requiring 100% FiO2 and a PEEP of 20+ to maintain adequate saturations; extremely difficult to ventilate and oxygenate. Known COPD with wheezing reported though no obstruction evident on ventilator.   --doing much better with oxygenation and acidosis with crrt  -- Repeat TTE with EF 55% and hypokinesis of inferior wall.  -- Fluid removal with RRT     -- Elevated WBC but procal WNL.   -- empiric cefepime and vanco 1/28/22.  Culture ngtd.  Still with leukocytosis.    -- will repeat cxr.    -- tolerated sbt  -- main barrier is mentation    Encephalopathy,  metabolic  -minimally responsive off sedation   -will hold all sedation  -await neuro inputs.      Shock, unspecified  Unclear cause. Suspect cardiogenic vs septic. No source of infection identified    --  TTE with normal EF  -- pan cultured, f/u results  -- continue broad spectrum abx  -- was on levophed and epinephrine drip for MAP >65; off pressors since 1/29/22  -- Stress dose steroids 1/28/22.  Will wean to off 1/30/22    Panlobular emphysema  -s/p solucortef and hydrocotisone  -off steroid since 1/30/21    Lactic acidosis  -etiology unclear - may relate to hypoperfusion state a/w respiratory failure and shock.     -abdominal exam benight  -acid status improved    Acute on chronic diastolic congestive heart failure  -- fluid removal with RRT      Atrial fibrillation with RVR  A fib RVR noted on admission, possibly 2/2 amphetamines as his tox screen was positive. RVR noted on multiple previous admissions      -- was on amiodarone infusion, but now bradycardic. Hold for now  -- full dose AC until 1/30/21.  Held due to altered mentation with non-reactive pupils  -- cardiology following, appreciate assistance             Bartolome Callaway MD  Pulmonology  [facility] - [place] - Intensive Care    Critical Care Time: 45  minutes  Critical secondary to respiratory failure     Critical care was time spent personally by me on the following activities: development of treatment plan with patient or surrogate and bedside caregivers, discussions with consultants, evaluation of patient's response to treatment, examination of patient, ordering and performing treatments and interventions, ordering and review of laboratory studies, ordering and review of radiographic studies, pulse oximetry, re-evaluation of patient's condition.  This critical care time did not overlap with that of any other provider or involve time for any procedures.

## 2022-01-30 NOTE — ASSESSMENT & PLAN NOTE
Hx of flutter, presented in Afib w/ RVR. Now in NSR after amiodarone  - holding anticoagulation due to thrombocytopenia

## 2022-01-30 NOTE — ASSESSMENT & PLAN NOTE
-Suspect this is 2/2 cardiogenic pulmonary edema. He was initially admitted on room air with no respiratory distress and decompensated to the point of needing intubation the following day. BNP elevated at 439 prior to receiving several liters of fluid. CXR with worsening bilateral opacities; significant amount of pink frothy sputum noted on intubation. Known diastolic dysfunction at baseline and a history of systolic dysfunction with an EF of 35% which has since recovered.     --Requiring 100% FiO2 and a PEEP of 20+ to maintain adequate saturations; extremely difficult to ventilate and oxygenate. Known COPD with wheezing reported though no obstruction evident on ventilator.   --doing much better with oxygenation and acidosis with crrt  -- Repeat TTE with EF 55% and hypokinesis of inferior wall.  -- Fluid removal with RRT     -- Elevated WBC but procal WNL.   -- empiric cefepime and vanco 1/28/22.  Culture ngtd.  Still with leukocytosis.    -- will repeat cxr.    -- tolerated sbt  -- main barrier is mentation

## 2022-01-30 NOTE — ASSESSMENT & PLAN NOTE
-etiology unclear - may relate to hypoperfusion state a/w respiratory failure and shock.     -abdominal exam benight  -acid status improved

## 2022-01-30 NOTE — PROGRESS NOTES
Renal ICU Progress Note    Date of Admission:  1/26/2022 11:00 AM    Length of Stay: 4  Days    Subjective: n/a    Objective:    Current Facility-Administered Medications   Medication    albuterol-ipratropium 2.5 mg-0.5 mg/3 mL nebulizer solution 3 mL    amiodarone 360 mg/200 mL (1.8 mg/mL) infusion    aspirin chewable tablet 81 mg    azithromycin 500 mg in dextrose 5 % 250 mL IVPB (ready to mix system)    calcium gluconate 1g in dextrose 5% 100mL (ready to mix system)    cefepime in dextrose 5 % IVPB 2 g    chlorhexidine 0.12 % solution 15 mL    dexmedetomidine (PRECEDEX) 400mcg/100mL 0.9% NaCL infusion    dextrose 10% bolus 125 mL    dextrose 10% bolus 250 mL    dextrose 10% bolus 250 mL    dextrose 50% injection 12.5 g    dextrose 50% injection 25 g    famotidine (PF) injection 20 mg    glucagon (human recombinant) injection 1 mg    glucose chewable tablet 16 g    glucose chewable tablet 24 g    heparin (porcine) injection 5,000 Units    heparin (porcine) injection 5,000 Units    heparin (porcine) injection 5,000 Units    hydrocortisone sodium succinate injection 50 mg    insulin aspart U-100 pen 0-5 Units    insulin detemir U-100 pen 5 Units    lorazepam injection 1 mg    melatonin tablet 6 mg    mupirocin 2 % ointment    naloxone 0.4 mg/mL injection 0.02 mg    nicotine 21 mg/24 hr 1 patch    NORepinephrine 32 mg in dextrose 5 % 250 mL infusion    phenylephrine HCl in 0.9% NaCl 1 mg/10 mL (100 mcg/mL) syringe 100 mcg    senna-docusate 8.6-50 mg per tablet 1 tablet    sodium chloride 0.9% flush 10 mL    sodium chloride 0.9% flush 10 mL    And    sodium chloride 0.9% flush 10 mL    vancomycin - pharmacy to dose    vancomycin 750 mg in dextrose 5 % 250 mL IVPB (ready to mix system)       Vitals:    01/30/22 0809 01/30/22 0830 01/30/22 0900 01/30/22 0930   BP: (!) 156/87      BP Location:       Patient Position:       Pulse: (!) 57 (!) 51 (!) 54 (!) 55    Resp: (!) 30 (!) 30 20 (!) 21   Temp:       TempSrc:       SpO2: 99% 100% 99% 99%   Weight:       Height:           I/O last 3 completed shifts:  In: 1436.7 [I.V.:559.9; IV Piggyback:876.8]  Out: 4351 [Urine:145; Other:4206]  I/O this shift:  In: 22 [I.V.:22]  Out: -       Physical Exam:     General: On the Vent.  Neck: supple  Heart: RRR  Lungs: unlabored breathing  Abdomen: n/a  Limbs: n/a  Neurologic: Sedated      Laboratories:    Recent Labs   Lab 01/30/22  0301   WBC 16.27*   RBC 4.33*   HGB 14.2   HCT 40.9   PLT 55*   MCV 95   MCH 32.8*   MCHC 34.7       Recent Labs   Lab 01/30/22  0301   CALCIUM 8.5*  8.5*   PROT 5.8*   *  135*   K 3.8  3.8   CO2 20*  20*     102   BUN 36*  36*   CREATININE 2.7*  2.7*   ALKPHOS 61   ALT 1,207*   *   BILITOT 0.8       No results for input(s): COLORU, CLARITYU, SPECGRAV, PHUR, PROTEINUA, GLUCOSEU, BLOODU, WBCU, RBCU, BACTERIA, MUCUS in the last 24 hours.    Invalid input(s):  BILIRUBINCON    Microbiology Results (last 7 days)     Procedure Component Value Units Date/Time    Blood culture x two cultures. Draw prior to antibiotics. [[id number]] Collected: 01/26/22 1221    Order Status: Completed Specimen: Blood from Peripheral, Forearm, Left Updated: 01/29/22 1303     Blood Culture, Routine No Growth to date      No Growth to date      No Growth to date      No Growth to date    Narrative:      Aerobic and anaerobic    Blood culture x two cultures. Draw prior to antibiotics. [[id number]] Collected: 01/26/22 1225    Order Status: Completed Specimen: Blood from Peripheral, Forearm, Left Updated: 01/29/22 1303     Blood Culture, Routine No Growth to date      No Growth to date      No Growth to date      No Growth to date    Narrative:      Aerobic and anaerobic    Culture, Respiratory with Gram Stain [[id number]]     Order Status: No result Specimen: Respiratory from Endotracheal Aspirate             Diagnostic Tests:     CXR:    FINDINGS:   Right IJ  central venous catheter is visualized with distal tip over the SVC.  Remaining tubes and lines are in stable position.  No significant change in cardiopulmonary status from earlier exam.  No pneumothorax.    Impression:       As above.       Electronically signed by: Dimas Barlow MD   Date: 01/27/2022   Time: 22:02             Assessment:    60 y/o male admitted with:    Acute hypoxemic respiratory failure on the Vent.  Acute on chronic diastolic congestive heart failure  Atrial fibrillation with RVR  Alcohol withdrawal syndrome, with delirium  Acute renal failure w/ improving UO  Elevated troponin likely S/D  Type 2 diabetes mellitus  Cholelithiasis  Hypoalbuminemia  Panlobular emphysema  Tobacco abuse        Plan:    - Pte. Hemodynamically stable with controlled potassium and acidosis; will switch to PRN intermittent Dialysis as needed  - STOP CRRT  - Vent. Support  - Pressors  - Renner to gravity  - Antibiotics per admitting  - Will follow Wmb-fsbmc-fowwo-acid/base  - Other problems per admitting

## 2022-01-30 NOTE — ASSESSMENT & PLAN NOTE
Improved lactate likely due to CRRT. Unclear etiology of acidosis - did not improve after improved oxygenation, no refractory shock, no culprit medications.   - monitor while off CRRT

## 2022-01-30 NOTE — CONSULTS
"[facility] - Intensive Care  Neurology  Consult Note    Patient Name: Marck Madison  MRN: [id number]  Admission Date: 1/26/2022  Hospital Length of Stay: 4 days  Code Status: DNR   Attending Provider: Virgil Ritchie MD   Consulting Provider: Jonathan Huang MD  Primary Care Physician: [facility]  Principal Problem:Acute hypoxemic respiratory failure    Inpatient consult to Neurology  Consult performed by: Jonathan Huang MD  Consult ordered by: Bartolome Callaway MD        Subjective:     Chief Complaint: AMS     HPI: 61 year old male with medical Hx of atrial flutter, pulmonary hypertension, emphysema (still smokes daily)  presented with shortness of breath, body aches, bilateral flank pain and weakness since last night.  In the ED patient found to be hypotensive, tachycardic (120's,bpm).  Admitted for acute CHF exacerbation, afib/RVR. Pt has HX of alcohol abuseand had symptoms of withdrawal in ED. He was ultimately intubated. No off sedation he is reported to be unresponsive. No seizures seen.    Past Medical History:   Diagnosis Date    Arrhythmia 2017    aflutter    Atrial flutter     CAD (coronary artery disease)     CHF (congestive heart failure), NYHA class I 2017    Cholelithiasis with chronic cholecystitis     Chronic diastolic heart failure     Cigarette smoker     COPD (chronic obstructive pulmonary disease)     COVID-19 06/01/2021    DKA (diabetic ketoacidosis)     NSTEMI (non-ST elevation myocardial infarction)     NSVT (nonsustained ventricular tachycardia)     Psychiatric disorder     h/o "schizophrena/bipolar"    Schizoaffective disorder     Severe sepsis        Past Surgical History:   Procedure Laterality Date    LIVER SURGERY      abscess drainage; 1970s    TREATMENT OF CARDIAC ARRHYTHMIA  9/12/2019    Procedure: Cardioversion or Defibrillation;  Surgeon: Jonathan Lopez MD;  Location: [facility] CATH LAB;  Service: Cardiology;;       Review of patient's allergies " "indicates:  No Known Allergies    Current Neurological Medications:     No current facility-administered medications on file prior to encounter.     Current Outpatient Medications on File Prior to Encounter   Medication Sig    hydrALAZINE (APRESOLINE) 100 MG tablet Take 1 tablet (100 mg total) by mouth every 8 (eight) hours.    metFORMIN (GLUCOPHAGE) 1000 MG tablet Take 1,000 mg by mouth 2 (two) times daily.    metoprolol succinate (TOPROL-XL) 100 MG 24 hr tablet Take 1 tablet (100 mg total) by mouth once daily.    NIFEdipine (PROCARDIA-XL) 30 MG (OSM) 24 hr tablet Take 1 tablet (30 mg total) by mouth once daily.    apixaban (ELIQUIS) 5 mg Tab Take 1 tablet (5 mg total) by mouth 2 (two) times daily.    aspirin 81 MG Chew Take 1 tablet (81 mg total) by mouth once daily.    atorvastatin (LIPITOR) 40 MG tablet Take 1 tablet (40 mg total) by mouth every evening.    blood-glucose meter kit Use as instructed    insulin aspart U-100 (NOVOLOG) 100 unit/mL (3 mL) InPn pen Inject 13 Units into the skin 3 (three) times daily.    insulin detemir U-100 (LEVEMIR FLEXTOUCH) 100 unit/mL (3 mL) SubQ InPn pen Inject 40 Units into the skin once daily.    TRULICITY 1.5 mg/0.5 mL pen injector Inject into the skin.      Family History    None       Tobacco Use    Smoking status: Current Every Day Smoker     Packs/day: 0.50     Years: 5.00     Pack years: 2.50     Types: Cigarettes    Smokeless tobacco: Never Used   Substance and Sexual Activity    Alcohol use: Not Currently     Alcohol/week: 14.0 standard drinks     Types: 14 Shots of liquor per week     Comment: 6/9/21:  "[employer]" everyday, last drank on 6/8/21     Drug use: No    Sexual activity: Not on file     Review of Systems   Unable to perform ROS: Intubated     Objective:     Vital Signs (Most Recent):  Temp: 96.2 °F (35.7 °C) (01/30/22 1200)  Pulse: 70 (01/30/22 1300)  Resp: 14 (01/30/22 1300)  BP: (!) 156/87 (01/30/22 0809)  SpO2: 98 % (01/30/22 1300) " Vital Signs (24h Range):  Temp:  [96.1 °F (35.6 °C)-98.7 °F (37.1 °C)] 96.2 °F (35.7 °C)  Pulse:  [48-81] 70  Resp:  [11-35] 14  SpO2:  [93 %-100 %] 98 %  BP: (113-173)/() 156/87  Arterial Line BP: ()/(58-94) 148/79     Weight: 78.8 kg (173 lb 11.6 oz)  Body mass index is 28.91 kg/m².    Physical Exam  Constitutional:       General: He is not in acute distress.  HENT:      Head: Normocephalic.      Right Ear: External ear normal.      Left Ear: External ear normal.   Eyes:      General:         Right eye: No discharge.         Left eye: No discharge.   Cardiovascular:      Rate and Rhythm: Normal rate.   Pulmonary:      Breath sounds: Normal breath sounds.   Abdominal:      Palpations: Abdomen is soft.   Musculoskeletal:         General: No tenderness.      Cervical back: Neck supple.   Skin:     General: Skin is warm.         NEUROLOGICAL EXAMINATION:     MENTAL STATUS        Opening eyes on noxious stimuli     CRANIAL NERVES     CN III, IV, VI   Right pupil: Size: 2 mm.   Left pupil: Size: 2 mm.   Nystagmus: none   Conjugate gaze: present    CN V   Right corneal reflex: normal  Left corneal reflex: normal    CN VII   Right facial weakness: none       Intact gag reflex  Intact cough reflex     MOTOR EXAM        Wtithdrawal of UE's on noxious stimulation     SENSORY EXAM        Grimacing on noxious stimuli       Significant Labs:   CBC:   Recent Labs   Lab 01/29/22  0312 01/30/22  0301   WBC 18.53* 16.27*   HGB 14.6 14.2   HCT 42.1 40.9   PLT 84* 55*     CMP:   Recent Labs   Lab 01/29/22  0312 01/29/22  0828 01/29/22  1522 01/29/22  2120 01/29/22  2330 01/30/22  0301   *  119*   < > 113* 177*  --  143*  143*   *  133*   < > 133* 132*  --  135*  135*   K 5.1  5.1   < > 4.0 3.8  --  3.8  3.8     101   < > 102 100  --  102  102   CO2 16*  16*   < > 19* 19*  --  20*  20*   BUN 46*  46*   < > 41* 40*  --  36*  36*   CREATININE 3.7*  3.7*   < > 3.1* 3.0*  --  2.7*  2.7*    CALCIUM 8.0*  8.0*   < > 8.3* 8.1*  --  8.5*  8.5*   MG  --    < > 1.9  --  1.9 1.9   PROT 6.2  --   --   --   --  5.8*   ALBUMIN 2.8*  2.8*   < > 2.9* 2.7*  --  2.7*  2.7*   BILITOT 0.9  --   --   --   --  0.8   ALKPHOS 63  --   --   --   --  61   AST 3,307*  --   --   --   --  956*   ALT 1,621*  --   --   --   --  1,207*   ANIONGAP 16  16   < > 12 13  --  13  13   EGFRNONAA 16*  16*   < > 20* 21*  --  24*  24*    < > = values in this interval not displayed.       Significant Imaging: I have reviewed all pertinent imaging results/findings within the past 24 hours.       Assessment and Plan:     60 y/o male consulted for AMS    1. Encephalopathy: multifactorial given acute renal failure and hepatic failure.   Pt has been on sedation as well.   -Exam is limited but he seems to be slowly responding to external stimuli. No concerning findings on neurological assessment.   -Avoid sedation as possible.   -Will monitor.    Active Diagnoses:    Diagnosis Date Noted POA    PRINCIPAL PROBLEM:  Acute hypoxemic respiratory failure [J96.01] 09/10/2019 Yes    Encephalopathy, metabolic [G93.41] 01/30/2022 Unknown    Goals of care, counseling/discussion [Z71.89] 01/28/2022 Not Applicable    Alcohol withdrawal syndrome, with delirium [F10.231] 01/28/2022 Yes    Shock, unspecified [R57.9] 01/27/2022 No    Panlobular emphysema [J43.1] 01/26/2022 Yes    Type 2 diabetes mellitus, with long-term current use of insulin [E11.9, Z79.4] 01/26/2022 Not Applicable    Lactic acidosis [E87.2] 06/12/2021 Unknown    Acute on chronic diastolic congestive heart failure [I50.33] 06/09/2021 Yes    Tobacco use disorder, severe, dependence [F17.200] 01/21/2020 Yes    Atrial fibrillation with RVR [I48.91] 01/20/2020 Yes    Elevated troponin [R77.8] 01/20/2020 Yes    Acute renal failure [N17.9] 09/10/2019 Yes      Problems Resolved During this Admission:       VTE Risk Mitigation (From admission, onward)         Ordered     heparin  (porcine) injection 5,000 Units  Use PRN         01/27/22 2149     heparin (porcine) injection 5,000 Units  Use PRN         01/27/22 2158     heparin (porcine) injection 5,000 Units  Use PRN         01/27/22 2149     IP VTE HIGH RISK PATIENT  Once         01/26/22 1409     Place sequential compression device  Until discontinued         01/26/22 1409                Thank you for your consult. I will follow-up with patient. Please contact us if you have any additional questions.    Jonathan Huang MD  Neurology  [facility] [place] - Intensive Care

## 2022-01-30 NOTE — CARE UPDATE
[facility] [place] - Intensive Care  ICU Multidisciplinary Bedside Rounds     UPDATE     Date: 1/30/2022      Plan of care reviewed with the following, Nurse, Charge Nurse, Physician and Pulm CC.     Needs/ Goals for the day: Sedation vacation- placed on CPAP- check ABG, stop CRRT per Nephro, Neuro consult      Level of Care: Critical Care

## 2022-01-30 NOTE — PLAN OF CARE
Problem: Adult Inpatient Plan of Care  Goal: Plan of Care Review  Outcome: Ongoing, Progressing  Flowsheets (Taken 1/29/2022 8539)  Plan of Care Reviewed With: family     Problem: Skin Injury Risk Increased  Goal: Skin Health and Integrity  Outcome: Ongoing, Progressing     Problem: Diabetes Comorbidity  Goal: Blood Glucose Level Within Targeted Range  Outcome: Ongoing, Progressing     Problem: Fall Injury Risk  Goal: Absence of Fall and Fall-Related Injury  Outcome: Ongoing, Progressing

## 2022-01-30 NOTE — ASSESSMENT & PLAN NOTE
Unclear etiology of shock, TTE essentially rules out obstructive and cardiogenic shock, clinical picture argues strongly against hypovolemic shock, no evidence of hemorrhage. C/f septic shock however no clear source of infection noted.  - weaned from pressors, continue to monitor

## 2022-01-30 NOTE — PROGRESS NOTES
Pharmacokinetic Assessment Follow Up: IV Vancomycin    Vancomycin serum concentration assessment(s):    The random level was drawn correctly and can be used to guide therapy at this time. The measurement is within the desired definitive target range of 10 to 20 mcg/mL.    Vancomycin Regimen Plan:    Patient will obtain 750 mg x 1 dose and a random will be re-checked tomorrow. Dosed if level is less than 20.     Drug levels (last 3 results):  Recent Labs   Lab Result Units 01/28/22  1031 01/29/22  0312 01/30/22  0301   Vancomycin, Random ug/mL 7.5 18.2 14.3       Pharmacy will continue to follow and monitor vancomycin.    Please contact pharmacy at extension [phone] for questions regarding this assessment.    Thank you for the consult,   Anson Arreola       Patient brief summary:  Marck Madison is a 61 y.o. male initiated on antimicrobial therapy with IV Vancomycin for treatment of sepsis    The patient's current regimen is 750 mg x 1.     Drug Allergies:   Review of patient's allergies indicates:  No Known Allergies    Actual Body Weight:   78.8 kg.    Renal Function:   Estimated Creatinine Clearance: 27.1 mL/min (A) (based on SCr of 2.7 mg/dL (H)).,     Dialysis Method (if applicable):  CRRT    CBC (last 72 hours):  Recent Labs   Lab Result Units 01/28/22  0344 01/29/22  0312 01/30/22  0301   WBC K/uL 18.22* 18.53* 16.27*   Hemoglobin g/dL 16.1 14.6 14.2   Hematocrit % 49.9 42.1 40.9   Platelets K/uL 104* 84* 55*   Gran % % 91.7* 91.9* 91.3*   Lymph % % 4.7* 4.0* 4.5*   Mono % % 2.2* 3.1* 2.7*   Eosinophil % % 0.0 0.0 0.0   Basophil % % 0.4 0.2 0.1   Differential Method  Automated Automated Automated       Metabolic Panel (last 72 hours):  Recent Labs   Lab Result Units 01/27/22  1948 01/28/22  0017 01/28/22  0344 01/28/22  0803 01/28/22  1150 01/28/22  1531 01/28/22  1947 01/28/22  2104 01/28/22  2353 01/29/22  0312 01/29/22  0828 01/29/22  1522 01/29/22  2120 01/29/22  2330 01/30/22  0301   Sodium mmol/L 136 134* 134*   134*  135* 137 135* 134* 132* 131*  --  133*  133*  --  133* 132*  --  135*  135*   Potassium mmol/L 7.4* 7.3*  7.3* 5.2*  5.2*  5.2*  5.2*  5.2* 5.3*  5.3*  5.3* 5.6* 5.4* 5.4* 5.4*  --  5.1  5.1  --  4.0 3.8  --  3.8  3.8   Chloride mmol/L 101 99 100  100  101 101 100 98 100 99  --  101  101  --  102 100  --  102  102   CO2 mmol/L 20* 19* 18*  18*  20* 18* 19* 14* 13* 13*  --  16*  16*  --  19* 19*  --  20*  20*   Glucose mg/dL 196* 119* 111*  111*  112* 87 145* 220* 214* 250*  --  119*  119*  --  113* 177*  --  143*  143*   BUN mg/dL 64* 66* 51*  51*  51* 48* 44* 43* 41* 39*  --  46*  46*  --  41* 40*  --  36*  36*   Creatinine mg/dL 3.8* 4.1* 2.9*  2.9*  3.0* 2.9* 3.1* 3.2* 3.3* 3.2*  --  3.7*  3.7*  --  3.1* 3.0*  --  2.7*  2.7*   Albumin g/dL  --   --  2.9*  3.0*  --   --  3.0*  --  2.7*  --  2.8*  2.8*  --  2.9* 2.7*  --  2.7*  2.7*   Total Bilirubin mg/dL  --   --  0.7  --   --   --   --   --   --  0.9  --   --   --   --  0.8   Alkaline Phosphatase U/L  --   --  66  --   --   --   --   --   --  63  --   --   --   --  61   AST U/L  --   --  217*  --   --   --   --   --   --  3,307*  --   --   --   --  956*   ALT U/L  --   --  150*  --   --   --   --   --   --  1,621*  --   --   --   --  1,207*   Magnesium mg/dL  --  2.3  --  1.8  --  1.7 1.7  --  1.7  --  1.8 1.9  --  1.9 1.9   Phosphorus mg/dL  --   --  7.0*  --   --  5.8* 5.4* 4.9*  --  4.7*  4.7*  --  4.7* 4.3  --  4.4  4.4       Vancomycin Administrations:  vancomycin given in the last 96 hours                     vancomycin 750 mg in dextrose 5 % 250 mL IVPB (ready to mix system) (mg) 750 mg New Bag 01/29/22 1116    vancomycin 750 mg in dextrose 5 % 250 mL IVPB (ready to mix system) (mg) 750 mg New Bag 01/28/22 1324    vancomycin 1.5 g in dextrose 5 % 250 mL IVPB (ready to mix) (mg) 1,500 mg New Bag 01/26/22 1500                    Microbiologic Results:  Microbiology Results (last 7 days)       Procedure Component  Value Units Date/Time    Blood culture x two cultures. Draw prior to antibiotics. [[id number]] Collected: 01/26/22 1221    Order Status: Completed Specimen: Blood from Peripheral, Forearm, Left Updated: 01/29/22 1303     Blood Culture, Routine No Growth to date      No Growth to date      No Growth to date      No Growth to date    Narrative:      Aerobic and anaerobic    Blood culture x two cultures. Draw prior to antibiotics. [[id number]] Collected: 01/26/22 1225    Order Status: Completed Specimen: Blood from Peripheral, Forearm, Left Updated: 01/29/22 1303     Blood Culture, Routine No Growth to date      No Growth to date      No Growth to date      No Growth to date    Narrative:      Aerobic and anaerobic    Culture, Respiratory with Gram Stain [[id number]]     Order Status: No result Specimen: Respiratory from Endotracheal Aspirate

## 2022-01-30 NOTE — ASSESSMENT & PLAN NOTE
-- fluid removal with RRT     [Sick Contacts: ___] : no sick contacts [Eye Redness] : no eye redness [Fever] : no fever [Eye Discharge] : no eye discharge [Eye Itching] : no eye itching [Ear Tugging] : no ear tugging [Wheezing] : no wheezing [Posttussive emesis] : no posttussive emesis [Vomiting] : no vomiting [Diarrhea] : no diarrhea [Decreased Urine Output] : no decreased urine output [[email or web address]] : afebrile

## 2022-01-30 NOTE — PLAN OF CARE
Pt extubated to NC. Still drowsy but follows commands, attempts to verbalize. Off vasopressors. Off CRRT, will dialyze as needed. Plan of care reviewed with pt's vega Lopez.    Problem: Adult Inpatient Plan of Care  Goal: Plan of Care Review  Outcome: Ongoing, Progressing  Goal: Patient-Specific Goal (Individualized)  Outcome: Ongoing, Progressing  Goal: Absence of Hospital-Acquired Illness or Injury  Outcome: Ongoing, Progressing  Goal: Optimal Comfort and Wellbeing  Outcome: Ongoing, Progressing     Problem: Skin Injury Risk Increased  Goal: Skin Health and Integrity  Outcome: Ongoing, Progressing     Problem: Diabetes Comorbidity  Goal: Blood Glucose Level Within Targeted Range  Outcome: Ongoing, Progressing     Problem: Fluid and Electrolyte Imbalance (Acute Kidney Injury/Impairment)  Goal: Fluid and Electrolyte Balance  Outcome: Ongoing, Progressing     Problem: Renal Function Impairment (Acute Kidney Injury/Impairment)  Goal: Effective Renal Function  Outcome: Ongoing, Progressing

## 2022-01-30 NOTE — ASSESSMENT & PLAN NOTE
Unclear cause. Suspect cardiogenic vs septic. No source of infection identified    --  TTE with normal EF  -- pan cultured, f/u results  -- continue broad spectrum abx  -- was on levophed and epinephrine drip for MAP >65; off pressors since 1/29/22  -- Stress dose steroids 1/28/22.  Will wean to off 1/30/22

## 2022-01-30 NOTE — PROGRESS NOTES
[facility] Medicine  Progress Note    Patient Name: Marck Madison  MRN: [id number]  Patient Class: IP- Inpatient   Admission Date: 1/26/2022  Length of Stay: 4 days  Attending Physician: Virgil Ritchie MD  Primary Care Provider: [facility]        Subjective:     Principal Problem:Acute hypoxemic respiratory failure        HPI:  61 year old male with diastolic heart failure, atrial flutter, pulmonary hypertension, mitral and tricuspid valve regurgitation, emphysema and current every day smoker who presented with shortness of breath, body aches, bilateral flank pain and weakness since last night. Associated symptoms include cough. Admits to smoking. Denied nausea, vomiting, abdominal pain, dysuria, diarrhea. In the ED, patient was hypotensive, tachycardic to 120s, diffuse expiratory wheezing and with a lactic acid of 3. Given one liter of fluids, continuous bronchodilators, methylprednisolone and broad spectrum antibiotics. O2 sats stable at room air. COVID and flu negative. Procalcitonin negative. Remains tachycardic. Cardiology consulted. BP stable. Admit to ICU.       Overview/Hospital Course:  Mr Madison presented with acute exacerbation of diastolic heart failure and atrial fibrillation with RVR. Also with acute renal failure. He was given fluids in the ED. O2 requirements increased significantly. Tox screen positive for amphetamines. Placed on BiPAP but non compliant with this. He is AAO x 3 and with no tremors. Required precedex to comply with BiPAP. Amiodarone infusion and anticoagulation. Nephrology, cardiology and pulmonology consulted. While still in the ED waiting for an ICU bed patient experienced alcohol withdrawals. Give lorazepam and more diuresis but poor UOP and remained in respiratory distress despite lorazepam. Patient was intubated. Required significant vent support. Renal function continued to deteriorate. Started on CRRT.       Interval History: No events  overnight    Review of Systems   Unable to perform ROS: Intubated     Objective:     Vital Signs (Most Recent):  Temp: 96.1 °F (35.6 °C) (01/30/22 0800)  Pulse: 73 (01/30/22 1200)  Resp: 13 (01/30/22 1200)  BP: (!) 156/87 (01/30/22 0809)  SpO2: 95 % (01/30/22 1200) Vital Signs (24h Range):  Temp:  [96.1 °F (35.6 °C)-98.7 °F (37.1 °C)] 96.1 °F (35.6 °C)  Pulse:  [48-81] 73  Resp:  [11-35] 13  SpO2:  [93 %-100 %] 95 %  BP: (113-173)/() 156/87  Arterial Line BP: ()/(58-94) 147/77     Weight: 78.8 kg (173 lb 11.6 oz)  Body mass index is 28.91 kg/m².    Intake/Output Summary (Last 24 hours) at 1/30/2022 1215  Last data filed at 1/30/2022 1125  Gross per 24 hour   Intake 653.52 ml   Output 3015 ml   Net -2361.48 ml      Physical Exam  Constitutional:       General: He is not in acute distress.     Appearance: He is ill-appearing and toxic-appearing. He is not diaphoretic.   Cardiovascular:      Rate and Rhythm: Normal rate and regular rhythm.      Heart sounds: No murmur heard.  No gallop.    Pulmonary:      Effort: Pulmonary effort is normal. No respiratory distress.      Breath sounds: Rales present. No wheezing.      Comments: On vent  Abdominal:      General: Bowel sounds are normal. There is no distension.      Palpations: Abdomen is soft.      Tenderness: There is no abdominal tenderness.   Neurological:      Comments: Pupils sluggish, grimace to pain x 4 extremities         Significant Labs: All pertinent labs within the past 24 hours have been reviewed.    Significant Imaging: I have reviewed all pertinent imaging results/findings within the past 24 hours.      Assessment/Plan:      * Acute hypoxemic respiratory failure  Pt w/ acute onset pulmonary edema, likely cardiogenic. Significant difficulties oxygenating patient initially despite MV, however now improved w/ cRRT  - vent management per pulmonology   - mental status is the barrier to ventilator liberation, holding sedation    Encephalopathy,  metabolic  Pt w/ non-focal encephalopathy of unclear etiology  - neurology following  - holding cessation      Alcohol withdrawal syndrome, with delirium  Controlled on sedation for vent support    Goals of care, counseling/discussion  Pt DNR status (see prior notes for details). Continuing current care for now.    Shock, unspecified  Unclear etiology of shock, TTE essentially rules out obstructive and cardiogenic shock, clinical picture argues strongly against hypovolemic shock, no evidence of hemorrhage. C/f septic shock however no clear source of infection noted.  - weaned from pressors, continue to monitor      Type 2 diabetes mellitus, with long-term current use of insulin  HgbA1c pending. Start insulin and adjust as needed for goal glucose 140-180      Panlobular emphysema  Now on vent support, pulmonology following      Lactic acidosis  Improved lactate likely due to CRRT. Unclear etiology of acidosis - did not improve after improved oxygenation, no refractory shock, no culprit medications.   - monitor while off CRRT      Acute on chronic diastolic congestive heart failure  Initially non-responsive to lasix, now improving w/ CRRT  - volume removal w/ CRRT      Tobacco use disorder, severe, dependence  Counseled on cessation by prior provider.   - nicotine patch      Atrial fibrillation with RVR  Hx of flutter, presented in Afib w/ RVR. Now in NSR after amiodarone  - holding anticoagulation due to thrombocytopenia      Elevated troponin  Flat troponin, likely demand. EF wnl. NST in 2020 without ischemia    Acute renal failure  S/p CRRT w/ improvement in volume status.   - HD per nephrology        VTE Risk Mitigation (From admission, onward)         Ordered     heparin (porcine) injection 5,000 Units  Use PRN         01/27/22 2149     heparin (porcine) injection 5,000 Units  Use PRN         01/27/22 2158     heparin (porcine) injection 5,000 Units  Use PRN         01/27/22 2149     IP VTE HIGH RISK PATIENT   Once         01/26/22 1409     Place sequential compression device  Until discontinued         01/26/22 1409                Discharge Planning   BALDO:      Code Status: DNR   Is the patient medically ready for discharge?:     Reason for patient still in hospital (select all that apply): Patient trending condition, Laboratory test, Treatment, Consult recommendations and Pending disposition  Discharge Plan A:  (tbd)            Critical care time spent on the evaluation and treatment of severe organ dysfunction, review of pertinent labs and imaging studies, discussions with consulting providers and discussions with patient/family: 55 minutes.      Virgil Ritchie MD  Department of Hospital Medicine   [facility] - Intensive Care

## 2022-01-30 NOTE — ASSESSMENT & PLAN NOTE
A fib RVR noted on admission, possibly 2/2 amphetamines as his tox screen was positive. RVR noted on multiple previous admissions      -- was on amiodarone infusion, but now bradycardic. Hold for now  -- full dose AC until 1/30/21.  Held due to altered mentation with non-reactive pupils  -- cardiology following, appreciate assistance

## 2022-01-30 NOTE — SUBJECTIVE & OBJECTIVE
Interval History: No events overnight    Review of Systems   Unable to perform ROS: Intubated     Objective:     Vital Signs (Most Recent):  Temp: 96.1 °F (35.6 °C) (01/30/22 0800)  Pulse: 73 (01/30/22 1200)  Resp: 13 (01/30/22 1200)  BP: (!) 156/87 (01/30/22 0809)  SpO2: 95 % (01/30/22 1200) Vital Signs (24h Range):  Temp:  [96.1 °F (35.6 °C)-98.7 °F (37.1 °C)] 96.1 °F (35.6 °C)  Pulse:  [48-81] 73  Resp:  [11-35] 13  SpO2:  [93 %-100 %] 95 %  BP: (113-173)/() 156/87  Arterial Line BP: ()/(58-94) 147/77     Weight: 78.8 kg (173 lb 11.6 oz)  Body mass index is 28.91 kg/m².    Intake/Output Summary (Last 24 hours) at 1/30/2022 1215  Last data filed at 1/30/2022 1125  Gross per 24 hour   Intake 653.52 ml   Output 3015 ml   Net -2361.48 ml      Physical Exam  Constitutional:       General: He is not in acute distress.     Appearance: He is ill-appearing and toxic-appearing. He is not diaphoretic.   Cardiovascular:      Rate and Rhythm: Normal rate and regular rhythm.      Heart sounds: No murmur heard.  No gallop.    Pulmonary:      Effort: Pulmonary effort is normal. No respiratory distress.      Breath sounds: Rales present. No wheezing.      Comments: On vent  Abdominal:      General: Bowel sounds are normal. There is no distension.      Palpations: Abdomen is soft.      Tenderness: There is no abdominal tenderness.   Neurological:      Comments: Pupils sluggish, grimace to pain x 4 extremities         Significant Labs: All pertinent labs within the past 24 hours have been reviewed.    Significant Imaging: I have reviewed all pertinent imaging results/findings within the past 24 hours.

## 2022-01-31 PROBLEM — D69.6 THROMBOCYTOPENIA: Status: ACTIVE | Noted: 2022-01-31

## 2022-01-31 PROBLEM — K72.00 SHOCK LIVER: Status: ACTIVE | Noted: 2022-01-31

## 2022-01-31 LAB
ALBUMIN SERPL BCP-MCNC: 2.8 G/DL (ref 3.5–5.2)
ALP SERPL-CCNC: 61 U/L (ref 55–135)
ALT SERPL W/O P-5'-P-CCNC: 861 U/L (ref 10–44)
ANION GAP SERPL CALC-SCNC: 14 MMOL/L (ref 8–16)
AST SERPL-CCNC: 371 U/L (ref 10–40)
BASOPHILS # BLD AUTO: 0.04 K/UL (ref 0–0.2)
BASOPHILS NFR BLD: 0.2 % (ref 0–1.9)
BILIRUB SERPL-MCNC: 0.9 MG/DL (ref 0.1–1)
BUN SERPL-MCNC: 64 MG/DL (ref 8–23)
CALCIUM SERPL-MCNC: 8.4 MG/DL (ref 8.7–10.5)
CHLORIDE SERPL-SCNC: 100 MMOL/L (ref 95–110)
CO2 SERPL-SCNC: 23 MMOL/L (ref 23–29)
CREAT SERPL-MCNC: 3.3 MG/DL (ref 0.5–1.4)
DIFFERENTIAL METHOD: ABNORMAL
EOSINOPHIL # BLD AUTO: 0 K/UL (ref 0–0.5)
EOSINOPHIL NFR BLD: 0.1 % (ref 0–8)
ERYTHROCYTE [DISTWIDTH] IN BLOOD BY AUTOMATED COUNT: 13.1 % (ref 11.5–14.5)
EST. GFR  (AFRICAN AMERICAN): 22 ML/MIN/1.73 M^2
EST. GFR  (NON AFRICAN AMERICAN): 19 ML/MIN/1.73 M^2
GLUCOSE SERPL-MCNC: 120 MG/DL (ref 70–110)
HAV IGM SERPL QL IA: NEGATIVE
HBV CORE IGM SERPL QL IA: NEGATIVE
HBV SURFACE AG SERPL QL IA: NEGATIVE
HCT VFR BLD AUTO: 39.7 % (ref 40–54)
HCV AB SERPL QL IA: POSITIVE
HGB BLD-MCNC: 14.2 G/DL (ref 14–18)
IMM GRANULOCYTES # BLD AUTO: 0.14 K/UL (ref 0–0.04)
IMM GRANULOCYTES NFR BLD AUTO: 0.9 % (ref 0–0.5)
LYMPHOCYTES # BLD AUTO: 1.6 K/UL (ref 1–4.8)
LYMPHOCYTES NFR BLD: 9.9 % (ref 18–48)
MAGNESIUM SERPL-MCNC: 2.4 MG/DL (ref 1.6–2.6)
MCH RBC QN AUTO: 33.3 PG (ref 27–31)
MCHC RBC AUTO-ENTMCNC: 35.8 G/DL (ref 32–36)
MCV RBC AUTO: 93 FL (ref 82–98)
MONOCYTES # BLD AUTO: 0.9 K/UL (ref 0.3–1)
MONOCYTES NFR BLD: 5.5 % (ref 4–15)
NEUTROPHILS # BLD AUTO: 13.4 K/UL (ref 1.8–7.7)
NEUTROPHILS NFR BLD: 83.4 % (ref 38–73)
NRBC BLD-RTO: 1 /100 WBC
PHOSPHATE SERPL-MCNC: 3 MG/DL (ref 2.7–4.5)
PLATELET # BLD AUTO: 67 K/UL (ref 150–450)
PMV BLD AUTO: 11.2 FL (ref 9.2–12.9)
POCT GLUCOSE: 111 MG/DL (ref 70–110)
POCT GLUCOSE: 122 MG/DL (ref 70–110)
POCT GLUCOSE: 131 MG/DL (ref 70–110)
POCT GLUCOSE: 137 MG/DL (ref 70–110)
POTASSIUM SERPL-SCNC: 2.7 MMOL/L (ref 3.5–5.1)
PROT SERPL-MCNC: 5.9 G/DL (ref 6–8.4)
RBC # BLD AUTO: 4.27 M/UL (ref 4.6–6.2)
SODIUM SERPL-SCNC: 137 MMOL/L (ref 136–145)
VANCOMYCIN SERPL-MCNC: 24.5 UG/ML
WBC # BLD AUTO: 16.05 K/UL (ref 3.9–12.7)

## 2022-01-31 PROCEDURE — 25000003 PHARM REV CODE 250: Performed by: NURSE PRACTITIONER

## 2022-01-31 PROCEDURE — 25000003 PHARM REV CODE 250: Performed by: INTERNAL MEDICINE

## 2022-01-31 PROCEDURE — 63600175 PHARM REV CODE 636 W HCPCS: Performed by: INTERNAL MEDICINE

## 2022-01-31 PROCEDURE — 63600175 PHARM REV CODE 636 W HCPCS: Performed by: STUDENT IN AN ORGANIZED HEALTH CARE EDUCATION/TRAINING PROGRAM

## 2022-01-31 PROCEDURE — 80053 COMPREHEN METABOLIC PANEL: CPT | Performed by: STUDENT IN AN ORGANIZED HEALTH CARE EDUCATION/TRAINING PROGRAM

## 2022-01-31 PROCEDURE — 97535 SELF CARE MNGMENT TRAINING: CPT

## 2022-01-31 PROCEDURE — 63600175 PHARM REV CODE 636 W HCPCS: Performed by: NURSE PRACTITIONER

## 2022-01-31 PROCEDURE — 84100 ASSAY OF PHOSPHORUS: CPT | Performed by: STUDENT IN AN ORGANIZED HEALTH CARE EDUCATION/TRAINING PROGRAM

## 2022-01-31 PROCEDURE — S4991 NICOTINE PATCH NONLEGEND: HCPCS | Performed by: INTERNAL MEDICINE

## 2022-01-31 PROCEDURE — 83735 ASSAY OF MAGNESIUM: CPT | Performed by: STUDENT IN AN ORGANIZED HEALTH CARE EDUCATION/TRAINING PROGRAM

## 2022-01-31 PROCEDURE — 20000000 HC ICU ROOM

## 2022-01-31 PROCEDURE — 99232 PR SUBSEQUENT HOSPITAL CARE,LEVL II: ICD-10-PCS | Mod: ,,, | Performed by: PSYCHIATRY & NEUROLOGY

## 2022-01-31 PROCEDURE — 92610 EVALUATE SWALLOWING FUNCTION: CPT

## 2022-01-31 PROCEDURE — 99291 CRITICAL CARE FIRST HOUR: CPT | Mod: ,,, | Performed by: NURSE PRACTITIONER

## 2022-01-31 PROCEDURE — 25000003 PHARM REV CODE 250: Performed by: STUDENT IN AN ORGANIZED HEALTH CARE EDUCATION/TRAINING PROGRAM

## 2022-01-31 PROCEDURE — 80202 ASSAY OF VANCOMYCIN: CPT | Performed by: INTERNAL MEDICINE

## 2022-01-31 PROCEDURE — 99232 SBSQ HOSP IP/OBS MODERATE 35: CPT | Mod: ,,, | Performed by: PSYCHIATRY & NEUROLOGY

## 2022-01-31 PROCEDURE — 99291 PR CRITICAL CARE, E/M 30-74 MINUTES: ICD-10-PCS | Mod: ,,, | Performed by: NURSE PRACTITIONER

## 2022-01-31 PROCEDURE — 27000221 HC OXYGEN, UP TO 24 HOURS

## 2022-01-31 PROCEDURE — A4216 STERILE WATER/SALINE, 10 ML: HCPCS | Performed by: INTERNAL MEDICINE

## 2022-01-31 PROCEDURE — 85025 COMPLETE CBC W/AUTO DIFF WBC: CPT | Performed by: STUDENT IN AN ORGANIZED HEALTH CARE EDUCATION/TRAINING PROGRAM

## 2022-01-31 RX ORDER — METOPROLOL TARTRATE 1 MG/ML
5 INJECTION, SOLUTION INTRAVENOUS ONCE
Status: COMPLETED | OUTPATIENT
Start: 2022-01-31 | End: 2022-01-31

## 2022-01-31 RX ORDER — METOPROLOL TARTRATE 50 MG/1
50 TABLET ORAL 3 TIMES DAILY
Status: DISCONTINUED | OUTPATIENT
Start: 2022-01-31 | End: 2022-02-01

## 2022-01-31 RX ORDER — METOPROLOL TARTRATE 50 MG/1
50 TABLET ORAL 3 TIMES DAILY
Status: DISCONTINUED | OUTPATIENT
Start: 2022-01-31 | End: 2022-01-31

## 2022-01-31 RX ORDER — POTASSIUM CHLORIDE 7.45 MG/ML
10 INJECTION INTRAVENOUS
Status: COMPLETED | OUTPATIENT
Start: 2022-01-31 | End: 2022-01-31

## 2022-01-31 RX ORDER — POTASSIUM CHLORIDE 20 MEQ/1
60 TABLET, EXTENDED RELEASE ORAL ONCE
Status: COMPLETED | OUTPATIENT
Start: 2022-01-31 | End: 2022-01-31

## 2022-01-31 RX ORDER — POTASSIUM CHLORIDE 7.45 MG/ML
10 INJECTION INTRAVENOUS ONCE
Status: COMPLETED | OUTPATIENT
Start: 2022-01-31 | End: 2022-01-31

## 2022-01-31 RX ORDER — SODIUM CHLORIDE 9 MG/ML
INJECTION, SOLUTION INTRAVENOUS CONTINUOUS
Status: DISCONTINUED | OUTPATIENT
Start: 2022-01-31 | End: 2022-02-03

## 2022-01-31 RX ADMIN — AMIODARONE HYDROCHLORIDE 0.5 MG/MIN: 50 INJECTION, SOLUTION INTRAVENOUS at 07:01

## 2022-01-31 RX ADMIN — Medication 10 ML: at 06:01

## 2022-01-31 RX ADMIN — POTASSIUM CHLORIDE 10 MEQ: 7.46 INJECTION, SOLUTION INTRAVENOUS at 12:01

## 2022-01-31 RX ADMIN — SODIUM CHLORIDE, PRESERVATIVE FREE 10 ML: 5 INJECTION INTRAVENOUS at 02:01

## 2022-01-31 RX ADMIN — POTASSIUM CHLORIDE 60 MEQ: 1500 TABLET, EXTENDED RELEASE ORAL at 07:01

## 2022-01-31 RX ADMIN — Medication 10 ML: at 12:01

## 2022-01-31 RX ADMIN — INSULIN DETEMIR 5 UNITS: 100 INJECTION, SOLUTION SUBCUTANEOUS at 10:01

## 2022-01-31 RX ADMIN — POTASSIUM CHLORIDE 10 MEQ: 7.46 INJECTION, SOLUTION INTRAVENOUS at 04:01

## 2022-01-31 RX ADMIN — APIXABAN 5 MG: 5 TABLET, FILM COATED ORAL at 08:01

## 2022-01-31 RX ADMIN — FAMOTIDINE 20 MG: 10 INJECTION INTRAVENOUS at 09:01

## 2022-01-31 RX ADMIN — SODIUM CHLORIDE: 0.9 INJECTION, SOLUTION INTRAVENOUS at 05:01

## 2022-01-31 RX ADMIN — METOPROLOL TARTRATE 50 MG: 50 TABLET, FILM COATED ORAL at 08:01

## 2022-01-31 RX ADMIN — METOPROLOL TARTRATE 50 MG: 50 TABLET, FILM COATED ORAL at 04:01

## 2022-01-31 RX ADMIN — POTASSIUM CHLORIDE 10 MEQ: 7.46 INJECTION, SOLUTION INTRAVENOUS at 11:01

## 2022-01-31 RX ADMIN — CEFEPIME HYDROCHLORIDE 2 G: 2 INJECTION, SOLUTION INTRAVENOUS at 10:01

## 2022-01-31 RX ADMIN — MUPIROCIN: 20 OINTMENT TOPICAL at 08:01

## 2022-01-31 RX ADMIN — CHLORHEXIDINE GLUCONATE 0.12% ORAL RINSE 15 ML: 1.2 LIQUID ORAL at 08:01

## 2022-01-31 RX ADMIN — LORAZEPAM 1 MG: 2 INJECTION INTRAMUSCULAR; INTRAVENOUS at 07:01

## 2022-01-31 RX ADMIN — MUPIROCIN: 20 OINTMENT TOPICAL at 09:01

## 2022-01-31 RX ADMIN — Medication 1 PATCH: at 09:01

## 2022-01-31 RX ADMIN — POTASSIUM CHLORIDE 10 MEQ: 7.46 INJECTION, SOLUTION INTRAVENOUS at 01:01

## 2022-01-31 RX ADMIN — SODIUM CHLORIDE, PRESERVATIVE FREE 10 ML: 5 INJECTION INTRAVENOUS at 06:01

## 2022-01-31 RX ADMIN — METOROPROLOL TARTRATE 5 MG: 5 INJECTION, SOLUTION INTRAVENOUS at 01:01

## 2022-01-31 NOTE — NURSING
[facility] [place]    Patient Name: Marck Madison  MRN: [id number]  Attending Provider:  Virgil Ritchie MD  1/31/2022       Wounds on : Q Shift    [x] No   [x]Prevention Measures Documented    [] Yes    []LDA's Charted   []Wound Care Consulted (optional)   []Photo Taken (optional)   []MD Notified    Attending RN:  Carmita Vital RN     Second RN:  Tung Wellington RN

## 2022-01-31 NOTE — ASSESSMENT & PLAN NOTE
-Suspect this is 2/2 cardiogenic pulmonary edema. He was initially admitted on room air with no respiratory distress and decompensated to the point of needing intubation the following day. BNP elevated at 439 prior to receiving several liters of fluid. CXR with worsening bilateral opacities; significant amount of pink frothy sputum noted on intubation. Known diastolic dysfunction at baseline and a history of systolic dysfunction with an EF of 35% which has since recovered. Initially requiring 100% FiO2 and a PEEP of 20+ to maintain adequate saturations; extremely difficult to ventilate and oxygenate. Known COPD with wheezing reported though no obstruction evident on ventilator.     -- doing much better with oxygenation and acidosis with crrt  -- Repeat TTE with EF 55% and hypokinesis of inferior wall.  -- Fluid removal with RRT/ diuresis      -- Elevated WBC but procal WNL.   -- empiric cefepime and vanco 1/28/22.  Culture ngtd.  Still with leukocytosis.    -- extubated to NC on 1/30; continue weaning oxygen as needed

## 2022-01-31 NOTE — ASSESSMENT & PLAN NOTE
Unclear etiology of shock, TTE essentially rules out obstructive and cardiogenic shock, clinical picture argues strongly against hypovolemic shock, no evidence of hemorrhage. C/f septic shock however no clear source of infection noted.  - weaned from pressors, continue to monitor  - continue cefepime, stop vancomycin

## 2022-01-31 NOTE — ASSESSMENT & PLAN NOTE
FARHAN noted on admission and worsening. Cardiorenal vs iATN     -- Avoid ACEI/ARB/NSAIDS/Nephrotoxins.   -- Renally dose all meds  -- Renner in place, strict I&Os  -- Nephrology consulted, appreciate assistance   -- Emergent RRT started overnight for severe acidosis and hyperkalemia   -- making urine, signs of recovery

## 2022-01-31 NOTE — PROGRESS NOTES
Pharmacokinetic Assessment Follow Up: IV Vancomycin    Vancomycin serum concentration assessment(s):    The random level was drawn correctly and can be used to guide therapy at this time. The measurement is above the desired definitive target range of 10 to 20 mcg/mL.    Vancomycin Regimen Plan:    No dose today.  Re-dose when the random level is less than 20 mcg/mL, next level to be drawn at 0300 on 2/1/2022    Drug levels (last 3 results):  Recent Labs   Lab Result Units 01/29/22  0312 01/30/22  0301 01/31/22  0323   Vancomycin, Random ug/mL 18.2 14.3 24.5       Pharmacy will continue to follow and monitor vancomycin.    Please contact pharmacy at extension [phone] for questions regarding this assessment.    Thank you for the consult,   Luis Mendez Jr       Patient brief summary:  Marck Madison is a 61 y.o. male initiated on antimicrobial therapy with IV Vancomycin for treatment of sepsis    Drug Allergies:   Review of patient's allergies indicates:  No Known Allergies    Actual Body Weight:   78.8 kg    Renal Function:   Estimated Creatinine Clearance: 22.2 mL/min (A) (based on SCr of 3.3 mg/dL (H)).,     Dialysis Method (if applicable):  N/A    CBC (last 72 hours):  Recent Labs   Lab Result Units 01/29/22  0312 01/30/22  0301 01/31/22  0323   WBC K/uL 18.53* 16.27* 16.05*   Hemoglobin g/dL 14.6 14.2 14.2   Hematocrit % 42.1 40.9 39.7*   Platelets K/uL 84* 55* 67*   Gran % % 91.9* 91.3* 83.4*   Lymph % % 4.0* 4.5* 9.9*   Mono % % 3.1* 2.7* 5.5   Eosinophil % % 0.0 0.0 0.1   Basophil % % 0.2 0.1 0.2   Differential Method  Automated Automated Automated       Metabolic Panel (last 72 hours):  Recent Labs   Lab Result Units 01/28/22  1150 01/28/22  1531 01/28/22  1947 01/28/22  2104 01/28/22  2353 01/29/22  0312 01/29/22  0828 01/29/22  1522 01/29/22  2120 01/29/22  2330 01/30/22  0301 01/31/22  0323   Sodium mmol/L 135* 134* 132* 131*  --  133*  133*  --  133* 132*  --  135*  135* 137   Potassium mmol/L 5.6*  5.4* 5.4* 5.4*  --  5.1  5.1  --  4.0 3.8  --  3.8  3.8 2.7*   Chloride mmol/L 100 98 100 99  --  101  101  --  102 100  --  102  102 100   CO2 mmol/L 19* 14* 13* 13*  --  16*  16*  --  19* 19*  --  20*  20* 23   Glucose mg/dL 145* 220* 214* 250*  --  119*  119*  --  113* 177*  --  143*  143* 120*   BUN mg/dL 44* 43* 41* 39*  --  46*  46*  --  41* 40*  --  36*  36* 64*   Creatinine mg/dL 3.1* 3.2* 3.3* 3.2*  --  3.7*  3.7*  --  3.1* 3.0*  --  2.7*  2.7* 3.3*   Albumin g/dL  --  3.0*  --  2.7*  --  2.8*  2.8*  --  2.9* 2.7*  --  2.7*  2.7* 2.8*   Total Bilirubin mg/dL  --   --   --   --   --  0.9  --   --   --   --  0.8 0.9   Alkaline Phosphatase U/L  --   --   --   --   --  63  --   --   --   --  61 61   AST U/L  --   --   --   --   --  3,307*  --   --   --   --  956* 371*   ALT U/L  --   --   --   --   --  1,621*  --   --   --   --  1,207* 861*   Magnesium mg/dL  --  1.7 1.7  --  1.7  --  1.8 1.9  --  1.9 1.9 2.4   Phosphorus mg/dL  --  5.8* 5.4* 4.9*  --  4.7*  4.7*  --  4.7* 4.3  --  4.4  4.4 3.0       Vancomycin Administrations:  vancomycin given in the last 96 hours                     vancomycin 750 mg in dextrose 5 % 250 mL IVPB (ready to mix system) (mg) 750 mg New Bag 01/30/22 1125    vancomycin 750 mg in dextrose 5 % 250 mL IVPB (ready to mix system) (mg) 750 mg New Bag 01/29/22 1116    vancomycin 750 mg in dextrose 5 % 250 mL IVPB (ready to mix system) (mg) 750 mg New Bag 01/28/22 1324                    Microbiologic Results:  Microbiology Results (last 7 days)       Procedure Component Value Units Date/Time    Blood culture x two cultures. Draw prior to antibiotics. [[id number]] Collected: 01/26/22 1225    Order Status: Completed Specimen: Blood from Peripheral, Forearm, Left Updated: 01/30/22 1303     Blood Culture, Routine No Growth after 4 days.     Narrative:      Aerobic and anaerobic    Blood culture x two cultures. Draw prior to antibiotics. [[id number]] Collected: 01/26/22 1221     Order Status: Completed Specimen: Blood from Peripheral, Forearm, Left Updated: 01/30/22 1303     Blood Culture, Routine No Growth after 4 days.     Narrative:      Aerobic and anaerobic    Culture, Respiratory with Gram Stain [[id number]]     Order Status: No result Specimen: Respiratory from Endotracheal Aspirate

## 2022-01-31 NOTE — ASSESSMENT & PLAN NOTE
Hx of flutter, presented in Afib w/ RVR. Converted to NSR w/ amiodarone, then returned to flutter overnight 1/30.   - on amio gtt  - start scheduled lopressor  - will make npo at midnight; could consider cardiology consult for CV if warranted

## 2022-01-31 NOTE — PROGRESS NOTES
[facility] [place] [facility]  Pulmonology  Progress Note    Patient Name: Marck Madison  MRN: [id number]  Admission Date: 1/26/2022  Hospital Length of Stay: 5 days  Code Status: DNR  Attending Provider: Virgil Ritchie MD  Primary Care Provider: [facility]   Principal Problem: Acute hypoxemic respiratory failure    Subjective:     Interval History: Extubated yesterday afternoon without incident. On NC and able to wean. Still with runs of A. fib RVR on amiodarone. Making urine. Overall, improving.     Review of Systems   Respiratory: Negative for sputum production and shortness of breath.    Cardiovascular: Positive for palpitations. Negative for chest pain.   Gastrointestinal: Negative for abdominal pain, nausea and vomiting.   Genitourinary: Negative for dysuria.   Musculoskeletal: Positive for neck pain.     Objective:     Vital Signs (Most Recent):  Temp: 98.1 °F (36.7 °C) (01/31/22 1200)  Pulse: (!) 132 (01/31/22 1200)  Resp: (!) 23 (01/31/22 1200)  BP: (!) 134/102 (01/31/22 1200)  SpO2: 97 % (01/31/22 1200) Vital Signs (24h Range):  Temp:  [98.1 °F (36.7 °C)-99.1 °F (37.3 °C)] 98.1 °F (36.7 °C)  Pulse:  [] 132  Resp:  [15-28] 23  SpO2:  [92 %-100 %] 97 %  BP: (115-185)/() 134/102  Arterial Line BP: (130-178)/(68-93) 156/93     Weight: 78.8 kg (173 lb 11.6 oz)  Body mass index is 28.91 kg/m².      Intake/Output Summary (Last 24 hours) at 1/31/2022 1330  Last data filed at 1/31/2022 1200  Gross per 24 hour   Intake 1057.76 ml   Output 1575 ml   Net -517.24 ml       Physical Exam  Vitals and nursing note reviewed.   Constitutional:       Appearance: He is ill-appearing.      Interventions: Nasal cannula in place.   HENT:      Head: Normocephalic and atraumatic.   Eyes:      General: No scleral icterus.     Comments: Pupils nonreactive.   Neck:      Comments: RIJ Trialysis catheter in place   Cardiovascular:      Rate and Rhythm: Regular rhythm.      Pulses: No decreased pulses.    Pulmonary:      Effort: No tachypnea, accessory muscle usage or respiratory distress.      Breath sounds: Rhonchi present. No wheezing or rales (diffuse, inspiratory ).   Abdominal:      General: Bowel sounds are normal. There is no distension.      Palpations: Abdomen is soft.   Genitourinary:     Comments: Renner in place   Musculoskeletal:      Cervical back: Normal range of motion. No rigidity.      Right lower le+ Edema present.      Left lower le+ Edema present.   Skin:     General: Skin is cool.      Capillary Refill: Capillary refill takes more than 3 seconds.   Neurological:      Mental Status: He is alert.      Comments: AAO, follows all commands.        Lines/Drains/Airways     Peripherally Inserted Central Catheter Line            PICC Triple Lumen 22 1915 right brachial 3 days          Central Venous Catheter Line            Percutaneous Central Line Insertion/Assessment - Triple Lumen  22 2130 right internal jugular 3 days          Drain                 Urethral Catheter 22 0916 Double-lumen 18 Fr. 4 days          Arterial Line            Arterial Line 22 0015 Left Radial 3 days          Peripheral Intravenous Line                 Peripheral IV - Single Lumen 22 1000 Left;Posterior Hand 5 days         Peripheral IV - Single Lumen 22 20 G Anterior;Right Upper Arm 4 days                Significant Labs:    CBC/Anemia Profile:  Recent Labs   Lab 22  0301 22  0323   WBC 16.27* 16.05*   HGB 14.2 14.2   HCT 40.9 39.7*   PLT 55* 67*   MCV 95 93   RDW 13.2 13.1        Chemistries:  Recent Labs   Lab 22  1522 22  2120 22  2330 22  0301 22  0323   NA  --  132*  --  135*  135* 137   K  --  3.8  --  3.8  3.8 2.7*   CL  --  100  --  102  102 100   CO2  --  19*  --  20*  20* 23   BUN  --  40*  --  36*  36* 64*   CREATININE  --  3.0*  --  2.7*  2.7* 3.3*   CALCIUM  --  8.1*  --  8.5*  8.5* 8.4*   ALBUMIN  --  2.7*  --  2.7*   2.7* 2.8*   PROT  --   --   --  5.8* 5.9*   BILITOT  --   --   --  0.8 0.9   ALKPHOS  --   --   --  61 61   ALT  --   --   --  1,207* 861*   AST  --   --   --  956* 371*   MG   < >  --  1.9 1.9 2.4   PHOS  --  4.3  --  4.4  4.4 3.0    < > = values in this interval not displayed.       All pertinent labs within the past 24 hours have been reviewed.    Significant Imaging:  I have reviewed all pertinent imaging results/findings within the past 24 hours.      ABG  Recent Labs   Lab 01/30/22  0923   PH 7.401   PO2 116*   PCO2 38.9   HCO3 24.2   BE 0     Assessment/Plan:     * Acute hypoxemic respiratory failure  -Suspect this is 2/2 cardiogenic pulmonary edema. He was initially admitted on room air with no respiratory distress and decompensated to the point of needing intubation the following day. BNP elevated at 439 prior to receiving several liters of fluid. CXR with worsening bilateral opacities; significant amount of pink frothy sputum noted on intubation. Known diastolic dysfunction at baseline and a history of systolic dysfunction with an EF of 35% which has since recovered. Initially requiring 100% FiO2 and a PEEP of 20+ to maintain adequate saturations; extremely difficult to ventilate and oxygenate. Known COPD with wheezing reported though no obstruction evident on ventilator.     -- doing much better with oxygenation and acidosis with crrt  -- Repeat TTE with EF 55% and hypokinesis of inferior wall.  -- Fluid removal with RRT/ diuresis      -- Elevated WBC but procal WNL.   -- empiric cefepime and vanco 1/28/22.  Culture ngtd.  Still with leukocytosis.    -- extubated to NC on 1/30; continue weaning oxygen as needed    Encephalopathy, metabolic  Mental status much improved today    Goals of care, counseling/discussion  1/27 - I spoke with niece, Jessica Gabriel. Per Ms. Harvey, patient does not have spouse or children.  Ms. Gabriel is in agreement with current POC.  She does not want CPR or cardioversion in the  "case of cardiac arrest.  Code status to DNR.       1/28 - Vicki Thomas "Along with Dr. Vences, I updated Ms. Gabriel by phone regarding Mr. Madison's continued decline. She verbalized understanding the severity of his condition and realizes that he may not survive through today. She stated that she is not ready to "take him off of the machines until tomorrow". I assured her that we would continue with the current plan of care and continue conversations tomorrow if he survives until then."    Shock, unspecified  Unclear cause. Suspect cardiogenic vs septic. No source of infection identified    -- TTE with normal EF  -- pan cultured, f/u results  -- complete 7 days of cefepime; d/c vanc and azithro  -- was on levophed and epinephrine drip for MAP >65; off pressors since 1/29/22  -- Stress dose steroids 1/28/22.  Weaned off 1/30/22    Panlobular emphysema  -s/p solucortef and hydrocotisone  -off steroid since 1/30/21    Lactic acidosis  etiology unclear - may relate to hypoperfusion state a/w respiratory failure and shock.       -- abdominal exam benight  -- acid status improved    Acute on chronic diastolic congestive heart failure  -- fluid removal with RRT/diuresis      Atrial fibrillation with RVR  A fib RVR noted on admission, possibly 2/2 amphetamines as his tox screen was positive. RVR noted on multiple previous admissions      -- back on amiodarone infusion  -- full dose AC until 1/30/21.  Held due to altered mentation with non-reactive pupils. Needs some form of full dose AC started.   -- cardiology following, appreciate assistance      Acute renal failure  FARHAN noted on admission and worsening. Cardiorenal vs iATN     -- Avoid ACEI/ARB/NSAIDS/Nephrotoxins.   -- Renally dose all meds  -- Renner in place, strict I&Os  -- Nephrology consulted, appreciate assistance   -- Emergent RRT started overnight for severe acidosis and hyperkalemia   -- making urine, signs of recovery     Plan discussed with Dr. Valdez.    Critical " Care Time: 45 minutes  Critical care was time spent personally by me on the following activities: development of treatment plan with patient or surrogate and bedside caregivers, discussions with consultants, evaluation of patient's response to treatment, examination of patient, ordering and performing treatments and interventions, ordering and review of laboratory studies, ordering and review of radiographic studies, pulse oximetry, re-evaluation of patient's condition. This critical care time did not overlap with that of any other provider or involve time for any procedures.     Vicki Thomas NP  Pulmonology  [facility] [place] - Intensive Care

## 2022-01-31 NOTE — CARE UPDATE
[facility] Intensive Care  ICU Shift Summary  Date: 1/31/2022      Prehospitalization: Home  Admit Date / LOS : 1/26/2022/ 5 days    Diagnosis: Acute hypoxemic respiratory failure    Consults:        Active: Cardio, Nephro and Pulm CC       Needed: N/A     Code Status: DNR   Advanced Directive: <no information>    LDA: Renner, PICC, PIV and Trialysis       Central Lines/Site/Justification:Multiple GTTS       Urinary Cath/Order/Justification:Critically Ill in the ICU and requiring intensive monitoring    Vasopressors/Infusions:    sodium chloride 0.9%      amiodarone in dextrose 5% 0.5 mg/min (01/31/22 1600)          GOALS: Volume/ Hemodynamic: HR <                     RASS: 0  alert and calm    Pain Management: PO       Pain Controlled: yes     Rhythm: A-Fib    Respiratory Device: Nasal Cannula                  Most Recent SBT/ SAT: N/A       MOVE Screen: PT Consult  ICU Liberation: not applicable    VTE Prophylaxis: Pharm  Mobility: Bedrest  Stress Ulcer Prophylaxis: Yes    Isolation: No active isolations  Dietary: PO  Tolerance: yes  Advancement: @ goal    I & O (24h):    Intake/Output Summary (Last 24 hours) at 1/31/2022 1639  Last data filed at 1/31/2022 1600  Gross per 24 hour   Intake 1422.14 ml   Output 1375 ml   Net 47.14 ml        Restraints: No    Significant Dates:  Post Op Date: N/A  Rescue Date: N/A  Imaging/ Diagnostics: see results    Noteworthy Labs:  See below    COVID Test: (--)  CBC/Anemia Labs: Coags:    Recent Labs   Lab 01/30/22  0301 01/31/22  0323   WBC 16.27* 16.05*   HGB 14.2 14.2   HCT 40.9 39.7*   PLT 55* 67*   MCV 95 93   RDW 13.2 13.1    No results for input(s): PT, INR, APTT in the last 168 hours.     Chemistries:   Recent Labs   Lab 01/30/22  0301 01/31/22  0323   *  135* 137   K 3.8  3.8 2.7*     102 100   CO2 20*  20* 23   BUN 36*  36* 64*   CREATININE 2.7*  2.7* 3.3*   CALCIUM 8.5*  8.5* 8.4*   PROT 5.8* 5.9*   BILITOT 0.8 0.9   ALKPHOS 61 61   ALT 1,207* 861*    * 371*   MG 1.9 2.4   PHOS 4.4  4.4 3.0        Cardiac Enzymes: Ejection Fractions:    No results for input(s): CPK, CPKMB, MB, TROPONINI in the last 72 hours. EF   Date Value Ref Range Status   01/28/2022 55 % Final        POCT Glucose: HbA1c:    Recent Labs   Lab 01/31/22  0058 01/31/22  0615 01/31/22  1012   POCTGLUCOSE 111* 137* 122*    Hemoglobin A1C   Date Value Ref Range Status   06/30/2021 10.5 (H) 4.0 - 5.6 % Final     Comment:     ADA Screening Guidelines:  5.7-6.4%  Consistent with prediabetes  >or=6.5%  Consistent with diabetes    High levels of fetal hemoglobin interfere with the HbA1C  assay. Heterozygous hemoglobin variants (HbS, HgC, etc)do  not significantly interfere with this assay.   However, presence of multiple variants may affect accuracy.             ICU LOS 4d  Level of Care: Critical Care    Chart Check: 12 HR Done  Shift Summary/Plan for the shift: See care plan note

## 2022-01-31 NOTE — ASSESSMENT & PLAN NOTE
Pt w/ acute onset pulmonary edema, likely cardiogenic. Significant difficulties oxygenating patient initially despite ventilation,

## 2022-01-31 NOTE — PT/OT/SLP EVAL
"Speech Language Pathology Evaluation  Bedside Swallow    Patient Name:  Marck Madison   MRN:  [id number]  Admitting Diagnosis: Acute hypoxemic respiratory failure    Recommendations:                 General Recommendations:  Dysphagia therapy and Cognitive-linguistic evaluation  Diet recommendations:  Mechanical soft, Thin   Aspiration Precautions: 1 bite/sip at a time, Assistance with meals, HOB to 90 degrees, Small bites/sips and Standard aspiration precautions   General Precautions: Standard,    Communication strategies:  provide increased time to answer    History:     Past Medical History:   Diagnosis Date    Arrhythmia 2017    aflutter    Atrial flutter     CAD (coronary artery disease)     CHF (congestive heart failure), NYHA class I 2017    Cholelithiasis with chronic cholecystitis     Chronic diastolic heart failure     Cigarette smoker     COPD (chronic obstructive pulmonary disease)     COVID-19 06/01/2021    DKA (diabetic ketoacidosis)     NSTEMI (non-ST elevation myocardial infarction)     NSVT (nonsustained ventricular tachycardia)     Psychiatric disorder     h/o "schizophrena/bipolar"    Schizoaffective disorder     Severe sepsis        Past Surgical History:   Procedure Laterality Date    LIVER SURGERY      abscess drainage; 1970s    TREATMENT OF CARDIAC ARRHYTHMIA  9/12/2019    Procedure: Cardioversion or Defibrillation;  Surgeon: Jonathan Lopez MD;  Location: [facility] CATH LAB;  Service: Cardiology;;       Prior Intubation HX:  Intubated  1/27/22 ;extubated 1/30/22    Modified Barium Swallow: not on file    Chest X-Rays: 1/30/22: Support devices: Several overlying monitoring leads partially limiting assessment.  Right central venous catheter tip projects over the lower SVC.  Endotracheal tube terminates above the rosmery.  Esophagogastric tube courses below the diaphragm.  Right PICC positioning appears similar.     Low lung volumes with improving infiltrate on the right.  Remainder of " the lungs and heart demonstrate no significant interval detrimental change.  No gross pneumothorax.       Prior diet: unrestricted    Subjective   Pt awake in bed upon SLP arrival. Pt oriented to self only. Pt with generalized weakness and fatigue. Pt attempted to answer simple questions, however, vocal quality is barely audible 2/2 to recent extubation. Pt agreeable to accept PO trials for swallow eval.    Patient goals: unable to report at this time.    Pain/Comfort:  · Pain Rating 1: 0/10    Respiratory Status: Nasal cannula, flow 3 L/min    Objective:     Oral Musculature Evaluation  · Oral Musculature: general weakness  · Dentition: edentulous  · Secretion Management: adequate  · Mucosal Quality: good  · Mandibular Strength and Mobility: WFL  · Oral Labial Strength and Mobility: WFL  · Voice Prior to PO Intake: low volume, almost aphonic; clear quality  · Oral Musculature Comments: Pt exhibits generalized weakness for oral musculature.    Bedside Swallow Eval:   Consistencies Assessed:  · Thin liquids via spoon x2 trial; straw x4 oz  · Puree x4 oz  · Soft solids x4 bites of cracker softned in applesauce     Oral Phase:   · Prolonged mastication- soft solids  · Lingual residue- soft solids; cleared with liquid wash  · Slow oral transit time- soft solids    Pharyngeal Phase:   · delayed swallow initation    Compensatory Strategies  · None    Treatment: Rec; PO diet mechanical soft/thin liquids; whole meds one at a time.    Please note silent aspiration cannot be ruled out at bedside.    Education: Patient and family educated on aspiration precautions.(1 bite/sip at a time, Assistance with meals, HOB to 90 degrees, Small bites/sips and Standard aspiration precautions)     DELTA Vizcarra notified regarding diet recs. White board update in patient's room regarding diet recs.      Assessment:     Marck Madison is a 61 y.o. male with a dx of Acute hypoxemic respiratory failure. Pt presents with moderate oropharyngeal  dysphagia c/b prolonged mastication and slow A-P transport of soft solids with slightly delayed swallow response. ST will follow pt to monitor diet tolerance and and complete cognitive-communication eval when pt's CLAYTON improves.    Goals:   Multidisciplinary Problems     SLP Goals        Problem: SLP Goal    Goal Priority Disciplines Outcome   SLP Goal     SLP Ongoing, Progressing   Description: ST. Pt will tolerate mechanical soft/thin liquid diet without overt s/s of aspiration.  2. Pt will participate in speech, lang, cognitive-linguistic eval to determine level of functional communication.                   Plan:     · Patient to be seen:  3 x/week,2 x/week   · Plan of Care expires:  22  · Plan of Care reviewed with:  patient   · SLP Follow-Up:  Yes       Discharge recommendations:  other (see comments) (TBD)   Barriers to Discharge:  None    Time Tracking:     SLP Treatment Date:   22  Speech Start Time:  944  Speech Stop Time:  1009     Speech Total Time (min):  25 min    Billable Minutes: Eval Swallow and Oral Function 15 min and Self Care/Home Management Training 10 min    2022

## 2022-01-31 NOTE — PROGRESS NOTES
Date of Admission:1/26/2022    SUBJECTIVE:open eyes, some response to me    Current Facility-Administered Medications   Medication    albuterol-ipratropium 2.5 mg-0.5 mg/3 mL nebulizer solution 3 mL    amiodarone (CORDARONE) 450 mg in dextrose 5 % 250 mL infusion    apixaban tablet 5 mg    aspirin chewable tablet 81 mg    calcium gluconate 1g in dextrose 5% 100mL (ready to mix system)    cefepime in dextrose 5 % IVPB 2 g    chlorhexidine 0.12 % solution 15 mL    dextrose 10% bolus 125 mL    dextrose 10% bolus 250 mL    dextrose 10% bolus 250 mL    dextrose 50% injection 12.5 g    dextrose 50% injection 25 g    famotidine (PF) injection 20 mg    glucagon (human recombinant) injection 1 mg    glucose chewable tablet 16 g    glucose chewable tablet 24 g    heparin (porcine) injection 5,000 Units    heparin (porcine) injection 5,000 Units    heparin (porcine) injection 5,000 Units    insulin aspart U-100 pen 0-5 Units    insulin detemir U-100 pen 5 Units    lorazepam injection 1 mg    melatonin tablet 6 mg    mupirocin 2 % ointment    naloxone 0.4 mg/mL injection 0.02 mg    nicotine 21 mg/24 hr 1 patch    senna-docusate 8.6-50 mg per tablet 1 tablet    sodium chloride 0.9% flush 10 mL    And    sodium chloride 0.9% flush 10 mL       Wt Readings from Last 3 Encounters:   01/30/22 78.8 kg (173 lb 11.6 oz)   11/21/21 79.4 kg (175 lb)   07/28/21 79.4 kg (175 lb)     Temp Readings from Last 3 Encounters:   01/31/22 98.1 °F (36.7 °C) (Axillary)   11/21/21 98.2 °F (36.8 °C) (Oral)   07/29/21 98.7 °F (37.1 °C)     BP Readings from Last 3 Encounters:   01/31/22 (!) 180/116   11/21/21 139/85   07/29/21 129/89     Pulse Readings from Last 3 Encounters:   01/31/22 (!) 133   11/21/21 95   07/29/21 91       Intake/Output Summary (Last 24 hours) at 1/31/2022 1531  Last data filed at 1/31/2022 1500  Gross per 24 hour   Intake 1405.44 ml   Output 1375 ml   Net 30.44 ml       PE:  GEN:wd male in  nad  HEENT:ncat,eyes open, Ett  CVS:s1s2 tachy  PULM:ctab  ABD:+bs, soft  EXT:no leg edema  NEURO:awakens     Recent Labs   Lab 01/31/22 0323   *      K 2.7*      CO2 23   BUN 64*   CREATININE 3.3*   CALCIUM 8.4*   MG 2.4       Lab Results   Component Value Date    .5 (H) 09/10/2019    CALCIUM 8.4 (L) 01/31/2022    CAION 1.19 01/21/2020    PHOS 3.0 01/31/2022       Recent Labs   Lab 01/31/22 0323   WBC 16.05*   RBC 4.27*   HGB 14.2   HCT 39.7*   PLT 67*   MCV 93   MCH 33.3*   MCHC 35.8           A/P:  1.alireza. hold hd.  Creatinine up. uop good. Watch vanc. Level up. Following. Watch cefepime dosing.  2.acute resp failure.volume negative. Start some ivfs.  3.afib. rate up. Following.  4.htn.sbp up. Consider   5.dm2.  Watch sugar.  6.hypokalemia.  Replete.  7.shock liver. Lfts. Better.

## 2022-01-31 NOTE — ASSESSMENT & PLAN NOTE
A fib RVR noted on admission, possibly 2/2 amphetamines as his tox screen was positive. RVR noted on multiple previous admissions      -- back on amiodarone infusion  -- full dose AC until 1/30/21.  Held due to altered mentation with non-reactive pupils. Needs some form of full dose AC started.   -- cardiology following, appreciate assistance

## 2022-01-31 NOTE — ASSESSMENT & PLAN NOTE
Unclear cause. Suspect cardiogenic vs septic. No source of infection identified    -- TTE with normal EF  -- pan cultured, f/u results  -- complete 7 days of cefepime; d/c vanc and azithro  -- was on levophed and epinephrine drip for MAP >65; off pressors since 1/29/22  -- Stress dose steroids 1/28/22.  Weaned off 1/30/22

## 2022-01-31 NOTE — ASSESSMENT & PLAN NOTE
Thrombocytopenia which was concurrent w/ HD. Low-intermediate HIT probability.   - apixaban  - monitor

## 2022-01-31 NOTE — SUBJECTIVE & OBJECTIVE
Interval History: No events overnight. This AM pt tracks, sometimes offered one word responses, but does not answer most questions.    Review of Systems   Unable to perform ROS: Mental status change     Objective:     Vital Signs (Most Recent):  Temp: 98.1 °F (36.7 °C) (01/31/22 1200)  Pulse: (!) 132 (01/31/22 1400)  Resp: (!) 22 (01/31/22 1400)  BP: (!) 200/99 (01/31/22 1400)  SpO2: 100 % (01/31/22 1400) Vital Signs (24h Range):  Temp:  [98.1 °F (36.7 °C)-99.1 °F (37.3 °C)] 98.1 °F (36.7 °C)  Pulse:  [] 132  Resp:  [17-28] 22  SpO2:  [92 %-100 %] 100 %  BP: (115-200)/() 200/99  Arterial Line BP: (130-178)/(68-96) 148/90     Weight: 78.8 kg (173 lb 11.6 oz)  Body mass index is 28.91 kg/m².    Intake/Output Summary (Last 24 hours) at 1/31/2022 1503  Last data filed at 1/31/2022 1400  Gross per 24 hour   Intake 1288.74 ml   Output 1375 ml   Net -86.26 ml      Physical Exam  Constitutional:       General: He is not in acute distress.     Appearance: He is ill-appearing. He is not toxic-appearing or diaphoretic.   Cardiovascular:      Rate and Rhythm: Normal rate and regular rhythm.      Heart sounds: No murmur heard.  No gallop.    Pulmonary:      Effort: Pulmonary effort is normal. No respiratory distress.      Breath sounds: Rales present. No wheezing.   Abdominal:      General: Bowel sounds are normal. There is no distension.      Palpations: Abdomen is soft.      Tenderness: There is no abdominal tenderness.         Significant Labs: All pertinent labs within the past 24 hours have been reviewed.    Significant Imaging: I have reviewed all pertinent imaging results/findings within the past 24 hours.

## 2022-01-31 NOTE — PLAN OF CARE
Patient is awake/alert confused to time/place/situation on assessment.  Cooperative and passed bedside swallow with speech therapy.  Remains in afib on amio gtt.  Art line removed.  Voiding clear yellow urine per Renner.  No fall/injury this shift.  Family updated at bedside.

## 2022-01-31 NOTE — PROGRESS NOTES
[facility]  Progress Note    Patient Name: Marck Madison  MRN: [id number]  Patient Class: IP- Inpatient   Admission Date: 1/26/2022  Length of Stay: 5 days  Attending Physician: Virgil Ritchie MD  Primary Care Provider: [facility]        Subjective:     Principal Problem:Acute hypoxemic respiratory failure        HPI:  61 year old male with diastolic heart failure, atrial flutter, pulmonary hypertension, mitral and tricuspid valve regurgitation, emphysema and current every day smoker who presented with shortness of breath, body aches, bilateral flank pain and weakness since last night. Associated symptoms include cough. Admits to smoking. Denied nausea, vomiting, abdominal pain, dysuria, diarrhea. In the ED, patient was hypotensive, tachycardic to 120s, diffuse expiratory wheezing and with a lactic acid of 3. Given one liter of fluids, continuous bronchodilators, methylprednisolone and broad spectrum antibiotics. O2 sats stable at room air. COVID and flu negative. Procalcitonin negative. Remains tachycardic. Cardiology consulted. BP stable. Admit to ICU.       Overview/Hospital Course:  Mr Madison presented with acute exacerbation of diastolic heart failure and atrial fibrillation with RVR. Also with acute renal failure. He was given fluids in the ED. O2 requirements increased significantly. Tox screen positive for amphetamines. Placed on BiPAP but non compliant with this. He is AAO x 3 and with no tremors. Required precedex to comply with BiPAP. Amiodarone infusion and anticoagulation. Nephrology, cardiology and pulmonology consulted. While still in the ED waiting for an ICU bed patient experienced alcohol withdrawals. Give lorazepam and more diuresis but poor UOP and remained in respiratory distress despite lorazepam. Patient was intubated. Required significant vent support. Renal function continued to deteriorate. Started on CRRT.     Pt initially poorly responsive  to CRRT, but eventually improved, hypoxia decreased. Sedation was stopped but he was poorly responsive. His mental status improved and he was extubated to nasal cannula.      Interval History: No events overnight. This AM pt tracks, sometimes offered one word responses, but does not answer most questions.    Review of Systems   Unable to perform ROS: Mental status change     Objective:     Vital Signs (Most Recent):  Temp: 98.1 °F (36.7 °C) (01/31/22 1200)  Pulse: (!) 132 (01/31/22 1400)  Resp: (!) 22 (01/31/22 1400)  BP: (!) 200/99 (01/31/22 1400)  SpO2: 100 % (01/31/22 1400) Vital Signs (24h Range):  Temp:  [98.1 °F (36.7 °C)-99.1 °F (37.3 °C)] 98.1 °F (36.7 °C)  Pulse:  [] 132  Resp:  [17-28] 22  SpO2:  [92 %-100 %] 100 %  BP: (115-200)/() 200/99  Arterial Line BP: (130-178)/(68-96) 148/90     Weight: 78.8 kg (173 lb 11.6 oz)  Body mass index is 28.91 kg/m².    Intake/Output Summary (Last 24 hours) at 1/31/2022 1503  Last data filed at 1/31/2022 1400  Gross per 24 hour   Intake 1288.74 ml   Output 1375 ml   Net -86.26 ml      Physical Exam  Constitutional:       General: He is not in acute distress.     Appearance: He is ill-appearing. He is not toxic-appearing or diaphoretic.   Cardiovascular:      Rate and Rhythm: Normal rate and regular rhythm.      Heart sounds: No murmur heard.  No gallop.    Pulmonary:      Effort: Pulmonary effort is normal. No respiratory distress.      Breath sounds: Rales present. No wheezing.   Abdominal:      General: Bowel sounds are normal. There is no distension.      Palpations: Abdomen is soft.      Tenderness: There is no abdominal tenderness.         Significant Labs: All pertinent labs within the past 24 hours have been reviewed.    Significant Imaging: I have reviewed all pertinent imaging results/findings within the past 24 hours.      Assessment/Plan:      * Acute hypoxemic respiratory failure  Pt w/ acute onset pulmonary edema, likely cardiogenic. Significant  difficulties oxygenating patient initially despite ventilation,     Shock liver  Improving with resolution of shock      Thrombocytopenia  Thrombocytopenia which was concurrent w/ HD. Low-intermediate HIT probability.   - apixaban  - monitor      Encephalopathy, metabolic  Pt w/ non-focal encephalopathy of unclear etiology, likely due to sedation +/- hypoperfusion. Improving with time. No focal deficits.  - neurology following      Alcohol withdrawal syndrome, with delirium  Controlled on sedation for vent support    Goals of care, counseling/discussion  Pt DNR status (see prior notes for details). Continuing current care for now.    Shock, unspecified  Unclear etiology of shock, TTE essentially rules out obstructive and cardiogenic shock, clinical picture argues strongly against hypovolemic shock, no evidence of hemorrhage. C/f septic shock however no clear source of infection noted.  - weaned from pressors, continue to monitor  - continue cefepime, stop vancomycin      Type 2 diabetes mellitus, with long-term current use of insulin  HgbA1c pending. Start insulin and adjust as needed for goal glucose 140-180      Panlobular emphysema  Now on vent support, pulmonology following.      Lactic acidosis  Improved lactate likely due to CRRT. Unclear etiology of acidosis - did not improve after improved oxygenation, no refractory shock, no culprit medications.   - monitor while off CRRT      Acute on chronic diastolic congestive heart failure  Initially non-responsive to lasix, now improving w/ CRRT  - volume removal via CRRT vs lasix      Hypertension  Cont lopressor      Tobacco use disorder, severe, dependence  Counseled on cessation by prior provider.   - nicotine patch      Atrial fibrillation with RVR  Hx of flutter, presented in Afib w/ RVR. Converted to NSR w/ amiodarone, then returned to flutter overnight 1/30.   - on amio gtt  - start scheduled lopressor  - will make npo at midnight; could consider cardiology  consult for CV if warranted      Elevated troponin  Flat troponin, likely demand. EF wnl. NST in 2020 without ischemia    Acute renal failure  S/p CRRT w/ improvement in volume status and creatinine.   - holding CRRT, will monitor for HD needs      VTE Risk Mitigation (From admission, onward)         Ordered     apixaban tablet 5 mg  2 times daily         01/31/22 1341     heparin (porcine) injection 5,000 Units  Use PRN         01/27/22 2149     heparin (porcine) injection 5,000 Units  Use PRN         01/27/22 2158     heparin (porcine) injection 5,000 Units  Use PRN         01/27/22 2149     IP VTE HIGH RISK PATIENT  Once         01/26/22 1409     Place sequential compression device  Until discontinued         01/26/22 1409                Discharge Planning   BALDO:      Code Status: DNR   Is the patient medically ready for discharge?:     Reason for patient still in hospital (select all that apply): Patient trending condition, Laboratory test, Treatment, Consult recommendations and Pending disposition  Discharge Plan A:  (tbd)            Critical care time spent on the evaluation and treatment of severe organ dysfunction, review of pertinent labs and imaging studies, discussions with consulting providers and discussions with patient/family: 45 minutes.      Virgil Ritchie MD  Department of Hospital Medicine   [facility] [place] - Intensive Care

## 2022-01-31 NOTE — PLAN OF CARE
Problem: SLP Goal  Goal: SLP Goal  Description: ST. Pt will tolerate mechanical soft/thin liquid diet without overt s/s of aspiration.  2. Pt will participate in speech, lang, cognitive-linguistic eval to determine level of functional communication.  Outcome: Ongoing, Progressing     ST will follow pt per poc.

## 2022-01-31 NOTE — ASSESSMENT & PLAN NOTE
Pt w/ non-focal encephalopathy of unclear etiology, likely due to sedation +/- hypoperfusion. Improving with time. No focal deficits.  - neurology following

## 2022-01-31 NOTE — ASSESSMENT & PLAN NOTE
etiology unclear - may relate to hypoperfusion state a/w respiratory failure and shock.       -- abdominal exam benight  -- acid status improved

## 2022-01-31 NOTE — SUBJECTIVE & OBJECTIVE
Interval History: Extubated yesterday afternoon without incident. On NC and able to wean. Still with runs of A. fib RVR on amiodarone. Making urine. Overall, improving.     Review of Systems   Respiratory: Negative for sputum production and shortness of breath.    Cardiovascular: Positive for palpitations. Negative for chest pain.   Gastrointestinal: Negative for abdominal pain, nausea and vomiting.   Genitourinary: Negative for dysuria.   Musculoskeletal: Positive for neck pain.     Objective:     Vital Signs (Most Recent):  Temp: 98.1 °F (36.7 °C) (01/31/22 1200)  Pulse: (!) 132 (01/31/22 1200)  Resp: (!) 23 (01/31/22 1200)  BP: (!) 134/102 (01/31/22 1200)  SpO2: 97 % (01/31/22 1200) Vital Signs (24h Range):  Temp:  [98.1 °F (36.7 °C)-99.1 °F (37.3 °C)] 98.1 °F (36.7 °C)  Pulse:  [] 132  Resp:  [15-28] 23  SpO2:  [92 %-100 %] 97 %  BP: (115-185)/() 134/102  Arterial Line BP: (130-178)/(68-93) 156/93     Weight: 78.8 kg (173 lb 11.6 oz)  Body mass index is 28.91 kg/m².      Intake/Output Summary (Last 24 hours) at 1/31/2022 1330  Last data filed at 1/31/2022 1200  Gross per 24 hour   Intake 1057.76 ml   Output 1575 ml   Net -517.24 ml       Physical Exam  Vitals and nursing note reviewed.   Constitutional:       Appearance: He is ill-appearing.      Interventions: Nasal cannula in place.   HENT:      Head: Normocephalic and atraumatic.   Eyes:      General: No scleral icterus.     Comments: Pupils nonreactive.   Neck:      Comments: RIJ Trialysis catheter in place   Cardiovascular:      Rate and Rhythm: Regular rhythm.      Pulses: No decreased pulses.   Pulmonary:      Effort: No tachypnea, accessory muscle usage or respiratory distress.      Breath sounds: Rhonchi present. No wheezing or rales (diffuse, inspiratory ).   Abdominal:      General: Bowel sounds are normal. There is no distension.      Palpations: Abdomen is soft.   Genitourinary:     Comments: Renner in place   Musculoskeletal:       Cervical back: Normal range of motion. No rigidity.      Right lower le+ Edema present.      Left lower le+ Edema present.   Skin:     General: Skin is cool.      Capillary Refill: Capillary refill takes more than 3 seconds.   Neurological:      Mental Status: He is alert.      Comments: AAO, follows all commands.        Lines/Drains/Airways     Peripherally Inserted Central Catheter Line            PICC Triple Lumen 22 191 right brachial 3 days          Central Venous Catheter Line            Percutaneous Central Line Insertion/Assessment - Triple Lumen  22 213 right internal jugular 3 days          Drain                 Urethral Catheter 22 0916 Double-lumen 18 Fr. 4 days          Arterial Line            Arterial Line 22 0015 Left Radial 3 days          Peripheral Intravenous Line                 Peripheral IV - Single Lumen 22 1000 Left;Posterior Hand 5 days         Peripheral IV - Single Lumen 22 20 G Anterior;Right Upper Arm 4 days                Significant Labs:    CBC/Anemia Profile:  Recent Labs   Lab 22  0301 22  0323   WBC 16.27* 16.05*   HGB 14.2 14.2   HCT 40.9 39.7*   PLT 55* 67*   MCV 95 93   RDW 13.2 13.1        Chemistries:  Recent Labs   Lab 22  1522 22  2120 22  2330 22  0301 22  0323   NA  --  132*  --  135*  135* 137   K  --  3.8  --  3.8  3.8 2.7*   CL  --  100  --  102  102 100   CO2  --  19*  --  20*  20* 23   BUN  --  40*  --  36*  36* 64*   CREATININE  --  3.0*  --  2.7*  2.7* 3.3*   CALCIUM  --  8.1*  --  8.5*  8.5* 8.4*   ALBUMIN  --  2.7*  --  2.7*  2.7* 2.8*   PROT  --   --   --  5.8* 5.9*   BILITOT  --   --   --  0.8 0.9   ALKPHOS  --   --   --  61 61   ALT  --   --   --  1,207* 861*   AST  --   --   --  956* 371*   MG   < >  --  1.9 1.9 2.4   PHOS  --  4.3  --  4.4  4.4 3.0    < > = values in this interval not displayed.       All pertinent labs within the past 24 hours have been  reviewed.    Significant Imaging:  I have reviewed all pertinent imaging results/findings within the past 24 hours.

## 2022-01-31 NOTE — ASSESSMENT & PLAN NOTE
S/p CRRT w/ improvement in volume status and creatinine.   - holding CRRT, will monitor for HD needs

## 2022-02-01 LAB
ANION GAP SERPL CALC-SCNC: 11 MMOL/L (ref 8–16)
BUN SERPL-MCNC: 65 MG/DL (ref 8–23)
CALCIUM SERPL-MCNC: 8.9 MG/DL (ref 8.7–10.5)
CHLORIDE SERPL-SCNC: 104 MMOL/L (ref 95–110)
CO2 SERPL-SCNC: 25 MMOL/L (ref 23–29)
CREAT SERPL-MCNC: 2.7 MG/DL (ref 0.5–1.4)
EST. GFR  (AFRICAN AMERICAN): 28 ML/MIN/1.73 M^2
EST. GFR  (NON AFRICAN AMERICAN): 24 ML/MIN/1.73 M^2
GLUCOSE SERPL-MCNC: 151 MG/DL (ref 70–110)
POCT GLUCOSE: 146 MG/DL (ref 70–110)
POCT GLUCOSE: 147 MG/DL (ref 70–110)
POCT GLUCOSE: 149 MG/DL (ref 70–110)
POCT GLUCOSE: 164 MG/DL (ref 70–110)
POCT GLUCOSE: 169 MG/DL (ref 70–110)
POTASSIUM SERPL-SCNC: 3.2 MMOL/L (ref 3.5–5.1)
SODIUM SERPL-SCNC: 140 MMOL/L (ref 136–145)

## 2022-02-01 PROCEDURE — A4216 STERILE WATER/SALINE, 10 ML: HCPCS | Performed by: INTERNAL MEDICINE

## 2022-02-01 PROCEDURE — 99291 CRITICAL CARE FIRST HOUR: CPT | Mod: ,,, | Performed by: NURSE PRACTITIONER

## 2022-02-01 PROCEDURE — 94761 N-INVAS EAR/PLS OXIMETRY MLT: CPT

## 2022-02-01 PROCEDURE — 63600175 PHARM REV CODE 636 W HCPCS: Performed by: STUDENT IN AN ORGANIZED HEALTH CARE EDUCATION/TRAINING PROGRAM

## 2022-02-01 PROCEDURE — 99291 PR CRITICAL CARE, E/M 30-74 MINUTES: ICD-10-PCS | Mod: ,,, | Performed by: NURSE PRACTITIONER

## 2022-02-01 PROCEDURE — 25000003 PHARM REV CODE 250: Performed by: NURSE PRACTITIONER

## 2022-02-01 PROCEDURE — 25000003 PHARM REV CODE 250: Performed by: STUDENT IN AN ORGANIZED HEALTH CARE EDUCATION/TRAINING PROGRAM

## 2022-02-01 PROCEDURE — 25000003 PHARM REV CODE 250: Performed by: INTERNAL MEDICINE

## 2022-02-01 PROCEDURE — 27000221 HC OXYGEN, UP TO 24 HOURS

## 2022-02-01 PROCEDURE — 63600175 PHARM REV CODE 636 W HCPCS: Performed by: INTERNAL MEDICINE

## 2022-02-01 PROCEDURE — 63600175 PHARM REV CODE 636 W HCPCS: Performed by: NURSE PRACTITIONER

## 2022-02-01 PROCEDURE — 20000000 HC ICU ROOM

## 2022-02-01 PROCEDURE — 99900035 HC TECH TIME PER 15 MIN (STAT)

## 2022-02-01 PROCEDURE — 80048 BASIC METABOLIC PNL TOTAL CA: CPT | Performed by: STUDENT IN AN ORGANIZED HEALTH CARE EDUCATION/TRAINING PROGRAM

## 2022-02-01 PROCEDURE — S4991 NICOTINE PATCH NONLEGEND: HCPCS | Performed by: INTERNAL MEDICINE

## 2022-02-01 RX ORDER — AMIODARONE HYDROCHLORIDE 200 MG/1
400 TABLET ORAL 2 TIMES DAILY
Status: DISCONTINUED | OUTPATIENT
Start: 2022-02-01 | End: 2022-02-05 | Stop reason: HOSPADM

## 2022-02-01 RX ORDER — METOPROLOL SUCCINATE 50 MG/1
50 TABLET, EXTENDED RELEASE ORAL 2 TIMES DAILY
Status: DISCONTINUED | OUTPATIENT
Start: 2022-02-01 | End: 2022-02-02

## 2022-02-01 RX ORDER — FOLIC ACID 1 MG/1
1 TABLET ORAL DAILY
Status: DISCONTINUED | OUTPATIENT
Start: 2022-02-02 | End: 2022-02-05 | Stop reason: HOSPADM

## 2022-02-01 RX ORDER — LANOLIN ALCOHOL/MO/W.PET/CERES
100 CREAM (GRAM) TOPICAL DAILY
Status: DISCONTINUED | OUTPATIENT
Start: 2022-02-01 | End: 2022-02-05 | Stop reason: HOSPADM

## 2022-02-01 RX ORDER — HYDRALAZINE HYDROCHLORIDE 20 MG/ML
10 INJECTION INTRAMUSCULAR; INTRAVENOUS EVERY 6 HOURS PRN
Status: DISCONTINUED | OUTPATIENT
Start: 2022-02-01 | End: 2022-02-05 | Stop reason: HOSPADM

## 2022-02-01 RX ORDER — CHLORDIAZEPOXIDE HYDROCHLORIDE 10 MG/1
10 CAPSULE, GELATIN COATED ORAL 3 TIMES DAILY
Status: DISCONTINUED | OUTPATIENT
Start: 2022-02-01 | End: 2022-02-02

## 2022-02-01 RX ADMIN — APIXABAN 5 MG: 5 TABLET, FILM COATED ORAL at 10:02

## 2022-02-01 RX ADMIN — Medication 10 ML: at 12:02

## 2022-02-01 RX ADMIN — CHLORHEXIDINE GLUCONATE 0.12% ORAL RINSE 15 ML: 1.2 LIQUID ORAL at 10:02

## 2022-02-01 RX ADMIN — Medication 10 ML: at 06:02

## 2022-02-01 RX ADMIN — MUPIROCIN: 20 OINTMENT TOPICAL at 08:02

## 2022-02-01 RX ADMIN — CEFEPIME HYDROCHLORIDE 2 G: 2 INJECTION, SOLUTION INTRAVENOUS at 11:02

## 2022-02-01 RX ADMIN — INSULIN DETEMIR 5 UNITS: 100 INJECTION, SOLUTION SUBCUTANEOUS at 09:02

## 2022-02-01 RX ADMIN — CEFEPIME HYDROCHLORIDE 2 G: 2 INJECTION, SOLUTION INTRAVENOUS at 10:02

## 2022-02-01 RX ADMIN — LORAZEPAM 1 MG: 2 INJECTION INTRAMUSCULAR; INTRAVENOUS at 11:02

## 2022-02-01 RX ADMIN — APIXABAN 5 MG: 5 TABLET, FILM COATED ORAL at 08:02

## 2022-02-01 RX ADMIN — MUPIROCIN: 20 OINTMENT TOPICAL at 10:02

## 2022-02-01 RX ADMIN — LORAZEPAM 1 MG: 2 INJECTION INTRAMUSCULAR; INTRAVENOUS at 03:02

## 2022-02-01 RX ADMIN — ASPIRIN 81 MG CHEWABLE TABLET 81 MG: 81 TABLET CHEWABLE at 10:02

## 2022-02-01 RX ADMIN — METOPROLOL SUCCINATE 50 MG: 50 TABLET, EXTENDED RELEASE ORAL at 08:02

## 2022-02-01 RX ADMIN — THIAMINE HCL TAB 100 MG 100 MG: 100 TAB at 05:02

## 2022-02-01 RX ADMIN — METOPROLOL SUCCINATE 50 MG: 50 TABLET, EXTENDED RELEASE ORAL at 11:02

## 2022-02-01 RX ADMIN — SODIUM CHLORIDE: 0.9 INJECTION, SOLUTION INTRAVENOUS at 06:02

## 2022-02-01 RX ADMIN — AMIODARONE HYDROCHLORIDE 400 MG: 200 TABLET ORAL at 11:02

## 2022-02-01 RX ADMIN — METOPROLOL TARTRATE 50 MG: 50 TABLET, FILM COATED ORAL at 10:02

## 2022-02-01 RX ADMIN — CHLORDIAZEPOXIDE HYDROCHLORIDE 10 MG: 10 CAPSULE ORAL at 08:02

## 2022-02-01 RX ADMIN — SENNOSIDES AND DOCUSATE SODIUM 1 TABLET: 50; 8.6 TABLET ORAL at 08:02

## 2022-02-01 RX ADMIN — FAMOTIDINE 20 MG: 10 INJECTION INTRAVENOUS at 10:02

## 2022-02-01 RX ADMIN — Medication 1 PATCH: at 10:02

## 2022-02-01 RX ADMIN — CHLORDIAZEPOXIDE HYDROCHLORIDE 10 MG: 10 CAPSULE ORAL at 03:02

## 2022-02-01 RX ADMIN — AMIODARONE HYDROCHLORIDE 400 MG: 200 TABLET ORAL at 08:02

## 2022-02-01 RX ADMIN — CHLORDIAZEPOXIDE HYDROCHLORIDE 10 MG: 10 CAPSULE ORAL at 11:02

## 2022-02-01 RX ADMIN — POTASSIUM PHOSPHATE, MONOBASIC 500 MG: 500 TABLET, SOLUBLE ORAL at 10:02

## 2022-02-01 RX ADMIN — HYDRALAZINE HYDROCHLORIDE 10 MG: 20 INJECTION INTRAMUSCULAR; INTRAVENOUS at 07:02

## 2022-02-01 RX ADMIN — SODIUM CHLORIDE: 0.9 INJECTION, SOLUTION INTRAVENOUS at 11:02

## 2022-02-01 RX ADMIN — LORAZEPAM 1 MG: 2 INJECTION INTRAMUSCULAR; INTRAVENOUS at 06:02

## 2022-02-01 NOTE — PROGRESS NOTES
[facility]  Progress Note    Patient Name: Marck Madison  MRN: [id number]  Patient Class: IP- Inpatient   Admission Date: 1/26/2022  Length of Stay: 6 days  Attending Physician: Kulwant Nesbitt MD  [facility] Provider: [facility] [place]        Subjective:     Principal Problem:Acute hypoxemic respiratory failure        HPI:  61 year old male with diastolic heart failure, atrial flutter, pulmonary hypertension, mitral and tricuspid valve regurgitation, emphysema and current every day smoker who presented with shortness of breath, body aches, bilateral flank pain and weakness since last night. Associated symptoms include cough. Admits to smoking. Denied nausea, vomiting, abdominal pain, dysuria, diarrhea. In the ED, patient was hypotensive, tachycardic to 120s, diffuse expiratory wheezing and with a lactic acid of 3. Given one liter of fluids, continuous bronchodilators, methylprednisolone and broad spectrum antibiotics. O2 sats stable at room air. COVID and flu negative. Procalcitonin negative. Remains tachycardic. Cardiology consulted. BP stable. Admit to ICU.       Overview/Hospital Course:  Mr Madison presented with acute exacerbation of diastolic heart failure and atrial fibrillation with RVR. Also with acute renal failure. He was given fluids in the ED. O2 requirements increased significantly. Tox screen positive for amphetamines. Placed on BiPAP but non compliant with this. He is AAO x 3 and with no tremors. Required precedex to comply with BiPAP. Amiodarone infusion and anticoagulation. Nephrology, cardiology and pulmonology consulted. While still in the ED waiting for an ICU bed patient experienced alcohol withdrawals. Give lorazepam and more diuresis but poor UOP and remained in respiratory distress despite lorazepam. Patient was intubated. Required significant vent support. Renal function continued to deteriorate. Started on CRRT.     Pt initially poorly responsive  to CRRT, but eventually improved, hypoxia decreased. Sedation was stopped but he was poorly responsive. His mental status improved and he was extubated to nasal cannula.      Interval History: No acute events overnight, in and out of a-flutter with RVR. Able to take medications. Concern for withdrawals. He's talking to me some today.    Review of Systems   Unable to perform ROS: Mental status change     Objective:     Vital Signs (Most Recent):  Temp: 97.9 °F (36.6 °C) (02/01/22 1200)  Pulse: 72 (02/01/22 1400)  Resp: (!) 29 (02/01/22 1400)  BP: (!) 170/133 (02/01/22 1400)  SpO2: 98 % (02/01/22 1400) Vital Signs (24h Range):  Temp:  [97.9 °F (36.6 °C)-98.9 °F (37.2 °C)] 97.9 °F (36.6 °C)  Pulse:  [] 72  Resp:  [19-31] 29  SpO2:  [92 %-100 %] 98 %  BP: (102-221)/() 170/133  Arterial Line BP: (162)/(97) 162/97     Weight: 78.8 kg (173 lb 11.6 oz)  Body mass index is 28.91 kg/m².    Intake/Output Summary (Last 24 hours) at 2/1/2022 1430  Last data filed at 2/1/2022 1400  Gross per 24 hour   Intake 2036.13 ml   Output 3380 ml   Net -1343.87 ml      Physical Exam  Constitutional:       General: He is not in acute distress.     Appearance: He is ill-appearing. He is not toxic-appearing or diaphoretic.   Cardiovascular:      Rate and Rhythm: Normal rate.      Heart sounds: No murmur heard.  No gallop.       Comments: Aflutter in rate of 60s  Pulmonary:      Effort: Pulmonary effort is normal. No respiratory distress.      Breath sounds: Rales present. No wheezing.   Abdominal:      General: Bowel sounds are normal. There is no distension.      Palpations: Abdomen is soft.      Tenderness: There is no abdominal tenderness.   Musculoskeletal:         General: No swelling.   Skin:     General: Skin is warm and dry.   Neurological:      Mental Status: He is disoriented.         Significant Labs: All pertinent labs within the past 24 hours have been reviewed.    Significant Imaging: I have reviewed all pertinent  imaging results/findings within the past 24 hours.      Assessment/Plan:      * Acute hypoxemic respiratory failure  Pt w/ acute onset pulmonary edema, likely cardiogenic. Significant difficulties oxygenating patient initially despite ventilation  - now he is on minimum oxygen requirements and improving from respiratory standpoint    Shock liver  Improving with resolution of shock      Thrombocytopenia  Thrombocytopenia which was concurrent w/ HD. Low-intermediate HIT probability.   - apixaban  - monitor      Encephalopathy, metabolic  Pt w/ non-focal encephalopathy of unclear etiology, likely due to sedation +/- hypoperfusion. Improving with time. No focal deficits.  - neurology following      Alcohol withdrawal syndrome, with delirium  - started scheduled librium for withdrawals and PRN ativan    Goals of care, counseling/discussion  Pt DNR status (see prior notes for details). Continuing current care for now.    Shock, unspecified  Unclear etiology of shock, TTE essentially rules out obstructive and cardiogenic shock, clinical picture argues strongly against hypovolemic shock, no evidence of hemorrhage. C/f septic shock however no clear source of infection noted.  - weaned from pressors, continue to monitor  - continue cefepime, stop vancomycin  - day #7 of antibiotics with no clear source - complete 10 day course for thoroughness    Type 2 diabetes mellitus, with long-term current use of insulin  A1c:   Lab Results   Component Value Date    HGBA1C 10.5 (H) 06/30/2021     Meds: basal bolus insulin + SSI PRN to maintain goal 140-180  ADA diet, accuchecks ACHS, hypoglycemic protocol        Panlobular emphysema  - extubated and doing okay respiratory wise      Lactic acidosis  Improved lactate likely due to CRRT. Unclear etiology of acidosis - did not improve after improved oxygenation, no refractory shock, no culprit medications.   - monitor while off CRRT      Acute on chronic diastolic congestive heart  failure  Initially non-responsive to lasix, now improving w/ CRRT  - volume removal via CRRT vs lasix PRN  - respiratory wise he is improving      Hypertension  Cont lopressor      Tobacco use disorder, severe, dependence  Counseled on cessation by prior provider.   - nicotine patch      Atrial fibrillation with RVR  Hx of flutter, presented in Afib w/ RVR. Converted to NSR w/ amiodarone, then returned to flutter overnight 1/30.   - on amio gtt and scheduled metoprolol  - treating underlying w/d symptoms  - transition to PO Amiodarone and increase scheduled Metoprolol      Elevated troponin  Flat troponin, likely demand. EF wnl. NST in 2020 without ischemia    Acute renal failure  S/p CRRT w/ improvement in volume status and creatinine.   - holding CRRT, will monitor for HD needs        VTE Risk Mitigation (From admission, onward)         Ordered     apixaban tablet 5 mg  2 times daily         01/31/22 1341     heparin (porcine) injection 5,000 Units  Use PRN         01/27/22 2149     heparin (porcine) injection 5,000 Units  Use PRN         01/27/22 2158     heparin (porcine) injection 5,000 Units  Use PRN         01/27/22 2149     IP VTE HIGH RISK PATIENT  Once         01/26/22 1409     Place sequential compression device  Until discontinued         01/26/22 1409                Discharge Planning   BALDO:      Code Status: DNR   Is the patient medically ready for discharge?:     Reason for patient still in hospital (select all that apply): Treatment  Discharge Plan A:  (tbd)            Critical care time spent on the evaluation and treatment of severe organ dysfunction, review of pertinent labs and imaging studies, discussions with consulting providers and discussions with patient/family: 25 minutes.      Kulwant Nesbitt MD  Department of Hospital Medicine   [facility] [place] - Intensive Care

## 2022-02-01 NOTE — PT/OT/SLP PROGRESS
Occupational Therapy      Patient Name:  Marck Madison   MRN:  [id number]    Patient not seen today secondary to Other (Comment) (The patient is currently on amio for afib/RVR and is not appropriate for OT eval.   ). Will follow-up later today.    2/1/2022

## 2022-02-01 NOTE — PROGRESS NOTES
[facility] - Intensive Care  Neurology  Progress Note    Patient Name: Marck Madison  MRN: [id number]  Admission Date: 1/26/2022  Hospital Length of Stay: 6 days  Code Status: DNR   Attending Provider: Kulwant Nesbitt MD  [facility] Physician: [facility]   Principal Problem:Acute hypoxemic respiratory failure    Subjective:     Interval History: 61 year old male with medical Hx of atrial flutter, pulmonary hypertension, emphysema (still smokes daily)  presented with shortness of breath, body aches, bilateral flank pain and weakness since last night.  In the ED patient found to be hypotensive, tachycardic (120's,bpm).  Admitted for acute CHF exacerbation, afib/RVR. Pt has HX of alcohol abuseand had symptoms of withdrawal in ED. He was ultimately intubated. No off sedation he is reported to be unresponsive. No seizures seen.    -1/31/22: Pt has been extubated. Following commands.    Current Neurological Medications:     Current Facility-Administered Medications   Medication Dose Route Frequency Provider Last Rate Last Admin    0.9%  NaCl infusion   Intravenous Continuous Mame Gallagher MD 75 mL/hr at 02/01/22 0800 Rate Verify at 02/01/22 0800    albuterol-ipratropium 2.5 mg-0.5 mg/3 mL nebulizer solution 3 mL  3 mL Nebulization Q6H PRN Douglas Irby MD   3 mL at 01/27/22 0545    amiodarone (CORDARONE) 450 mg in dextrose 5 % 250 mL infusion  0.5 mg/min Intravenous Continuous Virgil Ritchie MD 16.7 mL/hr at 02/01/22 0800 0.5 mg/min at 02/01/22 0800    apixaban tablet 5 mg  5 mg Oral BID Virgil Ritchie MD   5 mg at 01/31/22 2046    aspirin chewable tablet 81 mg  81 mg Oral Daily Winifred Lopez MD   81 mg at 01/30/22 0907    calcium gluconate 1g in dextrose 5% 100mL (ready to mix system)  1 g Intravenous Q10 Min PRN Bartolome Callaway MD        cefepime in dextrose 5 % IVPB 2 g  2 g Intravenous Q12H Vicki H. Martha, NP   Stopped at 01/31/22 2315    chlorhexidine 0.12 % solution 15 mL  15 mL  Mouth/Throat BID Vicki H. Martha, NP   15 mL at 01/31/22 2046    dextrose 10% bolus 125 mL  12.5 g Intravenous PRN Amy Kaufman MD        dextrose 10% bolus 250 mL  25 g Intravenous Once Bartolome Callaway MD        dextrose 10% bolus 250 mL  25 g Intravenous PRN Amy Kaufman MD        dextrose 50% injection 12.5 g  12.5 g Intravenous PRN Winifred Lopez MD        dextrose 50% injection 25 g  25 g Intravenous PRN Winifred Lopez MD        famotidine (PF) injection 20 mg  20 mg Intravenous Daily Vicki H. Martha, NP   20 mg at 01/31/22 0900    glucagon (human recombinant) injection 1 mg  1 mg Intramuscular PRN Winifred Lopez MD        glucose chewable tablet 16 g  16 g Oral PRN Winifred Lopez MD        glucose chewable tablet 24 g  24 g Oral PRN Winifred Lopez MD        heparin (porcine) injection 5,000 Units  5,000 Units Intravenous PRN Mame Gallagher MD   5,000 Units at 01/30/22 1033    heparin (porcine) injection 5,000 Units  5,000 Units Intravenous PRN Mame Gallagher MD   5,000 Units at 01/30/22 1033    heparin (porcine) injection 5,000 Units  5,000 Units Intravenous PRN Winifred Lopez MD   5,000 Units at 01/27/22 2159    insulin aspart U-100 pen 0-5 Units  0-5 Units Subcutaneous QID (AC + HS) PRSPIKE Lopez MD        insulin detemir U-100 pen 5 Units  5 Units Subcutaneous Daily Winifred Lopez MD   5 Units at 01/31/22 1045    lorazepam injection 1 mg  1 mg Intravenous Q4H PRN Winifred Lopez MD   1 mg at 02/01/22 0346    melatonin tablet 6 mg  6 mg Oral Nightly PRN Winifred Lopez MD        metoprolol tartrate (LOPRESSOR) tablet 50 mg  50 mg Oral TID Virgil Ritchie MD   50 mg at 01/31/22 2049    mupirocin 2 % ointment   Nasal BID Virgil Ritchie MD   Given at 01/31/22 2046    naloxone 0.4 mg/mL injection 0.02 mg  0.02 mg Intravenous PRN Winifred Lopez MD        nicotine 21 mg/24 hr 1 patch  1 patch Transdermal Daily Winifred Lopez MD   1 patch at 01/31/22 0959     potassium phosphate (monobasic) tablet 500 mg  500 mg Oral Daily Kulwant Nesbitt MD        senna-docusate 8.6-50 mg per tablet 1 tablet  1 tablet Oral BID Winifred Lopez MD   1 tablet at 01/30/22 2013    sodium chloride 0.9% flush 10 mL  10 mL Intravenous Q6H Winifred Lopez MD   10 mL at 02/01/22 0600    And    sodium chloride 0.9% flush 10 mL  10 mL Intravenous PRN Winifred Lopez MD           Review of Systems   Unable to perform ROS: Patient unresponsive     Objective:     Vital Signs (Most Recent):  Temp: 98.1 °F (36.7 °C) (02/01/22 0800)  Pulse: (!) 134 (02/01/22 0815)  Resp: (!) 24 (02/01/22 0815)  BP: (!) 201/126 (02/01/22 0800)  SpO2: (!) 93 % (02/01/22 0815) Vital Signs (24h Range):  Temp:  [98.1 °F (36.7 °C)-98.9 °F (37.2 °C)] 98.1 °F (36.7 °C)  Pulse:  [] 134  Resp:  [19-31] 24  SpO2:  [92 %-100 %] 93 %  BP: (102-221)/() 201/126  Arterial Line BP: (130-168)/() 162/97     Weight: 78.8 kg (173 lb 11.6 oz)  Body mass index is 28.91 kg/m².    Physical Exam  Constitutional:       General: He is not in acute distress.  HENT:      Head: Normocephalic.      Right Ear: External ear normal.      Left Ear: External ear normal.   Eyes:      General:         Right eye: No discharge.         Left eye: No discharge.   Cardiovascular:      Rate and Rhythm: Normal rate.   Pulmonary:      Breath sounds: Normal breath sounds.   Abdominal:      Palpations: Abdomen is soft.   Musculoskeletal:         General: No tenderness.      Cervical back: Neck supple.   Skin:     General: Skin is warm.         NEUROLOGICAL EXAMINATION:      MENTAL STATUS        Alert; follows simple commads     CRANIAL NERVES      CN III, IV, VI   Right pupil: Size: 2 mm.   Left pupil: Size: 2 mm.   Nystagmus: none   Conjugate gaze: present     CN V   Right corneal reflex: normal  Left corneal reflex: normal     CN VII   Right facial weakness: none       Intact gag reflex  Intact cough reflex      MOTOR EXAM        AROM  of UE's and LE's against gravity              Significant Labs:   CBC:   Recent Labs   Lab 01/31/22 0323   WBC 16.05*   HGB 14.2   HCT 39.7*   PLT 67*     CMP:   Recent Labs   Lab 01/31/22 0323 02/01/22  0331   * 151*    140   K 2.7* 3.2*    104   CO2 23 25   BUN 64* 65*   CREATININE 3.3* 2.7*   CALCIUM 8.4* 8.9   MG 2.4  --    PROT 5.9*  --    ALBUMIN 2.8*  --    BILITOT 0.9  --    ALKPHOS 61  --    *  --    *  --    ANIONGAP 14 11   EGFRNONAA 19* 24*       Significant Imaging: I have reviewed all pertinent imaging results/findings within the past 24 hours.    Assessment and Plan:        62 y/o male consulted for AMS     1. Encephalopathy: multifactorial given acute renal failure and hepatic failure, EtOh withdrawal.           Pt has been on sedation as well but now responsive, extubated.   -For now will monitor. No finding on exam that would suggest ICH, large area stroke.    2. EtOH withdrawal: tachycardia and hypertension.   -Diazepam 5 mg every 6 hours.   -No seizures.      Active Diagnoses:    Diagnosis Date Noted POA    PRINCIPAL PROBLEM:  Acute hypoxemic respiratory failure [J96.01] 09/10/2019 Yes    Thrombocytopenia [D69.6] 01/31/2022 Yes    Shock liver [K72.00] 01/31/2022 Yes    Encephalopathy, metabolic [G93.41] 01/30/2022 Yes    Goals of care, counseling/discussion [Z71.89] 01/28/2022 Not Applicable    Alcohol withdrawal syndrome, with delirium [F10.231] 01/28/2022 Yes    Shock, unspecified [R57.9] 01/27/2022 No    Panlobular emphysema [J43.1] 01/26/2022 Yes    Type 2 diabetes mellitus, with long-term current use of insulin [E11.9, Z79.4] 01/26/2022 Not Applicable    Lactic acidosis [E87.2] 06/12/2021 Yes    Acute on chronic diastolic congestive heart failure [I50.33] 06/09/2021 Yes    Hypertension [I10] 06/09/2021 Unknown    Tobacco use disorder, severe, dependence [F17.200] 01/21/2020 Yes    Atrial fibrillation with RVR [I48.91] 01/20/2020 Yes     Elevated troponin [R77.8] 01/20/2020 Yes    Acute renal failure [N17.9] 09/10/2019 Yes      Problems Resolved During this Admission:       VTE Risk Mitigation (From admission, onward)         Ordered     apixaban tablet 5 mg  2 times daily         01/31/22 1341     heparin (porcine) injection 5,000 Units  Use PRN         01/27/22 2149     heparin (porcine) injection 5,000 Units  Use PRN         01/27/22 2158     heparin (porcine) injection 5,000 Units  Use PRN         01/27/22 2149     IP VTE HIGH RISK PATIENT  Once         01/26/22 1409     Place sequential compression device  Until discontinued         01/26/22 1409                Jonathan Huang MD  Neurology  [facility] - [place] - Intensive Care

## 2022-02-01 NOTE — NURSING
"Overnight pt remains in afib/aflutter with HR 90-130s. Currently on PO metoprolol,  Amio gtt and IV fluids. SBP 160s-200s at rest, overnight hospitalist notified, no new orders at this time. PRN ativan given x2 for withdrawal symptoms(visual hallucinations, anxiety/restlessness, and hypertension). Pt oriented only to person, and occasionally to place(States "[place]" or "Where am I"). Afebrile overnight. UOP>1500ml this shift.   "

## 2022-02-01 NOTE — PT/OT/SLP PROGRESS
Physical Therapy      Patient Name:  Marck Madison   MRN:  [id number]      Patient not seen today secondary to Other (Comment) (The patient is currently on amio for afib/RVR and is not appropriate for OT eval ). Will follow-up later today.

## 2022-02-01 NOTE — PT/OT/SLP PROGRESS
Speech Language Pathology      Marck Madison  MRN: [id number]    Patient not seen today secondary to Other (Comment) (The patient is currently on amio for afib/RVR and is not appropriate. Pt NPO for possible procedure later today.). Will follow-up tomorrow.

## 2022-02-01 NOTE — ASSESSMENT & PLAN NOTE
Initially non-responsive to lasix, now improving w/ CRRT  - volume removal via CRRT vs lasix PRN  - respiratory wise he is improving

## 2022-02-01 NOTE — PLAN OF CARE
Recommendations    1) Add IV Thiamine, Folic Acid, MVI to assist in managing ETOH withdrawal, avoid Wernicke's.  2) ADAT and medically able to SLP approved texture, Renal Diet per current worsening renal labs.   - If renals have stabilized - Advance to cardiac diet - Diabetic restrictions if needed   - If Unable to advance, Consider alternate means of nutrition.  3) Hypokalemia, Hyperglycemia - Add/adjust insulin PRN    Goals:   1) CIWA-Ar IV supplementation protocol to be implemented by RD Follow up  2) Pt diet to advance within 48 hours  3) Monitored labs to trend toward target ranges    Nutrition Goal Status: new  Communication of RD Recs: reviewed with physician

## 2022-02-01 NOTE — SUBJECTIVE & OBJECTIVE
Interval History: No acute events overnight, in and out of a-flutter with RVR. Able to take medications. Concern for withdrawals. He's talking to me some today.    Review of Systems   Unable to perform ROS: Mental status change     Objective:     Vital Signs (Most Recent):  Temp: 97.9 °F (36.6 °C) (02/01/22 1200)  Pulse: 72 (02/01/22 1400)  Resp: (!) 29 (02/01/22 1400)  BP: (!) 170/133 (02/01/22 1400)  SpO2: 98 % (02/01/22 1400) Vital Signs (24h Range):  Temp:  [97.9 °F (36.6 °C)-98.9 °F (37.2 °C)] 97.9 °F (36.6 °C)  Pulse:  [] 72  Resp:  [19-31] 29  SpO2:  [92 %-100 %] 98 %  BP: (102-221)/() 170/133  Arterial Line BP: (162)/(97) 162/97     Weight: 78.8 kg (173 lb 11.6 oz)  Body mass index is 28.91 kg/m².    Intake/Output Summary (Last 24 hours) at 2/1/2022 1430  Last data filed at 2/1/2022 1400  Gross per 24 hour   Intake 2036.13 ml   Output 3380 ml   Net -1343.87 ml      Physical Exam  Constitutional:       General: He is not in acute distress.     Appearance: He is ill-appearing. He is not toxic-appearing or diaphoretic.   Cardiovascular:      Rate and Rhythm: Normal rate.      Heart sounds: No murmur heard.  No gallop.       Comments: Aflutter in rate of 60s  Pulmonary:      Effort: Pulmonary effort is normal. No respiratory distress.      Breath sounds: Rales present. No wheezing.   Abdominal:      General: Bowel sounds are normal. There is no distension.      Palpations: Abdomen is soft.      Tenderness: There is no abdominal tenderness.   Musculoskeletal:         General: No swelling.   Skin:     General: Skin is warm and dry.   Neurological:      Mental Status: He is disoriented.         Significant Labs: All pertinent labs within the past 24 hours have been reviewed.    Significant Imaging: I have reviewed all pertinent imaging results/findings within the past 24 hours.

## 2022-02-01 NOTE — ASSESSMENT & PLAN NOTE
Unclear cause. Suspect cardiogenic vs septic. No source of infection identified    -- TTE with normal EF  -- pan cultured, f/u results  -- complete 7 days of cefepime; d/c vanc and azithro  -- was on levophed and epinephrine drip for MAP >65; off pressors since 1/29/22  -- Stress dose steroids 1/28/22.  Weaned off 1/30/22    Resolved

## 2022-02-01 NOTE — ASSESSMENT & PLAN NOTE
A1c:   Lab Results   Component Value Date    HGBA1C 10.5 (H) 06/30/2021     Meds: basal bolus insulin + SSI PRN to maintain goal 140-180  ADA diet, accuchecks ACHS, hypoglycemic protocol

## 2022-02-01 NOTE — PROGRESS NOTES
[facility] Intensive Care  Pulmonology  Progress Note    Patient Name: Marck Madison  MRN: [id number]  Admission Date: 1/26/2022  Hospital Length of Stay: 6 days  Code Status: DNR  Attending Provider: Kulwant Nesbitt MD  Primary Care Provider: [facility]   Principal Problem: Acute hypoxemic respiratory failure    Subjective:     Interval History: Weaned to minimal oxygen. Still with intermittent runs of A. Fib RVR.     Review of Systems   Respiratory: Negative for sputum production and shortness of breath.    Cardiovascular: Positive for palpitations. Negative for chest pain.   Gastrointestinal: Negative for abdominal pain, nausea and vomiting.   Genitourinary: Negative for dysuria.   Musculoskeletal: Positive for neck pain.     Objective:     Vital Signs (Most Recent):  Temp: 97.9 °F (36.6 °C) (02/01/22 1200)  Pulse: 74 (02/01/22 1500)  Resp: (!) 24 (02/01/22 1500)  BP: (!) 183/102 (02/01/22 1430)  SpO2: 98 % (02/01/22 1500) Vital Signs (24h Range):  Temp:  [97.9 °F (36.6 °C)-98.9 °F (37.2 °C)] 97.9 °F (36.6 °C)  Pulse:  [] 74  Resp:  [19-31] 24  SpO2:  [92 %-100 %] 98 %  BP: (102-221)/() 183/102     Weight: 78.8 kg (173 lb 11.6 oz)  Body mass index is 28.91 kg/m².      Intake/Output Summary (Last 24 hours) at 2/1/2022 1530  Last data filed at 2/1/2022 1500  Gross per 24 hour   Intake 1994.38 ml   Output 3380 ml   Net -1385.62 ml       Physical Exam  Vitals and nursing note reviewed.   Constitutional:       Appearance: He is ill-appearing.      Interventions: Nasal cannula in place.   HENT:      Head: Normocephalic and atraumatic.   Eyes:      General: No scleral icterus.  Neck:      Comments: RIJ Trialysis catheter in place   Cardiovascular:      Rate and Rhythm: Regular rhythm.      Pulses: No decreased pulses.   Pulmonary:      Effort: No tachypnea, accessory muscle usage or respiratory distress.      Breath sounds: Rales (diffuse, inspiratory ) present. No wheezing or rhonchi.    Abdominal:      General: Bowel sounds are normal. There is no distension.      Palpations: Abdomen is soft.   Genitourinary:     Comments: Renner in place   Musculoskeletal:      Cervical back: Normal range of motion. No rigidity.      Right lower leg: No edema.      Left lower leg: No edema.   Skin:     General: Skin is cool.      Capillary Refill: Capillary refill takes more than 3 seconds.   Neurological:      Mental Status: He is alert.      Comments: AAO, follows all commands.          Vents:  Vent Mode: PS/CPAP (01/30/22 1136)  Ventilator Initiated: Yes (01/27/22 1631)  Set Rate: 30 BPM (01/30/22 0809)  Vt Set: 500 mL (01/30/22 0809)  Pressure Support: 10 cmH20 (01/30/22 1136)  PEEP/CPAP: 5 cmH20 (01/30/22 1136)  Oxygen Concentration (%): 40 (01/30/22 1400)  Peak Airway Pressure: 15.2 cmH2O (01/30/22 1136)  Total Ve: 9.72 mL (01/30/22 1136)  F/VT Ratio<105 (RSBI): (!) 22.76 (01/30/22 1136)    Lines/Drains/Airways     Peripherally Inserted Central Catheter Line            PICC Triple Lumen 01/27/22 1915 right brachial 4 days          Central Venous Catheter Line            Percutaneous Central Line Insertion/Assessment - Triple Lumen  01/27/22 2130 right internal jugular 4 days          Drain                 Urethral Catheter 01/27/22 0916 Double-lumen 18 Fr. 5 days          Peripheral Intravenous Line                 Peripheral IV - Single Lumen 01/26/22 1000 Left;Posterior Hand 6 days         Peripheral IV - Single Lumen 01/27/22 20 G Anterior;Right Upper Arm 5 days                Significant Labs:    CBC/Anemia Profile:  Recent Labs   Lab 01/31/22  0323   WBC 16.05*   HGB 14.2   HCT 39.7*   PLT 67*   MCV 93   RDW 13.1        Chemistries:  Recent Labs   Lab 01/31/22  0323 02/01/22  0331    140   K 2.7* 3.2*    104   CO2 23 25   BUN 64* 65*   CREATININE 3.3* 2.7*   CALCIUM 8.4* 8.9   ALBUMIN 2.8*  --    PROT 5.9*  --    BILITOT 0.9  --    ALKPHOS 61  --    *  --    *  --    MG 2.4   "--    PHOS 3.0  --        All pertinent labs within the past 24 hours have been reviewed.    Significant Imaging:  I have reviewed all pertinent imaging results/findings within the past 24 hours.      ABG  Recent Labs   Lab 01/30/22  0923   PH 7.401   PO2 116*   PCO2 38.9   HCO3 24.2   BE 0     Assessment/Plan:     * Acute hypoxemic respiratory failure  -Suspect this is 2/2 cardiogenic pulmonary edema. He was initially admitted on room air with no respiratory distress and decompensated to the point of needing intubation the following day. BNP elevated at 439 prior to receiving several liters of fluid. CXR with worsening bilateral opacities; significant amount of pink frothy sputum noted on intubation. Known diastolic dysfunction at baseline and a history of systolic dysfunction with an EF of 35% which has since recovered. Initially requiring 100% FiO2 and a PEEP of 20+ to maintain adequate saturations; extremely difficult to ventilate and oxygenate. Known COPD with wheezing reported though no obstruction evident on ventilator.     -- doing much better with oxygenation and acidosis with crrt  -- Repeat TTE with EF 55% and hypokinesis of inferior wall.  -- Fluid removal with RRT/ diuresis      -- Elevated WBC but procal WNL.   -- empiric cefepime last day today.  vanco d/c.  Culture ngtd.   -- extubated to NC on 1/30; continue weaning oxygen as needed    Encephalopathy, metabolic  Mental status much improved today    Alcohol withdrawal syndrome, with delirium  -- librium scheduled     Goals of care, counseling/discussion  1/27 - I spoke with niece, Jessica Gabriel. Per Ms. Gabriel, patient does not have spouse or children.  Ms. Gabriel is in agreement with current POC.  She does not want CPR or cardioversion in the case of cardiac arrest.  Code status to DNR.       1/28 - Vicki Thomas "Along with Dr. Vences, I updated Ms. Gabriel by phone regarding Mr. Madison's continued decline. She verbalized understanding the severity of his " "condition and realizes that he may not survive through today. She stated that she is not ready to "take him off of the machines until tomorrow". I assured her that we would continue with the current plan of care and continue conversations tomorrow if he survives until then."    Shock, unspecified  Unclear cause. Suspect cardiogenic vs septic. No source of infection identified    -- TTE with normal EF  -- pan cultured, f/u results  -- complete 7 days of cefepime; d/c vanc and azithro  -- was on levophed and epinephrine drip for MAP >65; off pressors since 1/29/22  -- Stress dose steroids 1/28/22.  Weaned off 1/30/22    Resolved     Panlobular emphysema  -- s/p solucortef and hydrocotisone  -- off steroid since 1/30/21    Atrial fibrillation with RVR  A fib RVR noted on admission, possibly 2/2 amphetamines as his tox screen was positive. RVR noted on multiple previous admissions      -- back on amiodarone infusion  -- full dose AC until 1/30/21.  Held due to altered mentation with non-reactive pupils. Needs some form of full dose AC started.   -- cardiology following, appreciate assistance      Acute renal failure  FARHAN noted on admission and worsening. Cardiorenal vs iATN     -- Avoid ACEI/ARB/NSAIDS/Nephrotoxins.   -- Renally dose all meds  -- Renner in place, strict I&Os  -- Nephrology consulted, appreciate assistance   -- Emergent RRT started overnight for severe acidosis and hyperkalemia   -- making urine, signs of recovery   -- cautious with IVFs    Plan discussed with Dr. Valdez.    Critical Care Time: 35 minutes  Critical care was time spent personally by me on the following activities: development of treatment plan with patient or surrogate and bedside caregivers, discussions with consultants, evaluation of patient's response to treatment, examination of patient, ordering and performing treatments and interventions, ordering and review of laboratory studies, ordering and review of radiographic studies, pulse " oximetry, re-evaluation of patient's condition. This critical care time did not overlap with that of any other provider or involve time for any procedures.    Pulmonary will sign off at this time. Please call with any further questions.      Vicki Thomas NP  Pulmonology  [facility] - Intensive Care

## 2022-02-01 NOTE — ASSESSMENT & PLAN NOTE
Pt w/ acute onset pulmonary edema, likely cardiogenic. Significant difficulties oxygenating patient initially despite ventilation  - now he is on minimum oxygen requirements and improving from respiratory standpoint

## 2022-02-01 NOTE — ASSESSMENT & PLAN NOTE
Hx of flutter, presented in Afib w/ RVR. Converted to NSR w/ amiodarone, then returned to flutter overnight 1/30.   - on amio gtt and scheduled metoprolol  - treating underlying w/d symptoms  - transition to PO Amiodarone and increase scheduled Metoprolol

## 2022-02-01 NOTE — ASSESSMENT & PLAN NOTE
FARHAN noted on admission and worsening. Cardiorenal vs iATN     -- Avoid ACEI/ARB/NSAIDS/Nephrotoxins.   -- Renally dose all meds  -- Renner in place, strict I&Os  -- Nephrology consulted, appreciate assistance   -- Emergent RRT started overnight for severe acidosis and hyperkalemia   -- making urine, signs of recovery   -- cautious with IVFs

## 2022-02-01 NOTE — ASSESSMENT & PLAN NOTE
-Suspect this is 2/2 cardiogenic pulmonary edema. He was initially admitted on room air with no respiratory distress and decompensated to the point of needing intubation the following day. BNP elevated at 439 prior to receiving several liters of fluid. CXR with worsening bilateral opacities; significant amount of pink frothy sputum noted on intubation. Known diastolic dysfunction at baseline and a history of systolic dysfunction with an EF of 35% which has since recovered. Initially requiring 100% FiO2 and a PEEP of 20+ to maintain adequate saturations; extremely difficult to ventilate and oxygenate. Known COPD with wheezing reported though no obstruction evident on ventilator.     -- doing much better with oxygenation and acidosis with crrt  -- Repeat TTE with EF 55% and hypokinesis of inferior wall.  -- Fluid removal with RRT/ diuresis      -- Elevated WBC but procal WNL.   -- empiric cefepime last day today.  vanco d/c.  Culture ngtd.   -- extubated to NC on 1/30; continue weaning oxygen as needed

## 2022-02-01 NOTE — PROGRESS NOTES
[facility] - Intensive Care  Adult Nutrition  Progress Note    SUMMARY       Recommendations    1) Add IV Thiamine, Folic Acid, MVI to assist in managing ETOH withdrawal, avoid Wernicke's.  2) ADAT and medically able to SLP approved texture, Renal Diet per current worsening renal labs.   - If renals have stabilized - Advance to cardiac diet - Diabetic restrictions if needed   - If Unable to advance, Consider alternate means of nutrition.  3) Hypokalemia, Hyperglycemia - Add/adjust insulin PRN    Goals:   1) CIWA-Ar IV supplementation protocol to be implemented by RD Follow up  2) Pt diet to advance within 48 hours  3) Monitored labs to trend toward target ranges    Nutrition Goal Status: new  Communication of RD Recs: reviewed with physician    Assessment and Plan  Nutrition Problem  Inadequate Energy Intake      Related to (etiology):   Altered Mental Status     Signs and Symptoms (as evidenced by):   NPO     Interventions/Recommendations (treatment strategy):  Texture Modified Diet (IDDSI 5)  Mineral Modified Diet (Renal/Cardiac)  Carbohydrate Modified Diet  Collaboration of care with other providers     Nutrition Diagnosis Status:   New     Reason for Assessment    Reason For Assessment: RD follow-up  Diagnosis:  (Acute Respiratory Failure)  Relevant Medical History: FARHAN, T2DM, CHF, Afib w/RVR, HTN, NSTEMI, COPD, ETOH Abuse, Substance Abuse  General Information Comments: Pt now extubated, AMS with hallucinations likely related to ETOH withdrawals. Secure Chat with physician Recs for CIWA-Ar IV supplementation regimen to be implemented to assist in avoiding Wernicke's now that sedation no longer managing withdrawal. Pt has had weight loss since admit d/t NPO status (3.5%).  Nutrition Discharge Planning: Pending Medical Course    Nutrition Risk Screen    Nutrition Risk Screen: no indicators present    Nutrition/Diet History    Spiritual, Cultural Beliefs, Ep[employer] Practices, Values that Affect Care: no  Food  "Allergies: NKFA  Factors Affecting Nutritional Intake: NPO,impaired cognitive status/motor control,difficulty/impaired swallowing    Anthropometrics    Temp: 98.1 °F (36.7 °C)  Height Method: Stated  Height: 5' 5" (165.1 cm)  Height (inches): 65 in  Weight Method: Bed Scale  Weight: 78.8 kg (173 lb 11.6 oz)  Weight (lb): 173.72 lb  Ideal Body Weight (IBW), Male: 136 lb  % Ideal Body Weight, Male (lb): 131.62 %  BMI (Calculated): 28.9  BMI Grade: 25 - 29.9 - overweight       Lab/Procedures/Meds    Pertinent Labs Reviewed: reviewed  CMP:  Recent Labs   Lab 02/01/22  0331   CALCIUM 8.9      K 3.2*   CO2 25      BUN 65*   CREATININE 2.7*     BMP  Lab Results   Component Value Date    ESTGFRAFRICA 28 (A) 02/01/2022    EGFRNONAA 24 (A) 02/01/2022     Glucose   Date Value Ref Range Status   02/01/2022 151 (H) 70 - 110 mg/dL Final       Pertinent Medications Reviewed: reviewed  Scheduled Meds:   amiodarone  400 mg Oral BID    apixaban  5 mg Oral BID    aspirin  81 mg Oral Daily    ceFEPime (MAXIPIME) IVPB  2 g Intravenous Q12H    chlordiazepoxide  10 mg Oral TID    famotidine (PF)  20 mg Intravenous Daily    [START ON 2/2/2022] folic acid  1 mg Oral Daily    insulin detemir U-100  5 Units Subcutaneous Daily    metoprolol succinate  50 mg Oral BID    [START ON 2/2/2022] multivitamin liquid no.118  10 mL Oral Daily    mupirocin   Nasal BID    nicotine  1 patch Transdermal Daily    potassium phosphate (monobasic)  500 mg Oral Daily    senna-docusate 8.6-50 mg  1 tablet Oral BID    sodium chloride 0.9%  10 mL Intravenous Q6H    thiamine  100 mg Oral Daily     Continuous Infusions:   sodium chloride 0.9% 50 mL/hr at 02/01/22 1600     PRN Meds:.albuterol-ipratropium, [COMPLETED] calcium gluconate IVPB **AND** calcium gluconate IVPB, dextrose 10%, dextrose 10%, dextrose 50%, dextrose 50%, glucagon (human recombinant), glucose, glucose, heparin (porcine), heparin (porcine), heparin (porcine), insulin " aspart U-100, lorazepam, melatonin, naloxone, Flushing PICC Protocol **AND** sodium chloride 0.9% **AND** sodium chloride 0.9%      Estimated/Assessed Needs    Weight Used For Calorie Calculations: 78.8 kg (173 lb 11.6 oz)  Energy Calorie Requirements (kcal): 1888  Energy Need Method: Ivel-St Jeor (x 1.25)  Protein Requirements: 95 - 157g (1.2 - 2.0g/kg)  Weight Used For Protein Calculations: 78.8 kg (173 lb 11.6 oz)     Estimated Fluid Requirement Method: RDA Method (or per MD)  RDA Method (mL): 1888  CHO Requirement: 236g      Nutrition Prescription Ordered    Current Diet Order: NPO    Evaluation of Received Nutrient/Fluid Intake    Other Calories (kcal): 0  Energy Calories Required: not meeting needs  Protein Required: not meeting needs  Fluid Required: not meeting needs  % Intake of Estimated Energy Needs: 0 - 25 %  % Meal Intake: NPO    Nutrition Risk    Level of Risk/Frequency of Follow-up:  (2x/week)     Monitor and Evaluation    Food and Nutrient Intake: energy intake,food and beverage intake,enteral nutrition intake  Food and Nutrient Adminstration: diet order,enteral and parenteral nutrition administration  Anthropometric Measurements: weight,weight change,body mass index  Biochemical Data, Medical Tests and Procedures: electrolyte and renal panel,gastrointestinal profile,glucose/endocrine profile,inflammatory profile,lipid profile  Nutrition-Focused Physical Findings: overall appearance,extremities, muscles and bones,head and eyes,skin     Nutrition Follow-Up    RD Follow-up?: Yes

## 2022-02-01 NOTE — ASSESSMENT & PLAN NOTE
Unclear etiology of shock, TTE essentially rules out obstructive and cardiogenic shock, clinical picture argues strongly against hypovolemic shock, no evidence of hemorrhage. C/f septic shock however no clear source of infection noted.  - weaned from pressors, continue to monitor  - continue cefepime, stop vancomycin  - day #7 of antibiotics with no clear source - complete 10 day course for thoroughness

## 2022-02-01 NOTE — ASSESSMENT & PLAN NOTE
Cont lopressor     Is This A New Presentation, Or A Follow-Up?: Skin Lesion Has Your Skin Lesion Been Treated?: not been treated Additional History: Also would like a skin check

## 2022-02-01 NOTE — PROGRESS NOTES
Date of Admission:1/26/2022    SUBJECTIVE:responding some to me    Current Facility-Administered Medications   Medication    0.9%  NaCl infusion    albuterol-ipratropium 2.5 mg-0.5 mg/3 mL nebulizer solution 3 mL    amiodarone tablet 400 mg    apixaban tablet 5 mg    aspirin chewable tablet 81 mg    calcium gluconate 1g in dextrose 5% 100mL (ready to mix system)    cefepime in dextrose 5 % IVPB 2 g    chlordiazepoxide capsule 10 mg    chlorhexidine 0.12 % solution 15 mL    dextrose 10% bolus 125 mL    dextrose 10% bolus 250 mL    dextrose 10% bolus 250 mL    dextrose 50% injection 12.5 g    dextrose 50% injection 25 g    famotidine (PF) injection 20 mg    glucagon (human recombinant) injection 1 mg    glucose chewable tablet 16 g    glucose chewable tablet 24 g    heparin (porcine) injection 5,000 Units    heparin (porcine) injection 5,000 Units    heparin (porcine) injection 5,000 Units    insulin aspart U-100 pen 0-5 Units    insulin detemir U-100 pen 5 Units    lorazepam injection 1 mg    melatonin tablet 6 mg    metoprolol succinate (TOPROL-XL) 24 hr tablet 50 mg    mupirocin 2 % ointment    naloxone 0.4 mg/mL injection 0.02 mg    nicotine 21 mg/24 hr 1 patch    potassium phosphate (monobasic) tablet 500 mg    senna-docusate 8.6-50 mg per tablet 1 tablet    sodium chloride 0.9% flush 10 mL    And    sodium chloride 0.9% flush 10 mL       Wt Readings from Last 3 Encounters:   01/30/22 78.8 kg (173 lb 11.6 oz)   11/21/21 79.4 kg (175 lb)   07/28/21 79.4 kg (175 lb)     Temp Readings from Last 3 Encounters:   02/01/22 98.1 °F (36.7 °C) (Axillary)   11/21/21 98.2 °F (36.8 °C) (Oral)   07/29/21 98.7 °F (37.1 °C)     BP Readings from Last 3 Encounters:   02/01/22 (!) 180/93   11/21/21 139/85   07/29/21 129/89     Pulse Readings from Last 3 Encounters:   02/01/22 78   11/21/21 95   07/29/21 91       Intake/Output Summary (Last 24 hours) at 2/1/2022 1211  Last data filed at 2/1/2022  1000  Gross per 24 hour   Intake 1943.42 ml   Output 3380 ml   Net -1436.58 ml       PE:  GEN:wd male in nad  HEENT:ncat,eyes open, Ett  CVS:s1s2 regular  PULM:ctab  ABD:+bs, soft  EXT:no leg edema  NEURO:awakens     Recent Labs   Lab 02/01/22  0331   *      K 3.2*      CO2 25   BUN 65*   CREATININE 2.7*   CALCIUM 8.9       Lab Results   Component Value Date    .5 (H) 09/10/2019    CALCIUM 8.9 02/01/2022    CAION 1.19 01/21/2020    PHOS 3.0 01/31/2022       No results for input(s): WBC, RBC, HGB, HCT, PLT, MCV, MCH, MCHC in the last 24 hours.        A/P:  1.alireza. hold hd.  Creatinine better. Good uop. Stopping hd. Watch vanc. Following. Watch cefepime dosing.  2.acute resp failure. Cont ivfs.  3.afib. rate up. Following.  4.htn.sbp up. Consider   5.dm2.  Watch sugar.  6.hypokalemia.  Replete more.  7.shock liver. Lfts. Better.

## 2022-02-01 NOTE — SUBJECTIVE & OBJECTIVE
Interval History: Weaned to minimal oxygen. Still with intermittent runs of A. Fib RVR.     Review of Systems   Respiratory: Negative for sputum production and shortness of breath.    Cardiovascular: Positive for palpitations. Negative for chest pain.   Gastrointestinal: Negative for abdominal pain, nausea and vomiting.   Genitourinary: Negative for dysuria.   Musculoskeletal: Positive for neck pain.     Objective:     Vital Signs (Most Recent):  Temp: 97.9 °F (36.6 °C) (02/01/22 1200)  Pulse: 74 (02/01/22 1500)  Resp: (!) 24 (02/01/22 1500)  BP: (!) 183/102 (02/01/22 1430)  SpO2: 98 % (02/01/22 1500) Vital Signs (24h Range):  Temp:  [97.9 °F (36.6 °C)-98.9 °F (37.2 °C)] 97.9 °F (36.6 °C)  Pulse:  [] 74  Resp:  [19-31] 24  SpO2:  [92 %-100 %] 98 %  BP: (102-221)/() 183/102     Weight: 78.8 kg (173 lb 11.6 oz)  Body mass index is 28.91 kg/m².      Intake/Output Summary (Last 24 hours) at 2/1/2022 1530  Last data filed at 2/1/2022 1500  Gross per 24 hour   Intake 1994.38 ml   Output 3380 ml   Net -1385.62 ml       Physical Exam  Vitals and nursing note reviewed.   Constitutional:       Appearance: He is ill-appearing.      Interventions: Nasal cannula in place.   HENT:      Head: Normocephalic and atraumatic.   Eyes:      General: No scleral icterus.  Neck:      Comments: RIJ Trialysis catheter in place   Cardiovascular:      Rate and Rhythm: Regular rhythm.      Pulses: No decreased pulses.   Pulmonary:      Effort: No tachypnea, accessory muscle usage or respiratory distress.      Breath sounds: Rales (diffuse, inspiratory ) present. No wheezing or rhonchi.   Abdominal:      General: Bowel sounds are normal. There is no distension.      Palpations: Abdomen is soft.   Genitourinary:     Comments: Renner in place   Musculoskeletal:      Cervical back: Normal range of motion. No rigidity.      Right lower leg: No edema.      Left lower leg: No edema.   Skin:     General: Skin is cool.      Capillary Refill:  Capillary refill takes more than 3 seconds.   Neurological:      Mental Status: He is alert.      Comments: AAO, follows all commands.          Vents:  Vent Mode: PS/CPAP (01/30/22 1136)  Ventilator Initiated: Yes (01/27/22 1631)  Set Rate: 30 BPM (01/30/22 0809)  Vt Set: 500 mL (01/30/22 0809)  Pressure Support: 10 cmH20 (01/30/22 1136)  PEEP/CPAP: 5 cmH20 (01/30/22 1136)  Oxygen Concentration (%): 40 (01/30/22 1400)  Peak Airway Pressure: 15.2 cmH2O (01/30/22 1136)  Total Ve: 9.72 mL (01/30/22 1136)  F/VT Ratio<105 (RSBI): (!) 22.76 (01/30/22 1136)    Lines/Drains/Airways     Peripherally Inserted Central Catheter Line            PICC Triple Lumen 01/27/22 1915 right brachial 4 days          Central Venous Catheter Line            Percutaneous Central Line Insertion/Assessment - Triple Lumen  01/27/22 2130 right internal jugular 4 days          Drain                 Urethral Catheter 01/27/22 0916 Double-lumen 18 Fr. 5 days          Peripheral Intravenous Line                 Peripheral IV - Single Lumen 01/26/22 1000 Left;Posterior Hand 6 days         Peripheral IV - Single Lumen 01/27/22 20 G Anterior;Right Upper Arm 5 days                Significant Labs:    CBC/Anemia Profile:  Recent Labs   Lab 01/31/22  0323   WBC 16.05*   HGB 14.2   HCT 39.7*   PLT 67*   MCV 93   RDW 13.1        Chemistries:  Recent Labs   Lab 01/31/22  0323 02/01/22  0331    140   K 2.7* 3.2*    104   CO2 23 25   BUN 64* 65*   CREATININE 3.3* 2.7*   CALCIUM 8.4* 8.9   ALBUMIN 2.8*  --    PROT 5.9*  --    BILITOT 0.9  --    ALKPHOS 61  --    *  --    *  --    MG 2.4  --    PHOS 3.0  --        All pertinent labs within the past 24 hours have been reviewed.    Significant Imaging:  I have reviewed all pertinent imaging results/findings within the past 24 hours.

## 2022-02-02 LAB
BASOPHILS # BLD AUTO: 0.05 K/UL (ref 0–0.2)
BASOPHILS NFR BLD: 0.4 % (ref 0–1.9)
DIFFERENTIAL METHOD: ABNORMAL
EOSINOPHIL # BLD AUTO: 0.1 K/UL (ref 0–0.5)
EOSINOPHIL NFR BLD: 0.6 % (ref 0–8)
ERYTHROCYTE [DISTWIDTH] IN BLOOD BY AUTOMATED COUNT: 13.3 % (ref 11.5–14.5)
HCT VFR BLD AUTO: 45 % (ref 40–54)
HGB BLD-MCNC: 15.2 G/DL (ref 14–18)
IMM GRANULOCYTES # BLD AUTO: 0.19 K/UL (ref 0–0.04)
IMM GRANULOCYTES NFR BLD AUTO: 1.5 % (ref 0–0.5)
LYMPHOCYTES # BLD AUTO: 1.3 K/UL (ref 1–4.8)
LYMPHOCYTES NFR BLD: 10.2 % (ref 18–48)
MAGNESIUM SERPL-MCNC: 2 MG/DL (ref 1.6–2.6)
MCH RBC QN AUTO: 32.5 PG (ref 27–31)
MCHC RBC AUTO-ENTMCNC: 33.8 G/DL (ref 32–36)
MCV RBC AUTO: 96 FL (ref 82–98)
MONOCYTES # BLD AUTO: 1.3 K/UL (ref 0.3–1)
MONOCYTES NFR BLD: 10.2 % (ref 4–15)
NEUTROPHILS # BLD AUTO: 10 K/UL (ref 1.8–7.7)
NEUTROPHILS NFR BLD: 77.1 % (ref 38–73)
NRBC BLD-RTO: 0 /100 WBC
PHOSPHATE SERPL-MCNC: 2.7 MG/DL (ref 2.7–4.5)
PLATELET # BLD AUTO: 81 K/UL (ref 150–450)
PMV BLD AUTO: 12.6 FL (ref 9.2–12.9)
POCT GLUCOSE: 136 MG/DL (ref 70–110)
POCT GLUCOSE: 150 MG/DL (ref 70–110)
POCT GLUCOSE: 160 MG/DL (ref 70–110)
RBC # BLD AUTO: 4.67 M/UL (ref 4.6–6.2)
WBC # BLD AUTO: 13 K/UL (ref 3.9–12.7)

## 2022-02-02 PROCEDURE — 25000003 PHARM REV CODE 250: Performed by: STUDENT IN AN ORGANIZED HEALTH CARE EDUCATION/TRAINING PROGRAM

## 2022-02-02 PROCEDURE — S4991 NICOTINE PATCH NONLEGEND: HCPCS | Performed by: INTERNAL MEDICINE

## 2022-02-02 PROCEDURE — 20000000 HC ICU ROOM

## 2022-02-02 PROCEDURE — 63600175 PHARM REV CODE 636 W HCPCS: Performed by: STUDENT IN AN ORGANIZED HEALTH CARE EDUCATION/TRAINING PROGRAM

## 2022-02-02 PROCEDURE — 84100 ASSAY OF PHOSPHORUS: CPT | Performed by: INTERNAL MEDICINE

## 2022-02-02 PROCEDURE — 85025 COMPLETE CBC W/AUTO DIFF WBC: CPT | Performed by: STUDENT IN AN ORGANIZED HEALTH CARE EDUCATION/TRAINING PROGRAM

## 2022-02-02 PROCEDURE — 92526 ORAL FUNCTION THERAPY: CPT

## 2022-02-02 PROCEDURE — 27000221 HC OXYGEN, UP TO 24 HOURS

## 2022-02-02 PROCEDURE — 25000003 PHARM REV CODE 250: Performed by: NURSE PRACTITIONER

## 2022-02-02 PROCEDURE — 83735 ASSAY OF MAGNESIUM: CPT | Performed by: INTERNAL MEDICINE

## 2022-02-02 PROCEDURE — 94761 N-INVAS EAR/PLS OXIMETRY MLT: CPT

## 2022-02-02 PROCEDURE — 25000003 PHARM REV CODE 250: Performed by: INTERNAL MEDICINE

## 2022-02-02 PROCEDURE — A4216 STERILE WATER/SALINE, 10 ML: HCPCS | Performed by: INTERNAL MEDICINE

## 2022-02-02 PROCEDURE — 99291 CRITICAL CARE FIRST HOUR: CPT | Mod: ,,, | Performed by: INTERNAL MEDICINE

## 2022-02-02 PROCEDURE — 99291 PR CRITICAL CARE, E/M 30-74 MINUTES: ICD-10-PCS | Mod: ,,, | Performed by: INTERNAL MEDICINE

## 2022-02-02 PROCEDURE — 99900035 HC TECH TIME PER 15 MIN (STAT)

## 2022-02-02 RX ORDER — METOPROLOL SUCCINATE 50 MG/1
100 TABLET, EXTENDED RELEASE ORAL 2 TIMES DAILY
Status: DISCONTINUED | OUTPATIENT
Start: 2022-02-02 | End: 2022-02-05 | Stop reason: HOSPADM

## 2022-02-02 RX ORDER — METOPROLOL SUCCINATE 50 MG/1
50 TABLET, EXTENDED RELEASE ORAL ONCE
Status: COMPLETED | OUTPATIENT
Start: 2022-02-02 | End: 2022-02-02

## 2022-02-02 RX ORDER — CHLORDIAZEPOXIDE HYDROCHLORIDE 5 MG/1
5 CAPSULE, GELATIN COATED ORAL 3 TIMES DAILY
Status: DISCONTINUED | OUTPATIENT
Start: 2022-02-02 | End: 2022-02-03

## 2022-02-02 RX ADMIN — Medication 6 MG: at 08:02

## 2022-02-02 RX ADMIN — APIXABAN 5 MG: 5 TABLET, FILM COATED ORAL at 08:02

## 2022-02-02 RX ADMIN — FOLIC ACID 1 MG: 1 TABLET ORAL at 08:02

## 2022-02-02 RX ADMIN — Medication 1 PATCH: at 08:02

## 2022-02-02 RX ADMIN — METOPROLOL SUCCINATE 50 MG: 50 TABLET, EXTENDED RELEASE ORAL at 11:02

## 2022-02-02 RX ADMIN — Medication 10 ML: at 06:02

## 2022-02-02 RX ADMIN — METOPROLOL SUCCINATE 50 MG: 50 TABLET, EXTENDED RELEASE ORAL at 08:02

## 2022-02-02 RX ADMIN — CHLORDIAZEPOXIDE HYDROCHLORIDE 10 MG: 10 CAPSULE ORAL at 08:02

## 2022-02-02 RX ADMIN — Medication 10 ML: at 03:02

## 2022-02-02 RX ADMIN — MUPIROCIN: 20 OINTMENT TOPICAL at 08:02

## 2022-02-02 RX ADMIN — METOPROLOL SUCCINATE 100 MG: 50 TABLET, EXTENDED RELEASE ORAL at 08:02

## 2022-02-02 RX ADMIN — INSULIN DETEMIR 5 UNITS: 100 INJECTION, SOLUTION SUBCUTANEOUS at 09:02

## 2022-02-02 RX ADMIN — AMIODARONE HYDROCHLORIDE 400 MG: 200 TABLET ORAL at 08:02

## 2022-02-02 RX ADMIN — ASPIRIN 81 MG CHEWABLE TABLET 81 MG: 81 TABLET CHEWABLE at 08:02

## 2022-02-02 RX ADMIN — HYDRALAZINE HYDROCHLORIDE 10 MG: 20 INJECTION INTRAMUSCULAR; INTRAVENOUS at 12:02

## 2022-02-02 RX ADMIN — SENNOSIDES AND DOCUSATE SODIUM 1 TABLET: 50; 8.6 TABLET ORAL at 08:02

## 2022-02-02 RX ADMIN — CHLORDIAZEPOXIDE HYDROCHLORIDE 5 MG: 5 CAPSULE ORAL at 10:02

## 2022-02-02 RX ADMIN — POTASSIUM PHOSPHATE, MONOBASIC 500 MG: 500 TABLET, SOLUBLE ORAL at 08:02

## 2022-02-02 RX ADMIN — FAMOTIDINE 20 MG: 10 INJECTION INTRAVENOUS at 08:02

## 2022-02-02 RX ADMIN — SODIUM CHLORIDE: 0.9 INJECTION, SOLUTION INTRAVENOUS at 09:02

## 2022-02-02 RX ADMIN — THIAMINE HCL TAB 100 MG 100 MG: 100 TAB at 08:02

## 2022-02-02 RX ADMIN — Medication 10 ML: at 08:02

## 2022-02-02 NOTE — PT/OT/SLP PROGRESS
Physical Therapy      Patient Name:  Marck Madison   MRN:  [id number]    Patient not seen today secondary to  (Tachycardia and increased BP). Will follow-up .

## 2022-02-02 NOTE — PT/OT/SLP PROGRESS
Speech Language Pathology Treatment    Patient Name:  Marck Madison   MRN:  [id number]  Admitting Diagnosis: Acute hypoxemic respiratory failure    Recommendations:                 General Recommendations:  Dysphagia therapy  Diet recommendations:  Mechanical soft, Liquid Diet Level: Thin   Aspiration Precautions: Alternating bites/sips, Assistance with meals, Feed only when awake/alert, Frequent oral care, HOB to 90 degrees, Meds whole 1 at a time and Monitor for s/s of aspiration   General Precautions: Standard    Communication strategies:  provide increased time to answer, encourage pt to increase volume     Subjective     · Responded to verbal stimuli, became awake/alert.   · Pt's voice barely audible, slight improvement noted when cued to increase volume.   · He answered questions appropriately, was oriented to self, place, situation.     Patient goals: To eat     Respiratory Status: Room air    Objective:     Has the patient been evaluated by SLP for swallowing?   Yes  Keep patient NPO? No   Current Respiratory Status:    RA    Pt's nurse present at b/s for med admin, pt swallowed single pill w/o difficulty.     Assessed pt with PO trials of TL via straw, soft solid, and puree items. Across consistencies, pt demo intact labial seal, timely oral transit, thorough mastication w/o oral residue, and improved swallow timing; no overt s/s of aspiration noted.     Assessment:     Marck Madison is a 61 y.o. male with a dx of Acute hypoxemic respiratory failure. He presents with improving swallow function and adequate tolerance of SLP diet recs and medication admin 1:1. ST will follow x1.       Goals:   Multidisciplinary Problems     SLP Goals        Problem: SLP Goal    Goal Priority Disciplines Outcome   SLP Goal     SLP Ongoing, Progressing   Description: ST. Pt will tolerate mechanical soft/thin liquid diet without overt s/s of aspiration.  2. Pt will participate in speech, lang, cognitive-linguistic eval to  determine level of functional communication.                   Plan:     · Patient to be seen:  1 x/week   · Plan of Care expires:  02/18/22  · Plan of Care reviewed with:  patient   · SLP Follow-Up:  Yes       Discharge recommendations:  other (see comments) (TBD)   Barriers to Discharge:  None    Time Tracking:     SLP Treatment Date:   02/02/22  Speech Start Time:  1111  Speech Stop Time:  1122     Speech Total Time (min):  11 min    Billable Minutes: Treatment Swallowing Dysfunction 11min    02/02/2022

## 2022-02-02 NOTE — ASSESSMENT & PLAN NOTE
Hx of flutter, presented in Afib w/ RVR. Converted to NSR w/ amiodarone, then returned to flutter overnight 1/30.   - on amio gtt and scheduled metoprolol  - treating underlying w/d symptoms  - transition to PO Amiodarone and increase scheduled Metoprolol  - still an issue with in/out. Re-consulted Cardiology, increasing Metoprolol today  - keep NPO at midnight for possible cardioversion however patient will need to be awake enough to consent for this

## 2022-02-02 NOTE — PLAN OF CARE
Problem: SLP Goal  Goal: SLP Goal  Description: ST. Pt will tolerate mechanical soft/thin liquid diet without overt s/s of aspiration.  2. Pt will participate in speech, lang, cognitive-linguistic eval to determine level of functional communication.  Outcome: Ongoing, Progressing     Tolerated PO trials. ST will follow x1.

## 2022-02-02 NOTE — PLAN OF CARE
[facility] - Intensive Care  Discharge Reassessment    Primary Care Provider: [facility] [place]    Expected Discharge Date:  TBD    Reassessment (most recent)       Discharge Reassessment - 02/01/22 1832          Discharge Reassessment    Assessment Type Discharge Planning Reassessment     Did the patient's condition or plan change since previous assessment? Yes     Discharge Plan discussed with: --   other relative    Communicated BALDO with patient/caregiver No     Discharge Plan A Home Health     Discharge Plan B Hospice/home     DME Needed Upon Discharge  other (see comments)   TBD    Discharge Barriers Identified None     Why the patient remains in the hospital Requires continued medical care        Post-Acute Status    Discharge Delays None known at this time                 o This patient has been screened for Case Management needs.  Based on (documentation in medical record), patient remains in ICU with treatment ongiong.  Patient has been extubated and per chart review, has shown some improvement from respiratory standpoint.    Discharge planning:  Discussion had with patient's caregiver/niece regarding hospice.  Per chart review, niece is open to hospice if recommended.  Patient is DNR.  o   o Case Management/Social Work remains available if a need arises, please enter consult for assistance.  For urgent needs contact Case Management Department/on-call at: [phone].

## 2022-02-02 NOTE — SUBJECTIVE & OBJECTIVE
Review of Systems   Unable to perform ROS: mental status change   Objective:     Vital Signs (Most Recent):  Temp: 98.7 °F (37.1 °C) (02/02/22 0700)  Pulse: 97 (02/02/22 0900)  Resp: 19 (02/02/22 0900)  BP: (!) 156/113 (02/02/22 0900)  SpO2: (!) 93 % (02/02/22 0900) Vital Signs (24h Range):  Temp:  [97.9 °F (36.6 °C)-98.9 °F (37.2 °C)] 98.7 °F (37.1 °C)  Pulse:  [] 97  Resp:  [17-42] 19  SpO2:  [76 %-100 %] 93 %  BP: (133-209)/() 156/113     Weight: 78.8 kg (173 lb 11.6 oz)  Body mass index is 28.91 kg/m².     SpO2: (!) 93 %  O2 Device (Oxygen Therapy): nasal cannula      Intake/Output Summary (Last 24 hours) at 2/2/2022 0949  Last data filed at 2/2/2022 0900  Gross per 24 hour   Intake 1398.9 ml   Output 2475 ml   Net -1076.1 ml       Lines/Drains/Airways     Peripherally Inserted Central Catheter Line            PICC Triple Lumen 01/27/22 1915 right brachial 5 days          Drain                 Urethral Catheter 01/27/22 0916 Double-lumen 18 Fr. 6 days          Peripheral Intravenous Line                 Peripheral IV - Single Lumen 01/26/22 1000 Left;Posterior Hand 6 days         Peripheral IV - Single Lumen 01/27/22 20 G Anterior;Right Upper Arm 6 days                Physical Exam  Constitutional:       General: He is not in acute distress.     Appearance: He is ill-appearing. He is not toxic-appearing or diaphoretic.   HENT:      Head: Normocephalic and atraumatic.   Eyes:      General:         Right eye: No discharge.         Left eye: No discharge.      Extraocular Movements: Extraocular movements intact.      Pupils: Pupils are equal, round, and reactive to light.   Cardiovascular:      Rate and Rhythm: Tachycardia present. Rhythm irregularly irregular.      Heart sounds: S1 normal and S2 normal. Heart sounds are distant. No murmur heard.      Pulmonary:      Effort: Pulmonary effort is normal. No respiratory distress.      Breath sounds: Normal breath sounds. No stridor. No wheezing, rhonchi  or rales.   Chest:      Chest wall: No tenderness.   Abdominal:      General: There is no distension.      Palpations: Abdomen is soft.   Musculoskeletal:         General: No swelling or tenderness.      Cervical back: Normal range of motion and neck supple. No rigidity.      Right lower leg: No edema.      Left lower leg: No edema.   Skin:     General: Skin is warm and dry.      Coloration: Skin is not jaundiced.   Neurological:      General: No focal deficit present.      Cranial Nerves: No cranial nerve deficit.   Psychiatric:      Comments: MS changes noted.         Current Medications:   amiodarone  400 mg Oral BID    apixaban  5 mg Oral BID    aspirin  81 mg Oral Daily    chlordiazepoxide  10 mg Oral TID    famotidine (PF)  20 mg Intravenous Daily    folic acid  1 mg Oral Daily    insulin detemir U-100  5 Units Subcutaneous Daily    metoprolol succinate  100 mg Oral BID    multivitamin liquid no.118  10 mL Oral Daily    mupirocin   Nasal BID    nicotine  1 patch Transdermal Daily    potassium phosphate (monobasic)  500 mg Oral Daily    senna-docusate 8.6-50 mg  1 tablet Oral BID    sodium chloride 0.9%  10 mL Intravenous Q6H    thiamine  100 mg Oral Daily      sodium chloride 0.9% 50 mL/hr at 02/02/22 0900     albuterol-ipratropium, [COMPLETED] calcium gluconate IVPB **AND** calcium gluconate IVPB, dextrose 10%, dextrose 10%, dextrose 50%, dextrose 50%, glucagon (human recombinant), glucose, glucose, heparin (porcine), heparin (porcine), heparin (porcine), hydrALAZINE, insulin aspart U-100, lorazepam, melatonin, naloxone, Flushing PICC Protocol **AND** sodium chloride 0.9% **AND** sodium chloride 0.9%    Laboratory (all labs reviewed):  CBC:  Recent Labs   Lab 01/28/22  0344 01/29/22  0312 01/30/22  0301 01/31/22  0323 02/02/22  0334   WBC 18.22 H 18.53 H 16.27 H 16.05 H 13.00 H   Hemoglobin 16.1 14.6 14.2 14.2 15.2   Hematocrit 49.9 42.1 40.9 39.7 L 45.0   Platelets 104 L 84 L 55 L 67 L 81 L        CHEMISTRIES:  Recent Labs   Lab 01/29/22  1522 01/29/22  2120 01/29/22  2330 01/30/22  0301 01/31/22  0323 02/01/22  0331 02/02/22  0334   Glucose 113 H 177 H  --  143 H  143 H 120 H 151 H  --    Sodium 133 L 132 L  --  135 L  135 L 137 140  --    Potassium 4.0 3.8  --  3.8  3.8 2.7 LL 3.2 L  --    BUN 41 H 40 H  --  36 H  36 H 64 H 65 H  --    Creatinine 3.1 H 3.0 H  --  2.7 H  2.7 H 3.3 H 2.7 H  --    eGFR if  23 A 24 A  --  28 A  28 A 22 A 28 A  --    eGFR if non  20 A 21 A  --  24 A  24 A 19 A 24 A  --    Calcium 8.3 L 8.1 L  --  8.5 L  8.5 L 8.4 L 8.9  --    Magnesium 1.9  --  1.9 1.9 2.4  --  2.0       CARDIAC BIOMARKERS:  Recent Labs   Lab 09/11/19  1611 01/20/20  1731 01/20/20  2322 01/21/20  0400 06/09/21  1220 06/09/21  1748 07/28/21  2105 11/21/21  0024 11/21/21  0418 01/26/22  1225 01/28/22  1150    H  --  873 H  --  733 H  --   --   --   --   --   --    Troponin I  --    < > 0.308 H   < > 0.235 H   < > 0.068 H 0.161 H 0.149 H 0.172 H 0.161 H    < > = values in this interval not displayed.       COAGS:  Recent Labs   Lab 01/22/20  0345 01/23/20  0529 06/12/21  1201 06/13/21  0159 06/30/21  2207   INR 1.4 H 1.3 H 2.5 H 1.7 H 1.2       LIPIDS/LFTS:  Recent Labs   Lab 01/27/22  0615 01/28/22  0344 01/29/22  0312 01/30/22  0301 01/31/22  0323   AST 40 217 H 3,307 H 956 H 371 H   ALT 33 150 H 1,621 H 1,207 H 861 H       BNP:  Recent Labs   Lab 06/12/21  0538 07/01/21  0608 07/28/21  2105 11/21/21  0024 01/26/22  1225    H 481 H 94 390 H 434 H       TSH:  Recent Labs   Lab 01/20/20  1731 06/10/21  0433 06/12/21  0326 07/28/21  2105 01/26/22  1225   TSH 2.296 0.176 L 2.253 0.831 1.086       Free T4:  Recent Labs   Lab 06/10/21  0433   Free T4 1.02       Diagnostic Results:  ECG (personally reviewed and interpreted tracing(s)):  1/27/22 1838 SR 93, ?inf isch  1/28/22 1056 SR 64, AIVR  1/30/22 1922 tele: SR    Echo: 1/28/22 (images personally reviewed  and interpreted)  · There is no evidence of intracardiac shunting (negatove saline contrast study).  · The left ventricle is normal in size with mild concentric hypertrophy and normal systolic function.  · The estimated ejection fraction is 55%.  · Cannot exclude posterior wall hypokinesis.  · Mild right ventricular enlargement with mildly reduced right ventricular systolic function.    Stress Test: L MPI 1/29/20    The perfusion scan is free of evidence from myocardial ischemia or injury.    There is a  mild intensity fixed defect in the inferior wall of the left ventricle secondary to diaphragm attenuation.    Visually estimated ejection fraction is mildly depressed at stress.    The EKG portion of this study is negative for ischemia.    The patient reported no chest pain during the stress test.    Arrhythmias during stress: occasional PACs, occasional PVCs.    The blood pressure response to stress was normal.

## 2022-02-02 NOTE — PROGRESS NOTES
[facility] - Intensive Care  Cardiology  Progress Note    Patient Name: Marck Madison  MRN: [id number]  Admission Date: 1/26/2022  Hospital Length of Stay: 7 days  Code Status: DNR   Attending Physician: Kulwant Nesbitt MD   Primary Care Physician: [facility]  Expected Discharge Date:   Principal Problem:Acute hypoxemic respiratory failure    Subjective:     Interval Hx: pt seen in ICU, case d/w Dr. Nesbitt.  Reconsulted for persistent AFL.  Now extubated, concern for withdrawal.  Hypertensive/tachy noted.  No cp/sob.    Tele: AFL 130s, last documented in SR 1/30/22 at 1922 on tele (personally reviewed and interpreted)          Review of Systems   Unable to perform ROS: mental status change   Objective:     Vital Signs (Most Recent):  Temp: 98.7 °F (37.1 °C) (02/02/22 0700)  Pulse: 97 (02/02/22 0900)  Resp: 19 (02/02/22 0900)  BP: (!) 156/113 (02/02/22 0900)  SpO2: (!) 93 % (02/02/22 0900) Vital Signs (24h Range):  Temp:  [97.9 °F (36.6 °C)-98.9 °F (37.2 °C)] 98.7 °F (37.1 °C)  Pulse:  [] 97  Resp:  [17-42] 19  SpO2:  [76 %-100 %] 93 %  BP: (133-209)/() 156/113     Weight: 78.8 kg (173 lb 11.6 oz)  Body mass index is 28.91 kg/m².     SpO2: (!) 93 %  O2 Device (Oxygen Therapy): nasal cannula      Intake/Output Summary (Last 24 hours) at 2/2/2022 0949  Last data filed at 2/2/2022 0900  Gross per 24 hour   Intake 1398.9 ml   Output 2475 ml   Net -1076.1 ml       Lines/Drains/Airways     Peripherally Inserted Central Catheter Line            PICC Triple Lumen 01/27/22 1915 right brachial 5 days          Drain                 Urethral Catheter 01/27/22 0916 Double-lumen 18 Fr. 6 days          Peripheral Intravenous Line                 Peripheral IV - Single Lumen 01/26/22 1000 Left;Posterior Hand 6 days         Peripheral IV - Single Lumen 01/27/22 20 G Anterior;Right Upper Arm 6 days                Physical Exam  Constitutional:       General: He is not in acute distress.     Appearance: He is  ill-appearing. He is not toxic-appearing or diaphoretic.   HENT:      Head: Normocephalic and atraumatic.   Eyes:      General:         Right eye: No discharge.         Left eye: No discharge.      Extraocular Movements: Extraocular movements intact.      Pupils: Pupils are equal, round, and reactive to light.   Cardiovascular:      Rate and Rhythm: Tachycardia present. Rhythm irregularly irregular.      Heart sounds: S1 normal and S2 normal. Heart sounds are distant. No murmur heard.      Pulmonary:      Effort: Pulmonary effort is normal. No respiratory distress.      Breath sounds: Normal breath sounds. No stridor. No wheezing, rhonchi or rales.   Chest:      Chest wall: No tenderness.   Abdominal:      General: There is no distension.      Palpations: Abdomen is soft.   Musculoskeletal:         General: No swelling or tenderness.      Cervical back: Normal range of motion and neck supple. No rigidity.      Right lower leg: No edema.      Left lower leg: No edema.   Skin:     General: Skin is warm and dry.      Coloration: Skin is not jaundiced.   Neurological:      General: No focal deficit present.      Cranial Nerves: No cranial nerve deficit.   Psychiatric:      Comments: MS changes noted.         Current Medications:   amiodarone  400 mg Oral BID    apixaban  5 mg Oral BID    aspirin  81 mg Oral Daily    chlordiazepoxide  10 mg Oral TID    famotidine (PF)  20 mg Intravenous Daily    folic acid  1 mg Oral Daily    insulin detemir U-100  5 Units Subcutaneous Daily    metoprolol succinate  100 mg Oral BID    multivitamin liquid no.118  10 mL Oral Daily    mupirocin   Nasal BID    nicotine  1 patch Transdermal Daily    potassium phosphate (monobasic)  500 mg Oral Daily    senna-docusate 8.6-50 mg  1 tablet Oral BID    sodium chloride 0.9%  10 mL Intravenous Q6H    thiamine  100 mg Oral Daily      sodium chloride 0.9% 50 mL/hr at 02/02/22 0900     albuterol-ipratropium, [COMPLETED] calcium  gluconate IVPB **AND** calcium gluconate IVPB, dextrose 10%, dextrose 10%, dextrose 50%, dextrose 50%, glucagon (human recombinant), glucose, glucose, heparin (porcine), heparin (porcine), heparin (porcine), hydrALAZINE, insulin aspart U-100, lorazepam, melatonin, naloxone, Flushing PICC Protocol **AND** sodium chloride 0.9% **AND** sodium chloride 0.9%    Laboratory (all labs reviewed):  CBC:  Recent Labs   Lab 01/28/22  0344 01/29/22  0312 01/30/22  0301 01/31/22  0323 02/02/22  0334   WBC 18.22 H 18.53 H 16.27 H 16.05 H 13.00 H   Hemoglobin 16.1 14.6 14.2 14.2 15.2   Hematocrit 49.9 42.1 40.9 39.7 L 45.0   Platelets 104 L 84 L 55 L 67 L 81 L       CHEMISTRIES:  Recent Labs   Lab 01/29/22  1522 01/29/22  2120 01/29/22  2330 01/30/22  0301 01/31/22  0323 02/01/22  0331 02/02/22  0334   Glucose 113 H 177 H  --  143 H  143 H 120 H 151 H  --    Sodium 133 L 132 L  --  135 L  135 L 137 140  --    Potassium 4.0 3.8  --  3.8  3.8 2.7 LL 3.2 L  --    BUN 41 H 40 H  --  36 H  36 H 64 H 65 H  --    Creatinine 3.1 H 3.0 H  --  2.7 H  2.7 H 3.3 H 2.7 H  --    eGFR if  23 A 24 A  --  28 A  28 A 22 A 28 A  --    eGFR if non  20 A 21 A  --  24 A  24 A 19 A 24 A  --    Calcium 8.3 L 8.1 L  --  8.5 L  8.5 L 8.4 L 8.9  --    Magnesium 1.9  --  1.9 1.9 2.4  --  2.0       CARDIAC BIOMARKERS:  Recent Labs   Lab 09/11/19  1611 01/20/20  1731 01/20/20  2322 01/21/20  0400 06/09/21  1220 06/09/21  1748 07/28/21  2105 11/21/21  0024 11/21/21  0418 01/26/22  1225 01/28/22  1150    H  --  873 H  --  733 H  --   --   --   --   --   --    Troponin I  --    < > 0.308 H   < > 0.235 H   < > 0.068 H 0.161 H 0.149 H 0.172 H 0.161 H    < > = values in this interval not displayed.       COAGS:  Recent Labs   Lab 01/22/20  0345 01/23/20  0529 06/12/21  1201 06/13/21  0159 06/30/21  2207   INR 1.4 H 1.3 H 2.5 H 1.7 H 1.2       LIPIDS/LFTS:  Recent Labs   Lab 01/27/22  0615 01/28/22  0344 01/29/22  0312  01/30/22  0301 01/31/22  0323   AST 40 217 H 3,307 H 956 H 371 H   ALT 33 150 H 1,621 H 1,207 H 861 H       BNP:  Recent Labs   Lab 06/12/21  0538 07/01/21  0608 07/28/21  2105 11/21/21  0024 01/26/22  1225    H 481 H 94 390 H 434 H       TSH:  Recent Labs   Lab 01/20/20  1731 06/10/21  0433 06/12/21  0326 07/28/21  2105 01/26/22  1225   TSH 2.296 0.176 L 2.253 0.831 1.086       Free T4:  Recent Labs   Lab 06/10/21  0433   Free T4 1.02       Diagnostic Results:  ECG (personally reviewed and interpreted tracing(s)):  1/27/22 1838 SR 93, ?inf isch  1/28/22 1056 SR 64, AIVR  1/30/22 1922 tele: SR    Echo: 1/28/22 (images personally reviewed and interpreted)  · There is no evidence of intracardiac shunting (negatove saline contrast study).  · The left ventricle is normal in size with mild concentric hypertrophy and normal systolic function.  · The estimated ejection fraction is 55%.  · Cannot exclude posterior wall hypokinesis.  · Mild right ventricular enlargement with mildly reduced right ventricular systolic function.    Stress Test: L MPI 1/29/20    The perfusion scan is free of evidence from myocardial ischemia or injury.    There is a  mild intensity fixed defect in the inferior wall of the left ventricle secondary to diaphragm attenuation.    Visually estimated ejection fraction is mildly depressed at stress.    The EKG portion of this study is negative for ischemia.    The patient reported no chest pain during the stress test.    Arrhythmias during stress: occasional PACs, occasional PVCs.    The blood pressure response to stress was normal.          Assessment and Plan:     * Acute hypoxemic respiratory failure  Mgmt per IM  Resolved    Alcohol withdrawal syndrome, with delirium  Contributory to above presentation    Acute renal failure  Mgmt per IM/renal    Atrial fibrillation with RVR  In AFL with RVR  Last documented in SR 1/30/22 at 1922 (on tele)  Started on apixaban 5mg bid 1/31/22 at  2046  Cont po amio  Titrate toprol as BP/HR will allow  Will consider ESTEBAN/DCCV if unable to control HR.  Prefer to defer until pt is able to consent for procedure given hemodynamic stability.  NPO after MN.    Elevated troponin  Suspect demand ischemia from tachycardia.   EF preserved  Consider outpat isch eval when he recovers from current presentation    Acute on chronic diastolic congestive heart failure  Appears euvolemic        VTE Risk Mitigation (From admission, onward)         Ordered     apixaban tablet 5 mg  2 times daily         01/31/22 1341     heparin (porcine) injection 5,000 Units  Use PRN         01/27/22 2149     heparin (porcine) injection 5,000 Units  Use PRN         01/27/22 2158     heparin (porcine) injection 5,000 Units  Use PRN         01/27/22 2149     IP VTE HIGH RISK PATIENT  Once         01/26/22 1409     Place sequential compression device  Until discontinued         01/26/22 1409              Pt seen in ICU, critical care time 35min    Trevor Schwab MD  Cardiology  [facility] - Intensive Care

## 2022-02-02 NOTE — NURSING
[facility], [facility] - [place]  Nurses Note -- 4 Eyes    Patient Name: Marck Madison  MRN: [id number]  Attending Provider:  Kulwant Nesbitt MD  2/1/2022       Wounds on : Q Shift    [x] No   [x]Prevention Measures Documented    [] Yes    []LDA's Charted   []Wound Care Consulted (optional)   []Photo Taken (optional)   []MD Notified    Attending RN:  Carmita Vital RN     Second RN:  Tung Wellington RN

## 2022-02-02 NOTE — PROGRESS NOTES
F/u with patient's niece, Jessica, to confirm that she will be assisting patient post discharge if needed.

## 2022-02-02 NOTE — PLAN OF CARE
Patient remains in ICU awake but disoriented to time/place/situation.  Gets restless and PO librium scheduled today.  Afib with HR .   Blood pressure elevated and medications given.  Adequate urine output this shift.  Amiodarone gtt stopped.  Family updated at bedside.

## 2022-02-02 NOTE — PROGRESS NOTES
[facility] Medicine  Progress Note    Patient Name: Marck Madison  MRN: [id number]  Patient Class: IP- Inpatient   Admission Date: 1/26/2022  Length of Stay: 7 days  Attending Physician: Kulwant Nesbitt MD  Primary Care Provider: [facility] [place]        Subjective:     Principal Problem:Acute hypoxemic respiratory failure        HPI:  61 year old male with diastolic heart failure, atrial flutter, pulmonary hypertension, mitral and tricuspid valve regurgitation, emphysema and current every day smoker who presented with shortness of breath, body aches, bilateral flank pain and weakness since last night. Associated symptoms include cough. Admits to smoking. Denied nausea, vomiting, abdominal pain, dysuria, diarrhea. In the ED, patient was hypotensive, tachycardic to 120s, diffuse expiratory wheezing and with a lactic acid of 3. Given one liter of fluids, continuous bronchodilators, methylprednisolone and broad spectrum antibiotics. O2 sats stable at room air. COVID and flu negative. Procalcitonin negative. Remains tachycardic. Cardiology consulted. BP stable. Admit to ICU.       Overview/Hospital Course:  Mr Madison presented with acute exacerbation of diastolic heart failure and atrial fibrillation with RVR. Also with acute renal failure. He was given fluids in the ED. O2 requirements increased significantly. Tox screen positive for amphetamines. Placed on BiPAP but non compliant with this. He is AAO x 3 and with no tremors. Required precedex to comply with BiPAP. Amiodarone infusion and anticoagulation. Nephrology, cardiology and pulmonology consulted. While still in the ED waiting for an ICU bed patient experienced alcohol withdrawals. Give lorazepam and more diuresis but poor UOP and remained in respiratory distress despite lorazepam. Patient was intubated. Required significant vent support. Renal function continued to deteriorate. Started on CRRT.     Pt initially poorly responsive  to CRRT, but eventually improved, hypoxia decreased. Sedation was stopped but he was poorly responsive. His mental status improved and he was extubated to nasal cannula.      Interval History: No new issues, still in/out a flutter. Took his medications this morning but not waking up much for me on exam.    Review of Systems   Unable to perform ROS: Mental status change     Objective:     Vital Signs (Most Recent):  Temp: 98.6 °F (37 °C) (02/02/22 1100)  Pulse: 88 (02/02/22 1100)  Resp: 16 (02/02/22 1100)  BP: (!) 149/103 (02/02/22 1100)  SpO2: 100 % (02/02/22 1100) Vital Signs (24h Range):  Temp:  [98.1 °F (36.7 °C)-98.9 °F (37.2 °C)] 98.6 °F (37 °C)  Pulse:  [] 88  Resp:  [16-42] 16  SpO2:  [76 %-100 %] 100 %  BP: (133-198)/() 149/103     Weight: 78.8 kg (173 lb 11.6 oz)  Body mass index is 28.91 kg/m².    Intake/Output Summary (Last 24 hours) at 2/2/2022 1251  Last data filed at 2/2/2022 1100  Gross per 24 hour   Intake 1233.6 ml   Output 2475 ml   Net -1241.4 ml      Physical Exam  Constitutional:       General: He is not in acute distress.     Appearance: He is ill-appearing. He is not toxic-appearing or diaphoretic.   Cardiovascular:      Rate and Rhythm: Normal rate.      Heart sounds: No murmur heard.  No gallop.       Comments: Aflutter in rate of 130s  Pulmonary:      Effort: Pulmonary effort is normal. No respiratory distress.      Breath sounds: No wheezing or rales.   Abdominal:      General: Bowel sounds are normal. There is no distension.      Palpations: Abdomen is soft.      Tenderness: There is no abdominal tenderness.   Musculoskeletal:         General: No swelling.   Skin:     General: Skin is warm and dry.   Neurological:      Mental Status: He is disoriented.         Significant Labs: All pertinent labs within the past 24 hours have been reviewed.    Significant Imaging: I have reviewed all pertinent imaging results/findings within the past 24 hours.      Assessment/Plan:      *  Acute hypoxemic respiratory failure  Pt w/ acute onset pulmonary edema, likely cardiogenic. Significant difficulties oxygenating patient initially despite ventilation  - now he is on minimum oxygen requirements and improving from respiratory standpoint    Shock liver  Improving with resolution of shock      Thrombocytopenia  Thrombocytopenia which was concurrent w/ HD. Low-intermediate HIT probability.   - apixaban  - monitor      Encephalopathy, metabolic  Pt w/ non-focal encephalopathy of unclear etiology, likely due to sedation +/- hypoperfusion. Improving with time. No focal deficits.  - neurology following      Alcohol withdrawal syndrome, with delirium  - started scheduled librium for withdrawals and PRN ativan  - decreased librium as patient was more lethargic after receiving AM dose    Goals of care, counseling/discussion  Pt DNR status (see prior notes for details). Continuing current care for now.    Shock, unspecified  Unclear etiology of shock, TTE essentially rules out obstructive and cardiogenic shock, clinical picture argues strongly against hypovolemic shock, no evidence of hemorrhage. C/f septic shock however no clear source of infection noted.  - weaned from pressors, continue to monitor  - continue cefepime, stop vancomycin  - day #7 of antibiotics with no clear source - complete 10 day course for thoroughness    Type 2 diabetes mellitus, with long-term current use of insulin  A1c:   Lab Results   Component Value Date    HGBA1C 10.5 (H) 06/30/2021     Meds: basal bolus insulin + SSI PRN to maintain goal 140-180  ADA diet, accuchecks ACHS, hypoglycemic protocol        Panlobular emphysema  - extubated and doing okay respiratory wise      Lactic acidosis  Improved lactate likely due to CRRT. Unclear etiology of acidosis - did not improve after improved oxygenation, no refractory shock, no culprit medications.   - monitor while off CRRT      Acute on chronic diastolic congestive heart  failure  Initially non-responsive to lasix, now improving w/ CRRT  - volume removal via CRRT vs lasix PRN  - respiratory wise he is improving      Hypertension  Cont lopressor  - titrate medications as needed    Tobacco use disorder, severe, dependence  Counseled on cessation by prior provider.   - nicotine patch      Atrial fibrillation with RVR  Hx of flutter, presented in Afib w/ RVR. Converted to NSR w/ amiodarone, then returned to flutter overnight 1/30.   - on amio gtt and scheduled metoprolol  - treating underlying w/d symptoms  - transition to PO Amiodarone and increase scheduled Metoprolol  - still an issue with in/out. Re-consulted Cardiology, increasing Metoprolol today  - keep NPO at midnight for possible cardioversion however patient will need to be awake enough to consent for this      Elevated troponin  Flat troponin, likely demand. EF wnl. NST in 2020 without ischemia    Acute renal failure  S/p CRRT w/ improvement in volume status and creatinine.   - holding CRRT, will monitor for HD needs        VTE Risk Mitigation (From admission, onward)         Ordered     apixaban tablet 5 mg  2 times daily         01/31/22 1341     heparin (porcine) injection 5,000 Units  Use PRN         01/27/22 2149     heparin (porcine) injection 5,000 Units  Use PRN         01/27/22 2158     heparin (porcine) injection 5,000 Units  Use PRN         01/27/22 2149     IP VTE HIGH RISK PATIENT  Once         01/26/22 1409     Place sequential compression device  Until discontinued         01/26/22 1409                Discharge Planning   BALDO:      Code Status: DNR   Is the patient medically ready for discharge?:     Reason for patient still in hospital (select all that apply): Treatment  Discharge Plan A: Home Health   Discharge Delays: None known at this time        Critical care time spent on the evaluation and treatment of severe organ dysfunction, review of pertinent labs and imaging studies, discussions with consulting  providers and discussions with patient/family: 35 minutes.      Kulwant Nesbitt MD  Department of Hospital Medicine   [facility] - [place] - Intensive Care

## 2022-02-02 NOTE — ASSESSMENT & PLAN NOTE
In AFL with RVR  Last documented in SR 1/30/22 at 1922 (on tele)  Started on apixaban 5mg bid 1/31/22 at 2046  Cont po amio  Titrate toprol as BP/HR will allow  Will consider ESTEBAN/DCCV if unable to control HR.  Prefer to defer until pt is able to consent for procedure given hemodynamic stability.  NPO after MN.

## 2022-02-02 NOTE — CARE UPDATE
[facility] [place] Intensive Care  ICU Shift Summary  Date: 2/1/2022      Prehospitalization: Home  Admit Date / LOS : 1/26/2022/ 6 days    Diagnosis: Acute hypoxemic respiratory failure    Consults:        Active: Cardio, Nephro, Palliative and Pulm CC       Needed: N/A     Code Status: DNR   Advanced Directive: <no information>    LDA: Ren[place], PICC, PIV and Trialysis       Central Lines/Site/Justification:Multiple GTTS and Unable to Obtain/Maintain PIV       Urinary Cath/Order/Justification:Critically Ill in the ICU and requiring intensive monitoring    Vasopressors/Infusions:    sodium chloride 0.9% 50 mL/hr at 02/01/22 1800          GOALS: Volume/ Hemodynamic: HR <                     RASS: +1 anxious, apprehensive but not aggressive    Pain Management: IV       Pain Controlled: yes     Rhythm: A-Fib    Respiratory Device: Nasal Cannula                  Most Recent SBT/ SAT: Did not perform       MOVE Screen: PT Consult  ICU Liberation: not applicable    VTE Prophylaxis: Mechanical  Mobility: Bedrest  Stress Ulcer Prophylaxis: Yes    Isolation: No active isolations  Dietary: PO  Tolerance: yes  Advancement: no    I & O (24h):    Intake/Output Summary (Last 24 hours) at 2/1/2022 1802  Last data filed at 2/1/2022 1800  Gross per 24 hour   Intake 2023 ml   Output 1655 ml   Net 368 ml        Restraints: No    Significant Dates:  Post Op Date: N/A  Rescue Date: N/A  Imaging/ Diagnostics: N/A    Noteworthy Labs:  See below    COVID Test: (--)  CBC/Anemia Labs: Coags:    Recent Labs   Lab 01/30/22 0301 01/31/22 0323   WBC 16.27* 16.05*   HGB 14.2 14.2   HCT 40.9 39.7*   PLT 55* 67*   MCV 95 93   RDW 13.2 13.1    No results for input(s): PT, INR, APTT in the last 168 hours.     Chemistries:   Recent Labs   Lab 01/30/22  0301 01/30/22  0301 01/31/22  0323 02/01/22  0331   *  135*   < > 137 140   K 3.8  3.8   < > 2.7* 3.2*     102   < > 100 104   CO2 20*  20*   < > 23 25   BUN 36*  36*   < > 64* 65*    CREATININE 2.7*  2.7*   < > 3.3* 2.7*   CALCIUM 8.5*  8.5*   < > 8.4* 8.9   PROT 5.8*  --  5.9*  --    BILITOT 0.8  --  0.9  --    ALKPHOS 61  --  61  --    ALT 1,207*  --  861*  --    *  --  371*  --    MG 1.9  --  2.4  --    PHOS 4.4  4.4  --  3.0  --     < > = values in this interval not displayed.        Cardiac Enzymes: Ejection Fractions:    No results for input(s): CPK, CPKMB, MB, TROPONINI in the last 72 hours. EF   Date Value Ref Range Status   01/28/2022 55 % Final        POCT Glucose: HbA1c:    Recent Labs   Lab 02/01/22  0335 02/01/22  0533 02/01/22  1145   POCTGLUCOSE 169* 149* 164*    Hemoglobin A1C   Date Value Ref Range Status   06/30/2021 10.5 (H) 4.0 - 5.6 % Final     Comment:     ADA Screening Guidelines:  5.7-6.4%  Consistent with prediabetes  >or=6.5%  Consistent with diabetes    High levels of fetal hemoglobin interfere with the HbA1C  assay. Heterozygous hemoglobin variants (HbS, HgC, etc)do  not significantly interfere with this assay.   However, presence of multiple variants may affect accuracy.             ICU LOS 5d 1h  Level of Care: Critical Care    Chart Check: 12 HR Done  Shift Summary/Plan for the shift: See care plan note

## 2022-02-02 NOTE — PT/OT/SLP PROGRESS
Occupational Therapy      Patient Name:  Marck Madison   MRN:  [id number]    Patient not seen today secondary to Other (Comment) (The patient remains tachycardiac afib and high BP). Will follow-up as appropriate.    2/2/2022

## 2022-02-02 NOTE — PROGRESS NOTES
Date of Admission:1/26/2022    SUBJECTIVE:responding to me more     Current Facility-Administered Medications   Medication    0.9%  NaCl infusion    albuterol-ipratropium 2.5 mg-0.5 mg/3 mL nebulizer solution 3 mL    amiodarone tablet 400 mg    apixaban tablet 5 mg    aspirin chewable tablet 81 mg    calcium gluconate 1g in dextrose 5% 100mL (ready to mix system)    chlordiazepoxide capsule 5 mg    dextrose 10% bolus 125 mL    dextrose 10% bolus 250 mL    dextrose 50% injection 12.5 g    dextrose 50% injection 25 g    famotidine (PF) injection 20 mg    folic acid tablet 1 mg    glucagon (human recombinant) injection 1 mg    glucose chewable tablet 16 g    glucose chewable tablet 24 g    heparin (porcine) injection 5,000 Units    heparin (porcine) injection 5,000 Units    heparin (porcine) injection 5,000 Units    hydrALAZINE injection 10 mg    insulin aspart U-100 pen 0-5 Units    insulin detemir U-100 pen 5 Units    lorazepam injection 1 mg    melatonin tablet 6 mg    metoprolol succinate (TOPROL-XL) 24 hr tablet 100 mg    multivitamin liquid no.118 per dose 10 mL    mupirocin 2 % ointment    naloxone 0.4 mg/mL injection 0.02 mg    nicotine 21 mg/24 hr 1 patch    potassium phosphate (monobasic) tablet 500 mg    senna-docusate 8.6-50 mg per tablet 1 tablet    sodium chloride 0.9% flush 10 mL    And    sodium chloride 0.9% flush 10 mL    thiamine tablet 100 mg       Wt Readings from Last 3 Encounters:   01/30/22 78.8 kg (173 lb 11.6 oz)   11/21/21 79.4 kg (175 lb)   07/28/21 79.4 kg (175 lb)     Temp Readings from Last 3 Encounters:   02/02/22 98.8 °F (37.1 °C) (Oral)   11/21/21 98.2 °F (36.8 °C) (Oral)   07/29/21 98.7 °F (37.1 °C)     BP Readings from Last 3 Encounters:   02/02/22 (!) 141/75   11/21/21 139/85   07/29/21 129/89     Pulse Readings from Last 3 Encounters:   02/02/22 91   11/21/21 95   07/29/21 91       Intake/Output Summary (Last 24 hours) at 2/2/2022 5935  Last data  filed at 2/2/2022 1700  Gross per 24 hour   Intake 1207.96 ml   Output 2475 ml   Net -1267.04 ml       PE:  GEN:wd male in nad  HEENT:ncat,eyes open, Ett  CVS:s1s2 regular  PULM:ctab  ABD:+bs, soft  EXT:no leg edema  NEURO:more alert    Recent Labs   Lab 02/02/22  0334   MG 2.0       Lab Results   Component Value Date    .5 (H) 09/10/2019    CALCIUM 8.9 02/01/2022    CAION 1.19 01/21/2020    PHOS 2.7 02/02/2022       Recent Labs   Lab 02/02/22  0334   WBC 13.00*   RBC 4.67   HGB 15.2   HCT 45.0   PLT 81*   MCV 96   MCH 32.5*   MCHC 33.8           A/P:  1.alireza. hold hd.  Creatinine better. Good uop. Stopped hd. Cont tx.  2.acute resp failure. Cont ivfs.  3.afib. rate up. Following.  4.htn.sbp ok.  5.dm2.  Watch sugar.  6.hypokalemia.  Lamar level.  7.shock liver. Lfts lamar.

## 2022-02-02 NOTE — ASSESSMENT & PLAN NOTE
- started scheduled librium for withdrawals and PRN ativan  - decreased librium as patient was more lethargic after receiving AM dose

## 2022-02-02 NOTE — NURSING
Assumed care at this time pt calm and arousable to tactile stimuli. -140 afib. Pt blood pressure elevated also with sys of 180-190. He was medicated with hydralazine moments ago and scheduled to receive amiodarone and metoprolol.

## 2022-02-02 NOTE — PLAN OF CARE
Pt heart rate remained in the 130-140's. Blood elevated at times parameters to treat sbp >180. No distress noted. Pt did required one dose of Ativan 1 mg IV for agitation on last night.

## 2022-02-02 NOTE — NURSING
Pt sys blood pressure 177 and . Will continue to monitor. Sometimes patient moves when blood pressure is cycling which makes blood pressure reading high.

## 2022-02-02 NOTE — SUBJECTIVE & OBJECTIVE
Interval History: No new issues, still in/out a flutter. Took his medications this morning but not waking up much for me on exam.    Review of Systems   Unable to perform ROS: Mental status change     Objective:     Vital Signs (Most Recent):  Temp: 98.6 °F (37 °C) (02/02/22 1100)  Pulse: 88 (02/02/22 1100)  Resp: 16 (02/02/22 1100)  BP: (!) 149/103 (02/02/22 1100)  SpO2: 100 % (02/02/22 1100) Vital Signs (24h Range):  Temp:  [98.1 °F (36.7 °C)-98.9 °F (37.2 °C)] 98.6 °F (37 °C)  Pulse:  [] 88  Resp:  [16-42] 16  SpO2:  [76 %-100 %] 100 %  BP: (133-198)/() 149/103     Weight: 78.8 kg (173 lb 11.6 oz)  Body mass index is 28.91 kg/m².    Intake/Output Summary (Last 24 hours) at 2/2/2022 1251  Last data filed at 2/2/2022 1100  Gross per 24 hour   Intake 1233.6 ml   Output 2475 ml   Net -1241.4 ml      Physical Exam  Constitutional:       General: He is not in acute distress.     Appearance: He is ill-appearing. He is not toxic-appearing or diaphoretic.   Cardiovascular:      Rate and Rhythm: Normal rate.      Heart sounds: No murmur heard.  No gallop.       Comments: Aflutter in rate of 130s  Pulmonary:      Effort: Pulmonary effort is normal. No respiratory distress.      Breath sounds: No wheezing or rales.   Abdominal:      General: Bowel sounds are normal. There is no distension.      Palpations: Abdomen is soft.      Tenderness: There is no abdominal tenderness.   Musculoskeletal:         General: No swelling.   Skin:     General: Skin is warm and dry.   Neurological:      Mental Status: He is disoriented.         Significant Labs: All pertinent labs within the past 24 hours have been reviewed.    Significant Imaging: I have reviewed all pertinent imaging results/findings within the past 24 hours.

## 2022-02-03 ENCOUNTER — ANESTHESIA (OUTPATIENT)
Dept: CARDIOLOGY | Facility: HOSPITAL | Age: 62
DRG: 871 | End: 2022-02-03
Payer: MEDICAID

## 2022-02-03 ENCOUNTER — ANESTHESIA EVENT (OUTPATIENT)
Dept: CARDIOLOGY | Facility: HOSPITAL | Age: 62
DRG: 871 | End: 2022-02-03
Payer: MEDICAID

## 2022-02-03 LAB
ALBUMIN SERPL BCP-MCNC: 2.8 G/DL (ref 3.5–5.2)
ALP SERPL-CCNC: 63 U/L (ref 55–135)
ALT SERPL W/O P-5'-P-CCNC: 247 U/L (ref 10–44)
AMMONIA PLAS-SCNC: 26 UMOL/L (ref 10–50)
ANION GAP SERPL CALC-SCNC: 12 MMOL/L (ref 8–16)
ANION GAP SERPL CALC-SCNC: 12 MMOL/L (ref 8–16)
AST SERPL-CCNC: 53 U/L (ref 10–40)
BILIRUB SERPL-MCNC: 0.6 MG/DL (ref 0.1–1)
BSA FOR ECHO PROCEDURE: 1.93 M2
BUN SERPL-MCNC: 54 MG/DL (ref 8–23)
BUN SERPL-MCNC: 54 MG/DL (ref 8–23)
CALCIUM SERPL-MCNC: 8.3 MG/DL (ref 8.7–10.5)
CALCIUM SERPL-MCNC: 8.3 MG/DL (ref 8.7–10.5)
CHLORIDE SERPL-SCNC: 113 MMOL/L (ref 95–110)
CHLORIDE SERPL-SCNC: 113 MMOL/L (ref 95–110)
CO2 SERPL-SCNC: 22 MMOL/L (ref 23–29)
CO2 SERPL-SCNC: 22 MMOL/L (ref 23–29)
CREAT SERPL-MCNC: 1.8 MG/DL (ref 0.5–1.4)
CREAT SERPL-MCNC: 1.8 MG/DL (ref 0.5–1.4)
EJECTION FRACTION: 55 %
EST. GFR  (AFRICAN AMERICAN): 45 ML/MIN/1.73 M^2
EST. GFR  (AFRICAN AMERICAN): 45 ML/MIN/1.73 M^2
EST. GFR  (NON AFRICAN AMERICAN): 39 ML/MIN/1.73 M^2
EST. GFR  (NON AFRICAN AMERICAN): 39 ML/MIN/1.73 M^2
GLUCOSE SERPL-MCNC: 131 MG/DL (ref 70–110)
GLUCOSE SERPL-MCNC: 131 MG/DL (ref 70–110)
PHOSPHATE SERPL-MCNC: 2.7 MG/DL (ref 2.7–4.5)
POCT GLUCOSE: 133 MG/DL (ref 70–110)
POCT GLUCOSE: 139 MG/DL (ref 70–110)
POCT GLUCOSE: 154 MG/DL (ref 70–110)
POCT GLUCOSE: 176 MG/DL (ref 70–110)
POTASSIUM SERPL-SCNC: 3.1 MMOL/L (ref 3.5–5.1)
POTASSIUM SERPL-SCNC: 3.1 MMOL/L (ref 3.5–5.1)
PROT SERPL-MCNC: 5.3 G/DL (ref 6–8.4)
SINUS: 3.2 CM
SODIUM SERPL-SCNC: 147 MMOL/L (ref 136–145)
SODIUM SERPL-SCNC: 147 MMOL/L (ref 136–145)

## 2022-02-03 PROCEDURE — 25000003 PHARM REV CODE 250: Performed by: STUDENT IN AN ORGANIZED HEALTH CARE EDUCATION/TRAINING PROGRAM

## 2022-02-03 PROCEDURE — D9220A PRA ANESTHESIA: Mod: ANES,,, | Performed by: ANESTHESIOLOGY

## 2022-02-03 PROCEDURE — 25000003 PHARM REV CODE 250: Performed by: INTERNAL MEDICINE

## 2022-02-03 PROCEDURE — 82140 ASSAY OF AMMONIA: CPT | Performed by: STUDENT IN AN ORGANIZED HEALTH CARE EDUCATION/TRAINING PROGRAM

## 2022-02-03 PROCEDURE — 25000003 PHARM REV CODE 250: Performed by: NURSE PRACTITIONER

## 2022-02-03 PROCEDURE — 21400001 HC TELEMETRY ROOM

## 2022-02-03 PROCEDURE — A4216 STERILE WATER/SALINE, 10 ML: HCPCS | Performed by: INTERNAL MEDICINE

## 2022-02-03 PROCEDURE — D9220A PRA ANESTHESIA: ICD-10-PCS | Mod: ANES,,, | Performed by: ANESTHESIOLOGY

## 2022-02-03 PROCEDURE — 37000009 HC ANESTHESIA EA ADD 15 MINS: Performed by: INTERNAL MEDICINE

## 2022-02-03 PROCEDURE — 80053 COMPREHEN METABOLIC PANEL: CPT | Performed by: INTERNAL MEDICINE

## 2022-02-03 PROCEDURE — S4991 NICOTINE PATCH NONLEGEND: HCPCS | Performed by: INTERNAL MEDICINE

## 2022-02-03 PROCEDURE — 84100 ASSAY OF PHOSPHORUS: CPT | Performed by: INTERNAL MEDICINE

## 2022-02-03 PROCEDURE — D9220A PRA ANESTHESIA: Mod: CRNA,,, | Performed by: NURSE ANESTHETIST, CERTIFIED REGISTERED

## 2022-02-03 PROCEDURE — 37000008 HC ANESTHESIA 1ST 15 MINUTES: Performed by: INTERNAL MEDICINE

## 2022-02-03 PROCEDURE — 99233 PR SUBSEQUENT HOSPITAL CARE,LEVL III: ICD-10-PCS | Mod: ,,, | Performed by: INTERNAL MEDICINE

## 2022-02-03 PROCEDURE — 63600175 PHARM REV CODE 636 W HCPCS: Performed by: STUDENT IN AN ORGANIZED HEALTH CARE EDUCATION/TRAINING PROGRAM

## 2022-02-03 PROCEDURE — 63600175 PHARM REV CODE 636 W HCPCS: Performed by: NURSE ANESTHETIST, CERTIFIED REGISTERED

## 2022-02-03 PROCEDURE — 94761 N-INVAS EAR/PLS OXIMETRY MLT: CPT

## 2022-02-03 PROCEDURE — D9220A PRA ANESTHESIA: ICD-10-PCS | Mod: CRNA,,, | Performed by: NURSE ANESTHETIST, CERTIFIED REGISTERED

## 2022-02-03 PROCEDURE — 27000221 HC OXYGEN, UP TO 24 HOURS

## 2022-02-03 PROCEDURE — 99233 SBSQ HOSP IP/OBS HIGH 50: CPT | Mod: ,,, | Performed by: INTERNAL MEDICINE

## 2022-02-03 RX ORDER — PROPOFOL 10 MG/ML
VIAL (ML) INTRAVENOUS
Status: DISCONTINUED | OUTPATIENT
Start: 2022-02-03 | End: 2022-02-03

## 2022-02-03 RX ORDER — SODIUM CHLORIDE 450 MG/100ML
INJECTION, SOLUTION INTRAVENOUS ONCE
Status: COMPLETED | OUTPATIENT
Start: 2022-02-03 | End: 2022-02-03

## 2022-02-03 RX ADMIN — Medication 10 ML: at 05:02

## 2022-02-03 RX ADMIN — FOLIC ACID 1 MG: 1 TABLET ORAL at 08:02

## 2022-02-03 RX ADMIN — METOPROLOL SUCCINATE 100 MG: 50 TABLET, EXTENDED RELEASE ORAL at 09:02

## 2022-02-03 RX ADMIN — PROPOFOL 10 MG: 10 INJECTION, EMULSION INTRAVENOUS at 12:02

## 2022-02-03 RX ADMIN — Medication 10 ML: at 08:02

## 2022-02-03 RX ADMIN — SODIUM CHLORIDE: 0.45 INJECTION, SOLUTION INTRAVENOUS at 02:02

## 2022-02-03 RX ADMIN — POTASSIUM PHOSPHATE, MONOBASIC 500 MG: 500 TABLET, SOLUBLE ORAL at 08:02

## 2022-02-03 RX ADMIN — HYDRALAZINE HYDROCHLORIDE 10 MG: 20 INJECTION INTRAMUSCULAR; INTRAVENOUS at 12:02

## 2022-02-03 RX ADMIN — ASPIRIN 81 MG CHEWABLE TABLET 81 MG: 81 TABLET CHEWABLE at 08:02

## 2022-02-03 RX ADMIN — AMIODARONE HYDROCHLORIDE 400 MG: 200 TABLET ORAL at 08:02

## 2022-02-03 RX ADMIN — AMIODARONE HYDROCHLORIDE 400 MG: 200 TABLET ORAL at 09:02

## 2022-02-03 RX ADMIN — Medication 10 ML: at 11:02

## 2022-02-03 RX ADMIN — SENNOSIDES AND DOCUSATE SODIUM 1 TABLET: 50; 8.6 TABLET ORAL at 09:02

## 2022-02-03 RX ADMIN — APIXABAN 5 MG: 5 TABLET, FILM COATED ORAL at 08:02

## 2022-02-03 RX ADMIN — FAMOTIDINE 20 MG: 10 INJECTION INTRAVENOUS at 08:02

## 2022-02-03 RX ADMIN — THIAMINE HCL TAB 100 MG 100 MG: 100 TAB at 08:02

## 2022-02-03 RX ADMIN — SENNOSIDES AND DOCUSATE SODIUM 1 TABLET: 50; 8.6 TABLET ORAL at 08:02

## 2022-02-03 RX ADMIN — SODIUM CHLORIDE, SODIUM LACTATE, POTASSIUM CHLORIDE, AND CALCIUM CHLORIDE: .6; .31; .03; .02 INJECTION, SOLUTION INTRAVENOUS at 11:02

## 2022-02-03 RX ADMIN — PROPOFOL 30 MG: 10 INJECTION, EMULSION INTRAVENOUS at 12:02

## 2022-02-03 RX ADMIN — APIXABAN 5 MG: 5 TABLET, FILM COATED ORAL at 09:02

## 2022-02-03 RX ADMIN — Medication 10 ML: at 12:02

## 2022-02-03 RX ADMIN — Medication 1 PATCH: at 08:02

## 2022-02-03 RX ADMIN — INSULIN DETEMIR 5 UNITS: 100 INJECTION, SOLUTION SUBCUTANEOUS at 08:02

## 2022-02-03 RX ADMIN — METOPROLOL SUCCINATE 100 MG: 50 TABLET, EXTENDED RELEASE ORAL at 08:02

## 2022-02-03 NOTE — NURSING
Assumed care at this time. Pt HR 92 in no apparent distress. Reported via off going nurse patient will be npo for cardioversion in am.

## 2022-02-03 NOTE — PLAN OF CARE
Uneventful night pt required one dose of IV hydralazine for b/p 180/102. He remains in atrial flutter HR 70-80's. He is still very confuse at this time but oriented to Name and birthday. Remains npo for possible cardioversion today. No ativan given on last night pt was very calm.

## 2022-02-03 NOTE — BRIEF OP NOTE
[facility] - Cath Lab  Brief Operative Note     SUMMARY     Surgery Date: 2/3/2022     Surgeon(s) and Role:     * Trevor Schwab MD - Primary    Assisting Surgeon: None    Pre-op Diagnosis:  Atrial flutter with rapid ventricular response [I48.92]    Post-op Diagnosis:  Post-Op Diagnosis Codes:     * Atrial flutter with rapid ventricular response [I48.92]    Procedure(s) (LRB):  Transesophageal echo (ESTEBAN) intra-procedure log documentation (N/A)  Cardioversion or Defibrillation (N/A)    Anesthesia: Monitor Anesthesia Care    Description of the findings of the procedure: uneventful ESTEBAN/DCCV with anesthesia    Findings/Key Components:   Normal bivent size/fxn  LVEF 50-55%, normal wall motion.  Normal valves  Trace AI  Mod MR  Mild TR  No LA/JAMEEL thrombus    Successful DCCV AFL->NSR 60 BPM 75J x1    Pt tolerated procedure well without complications.    Plan:  Cont med rx  Cont eliquis 5mg bid  Cont PO amio/metoprolol.  Hope to stop amio in the future (consider EPS eval for AFL ablation as outpatient).    Estimated Blood Loss: none         Specimens: None    Diet: NPO pending primary team assessment for ability to start diet    Activity: ad mary jane.

## 2022-02-03 NOTE — ASSESSMENT & PLAN NOTE
S/p CRRT w/ improvement in volume status and creatinine.   - holding CRRT, will monitor for HD needs  - renal function is actually improving

## 2022-02-03 NOTE — PT/OT/SLP PROGRESS
Occupational Therapy      Patient Name:  Marck Madison   MRN:  [id number]    Patient not seen today secondary to Unavailable (Cardioversion today). OT will follow-up as indicated.    2/3/2022

## 2022-02-03 NOTE — PROGRESS NOTES
[facility] Medicine  Progress Note    Patient Name: Marck Madison  MRN: [id number]  Patient Class: IP- Inpatient   Admission Date: 1/26/2022  Length of Stay: 8 days  Attending Physician: Kulwant Nesbitt MD  Primary Care Provider: [facility] [place]        Subjective:     Principal Problem:Acute hypoxemic respiratory failure        HPI:  61 year old male with diastolic heart failure, atrial flutter, pulmonary hypertension, mitral and tricuspid valve regurgitation, emphysema and current every day smoker who presented with shortness of breath, body aches, bilateral flank pain and weakness since last night. Associated symptoms include cough. Admits to smoking. Denied nausea, vomiting, abdominal pain, dysuria, diarrhea. In the ED, patient was hypotensive, tachycardic to 120s, diffuse expiratory wheezing and with a lactic acid of 3. Given one liter of fluids, continuous bronchodilators, methylprednisolone and broad spectrum antibiotics. O2 sats stable at room air. COVID and flu negative. Procalcitonin negative. Remains tachycardic. Cardiology consulted. BP stable. Admit to ICU.       Overview/Hospital Course:  Mr Madison presented with acute exacerbation of diastolic heart failure and atrial fibrillation with RVR. Also with acute renal failure. He was given fluids in the ED. O2 requirements increased significantly. Tox screen positive for amphetamines. Placed on BiPAP but non compliant with this. He is AAO x 3 and with no tremors. Required precedex to comply with BiPAP. Amiodarone infusion and anticoagulation. Nephrology, cardiology and pulmonology consulted. While still in the ED waiting for an ICU bed patient experienced alcohol withdrawals. Give lorazepam and more diuresis but poor UOP and remained in respiratory distress despite lorazepam. Patient was intubated. Required significant vent support. Renal function continued to deteriorate. Started on CRRT.   Pt initially poorly responsive to  CRRT, but eventually improved, hypoxia decreased. Sedation was stopped but he was poorly responsive. His mental status improved and he was extubated to nasal cannula. Slowly improving in mentation but heart rate difficult to control. On IV amiodarone and metoprolol and still with aflutter to 130s at time. Cardiology consulted, patient underwent ESTEBAN/CV on 2/3/22. Stable for transfer to floor.      Interval History: much more alert and awake today. Heart rate still in 130s intermittently. Went for ESTEBAN/CV today.    Review of Systems   Constitutional: Negative for chills and fever.   Respiratory: Negative for cough and shortness of breath.    Gastrointestinal: Negative for abdominal pain.   Genitourinary: Negative for dysuria.   Skin: Negative for rash.   Psychiatric/Behavioral: Negative for agitation.     Objective:     Vital Signs (Most Recent):  Temp: 98.6 °F (37 °C) (02/03/22 1500)  Pulse: 77 (02/03/22 1500)  Resp: (!) 22 (02/03/22 1500)  BP: (!) 159/95 (02/03/22 1500)  SpO2: (!) 93 % (02/03/22 1500) Vital Signs (24h Range):  Temp:  [97.9 °F (36.6 °C)-98.6 °F (37 °C)] 98.6 °F (37 °C)  Pulse:  [60-91] 77  Resp:  [12-22] 22  SpO2:  [93 %-100 %] 93 %  BP: (136-187)/() 159/95     Weight: 74.8 kg (164 lb 14.5 oz)  Body mass index is 27.44 kg/m².    Intake/Output Summary (Last 24 hours) at 2/3/2022 1614  Last data filed at 2/3/2022 1500  Gross per 24 hour   Intake 1236.61 ml   Output 1615 ml   Net -378.39 ml      Physical Exam  Constitutional:       General: He is not in acute distress.     Appearance: He is ill-appearing. He is not toxic-appearing or diaphoretic.   Cardiovascular:      Rate and Rhythm: Normal rate.      Heart sounds: No murmur heard.  No gallop.       Comments: Aflutter in rate of 130s then NSR after CV  Pulmonary:      Effort: Pulmonary effort is normal. No respiratory distress.      Breath sounds: No wheezing or rales.   Abdominal:      General: Bowel sounds are normal. There is no distension.       Palpations: Abdomen is soft.      Tenderness: There is no abdominal tenderness.   Musculoskeletal:         General: No swelling.   Skin:     General: Skin is warm and dry.   Neurological:      Mental Status: He is alert.      Comments: Much more alert awake and oriented to situation/self         Significant Labs: All pertinent labs within the past 24 hours have been reviewed.    Significant Imaging: I have reviewed all pertinent imaging results/findings within the past 24 hours.      Assessment/Plan:      * Acute hypoxemic respiratory failure  Pt w/ acute onset pulmonary edema, likely cardiogenic. Significant difficulties oxygenating patient initially despite ventilation  - now he is on minimum oxygen requirements and improving from respiratory standpoint    Shock liver  Improving with resolution of shock      Thrombocytopenia  Thrombocytopenia which was concurrent w/ HD. Low-intermediate HIT probability.   - apixaban  - monitor      Encephalopathy, metabolic  Pt w/ non-focal encephalopathy of unclear etiology, likely due to sedation +/- hypoperfusion. Improving with time. No focal deficits.  - neurology following  - resolving      Alcohol withdrawal syndrome, with delirium  - appears to be improved  - PRN Ativan    Goals of care, counseling/discussion  Pt DNR status (see prior notes for details). Continuing current care for now.    Shock, unspecified  Unclear etiology of shock, TTE essentially rules out obstructive and cardiogenic shock, clinical picture argues strongly against hypovolemic shock, no evidence of hemorrhage. C/f septic shock however no clear source of infection noted.  - weaned from pressors, continue to monitor  - continue cefepime, stop vancomycin  - day #7 of antibiotics with no clear source - complete 10 day course for thoroughness    Type 2 diabetes mellitus, with long-term current use of insulin  A1c:   Lab Results   Component Value Date    HGBA1C 10.5 (H) 06/30/2021     Meds: basal bolus  insulin + SSI PRN to maintain goal 140-180  ADA diet, accuchecks ACHS, hypoglycemic protocol        Panlobular emphysema  - extubated and doing okay respiratory wise      Lactic acidosis  Improved lactate likely due to CRRT. Unclear etiology of acidosis - did not improve after improved oxygenation, no refractory shock, no culprit medications.   - monitor while off CRRT      Acute on chronic diastolic congestive heart failure  Initially non-responsive to lasix, now improving w/ CRRT  - volume removal via CRRT vs lasix PRN  - respiratory wise he is improving      Hypertension  Cont lopressor  - titrate medications as needed    Tobacco use disorder, severe, dependence  Counseled on cessation by prior provider.   - nicotine patch      Atrial fibrillation with RVR  Hx of flutter, presented in Afib w/ RVR. Converted to NSR w/ amiodarone, then returned to flutter overnight 1/30.   - on amio gtt and scheduled metoprolol  - treating underlying w/d symptoms  - transition to PO Amiodarone and increase scheduled Metoprolol  - still an issue with in/out. Re-consulted Cardiology, increasing Metoprolol today  - ESTEBAN/CV 2/3/22       Elevated troponin  Flat troponin, likely demand. EF wnl. NST in 2020 without ischemia    Acute renal failure  S/p CRRT w/ improvement in volume status and creatinine.   - holding CRRT, will monitor for HD needs  - renal function is actually improving        VTE Risk Mitigation (From admission, onward)         Ordered     apixaban tablet 5 mg  2 times daily         01/31/22 1341     heparin (porcine) injection 5,000 Units  Use PRN         01/27/22 2149     heparin (porcine) injection 5,000 Units  Use PRN         01/27/22 2158     heparin (porcine) injection 5,000 Units  Use PRN         01/27/22 2149     IP VTE HIGH RISK PATIENT  Once         01/26/22 1409     Place sequential compression device  Until discontinued         01/26/22 1409                Discharge Planning   BALDO:      Code Status: DNR   Is  the patient medically ready for discharge?:     Reason for patient still in hospital (select all that apply): Treatment  Discharge Plan A: Home Health   Discharge Delays: None known at this time        Critical care time spent on the evaluation and treatment of severe organ dysfunction, review of pertinent labs and imaging studies, discussions with consulting providers and discussions with patient/family: 45 minutes.      Kulwant Nesbitt MD  Department of Hospital Medicine   [facility] [place] - Intensive Care

## 2022-02-03 NOTE — PT/OT/SLP PROGRESS
Physical Therapy      Patient Name:  Marck Madison   MRN:  [id number]    Patient not seen today secondary to  (Cardioversion today). Will follow-up .

## 2022-02-03 NOTE — PROVIDER TRANSFER
Mr. Madison was admitted for acute heart failure exacerbation, Afib with RVR and acute renal failure. Started on iV Amiodarone as well as bipap but eventually had to be intubated due to alcohol withdrawal and worsening respiratory distress. Also on CRRT for short period of time, now monitoring for HD needs. Eventually extubated to nasal cannula, mental status slowly improved. ESTEBAN/CV on 2/3 and now in NSR. On PO Amiodarone and Metoprolol. Eliquis for anticoagulation. Stable for transfer to floor.    - PT/OT consulted  - Wean O2 as tolerated  - monitor for HD needs    Kulwant Nesbitt MD  Department of Hospital Medicine  [facility]

## 2022-02-03 NOTE — SUBJECTIVE & OBJECTIVE
Interval History: much more alert and awake today. Heart rate still in 130s intermittently. Went for ESTEBAN/CV today.    Review of Systems   Constitutional: Negative for chills and fever.   Respiratory: Negative for cough and shortness of breath.    Gastrointestinal: Negative for abdominal pain.   Genitourinary: Negative for dysuria.   Skin: Negative for rash.   Psychiatric/Behavioral: Negative for agitation.     Objective:     Vital Signs (Most Recent):  Temp: 98.6 °F (37 °C) (02/03/22 1500)  Pulse: 77 (02/03/22 1500)  Resp: (!) 22 (02/03/22 1500)  BP: (!) 159/95 (02/03/22 1500)  SpO2: (!) 93 % (02/03/22 1500) Vital Signs (24h Range):  Temp:  [97.9 °F (36.6 °C)-98.6 °F (37 °C)] 98.6 °F (37 °C)  Pulse:  [60-91] 77  Resp:  [12-22] 22  SpO2:  [93 %-100 %] 93 %  BP: (136-187)/() 159/95     Weight: 74.8 kg (164 lb 14.5 oz)  Body mass index is 27.44 kg/m².    Intake/Output Summary (Last 24 hours) at 2/3/2022 1614  Last data filed at 2/3/2022 1500  Gross per 24 hour   Intake 1236.61 ml   Output 1615 ml   Net -378.39 ml      Physical Exam  Constitutional:       General: He is not in acute distress.     Appearance: He is ill-appearing. He is not toxic-appearing or diaphoretic.   Cardiovascular:      Rate and Rhythm: Normal rate.      Heart sounds: No murmur heard.  No gallop.       Comments: Aflutter in rate of 130s then NSR after CV  Pulmonary:      Effort: Pulmonary effort is normal. No respiratory distress.      Breath sounds: No wheezing or rales.   Abdominal:      General: Bowel sounds are normal. There is no distension.      Palpations: Abdomen is soft.      Tenderness: There is no abdominal tenderness.   Musculoskeletal:         General: No swelling.   Skin:     General: Skin is warm and dry.   Neurological:      Mental Status: He is alert.      Comments: Much more alert awake and oriented to situation/self         Significant Labs: All pertinent labs within the past 24 hours have been reviewed.    Significant  Imaging: I have reviewed all pertinent imaging results/findings within the past 24 hours.

## 2022-02-03 NOTE — PROGRESS NOTES
[facility] - Intensive Care  Cardiology  Progress Note    Patient Name: Marck Madison  MRN: [id number]  Admission Date: 1/26/2022  Hospital Length of Stay: 8 days  Code Status: DNR   Attending Physician: Kulwant Nesbitt MD   Primary Care Physician: [facility]  Expected Discharge Date:   Principal Problem:Acute hypoxemic respiratory failure    Subjective:     Interval Hx: pt seen in ICU, case d/w Dr. Nesbitt.  MS improved.  Will plan DCCV today.  RN at bedside to cosign consent.    Tele: AFL 60s, last documented in SR 1/30/22 at 1922 on tele (personally reviewed and interpreted)          Review of Systems   Gastrointestinal: Negative for melena.   Genitourinary: Negative for hematuria.     Objective:     Vital Signs (Most Recent):  Temp: 98.4 °F (36.9 °C) (02/03/22 0700)  Pulse: 69 (02/03/22 0900)  Resp: 16 (02/03/22 0900)  BP: (!) 169/98 (02/03/22 0900)  SpO2: 97 % (02/03/22 0900) Vital Signs (24h Range):  Temp:  [97.9 °F (36.6 °C)-98.8 °F (37.1 °C)] 98.4 °F (36.9 °C)  Pulse:  [49-91] 69  Resp:  [12-22] 16  SpO2:  [93 %-100 %] 97 %  BP: (136-177)/() 169/98     Weight: 74.8 kg (164 lb 14.5 oz)  Body mass index is 27.44 kg/m².     SpO2: 97 %  O2 Device (Oxygen Therapy): nasal cannula      Intake/Output Summary (Last 24 hours) at 2/3/2022 1005  Last data filed at 2/3/2022 0900  Gross per 24 hour   Intake 1148.49 ml   Output 1225 ml   Net -76.51 ml       Lines/Drains/Airways     Peripherally Inserted Central Catheter Line            PICC Triple Lumen 01/27/22 1915 right brachial 6 days          Drain                 Urethral Catheter 01/27/22 0916 Double-lumen 18 Fr. 7 days          Peripheral Intravenous Line                 Peripheral IV - Single Lumen 01/26/22 1000 Left;Posterior Hand 8 days         Peripheral IV - Single Lumen 01/27/22 20 G Anterior;Right Upper Arm 7 days                Physical Exam  Constitutional:       General: He is not in acute distress.     Appearance: He is not ill-appearing,  toxic-appearing or diaphoretic.   HENT:      Head: Normocephalic and atraumatic.   Eyes:      General:         Right eye: No discharge.         Left eye: No discharge.      Extraocular Movements: Extraocular movements intact.      Pupils: Pupils are equal, round, and reactive to light.   Cardiovascular:      Rate and Rhythm: Normal rate. Rhythm irregularly irregular.      Heart sounds: S1 normal and S2 normal. Heart sounds are distant. No murmur heard.      Pulmonary:      Effort: Pulmonary effort is normal. No respiratory distress.      Breath sounds: Normal breath sounds. No stridor. No wheezing, rhonchi or rales.   Chest:      Chest wall: No tenderness.   Abdominal:      General: There is no distension.      Palpations: Abdomen is soft.   Musculoskeletal:         General: No swelling or tenderness.      Cervical back: Normal range of motion and neck supple. No rigidity.      Right lower leg: No edema.      Left lower leg: No edema.   Skin:     General: Skin is warm and dry.      Coloration: Skin is not jaundiced.   Neurological:      General: No focal deficit present.      Cranial Nerves: No cranial nerve deficit.   Psychiatric:         Mood and Affect: Mood normal.         Behavior: Behavior normal.         Current Medications:   amiodarone  400 mg Oral BID    apixaban  5 mg Oral BID    aspirin  81 mg Oral Daily    famotidine (PF)  20 mg Intravenous Daily    folic acid  1 mg Oral Daily    insulin detemir U-100  5 Units Subcutaneous Daily    metoprolol succinate  100 mg Oral BID    multivitamin liquid no.118  10 mL Oral Daily    nicotine  1 patch Transdermal Daily    potassium phosphate (monobasic)  500 mg Oral Daily    senna-docusate 8.6-50 mg  1 tablet Oral BID    sodium chloride 0.9%  10 mL Intravenous Q6H    thiamine  100 mg Oral Daily       albuterol-ipratropium, [COMPLETED] calcium gluconate IVPB **AND** calcium gluconate IVPB, dextrose 10%, dextrose 10%, dextrose 50%, dextrose 50%, glucagon  (human recombinant), glucose, glucose, heparin (porcine), heparin (porcine), heparin (porcine), hydrALAZINE, insulin aspart U-100, lorazepam, melatonin, naloxone, Flushing PICC Protocol **AND** sodium chloride 0.9% **AND** sodium chloride 0.9%    Laboratory (all labs reviewed):  CBC:  Recent Labs   Lab 01/28/22  0344 01/29/22  0312 01/30/22  0301 01/31/22  0323 02/02/22  0334   WBC 18.22 H 18.53 H 16.27 H 16.05 H 13.00 H   Hemoglobin 16.1 14.6 14.2 14.2 15.2   Hematocrit 49.9 42.1 40.9 39.7 L 45.0   Platelets 104 L 84 L 55 L 67 L 81 L       CHEMISTRIES:  Recent Labs   Lab 01/29/22  1522 01/29/22  1522 01/29/22  2120 01/29/22  2330 01/30/22  0301 01/31/22  0323 02/01/22  0331 02/02/22  0334 02/03/22  0334   Glucose 113 H   < > 177 H  --  143 H  143 H 120 H 151 H  --  131 H  131 H   Sodium 133 L   < > 132 L  --  135 L  135 L 137 140  --  147 H  147 H   Potassium 4.0   < > 3.8  --  3.8  3.8 2.7 LL 3.2 L  --  3.1 L  3.1 L   BUN 41 H   < > 40 H  --  36 H  36 H 64 H 65 H  --  54 H  54 H   Creatinine 3.1 H   < > 3.0 H  --  2.7 H  2.7 H 3.3 H 2.7 H  --  1.8 H  1.8 H   eGFR if  23 A   < > 24 A  --  28 A  28 A 22 A 28 A  --  45 A  45 A   eGFR if non  20 A   < > 21 A  --  24 A  24 A 19 A 24 A  --  39 A  39 A   Calcium 8.3 L   < > 8.1 L  --  8.5 L  8.5 L 8.4 L 8.9  --  8.3 L  8.3 L   Magnesium 1.9  --   --  1.9 1.9 2.4  --  2.0  --     < > = values in this interval not displayed.       CARDIAC BIOMARKERS:  Recent Labs   Lab 09/11/19  1611 01/20/20  1731 01/20/20  2322 01/21/20  0400 06/09/21  1220 06/09/21  1748 07/28/21  2105 11/21/21  0024 11/21/21  0418 01/26/22  1225 01/28/22  1150    H  --  873 H  --  733 H  --   --   --   --   --   --    Troponin I  --    < > 0.308 H   < > 0.235 H   < > 0.068 H 0.161 H 0.149 H 0.172 H 0.161 H    < > = values in this interval not displayed.       COAGS:  Recent Labs   Lab 01/22/20  0345 01/23/20  0529 06/12/21  1201 06/13/21  0159  06/30/21  2207   INR 1.4 H 1.3 H 2.5 H 1.7 H 1.2       LIPIDS/LFTS:  Recent Labs   Lab 01/28/22  0344 01/29/22  0312 01/30/22  0301 01/31/22  0323 02/03/22  0334    H 3,307 H 956 H 371 H 53 H    H 1,621 H 1,207 H 861 H 247 H       BNP:  Recent Labs   Lab 06/12/21  0538 07/01/21  0608 07/28/21  2105 11/21/21  0024 01/26/22  1225    H 481 H 94 390 H 434 H       TSH:  Recent Labs   Lab 01/20/20  1731 06/10/21  0433 06/12/21  0326 07/28/21  2105 01/26/22  1225   TSH 2.296 0.176 L 2.253 0.831 1.086       Free T4:  Recent Labs   Lab 06/10/21  0433   Free T4 1.02       Diagnostic Results:  ECG (personally reviewed and interpreted tracing(s)):  1/27/22 1838 SR 93, ?inf isch  1/28/22 1056 SR 64, AIVR  1/30/22 1922 tele: SR    Echo: 1/28/22 (images prev personally reviewed and interpreted)  · There is no evidence of intracardiac shunting (negatove saline contrast study).  · The left ventricle is normal in size with mild concentric hypertrophy and normal systolic function.  · The estimated ejection fraction is 55%.  · Cannot exclude posterior wall hypokinesis.  · Mild right ventricular enlargement with mildly reduced right ventricular systolic function.    Stress Test: L MPI 1/29/20    The perfusion scan is free of evidence from myocardial ischemia or injury.    There is a  mild intensity fixed defect in the inferior wall of the left ventricle secondary to diaphragm attenuation.    Visually estimated ejection fraction is mildly depressed at stress.    The EKG portion of this study is negative for ischemia.    The patient reported no chest pain during the stress test.    Arrhythmias during stress: occasional PACs, occasional PVCs.    The blood pressure response to stress was normal.          Assessment and Plan:     * Acute hypoxemic respiratory failure  Mgmt per IM  Resolved    Alcohol withdrawal syndrome, with delirium  Contributory to above presentation    Acute renal failure  Mgmt per  IM/renal  Creat improving    Atrial fibrillation with RVR  In AFL with controlled VR today  Last documented in SR 1/30/22 at 1922 (on tele)  Started on apixaban 5mg bid 1/31/22 at 2046  Cont po amio  Titrate toprol as BP/HR will allow  ESTEBAN/DCCV today.    Risks, benefits and alternatives of the ESTEBAN/Cardioversion procedure were discussed with the patient including throat irritation, aspiration, anesthetic complications, esophageal irritation/perforation, skin irritation, arrhythmia, etc.  Patient understands and agrees to proceed.  Permits signed and placed on chart.      Elevated troponin  Suspect demand ischemia from tachycardia.   EF preserved   Consider outpat isch eval when he recovers from current presentation    Acute on chronic diastolic congestive heart failure  Appears euvolemic        VTE Risk Mitigation (From admission, onward)         Ordered     apixaban tablet 5 mg  2 times daily         01/31/22 1341     heparin (porcine) injection 5,000 Units  Use PRN         01/27/22 2149     heparin (porcine) injection 5,000 Units  Use PRN         01/27/22 2158     heparin (porcine) injection 5,000 Units  Use PRN         01/27/22 2149     IP VTE HIGH RISK PATIENT  Once         01/26/22 1409     Place sequential compression device  Until discontinued         01/26/22 1409              Pt seen in ICU, case d/w RN and Dr. Nesbitt.    Trevor Schwab MD  Cardiology  [facility] - Intensive Care

## 2022-02-03 NOTE — ANESTHESIA PREPROCEDURE EVALUATION
02/03/2022      Pre-operative evaluation for Procedure(s) (LRB):  Transesophageal echo (ESTEBAN) intra-procedure log documentation (N/A)  Cardioversion or Defibrillation (N/A)    Marck Madison is a 61 y.o. male     Patient Active Problem List   Diagnosis    Acute hypoxemic respiratory failure    NSTEMI (non-ST elevation myocardial infarction)    Acute renal failure    Dilated cardiomyopathy    Elevated troponin    Atrial fibrillation with RVR    Cardiac arrest    Tobacco use disorder, severe, dependence    Personal history of atrial flutter    Pneumonia due to COVID-19 virus    Hypertension    Tobacco abuse    Acute on chronic diastolic congestive heart failure    Lactic acidosis    Diabetic ketoacidosis without coma associated with type 2 diabetes mellitus    Cholelithiasis with chronic cholecystitis    Debility    NSVT (nonsustained ventricular tachycardia)    Acute bronchitis with bronchospasm    COPD (chronic obstructive pulmonary disease)    Moderate cigarette smoker    Panlobular emphysema    Type 2 diabetes mellitus, with long-term current use of insulin    Shock, unspecified    Goals of care, counseling/discussion    Alcohol withdrawal syndrome, with delirium    Encephalopathy, metabolic    Thrombocytopenia    Shock liver       Review of patient's allergies indicates:  No Known Allergies    No current facility-administered medications on file prior to encounter.     Current Outpatient Medications on File Prior to Encounter   Medication Sig Dispense Refill    hydrALAZINE (APRESOLINE) 100 MG tablet Take 1 tablet (100 mg total) by mouth every 8 (eight) hours. 90 tablet 1    metFORMIN (GLUCOPHAGE) 1000 MG tablet Take 1,000 mg by mouth 2 (two) times daily.      metoprolol succinate (TOPROL-XL) 100 MG 24 hr tablet Take 1 tablet (100 mg total) by mouth once daily. 30 tablet 1    NIFEdipine (PROCARDIA-XL) 30 MG (OSM) 24 hr tablet Take 1 tablet (30 mg total) by mouth once daily. 30 tablet  "1    apixaban (ELIQUIS) 5 mg Tab Take 1 tablet (5 mg total) by mouth 2 (two) times daily. 60 tablet 3    aspirin 81 MG Chew Take 1 tablet (81 mg total) by mouth once daily. 30 tablet 0    atorvastatin (LIPITOR) 40 MG tablet Take 1 tablet (40 mg total) by mouth every evening. 30 tablet 1    blood-glucose meter kit Use as instructed 1 each 0    insulin aspart U-100 (NOVOLOG) 100 unit/mL (3 mL) InPn pen Inject 13 Units into the skin 3 (three) times daily. 11.7 mL 1    insulin detemir U-100 (LEVEMIR FLEXTOUCH) 100 unit/mL (3 mL) SubQ InPn pen Inject 40 Units into the skin once daily. 12 mL 1    TRULICITY 1.5 mg/0.5 mL pen injector Inject into the skin.         Past Surgical History:   Procedure Laterality Date    LIVER SURGERY      abscess drainage; 1970s    TREATMENT OF CARDIAC ARRHYTHMIA  9/12/2019    Procedure: Cardioversion or Defibrillation;  Surgeon: Jonathan Lopez MD;  Location: [facility] CATH LAB;  Service: Cardiology;;       Social History     Socioeconomic History    Marital status: Single   Tobacco Use    Smoking status: Current Every Day Smoker     Packs/day: 0.50     Years: 5.00     Pack years: 2.50     Types: Cigarettes    Smokeless tobacco: Never Used   Substance and Sexual Activity    Alcohol use: Not Currently     Alcohol/week: 14.0 standard drinks     Types: 14 Shots of liquor per week     Comment: 6/9/21:  "[employer]" everyday, last drank on 6/8/21     Drug use: No         CBC:   Recent Labs     02/02/22  0334   WBC 13.00*   RBC 4.67   HGB 15.2   HCT 45.0   PLT 81*   MCV 96   MCH 32.5*   MCHC 33.8       CMP:   Recent Labs     02/01/22  0331 02/02/22  0334 02/03/22  0334     --  147*  147*   K 3.2*  --  3.1*  3.1*     --  113*  113*   CO2 25  --  22*  22*   BUN 65*  --  54*  54*   CREATININE 2.7*  --  1.8*  1.8*   *  --  131*  131*   MG  --  2.0  --    PHOS  --  2.7 2.7   CALCIUM 8.9  --  8.3*  8.3*   ALBUMIN  --   --  2.8*   PROT  --   --  5.3*   ALKPHOS  --  "  --  63   ALT  --   --  247*   AST  --   --  53*   BILITOT  --   --  0.6         Anesthesia Evaluation    I have reviewed the Patient Summary Reports.    I have reviewed the Nursing Notes.    I have reviewed the Medications.     Review of Systems  Anesthesia Hx:  No problems with previous Anesthesia Denies Hx of Anesthetic complications  Denies Family Hx of Anesthesia complications.   Denies Personal Hx of Anesthesia complications.   Social:  No Alcohol Use, Smoker    Hematology/Oncology:  Hematology Normal   Oncology Normal     EENT/Dental:EENT/Dental Normal   Cardiovascular:   Exercise tolerance: good Hypertension Past MI CAD  Dysrhythmias (atrial flutter)  CHF    Pulmonary:   Pneumonia Admitted with COVID pneumonia 6/8-5/15. Denies any continued SOB   Renal/:   Chronic Renal Disease, ARF Cr- 2.1   Hepatic/GI:  Hepatic/GI Normal    Musculoskeletal:  Musculoskeletal Normal    Neurological:  Neurology Normal    Endocrine:   Diabetes, type 2 Admitted with dka gap now closed   Dermatological:  Skin Normal    Psych:  Psychiatric Normal           Physical Exam  General:  Well nourished    Airway/Jaw/Neck:  Airway Findings: Mouth Opening: Normal General Airway Assessment: Adult  Mallampati: II  TM Distance: Normal, at least 6 cm         Dental:  Dental Findings: Edentulous   Chest/Lungs:  Chest/Lungs Findings: Normal Respiratory Rate     Heart/Vascular:  Heart Findings: Rate: Normal  Heart murmur: negative       Mental Status:  Mental Status Findings:  Cooperative, Alert and Oriented         Anesthesia Plan  Type of Anesthesia, risks & benefits discussed:  Anesthesia Type:  general, MAC    Patient's Preference:   Plan Factors:          Intra-op Monitoring Plan: standard ASA monitors  Intra-op Monitoring Plan Comments:   Post Op Pain Control Plan:   Post Op Pain Control Plan Comments:     Induction:   IV  Beta Blocker:         Informed Consent: Patient understands risks and agrees with Anesthesia plan.  Questions  answered. Anesthesia consent signed with patient.  ASA Score: 3     Day of Surgery Review of History & Physical: I have interviewed and examined the patient. I have reviewed the patient's H&P dated:  There are no significant changes.          Ready For Surgery From Anesthesia Perspective.

## 2022-02-03 NOTE — ASSESSMENT & PLAN NOTE
Hx of flutter, presented in Afib w/ RVR. Converted to NSR w/ amiodarone, then returned to flutter overnight 1/30.   - on amio gtt and scheduled metoprolol  - treating underlying w/d symptoms  - transition to PO Amiodarone and increase scheduled Metoprolol  - still an issue with in/out. Re-consulted Cardiology, increasing Metoprolol today  - ESTEBAN/CV 2/3/22

## 2022-02-03 NOTE — TRANSFER OF CARE
"Anesthesia Transfer of Care Note    Patient: Marck Madison    Procedure(s) Performed: Procedure(s) (LRB):  Transesophageal echo (ESTEBAN) intra-procedure log documentation (N/A)  Cardioversion or Defibrillation (N/A)    Patient location: Other: OR 2    Anesthesia Type: MAC    Post pain: adequate analgesia    Post assessment: no apparent anesthetic complications and tolerated procedure well    Post vital signs: stable    Level of consciousness: awake    Nausea/Vomiting: no nausea/vomiting    Complications: none          Last vitals:   Visit Vitals  BP (!) 140/88   Pulse 68   Temp 36.8 °C (98.3 °F) (Oral)   Resp 18   Ht 5' 5" (1.651 m)   Wt 74.8 kg (164 lb 14.5 oz)   SpO2 100%   BMI 27.44 kg/m²     "

## 2022-02-03 NOTE — PROGRESS NOTES
Date of Admission:1/26/2022    SUBJECTIVE:hungry    Current Facility-Administered Medications   Medication    0.45% NaCl infusion    albuterol-ipratropium 2.5 mg-0.5 mg/3 mL nebulizer solution 3 mL    amiodarone tablet 400 mg    apixaban tablet 5 mg    aspirin chewable tablet 81 mg    calcium gluconate 1g in dextrose 5% 100mL (ready to mix system)    dextrose 10% bolus 125 mL    dextrose 10% bolus 250 mL    dextrose 50% injection 12.5 g    dextrose 50% injection 25 g    famotidine (PF) injection 20 mg    folic acid tablet 1 mg    glucagon (human recombinant) injection 1 mg    glucose chewable tablet 16 g    glucose chewable tablet 24 g    heparin (porcine) injection 5,000 Units    heparin (porcine) injection 5,000 Units    heparin (porcine) injection 5,000 Units    hydrALAZINE injection 10 mg    insulin aspart U-100 pen 0-5 Units    insulin detemir U-100 pen 5 Units    lorazepam injection 1 mg    melatonin tablet 6 mg    metoprolol succinate (TOPROL-XL) 24 hr tablet 100 mg    multivitamin liquid no.118 per dose 10 mL    naloxone 0.4 mg/mL injection 0.02 mg    nicotine 21 mg/24 hr 1 patch    potassium phosphate (monobasic) tablet 500 mg    senna-docusate 8.6-50 mg per tablet 1 tablet    sodium chloride 0.9% flush 10 mL    And    sodium chloride 0.9% flush 10 mL    thiamine tablet 100 mg       Wt Readings from Last 3 Encounters:   02/03/22 74.8 kg (164 lb 14.5 oz)   11/21/21 79.4 kg (175 lb)   07/28/21 79.4 kg (175 lb)     Temp Readings from Last 3 Encounters:   02/03/22 98.3 °F (36.8 °C) (Oral)   11/21/21 98.2 °F (36.8 °C) (Oral)   07/29/21 98.7 °F (37.1 °C)     BP Readings from Last 3 Encounters:   02/03/22 (!) 187/110   11/21/21 139/85   07/29/21 129/89     Pulse Readings from Last 3 Encounters:   02/03/22 71   11/21/21 95   07/29/21 91       Intake/Output Summary (Last 24 hours) at 2/3/2022 1408  Last data filed at 2/3/2022 1222  Gross per 24 hour   Intake 1198.28 ml   Output 1475  ml   Net -276.72 ml       PE:  GEN:wd male in nad  HEENT:ncat,eomi,mm  CVS:s1s2 regular  PULM:ctab  ABD:+bs, soft  EXT:no leg edema  NEURO:awake, alert    Recent Labs   Lab 02/03/22  0334   *  131*   *  147*   K 3.1*  3.1*   *  113*   CO2 22*  22*   BUN 54*  54*   CREATININE 1.8*  1.8*   CALCIUM 8.3*  8.3*       Lab Results   Component Value Date    .5 (H) 09/10/2019    CALCIUM 8.3 (L) 02/03/2022    CALCIUM 8.3 (L) 02/03/2022    CAION 1.19 01/21/2020    PHOS 2.7 02/03/2022       No results for input(s): WBC, RBC, HGB, HCT, PLT, MCV, MCH, MCHC in the last 24 hours.        A/P:  1.alireza.  Resolving. Good  uop. Stopped hd. Cont ivfs.  2.acute resp failure. Cont ivfs. Extubated.  3.afib. rate up. Following.  4.htn.sbp ok.  5.dm2.  Watch sugar.  6.hypokalemia.  Replete.  7.shock liver. Lfts lamar.  8.hypernatremia. 1/2ns.   Renal wise better. Will sign off. Please reconsult as needed. Thanks.

## 2022-02-03 NOTE — SUBJECTIVE & OBJECTIVE
Review of Systems   Gastrointestinal: Negative for melena.   Genitourinary: Negative for hematuria.     Objective:     Vital Signs (Most Recent):  Temp: 98.4 °F (36.9 °C) (02/03/22 0700)  Pulse: 69 (02/03/22 0900)  Resp: 16 (02/03/22 0900)  BP: (!) 169/98 (02/03/22 0900)  SpO2: 97 % (02/03/22 0900) Vital Signs (24h Range):  Temp:  [97.9 °F (36.6 °C)-98.8 °F (37.1 °C)] 98.4 °F (36.9 °C)  Pulse:  [49-91] 69  Resp:  [12-22] 16  SpO2:  [93 %-100 %] 97 %  BP: (136-177)/() 169/98     Weight: 74.8 kg (164 lb 14.5 oz)  Body mass index is 27.44 kg/m².     SpO2: 97 %  O2 Device (Oxygen Therapy): nasal cannula      Intake/Output Summary (Last 24 hours) at 2/3/2022 1005  Last data filed at 2/3/2022 0900  Gross per 24 hour   Intake 1148.49 ml   Output 1225 ml   Net -76.51 ml       Lines/Drains/Airways     Peripherally Inserted Central Catheter Line            PICC Triple Lumen 01/27/22 1915 right brachial 6 days          Drain                 Urethral Catheter 01/27/22 0916 Double-lumen 18 Fr. 7 days          Peripheral Intravenous Line                 Peripheral IV - Single Lumen 01/26/22 1000 Left;Posterior Hand 8 days         Peripheral IV - Single Lumen 01/27/22 20 G Anterior;Right Upper Arm 7 days                Physical Exam  Constitutional:       General: He is not in acute distress.     Appearance: He is not ill-appearing, toxic-appearing or diaphoretic.   HENT:      Head: Normocephalic and atraumatic.   Eyes:      General:         Right eye: No discharge.         Left eye: No discharge.      Extraocular Movements: Extraocular movements intact.      Pupils: Pupils are equal, round, and reactive to light.   Cardiovascular:      Rate and Rhythm: Normal rate. Rhythm irregularly irregular.      Heart sounds: S1 normal and S2 normal. Heart sounds are distant. No murmur heard.      Pulmonary:      Effort: Pulmonary effort is normal. No respiratory distress.      Breath sounds: Normal breath sounds. No stridor. No  wheezing, rhonchi or rales.   Chest:      Chest wall: No tenderness.   Abdominal:      General: There is no distension.      Palpations: Abdomen is soft.   Musculoskeletal:         General: No swelling or tenderness.      Cervical back: Normal range of motion and neck supple. No rigidity.      Right lower leg: No edema.      Left lower leg: No edema.   Skin:     General: Skin is warm and dry.      Coloration: Skin is not jaundiced.   Neurological:      General: No focal deficit present.      Cranial Nerves: No cranial nerve deficit.   Psychiatric:         Mood and Affect: Mood normal.         Behavior: Behavior normal.         Current Medications:   amiodarone  400 mg Oral BID    apixaban  5 mg Oral BID    aspirin  81 mg Oral Daily    famotidine (PF)  20 mg Intravenous Daily    folic acid  1 mg Oral Daily    insulin detemir U-100  5 Units Subcutaneous Daily    metoprolol succinate  100 mg Oral BID    multivitamin liquid no.118  10 mL Oral Daily    nicotine  1 patch Transdermal Daily    potassium phosphate (monobasic)  500 mg Oral Daily    senna-docusate 8.6-50 mg  1 tablet Oral BID    sodium chloride 0.9%  10 mL Intravenous Q6H    thiamine  100 mg Oral Daily       albuterol-ipratropium, [COMPLETED] calcium gluconate IVPB **AND** calcium gluconate IVPB, dextrose 10%, dextrose 10%, dextrose 50%, dextrose 50%, glucagon (human recombinant), glucose, glucose, heparin (porcine), heparin (porcine), heparin (porcine), hydrALAZINE, insulin aspart U-100, lorazepam, melatonin, naloxone, Flushing PICC Protocol **AND** sodium chloride 0.9% **AND** sodium chloride 0.9%    Laboratory (all labs reviewed):  CBC:  Recent Labs   Lab 01/28/22  0344 01/29/22  0312 01/30/22  0301 01/31/22  0323 02/02/22  0334   WBC 18.22 H 18.53 H 16.27 H 16.05 H 13.00 H   Hemoglobin 16.1 14.6 14.2 14.2 15.2   Hematocrit 49.9 42.1 40.9 39.7 L 45.0   Platelets 104 L 84 L 55 L 67 L 81 L       CHEMISTRIES:  Recent Labs   Lab 01/29/22  4642  01/29/22  1522 01/29/22  2120 01/29/22  2330 01/30/22  0301 01/31/22  0323 02/01/22  0331 02/02/22  0334 02/03/22  0334   Glucose 113 H   < > 177 H  --  143 H  143 H 120 H 151 H  --  131 H  131 H   Sodium 133 L   < > 132 L  --  135 L  135 L 137 140  --  147 H  147 H   Potassium 4.0   < > 3.8  --  3.8  3.8 2.7 LL 3.2 L  --  3.1 L  3.1 L   BUN 41 H   < > 40 H  --  36 H  36 H 64 H 65 H  --  54 H  54 H   Creatinine 3.1 H   < > 3.0 H  --  2.7 H  2.7 H 3.3 H 2.7 H  --  1.8 H  1.8 H   eGFR if  23 A   < > 24 A  --  28 A  28 A 22 A 28 A  --  45 A  45 A   eGFR if non  20 A   < > 21 A  --  24 A  24 A 19 A 24 A  --  39 A  39 A   Calcium 8.3 L   < > 8.1 L  --  8.5 L  8.5 L 8.4 L 8.9  --  8.3 L  8.3 L   Magnesium 1.9  --   --  1.9 1.9 2.4  --  2.0  --     < > = values in this interval not displayed.       CARDIAC BIOMARKERS:  Recent Labs   Lab 09/11/19  1611 01/20/20  1731 01/20/20  2322 01/21/20  0400 06/09/21  1220 06/09/21  1748 07/28/21  2105 11/21/21  0024 11/21/21  0418 01/26/22  1225 01/28/22  1150    H  --  873 H  --  733 H  --   --   --   --   --   --    Troponin I  --    < > 0.308 H   < > 0.235 H   < > 0.068 H 0.161 H 0.149 H 0.172 H 0.161 H    < > = values in this interval not displayed.       COAGS:  Recent Labs   Lab 01/22/20  0345 01/23/20  0529 06/12/21  1201 06/13/21  0159 06/30/21  2207   INR 1.4 H 1.3 H 2.5 H 1.7 H 1.2       LIPIDS/LFTS:  Recent Labs   Lab 01/28/22  0344 01/29/22  0312 01/30/22  0301 01/31/22  0323 02/03/22  0334    H 3,307 H 956 H 371 H 53 H    H 1,621 H 1,207 H 861 H 247 H       BNP:  Recent Labs   Lab 06/12/21  0538 07/01/21  0608 07/28/21  2105 11/21/21  0024 01/26/22  1225    H 481 H 94 390 H 434 H       TSH:  Recent Labs   Lab 01/20/20  1731 06/10/21  0433 06/12/21  0326 07/28/21  2105 01/26/22  1225   TSH 2.296 0.176 L 2.253 0.831 1.086       Free T4:  Recent Labs   Lab 06/10/21  0433   Free T4 1.02        Diagnostic Results:  ECG (personally reviewed and interpreted tracing(s)):  1/27/22 1838 SR 93, ?inf isch  1/28/22 1056 SR 64, AIVR  1/30/22 1922 tele: SR    Echo: 1/28/22 (images prev personally reviewed and interpreted)  · There is no evidence of intracardiac shunting (negatove saline contrast study).  · The left ventricle is normal in size with mild concentric hypertrophy and normal systolic function.  · The estimated ejection fraction is 55%.  · Cannot exclude posterior wall hypokinesis.  · Mild right ventricular enlargement with mildly reduced right ventricular systolic function.    Stress Test: L MPI 1/29/20    The perfusion scan is free of evidence from myocardial ischemia or injury.    There is a  mild intensity fixed defect in the inferior wall of the left ventricle secondary to diaphragm attenuation.    Visually estimated ejection fraction is mildly depressed at stress.    The EKG portion of this study is negative for ischemia.    The patient reported no chest pain during the stress test.    Arrhythmias during stress: occasional PACs, occasional PVCs.    The blood pressure response to stress was normal.

## 2022-02-03 NOTE — ASSESSMENT & PLAN NOTE
Pt w/ non-focal encephalopathy of unclear etiology, likely due to sedation +/- hypoperfusion. Improving with time. No focal deficits.  - neurology following  - resolving

## 2022-02-04 PROBLEM — B18.2 CHRONIC HEPATITIS C WITHOUT HEPATIC COMA: Status: ACTIVE | Noted: 2022-02-04

## 2022-02-04 LAB
POCT GLUCOSE: 132 MG/DL (ref 70–110)
POCT GLUCOSE: 158 MG/DL (ref 70–110)

## 2022-02-04 PROCEDURE — 97116 GAIT TRAINING THERAPY: CPT

## 2022-02-04 PROCEDURE — 97530 THERAPEUTIC ACTIVITIES: CPT

## 2022-02-04 PROCEDURE — 21400001 HC TELEMETRY ROOM

## 2022-02-04 PROCEDURE — 25000003 PHARM REV CODE 250: Performed by: STUDENT IN AN ORGANIZED HEALTH CARE EDUCATION/TRAINING PROGRAM

## 2022-02-04 PROCEDURE — 97535 SELF CARE MNGMENT TRAINING: CPT

## 2022-02-04 PROCEDURE — 97161 PT EVAL LOW COMPLEX 20 MIN: CPT

## 2022-02-04 PROCEDURE — A4216 STERILE WATER/SALINE, 10 ML: HCPCS | Performed by: STUDENT IN AN ORGANIZED HEALTH CARE EDUCATION/TRAINING PROGRAM

## 2022-02-04 PROCEDURE — S4991 NICOTINE PATCH NONLEGEND: HCPCS | Performed by: STUDENT IN AN ORGANIZED HEALTH CARE EDUCATION/TRAINING PROGRAM

## 2022-02-04 PROCEDURE — 25000003 PHARM REV CODE 250: Performed by: HOSPITALIST

## 2022-02-04 PROCEDURE — 92526 ORAL FUNCTION THERAPY: CPT

## 2022-02-04 PROCEDURE — 97165 OT EVAL LOW COMPLEX 30 MIN: CPT

## 2022-02-04 RX ORDER — FAMOTIDINE 20 MG/1
20 TABLET, FILM COATED ORAL DAILY
Status: DISCONTINUED | OUTPATIENT
Start: 2022-02-04 | End: 2022-02-05 | Stop reason: HOSPADM

## 2022-02-04 RX ADMIN — SENNOSIDES AND DOCUSATE SODIUM 1 TABLET: 50; 8.6 TABLET ORAL at 08:02

## 2022-02-04 RX ADMIN — AMIODARONE HYDROCHLORIDE 400 MG: 200 TABLET ORAL at 08:02

## 2022-02-04 RX ADMIN — Medication 10 ML: at 05:02

## 2022-02-04 RX ADMIN — APIXABAN 5 MG: 5 TABLET, FILM COATED ORAL at 09:02

## 2022-02-04 RX ADMIN — FOLIC ACID 1 MG: 1 TABLET ORAL at 09:02

## 2022-02-04 RX ADMIN — ASPIRIN 81 MG CHEWABLE TABLET 81 MG: 81 TABLET CHEWABLE at 09:02

## 2022-02-04 RX ADMIN — POTASSIUM PHOSPHATE, MONOBASIC 500 MG: 500 TABLET, SOLUBLE ORAL at 09:02

## 2022-02-04 RX ADMIN — APIXABAN 5 MG: 5 TABLET, FILM COATED ORAL at 08:02

## 2022-02-04 RX ADMIN — AMIODARONE HYDROCHLORIDE 400 MG: 200 TABLET ORAL at 09:02

## 2022-02-04 RX ADMIN — Medication 10 ML: at 12:02

## 2022-02-04 RX ADMIN — THIAMINE HCL TAB 100 MG 100 MG: 100 TAB at 09:02

## 2022-02-04 RX ADMIN — INSULIN DETEMIR 5 UNITS: 100 INJECTION, SOLUTION SUBCUTANEOUS at 09:02

## 2022-02-04 RX ADMIN — Medication 10 ML: at 06:02

## 2022-02-04 RX ADMIN — SENNOSIDES AND DOCUSATE SODIUM 1 TABLET: 50; 8.6 TABLET ORAL at 09:02

## 2022-02-04 RX ADMIN — METOPROLOL SUCCINATE 100 MG: 50 TABLET, EXTENDED RELEASE ORAL at 08:02

## 2022-02-04 RX ADMIN — METOPROLOL SUCCINATE 100 MG: 50 TABLET, EXTENDED RELEASE ORAL at 09:02

## 2022-02-04 RX ADMIN — Medication 1 PATCH: at 10:02

## 2022-02-04 RX ADMIN — FAMOTIDINE 20 MG: 20 TABLET, FILM COATED ORAL at 09:02

## 2022-02-04 RX ADMIN — Medication 10 ML: at 09:02

## 2022-02-04 NOTE — PT/OT/SLP EVAL
Physical Therapy Evaluation    Patient Name:  Marck Madison   MRN:  [id number]    Recommendations:     Discharge Recommendations:   (Inpatient rehab vs Home with 24hr supervision and Outpatient rehab)   Discharge Equipment Recommendations: walker, rolling   Barriers to discharge: Inaccessible home, Decreased caregiver support and Pt is not functioning at [facility] and is at increased risk for falls and readmission if he returns home at present time.  Pt could benefit from an aggressive Multidisciplinary approach in order to help restore pt's functional independence  Pt with decreased insight into safety and self limitations    Assessment:     Marck Madison is a 62 y.o. male admitted with a medical diagnosis of Acute hypoxemic respiratory failure.  He presents with the following impairments/functional limitations:  weakness,impaired balance,decreased safety awareness,impaired cognition,impaired self care skills,decreased coordination,impaired functional mobilty,impaired coordination,gait instability,decreased lower extremity function,impaired fine motor .    Rehab Prognosis: Good; patient would benefit from acute skilled PT services to address these deficits and reach maximum level of function.    Recent Surgery: Procedure(s) (LRB):  Transesophageal echo (ESTEBAN) intra-procedure log documentation (N/A)  Cardioversion or Defibrillation (N/A) 1 Day Post-Op    Plan:     During this hospitalization, patient to be seen 6 x/week to address the identified rehab impairments via gait training,therapeutic activities,therapeutic exercises and progress toward the following goals:    · Plan of Care Expires:  02/18/22    Subjective     Chief Complaint: Toenails need to be cut  Patient/Family Comments/goals: RW  Pain/Comfort:  · Pain Rating 1: 0/10    Patients cultural, spiritual, Baptist conflicts given the current situation: no    Living Environment:  Pt lives with his niece in a  with 3STE, B HR's  Prior to admission,  patients level of function was Independent with ambulation, ADL's, and IADL's .  Equipment used at home: none.  DME owned (not currently used): none.  Upon discharge, patient will have assistance from Niece.    Objective:     Communicated with nsg prior to session.  Patient found lying low down in the bed with bed alarm,ha catheter  upon PT entry to room.    General Precautions: Standard, fall   Orthopedic Precautions:N/A   Braces: N/A  Respiratory Status: Room air    Exams:  · Cognitive Exam:  Patient is oriented to Person, Place and Time, Difficulty following commands, decreased insight into safety and self limitations  · Fine Motor Coordination:    · -       Impaired  Fine motor coordination tests Rapid alt toe tap  · Gross Motor Coordination:  Mildly impaired 2/2 weakness and deconditioning from prolonged hospital stay  · Postural Exam:  Patient presented with the following abnormalities:    · -       Rounded shoulders  · -       Forward head  · Sensation:    · -       Intact  light/touch B LE's  · Skin Integrity/Edema:      · -       Skin integrity: Visible skin intact  · RLE ROM: WFL  · RLE Strength: WFL  · LLE ROM: WFL  · LLE Strength: WFL    Functional Mobility:  · Bed Mobility:     · Scooting: stand by assistance  · Supine to Sit: stand by assistance with HOB elevated  · Transfers:     · Sit to Stand:  minimum assistance with no AD    · Gait: 10' without AD with CGA/Min A and unsteady gait, crossing over And veering from path  · Gait training approx 150' with RW and CGA with VC/TC for walker management/safety, increased trunk extension, and increased stride width  · Balance: Fair+ sit, Fair stand    Therapeutic Activities and Exercises:   Gait training as above    AM-PAC 6 CLICK MOBILITY  Total Score:18     Patient left up in chair with all lines intact, call button in reach, nsg notified and family present.    GOALS:   Multidisciplinary Problems     Physical Therapy Goals        Problem: Physical  "Therapy Goal    Goal Priority Disciplines Outcome Goal Variances Interventions   Physical Therapy Goal     PT, PT/OT Ongoing, Progressing     Description: Goals to be met by: 22     Patient will increase functional independence with mobility by performin. Sit to stand transfer with Modified [place]  2. Gait  x 150 feet with Modified [place] using Rolling Walker.   3. Ascend/descend 3 stair with right Handrails Modified [place]    4. Lower extremity exercise program x10 reps per handout, with supervision                     History:     Past Medical History:   Diagnosis Date    Arrhythmia 2017    aflutter    Atrial flutter     CAD (coronary artery disease)     CHF (congestive heart failure), NYHA class I 2017    Cholelithiasis with chronic cholecystitis     Chronic diastolic heart failure     Cigarette smoker     COPD (chronic obstructive pulmonary disease)     COVID-19 2021    DKA (diabetic ketoacidosis)     NSTEMI (non-ST elevation myocardial infarction)     NSVT (nonsustained ventricular tachycardia)     Psychiatric disorder     h/o "schizophrena/bipolar"    Schizoaffective disorder     Severe sepsis        Past Surgical History:   Procedure Laterality Date    LIVER SURGERY      abscess drainage; 1970s    TREATMENT OF CARDIAC ARRHYTHMIA  2019    Procedure: Cardioversion or Defibrillation;  Surgeon: Jonathan Lopez MD;  Location: [facility] CATH LAB;  Service: Cardiology;;       Time Tracking:     PT Received On: 22  PT Start Time: 1130     PT Stop Time: 1156  PT Total Time (min): 26 min     Billable Minutes: Evaluation 15 and Gait Training 11      2022  "

## 2022-02-04 NOTE — PLAN OF CARE
Pt resting quietly overnight.  No distress noted.  Call bell within reach.  Bed locked and in lowest position.  Pt with no needs at this time.        Problem: Infection  Goal: Absence of Infection Signs and Symptoms  Outcome: Ongoing, Progressing     Problem: Skin Injury Risk Increased  Goal: Skin Health and Integrity  Outcome: Ongoing, Progressing     Problem: Renal Function Impairment (Acute Kidney Injury/Impairment)  Goal: Effective Renal Function  Outcome: Ongoing, Progressing     Problem: Fall Injury Risk  Goal: Absence of Fall and Fall-Related Injury  Outcome: Ongoing, Progressing

## 2022-02-04 NOTE — PT/OT/SLP EVAL
"Occupational Therapy   Evaluation    Name: Marck Madison  MRN: [id number]  Admitting Diagnosis:  Acute hypoxemic respiratory failure  Recent Surgery: Procedure(s) (LRB):  Transesophageal echo (ESTEBAN) intra-procedure log documentation (N/A)  Cardioversion or Defibrillation (N/A) 1 Day Post-Op    Recommendations:     Discharge Recommendations: outpatient OT  Discharge Equipment Recommendations:  bedside commode,walker, rolling  Barriers to discharge:  None    Assessment:     Marck Madison is a 62 y.o. male with a medical diagnosis of Acute hypoxemic respiratory failure. The presents with safety awareness deficits related to movement with cath attached to bedside or chair at time of eval. Performance deficits affecting function: weakness,impaired functional mobilty,impaired cognition,decreased safety awareness,impaired endurance,gait instability,impaired balance,impaired self care skills,decreased lower extremity function,impaired coordination.      Rehab Prognosis: Good; patient would benefit from acute skilled OT services to address these deficits and reach maximum level of function.       Plan:     Patient to be seen 3 x/week,5 x/week to address the above listed problems via self-care/home management,therapeutic activities,therapeutic exercises,neuromuscular re-education  · Plan of Care Expires: 02/18/22  · Plan of Care Reviewed with: patient    Subjective     Chief Complaint: weakness "They made me sit all of the time, or stay in the bed down stairs. Now I am weak. They still scream at me not to move."  Patient/Family Comments/goals: return to own Home, shared with Niece. Patient open to outpatient pst d/c services.     Occupational Profile:  Living Environment: Patient resides with his niece in a [facility], 3 steps to enter, (B) hand rails.    Prior to admission, patients level of function was Independent with ambulation, ADL's, and shared home management. Niece provides transportation.     Equipment used at home: none.  Upon " discharge, patient will have assistance of Self and Niece.      Pain/Comfort:  · Pain Rating 1: 0/10    Patients cultural, spiritual, Rastafari conflicts given the current situation: no    Objective:     Communicated with: RN prior to session.  Patient found up in chair with ha catheter,bed alarm upon OT entry to room.    General Precautions: Standard, fall   Orthopedic Precautions:N/A   Braces: N/A  Respiratory Status: Room air    Occupational Performance:    Bed Mobility:    Patient completed Sit to Supine with SBA  · Patient completed Rolling/Turning to Left with  modified independence  · Patient completed Rolling/Turning to Right with modified independence  · Patient completed Scooting/Bridging with supervision      Functional Mobility/Transfers:  · Patient completed Sit <> Stand Transfer with minimum assistance  with  rolling walker   · Patient completed Bed <> Chair Transfer using Step Transfer technique with contact guard assistance with rolling walker  · Patient completed Toilet Transfer Step Transfer technique with contact guard assistance with  rolling walker  · Functional Mobility: safety awareness with r/w (abandonment during return to bed from toileting), and catheter connection to chair and bedside prior to mobility. positive responses to single VC however.     Activities of Daily Living:  · Feeding:  independence    · Upper Body Dressing: stand by assistance gown  · Lower Body Dressing: stand by assistance foot covering  · Toileting: modified independence BM fleeting clothing management assist, ha cath in place for bladder.     Cognitive/Visual Perceptual:  Cognitive/Psychosocial Skills:     -       Oriented to: Person, Place and Situation   -       Follows Commands/attention:Follows multistep  commands  -       Communication: clear/fluent  -       Memory: extra response time benficial   -       Safety awareness, close (S) beneficial /insight to disability: intact  -       Mood/Affect/Coping  skills/emotional control: Cooperative and Pleasant    Physical Exam:  BUE AROM WNL  BUE MMT WNL  Senosry intact Lt touch w/ localization  BUE GM/FM coordination WNL  No postural abnormalities noted.     AMPA 6 Click ADL:  AMPAC Total Score: 20    Treatment & Education:  *Patient education provided re: role of OT, and OT Treatment rationales / protocols. Patient verbalized understanding.  *Patient agreeable to therapy session. Lights on / shade open for session .  SC:   *Basic self-care tasks and rudimentary functional mobility undertaken -- assist levels noted above.  TE:   *Education provided regarding the importance of early/consistent mobility to maximize recovery in conjunction w/ edu related to importance of performing daily     UB/LB AROM exercises.  TA:   Patient completed dynamic sit/stand TRUNK CONTROL/FUNCTIONAL REACH AND GROSS GRASP activity to bolster balance recovery skills as employed in in room ADL related functional reach tasks, to bolster in room safety edu and r/w management, as well as to progress safest execution of transitional movements in ADL tasks.   Pt projected to benefit from a collaborative therapeutic program to improve quality of life and sustain focus on impairment resolution.  OT summoned by RN to return to Patient's room for assist with distance amb (in room safety training, environmental modification assessment/assist) and r/w safety intro following initial completion of eval. Findings above.   Patient progressing steadily towards goals  *Questions/concerns within OT scope addressed.Education:    Patient left HOB elevated 65 degrees, all needs in reach (bedsie table healthy snacks/fresh fruit brought be family) with all lines intact, call button in hand and RN notified    GOALS:   Multidisciplinary Problems     Occupational Therapy Goals        Problem: Occupational Therapy Goal    Goal Priority Disciplines Outcome Interventions   Occupational Therapy Goal     OT, PT/OT Ongoing,  "Progressing    Description: Goals to be met by: 02/18/22    Patient will increase functional independence with ADLs by performing:    UE Dressing with [place].  LE Dressing with Modified [place].  Grooming while standing at sink with [place].  Toileting from toilet with [place] for hygiene and clothing management.   Toilet transfer to toilet with Supervision.  Upper extremity exercise program 3x10 reps per handout, with assistance as needed.                     History:     Past Medical History:   Diagnosis Date    Arrhythmia 2017    aflutter    Atrial flutter     CAD (coronary artery disease)     CHF (congestive heart failure), NYHA class I 2017    Cholelithiasis with chronic cholecystitis     Chronic diastolic heart failure     Cigarette smoker     COPD (chronic obstructive pulmonary disease)     COVID-19 06/01/2021    DKA (diabetic ketoacidosis)     NSTEMI (non-ST elevation myocardial infarction)     NSVT (nonsustained ventricular tachycardia)     Psychiatric disorder     h/o "schizophrena/bipolar"    Schizoaffective disorder     Severe sepsis        Past Surgical History:   Procedure Laterality Date    LIVER SURGERY      abscess drainage; 1970s    TREATMENT OF CARDIAC ARRHYTHMIA  9/12/2019    Procedure: Cardioversion or Defibrillation;  Surgeon: Jonathan Lopez MD;  Location: [facility] CATH LAB;  Service: Cardiology;;    TREATMENT OF CARDIAC ARRHYTHMIA N/A 2/3/2022    Procedure: Cardioversion or Defibrillation;  Surgeon: Trevor Schwab MD;  Location: [facility] CATH LAB;  Service: Cardiology;  Laterality: N/A;       Time Tracking:     OT Date of Treatment: 02/04/22  OT Start Time: 1204  OT Stop Time: 1229  OT Total Time (min): 25 min   OT Start Time: 1316  OT Stop Time: 1324  OT Total Time (min): 8 min    Billable Minutes:Evaluation 15  Therapeutic Activity 10  Therapeutic Exercise 8    2/4/2022    "

## 2022-02-04 NOTE — ASSESSMENT & PLAN NOTE
Hx of flutter, presented in Afib w/ RVR. Converted to NSR w/ amiodarone, then returned to flutter overnight 1/30.   - on amio gtt and scheduled metoprolol  - treating underlying w/d symptoms  - transition to PO Amiodarone and increase scheduled Metoprolol  - still an issue with in/out. Re-consulted Cardiology, increasing Metoprolol today  - ESTEBAN/CV 2/3/22 .  Back to sinus,on PO amiodarone.transfered to Tele.

## 2022-02-04 NOTE — PLAN OF CARE
Problem: Physical Therapy Goal  Goal: Physical Therapy Goal  Description: Goals to be met by: 22     Patient will increase functional independence with mobility by performin. Sit to stand transfer with Modified [place]  2. Gait  x 150 feet with Modified [place] using Rolling Walker.   3. Ascend/descend 3 stair with right Handrails Modified [place]    4. Lower extremity exercise program x10 reps per handout, with supervision    Outcome: Ongoing, Progressing   Initial PT evaluation performed today.  Pt could benefit from skilled PT services 6x/wk in order to maximize function prior to D/C.  Pt could benefit from IPR vs Outpatient rehab as patient was completely independent and now requires PT/OT/ST to address weakness, balance, cognition, safety, and ADL's.

## 2022-02-04 NOTE — ACP (ADVANCE CARE PLANNING)
Advance Care Planning     Date: 02/04/2022    Code Status  Per conversation with attending  in ICU  ,patient has DNR status.

## 2022-02-04 NOTE — SUBJECTIVE & OBJECTIVE
Interval History: much more alert and awake today.  CC today is weakness.    Review of Systems   Constitutional: Negative for chills and fever.   Respiratory: Negative for cough and shortness of breath.    Gastrointestinal: Negative for abdominal pain.   Genitourinary: Negative for dysuria.   Skin: Negative for rash.   Psychiatric/Behavioral: Negative for agitation.     Objective:     Vital Signs (Most Recent):  Temp: 98.3 °F (36.8 °C) (02/04/22 0758)  Pulse: 73 (02/04/22 0758)  Resp: 18 (02/04/22 0758)  BP: (!) 188/94 (02/04/22 0758)  SpO2: 96 % (02/04/22 0758) Vital Signs (24h Range):  Temp:  [97.8 °F (36.6 °C)-98.6 °F (37 °C)] 98.3 °F (36.8 °C)  Pulse:  [60-82] 73  Resp:  [11-22] 18  SpO2:  [93 %-100 %] 96 %  BP: (134-193)/() 188/94     Weight: 74.8 kg (164 lb 14.5 oz)  Body mass index is 27.44 kg/m².    Intake/Output Summary (Last 24 hours) at 2/4/2022 1006  Last data filed at 2/4/2022 0600  Gross per 24 hour   Intake 718.46 ml   Output 860 ml   Net -141.54 ml      Physical Exam  Constitutional:       General: He is not in acute distress.     Appearance: He is ill-appearing. He is not toxic-appearing or diaphoretic.   Cardiovascular:      Rate and Rhythm: Normal rate.      Heart sounds: No murmur heard.  No gallop.       Comments: Aflutter in rate of 130s then NSR after CV  Pulmonary:      Effort: Pulmonary effort is normal. No respiratory distress.      Breath sounds: No wheezing or rales.   Abdominal:      General: Bowel sounds are normal. There is no distension.      Palpations: Abdomen is soft.      Tenderness: There is no abdominal tenderness.   Musculoskeletal:         General: No swelling.   Skin:     General: Skin is warm and dry.   Neurological:      Mental Status: He is alert.      Comments: Much more alert awake and oriented to situation/self         Significant Labs: All pertinent labs within the past 24 hours have been reviewed.    Significant Imaging: I have reviewed all pertinent imaging  results/findings within the past 24 hours.

## 2022-02-04 NOTE — PT/OT/SLP PROGRESS
Speech Language Pathology Treatment    Patient Name:  Marck Madison   MRN:  [id number]  Admitting Diagnosis: Acute hypoxemic respiratory failure    Recommendations:                 General Recommendations:  ST at next level of care to evaluate and address higher-level cognitive deficits  Diet recommendations:  Mechanical soft, Liquid Diet Level: Thin   Aspiration Precautions: Meds whole 1 at a time and Standard aspiration precautions   General Precautions: Standard,    Communication strategies:  none    Subjective     Pt was alert and awake, seated in bedside chair throughout duration of tx session. Pt completing OT tx session upon ST entrance. Pt was able to recall most events that led to current admit, and was oriented to person, place, and date, specifically  oriented to his birthday being today.     Pain/Comfort:  · Pain Rating 1: 0/10    Respiratory Status: Room air    Objective:     Has the patient been evaluated by SLP for swallowing?   Yes  Keep patient NPO? No   Current Respiratory Status:        ST utilized pt's lunch tray to assess pt's safety with current diet. The pt consumed soft cake present on lunch tray, and half a cup of water. No overt s/s of aspiration noted across consistencies. ST noted pt's mild impulsivity during po intake, with pt consuming consecutive bites prior to clearing residue from the oral cavity. Pt was responsive to verbal cues, and was able to demo single bites/sips.     ST provided education to pt regarding safe swallow precautions to implement during meals to reduce the risk of aspiration. Pt verbalized an understanding of all information provided, and demo strategies during po intake.     Assessment:     ST recs pt cont current [facility] soft diet with thin liquids 2/2 slight prolonged mastication which is negatively impacted by being edentulous. ST will sign off at this time, as pt does not require cont ST in the acute setting. ST rec cont ST services at next-level of care to address  higher-level cognitive deficits noted during PT/OT tx sessions during functional tasks (dcr safety awareness).     Goals:      SLP Goals:  1. [facility] with thin liquids w/o overt s/s of aspiration.  MET    Plan:     · Patient to be seen:  1 x/week   · Plan of Care expires:  02/18/22  · Plan of Care reviewed with:  patient   · SLP Follow-Up:  No     Discharge recommendations:  ST at next-level of care to address higher-level cognitive deficits  Barriers to Discharge:  None    Time Tracking:     SLP Treatment Date:   02/04/22  Speech Start Time:  1220  Speech Stop Time:  1231     Speech Total Time (min):  11 min    Billable Minutes: Treatment Swallowing Dysfunction 11    02/04/2022

## 2022-02-04 NOTE — ASSESSMENT & PLAN NOTE
Pt w/ acute onset pulmonary edema, likely cardiogenic. Significant difficulties oxygenating patient initially despite ventilation  - now he is on minimum oxygen requirements and improving from respiratory standpoint.  ,respiratory status is much improved.

## 2022-02-04 NOTE — PLAN OF CARE
Problem: Occupational Therapy Goal  Goal: Occupational Therapy Goal  Description: Goals to be met by: 02/18/22    Patient will increase functional independence with ADLs by performing:    UE Dressing with [place].  LE Dressing with Modified [place].  Grooming while standing at sink with [place].  Toileting from toilet with [place] for hygiene and clothing management.   Toilet transfer to toilet with Supervision.  Upper extremity exercise program 3x10 reps per handout, with assistance as needed.    Outcome: Ongoing, Progressing

## 2022-02-04 NOTE — PLAN OF CARE
Problem: SLP Goal  Goal: SLP Goal  Description: ST. Pt will tolerate mechanical soft/thin liquid diet without overt s/s of aspiration.  Outcome: Met

## 2022-02-04 NOTE — NURSING TRANSFER
Nursing Transfer Note      2/3/2022     Reason patient is being transferred: transfer orders    Transfer To: Telemetry    Transfer via bed    Transfer with cardiac monitoring    Transported by Jay & RN    Medicines sent: yes    Any special needs or follow-up needed: N/A    Chart send with patient: Yes    Notified: N/A    Upon arrival to floor: cardiac monitor applied, patient oriented to room, call bell in reach and bed in lowest position

## 2022-02-04 NOTE — PROGRESS NOTES
[facility] Medicine  Progress Note    Patient Name: Marck Madison  MRN: [id number]  Patient Class: IP- Inpatient   Admission Date: 1/26/2022  Length of Stay: 9 days  Attending Physician: Whit Hannon MD  Primary Care Provider: [facility] [place]        Subjective:     Principal Problem:Acute hypoxemic respiratory failure        HPI:  61 year old male with diastolic heart failure, atrial flutter, pulmonary hypertension, mitral and tricuspid valve regurgitation, emphysema and current every day smoker who presented with shortness of breath, body aches, bilateral flank pain and weakness since last night. Associated symptoms include cough. Admits to smoking. Denied nausea, vomiting, abdominal pain, dysuria, diarrhea. In the ED, patient was hypotensive, tachycardic to 120s, diffuse expiratory wheezing and with a lactic acid of 3. Given one liter of fluids, continuous bronchodilators, methylprednisolone and broad spectrum antibiotics. O2 sats stable at room air. COVID and flu negative. Procalcitonin negative. Remains tachycardic. Cardiology consulted. BP stable. Admit to ICU.       Overview/Hospital Course:  Mr Madison presented with acute exacerbation of diastolic heart failure and atrial fibrillation with RVR. Also with acute renal failure. He was given fluids in the ED. O2 requirements increased significantly. Tox screen positive for amphetamines. Placed on BiPAP but non compliant with this. He is AAO x 3 and with no tremors. Required precedex to comply with BiPAP. Amiodarone infusion and anticoagulation. Nephrology, cardiology and pulmonology consulted. While still in the ED waiting for an ICU bed patient experienced alcohol withdrawals. Give lorazepam and more diuresis but poor UOP and remained in respiratory distress despite lorazepam. Patient was intubated. Required significant vent support. Renal function continued to deteriorate. Started on CRRT.   Pt initially poorly  responsive to CRRT, but eventually improved, hypoxia decreased. Sedation was stopped but he was poorly responsive. His mental status improved and he was extubated to nasal cannula. Slowly improving in mentation but heart rate difficult to control. On IV amiodarone and metoprolol and still with aflutter to 130s at time. Cardiology consulted, patient underwent ESTEBAN/CV on 2/3/22. Back to sinus,on PO amiodarone.transfered to Tele.  Will remove ha,PT,OT is consulted,alert and oriented,respiratory status is much improved.    CC today is weakness      Interval History: much more alert and awake today.  CC today is weakness.    Review of Systems   Constitutional: Negative for chills and fever.   Respiratory: Negative for cough and shortness of breath.    Gastrointestinal: Negative for abdominal pain.   Genitourinary: Negative for dysuria.   Skin: Negative for rash.   Psychiatric/Behavioral: Negative for agitation.     Objective:     Vital Signs (Most Recent):  Temp: 98.3 °F (36.8 °C) (02/04/22 0758)  Pulse: 73 (02/04/22 0758)  Resp: 18 (02/04/22 0758)  BP: (!) 188/94 (02/04/22 0758)  SpO2: 96 % (02/04/22 0758) Vital Signs (24h Range):  Temp:  [97.8 °F (36.6 °C)-98.6 °F (37 °C)] 98.3 °F (36.8 °C)  Pulse:  [60-82] 73  Resp:  [11-22] 18  SpO2:  [93 %-100 %] 96 %  BP: (134-193)/() 188/94     Weight: 74.8 kg (164 lb 14.5 oz)  Body mass index is 27.44 kg/m².    Intake/Output Summary (Last 24 hours) at 2/4/2022 1006  Last data filed at 2/4/2022 0600  Gross per 24 hour   Intake 718.46 ml   Output 860 ml   Net -141.54 ml      Physical Exam  Constitutional:       General: He is not in acute distress.     Appearance: He is ill-appearing. He is not toxic-appearing or diaphoretic.   Cardiovascular:      Rate and Rhythm: Normal rate.      Heart sounds: No murmur heard.  No gallop.       Comments: Aflutter in rate of 130s then NSR after CV  Pulmonary:      Effort: Pulmonary effort is normal. No respiratory distress.      Breath  sounds: No wheezing or rales.   Abdominal:      General: Bowel sounds are normal. There is no distension.      Palpations: Abdomen is soft.      Tenderness: There is no abdominal tenderness.   Musculoskeletal:         General: No swelling.   Skin:     General: Skin is warm and dry.   Neurological:      Mental Status: He is alert.      Comments: Much more alert awake and oriented to situation/self         Significant Labs: All pertinent labs within the past 24 hours have been reviewed.    Significant Imaging: I have reviewed all pertinent imaging results/findings within the past 24 hours.      Assessment/Plan:      * Acute hypoxemic respiratory failure  Pt w/ acute onset pulmonary edema, likely cardiogenic. Significant difficulties oxygenating patient initially despite ventilation  - now he is on minimum oxygen requirements and improving from respiratory standpoint.  ,respiratory status is much improved.    Chronic hepatitis C without hepatic coma  Need out patient follow up with GI.      Shock liver  Improving with resolution of shock      Thrombocytopenia  Thrombocytopenia which was concurrent w/ HD. Low-intermediate HIT probability.   - apixaban  - monitor      Encephalopathy, metabolic  Pt w/ non-focal encephalopathy of unclear etiology, likely due to sedation +/- hypoperfusion. Improving with time. No focal deficits.  - neurology following  - resolving      Alcohol withdrawal syndrome, with delirium  - appears to be improved  - PRN Ativan    Goals of care, counseling/discussion  Pt DNR status (see prior notes for details). Continuing current care for now.    Shock, unspecified  Unclear etiology of shock, TTE essentially rules out obstructive and cardiogenic shock, clinical picture argues strongly against hypovolemic shock, no evidence of hemorrhage. C/f septic shock however no clear source of infection noted.  - weaned from pressors, continue to monitor  - continue cefepime, stop vancomycin  - day #7 of  antibiotics with no clear source - complete 10 day course for thoroughness    Type 2 diabetes mellitus, with long-term current use of insulin  A1c:   Lab Results   Component Value Date    HGBA1C 10.5 (H) 06/30/2021     Meds: basal bolus insulin + SSI PRN to maintain goal 140-180  ADA diet, accuchecks ACHS, hypoglycemic protocol        Panlobular emphysema  - extubated and doing okay respiratory wise      Debility  Duo to all above.,PT,OT       Lactic acidosis  Improved lactate likely due to CRRT. Unclear etiology of acidosis - did not improve after improved oxygenation, no refractory shock, no culprit medications.   - monitor while off CRRT      Acute on chronic diastolic congestive heart failure  Initially non-responsive to lasix, now improving w/ CRRT  - volume removal via CRRT vs lasix PRN  - respiratory wise he is improving      Hypertension  Cont lopressor  - titrate medications as needed    Tobacco use disorder, severe, dependence  Counseled on cessation by prior provider.   - nicotine patch      Atrial fibrillation with RVR  Hx of flutter, presented in Afib w/ RVR. Converted to NSR w/ amiodarone, then returned to flutter overnight 1/30.   - on amio gtt and scheduled metoprolol  - treating underlying w/d symptoms  - transition to PO Amiodarone and increase scheduled Metoprolol  - still an issue with in/out. Re-consulted Cardiology, increasing Metoprolol today  - ESTEBAN/CV 2/3/22 .  Back to sinus,on PO amiodarone.transfered to Tele.        Elevated troponin  Flat troponin, likely demand. EF wnl. NST in 2020 without ischemia    Acute renal failure  S/p CRRT w/ improvement in volume status and creatinine.   - holding CRRT, will monitor for HD needs  - renal function is actually improving        VTE Risk Mitigation (From admission, onward)         Ordered     apixaban tablet 5 mg  2 times daily         01/31/22 1341     heparin (porcine) injection 5,000 Units  Use PRN         01/27/22 2149     heparin (porcine)  injection 5,000 Units  Use PRN         01/27/22 2158     heparin (porcine) injection 5,000 Units  Use PRN         01/27/22 2149     IP VTE HIGH RISK PATIENT  Once         01/26/22 1409     Place sequential compression device  Until discontinued         01/26/22 1409                Discharge Planning   BALDO:      Code Status: DNR   Is the patient medically ready for discharge?:     Reason for patient still in hospital (select all that apply): Patient trending condition  Discharge Plan A: Home Health   Discharge Delays: None known at this time              Wiht Hannon MD  Department of Hospital Medicine   [facility]

## 2022-02-05 VITALS
RESPIRATION RATE: 18 BRPM | HEART RATE: 73 BPM | DIASTOLIC BLOOD PRESSURE: 74 MMHG | SYSTOLIC BLOOD PRESSURE: 134 MMHG | BODY MASS INDEX: 27.47 KG/M2 | WEIGHT: 164.88 LBS | OXYGEN SATURATION: 98 % | TEMPERATURE: 99 F | HEIGHT: 65 IN

## 2022-02-05 LAB
ALBUMIN SERPL BCP-MCNC: 3 G/DL (ref 3.5–5.2)
ALP SERPL-CCNC: 62 U/L (ref 55–135)
ALT SERPL W/O P-5'-P-CCNC: 148 U/L (ref 10–44)
ANION GAP SERPL CALC-SCNC: 11 MMOL/L (ref 8–16)
AST SERPL-CCNC: 45 U/L (ref 10–40)
BILIRUB SERPL-MCNC: 1.1 MG/DL (ref 0.1–1)
BUN SERPL-MCNC: 34 MG/DL (ref 8–23)
CALCIUM SERPL-MCNC: 9 MG/DL (ref 8.7–10.5)
CHLORIDE SERPL-SCNC: 111 MMOL/L (ref 95–110)
CO2 SERPL-SCNC: 23 MMOL/L (ref 23–29)
CREAT SERPL-MCNC: 1.3 MG/DL (ref 0.5–1.4)
EST. GFR  (AFRICAN AMERICAN): >60 ML/MIN/1.73 M^2
EST. GFR  (NON AFRICAN AMERICAN): 58 ML/MIN/1.73 M^2
GLUCOSE SERPL-MCNC: 107 MG/DL (ref 70–110)
POCT GLUCOSE: 130 MG/DL (ref 70–110)
POCT GLUCOSE: 98 MG/DL (ref 70–110)
POTASSIUM SERPL-SCNC: 3.3 MMOL/L (ref 3.5–5.1)
PROT SERPL-MCNC: 5.9 G/DL (ref 6–8.4)
SODIUM SERPL-SCNC: 145 MMOL/L (ref 136–145)

## 2022-02-05 PROCEDURE — 97530 THERAPEUTIC ACTIVITIES: CPT | Mod: CQ

## 2022-02-05 PROCEDURE — 25000003 PHARM REV CODE 250: Performed by: HOSPITALIST

## 2022-02-05 PROCEDURE — 25000003 PHARM REV CODE 250: Performed by: STUDENT IN AN ORGANIZED HEALTH CARE EDUCATION/TRAINING PROGRAM

## 2022-02-05 PROCEDURE — 80053 COMPREHEN METABOLIC PANEL: CPT | Performed by: HOSPITALIST

## 2022-02-05 PROCEDURE — S4991 NICOTINE PATCH NONLEGEND: HCPCS | Performed by: STUDENT IN AN ORGANIZED HEALTH CARE EDUCATION/TRAINING PROGRAM

## 2022-02-05 PROCEDURE — A4216 STERILE WATER/SALINE, 10 ML: HCPCS | Performed by: STUDENT IN AN ORGANIZED HEALTH CARE EDUCATION/TRAINING PROGRAM

## 2022-02-05 RX ORDER — FUROSEMIDE 40 MG/1
40 TABLET ORAL DAILY
Qty: 30 TABLET | Refills: 0 | Status: ON HOLD | OUTPATIENT
Start: 2022-02-05 | End: 2024-03-20

## 2022-02-05 RX ORDER — HYDRALAZINE HYDROCHLORIDE 25 MG/1
100 TABLET, FILM COATED ORAL EVERY 8 HOURS
Status: DISCONTINUED | OUTPATIENT
Start: 2022-02-05 | End: 2022-02-05 | Stop reason: HOSPADM

## 2022-02-05 RX ORDER — NIFEDIPINE 30 MG/1
30 TABLET, EXTENDED RELEASE ORAL DAILY
Status: DISCONTINUED | OUTPATIENT
Start: 2022-02-05 | End: 2022-02-05 | Stop reason: HOSPADM

## 2022-02-05 RX ORDER — AMLODIPINE BESYLATE 5 MG/1
10 TABLET ORAL DAILY
Status: DISCONTINUED | OUTPATIENT
Start: 2022-02-05 | End: 2022-02-05

## 2022-02-05 RX ORDER — INSULIN ASPART 100 [IU]/ML
5 INJECTION, SOLUTION INTRAVENOUS; SUBCUTANEOUS 3 TIMES DAILY
Qty: 11.7 ML | Refills: 1
Start: 2022-02-05 | End: 2022-06-30 | Stop reason: CLARIF

## 2022-02-05 RX ORDER — FUROSEMIDE 40 MG/1
40 TABLET ORAL DAILY
Status: DISCONTINUED | OUTPATIENT
Start: 2022-02-05 | End: 2022-02-05 | Stop reason: HOSPADM

## 2022-02-05 RX ORDER — INSULIN PUMP SYRINGE, 3 ML
EACH MISCELLANEOUS
Qty: 5 EACH | Refills: 0 | Status: SHIPPED | OUTPATIENT
Start: 2022-02-05 | End: 2023-02-05

## 2022-02-05 RX ORDER — AMIODARONE HYDROCHLORIDE 200 MG/1
200 TABLET ORAL 2 TIMES DAILY
Qty: 60 TABLET | Refills: 0 | Status: ON HOLD | OUTPATIENT
Start: 2022-02-05 | End: 2022-07-02 | Stop reason: HOSPADM

## 2022-02-05 RX ADMIN — AMIODARONE HYDROCHLORIDE 400 MG: 200 TABLET ORAL at 09:02

## 2022-02-05 RX ADMIN — Medication 10 ML: at 05:02

## 2022-02-05 RX ADMIN — THIAMINE HCL TAB 100 MG 100 MG: 100 TAB at 09:02

## 2022-02-05 RX ADMIN — FOLIC ACID 1 MG: 1 TABLET ORAL at 09:02

## 2022-02-05 RX ADMIN — APIXABAN 5 MG: 5 TABLET, FILM COATED ORAL at 09:02

## 2022-02-05 RX ADMIN — Medication 10 ML: at 09:02

## 2022-02-05 RX ADMIN — POTASSIUM PHOSPHATE, MONOBASIC 500 MG: 500 TABLET, SOLUBLE ORAL at 09:02

## 2022-02-05 RX ADMIN — NIFEDIPINE 30 MG: 30 TABLET, FILM COATED, EXTENDED RELEASE ORAL at 09:02

## 2022-02-05 RX ADMIN — ASPIRIN 81 MG CHEWABLE TABLET 81 MG: 81 TABLET CHEWABLE at 09:02

## 2022-02-05 RX ADMIN — FAMOTIDINE 20 MG: 20 TABLET, FILM COATED ORAL at 09:02

## 2022-02-05 RX ADMIN — INSULIN DETEMIR 5 UNITS: 100 INJECTION, SOLUTION SUBCUTANEOUS at 09:02

## 2022-02-05 RX ADMIN — METOPROLOL SUCCINATE 100 MG: 50 TABLET, EXTENDED RELEASE ORAL at 09:02

## 2022-02-05 RX ADMIN — Medication 1 PATCH: at 09:02

## 2022-02-05 RX ADMIN — HYDRALAZINE HYDROCHLORIDE 100 MG: 25 TABLET, FILM COATED ORAL at 09:02

## 2022-02-05 RX ADMIN — SENNOSIDES AND DOCUSATE SODIUM 1 TABLET: 50; 8.6 TABLET ORAL at 09:02

## 2022-02-05 RX ADMIN — FUROSEMIDE 40 MG: 40 TABLET ORAL at 09:02

## 2022-02-05 NOTE — PLAN OF CARE
Problem: Infection  Goal: Absence of Infection Signs and Symptoms  Outcome: Ongoing, Progressing     Problem: Adult Inpatient Plan of Care  Goal: Plan of Care Review  Outcome: Ongoing, Progressing  Goal: Patient-Specific Goal (Individualized)  Outcome: Ongoing, Progressing  Goal: Absence of Hospital-Acquired Illness or Injury  Outcome: Ongoing, Progressing  Goal: Optimal Comfort and Wellbeing  Outcome: Ongoing, Progressing  Goal: Readiness for Transition of Care  Outcome: Ongoing, Progressing     Problem: Skin Injury Risk Increased  Goal: Skin Health and Integrity  Outcome: Ongoing, Progressing     Problem: Diabetes Comorbidity  Goal: Blood Glucose Level Within Targeted Range  Outcome: Ongoing, Progressing     Problem: Fluid and Electrolyte Imbalance (Acute Kidney Injury/Impairment)  Goal: Fluid and Electrolyte Balance  Outcome: Ongoing, Progressing     Problem: Oral Intake Inadequate (Acute Kidney Injury/Impairment)  Goal: Optimal Nutrition Intake  Outcome: Ongoing, Progressing     Problem: Renal Function Impairment (Acute Kidney Injury/Impairment)  Goal: Effective Renal Function  Outcome: Ongoing, Progressing     Problem: Fall Injury Risk  Goal: Absence of Fall and Fall-Related Injury  Outcome: Ongoing, Progressing     Problem: Restraint, Nonbehavioral (Nonviolent)  Goal: Absence of Harm or Injury  Outcome: Ongoing, Progressing     Problem: Communication Impairment (Mechanical Ventilation, Invasive)  Goal: Effective Communication  Outcome: Ongoing, Progressing     Problem: Device-Related Complication Risk (Mechanical Ventilation, Invasive)  Goal: Optimal Device Function  Outcome: Ongoing, Progressing     Problem: Inability to Wean (Mechanical Ventilation, Invasive)  Goal: Mechanical Ventilation Liberation  Outcome: Ongoing, Progressing     Problem: Nutrition Impairment (Mechanical Ventilation, Invasive)  Goal: Optimal Nutrition Delivery  Outcome: Ongoing, Progressing     Problem: Skin and Tissue Injury  (Mechanical Ventilation, Invasive)  Goal: Absence of Device-Related Skin and Tissue Injury  Outcome: Ongoing, Progressing     Problem: Ventilator-Induced Lung Injury (Mechanical Ventilation, Invasive)  Goal: Absence of Ventilator-Induced Lung Injury  Outcome: Ongoing, Progressing     Problem: Communication Impairment (Artificial Airway)  Goal: Effective Communication  Outcome: Ongoing, Progressing     Problem: Device-Related Complication Risk (Artificial Airway)  Goal: Optimal Device Function  Outcome: Ongoing, Progressing     Problem: Skin and Tissue Injury (Artificial Airway)  Goal: Absence of Device-Related Skin or Tissue Injury  Outcome: Ongoing, Progressing     Problem: Device-Related Complication Risk (CRRT (Continuous Renal Replacement Therapy))  Goal: Safe, Effective Therapy Delivery  Outcome: Ongoing, Progressing     Problem: Hypothermia (CRRT (Continuous Renal Replacement Therapy))  Goal: Body Temperature Maintained in Desired Range  Outcome: Ongoing, Progressing     Problem: Infection (CRRT (Continuous Renal Replacement Therapy))  Goal: Absence of Infection Signs and Symptoms  Outcome: Ongoing, Progressing

## 2022-02-05 NOTE — PT/OT/SLP PROGRESS
"Physical Therapy Treatment    Patient Name:  Marck Madison   MRN:  [id number]    Recommendations:     Discharge Recommendations:   (Inpatient rehab vs Home with 24hr supervision and Outpatient rehab)   Discharge Equipment Recommendations: walker, rolling   Barriers to discharge: Inaccessible home and Decreased caregiver support    Assessment:     Marck Madiosn is a 62 y.o. male admitted with a medical diagnosis of Acute hypoxemic respiratory failure.  He presents with the following impairments/functional limitations:  weakness,impaired endurance,gait instability,decreased lower extremity function,decreased ROM,impaired balance,impaired functional mobilty,decreased safety awareness .    Rehab Prognosis: Good; patient would benefit from acute skilled PT services to address these deficits and reach maximum level of function.    Recent Surgery: Procedure(s) (LRB):  Transesophageal echo (ESTEBAN) intra-procedure log documentation (N/A)  Cardioversion or Defibrillation (N/A) 2 Days Post-Op    Plan:     During this hospitalization, patient to be seen 6 x/week to address the identified rehab impairments via gait training,therapeutic activities,therapeutic exercises and progress toward the following goals:    · Plan of Care Expires:  02/18/22    Subjective     Chief Complaint: none   Patient/Family Comments/goals: to go home  Pain/Comfort:  · Pain Rating 1: 0/10  · Pain Rating Post-Intervention 1: 0/10      Objective:     Communicated with nurse prior to session.  Patient found up in chair with multiples family members  with PICC line upon PT entry to room.     General Precautions: Standard, fall   Orthopedic Precautions:N/A   Braces: N/A  Respiratory Status: Room air     Functional Mobility:  · Pt found up in chair, getting dress with family assistance. Pt stated " I just walked to the bathroom."       AM-PAC 6 CLICK MOBILITY  Turning over in bed (including adjusting bedclothes, sheets and blankets)?: 3  Sitting down on and standing " up from a chair with arms (e.g., wheelchair, bedside commode, etc.): 3  Moving from lying on back to sitting on the side of the bed?: 3  Moving to and from a bed to a chair (including a wheelchair)?: 3  Need to walk in hospital room?: 3  Climbing 3-5 steps with a railing?: 3  Basic Mobility Total Score: 18       Therapeutic Activities and Exercises:   BLE HEP given with instructions and demonstrations (pt  verbalized understanding). Encouraged pt to perform BLE ex's per handout throughout the day.   Educated pt on safety awareness with all OOB mobility and to ambulate with RW to improve balance and stability . Pt verbalized understanding.    Patient left up in chair with all lines intact, call button in reach, nurse notified and family present..    GOALS:   Multidisciplinary Problems     Physical Therapy Goals        Problem: Physical Therapy Goal    Goal Priority Disciplines Outcome Goal Variances Interventions   Physical Therapy Goal     PT, PT/OT Ongoing, Progressing     Description: Goals to be met by: 22     Patient will increase functional independence with mobility by performin. Sit to stand transfer with Modified [place]  2. Gait  x 150 feet with Modified [place] using Rolling Walker.   3. Ascend/descend 3 stair with right Handrails Modified [place]    4. Lower extremity exercise program x10 reps per handout, with supervision                     Time Tracking:     PT Received On: 22  PT Start Time: 1151     PT Stop Time: 1159  PT Total Time (min): 8 min     Billable Minutes: Therapeutic Activity 8    Treatment Type: Treatment  PT/PTA: PTA     PTA Visit Number: 1     2022

## 2022-02-05 NOTE — PLAN OF CARE
02/04/22 1818   Post-Acute Status   Post-Acute Authorization Placement   Post-Acute Placement Status Referrals Sent   Discharge Delays (!) Other  (Awaiting an accepting facility)   Discharge Plan   Discharge Plan A Rehab   Discharge Plan B Other

## 2022-02-05 NOTE — NURSING
Discharge information given to patient prior to discharge. Patient and patient daughter state understanding of information provided. PICC removed by Elza WALLER RN. Tolerated well. All personal belongings with patient at the time of discharge. Transported to personal vehicle via hospital transport wheelchair.

## 2022-02-05 NOTE — DISCHARGE SUMMARY
[facility] Medicine  Discharge Summary      Patient Name: Marck Madison  MRN: [id number]  Patient Class: IP- Inpatient  Admission Date: 1/26/2022  Hospital Length of Stay: 10 days  Discharge Date and Time:  02/05/2022 10:49 AM  Attending Physician: Whit Hannon MD   Discharging Provider: Whit Hannon MD  Primary Care Provider: [facility]      HPI:   61 year old male with diastolic heart failure, atrial flutter, pulmonary hypertension, mitral and tricuspid valve regurgitation, emphysema and current every day smoker who presented with shortness of breath, body aches, bilateral flank pain and weakness since last night. Associated symptoms include cough. Admits to smoking. Denied nausea, vomiting, abdominal pain, dysuria, diarrhea. In the ED, patient was hypotensive, tachycardic to 120s, diffuse expiratory wheezing and with a lactic acid of 3. Given one liter of fluids, continuous bronchodilators, methylprednisolone and broad spectrum antibiotics. O2 sats stable at room air. COVID and flu negative. Procalcitonin negative. Remains tachycardic. Cardiology consulted. BP stable. Admit to ICU.       Procedure(s) (LRB):  Transesophageal echo (ESTEBAN) intra-procedure log documentation (N/A)  Cardioversion or Defibrillation (N/A)      Hospital Course:   Mr Madison presented with acute exacerbation of diastolic heart failure and atrial fibrillation with RVR. Also with acute renal failure. He was given fluids in the ED. O2 requirements increased significantly. Tox screen positive for amphetamines. Placed on BiPAP but non compliant with this. He is AAO x 3 and with no tremors. Required precedex to comply with BiPAP. Amiodarone infusion and anticoagulation. Nephrology, cardiology and pulmonology consulted. While still in the ED waiting for an ICU bed patient experienced alcohol withdrawals. Give lorazepam and more diuresis but poor UOP and remained in respiratory distress  despite lorazepam. Patient was intubated. Required significant vent support. Renal function continued to deteriorate. Started on CRRT.   Pt initially poorly responsive to CRRT, but eventually improved, hypoxia decreased. Sedation was stopped but he was poorly responsive. His mental status improved and he was extubated to nasal cannula. Slowly improved  in mentation but heart rate difficult to control.was on IV amiodarone and metoprolol and still with aflutter to 130s at time. Cardiology consulted, patient underwent ESTEBAN/CV on 2/3/22. Back to sinus,was started on PO amiodarone.transfered to Tele.  Removed ha,PT,OT is consulted,alert and oriented,respiratory status is much improved.did well with PT,OT,did not want rehab.he was stable on RA,patient was discharged home with out patient PT,OT and DME and follow up with PCP as out patient.           Goals of Care Treatment Preferences:  Code Status: DNR      Consults:   Consults (From admission, onward)        Status Ordering Provider     Inpatient consult to Social Work  Once        Provider:  (Not yet assigned)    Completed EMILIANO SAVAGE     Inpatient consult to Cardiology  Once        Provider:  (Not yet assigned)    Completed CLAUDETTE VELAZQUEZ     Inpatient consult to Neurology  Once        Provider:  Jonathan Huang MD    Completed BARTOLOME KAMINSKI     Inpatient consult to Registered Dietitian/Nutritionist  Once        Provider:  (Not yet assigned)    Completed HORTENCIA SANTILLAN     Inpatient consult to PICC team ([facility])  Once        Provider:  (Not yet assigned)    Acknowledged CLAUDETTE VELAZQUEZ     Inpatient consult to Pulmonology  Once        Provider:  Bartolome Kaminski MD    Completed TONY MUNOZ     Inpatient consult to Nephrology  Once        Provider:  Marivel Recinos MD    Completed TONY MUNOZ     Inpatient consult to Cardiology  Once        Provider:  Jonathan Lopez MD    Completed TONY MUNOZ          No new Assessment & Plan notes have been  "filed under this hospital service since the last note was generated.  Service: Hospital Medicine    Final Active Diagnoses:    Diagnosis Date Noted POA    PRINCIPAL PROBLEM:  Acute hypoxemic respiratory failure [J96.01] 09/10/2019 Yes    Chronic hepatitis C without hepatic coma [B18.2] 02/04/2022 Yes    Thrombocytopenia [D69.6] 01/31/2022 Yes    Shock liver [K72.00] 01/31/2022 Yes    Encephalopathy, metabolic [G93.41] 01/30/2022 Yes    Goals of care, counseling/discussion [Z71.89] 01/28/2022 Not Applicable    Alcohol withdrawal syndrome, with delirium [F10.231] 01/28/2022 Yes    Shock, unspecified [R57.9] 01/27/2022 No    Panlobular emphysema [J43.1] 01/26/2022 Yes    Type 2 diabetes mellitus, with long-term current use of insulin [E11.9, Z79.4] 01/26/2022 Not Applicable    Debility [R53.81] 07/03/2021 Yes    Lactic acidosis [E87.2] 06/12/2021 Yes    Acute on chronic diastolic congestive heart failure [I50.33] 06/09/2021 Yes    Hypertension [I10] 06/09/2021 Yes    Tobacco use disorder, severe, dependence [F17.200] 01/21/2020 Yes    Atrial fibrillation with RVR [I48.91] 01/20/2020 Yes    Elevated troponin [R77.8] 01/20/2020 Yes    Dilated cardiomyopathy [I42.0] 09/13/2019 Yes    Acute renal failure [N17.9] 09/10/2019 Yes      Problems Resolved During this Admission:       Discharged Condition: stable    Disposition: Home or Self Care    Follow Up:   Follow-up Information     [facility]. Schedule an appointment as soon as possible for a visit in 1 week.    Why: for Hospital Follow up  Contact information:  [address] [place] [id number] [phone]                       Patient Instructions:      WALKER FOR HOME USE     Order Specific Question Answer Comments   Type of Walker: Adult (5'4"-6'6")    With wheels? Yes    Height: 5' 5" (1.651 m)    Weight: 74.8 kg (164 lb 14.5 oz)    Length of need (1-99 months): 99    Does patient have medical equipment at home? none    Please " check all that apply: Patient's condition impairs ambulation.    Please check all that apply: Patient is unable to safely ambulate without equipment.      Ambulatory referral/consult to Physical/Occupational Therapy   Standing Status: Future   Referral Priority: Routine Referral Type: Physical Medicine   Referral Reason: Specialty Services Required   Number of Visits Requested: 1     Activity as tolerated       Significant Diagnostic Studies: Radiology: X-Ray: CXR: X-Ray Chest 1 View (CXR):   Results for orders placed or performed during the hospital encounter of 01/26/22   X-Ray Chest 1 View    Narrative    EXAMINATION:  XR CHEST 1 VIEW    CLINICAL HISTORY:  eval ETT/line placement/pna/ptx;    TECHNIQUE:  Single frontal view of the chest was performed.    COMPARISON:  01/27/2022    FINDINGS:  Support devices: Several overlying monitoring leads partially limiting assessment.  Right central venous catheter tip projects over the lower SVC.  Endotracheal tube terminates above the rosmery.  Esophagogastric tube courses below the diaphragm.  Right PICC positioning appears similar.    Low lung volumes with improving infiltrate on the right.  Remainder of the lungs and heart demonstrate no significant interval detrimental change.  No gross pneumothorax.      Impression    As above.      Electronically signed by: Irwin Lorenzo  Date:    01/30/2022  Time:    10:52    and X-Ray Chest PA and Lateral (CXR): No results found for this visit on 01/26/22.    Pending Diagnostic Studies:     Procedure Component Value Units Date/Time    EKG 12-lead [[id number]]     Order Status: Sent Lab Status: No result          Medications:  Reconciled Home Medications:      Medication List      START taking these medications    amiodarone 200 MG Tab  Commonly known as: PACERONE  Take 1 tablet (200 mg total) by mouth 2 (two) times daily.     furosemide 40 MG tablet  Commonly known as: LASIX  Take 1 tablet (40 mg total) by mouth once daily.         CHANGE how you take these medications    insulin aspart U-100 100 unit/mL (3 mL) Inpn pen  Commonly known as: NovoLOG  Inject 5 Units into the skin 3 (three) times daily.  What changed: how much to take     insulin detemir U-100 100 unit/mL (3 mL) Inpn pen  Commonly known as: Levemir FLEXTOUCH  Inject 10 Units into the skin once daily.  What changed: how much to take        CONTINUE taking these medications    aspirin 81 MG Chew  Take 1 tablet (81 mg total) by mouth once daily.     atorvastatin 40 MG tablet  Commonly known as: LIPITOR  Take 1 tablet (40 mg total) by mouth every evening.     blood-glucose meter kit  Use as instructed     ELIQUIS 5 mg Tab  Generic drug: apixaban  Take 1 tablet (5 mg total) by mouth 2 (two) times daily.     hydrALAZINE 100 MG tablet  Commonly known as: APRESOLINE  Take 1 tablet (100 mg total) by mouth every 8 (eight) hours.     metoprolol succinate 100 MG 24 hr tablet  Commonly known as: TOPROL-XL  Take 1 tablet (100 mg total) by mouth once daily.     NIFEdipine 30 MG (OSM) 24 hr tablet  Commonly known as: PROCARDIA-XL  Take 1 tablet (30 mg total) by mouth once daily.     TRULICITY 1.5 mg/0.5 mL pen injector  Generic drug: dulaglutide  Inject into the skin.        STOP taking these medications    metFORMIN 1000 MG tablet  Commonly known as: GLUCOPHAGE            Indwelling Lines/Drains at time of discharge:   Lines/Drains/Airways     Peripherally Inserted Central Catheter Line            PICC Triple Lumen 01/27/22 1915 right brachial 8 days                Time spent on the discharge of patient: over 30  minutes         Whit Hannon MD  Department of Hospital Medicine  [facility]

## 2022-02-05 NOTE — PLAN OF CARE
02/05/22 1239   Final Note   Assessment Type Final Discharge Note   Anticipated Discharge Disposition Home  (with OP PT)   Hospital Resources/Appts/Education Provided   (Patient to self schedule appointment at [facility])   Post-Acute Status   Discharge Delays None known at this time   EDUCATION:  Things You are responsible For To Manage Your Care At Home:  1.    Getting your prescriptions filled   2.    Taking your medications as directed, DO NOT MISS ANY DOSES!  3.    Going to your follow-up doctor appointment. This is important because it  allow the doctor to monitor your progress and determine if  any changes need to made to your treatment plan.  Call [facility] Help at home number for new or repeated problems / symptoms   Call 911 for CP and / or SOB  Patient agreed to all above

## 2022-02-05 NOTE — PLAN OF CARE
[facility] - Telemetry [place]  Discharge Reassessment    Primary Care Provider: [facility] [place]    Expected Discharge Date:  TBD    Reassessment (most recent)       Discharge Reassessment - 02/04/22 1803          Discharge Reassessment    Assessment Type Discharge Planning Reassessment     Did the patient's condition or plan change since previous assessment? Yes     Discharge Plan discussed with: --   Vega/Jessica    Communicated BALDO with patient/caregiver No   Discussed that referrals would be submitted to Inpatient Rehab facilities.    Discharge Plan A Rehab     Discharge Plan B Other   TBD    DME Needed Upon Discharge  none     Discharge Barriers Identified None     Why the patient remains in the hospital Requires continued medical care        Post-Acute Status    Discharge Delays Other   awaiting an accepting inpatient rehab facility.                This patient has been screened for Case Management needs.  Based on (documentation in medical record/communication with attending physician), patient will need inpatient rehab at discharge.  Patient has stepped down from ICU to Telemetry.    Discharge Plan:  KENYATTA spoke with patient's niece, Jessica, to advise of recommendation for inpatient rehab.  Vega would like to remain on [facility] if possible.  KENYATTA advised vega that we would try to take her preferences into account but would send referral to additional facilities in the event there is no availability on the [facility].      Case Management/Social Work remains available if a need arises, please enter consult for assistance.  For urgent needs contact Case Management Department/on-call at: [phone]

## 2022-02-05 NOTE — PLAN OF CARE
Problem: Physical Therapy Goal  Goal: Physical Therapy Goal  Description: Goals to be met by: 22     Patient will increase functional independence with mobility by performin. Sit to stand transfer with Modified [place]  2. Gait  x 150 feet with Modified [place] using Rolling Walker.   3. Ascend/descend 3 stair with right Handrails Modified [place]    4. Lower extremity exercise program x10 reps per handout, with supervision    Outcome: Ongoing, Progressing

## 2022-02-05 NOTE — PLAN OF CARE
02/05/22 1236   Post-Acute Status   HME Status Set-up Complete/Auth obtained   Discharge Delays None known at this time   Orders received to DC patient home with OP PT and RW.  OK per Peg to pull RW from depot.  Delivered by this CM.

## 2022-02-07 ENCOUNTER — PATIENT OUTREACH (OUTPATIENT)
Dept: ADMINISTRATIVE | Facility: CLINIC | Age: 62
End: 2022-02-07
Payer: MEDICAID

## 2022-02-07 NOTE — PROGRESS NOTES
C3 nurse attempted to contact Marck Madison  for a TCC post hospital discharge follow up call. No answer. No voicemail available.The patient does not have a scheduled HOSFU appointment. Non [facility] PCP

## 2022-02-07 NOTE — PHYSICIAN QUERY
PT Name: Marck Madison  MR #: [id number]     DOCUMENTATION CLARIFICATION     CDS/: JODI Dan, RN, CDS               Contact information:[email or web address]   This form is a permanent document in the medical record.     Query Date: February 7, 2022    By submitting this query, we are merely seeking further clarification of documentation.  Please utilize your independent clinical judgment when addressing the question(s) below.  The Medical Record contains the following:  Indicators Supporting Clinical Findings Location in Medical Record   X HR         RR          BP        Temp Vital Signs (24h Range):  Temp:  [96.4 °F (35.8 °C)] 96.4 °F (35.8 °C)  Pulse:  [] 120  Resp:  [19-31] 27  SpO2:  [88 %-100 %] 96 %  BP: ()/(66-81) 104/81    In the ED, patient was hypotensive, tachycardic to 120s, diffuse expiratory wheezing and with a lactic acid of 3    Vital Signs (24h Range):  Temp:  [97.8 °F (36.6 °C)] 97.8 °F (36.6 °C)  Pulse:  [103-129] 105  Resp:  [18-45] 29  SpO2:  [80 %-100 %] 92 %  BP: (100-133)/(70-94) 125/90    Hypothermic    Vital Signs (24h Range):  Temp:  [96.1 °F (35.6 °C)-98.7 °F (37.1 °C)] 96.1 °F (35.6 °C)  Pulse:  [48-81] 73  Resp:  [11-35] 13  SpO2:  [93 %-100 %] 95 %  BP: (113-173)/() 156/87  Arterial Line BP: ()/(58-94) 147/77   H&P, Dr. Lopez, 1/26                    , Dr. Lopez, 1/27              Cards, Dr. Schwab, 1/28    , Dr. Ritchie, 1/30   X Lactic Acid          Procalcitonin    1/26 1/26 1/26/ 1/27 1/28 1/29   Lactate 3.2 (H) 6.8 (HH) 6.6 (HH) 2.5 (H) 7.7 (HH) 2.3 (H)       Procalcitonin 0.06        Lab 1/26-1/29          Lab 1/26   X WBC           Bands          CRP    1/26 1/27 1/28 1/29 1/30 1/31 2/2   WBC 12.07 14.41 (H) 18.22 (H) 18.53 (H) 16.27 (H) 16.05 (H) 13.00 (H)        Lab 1/   X Culture(s) Blood culture: No growth   Lab 1/26   X AMS, Confusion, LOC, etc. Encephalopathy: multifactorial given acute renal failure and hepatic  failure   Neuro, Dr. Huang, 1/30   X Organ Dysfunction/Failure Elevated troponin, FARHAN    Encephalopathy: multifactorial given acute renal failure and hepatic failure   , Dr. Lopez, 1/27    Neuro, Dr. Huang, 1/30   X Bacteremia or Sepsis / Septic Vancomycin and Zosyn were ordered for presumed sepsis    Sepsis    Unclear etiology of shock, C/f septic shock however no clear source of infection noted    Unclear etiology of shock, C/f septic shock however no clear source of infection noted, continue cefepime, stop vancomycin  day #7 of antibiotics with no clear source - complete 10 day course for thoroughness     ED, Dina, 1/26        , Dr. Ritchie, 1/30      , Dr. Hannon, 2/4    Known or Suspected Source of Infection documented      (Failed) Outpatient Treatment      Medication     X Treatment Given one liter of fluids, continuous bronchodilators, methylprednisolone and broad spectrum antibiotics    continue cefepime, stop vancomycin  - day #7 of antibiotics with no clear source - complete 10 day course for thoroughness   H&P, Dr. Lopez, 1/26      , Dr. Hannon, 2/4   X Other Presented with acute exacerbation of diastolic heart failure and atrial fibrillation with RVR. Also with acute renal failure    Unclear etiology of shock, TTE essentially rules out obstructive and cardiogenic shock, clinical picture argues strongly against hypovolemic shock, no evidence of hemorrhage. C/f septic shock however no clear source of infection noted.   , Dr. Lopez, 1/27      , Parvez, 2/4        Provider, please Clarify the Sepsis Diagnosis associated with above clinical findings.    [  x ] Sepsis Ruled In   [   ] Sepsis Ruled Out   [   ] Other Infectious Disease (please specify): __________   [  ] Clinically Undetermined         Please document in your progress notes daily for the duration of treatment until resolved and include in your discharge summary.

## 2022-02-07 NOTE — PT/OT/SLP DISCHARGE
Physical Therapy Discharge Summary    Name: Marck Madison  MRN: [id number]   Principal Problem: Acute hypoxemic respiratory failure     Patient Discharged from acute Physical Therapy on 22.  Please refer to prior PT noted date on 22 for functional status.     Assessment:     Patient appropriate for care in another setting.    Objective:     GOALS:   Multidisciplinary Problems     Physical Therapy Goals        Problem: Physical Therapy Goal    Goal Priority Disciplines Outcome Goal Variances Interventions   Physical Therapy Goal     PT, PT/OT Ongoing, Progressing     Description: Goals to be met by: 22     Patient will increase functional independence with mobility by performin. Sit to stand transfer with Modified [place]  2. Gait  x 150 feet with Modified [place] using Rolling Walker.   3. Ascend/descend 3 stair with right Handrails Modified [place]    4. Lower extremity exercise program x10 reps per handout, with supervision                     Reasons for Discontinuation of Therapy Services  Transfer to alternate level of care.      Plan:     Patient Discharged to: Outpatient Therapy Services.      2022

## 2022-02-07 NOTE — ANESTHESIA POSTPROCEDURE EVALUATION
Anesthesia Post Evaluation    Patient: Marck Madison    Procedure(s) Performed: Procedure(s) (LRB):  Transesophageal echo (ESTEBAN) intra-procedure log documentation (N/A)  Cardioversion or Defibrillation (N/A)    Final Anesthesia Type: general      Patient location during evaluation: PACU  Patient participation: Yes- Able to Participate  Level of consciousness: awake and alert and oriented  Post-procedure vital signs: reviewed and stable  Pain management: adequate  Airway patency: patent    PONV status at discharge: No PONV  Anesthetic complications: no      Cardiovascular status: blood pressure returned to baseline, hemodynamically stable and stable  Respiratory status: unassisted, spontaneous ventilation and room air  Hydration status: euvolemic  Follow-up not needed.          Vitals Value Taken Time   /74 02/05/22 1116   Temp 36.9 °C (98.5 °F) 02/05/22 1116   Pulse 73 02/05/22 1116   Resp 18 02/05/22 1116   SpO2 98 % 02/05/22 1116         No case tracking events are documented in the log.      Pain/Khris Score: No data recorded

## 2022-02-08 NOTE — ANESTHESIA POSTPROCEDURE EVALUATION
Anesthesia Post Evaluation    Patient: Marck Madison    Procedure(s) Performed: Procedure(s) (LRB):  Transesophageal echo (ESTEBAN) intra-procedure log documentation (N/A)  Cardioversion or Defibrillation (N/A)    [facility] Anesthesia Post Op Evaluation      Vitals Value Taken Time   /74 02/05/22 1116   Temp 36.9 °C (98.5 °F) 02/05/22 1116   Pulse 73 02/05/22 1116   Resp 18 02/05/22 1116   SpO2 98 % 02/05/22 1116         No case tracking events are documented in the log.      Pain/Khris Score: No data recorded

## 2022-03-29 ENCOUNTER — HOSPITAL ENCOUNTER (EMERGENCY)
Facility: HOSPITAL | Age: 62
Discharge: HOME | End: 2022-03-29
Attending: EMERGENCY MEDICINE
Payer: MEDICAID

## 2022-03-29 ENCOUNTER — PATIENT OUTREACH (OUTPATIENT)
Dept: EMERGENCY MEDICINE | Facility: HOSPITAL | Age: 62
End: 2022-03-29
Payer: MEDICAID

## 2022-03-29 VITALS
HEIGHT: 66 IN | SYSTOLIC BLOOD PRESSURE: 192 MMHG | DIASTOLIC BLOOD PRESSURE: 104 MMHG | RESPIRATION RATE: 16 BRPM | TEMPERATURE: 98 F | OXYGEN SATURATION: 96 % | WEIGHT: 175 LBS | HEART RATE: 84 BPM | BODY MASS INDEX: 28.12 KG/M2

## 2022-03-29 DIAGNOSIS — L08.9 FOOT INFECTION: Primary | ICD-10-CM

## 2022-03-29 DIAGNOSIS — T14.8XXA WOUND INFECTION: ICD-10-CM

## 2022-03-29 DIAGNOSIS — L08.9 WOUND INFECTION: ICD-10-CM

## 2022-03-29 LAB
ALBUMIN SERPL BCP-MCNC: 3.5 G/DL (ref 3.5–5.2)
ALP SERPL-CCNC: 86 U/L (ref 55–135)
ALT SERPL W/O P-5'-P-CCNC: 18 U/L (ref 10–44)
ANION GAP SERPL CALC-SCNC: 8 MMOL/L (ref 8–16)
AST SERPL-CCNC: 29 U/L (ref 10–40)
BASOPHILS # BLD AUTO: 0.03 K/UL (ref 0–0.2)
BASOPHILS NFR BLD: 0.4 % (ref 0–1.9)
BILIRUB SERPL-MCNC: 0.6 MG/DL (ref 0.1–1)
BUN SERPL-MCNC: 20 MG/DL (ref 8–23)
CALCIUM SERPL-MCNC: 9.2 MG/DL (ref 8.7–10.5)
CHLORIDE SERPL-SCNC: 108 MMOL/L (ref 95–110)
CO2 SERPL-SCNC: 22 MMOL/L (ref 23–29)
CREAT SERPL-MCNC: 1.2 MG/DL (ref 0.5–1.4)
CRP SERPL-MCNC: 27.6 MG/L (ref 0–8.2)
DIFFERENTIAL METHOD: ABNORMAL
EOSINOPHIL # BLD AUTO: 0 K/UL (ref 0–0.5)
EOSINOPHIL NFR BLD: 0.5 % (ref 0–8)
ERYTHROCYTE [DISTWIDTH] IN BLOOD BY AUTOMATED COUNT: 13.5 % (ref 11.5–14.5)
ERYTHROCYTE [SEDIMENTATION RATE] IN BLOOD BY WESTERGREN METHOD: 30 MM/HR (ref 0–10)
EST. GFR  (AFRICAN AMERICAN): >60 ML/MIN/1.73 M^2
EST. GFR  (NON AFRICAN AMERICAN): >60 ML/MIN/1.73 M^2
GLUCOSE SERPL-MCNC: 104 MG/DL (ref 70–110)
HCT VFR BLD AUTO: 37.1 % (ref 40–54)
HGB BLD-MCNC: 12.4 G/DL (ref 14–18)
IMM GRANULOCYTES # BLD AUTO: 0.02 K/UL (ref 0–0.04)
IMM GRANULOCYTES NFR BLD AUTO: 0.3 % (ref 0–0.5)
INR PPP: 1 (ref 0.8–1.2)
LYMPHOCYTES # BLD AUTO: 2.3 K/UL (ref 1–4.8)
LYMPHOCYTES NFR BLD: 30.3 % (ref 18–48)
MCH RBC QN AUTO: 32.5 PG (ref 27–31)
MCHC RBC AUTO-ENTMCNC: 33.4 G/DL (ref 32–36)
MCV RBC AUTO: 97 FL (ref 82–98)
MONOCYTES # BLD AUTO: 0.7 K/UL (ref 0.3–1)
MONOCYTES NFR BLD: 9.3 % (ref 4–15)
NEUTROPHILS # BLD AUTO: 4.4 K/UL (ref 1.8–7.7)
NEUTROPHILS NFR BLD: 59.2 % (ref 38–73)
NRBC BLD-RTO: 0 /100 WBC
PLATELET # BLD AUTO: 175 K/UL (ref 150–450)
PMV BLD AUTO: 10.5 FL (ref 9.2–12.9)
POTASSIUM SERPL-SCNC: 4.5 MMOL/L (ref 3.5–5.1)
PROT SERPL-MCNC: 7.1 G/DL (ref 6–8.4)
PROTHROMBIN TIME: 11 SEC (ref 9–12.5)
RBC # BLD AUTO: 3.81 M/UL (ref 4.6–6.2)
SODIUM SERPL-SCNC: 138 MMOL/L (ref 136–145)
WBC # BLD AUTO: 7.42 K/UL (ref 3.9–12.7)

## 2022-03-29 PROCEDURE — 86140 C-REACTIVE PROTEIN: CPT | Performed by: EMERGENCY MEDICINE

## 2022-03-29 PROCEDURE — 87040 BLOOD CULTURE FOR BACTERIA: CPT | Performed by: EMERGENCY MEDICINE

## 2022-03-29 PROCEDURE — 85025 COMPLETE CBC W/AUTO DIFF WBC: CPT | Performed by: EMERGENCY MEDICINE

## 2022-03-29 PROCEDURE — 25000003 PHARM REV CODE 250: Performed by: EMERGENCY MEDICINE

## 2022-03-29 PROCEDURE — 80053 COMPREHEN METABOLIC PANEL: CPT | Performed by: EMERGENCY MEDICINE

## 2022-03-29 PROCEDURE — 85652 RBC SED RATE AUTOMATED: CPT | Performed by: EMERGENCY MEDICINE

## 2022-03-29 PROCEDURE — 99284 EMERGENCY DEPT VISIT MOD MDM: CPT

## 2022-03-29 PROCEDURE — 85610 PROTHROMBIN TIME: CPT | Performed by: EMERGENCY MEDICINE

## 2022-03-29 RX ORDER — CLINDAMYCIN HYDROCHLORIDE 150 MG/1
300 CAPSULE ORAL 4 TIMES DAILY
Qty: 80 CAPSULE | Refills: 0 | Status: SHIPPED | OUTPATIENT
Start: 2022-03-29 | End: 2022-04-08

## 2022-03-29 RX ORDER — HYDRALAZINE HYDROCHLORIDE 25 MG/1
100 TABLET, FILM COATED ORAL
Status: COMPLETED | OUTPATIENT
Start: 2022-03-29 | End: 2022-03-29

## 2022-03-29 RX ORDER — CLINDAMYCIN HYDROCHLORIDE 150 MG/1
300 CAPSULE ORAL
Status: COMPLETED | OUTPATIENT
Start: 2022-03-29 | End: 2022-03-29

## 2022-03-29 RX ADMIN — CLINDAMYCIN HYDROCHLORIDE 300 MG: 150 CAPSULE ORAL at 11:03

## 2022-03-29 RX ADMIN — HYDRALAZINE HYDROCHLORIDE 100 MG: 25 TABLET, FILM COATED ORAL at 09:03

## 2022-03-29 NOTE — DISCHARGE INSTRUCTIONS
Thank you for coming to our Emergency Department today. It is important to remember that some problems are difficult to diagnose and may not be found during your first visit. Be sure to follow up with your primary care doctor and review any labs/imaging that was performed with them. If you do not have a primary care doctor, you may contact the one listed on your discharge paperwork or you may also call the [facility] Appointment Desk at [phone] to schedule an appointment with one.     All medications may potentially have side effects and it is impossible to predict which medications may give you side effects. If you feel that you are having a negative effect of any medication you should immediately stop taking them and seek medical attention.    Return to the ER with any questions/concerns, new/concerning symptoms, worsening or failure to improve. Do not drive or make any important decisions for 24 hours if you have received any pain medications, sedatives or mood altering drugs during your ER visit.

## 2022-03-29 NOTE — ED TRIAGE NOTES
Patient presents to ED via personal vehicle after PCP told him to come to ED for open wound to R heel. Patient with hx of DM, reports his A1c is 5.6, hypertensive and reports that he hasn't been taking his BP meds because he ran out.

## 2022-03-29 NOTE — ED PROVIDER NOTES
"Encounter Date: 3/29/2022    SCRIBE #1 NOTE: I, Seth Donovan, am scribing for, and in the presence of,  Octavio Dempsey MD. I have scribed the following portions of the note - Other sections scribed: HPI, ROS, PE, MDM.       History     Chief Complaint   Patient presents with    Wound Infection     Pt sent from PCP office for open wound to right foot and dried wound to left foot. Both on heels of feet. Drainage noted from right heel. Denies fever. Wound x 2 weeks per patient. Hx of diabetes. Wound wrapped with Kerlex      62 y.o. male with a PMHx of DM II, CAD, CHF, COPD presents to the ED for wound infection. Patient reports wounds to bilateral feet for 2 weeks. He states wounds were cleaned yesterday at his PCP appointment. Per patient's niece, patient's feet have been "peeling and turning black." She also states patient has been unable to walk. Patient reports he has been off his diabetes medications for 1 week but began taking them again today. Patient notes he does not see podiatry. He admits to cigarette use. Denies fevers. No other complaints at this time. No other exacerbating or alleviating factors. No other associated symptoms. No known drug allergies.      The history is provided by the patient and a relative.     Review of patient's allergies indicates:  No Known Allergies  Past Medical History:   Diagnosis Date    Arrhythmia 2017    aflutter    Atrial flutter     CAD (coronary artery disease)     CHF (congestive heart failure), NYHA class I 2017    Cholelithiasis with chronic cholecystitis     Chronic diastolic heart failure     Cigarette smoker     COPD (chronic obstructive pulmonary disease)     COVID-19 06/01/2021    DKA (diabetic ketoacidosis)     NSTEMI (non-ST elevation myocardial infarction)     NSVT (nonsustained ventricular tachycardia)     Psychiatric disorder     h/o "schizophrena/bipolar"    Schizoaffective disorder     Severe sepsis      Past Surgical History:   Procedure " "Laterality Date    LIVER SURGERY      abscess drainage; 1970s    TREATMENT OF CARDIAC ARRHYTHMIA  9/12/2019    Procedure: Cardioversion or Defibrillation;  Surgeon: Jonathan Lopez MD;  Location: [facility] CATH LAB;  Service: Cardiology;;    TREATMENT OF CARDIAC ARRHYTHMIA N/A 2/3/2022    Procedure: Cardioversion or Defibrillation;  Surgeon: Trevor Schwab MD;  Location: [facility] CATH LAB;  Service: Cardiology;  Laterality: N/A;     No family history on file.  Social History     Tobacco Use    Smoking status: Current Every Day Smoker     Packs/day: 0.50     Years: 5.00     Pack years: 2.50     Types: Cigarettes    Smokeless tobacco: Never Used   Substance Use Topics    Alcohol use: Not Currently     Alcohol/week: 14.0 standard drinks     Types: 14 Shots of liquor per week     Comment: 6/9/21:  "[employer]" everyday, last drank on 6/8/21     Drug use: No     Review of Systems   Constitutional: Negative for chills and fever.   HENT: Negative for congestion and sore throat.    Eyes: Negative for visual disturbance.   Respiratory: Negative for cough and shortness of breath.    Cardiovascular: Negative for chest pain.   Gastrointestinal: Negative for abdominal pain, nausea and vomiting.   Genitourinary: Negative for dysuria.   Skin: Positive for color change and wound. Negative for rash.   Neurological: Negative for headaches.   Psychiatric/Behavioral: Negative for confusion.   All other systems reviewed and are negative.      Physical Exam     Initial Vitals [03/29/22 0827]   BP Pulse Resp Temp SpO2   (!) 196/107 82 16 98.6 °F (37 °C) 98 %      MAP       --         Physical Exam    Nursing note and vitals reviewed.  Constitutional: He appears well-developed and well-nourished.   HENT:   Head: Normocephalic and atraumatic.   Mouth/Throat: Mucous membranes are normal.   Patent   Eyes: Conjunctivae and EOM are normal. Pupils are equal, round, and reactive to light. Right conjunctiva is not injected. Left " conjunctiva is not injected.   sclera anicteric   Neck: Neck supple.    Full passive range of motion without pain.     Cardiovascular: Regular rhythm, S1 normal, S2 normal and normal heart sounds. Exam reveals no gallop and no friction rub.    No murmur heard.  Pulses:       Radial pulses are 2+ on the right side and 2+ on the left side.   Pulmonary/Chest: Effort normal and breath sounds normal. No respiratory distress.   Abdominal: Abdomen is soft. He exhibits no distension. There is no abdominal tenderness.   Musculoskeletal:         General: No edema. Normal range of motion.      Cervical back: Full passive range of motion without pain and neck supple.      Comments: Good active ROM of all extremities. No lower extremity edema or cyanosis.     Neurological: He is alert. No cranial nerve deficit or sensory deficit. Gait normal.   A&Ox4, normal speech   Skin: Skin is warm. No ecchymosis and no rash noted.   Pink exposed skin on the right heel.  No discharge.  Small open wound.  No pus.  No smell.   Psychiatric: He has a normal mood and affect. Thought content normal.         ED Course   Procedures  Labs Reviewed   CBC W/ AUTO DIFFERENTIAL - Abnormal; Notable for the following components:       Result Value    RBC 3.81 (*)     Hemoglobin 12.4 (*)     Hematocrit 37.1 (*)     MCH 32.5 (*)     All other components within normal limits   COMPREHENSIVE METABOLIC PANEL - Abnormal; Notable for the following components:    CO2 22 (*)     All other components within normal limits   C-REACTIVE PROTEIN - Abnormal; Notable for the following components:    CRP 27.6 (*)     All other components within normal limits   SEDIMENTATION RATE - Abnormal; Notable for the following components:    Sed Rate 30 (*)     All other components within normal limits   CULTURE, BLOOD   CULTURE, BLOOD   PROTIME-INR          Imaging Results          X-Ray Foot Complete Bilateral (Final result)  Result time 03/29/22 11:01:01    Final result by  Irwin Lorenzo MD (03/29/22 11:01:01)                 Impression:      As above.      Electronically signed by: Irwin Lorenzo  Date:    03/29/2022  Time:    11:01             Narrative:    EXAMINATION:  XR FOOT COMPLETE 3 VIEW BILATERAL    CLINICAL HISTORY:  Other injury of unspecified body region, initial encounter; infection    TECHNIQUE:  AP, lateral, and oblique views of both feet were performed.    COMPARISON:  None available    FINDINGS:  Partial flexion of several rays suggesting hammertoe deformity.  Within this limitation, no sara osseous destruction suggest component of osteomyelitis.  Bilateral Lisfranc intervals are congruent.  No acute fracture.  Mild enthesopathic change at the bilateral distal Achilles insertion.                                 Medications   hydrALAZINE tablet 100 mg (100 mg Oral Given 3/29/22 0914)   clindamycin capsule 300 mg (300 mg Oral Given 3/29/22 1122)     Medical Decision Making:   History:   Old Medical Records: I decided to obtain old medical records.  Initial Assessment:   62-year-old male presenting secondary to wound to his right heel.  Left heel does not look infected.  Afebrile.  Patient is hypertensive.  Giving p.o. hydralazine.  Patient to take his medications at home.  Only mildly elevated ESR and CRP.  X-rays do not show any obvious osteomyelitis.  To follow up with Podiatry.  Wound care.  Afebrile.  Vitals reassuring.  No major white count. I discussed with the patient/family the diagnosis, treatment plan, indications for return to the emergency department, and for expected follow-up. The patient/family verbalized an understanding. The patient/family is asked if there are any questions or concerns. We discuss the case, until all issues are addressed to the patient/familys satisfaction. Patient/family understands and is agreeable to the plan.   Octavio Dempsey      Clinical Tests:   Lab Tests: Reviewed and Ordered  Radiological Study: Reviewed and  Ordered          Scribe Attestation:   Scribe #1: I performed the above scribed service and the documentation accurately describes the services I performed. I attest to the accuracy of the note.                 Clinical Impression:   Final diagnoses:  [T14.8XXA, L08.9] Wound infection  [L08.9] Foot infection (Primary)          ED Disposition Condition    Discharge Stable        ED Prescriptions     Medication Sig Dispense Start Date End Date Auth. Provider    clindamycin (CLEOCIN) 150 MG capsule Take 2 capsules (300 mg total) by mouth 4 (four) times daily. for 10 days 80 capsule 3/29/2022 4/8/2022 Octavio Dempsey MD        Follow-up Information     Follow up With Specialties Details Why Contact Info    [facility]  Schedule an appointment as soon as possible for a visit in 2 days  [address] [place] [address]  [phone]      Yesenia Cardenas DPM Podiatry, Wound Care Schedule an appointment as soon as possible for a visit in 2 days  [address] [facility] [address]  [phone]         I, octavio dempsey, personally performed the services described in this documentation. All medical record entries made by the scribe were at my direction and in my presence. I have reviewed the chart and agree that the record reflects my personal performance and is accurate and complete.       Octavio Dempsey MD  03/29/22 [id number]

## 2022-04-02 LAB
BACTERIA BLD CULT: NORMAL
BACTERIA BLD CULT: NORMAL

## 2022-04-05 ENCOUNTER — PATIENT OUTREACH (OUTPATIENT)
Dept: EMERGENCY MEDICINE | Facility: HOSPITAL | Age: 62
End: 2022-04-05
Payer: MEDICAID

## 2022-04-13 ENCOUNTER — PATIENT OUTREACH (OUTPATIENT)
Dept: EMERGENCY MEDICINE | Facility: HOSPITAL | Age: 62
End: 2022-04-13
Payer: MEDICAID

## 2022-04-19 ENCOUNTER — PATIENT OUTREACH (OUTPATIENT)
Dept: EMERGENCY MEDICINE | Facility: HOSPITAL | Age: 62
End: 2022-04-19
Payer: MEDICAID

## 2022-05-02 ENCOUNTER — PATIENT OUTREACH (OUTPATIENT)
Dept: EMERGENCY MEDICINE | Facility: HOSPITAL | Age: 62
End: 2022-05-02
Payer: MEDICAID

## 2022-05-18 ENCOUNTER — PATIENT OUTREACH (OUTPATIENT)
Dept: EMERGENCY MEDICINE | Facility: HOSPITAL | Age: 62
End: 2022-05-18
Payer: MEDICAID

## 2022-06-30 PROBLEM — R07.9 CHEST PAIN: Status: ACTIVE | Noted: 2022-06-30

## 2022-07-01 PROBLEM — I51.89 DIASTOLIC DYSFUNCTION: Status: ACTIVE | Noted: 2022-07-01

## 2022-07-01 PROBLEM — I48.92 ATRIAL FLUTTER: Status: ACTIVE | Noted: 2022-07-01

## 2022-08-04 NOTE — ASSESSMENT & PLAN NOTE
HPI and Plan:   See dictation      Orders Placed This Encounter   Procedures     Follow-Up with Cardiology- EP     EKG 12-lead complete w/read (Future)- to be scheduled     Echocardiogram Complete       Orders Placed This Encounter   Medications     metoprolol succinate ER (TOPROL XL) 50 MG 24 hr tablet     Sig: Take 1 tablet (50 mg) by mouth daily     Dispense:  180 tablet     Refill:  3     diltiazem ER COATED BEADS (CARDIZEM CD) 180 MG 24 hr capsule     Sig: Take 1 capsule (180 mg) by mouth daily     Dispense:  90 capsule     Refill:  3     losartan (COZAAR) 50 MG tablet     Sig: Take 1 tablet (50 mg) by mouth daily     Dispense:  90 tablet     Refill:  3       Medications Discontinued During This Encounter   Medication Reason     metoprolol succinate ER (TOPROL XL) 100 MG 24 hr tablet      diltiazem ER (TIAZAC) 240 MG 24 hr ER beaded capsule          Encounter Diagnoses   Name Primary?     NSVT (nonsustained ventricular tachycardia) (H)      Persistent atrial fibrillation (H)        CURRENT MEDICATIONS:  Current Outpatient Medications   Medication Sig Dispense Refill     apixaban ANTICOAGULANT (ELIQUIS) 5 MG tablet Take 1 tablet (5 mg) by mouth 2 times daily Appointment needed for additional refills. Please call [phone] to schedule. 180 tablet 0     betamethasone dipropionate (DIPROSONE) 0.05 % external ointment Apply topically 2 times daily 50 g 1     cholecalciferol 25 MCG (1000 UT) TABS taken during winter only       Cyanocobalamin 5000 MCG TBDP Take by mouth daily        diltiazem ER COATED BEADS (CARDIZEM CD) 180 MG 24 hr capsule Take 1 capsule (180 mg) by mouth daily 90 capsule 3     Ferrous Sulfate (IRON) 325 (65 Fe) MG tablet Take 1 tablet by mouth every other day  90 tablet 3     fluconazole (DIFLUCAN) 150 MG tablet Take 1 tablet (150 mg) by mouth once a week 4 tablet 0     fluticasone (FLONASE) 50 MCG/ACT nasal spray Spray 2 sprays into both nostrils daily as needed for rhinitis or allergies 54.6  In AFL with controlled VR today  Last documented in SR 1/30/22 at 1922 (on tele)  Started on apixaban 5mg bid 1/31/22 at 2046  Cont po amio  Titrate toprol as BP/HR will allow  ESTEBAN/DCCV today.    Risks, benefits and alternatives of the ESTEBAN/Cardioversion procedure were discussed with the patient including throat irritation, aspiration, anesthetic complications, esophageal irritation/perforation, skin irritation, arrhythmia, etc.  Patient understands and agrees to proceed.  Permits signed and placed on chart.     mL 3     gabapentin (NEURONTIN) 300 MG capsule Take 2 capsules (600 mg) by mouth 3 times daily 180 capsule 1     losartan (COZAAR) 50 MG tablet Take 1 tablet (50 mg) by mouth daily 90 tablet 3     metoprolol succinate ER (TOPROL XL) 50 MG 24 hr tablet Take 1 tablet (50 mg) by mouth daily 180 tablet 3     niacin 500 MG tablet Take by mouth daily (with breakfast)       pantoprazole (PROTONIX) 40 MG EC tablet TAKE 1 TABLET DAILY 90 tablet 1     tamsulosin (FLOMAX) 0.4 MG capsule TAKE 1 CAPSULE TWICE A  capsule 1     vitamin B-12 (CYANOCOBALAMIN) 1000 MCG tablet Take by mouth every 24 hours       vitamin C (ASCORBIC ACID) 1000 MG TABS Take 1,000 mg by mouth daily       Vitamin D-Vitamin K (K2 PLUS D3) 100-1000 MCG-UNIT TABS          ALLERGIES   No Known Allergies    PAST MEDICAL HISTORY:  Past Medical History:   Diagnosis Date     Arrhythmia      Arthritis      Atrial fibrillation 2006    Followed by [facility] - persistent     Calculus of kidney 2005, 6/06, 10/12, 8/18, 9/19    dr walker/nestor/иван - hematuria - w/u o/w neg     Cervical stenosis of spine     Moderate C4-5     Cholelithiasis      Chronic peptic ulcer, unspecified site, without mention of hemorrhage, perforation, or obstruction 1985    gastric bypass     Colon polyps 8/05, 9/10, 10/15    2 tubular adenoma, 1 hyperplastic - multiple serrated/tubular adenomas - last colonscopy 11/20 due 5 yrs     CVA (cerebral vascular accident) (H) 01/2019    small right periorlandic subcortical - left sided residual - left hand tingling/numbness - Left leg weak/numbness - cardioembolic     First degree AV block      Hepatitis C 10/2012    chronic hepatitis C, grade 1-2, stage 1 - mild - Dr Douglas     Hyperlipidemia LDL goal <70      Hypertension goal BP (blood pressure) < 140/90 06/2006    dr jaciel winchester     Iron deficiency anemia, unspecified 1985    gastric bypass     Nephrolithiasis multiple episodes, last 10/19    Dr Bey/Ivana - 9mm 3/2021     OA  (osteoarthritis)     Multiple - Left shoulder with rotator cuff tear - Dr Durham     Obesity, unspecified     s/p gastric bypass 1985      Obstructive sleep apnea syndrome      Prediabetes      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    dr corona     Pyelonephritis, unspecified 12/1999     SCC (squamous cell carcinoma), ear 10/2008    dr saez - lt conchal bowl     Sleep apnea 10/2002    cpap - severe - 15 cm - dr cunha     Stroke (H) 01/19/2019     TMJ arthralgia 09/2017    [facility] Head and Neck     Vitamin B12 deficiency (non anemic) 04/15/2019     Vitamin D deficiency 04/15/2019       PAST SURGICAL HISTORY:  Past Surgical History:   Procedure Laterality Date     APPENDECTOMY       APPENDECTOMY       ARTHROPLASTY  08/13/2012    knee     ARTHROPLASTY KNEE  08/13/2012    LT TKA - Dr Villanueva     CARDIOVERSION  2016     CARDIOVERSION       COLONOSCOPY  8/05, 9/10, 10/15    multiple tubular/serrated/hyperplastic polyps     COLONOSCOPY N/A 10/29/2015    multiple tubular and serrated adenomas     COLONOSCOPY N/A 09/07/2016     COLONOSCOPY  11/2020    tubular adenomas - due 5 yrs     COLONOSCOPY N/A 11/24/2020    Procedure: COLONOSCOPY with biopsies;  Surgeon: Chadwick Burgos MD;  Location: [facility] OR     CYSTOSCOPY W/ LASER LITHOTRIPSY  10/16/2012,5/2/2018     ESOPHAGOSCOPY, GASTROSCOPY, DUODENOSCOPY (EGD), COMBINED N/A 11/24/2020    Procedure: ESOPHAGOGASTRODUODENOSCOPY, COLONOSCOPY with biopsies;  Surgeon: Chadwick Burgos MD;  Location: [facility] OR     EYE SURGERY  Lasik     EYE SURGERY  1/01,6/02,11/02    lasik     GASTRIC BYPASS  1985     HC KNEE SCOPE, DIAGNOSTIC  05/2000    Arthroscopy, Knee RT     LASER HOLMIUM LITHOTRIPSY URETER(S), INSERT STENT, COMBINED  10/16/2012    stones x 4, Dr Campa     LASER HOLMIUM LITHOTRIPSY URETER(S), INSERT STENT, COMBINED Right 05/02/2018    CYSTOSCOPY, RIGHT URETEROSCOPY, HOLMIUM LASER LITHOTRIPSY, RIGHT STENT PLACEMENT - Dr Bey     OTHER SURGICAL HISTORY  1979    cellulitis     OTHER SURGICAL  HISTORY Bilateral 1998 ,    forearm spur     OTHER SURGICAL HISTORY  2006    lihoripsy     OTHER SURGICAL HISTORY  10/08,     lt ear choncal lesion removal scc     REPLACEMENT TOTAL KNEE  2012     SURGICAL HISTORY OF -       s/p gastric bypass Bilroth II     SURGICAL HISTORY OF -   1998    wisdom teeth     SURGICAL HISTORY OF -       cellulitis     SURGICAL HISTORY OF -   1998    lt forearm spur's     SURGICAL HISTORY OF -   ,,    s/p lasik     SURGICAL HISTORY OF -   1999    rt forearm spurs     SURGICAL HISTORY OF -   2006    dr stevenson - lithotrypsy     SURGICAL HISTORY OF -   10/08,     Lt ear chonchal lesion removal SCC - dr saez     SURGICAL HISTORY OF -  Right 10/2019    nephrolithiasis - cystoscopy, rt lithotrypsy, Dr Heath     SURGICAL HISTORY OF -  Right 2019    Cystoscopy, Rt lithotrypsy, Calcium Oxalate, Dr Heath     WISDOM TOOTH EXTRACTION  1998       FAMILY HISTORY:  Family History   Problem Relation Age of Onset     Alzheimer Disease Father 82         at 88     Prostate Cancer Father 76        bladder and prostate     Heart Disease Mother 80        CHF     Heart Disease Maternal Grandfather         MI at 55     Cancer Paternal Uncle         ?     Ulcerative Colitis No family hx of      Crohn's Disease No family hx of      Stomach Cancer No family hx of      GERD No family hx of        SOCIAL HISTORY:  Social History     Socioeconomic History     Marital status:      Spouse name: Mayra     Number of children: 3     Years of education: 21     Highest education level: None   Occupational History     Occupation: retired teacher and football and       Employer: [employer]     Employer: RETIRED   Tobacco Use     Smoking status: Never Smoker     Smokeless tobacco: Never Used   Vaping Use     Vaping Use: Never used   Substance and Sexual Activity     Alcohol use: Yes     Alcohol/week: 2.0 - 3.0  "standard drinks     Comment: 2-3 per week     Drug use: No     Comment: acknowledges using herbal supplement \"Vibe\" for energy-none used recently      Sexual activity: Not Currently     Partners: Female     Comment:     Other Topics Concern     Caffeine Concern Yes     Comment: <1 can qd     Sleep Concern Yes     Comment: sleep apnea, wears cpap     Exercise No     Seat Belt Yes     Parent/sibling w/ CABG, MI or angioplasty before 65F 55M? No       Review of Systems:  Skin:  not assessed       Eyes:  Positive for glasses    ENT:  Positive for hearing loss    Respiratory:  Positive for sleep apnea;CPAP     Cardiovascular:  Negative edema;Positive for some edema in ankles  Gastroenterology: not assessed      Genitourinary:  Positive for nocturia    Musculoskeletal:  Positive for back pain managed with PCP  Neurologic:  not assessed      Psychiatric:  not assessed      Heme/Lymph/Imm:  not assessed      Endocrine:  Negative        Physical Exam:  Vitals: /74   Pulse 66   Ht 1.791 m (5' 10.5\")   Wt (!) 154.2 kg (340 lb)   SpO2 97%   BMI 48.10 kg/m      Constitutional:  cooperative, alert and oriented, well developed, well nourished, in no acute distress        Skin:  warm and dry to the touch, no apparent skin lesions or masses noted          Head:  normocephalic, no masses or lesions        Eyes:  pupils equal and round, conjunctivae and lids unremarkable, sclera white, no xanthalasma, EOMS intact, no nystagmus        Lymph:No Cervical lymphadenopathy present     ENT:  no pallor or cyanosis, dentition good        Neck:  carotid pulses are full and equal bilaterally, JVP normal, no carotid bruit        Respiratory:  normal breath sounds, clear to auscultation, normal A-P diameter, normal symmetry, normal respiratory excursion, no use of accessory muscles         Cardiac: regular rhythm, normal S1/S2, no S3 or S4, apical impulse not displaced, no murmurs, gallops or rubs irregularly irregular rhythm     "          not assessed this visit                                        GI:  abdomen soft, non-tender, BS normoactive, no mass, no HSM, no bruits        Extremities and Muscular Skeletal:  no deformities, clubbing, cyanosis, erythema observed              Neurological:  no gross motor deficits        Psych:           CC  Sarah Beth Castillo MD  [address]  [address]  [place] [address]

## 2022-09-17 ENCOUNTER — HOSPITAL ENCOUNTER (INPATIENT)
Facility: HOSPITAL | Age: 62
LOS: 5 days | Discharge: HOME | DRG: 280 | End: 2022-09-22
Attending: EMERGENCY MEDICINE | Admitting: STUDENT IN AN ORGANIZED HEALTH CARE EDUCATION/TRAINING PROGRAM
Payer: MEDICAID

## 2022-09-17 DIAGNOSIS — R50.9 FEVER, UNSPECIFIED FEVER CAUSE: ICD-10-CM

## 2022-09-17 DIAGNOSIS — R00.0 TACHYCARDIA: ICD-10-CM

## 2022-09-17 DIAGNOSIS — R06.02 SOB (SHORTNESS OF BREATH): ICD-10-CM

## 2022-09-17 DIAGNOSIS — I48.92 ATRIAL FLUTTER WITH RAPID VENTRICULAR RESPONSE: Primary | ICD-10-CM

## 2022-09-17 DIAGNOSIS — J44.9 CHRONIC OBSTRUCTIVE PULMONARY DISEASE, UNSPECIFIED COPD TYPE: ICD-10-CM

## 2022-09-17 DIAGNOSIS — J18.9 PNEUMONIA DUE TO INFECTIOUS ORGANISM, UNSPECIFIED LATERALITY, UNSPECIFIED PART OF LUNG: ICD-10-CM

## 2022-09-17 DIAGNOSIS — J96.01 ACUTE RESPIRATORY FAILURE WITH HYPOXIA: ICD-10-CM

## 2022-09-17 DIAGNOSIS — R53.1 WEAKNESS: ICD-10-CM

## 2022-09-17 DIAGNOSIS — R07.9 CHEST PAIN: ICD-10-CM

## 2022-09-17 DIAGNOSIS — I50.33 ACUTE ON CHRONIC DIASTOLIC CONGESTIVE HEART FAILURE: ICD-10-CM

## 2022-09-17 DIAGNOSIS — R79.89 ELEVATED TROPONIN: ICD-10-CM

## 2022-09-17 LAB
ALBUMIN SERPL BCP-MCNC: 3.6 G/DL (ref 3.5–5.2)
ALP SERPL-CCNC: 74 U/L (ref 55–135)
ALT SERPL W/O P-5'-P-CCNC: 12 U/L (ref 10–44)
ANION GAP SERPL CALC-SCNC: 14 MMOL/L (ref 8–16)
AST SERPL-CCNC: 21 U/L (ref 10–40)
BASOPHILS # BLD AUTO: 0.03 K/UL (ref 0–0.2)
BASOPHILS NFR BLD: 0.3 % (ref 0–1.9)
BILIRUB SERPL-MCNC: 1.4 MG/DL (ref 0.1–1)
BNP SERPL-MCNC: 175 PG/ML (ref 0–99)
BUN SERPL-MCNC: 16 MG/DL (ref 8–23)
CALCIUM SERPL-MCNC: 8.7 MG/DL (ref 8.7–10.5)
CHLORIDE SERPL-SCNC: 102 MMOL/L (ref 95–110)
CO2 SERPL-SCNC: 19 MMOL/L (ref 23–29)
CREAT SERPL-MCNC: 1.2 MG/DL (ref 0.5–1.4)
CTP QC/QA: YES
CTP QC/QA: YES
DIFFERENTIAL METHOD: ABNORMAL
EOSINOPHIL # BLD AUTO: 0 K/UL (ref 0–0.5)
EOSINOPHIL NFR BLD: 0 % (ref 0–8)
ERYTHROCYTE [DISTWIDTH] IN BLOOD BY AUTOMATED COUNT: 14.4 % (ref 11.5–14.5)
EST. GFR  (NO RACE VARIABLE): >60 ML/MIN/1.73 M^2
GLUCOSE SERPL-MCNC: 113 MG/DL (ref 70–110)
HCT VFR BLD AUTO: 41.8 % (ref 40–54)
HGB BLD-MCNC: 14.8 G/DL (ref 14–18)
IMM GRANULOCYTES # BLD AUTO: 0.04 K/UL (ref 0–0.04)
IMM GRANULOCYTES NFR BLD AUTO: 0.4 % (ref 0–0.5)
LACTATE SERPL-SCNC: 1 MMOL/L (ref 0.5–2.2)
LYMPHOCYTES # BLD AUTO: 1.5 K/UL (ref 1–4.8)
LYMPHOCYTES NFR BLD: 14.4 % (ref 18–48)
MAGNESIUM SERPL-MCNC: 1.3 MG/DL (ref 1.6–2.6)
MCH RBC QN AUTO: 32.4 PG (ref 27–31)
MCHC RBC AUTO-ENTMCNC: 35.4 G/DL (ref 32–36)
MCV RBC AUTO: 92 FL (ref 82–98)
MONOCYTES # BLD AUTO: 1 K/UL (ref 0.3–1)
MONOCYTES NFR BLD: 9.1 % (ref 4–15)
NEUTROPHILS # BLD AUTO: 7.9 K/UL (ref 1.8–7.7)
NEUTROPHILS NFR BLD: 75.8 % (ref 38–73)
NRBC BLD-RTO: 0 /100 WBC
PLATELET # BLD AUTO: 148 K/UL (ref 150–450)
PMV BLD AUTO: 11.1 FL (ref 9.2–12.9)
POC MOLECULAR INFLUENZA A AGN: NEGATIVE
POC MOLECULAR INFLUENZA B AGN: NEGATIVE
POTASSIUM SERPL-SCNC: 3.5 MMOL/L (ref 3.5–5.1)
PROT SERPL-MCNC: 7.1 G/DL (ref 6–8.4)
RBC # BLD AUTO: 4.57 M/UL (ref 4.6–6.2)
SARS-COV-2 RDRP RESP QL NAA+PROBE: NEGATIVE
SODIUM SERPL-SCNC: 135 MMOL/L (ref 136–145)
TROPONIN I SERPL DL<=0.01 NG/ML-MCNC: 0.14 NG/ML (ref 0–0.03)
WBC # BLD AUTO: 10.45 K/UL (ref 3.9–12.7)

## 2022-09-17 PROCEDURE — 99291 CRITICAL CARE FIRST HOUR: CPT | Mod: 25

## 2022-09-17 PROCEDURE — 93010 ELECTROCARDIOGRAM REPORT: CPT | Mod: ,,, | Performed by: INTERNAL MEDICINE

## 2022-09-17 PROCEDURE — U0002 COVID-19 LAB TEST NON-CDC: HCPCS | Performed by: EMERGENCY MEDICINE

## 2022-09-17 PROCEDURE — 85025 COMPLETE CBC W/AUTO DIFF WBC: CPT | Performed by: EMERGENCY MEDICINE

## 2022-09-17 PROCEDURE — 84443 ASSAY THYROID STIM HORMONE: CPT | Performed by: EMERGENCY MEDICINE

## 2022-09-17 PROCEDURE — 12000002 HC ACUTE/MED SURGE SEMI-PRIVATE ROOM

## 2022-09-17 PROCEDURE — 96365 THER/PROPH/DIAG IV INF INIT: CPT

## 2022-09-17 PROCEDURE — 63600175 PHARM REV CODE 636 W HCPCS: Performed by: EMERGENCY MEDICINE

## 2022-09-17 PROCEDURE — 93005 ELECTROCARDIOGRAM TRACING: CPT

## 2022-09-17 PROCEDURE — 87040 BLOOD CULTURE FOR BACTERIA: CPT | Mod: 59 | Performed by: EMERGENCY MEDICINE

## 2022-09-17 PROCEDURE — 83880 ASSAY OF NATRIURETIC PEPTIDE: CPT | Performed by: EMERGENCY MEDICINE

## 2022-09-17 PROCEDURE — 83735 ASSAY OF MAGNESIUM: CPT | Performed by: EMERGENCY MEDICINE

## 2022-09-17 PROCEDURE — 83605 ASSAY OF LACTIC ACID: CPT | Performed by: EMERGENCY MEDICINE

## 2022-09-17 PROCEDURE — 80053 COMPREHEN METABOLIC PANEL: CPT | Performed by: EMERGENCY MEDICINE

## 2022-09-17 PROCEDURE — 84484 ASSAY OF TROPONIN QUANT: CPT | Performed by: EMERGENCY MEDICINE

## 2022-09-17 PROCEDURE — 84145 PROCALCITONIN (PCT): CPT | Performed by: EMERGENCY MEDICINE

## 2022-09-17 PROCEDURE — 25000003 PHARM REV CODE 250: Performed by: EMERGENCY MEDICINE

## 2022-09-17 PROCEDURE — 93010 EKG 12-LEAD: ICD-10-PCS | Mod: ,,, | Performed by: INTERNAL MEDICINE

## 2022-09-17 PROCEDURE — 96375 TX/PRO/DX INJ NEW DRUG ADDON: CPT

## 2022-09-17 RX ORDER — FUROSEMIDE 10 MG/ML
40 INJECTION INTRAMUSCULAR; INTRAVENOUS
Status: DISCONTINUED | OUTPATIENT
Start: 2022-09-17 | End: 2022-09-18

## 2022-09-17 RX ORDER — ACETAMINOPHEN 500 MG
1000 TABLET ORAL
Status: COMPLETED | OUTPATIENT
Start: 2022-09-17 | End: 2022-09-17

## 2022-09-17 RX ADMIN — AZITHROMYCIN MONOHYDRATE 500 MG: 500 INJECTION, POWDER, LYOPHILIZED, FOR SOLUTION INTRAVENOUS at 11:09

## 2022-09-17 RX ADMIN — ACETAMINOPHEN 1000 MG: 500 TABLET ORAL at 09:09

## 2022-09-17 NOTE — Clinical Note
Diagnosis: Elevated troponin [401477]   Admitting Provider:: CHRIS WATSON [[id number]]   Future Attending Provider: EMILIANO SAVAGE [[id number]]   Reason for IP Medical Treatment  (Clinical interventions that can only be accomplished in the IP setting? ) :: pneumonia, sepsis, aflutter with RVR, elevated trop   Estimated Length of Stay:: 2 midnights   I certify that Inpatient services for greater than or equal to 2 midnights are medically necessary:: Yes   Plans for Post-Acute care--if anticipated (pick the single best option):: C. Discharge home with home health services   Special Needs:: Fall Risk [15]

## 2022-09-18 PROBLEM — J44.9 COPD (CHRONIC OBSTRUCTIVE PULMONARY DISEASE): Status: ACTIVE | Noted: 2022-09-18

## 2022-09-18 PROBLEM — I21.4 NSTEMI (NON-ST ELEVATED MYOCARDIAL INFARCTION): Status: ACTIVE | Noted: 2022-09-18

## 2022-09-18 PROBLEM — K74.60 CIRRHOSIS: Status: ACTIVE | Noted: 2022-09-18

## 2022-09-18 PROBLEM — I50.9 CHF (CONGESTIVE HEART FAILURE): Status: ACTIVE | Noted: 2022-09-18

## 2022-09-18 PROBLEM — E11.9 DM (DIABETES MELLITUS): Status: ACTIVE | Noted: 2022-09-18

## 2022-09-18 PROBLEM — J96.00 ACUTE RESPIRATORY FAILURE: Status: ACTIVE | Noted: 2022-09-18

## 2022-09-18 PROBLEM — I25.10 CAD (CORONARY ARTERY DISEASE): Status: ACTIVE | Noted: 2022-09-18

## 2022-09-18 PROBLEM — D69.6 THROMBOCYTOPENIA: Status: ACTIVE | Noted: 2022-09-18

## 2022-09-18 PROBLEM — B19.20 HCV INFECTION: Status: ACTIVE | Noted: 2022-09-18

## 2022-09-18 PROBLEM — J18.9 PNEUMONIA: Status: ACTIVE | Noted: 2022-09-18

## 2022-09-18 PROBLEM — I48.91 A-FIB: Status: ACTIVE | Noted: 2022-09-18

## 2022-09-18 LAB
ALBUMIN SERPL BCP-MCNC: 3.1 G/DL (ref 3.5–5.2)
ALLENS TEST: ABNORMAL
ALP SERPL-CCNC: 66 U/L (ref 55–135)
ALT SERPL W/O P-5'-P-CCNC: 15 U/L (ref 10–44)
AMMONIA PLAS-SCNC: >500 UMOL/L (ref 10–50)
AMPHET+METHAMPHET UR QL: NEGATIVE
ANION GAP SERPL CALC-SCNC: 10 MMOL/L (ref 8–16)
ANISOCYTOSIS BLD QL SMEAR: SLIGHT
AST SERPL-CCNC: 21 U/L (ref 10–40)
BACTERIA #/AREA URNS HPF: ABNORMAL /HPF
BARBITURATES UR QL SCN>200 NG/ML: NEGATIVE
BASOPHILS # BLD AUTO: 0.08 K/UL (ref 0–0.2)
BASOPHILS NFR BLD: 1.5 % (ref 0–1.9)
BENZODIAZ UR QL SCN>200 NG/ML: NEGATIVE
BILIRUB SERPL-MCNC: 1 MG/DL (ref 0.1–1)
BILIRUB UR QL STRIP: NEGATIVE
BUN SERPL-MCNC: 18 MG/DL (ref 8–23)
BZE UR QL SCN: NEGATIVE
CALCIUM SERPL-MCNC: 8.2 MG/DL (ref 8.7–10.5)
CANNABINOIDS UR QL SCN: ABNORMAL
CHLORIDE SERPL-SCNC: 103 MMOL/L (ref 95–110)
CLARITY UR: CLEAR
CO2 SERPL-SCNC: 21 MMOL/L (ref 23–29)
COLOR UR: YELLOW
CREAT SERPL-MCNC: 1.4 MG/DL (ref 0.5–1.4)
CREAT UR-MCNC: 178.1 MG/DL (ref 23–375)
DELSYS: ABNORMAL
DIFFERENTIAL METHOD: ABNORMAL
EOSINOPHIL # BLD AUTO: 0 K/UL (ref 0–0.5)
EOSINOPHIL NFR BLD: 0 % (ref 0–8)
ERYTHROCYTE [DISTWIDTH] IN BLOOD BY AUTOMATED COUNT: 14.9 % (ref 11.5–14.5)
EST. GFR  (NO RACE VARIABLE): 56 ML/MIN/1.73 M^2
ETHANOL SERPL-MCNC: <10 MG/DL
FLOW: 5
GLUCOSE SERPL-MCNC: 163 MG/DL (ref 70–110)
GLUCOSE UR QL STRIP: NEGATIVE
HCO3 UR-SCNC: 18.8 MMOL/L (ref 24–28)
HCT VFR BLD AUTO: 42.4 % (ref 40–54)
HGB BLD-MCNC: 14.6 G/DL (ref 14–18)
HGB UR QL STRIP: NEGATIVE
HYALINE CASTS #/AREA URNS LPF: 0 /LPF
IMM GRANULOCYTES # BLD AUTO: 0.05 K/UL (ref 0–0.04)
IMM GRANULOCYTES NFR BLD AUTO: 0.9 % (ref 0–0.5)
KETONES UR QL STRIP: NEGATIVE
LACTATE SERPL-SCNC: 2.2 MMOL/L (ref 0.5–2.2)
LEUKOCYTE ESTERASE UR QL STRIP: NEGATIVE
LYMPHOCYTES # BLD AUTO: 0.3 K/UL (ref 1–4.8)
LYMPHOCYTES NFR BLD: 5.6 % (ref 18–48)
MCH RBC QN AUTO: 31.6 PG (ref 27–31)
MCHC RBC AUTO-ENTMCNC: 34.4 G/DL (ref 32–36)
MCV RBC AUTO: 92 FL (ref 82–98)
METHADONE UR QL SCN>300 NG/ML: NEGATIVE
MICROSCOPIC COMMENT: ABNORMAL
MODE: ABNORMAL
MONOCYTES # BLD AUTO: 0 K/UL (ref 0.3–1)
MONOCYTES NFR BLD: 0.7 % (ref 4–15)
NEUTROPHILS # BLD AUTO: 4.9 K/UL (ref 1.8–7.7)
NEUTROPHILS NFR BLD: 91.3 % (ref 38–73)
NITRITE UR QL STRIP: NEGATIVE
NRBC BLD-RTO: 0 /100 WBC
OPIATES UR QL SCN: NEGATIVE
OVALOCYTES BLD QL SMEAR: ABNORMAL
PCO2 BLDA: 31.4 MMHG (ref 35–45)
PCP UR QL SCN>25 NG/ML: NEGATIVE
PH SMN: 7.39 [PH] (ref 7.35–7.45)
PH UR STRIP: 7 [PH] (ref 5–8)
PLATELET # BLD AUTO: 137 K/UL (ref 150–450)
PLATELET BLD QL SMEAR: ABNORMAL
PMV BLD AUTO: 12.3 FL (ref 9.2–12.9)
PO2 BLDA: 66 MMHG (ref 80–100)
POC BE: -5 MMOL/L
POC SATURATED O2: 93 % (ref 95–100)
POC TCO2: 20 MMOL/L (ref 23–27)
POCT GLUCOSE: 196 MG/DL (ref 70–110)
POIKILOCYTOSIS BLD QL SMEAR: SLIGHT
POTASSIUM SERPL-SCNC: 3.6 MMOL/L (ref 3.5–5.1)
PROCALCITONIN SERPL IA-MCNC: 0.12 NG/ML
PROT SERPL-MCNC: 6.3 G/DL (ref 6–8.4)
PROT UR QL STRIP: ABNORMAL
RBC # BLD AUTO: 4.62 M/UL (ref 4.6–6.2)
RBC #/AREA URNS HPF: 5 /HPF (ref 0–4)
SAMPLE: ABNORMAL
SITE: ABNORMAL
SODIUM SERPL-SCNC: 134 MMOL/L (ref 136–145)
SP GR UR STRIP: >1.03 (ref 1–1.03)
SQUAMOUS #/AREA URNS HPF: 1 /HPF
TOXICOLOGY INFORMATION: ABNORMAL
TSH SERPL DL<=0.005 MIU/L-ACNC: 0.42 UIU/ML (ref 0.4–4)
URN SPEC COLLECT METH UR: ABNORMAL
UROBILINOGEN UR STRIP-ACNC: >=8 EU/DL
WBC # BLD AUTO: 5.34 K/UL (ref 3.9–12.7)
WBC #/AREA URNS HPF: 1 /HPF (ref 0–5)

## 2022-09-18 PROCEDURE — 25000242 PHARM REV CODE 250 ALT 637 W/ HCPCS: Performed by: STUDENT IN AN ORGANIZED HEALTH CARE EDUCATION/TRAINING PROGRAM

## 2022-09-18 PROCEDURE — 94640 AIRWAY INHALATION TREATMENT: CPT

## 2022-09-18 PROCEDURE — 81000 URINALYSIS NONAUTO W/SCOPE: CPT | Mod: 59 | Performed by: EMERGENCY MEDICINE

## 2022-09-18 PROCEDURE — 25000003 PHARM REV CODE 250: Performed by: EMERGENCY MEDICINE

## 2022-09-18 PROCEDURE — 36415 COLL VENOUS BLD VENIPUNCTURE: CPT | Performed by: HOSPITALIST

## 2022-09-18 PROCEDURE — 21400001 HC TELEMETRY ROOM

## 2022-09-18 PROCEDURE — 27000221 HC OXYGEN, UP TO 24 HOURS

## 2022-09-18 PROCEDURE — 25000242 PHARM REV CODE 250 ALT 637 W/ HCPCS: Performed by: HOSPITALIST

## 2022-09-18 PROCEDURE — 80053 COMPREHEN METABOLIC PANEL: CPT | Performed by: HOSPITALIST

## 2022-09-18 PROCEDURE — 94761 N-INVAS EAR/PLS OXIMETRY MLT: CPT

## 2022-09-18 PROCEDURE — 82140 ASSAY OF AMMONIA: CPT | Performed by: HOSPITALIST

## 2022-09-18 PROCEDURE — 82077 ASSAY SPEC XCP UR&BREATH IA: CPT | Performed by: EMERGENCY MEDICINE

## 2022-09-18 PROCEDURE — 82803 BLOOD GASES ANY COMBINATION: CPT

## 2022-09-18 PROCEDURE — 63600175 PHARM REV CODE 636 W HCPCS: Performed by: EMERGENCY MEDICINE

## 2022-09-18 PROCEDURE — 25000003 PHARM REV CODE 250: Performed by: HOSPITALIST

## 2022-09-18 PROCEDURE — 25500020 PHARM REV CODE 255: Performed by: HOSPITALIST

## 2022-09-18 PROCEDURE — 99900035 HC TECH TIME PER 15 MIN (STAT)

## 2022-09-18 PROCEDURE — 36600 WITHDRAWAL OF ARTERIAL BLOOD: CPT

## 2022-09-18 PROCEDURE — 96375 TX/PRO/DX INJ NEW DRUG ADDON: CPT

## 2022-09-18 PROCEDURE — 80307 DRUG TEST PRSMV CHEM ANLYZR: CPT | Performed by: EMERGENCY MEDICINE

## 2022-09-18 PROCEDURE — 63600175 PHARM REV CODE 636 W HCPCS: Performed by: HOSPITALIST

## 2022-09-18 PROCEDURE — 25000003 PHARM REV CODE 250: Performed by: STUDENT IN AN ORGANIZED HEALTH CARE EDUCATION/TRAINING PROGRAM

## 2022-09-18 PROCEDURE — 63600175 PHARM REV CODE 636 W HCPCS: Performed by: STUDENT IN AN ORGANIZED HEALTH CARE EDUCATION/TRAINING PROGRAM

## 2022-09-18 PROCEDURE — 85025 COMPLETE CBC W/AUTO DIFF WBC: CPT | Performed by: STUDENT IN AN ORGANIZED HEALTH CARE EDUCATION/TRAINING PROGRAM

## 2022-09-18 PROCEDURE — 83605 ASSAY OF LACTIC ACID: CPT | Performed by: EMERGENCY MEDICINE

## 2022-09-18 RX ORDER — POLYETHYLENE GLYCOL 3350 17 G/17G
17 POWDER, FOR SOLUTION ORAL DAILY
Status: DISCONTINUED | OUTPATIENT
Start: 2022-09-18 | End: 2022-09-18

## 2022-09-18 RX ORDER — HYDROCODONE BITARTRATE AND ACETAMINOPHEN 10; 325 MG/1; MG/1
1 TABLET ORAL EVERY 6 HOURS PRN
Status: DISCONTINUED | OUTPATIENT
Start: 2022-09-18 | End: 2022-09-22 | Stop reason: HOSPADM

## 2022-09-18 RX ORDER — ENOXAPARIN SODIUM 100 MG/ML
40 INJECTION SUBCUTANEOUS EVERY 24 HOURS
Status: DISCONTINUED | OUTPATIENT
Start: 2022-09-18 | End: 2022-09-18

## 2022-09-18 RX ORDER — KETOROLAC TROMETHAMINE 30 MG/ML
30 INJECTION, SOLUTION INTRAMUSCULAR; INTRAVENOUS
Status: COMPLETED | OUTPATIENT
Start: 2022-09-18 | End: 2022-09-18

## 2022-09-18 RX ORDER — AMIODARONE HYDROCHLORIDE 200 MG/1
200 TABLET ORAL DAILY
Status: DISCONTINUED | OUTPATIENT
Start: 2022-09-18 | End: 2022-09-22 | Stop reason: HOSPADM

## 2022-09-18 RX ORDER — METFORMIN HYDROCHLORIDE 1000 MG/1
1000 TABLET ORAL 2 TIMES DAILY WITH MEALS
Status: ON HOLD | COMMUNITY
End: 2022-09-18

## 2022-09-18 RX ORDER — LEVALBUTEROL 1.25 MG/.5ML
1.25 SOLUTION, CONCENTRATE RESPIRATORY (INHALATION) EVERY 8 HOURS
Status: DISCONTINUED | OUTPATIENT
Start: 2022-09-18 | End: 2022-09-22 | Stop reason: HOSPADM

## 2022-09-18 RX ORDER — HYDRALAZINE HYDROCHLORIDE 50 MG/1
50 TABLET, FILM COATED ORAL EVERY 8 HOURS
COMMUNITY

## 2022-09-18 RX ORDER — METOPROLOL SUCCINATE 50 MG/1
100 TABLET, EXTENDED RELEASE ORAL DAILY
Status: DISCONTINUED | OUTPATIENT
Start: 2022-09-18 | End: 2022-09-22 | Stop reason: HOSPADM

## 2022-09-18 RX ORDER — IPRATROPIUM BROMIDE AND ALBUTEROL SULFATE 2.5; .5 MG/3ML; MG/3ML
3 SOLUTION RESPIRATORY (INHALATION)
Status: COMPLETED | OUTPATIENT
Start: 2022-09-18 | End: 2022-09-18

## 2022-09-18 RX ORDER — LACTULOSE 10 G/15ML
20 SOLUTION ORAL 2 TIMES DAILY
Status: DISCONTINUED | OUTPATIENT
Start: 2022-09-18 | End: 2022-09-18

## 2022-09-18 RX ORDER — IBUPROFEN 200 MG
16 TABLET ORAL
Status: DISCONTINUED | OUTPATIENT
Start: 2022-09-18 | End: 2022-09-22 | Stop reason: HOSPADM

## 2022-09-18 RX ORDER — ONDANSETRON 2 MG/ML
4 INJECTION INTRAMUSCULAR; INTRAVENOUS EVERY 8 HOURS PRN
Status: DISCONTINUED | OUTPATIENT
Start: 2022-09-18 | End: 2022-09-22 | Stop reason: HOSPADM

## 2022-09-18 RX ORDER — METOPROLOL TARTRATE 1 MG/ML
5 INJECTION, SOLUTION INTRAVENOUS
Status: COMPLETED | OUTPATIENT
Start: 2022-09-18 | End: 2022-09-18

## 2022-09-18 RX ORDER — ATORVASTATIN CALCIUM 40 MG/1
40 TABLET, FILM COATED ORAL DAILY
Status: DISCONTINUED | OUTPATIENT
Start: 2022-09-18 | End: 2022-09-22 | Stop reason: HOSPADM

## 2022-09-18 RX ORDER — LACTULOSE 10 G/15ML
30 SOLUTION ORAL 3 TIMES DAILY
Status: DISCONTINUED | OUTPATIENT
Start: 2022-09-18 | End: 2022-09-19

## 2022-09-18 RX ORDER — MAG HYDROX/ALUMINUM HYD/SIMETH 200-200-20
30 SUSPENSION, ORAL (FINAL DOSE FORM) ORAL 4 TIMES DAILY PRN
Status: DISCONTINUED | OUTPATIENT
Start: 2022-09-18 | End: 2022-09-22 | Stop reason: HOSPADM

## 2022-09-18 RX ORDER — ATORVASTATIN CALCIUM 40 MG/1
40 TABLET, FILM COATED ORAL DAILY
COMMUNITY

## 2022-09-18 RX ORDER — FLUTICASONE FUROATE AND VILANTEROL 100; 25 UG/1; UG/1
1 POWDER RESPIRATORY (INHALATION) DAILY
Status: DISCONTINUED | OUTPATIENT
Start: 2022-09-18 | End: 2022-09-22 | Stop reason: HOSPADM

## 2022-09-18 RX ORDER — INSULIN ASPART 100 [IU]/ML
5 INJECTION, SOLUTION INTRAVENOUS; SUBCUTANEOUS
COMMUNITY

## 2022-09-18 RX ORDER — TALC
6 POWDER (GRAM) TOPICAL NIGHTLY PRN
Status: DISCONTINUED | OUTPATIENT
Start: 2022-09-18 | End: 2022-09-22 | Stop reason: HOSPADM

## 2022-09-18 RX ORDER — ENOXAPARIN SODIUM 100 MG/ML
40 INJECTION SUBCUTANEOUS EVERY 12 HOURS
Status: DISCONTINUED | OUTPATIENT
Start: 2022-09-18 | End: 2022-09-18 | Stop reason: DRUGHIGH

## 2022-09-18 RX ORDER — SODIUM CHLORIDE 0.9 % (FLUSH) 0.9 %
10 SYRINGE (ML) INJECTION EVERY 12 HOURS PRN
Status: DISCONTINUED | OUTPATIENT
Start: 2022-09-18 | End: 2022-09-22 | Stop reason: HOSPADM

## 2022-09-18 RX ORDER — ASPIRIN 325 MG
325 TABLET ORAL
Status: COMPLETED | OUTPATIENT
Start: 2022-09-18 | End: 2022-09-18

## 2022-09-18 RX ORDER — FUROSEMIDE 10 MG/ML
40 INJECTION INTRAMUSCULAR; INTRAVENOUS DAILY
Status: DISCONTINUED | OUTPATIENT
Start: 2022-09-18 | End: 2022-09-19

## 2022-09-18 RX ORDER — AMIODARONE HYDROCHLORIDE 200 MG/1
200 TABLET ORAL DAILY
COMMUNITY

## 2022-09-18 RX ORDER — HYDROCODONE BITARTRATE AND ACETAMINOPHEN 5; 325 MG/1; MG/1
1 TABLET ORAL EVERY 6 HOURS PRN
Status: DISCONTINUED | OUTPATIENT
Start: 2022-09-18 | End: 2022-09-22 | Stop reason: HOSPADM

## 2022-09-18 RX ORDER — DULAGLUTIDE 1.5 MG/.5ML
INJECTION, SOLUTION SUBCUTANEOUS
COMMUNITY

## 2022-09-18 RX ORDER — NIFEDIPINE 30 MG/1
30 TABLET, FILM COATED, EXTENDED RELEASE ORAL DAILY
Status: ON HOLD | COMMUNITY
End: 2024-03-20 | Stop reason: HOSPADM

## 2022-09-18 RX ORDER — GLUCAGON 1 MG
1 KIT INJECTION
Status: DISCONTINUED | OUTPATIENT
Start: 2022-09-18 | End: 2022-09-22 | Stop reason: HOSPADM

## 2022-09-18 RX ORDER — NALOXONE HCL 0.4 MG/ML
0.02 VIAL (ML) INJECTION
Status: DISCONTINUED | OUTPATIENT
Start: 2022-09-18 | End: 2022-09-22 | Stop reason: HOSPADM

## 2022-09-18 RX ORDER — IBUPROFEN 200 MG
24 TABLET ORAL
Status: DISCONTINUED | OUTPATIENT
Start: 2022-09-18 | End: 2022-09-22 | Stop reason: HOSPADM

## 2022-09-18 RX ORDER — METOPROLOL SUCCINATE 100 MG/1
100 TABLET, EXTENDED RELEASE ORAL DAILY
COMMUNITY

## 2022-09-18 RX ORDER — ACETAMINOPHEN 325 MG/1
650 TABLET ORAL EVERY 4 HOURS PRN
Status: DISCONTINUED | OUTPATIENT
Start: 2022-09-18 | End: 2022-09-22 | Stop reason: HOSPADM

## 2022-09-18 RX ADMIN — IPRATROPIUM BROMIDE AND ALBUTEROL SULFATE 3 ML: 2.5; .5 SOLUTION RESPIRATORY (INHALATION) at 03:09

## 2022-09-18 RX ADMIN — APIXABAN 5 MG: 5 TABLET, FILM COATED ORAL at 10:09

## 2022-09-18 RX ADMIN — AMIODARONE HYDROCHLORIDE 200 MG: 200 TABLET ORAL at 11:09

## 2022-09-18 RX ADMIN — SODIUM CHLORIDE 1000 ML: 0.9 INJECTION, SOLUTION INTRAVENOUS at 12:09

## 2022-09-18 RX ADMIN — KETOROLAC TROMETHAMINE 30 MG: 30 INJECTION, SOLUTION INTRAMUSCULAR; INTRAVENOUS at 01:09

## 2022-09-18 RX ADMIN — LEVALBUTEROL HYDROCHLORIDE 1.25 MG: 1.25 SOLUTION, CONCENTRATE RESPIRATORY (INHALATION) at 03:09

## 2022-09-18 RX ADMIN — LACTULOSE 30 G: 10 SOLUTION ORAL at 02:09

## 2022-09-18 RX ADMIN — ASPIRIN 325 MG ORAL TABLET 325 MG: 325 PILL ORAL at 12:09

## 2022-09-18 RX ADMIN — Medication 6 MG: at 09:09

## 2022-09-18 RX ADMIN — LACTULOSE 30 G: 10 SOLUTION ORAL at 09:09

## 2022-09-18 RX ADMIN — CEFTRIAXONE 2 G: 2 INJECTION, SOLUTION INTRAVENOUS at 01:09

## 2022-09-18 RX ADMIN — ATORVASTATIN CALCIUM 40 MG: 40 TABLET, FILM COATED ORAL at 11:09

## 2022-09-18 RX ADMIN — APIXABAN 5 MG: 5 TABLET, FILM COATED ORAL at 09:09

## 2022-09-18 RX ADMIN — FLUTICASONE FUROATE AND VILANTEROL TRIFENATATE 1 PUFF: 100; 25 POWDER RESPIRATORY (INHALATION) at 07:09

## 2022-09-18 RX ADMIN — METOPROLOL SUCCINATE 100 MG: 50 TABLET, EXTENDED RELEASE ORAL at 11:09

## 2022-09-18 RX ADMIN — AZITHROMYCIN MONOHYDRATE 500 MG: 500 INJECTION, POWDER, LYOPHILIZED, FOR SOLUTION INTRAVENOUS at 10:09

## 2022-09-18 RX ADMIN — ONDANSETRON 4 MG: 2 INJECTION INTRAMUSCULAR; INTRAVENOUS at 11:09

## 2022-09-18 RX ADMIN — METOROPROLOL TARTRATE 5 MG: 5 INJECTION, SOLUTION INTRAVENOUS at 12:09

## 2022-09-18 RX ADMIN — LEVALBUTEROL HYDROCHLORIDE 1.25 MG: 1.25 SOLUTION, CONCENTRATE RESPIRATORY (INHALATION) at 07:09

## 2022-09-18 RX ADMIN — FUROSEMIDE 40 MG: 10 INJECTION, SOLUTION INTRAMUSCULAR; INTRAVENOUS at 10:09

## 2022-09-18 RX ADMIN — IOHEXOL 85 ML: 350 INJECTION, SOLUTION INTRAVENOUS at 04:09

## 2022-09-18 NOTE — ED NOTES
Dr. BOYLE to bedside evaluating patient and verbal order given for breathing tx. Respiratory therapist called and notified pt will need Duoneb x3 (pt tachypneic and SOB). O2 sats 81% on RA. Pt placed on a nonrebreather with improvement of sats, pulse ox site changed to forehead. Pt assisted with positioning. Primary bedside nurse notified. Dr. BOYLE states will also place additional orders (CT of chest?, etc.)

## 2022-09-18 NOTE — SUBJECTIVE & OBJECTIVE
Past Medical History:   Diagnosis Date    Diabetes mellitus     Hypertension        History reviewed. No pertinent surgical history.    Review of patient's allergies indicates:  No Known Allergies    No current facility-administered medications on file prior to encounter.     No current outpatient medications on file prior to encounter.     Family History    None       Tobacco Use    Smoking status: Every Day     Packs/day: 1.00     Years: 15.00     Pack years: 15.00     Types: Cigarettes    Smokeless tobacco: Never   Substance and Sexual Activity    Alcohol use: Yes     Comment: daily    Drug use: Never    Sexual activity: Not Currently     Review of Systems   Reason unable to perform ROS: 12 point ROS negative except as detailed above.   Objective:     Vital Signs (Most Recent):  Temp: 98.5 °F (36.9 °C) (09/18/22 0132)  Pulse: (!) 123 (09/18/22 0324)  Resp: (!) 27 (09/18/22 0324)  BP: (!) 147/101 (09/18/22 0305)  SpO2: 98 % (09/18/22 0324)   Vital Signs (24h Range):  Temp:  [98.5 °F (36.9 °C)-102.2 °F (39 °C)] 98.5 °F (36.9 °C)  Pulse:  [] 123  Resp:  [18-71] 27  SpO2:  [75 %-100 %] 98 %  BP: (132-170)/() 147/101     Weight: 79.4 kg (175 lb)  Body mass index is 30.04 kg/m².    Physical Exam  Constitutional:       Comments: Elderly AA m who appear older than stated age appears to be in significant respiratory distress   HENT:      Head: Normocephalic and atraumatic.   Eyes:      Extraocular Movements: Extraocular movements intact.      Conjunctiva/sclera: Conjunctivae normal.      Pupils: Pupils are equal, round, and reactive to light.   Cardiovascular:      Rate and Rhythm: Regular rhythm. Tachycardia present.      Pulses: Normal pulses.      Heart sounds: Normal heart sounds. No murmur heard.    No friction rub. No gallop.   Pulmonary:      Comments: Patient with significant respiratory distress and tachypnea. Lungs with diffuse rhonchi   Abdominal:      General: Abdomen is flat. Bowel sounds are  normal.      Palpations: Abdomen is soft.   Skin:     Capillary Refill: Capillary refill takes less than 2 seconds.   Neurological:      General: No focal deficit present.      Mental Status: He is oriented to person, place, and time. Mental status is at baseline.      Cranial Nerves: No cranial nerve deficit.      Sensory: No sensory deficit.      Motor: No weakness.      Coordination: Coordination normal.      Gait: Gait normal.      Deep Tendon Reflexes: Reflexes normal.   Psychiatric:         Thought Content: Thought content normal.         CRANIAL NERVES     CN III, IV, VI   Pupils are equal, round, and reactive to light.     Significant Labs: All pertinent labs within the past 24 hours have been reviewed.    Significant Imaging: I have reviewed all pertinent imaging results/findings within the past 24 hours.

## 2022-09-18 NOTE — ED TRIAGE NOTES
Marck Madison is a 62 y.o male to ED with niece c/o SOB x4 days. Patient is poor historian, information gathered from niece.  She states that patient was recently released from alf 2 months ago and has been feeling ill while in custody.  Patient was in alf for >7 months.  Hx of HTN and DM.  No meds pta.

## 2022-09-18 NOTE — ASSESSMENT & PLAN NOTE
He denies any chest pain  Troponin seems chronically elevated but higher than his baseline  Possibly type II from acute process

## 2022-09-18 NOTE — ASSESSMENT & PLAN NOTE
He denies any GI bleeds  Labs with significantly elevated ammonia but he otherwise is AAOX4  Will initiate lactulose

## 2022-09-18 NOTE — NURSING
0845-Patient arrive to floor from [facility] via stretcher with staff. Pt transferred to bed in room 338. NAD noted. No needs voiced. Call light in reach. Will continue to monitor.

## 2022-09-18 NOTE — HPI
61 yo male with history of diastolic CHF, afib/aflutter on Eliquis, NSVT, cirrhosis, CAD, DM2 on insulin, COPD/emphysema, metabolic encephalopathy, hep C, thrombocytopenia, presents with c/o SOB/DONNELLY. Patient states symptoms started acutely over the past 24hrs and has been progressively getting worse. He is unable to do anything without gasping for air. He also endorses associated dry cough, palpitations, generalized weakness and progressive inability to ambulate. Per his niece, he has been having progressively worsening gait instability which started while he was in [facility]. Patient has been using a wheelchair for the past 6 months but he is still able to stand and walk by himself but has been having frequent falls. Patient seemed to have fallen today witness by nephew and he eventually brought to the hospital.  Patient had just recently been released from [facility] within the past 6 weeks, he was incarcerated for about 7 months.   Patient noted with significant tachypnea, and also febrile upon ER presentation. CXR with concern for left lobar PNA.     Patient with another MRN [id number]

## 2022-09-18 NOTE — CARE UPDATE
Patient has been seen and examinated,patient with history of diastolic CHF, afib/aflutter on Eliquis, NSVT, cirrhosis, CAD, DM2 on insulin, COPD/emphysema, metabolic encephalopathy, hep C, thrombocytopenia, presents with c/o SOB/DONNELLY,hypoxia,patient has been started on IV Abx for pneumonia,nebulizer and IV lasix,he is stable on NC O 2,her has also elevated amnionia,but he is alert  and oriented,started on lactulose.
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
## 2022-09-18 NOTE — ED NOTES
Spoke to DELTA Tamayo who is assigned to pt going to Rm 338A.  Updated on changes with patient.  All questions answered.

## 2022-09-18 NOTE — H&P
"[facility] Medicine  History & Physical    Patient Name: Marck Madison  MRN: [id number]  Patient Class: IP- Inpatient  Admission Date: 9/17/2022  Attending Physician: Isabella Wasserman MD  Primary Care Provider: No primary care provider on file.         Patient information was obtained from patient, relative(s) and ER records.     Subjective:     Principal Problem:Acute respiratory failure    Chief Complaint:   Chief Complaint   Patient presents with    Fatigue     With SOB x several days. States been "unbalanced". Denies CP.         HPI: 61 yo male with history of diastolic CHF, afib/aflutter on Eliquis, NSVT, cirrhosis, CAD, DM2 on insulin, COPD/emphysema, metabolic encephalopathy, hep C, thrombocytopenia, presents with c/o SOB/DONNELLY. Patient states symptoms started acutely over the past 24hrs and has been progressively getting worse. He is unable to do anything without gasping for air. He also endorses associated dry cough, palpitations, generalized weakness and progressive inability to ambulate. Per his niece, he has been having progressively worsening gait instability which started while he was in [facility]. Patient has been using a wheelchair for the past 6 months but he is still able to stand and walk by himself but has been having frequent falls. Patient seemed to have fallen today witness by nephew and he eventually brought to the hospital.  Patient had just recently been released from [facility] within the past 6 weeks, he was incarcerated for about 7 months.   Patient noted with significant tachypnea, and also febrile upon ER presentation. CXR with concern for left lobar PNA.     Patient with another MRN [id number]      Past Medical History:   Diagnosis Date    Diabetes mellitus     Hypertension        History reviewed. No pertinent surgical history.    Review of patient's allergies indicates:  No Known Allergies    No current facility-administered medications on file prior to encounter. "     No current outpatient medications on file prior to encounter.     Family History    None       Tobacco Use    Smoking status: Every Day     Packs/day: 1.00     Years: 15.00     Pack years: 15.00     Types: Cigarettes    Smokeless tobacco: Never   Substance and Sexual Activity    Alcohol use: Yes     Comment: daily    Drug use: Never    Sexual activity: Not Currently     Review of Systems   Reason unable to perform ROS: 12 point ROS negative except as detailed above.   Objective:     Vital Signs (Most Recent):  Temp: 98.5 °F (36.9 °C) (09/18/22 0132)  Pulse: (!) 123 (09/18/22 0324)  Resp: (!) 27 (09/18/22 0324)  BP: (!) 147/101 (09/18/22 0305)  SpO2: 98 % (09/18/22 0324)   Vital Signs (24h Range):  Temp:  [98.5 °F (36.9 °C)-102.2 °F (39 °C)] 98.5 °F (36.9 °C)  Pulse:  [] 123  Resp:  [18-71] 27  SpO2:  [75 %-100 %] 98 %  BP: (132-170)/() 147/101     Weight: 79.4 kg (175 lb)  Body mass index is 30.04 kg/m².    Physical Exam  Constitutional:       Comments: Elderly KWAKU chavez who appear older than stated age appears to be in significant respiratory distress   HENT:      Head: Normocephalic and atraumatic.   Eyes:      Extraocular Movements: Extraocular movements intact.      Conjunctiva/sclera: Conjunctivae normal.      Pupils: Pupils are equal, round, and reactive to light.   Cardiovascular:      Rate and Rhythm: Regular rhythm. Tachycardia present.      Pulses: Normal pulses.      Heart sounds: Normal heart sounds. No murmur heard.    No friction rub. No gallop.   Pulmonary:      Comments: Patient with significant respiratory distress and tachypnea. Lungs with diffuse rhonchi   Abdominal:      General: Abdomen is flat. Bowel sounds are normal.      Palpations: Abdomen is soft.   Skin:     Capillary Refill: Capillary refill takes less than 2 seconds.   Neurological:      General: No focal deficit present.      Mental Status: He is oriented to person, place, and time. Mental status is at baseline.       Cranial Nerves: No cranial nerve deficit.      Sensory: No sensory deficit.      Motor: No weakness.      Coordination: Coordination normal.      Gait: Gait normal.      Deep Tendon Reflexes: Reflexes normal.   Psychiatric:         Thought Content: Thought content normal.         CRANIAL NERVES     CN III, IV, VI   Pupils are equal, round, and reactive to light.     Significant Labs: All pertinent labs within the past 24 hours have been reviewed.    Significant Imaging: I have reviewed all pertinent imaging results/findings within the past 24 hours.    Assessment/Plan:     * Acute respiratory failure  Patient very tachypneic and in significant respiratory distress, hypoxic in 80s requiring supplemental oxygen  Suspect 2/2 PNA and COPD exacerbation  Continue treatment for PNA and COPD and wean off oxygen appropriately    Pneumonia  Started on rocephin and azithromycin  Continue to monitor response to treatment      COPD (chronic obstructive pulmonary disease) with exacerbation  Will start KINGS/COLETTE, LABA/ICS, IV steroids, chest physiotherapy        NSTEMI (non-ST elevated myocardial infarction)  He denies any chest pain  Troponin seems chronically elevated but higher than his baseline  Possibly type II from acute process      CAD (coronary artery disease)  Plan to restart home cardioprotective meds once identified      Thrombocytopenia  2/2 cirrhosis  Continue to monitor      HCV infection  No acute process      DM (diabetes mellitus)  BGL fairly stable, will monitor for now    A-fib  Will restart home meds once identified        Cirrhosis  He denies any GI bleeds  Labs with significantly elevated ammonia but he otherwise is AAOX4  Will initiate lactulose      CHF (congestive heart failure)  He does not appear volumed up  Will start low dose diuretics to keep him euvolemic        VTE Risk Mitigation (From admission, onward)         Ordered     enoxaparin injection 40 mg  Daily         09/18/22 0348     IP VTE HIGH  RISK PATIENT  Once         09/18/22 0321     Place sequential compression device  Until discontinued         09/18/22 0321                   Isabella Wasserman MD  Department of Hospital Medicine   [facility] - Emergency Dept

## 2022-09-18 NOTE — ASSESSMENT & PLAN NOTE
Patient very tachypneic and in significant respiratory distress, hypoxic in 80s requiring supplemental oxygen  Suspect 2/2 PNA and COPD exacerbation  Continue treatment for PNA and COPD and wean off oxygen appropriately

## 2022-09-19 PROBLEM — I50.33 ACUTE ON CHRONIC DIASTOLIC CONGESTIVE HEART FAILURE: Status: ACTIVE | Noted: 2022-09-18

## 2022-09-19 PROBLEM — I48.21 PERMANENT ATRIAL FIBRILLATION: Status: ACTIVE | Noted: 2022-09-18

## 2022-09-19 LAB
AMMONIA PLAS-SCNC: 52 UMOL/L (ref 10–50)
ANION GAP SERPL CALC-SCNC: 18 MMOL/L (ref 8–16)
BASOPHILS # BLD AUTO: 0.02 K/UL (ref 0–0.2)
BASOPHILS NFR BLD: 0.4 % (ref 0–1.9)
BUN SERPL-MCNC: 23 MG/DL (ref 8–23)
CALCIUM SERPL-MCNC: 8.7 MG/DL (ref 8.7–10.5)
CHLORIDE SERPL-SCNC: 102 MMOL/L (ref 95–110)
CO2 SERPL-SCNC: 16 MMOL/L (ref 23–29)
CREAT SERPL-MCNC: 1.7 MG/DL (ref 0.5–1.4)
DIFFERENTIAL METHOD: ABNORMAL
EOSINOPHIL # BLD AUTO: 0 K/UL (ref 0–0.5)
EOSINOPHIL NFR BLD: 0.2 % (ref 0–8)
ERYTHROCYTE [DISTWIDTH] IN BLOOD BY AUTOMATED COUNT: 15.1 % (ref 11.5–14.5)
EST. GFR  (NO RACE VARIABLE): 44 ML/MIN/1.73 M^2
GLUCOSE SERPL-MCNC: 107 MG/DL (ref 70–110)
HCT VFR BLD AUTO: 46.5 % (ref 40–54)
HGB BLD-MCNC: 15.3 G/DL (ref 14–18)
IMM GRANULOCYTES # BLD AUTO: 0.01 K/UL (ref 0–0.04)
IMM GRANULOCYTES NFR BLD AUTO: 0.2 % (ref 0–0.5)
LYMPHOCYTES # BLD AUTO: 1 K/UL (ref 1–4.8)
LYMPHOCYTES NFR BLD: 17 % (ref 18–48)
MCH RBC QN AUTO: 31.7 PG (ref 27–31)
MCHC RBC AUTO-ENTMCNC: 32.9 G/DL (ref 32–36)
MCV RBC AUTO: 96 FL (ref 82–98)
MONOCYTES # BLD AUTO: 0.1 K/UL (ref 0.3–1)
MONOCYTES NFR BLD: 2.3 % (ref 4–15)
NEUTROPHILS # BLD AUTO: 4.5 K/UL (ref 1.8–7.7)
NEUTROPHILS NFR BLD: 79.9 % (ref 38–73)
NRBC BLD-RTO: 0 /100 WBC
PLATELET # BLD AUTO: 132 K/UL (ref 150–450)
PMV BLD AUTO: 11.6 FL (ref 9.2–12.9)
POTASSIUM SERPL-SCNC: 4 MMOL/L (ref 3.5–5.1)
RBC # BLD AUTO: 4.83 M/UL (ref 4.6–6.2)
SODIUM SERPL-SCNC: 136 MMOL/L (ref 136–145)
WBC # BLD AUTO: 5.66 K/UL (ref 3.9–12.7)

## 2022-09-19 PROCEDURE — 94760 N-INVAS EAR/PLS OXIMETRY 1: CPT

## 2022-09-19 PROCEDURE — 36415 COLL VENOUS BLD VENIPUNCTURE: CPT | Performed by: STUDENT IN AN ORGANIZED HEALTH CARE EDUCATION/TRAINING PROGRAM

## 2022-09-19 PROCEDURE — 21400001 HC TELEMETRY ROOM

## 2022-09-19 PROCEDURE — 94761 N-INVAS EAR/PLS OXIMETRY MLT: CPT

## 2022-09-19 PROCEDURE — 85025 COMPLETE CBC W/AUTO DIFF WBC: CPT | Performed by: STUDENT IN AN ORGANIZED HEALTH CARE EDUCATION/TRAINING PROGRAM

## 2022-09-19 PROCEDURE — 94640 AIRWAY INHALATION TREATMENT: CPT

## 2022-09-19 PROCEDURE — 27000646 HC AEROBIKA DEVICE

## 2022-09-19 PROCEDURE — 27000221 HC OXYGEN, UP TO 24 HOURS

## 2022-09-19 PROCEDURE — 97535 SELF CARE MNGMENT TRAINING: CPT

## 2022-09-19 PROCEDURE — 51798 US URINE CAPACITY MEASURE: CPT

## 2022-09-19 PROCEDURE — 94664 DEMO&/EVAL PT USE INHALER: CPT

## 2022-09-19 PROCEDURE — 63600175 PHARM REV CODE 636 W HCPCS: Performed by: STUDENT IN AN ORGANIZED HEALTH CARE EDUCATION/TRAINING PROGRAM

## 2022-09-19 PROCEDURE — 80048 BASIC METABOLIC PNL TOTAL CA: CPT | Performed by: STUDENT IN AN ORGANIZED HEALTH CARE EDUCATION/TRAINING PROGRAM

## 2022-09-19 PROCEDURE — 97165 OT EVAL LOW COMPLEX 30 MIN: CPT

## 2022-09-19 PROCEDURE — 25000003 PHARM REV CODE 250: Performed by: STUDENT IN AN ORGANIZED HEALTH CARE EDUCATION/TRAINING PROGRAM

## 2022-09-19 PROCEDURE — 25000003 PHARM REV CODE 250: Performed by: HOSPITALIST

## 2022-09-19 PROCEDURE — 97161 PT EVAL LOW COMPLEX 20 MIN: CPT

## 2022-09-19 PROCEDURE — 99900035 HC TECH TIME PER 15 MIN (STAT)

## 2022-09-19 PROCEDURE — 97110 THERAPEUTIC EXERCISES: CPT

## 2022-09-19 PROCEDURE — 25000242 PHARM REV CODE 250 ALT 637 W/ HCPCS: Performed by: HOSPITALIST

## 2022-09-19 PROCEDURE — 82140 ASSAY OF AMMONIA: CPT | Performed by: HOSPITALIST

## 2022-09-19 RX ORDER — LACTULOSE 10 G/15ML
20 SOLUTION ORAL 3 TIMES DAILY
Status: DISCONTINUED | OUTPATIENT
Start: 2022-09-19 | End: 2022-09-20

## 2022-09-19 RX ADMIN — LACTULOSE 20 G: 10 SOLUTION ORAL at 09:09

## 2022-09-19 RX ADMIN — APIXABAN 5 MG: 5 TABLET, FILM COATED ORAL at 09:09

## 2022-09-19 RX ADMIN — AZITHROMYCIN MONOHYDRATE 500 MG: 500 INJECTION, POWDER, LYOPHILIZED, FOR SOLUTION INTRAVENOUS at 11:09

## 2022-09-19 RX ADMIN — LEVALBUTEROL HYDROCHLORIDE 1.25 MG: 1.25 SOLUTION, CONCENTRATE RESPIRATORY (INHALATION) at 12:09

## 2022-09-19 RX ADMIN — AMIODARONE HYDROCHLORIDE 200 MG: 200 TABLET ORAL at 09:09

## 2022-09-19 RX ADMIN — METOPROLOL SUCCINATE 100 MG: 50 TABLET, EXTENDED RELEASE ORAL at 09:09

## 2022-09-19 RX ADMIN — LEVALBUTEROL HYDROCHLORIDE 1.25 MG: 1.25 SOLUTION, CONCENTRATE RESPIRATORY (INHALATION) at 07:09

## 2022-09-19 RX ADMIN — FLUTICASONE FUROATE AND VILANTEROL TRIFENATATE 1 PUFF: 100; 25 POWDER RESPIRATORY (INHALATION) at 08:09

## 2022-09-19 RX ADMIN — LEVALBUTEROL HYDROCHLORIDE 1.25 MG: 1.25 SOLUTION, CONCENTRATE RESPIRATORY (INHALATION) at 08:09

## 2022-09-19 RX ADMIN — ACETAMINOPHEN 650 MG: 325 TABLET ORAL at 04:09

## 2022-09-19 RX ADMIN — CEFTRIAXONE 1 G: 1 INJECTION, SOLUTION INTRAVENOUS at 02:09

## 2022-09-19 RX ADMIN — ATORVASTATIN CALCIUM 40 MG: 40 TABLET, FILM COATED ORAL at 09:09

## 2022-09-19 RX ADMIN — LACTULOSE 30 G: 10 SOLUTION ORAL at 09:09

## 2022-09-19 RX ADMIN — LEVALBUTEROL HYDROCHLORIDE 1.25 MG: 1.25 SOLUTION, CONCENTRATE RESPIRATORY (INHALATION) at 03:09

## 2022-09-19 RX ADMIN — LACTULOSE 20 G: 10 SOLUTION ORAL at 02:09

## 2022-09-19 NOTE — ASSESSMENT & PLAN NOTE
He does not appear volumed up  Will start low dose diuretics to keep him euvolemic  Hod,lasix duo to increased Cr.

## 2022-09-19 NOTE — ASSESSMENT & PLAN NOTE
He denies any GI bleeds  Labs with significantly elevated ammonia but he otherwise is AAOX4  On  lactulose

## 2022-09-19 NOTE — HOSPITAL COURSE
Patient with history of diastolic CHF, afib/aflutter on Eliquis, NSVT, cirrhosis, CAD, DM2 on insulin, COPD/emphysema, metabolic encephalopathy, hep C, thrombocytopenia admitted for acute hypoxic respiratory failure due to multi lobular pneumonia.  Patient is not on home oxygen. He was started on IV Abx for pneumonia,nebulizer and IV lasix, and has been stable on NC O 2. Weaning O2 as tolerated. Noted to have elevated amnionia level,but he is alert  and oriented,started on lactulose and ammonia level has improved. Cr increased, likely due to lasix. Cr improved after lasix was held. PT/OT consulted and recommend outpatient PT/OT. Blood cx from 09/17 with gram positive cocci in clusters in 1 of 4 bottle. Suspect contaminant. Repeat blood cultures ordered and patient started on vancomycin. Blood culture resulted as contaminant so vancomycin d/c. SOB however hypoxic on six minute walk test. Will likely need oxygen upon discharge home tomorrow.

## 2022-09-19 NOTE — PLAN OF CARE
Problem: Occupational Therapy  Goal: Occupational Therapy Goal  Outcome: Adequate for Care Transition     Patient evaluated today by OT.  Patient with no further need for acute care OT.  Patient was able to perform all toileting and dressing tasks with SBA to Mod I.  Patient required CGA/SBA for transfers without use of RW and SBA/Supervision for T/F's with use of RW.  Patient required min to no verbal cues for proper hand placement during RW transfers to and from chair.  He demonstrated no obvious safety awareness deficits despite his recent falls.  Patient is okay to ambulate with assistance as needed in room.      Patient was on 4 lpm of O2 via NC during Tx.  BP was 125/81 at onset of Tx.  At rest, HR was 76 bpm and SpO2 was 99%.  With activity, HR increased to 100 bpm and SpO2 ranged from 98-99%.      Patient to D/C to home with outpatient OT services (changed recommendations due to insurer) and family to assist him as needed.  DME Needs: RW.

## 2022-09-19 NOTE — SUBJECTIVE & OBJECTIVE
Past Medical History:   Diagnosis Date    Diabetes mellitus     Hypertension        History reviewed. No pertinent surgical history.    Review of patient's allergies indicates:  No Known Allergies    No current facility-administered medications on file prior to encounter.     Current Outpatient Medications on File Prior to Encounter   Medication Sig    amiodarone (PACERONE) 200 MG Tab Take 200 mg by mouth once daily.    apixaban (ELIQUIS) 5 mg Tab Take 5 mg by mouth 2 (two) times daily.    atorvastatin (LIPITOR) 40 MG tablet Take 40 mg by mouth once daily.    dulaglutide (TRULICITY) 1.5 mg/0.5 mL pen injector Inject into the skin every 7 days. Thursdays    hydrALAZINE (APRESOLINE) 50 MG tablet Take 50 mg by mouth every 8 (eight) hours.    insulin aspart U-100 (NOVOLOG) 100 unit/mL injection Inject 5 Units into the skin 3 (three) times daily before meals.    insulin detemir U-100 (LEVEMIR) 100 unit/mL injection Inject 10 Units into the skin every evening.    metoprolol succinate (TOPROL-XL) 100 MG 24 hr tablet Take 100 mg by mouth once daily.    NIFEdipine (ADALAT CC) 30 MG TbSR Take 30 mg by mouth once daily.     Family History    None       Tobacco Use    Smoking status: Every Day     Packs/day: 1.00     Years: 15.00     Pack years: 15.00     Types: Cigarettes    Smokeless tobacco: Never   Substance and Sexual Activity    Alcohol use: Yes     Comment: daily    Drug use: Never    Sexual activity: Not Currently     Review of Systems   Reason unable to perform ROS: 12 point ROS negative except as detailed above.   Objective:     Vital Signs (Most Recent):  Temp: 98.9 °F (37.2 °C) (09/19/22 0747)  Pulse: 99 (09/19/22 0812)  Resp: 18 (09/19/22 0812)  BP: 120/80 (09/19/22 0747)  SpO2: 97 % (09/19/22 0812)   Vital Signs (24h Range):  Temp:  [98.3 °F (36.8 °C)-98.9 °F (37.2 °C)] 98.9 °F (37.2 °C)  Pulse:  [] 99  Resp:  [18-20] 18  SpO2:  [95 %-99 %] 97 %  BP: (108-144)/(66-99) 120/80     Weight: 80.4 kg (177 lb 4  oz)  Body mass index is 30.42 kg/m².    Physical Exam  Constitutional:       Comments: Elderly AA m who appear older than stated age appears to be in significant respiratory distress   HENT:      Head: Normocephalic and atraumatic.   Eyes:      Extraocular Movements: Extraocular movements intact.      Conjunctiva/sclera: Conjunctivae normal.      Pupils: Pupils are equal, round, and reactive to light.   Cardiovascular:      Rate and Rhythm: Regular rhythm. Tachycardia present.      Pulses: Normal pulses.      Heart sounds: Normal heart sounds. No murmur heard.    No friction rub. No gallop.   Pulmonary:      Comments: Patient with significant respiratory distress and tachypnea. Lungs with diffuse rhonchi   Abdominal:      General: Abdomen is flat. Bowel sounds are normal.      Palpations: Abdomen is soft.   Skin:     Capillary Refill: Capillary refill takes less than 2 seconds.   Neurological:      General: No focal deficit present.      Mental Status: He is oriented to person, place, and time. Mental status is at baseline.      Cranial Nerves: No cranial nerve deficit.      Sensory: No sensory deficit.      Motor: No weakness.      Coordination: Coordination normal.      Gait: Gait normal.      Deep Tendon Reflexes: Reflexes normal.   Psychiatric:         Thought Content: Thought content normal.         CRANIAL NERVES     CN III, IV, VI   Pupils are equal, round, and reactive to light.     Significant Labs: All pertinent labs within the past 24 hours have been reviewed.    Significant Imaging: I have reviewed all pertinent imaging results/findings within the past 24 hours.
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
## 2022-09-19 NOTE — PLAN OF CARE
[facility] [place] - Telemetry  Initial Discharge Assessment       Primary Care Provider: Primary Doctor No    Admission Diagnosis: Weakness [R53.1]  SOB (shortness of breath) [R06.02]  Tachycardia [R00.0]  Elevated troponin [R77.8]  Atrial flutter with rapid ventricular response [I48.92]  Chest pain [R07.9]  Fever, unspecified fever cause [R50.9]    Admission Date: 9/17/2022  Expected Discharge Date:     Discharge Barriers Identified: None    Payor: MEDICAID / Plan: [facility] COMMUNITY PLAN [facility] ([place] MEDICAID) / Product Type: Managed Medicaid /     No emergency contact information on file.    Discharge Plan A: Home  Discharge Plan B: Home    No Pharmacies Listed    Initial Assessment (most recent)       Adult Discharge Assessment - 09/19/22 1500          Discharge Assessment    Assessment Type Discharge Planning Assessment     Confirmed/corrected address, phone number and insurance Yes     Confirmed Demographics Correct on Facesheet     Source of Information patient     Communicated BALDO with patient/caregiver Date not available/Unable to determine     Reason For Admission Acute Respiratory Failure     Lives With alone     Do you expect to return to your current living situation? Yes     Do you have help at home or someone to help you manage your care at home? No     Prior to hospitilization cognitive status: Alert/Oriented     Current cognitive status: Alert/Oriented     Walking or Climbing Stairs Difficulty none     Dressing/Bathing Difficulty none     Equipment Currently Used at Home walker, rolling     Readmission within 30 days? No     Patient currently being followed by outpatient case management? No     Do you currently have service(s) that help you manage your care at home? No     Do you take prescription medications? Yes     Do you have prescription coverage? Yes     Coverage Medicaid     Do you have any problems affording any of your prescribed medications? No     Is the patient taking medications as prescribed?  yes     How do you get to doctors appointments? car, drives self     Are you on dialysis? No     Do you take coumadin? No     Discharge Plan A Home     Discharge Plan B Home     DME Needed Upon Discharge  none     Discharge Barriers Identified None        Physical Activity    On average, how many days per week do you engage in moderate to strenuous exercise (like a brisk walk)? Patient refused     On average, how many minutes do you engage in exercise at this level? Patient refused        Financial Resource Strain    How hard is it for you to pay for the very basics like food, housing, medical care, and heating? Patient refused        Housing Stability    In the last 12 months, was there a time when you were not able to pay the mortgage or rent on time? Patient refused     In the last 12 months, was there a time when you did not have a steady place to sleep or slept in a shelter (including now)? Patient refused        Transportation Needs    In the past 12 months, has lack of transportation kept you from medical appointments or from getting medications? Patient refused     In the past 12 months, has lack of transportation kept you from meetings, work, or from getting things needed for daily living? Patient refused        Food Insecurity    Within the past 12 months, the food you bought just didn't last and you didn't have money to get more. Patient refused        Stress    Do you feel stress - tense, restless, nervous, or anxious, or unable to sleep at night because your mind is troubled all the time - these days? Patient refused        Social Connections    In a typical week, how many times do you talk on the phone with family, friends, or neighbors? Patient refused     How often do you get together with friends or relatives? Patient refused     How often do you attend Muslim or Druze services? Patient refused     Do you belong to any clubs or organizations such as Muslim groups, unions, fraternal or athletic  groups, or school groups? Patient refused     How often do you attend meetings of the clubs or organizations you belong to? Patient refused     Are you , , , , never , or living with a partner? Patient refused        Alcohol Use    Q1: How often do you have a drink containing alcohol? Patient refused     Q2: How many drinks containing alcohol do you have on a typical day when you are drinking? Patient refused     Q3: How often do you have six or more drinks on one occasion? Patient refused

## 2022-09-19 NOTE — ASSESSMENT & PLAN NOTE
He denies any chest pain  Troponin seems chronically elevated but higher than his baseline  Possibly type II from acute process,denies chest pain.

## 2022-09-19 NOTE — ASSESSMENT & PLAN NOTE
Patient very tachypneic and in significant respiratory distress, hypoxic in 80s requiring supplemental oxygen  Suspect 2/2 PNA and COPD,CHF  exacerbation  Continue treatment for PNA and COPD and wean off oxygen appropriately,  acute hypoxic respiratory failure,not sure he is on home oxygen,poor historian,will try reach family.

## 2022-09-19 NOTE — NURSING
"Bladder scan completed at bedside per orders. 1cc noted on scan volume.  Patient has been voiding without difficulty. He says that he feels like he is urinating "completely".     "
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
## 2022-09-19 NOTE — PROGRESS NOTES
[facility] Medicine  Progress Note    Patient Name: Marck Madison  MRN: [id number]  Patient Class: IP- Inpatient   Admission Date: 9/17/2022  Length of Stay: 1 days  Attending Physician: Whit Hannon MD  Primary Care Provider: Primary Doctor No        Subjective:     Principal Problem:Acute respiratory failure        HPI:  63 yo male with history of diastolic CHF, afib/aflutter on Eliquis, NSVT, cirrhosis, CAD, DM2 on insulin, COPD/emphysema, metabolic encephalopathy, hep C, thrombocytopenia, presents with c/o SOB/DONNELLY. Patient states symptoms started acutely over the past 24hrs and has been progressively getting worse. He is unable to do anything without gasping for air. He also endorses associated dry cough, palpitations, generalized weakness and progressive inability to ambulate. Per his niece, he has been having progressively worsening gait instability which started while he was in [facility]. Patient has been using a wheelchair for the past 6 months but he is still able to stand and walk by himself but has been having frequent falls. Patient seemed to have fallen today witness by nephew and he eventually brought to the hospital.  Patient had just recently been released from [facility] within the past 6 weeks, he was incarcerated for about 7 months.   Patient noted with significant tachypnea, and also febrile upon ER presentation. CXR with concern for left lobar PNA.     Patient with another MRN [id number]      Overview/Hospital Course:  patient with history of diastolic CHF, afib/aflutter on Eliquis, NSVT, cirrhosis, CAD, DM2 on insulin, COPD/emphysema, metabolic encephalopathy, hep C, thrombocytopenia, presents with c/o SOB/DONNELLY,patient is admitted for acute hypoxic respiratory failure,not sure he is on home oxygen,poor historian,will try reach family,,patient has been started on IV Abx for pneumonia,nebulizer and IV lasix,he is stable on NC O 2,her has also elevated amnionia,but he is alert   and oriented,started on lactulose.ammonal level is improved.hold lasix today duo to increased Cr.check bladder scan.consulted PT,OT .  CC is weakness.      Past Medical History:   Diagnosis Date    Diabetes mellitus     Hypertension        History reviewed. No pertinent surgical history.    Review of patient's allergies indicates:  No Known Allergies    No current facility-administered medications on file prior to encounter.     Current Outpatient Medications on File Prior to Encounter   Medication Sig    amiodarone (PACERONE) 200 MG Tab Take 200 mg by mouth once daily.    apixaban (ELIQUIS) 5 mg Tab Take 5 mg by mouth 2 (two) times daily.    atorvastatin (LIPITOR) 40 MG tablet Take 40 mg by mouth once daily.    dulaglutide (TRULICITY) 1.5 mg/0.5 mL pen injector Inject into the skin every 7 days. Thursdays    hydrALAZINE (APRESOLINE) 50 MG tablet Take 50 mg by mouth every 8 (eight) hours.    insulin aspart U-100 (NOVOLOG) 100 unit/mL injection Inject 5 Units into the skin 3 (three) times daily before meals.    insulin detemir U-100 (LEVEMIR) 100 unit/mL injection Inject 10 Units into the skin every evening.    metoprolol succinate (TOPROL-XL) 100 MG 24 hr tablet Take 100 mg by mouth once daily.    NIFEdipine (ADALAT CC) 30 MG TbSR Take 30 mg by mouth once daily.     Family History    None       Tobacco Use    Smoking status: Every Day     Packs/day: 1.00     Years: 15.00     Pack years: 15.00     Types: Cigarettes    Smokeless tobacco: Never   Substance and Sexual Activity    Alcohol use: Yes     Comment: daily    Drug use: Never    Sexual activity: Not Currently     Review of Systems   Reason unable to perform ROS: 12 point ROS negative except as detailed above.   Objective:     Vital Signs (Most Recent):  Temp: 98.9 °F (37.2 °C) (09/19/22 0747)  Pulse: 99 (09/19/22 0812)  Resp: 18 (09/19/22 0812)  BP: 120/80 (09/19/22 0747)  SpO2: 97 % (09/19/22 0812)   Vital Signs (24h Range):  Temp:  [98.3 °F  (36.8 °C)-98.9 °F (37.2 °C)] 98.9 °F (37.2 °C)  Pulse:  [] 99  Resp:  [18-20] 18  SpO2:  [95 %-99 %] 97 %  BP: (108-144)/(66-99) 120/80     Weight: 80.4 kg (177 lb 4 oz)  Body mass index is 30.42 kg/m².    Physical Exam  Constitutional:       Comments: Elderly KWAKU chavez who appear older than stated age appears to be in significant respiratory distress   HENT:      Head: Normocephalic and atraumatic.   Eyes:      Extraocular Movements: Extraocular movements intact.      Conjunctiva/sclera: Conjunctivae normal.      Pupils: Pupils are equal, round, and reactive to light.   Cardiovascular:      Rate and Rhythm: Regular rhythm. Tachycardia present.      Pulses: Normal pulses.      Heart sounds: Normal heart sounds. No murmur heard.    No friction rub. No gallop.   Pulmonary:      Comments: Patient with significant respiratory distress and tachypnea. Lungs with diffuse rhonchi   Abdominal:      General: Abdomen is flat. Bowel sounds are normal.      Palpations: Abdomen is soft.   Skin:     Capillary Refill: Capillary refill takes less than 2 seconds.   Neurological:      General: No focal deficit present.      Mental Status: He is oriented to person, place, and time. Mental status is at baseline.      Cranial Nerves: No cranial nerve deficit.      Sensory: No sensory deficit.      Motor: No weakness.      Coordination: Coordination normal.      Gait: Gait normal.      Deep Tendon Reflexes: Reflexes normal.   Psychiatric:         Thought Content: Thought content normal.         CRANIAL NERVES     CN III, IV, VI   Pupils are equal, round, and reactive to light.     Significant Labs: All pertinent labs within the past 24 hours have been reviewed.    Significant Imaging: I have reviewed all pertinent imaging results/findings within the past 24 hours.    CC is weakness.  Assessment/Plan:      * Acute respiratory failure  Patient very tachypneic and in significant respiratory distress, hypoxic in 80s requiring supplemental  oxygen  Suspect 2/2 PNA and COPD,CHF  exacerbation  Continue treatment for PNA and COPD and wean off oxygen appropriately,  acute hypoxic respiratory failure,not sure he is on home oxygen,poor historian,will try reach family.    NSTEMI (non-ST elevated myocardial infarction)  He denies any chest pain  Troponin seems chronically elevated but higher than his baseline  Possibly type II from acute process,denies chest pain.      CAD (coronary artery disease)  Plan to restart home cardioprotective meds once identified      Thrombocytopenia  2/2 cirrhosis  Continue to monitor      HCV infection  No acute process      Type 2 diabetes mellitus  BGL fairly stable, will monitor for now    COPD (chronic obstructive pulmonary disease) with exacerbation  Will start KINGS/COLETTE, LABA/ICS, IV steroids, chest physiotherapy        Permanent atrial fibrillation  Will restart home meds once identified        Cirrhosis  He denies any GI bleeds  Labs with significantly elevated ammonia but he otherwise is AAOX4  On  lactulose      Acute on chronic diastolic congestive heart failure  He does not appear volumed up  Will start low dose diuretics to keep him euvolemic  Hod,lasix duo to increased Cr.      Pneumonia due to infectious organism  Started on rocephin and azithromycin  Continue to monitor response to treatment        VTE Risk Mitigation (From admission, onward)         Ordered     apixaban tablet 5 mg  2 times daily         09/18/22 1010     IP VTE HIGH RISK PATIENT  Once         09/18/22 0321     Place sequential compression device  Until discontinued         09/18/22 0321                Discharge Planning   BALDO:      Code Status: Full Code   Is the patient medically ready for discharge?:     Reason for patient still in hospital (select all that apply): Patient trending condition                     Whit Hannon MD  Department of Hospital Medicine   [facility]

## 2022-09-20 PROBLEM — E66.811 CLASS 1 OBESITY WITH SERIOUS COMORBIDITY AND BODY MASS INDEX (BMI) OF 30.0 TO 30.9 IN ADULT: Status: ACTIVE | Noted: 2022-09-20

## 2022-09-20 PROBLEM — E66.9 CLASS 1 OBESITY WITH SERIOUS COMORBIDITY AND BODY MASS INDEX (BMI) OF 30.0 TO 30.9 IN ADULT: Status: ACTIVE | Noted: 2022-09-20

## 2022-09-20 LAB
AMMONIA PLAS-SCNC: 26 UMOL/L (ref 10–50)
ANION GAP SERPL CALC-SCNC: 10 MMOL/L (ref 8–16)
BASOPHILS # BLD AUTO: 0.02 K/UL (ref 0–0.2)
BASOPHILS NFR BLD: 0.3 % (ref 0–1.9)
BUN SERPL-MCNC: 23 MG/DL (ref 8–23)
CALCIUM SERPL-MCNC: 8.8 MG/DL (ref 8.7–10.5)
CHLORIDE SERPL-SCNC: 107 MMOL/L (ref 95–110)
CO2 SERPL-SCNC: 20 MMOL/L (ref 23–29)
CREAT SERPL-MCNC: 1.4 MG/DL (ref 0.5–1.4)
DIFFERENTIAL METHOD: ABNORMAL
EOSINOPHIL # BLD AUTO: 0.1 K/UL (ref 0–0.5)
EOSINOPHIL NFR BLD: 1 % (ref 0–8)
ERYTHROCYTE [DISTWIDTH] IN BLOOD BY AUTOMATED COUNT: 15.1 % (ref 11.5–14.5)
EST. GFR  (NO RACE VARIABLE): 56 ML/MIN/1.73 M^2
GLUCOSE SERPL-MCNC: 115 MG/DL (ref 70–110)
HCT VFR BLD AUTO: 43.4 % (ref 40–54)
HGB BLD-MCNC: 14.1 G/DL (ref 14–18)
IMM GRANULOCYTES # BLD AUTO: 0.02 K/UL (ref 0–0.04)
IMM GRANULOCYTES NFR BLD AUTO: 0.3 % (ref 0–0.5)
LYMPHOCYTES # BLD AUTO: 1.1 K/UL (ref 1–4.8)
LYMPHOCYTES NFR BLD: 16.9 % (ref 18–48)
MAGNESIUM SERPL-MCNC: 1.8 MG/DL (ref 1.6–2.6)
MCH RBC QN AUTO: 30.9 PG (ref 27–31)
MCHC RBC AUTO-ENTMCNC: 32.5 G/DL (ref 32–36)
MCV RBC AUTO: 95 FL (ref 82–98)
MONOCYTES # BLD AUTO: 0.5 K/UL (ref 0.3–1)
MONOCYTES NFR BLD: 8 % (ref 4–15)
NEUTROPHILS # BLD AUTO: 5 K/UL (ref 1.8–7.7)
NEUTROPHILS NFR BLD: 73.5 % (ref 38–73)
NRBC BLD-RTO: 0 /100 WBC
PLATELET # BLD AUTO: 134 K/UL (ref 150–450)
PMV BLD AUTO: 11.6 FL (ref 9.2–12.9)
POTASSIUM SERPL-SCNC: 3.9 MMOL/L (ref 3.5–5.1)
RBC # BLD AUTO: 4.56 M/UL (ref 4.6–6.2)
SODIUM SERPL-SCNC: 137 MMOL/L (ref 136–145)
WBC # BLD AUTO: 6.76 K/UL (ref 3.9–12.7)

## 2022-09-20 PROCEDURE — 80048 BASIC METABOLIC PNL TOTAL CA: CPT | Performed by: STUDENT IN AN ORGANIZED HEALTH CARE EDUCATION/TRAINING PROGRAM

## 2022-09-20 PROCEDURE — 25000242 PHARM REV CODE 250 ALT 637 W/ HCPCS: Performed by: HOSPITALIST

## 2022-09-20 PROCEDURE — 83735 ASSAY OF MAGNESIUM: CPT | Performed by: STUDENT IN AN ORGANIZED HEALTH CARE EDUCATION/TRAINING PROGRAM

## 2022-09-20 PROCEDURE — 94760 N-INVAS EAR/PLS OXIMETRY 1: CPT

## 2022-09-20 PROCEDURE — 87040 BLOOD CULTURE FOR BACTERIA: CPT | Performed by: STUDENT IN AN ORGANIZED HEALTH CARE EDUCATION/TRAINING PROGRAM

## 2022-09-20 PROCEDURE — 25000003 PHARM REV CODE 250: Performed by: STUDENT IN AN ORGANIZED HEALTH CARE EDUCATION/TRAINING PROGRAM

## 2022-09-20 PROCEDURE — 85025 COMPLETE CBC W/AUTO DIFF WBC: CPT | Performed by: STUDENT IN AN ORGANIZED HEALTH CARE EDUCATION/TRAINING PROGRAM

## 2022-09-20 PROCEDURE — 21400001 HC TELEMETRY ROOM

## 2022-09-20 PROCEDURE — 63600175 PHARM REV CODE 636 W HCPCS: Performed by: STUDENT IN AN ORGANIZED HEALTH CARE EDUCATION/TRAINING PROGRAM

## 2022-09-20 PROCEDURE — 36415 COLL VENOUS BLD VENIPUNCTURE: CPT | Performed by: STUDENT IN AN ORGANIZED HEALTH CARE EDUCATION/TRAINING PROGRAM

## 2022-09-20 PROCEDURE — 94640 AIRWAY INHALATION TREATMENT: CPT

## 2022-09-20 PROCEDURE — 36415 COLL VENOUS BLD VENIPUNCTURE: CPT | Performed by: HOSPITALIST

## 2022-09-20 PROCEDURE — 25000003 PHARM REV CODE 250: Performed by: HOSPITALIST

## 2022-09-20 PROCEDURE — 82140 ASSAY OF AMMONIA: CPT | Performed by: HOSPITALIST

## 2022-09-20 PROCEDURE — 27000221 HC OXYGEN, UP TO 24 HOURS

## 2022-09-20 RX ADMIN — ATORVASTATIN CALCIUM 40 MG: 40 TABLET, FILM COATED ORAL at 08:09

## 2022-09-20 RX ADMIN — VANCOMYCIN HYDROCHLORIDE 1500 MG: 1.5 INJECTION, POWDER, LYOPHILIZED, FOR SOLUTION INTRAVENOUS at 10:09

## 2022-09-20 RX ADMIN — APIXABAN 5 MG: 5 TABLET, FILM COATED ORAL at 08:09

## 2022-09-20 RX ADMIN — FLUTICASONE FUROATE AND VILANTEROL TRIFENATATE 1 PUFF: 100; 25 POWDER RESPIRATORY (INHALATION) at 07:09

## 2022-09-20 RX ADMIN — LEVALBUTEROL HYDROCHLORIDE 1.25 MG: 1.25 SOLUTION, CONCENTRATE RESPIRATORY (INHALATION) at 07:09

## 2022-09-20 RX ADMIN — METOPROLOL SUCCINATE 100 MG: 50 TABLET, EXTENDED RELEASE ORAL at 08:09

## 2022-09-20 RX ADMIN — LEVALBUTEROL HYDROCHLORIDE 1.25 MG: 1.25 SOLUTION, CONCENTRATE RESPIRATORY (INHALATION) at 04:09

## 2022-09-20 RX ADMIN — CEFTRIAXONE 1 G: 1 INJECTION, SOLUTION INTRAVENOUS at 02:09

## 2022-09-20 RX ADMIN — AMIODARONE HYDROCHLORIDE 200 MG: 200 TABLET ORAL at 08:09

## 2022-09-20 RX ADMIN — APIXABAN 5 MG: 5 TABLET, FILM COATED ORAL at 09:09

## 2022-09-20 NOTE — ASSESSMENT & PLAN NOTE
Body mass index is 30.42 kg/m². Morbid obesity complicates all aspects of disease management from diagnostic modalities to treatment. Weight loss encouraged and health benefits explained to patient.

## 2022-09-20 NOTE — SUBJECTIVE & OBJECTIVE
Interval History:  No acute overnight events.  Patient remained afebrile.  Patient is alert and oriented x4.  States he is feeling a lot better and wants to go home.  Continues to require 2 L of nasal cannula.  Inform nurse to wean O2 as tolerated.  Patient denies any shortness of breath or difficulty breathing.    Review of Systems   Constitutional:  Negative for chills, fatigue and fever.   HENT:  Negative for congestion and trouble swallowing.    Eyes:  Negative for photophobia and visual disturbance.   Respiratory:  Positive for cough. Negative for chest tightness, shortness of breath and wheezing.    Cardiovascular:  Negative for chest pain, palpitations and leg swelling.   Gastrointestinal:  Negative for abdominal pain, nausea and vomiting.   Genitourinary:  Negative for difficulty urinating and dysuria.   Musculoskeletal:  Negative for joint swelling.   Neurological:  Negative for dizziness, weakness and headaches.   Psychiatric/Behavioral:  Negative for confusion and hallucinations.      Objective:     Vital Signs (Most Recent):  Temp: 97.9 °F (36.6 °C) (09/20/22 1205)  Pulse: 82 (09/20/22 1617)  Resp: 18 (09/20/22 1617)  BP: 115/72 (09/20/22 1205)  SpO2: 96 % (09/20/22 1617) Vital Signs (24h Range):  Temp:  [97.5 °F (36.4 °C)-98.5 °F (36.9 °C)] 97.9 °F (36.6 °C)  Pulse:  [] 82  Resp:  [17-18] 18  SpO2:  [93 %-100 %] 96 %  BP: (115-137)/(62-87) 115/72     Weight: 80.4 kg (177 lb 4 oz)  Body mass index is 30.42 kg/m².    Intake/Output Summary (Last 24 hours) at 9/20/2022 1635  Last data filed at 9/20/2022 1230  Gross per 24 hour   Intake 1200 ml   Output --   Net 1200 ml      Physical Exam  Vitals and nursing note reviewed.   Constitutional:       General: He is not in acute distress.     Appearance: He is not ill-appearing.   HENT:      Head: Atraumatic.      Right Ear: External ear normal.      Left Ear: External ear normal.      Nose: Nose normal.      Mouth/Throat:      Mouth: Mucous membranes are  moist.   Eyes:      Extraocular Movements: Extraocular movements intact.      Conjunctiva/sclera: Conjunctivae normal.      Pupils: Pupils are equal, round, and reactive to light.   Cardiovascular:      Rate and Rhythm: Normal rate and regular rhythm.      Heart sounds: No murmur heard.    No gallop.   Pulmonary:      Effort: Pulmonary effort is normal. No respiratory distress.      Breath sounds: Examination of the left-upper field reveals decreased breath sounds. Examination of the left-middle field reveals decreased breath sounds. Decreased breath sounds present. No wheezing or rales.      Comments: Currently on 2L NC  Abdominal:      General: There is no distension.      Palpations: Abdomen is soft.      Tenderness: There is no abdominal tenderness. There is no guarding.   Musculoskeletal:         General: No swelling or tenderness.      Cervical back: Normal range of motion.      Right lower leg: No edema.      Left lower leg: No edema.   Skin:     General: Skin is warm and dry.   Neurological:      General: No focal deficit present.      Mental Status: He is alert and oriented to person, place, and time.   Psychiatric:         Mood and Affect: Mood normal.         Thought Content: Thought content normal.       Significant Labs: All pertinent labs within the past 24 hours have been reviewed.    Significant Imaging: I have reviewed all pertinent imaging results/findings within the past 24 hours.

## 2022-09-20 NOTE — ASSESSMENT & PLAN NOTE
-On admit, patient very tachypneic and in significant respiratory distress, hypoxic in 80s requiring supplemental oxygen  -pt not on home oxygen   -CXR: Left mid lung area of consolidation.  -CTA chest with no PE. Patchy and confluent airspace opacities within the left upper lobe and lingula as well as to a lesser degree within the right middle lobe.  Findings may relate to multifocal infection.   -Suspect 2/2 PNA and COPD,CHF  exacerbation  -Continue treatment for PNA and COPD and wean off oxygen appropriately

## 2022-09-20 NOTE — NURSING
Alba called from lab with critical lab. Blood culture that was collected on 9/17/2022, gram positive cocci and clusters resembling staph 1 out of 4 bottles. Dr. Austin notified.

## 2022-09-20 NOTE — PROGRESS NOTES
[facility] Medicine  Progress Note    Patient Name: Marck Madison  MRN: [id number]  Patient Class: IP- Inpatient   Admission Date: 9/17/2022  Length of Stay: 2 days  Attending Physician: Senthil Austin DO  Primary Care Provider: Primary Doctor No        Subjective:     Principal Problem:Acute respiratory failure        HPI:  63 yo male with history of diastolic CHF, afib/aflutter on Eliquis, NSVT, cirrhosis, CAD, DM2 on insulin, COPD/emphysema, metabolic encephalopathy, hep C, thrombocytopenia, presents with c/o SOB/DONNELLY. Patient states symptoms started acutely over the past 24hrs and has been progressively getting worse. He is unable to do anything without gasping for air. He also endorses associated dry cough, palpitations, generalized weakness and progressive inability to ambulate. Per his niece, he has been having progressively worsening gait instability which started while he was in [facility]. Patient has been using a wheelchair for the past 6 months but he is still able to stand and walk by himself but has been having frequent falls. Patient seemed to have fallen today witness by nephew and he eventually brought to the hospital.  Patient had just recently [facility] from [facility] within the past 6 weeks, he was incarcerated for about 7 months.   Patient noted with significant tachypnea, and also febrile upon ER presentation. CXR with concern for left lobar PNA.     Patient with another MRN [id number]      Overview/Hospital Course:  Patient with history of diastolic CHF, afib/aflutter on Eliquis, NSVT, cirrhosis, CAD, DM2 on insulin, COPD/emphysema, metabolic encephalopathy, hep C, thrombocytopenia admitted for acute hypoxic respiratory failure due to multi lobular pneumonia.  Patient is not on home oxygen. He was started on IV Abx for pneumonia,nebulizer and IV lasix, and has been stable on NC O 2. Weaning O2 as tolerated. Noted to have elevated amnionia level,but he is alert  and  oriented,started on lactulose and ammonia level has improved. Cr increased, likely due to lasix. Cr improved after lasix was held. PT/OT consulted and recommend outpatient PT/OT. Blood cx from 09/17 with gram positive cocci in clusters in 1 of 4 bottle. Suspect contaminant. Repeat blood cultures ordered and patient started on vancomycin. Monitor closely at this time.       Interval History:  No acute overnight events.  Patient remained afebrile.  Patient is alert and oriented x4.  States he is feeling a lot better and wants to go home.  Continues to require 2 L of nasal cannula.  Inform nurse to wean O2 as tolerated.  Patient denies any shortness of breath or difficulty breathing.    Review of Systems   Constitutional:  Negative for chills, fatigue and fever.   HENT:  Negative for congestion and trouble swallowing.    Eyes:  Negative for photophobia and visual disturbance.   Respiratory:  Positive for cough. Negative for chest tightness, shortness of breath and wheezing.    Cardiovascular:  Negative for chest pain, palpitations and leg swelling.   Gastrointestinal:  Negative for abdominal pain, nausea and vomiting.   Genitourinary:  Negative for difficulty urinating and dysuria.   Musculoskeletal:  Negative for joint swelling.   Neurological:  Negative for dizziness, weakness and headaches.   Psychiatric/Behavioral:  Negative for confusion and hallucinations.      Objective:     Vital Signs (Most Recent):  Temp: 97.9 °F (36.6 °C) (09/20/22 1205)  Pulse: 82 (09/20/22 1617)  Resp: 18 (09/20/22 1617)  BP: 115/72 (09/20/22 1205)  SpO2: 96 % (09/20/22 1617) Vital Signs (24h Range):  Temp:  [97.5 °F (36.4 °C)-98.5 °F (36.9 °C)] 97.9 °F (36.6 °C)  Pulse:  [] 82  Resp:  [17-18] 18  SpO2:  [93 %-100 %] 96 %  BP: (115-137)/(62-87) 115/72     Weight: 80.4 kg (177 lb 4 oz)  Body mass index is 30.42 kg/m².    Intake/Output Summary (Last 24 hours) at 9/20/2022 1635  Last data filed at 9/20/2022 1230  Gross per 24 hour    Intake 1200 ml   Output --   Net 1200 ml      Physical Exam  Vitals and nursing note reviewed.   Constitutional:       General: He is not in acute distress.     Appearance: He is not ill-appearing.   HENT:      Head: Atraumatic.      Right Ear: External ear normal.      Left Ear: External ear normal.      Nose: Nose normal.      Mouth/Throat:      Mouth: Mucous membranes are moist.   Eyes:      Extraocular Movements: Extraocular movements intact.      Conjunctiva/sclera: Conjunctivae normal.      Pupils: Pupils are equal, round, and reactive to light.   Cardiovascular:      Rate and Rhythm: Normal rate and regular rhythm.      Heart sounds: No murmur heard.    No gallop.   Pulmonary:      Effort: Pulmonary effort is normal. No respiratory distress.      Breath sounds: Examination of the left-upper field reveals decreased breath sounds. Examination of the left-middle field reveals decreased breath sounds. Decreased breath sounds present. No wheezing or rales.      Comments: Currently on 2L NC  Abdominal:      General: There is no distension.      Palpations: Abdomen is soft.      Tenderness: There is no abdominal tenderness. There is no guarding.   Musculoskeletal:         General: No swelling or tenderness.      Cervical back: Normal range of motion.      Right lower leg: No edema.      Left lower leg: No edema.   Skin:     General: Skin is warm and dry.   Neurological:      General: No focal deficit present.      Mental Status: He is alert and oriented to person, place, and time.   Psychiatric:         Mood and Affect: Mood normal.         Thought Content: Thought content normal.       Significant Labs: All pertinent labs within the past 24 hours have been reviewed.    Significant Imaging: I have reviewed all pertinent imaging results/findings within the past 24 hours.      Assessment/Plan:      * Acute respiratory failure  -On admit, patient very tachypneic and in significant respiratory distress, hypoxic in 80s  requiring supplemental oxygen  -pt not on home oxygen   -CXR: Left mid lung area of consolidation.  -CTA chest with no PE. Patchy and confluent airspace opacities within the left upper lobe and lingula as well as to a lesser degree within the right middle lobe.  Findings may relate to multifocal infection.   -Suspect 2/2 PNA and COPD,CHF  exacerbation  -Continue treatment for PNA and COPD and wean off oxygen appropriately    Pneumonia due to infectious organism  -Pt presented in respiratory distress with cough and shortness of breath on admission  -CTA chest with no PE but findings consistent with pneumonia.   -Continue on rocephin, day 3 and azithromycin day 4  -Continue to monitor response to treatment  -Wean O2 as tolerated      Acute on chronic diastolic congestive heart failure  He does not appear volumed overloaded  Lasix held given rising Cr  Cr now improved       COPD (chronic obstructive pulmonary disease) with exacerbation  Cont KINGS/COLETTE, LABA/ICS, chest physiotherapy  No wheezing  Wean O2 as tolerated        NSTEMI (non-ST elevated myocardial infarction)  He denies any chest pain  Troponin seems chronically elevated but higher than his baseline  Possibly type II from acute process,denies chest pain.      CAD (coronary artery disease)  Continue home metoprolol, atorvastatin    Permanent atrial fibrillation  Continue home metoprolol, amiodarone, and Eliquis  Currently rate controlled    Type 2 diabetes mellitus  Meds:SSI PRN to maintain goal 140-180  ADA diet, accuchecks ACHS, hypoglycemic protocol      Cirrhosis  He denies any GI bleeds  Labs with significantly elevated ammonia but he otherwise is AAOX4  On  Lactulose with multiple BM and ammonia level now normal       HCV infection  No acute process      Thrombocytopenia  2/2 cirrhosis  Continue to monitor      Class 1 obesity with serious comorbidity and body mass index (BMI) of 30.0 to 30.9 in adult  Body mass index is 30.42 kg/m². Morbid obesity  complicates all aspects of disease management from diagnostic modalities to treatment. Weight loss encouraged and health benefits explained to patient.           VTE Risk Mitigation (From admission, onward)         Ordered     apixaban tablet 5 mg  2 times daily         09/18/22 1010     IP VTE HIGH RISK PATIENT  Once         09/18/22 0321     Place sequential compression device  Until discontinued         09/18/22 0321                Discharge Planning   BALDO:      Code Status: Full Code   Is the patient medically ready for discharge?:     Reason for patient still in hospital (select all that apply): Patient trending condition and Treatment  Discharge Plan A: Home                  Senthil Austin DO  Department of Hospital Medicine   [facility]

## 2022-09-20 NOTE — PLAN OF CARE
Problem: Adult Inpatient Plan of Care  Goal: Plan of Care Review  Outcome: Ongoing, Progressing  Goal: Patient-Specific Goal (Individualized)  Outcome: Ongoing, Progressing  Goal: Absence of Hospital-Acquired Illness or Injury  Outcome: Ongoing, Progressing  Goal: Optimal Comfort and Wellbeing  Outcome: Ongoing, Progressing  Goal: Readiness for Transition of Care  Outcome: Ongoing, Progressing     Problem: Diabetes Comorbidity  Goal: Blood Glucose Level Within Targeted Range  Outcome: Ongoing, Progressing     Problem: Fluid Imbalance (Pneumonia)  Goal: Fluid Balance  Outcome: Ongoing, Progressing     Problem: Infection (Pneumonia)  Goal: Resolution of Infection Signs and Symptoms  Outcome: Ongoing, Progressing   No falls, safety maintained, O2 weaned to 2L from 4L. More than 5 BM today. No c/o pain

## 2022-09-20 NOTE — PLAN OF CARE
Problem: Adult Inpatient Plan of Care  Goal: Plan of Care Review  Outcome: Ongoing, Progressing  Goal: Optimal Comfort and Wellbeing  Outcome: Ongoing, Progressing  Intervention: Monitor Pain and Promote Comfort  Flowsheets (Taken 9/20/2022 4803)  Pain Management Interventions:   care clustered   pillow support provided   position adjusted

## 2022-09-20 NOTE — NURSING
Report received from night nurse DELTA Rocha. Visualized patient and assessed patient's overall condition and appearance. No acute distress noted. Will continue to monitor

## 2022-09-20 NOTE — ASSESSMENT & PLAN NOTE
-Pt presented in respiratory distress with cough and shortness of breath on admission  -CTA chest with no PE but findings consistent with pneumonia.   -Continue on rocephin, day 3 and azithromycin day 4  -Continue to monitor response to treatment  -Wean O2 as tolerated

## 2022-09-20 NOTE — ASSESSMENT & PLAN NOTE
He denies any GI bleeds  Labs with significantly elevated ammonia but he otherwise is AAOX4  On  Lactulose with multiple BM and ammonia level now normal

## 2022-09-21 PROBLEM — R78.81 POSITIVE BLOOD CULTURE: Status: ACTIVE | Noted: 2022-09-21

## 2022-09-21 LAB
ANION GAP SERPL CALC-SCNC: 11 MMOL/L (ref 8–16)
BUN SERPL-MCNC: 15 MG/DL (ref 8–23)
CALCIUM SERPL-MCNC: 9.8 MG/DL (ref 8.7–10.5)
CHLORIDE SERPL-SCNC: 108 MMOL/L (ref 95–110)
CO2 SERPL-SCNC: 21 MMOL/L (ref 23–29)
CREAT SERPL-MCNC: 1.2 MG/DL (ref 0.5–1.4)
EST. GFR  (NO RACE VARIABLE): >60 ML/MIN/1.73 M^2
GLUCOSE SERPL-MCNC: 109 MG/DL (ref 70–110)
POTASSIUM SERPL-SCNC: 4.1 MMOL/L (ref 3.5–5.1)
SODIUM SERPL-SCNC: 140 MMOL/L (ref 136–145)

## 2022-09-21 PROCEDURE — 27000221 HC OXYGEN, UP TO 24 HOURS

## 2022-09-21 PROCEDURE — 94640 AIRWAY INHALATION TREATMENT: CPT

## 2022-09-21 PROCEDURE — 63600175 PHARM REV CODE 636 W HCPCS: Performed by: STUDENT IN AN ORGANIZED HEALTH CARE EDUCATION/TRAINING PROGRAM

## 2022-09-21 PROCEDURE — 25000003 PHARM REV CODE 250: Performed by: STUDENT IN AN ORGANIZED HEALTH CARE EDUCATION/TRAINING PROGRAM

## 2022-09-21 PROCEDURE — 94760 N-INVAS EAR/PLS OXIMETRY 1: CPT

## 2022-09-21 PROCEDURE — 36415 COLL VENOUS BLD VENIPUNCTURE: CPT | Performed by: STUDENT IN AN ORGANIZED HEALTH CARE EDUCATION/TRAINING PROGRAM

## 2022-09-21 PROCEDURE — 80048 BASIC METABOLIC PNL TOTAL CA: CPT | Performed by: STUDENT IN AN ORGANIZED HEALTH CARE EDUCATION/TRAINING PROGRAM

## 2022-09-21 PROCEDURE — 25000003 PHARM REV CODE 250: Performed by: HOSPITALIST

## 2022-09-21 PROCEDURE — 21400001 HC TELEMETRY ROOM

## 2022-09-21 PROCEDURE — 25000242 PHARM REV CODE 250 ALT 637 W/ HCPCS: Performed by: HOSPITALIST

## 2022-09-21 RX ADMIN — LEVALBUTEROL HYDROCHLORIDE 1.25 MG: 1.25 SOLUTION, CONCENTRATE RESPIRATORY (INHALATION) at 12:09

## 2022-09-21 RX ADMIN — AZITHROMYCIN MONOHYDRATE 500 MG: 500 INJECTION, POWDER, LYOPHILIZED, FOR SOLUTION INTRAVENOUS at 12:09

## 2022-09-21 RX ADMIN — LEVALBUTEROL HYDROCHLORIDE 1.25 MG: 1.25 SOLUTION, CONCENTRATE RESPIRATORY (INHALATION) at 08:09

## 2022-09-21 RX ADMIN — FLUTICASONE FUROATE AND VILANTEROL TRIFENATATE 1 PUFF: 100; 25 POWDER RESPIRATORY (INHALATION) at 08:09

## 2022-09-21 RX ADMIN — APIXABAN 5 MG: 5 TABLET, FILM COATED ORAL at 08:09

## 2022-09-21 RX ADMIN — AMIODARONE HYDROCHLORIDE 200 MG: 200 TABLET ORAL at 08:09

## 2022-09-21 RX ADMIN — ATORVASTATIN CALCIUM 40 MG: 40 TABLET, FILM COATED ORAL at 08:09

## 2022-09-21 RX ADMIN — CEFTRIAXONE 1 G: 1 INJECTION, SOLUTION INTRAVENOUS at 01:09

## 2022-09-21 RX ADMIN — METOPROLOL SUCCINATE 100 MG: 50 TABLET, EXTENDED RELEASE ORAL at 08:09

## 2022-09-21 NOTE — PLAN OF CARE
[facility] - Telemetry  Discharge Reassessment    Primary Care Provider: [facility]    Expected Discharge Date: Pending    KENYATTA discussed discharge planning with patient's niece, Jessica. Jessica stated that patient lives with her. KENYATTA informed Jessica that outpatient PT has been recommended upon discharge. Jessica inquired if patient can have home health. KENYATTA explained that insurance will not pay for home health PT. Jessica informed KENYATTA that she works but can bring patient to physical therapy  Tuesday and Wednesdays. SW informed Jessica that the days will be noted on referral. KENYATTA inquired if patient use home oxygen. Jessica said no.     Reassessment (most recent)       Discharge Reassessment - 09/21/22 7822          Discharge Reassessment    Assessment Type Discharge Planning Reassessment     Did the patient's condition or plan change since previous assessment? No     Discharge Plan discussed with: Caregiver     Name(s) and Number(s) Jessica Gabriel (niece) [phone]     Communicated BALDO with patient/caregiver Date not available/Unable to determine     Discharge Plan A Home     Discharge Plan B Home     DME Needed Upon Discharge  walker, rolling     Discharge Barriers Identified None     Why the patient remains in the hospital Requires continued medical care        Post-Acute Status    Post-Acute Authorization Other     Coverage Medicaid     Other Status No Post-Acute Service Needs     Discharge Delays None known at this time

## 2022-09-21 NOTE — PROGRESS NOTES
Vancomycin consult follow-up:    Patient reviewed, renal function stable, no new levels, continue current therapy; Next levels due: trough due 9/22/2022 at 2200

## 2022-09-21 NOTE — NURSING
Bedside report given to night nurse DELTA Munoz. Walking rounds completed. Visualized and assessed patient. NAD noted. Safety precautions maintained and call light within reach.    Chart check completed.

## 2022-09-21 NOTE — PROGRESS NOTES
Pharmacokinetic Initial Assessment: IV Vancomycin    Assessment/Plan:    Initiate intravenous vancomycin with loading dose of 1500 mg once followed by a maintenance dose of vancomycin 1750 mg IV every 24 hours  Desired empiric serum trough concentration is 10 to 20 mcg/mL  Draw vancomycin trough level 60 min prior to third dose on 9/22/22 at approximately 2200  Pharmacy will continue to follow and monitor vancomycin.      Please contact pharmacy at extension [phone] with any questions regarding this assessment.     Thank you for the consult,   Jordy Shah       Patient brief summary:  Marck Madison is a 62 y.o. male initiated on antimicrobial therapy with IV Vancomycin for treatment of suspected bacteremia    Drug Allergies:   Review of patient's allergies indicates:  No Known Allergies    Actual Body Weight:   80.4 kg    Renal Function:   Estimated Creatinine Clearance: 51 mL/min (based on SCr of 1.4 mg/dL).,     Dialysis Method (if applicable):  N/A    CBC (last 72 hours):  Recent Labs   Lab Result Units 09/18/22  0708 09/19/22  0430 09/20/22  0425   WBC K/uL 5.34 5.66 6.76   Hemoglobin g/dL 14.6 15.3 14.1   Hematocrit % 42.4 46.5 43.4   Platelets K/uL 137* 132* 134*   Gran % % 91.3* 79.9* 73.5*   Lymph % % 5.6* 17.0* 16.9*   Mono % % 0.7* 2.3* 8.0   Eosinophil % % 0.0 0.2 1.0   Basophil % % 1.5 0.4 0.3   Differential Method  Automated Automated Automated       Metabolic Panel (last 72 hours):  Recent Labs   Lab Result Units 09/18/22  0755 09/18/22  1108 09/19/22  0430 09/20/22  0425   Sodium mmol/L  --  134* 136 137   Potassium mmol/L  --  3.6 4.0 3.9   Chloride mmol/L  --  103 102 107   CO2 mmol/L  --  21* 16* 20*   Glucose mg/dL  --  163* 107 115*   Glucose, UA  Negative  --   --   --    BUN mg/dL  --  18 23 23   Creatinine mg/dL  --  1.4 1.7* 1.4   Creatinine, Urine mg/dL 178.1  --   --   --    Albumin g/dL  --  3.1*  --   --    Total Bilirubin mg/dL  --  1.0  --   --    Alkaline Phosphatase U/L  --  66   --   --    AST U/L  --  21  --   --    ALT U/L  --  15  --   --    Magnesium mg/dL  --   --   --  1.8       Drug levels (last 3 results):  No results for input(s): VANCOMYCINRA, VANCORANDOM, VANCOMYCINPE, VANCOPEAK, VANCOMYCINTR, VANCOTROUGH in the last 72 hours.    Microbiologic Results:  Microbiology Results (last 7 days)       Procedure Component Value Units Date/Time    Blood culture [[id number]] Collected: 09/20/22 1416    Order Status: Completed Specimen: Blood from Antecubital, Right Hand Updated: 09/20/22 2112     Blood Culture, Routine No Growth to date    Blood culture [[id number]] Collected: 09/20/22 1420    Order Status: Completed Specimen: Blood from Peripheral, Left Hand Updated: 09/20/22 2112     Blood Culture, Routine No Growth to date    Blood culture x two cultures. Draw prior to antibiotics. [[id number]] Collected: 09/17/22 2300    Order Status: Completed Specimen: Blood from Peripheral, Wrist, Left Updated: 09/20/22 1358     Blood Culture, Routine Gram stain leeanne bottle: Gram positive cocci in clusters resembling Staph      Results called to and read back by: Carmita Rodriguez 09/20/2022  13:57    Narrative:      Aerobic and anaerobic    Blood culture x two cultures. Draw prior to antibiotics. [[id number]] Collected: 09/17/22 2330    Order Status: Completed Specimen: Blood from Peripheral, Forearm, Right Updated: 09/20/22 0503     Blood Culture, Routine No Growth to date      No Growth to date      No Growth to date    Narrative:      Aerobic and anaerobic

## 2022-09-21 NOTE — PLAN OF CARE
Problem: Adult Inpatient Plan of Care  Goal: Plan of Care Review  Outcome: Ongoing, Progressing  Goal: Patient-Specific Goal (Individualized)  Outcome: Ongoing, Progressing  Goal: Absence of Hospital-Acquired Illness or Injury  Outcome: Ongoing, Progressing  Goal: Optimal Comfort and Wellbeing  Outcome: Ongoing, Progressing  Goal: Readiness for Transition of Care  Outcome: Ongoing, Progressing     Problem: Diabetes Comorbidity  Goal: Blood Glucose Level Within Targeted Range  Outcome: Ongoing, Progressing     Problem: Fluid Imbalance (Pneumonia)  Goal: Fluid Balance  Outcome: Ongoing, Progressing     Problem: Infection (Pneumonia)  Goal: Resolution of Infection Signs and Symptoms  Outcome: Ongoing, Progressing     Problem: Respiratory Compromise (Pneumonia)  Goal: Effective Oxygenation and Ventilation  Outcome: Ongoing, Progressing     Problem: Skin Injury Risk Increased  Goal: Skin Health and Integrity  Outcome: Ongoing, Progressing

## 2022-09-21 NOTE — NURSING
Testing for Home O2    O2 Sats on RA at rest= 95%    O2 Sats on RA with exercise=87%     O2 Sats on 2 LO2 with exercise= 92%

## 2022-09-21 NOTE — NURSING
PER handoff received from DELTA Munoz     Pt resting in bed quietly. NAD noted. No c/o pain. 2 L oxygen via nasal canula in use.  Fall and safety precautions maintained. Bed alarm activated and audible.. Bed locked in lowest position, with side rails up x2. Call bell and personal items within reach

## 2022-09-21 NOTE — CONSULTS
Thank you for your consult to [facility]. We have reviewed the patient chart. This patient does meet criteria for [facility] service at this time. Will assume care on 09/21/22 at 6AM

## 2022-09-21 NOTE — NURSING
PER handoff given to DELTA Munoz     Pt resting in bed quietly. NAD noted. No c/o pain. Fall and safety precautions maintained. Bed alarm activated and audible.. Bed locked in lowest position, with side rails up x2. Call bell and personal items within reach

## 2022-09-22 VITALS
HEART RATE: 102 BPM | TEMPERATURE: 99 F | DIASTOLIC BLOOD PRESSURE: 80 MMHG | SYSTOLIC BLOOD PRESSURE: 165 MMHG | HEIGHT: 64 IN | BODY MASS INDEX: 30.26 KG/M2 | RESPIRATION RATE: 20 BRPM | OXYGEN SATURATION: 97 % | WEIGHT: 177.25 LBS

## 2022-09-22 LAB
ANION GAP SERPL CALC-SCNC: 11 MMOL/L (ref 8–16)
BACTERIA BLD CULT: ABNORMAL
BACTERIA BLD CULT: NORMAL
BUN SERPL-MCNC: 14 MG/DL (ref 8–23)
CALCIUM SERPL-MCNC: 9.5 MG/DL (ref 8.7–10.5)
CHLORIDE SERPL-SCNC: 103 MMOL/L (ref 95–110)
CO2 SERPL-SCNC: 24 MMOL/L (ref 23–29)
CREAT SERPL-MCNC: 1.1 MG/DL (ref 0.5–1.4)
EST. GFR  (NO RACE VARIABLE): >60 ML/MIN/1.73 M^2
GLUCOSE SERPL-MCNC: 102 MG/DL (ref 70–110)
POTASSIUM SERPL-SCNC: 3.6 MMOL/L (ref 3.5–5.1)
SODIUM SERPL-SCNC: 138 MMOL/L (ref 136–145)

## 2022-09-22 PROCEDURE — 94760 N-INVAS EAR/PLS OXIMETRY 1: CPT

## 2022-09-22 PROCEDURE — 99900035 HC TECH TIME PER 15 MIN (STAT)

## 2022-09-22 PROCEDURE — 94664 DEMO&/EVAL PT USE INHALER: CPT

## 2022-09-22 PROCEDURE — 27000646 HC AEROBIKA DEVICE

## 2022-09-22 PROCEDURE — 27000221 HC OXYGEN, UP TO 24 HOURS

## 2022-09-22 PROCEDURE — 94640 AIRWAY INHALATION TREATMENT: CPT

## 2022-09-22 PROCEDURE — 36415 COLL VENOUS BLD VENIPUNCTURE: CPT | Performed by: STUDENT IN AN ORGANIZED HEALTH CARE EDUCATION/TRAINING PROGRAM

## 2022-09-22 PROCEDURE — 25000003 PHARM REV CODE 250: Performed by: HOSPITALIST

## 2022-09-22 PROCEDURE — 80048 BASIC METABOLIC PNL TOTAL CA: CPT | Performed by: STUDENT IN AN ORGANIZED HEALTH CARE EDUCATION/TRAINING PROGRAM

## 2022-09-22 PROCEDURE — 63600175 PHARM REV CODE 636 W HCPCS: Performed by: STUDENT IN AN ORGANIZED HEALTH CARE EDUCATION/TRAINING PROGRAM

## 2022-09-22 PROCEDURE — 25000242 PHARM REV CODE 250 ALT 637 W/ HCPCS: Performed by: HOSPITALIST

## 2022-09-22 RX ORDER — BENZONATATE 100 MG/1
100 CAPSULE ORAL 3 TIMES DAILY PRN
Qty: 30 CAPSULE | Refills: 0 | Status: SHIPPED | OUTPATIENT
Start: 2022-09-22 | End: 2022-10-02

## 2022-09-22 RX ORDER — BUDESONIDE AND FORMOTEROL FUMARATE DIHYDRATE 80; 4.5 UG/1; UG/1
1 AEROSOL RESPIRATORY (INHALATION) DAILY
Qty: 10.2 G | Refills: 2 | Status: SHIPPED | OUTPATIENT
Start: 2022-09-22

## 2022-09-22 RX ORDER — ALBUTEROL SULFATE 0.83 MG/ML
2.5 SOLUTION RESPIRATORY (INHALATION) EVERY 8 HOURS
Qty: 180 ML | Refills: 11 | Status: SHIPPED | OUTPATIENT
Start: 2022-09-22 | End: 2023-09-22

## 2022-09-22 RX ADMIN — ATORVASTATIN CALCIUM 40 MG: 40 TABLET, FILM COATED ORAL at 09:09

## 2022-09-22 RX ADMIN — APIXABAN 5 MG: 5 TABLET, FILM COATED ORAL at 09:09

## 2022-09-22 RX ADMIN — CEFTRIAXONE 1 G: 1 INJECTION, SOLUTION INTRAVENOUS at 02:09

## 2022-09-22 RX ADMIN — METOPROLOL SUCCINATE 100 MG: 50 TABLET, EXTENDED RELEASE ORAL at 09:09

## 2022-09-22 RX ADMIN — LEVALBUTEROL HYDROCHLORIDE 1.25 MG: 1.25 SOLUTION, CONCENTRATE RESPIRATORY (INHALATION) at 12:09

## 2022-09-22 RX ADMIN — FLUTICASONE FUROATE AND VILANTEROL TRIFENATATE 1 PUFF: 100; 25 POWDER RESPIRATORY (INHALATION) at 07:09

## 2022-09-22 RX ADMIN — AMIODARONE HYDROCHLORIDE 200 MG: 200 TABLET ORAL at 09:09

## 2022-09-22 RX ADMIN — LEVALBUTEROL HYDROCHLORIDE 1.25 MG: 1.25 SOLUTION, CONCENTRATE RESPIRATORY (INHALATION) at 07:09

## 2022-09-22 NOTE — NURSING
PER handoff received from DELTA Munoz     Pt resting in bed quietly. NAD noted. No c/o pain. 2L oxygen via nasal canula in use  Fall and safety precautions maintained. Bed alarm activated and audible.. Bed locked in lowest position, with side rails up x2. Call bell and personal items within reach

## 2022-09-22 NOTE — PLAN OF CARE
[facility] - Telemetry  Discharge Final Note    Primary Care Provider: [facility] [place]    Expected Discharge Date: 9/22/2022    All needs met. Patient to schedule follow up appointment with PCP. Home O2 provided by [facility]. SW notified nurse Kanika that patient is ready for discharge from case management.     Final Discharge Note (most recent)       Final Note - 09/22/22 1301          Final Note    Assessment Type Final Discharge Note     Anticipated Discharge Disposition Home or Self Care     What phone number can be called within the next 1-3 days to see how you are doing after discharge? [phone]        Post-Acute Status    Post-Acute Authorization HME     HME Status Set-up Complete/Auth obtained     Coverage Medicaid     Other Status No Post-Acute Service Needs     Discharge Delays None known at this time                     Important Message from Medicare             Contact Info       [facility]   Relationship: PCP - General    [address] [place] [address]   Phone: [phone]       Next Steps: Schedule an appointment as soon as possible for a visit in 1 week(s)    [facility] - [address] [id number] [phone]       Next Steps: Follow up    Instructions: Clinic will call to schedule outpatient physical therapy.    [facility] - Emergency Dept   Specialty: Emergency Medicine    [address]  [facility] [address]   Phone: [phone]       Next Steps: Follow up    Instructions: As needed, If symptoms worsen    [facility]   Specialty: DME Provider    [address] [place] [address]   Phone: [phone]       Next Steps: Follow up    Instructions: Please call company if you have any questions or concerns about home O2.

## 2022-09-22 NOTE — NURSING
Discharge instructions given to patient at bedside. Patient verbalized understanding and states willingness to comply. Saline lock removed. Tele monitoring removed.

## 2022-09-22 NOTE — PROGRESS NOTES
[facility] Medicine  Telemedicine Progress Note    Patient Name: Marck Madison  MRN: [id number]  Patient Class: IP- Inpatient   Admission Date: 9/17/2022  Length of Stay: 3 days  Attending Physician: Dannie Cronin MD  [facility] Provider: [facility]          Subjective:     Principal Problem:Acute respiratory failure        HPI:  61 yo male with history of diastolic CHF, afib/aflutter on Eliquis, NSVT, cirrhosis, CAD, DM2 on insulin, COPD/emphysema, metabolic encephalopathy, hep C, thrombocytopenia, presents with c/o SOB/DONNELLY. Patient states symptoms started acutely over the past 24hrs and has been progressively getting worse. He is unable to do anything without gasping for air. He also endorses associated dry cough, palpitations, generalized weakness and progressive inability to ambulate. Per his niece, he has been having progressively worsening gait instability which started while he was in [facility]. Patient has been using a wheelchair for the past 6 months but he is still able to stand and walk by himself but has been having frequent falls. Patient seemed to have fallen today witness by nephew and he eventually brought to the hospital.  Patient had just recently been released from [facility] within the past 6 weeks, he was incarcerated for about 7 months.   Patient noted with significant tachypnea, and also febrile upon ER presentation. CXR with concern for left lobar PNA.     Patient with another MRN [id number]      Overview/Hospital Course:  Patient with history of diastolic CHF, afib/aflutter on Eliquis, NSVT, cirrhosis, CAD, DM2 on insulin, COPD/emphysema, metabolic encephalopathy, hep C, thrombocytopenia admitted for acute hypoxic respiratory failure due to multi lobular pneumonia.  Patient is not on home oxygen. He was started on IV Abx for pneumonia,nebulizer and IV lasix, and has been stable on NC O 2. Weaning O2 as tolerated. Noted to have elevated amnionia  level,but he is alert  and oriented,started on lactulose and ammonia level has improved. Cr increased, likely due to lasix. Cr improved after lasix was held. PT/OT consulted and recommend outpatient PT/OT. Blood cx from 09/17 with gram positive cocci in clusters in 1 of 4 bottle. Suspect contaminant. Repeat blood cultures ordered and patient started on vancomycin. Monitor closely at this time.        Interval History:  No acute overnight events.  Patient still with some cough, denies SOB however hypoxic on six minute walk test. Will likely need oxygen upon discharge home tomorrow. Blood culture resulted as contaminant so vancomycin d/c.     Review of Systems   Constitutional:  Negative for chills, fatigue and fever.   HENT:  Negative for congestion and trouble swallowing.    Eyes:  Negative for photophobia and visual disturbance.   Respiratory:  Positive for cough. Negative for chest tightness, shortness of breath and wheezing.    Cardiovascular:  Negative for chest pain, palpitations and leg swelling.   Gastrointestinal:  Negative for abdominal pain, nausea and vomiting.   Genitourinary:  Negative for difficulty urinating and dysuria.   Musculoskeletal:  Negative for joint swelling.   Neurological:  Negative for dizziness, weakness and headaches.   Psychiatric/Behavioral:  Negative for confusion and hallucinations.      Objective:     Vital Signs (Most Recent):  Temp: 99.6 °F (37.6 °C) (09/21/22 1608)  Pulse: 97 (09/21/22 1608)  Resp: 18 (09/21/22 1608)  BP: (!) 147/88 (09/21/22 1608)  SpO2: 95 % (09/21/22 1608) Vital Signs (24h Range):  Temp:  [98.2 °F (36.8 °C)-99.6 °F (37.6 °C)] 99.6 °F (37.6 °C)  Pulse:  [] 97  Resp:  [18-19] 18  SpO2:  [90 %-97 %] 95 %  BP: (145-184)/() 147/88     Weight: 80.4 kg (177 lb 4 oz)  Body mass index is 30.42 kg/m².    Intake/Output Summary (Last 24 hours) at 9/21/2022 2016  Last data filed at 9/21/2022 1700  Gross per 24 hour   Intake 600 ml   Output 1100 ml   Net -500  ml        Physical Exam  Vitals and nursing note reviewed.   Constitutional:       General: He is not in acute distress.     Appearance: He is not ill-appearing.   HENT:      Head: Atraumatic.   Eyes:      Conjunctiva/sclera: Conjunctivae normal.   Pulmonary:      Effort: Pulmonary effort is normal. No respiratory distress.      Comments: Currently on 2L NC  Abdominal:      Tenderness: There is no guarding.   Musculoskeletal:         General: Normal range of motion.   Skin:     General: Skin is warm and dry.   Neurological:      General: No focal deficit present.      Mental Status: He is alert and oriented to person, place, and time.   Psychiatric:         Mood and Affect: Mood normal.         Thought Content: Thought content normal.       Significant Labs: All pertinent labs within the past 24 hours have been reviewed.    Significant Imaging: I have reviewed all pertinent imaging results/findings within the past 24 hours.      Assessment/Plan:      * Acute respiratory failure  -On admit, patient very tachypneic and in significant respiratory distress, hypoxic in 80s requiring supplemental oxygen  -pt not on home oxygen   -CXR: Left mid lung area of consolidation.  -CTA chest with no PE. Patchy and confluent airspace opacities within the left upper lobe and lingula as well as to a lesser degree within the right middle lobe.  Findings may relate to multifocal infection.   -Suspect 2/2 PNA and COPD,CHF  exacerbation  -Continue treatment for PNA and COPD and wean off oxygen appropriately    Positive blood culture  - started on IV vanc however resulted as a contaminant so IV vanc d/c      Class 1 obesity with serious comorbidity and body mass index (BMI) of 30.0 to 30.9 in adult  Body mass index is 30.42 kg/m². Morbid obesity complicates all aspects of disease management from diagnostic modalities to treatment. Weight loss encouraged and health benefits explained to patient.         NSTEMI (non-ST elevated myocardial  infarction)  He denies any chest pain  Troponin seems chronically elevated but higher than his baseline  Possibly type II from acute process,denies chest pain.      CAD (coronary artery disease)  Continue home metoprolol, atorvastatin    Thrombocytopenia  2/2 cirrhosis  Continue to monitor      HCV infection  No acute process      Type 2 diabetes mellitus  Meds:SSI PRN to maintain goal 140-180  ADA diet, accuchecks ACHS, hypoglycemic protocol      COPD (chronic obstructive pulmonary disease) with exacerbation  Cont KINGS/COLETTE, LABA/ICS, chest physiotherapy  No wheezing  Wean O2 as tolerated        Permanent atrial fibrillation  Continue home metoprolol, amiodarone, and Eliquis  Currently rate controlled    Cirrhosis  He denies any GI bleeds  Labs with significantly elevated ammonia but he otherwise is AAOX4  On  Lactulose with multiple BM and ammonia level now normal       Acute on chronic diastolic congestive heart failure  He does not appear volumed overloaded  Lasix held given rising Cr  Cr now improved       Pneumonia due to infectious organism  -Pt presented in respiratory distress with cough and shortness of breath on admission  -CTA chest with no PE but findings consistent with pneumonia.   -Continue on rocephin and azithromycin  -Continue to monitor response to treatment  -Wean O2 as tolerated        VTE Risk Mitigation (From admission, onward)         Ordered     apixaban tablet 5 mg  2 times daily         09/18/22 1010     IP VTE HIGH RISK PATIENT  Once         09/18/22 0321     Place sequential compression device  Until discontinued         09/18/22 0321                      I have completed this tele-visit without the assistance of a telepresenter.    The attending portion of this evaluation, treatment, and documentation was performed per Dannie Cronin MD via Telemedicine AudioVisual using the secure [email or web address] software platform with 2 way audio/video. The provider was located off-site and the patient is  located in the hospital. The aforementioned video software was utilized to document the relevant history and physical exam    Dannie Cronin MD  Department of [facility] Medicine   [facility]

## 2022-09-22 NOTE — PLAN OF CARE
Patient approved for home O2 by Orlin at [facility].        09/22/22 1244   Post-Acute Status   Post-Acute Authorization HME   HME Status Set-up Complete/Auth obtained   Coverage Medicaid   Other Status No Post-Acute Service Needs   Discharge Delays None known at this time   Discharge Plan   Discharge Plan A Home   Discharge Plan B Home

## 2022-09-22 NOTE — ASSESSMENT & PLAN NOTE
-Pt presented in respiratory distress with cough and shortness of breath on admission  -CTA chest with no PE but findings consistent with pneumonia.   -Continue on rocephin and azithromycin  -Continue to monitor response to treatment  -Wean O2 as tolerated

## 2022-09-22 NOTE — PLAN OF CARE
Problem: Adult Inpatient Plan of Care  Goal: Plan of Care Review  Outcome: Met  Goal: Patient-Specific Goal (Individualized)  Outcome: Met  Goal: Absence of Hospital-Acquired Illness or Injury  Outcome: Met  Goal: Optimal Comfort and Wellbeing  Outcome: Met  Goal: Readiness for Transition of Care  Outcome: Met     Problem: Diabetes Comorbidity  Goal: Blood Glucose Level Within Targeted Range  Outcome: Met     Problem: Fluid Imbalance (Pneumonia)  Goal: Fluid Balance  Outcome: Met     Problem: Infection (Pneumonia)  Goal: Resolution of Infection Signs and Symptoms  Outcome: Met     Problem: Respiratory Compromise (Pneumonia)  Goal: Effective Oxygenation and Ventilation  Outcome: Met     Problem: Skin Injury Risk Increased  Goal: Skin Health and Integrity  Outcome: Met

## 2022-09-22 NOTE — SUBJECTIVE & OBJECTIVE
Interval History:  No acute overnight events.  Patient still with some cough, denies SOB however hypoxic on six minute walk test. Will likely need oxygen upon discharge home tomorrow. Blood culture resulted as contaminant so vancomycin d/c.     Review of Systems   Constitutional:  Negative for chills, fatigue and fever.   HENT:  Negative for congestion and trouble swallowing.    Eyes:  Negative for photophobia and visual disturbance.   Respiratory:  Positive for cough. Negative for chest tightness, shortness of breath and wheezing.    Cardiovascular:  Negative for chest pain, palpitations and leg swelling.   Gastrointestinal:  Negative for abdominal pain, nausea and vomiting.   Genitourinary:  Negative for difficulty urinating and dysuria.   Musculoskeletal:  Negative for joint swelling.   Neurological:  Negative for dizziness, weakness and headaches.   Psychiatric/Behavioral:  Negative for confusion and hallucinations.      Objective:     Vital Signs (Most Recent):  Temp: 99.6 °F (37.6 °C) (09/21/22 1608)  Pulse: 97 (09/21/22 1608)  Resp: 18 (09/21/22 1608)  BP: (!) 147/88 (09/21/22 1608)  SpO2: 95 % (09/21/22 1608) Vital Signs (24h Range):  Temp:  [98.2 °F (36.8 °C)-99.6 °F (37.6 °C)] 99.6 °F (37.6 °C)  Pulse:  [] 97  Resp:  [18-19] 18  SpO2:  [90 %-97 %] 95 %  BP: (145-184)/() 147/88     Weight: 80.4 kg (177 lb 4 oz)  Body mass index is 30.42 kg/m².    Intake/Output Summary (Last 24 hours) at 9/21/2022 2016  Last data filed at 9/21/2022 1700  Gross per 24 hour   Intake 600 ml   Output 1100 ml   Net -500 ml        Physical Exam  Vitals and nursing note reviewed.   Constitutional:       General: He is not in acute distress.     Appearance: He is not ill-appearing.   HENT:      Head: Atraumatic.   Eyes:      Conjunctiva/sclera: Conjunctivae normal.   Pulmonary:      Effort: Pulmonary effort is normal. No respiratory distress.      Comments: Currently on 2L NC  Abdominal:      Tenderness: There is no  guarding.   Musculoskeletal:         General: Normal range of motion.   Skin:     General: Skin is warm and dry.   Neurological:      General: No focal deficit present.      Mental Status: He is alert and oriented to person, place, and time.   Psychiatric:         Mood and Affect: Mood normal.         Thought Content: Thought content normal.       Significant Labs: All pertinent labs within the past 24 hours have been reviewed.    Significant Imaging: I have reviewed all pertinent imaging results/findings within the past 24 hours.

## 2022-09-23 ENCOUNTER — PATIENT OUTREACH (OUTPATIENT)
Dept: ADMINISTRATIVE | Facility: CLINIC | Age: 62
End: 2022-09-23
Payer: MEDICAID

## 2022-09-23 NOTE — PROGRESS NOTES
C3 [facility] post hospital discharge follow up call. The patient reports does not have a scheduled HOSFU appointment. C3 nurse was unable to schedule HOSFU appointment for [facility]. Patient advised to contact their PCP to schedule a HOSPFU within 5-7 days. NP HV DECLINED.

## 2022-09-24 LAB
BACTERIA BLD CULT: NORMAL
BACTERIA BLD CULT: NORMAL

## 2022-09-24 NOTE — DISCHARGE SUMMARY
[facility] Medicine  Discharge Summary      Patient Name: Marck Madison  MRN: [id number]  Patient Class: IP- Inpatient  Admission Date: 9/17/2022  Hospital Length of Stay: 5 days  Discharge Date and Time: 9/22/2022  3:18 PM  Attending Physician: No att. providers found   Discharging Provider: Krissy Phillips MD  [facility]: [facility]      HPI:   63 yo male with history of diastolic CHF, afib/aflutter on Eliquis, NSVT, cirrhosis, CAD, DM2 on insulin, COPD/emphysema, metabolic encephalopathy, hep C, thrombocytopenia, presents with c/o SOB/DONNELLY. Patient states symptoms started acutely over the past 24hrs and has been progressively getting worse. He is unable to do anything without gasping for air. He also endorses associated dry cough, palpitations, generalized weakness and progressive inability to ambulate. Per his niece, he has been having progressively worsening gait instability which started while he was in [facility]. Patient has been using a wheelchair for the past 6 months but he is still able to stand and walk by himself but has been having frequent falls. Patient seemed to have fallen today witness by nephew and he eventually brought to the hospital.  Patient had just recently been released from [facility] within the past 6 weeks, he was incarcerated for about 7 months.   Patient noted with significant tachypnea, and also febrile upon ER presentation. CXR with concern for left lobar PNA.     Patient with another MRN [id number]      * No surgery found *      Hospital Course:   Patient with history of diastolic CHF, afib/aflutter on Eliquis, NSVT, cirrhosis, CAD, DM2 on insulin, COPD/emphysema, metabolic encephalopathy, hep C, thrombocytopenia admitted for acute hypoxic respiratory failure due to multi lobular pneumonia.  Patient is not on home oxygen. He was started on IV Abx for pneumonia,nebulizer and IV lasix, and has been stable on NC O 2. Weaning O2 as tolerated. Noted to  have elevated amnionia level,but he is alert  and oriented,started on lactulose and ammonia level has improved. Cr increased, likely due to lasix. Cr improved after lasix was held. PT/OT consulted and recommend outpatient PT/OT. Blood cx from 09/17 with gram positive cocci in clusters in 1 of 4 bottle. Suspect contaminant. Repeat blood cultures ordered and patient started on vancomycin. Blood culture resulted as contaminant so vancomycin d/c. SOB however hypoxic on six minute walk test. Will likely need oxygen upon discharge home tomorrow.        Goals of Care Treatment Preferences:  Code Status: Full Code      Consults:   Consults (From admission, onward)        Status Ordering Provider     Inpatient virtual consult to Hospital Medicine  Once        Provider:  Dannie Cronin MD    Completed KEANU NOONAN consult to case management  Once        Provider:  (Not yet assigned)    EMILIANO Panchal          No new Assessment & Plan notes have been filed under this hospital service since the last note was generated.  Service: Hospital Medicine    Final Active Diagnoses:    Diagnosis Date Noted POA    PRINCIPAL PROBLEM:  Acute respiratory failure [J96.00] 09/18/2022 Yes    Positive blood culture [R78.81] 09/21/2022 Yes    Class 1 obesity with serious comorbidity and body mass index (BMI) of 30.0 to 30.9 in adult [E66.9, Z68.30] 09/20/2022 Not Applicable    Pneumonia due to infectious organism [J18.9] 09/18/2022 Yes    Acute on chronic diastolic congestive heart failure [I50.33] 09/18/2022 Yes    Cirrhosis [K74.60] 09/18/2022 Yes    Permanent atrial fibrillation [I48.21] 09/18/2022 Yes    COPD (chronic obstructive pulmonary disease) with exacerbation [J44.9] 09/18/2022 Yes    Type 2 diabetes mellitus [E11.9] 09/18/2022 Yes    HCV infection [B19.20] 09/18/2022 Yes    Thrombocytopenia [D69.6] 09/18/2022 Yes    CAD (coronary artery disease) [I25.10] 09/18/2022 Yes    NSTEMI (non-ST  "elevated myocardial infarction) [I21.4] 09/18/2022 Yes      Problems Resolved During this Admission:       Discharged Condition: stable    Disposition: Home or Self Care    Follow Up:   Follow-up Information     [facility] Schedule an appointment as soon as possible for a visit in 1 week(s).    Contact information:  [address] [place] [address]  [phone]             [facility] Follow up.    Why: Clinic will call to schedule outpatient physical therapy.  Contact information:  [address] [id number] [phone]           [facility] Follow up.    Specialty: Emergency Medicine  Why: As needed, If symptoms worsen  Contact information:  [address]  [facility] [address] [phone]           [facility] Follow up.    Specialty: DME Provider  Why: Please call company if you have any questions or concerns about home O2.  Contact information:  [address] [facility] [address] [phone]                       Patient Instructions:      NEBULIZER FOR HOME USE     Order Specific Question Answer Comments   Height: 5' 4" (1.626 m)    Weight: 80.4 kg (177 lb 4 oz)    Does patient have medical equipment at home? none Pt states RW was lost or stolen   Length of need (1-99 months): 99      OXYGEN FOR HOME USE     Order Specific Question Answer Comments   Liter Flow 2    Duration With activity    Qualifying Test Performed at: Activity    Oxygen saturation at rest 95    Oxygen saturation with activity 87    Oxygen saturation with activity on oxygen 92    Portable mode: continuous    Route nasal cannula    Device: home concentrator with portable tanks    Length of need (in months): 99 mos    Patient condition with qualifying saturation COPD    Height: 5' 4" (1.626 m)    Weight: 80.4 kg (177 lb 4 oz)    Alternative treatment measures have been tried or considered and deemed clinically ineffective. " Yes      Ambulatory referral/consult to Physical/Occupational Therapy   Standing Status: Future   Referral Priority: Routine Referral Type: Physical Medicine   Referral Reason: Specialty Services Required   Number of Visits Requested: 1     Diet Cardiac     Notify your health care provider if you experience any of the following:  temperature >100.4     Notify your health care provider if you experience any of the following:  persistent nausea and vomiting or diarrhea     Notify your health care provider if you experience any of the following:  severe uncontrolled pain     Notify your health care provider if you experience any of the following:  redness, tenderness, or signs of infection (pain, swelling, redness, odor or green/yellow discharge around incision site)     Notify your health care provider if you experience any of the following:  difficulty breathing or increased cough     Notify your health care provider if you experience any of the following:  severe persistent headache     Notify your health care provider if you experience any of the following:  worsening rash     Notify your health care provider if you experience any of the following:  persistent dizziness, light-headedness, or visual disturbances     Notify your health care provider if you experience any of the following:  increased confusion or weakness     Send follow up & questions to   Order Comments: Patient's primary care physician: [facility]     Activity as tolerated       Significant Diagnostic Studies: Labs:   CMP   Recent Labs   Lab 09/22/22  0419      K 3.6      CO2 24      BUN 14   CREATININE 1.1   CALCIUM 9.5   ANIONGAP 11    and CBC No results for input(s): WBC, HGB, HCT, PLT in the last 48 hours.    Pending Diagnostic Studies:     None         Medications:  Reconciled Home Medications:      Medication List      START taking these medications    albuterol 2.5 mg /3 mL (0.083 %) nebulizer  solution  Commonly known as: PROVENTIL  Use 1 vial per nebulizer every 8 (eight) hours.     benzonatate 100 MG capsule  Commonly known as: TESSALON  Take 1 capsule (100 mg total) by mouth 3 (three) times daily as needed.     SYMBICORT 80-4.5 mcg/actuation Hfaa  Generic drug: budesonide-formoterol 80-4.5 mcg  Inhale 2 puffs into the lungs twice daily        CONTINUE taking these medications    amiodarone 200 MG Tab  Commonly known as: PACERONE  Take 200 mg by mouth once daily.     apixaban 5 mg Tab  Commonly known as: ELIQUIS  Take 5 mg by mouth 2 (two) times daily.     atorvastatin 40 MG tablet  Commonly known as: LIPITOR  Take 40 mg by mouth once daily.     hydrALAZINE 50 MG tablet  Commonly known as: APRESOLINE  Take 50 mg by mouth every 8 (eight) hours.     insulin aspart U-100 100 unit/mL injection  Commonly known as: NovoLOG  Inject 5 Units into the skin 3 (three) times daily before meals.     insulin detemir U-100 100 unit/mL injection  Commonly known as: Levemir  Inject 10 Units into the skin every evening.     metoprolol succinate 100 MG 24 hr tablet  Commonly known as: TOPROL-XL  Take 100 mg by mouth once daily.     NIFEdipine 30 MG Tbsr  Commonly known as: ADALAT CC  Take 30 mg by mouth once daily.     TRULICITY 1.5 mg/0.5 mL pen injector  Generic drug: dulaglutide  Inject into the skin every 7 days. Thursdays        STOP taking these medications    metFORMIN 1000 MG tablet  Commonly known as: GLUCOPHAGE            Indwelling Lines/Drains at time of discharge:   Lines/Drains/Airways     None                 Time spent on the discharge of patient: 39 minutes         The attending portion of this evaluation, treatment, and documentation was performed per Krissy Phillips MD via Telemedicine AudioVisual using the secure [employer] software platform with 2 way audio/video. The provider was located off-site and the patient is located in the hospital. The aforementioned video software was utilized to document the  relevant history and physical exam    Krissy Phillips MD  Department of Hospital Medicine  [facility] [place] - Telemetry

## 2022-09-26 NOTE — PHYSICIAN QUERY
PT Name: Marck Madison  MR #: [id number]     DOCUMENTATION CLARIFICATION      CDS/: Lorena Chun RN CCDS             Contact information:[email or web address]  This form is a permanent document in the medical record.    Query Date: September 26, 2022    By submitting this query, we are merely seeking further clarification of documentation to reflect the severity of illness of your patient. Please utilize your independent clinical judgment when addressing the question(s) below.     The Medical Record contains the following:   Indicators   Supporting Clinical Findings Location in Medical Record   x Chest Pain, Angina He denies any chest pain H&P- Hospital medicine PN 9/21    Coronary Artery Disease     x EKG EKG 23:58: Aflutter with RVR, . PVCs. No STEMI.  EKG 23:59: Aflutter with RVR, . No STEMI.  EKG 00:43: Aflutter, . PVCs. No STEMI. EKG 9/17   x Troponin Troponin seems chronically elevated but higher than his baseline    Troponin- 0.137 H&P- Hospital medicine PN 9/21        Lab 9/17    Echo Results      Angiography     x Documentation of acute cardiac condition NSTEMI (non-ST elevated myocardial infarction)  He denies any chest pain  Troponin seems chronically elevated but higher than his baseline  Possibly type II from acute process,denies chest pain. H&P- Hospital medicine PN 9/21   x Medication/Treatment Aspirin PO in ER  Metoprolol 5 mg IV  Metoprolol 100 mg daily MAR    Other        Please further clarify the NSTEMI.  Thank you.  [   ] NSTEMI   [  x ] NSTEMI/Myocardial Infarction Type 2 due to (please specify): ________   [   ] Demand Ischemia   [   ] Elevated troponin only (without corresponding diagnosis)   [   ] Other:______________________________________   [   ] Clinically Undetermined       Present on admission (POA) status:  [   ] Yes (Y)   [   ] No (N)   [   ] Documentation insufficient to determine if condition is POA (U)   [   ]  Clinically Undetermined (W)     Please  document in your progress notes daily for the duration of treatment until resolved, and include in your discharge summary.  Form No. [id number]

## 2022-09-29 NOTE — PHYSICIAN QUERY
PT Name: Marck Madison  MR #: [id number]     Documentation Clarification      CDS/: Lorena Chun   RN CCDS          Contact information:[email or web address]    This form is a permanent document in the medical record.     Query Date: September 29, 2022    By submitting this query, we are merely seeking further clarification of documentation. Please utilize your independent clinical judgment when addressing the question(s) below.    The Medical Record reflects the following:    Supporting Clinical Findings Location in Medical Record     CHF (congestive heart failure)  He does not appear volumed up  Will start low dose diuretics to keep him euvolemic    Acute respiratory failure  Patient very tachypneic and in significant respiratory distress, hypoxic in 80s requiring supplemental oxygen  Suspect 2/2 PNA and COPD,CHF  exacerbation  Continue treatment for PNA and COPD and wean off oxygen appropriately,  acute hypoxic respiratory failure,not sure he is on home oxygen,poor historian,will try reach family.    Acute on chronic diastolic congestive heart failure  He does not appear volumed up  Will start low dose diuretics to keep him euvolemic  Hod,lasix duo to increased Cr.   H&P          Hospital medicine PN 9/21                        Hospital medicine PN 9/21     Left mid lung area of consolidation.Correlate for round pneumonia.Follow-up until radiographic clearing is urged to exclude underlying mass.    YVH=737    1 dose of IV lasix 40 mg 9/18   CXR 9/17          Lab 9/17    MAR                                                                            Due to conflicting documentation, please clarify the acuity of the Diastolic CHF.  Thank you.      [x   ] Acute on Chronic Diastolic CHF     [   ] Chronic Diastolic CHF     [   ] Other (please specify): ____________     [  ] Clinically undetermined

## 2022-12-26 PROBLEM — J96.00 ACUTE RESPIRATORY FAILURE: Status: RESOLVED | Noted: 2022-09-18 | Resolved: 2022-12-26

## 2022-12-26 PROBLEM — I21.4 NSTEMI (NON-ST ELEVATED MYOCARDIAL INFARCTION): Status: RESOLVED | Noted: 2022-09-18 | Resolved: 2022-12-26

## 2022-12-26 PROBLEM — J18.9 PNEUMONIA DUE TO INFECTIOUS ORGANISM: Status: RESOLVED | Noted: 2022-09-18 | Resolved: 2022-12-26

## 2023-01-28 NOTE — ED NOTES
RT @ bedside to place pt on Hi-flow. Pt calm drowsy from IV medication   Toradol given for abdominal cramping

## 2023-11-08 NOTE — ED NOTES
Pt resting quietly. Hi-flow continues w O2 sat's 94%.     Detail Level: Zone Otc Regimen: Use Sunscreen 30 SPF or Higher daily on sun exposed areas.

## 2024-01-22 NOTE — PLAN OF CARE
IMPORTANT EVENTS THIS SHIFT:  AxOx3  Refusing bed alarm, pt educated, pt continues to refuse.   Pt tearful/guarding, MD notified, pain medication adjusted.  Xray of spine completed, MD notified of results, no new orders.   PRN pain medication given for pain (see MAR).      IMPORTANT EVENTS COMING UP/GOALS (PLEASE INCLUDE WHITE BOARD AND DISCHARGE BOARD UPDATES):  NPO for EGD 1/22/24   PATIENT SPECIAL NEEDS/ACCOMMODATIONS:  [facility]  Tele                Pt recieved intubated on Servo i ventilator with the following settings;. PRVC/  450 TV/ 18 RR? +5 Peep/ 35% FI02  Alarms are set and functioning, Ambu bag at bedside, Pt without distress RT will continue to monitor and wean as tolerated.     1:00 RT placed Pt on  28% 8 lpm T-Piece spontaneous breathing trial for 28 minutes.  Pt would have bouts of tachypnea up to 45 bpm and episodes of apnea. Pt did not pass the spontaneous breathing trial.    1:15  Pt put on SIMV (VC+PS) 370 TV/ PS 10/ 6 RR/ +5 Peep/ 30% FI02.

## 2024-03-15 ENCOUNTER — HOSPITAL ENCOUNTER (INPATIENT)
Facility: HOSPITAL | Age: 64
LOS: 5 days | Discharge: HOME | DRG: 291 | End: 2024-03-20
Attending: EMERGENCY MEDICINE | Admitting: STUDENT IN AN ORGANIZED HEALTH CARE EDUCATION/TRAINING PROGRAM
Payer: MEDICARE

## 2024-03-15 DIAGNOSIS — I48.92 ATRIAL FLUTTER, UNSPECIFIED TYPE: ICD-10-CM

## 2024-03-15 DIAGNOSIS — R09.02 HYPOXIA: ICD-10-CM

## 2024-03-15 DIAGNOSIS — I48.92 ATRIAL FLUTTER: ICD-10-CM

## 2024-03-15 DIAGNOSIS — I48.91 ATRIAL FIBRILLATION WITH RVR: ICD-10-CM

## 2024-03-15 DIAGNOSIS — I50.9 CHF (CONGESTIVE HEART FAILURE): ICD-10-CM

## 2024-03-15 DIAGNOSIS — R42 DIZZY: ICD-10-CM

## 2024-03-15 DIAGNOSIS — I21.4 NSTEMI (NON-ST ELEVATED MYOCARDIAL INFARCTION): ICD-10-CM

## 2024-03-15 DIAGNOSIS — F17.200 TOBACCO USE DISORDER, SEVERE, DEPENDENCE: ICD-10-CM

## 2024-03-15 DIAGNOSIS — R06.02 SHORTNESS OF BREATH: ICD-10-CM

## 2024-03-15 DIAGNOSIS — I50.33 ACUTE ON CHRONIC DIASTOLIC CONGESTIVE HEART FAILURE: ICD-10-CM

## 2024-03-15 DIAGNOSIS — J96.21 ACUTE ON CHRONIC RESPIRATORY FAILURE WITH HYPOXIA: Primary | ICD-10-CM

## 2024-03-15 PROBLEM — N17.9 ACUTE RENAL FAILURE: Status: RESOLVED | Noted: 2019-09-10 | Resolved: 2024-03-15

## 2024-03-15 PROBLEM — J20.9 ACUTE BRONCHITIS WITH BRONCHOSPASM: Status: RESOLVED | Noted: 2021-11-21 | Resolved: 2024-03-15

## 2024-03-15 LAB
ALBUMIN SERPL BCP-MCNC: 3.6 G/DL (ref 3.5–5.2)
ALLENS TEST: ABNORMAL
ALLENS TEST: NORMAL
ALP SERPL-CCNC: 92 U/L (ref 55–135)
ALT SERPL W/O P-5'-P-CCNC: 14 U/L (ref 10–44)
ANION GAP SERPL CALC-SCNC: 13 MMOL/L (ref 8–16)
AORTIC ROOT ANNULUS: 3.5 CM
AORTIC VALVE CUSP SEPERATION: 1.98 CM
APTT PPP: 28.8 SEC (ref 21–32)
ASCENDING AORTA: 2.74 CM
AST SERPL-CCNC: 22 U/L (ref 10–40)
AV INDEX (PROSTH): 0.61
AV MEAN GRADIENT: 3 MMHG
AV PEAK GRADIENT: 5 MMHG
AV VALVE AREA BY VELOCITY RATIO: 2.12 CM²
AV VALVE AREA: 1.88 CM²
AV VELOCITY RATIO: 0.68
BASOPHILS # BLD AUTO: 0.03 K/UL (ref 0–0.2)
BASOPHILS NFR BLD: 0.4 % (ref 0–1.9)
BILIRUB SERPL-MCNC: 0.7 MG/DL (ref 0.1–1)
BNP SERPL-MCNC: 541 PG/ML (ref 0–99)
BSA FOR ECHO PROCEDURE: 1.93 M2
BUN SERPL-MCNC: 30 MG/DL (ref 8–23)
CALCIUM SERPL-MCNC: 9.3 MG/DL (ref 8.7–10.5)
CHLORIDE SERPL-SCNC: 113 MMOL/L (ref 95–110)
CO2 SERPL-SCNC: 15 MMOL/L (ref 23–29)
CREAT SERPL-MCNC: 1.6 MG/DL (ref 0.5–1.4)
CTP QC/QA: YES
CV ECHO LV RWT: 0.9 CM
DELSYS: ABNORMAL
DELSYS: NORMAL
DIFFERENTIAL METHOD BLD: NORMAL
DOP CALC AO PEAK VEL: 1.1 M/S
DOP CALC AO VTI: 16 CM
DOP CALC LVOT AREA: 3.1 CM2
DOP CALC LVOT DIAMETER: 1.99 CM
DOP CALC LVOT PEAK VEL: 0.75 M/S
DOP CALC LVOT STROKE VOLUME: 30.15 CM3
DOP CALC MV VTI: 23 CM
DOP CALCLVOT PEAK VEL VTI: 9.7 CM
E/E' RATIO: 15.57 M/S
ECHO LV POSTERIOR WALL: 1.96 CM (ref 0.6–1.1)
EOSINOPHIL # BLD AUTO: 0.1 K/UL (ref 0–0.5)
EOSINOPHIL NFR BLD: 0.7 % (ref 0–8)
EP: 5
EP: 5
ERYTHROCYTE [DISTWIDTH] IN BLOOD BY AUTOMATED COUNT: 14.2 % (ref 11.5–14.5)
ERYTHROCYTE [SEDIMENTATION RATE] IN BLOOD BY WESTERGREN METHOD: 16 MM/H
ERYTHROCYTE [SEDIMENTATION RATE] IN BLOOD BY WESTERGREN METHOD: 16 MM/H
EST. GFR  (NO RACE VARIABLE): 47 ML/MIN/1.73 M^2
ESTIMATED AVG GLUCOSE: 157 MG/DL (ref 68–131)
ESTIMATED AVG GLUCOSE: 157 MG/DL (ref 68–131)
FIO2: 30
FIO2: 30
FRACTIONAL SHORTENING: 18 % (ref 28–44)
GLUCOSE SERPL-MCNC: 148 MG/DL (ref 70–110)
HBA1C MFR BLD: 7.1 % (ref 4–5.6)
HBA1C MFR BLD: 7.1 % (ref 4–5.6)
HCO3 UR-SCNC: 21.5 MMOL/L (ref 24–28)
HCT VFR BLD AUTO: 45.6 % (ref 40–54)
HGB BLD-MCNC: 15.1 G/DL (ref 14–18)
IMM GRANULOCYTES # BLD AUTO: 0.02 K/UL (ref 0–0.04)
IMM GRANULOCYTES NFR BLD AUTO: 0.3 % (ref 0–0.5)
INR PPP: 1.2 (ref 0.8–1.2)
INTERVENTRICULAR SEPTUM: 1.77 CM (ref 0.6–1.1)
IP: 12
IP: 12
IVC DIAMETER: 2.54 CM
IVRT: 82.46 MSEC
LA MAJOR: 7.92 CM
LA MINOR: 6.71 CM
LA WIDTH: 5.3 CM
LACTATE SERPL-SCNC: 2.2 MMOL/L (ref 0.5–2.2)
LDH SERPL L TO P-CCNC: 1.02 MMOL/L (ref 0.5–2.2)
LEFT ATRIUM SIZE: 5.07 CM
LEFT ATRIUM VOLUME INDEX: 87.8 ML/M2
LEFT ATRIUM VOLUME: 165.93 CM3
LEFT INTERNAL DIMENSION IN SYSTOLE: 3.57 CM (ref 2.1–4)
LEFT VENTRICLE DIASTOLIC VOLUME INDEX: 44.82 ML/M2
LEFT VENTRICLE DIASTOLIC VOLUME: 84.71 ML
LEFT VENTRICLE MASS INDEX: 196 G/M2
LEFT VENTRICLE SYSTOLIC VOLUME INDEX: 28.3 ML/M2
LEFT VENTRICLE SYSTOLIC VOLUME: 53.48 ML
LEFT VENTRICULAR INTERNAL DIMENSION IN DIASTOLE: 4.34 CM (ref 3.5–6)
LEFT VENTRICULAR MASS: 369.85 G
LV LATERAL E/E' RATIO: 13.63 M/S
LV SEPTAL E/E' RATIO: 18.17 M/S
LVOT MG: 1.21 MMHG
LVOT MV: 0.52 CM/S
LYMPHOCYTES # BLD AUTO: 2 K/UL (ref 1–4.8)
LYMPHOCYTES NFR BLD: 27.7 % (ref 18–48)
MAGNESIUM SERPL-MCNC: 1.8 MG/DL (ref 1.6–2.6)
MCH RBC QN AUTO: 29.4 PG (ref 27–31)
MCHC RBC AUTO-ENTMCNC: 33.1 G/DL (ref 32–36)
MCV RBC AUTO: 89 FL (ref 82–98)
MODE: ABNORMAL
MODE: NORMAL
MONOCYTES # BLD AUTO: 0.7 K/UL (ref 0.3–1)
MONOCYTES NFR BLD: 9.3 % (ref 4–15)
MV MEAN GRADIENT: 1 MMHG
MV PEAK E VEL: 1.09 M/S
MV PEAK GRADIENT: 4 MMHG
MV VALVE AREA BY CONTINUITY EQUATION: 1.31 CM2
NEUTROPHILS # BLD AUTO: 4.5 K/UL (ref 1.8–7.7)
NEUTROPHILS NFR BLD: 61.6 % (ref 38–73)
NRBC BLD-RTO: 0 /100 WBC
PCO2 BLDA: 40.3 MMHG (ref 35–45)
PH SMN: 7.34 [PH] (ref 7.35–7.45)
PISA TR MAX VEL: 3.01 M/S
PLATELET # BLD AUTO: 174 K/UL (ref 150–450)
PMV BLD AUTO: 11.8 FL (ref 9.2–12.9)
PO2 BLDA: 50 MMHG (ref 40–60)
POC BE: -4 MMOL/L
POC SATURATED O2: 82 % (ref 95–100)
POC TCO2: 23 MMOL/L (ref 24–29)
POCT GLUCOSE: 112 MG/DL (ref 70–110)
POCT GLUCOSE: 118 MG/DL (ref 70–110)
POCT GLUCOSE: 208 MG/DL (ref 70–110)
POTASSIUM SERPL-SCNC: 3.9 MMOL/L (ref 3.5–5.1)
PROCALCITONIN SERPL IA-MCNC: 0.03 NG/ML
PROT SERPL-MCNC: 7 G/DL (ref 6–8.4)
PROTHROMBIN TIME: 12.8 SEC (ref 9–12.5)
PULM VEIN S/D RATIO: 0.75
PV PEAK D VEL: 0.32 M/S
PV PEAK GRADIENT: 2 MMHG
PV PEAK S VEL: 0.24 M/S
PV PEAK VELOCITY: 0.76 M/S
RA MAJOR: 5.91 CM
RA PRESSURE ESTIMATED: 15 MMHG
RA WIDTH: 5.7 CM
RBC # BLD AUTO: 5.13 M/UL (ref 4.6–6.2)
RIGHT VENTRICULAR END-DIASTOLIC DIMENSION: 4.65 CM
RV TB RVSP: 18 MMHG
RV TISSUE DOPPLER FREE WALL SYSTOLIC VELOCITY 1 (APICAL 4 CHAMBER VIEW): 9.51 CM/S
SAMPLE: ABNORMAL
SAMPLE: NORMAL
SARS-COV-2 RDRP RESP QL NAA+PROBE: NEGATIVE
SINUS: 3.42 CM
SITE: ABNORMAL
SITE: NORMAL
SODIUM SERPL-SCNC: 141 MMOL/L (ref 136–145)
STJ: 2.9 CM
TDI LATERAL: 0.08 M/S
TDI SEPTAL: 0.06 M/S
TDI: 0.07 M/S
TR MAX PG: 36 MMHG
TRICUSPID ANNULAR PLANE SYSTOLIC EXCURSION: 1.08 CM
TROPONIN I SERPL DL<=0.01 NG/ML-MCNC: 0.18 NG/ML (ref 0–0.03)
TROPONIN I SERPL DL<=0.01 NG/ML-MCNC: 0.2 NG/ML (ref 0–0.03)
TROPONIN I SERPL DL<=0.01 NG/ML-MCNC: 0.21 NG/ML (ref 0–0.03)
TV REST PULMONARY ARTERY PRESSURE: 51 MMHG
WBC # BLD AUTO: 7.33 K/UL (ref 3.9–12.7)
Z-SCORE OF LEFT VENTRICULAR DIMENSION IN END DIASTOLE: -1.98
Z-SCORE OF LEFT VENTRICULAR DIMENSION IN END SYSTOLE: 0.72

## 2024-03-15 PROCEDURE — 87635 SARS-COV-2 COVID-19 AMP PRB: CPT | Performed by: EMERGENCY MEDICINE

## 2024-03-15 PROCEDURE — 83036 HEMOGLOBIN GLYCOSYLATED A1C: CPT | Performed by: STUDENT IN AN ORGANIZED HEALTH CARE EDUCATION/TRAINING PROGRAM

## 2024-03-15 PROCEDURE — 25000003 PHARM REV CODE 250: Performed by: EMERGENCY MEDICINE

## 2024-03-15 PROCEDURE — 25000003 PHARM REV CODE 250: Performed by: STUDENT IN AN ORGANIZED HEALTH CARE EDUCATION/TRAINING PROGRAM

## 2024-03-15 PROCEDURE — 96375 TX/PRO/DX INJ NEW DRUG ADDON: CPT

## 2024-03-15 PROCEDURE — 94760 N-INVAS EAR/PLS OXIMETRY 1: CPT | Mod: XB

## 2024-03-15 PROCEDURE — 96365 THER/PROPH/DIAG IV INF INIT: CPT

## 2024-03-15 PROCEDURE — 84484 ASSAY OF TROPONIN QUANT: CPT | Mod: 91 | Performed by: EMERGENCY MEDICINE

## 2024-03-15 PROCEDURE — 25000003 PHARM REV CODE 250: Performed by: INTERNAL MEDICINE

## 2024-03-15 PROCEDURE — 85025 COMPLETE CBC W/AUTO DIFF WBC: CPT | Performed by: EMERGENCY MEDICINE

## 2024-03-15 PROCEDURE — 63600175 PHARM REV CODE 636 W HCPCS: Performed by: EMERGENCY MEDICINE

## 2024-03-15 PROCEDURE — 83605 ASSAY OF LACTIC ACID: CPT

## 2024-03-15 PROCEDURE — 85730 THROMBOPLASTIN TIME PARTIAL: CPT | Performed by: EMERGENCY MEDICINE

## 2024-03-15 PROCEDURE — 87040 BLOOD CULTURE FOR BACTERIA: CPT | Mod: 59 | Performed by: EMERGENCY MEDICINE

## 2024-03-15 PROCEDURE — 25000242 PHARM REV CODE 250 ALT 637 W/ HCPCS: Performed by: STUDENT IN AN ORGANIZED HEALTH CARE EDUCATION/TRAINING PROGRAM

## 2024-03-15 PROCEDURE — 94761 N-INVAS EAR/PLS OXIMETRY MLT: CPT | Mod: XB

## 2024-03-15 PROCEDURE — 83880 ASSAY OF NATRIURETIC PEPTIDE: CPT | Performed by: EMERGENCY MEDICINE

## 2024-03-15 PROCEDURE — 99291 CRITICAL CARE FIRST HOUR: CPT | Mod: 25,,, | Performed by: INTERNAL MEDICINE

## 2024-03-15 PROCEDURE — 93010 ELECTROCARDIOGRAM REPORT: CPT | Mod: 76,,, | Performed by: INTERNAL MEDICINE

## 2024-03-15 PROCEDURE — 5A09357 ASSISTANCE WITH RESPIRATORY VENTILATION, LESS THAN 24 CONSECUTIVE HOURS, CONTINUOUS POSITIVE AIRWAY PRESSURE: ICD-10-PCS | Performed by: EMERGENCY MEDICINE

## 2024-03-15 PROCEDURE — 27100171 HC OXYGEN HIGH FLOW UP TO 24 HOURS

## 2024-03-15 PROCEDURE — 93005 ELECTROCARDIOGRAM TRACING: CPT

## 2024-03-15 PROCEDURE — 94660 CPAP INITIATION&MGMT: CPT

## 2024-03-15 PROCEDURE — 82803 BLOOD GASES ANY COMBINATION: CPT

## 2024-03-15 PROCEDURE — 63600175 PHARM REV CODE 636 W HCPCS: Mod: JZ,JG | Performed by: STUDENT IN AN ORGANIZED HEALTH CARE EDUCATION/TRAINING PROGRAM

## 2024-03-15 PROCEDURE — 94640 AIRWAY INHALATION TREATMENT: CPT

## 2024-03-15 PROCEDURE — 80053 COMPREHEN METABOLIC PANEL: CPT | Performed by: EMERGENCY MEDICINE

## 2024-03-15 PROCEDURE — 83605 ASSAY OF LACTIC ACID: CPT | Performed by: STUDENT IN AN ORGANIZED HEALTH CARE EDUCATION/TRAINING PROGRAM

## 2024-03-15 PROCEDURE — 20000000 HC ICU ROOM

## 2024-03-15 PROCEDURE — 99285 EMERGENCY DEPT VISIT HI MDM: CPT | Mod: 25

## 2024-03-15 PROCEDURE — 5A0935A ASSISTANCE WITH RESPIRATORY VENTILATION, LESS THAN 24 CONSECUTIVE HOURS, HIGH NASAL FLOW/VELOCITY: ICD-10-PCS | Performed by: EMERGENCY MEDICINE

## 2024-03-15 PROCEDURE — 63600175 PHARM REV CODE 636 W HCPCS: Performed by: INTERNAL MEDICINE

## 2024-03-15 PROCEDURE — 0T9B70Z DRAINAGE OF BLADDER WITH DRAINAGE DEVICE, VIA NATURAL OR ARTIFICIAL OPENING: ICD-10-PCS | Performed by: STUDENT IN AN ORGANIZED HEALTH CARE EDUCATION/TRAINING PROGRAM

## 2024-03-15 PROCEDURE — 99900035 HC TECH TIME PER 15 MIN (STAT)

## 2024-03-15 PROCEDURE — P9047 ALBUMIN (HUMAN), 25%, 50ML: HCPCS | Mod: JZ,JG | Performed by: STUDENT IN AN ORGANIZED HEALTH CARE EDUCATION/TRAINING PROGRAM

## 2024-03-15 PROCEDURE — 84145 PROCALCITONIN (PCT): CPT | Performed by: STUDENT IN AN ORGANIZED HEALTH CARE EDUCATION/TRAINING PROGRAM

## 2024-03-15 PROCEDURE — 51798 US URINE CAPACITY MEASURE: CPT

## 2024-03-15 PROCEDURE — 85610 PROTHROMBIN TIME: CPT | Performed by: EMERGENCY MEDICINE

## 2024-03-15 PROCEDURE — 36415 COLL VENOUS BLD VENIPUNCTURE: CPT | Performed by: STUDENT IN AN ORGANIZED HEALTH CARE EDUCATION/TRAINING PROGRAM

## 2024-03-15 PROCEDURE — 27000190 HC CPAP FULL FACE MASK W/VALVE

## 2024-03-15 PROCEDURE — 83735 ASSAY OF MAGNESIUM: CPT | Performed by: EMERGENCY MEDICINE

## 2024-03-15 PROCEDURE — 84484 ASSAY OF TROPONIN QUANT: CPT | Mod: 91 | Performed by: STUDENT IN AN ORGANIZED HEALTH CARE EDUCATION/TRAINING PROGRAM

## 2024-03-15 RX ORDER — INSULIN ASPART 100 [IU]/ML
0-5 INJECTION, SOLUTION INTRAVENOUS; SUBCUTANEOUS
Status: DISCONTINUED | OUTPATIENT
Start: 2024-03-15 | End: 2024-03-20 | Stop reason: HOSPADM

## 2024-03-15 RX ORDER — GLUCAGON 1 MG
1 KIT INJECTION
Status: DISCONTINUED | OUTPATIENT
Start: 2024-03-15 | End: 2024-03-20 | Stop reason: HOSPADM

## 2024-03-15 RX ORDER — IBUPROFEN 200 MG
16 TABLET ORAL
Status: DISCONTINUED | OUTPATIENT
Start: 2024-03-15 | End: 2024-03-20 | Stop reason: HOSPADM

## 2024-03-15 RX ORDER — ATORVASTATIN CALCIUM 40 MG/1
40 TABLET, FILM COATED ORAL DAILY
Status: DISCONTINUED | OUTPATIENT
Start: 2024-03-15 | End: 2024-03-20 | Stop reason: HOSPADM

## 2024-03-15 RX ORDER — TALC
6 POWDER (GRAM) TOPICAL NIGHTLY PRN
Status: DISCONTINUED | OUTPATIENT
Start: 2024-03-15 | End: 2024-03-20 | Stop reason: HOSPADM

## 2024-03-15 RX ORDER — FLUTICASONE FUROATE AND VILANTEROL 100; 25 UG/1; UG/1
1 POWDER RESPIRATORY (INHALATION) DAILY
Status: DISCONTINUED | OUTPATIENT
Start: 2024-03-15 | End: 2024-03-15

## 2024-03-15 RX ORDER — DILTIAZEM HYDROCHLORIDE 5 MG/ML
10 INJECTION INTRAVENOUS
Status: COMPLETED | OUTPATIENT
Start: 2024-03-15 | End: 2024-03-15

## 2024-03-15 RX ORDER — METOPROLOL SUCCINATE 50 MG/1
100 TABLET, EXTENDED RELEASE ORAL DAILY
Status: DISCONTINUED | OUTPATIENT
Start: 2024-03-16 | End: 2024-03-15

## 2024-03-15 RX ORDER — MUPIROCIN 20 MG/G
OINTMENT TOPICAL 2 TIMES DAILY
Status: COMPLETED | OUTPATIENT
Start: 2024-03-15 | End: 2024-03-20

## 2024-03-15 RX ORDER — ONDANSETRON HYDROCHLORIDE 2 MG/ML
4 INJECTION, SOLUTION INTRAVENOUS
Status: COMPLETED | OUTPATIENT
Start: 2024-03-15 | End: 2024-03-15

## 2024-03-15 RX ORDER — ARFORMOTEROL TARTRATE 15 UG/2ML
15 SOLUTION RESPIRATORY (INHALATION) 2 TIMES DAILY
Status: DISCONTINUED | OUTPATIENT
Start: 2024-03-15 | End: 2024-03-20 | Stop reason: HOSPADM

## 2024-03-15 RX ORDER — ASPIRIN 325 MG
325 TABLET ORAL
Status: COMPLETED | OUTPATIENT
Start: 2024-03-15 | End: 2024-03-15

## 2024-03-15 RX ORDER — ALBUMIN HUMAN 250 G/1000ML
25 SOLUTION INTRAVENOUS ONCE
Status: COMPLETED | OUTPATIENT
Start: 2024-03-15 | End: 2024-03-15

## 2024-03-15 RX ORDER — FUROSEMIDE 10 MG/ML
40 INJECTION INTRAMUSCULAR; INTRAVENOUS
Status: COMPLETED | OUTPATIENT
Start: 2024-03-15 | End: 2024-03-15

## 2024-03-15 RX ORDER — FUROSEMIDE 10 MG/ML
40 INJECTION INTRAMUSCULAR; INTRAVENOUS 2 TIMES DAILY
Status: DISCONTINUED | OUTPATIENT
Start: 2024-03-15 | End: 2024-03-20 | Stop reason: HOSPADM

## 2024-03-15 RX ORDER — IBUPROFEN 200 MG
24 TABLET ORAL
Status: DISCONTINUED | OUTPATIENT
Start: 2024-03-15 | End: 2024-03-20 | Stop reason: HOSPADM

## 2024-03-15 RX ORDER — BUDESONIDE 0.5 MG/2ML
0.5 INHALANT ORAL 2 TIMES DAILY
Status: DISCONTINUED | OUTPATIENT
Start: 2024-03-15 | End: 2024-03-20 | Stop reason: HOSPADM

## 2024-03-15 RX ORDER — SODIUM CHLORIDE 0.9 % (FLUSH) 0.9 %
10 SYRINGE (ML) INJECTION
Status: DISCONTINUED | OUTPATIENT
Start: 2024-03-15 | End: 2024-03-20 | Stop reason: HOSPADM

## 2024-03-15 RX ADMIN — AMIODARONE HYDROCHLORIDE 1 MG/MIN: 1.8 INJECTION, SOLUTION INTRAVENOUS at 11:03

## 2024-03-15 RX ADMIN — DILTIAZEM HYDROCHLORIDE 10 MG: 5 INJECTION INTRAVENOUS at 10:03

## 2024-03-15 RX ADMIN — APIXABAN 5 MG: 5 TABLET, FILM COATED ORAL at 08:03

## 2024-03-15 RX ADMIN — ARFORMOTEROL TARTRATE 15 MCG: 15 SOLUTION RESPIRATORY (INHALATION) at 07:03

## 2024-03-15 RX ADMIN — INSULIN DETEMIR 10 UNITS: 100 INJECTION, SOLUTION SUBCUTANEOUS at 01:03

## 2024-03-15 RX ADMIN — ASPIRIN 325 MG ORAL TABLET 325 MG: 325 PILL ORAL at 10:03

## 2024-03-15 RX ADMIN — NITROGLYCERIN 0.5 INCH: 20 OINTMENT TOPICAL at 10:03

## 2024-03-15 RX ADMIN — FUROSEMIDE 40 MG: 10 INJECTION, SOLUTION INTRAVENOUS at 08:03

## 2024-03-15 RX ADMIN — ATORVASTATIN CALCIUM 40 MG: 40 TABLET, FILM COATED ORAL at 01:03

## 2024-03-15 RX ADMIN — BUDESONIDE 0.5 MG: 0.5 INHALANT RESPIRATORY (INHALATION) at 07:03

## 2024-03-15 RX ADMIN — MUPIROCIN: 20 OINTMENT TOPICAL at 08:03

## 2024-03-15 RX ADMIN — ONDANSETRON 4 MG: 2 INJECTION INTRAMUSCULAR; INTRAVENOUS at 11:03

## 2024-03-15 RX ADMIN — FUROSEMIDE 40 MG: 10 INJECTION, SOLUTION INTRAVENOUS at 11:03

## 2024-03-15 RX ADMIN — ALBUMIN (HUMAN) 25 G: 12.5 SOLUTION INTRAVENOUS at 05:03

## 2024-03-15 RX ADMIN — Medication 6 MG: at 08:03

## 2024-03-15 RX ADMIN — AMIODARONE HYDROCHLORIDE 150 MG: 1.5 INJECTION, SOLUTION INTRAVENOUS at 11:03

## 2024-03-15 NOTE — ASSESSMENT & PLAN NOTE
Patient with Hypoxic Respiratory failure which is Acute on chronic.  he is on home oxygen at 2 LPM. This was taken away a couple months pta. Supplemental oxygen was provided and noted- Oxygen Concentration (%):  [30] 30    .   Signs/symptoms of respiratory failure include- tachypnea, increased work of breathing, and respiratory distress. Contributing diagnoses includes - CHF and COPD Labs and images were reviewed. Patient Has recent ABG, which has been reviewed. Will treat underlying causes and adjust management of respiratory failure as follows- Starting diuresis. Weaned off of bipap to NC on admission.    -continuing diuresis  -cardiology consulted.  -wean o2 as tolerated

## 2024-03-15 NOTE — ED NOTES
"Pt requesting to come off BiPAP. Dr Elaine agreed. Pt off BIPAP with labored breathing. Pt reports " I feel fine I want to keep it off for a while". Pt sat up in bed and placed on 2 L NC. 100% on 2L. VSS. Will continue to monitor   "

## 2024-03-15 NOTE — ASSESSMENT & PLAN NOTE
Patient with Long standing persistent (>12 months) atrial fibrillation which is uncontrolled currently with Beta Blocker and Amiodarone. Patient is currently in atrial fibrillation.[id number] Score: 3. Anticoagulation indicated. Anticoagulation done with eliquis .    Cardiology consulted in the ed and pt started on amio drip. Appreciate recommendations on transitioning when able.

## 2024-03-15 NOTE — ASSESSMENT & PLAN NOTE
A-flutter. On IV amiodarone. Reports compliance with eliquis. Repeat echo. If A-flutter persists will discuss CV tomorrow

## 2024-03-15 NOTE — HPI
64-year-old male with history of atrial fibrillation, CAD, CHF, COPD, diabetes, hypertension presents to the emergency department with shortness of breath.  The patient reports his symptoms started about 2-3 days ago.  He is supposed to be on chronic home oxygen, 2 L nasal cannula, however, family member at bedside state they took his tank several months ago.  Although he can not name his medications, he tells me he is taking his Eliquis and his amiodarone.  He has not having any chest pain, abdominal pain, nausea vomiting.  He does have an associated cough productive of white phlegm.  The patient reports he gets very out of breath with short ambulation and with lying flat.       Less SOB, denies CP  EKG A-flutter old IMI PRWP  Hx PAF - reports compliance with eliquis  Initial  A-flutter - improved after IV diltiazem but going back up. On IV amiodarone now  Troponin 0.18  Cr 1.6      Echo 7/1/22  The left ventricle is normal in size with moderate concentric hypertrophy and normal systolic function.  The estimated ejection fraction is 60%.  A diastolic pattern consistent with atrial fibrillation observed.  Normal right ventricular size with normal right ventricular systolic function.  Mild mitral regurgitation.  Mild tricuspid regurgitation.  The estimated PA systolic pressure is 20 mmHg.  Normal central venous pressure (3 mmHg).    Saw me during admission 1/2022    61 y.o. male with past medical history of COPD, DM, and CHF presenting with 1 day history of shortness of breath with associated chest pain, cough and generalized weakness. Patient reports he has been out of his medications for weeks. He denies fever, chills, vomiting, diarrhea or further complaints. He has no known drug allergies.         Denies CP, mild SOB  EKG A-fib 100 PRWP. Currently A-flutter 126   Troponin 0.17 - similar to previous    Cr 1.9     Echo 7/1/21  The estimated ejection fraction is 55%.  The left ventricle is  "normal in size with moderate concentric hypertrophy and normal systolic function.  Grade III left ventricular diastolic dysfunction.  Severe left atrial enlargement.  Moderate mitral regurgitation.  Mild pulmonic regurgitation.  Normal right ventricular size with normal right ventricular systolic function.  Mild right atrial enlargement.  Normal central venous pressure (3 mmHg).  The estimated PA systolic pressure is 48 mmHg.  There is pulmonary hypertension.  Moderate tricuspid regurgitation.     Admitted 11/21/21  Mr. Madison is a 60yo man well known to me from admitting him twice in the recent past.  He has a past medical history of CAD, COVID, tobacco abuse, COPD, DCHF, DM2, Aflutter on Eliquis, and chronic cholelithiasis.  He has been covid vaccinated, but is overdue for his booster.  Last echo showed EF 55%, grade III diastolic dysfunction, severe LAE, mod MR, and mod TR.     He was admitted 6/9/2021 to 6/15/2021, initially to MICU for iv amiodarone.  At discharge, Dr. Ritchie noted, "Mr. Madison was admitted for Afib w/ RVR and COVID. He was put on an amiodarone drip with subsequent cardioversion. He required BiPAP briefly which was later weaned to nasal cannula and then room air. His hospital course was complicated by episodes of hypotension leading to FARHAN and shock liver. His creatinine returned to baseline prior to discharge, and his liver function was steadily improving. By discharge he was on room air at rest. He desaturated to 88% with ambulation, but refused home oxygen. He requested discharge and that wish was granted. He was enrolled in the COVID surveillance program and will follow up with his PCP in the outpatient setting."     He was most recently admitted again on 6/30/21 for 5 days again with Aflutter with RVR, severe sepsis thought due to cholecystitis, and DKA.    On that stay he was admitted to ICU and was treated with on IV Amio and Eliquis.  Cardiology was consulted and he underwent successful " "ESTEBAN/DCCV on 7/2. CHADS-VASc score of 2.  He was continued on  Eliquis and Metoprolol for rate control at discharge.     He now comes to the ED with acute SOB while smoking.  This led to paroxysms of coughing and post-tussive episode of emesis.  He denies any chest pain.  He has been out of all of his medications as well for the past 2 weeks.  On arrival to triage, he was found to have an oxygen saturation of 74% on RA.  He tells me he was sitting outside smoking, "when the weather changed and it got a little cold.  That set me off to coughing and I couldn't catch my breath.  I feel great now."  He states his nephew "who is a little mental" threw all of his medicines in the trash 2 weeks ago, and he has not been able to get them filled.     In the ED his VS's showed RR 34, /128, P 120 with no fever on arrival.  He was initially 74% on RA, placed on 15L NRB at 100%, then weaned to 2L at 97% currently.  Labs showed WBC 12.3, Cr 1.4, Gluc 136, , Trop 0.161 and negative covid.  CXR showed increased interstitial attenuation with bilateral airspace opacification.  Findings are nonspecific but may reflect moderate pulmonary edema.  Atypical pneumonia could present with similar appearance in the right clinical setting.     In the ED he was treated with duonebs, ASA 325mg, hydralazine 100mg, solumedrol 125mg iv x 1, NTG 1" to CW, Lopressor 5mg iv x 1, MgSO4 2g iv x 1.  At admission he was given Lasix 20mg iv x 1, xopenex, atrovent nebs, and Levaquin 500mg po qday.       Hospital Course:   Mr. Madison is a 62yo man well known to me from admitting him twice in the recent past.  He has a past medical history of CAD, COVID, tobacco abuse, COPD, DCHF, DM2, Aflutter on Eliquis, and chronic cholelithiasis.  He has been covid vaccinated, but is overdue for his booster.  Last echo showed EF 55%, grade III diastolic dysfunction, severe LAE, mod MR, and mod TR.     He was admitted 6/9/2021 to 6/15/2021, initially to MICU for " "iv amiodarone.  At discharge, Dr. Ritchie noted, "Mr. Madison was admitted for Afib w/ RVR and COVID. He was put on an amiodarone drip with subsequent cardioversion. He required BiPAP briefly which was later weaned to nasal cannula and then room air. His hospital course was complicated by episodes of hypotension leading to FARHAN and shock liver. His creatinine returned to baseline prior to discharge, and his liver function was steadily improving. By discharge he was on room air at rest. He desaturated to 88% with ambulation, but refused home oxygen. He requested discharge and that wish was granted. He was enrolled in the COVID surveillance program and will follow up with his PCP in the outpatient setting."     He was most recently admitted again on 6/30/21 for 5 days again with Aflutter with RVR, severe sepsis thought due to cholecystitis, and DKA.    On that stay he was admitted to ICU and was treated with on IV Amio and Eliquis.  Cardiology was consulted and he underwent successful ESTEBAN/DCCV on 7/2. CHADS-VASc score of 2.  He was continued on  Eliquis and Metoprolol for rate control at discharge.     He now comes to the ED with acute SOB while smoking.  This led to paroxysms of coughing and post-tussive episode of emesis.  He denies any chest pain.  He has been out of all of his medications as well for the past 2 weeks.  On arrival to triage, he was found to have an oxygen saturation of 74% on RA.  He tells me he was sitting outside smoking, "when the weather changed and it got a little cold.  That set me off to coughing and I couldn't catch my breath.  I feel great now."  He states his nephew "who is a little mental" threw all of his medicines in the trash 2 weeks ago, and he has not been able to get them filled.     In the ED his VS's showed RR 34, /128, P 120 with no fever on arrival.  He was initially 74% on RA, placed on 15L NRB at 100%, then weaned to 2L at 97% currently.  Labs showed WBC 12.3, Cr 1.4, Gluc " "136, , Trop 0.161 and negative covid.  CXR showed increased interstitial attenuation with bilateral airspace opacification.  Findings are nonspecific but may reflect moderate pulmonary edema.  Atypical pneumonia could present with similar appearance in the right clinical setting.     In the ED he was treated with duonebs, ASA 325mg, hydralazine 100mg, solumedrol 125mg iv x 1, NTG 1" to CW, Lopressor 5mg iv x 1, MgSO4 2g iv x 1.  At admission he was given Lasix 20mg iv x 1, xopenex, atrovent nebs, and Levaquin 500mg po qday.  Unfortunately,patient decided leave AMA.     "

## 2024-03-15 NOTE — ED NOTES
"Pt to ER with reports of increased SOB worsening over the past 3 days. Pt reports "has been off home oxygen x 1 month due to no longer needing it". Pt placed on 2L N/C. And hooked up to monitor.  IV started and pt awaiting MD evaluation   "

## 2024-03-15 NOTE — ASSESSMENT & PLAN NOTE
Diuresis and afterload reduction as tolerated    Echo    Interpretation Summary  · The left ventricle is normal in size with moderate concentric hypertrophy and normal systolic function.  · The estimated ejection fraction is 60%.  · A diastolic pattern consistent with atrial fibrillation observed.  · Normal right ventricular size with normal right ventricular systolic function.  · Mild mitral regurgitation.  · Mild tricuspid regurgitation.  · The estimated PA systolic pressure is 20 mmHg.  · Normal central venous pressure (3 mmHg).    Recent Labs   Lab 03/15/24  1005   *

## 2024-03-15 NOTE — ASSESSMENT & PLAN NOTE
"Patient's FSGs are controlled on current medication regimen.  Last A1c reviewed-   Lab Results   Component Value Date    HGBA1C 6.5 (H) 05/16/2023     Most recent fingerstick glucose reviewed- No results for input(s): "POCTGLUCOSE" in the last 24 hours.  Current correctional scale  Low  Maintain anti-hyperglycemic dose as follows-   Antihyperglycemics (From admission, onward)      Start     Stop Route Frequency Ordered    03/15/24 1315  insulin detemir U-100 (Levemir) pen 10 Units         -- SubQ Daily 03/15/24 1204    03/15/24 1304  insulin aspart U-100 pen 0-5 Units         -- SubQ Before meals & nightly PRN 03/15/24 1204          Hold Oral hypoglycemics while patient is in the hospital.    "

## 2024-03-15 NOTE — SUBJECTIVE & OBJECTIVE
"Past Medical History:   Diagnosis Date    Arrhythmia 2017    aflutter    Atrial flutter     CAD (coronary artery disease)     CHF (congestive heart failure), NYHA class I 2017    Cholelithiasis with chronic cholecystitis     Chronic diastolic heart failure     Cigarette smoker     COPD (chronic obstructive pulmonary disease)     COVID-19 06/01/2021    Diabetes mellitus     DKA (diabetic ketoacidosis)     Hypertension     NSTEMI (non-ST elevation myocardial infarction)     NSVT (nonsustained ventricular tachycardia)     Psychiatric disorder     h/o "schizophrena/bipolar"    Schizoaffective disorder     Severe sepsis        Past Surgical History:   Procedure Laterality Date    LIVER SURGERY      abscess drainage; 1970s    TREATMENT OF CARDIAC ARRHYTHMIA  9/12/2019    Procedure: Cardioversion or Defibrillation;  Surgeon: Jonathan Lopez MD;  Location: [facility] CATH LAB;  Service: Cardiology;;    TREATMENT OF CARDIAC ARRHYTHMIA N/A 2/3/2022    Procedure: Cardioversion or Defibrillation;  Surgeon: Trevor Schwab MD;  Location: [facility] CATH LAB;  Service: Cardiology;  Laterality: N/A;       Review of patient's allergies indicates:  No Known Allergies    No current facility-administered medications on file prior to encounter.     Current Outpatient Medications on File Prior to Encounter   Medication Sig    amiodarone (PACERONE) 200 MG Tab Take 200 mg by mouth once daily.    amLODIPine (NORVASC) 5 MG tablet Take 5 mg by mouth once daily.    apixaban (ELIQUIS) 5 mg Tab Take 1 tablet (5 mg total) by mouth 2 (two) times daily.    apixaban (ELIQUIS) 5 mg Tab Take 5 mg by mouth 2 (two) times daily.    aspirin 81 MG Chew Take 1 tablet (81 mg total) by mouth once daily.    atorvastatin (LIPITOR) 40 MG tablet Take 40 mg by mouth once daily.    blood-glucose meter kit Use as instructed    budesonide-formoterol 80-4.5 mcg (SYMBICORT) 80-4.5 mcg/actuation HFAA Inhale 2 puffs into the lungs twice daily    dulaglutide (TRULICITY) 1.5 " mg/0.5 mL pen injector Inject into the skin every 7 days. Thursdays    furosemide (LASIX) 40 MG tablet Take 1 tablet (40 mg total) by mouth once daily.    hydrALAZINE (APRESOLINE) 50 MG tablet Take 50 mg by mouth every 8 (eight) hours.    insulin aspart U-100 (NOVOLOG) 100 unit/mL injection Inject 5 Units into the skin 3 (three) times daily before meals.    insulin detemir U-100 (LEVEMIR) 100 unit/mL injection Inject 10 Units into the skin every evening.    insulin NPH-insulin regular, 70/30, (NOVOLIN 70/30) 100 unit/mL (70-30) injection Inject into the skin 2 (two) times daily.    metFORMIN (FORTAMET) 1,000 mg 24hr tablet Take 1,000 mg by mouth daily with breakfast.    metoprolol succinate (TOPROL-XL) 100 MG 24 hr tablet Take 100 mg by mouth once daily.    NIFEdipine (ADALAT CC) 30 MG TbSR Take 30 mg by mouth once daily.     Family History    None       Tobacco Use    Smoking status: Every Day     Current packs/day: 1.00     Average packs/day: 1 pack/day for 15.0 years (15.0 ttl pk-yrs)     Types: Cigarettes    Smokeless tobacco: Never   Substance and Sexual Activity    Alcohol use: Yes     Comment: daily    Drug use: Never    Sexual activity: Not Currently     Review of Systems   Unable to perform ROS: Acuity of condition     Objective:     Vital Signs (Most Recent):  Temp: 97.5 °F (36.4 °C) (03/15/24 0914)  Pulse: 89 (03/15/24 1200)  Resp: 20 (03/15/24 1200)  BP: 101/61 (03/15/24 1200)  SpO2: 95 % (03/15/24 1200) Vital Signs (24h Range):  Temp:  [97.5 °F (36.4 °C)] 97.5 °F (36.4 °C)  Pulse:  [] 89  Resp:  [19-34] 20  SpO2:  [76 %-99 %] 95 %  BP: (101-165)/() 101/61     Weight: 79.8 kg (176 lb)  Body mass index is 28.41 kg/m².    SpO2: 95 %         Intake/Output Summary (Last 24 hours) at 3/15/2024 1239  Last data filed at 3/15/2024 1124  Gross per 24 hour   Intake 100 ml   Output --   Net 100 ml       Lines/Drains/Airways       Peripheral Intravenous Line  Duration                  Peripheral IV -  Single Lumen 03/15/24 0927 20 G Posterior;Right Hand <1 day         Peripheral IV - Single Lumen 03/15/24 1021 Anterior;Left;Proximal Forearm <1 day                     Physical Exam  Constitutional:       Appearance: He is well-developed.   HENT:      Head: Normocephalic and atraumatic.   Eyes:      Conjunctiva/sclera: Conjunctivae normal.      Pupils: Pupils are equal, round, and reactive to light.   Cardiovascular:      Rate and Rhythm: Normal rate. Rhythm irregular.      Pulses: Intact distal pulses.      Heart sounds: Normal heart sounds.   Pulmonary:      Effort: Pulmonary effort is normal.      Breath sounds: Normal breath sounds.   Abdominal:      General: Bowel sounds are normal.      Palpations: Abdomen is soft.   Musculoskeletal:         General: Normal range of motion.      Cervical back: Normal range of motion and neck supple.   Skin:     General: Skin is warm and dry.   Neurological:      Mental Status: He is alert and oriented to person, place, and time.          Significant Labs: All pertinent lab results from the last 24 hours have been reviewed.    Significant Imaging: Echocardiogram: 2D echo with color flow doppler: No results found for this or any previous visit.

## 2024-03-15 NOTE — ED PROVIDER NOTES
"Encounter Date: 3/15/2024       History     Chief Complaint   Patient presents with    Shortness of Breath     Patient is 65yo male that is having SOB and dry cough tht started 3 days ago. Denied any chest pain. Wife reports that abdomen uis more swollen than normal. Hx of afib and in afib with rvr.on home o2 but has not had tank in a month.      64-year-old male with history of atrial fibrillation, CAD, CHF, COPD, diabetes, hypertension presents to the emergency department with shortness of breath.  The patient reports his symptoms started about 2-3 days ago.  He is supposed to be on chronic home oxygen, 2 L nasal cannula, however, family member at bedside state they took his tank several months ago.  Although he can not name his medications, he tells me he is taking his Eliquis and his amiodarone.  He has not having any chest pain, abdominal pain, nausea vomiting.  He does have an associated cough productive of white phlegm.  The patient reports he gets very out of breath with short ambulation and with lying flat.    The history is provided by the patient and medical records.     Review of patient's allergies indicates:  No Known Allergies  Past Medical History:   Diagnosis Date    Arrhythmia 2017    aflutter    Atrial flutter     CAD (coronary artery disease)     CHF (congestive heart failure), NYHA class I 2017    Cholelithiasis with chronic cholecystitis     Chronic diastolic heart failure     Cigarette smoker     COPD (chronic obstructive pulmonary disease)     COVID-19 06/01/2021    Diabetes mellitus     DKA (diabetic ketoacidosis)     Hypertension     NSTEMI (non-ST elevation myocardial infarction)     NSVT (nonsustained ventricular tachycardia)     Psychiatric disorder     h/o "schizophrena/bipolar"    Schizoaffective disorder     Severe sepsis      Past Surgical History:   Procedure Laterality Date    LIVER SURGERY      abscess drainage; 1970s    TREATMENT OF CARDIAC ARRHYTHMIA  9/12/2019    Procedure: " Cardioversion or Defibrillation;  Surgeon: Jonathan Lopez MD;  Location: [facility] CATH LAB;  Service: Cardiology;;    TREATMENT OF CARDIAC ARRHYTHMIA N/A 2/3/2022    Procedure: Cardioversion or Defibrillation;  Surgeon: Trevor Schwab MD;  Location: [facility] CATH LAB;  Service: Cardiology;  Laterality: N/A;     No family history on file.  Social History     Tobacco Use    Smoking status: Every Day     Current packs/day: 1.00     Average packs/day: 1 pack/day for 15.0 years (15.0 ttl pk-yrs)     Types: Cigarettes    Smokeless tobacco: Never   Substance Use Topics    Alcohol use: Yes     Comment: daily    Drug use: Never     Review of Systems   Constitutional:  Negative for chills and fever.   HENT:  Negative for trouble swallowing.    Respiratory:  Positive for cough and shortness of breath.    Cardiovascular:  Negative for chest pain.   Gastrointestinal:  Negative for abdominal pain, nausea and vomiting.   Genitourinary:  Positive for difficulty urinating.        Reports occasional urinary incontinence   Skin:  Negative for rash.   Neurological:  Negative for headaches.   Psychiatric/Behavioral:  Negative for agitation.        Physical Exam     Initial Vitals [03/15/24 0914]   BP Pulse Resp Temp SpO2   (!) 165/122 (!) 133 (!) 30 97.5 °F (36.4 °C) (!) 76 %      MAP       --         Physical Exam    Constitutional: He appears well-developed and well-nourished. He is not diaphoretic. No distress.   Body mass index is 28.41 kg/m².     HENT:   Head: Normocephalic and atraumatic.   Eyes: Conjunctivae are normal.   Neck: Neck supple.   Cardiovascular:  An irregularly irregular rhythm present.   Tachycardia present.         Pulmonary/Chest: He is in respiratory distress. He has wheezes. He has rales.   Patient appears very out of breath, he is tachypneic, he is using accessory muscles to breathe.  He has rales, faint expiratory wheezing.   Abdominal: Abdomen is soft. Bowel sounds are normal. There is no abdominal tenderness.    Musculoskeletal:         General: No edema.      Cervical back: Neck supple.     Neurological: He is alert. GCS score is 15. GCS eye subscore is 4. GCS verbal subscore is 5. GCS motor subscore is 6.   Skin: Skin is warm and dry.   Psychiatric: He has a normal mood and affect.         ED Course   Critical Care    Date/Time: 3/15/2024 11:24 AM    Performed by: Moira Prince MD  Authorized by: Moira Prince MD  Total critical care time (exclusive of procedural time) : 0 minutes  Comments: Please put in 45 minutes of critical care due to patient having a high risk of cardiac, respiratory failure.   Separate from teaching and exclusive of procedure and ekg time  Includes:  Time at bedside  Time reviewing test results  Time discussing case with staff  Time documenting the medical record  Time spent with family members  Time spent with consults  Management            Labs Reviewed   COMPREHENSIVE METABOLIC PANEL - Abnormal; Notable for the following components:       Result Value    Chloride 113 (*)     CO2 15 (*)     Glucose 148 (*)     BUN 30 (*)     Creatinine 1.6 (*)     eGFR 47 (*)     All other components within normal limits   TROPONIN I - Abnormal; Notable for the following components:    Troponin I 0.183 (*)     All other components within normal limits   B-TYPE NATRIURETIC PEPTIDE - Abnormal; Notable for the following components:     (*)     All other components within normal limits   PROTIME-INR - Abnormal; Notable for the following components:    Prothrombin Time 12.8 (*)     All other components within normal limits   ISTAT PROCEDURE - Abnormal; Notable for the following components:    POC PH 7.336 (*)     POC HCO3 21.5 (*)     POC BE -4 (*)     POC TCO2 23 (*)     All other components within normal limits   POCT GLUCOSE - Abnormal; Notable for the following components:    POCT Glucose 208 (*)     All other components within normal limits   CULTURE, BLOOD   CULTURE, BLOOD   CBC W/ AUTO  DIFFERENTIAL   APTT   MAGNESIUM   PROCALCITONIN    Narrative:     Collection has been rescheduled by LB3 at 03/15/2024 12:34 Reason:   Nurse Draw   HEMOGLOBIN A1C   HEMOGLOBIN A1C   LACTIC ACID, PLASMA    Narrative:     Collection has been rescheduled by LB3 at 03/15/2024 12:34 Reason:   Nurse Draw   TROPONIN I   SARS-COV-2 RDRP GENE   ISTAT LACTATE   POCT GLUCOSE MONITORING CONTINUOUS     EKG Readings: (Independently Interpreted)   Atrial fibrillation with RVR, rate 133 beats per minute,  milliseconds, no STEMI.     ECG Results              Repeat EKG 12-lead (Preliminary result)  Result time 03/15/24 11:22:19      Wet Read by Moira Prince MD (03/15/24 11:22:19, [facility], Emergency Medicine)    Atrial flutter with variable AV block, rate 70 beats per minute,  milliseconds.  No STEMI.                                  Imaging Results              X-Ray Chest AP Portable (Final result)  Result time 03/15/24 10:14:42      Final result by Brian Moreno DO (03/15/24 10:14:42)                   Impression:      Please see above      Electronically signed by: Brian Moreno DO  Date:    03/15/2024  Time:    10:14               Narrative:    EXAMINATION:  XR CHEST AP PORTABLE    CLINICAL HISTORY:  CHF;    TECHNIQUE:  Single frontal view of the chest was performed.    COMPARISON:  09/17/2022    FINDINGS:  Interval clearing previous left upper lobe consolidation.  There is ill-defined perihilar and bibasilar opacities greater on the right which are nonspecific and may represent vascular congestion and edema.  There is no large pleural effusion or pneumothorax.  Continued atherosclerotic aorta.  Clinical correlation and follow-up advised.                                       Medications   amiodarone 360 mg/200 mL (1.8 mg/mL) infusion (1 mg/min Intravenous New Bag 3/15/24 1127)   amiodarone 360 mg/200 mL (1.8 mg/mL) infusion (has no administration in time range)   apixaban tablet 5 mg  (has no administration in time range)   atorvastatin tablet 40 mg (40 mg Oral Given 3/15/24 1339)   insulin detemir U-100 (Levemir) pen 10 Units (10 Units Subcutaneous Given 3/15/24 1339)   metoprolol succinate (TOPROL-XL) 24 hr tablet 100 mg (has no administration in time range)   sodium chloride 0.9% flush 10 mL (has no administration in time range)   furosemide injection 40 mg (has no administration in time range)   glucose chewable tablet 16 g (has no administration in time range)   glucose chewable tablet 24 g (has no administration in time range)   glucagon (human recombinant) injection 1 mg (has no administration in time range)   insulin aspart U-100 pen 0-5 Units (has no administration in time range)   dextrose 10% bolus 125 mL 125 mL (has no administration in time range)   dextrose 10% bolus 250 mL 250 mL (has no administration in time range)   budesonide nebulizer solution 0.5 mg (has no administration in time range)   arformoteroL nebulizer solution 15 mcg (has no administration in time range)   nitroGLYCERIN 2% TD oint ointment 0.5 inch (0.5 inches Topical (Top) Given 3/15/24 1018)   aspirin tablet 325 mg (325 mg Oral Given 3/15/24 1017)   diltiaZEM injection 10 mg (10 mg Intravenous Given 3/15/24 1019)   amiodarone in dextrose 150 mg/100 mL (1.5 mg/mL) loading dose 150 mg (0 mg Intravenous Stopped 3/15/24 1124)   furosemide injection 40 mg (40 mg Intravenous Given 3/15/24 1132)   ondansetron injection 4 mg (4 mg Intravenous Given 3/15/24 1132)     Medical Decision Making  64-year-old male with history of atrial fibrillation, CHF, diabetes, hypertension, CAD, COPD, supposed to be on chronic home O2, presents emergency department shortness of breath.  Symptoms started 2 days ago.  He is associated cough with white phlegm, he denies any chest pain, fever.  On exam, the patient is tachycardic with an irregularly irregular rhythm, he has rales and faint wheezes, he is in respiratory distress.  The patient's  initial O2 sats were 78% on room air.  He is currently on 2 L nasal cannula.  His oxygenation ranges from the mid 80s to the mid 90s on this.  Will start BiPAP for respiratory support.  His differential is broad and includes not limited to CHF exacerbation, dysrhythmia, CAD, pneumonia, COPD exacerbation.  Workup initiated with labs including cardiac markers, cultures, chest x-ray.  Will initiate BiPAP, nitroglycerin, diltiazem for heart rate control.    Amount and/or Complexity of Data Reviewed  External Data Reviewed: radiology.     Details: Last echo in our system 7/2022: normal LVEF, afib  Labs: ordered.     Details: COVID negative.  Point of care lactate normal.  INR 1.2.  PTT 28.8.  CMP with a bicarb of 15, glucose 148, creatinine of 1.6.  There is no elevated anion gap.  The BNP is elevated at 541.  The troponin is elevated 0.183.  CBC shows no leukocytosis, normal H&H, normal platelet count.  PH is 7.336, pCO2 40.3.  Blood cultures are pending.    Radiology: ordered.     Details: Perihilar and beta bibasilar opacities greater on the right, no large pleural effusion or pneumothorax.  Suspect pulmonary edema.    Risk  OTC drugs.  Prescription drug management.  Decision regarding hospitalization.    Suspect elevated troponin related to tachycardia and hypoxia with demand ischemia.  The patient has no chest pain.  No STEMI noted on ECG.           ED Course as of 03/15/24 1402   Fri Mar 15, 2024   1107 Patient coming off bipap now. Placed initially on 2L NC, O2 sats in low to mid 80s, up to 4 L with SpO2 95-97%. [LH]   1128 Patient back on bipap. Reports nausea, Zofran ordered.  [LH]   1145 Dr. Del Angel to come see patient.  [LH]      ED Course User Index  [LH] Moira Prince MD                       This dictation has been generated using M-Modal Fluency Direct dictation; some phonetic errors may occur.       Clinical Impression:  Final diagnoses:  [R06.02] Shortness of breath  [I48.92] Atrial flutter,  unspecified type  [J96.21] Acute on chronic respiratory failure with hypoxia (Primary)  [R09.02] Hypoxia  [I21.4] NSTEMI (non-ST elevated myocardial infarction)          ED Disposition Condition    Admit Stable                Moira Prince MD  03/15/24 2729

## 2024-03-15 NOTE — H&P
[facility] Emergency Dept  [facility] Medicine  History & Physical    Patient Name: Marck Madison  MRN: [id number]  Patient Class: IP- Inpatient  Admission Date: 3/15/2024  Attending Physician: Thang Del Angel III, MD   Primary Care Provider: [facility] -         Patient information was obtained from patient and ER records.     Subjective:     Principal Problem:Acute hypoxemic respiratory failure    Chief Complaint:   Chief Complaint   Patient presents with    Shortness of Breath     Patient is 65yo male that is having SOB and dry cough tht started 3 days ago. Denied any chest pain. Wife reports that abdomen uis more swollen than normal. Hx of afib and in afib with rvr.on home o2 but has not had tank in a month.         HPI: 64M with pmh of atrial fibrillation, CAD, CHF, COPD, diabetes, hypertension who presents with 3 day h/o worsening dyspnea especially with exertion. He is supposed to be on chronic home oxygen, 2 L nasal cannula, but this was taken away a couple months pta  Although he can not name his medications, he tells me he is taking his Eliquis and his amiodarone. Pt denies missing doses of medications. Pt also endorses a dry cough and some worsening of swelling. Pt denies cp, fever, chills, sick contacts, nausea, vomiting, abdominal pain, or dysuria.  Patient is a current smoker.  In the ED patient noted to be in AFib with RVR and cardiology consulted who started on amiodarone drip.  Initially on BiPAP, but able to transitioned back to nasal cannula at 5 L after dose of Lasix.  Patient to be admitted to the ICU for close monitoring and continued amiodarone drip pending Cardiology recommendation    Past Medical History:   Diagnosis Date    Arrhythmia 2017    aflutter    Atrial flutter     CAD (coronary artery disease)     CHF (congestive heart failure), NYHA class I 2017    Cholelithiasis with chronic cholecystitis     Chronic diastolic heart failure     Cigarette smoker     COPD (chronic  "obstructive pulmonary disease)     COVID-19 06/01/2021    Diabetes mellitus     DKA (diabetic ketoacidosis)     Hypertension     NSTEMI (non-ST elevation myocardial infarction)     NSVT (nonsustained ventricular tachycardia)     Psychiatric disorder     h/o "schizophrena/bipolar"    Schizoaffective disorder     Severe sepsis        Past Surgical History:   Procedure Laterality Date    LIVER SURGERY      abscess drainage; 1970s    TREATMENT OF CARDIAC ARRHYTHMIA  9/12/2019    Procedure: Cardioversion or Defibrillation;  Surgeon: Jonathan Lopez MD;  Location: [facility] CATH LAB;  Service: Cardiology;;    TREATMENT OF CARDIAC ARRHYTHMIA N/A 2/3/2022    Procedure: Cardioversion or Defibrillation;  Surgeon: Trevor Schwab MD;  Location: [facility] CATH LAB;  Service: Cardiology;  Laterality: N/A;       Review of patient's allergies indicates:  No Known Allergies    No current facility-administered medications on file prior to encounter.     Current Outpatient Medications on File Prior to Encounter   Medication Sig    amiodarone (PACERONE) 200 MG Tab Take 200 mg by mouth once daily.    amLODIPine (NORVASC) 5 MG tablet Take 5 mg by mouth once daily.    apixaban (ELIQUIS) 5 mg Tab Take 1 tablet (5 mg total) by mouth 2 (two) times daily.    apixaban (ELIQUIS) 5 mg Tab Take 5 mg by mouth 2 (two) times daily.    aspirin 81 MG Chew Take 1 tablet (81 mg total) by mouth once daily.    atorvastatin (LIPITOR) 40 MG tablet Take 40 mg by mouth once daily.    blood-glucose meter kit Use as instructed    budesonide-formoterol 80-4.5 mcg (SYMBICORT) 80-4.5 mcg/actuation HFAA Inhale 2 puffs into the lungs twice daily    dulaglutide (TRULICITY) 1.5 mg/0.5 mL pen injector Inject into the skin every 7 days. Thursdays    furosemide (LASIX) 40 MG tablet Take 1 tablet (40 mg total) by mouth once daily.    hydrALAZINE (APRESOLINE) 50 MG tablet Take 50 mg by mouth every 8 (eight) hours.    insulin aspart U-100 (NOVOLOG) 100 unit/mL injection Inject " 5 Units into the skin 3 (three) times daily before meals.    insulin detemir U-100 (LEVEMIR) 100 unit/mL injection Inject 10 Units into the skin every evening.    insulin NPH-insulin regular, 70/30, (NOVOLIN 70/30) 100 unit/mL (70-30) injection Inject into the skin 2 (two) times daily.    metFORMIN (FORTAMET) 1,000 mg 24hr tablet Take 1,000 mg by mouth daily with breakfast.    metoprolol succinate (TOPROL-XL) 100 MG 24 hr tablet Take 100 mg by mouth once daily.    NIFEdipine (ADALAT CC) 30 MG TbSR Take 30 mg by mouth once daily.     Family History    None       Tobacco Use    Smoking status: Every Day     Current packs/day: 1.00     Average packs/day: 1 pack/day for 15.0 years (15.0 ttl pk-yrs)     Types: Cigarettes    Smokeless tobacco: Never   Substance and Sexual Activity    Alcohol use: Yes     Comment: daily    Drug use: Never    Sexual activity: Not Currently     Review of Systems  Objective:     Vital Signs (Most Recent):  Temp: 97.5 °F (36.4 °C) (03/15/24 0914)  Pulse: 89 (03/15/24 1200)  Resp: 20 (03/15/24 1200)  BP: 101/61 (03/15/24 1200)  SpO2: 95 % (03/15/24 1200) Vital Signs (24h Range):  Temp:  [97.5 °F (36.4 °C)] 97.5 °F (36.4 °C)  Pulse:  [] 89  Resp:  [19-34] 20  SpO2:  [76 %-99 %] 95 %  BP: (101-165)/() 101/61     Weight: 79.8 kg (176 lb)  Body mass index is 28.41 kg/m².     Physical Exam  Vitals reviewed.   Constitutional:       General: He is in acute distress (mild).      Appearance: He is ill-appearing.   HENT:      Head: Normocephalic and atraumatic.      Mouth/Throat:      Mouth: Mucous membranes are moist.      Pharynx: Oropharynx is clear.   Eyes:      Extraocular Movements: Extraocular movements intact.   Cardiovascular:      Rate and Rhythm: Tachycardia present. Rhythm irregular.   Pulmonary:      Effort: Respiratory distress (on 5L nc with mild tachypnea, but oxygenation adequate and denies distres) present.   Abdominal:      Palpations: Abdomen is soft.      Tenderness:  There is no abdominal tenderness.   Musculoskeletal:         General: Swelling (bilateral le swelling that appears very chronic in presentation) present. No tenderness.      Cervical back: Neck supple. No rigidity.   Skin:     General: Skin is warm and dry.   Neurological:      General: No focal deficit present.      Mental Status: He is alert and oriented to person, place, and time.   Psychiatric:         Mood and Affect: Mood normal.         Behavior: Behavior normal.                Significant Labs: All pertinent labs within the past 24 hours have been reviewed.    Significant Imaging: I have reviewed all pertinent imaging results/findings within the past 24 hours.  Assessment/Plan:     * Acute hypoxemic respiratory failure  Patient with Hypoxic Respiratory failure which is Acute on chronic.  he is on home oxygen at 2 LPM. This was taken away a couple months pta. Supplemental oxygen was provided and noted- Oxygen Concentration (%):  [30] 30    .   Signs/symptoms of respiratory failure include- tachypnea, increased work of breathing, and respiratory distress. Contributing diagnoses includes - CHF and COPD Labs and images were reviewed. Patient Has recent ABG, which has been reviewed. Will treat underlying causes and adjust management of respiratory failure as follows- Starting diuresis. Weaned off of bipap to NC on admission.    -continuing diuresis  -cardiology consulted.  -wean o2 as tolerated    CAD (coronary artery disease)  Patient with known CAD, which is controlled Will continue ASA and Statin and monitor for S/Sx of angina/ACS. Continue to monitor on telemetry. Cardiology consulted.     Cirrhosis  Patient with known Cirrhosis  MELD-Na score calculated; MELD 3.0: 13 at 3/15/2024 10:10 AM  MELD-Na: 13 at 3/15/2024 10:10 AM  Calculated from:  Serum Creatinine: 1.6 mg/dL at 3/15/2024 10:05 AM  Serum Sodium: 141 mmol/L (Using max of 137 mmol/L) at 3/15/2024 10:05 AM  Total Bilirubin: 0.7 mg/dL (Using min of 1  "mg/dL) at 3/15/2024 10:05 AM  Serum Albumin: 3.6 g/dL (Using max of 3.5 g/dL) at 3/15/2024 10:05 AM  INR(ratio): 1.2 at 3/15/2024 10:10 AM  Age at listing (hypothetical): 64 years  Sex: Male at 3/15/2024 10:10 AM      Continue chronic meds. Etiology likely Hepatitis. Will avoid any hepatotoxic meds, and monitor CBC/CMP/INR for synthetic function.     Type 2 diabetes mellitus, with long-term current use of insulin  Patient's FSGs are controlled on current medication regimen.  Last A1c reviewed-   Lab Results   Component Value Date    HGBA1C 6.5 (H) 05/16/2023     Most recent fingerstick glucose reviewed- No results for input(s): "POCTGLUCOSE" in the last 24 hours.  Current correctional scale  Low  Maintain anti-hyperglycemic dose as follows-   Antihyperglycemics (From admission, onward)      Start     Stop Route Frequency Ordered    03/15/24 1315  insulin detemir U-100 (Levemir) pen 10 Units         -- SubQ Daily 03/15/24 1204    03/15/24 1304  insulin aspart U-100 pen 0-5 Units         -- SubQ Before meals & nightly PRN 03/15/24 1204          Hold Oral hypoglycemics while patient is in the hospital.      Moderate cigarette smoker  History noted.      COPD (chronic obstructive pulmonary disease)  Patient's COPD is controlled currently.  Patient is currently off COPD Pathway. Continue scheduled inhalers Supplemental oxygen and monitor respiratory status closely.     Acute on chronic diastolic congestive heart failure  Patient is identified as having Diastolic (HFpEF) heart failure that is Acute on chronic. CHF is currently uncontrolled due to Rales/crackles on pulmonary exam and Pulmonary edema/pleural effusion on CXR. Latest ECHO performed and demonstrates- Results for orders placed during the hospital encounter of 06/30/22    Echo    Interpretation Summary  · The left ventricle is normal in size with moderate concentric hypertrophy and normal systolic function.  · The estimated ejection fraction is 60%.  · A " diastolic pattern consistent with atrial fibrillation observed.  · Normal right ventricular size with normal right ventricular systolic function.  · Mild mitral regurgitation.  · Mild tricuspid regurgitation.  · The estimated PA systolic pressure is 20 mmHg.  · Normal central venous pressure (3 mmHg).  . Continue Beta Blocker, ACE/ARB, and Furosemide and monitor clinical status closely. Monitor on telemetry. Patient is on CHF pathway.  Monitor strict Is&Os and daily weights.  Place on fluid restriction of 1.5 L. Cardiology has been consulted. Continue to stress to patient importance of self efficacy and  on diet for CHF. Last BNP reviewed- and noted below   Recent Labs   Lab 03/15/24  1005   *       Atrial fibrillation with RVR  Patient with Long standing persistent (>12 months) atrial fibrillation which is uncontrolled currently with Beta Blocker and Amiodarone. Patient is currently in atrial fibrillation.[id number] Score: 3. Anticoagulation indicated. Anticoagulation done with eliquis .    Cardiology consulted in the ed and pt started on amio drip. Appreciate recommendations on transitioning when able.    Elevated troponin  Troponin elevated on admission. Appears to be chronically elevated. Will trend. Cardiology consulted         VTE Risk Mitigation (From admission, onward)           Ordered     apixaban tablet 5 mg  2 times daily         03/15/24 1204     IP VTE HIGH RISK PATIENT  Once         03/15/24 1204     Place sequential compression device  Until discontinued         03/15/24 1204                       Critical care time spent on the evaluation and treatment of severe organ dysfunction, review of pertinent labs and imaging studies, discussions with consulting providers and discussions with patient/family: 32 minutes.          AdmissionCare    Guideline: Respiratory Failure, Inpatient    Based on the indications selected for the patient, the bed status of Inpatient was determined to be  MET    The following indications were selected as present at the time of evaluation of the patient:      - New (acute) need for mechanical invasive or noninvasive (eg, bilevel positive airway pressure (BPAP), volume-assured pressure support (VAPS), or volume control) ventilation    AdmissionCare documentation entered by: Alida Dai    [facility] [employer], 27th edition, Copyright © 2023 [facility] [employer], [employer] All Rights Reserved.  [id number]:00    Thang Del Angel III, MD  Department of Hospital Medicine  [facility] [place] - Emergency Dept

## 2024-03-15 NOTE — ASSESSMENT & PLAN NOTE
Patient with known CAD, which is controlled Will continue ASA and Statin and monitor for S/Sx of angina/ACS. Continue to monitor on telemetry. Cardiology consulted.

## 2024-03-15 NOTE — SUBJECTIVE & OBJECTIVE
"Past Medical History:   Diagnosis Date    Arrhythmia 2017    aflutter    Atrial flutter     CAD (coronary artery disease)     CHF (congestive heart failure), NYHA class I 2017    Cholelithiasis with chronic cholecystitis     Chronic diastolic heart failure     Cigarette smoker     COPD (chronic obstructive pulmonary disease)     COVID-19 06/01/2021    Diabetes mellitus     DKA (diabetic ketoacidosis)     Hypertension     NSTEMI (non-ST elevation myocardial infarction)     NSVT (nonsustained ventricular tachycardia)     Psychiatric disorder     h/o "schizophrena/bipolar"    Schizoaffective disorder     Severe sepsis        Past Surgical History:   Procedure Laterality Date    LIVER SURGERY      abscess drainage; 1970s    TREATMENT OF CARDIAC ARRHYTHMIA  9/12/2019    Procedure: Cardioversion or Defibrillation;  Surgeon: Jonathan Lopez MD;  Location: [facility] CATH LAB;  Service: Cardiology;;    TREATMENT OF CARDIAC ARRHYTHMIA N/A 2/3/2022    Procedure: Cardioversion or Defibrillation;  Surgeon: Trevor Schwab MD;  Location: [facility] CATH LAB;  Service: Cardiology;  Laterality: N/A;       Review of patient's allergies indicates:  No Known Allergies    No current facility-administered medications on file prior to encounter.     Current Outpatient Medications on File Prior to Encounter   Medication Sig    amiodarone (PACERONE) 200 MG Tab Take 200 mg by mouth once daily.    amLODIPine (NORVASC) 5 MG tablet Take 5 mg by mouth once daily.    apixaban (ELIQUIS) 5 mg Tab Take 1 tablet (5 mg total) by mouth 2 (two) times daily.    apixaban (ELIQUIS) 5 mg Tab Take 5 mg by mouth 2 (two) times daily.    aspirin 81 MG Chew Take 1 tablet (81 mg total) by mouth once daily.    atorvastatin (LIPITOR) 40 MG tablet Take 40 mg by mouth once daily.    blood-glucose meter kit Use as instructed    budesonide-formoterol 80-4.5 mcg (SYMBICORT) 80-4.5 mcg/actuation HFAA Inhale 2 puffs into the lungs twice daily    dulaglutide (TRULICITY) 1.5 " mg/0.5 mL pen injector Inject into the skin every 7 days. Thursdays    furosemide (LASIX) 40 MG tablet Take 1 tablet (40 mg total) by mouth once daily.    hydrALAZINE (APRESOLINE) 50 MG tablet Take 50 mg by mouth every 8 (eight) hours.    insulin aspart U-100 (NOVOLOG) 100 unit/mL injection Inject 5 Units into the skin 3 (three) times daily before meals.    insulin detemir U-100 (LEVEMIR) 100 unit/mL injection Inject 10 Units into the skin every evening.    insulin NPH-insulin regular, 70/30, (NOVOLIN 70/30) 100 unit/mL (70-30) injection Inject into the skin 2 (two) times daily.    metFORMIN (FORTAMET) 1,000 mg 24hr tablet Take 1,000 mg by mouth daily with breakfast.    metoprolol succinate (TOPROL-XL) 100 MG 24 hr tablet Take 100 mg by mouth once daily.    NIFEdipine (ADALAT CC) 30 MG TbSR Take 30 mg by mouth once daily.     Family History    None       Tobacco Use    Smoking status: Every Day     Current packs/day: 1.00     Average packs/day: 1 pack/day for 15.0 years (15.0 ttl pk-yrs)     Types: Cigarettes    Smokeless tobacco: Never   Substance and Sexual Activity    Alcohol use: Yes     Comment: daily    Drug use: Never    Sexual activity: Not Currently     Review of Systems  Objective:     Vital Signs (Most Recent):  Temp: 97.5 °F (36.4 °C) (03/15/24 0914)  Pulse: 89 (03/15/24 1200)  Resp: 20 (03/15/24 1200)  BP: 101/61 (03/15/24 1200)  SpO2: 95 % (03/15/24 1200) Vital Signs (24h Range):  Temp:  [97.5 °F (36.4 °C)] 97.5 °F (36.4 °C)  Pulse:  [] 89  Resp:  [19-34] 20  SpO2:  [76 %-99 %] 95 %  BP: (101-165)/() 101/61     Weight: 79.8 kg (176 lb)  Body mass index is 28.41 kg/m².     Physical Exam  Vitals reviewed.   Constitutional:       General: He is in acute distress (mild).      Appearance: He is ill-appearing.   HENT:      Head: Normocephalic and atraumatic.      Mouth/Throat:      Mouth: Mucous membranes are moist.      Pharynx: Oropharynx is clear.   Eyes:      Extraocular Movements:  Extraocular movements intact.   Cardiovascular:      Rate and Rhythm: Tachycardia present. Rhythm irregular.   Pulmonary:      Effort: Respiratory distress (on 5L nc with mild tachypnea, but oxygenation adequate and denies distres) present.   Abdominal:      Palpations: Abdomen is soft.      Tenderness: There is no abdominal tenderness.   Musculoskeletal:         General: Swelling (bilateral le swelling that appears very chronic in presentation) present. No tenderness.      Cervical back: Neck supple. No rigidity.   Skin:     General: Skin is warm and dry.   Neurological:      General: No focal deficit present.      Mental Status: He is alert and oriented to person, place, and time.   Psychiatric:         Mood and Affect: Mood normal.         Behavior: Behavior normal.                Significant Labs: All pertinent labs within the past 24 hours have been reviewed.    Significant Imaging: I have reviewed all pertinent imaging results/findings within the past 24 hours.

## 2024-03-15 NOTE — CONSULTS
[place] - Emergency Dept  Cardiology  Consult Note    Patient Name: Marck Madison  MRN: [id number]  Admission Date: 3/15/2024  Hospital Length of Stay: 0 days  Code Status: Full Code   Attending Provider: Thang Del Angel III, MD   Consulting Provider: Jonathan Lopez MD  Primary Care Physician: [facility] -  Principal Problem:Acute hypoxemic respiratory failure    Patient information was obtained from patient and ER records.     Inpatient consult to Cardiology  Consult performed by: Jonathan Lopez MD  Consult ordered by: Moira Prince MD        Subjective:     Chief Complaint:  A-flutter RVR, elevated troponin     HPI:     64-year-old male with history of atrial fibrillation, CAD, CHF, COPD, diabetes, hypertension presents to the emergency department with shortness of breath.  The patient reports his symptoms started about 2-3 days ago.  He is supposed to be on chronic home oxygen, 2 L nasal cannula, however, family member at bedside state they took his tank several months ago.  Although he can not name his medications, he tells me he is taking his Eliquis and his amiodarone.  He has not having any chest pain, abdominal pain, nausea vomiting.  He does have an associated cough productive of white phlegm.  The patient reports he gets very out of breath with short ambulation and with lying flat.       Less SOB, denies CP  EKG A-flutter old IMI PRWP  Hx PAF - reports compliance with eliquis  Initial  A-flutter - improved after IV diltiazem but going back up. On IV amiodarone now  Troponin 0.18  Cr 1.6      Echo 7/1/22  The left ventricle is normal in size with moderate concentric hypertrophy and normal systolic function.  The estimated ejection fraction is 60%.  A diastolic pattern consistent with atrial fibrillation observed.  Normal right ventricular size with normal right ventricular systolic function.  Mild mitral regurgitation.  Mild tricuspid regurgitation.  The estimated PA  "systolic pressure is 20 mmHg.  Normal central venous pressure (3 mmHg).    Saw me during admission 1/2022    61 y.o. male with past medical history of COPD, DM, and CHF presenting with 1 day history of shortness of breath with associated chest pain, cough and generalized weakness. Patient reports he has been out of his medications for weeks. He denies fever, chills, vomiting, diarrhea or further complaints. He has no known drug allergies.         Denies CP, mild SOB  EKG A-fib 100 PRWP. Currently A-flutter 126   Troponin 0.17 - similar to previous    Cr 1.9     Echo 7/1/21  The estimated ejection fraction is 55%.  The left ventricle is normal in size with moderate concentric hypertrophy and normal systolic function.  Grade III left ventricular diastolic dysfunction.  Severe left atrial enlargement.  Moderate mitral regurgitation.  Mild pulmonic regurgitation.  Normal right ventricular size with normal right ventricular systolic function.  Mild right atrial enlargement.  Normal central venous pressure (3 mmHg).  The estimated PA systolic pressure is 48 mmHg.  There is pulmonary hypertension.  Moderate tricuspid regurgitation.     Admitted 11/21/21  Mr. Madison is a 60yo man well known to me from admitting him twice in the recent past.  He has a past medical history of CAD, COVID, tobacco abuse, COPD, DCHF, DM2, Aflutter on Eliquis, and chronic cholelithiasis.  He has been covid vaccinated, but is overdue for his booster.  Last echo showed EF 55%, grade III diastolic dysfunction, severe LAE, mod MR, and mod TR.     He was admitted 6/9/2021 to 6/15/2021, initially to MICU for iv amiodarone.  At discharge, Dr. Ritchie noted, "Mr. Madison was admitted for Afib w/ RVR and COVID. He was put on an amiodarone drip with subsequent cardioversion. He required BiPAP briefly which was later weaned to nasal cannula and then room air. His hospital course was complicated by episodes of hypotension leading to FARHAN and shock liver. " "His creatinine returned to baseline prior to discharge, and his liver function was steadily improving. By discharge he was on room air at rest. He desaturated to 88% with ambulation, but refused home oxygen. He requested discharge and that wish was granted. He was enrolled in the COVID surveillance program and will follow up with his PCP in the outpatient setting."     He was most recently admitted again on 6/30/21 for 5 days again with Aflutter with RVR, severe sepsis thought due to cholecystitis, and DKA.    On that stay he was admitted to ICU and was treated with on IV Amio and Eliquis.  Cardiology was consulted and he underwent successful ESTEBAN/DCCV on 7/2. CHADS-VASc score of 2.  He was continued on  Eliquis and Metoprolol for rate control at discharge.     He now comes to the ED with acute SOB while smoking.  This led to paroxysms of coughing and post-tussive episode of emesis.  He denies any chest pain.  He has been out of all of his medications as well for the past 2 weeks.  On arrival to triage, he was found to have an oxygen saturation of 74% on RA.  He tells me he was sitting outside smoking, "when the weather changed and it got a little cold.  That set me off to coughing and I couldn't catch my breath.  I feel great now."  He states his nephew "who is a little mental" threw all of his medicines in the trash 2 weeks ago, and he has not been able to get them filled.     In the ED his VS's showed RR 34, /128, P 120 with no fever on arrival.  He was initially 74% on RA, placed on 15L NRB at 100%, then weaned to 2L at 97% currently.  Labs showed WBC 12.3, Cr 1.4, Gluc 136, , Trop 0.161 and negative covid.  CXR showed increased interstitial attenuation with bilateral airspace opacification.  Findings are nonspecific but may reflect moderate pulmonary edema.  Atypical pneumonia could present with similar appearance in the right clinical setting.     In the ED he was treated with duonebs, ASA 325mg, " "hydralazine 100mg, solumedrol 125mg iv x 1, NTG 1" to CW, Lopressor 5mg iv x 1, MgSO4 2g iv x 1.  At admission he was given Lasix 20mg iv x 1, xopenex, atrovent nebs, and Levaquin 500mg po qday.       Hospital Course:   Mr. Madison is a 62yo man well known to me from admitting him twice in the recent past.  He has a past medical history of CAD, COVID, tobacco abuse, COPD, DCHF, DM2, Aflutter on Eliquis, and chronic cholelithiasis.  He has been covid vaccinated, but is overdue for his booster.  Last echo showed EF 55%, grade III diastolic dysfunction, severe LAE, mod MR, and mod TR.     He was admitted 6/9/2021 to 6/15/2021, initially to MICU for iv amiodarone.  At discharge, Dr. Ritchie noted, "Mr. Madison was admitted for Afib w/ RVR and COVID. He was put on an amiodarone drip with subsequent cardioversion. He required BiPAP briefly which was later weaned to nasal cannula and then room air. His hospital course was complicated by episodes of hypotension leading to FARHAN and shock liver. His creatinine returned to baseline prior to discharge, and his liver function was steadily improving. By discharge he was on room air at rest. He desaturated to 88% with ambulation, but refused home oxygen. He requested discharge and that wish was granted. He was enrolled in the COVID surveillance program and will follow up with his PCP in the outpatient setting."     He was most recently admitted again on 6/30/21 for 5 days again with Aflutter with RVR, severe sepsis thought due to cholecystitis, and DKA.    On that stay he was admitted to ICU and was treated with on IV Amio and Eliquis.  Cardiology was consulted and he underwent successful ESTEBAN/DCCV on 7/2. CHADS-VASc score of 2.  He was continued on  Eliquis and Metoprolol for rate control at discharge.     He now comes to the ED with acute SOB while smoking.  This led to paroxysms of coughing and post-tussive episode of emesis.  He denies any chest pain.  He has been out of all of his " "medications as well for the past 2 weeks.  On arrival to triage, he was found to have an oxygen saturation of 74% on RA.  He tells me he was sitting outside smoking, "when the weather changed and it got a little cold.  That set me off to coughing and I couldn't catch my breath.  I feel great now."  He states his nephew "who is a little mental" threw all of his medicines in the trash 2 weeks ago, and he has not been able to get them filled.     In the ED his VS's showed RR 34, /128, P 120 with no fever on arrival.  He was initially 74% on RA, placed on 15L NRB at 100%, then weaned to 2L at 97% currently.  Labs showed WBC 12.3, Cr 1.4, Gluc 136, , Trop 0.161 and negative covid.  CXR showed increased interstitial attenuation with bilateral airspace opacification.  Findings are nonspecific but may reflect moderate pulmonary edema.  Atypical pneumonia could present with similar appearance in the right clinical setting.     In the ED he was treated with duonebs, ASA 325mg, hydralazine 100mg, solumedrol 125mg iv x 1, NTG 1" to CW, Lopressor 5mg iv x 1, MgSO4 2g iv x 1.  At admission he was given Lasix 20mg iv x 1, xopenex, atrovent nebs, and Levaquin 500mg po qday.  Unfortunately,patient decided leave AMA.       Past Medical History:   Diagnosis Date    Arrhythmia 2017    aflutter    Atrial flutter     CAD (coronary artery disease)     CHF (congestive heart failure), NYHA class I 2017    Cholelithiasis with chronic cholecystitis     Chronic diastolic heart failure     Cigarette smoker     COPD (chronic obstructive pulmonary disease)     COVID-19 06/01/2021    Diabetes mellitus     DKA (diabetic ketoacidosis)     Hypertension     NSTEMI (non-ST elevation myocardial infarction)     NSVT (nonsustained ventricular tachycardia)     Psychiatric disorder     h/o "schizophrena/bipolar"    Schizoaffective disorder     Severe sepsis        Past Surgical History:   Procedure Laterality Date    LIVER SURGERY      abscess " drainage; 1970s    TREATMENT OF CARDIAC ARRHYTHMIA  9/12/2019    Procedure: Cardioversion or Defibrillation;  Surgeon: Jonathan Lopez MD;  Location: [facility] CATH LAB;  Service: Cardiology;;    TREATMENT OF CARDIAC ARRHYTHMIA N/A 2/3/2022    Procedure: Cardioversion or Defibrillation;  Surgeon: Trevor Schwab MD;  Location: [facility] CATH LAB;  Service: Cardiology;  Laterality: N/A;       Review of patient's allergies indicates:  No Known Allergies    No current facility-administered medications on file prior to encounter.     Current Outpatient Medications on File Prior to Encounter   Medication Sig    amiodarone (PACERONE) 200 MG Tab Take 200 mg by mouth once daily.    amLODIPine (NORVASC) 5 MG tablet Take 5 mg by mouth once daily.    apixaban (ELIQUIS) 5 mg Tab Take 1 tablet (5 mg total) by mouth 2 (two) times daily.    apixaban (ELIQUIS) 5 mg Tab Take 5 mg by mouth 2 (two) times daily.    aspirin 81 MG Chew Take 1 tablet (81 mg total) by mouth once daily.    atorvastatin (LIPITOR) 40 MG tablet Take 40 mg by mouth once daily.    blood-glucose meter kit Use as instructed    budesonide-formoterol 80-4.5 mcg (SYMBICORT) 80-4.5 mcg/actuation HFAA Inhale 2 puffs into the lungs twice daily    dulaglutide (TRULICITY) 1.5 mg/0.5 mL pen injector Inject into the skin every 7 days. Thursdays    furosemide (LASIX) 40 MG tablet Take 1 tablet (40 mg total) by mouth once daily.    hydrALAZINE (APRESOLINE) 50 MG tablet Take 50 mg by mouth every 8 (eight) hours.    insulin aspart U-100 (NOVOLOG) 100 unit/mL injection Inject 5 Units into the skin 3 (three) times daily before meals.    insulin detemir U-100 (LEVEMIR) 100 unit/mL injection Inject 10 Units into the skin every evening.    insulin NPH-insulin regular, 70/30, (NOVOLIN 70/30) 100 unit/mL (70-30) injection Inject into the skin 2 (two) times daily.    metFORMIN (FORTAMET) 1,000 mg 24hr tablet Take 1,000 mg by mouth daily with breakfast.    metoprolol succinate (TOPROL-XL)  100 MG 24 hr tablet Take 100 mg by mouth once daily.    NIFEdipine (ADALAT CC) 30 MG TbSR Take 30 mg by mouth once daily.     Family History    None       Tobacco Use    Smoking status: Every Day     Current packs/day: 1.00     Average packs/day: 1 pack/day for 15.0 years (15.0 ttl pk-yrs)     Types: Cigarettes    Smokeless tobacco: Never   Substance and Sexual Activity    Alcohol use: Yes     Comment: daily    Drug use: Never    Sexual activity: Not Currently     Review of Systems   Unable to perform ROS: Acuity of condition     Objective:     Vital Signs (Most Recent):  Temp: 97.5 °F (36.4 °C) (03/15/24 0914)  Pulse: 89 (03/15/24 1200)  Resp: 20 (03/15/24 1200)  BP: 101/61 (03/15/24 1200)  SpO2: 95 % (03/15/24 1200) Vital Signs (24h Range):  Temp:  [97.5 °F (36.4 °C)] 97.5 °F (36.4 °C)  Pulse:  [] 89  Resp:  [19-34] 20  SpO2:  [76 %-99 %] 95 %  BP: (101-165)/() 101/61     Weight: 79.8 kg (176 lb)  Body mass index is 28.41 kg/m².    SpO2: 95 %         Intake/Output Summary (Last 24 hours) at 3/15/2024 1239  Last data filed at 3/15/2024 1124  Gross per 24 hour   Intake 100 ml   Output --   Net 100 ml       Lines/Drains/Airways       Peripheral Intravenous Line  Duration                  Peripheral IV - Single Lumen 03/15/24 0927 20 G Posterior;Right Hand <1 day         Peripheral IV - Single Lumen 03/15/24 1021 Anterior;Left;Proximal Forearm <1 day                     Physical Exam  Constitutional:       Appearance: He is well-developed.   HENT:      Head: Normocephalic and atraumatic.   Eyes:      Conjunctiva/sclera: Conjunctivae normal.      Pupils: Pupils are equal, round, and reactive to light.   Cardiovascular:      Rate and Rhythm: Normal rate. Rhythm irregular.      Pulses: Intact distal pulses.      Heart sounds: Normal heart sounds.   Pulmonary:      Effort: Pulmonary effort is normal.      Breath sounds: Normal breath sounds.   Abdominal:      General: Bowel sounds are normal.      Palpations:  Abdomen is soft.   Musculoskeletal:         General: Normal range of motion.      Cervical back: Normal range of motion and neck supple.   Skin:     General: Skin is warm and dry.   Neurological:      Mental Status: He is alert and oriented to person, place, and time.          Significant Labs: All pertinent lab results from the last 24 hours have been reviewed.    Significant Imaging: Echocardiogram: 2D echo with color flow doppler: No results found for this or any previous visit.  Assessment and Plan:     * Acute hypoxemic respiratory failure  Per primary    Acute on chronic diastolic congestive heart failure  Diuresis and afterload reduction as tolerated    Echo    Interpretation Summary  · The left ventricle is normal in size with moderate concentric hypertrophy and normal systolic function.  · The estimated ejection fraction is 60%.  · A diastolic pattern consistent with atrial fibrillation observed.  · Normal right ventricular size with normal right ventricular systolic function.  · Mild mitral regurgitation.  · Mild tricuspid regurgitation.  · The estimated PA systolic pressure is 20 mmHg.  · Normal central venous pressure (3 mmHg).    Recent Labs   Lab 03/15/24  1005   *       Atrial fibrillation with RVR  A-flutter. On IV amiodarone. Reports compliance with eliquis. Repeat echo. If A-flutter persists will discuss CV tomorrow    Elevated troponin  Troponin 0.18 - denies CP. Suspect demand ischemia from A-flutter and respiratory distress - trend troponin. Consider out patient ischemic evaluation        VTE Risk Mitigation (From admission, onward)           Ordered     apixaban tablet 5 mg  2 times daily         03/15/24 1204     IP VTE HIGH RISK PATIENT  Once         03/15/24 1204     Place sequential compression device  Until discontinued         03/15/24 1204                  35 minutes spent with patient in ICU    Thank you for your consult. I will follow-up with patient. Please contact us if you  have any additional questions.    Jonathan Lopez MD  Cardiology   [facility] [place] - Emergency Dept

## 2024-03-15 NOTE — ASSESSMENT & PLAN NOTE
Patient is identified as having Diastolic (HFpEF) heart failure that is Acute on chronic. CHF is currently uncontrolled due to Rales/crackles on pulmonary exam and Pulmonary edema/pleural effusion on CXR. Latest ECHO performed and demonstrates- Results for orders placed during the hospital encounter of 06/30/22    Echo    Interpretation Summary  · The left ventricle is normal in size with moderate concentric hypertrophy and normal systolic function.  · The estimated ejection fraction is 60%.  · A diastolic pattern consistent with atrial fibrillation observed.  · Normal right ventricular size with normal right ventricular systolic function.  · Mild mitral regurgitation.  · Mild tricuspid regurgitation.  · The estimated PA systolic pressure is 20 mmHg.  · Normal central venous pressure (3 mmHg).  . Continue Beta Blocker, ACE/ARB, and Furosemide and monitor clinical status closely. Monitor on telemetry. Patient is on CHF pathway.  Monitor strict Is&Os and daily weights.  Place on fluid restriction of 1.5 L. Cardiology has been consulted. Continue to stress to patient importance of self efficacy and  on diet for CHF. Last BNP reviewed- and noted below   Recent Labs   Lab 03/15/24  1005   *

## 2024-03-15 NOTE — ASSESSMENT & PLAN NOTE
Patient with known Cirrhosis  MELD-Na score calculated; MELD 3.0: 13 at 3/15/2024 10:10 AM  MELD-Na: 13 at 3/15/2024 10:10 AM  Calculated from:  Serum Creatinine: 1.6 mg/dL at 3/15/2024 10:05 AM  Serum Sodium: 141 mmol/L (Using max of 137 mmol/L) at 3/15/2024 10:05 AM  Total Bilirubin: 0.7 mg/dL (Using min of 1 mg/dL) at 3/15/2024 10:05 AM  Serum Albumin: 3.6 g/dL (Using max of 3.5 g/dL) at 3/15/2024 10:05 AM  INR(ratio): 1.2 at 3/15/2024 10:10 AM  Age at listing (hypothetical): 64 years  Sex: Male at 3/15/2024 10:10 AM      Continue chronic meds. Etiology likely Hepatitis. Will avoid any hepatotoxic meds, and monitor CBC/CMP/INR for synthetic function.

## 2024-03-15 NOTE — ASSESSMENT & PLAN NOTE
Troponin elevated on admission. Appears to be chronically elevated. Will trend. Cardiology consulted

## 2024-03-15 NOTE — HPI
64M with pmh of atrial fibrillation, CAD, CHF, COPD, diabetes, hypertension who presents with 3 day h/o worsening dyspnea especially with exertion. He is supposed to be on chronic home oxygen, 2 L nasal cannula, but this was taken away a couple months pta  Although he can not name his medications, he tells me he is taking his Eliquis and his amiodarone. Pt denies missing doses of medications. Pt also endorses a dry cough and some worsening of swelling. Pt denies cp, fever, chills, sick contacts, nausea, vomiting, abdominal pain, or dysuria.  Patient is a current smoker.  In the ED patient noted to be in AFib with RVR and cardiology consulted who started on amiodarone drip.  Initially on BiPAP, but able to transitioned back to nasal cannula at 5 L after dose of Lasix.  Patient to be admitted to the ICU for close monitoring and continued amiodarone drip pending Cardiology recommendation

## 2024-03-15 NOTE — NURSING
[facility] Intensive Care  ICU Shift Summary  Date: 3/15/2024      Prehospitalization: Home  Admit Date / LOS : 3/15/2024/ 0 days    Diagnosis: Acute hypoxemic respiratory failure    Consults:        Active: Cardio and Pulm CC       Needed: N/A     Code Status: Full Code   Advanced Directive: <no information>    LDA:  Lines/Drains/Airways       Peripheral Intravenous Line  Duration                  Peripheral IV - Single Lumen 03/15/24 0927 20 G Posterior;Right Hand <1 day         Peripheral IV - Single Lumen 03/15/24 1021 Anterior;Left;Proximal Forearm <1 day                  Central Lines/Site/Justification:Patient Does Not Have Central Line  Urinary Cath/Order/Justification:Patient Does Not Have Urinary Catheter    Vasopressors/Infusions:    amiodarone in dextrose 5%            GOALS: Volume/ Hemodynamic: N/A                     RASS: 0  alert and calm    Pain Management: none       Pain Controlled: yes     Rhythm: NSR    Respiratory Device: Nasal Cannula and Bipap    Oxygen Concentration (%):  [30] 30                 Most Recent SBT/ SAT: N/A       MOVE Screen: PASS  ICU Liberation: not applicable    VTE Prophylaxis: Pharm  Mobility: Bedrest  Stress Ulcer Prophylaxis: Yes    Isolation: No active isolations    Dietary:   Current Diet Order   Procedures    Diet NPO    Diet Low Sodium, 2gm Fluid - 1500mL     Order Specific Question:   Fluid restriction:     Answer:   Fluid - 1500mL      Tolerance: yes  Advancement: @ goal    I & O (24h):    Intake/Output Summary (Last 24 hours) at 3/15/2024 1846  Last data filed at 3/15/2024 1800  Gross per 24 hour   Intake 268.43 ml   Output --   Net 268.43 ml        Restraints: No    Significant Dates:  Post Op Date: N/A  Rescue Date: N/A  Imaging/ Diagnostics: N/A    Noteworthy Labs:  none    COVID Test: (--)  CBC/Anemia Labs: Coags:    Recent Labs   Lab 03/15/24  1005   WBC 7.33   HGB 15.1   HCT 45.6      MCV 89   RDW 14.2    Recent Labs   Lab 03/15/24  1010   INR 1.2    APTT 28.8        Chemistries:   Recent Labs   Lab 03/15/24  1005 03/15/24  1010     --    K 3.9  --    *  --    CO2 15*  --    BUN 30*  --    CREATININE 1.6*  --    CALCIUM 9.3  --    PROT 7.0  --    BILITOT 0.7  --    ALKPHOS 92  --    ALT 14  --    AST 22  --    MG  --  1.8        Cardiac Enzymes: Ejection Fractions:    Recent Labs     03/15/24  1005 03/15/24  1243   TROPONINI 0.183* 0.196*    EF   Date Value Ref Range Status   07/01/2022 60 % Final        POCT Glucose: HbA1c:    Recent Labs   Lab 03/15/24  1310 03/15/24  1749   POCTGLUCOSE 208* 118*    Hemoglobin A1C   Date Value Ref Range Status   03/15/2024 7.1 (H) 4.0 - 5.6 % Final     Comment:     ADA Screening Guidelines:  5.7-6.4%  Consistent with prediabetes  >or=6.5%  Consistent with diabetes    High levels of fetal hemoglobin interfere with the HbA1C  assay. Heterozygous hemoglobin variants (HbS, HgC, etc)do  not significantly interfere with this assay.   However, presence of multiple variants may affect accuracy.     03/15/2024 7.1 (H) 4.0 - 5.6 % Final     Comment:     ADA Screening Guidelines:  5.7-6.4%  Consistent with prediabetes  >or=6.5%  Consistent with diabetes    High levels of fetal hemoglobin interfere with the HbA1C  assay. Heterozygous hemoglobin variants (HbS, HgC, etc)do  not significantly interfere with this assay.   However, presence of multiple variants may affect accuracy.             ICU LOS 3h  Level of Care: Critical Care    Chart Check: 12 HR Done  Shift Summary/Plan for the shift: Admitted to ICU on NC.  Pt with noted change in resp status; BiPap placed, but pt not tolerating well; NC placed.  Hypotension noted and Dr. Del Angel notified.  Consult for pulm placed. BiPap settings adjusted and pt more comfortable.  Albumin given per orders.  No UOP noted; Bladder scan completed; new orders for ha noted.  Pt with low temp upon arrival; arsalan huggar place.  Accu checks AC/HS; no insulin required.  POC reviewed with pt;  expressed understanding.

## 2024-03-15 NOTE — ADMISSIONCARE
AdmissionCare    Guideline: Respiratory Failure, Inpatient    Based on the indications selected for the patient, the bed status of Inpatient was determined to be MET    The following indications were selected as present at the time of evaluation of the patient:      - New (acute) need for mechanical invasive or noninvasive (eg, bilevel positive airway pressure (BPAP), volume-assured pressure support (VAPS), or volume control) ventilation    AdmissionCare documentation entered by: Alida Dai    [facility], 27th edition, Copyright © 2023 [facility] [employer], [facility] All Rights Reserved.  [id number]

## 2024-03-15 NOTE — ASSESSMENT & PLAN NOTE
Troponin 0.18 - denies CP. Suspect demand ischemia from A-flutter and respiratory distress - trend troponin. Consider out patient ischemic evaluation

## 2024-03-15 NOTE — NURSING
[facility] [place]  Nurses Note -- 4 Eyes      3/15/2024       Skin assessed on: Admit      [x] No Pressure Injuries Present    [x]Prevention Measures Documented    [] Yes LDA  for Pressure Injury Previously documented     [] Yes New Pressure Injury Discovered   [] LDA for New Pressure Injury Added      Attending RN:  Christiana Carmen RN     Second RN:  Tung Wellington RN

## 2024-03-15 NOTE — PLAN OF CARE
Case Management Assessment     [facility]  [facility]: [facility]    Patient Arrived From: home  Existing Help at Home: Niece-Aileen    Barriers to Discharge: none    Discharge Plan:    A. Home with family   B. Home with family      SW completed initial assessment and discussed discharge planning with patient at his bedside. Patient stated that he lives with his niece Aileen who is his main support. Patient shared that he used to have oxygen, but was picked up by the [employer] company. Patient's niece will provide transportation for him to get home when discharge from the hospital.     03/15/24 1359   Discharge Assessment   Assessment Type Discharge Planning Assessment   Confirmed/corrected address, phone number and insurance Yes   Confirmed Demographics Correct on Facesheet   Source of Information patient   Communicated BALDO with patient/caregiver Date not available/Unable to determine   Reason For Admission Acute Hypoxemic respiratory failure   People in Home other relative(s)   Do you expect to return to your current living situation? Yes   Do you have help at home or someone to help you manage your care at home? Yes   Who are your caregiver(s) and their phone number(s)? HarveyCorazon bernalAileen (Relative) [phone] (Mobile)   Prior to hospitilization cognitive status: Alert/Oriented   Current cognitive status: Alert/Oriented   Walking or Climbing Stairs Difficulty no   Dressing/Bathing Difficulty no   Equipment Currently Used at Home none   Readmission within 30 days? No   Patient currently being followed by outpatient case management? No   Do you currently have service(s) that help you manage your care at home? No   Do you take prescription medications? Yes   Do you have prescription coverage? Yes   Coverage Medicaid   Do you have any problems affording any of your prescribed medications? No   Is the patient taking medications as prescribed? yes   Who is going to help you get home  at discharge? Aileen Mccarthy (Relative) [phone] (Mobile)   How do you get to doctors appointments? health plan transportation;family or friend will provide   Are you on dialysis? No   Do you take coumadin? No   Discharge Plan A Home with family   Discharge Plan B Home with family   DME Needed Upon Discharge  oxygen   Discharge Plan discussed with: Patient   Transition of Care Barriers None   OTHER   Name(s) of People in Home Aileen Mccarthy (Relative) [phone] (Mobile)

## 2024-03-16 ENCOUNTER — ANESTHESIA EVENT (OUTPATIENT)
Dept: CARDIOLOGY | Facility: HOSPITAL | Age: 64
DRG: 291 | End: 2024-03-16
Payer: MEDICARE

## 2024-03-16 ENCOUNTER — ANESTHESIA (OUTPATIENT)
Dept: CARDIOLOGY | Facility: HOSPITAL | Age: 64
DRG: 291 | End: 2024-03-16
Payer: MEDICARE

## 2024-03-16 LAB
ANION GAP SERPL CALC-SCNC: 14 MMOL/L (ref 8–16)
BUN SERPL-MCNC: 38 MG/DL (ref 8–23)
CALCIUM SERPL-MCNC: 9 MG/DL (ref 8.7–10.5)
CHLORIDE SERPL-SCNC: 110 MMOL/L (ref 95–110)
CO2 SERPL-SCNC: 17 MMOL/L (ref 23–29)
CREAT SERPL-MCNC: 2.4 MG/DL (ref 0.5–1.4)
EST. GFR  (NO RACE VARIABLE): 29 ML/MIN/1.73 M^2
GLUCOSE SERPL-MCNC: 108 MG/DL (ref 70–110)
POCT GLUCOSE: 100 MG/DL (ref 70–110)
POCT GLUCOSE: 111 MG/DL (ref 70–110)
POCT GLUCOSE: 137 MG/DL (ref 70–110)
POCT GLUCOSE: 160 MG/DL (ref 70–110)
POTASSIUM SERPL-SCNC: 5 MMOL/L (ref 3.5–5.1)
SODIUM SERPL-SCNC: 141 MMOL/L (ref 136–145)
TROPONIN I SERPL DL<=0.01 NG/ML-MCNC: 0.18 NG/ML (ref 0–0.03)

## 2024-03-16 PROCEDURE — 84484 ASSAY OF TROPONIN QUANT: CPT | Performed by: STUDENT IN AN ORGANIZED HEALTH CARE EDUCATION/TRAINING PROGRAM

## 2024-03-16 PROCEDURE — 25000003 PHARM REV CODE 250: Performed by: INTERNAL MEDICINE

## 2024-03-16 PROCEDURE — 94660 CPAP INITIATION&MGMT: CPT

## 2024-03-16 PROCEDURE — 63600175 PHARM REV CODE 636 W HCPCS: Performed by: STUDENT IN AN ORGANIZED HEALTH CARE EDUCATION/TRAINING PROGRAM

## 2024-03-16 PROCEDURE — 37000009 HC ANESTHESIA EA ADD 15 MINS: Performed by: INTERNAL MEDICINE

## 2024-03-16 PROCEDURE — 94640 AIRWAY INHALATION TREATMENT: CPT

## 2024-03-16 PROCEDURE — 92960 CARDIOVERSION ELECTRIC EXT: CPT | Performed by: INTERNAL MEDICINE

## 2024-03-16 PROCEDURE — 36415 COLL VENOUS BLD VENIPUNCTURE: CPT | Performed by: STUDENT IN AN ORGANIZED HEALTH CARE EDUCATION/TRAINING PROGRAM

## 2024-03-16 PROCEDURE — 37000008 HC ANESTHESIA 1ST 15 MINUTES: Performed by: INTERNAL MEDICINE

## 2024-03-16 PROCEDURE — 99900035 HC TECH TIME PER 15 MIN (STAT)

## 2024-03-16 PROCEDURE — 25000242 PHARM REV CODE 250 ALT 637 W/ HCPCS: Performed by: STUDENT IN AN ORGANIZED HEALTH CARE EDUCATION/TRAINING PROGRAM

## 2024-03-16 PROCEDURE — 92960 CARDIOVERSION ELECTRIC EXT: CPT | Mod: ,,, | Performed by: INTERNAL MEDICINE

## 2024-03-16 PROCEDURE — D9220A PRA ANESTHESIA: Mod: ANES,,, | Performed by: ANESTHESIOLOGY

## 2024-03-16 PROCEDURE — 94761 N-INVAS EAR/PLS OXIMETRY MLT: CPT

## 2024-03-16 PROCEDURE — 25000003 PHARM REV CODE 250: Performed by: NURSE ANESTHETIST, CERTIFIED REGISTERED

## 2024-03-16 PROCEDURE — 25000003 PHARM REV CODE 250: Performed by: STUDENT IN AN ORGANIZED HEALTH CARE EDUCATION/TRAINING PROGRAM

## 2024-03-16 PROCEDURE — 93010 ELECTROCARDIOGRAM REPORT: CPT | Mod: 59,,, | Performed by: INTERNAL MEDICINE

## 2024-03-16 PROCEDURE — 27000221 HC OXYGEN, UP TO 24 HOURS

## 2024-03-16 PROCEDURE — 5A2204Z RESTORATION OF CARDIAC RHYTHM, SINGLE: ICD-10-PCS | Performed by: INTERNAL MEDICINE

## 2024-03-16 PROCEDURE — 11000001 HC ACUTE MED/SURG PRIVATE ROOM

## 2024-03-16 PROCEDURE — 93005 ELECTROCARDIOGRAM TRACING: CPT

## 2024-03-16 PROCEDURE — 63600175 PHARM REV CODE 636 W HCPCS: Performed by: NURSE ANESTHETIST, CERTIFIED REGISTERED

## 2024-03-16 PROCEDURE — 80048 BASIC METABOLIC PNL TOTAL CA: CPT | Performed by: STUDENT IN AN ORGANIZED HEALTH CARE EDUCATION/TRAINING PROGRAM

## 2024-03-16 PROCEDURE — 99223 1ST HOSP IP/OBS HIGH 75: CPT | Mod: ,,, | Performed by: INTERNAL MEDICINE

## 2024-03-16 PROCEDURE — D9220A PRA ANESTHESIA: Mod: CRNA,,, | Performed by: NURSE ANESTHETIST, CERTIFIED REGISTERED

## 2024-03-16 RX ORDER — ETOMIDATE 2 MG/ML
INJECTION INTRAVENOUS
Status: DISCONTINUED | OUTPATIENT
Start: 2024-03-16 | End: 2024-03-16

## 2024-03-16 RX ORDER — LIDOCAINE HYDROCHLORIDE 20 MG/ML
INJECTION INTRAVENOUS
Status: DISCONTINUED | OUTPATIENT
Start: 2024-03-16 | End: 2024-03-16

## 2024-03-16 RX ORDER — TAMSULOSIN HYDROCHLORIDE 0.4 MG/1
0.4 CAPSULE ORAL DAILY
Status: DISCONTINUED | OUTPATIENT
Start: 2024-03-16 | End: 2024-03-20 | Stop reason: HOSPADM

## 2024-03-16 RX ORDER — AMIODARONE HYDROCHLORIDE 200 MG/1
200 TABLET ORAL 2 TIMES DAILY
Status: DISCONTINUED | OUTPATIENT
Start: 2024-03-16 | End: 2024-03-20 | Stop reason: HOSPADM

## 2024-03-16 RX ADMIN — ARFORMOTEROL TARTRATE 15 MCG: 15 SOLUTION RESPIRATORY (INHALATION) at 08:03

## 2024-03-16 RX ADMIN — LIDOCAINE HYDROCHLORIDE 60 MG: 20 INJECTION, SOLUTION INTRAVENOUS at 11:03

## 2024-03-16 RX ADMIN — BUDESONIDE 0.5 MG: 0.5 INHALANT RESPIRATORY (INHALATION) at 07:03

## 2024-03-16 RX ADMIN — ATORVASTATIN CALCIUM 40 MG: 40 TABLET, FILM COATED ORAL at 08:03

## 2024-03-16 RX ADMIN — SODIUM CHLORIDE, SODIUM LACTATE, POTASSIUM CHLORIDE, AND CALCIUM CHLORIDE: .6; .31; .03; .02 INJECTION, SOLUTION INTRAVENOUS at 11:03

## 2024-03-16 RX ADMIN — MUPIROCIN: 20 OINTMENT TOPICAL at 09:03

## 2024-03-16 RX ADMIN — AMIODARONE HYDROCHLORIDE 200 MG: 200 TABLET ORAL at 11:03

## 2024-03-16 RX ADMIN — ETOMIDATE 10 MG: 2 INJECTION, SOLUTION INTRAVENOUS at 11:03

## 2024-03-16 RX ADMIN — TAMSULOSIN HYDROCHLORIDE 0.4 MG: 0.4 CAPSULE ORAL at 06:03

## 2024-03-16 RX ADMIN — INSULIN DETEMIR 10 UNITS: 100 INJECTION, SOLUTION SUBCUTANEOUS at 08:03

## 2024-03-16 RX ADMIN — ARFORMOTEROL TARTRATE 15 MCG: 15 SOLUTION RESPIRATORY (INHALATION) at 07:03

## 2024-03-16 RX ADMIN — APIXABAN 5 MG: 5 TABLET, FILM COATED ORAL at 09:03

## 2024-03-16 RX ADMIN — APIXABAN 5 MG: 5 TABLET, FILM COATED ORAL at 08:03

## 2024-03-16 RX ADMIN — BUDESONIDE 0.5 MG: 0.5 INHALANT RESPIRATORY (INHALATION) at 08:03

## 2024-03-16 RX ADMIN — MUPIROCIN: 20 OINTMENT TOPICAL at 08:03

## 2024-03-16 RX ADMIN — FUROSEMIDE 40 MG: 10 INJECTION, SOLUTION INTRAVENOUS at 08:03

## 2024-03-16 RX ADMIN — AMIODARONE HYDROCHLORIDE 200 MG: 200 TABLET ORAL at 09:03

## 2024-03-16 RX ADMIN — FUROSEMIDE 40 MG: 10 INJECTION, SOLUTION INTRAVENOUS at 09:03

## 2024-03-16 NOTE — PLAN OF CARE
Problem: Adult Inpatient Plan of Care  Goal: Absence of Hospital-Acquired Illness or Injury  Outcome: Ongoing, Progressing     Problem: Adult Inpatient Plan of Care  Goal: Optimal Comfort and Wellbeing  Outcome: Ongoing, Progressing     Problem: Infection  Goal: Absence of Infection Signs and Symptoms  Outcome: Ongoing, Progressing     Problem: Diabetes Comorbidity  Goal: Blood Glucose Level Within Targeted Range  Outcome: Ongoing, Progressing     Problem: Adult Inpatient Plan of Care  Goal: Absence of Hospital-Acquired Illness or Injury  Outcome: Ongoing, Progressing

## 2024-03-16 NOTE — ASSESSMENT & PLAN NOTE
Patient with known Cirrhosis  MELD-Na score calculated; MELD 3.0: 17 at 3/16/2024  4:42 AM  MELD-Na: 17 at 3/16/2024  4:42 AM  Calculated from:  Serum Creatinine: 2.4 mg/dL at 3/16/2024  4:42 AM  Serum Sodium: 141 mmol/L (Using max of 137 mmol/L) at 3/16/2024  4:42 AM  Total Bilirubin: 0.7 mg/dL (Using min of 1 mg/dL) at 3/15/2024 10:05 AM  Serum Albumin: 3.6 g/dL (Using max of 3.5 g/dL) at 3/15/2024 10:05 AM  INR(ratio): 1.2 at 3/15/2024 10:10 AM  Age at listing (hypothetical): 64 years  Sex: Male at 3/16/2024  4:42 AM      Continue chronic meds. Etiology likely Hepatitis. Will avoid any hepatotoxic meds, and monitor CBC/CMP/INR for synthetic function.

## 2024-03-16 NOTE — CONSULTS
"[facility] - Intensive Care  Pulmonology  Consult Note    Patient Name: Marck Madison  MRN: [id number]  Admission Date: 3/15/2024  Hospital Length of Stay: 1 days  Code Status: Full Code  Attending Physician: Thang Del Angel III, MD  Primary Care Provider: [facility] -   Principal Problem: Acute hypoxemic respiratory failure    Consults  Subjective:     HPI:  63 yo male with h/o CHF, prior intubation for severe CHF requiring high level vent support, extensive smoking history with concern for COPD but no PFTs. He reports dyspnea progressive over last few weeks. He was intially intolerant of BiPAP but able to adjust settings and avoid machine intitiated breaths and he tolerates this much better. He has Cheynes-Dixon breathing pattern noted. He does not use CPAP or BIPAP at home. He feels more comfortable on BiPAP currently. He underwent DCCV today and tolerated it well including etomidate. He feels much better after this and feels closer to his baseline.    Past Medical History:   Diagnosis Date    Arrhythmia 2017    aflutter    Atrial flutter     CAD (coronary artery disease)     CHF (congestive heart failure), NYHA class I 2017    Cholelithiasis with chronic cholecystitis     Chronic diastolic heart failure     Cigarette smoker     COPD (chronic obstructive pulmonary disease)     COVID-19 06/01/2021    Diabetes mellitus     DKA (diabetic ketoacidosis)     Hypertension     NSTEMI (non-ST elevation myocardial infarction)     NSVT (nonsustained ventricular tachycardia)     Psychiatric disorder     h/o "schizophrena/bipolar"    Schizoaffective disorder     Severe sepsis        Past Surgical History:   Procedure Laterality Date    LIVER SURGERY      abscess drainage; 1970s    TREATMENT OF CARDIAC ARRHYTHMIA  9/12/2019    Procedure: Cardioversion or Defibrillation;  Surgeon: Jonathan Lopez MD;  Location: [facility] CATH LAB;  Service: Cardiology;;    TREATMENT OF CARDIAC ARRHYTHMIA N/A 2/3/2022    Procedure: " Cardioversion or Defibrillation;  Surgeon: Trevor Schwab MD;  Location: [facility] CATH LAB;  Service: Cardiology;  Laterality: N/A;       Review of patient's allergies indicates:  No Known Allergies    Family History    None       Tobacco Use    Smoking status: Every Day     Current packs/day: 1.00     Average packs/day: 1 pack/day for 15.0 years (15.0 ttl pk-yrs)     Types: Cigarettes    Smokeless tobacco: Never   Substance and Sexual Activity    Alcohol use: Yes     Comment: daily    Drug use: Never    Sexual activity: Not Currently         Review of Systems   Constitutional:  Positive for fatigue and unexpected weight change.   Respiratory:  Positive for cough, chest tightness and shortness of breath.    Cardiovascular:  Positive for palpitations and leg swelling.   Gastrointestinal:  Positive for abdominal distention.   All other systems reviewed and are negative.    Objective:     Vital Signs (Most Recent):  Temp: 97.2 °F (36.2 °C) (03/16/24 1500)  Pulse: 93 (03/16/24 1700)  Resp: (!) 27 (03/16/24 1700)  BP: (!) 146/95 (03/16/24 1700)  SpO2: (!) 93 % (03/16/24 1700) Vital Signs (24h Range):  Temp:  [96.8 °F (36 °C)-99.8 °F (37.7 °C)] 97.2 °F (36.2 °C)  Pulse:  [] 93  Resp:  [7-38] 27  SpO2:  [80 %-100 %] 93 %  BP: ()/() 146/95     Weight: 88.4 kg (194 lb 14.2 oz)  Body mass index is 31.46 kg/m².      Intake/Output Summary (Last 24 hours) at 3/16/2024 1757  Last data filed at 3/16/2024 1731  Gross per 24 hour   Intake 860.5 ml   Output 1300 ml   Net -439.5 ml        Physical Exam  Vitals reviewed.   Constitutional:       Appearance: He is obese. He is ill-appearing.   HENT:      Head: Normocephalic and atraumatic.      Mouth/Throat:      Comments: BiPAP mask with moderate leak  Cardiovascular:      Rate and Rhythm: Normal rate and regular rhythm.      Comments: After CV  Pulmonary:      Breath sounds: Rales present.      Comments: Increased effort improved with BiPAP and resolved after  CV  Abdominal:      Palpations: Abdomen is soft.   Musculoskeletal:         General: Swelling present.      Cervical back: Normal range of motion.   Skin:     General: Skin is warm and dry.      Capillary Refill: Capillary refill takes less than 2 seconds.   Neurological:      General: No focal deficit present.      Mental Status: He is oriented to person, place, and time.   Psychiatric:         Mood and Affect: Mood normal.         Behavior: Behavior normal.         Thought Content: Thought content normal.         Judgment: Judgment normal.          Vents:  Oxygen Concentration (%): 30 (03/16/24 0759)    Lines/Drains/Airways       Drain  Duration                  Urethral Catheter 03/15/24 2015 Non-latex 16 Fr. <1 day              Peripheral Intravenous Line  Duration                  Peripheral IV - Single Lumen 03/15/24 0927 20 G Posterior;Right Hand 1 day         Peripheral IV - Single Lumen 03/15/24 1021 Anterior;Left;Proximal Forearm 1 day                    Significant Labs:    CBC/Anemia Profile:  Recent Labs   Lab 03/15/24  1005   WBC 7.33   HGB 15.1   HCT 45.6      MCV 89   RDW 14.2        Chemistries:  Recent Labs   Lab 03/15/24  1005 03/15/24  1010 03/16/24  0442     --  141   K 3.9  --  5.0   *  --  110   CO2 15*  --  17*   BUN 30*  --  38*   CREATININE 1.6*  --  2.4*   CALCIUM 9.3  --  9.0   ALBUMIN 3.6  --   --    PROT 7.0  --   --    BILITOT 0.7  --   --    ALKPHOS 92  --   --    ALT 14  --   --    AST 22  --   --    MG  --  1.8  --        ABGs:   Recent Labs   Lab 03/15/24  1018   PH 7.336*   PCO2 40.3   HCO3 21.5*   POCSATURATED 82   BE -4*     All pertinent labs within the past 24 hours have been reviewed.    Significant Imaging:   I have reviewed all pertinent imaging results/findings within the past 24 hours.  CXR: I have reviewed all pertinent results/findings within the past 24 hours and my personal findings are:  Cardiomegally and pulm edema    ABG  Recent Labs   Lab  03/15/24  1018   PH 7.336*   PO2 50   PCO2 40.3   HCO3 21.5*   BE -4*     Assessment/Plan:     Pulmonary  * Acute hypoxemic respiratory failure  BiPAP adjusted at bedside. Will use PRN and at night as tolerated. Noted Cheynes Dixon respirations. No role for AVAPS with his CHF    COPD (chronic obstructive pulmonary disease)  Needs outpatient PFTs.  Continue long acting nebs.  Avoid short acting beta agonists    Cardiac/Vascular  Acute on chronic diastolic congestive heart failure  Continue diuresis as tolerated. Appears significantly volume overloaded    Atrial fibrillation with RVR  Improved after DCCV. Can tolerate amiodarone from a pulmonary standpoint if needed    GI  Cirrhosis  ? Congestive hepatopathy          Thank you for your consult. Given his dramatic clinical improvement after DCCV,  I will sign off. Please contact us if you have any additional questions.     Bj Nicholas MD  Pulmonology  [facility] [place] - Intensive Care

## 2024-03-16 NOTE — HPI
63 yo male with h/o CHF, prior intubation for severe CHF requiring high level vent support, extensive smoking history with concern for COPD but no PFTs. He reports dyspnea progressive over last few weeks. He was intially intolerant of BiPAP but able to adjust settings and avoid machine intitiated breaths and he tolerates this much better. He has Cheynes-Dixon breathing pattern noted. He does not use CPAP or BIPAP at home. He feels more comfortable on BiPAP currently. He underwent DCCV today and tolerated it well including etomidate. He feels much better after this and feels closer to his baseline.

## 2024-03-16 NOTE — PROGRESS NOTES
[facility] [place] Medicine  Progress Note    Patient Name: Marck Madison  MRN: [id number]  Patient Class: IP- Inpatient   Admission Date: 3/15/2024  Length of Stay: 1 days  Attending Physician: Thang Del Angel III, MD  Primary Care Provider: [facility] -        Subjective:     Principal Problem:Acute hypoxemic respiratory failure        HPI:  64M with pmh of atrial fibrillation, CAD, CHF, COPD, diabetes, hypertension who presents with 3 day h/o worsening dyspnea especially with exertion. He is supposed to be on chronic home oxygen, 2 L nasal cannula, but this was taken away a couple months pta  Although he can not name his medications, he tells me he is taking his Eliquis and his amiodarone. Pt denies missing doses of medications. Pt also endorses a dry cough and some worsening of swelling. Pt denies cp, fever, chills, sick contacts, nausea, vomiting, abdominal pain, or dysuria.  Patient is a current smoker.  In the ED patient noted to be in AFib with RVR and cardiology consulted who started on amiodarone drip.  Initially on BiPAP, but able to transitioned back to nasal cannula at 5 L after dose of Lasix.  Patient to be admitted to the ICU for close monitoring and continued amiodarone drip pending Cardiology recommendation    Overview/Hospital Course:  No notes on file    Interval History:  Patient is seen just prior to going to cardioversion states he is feeling fine.  Rate remains controlled, but still irregular.  No chest pain or shortness a breath at this time.    Review of Systems  Objective:     Vital Signs (Most Recent):  Temp: 97.8 °F (36.6 °C) (03/16/24 1100)  Pulse: 97 (03/16/24 1100)  Resp: (!) 31 (03/16/24 1100)  BP: (!) 153/102 (03/16/24 1100)  SpO2: (!) 92 % (03/16/24 1100) Vital Signs (24h Range):  Temp:  [94.2 °F (34.6 °C)-99.8 °F (37.7 °C)] 97.8 °F (36.6 °C)  Pulse:  [] 97  Resp:  [7-38] 31  SpO2:  [80 %-100 %] 92 %  BP: ()/() 153/102     Weight: 88.4 kg  (194 lb 14.2 oz)  Body mass index is 31.46 kg/m².    Intake/Output Summary (Last 24 hours) at 3/16/2024 1123  Last data filed at 3/16/2024 1000  Gross per 24 hour   Intake 598.43 ml   Output 850 ml   Net -251.57 ml         Physical Exam  Vitals reviewed.   Constitutional:       General: He is not in acute distress.     Appearance: He is not toxic-appearing.   HENT:      Head: Normocephalic and atraumatic.      Mouth/Throat:      Mouth: Mucous membranes are moist.      Pharynx: Oropharynx is clear.   Eyes:      General: No scleral icterus.     Extraocular Movements: Extraocular movements intact.   Cardiovascular:      Rate and Rhythm: Rhythm irregular.   Pulmonary:      Effort: Respiratory distress (mild on nasal cannula.) present.   Abdominal:      Palpations: Abdomen is soft.      Tenderness: There is no abdominal tenderness.   Musculoskeletal:         General: Swelling present.   Skin:     General: Skin is warm and dry.   Neurological:      General: No focal deficit present.      Mental Status: He is alert and oriented to person, place, and time.   Psychiatric:         Mood and Affect: Mood normal.         Behavior: Behavior normal.             Significant Labs: All pertinent labs within the past 24 hours have been reviewed.    Significant Imaging: I have reviewed all pertinent imaging results/findings within the past 24 hours.    Assessment/Plan:      * Acute hypoxemic respiratory failure  Patient with Hypoxic Respiratory failure which is Acute on chronic.  he is on home oxygen at 2 LPM. This was taken away a couple months pta. Supplemental oxygen was provided and noted- Oxygen Concentration (%):  [30] 30    .   Signs/symptoms of respiratory failure include- tachypnea, increased work of breathing, and respiratory distress. Contributing diagnoses includes - CHF and COPD Labs and images were reviewed. Patient Has recent ABG, which has been reviewed. Will treat underlying causes and adjust management of respiratory  failure as follows- Starting diuresis. Weaned off of bipap to NC on admission.    -weaned down to nc with adequate oxygenation  -continuing diuresis  -cardiology consulted.  -wean o2 as tolerated    CAD (coronary artery disease)  Patient with known CAD, which is controlled Will continue ASA and Statin and monitor for S/Sx of angina/ACS. Continue to monitor on telemetry. Cardiology consulted.     Cirrhosis  Patient with known Cirrhosis  MELD-Na score calculated; MELD 3.0: 17 at 3/16/2024  4:42 AM  MELD-Na: 17 at 3/16/2024  4:42 AM  Calculated from:  Serum Creatinine: 2.4 mg/dL at 3/16/2024  4:42 AM  Serum Sodium: 141 mmol/L (Using max of 137 mmol/L) at 3/16/2024  4:42 AM  Total Bilirubin: 0.7 mg/dL (Using min of 1 mg/dL) at 3/15/2024 10:05 AM  Serum Albumin: 3.6 g/dL (Using max of 3.5 g/dL) at 3/15/2024 10:05 AM  INR(ratio): 1.2 at 3/15/2024 10:10 AM  Age at listing (hypothetical): 64 years  Sex: Male at 3/16/2024  4:42 AM      Continue chronic meds. Etiology likely Hepatitis. Will avoid any hepatotoxic meds, and monitor CBC/CMP/INR for synthetic function.     Type 2 diabetes mellitus, with long-term current use of insulin  Patient's FSGs are controlled on current medication regimen.  Last A1c reviewed-   Lab Results   Component Value Date    HGBA1C 7.1 (H) 03/15/2024    HGBA1C 7.1 (H) 03/15/2024     Most recent fingerstick glucose reviewed-   Recent Labs   Lab 03/15/24  1310 03/15/24  1749 03/15/24  2051   POCTGLUCOSE 208* 118* 112*     Current correctional scale  Low  Maintain anti-hyperglycemic dose as follows-   Antihyperglycemics (From admission, onward)      Start     Stop Route Frequency Ordered    03/15/24 1315  insulin detemir U-100 (Levemir) pen 10 Units         -- SubQ Daily 03/15/24 1204    03/15/24 1304  insulin aspart U-100 pen 0-5 Units         -- SubQ Before meals & nightly PRN 03/15/24 1204          Hold Oral hypoglycemics while patient is in the hospital.      Moderate cigarette smoker  History  noted.      COPD (chronic obstructive pulmonary disease)  Patient's COPD is controlled currently.  Patient is currently off COPD Pathway. Continue scheduled inhalers Supplemental oxygen and monitor respiratory status closely.     Acute on chronic diastolic congestive heart failure  Patient is identified as having Diastolic (HFpEF) heart failure that is Acute on chronic. CHF is currently uncontrolled due to Rales/crackles on pulmonary exam and Pulmonary edema/pleural effusion on CXR. Latest ECHO performed and demonstrates- Results for orders placed during the hospital encounter of 06/30/22    Echo    Interpretation Summary  · The left ventricle is normal in size with moderate concentric hypertrophy and normal systolic function.  · The estimated ejection fraction is 60%.  · A diastolic pattern consistent with atrial fibrillation observed.  · Normal right ventricular size with normal right ventricular systolic function.  · Mild mitral regurgitation.  · Mild tricuspid regurgitation.  · The estimated PA systolic pressure is 20 mmHg.  · Normal central venous pressure (3 mmHg).  . Continue Beta Blocker, ACE/ARB, and Furosemide and monitor clinical status closely. Monitor on telemetry. Patient is on CHF pathway.  Monitor strict Is&Os and daily weights.  Place on fluid restriction of 1.5 L. Cardiology has been consulted. Continue to stress to patient importance of self efficacy and  on diet for CHF. Last BNP reviewed- and noted below   Recent Labs   Lab 03/15/24  1005   *         Atrial fibrillation with RVR  Patient with Long standing persistent (>12 months) atrial fibrillation which is uncontrolled currently with Beta Blocker and Amiodarone. Patient is currently in atrial fibrillation.[id number] Score: 3. Anticoagulation indicated. Anticoagulation done with eliquis .    Cardiology consulted in the ed and pt started on amio drip. Appreciate recommendations on transitioning when able.  -going for Cardioversion  today    Elevated troponin  Troponin elevated on admission. Appears to be chronically elevated. Will trend. Cardiology consulted         VTE Risk Mitigation (From admission, onward)           Ordered     apixaban tablet 5 mg  2 times daily         03/15/24 1204     IP VTE HIGH RISK PATIENT  Once         03/15/24 1204     Place sequential compression device  Until discontinued         03/15/24 1204                    Discharge Planning   BALDO:      Code Status: Full Code   Is the patient medically ready for discharge?:     Reason for patient still in hospital (select all that apply): Treatment and Consult recommendations  Discharge Plan A: Home with family                  Thang Del Angel III, MD  Department of Hospital Medicine   [facility] [place] - Cath Lab

## 2024-03-16 NOTE — ASSESSMENT & PLAN NOTE
Patient with Hypoxic Respiratory failure which is Acute on chronic.  he is on home oxygen at 2 LPM. This was taken away a couple months pta. Supplemental oxygen was provided and noted- Oxygen Concentration (%):  [30] 30    .   Signs/symptoms of respiratory failure include- tachypnea, increased work of breathing, and respiratory distress. Contributing diagnoses includes - CHF and COPD Labs and images were reviewed. Patient Has recent ABG, which has been reviewed. Will treat underlying causes and adjust management of respiratory failure as follows- Starting diuresis. Weaned off of bipap to NC on admission.    -weaned down to nc with adequate oxygenation  -continuing diuresis  -cardiology consulted.  -wean o2 as tolerated

## 2024-03-16 NOTE — ASSESSMENT & PLAN NOTE
Patient's FSGs are controlled on current medication regimen.  Last A1c reviewed-   Lab Results   Component Value Date    HGBA1C 7.1 (H) 03/15/2024    HGBA1C 7.1 (H) 03/15/2024     Most recent fingerstick glucose reviewed-   Recent Labs   Lab 03/15/24  1310 03/15/24  1749 03/15/24  2051   POCTGLUCOSE 208* 118* 112*     Current correctional scale  Low  Maintain anti-hyperglycemic dose as follows-   Antihyperglycemics (From admission, onward)    Start     Stop Route Frequency Ordered    03/15/24 1315  insulin detemir U-100 (Levemir) pen 10 Units         -- SubQ Daily 03/15/24 1204    03/15/24 1304  insulin aspart U-100 pen 0-5 Units         -- SubQ Before meals & nightly PRN 03/15/24 1204        Hold Oral hypoglycemics while patient is in the hospital.

## 2024-03-16 NOTE — PROCEDURES
"Marck Madison is a 64 y.o. male patient.    Temp: 97.8 °F (36.6 °C) (03/16/24 1100)  Pulse: 97 (03/16/24 1100)  Resp: (!) 31 (03/16/24 1100)  BP: (!) 153/102 (03/16/24 1100)  SpO2: (!) 92 % (03/16/24 1100)  Weight: 88.4 kg (194 lb 14.2 oz) (03/15/24 1515)  Height: 5' 6" (167.6 cm) (03/15/24 1515)       Procedures    CV   Dr Lopez  Pre-op Dx A-flutter  Post-op Dx same  Specimen none  EBL < 50 cc    3/16/24 CV - 200J shock converted A-flutter to NSR    Change amiodarone to po  Ok for telemetry later today if stable  Out patient EP evaluation for ablation    3/16/2024    "

## 2024-03-16 NOTE — SUBJECTIVE & OBJECTIVE
"Past Medical History:   Diagnosis Date    Arrhythmia 2017    aflutter    Atrial flutter     CAD (coronary artery disease)     CHF (congestive heart failure), NYHA class I 2017    Cholelithiasis with chronic cholecystitis     Chronic diastolic heart failure     Cigarette smoker     COPD (chronic obstructive pulmonary disease)     COVID-19 06/01/2021    Diabetes mellitus     DKA (diabetic ketoacidosis)     Hypertension     NSTEMI (non-ST elevation myocardial infarction)     NSVT (nonsustained ventricular tachycardia)     Psychiatric disorder     h/o "schizophrena/bipolar"    Schizoaffective disorder     Severe sepsis        Past Surgical History:   Procedure Laterality Date    LIVER SURGERY      abscess drainage; 1970s    TREATMENT OF CARDIAC ARRHYTHMIA  9/12/2019    Procedure: Cardioversion or Defibrillation;  Surgeon: Jonathan Lopez MD;  Location: [facility] CATH LAB;  Service: Cardiology;;    TREATMENT OF CARDIAC ARRHYTHMIA N/A 2/3/2022    Procedure: Cardioversion or Defibrillation;  Surgeon: Trevor Schwab MD;  Location: [facility] CATH LAB;  Service: Cardiology;  Laterality: N/A;       Review of patient's allergies indicates:  No Known Allergies    Family History    None       Tobacco Use    Smoking status: Every Day     Current packs/day: 1.00     Average packs/day: 1 pack/day for 15.0 years (15.0 ttl pk-yrs)     Types: Cigarettes    Smokeless tobacco: Never   Substance and Sexual Activity    Alcohol use: Yes     Comment: daily    Drug use: Never    Sexual activity: Not Currently         Review of Systems   Constitutional:  Positive for fatigue and unexpected weight change.   Respiratory:  Positive for cough, chest tightness and shortness of breath.    Cardiovascular:  Positive for palpitations and leg swelling.   Gastrointestinal:  Positive for abdominal distention.   All other systems reviewed and are negative.    Objective:     Vital Signs (Most Recent):  Temp: 97.2 °F (36.2 °C) (03/16/24 1500)  Pulse: 93 " (03/16/24 1700)  Resp: (!) 27 (03/16/24 1700)  BP: (!) 146/95 (03/16/24 1700)  SpO2: (!) 93 % (03/16/24 1700) Vital Signs (24h Range):  Temp:  [96.8 °F (36 °C)-99.8 °F (37.7 °C)] 97.2 °F (36.2 °C)  Pulse:  [] 93  Resp:  [7-38] 27  SpO2:  [80 %-100 %] 93 %  BP: ()/() 146/95     Weight: 88.4 kg (194 lb 14.2 oz)  Body mass index is 31.46 kg/m².      Intake/Output Summary (Last 24 hours) at 3/16/2024 1757  Last data filed at 3/16/2024 1731  Gross per 24 hour   Intake 860.5 ml   Output 1300 ml   Net -439.5 ml        Physical Exam  Vitals reviewed.   Constitutional:       Appearance: He is obese. He is ill-appearing.   HENT:      Head: Normocephalic and atraumatic.      Mouth/Throat:      Comments: BiPAP mask with moderate leak  Cardiovascular:      Rate and Rhythm: Normal rate and regular rhythm.      Comments: After CV  Pulmonary:      Breath sounds: Rales present.      Comments: Increased effort improved with BiPAP and resolved after CV  Abdominal:      Palpations: Abdomen is soft.   Musculoskeletal:         General: Swelling present.      Cervical back: Normal range of motion.   Skin:     General: Skin is warm and dry.      Capillary Refill: Capillary refill takes less than 2 seconds.   Neurological:      General: No focal deficit present.      Mental Status: He is oriented to person, place, and time.   Psychiatric:         Mood and Affect: Mood normal.         Behavior: Behavior normal.         Thought Content: Thought content normal.         Judgment: Judgment normal.          Vents:  Oxygen Concentration (%): 30 (03/16/24 0759)    Lines/Drains/Airways       Drain  Duration                  Urethral Catheter 03/15/24 2015 Non-latex 16 Fr. <1 day              Peripheral Intravenous Line  Duration                  Peripheral IV - Single Lumen 03/15/24 0927 20 G Posterior;Right Hand 1 day         Peripheral IV - Single Lumen 03/15/24 1021 Anterior;Left;Proximal Forearm 1 day                     Significant Labs:    CBC/Anemia Profile:  Recent Labs   Lab 03/15/24  1005   WBC 7.33   HGB 15.1   HCT 45.6      MCV 89   RDW 14.2        Chemistries:  Recent Labs   Lab 03/15/24  1005 03/15/24  1010 03/16/24  0442     --  141   K 3.9  --  5.0   *  --  110   CO2 15*  --  17*   BUN 30*  --  38*   CREATININE 1.6*  --  2.4*   CALCIUM 9.3  --  9.0   ALBUMIN 3.6  --   --    PROT 7.0  --   --    BILITOT 0.7  --   --    ALKPHOS 92  --   --    ALT 14  --   --    AST 22  --   --    MG  --  1.8  --        ABGs:   Recent Labs   Lab 03/15/24  1018   PH 7.336*   PCO2 40.3   HCO3 21.5*   POCSATURATED 82   BE -4*     All pertinent labs within the past 24 hours have been reviewed.    Significant Imaging:   I have reviewed all pertinent imaging results/findings within the past 24 hours.  CXR: I have reviewed all pertinent results/findings within the past 24 hours and my personal findings are:  Cardiomegally and pulm edema

## 2024-03-16 NOTE — TRANSFER OF CARE
"Anesthesia Transfer of Care Note    Patient: Marck Madison    Procedure(s) Performed: Procedure(s) (LRB):  Cardioversion or Defibrillation (N/A)    Patient location: Other: OR 5    Anesthesia Type: general    Post pain: adequate analgesia    Post assessment: no apparent anesthetic complications and tolerated procedure well    Post vital signs: stable    Level of consciousness: responds to stimulation and awake    Nausea/Vomiting: no nausea/vomiting    Complications: none    Transfer of care protocol was followed      Last vitals: Visit Vitals  BP (!) 120/90 (BP Location: Right arm)   Pulse 79   Temp 36 °C (96.8 °F) (Skin)   Resp (!) 22   Ht 5' 6" (1.676 m)   Wt 88.4 kg (194 lb 14.2 oz)   SpO2 98%   BMI 31.46 kg/m²     "

## 2024-03-16 NOTE — EICU
Intervention Initiated From:  Bedside    Marisa intervened regarding:  Medication    Doctor Notified:  Yes  Comments: Dr. Parekh notified that bedside RN is requesting med for sleep.

## 2024-03-16 NOTE — SUBJECTIVE & OBJECTIVE
Interval History:  Patient is seen just prior to going to cardioversion states he is feeling fine.  Rate remains controlled, but still irregular.  No chest pain or shortness a breath at this time.    Review of Systems  Objective:     Vital Signs (Most Recent):  Temp: 97.8 °F (36.6 °C) (03/16/24 1100)  Pulse: 97 (03/16/24 1100)  Resp: (!) 31 (03/16/24 1100)  BP: (!) 153/102 (03/16/24 1100)  SpO2: (!) 92 % (03/16/24 1100) Vital Signs (24h Range):  Temp:  [94.2 °F (34.6 °C)-99.8 °F (37.7 °C)] 97.8 °F (36.6 °C)  Pulse:  [] 97  Resp:  [7-38] 31  SpO2:  [80 %-100 %] 92 %  BP: ()/() 153/102     Weight: 88.4 kg (194 lb 14.2 oz)  Body mass index is 31.46 kg/m².    Intake/Output Summary (Last 24 hours) at 3/16/2024 1123  Last data filed at 3/16/2024 1000  Gross per 24 hour   Intake 598.43 ml   Output 850 ml   Net -251.57 ml         Physical Exam  Vitals reviewed.   Constitutional:       General: He is not in acute distress.     Appearance: He is not toxic-appearing.   HENT:      Head: Normocephalic and atraumatic.      Mouth/Throat:      Mouth: Mucous membranes are moist.      Pharynx: Oropharynx is clear.   Eyes:      General: No scleral icterus.     Extraocular Movements: Extraocular movements intact.   Cardiovascular:      Rate and Rhythm: Rhythm irregular.   Pulmonary:      Effort: Respiratory distress (mild on nasal cannula.) present.   Abdominal:      Palpations: Abdomen is soft.      Tenderness: There is no abdominal tenderness.   Musculoskeletal:         General: Swelling present.   Skin:     General: Skin is warm and dry.   Neurological:      General: No focal deficit present.      Mental Status: He is alert and oriented to person, place, and time.   Psychiatric:         Mood and Affect: Mood normal.         Behavior: Behavior normal.             Significant Labs: All pertinent labs within the past 24 hours have been reviewed.    Significant Imaging: I have reviewed all pertinent imaging  results/findings within the past 24 hours.

## 2024-03-16 NOTE — ASSESSMENT & PLAN NOTE
BiPAP adjusted at bedside. Will use PRN and at night as tolerated. Noted Cheynes Dixon respirations. No role for AVAPS with his CHF

## 2024-03-16 NOTE — ANESTHESIA PREPROCEDURE EVALUATION
03/16/2024  Marck Madison is a 64 y.o., male.      Pre-op Assessment    I have reviewed the Patient Summary Reports.     I have reviewed the Nursing Notes. I have reviewed the NPO Status.   I have reviewed the Medications.     Review of Systems  Social:  Smoker       Cardiovascular:     Hypertension  Past MI CAD    Dysrhythmias   CHF                                 Pulmonary:   COPD                     Renal/:  Chronic Renal Disease   Renal insufficiency             Hepatic/GI:      Liver Disease,            Psych:     Schizoaffective                Physical Exam  General: Well nourished, Cooperative, Alert and Oriented    Chest/Lungs:  Clear to auscultation, Normal Respiratory Rate    Heart:  Rate: Normal  Rhythm: Regular Rhythm, Irregularly Irregular  Sounds: Normal        Anesthesia Plan  Type of Anesthesia, risks & benefits discussed:    Anesthesia Type: Gen Natural Airway  Intra-op Monitoring Plan: Standard ASA Monitors  Induction:  IV  Informed Consent: Informed consent signed with the Patient and all parties understand the risks and agree with anesthesia plan.  All questions answered.   ASA Score: 3  Day of Surgery Review of History & Physical: H&P Update referred to the surgeon/provider.    Ready For Surgery From Anesthesia Perspective.     .

## 2024-03-16 NOTE — PLAN OF CARE
Admitted to ICU on NC.  Pt with noted change in resp status; BiPap placed, but pt not tolerating well; NC placed.  Hypotension noted and Dr. Del Angel notified.  Consult for pulm placed. BiPap settings adjusted and pt more comfortable.  Albumin given per orders.  No UOP noted; Bladder scan completed; new orders for ha noted.  Pt with low temp upon arrival; arsalan huggar place.  Accu checks AC/HS; no insulin required.  POC reviewed with pt; expressed understanding.        Problem: Adult Inpatient Plan of Care  Goal: Plan of Care Review  Outcome: Ongoing, Progressing  Goal: Patient-Specific Goal (Individualized)  Outcome: Ongoing, Progressing  Goal: Absence of Hospital-Acquired Illness or Injury  Outcome: Ongoing, Progressing  Goal: Optimal Comfort and Wellbeing  Outcome: Ongoing, Progressing  Goal: Readiness for Transition of Care  Outcome: Ongoing, Progressing     Problem: Diabetes Comorbidity  Goal: Blood Glucose Level Within Targeted Range  Outcome: Ongoing, Progressing

## 2024-03-16 NOTE — NURSING
[facility], [facility]  Nurses Note -- 4 Eyes      3/16/2024       Skin assessed on: Q Shift      [x] No Pressure Injuries Present    [x]Prevention Measures Documented    [] Yes LDA  for Pressure Injury Previously documented     [] Yes New Pressure Injury Discovered   [] LDA for New Pressure Injury Added      Attending RN:  Ambika Ivory RN     Second RN:  DELTA Willis

## 2024-03-16 NOTE — PLAN OF CARE
Patient remains in ICU overnight. Amio paused on AM shift due to drop in BP. BP improved but orders to keep Amio off unless HR sustains greater than 110. Amio remained off throughout shift. Pt afib/NSR with PVCs throughout shift. Bipap worn throughout shift, pt tolerated well. Breathing pattern mix of tachypneic and then bradypnea. Pt AAOx4, moves all extremities. Renner placed this shift per order for urinary retention with 450cc UO. No BM. No falls, injury, or skin break down. Plan of care reviewed with patient at bedside.

## 2024-03-16 NOTE — CONSULTS
Food & Nutrition  Education    Diet Education: Low Na and Fluid restriction  Time Spent: RD remote  Learners: Patient       Nutrition Education provided with handouts:   + Clinical Reference attachments to d/c documents      Comments:  Patient is currently npo and in the ICU.  Patient admitted with respiratory failure and on NC.   Education not appropriate at this time.  RD to follow up with education once patient status improves and diet advances. Labs reviewed.  NKFA.  LBM: 3/15/24.  I/O since admit: +120mls.  Patient Active Problem List   Diagnosis    Acute hypoxemic respiratory failure    NSTEMI (non-ST elevation myocardial infarction)    Dilated cardiomyopathy    Elevated troponin    Atrial fibrillation with RVR    Cardiac arrest    Tobacco use disorder, severe, dependence    Personal history of atrial flutter    Pneumonia due to COVID-19 virus    Hypertension    Acute on chronic diastolic congestive heart failure    Lactic acidosis    Diabetic ketoacidosis without coma associated with type 2 diabetes mellitus    Tachycardia    Cholelithiasis with chronic cholecystitis    Debility    NSVT (nonsustained ventricular tachycardia)    COPD (chronic obstructive pulmonary disease)    Moderate cigarette smoker    Panlobular emphysema    Type 2 diabetes mellitus, with long-term current use of insulin    Shock, unspecified    Goals of care, counseling/discussion    Alcohol withdrawal syndrome, with delirium    Encephalopathy, metabolic    Thrombocytopenia    Shock liver    Chronic hepatitis C without hepatic coma    Chest pain    Atrial flutter    Diastolic dysfunction    Dizzy    Acute on chronic diastolic congestive heart failure    Cirrhosis    Permanent atrial fibrillation    COPD (chronic obstructive pulmonary disease) with exacerbation    Type 2 diabetes mellitus    HCV infection    Thrombocytopenia    CAD (coronary artery disease)    Class 1 obesity with serious comorbidity and body mass index (BMI) of 30.0 to  "30.9 in adult    Positive blood culture     Past Medical History:   Diagnosis Date    Arrhythmia 2017    aflutter    Atrial flutter     CAD (coronary artery disease)     CHF (congestive heart failure), NYHA class I 2017    Cholelithiasis with chronic cholecystitis     Chronic diastolic heart failure     Cigarette smoker     COPD (chronic obstructive pulmonary disease)     COVID-19 06/01/2021    Diabetes mellitus     DKA (diabetic ketoacidosis)     Hypertension     NSTEMI (non-ST elevation myocardial infarction)     NSVT (nonsustained ventricular tachycardia)     Psychiatric disorder     h/o "schizophrena/bipolar"    Schizoaffective disorder     Severe sepsis      BMP  Lab Results   Component Value Date     03/16/2024    K 5.0 03/16/2024     03/16/2024    CO2 17 (L) 03/16/2024    BUN 38 (H) 03/16/2024    CREATININE 2.4 (H) 03/16/2024    CALCIUM 9.0 03/16/2024    ANIONGAP 14 03/16/2024    EGFRNORACEVR 29 (A) 03/16/2024     Lab Results   Component Value Date    HGBA1C 7.1 (H) 03/15/2024    HGBA1C 7.1 (H) 03/15/2024     Glucose   Date Value Ref Range Status   03/16/2024 108 70 - 110 mg/dL Final     Lab Results   Component Value Date    CALCIUM 9.0 03/16/2024    PHOS 3.2 07/01/2022         All questions and concerns answered. Dietitian's contact information provided.       Follow-Up: yes     Please Re-consult as needed        Thanks,  Alba Alejo, MS, RDN, LDN      "

## 2024-03-16 NOTE — ASSESSMENT & PLAN NOTE
Patient with Long standing persistent (>12 months) atrial fibrillation which is uncontrolled currently with Beta Blocker and Amiodarone. Patient is currently in atrial fibrillation.[id number] Score: 3. Anticoagulation indicated. Anticoagulation done with eliquis .    Cardiology consulted in the ed and pt started on amio drip. Appreciate recommendations on transitioning when able.  -going for Cardioversion today

## 2024-03-16 NOTE — NURSING
[facility], [facility] [place]  Nurses Note -- 4 Eyes      3/16/2024       Skin assessed on: Q Shift      [x] No Pressure Injuries Present    [x]Prevention Measures Documented    [] Yes LDA  for Pressure Injury Previously documented     [] Yes New Pressure Injury Discovered   [] LDA for New Pressure Injury Added      Attending RN:  Alba Gutierrez RN     Second RN:

## 2024-03-17 LAB
ANION GAP SERPL CALC-SCNC: 13 MMOL/L (ref 8–16)
BUN SERPL-MCNC: 39 MG/DL (ref 8–23)
CALCIUM SERPL-MCNC: 8.2 MG/DL (ref 8.7–10.5)
CHLORIDE SERPL-SCNC: 108 MMOL/L (ref 95–110)
CO2 SERPL-SCNC: 21 MMOL/L (ref 23–29)
CREAT SERPL-MCNC: 2.1 MG/DL (ref 0.5–1.4)
EST. GFR  (NO RACE VARIABLE): 34 ML/MIN/1.73 M^2
GLUCOSE SERPL-MCNC: 108 MG/DL (ref 70–110)
OHS QRS DURATION: 92 MS
OHS QRS DURATION: 94 MS
OHS QTC CALCULATION: 440 MS
OHS QTC CALCULATION: 468 MS
POCT GLUCOSE: 114 MG/DL (ref 70–110)
POCT GLUCOSE: 136 MG/DL (ref 70–110)
POCT GLUCOSE: 139 MG/DL (ref 70–110)
POCT GLUCOSE: 140 MG/DL (ref 70–110)
POTASSIUM SERPL-SCNC: 3.6 MMOL/L (ref 3.5–5.1)
SODIUM SERPL-SCNC: 142 MMOL/L (ref 136–145)

## 2024-03-17 PROCEDURE — 25000242 PHARM REV CODE 250 ALT 637 W/ HCPCS: Performed by: STUDENT IN AN ORGANIZED HEALTH CARE EDUCATION/TRAINING PROGRAM

## 2024-03-17 PROCEDURE — 11000001 HC ACUTE MED/SURG PRIVATE ROOM

## 2024-03-17 PROCEDURE — 36415 COLL VENOUS BLD VENIPUNCTURE: CPT | Performed by: STUDENT IN AN ORGANIZED HEALTH CARE EDUCATION/TRAINING PROGRAM

## 2024-03-17 PROCEDURE — 63600175 PHARM REV CODE 636 W HCPCS: Performed by: STUDENT IN AN ORGANIZED HEALTH CARE EDUCATION/TRAINING PROGRAM

## 2024-03-17 PROCEDURE — 25000003 PHARM REV CODE 250: Performed by: STUDENT IN AN ORGANIZED HEALTH CARE EDUCATION/TRAINING PROGRAM

## 2024-03-17 PROCEDURE — 99232 SBSQ HOSP IP/OBS MODERATE 35: CPT | Mod: ,,, | Performed by: INTERNAL MEDICINE

## 2024-03-17 PROCEDURE — 94640 AIRWAY INHALATION TREATMENT: CPT

## 2024-03-17 PROCEDURE — 99900035 HC TECH TIME PER 15 MIN (STAT)

## 2024-03-17 PROCEDURE — 94660 CPAP INITIATION&MGMT: CPT

## 2024-03-17 PROCEDURE — 94761 N-INVAS EAR/PLS OXIMETRY MLT: CPT

## 2024-03-17 PROCEDURE — 25000003 PHARM REV CODE 250: Performed by: INTERNAL MEDICINE

## 2024-03-17 PROCEDURE — 27000221 HC OXYGEN, UP TO 24 HOURS

## 2024-03-17 PROCEDURE — 80048 BASIC METABOLIC PNL TOTAL CA: CPT | Performed by: STUDENT IN AN ORGANIZED HEALTH CARE EDUCATION/TRAINING PROGRAM

## 2024-03-17 PROCEDURE — 21400001 HC TELEMETRY ROOM

## 2024-03-17 PROCEDURE — 99900031 HC PATIENT EDUCATION (STAT)

## 2024-03-17 RX ADMIN — APIXABAN 5 MG: 5 TABLET, FILM COATED ORAL at 08:03

## 2024-03-17 RX ADMIN — MUPIROCIN: 20 OINTMENT TOPICAL at 08:03

## 2024-03-17 RX ADMIN — FUROSEMIDE 40 MG: 10 INJECTION, SOLUTION INTRAVENOUS at 08:03

## 2024-03-17 RX ADMIN — BUDESONIDE 0.5 MG: 0.5 INHALANT RESPIRATORY (INHALATION) at 08:03

## 2024-03-17 RX ADMIN — ATORVASTATIN CALCIUM 40 MG: 40 TABLET, FILM COATED ORAL at 08:03

## 2024-03-17 RX ADMIN — INSULIN DETEMIR 10 UNITS: 100 INJECTION, SOLUTION SUBCUTANEOUS at 10:03

## 2024-03-17 RX ADMIN — APIXABAN 5 MG: 5 TABLET, FILM COATED ORAL at 10:03

## 2024-03-17 RX ADMIN — AMIODARONE HYDROCHLORIDE 200 MG: 200 TABLET ORAL at 08:03

## 2024-03-17 RX ADMIN — TAMSULOSIN HYDROCHLORIDE 0.4 MG: 0.4 CAPSULE ORAL at 08:03

## 2024-03-17 RX ADMIN — BUDESONIDE 0.5 MG: 0.5 INHALANT RESPIRATORY (INHALATION) at 07:03

## 2024-03-17 RX ADMIN — MUPIROCIN: 20 OINTMENT TOPICAL at 10:03

## 2024-03-17 RX ADMIN — ARFORMOTEROL TARTRATE 15 MCG: 15 SOLUTION RESPIRATORY (INHALATION) at 08:03

## 2024-03-17 RX ADMIN — FUROSEMIDE 40 MG: 10 INJECTION, SOLUTION INTRAVENOUS at 10:03

## 2024-03-17 RX ADMIN — AMIODARONE HYDROCHLORIDE 200 MG: 200 TABLET ORAL at 10:03

## 2024-03-17 RX ADMIN — ARFORMOTEROL TARTRATE 15 MCG: 15 SOLUTION RESPIRATORY (INHALATION) at 07:03

## 2024-03-17 NOTE — SUBJECTIVE & OBJECTIVE
Interval History:  no acute issues, feels well. Keeps taking O2 off     Review of Systems  Objective:     Vital Signs (Most Recent):  Temp: 97.8 °F (36.6 °C) (03/17/24 0819)  Pulse: 86 (03/17/24 0819)  Resp: 20 (03/17/24 0819)  BP: 138/85 (03/17/24 0819)  SpO2: 98 % (03/17/24 0819) Vital Signs (24h Range):  Temp:  [96.8 °F (36 °C)-98.6 °F (37 °C)] 97.8 °F (36.6 °C)  Pulse:  [76-97] 86  Resp:  [18-36] 20  SpO2:  [92 %-98 %] 98 %  BP: (110-168)/() 138/85     Weight: 88.4 kg (194 lb 14.2 oz)  Body mass index is 31.46 kg/m².    Intake/Output Summary (Last 24 hours) at 3/17/2024 0941  Last data filed at 3/17/2024 0541  Gross per 24 hour   Intake 530 ml   Output 1750 ml   Net -1220 ml           Physical Exam  Vitals and nursing note reviewed.   Constitutional:       General: He is not in acute distress.     Appearance: He is ill-appearing.   Cardiovascular:      Rate and Rhythm: Normal rate and regular rhythm.      Pulses: Normal pulses.   Pulmonary:      Effort: Pulmonary effort is normal. No respiratory distress.      Breath sounds: No wheezing.   Abdominal:      General: Bowel sounds are normal. There is no distension.   Musculoskeletal:         General: No swelling.   Neurological:      General: No focal deficit present.      Mental Status: He is alert. Mental status is at baseline.   Psychiatric:         Mood and Affect: Mood normal.         Thought Content: Thought content normal.             Significant Labs: All pertinent labs within the past 24 hours have been reviewed.    Significant Imaging: I have reviewed all pertinent imaging results/findings within the past 24 hours.

## 2024-03-17 NOTE — PLAN OF CARE
Problem: Adult Inpatient Plan of Care  Goal: Plan of Care Review  Outcome: Ongoing, Progressing  Goal: Patient-Specific Goal (Individualized)  Outcome: Ongoing, Progressing  Goal: Absence of Hospital-Acquired Illness or Injury  Outcome: Ongoing, Progressing  Goal: Optimal Comfort and Wellbeing  Outcome: Ongoing, Progressing  Goal: Readiness for Transition of Care  Outcome: Ongoing, Progressing     Problem: Diabetes Comorbidity  Goal: Blood Glucose Level Within Targeted Range  Outcome: Ongoing, Progressing     Problem: Infection  Goal: Absence of Infection Signs and Symptoms  Outcome: Ongoing, Progressing     Problem: Skin Injury Risk Increased  Goal: Skin Health and Integrity  Outcome: Ongoing, Progressing     Problem: Fall Injury Risk  Goal: Absence of Fall and Fall-Related Injury  Outcome: Ongoing, Progressing     Problem: Adjustment to Illness COPD (Chronic Obstructive Pulmonary Disease)  Goal: Optimal Chronic Illness Coping  Outcome: Ongoing, Progressing     Problem: Functional Ability Impaired COPD (Chronic Obstructive Pulmonary Disease)  Goal: Optimal Level of Functional Pershing  Outcome: Ongoing, Progressing     Problem: Infection COPD (Chronic Obstructive Pulmonary Disease)  Goal: Absence of Infection Signs and Symptoms  Outcome: Ongoing, Progressing     Problem: Oral Intake Inadequate COPD (Chronic Obstructive Pulmonary Disease)  Goal: Improved Nutrition Intake  Outcome: Ongoing, Progressing     Problem: Respiratory Compromise COPD (Chronic Obstructive Pulmonary Disease)  Goal: Effective Oxygenation and Ventilation  Outcome: Ongoing, Progressing     Problem: Adjustment to Illness (Heart Failure)  Goal: Optimal Coping  Outcome: Ongoing, Progressing     Problem: Cardiac Output Decreased (Heart Failure)  Goal: Optimal Cardiac Output  Outcome: Ongoing, Progressing     Problem: Dysrhythmia (Heart Failure)  Goal: Stable Heart Rate and Rhythm  Outcome: Ongoing, Progressing     Problem: Fluid Imbalance  (Heart Failure)  Goal: Fluid Balance  Outcome: Ongoing, Progressing     Problem: Functional Ability Impaired (Heart Failure)  Goal: Optimal Functional Ability  Outcome: Ongoing, Progressing     Problem: Oral Intake Inadequate (Heart Failure)  Goal: Optimal Nutrition Intake  Outcome: Ongoing, Progressing     Problem: Respiratory Compromise (Heart Failure)  Goal: Effective Oxygenation and Ventilation  Outcome: Ongoing, Progressing     Problem: Sleep Disordered Breathing (Heart Failure)  Goal: Effective Breathing Pattern During Sleep  Outcome: Ongoing, Progressing     Problem: Dysrhythmia  Goal: Normalized Cardiac Rhythm  Outcome: Ongoing, Progressing

## 2024-03-17 NOTE — ASSESSMENT & PLAN NOTE
Troponin elevated on admission. Appears to be chronically elevated. Flat trend. Probabl demand. Cardiology consulted and appreciate recommendations.

## 2024-03-17 NOTE — ASSESSMENT & PLAN NOTE
Patient with Long standing persistent (>12 months) atrial fibrillation which is uncontrolled currently with Beta Blocker and Amiodarone. Patient is currently in atrial fibrillation.[id number] Score: 3. Anticoagulation indicated. Anticoagulation done with eliquis .    Cardiology consulted in the ed and pt started on amio drip. Appreciate recommendations on transitioning when able.  -s/p cardioversion 3/16 and doing well in sinus rhythm  - continue po Amiodarone

## 2024-03-17 NOTE — ASSESSMENT & PLAN NOTE
Patient with known Cirrhosis  MELD-Na score calculated; MELD 3.0: 16 at 3/17/2024  4:31 AM  MELD-Na: 16 at 3/17/2024  4:31 AM  Calculated from:  Serum Creatinine: 2.1 mg/dL at 3/17/2024  4:31 AM  Serum Sodium: 142 mmol/L (Using max of 137 mmol/L) at 3/17/2024  4:31 AM  Total Bilirubin: 0.7 mg/dL (Using min of 1 mg/dL) at 3/15/2024 10:05 AM  Serum Albumin: 3.6 g/dL (Using max of 3.5 g/dL) at 3/15/2024 10:05 AM  INR(ratio): 1.2 at 3/15/2024 10:10 AM  Age at listing (hypothetical): 64 years  Sex: Male at 3/17/2024  4:31 AM      Continue chronic meds. Etiology likely Hepatitis. Will avoid any hepatotoxic meds, and monitor CBC/CMP/INR for synthetic function. LFTs not elevated. INR 1.2

## 2024-03-17 NOTE — ASSESSMENT & PLAN NOTE
Patient with Hypoxic Respiratory failure which is Acute on chronic.  he is on home oxygen at 2 LPM. This was taken away a couple months pta. Supplemental oxygen was provided and noted- Oxygen Concentration (%):  [36] 36    .   Signs/symptoms of respiratory failure include- tachypnea, increased work of breathing, and respiratory distress. Contributing diagnoses includes - CHF and COPD Labs and images were reviewed. Patient Has recent ABG, which has been reviewed. Will treat underlying causes and adjust management of respiratory failure as follows- Starting diuresis. Weaned off of bipap to NC on admission.    -weaned down to nc with adequate oxygenation  -continuing diuresis  -cardiology consulted.  -wean o2 as tolerated

## 2024-03-17 NOTE — ASSESSMENT & PLAN NOTE
Patient's FSGs are controlled on current medication regimen.  Last A1c reviewed-   Lab Results   Component Value Date    HGBA1C 7.1 (H) 03/15/2024    HGBA1C 7.1 (H) 03/15/2024     Most recent fingerstick glucose reviewed-   Recent Labs   Lab 03/16/24  1039 03/16/24  1649 03/16/24 2009 03/17/24  0818   POCTGLUCOSE 137* 100 160* 139*       Current correctional scale  Low  Maintain anti-hyperglycemic dose as follows-   Antihyperglycemics (From admission, onward)    Start     Stop Route Frequency Ordered    03/15/24 1315  insulin detemir U-100 (Levemir) pen 10 Units         -- SubQ Daily 03/15/24 1204    03/15/24 1304  insulin aspart U-100 pen 0-5 Units         -- SubQ Before meals & nightly PRN 03/15/24 1204        Hold Oral hypoglycemics while patient is in the hospital.

## 2024-03-17 NOTE — NURSING
[facility], [facility] [place]  Nurses Note -- 4 Eyes      3/17/2024       Skin assessed on: Q Shift      [x] No Pressure Injuries Present    [x]Prevention Measures Documented    [] Yes LDA  for Pressure Injury Previously documented     [] Yes New Pressure Injury Discovered   [] LDA for New Pressure Injury Added      Attending RN:  Kanika Cruz RN     Second RN:  LISSY Diane

## 2024-03-17 NOTE — ANESTHESIA POSTPROCEDURE EVALUATION
Anesthesia Post Evaluation    Patient: Marck Madison    Procedure(s) Performed: Procedure(s) (LRB):  Cardioversion or Defibrillation (N/A)    Final Anesthesia Type: general      Patient location during evaluation: PACU  Patient participation: Yes- Able to Participate  Level of consciousness: awake and alert  Post-procedure vital signs: reviewed and stable  Pain management: adequate  Airway patency: patent    PONV status at discharge: No PONV  Anesthetic complications: no      Respiratory status: spontaneous ventilation and room air  Hydration status: euvolemic  Follow-up not needed.              Vitals Value Taken Time   /94 03/17/24 0450   Temp 36.9 °C (98.5 °F) 03/17/24 0450   Pulse 89 03/17/24 0450   Resp 18 03/17/24 0450   SpO2 97 % 03/17/24 0450         No case tracking events are documented in the log.      Pain/Khris Score: No data recorded

## 2024-03-17 NOTE — PLAN OF CARE
Problem: Adult Inpatient Plan of Care  Goal: Plan of Care Review  Outcome: Ongoing, Progressing  Goal: Patient-Specific Goal (Individualized)  Outcome: Ongoing, Progressing  Goal: Absence of Hospital-Acquired Illness or Injury  Outcome: Ongoing, Progressing  Intervention: Identify and Manage Fall Risk  Flowsheets (Taken 3/17/2024 1721)  Safety Promotion/Fall Prevention:   assistive device/personal item within reach   bed alarm set   commode/urinal/bedpan at bedside   Fall Risk signage in place   lighting adjusted   medications reviewed   nonskid shoes/socks when out of bed   /camera at bedside   room near unit station   side rails raised x 3   instructed to call staff for mobility  Intervention: Prevent Skin Injury  Flowsheets (Taken 3/17/2024 1721)  Body Position: position changed independently  Skin Protection:   adhesive use limited   incontinence pads utilized   tubing/devices free from skin contact  Intervention: Prevent and Manage VTE (Venous Thromboembolism) Risk  Flowsheets (Taken 3/17/2024 1721)  Activity Management: Up to bedside commode - L3  VTE Prevention/Management:   bleeding precations maintained   bleeding risk assessed   bleeding risk factor(s) identified, provider notified  Intervention: Prevent Infection  Flowsheets (Taken 3/17/2024 1721)  Infection Prevention:   rest/sleep promoted   single patient room provided  Goal: Optimal Comfort and Wellbeing  Outcome: Ongoing, Progressing  Intervention: Monitor Pain and Promote Comfort  Flowsheets (Taken 3/17/2024 1721)  Pain Management Interventions: care clustered  Intervention: Provide Person-Centered Care  Flowsheets (Taken 3/17/2024 1721)  Trust Relationship/Rapport:   care explained   thoughts/feelings acknowledged  Goal: Readiness for Transition of Care  Outcome: Ongoing, Progressing     Problem: Diabetes Comorbidity  Goal: Blood Glucose Level Within Targeted Range  Outcome: Ongoing, Progressing  Intervention: Monitor and Manage  Glycemia  Flowsheets (Taken 3/17/2024 1721)  Glycemic Management: blood glucose monitored     Problem: Infection  Goal: Absence of Infection Signs and Symptoms  Outcome: Ongoing, Progressing     Problem: Skin Injury Risk Increased  Goal: Skin Health and Integrity  Outcome: Ongoing, Progressing  Intervention: Optimize Skin Protection  Flowsheets (Taken 3/17/2024 1721)  Pressure Reduction Techniques: frequent weight shift encouraged  Skin Protection:   adhesive use limited   incontinence pads utilized   tubing/devices free from skin contact  Head of Bed (HOB) Positioning: HOB elevated     Problem: Fall Injury Risk  Goal: Absence of Fall and Fall-Related Injury  Outcome: Ongoing, Progressing  Intervention: Identify and Manage Contributors  Flowsheets (Taken 3/17/2024 1721)  Self-Care Promotion: independence encouraged  Medication Review/Management: medications reviewed  Intervention: Promote Injury-Free Environment  Flowsheets (Taken 3/17/2024 1721)  Safety Promotion/Fall Prevention:   assistive device/personal item within reach   bed alarm set   commode/urinal/bedpan at bedside   Fall Risk signage in place   lighting adjusted   medications reviewed   nonskid shoes/socks when out of bed   /camera at bedside   room near unit station   side rails raised x 3   instructed to call staff for mobility     Problem: Adjustment to Illness COPD (Chronic Obstructive Pulmonary Disease)  Goal: Optimal Chronic Illness Coping  Outcome: Ongoing, Progressing  Intervention: Support and Optimize Psychosocial Response  Flowsheets (Taken 3/17/2024 1721)  Supportive Measures: active listening utilized     Problem: Functional Ability Impaired COPD (Chronic Obstructive Pulmonary Disease)  Goal: Optimal Level of Functional [place]  Outcome: Ongoing, Progressing  Intervention: Optimize Functional Ability  Flowsheets (Taken 3/17/2024 1721)  Self-Care Promotion: independence encouraged  Activity Management: Up to bedside  commode - L3  Environmental Support: calm environment promoted     Problem: Infection COPD (Chronic Obstructive Pulmonary Disease)  Goal: Absence of Infection Signs and Symptoms  Outcome: Ongoing, Progressing     Problem: Oral Intake Inadequate COPD (Chronic Obstructive Pulmonary Disease)  Goal: Improved Nutrition Intake  Outcome: Ongoing, Progressing     Problem: Respiratory Compromise COPD (Chronic Obstructive Pulmonary Disease)  Goal: Effective Oxygenation and Ventilation  Outcome: Ongoing, Progressing  Intervention: Promote Airway Secretion Clearance  Flowsheets (Taken 3/17/2024 1721)  Breathing Techniques/Airway Clearance: deep/controlled cough encouraged  Cough And Deep Breathing: done independently per patient  Activity Management: Up to bedside commode - L3  Intervention: Optimize Oxygenation and Ventilation  Flowsheets (Taken 3/17/2024 1721)  Airway/Ventilation Management: humidification applied  Fluid/Electrolyte Management: fluids restricted  Head of Bed (HOB) Positioning: HOB elevated     Problem: Adjustment to Illness (Heart Failure)  Goal: Optimal Coping  Outcome: Ongoing, Progressing     Problem: Cardiac Output Decreased (Heart Failure)  Goal: Optimal Cardiac Output  Outcome: Ongoing, Progressing  Intervention: Optimize Cardiac Output  Flowsheets (Taken 3/17/2024 1721)  Environmental Support: calm environment promoted     Problem: Dysrhythmia (Heart Failure)  Goal: Stable Heart Rate and Rhythm  Outcome: Ongoing, Progressing     Problem: Fluid Imbalance (Heart Failure)  Goal: Fluid Balance  Outcome: Ongoing, Progressing  Intervention: Monitor and Manage Fluid Balance  Flowsheets (Taken 3/17/2024 1721)  Fluid/Electrolyte Management: fluids restricted     Problem: Functional Ability Impaired (Heart Failure)  Goal: Optimal Functional Ability  Outcome: Ongoing, Progressing  Intervention: Optimize Functional Ability  Flowsheets (Taken 3/17/2024 1721)  Self-Care Promotion: independence encouraged  Activity  Management: Up to bedside commode - L3     Problem: Oral Intake Inadequate (Heart Failure)  Goal: Optimal Nutrition Intake  Outcome: Ongoing, Progressing     Problem: Respiratory Compromise (Heart Failure)  Goal: Effective Oxygenation and Ventilation  Outcome: Ongoing, Progressing  Intervention: Promote Airway Secretion Clearance  Flowsheets (Taken 3/17/2024 1721)  Breathing Techniques/Airway Clearance: deep/controlled cough encouraged  Cough And Deep Breathing: done independently per patient  Intervention: Optimize Oxygenation and Ventilation  Flowsheets (Taken 3/17/2024 1721)  Airway/Ventilation Management: humidification applied     Problem: Sleep Disordered Breathing (Heart Failure)  Goal: Effective Breathing Pattern During Sleep  Outcome: Ongoing, Progressing  Intervention: Monitor and Manage Obstructive Sleep Apnea  Flowsheets (Taken 3/17/2024 1721)  NPPV/CPAP Maintenance: home PAP equipment/settings used     Problem: Dysrhythmia  Goal: Normalized Cardiac Rhythm  Outcome: Ongoing, Progressing  Intervention: Monitor and Manage Cardiac Rhythm Effect  Flowsheets (Taken 3/17/2024 1721)  VTE Prevention/Management:   bleeding precations maintained   bleeding risk assessed   bleeding risk factor(s) identified, provider notified

## 2024-03-17 NOTE — NURSING
[facility] [place]- Rapid Response Follow-up Note     Followed up with patient for proactive rounding since patient moved from ICU this afternoon. Seen in conjunction with respiratory therapy. Patient says that he is feeling better than he was on admit.    No acute issues at this time. Reviewed plan of care with primary nurse, Roxi.   Please call Rapid Response RN with any questions or concerns at  [phone]

## 2024-03-17 NOTE — ASSESSMENT & PLAN NOTE
A-flutter. On IV amiodarone. Reports compliance with eliquis. Repeat echo. If A-flutter persists will discuss CV tomorrow    3/17/24 Had CV yesterday. Back in rate controlled A-flutter today. Will pursue rate control strategy. Will f/u PRN

## 2024-03-17 NOTE — NURSING
PER handoff received from LISSY Diane     Pt resting in bed quietly. NAD noted. No c/o pain.     Fall and safety precautions maintained. Bed alarm activated and audible.. Bed locked in lowest position, with side rails up x2. Call bell and personal items within reach

## 2024-03-17 NOTE — PROGRESS NOTES
[place] [facility] - Telemetry  Cardiology  Progress Note    Patient Name: Marck Madison  MRN: [id number]  Admission Date: 3/15/2024  Hospital Length of Stay: 2 days  Code Status: Full Code   Attending Physician: Kulwant Nesbitt MD   Primary Care Physician: [facility] -  Expected Discharge Date:   Principal Problem:Acute hypoxemic respiratory failure    Subjective:     Hospital Course:   3/17/24 back in rate controlled A-flutter. Felt much better after CV    Interval History:     Review of Systems   Constitutional: Negative for decreased appetite.   HENT:  Negative for ear discharge.    Eyes:  Negative for blurred vision.   Endocrine: Negative for polyphagia.   Skin:  Negative for nail changes.   Genitourinary:  Negative for bladder incontinence.   Neurological:  Negative for aphonia.   Psychiatric/Behavioral:  Negative for hallucinations.    Allergic/Immunologic: Negative for hives.     Objective:     Vital Signs (Most Recent):  Temp: 97.8 °F (36.6 °C) (03/17/24 0819)  Pulse: 86 (03/17/24 0819)  Resp: 20 (03/17/24 0819)  BP: 138/85 (03/17/24 0819)  SpO2: 98 % (03/17/24 0819) Vital Signs (24h Range):  Temp:  [96.8 °F (36 °C)-98.6 °F (37 °C)] 97.8 °F (36.6 °C)  Pulse:  [76-97] 86  Resp:  [18-36] 20  SpO2:  [92 %-98 %] 98 %  BP: (110-168)/() 138/85     Weight: 88.4 kg (194 lb 14.2 oz)  Body mass index is 31.46 kg/m².     SpO2: 98 %         Intake/Output Summary (Last 24 hours) at 3/17/2024 1049  Last data filed at 3/17/2024 0819  Gross per 24 hour   Intake 650 ml   Output 1350 ml   Net -700 ml       Lines/Drains/Airways       Drain  Duration                  Urethral Catheter 03/15/24 2015 Non-latex 16 Fr. 1 day              Peripheral Intravenous Line  Duration                  Peripheral IV - Single Lumen 03/15/24 0927 20 G Posterior;Right Hand 2 days         Peripheral IV - Single Lumen 03/15/24 1021 Anterior;Left;Proximal Forearm 2 days                       Physical Exam  Constitutional:        Appearance: He is well-developed.   HENT:      Head: Normocephalic and atraumatic.   Eyes:      Conjunctiva/sclera: Conjunctivae normal.      Pupils: Pupils are equal, round, and reactive to light.   Cardiovascular:      Rate and Rhythm: Normal rate. Rhythm irregular.      Pulses: Intact distal pulses.      Heart sounds: Normal heart sounds.   Pulmonary:      Effort: Pulmonary effort is normal.      Breath sounds: Normal breath sounds.   Abdominal:      General: Bowel sounds are normal.      Palpations: Abdomen is soft.   Musculoskeletal:         General: Normal range of motion.      Cervical back: Normal range of motion and neck supple.   Skin:     General: Skin is warm and dry.   Neurological:      Mental Status: He is alert and oriented to person, place, and time.            Significant Labs: All pertinent lab results from the last 24 hours have been reviewed.    Significant Imaging: Echocardiogram: 2D echo with color flow doppler: No results found for this or any previous visit.  Assessment and Plan:     Brief HPI:     * Acute hypoxemic respiratory failure  Per primary    Acute on chronic diastolic congestive heart failure  Diuresis and afterload reduction as tolerated    Echo    Interpretation Summary  · The left ventricle is normal in size with moderate concentric hypertrophy and normal systolic function.  · The estimated ejection fraction is 60%.  · A diastolic pattern consistent with atrial fibrillation observed.  · Normal right ventricular size with normal right ventricular systolic function.  · Mild mitral regurgitation.  · Mild tricuspid regurgitation.  · The estimated PA systolic pressure is 20 mmHg.  · Normal central venous pressure (3 mmHg).    Recent Labs   Lab 03/15/24  1005   *       Atrial fibrillation with RVR  A-flutter. On IV amiodarone. Reports compliance with eliquis. Repeat echo. If A-flutter persists will discuss CV tomorrow    3/17/24 Had CV yesterday. Back in rate controlled  A-flutter today. Will pursue rate control strategy. Will f/u PRN    Elevated troponin  Troponin 0.18 - denies CP. Suspect demand ischemia from A-flutter and respiratory distress - trend troponin. Consider out patient ischemic evaluation        VTE Risk Mitigation (From admission, onward)           Ordered     apixaban tablet 5 mg  2 times daily         03/15/24 1204     IP VTE HIGH RISK PATIENT  Once         03/15/24 1204     Place sequential compression device  Until discontinued         03/15/24 1204                    Jonathan Lopez MD  Cardiology  [facility]

## 2024-03-17 NOTE — SUBJECTIVE & OBJECTIVE
Interval History:     Review of Systems   Constitutional: Negative for decreased appetite.   HENT:  Negative for ear discharge.    Eyes:  Negative for blurred vision.   Endocrine: Negative for polyphagia.   Skin:  Negative for nail changes.   Genitourinary:  Negative for bladder incontinence.   Neurological:  Negative for aphonia.   Psychiatric/Behavioral:  Negative for hallucinations.    Allergic/Immunologic: Negative for hives.     Objective:     Vital Signs (Most Recent):  Temp: 97.8 °F (36.6 °C) (03/17/24 0819)  Pulse: 86 (03/17/24 0819)  Resp: 20 (03/17/24 0819)  BP: 138/85 (03/17/24 0819)  SpO2: 98 % (03/17/24 0819) Vital Signs (24h Range):  Temp:  [96.8 °F (36 °C)-98.6 °F (37 °C)] 97.8 °F (36.6 °C)  Pulse:  [76-97] 86  Resp:  [18-36] 20  SpO2:  [92 %-98 %] 98 %  BP: (110-168)/() 138/85     Weight: 88.4 kg (194 lb 14.2 oz)  Body mass index is 31.46 kg/m².     SpO2: 98 %         Intake/Output Summary (Last 24 hours) at 3/17/2024 1049  Last data filed at 3/17/2024 0819  Gross per 24 hour   Intake 650 ml   Output 1350 ml   Net -700 ml       Lines/Drains/Airways       Drain  Duration                  Urethral Catheter 03/15/24 2015 Non-latex 16 Fr. 1 day              Peripheral Intravenous Line  Duration                  Peripheral IV - Single Lumen 03/15/24 0927 20 G Posterior;Right Hand 2 days         Peripheral IV - Single Lumen 03/15/24 1021 Anterior;Left;Proximal Forearm 2 days                       Physical Exam  Constitutional:       Appearance: He is well-developed.   HENT:      Head: Normocephalic and atraumatic.   Eyes:      Conjunctiva/sclera: Conjunctivae normal.      Pupils: Pupils are equal, round, and reactive to light.   Cardiovascular:      Rate and Rhythm: Normal rate. Rhythm irregular.      Pulses: Intact distal pulses.      Heart sounds: Normal heart sounds.   Pulmonary:      Effort: Pulmonary effort is normal.      Breath sounds: Normal breath sounds.   Abdominal:      General: Bowel  sounds are normal.      Palpations: Abdomen is soft.   Musculoskeletal:         General: Normal range of motion.      Cervical back: Normal range of motion and neck supple.   Skin:     General: Skin is warm and dry.   Neurological:      Mental Status: He is alert and oriented to person, place, and time.            Significant Labs: All pertinent lab results from the last 24 hours have been reviewed.    Significant Imaging: Echocardiogram: 2D echo with color flow doppler: No results found for this or any previous visit.

## 2024-03-17 NOTE — PROGRESS NOTES
[facility] Medicine  Progress Note    Patient Name: Marck Madison  MRN: [id number]  Patient Class: IP- Inpatient   Admission Date: 3/15/2024  Length of Stay: 2 days  Attending Physician: Kulwant Nesbitt MD  Primary Care Provider: [facility] -        Subjective:     Principal Problem:Acute hypoxemic respiratory failure        HPI:  64M with pmh of atrial fibrillation, CAD, CHF, COPD, diabetes, hypertension who presents with 3 day h/o worsening dyspnea especially with exertion. He is supposed to be on chronic home oxygen, 2 L nasal cannula, but this was taken away a couple months pta  Although he can not name his medications, he tells me he is taking his Eliquis and his amiodarone. Pt denies missing doses of medications. Pt also endorses a dry cough and some worsening of swelling. Pt denies cp, fever, chills, sick contacts, nausea, vomiting, abdominal pain, or dysuria.  Patient is a current smoker.  In the ED patient noted to be in AFib with RVR and cardiology consulted who started on amiodarone drip.  Initially on BiPAP, but able to transitioned back to nasal cannula at 5 L after dose of Lasix.  Patient to be admitted to the ICU for close monitoring and continued amiodarone drip pending Cardiology recommendation    Overview/Hospital Course:  No notes on file    Interval History:  no acute issues, feels well. Keeps taking O2 off     Review of Systems  Objective:     Vital Signs (Most Recent):  Temp: 97.8 °F (36.6 °C) (03/17/24 0819)  Pulse: 86 (03/17/24 0819)  Resp: 20 (03/17/24 0819)  BP: 138/85 (03/17/24 0819)  SpO2: 98 % (03/17/24 0819) Vital Signs (24h Range):  Temp:  [96.8 °F (36 °C)-98.6 °F (37 °C)] 97.8 °F (36.6 °C)  Pulse:  [76-97] 86  Resp:  [18-36] 20  SpO2:  [92 %-98 %] 98 %  BP: (110-168)/() 138/85     Weight: 88.4 kg (194 lb 14.2 oz)  Body mass index is 31.46 kg/m².    Intake/Output Summary (Last 24 hours) at 3/17/2024 0941  Last data filed at 3/17/2024 0566  Gross  per 24 hour   Intake 530 ml   Output 1750 ml   Net -1220 ml           Physical Exam  Vitals and nursing note reviewed.   Constitutional:       General: He is not in acute distress.     Appearance: He is ill-appearing.   Cardiovascular:      Rate and Rhythm: Normal rate and regular rhythm.      Pulses: Normal pulses.   Pulmonary:      Effort: Pulmonary effort is normal. No respiratory distress.      Breath sounds: No wheezing.   Abdominal:      General: Bowel sounds are normal. There is no distension.   Musculoskeletal:         General: No swelling.   Neurological:      General: No focal deficit present.      Mental Status: He is alert. Mental status is at baseline.   Psychiatric:         Mood and Affect: Mood normal.         Thought Content: Thought content normal.             Significant Labs: All pertinent labs within the past 24 hours have been reviewed.    Significant Imaging: I have reviewed all pertinent imaging results/findings within the past 24 hours.    Assessment/Plan:      * Acute hypoxemic respiratory failure  Patient with Hypoxic Respiratory failure which is Acute on chronic.  he is on home oxygen at 2 LPM. This was taken away a couple months pta. Supplemental oxygen was provided and noted- Oxygen Concentration (%):  [36] 36    .   Signs/symptoms of respiratory failure include- tachypnea, increased work of breathing, and respiratory distress. Contributing diagnoses includes - CHF and COPD Labs and images were reviewed. Patient Has recent ABG, which has been reviewed. Will treat underlying causes and adjust management of respiratory failure as follows- Starting diuresis. Weaned off of bipap to NC on admission.    -weaned down to nc with adequate oxygenation  -continuing diuresis  -cardiology consulted.  -wean o2 as tolerated    CAD (coronary artery disease)  Patient with known CAD, which is controlled Will continue ASA and Statin and monitor for S/Sx of angina/ACS. Continue to monitor on telemetry.  Cardiology consulted.     Cirrhosis  Patient with known Cirrhosis  MELD-Na score calculated; MELD 3.0: 16 at 3/17/2024  4:31 AM  MELD-Na: 16 at 3/17/2024  4:31 AM  Calculated from:  Serum Creatinine: 2.1 mg/dL at 3/17/2024  4:31 AM  Serum Sodium: 142 mmol/L (Using max of 137 mmol/L) at 3/17/2024  4:31 AM  Total Bilirubin: 0.7 mg/dL (Using min of 1 mg/dL) at 3/15/2024 10:05 AM  Serum Albumin: 3.6 g/dL (Using max of 3.5 g/dL) at 3/15/2024 10:05 AM  INR(ratio): 1.2 at 3/15/2024 10:10 AM  Age at listing (hypothetical): 64 years  Sex: Male at 3/17/2024  4:31 AM      Continue chronic meds. Etiology likely Hepatitis. Will avoid any hepatotoxic meds, and monitor CBC/CMP/INR for synthetic function. LFTs not elevated. INR 1.2    Type 2 diabetes mellitus, with long-term current use of insulin  Patient's FSGs are controlled on current medication regimen.  Last A1c reviewed-   Lab Results   Component Value Date    HGBA1C 7.1 (H) 03/15/2024    HGBA1C 7.1 (H) 03/15/2024     Most recent fingerstick glucose reviewed-   Recent Labs   Lab 03/16/24  1039 03/16/24  1649 03/16/24 2009 03/17/24  0818   POCTGLUCOSE 137* 100 160* 139*       Current correctional scale  Low  Maintain anti-hyperglycemic dose as follows-   Antihyperglycemics (From admission, onward)      Start     Stop Route Frequency Ordered    03/15/24 1315  insulin detemir U-100 (Levemir) pen 10 Units         -- SubQ Daily 03/15/24 1204    03/15/24 1304  insulin aspart U-100 pen 0-5 Units         -- SubQ Before meals & nightly PRN 03/15/24 1204          Hold Oral hypoglycemics while patient is in the hospital.      Moderate cigarette smoker  History noted.      COPD (chronic obstructive pulmonary disease)  Patient's COPD is controlled currently.  Patient is currently off COPD Pathway. Continue scheduled inhalers Supplemental oxygen and monitor respiratory status closely.     Acute on chronic diastolic congestive heart failure  Patient is identified as having Diastolic  (HFpEF) heart failure that is Acute on chronic. CHF is currently uncontrolled due to Rales/crackles on pulmonary exam and Pulmonary edema/pleural effusion on CXR. Latest ECHO performed and demonstrates- Results for orders placed during the hospital encounter of 06/30/22    Echo    Interpretation Summary  · The left ventricle is normal in size with moderate concentric hypertrophy and normal systolic function.  · The estimated ejection fraction is 60%.  · A diastolic pattern consistent with atrial fibrillation observed.  · Normal right ventricular size with normal right ventricular systolic function.  · Mild mitral regurgitation.  · Mild tricuspid regurgitation.  · The estimated PA systolic pressure is 20 mmHg.  · Normal central venous pressure (3 mmHg).  . Continue Beta Blocker, ACE/ARB, and Furosemide and monitor clinical status closely. Monitor on telemetry. Patient is on CHF pathway.  Monitor strict Is&Os and daily weights.  Place on fluid restriction of 1.5 L. Cardiology has been consulted. Continue to stress to patient importance of self efficacy and  on diet for CHF. Last BNP reviewed- and noted below   Recent Labs   Lab 03/15/24  1005   *         Atrial fibrillation with RVR  Patient with Long standing persistent (>12 months) atrial fibrillation which is uncontrolled currently with Beta Blocker and Amiodarone. Patient is currently in atrial fibrillation.[id number] Score: 3. Anticoagulation indicated. Anticoagulation done with eliquis .    Cardiology consulted in the ed and pt started on amio drip. Appreciate recommendations on transitioning when able.  -s/p cardioversion 3/16 and doing well in sinus rhythm  - continue po Amiodarone    Elevated troponin  Troponin elevated on admission. Appears to be chronically elevated. Flat trend. Probabl demand. Cardiology consulted and appreciate recommendations.        VTE Risk Mitigation (From admission, onward)           Ordered     apixaban tablet 5 mg  2  times daily         03/15/24 1204     IP VTE HIGH RISK PATIENT  Once         03/15/24 1204     Place sequential compression device  Until discontinued         03/15/24 1204                    Discharge Planning   BALDO:      Code Status: Full Code   Is the patient medically ready for discharge?:     Reason for patient still in hospital (select all that apply): Treatment  Discharge Plan A: Home with family                  Kulwant Nesbitt MD  Department of [facility] Medicine   [facility]

## 2024-03-17 NOTE — HOSPITAL COURSE
3/17/24 back in rate controlled A-flutter. Felt much better after CV   Lukas presents for suture removal. He cut his left 5th fingertip.   He was treated at the ED 4/27/2019.   He has no problems with his finger.     He had a home sleep study through a provider in [place]. Was diagnosed with mild sleep apnea. He is awaiting his CPAP machine.     Problem list reviewed today.     Medications reviewed today.     Allergies as of 05/06/2019   • (No Known Allergies)         Exam:  Well developed, well nourished man in no apparent distress.    Blood pressure 112/82, pulse 84, weight 69.3 kg.  Sutures removed without difficulty.   Wound edges remain well approximated, healing well.   Steri strips applied.     Impression:  Finger laceration healing well.     Plan:  Recommended keeping the wound clean   He will allow steri strip to fall off in the next week.     Randy Hook MD

## 2024-03-18 LAB
ANION GAP SERPL CALC-SCNC: 11 MMOL/L (ref 8–16)
BASOPHILS # BLD AUTO: 0.03 K/UL (ref 0–0.2)
BASOPHILS NFR BLD: 0.4 % (ref 0–1.9)
BUN SERPL-MCNC: 32 MG/DL (ref 8–23)
CALCIUM SERPL-MCNC: 8.6 MG/DL (ref 8.7–10.5)
CHLORIDE SERPL-SCNC: 106 MMOL/L (ref 95–110)
CO2 SERPL-SCNC: 27 MMOL/L (ref 23–29)
CREAT SERPL-MCNC: 1.6 MG/DL (ref 0.5–1.4)
DIFFERENTIAL METHOD BLD: NORMAL
EOSINOPHIL # BLD AUTO: 0.1 K/UL (ref 0–0.5)
EOSINOPHIL NFR BLD: 1.5 % (ref 0–8)
ERYTHROCYTE [DISTWIDTH] IN BLOOD BY AUTOMATED COUNT: 14.4 % (ref 11.5–14.5)
EST. GFR  (NO RACE VARIABLE): 47 ML/MIN/1.73 M^2
GLUCOSE SERPL-MCNC: 113 MG/DL (ref 70–110)
HCT VFR BLD AUTO: 43 % (ref 40–54)
HGB BLD-MCNC: 14.2 G/DL (ref 14–18)
IMM GRANULOCYTES # BLD AUTO: 0.03 K/UL (ref 0–0.04)
IMM GRANULOCYTES NFR BLD AUTO: 0.4 % (ref 0–0.5)
LYMPHOCYTES # BLD AUTO: 1.5 K/UL (ref 1–4.8)
LYMPHOCYTES NFR BLD: 18.6 % (ref 18–48)
MCH RBC QN AUTO: 30.5 PG (ref 27–31)
MCHC RBC AUTO-ENTMCNC: 33 G/DL (ref 32–36)
MCV RBC AUTO: 93 FL (ref 82–98)
MONOCYTES # BLD AUTO: 0.9 K/UL (ref 0.3–1)
MONOCYTES NFR BLD: 11.1 % (ref 4–15)
NEUTROPHILS # BLD AUTO: 5.6 K/UL (ref 1.8–7.7)
NEUTROPHILS NFR BLD: 68 % (ref 38–73)
NRBC BLD-RTO: 0 /100 WBC
OHS QRS DURATION: 88 MS
OHS QTC CALCULATION: 532 MS
PLATELET # BLD AUTO: 153 K/UL (ref 150–450)
PMV BLD AUTO: 12 FL (ref 9.2–12.9)
POCT GLUCOSE: 100 MG/DL (ref 70–110)
POCT GLUCOSE: 110 MG/DL (ref 70–110)
POCT GLUCOSE: 141 MG/DL (ref 70–110)
POCT GLUCOSE: 143 MG/DL (ref 70–110)
POTASSIUM SERPL-SCNC: 3.7 MMOL/L (ref 3.5–5.1)
RBC # BLD AUTO: 4.65 M/UL (ref 4.6–6.2)
SODIUM SERPL-SCNC: 144 MMOL/L (ref 136–145)
WBC # BLD AUTO: 8.14 K/UL (ref 3.9–12.7)

## 2024-03-18 PROCEDURE — 94761 N-INVAS EAR/PLS OXIMETRY MLT: CPT

## 2024-03-18 PROCEDURE — 99900035 HC TECH TIME PER 15 MIN (STAT)

## 2024-03-18 PROCEDURE — 11000001 HC ACUTE MED/SURG PRIVATE ROOM

## 2024-03-18 PROCEDURE — 25000242 PHARM REV CODE 250 ALT 637 W/ HCPCS: Performed by: STUDENT IN AN ORGANIZED HEALTH CARE EDUCATION/TRAINING PROGRAM

## 2024-03-18 PROCEDURE — 27000221 HC OXYGEN, UP TO 24 HOURS

## 2024-03-18 PROCEDURE — 25000003 PHARM REV CODE 250: Performed by: STUDENT IN AN ORGANIZED HEALTH CARE EDUCATION/TRAINING PROGRAM

## 2024-03-18 PROCEDURE — 85025 COMPLETE CBC W/AUTO DIFF WBC: CPT | Performed by: STUDENT IN AN ORGANIZED HEALTH CARE EDUCATION/TRAINING PROGRAM

## 2024-03-18 PROCEDURE — 36415 COLL VENOUS BLD VENIPUNCTURE: CPT | Performed by: STUDENT IN AN ORGANIZED HEALTH CARE EDUCATION/TRAINING PROGRAM

## 2024-03-18 PROCEDURE — 80048 BASIC METABOLIC PNL TOTAL CA: CPT | Performed by: STUDENT IN AN ORGANIZED HEALTH CARE EDUCATION/TRAINING PROGRAM

## 2024-03-18 PROCEDURE — 94760 N-INVAS EAR/PLS OXIMETRY 1: CPT

## 2024-03-18 PROCEDURE — 63600175 PHARM REV CODE 636 W HCPCS: Performed by: STUDENT IN AN ORGANIZED HEALTH CARE EDUCATION/TRAINING PROGRAM

## 2024-03-18 PROCEDURE — 25000003 PHARM REV CODE 250: Performed by: INTERNAL MEDICINE

## 2024-03-18 PROCEDURE — 94640 AIRWAY INHALATION TREATMENT: CPT

## 2024-03-18 RX ORDER — ACETAMINOPHEN 325 MG/1
650 TABLET ORAL EVERY 6 HOURS PRN
Status: DISCONTINUED | OUTPATIENT
Start: 2024-03-18 | End: 2024-03-20 | Stop reason: HOSPADM

## 2024-03-18 RX ADMIN — BUDESONIDE 0.5 MG: 0.5 INHALANT RESPIRATORY (INHALATION) at 08:03

## 2024-03-18 RX ADMIN — TAMSULOSIN HYDROCHLORIDE 0.4 MG: 0.4 CAPSULE ORAL at 08:03

## 2024-03-18 RX ADMIN — MUPIROCIN: 20 OINTMENT TOPICAL at 08:03

## 2024-03-18 RX ADMIN — APIXABAN 5 MG: 5 TABLET, FILM COATED ORAL at 10:03

## 2024-03-18 RX ADMIN — MUPIROCIN: 20 OINTMENT TOPICAL at 10:03

## 2024-03-18 RX ADMIN — INSULIN DETEMIR 10 UNITS: 100 INJECTION, SOLUTION SUBCUTANEOUS at 10:03

## 2024-03-18 RX ADMIN — AMIODARONE HYDROCHLORIDE 200 MG: 200 TABLET ORAL at 10:03

## 2024-03-18 RX ADMIN — ATORVASTATIN CALCIUM 40 MG: 40 TABLET, FILM COATED ORAL at 08:03

## 2024-03-18 RX ADMIN — ARFORMOTEROL TARTRATE 15 MCG: 15 SOLUTION RESPIRATORY (INHALATION) at 07:03

## 2024-03-18 RX ADMIN — FUROSEMIDE 40 MG: 10 INJECTION, SOLUTION INTRAVENOUS at 08:03

## 2024-03-18 RX ADMIN — ARFORMOTEROL TARTRATE 15 MCG: 15 SOLUTION RESPIRATORY (INHALATION) at 08:03

## 2024-03-18 RX ADMIN — FUROSEMIDE 40 MG: 10 INJECTION, SOLUTION INTRAVENOUS at 10:03

## 2024-03-18 RX ADMIN — AMIODARONE HYDROCHLORIDE 200 MG: 200 TABLET ORAL at 08:03

## 2024-03-18 RX ADMIN — APIXABAN 5 MG: 5 TABLET, FILM COATED ORAL at 08:03

## 2024-03-18 RX ADMIN — BUDESONIDE 0.5 MG: 0.5 INHALANT RESPIRATORY (INHALATION) at 07:03

## 2024-03-18 NOTE — ASSESSMENT & PLAN NOTE
Patient's FSGs are controlled on current medication regimen.  Last A1c reviewed-   Lab Results   Component Value Date    HGBA1C 7.1 (H) 03/15/2024    HGBA1C 7.1 (H) 03/15/2024     Most recent fingerstick glucose reviewed-   Recent Labs   Lab 03/17/24  1139 03/17/24  1632 03/17/24  2123 03/18/24  0736   POCTGLUCOSE 114* 136* 140* 110       Current correctional scale  Low  Maintain anti-hyperglycemic dose as follows-   Antihyperglycemics (From admission, onward)    Start     Stop Route Frequency Ordered    03/15/24 1315  insulin detemir U-100 (Levemir) pen 10 Units         -- SubQ Daily 03/15/24 1204    03/15/24 1304  insulin aspart U-100 pen 0-5 Units         -- SubQ Before meals & nightly PRN 03/15/24 1204        Hold Oral hypoglycemics while patient is in the hospital.

## 2024-03-18 NOTE — ASSESSMENT & PLAN NOTE
Patient with Hypoxic Respiratory failure which is Acute on chronic.  he is on home oxygen at 2 LPM. This was taken away a couple months pta. Supplemental oxygen was provided and noted-      .   Signs/symptoms of respiratory failure include- tachypnea, increased work of breathing, and respiratory distress. Contributing diagnoses includes - CHF and COPD Labs and images were reviewed. Patient Has recent ABG, which has been reviewed. Will treat underlying causes and adjust management of respiratory failure as follows- Starting diuresis. Weaned off of bipap to NC on admission.    -weaned down to nc with adequate oxygenation  -continuing diuresis. Would benefit from IV diuresis today and possible transition to PO tomorrow.  -cardiology consulted.  -wean o2 as tolerated. May require O2 at discharge.

## 2024-03-18 NOTE — NURSING
PER handoff LISSY Diane     Pt resting in bed quietly. NAD noted. No c/o pain.     Fall and safety precautions maintained. Bed alarm activated and audible.. Bed locked in lowest position, with side rails up x2. Call bell and personal items within reach

## 2024-03-18 NOTE — CONSULTS
Please see consult noted dated 3/16 by Dr. Nicholas.     Vicki Thomas NP  Pulmonary Critical Care Medicine

## 2024-03-18 NOTE — ASSESSMENT & PLAN NOTE
Patient with Long standing persistent (>12 months) atrial fibrillation which is uncontrolled currently with Beta Blocker and Amiodarone. Patient is currently in atrial fibrillation.[id number] Score: 3. Anticoagulation indicated. Anticoagulation done with eliquis .    Cardiology consulted in the ed and pt started on amio drip. Appreciate recommendations on transitioning when able.  -s/p cardioversion 3/16 and doing well in sinus rhythm  - continue po Amiodarone

## 2024-03-18 NOTE — PROGRESS NOTES
[facility] Medicine  Progress Note    Patient Name: Marck Madison  MRN: [id number]  Patient Class: IP- Inpatient   Admission Date: 3/15/2024  Length of Stay: 3 days  Attending Physician: Kulwant Nesbitt MD  Primary Care Provider: [facility] -        Subjective:     Principal Problem:Acute hypoxemic respiratory failure        HPI:  64M with pmh of atrial fibrillation, CAD, CHF, COPD, diabetes, hypertension who presents with 3 day h/o worsening dyspnea especially with exertion. He is supposed to be on chronic home oxygen, 2 L nasal cannula, but this was taken away a couple months pta  Although he can not name his medications, he tells me he is taking his Eliquis and his amiodarone. Pt denies missing doses of medications. Pt also endorses a dry cough and some worsening of swelling. Pt denies cp, fever, chills, sick contacts, nausea, vomiting, abdominal pain, or dysuria.  Patient is a current smoker.  In the ED patient noted to be in AFib with RVR and cardiology consulted who started on amiodarone drip.  Initially on BiPAP, but able to transitioned back to nasal cannula at 5 L after dose of Lasix.  Patient to be admitted to the ICU for close monitoring and continued amiodarone drip pending Cardiology recommendation    Overview/Hospital Course:  Admitted with CHF exacerbation and afib with RVR. Started on IV lasix, IV amiodarone and eventually cardioverted. Now in sinus rhythm. Stepped down to floor. Diuresing well on IV lasix. Weaning oxygen. Was not on oxygen at home recently but has been in the past.     Interval History:  feeling better, now on 2 liters NC.     Review of Systems  Objective:     Vital Signs (Most Recent):  Temp: 98.1 °F (36.7 °C) (03/18/24 0739)  Pulse: 78 (03/18/24 0739)  Resp: 19 (03/18/24 0739)  BP: (!) 146/105 (03/18/24 0739)  SpO2: 96 % (03/18/24 0739) Vital Signs (24h Range):  Temp:  [98 °F (36.7 °C)-98.5 °F (36.9 °C)] 98.1 °F (36.7 °C)  Pulse:  []  78  Resp:  [18-20] 19  SpO2:  [92 %-99 %] 96 %  BP: (112-162)/() 146/105     Weight: 88.4 kg (194 lb 14.2 oz)  Body mass index is 31.46 kg/m².    Intake/Output Summary (Last 24 hours) at 3/18/2024 0903  Last data filed at 3/18/2024 0520  Gross per 24 hour   Intake 480 ml   Output 4000 ml   Net -3520 ml           Physical Exam  Vitals and nursing note reviewed.   Constitutional:       General: He is not in acute distress.     Appearance: He is ill-appearing.   Cardiovascular:      Rate and Rhythm: Normal rate and regular rhythm.      Pulses: Normal pulses.   Pulmonary:      Effort: Pulmonary effort is normal. No respiratory distress.      Breath sounds: No wheezing.   Abdominal:      General: Bowel sounds are normal. There is no distension.   Musculoskeletal:         General: No swelling.   Neurological:      General: No focal deficit present.      Mental Status: He is alert. Mental status is at baseline.   Psychiatric:         Mood and Affect: Mood normal.         Thought Content: Thought content normal.             Significant Labs: All pertinent labs within the past 24 hours have been reviewed.    Significant Imaging: I have reviewed all pertinent imaging results/findings within the past 24 hours.    Assessment/Plan:      * Acute hypoxemic respiratory failure  Patient with Hypoxic Respiratory failure which is Acute on chronic.  he is on home oxygen at 2 LPM. This was taken away a couple months pta. Supplemental oxygen was provided and noted-      .   Signs/symptoms of respiratory failure include- tachypnea, increased work of breathing, and respiratory distress. Contributing diagnoses includes - CHF and COPD Labs and images were reviewed. Patient Has recent ABG, which has been reviewed. Will treat underlying causes and adjust management of respiratory failure as follows- Starting diuresis. Weaned off of bipap to NC on admission.    -weaned down to nc with adequate oxygenation  -continuing diuresis. Would  benefit from IV diuresis today and possible transition to PO tomorrow.  -cardiology consulted.  -wean o2 as tolerated. May require O2 at discharge.    CAD (coronary artery disease)  Patient with known CAD, which is controlled Will continue ASA and Statin and monitor for S/Sx of angina/ACS. Continue to monitor on telemetry. Cardiology consulted.     Cirrhosis  Patient with known Cirrhosis  MELD-Na score calculated; MELD 3.0: 16 at 3/17/2024  4:31 AM  MELD-Na: 16 at 3/17/2024  4:31 AM  Calculated from:  Serum Creatinine: 2.1 mg/dL at 3/17/2024  4:31 AM  Serum Sodium: 142 mmol/L (Using max of 137 mmol/L) at 3/17/2024  4:31 AM  Total Bilirubin: 0.7 mg/dL (Using min of 1 mg/dL) at 3/15/2024 10:05 AM  Serum Albumin: 3.6 g/dL (Using max of 3.5 g/dL) at 3/15/2024 10:05 AM  INR(ratio): 1.2 at 3/15/2024 10:10 AM  Age at listing (hypothetical): 64 years  Sex: Male at 3/17/2024  4:31 AM      Continue chronic meds. Etiology likely Hepatitis. Will avoid any hepatotoxic meds, and monitor CBC/CMP/INR for synthetic function. LFTs not elevated. INR 1.2    Type 2 diabetes mellitus, with long-term current use of insulin  Patient's FSGs are controlled on current medication regimen.  Last A1c reviewed-   Lab Results   Component Value Date    HGBA1C 7.1 (H) 03/15/2024    HGBA1C 7.1 (H) 03/15/2024     Most recent fingerstick glucose reviewed-   Recent Labs   Lab 03/17/24  1139 03/17/24  1632 03/17/24  2123 03/18/24  0736   POCTGLUCOSE 114* 136* 140* 110       Current correctional scale  Low  Maintain anti-hyperglycemic dose as follows-   Antihyperglycemics (From admission, onward)      Start     Stop Route Frequency Ordered    03/15/24 1315  insulin detemir U-100 (Levemir) pen 10 Units         -- SubQ Daily 03/15/24 1204    03/15/24 1304  insulin aspart U-100 pen 0-5 Units         -- SubQ Before meals & nightly PRN 03/15/24 1204          Hold Oral hypoglycemics while patient is in the hospital.      Moderate cigarette smoker  History  noted.      COPD (chronic obstructive pulmonary disease)  Patient's COPD is controlled currently.  Patient is currently off COPD Pathway. Continue scheduled inhalers Supplemental oxygen and monitor respiratory status closely.     Acute on chronic diastolic congestive heart failure  Patient is identified as having Diastolic (HFpEF) heart failure that is Acute on chronic. CHF is currently uncontrolled due to Rales/crackles on pulmonary exam and Pulmonary edema/pleural effusion on CXR. Latest ECHO performed and demonstrates- Results for orders placed during the hospital encounter of 06/30/22    Echo    Interpretation Summary  · The left ventricle is normal in size with moderate concentric hypertrophy and normal systolic function.  · The estimated ejection fraction is 60%.  · A diastolic pattern consistent with atrial fibrillation observed.  · Normal right ventricular size with normal right ventricular systolic function.  · Mild mitral regurgitation.  · Mild tricuspid regurgitation.  · The estimated PA systolic pressure is 20 mmHg.  · Normal central venous pressure (3 mmHg).  . Continue Beta Blocker, ACE/ARB, and Furosemide and monitor clinical status closely. Monitor on telemetry. Patient is on CHF pathway.  Monitor strict Is&Os and daily weights.  Place on fluid restriction of 1.5 L. Cardiology has been consulted. Continue to stress to patient importance of self efficacy and  on diet for CHF. Last BNP reviewed- and noted below   Recent Labs   Lab 03/15/24  1005   *       - Would benefit from additional day of diuresis, likely can transition to PO tomorrow    Atrial fibrillation with RVR  Patient with Long standing persistent (>12 months) atrial fibrillation which is uncontrolled currently with Beta Blocker and Amiodarone. Patient is currently in atrial fibrillation.[id number] Score: 3. Anticoagulation indicated. Anticoagulation done with eliquis .    Cardiology consulted in the ed and pt started on amio  drip. Appreciate recommendations on transitioning when able.  -s/p cardioversion 3/16 and doing well in sinus rhythm  - continue po Amiodarone    Elevated troponin  Troponin elevated on admission. Appears to be chronically elevated. Flat trend. Probabl demand. Cardiology consulted and appreciate recommendations.        VTE Risk Mitigation (From admission, onward)           Ordered     apixaban tablet 5 mg  2 times daily         03/15/24 1204     IP VTE HIGH RISK PATIENT  Once         03/15/24 1204     Place sequential compression device  Until discontinued         03/15/24 1204                    Discharge Planning   BALDO:      Code Status: Full Code   Is the patient medically ready for discharge?:     Reason for patient still in hospital (select all that apply): Treatment  Discharge Plan A: Home with family                  Kulwant Nesbitt MD  Department of Hospital Medicine   [facility]

## 2024-03-18 NOTE — PLAN OF CARE
03/18/24 1404   Medicare Message   Important Message from Medicare regarding Discharge Appeal Rights Given to patient/caregiver;Explained to patient/caregiver;Signed/date by patient/caregiver   Date IMM was signed 03/18/24   Time IMM was signed 1400

## 2024-03-18 NOTE — NURSING
[facility]  Nurses Note -- 4 Eyes      3/18/2024       Skin assessed on: Q Shift      [x] No Pressure Injuries Present    [x]Prevention Measures Documented    [] Yes LDA  for Pressure Injury Previously documented     [] Yes New Pressure Injury Discovered   [] LDA for New Pressure Injury Added      Attending RN:  Kanika Cruz RN     Second RN:  LISSY Diane

## 2024-03-18 NOTE — PLAN OF CARE
Problem: Adult Inpatient Plan of Care  Goal: Plan of Care Review  Outcome: Ongoing, Progressing  Goal: Patient-Specific Goal (Individualized)  Outcome: Ongoing, Progressing  Goal: Absence of Hospital-Acquired Illness or Injury  Outcome: Ongoing, Progressing  Goal: Optimal Comfort and Wellbeing  Outcome: Ongoing, Progressing  Goal: Readiness for Transition of Care  Outcome: Ongoing, Progressing     Problem: Diabetes Comorbidity  Goal: Blood Glucose Level Within Targeted Range  Outcome: Ongoing, Progressing     Problem: Fall Injury Risk  Goal: Absence of Fall and Fall-Related Injury  Outcome: Ongoing, Progressing     Problem: Adjustment to Illness COPD (Chronic Obstructive Pulmonary Disease)  Goal: Optimal Chronic Illness Coping  Outcome: Ongoing, Progressing     Problem: Functional Ability Impaired COPD (Chronic Obstructive Pulmonary Disease)  Goal: Optimal Level of Functional Dunn  Outcome: Ongoing, Progressing     Problem: Infection COPD (Chronic Obstructive Pulmonary Disease)  Goal: Absence of Infection Signs and Symptoms  Outcome: Ongoing, Progressing     Problem: Respiratory Compromise COPD (Chronic Obstructive Pulmonary Disease)  Goal: Effective Oxygenation and Ventilation  Outcome: Ongoing, Progressing     Problem: Adjustment to Illness (Heart Failure)  Goal: Optimal Coping  Outcome: Ongoing, Progressing     Problem: Fluid Imbalance (Heart Failure)  Goal: Fluid Balance  Outcome: Ongoing, Progressing     Problem: Oral Intake Inadequate (Heart Failure)  Goal: Optimal Nutrition Intake  Outcome: Ongoing, Progressing     Problem: Respiratory Compromise (Heart Failure)  Goal: Effective Oxygenation and Ventilation  Outcome: Ongoing, Progressing     Problem: Dysrhythmia  Goal: Normalized Cardiac Rhythm  Outcome: Ongoing, Progressing

## 2024-03-18 NOTE — SUBJECTIVE & OBJECTIVE
Interval History:  feeling better, now on 2 liters NC.     Review of Systems  Objective:     Vital Signs (Most Recent):  Temp: 98.1 °F (36.7 °C) (03/18/24 0739)  Pulse: 78 (03/18/24 0739)  Resp: 19 (03/18/24 0739)  BP: (!) 146/105 (03/18/24 0739)  SpO2: 96 % (03/18/24 0739) Vital Signs (24h Range):  Temp:  [98 °F (36.7 °C)-98.5 °F (36.9 °C)] 98.1 °F (36.7 °C)  Pulse:  [] 78  Resp:  [18-20] 19  SpO2:  [92 %-99 %] 96 %  BP: (112-162)/() 146/105     Weight: 88.4 kg (194 lb 14.2 oz)  Body mass index is 31.46 kg/m².    Intake/Output Summary (Last 24 hours) at 3/18/2024 0937  Last data filed at 3/18/2024 0520  Gross per 24 hour   Intake 480 ml   Output 4000 ml   Net -3520 ml           Physical Exam  Vitals and nursing note reviewed.   Constitutional:       General: He is not in acute distress.     Appearance: He is ill-appearing.   Cardiovascular:      Rate and Rhythm: Normal rate and regular rhythm.      Pulses: Normal pulses.   Pulmonary:      Effort: Pulmonary effort is normal. No respiratory distress.      Breath sounds: No wheezing.   Abdominal:      General: Bowel sounds are normal. There is no distension.   Musculoskeletal:         General: No swelling.   Neurological:      General: No focal deficit present.      Mental Status: He is alert. Mental status is at baseline.   Psychiatric:         Mood and Affect: Mood normal.         Thought Content: Thought content normal.             Significant Labs: All pertinent labs within the past 24 hours have been reviewed.    Significant Imaging: I have reviewed all pertinent imaging results/findings within the past 24 hours.

## 2024-03-18 NOTE — ASSESSMENT & PLAN NOTE
Patient is identified as having Diastolic (HFpEF) heart failure that is Acute on chronic. CHF is currently uncontrolled due to Rales/crackles on pulmonary exam and Pulmonary edema/pleural effusion on CXR. Latest ECHO performed and demonstrates- Results for orders placed during the hospital encounter of 06/30/22    Echo    Interpretation Summary  · The left ventricle is normal in size with moderate concentric hypertrophy and normal systolic function.  · The estimated ejection fraction is 60%.  · A diastolic pattern consistent with atrial fibrillation observed.  · Normal right ventricular size with normal right ventricular systolic function.  · Mild mitral regurgitation.  · Mild tricuspid regurgitation.  · The estimated PA systolic pressure is 20 mmHg.  · Normal central venous pressure (3 mmHg).  . Continue Beta Blocker, ACE/ARB, and Furosemide and monitor clinical status closely. Monitor on telemetry. Patient is on CHF pathway.  Monitor strict Is&Os and daily weights.  Place on fluid restriction of 1.5 L. Cardiology has been consulted. Continue to stress to patient importance of self efficacy and  on diet for CHF. Last BNP reviewed- and noted below   Recent Labs   Lab 03/15/24  1005   *       - Would benefit from additional day of diuresis, likely can transition to PO tomorrow

## 2024-03-18 NOTE — HOSPITAL COURSE
Patient was admitted to ICU with acute hypoxic hypercapnic respiratory failure secondary to CHF exacerbation and afib with RVR. Started on BiPAP support IV lasix, IV amiodarone and eventually cardioverted. Now in sinus rhythm. Stepped down to floor. Diuresed well on IV lasix.  Patient is net-7 L this admission.  Patient was able to be weaned down to 2 L of oxygen with nasal cannula.  Discharged home with home oxygen 2 L.  Patient to follow up with PCP and Cardiology in 1 week at discharge

## 2024-03-18 NOTE — PLAN OF CARE
Problem: Adult Inpatient Plan of Care  Goal: Plan of Care Review  Outcome: Ongoing, Progressing  Goal: Patient-Specific Goal (Individualized)  Outcome: Ongoing, Progressing  Goal: Absence of Hospital-Acquired Illness or Injury  Outcome: Ongoing, Progressing  Goal: Optimal Comfort and Wellbeing  Outcome: Ongoing, Progressing  Goal: Readiness for Transition of Care  Outcome: Ongoing, Progressing     Problem: Diabetes Comorbidity  Goal: Blood Glucose Level Within Targeted Range  Outcome: Ongoing, Progressing     Problem: Infection  Goal: Absence of Infection Signs and Symptoms  Outcome: Ongoing, Progressing     Problem: Skin Injury Risk Increased  Goal: Skin Health and Integrity  Outcome: Ongoing, Progressing     Problem: Fall Injury Risk  Goal: Absence of Fall and Fall-Related Injury  Outcome: Ongoing, Progressing     Problem: Adjustment to Illness COPD (Chronic Obstructive Pulmonary Disease)  Goal: Optimal Chronic Illness Coping  Outcome: Ongoing, Progressing     Problem: Functional Ability Impaired COPD (Chronic Obstructive Pulmonary Disease)  Goal: Optimal Level of Functional [place]  Outcome: Ongoing, Progressing     Problem: Infection COPD (Chronic Obstructive Pulmonary Disease)  Goal: Absence of Infection Signs and Symptoms  Outcome: Ongoing, Progressing     Problem: Oral Intake Inadequate COPD (Chronic Obstructive Pulmonary Disease)  Goal: Improved Nutrition Intake  Outcome: Ongoing, Progressing     Problem: Respiratory Compromise COPD (Chronic Obstructive Pulmonary Disease)  Goal: Effective Oxygenation and Ventilation  Outcome: Ongoing, Progressing     Problem: Adjustment to Illness (Heart Failure)  Goal: Optimal Coping  Outcome: Ongoing, Progressing     Problem: Cardiac Output Decreased (Heart Failure)  Goal: Optimal Cardiac Output  Outcome: Ongoing, Progressing     Problem: Dysrhythmia (Heart Failure)  Goal: Stable Heart Rate and Rhythm  Outcome: Ongoing, Progressing     Problem: Fluid Imbalance  (Heart Failure)  Goal: Fluid Balance  Outcome: Ongoing, Progressing     Problem: Functional Ability Impaired (Heart Failure)  Goal: Optimal Functional Ability  Outcome: Ongoing, Progressing     Problem: Oral Intake Inadequate (Heart Failure)  Goal: Optimal Nutrition Intake  Outcome: Ongoing, Progressing     Problem: Respiratory Compromise (Heart Failure)  Goal: Effective Oxygenation and Ventilation  Outcome: Ongoing, Progressing     Problem: Sleep Disordered Breathing (Heart Failure)  Goal: Effective Breathing Pattern During Sleep  Outcome: Ongoing, Progressing     Problem: Dysrhythmia  Goal: Normalized Cardiac Rhythm  Outcome: Ongoing, Progressing

## 2024-03-19 LAB
ANION GAP SERPL CALC-SCNC: 8 MMOL/L (ref 8–16)
BACTERIA BLD CULT: NORMAL
BACTERIA BLD CULT: NORMAL
BASOPHILS # BLD AUTO: 0.03 K/UL (ref 0–0.2)
BASOPHILS NFR BLD: 0.3 % (ref 0–1.9)
BUN SERPL-MCNC: 30 MG/DL (ref 8–23)
CALCIUM SERPL-MCNC: 8.5 MG/DL (ref 8.7–10.5)
CHLORIDE SERPL-SCNC: 104 MMOL/L (ref 95–110)
CO2 SERPL-SCNC: 29 MMOL/L (ref 23–29)
CREAT SERPL-MCNC: 1.5 MG/DL (ref 0.5–1.4)
DIFFERENTIAL METHOD BLD: ABNORMAL
EOSINOPHIL # BLD AUTO: 0.1 K/UL (ref 0–0.5)
EOSINOPHIL NFR BLD: 1 % (ref 0–8)
ERYTHROCYTE [DISTWIDTH] IN BLOOD BY AUTOMATED COUNT: 14.1 % (ref 11.5–14.5)
EST. GFR  (NO RACE VARIABLE): 51 ML/MIN/1.73 M^2
GLUCOSE SERPL-MCNC: 148 MG/DL (ref 70–110)
HCT VFR BLD AUTO: 43.9 % (ref 40–54)
HGB BLD-MCNC: 14.2 G/DL (ref 14–18)
IMM GRANULOCYTES # BLD AUTO: 0.04 K/UL (ref 0–0.04)
IMM GRANULOCYTES NFR BLD AUTO: 0.4 % (ref 0–0.5)
LYMPHOCYTES # BLD AUTO: 1.7 K/UL (ref 1–4.8)
LYMPHOCYTES NFR BLD: 15.2 % (ref 18–48)
MCH RBC QN AUTO: 29.1 PG (ref 27–31)
MCHC RBC AUTO-ENTMCNC: 32.3 G/DL (ref 32–36)
MCV RBC AUTO: 90 FL (ref 82–98)
MONOCYTES # BLD AUTO: 1.2 K/UL (ref 0.3–1)
MONOCYTES NFR BLD: 11.2 % (ref 4–15)
NEUTROPHILS # BLD AUTO: 7.8 K/UL (ref 1.8–7.7)
NEUTROPHILS NFR BLD: 71.9 % (ref 38–73)
NRBC BLD-RTO: 0 /100 WBC
PLATELET # BLD AUTO: 166 K/UL (ref 150–450)
PMV BLD AUTO: 11.2 FL (ref 9.2–12.9)
POCT GLUCOSE: 106 MG/DL (ref 70–110)
POCT GLUCOSE: 111 MG/DL (ref 70–110)
POCT GLUCOSE: 140 MG/DL (ref 70–110)
POCT GLUCOSE: 179 MG/DL (ref 70–110)
POTASSIUM SERPL-SCNC: 3.4 MMOL/L (ref 3.5–5.1)
RBC # BLD AUTO: 4.88 M/UL (ref 4.6–6.2)
SODIUM SERPL-SCNC: 141 MMOL/L (ref 136–145)
WBC # BLD AUTO: 10.85 K/UL (ref 3.9–12.7)

## 2024-03-19 PROCEDURE — 99900031 HC PATIENT EDUCATION (STAT)

## 2024-03-19 PROCEDURE — 27000221 HC OXYGEN, UP TO 24 HOURS

## 2024-03-19 PROCEDURE — 80048 BASIC METABOLIC PNL TOTAL CA: CPT | Performed by: STUDENT IN AN ORGANIZED HEALTH CARE EDUCATION/TRAINING PROGRAM

## 2024-03-19 PROCEDURE — 94761 N-INVAS EAR/PLS OXIMETRY MLT: CPT

## 2024-03-19 PROCEDURE — 25000003 PHARM REV CODE 250: Performed by: INTERNAL MEDICINE

## 2024-03-19 PROCEDURE — 25000003 PHARM REV CODE 250: Performed by: STUDENT IN AN ORGANIZED HEALTH CARE EDUCATION/TRAINING PROGRAM

## 2024-03-19 PROCEDURE — 36415 COLL VENOUS BLD VENIPUNCTURE: CPT | Performed by: STUDENT IN AN ORGANIZED HEALTH CARE EDUCATION/TRAINING PROGRAM

## 2024-03-19 PROCEDURE — 25000003 PHARM REV CODE 250

## 2024-03-19 PROCEDURE — 85025 COMPLETE CBC W/AUTO DIFF WBC: CPT | Performed by: STUDENT IN AN ORGANIZED HEALTH CARE EDUCATION/TRAINING PROGRAM

## 2024-03-19 PROCEDURE — 25000242 PHARM REV CODE 250 ALT 637 W/ HCPCS: Performed by: STUDENT IN AN ORGANIZED HEALTH CARE EDUCATION/TRAINING PROGRAM

## 2024-03-19 PROCEDURE — 99900035 HC TECH TIME PER 15 MIN (STAT)

## 2024-03-19 PROCEDURE — 63600175 PHARM REV CODE 636 W HCPCS: Performed by: STUDENT IN AN ORGANIZED HEALTH CARE EDUCATION/TRAINING PROGRAM

## 2024-03-19 PROCEDURE — 11000001 HC ACUTE MED/SURG PRIVATE ROOM

## 2024-03-19 PROCEDURE — 94640 AIRWAY INHALATION TREATMENT: CPT

## 2024-03-19 RX ORDER — POTASSIUM CHLORIDE 20 MEQ/1
60 TABLET, EXTENDED RELEASE ORAL ONCE
Status: COMPLETED | OUTPATIENT
Start: 2024-03-19 | End: 2024-03-19

## 2024-03-19 RX ORDER — METOPROLOL SUCCINATE 50 MG/1
50 TABLET, EXTENDED RELEASE ORAL DAILY
Status: DISCONTINUED | OUTPATIENT
Start: 2024-03-19 | End: 2024-03-20 | Stop reason: HOSPADM

## 2024-03-19 RX ADMIN — METOPROLOL SUCCINATE 50 MG: 50 TABLET, EXTENDED RELEASE ORAL at 09:03

## 2024-03-19 RX ADMIN — APIXABAN 5 MG: 5 TABLET, FILM COATED ORAL at 09:03

## 2024-03-19 RX ADMIN — INSULIN DETEMIR 10 UNITS: 100 INJECTION, SOLUTION SUBCUTANEOUS at 09:03

## 2024-03-19 RX ADMIN — ACETAMINOPHEN 650 MG: 325 TABLET ORAL at 05:03

## 2024-03-19 RX ADMIN — AMIODARONE HYDROCHLORIDE 200 MG: 200 TABLET ORAL at 09:03

## 2024-03-19 RX ADMIN — FUROSEMIDE 40 MG: 10 INJECTION, SOLUTION INTRAVENOUS at 09:03

## 2024-03-19 RX ADMIN — BUDESONIDE 0.5 MG: 0.5 INHALANT RESPIRATORY (INHALATION) at 07:03

## 2024-03-19 RX ADMIN — ARFORMOTEROL TARTRATE 15 MCG: 15 SOLUTION RESPIRATORY (INHALATION) at 08:03

## 2024-03-19 RX ADMIN — MUPIROCIN: 20 OINTMENT TOPICAL at 09:03

## 2024-03-19 RX ADMIN — BUDESONIDE 0.5 MG: 0.5 INHALANT RESPIRATORY (INHALATION) at 08:03

## 2024-03-19 RX ADMIN — TAMSULOSIN HYDROCHLORIDE 0.4 MG: 0.4 CAPSULE ORAL at 09:03

## 2024-03-19 RX ADMIN — POTASSIUM CHLORIDE 60 MEQ: 1500 TABLET, EXTENDED RELEASE ORAL at 09:03

## 2024-03-19 RX ADMIN — ARFORMOTEROL TARTRATE 15 MCG: 15 SOLUTION RESPIRATORY (INHALATION) at 07:03

## 2024-03-19 RX ADMIN — ATORVASTATIN CALCIUM 40 MG: 40 TABLET, FILM COATED ORAL at 09:03

## 2024-03-19 NOTE — PLAN OF CARE
Weaned down to nc with adequate oxygenation. Patient will continue diuresis. Cardiology consulted. Wean O2 as tolerated. May require O2 at discharge.     03/19/24 1119   Discharge Reassessment   Assessment Type Discharge Planning Reassessment   Did the patient's condition or plan change since previous assessment? No   Discharge Plan discussed with:   (Dr. Bennett)   Communicated BALDO with patient/caregiver Date not available/Unable to determine   Discharge Plan A Home with family   Discharge Plan B Home with family   DME Needed Upon Discharge  oxygen   Transition of Care Barriers None   Why the patient remains in the hospital Requires continued medical care   Post-Acute Status   Coverage Medicaid   Other Status No Post-Acute Service Needs   Discharge Delays None known at this time

## 2024-03-19 NOTE — PLAN OF CARE
Problem: Adult Inpatient Plan of Care  Goal: Absence of Hospital-Acquired Illness or Injury  Outcome: Ongoing, Progressing  Intervention: Identify and Manage Fall Risk  Flowsheets (Taken 3/19/2024 0652)  Safety Promotion/Fall Prevention:   assistive device/personal item within reach   commode/urinal/bedpan at bedside   bed alarm set   Fall Risk reviewed with patient/family   nonskid shoes/socks when out of bed   room near unit station   side rails raised x 2   instructed to call staff for mobility     Problem: Diabetes Comorbidity  Goal: Blood Glucose Level Within Targeted Range  Outcome: Ongoing, Progressing  Intervention: Monitor and Manage Glycemia  Flowsheets (Taken 3/19/2024 0652)  Glycemic Management: blood glucose monitored     Problem: Skin Injury Risk Increased  Goal: Skin Health and Integrity  Outcome: Ongoing, Progressing

## 2024-03-19 NOTE — SUBJECTIVE & OBJECTIVE
Interval History:  Remains on 2 L of oxygen with nasal cannula with oxygen saturations at 92-94%.  Wean as tolerated.  Shortness of breath is improving.    Review of Systems   Constitutional: Negative.    Respiratory:  Positive for shortness of breath.    Cardiovascular: Negative.    Gastrointestinal: Negative.    Genitourinary: Negative.    Neurological: Negative.      Objective:     Vital Signs (Most Recent):  Temp: 97.9 °F (36.6 °C) (03/19/24 1132)  Pulse: 87 (03/19/24 1508)  Resp: 18 (03/19/24 1132)  BP: (!) 145/87 (03/19/24 1132)  SpO2: 98 % (03/19/24 1133) Vital Signs (24h Range):  Temp:  [97.5 °F (36.4 °C)-98.8 °F (37.1 °C)] 97.9 °F (36.6 °C)  Pulse:  [] 87  Resp:  [16-20] 18  SpO2:  [95 %-99 %] 98 %  BP: (140-162)/() 145/87     Weight: 88.4 kg (194 lb 14.2 oz)  Body mass index is 31.46 kg/m².    Intake/Output Summary (Last 24 hours) at 3/19/2024 1554  Last data filed at 3/19/2024 1503  Gross per 24 hour   Intake 600 ml   Output 1950 ml   Net -1350 ml         Physical Exam  Constitutional:       General: He is not in acute distress.  Cardiovascular:      Rate and Rhythm: Normal rate.      Pulses: Normal pulses.   Pulmonary:      Effort: Respiratory distress (On 2 L of oxygen with nasal cannula) present.   Abdominal:      General: Bowel sounds are normal. There is no distension.      Palpations: Abdomen is soft.      Tenderness: There is no abdominal tenderness.   Musculoskeletal:      Right lower leg: Edema present.      Left lower leg: Edema present.   Neurological:      General: No focal deficit present.      Mental Status: He is alert and oriented to person, place, and time. Mental status is at baseline.   Psychiatric:         Mood and Affect: Mood normal.             Significant Labs: All pertinent labs within the past 24 hours have been reviewed.    Significant Imaging: I have reviewed all pertinent imaging results/findings within the past 24 hours.

## 2024-03-19 NOTE — PROGRESS NOTES
[facility] Medicine  Progress Note    Patient Name: Marck Madison  MRN: [id number]  Patient Class: IP- Inpatient   Admission Date: 3/15/2024  Length of Stay: 4 days  Attending Physician: Jamie Bennett,*  Primary Care Provider: [facility] -        Subjective:     Principal Problem:Acute hypoxemic respiratory failure        HPI:  64M with pmh of atrial fibrillation, CAD, CHF, COPD, diabetes, hypertension who presents with 3 day h/o worsening dyspnea especially with exertion. He is supposed to be on chronic home oxygen, 2 L nasal cannula, but this was taken away a couple months pta  Although he can not name his medications, he tells me he is taking his Eliquis and his amiodarone. Pt denies missing doses of medications. Pt also endorses a dry cough and some worsening of swelling. Pt denies cp, fever, chills, sick contacts, nausea, vomiting, abdominal pain, or dysuria.  Patient is a current smoker.  In the ED patient noted to be in AFib with RVR and cardiology consulted who started on amiodarone drip.  Initially on BiPAP, but able to transitioned back to nasal cannula at 5 L after dose of Lasix.  Patient to be admitted to the ICU for close monitoring and continued amiodarone drip pending Cardiology recommendation    Overview/Hospital Course:  Admitted with CHF exacerbation and afib with RVR. Started on IV lasix, IV amiodarone and eventually cardioverted. Now in sinus rhythm. Stepped down to floor. Diuresing well on IV lasix. Weaning oxygen. Was not on oxygen at home recently but has been in the past.     Interval History:  Remains on 2 L of oxygen with nasal cannula with oxygen saturations at 92-94%.  Wean as tolerated.  Shortness of breath is improving.    Review of Systems   Constitutional: Negative.    Respiratory:  Positive for shortness of breath.    Cardiovascular: Negative.    Gastrointestinal: Negative.    Genitourinary: Negative.    Neurological: Negative.       Objective:     Vital Signs (Most Recent):  Temp: 97.9 °F (36.6 °C) (03/19/24 1132)  Pulse: 87 (03/19/24 1508)  Resp: 18 (03/19/24 1132)  BP: (!) 145/87 (03/19/24 1132)  SpO2: 98 % (03/19/24 1133) Vital Signs (24h Range):  Temp:  [97.5 °F (36.4 °C)-98.8 °F (37.1 °C)] 97.9 °F (36.6 °C)  Pulse:  [] 87  Resp:  [16-20] 18  SpO2:  [95 %-99 %] 98 %  BP: (140-162)/() 145/87     Weight: 88.4 kg (194 lb 14.2 oz)  Body mass index is 31.46 kg/m².    Intake/Output Summary (Last 24 hours) at 3/19/2024 1554  Last data filed at 3/19/2024 1503  Gross per 24 hour   Intake 600 ml   Output 1950 ml   Net -1350 ml         Physical Exam  Constitutional:       General: He is not in acute distress.  Cardiovascular:      Rate and Rhythm: Normal rate.      Pulses: Normal pulses.   Pulmonary:      Effort: Respiratory distress (On 2 L of oxygen with nasal cannula) present.   Abdominal:      General: Bowel sounds are normal. There is no distension.      Palpations: Abdomen is soft.      Tenderness: There is no abdominal tenderness.   Musculoskeletal:      Right lower leg: Edema present.      Left lower leg: Edema present.   Neurological:      General: No focal deficit present.      Mental Status: He is alert and oriented to person, place, and time. Mental status is at baseline.   Psychiatric:         Mood and Affect: Mood normal.             Significant Labs: All pertinent labs within the past 24 hours have been reviewed.    Significant Imaging: I have reviewed all pertinent imaging results/findings within the past 24 hours.    Assessment/Plan:      * Acute hypoxemic respiratory failure  Patient with Hypoxic Respiratory failure which is Acute on chronic.  he is on home oxygen at 2 LPM. This was taken away a couple months pta. Supplemental oxygen was provided and noted-      .   Signs/symptoms of respiratory failure include- tachypnea, increased work of breathing, and respiratory distress. Contributing diagnoses includes - CHF and  COPD Labs and images were reviewed. Patient Has recent ABG, which has been reviewed. Will treat underlying causes and adjust management of respiratory failure as follows- Starting diuresis. Weaned off of bipap to NC on admission.    -weaned down to nc with adequate oxygenation  -continuing diuresis. Would benefit from IV diuresis today and possible transition to PO tomorrow.  -cardiology consulted.  -wean o2 as tolerated. May require O2 at discharge.    CAD (coronary artery disease)  Patient with known CAD, which is controlled Will continue ASA and Statin and monitor for S/Sx of angina/ACS. Continue to monitor on telemetry. Cardiology consulted.     Cirrhosis  Patient with known Cirrhosis  MELD-Na score calculated; MELD 3.0: 16 at 3/17/2024  4:31 AM  MELD-Na: 16 at 3/17/2024  4:31 AM  Calculated from:  Serum Creatinine: 2.1 mg/dL at 3/17/2024  4:31 AM  Serum Sodium: 142 mmol/L (Using max of 137 mmol/L) at 3/17/2024  4:31 AM  Total Bilirubin: 0.7 mg/dL (Using min of 1 mg/dL) at 3/15/2024 10:05 AM  Serum Albumin: 3.6 g/dL (Using max of 3.5 g/dL) at 3/15/2024 10:05 AM  INR(ratio): 1.2 at 3/15/2024 10:10 AM  Age at listing (hypothetical): 64 years  Sex: Male at 3/17/2024  4:31 AM      Continue chronic meds. Etiology likely Hepatitis. Will avoid any hepatotoxic meds, and monitor CBC/CMP/INR for synthetic function. LFTs not elevated. INR 1.2    Type 2 diabetes mellitus, with long-term current use of insulin  Patient's FSGs are controlled on current medication regimen.  Last A1c reviewed-   Lab Results   Component Value Date    HGBA1C 7.1 (H) 03/15/2024    HGBA1C 7.1 (H) 03/15/2024     Most recent fingerstick glucose reviewed-   Recent Labs   Lab 03/18/24  1638 03/18/24  1942 03/19/24  0739 03/19/24  1129   POCTGLUCOSE 141* 143* 106 111*       Current correctional scale  Low  Maintain anti-hyperglycemic dose as follows-   Antihyperglycemics (From admission, onward)      Start     Stop Route Frequency Ordered    03/15/24  1315  insulin detemir U-100 (Levemir) pen 10 Units         -- SubQ Daily 03/15/24 1204    03/15/24 1304  insulin aspart U-100 pen 0-5 Units         -- SubQ Before meals & nightly PRN 03/15/24 1204          Hold Oral hypoglycemics while patient is in the hospital.      Moderate cigarette smoker  History noted.      COPD (chronic obstructive pulmonary disease)  Patient's COPD is controlled currently.  Patient is currently off COPD Pathway. Continue scheduled inhalers Supplemental oxygen and monitor respiratory status closely.     Acute on chronic diastolic congestive heart failure  Patient is identified as having Diastolic (HFpEF) heart failure that is Acute on chronic. CHF is currently uncontrolled due to Rales/crackles on pulmonary exam and Pulmonary edema/pleural effusion on CXR. Latest ECHO performed and demonstrates- Results for orders placed during the hospital encounter of 06/30/22    Echo    Interpretation Summary  · The left ventricle is normal in size with moderate concentric hypertrophy and normal systolic function.  · The estimated ejection fraction is 60%.  · A diastolic pattern consistent with atrial fibrillation observed.  · Normal right ventricular size with normal right ventricular systolic function.  · Mild mitral regurgitation.  · Mild tricuspid regurgitation.  · The estimated PA systolic pressure is 20 mmHg.  · Normal central venous pressure (3 mmHg).  . Continue Beta Blocker, ACE/ARB, and Furosemide and monitor clinical status closely. Monitor on telemetry. Patient is on CHF pathway.  Monitor strict Is&Os and daily weights.  Place on fluid restriction of 1.5 L. Cardiology has been consulted. Continue to stress to patient importance of self efficacy and  on diet for CHF. Last BNP reviewed- and noted below   Recent Labs   Lab 03/15/24  1005   *       - Would benefit from additional day of diuresis, likely can transition to PO tomorrow    Atrial fibrillation with RVR  Patient with Long  standing persistent (>12 months) atrial fibrillation which is uncontrolled currently with Beta Blocker and Amiodarone. Patient is currently in atrial fibrillation.[id number] Score: 3. Anticoagulation indicated. Anticoagulation done with eliquis .    Cardiology consulted in the ed and pt started on amio drip. Appreciate recommendations on transitioning when able.  -s/p cardioversion 3/16 and doing well in sinus rhythm  - continue po Amiodarone and restart home metoprolol at 50 once daily    Elevated troponin  Troponin elevated on admission. Appears to be chronically elevated. Flat trend. Probabl demand. Cardiology consulted and appreciate recommendations.        VTE Risk Mitigation (From admission, onward)           Ordered     apixaban tablet 5 mg  2 times daily         03/15/24 1204     IP VTE HIGH RISK PATIENT  Once         03/15/24 1204     Place sequential compression device  Until discontinued         03/15/24 1204                    Discharge Planning   BALDO: 3/19/2024     Code Status: Full Code   Is the patient medically ready for discharge?:     Reason for patient still in hospital (select all that apply): Patient trending condition and Treatment  Discharge Plan A: Home with family   Discharge Delays: None known at this time              Jamie Bennett MD  Department of Hospital Medicine   [facility] - Telemetry

## 2024-03-19 NOTE — ASSESSMENT & PLAN NOTE
Patient with Long standing persistent (>12 months) atrial fibrillation which is uncontrolled currently with Beta Blocker and Amiodarone. Patient is currently in atrial fibrillation.[id number] Score: 3. Anticoagulation indicated. Anticoagulation done with eliquis .    Cardiology consulted in the ed and pt started on amio drip. Appreciate recommendations on transitioning when able.  -s/p cardioversion 3/16 and doing well in sinus rhythm  - continue po Amiodarone and restart home metoprolol at 50 once daily

## 2024-03-19 NOTE — PLAN OF CARE
Problem: Adult Inpatient Plan of Care  Goal: Plan of Care Review  Outcome: Ongoing, Progressing  Goal: Patient-Specific Goal (Individualized)  Outcome: Ongoing, Progressing  Goal: Absence of Hospital-Acquired Illness or Injury  Outcome: Ongoing, Progressing  Goal: Optimal Comfort and Wellbeing  Outcome: Ongoing, Progressing  Goal: Readiness for Transition of Care  Outcome: Ongoing, Progressing     Problem: Diabetes Comorbidity  Goal: Blood Glucose Level Within Targeted Range  Outcome: Ongoing, Progressing     Problem: Fall Injury Risk  Goal: Absence of Fall and Fall-Related Injury  Outcome: Ongoing, Progressing     Problem: Adjustment to Illness COPD (Chronic Obstructive Pulmonary Disease)  Goal: Optimal Chronic Illness Coping  Outcome: Ongoing, Progressing     Problem: Oral Intake Inadequate (Heart Failure)  Goal: Optimal Nutrition Intake  Outcome: Ongoing, Progressing

## 2024-03-19 NOTE — NURSING
[facility]  Nurses Note -- 4 Eyes      3/18/2024      Skin assessed on: Q Shift      [x] No Pressure Injuries Present    [x]Prevention Measures Documented    [] Yes LDA  for Pressure Injury Previously documented     [] Yes New Pressure Injury Discovered   [] LDA for New Pressure Injury Added      Attending RN:  Yusuf Gray RN     Second RN:  DELTA Boudreaux

## 2024-03-20 VITALS
HEART RATE: 114 BPM | SYSTOLIC BLOOD PRESSURE: 135 MMHG | TEMPERATURE: 98 F | BODY MASS INDEX: 31.32 KG/M2 | DIASTOLIC BLOOD PRESSURE: 96 MMHG | WEIGHT: 194.88 LBS | RESPIRATION RATE: 19 BRPM | OXYGEN SATURATION: 95 % | HEIGHT: 66 IN

## 2024-03-20 PROBLEM — R79.89 ELEVATED TROPONIN: Status: RESOLVED | Noted: 2020-01-20 | Resolved: 2024-03-20

## 2024-03-20 PROBLEM — I50.33 ACUTE ON CHRONIC DIASTOLIC CONGESTIVE HEART FAILURE: Status: RESOLVED | Noted: 2021-06-09 | Resolved: 2024-03-20

## 2024-03-20 PROBLEM — I48.91 ATRIAL FIBRILLATION WITH RVR: Status: RESOLVED | Noted: 2020-01-20 | Resolved: 2024-03-20

## 2024-03-20 LAB
ANION GAP SERPL CALC-SCNC: 13 MMOL/L (ref 8–16)
BUN SERPL-MCNC: 27 MG/DL (ref 8–23)
CALCIUM SERPL-MCNC: 9.2 MG/DL (ref 8.7–10.5)
CHLORIDE SERPL-SCNC: 103 MMOL/L (ref 95–110)
CO2 SERPL-SCNC: 27 MMOL/L (ref 23–29)
CREAT SERPL-MCNC: 1.5 MG/DL (ref 0.5–1.4)
EST. GFR  (NO RACE VARIABLE): 51 ML/MIN/1.73 M^2
GLUCOSE SERPL-MCNC: 103 MG/DL (ref 70–110)
POCT GLUCOSE: 104 MG/DL (ref 70–110)
POCT GLUCOSE: 110 MG/DL (ref 70–110)
POCT GLUCOSE: 113 MG/DL (ref 70–110)
POTASSIUM SERPL-SCNC: 4.2 MMOL/L (ref 3.5–5.1)
SODIUM SERPL-SCNC: 143 MMOL/L (ref 136–145)

## 2024-03-20 PROCEDURE — 27000221 HC OXYGEN, UP TO 24 HOURS

## 2024-03-20 PROCEDURE — 25000003 PHARM REV CODE 250: Performed by: STUDENT IN AN ORGANIZED HEALTH CARE EDUCATION/TRAINING PROGRAM

## 2024-03-20 PROCEDURE — 36415 COLL VENOUS BLD VENIPUNCTURE: CPT | Performed by: STUDENT IN AN ORGANIZED HEALTH CARE EDUCATION/TRAINING PROGRAM

## 2024-03-20 PROCEDURE — 63600175 PHARM REV CODE 636 W HCPCS: Performed by: STUDENT IN AN ORGANIZED HEALTH CARE EDUCATION/TRAINING PROGRAM

## 2024-03-20 PROCEDURE — 80048 BASIC METABOLIC PNL TOTAL CA: CPT | Performed by: STUDENT IN AN ORGANIZED HEALTH CARE EDUCATION/TRAINING PROGRAM

## 2024-03-20 PROCEDURE — 25000242 PHARM REV CODE 250 ALT 637 W/ HCPCS: Performed by: STUDENT IN AN ORGANIZED HEALTH CARE EDUCATION/TRAINING PROGRAM

## 2024-03-20 PROCEDURE — 94640 AIRWAY INHALATION TREATMENT: CPT

## 2024-03-20 PROCEDURE — 25000003 PHARM REV CODE 250: Performed by: INTERNAL MEDICINE

## 2024-03-20 PROCEDURE — 94760 N-INVAS EAR/PLS OXIMETRY 1: CPT

## 2024-03-20 RX ORDER — AMLODIPINE BESYLATE 10 MG/1
10 TABLET ORAL DAILY
Qty: 30 TABLET | Refills: 0 | Status: ON HOLD | OUTPATIENT
Start: 2024-03-20 | End: 2024-06-12 | Stop reason: HOSPADM

## 2024-03-20 RX ORDER — FUROSEMIDE 40 MG/1
40 TABLET ORAL 2 TIMES DAILY
Qty: 60 TABLET | Refills: 0 | Status: SHIPPED | OUTPATIENT
Start: 2024-03-20 | End: 2024-06-05 | Stop reason: DRUGHIGH

## 2024-03-20 RX ORDER — TAMSULOSIN HYDROCHLORIDE 0.4 MG/1
0.4 CAPSULE ORAL DAILY
Qty: 30 CAPSULE | Refills: 0 | Status: SHIPPED | OUTPATIENT
Start: 2024-03-21 | End: 2024-04-20

## 2024-03-20 RX ORDER — NICOTINE 7MG/24HR
1 PATCH, TRANSDERMAL 24 HOURS TRANSDERMAL DAILY
Qty: 14 PATCH | Refills: 0 | Status: SHIPPED | OUTPATIENT
Start: 2024-03-20 | End: 2024-04-04

## 2024-03-20 RX ADMIN — ATORVASTATIN CALCIUM 40 MG: 40 TABLET, FILM COATED ORAL at 08:03

## 2024-03-20 RX ADMIN — TAMSULOSIN HYDROCHLORIDE 0.4 MG: 0.4 CAPSULE ORAL at 08:03

## 2024-03-20 RX ADMIN — AMIODARONE HYDROCHLORIDE 200 MG: 200 TABLET ORAL at 08:03

## 2024-03-20 RX ADMIN — METOPROLOL SUCCINATE 50 MG: 50 TABLET, EXTENDED RELEASE ORAL at 08:03

## 2024-03-20 RX ADMIN — APIXABAN 5 MG: 5 TABLET, FILM COATED ORAL at 08:03

## 2024-03-20 RX ADMIN — MUPIROCIN: 20 OINTMENT TOPICAL at 08:03

## 2024-03-20 RX ADMIN — ARFORMOTEROL TARTRATE 15 MCG: 15 SOLUTION RESPIRATORY (INHALATION) at 08:03

## 2024-03-20 RX ADMIN — FUROSEMIDE 40 MG: 10 INJECTION, SOLUTION INTRAVENOUS at 08:03

## 2024-03-20 RX ADMIN — BUDESONIDE 0.5 MG: 0.5 INHALANT RESPIRATORY (INHALATION) at 08:03

## 2024-03-20 NOTE — PLAN OF CARE
Problem: Diabetes Comorbidity  Goal: Blood Glucose Level Within Targeted Range  Outcome: Ongoing, Progressing  Intervention: Monitor and Manage Glycemia  Flowsheets (Taken 3/20/2024 0224)  Glycemic Management: blood glucose monitored     Problem: Skin Injury Risk Increased  Goal: Skin Health and Integrity  Outcome: Ongoing, Progressing  Intervention: Optimize Skin Protection  Flowsheets (Taken 3/20/2024 0224)  Pressure Reduction Techniques: frequent weight shift encouraged  Skin Protection:   adhesive use limited   transparent dressing maintained   tubing/devices free from skin contact  Head of Bed (HOB) Positioning: HOB elevated     Problem: Respiratory Compromise COPD (Chronic Obstructive Pulmonary Disease)  Goal: Effective Oxygenation and Ventilation  Outcome: Ongoing, Progressing  Intervention: Promote Airway Secretion Clearance  Flowsheets (Taken 3/20/2024 0224)  Breathing Techniques/Airway Clearance: deep/controlled cough encouraged  Cough And Deep Breathing: done independently per patient     Problem: Cardiac Output Decreased (Heart Failure)  Goal: Optimal Cardiac Output  Outcome: Ongoing, Progressing  Intervention: Optimize Cardiac Output  Flowsheets (Taken 3/20/2024 0224)  Environmental Support:   calm environment promoted   distractions minimized   rest periods encouraged   caregiver consistency promoted

## 2024-03-20 NOTE — NURSING
[facility] [place]  Nurses Note -- 4 Eyes      3/19/2024      Skin assessed on: Q Shift      [x] No Pressure Injuries Present    [x]Prevention Measures Documented    [] Yes LDA  for Pressure Injury Previously documented     [] Yes New Pressure Injury Discovered   [] LDA for New Pressure Injury Added      Attending RN:  Yusuf Gray RN     Second RN:  DELTA Wooten

## 2024-03-20 NOTE — DISCHARGE SUMMARY
[facility] Medicine  Discharge Summary      Patient Name: Marck Madison  MRN: [id number]  CAROLA: [id number]  Patient Class: IP- Inpatient  Admission Date: 3/15/2024  Hospital Length of Stay: 5 days  Discharge Date and Time:  03/20/2024 4:34 PM  Attending Physician: Jamie Bennett,*   Discharging Provider: Jamie Bennett MD  Primary Care Provider: [facility] -    Primary Care Team: Networked reference to record PCT     HPI:   64M with pmh of atrial fibrillation, CAD, CHF, COPD, diabetes, hypertension who presents with 3 day h/o worsening dyspnea especially with exertion. He is supposed to be on chronic home oxygen, 2 L nasal cannula, but this was taken away a couple months pta  Although he can not name his medications, he tells me he is taking his Eliquis and his amiodarone. Pt denies missing doses of medications. Pt also endorses a dry cough and some worsening of swelling. Pt denies cp, fever, chills, sick contacts, nausea, vomiting, abdominal pain, or dysuria.  Patient is a current smoker.  In the ED patient noted to be in AFib with RVR and cardiology consulted who started on amiodarone drip.  Initially on BiPAP, but able to transitioned back to nasal cannula at 5 L after dose of Lasix.  Patient to be admitted to the ICU for close monitoring and continued amiodarone drip pending Cardiology recommendation    Procedure(s) (LRB):  Cardioversion or Defibrillation (N/A)      Hospital Course:   Patient was admitted to ICU with acute hypoxic hypercapnic respiratory failure secondary to CHF exacerbation and afib with RVR. Started on BiPAP support IV lasix, IV amiodarone and eventually cardioverted. Now in sinus rhythm. Stepped down to floor. Diuresed well on IV lasix.  Patient is net-7 L this admission.  Patient was able to be weaned down to 2 L of oxygen with nasal cannula.  Discharged home with home oxygen 2 L.  Patient to follow up with PCP and Cardiology in 1 week at  discharge     Goals of Care Treatment Preferences:  Code Status: Full Code      Consults:   Consults (From admission, onward)          Status Ordering Provider     Inpatient consult to Pulmonology  Once        Provider:  (Not yet assigned)    Completed CHIDI HIRSCH III     Inpatient consult to Social Work/Case Management  Once        Provider:  (Not yet assigned)    Completed CHDII HIRSCH III     Inpatient consult to Registered Dietitian/Nutritionist  Once        Provider:  (Not yet assigned)    Completed CHIDI HIRSCH III     Inpatient consult to Cardiology  Once        Provider:  Jonathan Lopez MD    Completed MARIXA GREEN new Assessment & Plan notes have been filed under this hospital service since the last note was generated.  Service: Hospital Medicine    Final Active Diagnoses:    Diagnosis Date Noted POA    PRINCIPAL PROBLEM:  Acute hypoxemic respiratory failure [J96.01] 09/10/2019 Yes    CAD (coronary artery disease) [I25.10] 09/18/2022 Yes    Cirrhosis [K74.60] 09/18/2022 Yes    Type 2 diabetes mellitus, with long-term current use of insulin [E11.9, Z79.4] 01/26/2022 Not Applicable    COPD (chronic obstructive pulmonary disease) [J44.9] 11/21/2021 Yes    Moderate cigarette smoker [F17.210] 11/21/2021 Yes      Problems Resolved During this Admission:    Diagnosis Date Noted Date Resolved POA    Acute bronchitis with bronchospasm [J20.9] 11/21/2021 03/15/2024 Yes    Acute on chronic diastolic congestive heart failure [I50.33] 06/09/2021 03/20/2024 Yes    Atrial fibrillation with RVR [I48.91] 01/20/2020 03/20/2024 Yes    Elevated troponin [R79.89] 01/20/2020 03/20/2024 Yes    Acute renal failure [N17.9] 09/10/2019 03/15/2024 Yes       Discharged Condition: good    Disposition: Home or Self Care    Follow Up:   Follow-up Information       [facility] - .    Why: Please call to schedule hospital follow-up to been within 2 weeks.  Contact information:  [address]  "[place] [address]  [phone]               [employer] Follow up.    Why: DME  Contact information:  [address]  [address]  [facility] [id number] [phone]                         Patient Instructions:      OXYGEN FOR HOME USE     Order Specific Question Answer Comments   Liter Flow 2    Duration Continuous    Qualifying Test Performed at: Activity    Oxygen saturation at rest 95 RA   Oxygen saturation with activity 84 RA   Oxygen saturation with activity on oxygen 96 2L   Portable mode: continuous    Route nasal cannula    Device: home concentrator with portable tanks    Length of need (in months): 3 mos    Patient condition with qualifying saturation CHF    Height: 5' 6" (1.676 m)    Weight: 88.4 kg (194 lb 14.2 oz)    Alternative treatment measures have been tried or considered and deemed clinically ineffective. Yes      Ambulatory referral/consult to Smoking Cessation Program   Standing Status: Future   Referral Priority: Routine Referral Type: Consultation   Referral Reason: Specialty Services Required   Requested Specialty: CTTS   Number of Visits Requested: 1     Ambulatory referral/consult to Cardiology   Standing Status: Future   Referral Priority: Routine Referral Type: Consultation   Referral Reason: Specialty Services Required   Requested Specialty: Cardiology   Number of Visits Requested: 1     Diet Cardiac     Activity as tolerated       Significant Diagnostic Studies: Labs: CMP   Recent Labs   Lab 03/19/24  0445 03/20/24  0456    143   K 3.4* 4.2    103   CO2 29 27   * 103   BUN 30* 27*   CREATININE 1.5* 1.5*   CALCIUM 8.5* 9.2   ANIONGAP 8 13    and CBC   Recent Labs   Lab 03/19/24  0445   WBC 10.85   HGB 14.2   HCT 43.9          Pending Diagnostic Studies:       None           Medications:  Reconciled Home Medications:      Medication List        START taking these medications      nicotine 7 mg/24 hr  Commonly known as: NICODERM CQ  Place 1 " patch onto the skin once daily. for 15 days     tamsulosin 0.4 mg Cap  Commonly known as: FLOMAX  Take 1 capsule (0.4 mg total) by mouth once daily.  Start taking on: March 21, 2024            CHANGE how you take these medications      amLODIPine 10 MG tablet  Commonly known as: NORVASC  Take 1 tablet (10 mg total) by mouth once daily.  What changed:   medication strength  how much to take     apixaban 5 mg Tab  Commonly known as: ELIQUIS  Take 5 mg by mouth 2 (two) times daily.  What changed: Another medication with the same name was removed. Continue taking this medication, and follow the directions you see here.     furosemide 40 MG tablet  Commonly known as: LASIX  Take 1 tablet (40 mg total) by mouth 2 (two) times a day.  What changed: when to take this            CONTINUE taking these medications      amiodarone 200 MG Tab  Commonly known as: PACERONE  Take 200 mg by mouth once daily.     aspirin 81 MG Chew  Take 1 tablet (81 mg total) by mouth once daily.     atorvastatin 40 MG tablet  Commonly known as: LIPITOR  Take 40 mg by mouth once daily.     blood-glucose meter kit  Use as instructed     hydrALAZINE 50 MG tablet  Commonly known as: APRESOLINE  Take 50 mg by mouth every 8 (eight) hours.     insulin aspart U-100 100 unit/mL injection  Commonly known as: NovoLOG  Inject 5 Units into the skin 3 (three) times daily before meals.     insulin detemir U-100 100 unit/mL injection  Commonly known as: Levemir  Inject 10 Units into the skin every evening.     metFORMIN 1,000 mg 24hr tablet  Commonly known as: FORTAMET  Take 1,000 mg by mouth daily with breakfast.     metoprolol succinate 100 MG 24 hr tablet  Commonly known as: TOPROL-XL  Take 100 mg by mouth once daily.     SYMBICORT 80-4.5 mcg/actuation Hfaa  Generic drug: budesonide-formoterol 80-4.5 mcg  Inhale 2 puffs into the lungs twice daily     TRULICITY 1.5 mg/0.5 mL pen injector  Generic drug: dulaglutide  Inject into the skin every 7 days. Thursdays             STOP taking these medications      insulin NPH-insulin regular (70/30) 100 unit/mL (70-30) injection     NIFEdipine 30 MG Tbsr  Commonly known as: ADALAT CC              Indwelling Lines/Drains at time of discharge:   Lines/Drains/Airways       None                   Time spent on the discharge of patient: 35 minutes         Jamie Bennett MD  Department of Hospital Medicine  [facility] - Telemetry

## 2024-03-20 NOTE — PLAN OF CARE
Problem: Adult Inpatient Plan of Care  Goal: Plan of Care Review  Outcome: Adequate for Care Transition  Goal: Patient-Specific Goal (Individualized)  Outcome: Adequate for Care Transition  Goal: Absence of Hospital-Acquired Illness or Injury  Outcome: Adequate for Care Transition  Goal: Optimal Comfort and Wellbeing  Outcome: Adequate for Care Transition  Goal: Readiness for Transition of Care  Outcome: Adequate for Care Transition     Problem: Diabetes Comorbidity  Goal: Blood Glucose Level Within Targeted Range  Outcome: Adequate for Care Transition     Problem: Infection  Goal: Absence of Infection Signs and Symptoms  Outcome: Adequate for Care Transition     Problem: Skin Injury Risk Increased  Goal: Skin Health and Integrity  Outcome: Adequate for Care Transition     Problem: Fall Injury Risk  Goal: Absence of Fall and Fall-Related Injury  Outcome: Adequate for Care Transition     Problem: Adjustment to Illness COPD (Chronic Obstructive Pulmonary Disease)  Goal: Optimal Chronic Illness Coping  Outcome: Adequate for Care Transition     Problem: Functional Ability Impaired COPD (Chronic Obstructive Pulmonary Disease)  Goal: Optimal Level of Functional [place]  Outcome: Adequate for Care Transition     Problem: Infection COPD (Chronic Obstructive Pulmonary Disease)  Goal: Absence of Infection Signs and Symptoms  Outcome: Adequate for Care Transition     Problem: Oral Intake Inadequate COPD (Chronic Obstructive Pulmonary Disease)  Goal: Improved Nutrition Intake  Outcome: Adequate for Care Transition     Problem: Respiratory Compromise COPD (Chronic Obstructive Pulmonary Disease)  Goal: Effective Oxygenation and Ventilation  Outcome: Adequate for Care Transition     Problem: Adjustment to Illness (Heart Failure)  Goal: Optimal Coping  Outcome: Adequate for Care Transition     Problem: Cardiac Output Decreased (Heart Failure)  Goal: Optimal Cardiac Output  Outcome: Adequate for Care Transition     Problem:  Dysrhythmia (Heart Failure)  Goal: Stable Heart Rate and Rhythm  Outcome: Adequate for Care Transition     Problem: Fluid Imbalance (Heart Failure)  Goal: Fluid Balance  Outcome: Adequate for Care Transition     Problem: Functional Ability Impaired (Heart Failure)  Goal: Optimal Functional Ability  Outcome: Adequate for Care Transition     Problem: Oral Intake Inadequate (Heart Failure)  Goal: Optimal Nutrition Intake  Outcome: Adequate for Care Transition     Problem: Respiratory Compromise (Heart Failure)  Goal: Effective Oxygenation and Ventilation  Outcome: Adequate for Care Transition     Problem: Sleep Disordered Breathing (Heart Failure)  Goal: Effective Breathing Pattern During Sleep  Outcome: Adequate for Care Transition     Problem: Dysrhythmia  Goal: Normalized Cardiac Rhythm  Outcome: Adequate for Care Transition

## 2024-03-20 NOTE — NURSING
Home Oxygen Evaluation    Date Performed: 3/20/2024    1) Patient's Home O2 Sat on room air, while at rest: 95%        If O2 sats on room air at rest are 88% or below, patient qualifies. No additional testing needed. Document N/A in steps 2 and 3. If 89% or above, complete steps 2.      2) Patient's O2 Sat on room air while exercisin%        If O2 sats on room air while exercising remain 89% or above patient does not qualify, no further testing needed Document N/A in step 3. If O2 sats on room air while exercising are 88% or below, continue to step 3.      3) Patient's O2 Sat while exercising on O2: 96% at 2 LPM         (Must show improvement from #2 for patients to qualify)    If O2 sats improve on oxygen, patient qualifies for portable oxygen. If not, the patient does not qualify.

## 2024-03-20 NOTE — NURSING
[facility]  Nurses Note -- 4 Eyes      3/20/2024       Skin assessed on: Q Shift      [x] No Pressure Injuries Present    [x]Prevention Measures Documented    [] Yes LDA  for Pressure Injury Previously documented     [] Yes New Pressure Injury Discovered   [] LDA for New Pressure Injury Added      Attending RN:  Melodie Eddy RN     Second RN:  DELTA Goldberg

## 2024-03-20 NOTE — PLAN OF CARE
Case Management Final Discharge Note     03/20/24 1702   Final Note   Assessment Type Final Discharge Note   Anticipated Discharge Disposition Home   What phone number can be called within the next 1-3 days to see how you are doing after discharge? [phone]   Hospital Resources/Appts/Education Provided Post-Acute resouces added to AVS;Appointments scheduled and added to AVS   Post-Acute Status   Coverage Medicare   Discharge Delays None known at this time

## 2024-03-21 NOTE — NURSING
IV removed, pt tolerated well. Oxygen delivered to bedside. Patient escorted to front lobby via wheelchair. RICK

## 2024-06-05 ENCOUNTER — HOSPITAL ENCOUNTER (INPATIENT)
Facility: HOSPITAL | Age: 64
LOS: 8 days | Discharge: LEFT BEFORE DISCHARGE | DRG: 291 | End: 2024-06-13
Attending: EMERGENCY MEDICINE | Admitting: STUDENT IN AN ORGANIZED HEALTH CARE EDUCATION/TRAINING PROGRAM
Payer: MEDICARE

## 2024-06-05 DIAGNOSIS — R79.89 ELEVATED TROPONIN: ICD-10-CM

## 2024-06-05 DIAGNOSIS — I50.9 CHF (CONGESTIVE HEART FAILURE): ICD-10-CM

## 2024-06-05 DIAGNOSIS — I21.4 ACUTE NON-ST SEGMENT ELEVATION MYOCARDIAL INFARCTION: ICD-10-CM

## 2024-06-05 DIAGNOSIS — Z91.199 HISTORY OF NONCOMPLIANCE WITH MEDICAL TREATMENT: ICD-10-CM

## 2024-06-05 DIAGNOSIS — J96.01 ACUTE HYPOXEMIC RESPIRATORY FAILURE: ICD-10-CM

## 2024-06-05 DIAGNOSIS — I48.92 ATRIAL FLUTTER WITH RAPID VENTRICULAR RESPONSE: Primary | ICD-10-CM

## 2024-06-05 DIAGNOSIS — I10 UNCONTROLLED HYPERTENSION: ICD-10-CM

## 2024-06-05 DIAGNOSIS — R06.02 SHORTNESS OF BREATH: ICD-10-CM

## 2024-06-05 DIAGNOSIS — I50.9 CONGESTIVE HEART FAILURE, UNSPECIFIED HF CHRONICITY, UNSPECIFIED HEART FAILURE TYPE: ICD-10-CM

## 2024-06-05 PROBLEM — R57.0 CARDIOGENIC SHOCK: Status: ACTIVE | Noted: 2022-01-27

## 2024-06-05 LAB
ALBUMIN SERPL BCP-MCNC: 3.6 G/DL (ref 3.5–5.2)
ALLENS TEST: ABNORMAL
ALLENS TEST: NORMAL
ALP SERPL-CCNC: 82 U/L (ref 55–135)
ALT SERPL W/O P-5'-P-CCNC: 17 U/L (ref 10–44)
ANION GAP SERPL CALC-SCNC: 11 MMOL/L (ref 8–16)
ANION GAP SERPL CALC-SCNC: 14 MMOL/L (ref 8–16)
APTT PPP: 25.6 SEC (ref 21–32)
AST SERPL-CCNC: 31 U/L (ref 10–40)
BASOPHILS # BLD AUTO: 0.04 K/UL (ref 0–0.2)
BASOPHILS NFR BLD: 0.5 % (ref 0–1.9)
BILIRUB SERPL-MCNC: 1 MG/DL (ref 0.1–1)
BNP SERPL-MCNC: 546 PG/ML (ref 0–99)
BUN SERPL-MCNC: 25 MG/DL (ref 8–23)
BUN SERPL-MCNC: 30 MG/DL (ref 8–23)
CALCIUM SERPL-MCNC: 8.5 MG/DL (ref 8.7–10.5)
CALCIUM SERPL-MCNC: 8.9 MG/DL (ref 8.7–10.5)
CHLORIDE SERPL-SCNC: 107 MMOL/L (ref 95–110)
CHLORIDE SERPL-SCNC: 109 MMOL/L (ref 95–110)
CO2 SERPL-SCNC: 19 MMOL/L (ref 23–29)
CO2 SERPL-SCNC: 20 MMOL/L (ref 23–29)
CREAT SERPL-MCNC: 1.7 MG/DL (ref 0.5–1.4)
CREAT SERPL-MCNC: 2.5 MG/DL (ref 0.5–1.4)
DELSYS: ABNORMAL
DIFFERENTIAL METHOD BLD: ABNORMAL
EOSINOPHIL # BLD AUTO: 0.1 K/UL (ref 0–0.5)
EOSINOPHIL NFR BLD: 0.7 % (ref 0–8)
ERYTHROCYTE [DISTWIDTH] IN BLOOD BY AUTOMATED COUNT: 15.9 % (ref 11.5–14.5)
ERYTHROCYTE [SEDIMENTATION RATE] IN BLOOD BY WESTERGREN METHOD: 29 MM/H
EST. GFR  (NO RACE VARIABLE): 28 ML/MIN/1.73 M^2
EST. GFR  (NO RACE VARIABLE): 44 ML/MIN/1.73 M^2
ETHANOL SERPL-MCNC: <10 MG/DL
FIO2: 100
GLUCOSE SERPL-MCNC: 134 MG/DL (ref 70–110)
GLUCOSE SERPL-MCNC: 152 MG/DL (ref 70–110)
HCO3 UR-SCNC: 20.8 MMOL/L (ref 24–28)
HCO3 UR-SCNC: 26.2 MMOL/L (ref 24–28)
HCT VFR BLD AUTO: 44.4 % (ref 40–54)
HGB BLD-MCNC: 15 G/DL (ref 14–18)
IMM GRANULOCYTES # BLD AUTO: 0.04 K/UL (ref 0–0.04)
IMM GRANULOCYTES NFR BLD AUTO: 0.5 % (ref 0–0.5)
INR PPP: 1.2 (ref 0.8–1.2)
LACTATE SERPL-SCNC: 1.4 MMOL/L (ref 0.5–2.2)
LYMPHOCYTES # BLD AUTO: 1.6 K/UL (ref 1–4.8)
LYMPHOCYTES NFR BLD: 19.6 % (ref 18–48)
MAGNESIUM SERPL-MCNC: 1.4 MG/DL (ref 1.6–2.6)
MCH RBC QN AUTO: 30.4 PG (ref 27–31)
MCHC RBC AUTO-ENTMCNC: 33.8 G/DL (ref 32–36)
MCV RBC AUTO: 90 FL (ref 82–98)
MODE: ABNORMAL
MONOCYTES # BLD AUTO: 0.8 K/UL (ref 0.3–1)
MONOCYTES NFR BLD: 10.1 % (ref 4–15)
NEUTROPHILS # BLD AUTO: 5.5 K/UL (ref 1.8–7.7)
NEUTROPHILS NFR BLD: 68.6 % (ref 38–73)
NRBC BLD-RTO: 0 /100 WBC
OHS QRS DURATION: 98 MS
OHS QTC CALCULATION: 521 MS
PCO2 BLDA: 37.9 MMHG (ref 35–45)
PCO2 BLDA: 38.6 MMHG (ref 35–45)
PEEP: 12
PH SMN: 7.35 [PH] (ref 7.35–7.45)
PH SMN: 7.44 [PH] (ref 7.35–7.45)
PLATELET # BLD AUTO: 184 K/UL (ref 150–450)
PMV BLD AUTO: 11.4 FL (ref 9.2–12.9)
PO2 BLDA: 116 MMHG (ref 80–100)
PO2 BLDA: 48 MMHG (ref 40–60)
POC BE: -4 MMOL/L
POC BE: 2 MMOL/L
POC SATURATED O2: 85 % (ref 95–100)
POC SATURATED O2: 98 % (ref 95–100)
POC TCO2: 22 MMOL/L (ref 23–27)
POC TCO2: 27 MMOL/L (ref 24–29)
POCT GLUCOSE: 131 MG/DL (ref 70–110)
POTASSIUM SERPL-SCNC: 4 MMOL/L (ref 3.5–5.1)
POTASSIUM SERPL-SCNC: 4.1 MMOL/L (ref 3.5–5.1)
PROT SERPL-MCNC: 7.2 G/DL (ref 6–8.4)
PROTHROMBIN TIME: 13 SEC (ref 9–12.5)
RBC # BLD AUTO: 4.93 M/UL (ref 4.6–6.2)
SAMPLE: ABNORMAL
SAMPLE: NORMAL
SITE: ABNORMAL
SITE: NORMAL
SODIUM SERPL-SCNC: 139 MMOL/L (ref 136–145)
SODIUM SERPL-SCNC: 141 MMOL/L (ref 136–145)
TROPONIN I SERPL DL<=0.01 NG/ML-MCNC: 0.17 NG/ML (ref 0–0.03)
TROPONIN I SERPL DL<=0.01 NG/ML-MCNC: 0.18 NG/ML (ref 0–0.03)
VT: 390
WBC # BLD AUTO: 8.03 K/UL (ref 3.9–12.7)

## 2024-06-05 PROCEDURE — 96365 THER/PROPH/DIAG IV INF INIT: CPT

## 2024-06-05 PROCEDURE — 99291 CRITICAL CARE FIRST HOUR: CPT | Mod: ,,, | Performed by: INTERNAL MEDICINE

## 2024-06-05 PROCEDURE — 99900035 HC TECH TIME PER 15 MIN (STAT)

## 2024-06-05 PROCEDURE — 82077 ASSAY SPEC XCP UR&BREATH IA: CPT | Performed by: STUDENT IN AN ORGANIZED HEALTH CARE EDUCATION/TRAINING PROGRAM

## 2024-06-05 PROCEDURE — 25000003 PHARM REV CODE 250: Performed by: STUDENT IN AN ORGANIZED HEALTH CARE EDUCATION/TRAINING PROGRAM

## 2024-06-05 PROCEDURE — 99292 CRITICAL CARE ADDL 30 MIN: CPT | Mod: 25,,, | Performed by: INTERNAL MEDICINE

## 2024-06-05 PROCEDURE — 63600175 PHARM REV CODE 636 W HCPCS: Performed by: NURSE PRACTITIONER

## 2024-06-05 PROCEDURE — 63600175 PHARM REV CODE 636 W HCPCS

## 2024-06-05 PROCEDURE — 80048 BASIC METABOLIC PNL TOTAL CA: CPT | Mod: XB | Performed by: NURSE PRACTITIONER

## 2024-06-05 PROCEDURE — 94640 AIRWAY INHALATION TREATMENT: CPT

## 2024-06-05 PROCEDURE — 63600175 PHARM REV CODE 636 W HCPCS: Performed by: STUDENT IN AN ORGANIZED HEALTH CARE EDUCATION/TRAINING PROGRAM

## 2024-06-05 PROCEDURE — 25000003 PHARM REV CODE 250: Performed by: NURSE PRACTITIONER

## 2024-06-05 PROCEDURE — 84484 ASSAY OF TROPONIN QUANT: CPT | Mod: 91 | Performed by: STUDENT IN AN ORGANIZED HEALTH CARE EDUCATION/TRAINING PROGRAM

## 2024-06-05 PROCEDURE — 85610 PROTHROMBIN TIME: CPT | Performed by: PHYSICIAN ASSISTANT

## 2024-06-05 PROCEDURE — 31500 INSERT EMERGENCY AIRWAY: CPT | Mod: ,,, | Performed by: NURSE PRACTITIONER

## 2024-06-05 PROCEDURE — 96374 THER/PROPH/DIAG INJ IV PUSH: CPT | Mod: 59

## 2024-06-05 PROCEDURE — 25000003 PHARM REV CODE 250

## 2024-06-05 PROCEDURE — 25000003 PHARM REV CODE 250: Performed by: PHYSICIAN ASSISTANT

## 2024-06-05 PROCEDURE — 94761 N-INVAS EAR/PLS OXIMETRY MLT: CPT

## 2024-06-05 PROCEDURE — 84484 ASSAY OF TROPONIN QUANT: CPT | Performed by: PHYSICIAN ASSISTANT

## 2024-06-05 PROCEDURE — 20000000 HC ICU ROOM

## 2024-06-05 PROCEDURE — 80053 COMPREHEN METABOLIC PANEL: CPT | Performed by: PHYSICIAN ASSISTANT

## 2024-06-05 PROCEDURE — 93005 ELECTROCARDIOGRAM TRACING: CPT

## 2024-06-05 PROCEDURE — 02HV33Z INSERTION OF INFUSION DEVICE INTO SUPERIOR VENA CAVA, PERCUTANEOUS APPROACH: ICD-10-PCS | Performed by: INTERNAL MEDICINE

## 2024-06-05 PROCEDURE — 0BH17EZ INSERTION OF ENDOTRACHEAL AIRWAY INTO TRACHEA, VIA NATURAL OR ARTIFICIAL OPENING: ICD-10-PCS | Performed by: INTERNAL MEDICINE

## 2024-06-05 PROCEDURE — 94660 CPAP INITIATION&MGMT: CPT | Mod: XB

## 2024-06-05 PROCEDURE — 83036 HEMOGLOBIN GLYCOSYLATED A1C: CPT | Performed by: STUDENT IN AN ORGANIZED HEALTH CARE EDUCATION/TRAINING PROGRAM

## 2024-06-05 PROCEDURE — 85025 COMPLETE CBC W/AUTO DIFF WBC: CPT | Performed by: PHYSICIAN ASSISTANT

## 2024-06-05 PROCEDURE — 83605 ASSAY OF LACTIC ACID: CPT | Performed by: INTERNAL MEDICINE

## 2024-06-05 PROCEDURE — 5A1945Z RESPIRATORY VENTILATION, 24-96 CONSECUTIVE HOURS: ICD-10-PCS | Performed by: INTERNAL MEDICINE

## 2024-06-05 PROCEDURE — 99291 CRITICAL CARE FIRST HOUR: CPT | Mod: 25,,, | Performed by: INTERNAL MEDICINE

## 2024-06-05 PROCEDURE — 36569 INSJ PICC 5 YR+ W/O IMAGING: CPT

## 2024-06-05 PROCEDURE — 25000242 PHARM REV CODE 250 ALT 637 W/ HCPCS: Performed by: EMERGENCY MEDICINE

## 2024-06-05 PROCEDURE — 83880 ASSAY OF NATRIURETIC PEPTIDE: CPT | Performed by: PHYSICIAN ASSISTANT

## 2024-06-05 PROCEDURE — 99900026 HC AIRWAY MAINTENANCE (STAT): Mod: ER

## 2024-06-05 PROCEDURE — 94799 UNLISTED PULMONARY SVC/PX: CPT | Mod: XB

## 2024-06-05 PROCEDURE — 27000221 HC OXYGEN, UP TO 24 HOURS

## 2024-06-05 PROCEDURE — 96375 TX/PRO/DX INJ NEW DRUG ADDON: CPT

## 2024-06-05 PROCEDURE — 27000190 HC CPAP FULL FACE MASK W/VALVE

## 2024-06-05 PROCEDURE — 36620 INSERTION CATHETER ARTERY: CPT

## 2024-06-05 PROCEDURE — 36415 COLL VENOUS BLD VENIPUNCTURE: CPT | Performed by: STUDENT IN AN ORGANIZED HEALTH CARE EDUCATION/TRAINING PROGRAM

## 2024-06-05 PROCEDURE — 36415 COLL VENOUS BLD VENIPUNCTURE: CPT | Performed by: NURSE PRACTITIONER

## 2024-06-05 PROCEDURE — 83735 ASSAY OF MAGNESIUM: CPT | Performed by: PHYSICIAN ASSISTANT

## 2024-06-05 PROCEDURE — 99291 CRITICAL CARE FIRST HOUR: CPT

## 2024-06-05 PROCEDURE — 25000003 PHARM REV CODE 250: Performed by: EMERGENCY MEDICINE

## 2024-06-05 PROCEDURE — 99900035 HC TECH TIME PER 15 MIN (STAT): Mod: ER

## 2024-06-05 PROCEDURE — C1751 CATH, INF, PER/CENT/MIDLINE: HCPCS

## 2024-06-05 PROCEDURE — 63600175 PHARM REV CODE 636 W HCPCS: Performed by: INTERNAL MEDICINE

## 2024-06-05 PROCEDURE — 63600175 PHARM REV CODE 636 W HCPCS: Performed by: EMERGENCY MEDICINE

## 2024-06-05 PROCEDURE — 36620 INSERTION CATHETER ARTERY: CPT | Mod: 59,,, | Performed by: NURSE PRACTITIONER

## 2024-06-05 PROCEDURE — 93010 ELECTROCARDIOGRAM REPORT: CPT | Mod: ,,, | Performed by: INTERNAL MEDICINE

## 2024-06-05 PROCEDURE — 37799 UNLISTED PX VASCULAR SURGERY: CPT

## 2024-06-05 PROCEDURE — 85730 THROMBOPLASTIN TIME PARTIAL: CPT | Performed by: PHYSICIAN ASSISTANT

## 2024-06-05 PROCEDURE — 94002 VENT MGMT INPAT INIT DAY: CPT

## 2024-06-05 PROCEDURE — 82800 BLOOD PH: CPT

## 2024-06-05 PROCEDURE — 25000242 PHARM REV CODE 250 ALT 637 W/ HCPCS: Performed by: PHYSICIAN ASSISTANT

## 2024-06-05 RX ORDER — ATORVASTATIN CALCIUM 80 MG/1
1 TABLET, FILM COATED ORAL DAILY
COMMUNITY
Start: 2023-11-02

## 2024-06-05 RX ORDER — SODIUM CHLORIDE 0.9 % (FLUSH) 0.9 %
10 SYRINGE (ML) INJECTION EVERY 6 HOURS
Status: DISCONTINUED | OUTPATIENT
Start: 2024-06-06 | End: 2024-06-13 | Stop reason: HOSPADM

## 2024-06-05 RX ORDER — MUPIROCIN 20 MG/G
OINTMENT TOPICAL 2 TIMES DAILY
Status: COMPLETED | OUTPATIENT
Start: 2024-06-05 | End: 2024-06-09

## 2024-06-05 RX ORDER — ETOMIDATE 2 MG/ML
20 INJECTION INTRAVENOUS ONCE
Status: COMPLETED | OUTPATIENT
Start: 2024-06-05 | End: 2024-06-05

## 2024-06-05 RX ORDER — NITROGLYCERIN 0.3 MG/1
0.6 TABLET SUBLINGUAL EVERY 5 MIN PRN
Status: DISCONTINUED | OUTPATIENT
Start: 2024-06-05 | End: 2024-06-05

## 2024-06-05 RX ORDER — METOPROLOL SUCCINATE 50 MG/1
100 TABLET, EXTENDED RELEASE ORAL DAILY
Status: DISCONTINUED | OUTPATIENT
Start: 2024-06-05 | End: 2024-06-05

## 2024-06-05 RX ORDER — ATORVASTATIN CALCIUM 40 MG/1
80 TABLET, FILM COATED ORAL DAILY
Status: DISCONTINUED | OUTPATIENT
Start: 2024-06-06 | End: 2024-06-13 | Stop reason: HOSPADM

## 2024-06-05 RX ORDER — FUROSEMIDE 10 MG/ML
80 INJECTION INTRAMUSCULAR; INTRAVENOUS
Status: COMPLETED | OUTPATIENT
Start: 2024-06-05 | End: 2024-06-05

## 2024-06-05 RX ORDER — ROCURONIUM BROMIDE 10 MG/ML
90 INJECTION, SOLUTION INTRAVENOUS ONCE
Status: COMPLETED | OUTPATIENT
Start: 2024-06-05 | End: 2024-06-05

## 2024-06-05 RX ORDER — IBUPROFEN 200 MG
24 TABLET ORAL
Status: DISCONTINUED | OUTPATIENT
Start: 2024-06-05 | End: 2024-06-13 | Stop reason: HOSPADM

## 2024-06-05 RX ORDER — PHENYLEPHRINE HCL IN 0.9% NACL 1 MG/10 ML
100 SYRINGE (ML) INTRAVENOUS ONCE
Status: COMPLETED | OUTPATIENT
Start: 2024-06-05 | End: 2024-06-05

## 2024-06-05 RX ORDER — INSULIN ASPART 100 [IU]/ML
1-10 INJECTION, SOLUTION INTRAVENOUS; SUBCUTANEOUS
Status: DISCONTINUED | OUTPATIENT
Start: 2024-06-05 | End: 2024-06-13 | Stop reason: HOSPADM

## 2024-06-05 RX ORDER — DOBUTAMINE HYDROCHLORIDE 400 MG/100ML
2.5 INJECTION INTRAVENOUS CONTINUOUS
Status: DISCONTINUED | OUTPATIENT
Start: 2024-06-05 | End: 2024-06-07

## 2024-06-05 RX ORDER — SODIUM CHLORIDE 0.9 % (FLUSH) 0.9 %
10 SYRINGE (ML) INJECTION
Status: DISCONTINUED | OUTPATIENT
Start: 2024-06-05 | End: 2024-06-06

## 2024-06-05 RX ORDER — ACETAMINOPHEN 325 MG/1
650 TABLET ORAL EVERY 4 HOURS PRN
Status: DISCONTINUED | OUTPATIENT
Start: 2024-06-05 | End: 2024-06-09

## 2024-06-05 RX ORDER — PROPOFOL 10 MG/ML
0-50 INJECTION, EMULSION INTRAVENOUS CONTINUOUS
Status: DISCONTINUED | OUTPATIENT
Start: 2024-06-05 | End: 2024-06-10

## 2024-06-05 RX ORDER — NITROGLYCERIN 0.4 MG/1
0.8 TABLET SUBLINGUAL EVERY 5 MIN PRN
Status: DISCONTINUED | OUTPATIENT
Start: 2024-06-05 | End: 2024-06-13 | Stop reason: HOSPADM

## 2024-06-05 RX ORDER — FAMOTIDINE 10 MG/ML
20 INJECTION INTRAVENOUS DAILY
Status: DISCONTINUED | OUTPATIENT
Start: 2024-06-05 | End: 2024-06-10

## 2024-06-05 RX ORDER — PHENYLEPHRINE HCL IN 0.9% NACL 1 MG/10 ML
SYRINGE (ML) INTRAVENOUS
Status: COMPLETED
Start: 2024-06-05 | End: 2024-06-05

## 2024-06-05 RX ORDER — PHENTOLAMINE MESYLATE 5 MG/1
0.5 INJECTION INTRAMUSCULAR; INTRAVENOUS ONCE
Status: COMPLETED | OUTPATIENT
Start: 2024-06-05 | End: 2024-06-05

## 2024-06-05 RX ORDER — NOREPINEPHRINE BITARTRATE/D5W 4MG/250ML
PLASTIC BAG, INJECTION (ML) INTRAVENOUS
Status: COMPLETED
Start: 2024-06-05 | End: 2024-06-05

## 2024-06-05 RX ORDER — IBUPROFEN 200 MG
16 TABLET ORAL
Status: DISCONTINUED | OUTPATIENT
Start: 2024-06-05 | End: 2024-06-13 | Stop reason: HOSPADM

## 2024-06-05 RX ORDER — ASPIRIN 325 MG
325 TABLET ORAL
Status: COMPLETED | OUTPATIENT
Start: 2024-06-05 | End: 2024-06-05

## 2024-06-05 RX ORDER — HEPARIN SODIUM,PORCINE/D5W 25000/250
0-40 INTRAVENOUS SOLUTION INTRAVENOUS CONTINUOUS
Status: DISCONTINUED | OUTPATIENT
Start: 2024-06-05 | End: 2024-06-05

## 2024-06-05 RX ORDER — CHLORHEXIDINE GLUCONATE ORAL RINSE 1.2 MG/ML
15 SOLUTION DENTAL 2 TIMES DAILY
Status: DISCONTINUED | OUTPATIENT
Start: 2024-06-05 | End: 2024-06-10

## 2024-06-05 RX ORDER — NOREPINEPHRINE BITARTRATE/D5W 4MG/250ML
0-3 PLASTIC BAG, INJECTION (ML) INTRAVENOUS CONTINUOUS
Status: DISCONTINUED | OUTPATIENT
Start: 2024-06-05 | End: 2024-06-10

## 2024-06-05 RX ORDER — FENTANYL CITRATE 50 UG/ML
50 INJECTION, SOLUTION INTRAMUSCULAR; INTRAVENOUS
Status: ACTIVE | OUTPATIENT
Start: 2024-06-05 | End: 2024-06-05

## 2024-06-05 RX ORDER — FUROSEMIDE 10 MG/ML
80 INJECTION INTRAMUSCULAR; INTRAVENOUS 3 TIMES DAILY
Status: DISCONTINUED | OUTPATIENT
Start: 2024-06-05 | End: 2024-06-08

## 2024-06-05 RX ORDER — FAMOTIDINE 20 MG/50ML
20 INJECTION, SOLUTION INTRAVENOUS 2 TIMES DAILY
Status: DISCONTINUED | OUTPATIENT
Start: 2024-06-05 | End: 2024-06-05

## 2024-06-05 RX ORDER — LORAZEPAM 2 MG/ML
1 INJECTION INTRAMUSCULAR
Status: COMPLETED | OUTPATIENT
Start: 2024-06-05 | End: 2024-06-05

## 2024-06-05 RX ORDER — ONDANSETRON HYDROCHLORIDE 2 MG/ML
4 INJECTION, SOLUTION INTRAVENOUS EVERY 8 HOURS PRN
Status: DISCONTINUED | OUTPATIENT
Start: 2024-06-05 | End: 2024-06-13 | Stop reason: HOSPADM

## 2024-06-05 RX ORDER — FUROSEMIDE 10 MG/ML
40 INJECTION INTRAMUSCULAR; INTRAVENOUS EVERY 12 HOURS
Status: DISCONTINUED | OUTPATIENT
Start: 2024-06-05 | End: 2024-06-05

## 2024-06-05 RX ORDER — ENOXAPARIN SODIUM 100 MG/ML
1 INJECTION SUBCUTANEOUS EVERY 12 HOURS
Status: DISCONTINUED | OUTPATIENT
Start: 2024-06-05 | End: 2024-06-10

## 2024-06-05 RX ORDER — AMLODIPINE BESYLATE 5 MG/1
10 TABLET ORAL
Status: COMPLETED | OUTPATIENT
Start: 2024-06-05 | End: 2024-06-05

## 2024-06-05 RX ORDER — LORAZEPAM 2 MG/ML
2 INJECTION INTRAMUSCULAR ONCE
Status: COMPLETED | OUTPATIENT
Start: 2024-06-05 | End: 2024-06-05

## 2024-06-05 RX ORDER — SODIUM CHLORIDE 0.9 % (FLUSH) 0.9 %
10 SYRINGE (ML) INJECTION
Status: DISCONTINUED | OUTPATIENT
Start: 2024-06-05 | End: 2024-06-13 | Stop reason: HOSPADM

## 2024-06-05 RX ORDER — METOPROLOL TARTRATE 1 MG/ML
5 INJECTION, SOLUTION INTRAVENOUS EVERY 5 MIN PRN
Status: DISCONTINUED | OUTPATIENT
Start: 2024-06-05 | End: 2024-06-05

## 2024-06-05 RX ORDER — FUROSEMIDE 80 MG/1
1 TABLET ORAL DAILY
COMMUNITY
Start: 2023-11-02

## 2024-06-05 RX ORDER — AMIODARONE HYDROCHLORIDE 200 MG/1
200 TABLET ORAL DAILY
Status: DISCONTINUED | OUTPATIENT
Start: 2024-06-05 | End: 2024-06-05

## 2024-06-05 RX ORDER — NITROGLYCERIN 0.4 MG/1
2 TABLET SUBLINGUAL EVERY 5 MIN PRN
Status: DISCONTINUED | OUTPATIENT
Start: 2024-06-05 | End: 2024-06-05

## 2024-06-05 RX ORDER — FENTANYL CITRATE 50 UG/ML
50 INJECTION, SOLUTION INTRAMUSCULAR; INTRAVENOUS
Status: DISCONTINUED | OUTPATIENT
Start: 2024-06-05 | End: 2024-06-13 | Stop reason: HOSPADM

## 2024-06-05 RX ORDER — GLUCAGON 1 MG
1 KIT INJECTION
Status: DISCONTINUED | OUTPATIENT
Start: 2024-06-05 | End: 2024-06-13 | Stop reason: HOSPADM

## 2024-06-05 RX ORDER — DEXMEDETOMIDINE HYDROCHLORIDE 4 UG/ML
0-1.4 INJECTION, SOLUTION INTRAVENOUS CONTINUOUS
Status: DISCONTINUED | OUTPATIENT
Start: 2024-06-05 | End: 2024-06-05

## 2024-06-05 RX ORDER — IPRATROPIUM BROMIDE AND ALBUTEROL SULFATE 2.5; .5 MG/3ML; MG/3ML
3 SOLUTION RESPIRATORY (INHALATION)
Status: COMPLETED | OUTPATIENT
Start: 2024-06-05 | End: 2024-06-05

## 2024-06-05 RX ORDER — CARVEDILOL 12.5 MG/1
1 TABLET ORAL 2 TIMES DAILY WITH MEALS
COMMUNITY
Start: 2024-03-21

## 2024-06-05 RX ORDER — NAPROXEN SODIUM 220 MG/1
81 TABLET, FILM COATED ORAL DAILY
Status: DISCONTINUED | OUTPATIENT
Start: 2024-06-06 | End: 2024-06-13 | Stop reason: HOSPADM

## 2024-06-05 RX ADMIN — NOREPINEPHRINE BITARTRATE 0.08 MCG/KG/MIN: 4 INJECTION, SOLUTION INTRAVENOUS at 09:06

## 2024-06-05 RX ADMIN — Medication 100 MCG: at 02:06

## 2024-06-05 RX ADMIN — AMIODARONE HYDROCHLORIDE 1 MG/MIN: 1.8 INJECTION, SOLUTION INTRAVENOUS at 09:06

## 2024-06-05 RX ADMIN — PHENTOLAMINE MESYLATE 0.5 ML: 5 INJECTION INTRAMUSCULAR; INTRAVENOUS at 08:06

## 2024-06-05 RX ADMIN — AMLODIPINE BESYLATE 10 MG: 5 TABLET ORAL at 08:06

## 2024-06-05 RX ADMIN — Medication 10 ML: at 11:06

## 2024-06-05 RX ADMIN — PROPOFOL 10 MCG/KG/MIN: 10 INJECTION, EMULSION INTRAVENOUS at 07:06

## 2024-06-05 RX ADMIN — ROCURONIUM BROMIDE 90 MG: 10 INJECTION INTRAVENOUS at 02:06

## 2024-06-05 RX ADMIN — NITROGLYCERIN 0.8 MG: 0.4 TABLET SUBLINGUAL at 06:06

## 2024-06-05 RX ADMIN — PROPOFOL 45 MCG/KG/MIN: 10 INJECTION, EMULSION INTRAVENOUS at 11:06

## 2024-06-05 RX ADMIN — FUROSEMIDE 80 MG: 10 INJECTION, SOLUTION INTRAVENOUS at 09:06

## 2024-06-05 RX ADMIN — LORAZEPAM 1 MG: 2 INJECTION INTRAMUSCULAR; INTRAVENOUS at 09:06

## 2024-06-05 RX ADMIN — LORAZEPAM 2 MG: 2 INJECTION INTRAMUSCULAR; INTRAVENOUS at 01:06

## 2024-06-05 RX ADMIN — CHLORHEXIDINE GLUCONATE 0.12% ORAL RINSE 15 ML: 1.2 LIQUID ORAL at 08:06

## 2024-06-05 RX ADMIN — FAMOTIDINE 20 MG: 10 INJECTION, SOLUTION INTRAVENOUS at 09:06

## 2024-06-05 RX ADMIN — DOBUTAMINE HYDROCHLORIDE 5 MCG/KG/MIN: 400 INJECTION INTRAVENOUS at 03:06

## 2024-06-05 RX ADMIN — NOREPINEPHRINE BITARTRATE 0.02 MCG/KG/MIN: 4 INJECTION, SOLUTION INTRAVENOUS at 02:06

## 2024-06-05 RX ADMIN — DEXMEDETOMIDINE HYDROCHLORIDE 0.2 MCG/KG/HR: 4 INJECTION, SOLUTION INTRAVENOUS at 01:06

## 2024-06-05 RX ADMIN — ENOXAPARIN SODIUM 90 MG: 100 INJECTION SUBCUTANEOUS at 09:06

## 2024-06-05 RX ADMIN — SACUBITRIL AND VALSARTAN 1 TABLET: 24; 26 TABLET, FILM COATED ORAL at 01:06

## 2024-06-05 RX ADMIN — FUROSEMIDE 80 MG: 10 INJECTION, SOLUTION INTRAVENOUS at 04:06

## 2024-06-05 RX ADMIN — MUPIROCIN: 20 OINTMENT TOPICAL at 01:06

## 2024-06-05 RX ADMIN — IPRATROPIUM BROMIDE AND ALBUTEROL SULFATE 3 ML: 2.5; .5 SOLUTION RESPIRATORY (INHALATION) at 07:06

## 2024-06-05 RX ADMIN — ETOMIDATE 20 MG: 2 INJECTION INTRAVENOUS at 02:06

## 2024-06-05 RX ADMIN — FUROSEMIDE 80 MG: 10 INJECTION, SOLUTION INTRAVENOUS at 08:06

## 2024-06-05 RX ADMIN — APIXABAN 5 MG: 5 TABLET, FILM COATED ORAL at 08:06

## 2024-06-05 RX ADMIN — METOPROLOL SUCCINATE 100 MG: 50 TABLET, EXTENDED RELEASE ORAL at 08:06

## 2024-06-05 RX ADMIN — MUPIROCIN: 20 OINTMENT TOPICAL at 08:06

## 2024-06-05 RX ADMIN — AMIODARONE HYDROCHLORIDE 150 MG: 1.5 INJECTION, SOLUTION INTRAVENOUS at 08:06

## 2024-06-05 RX ADMIN — ASPIRIN 325 MG ORAL TABLET 325 MG: 325 PILL ORAL at 06:06

## 2024-06-05 NOTE — ASSESSMENT & PLAN NOTE
Patient with Hypoxic Respiratory failure which is Acute.  he is not on home oxygen. Supplemental oxygen was provided and noted- Oxygen Concentration (%):  [40] 40    .   Signs/symptoms of respiratory failure include- tachypnea, increased work of breathing, and respiratory distress. Contributing diagnoses includes - CHF Labs and images were reviewed. Patient Has recent ABG, which has been reviewed. Will treat underlying causes and adjust management of respiratory failure as follows-   - Bipap for support, wean as tolerated  - continue with IV lasix  - Cardiology following

## 2024-06-05 NOTE — ASSESSMENT & PLAN NOTE
Levophed to maintain MAP >60  Dobutamine at 5 and will adjust based on clinical response  Trend lactate

## 2024-06-05 NOTE — NURSING
[facility]  Nurses Note -- 4 Eyes      6/5/2024       Skin assessed on: Admit      [x] No Pressure Injuries Present    [x]Prevention Measures Documented    [] Yes LDA  for Pressure Injury Previously documented     [] Yes New Pressure Injury Discovered   [] LDA for New Pressure Injury Added      Attending RN:  Estela Ross, RN     Second RN:  Aminata

## 2024-06-05 NOTE — PROCEDURES
"Marck Madison is a 64 y.o. male patient.    Temp: 97.6 °F (36.4 °C) (06/05/24 1215)  Pulse: 70 (06/05/24 1443)  Resp: (!) 28 (06/05/24 1443)  BP: (!) 72/56 (06/05/24 1443)  SpO2: 97 % (06/05/24 1443)  Weight: 89.4 kg (197 lb 1.5 oz) (06/05/24 1215)  Height: 5' 6" (167.6 cm) (06/05/24 1215)       Arterial Line    Date/Time: 6/5/2024 3:39 PM  Location procedure was performed: [facility] ICU    Performed by: Vicki Thomas NP  Authorized by: Vicki Thomas NP  Pre-op Diagnosis: shock, respiratory failure  Post-operative diagnosis: shock, respiratory failure  Consent Done: Emergent Situation  Preparation: Patient was prepped and draped in the usual sterile fashion.  Indications: multiple ABGs, respiratory failure and hemodynamic monitoring  Location: right radial  Ke's test normal: yes  Needle gauge: 20  Seldinger technique: Seldinger technique used  Number of attempts: 1  Technical procedures used: ultrasound guidance  Complications: No  Estimated blood loss (mL): 1  Post-procedure: line sutured and dressing applied  Post-procedure CMS: normal and unchanged  Patient tolerance: Patient tolerated the procedure well with no immediate complications      Vicki Thomas NP  Critical Care Medicine   6/5/2024    "

## 2024-06-05 NOTE — ADMISSIONCARE
AdmissionCare    Guideline: Atrial Fibrillation - INPT, Inpatient    Based on the indications selected for the patient, the bed status of Inpatient was determined to be MET    The following indications were selected as present at the time of evaluation of the patient:        - Need for treatment with antiarrhythmic medication regimen that has significant proarrhythmic potential (eg, monomorphic ventricular tachycardia, torsades de pointes, bradycardia)    - Cardiac monitoring required that extends beyond observation care    AdmissionCare documentation entered by: Krissy Phillips    [facility], 28th edition, Copyright © 2024 [facility] [place], [employer], [facility] All Rights Reserved.  [id number]:00

## 2024-06-05 NOTE — ASSESSMENT & PLAN NOTE
Patient with Permanent atrial fibrillation which is controlled currently with Amiodarone. Patient is currently in atrial fibrillation.[id number] Score: 3. . Anticoagulation indicated. Anticoagulation done with Lovenox .

## 2024-06-05 NOTE — ED TRIAGE NOTES
"Marck Madison, a 64 y.o. male presents to the ED w/ complaint of SOB.  Pt with hx of CHF and COPD and on home oxygen started having SOB at 0300.  PT noncompliant on BP medications and diuretics.      Triage note:  Chief Complaint   Patient presents with    Shortness of Breath     Pt chief complaint is shortness of breath. Pt has been having sob since this morning, pt has a history of Copd     Review of patient's allergies indicates:  No Known Allergies  Past Medical History:   Diagnosis Date    Arrhythmia 2017    aflutter    Atrial flutter     CAD (coronary artery disease)     CHF (congestive heart failure), NYHA class I 2017    Cholelithiasis with chronic cholecystitis     Chronic diastolic heart failure     Cigarette smoker     COPD (chronic obstructive pulmonary disease)     COVID-19 06/01/2021    Diabetes mellitus     DKA (diabetic ketoacidosis)     Hypertension     NSTEMI (non-ST elevation myocardial infarction)     NSVT (nonsustained ventricular tachycardia)     Psychiatric disorder     h/o "schizophrena/bipolar"    Schizoaffective disorder     Severe sepsis        "

## 2024-06-05 NOTE — ASSESSMENT & PLAN NOTE
A1c:   Lab Results   Component Value Date    HGBA1C 7.1 (H) 03/15/2024    HGBA1C 7.1 (H) 03/15/2024     Meds: SSI PRN to maintain goal 140-180  ADA diet, accuchecks ACHS, hypoglycemic protocol

## 2024-06-05 NOTE — RESPIRATORY THERAPY
Received patient in ED wearing V60 Bipap with the following settings: 10/5/12/40%. Pt. Is alert, SOB, and stable at this time. I increased the IPAP to 12. Pt. Feels comfortable with new setting. Bipap is plugged into a red outlet. All alarms are set and audible. [facility] bag at [facility].

## 2024-06-05 NOTE — PROCEDURES
"Marck Madison is a 64 y.o. male patient.    Temp: 97.6 °F (36.4 °C) (06/05/24 1215)  Pulse: 82 (06/05/24 1306)  Resp: 10 (06/05/24 1306)  BP: (!) 165/105 (06/05/24 1200)  SpO2: 97 % (06/05/24 1306)  Weight: 89.4 kg (197 lb 1.5 oz) (06/05/24 1215)  Height: 5' 6" (167.6 cm) (06/05/24 1215)       Intubation    Date/Time: 6/5/2024 2:35 PM  Location procedure was performed: [facility]    Performed by: Vicki Thomas NP  Authorized by: Bj Nicholas MD  Assisting provider: Bj Nicholas MD  Pre-operative diagnosis: acute resp failure  Post-operative diagnosis: same as pre-op  Consent Done: Emergent Situation  Indications: respiratory failure  Intubation method: video-assisted  Patient status: paralyzed (RSI)  Preoxygenation: bag valve mask  Sedatives: etomidate  Paralytic: rocuronium  Laryngoscope size: S4.  Tube size: 8.0 mm  Tube type: cuffed  Number of attempts: 1  Cricoid pressure: no  Cords visualized: yes  Post-procedure assessment: chest rise and CO2 detector  Breath sounds: equal  Cuff inflated: yes  ETT to lip: 24 cm  Tube secured with: ETT wiley  Chest x-ray interpreted by: pending.  Patient tolerance: Patient tolerated the procedure well with no immediate complications  Complications: No  Estimated blood loss (mL): 0  Specimens: No  Implants: No        I was personally present for the entirety of the above procedure. I personally supervised COOPER Thomas for the entirety of the procedure. ETT directly visualized through the cords with glidescope.  6/5/2024    "

## 2024-06-05 NOTE — PLAN OF CARE
Problem: Adult Inpatient Plan of Care  Goal: Plan of Care Review  Outcome: Not Progressing  Goal: Patient-Specific Goal (Individualized)  Outcome: Not Progressing  Goal: Absence of Hospital-Acquired Illness or Injury  Outcome: Not Progressing  Goal: Optimal Comfort and Wellbeing  Outcome: Not Progressing  Goal: Readiness for Transition of Care  Outcome: Not Progressing     Problem: Diabetes Comorbidity  Goal: Blood Glucose Level Within Targeted Range  Outcome: Not Progressing     Problem: Infection  Goal: Absence of Infection Signs and Symptoms  Outcome: Not Progressing     Problem: Fall Injury Risk  Goal: Absence of Fall and Fall-Related Injury  Outcome: Not Progressing     Problem: Restraint, Nonviolent  Goal: Absence of Harm or Injury  Outcome: Not Progressing

## 2024-06-05 NOTE — PROGRESS NOTES
Pharmacist Renal Dose Adjustment Note    Marck Madison is a 64 y.o. male being treated with the medication famotidine    Patient Data:    Vital Signs (Most Recent):  Temp: 97.6 °F (36.4 °C) (06/05/24 1215)  Pulse: 82 (06/05/24 1306)  Resp: 10 (06/05/24 1306)  BP: (!) 165/105 (06/05/24 1200)  SpO2: 97 % (06/05/24 1306) Vital Signs (72h Range):  Temp:  [97.6 °F (36.4 °C)-98.5 °F (36.9 °C)]   Pulse:  []   Resp:  [10-45]   BP: (119-202)/()   SpO2:  [84 %-100 %]      Recent Labs   Lab 06/05/24 0618   CREATININE 1.7*     Serum creatinine: 1.7 mg/dL (H) 06/05/24 0618  Estimated creatinine clearance: 44.7 mL/min (A)    Medication:famotidine dose: 20 mg frequency bid will be changed to medication:famotidine dose:20 mg frequency:daily    Pharmacist's Name: Ezequiel Araya  Pharmacist's Extension: [phone]

## 2024-06-05 NOTE — ED PROVIDER NOTES
"Encounter Date: 6/5/2024    SCRIBE #1 NOTE: I, Mikey Emerson, am scribing for, and in the presence of,  William Arteaga MD. I have scribed the following portions of the note - Other sections scribed: HPI, ROS.       History     Chief Complaint   Patient presents with    Shortness of Breath     Pt chief complaint is shortness of breath. Pt has been having sob since this morning, pt has a history of Copd     Marck Madison is a 64 y.o. male, with a past medical history of COPD, CAD, CHF, DM, NVST, HTN, who presents to the ED with SOB for 3 hours. Patient reports that he uses oxygen at home. Patient denies feeling SOB currently in the ER. Patient is noncompliant on BP medications stating he does not have anymore. No other exacerbating or alleviating factors. Patient denies cough, shortness of breath, chest pain, fever, chills, abdominal pain, nausea, vomiting, diarrhea, dysuria, headaches, congestion, sore throat, arm or leg trouble, eye pain, ear pain, rash, or other associated symptoms.          The history is provided by the patient. No  was used.     Review of patient's allergies indicates:  No Known Allergies  Past Medical History:   Diagnosis Date    Arrhythmia 2017    aflutter    Atrial flutter     CAD (coronary artery disease)     CHF (congestive heart failure), NYHA class I 2017    Cholelithiasis with chronic cholecystitis     Chronic diastolic heart failure     Cigarette smoker     COPD (chronic obstructive pulmonary disease)     COVID-19 06/01/2021    Diabetes mellitus     DKA (diabetic ketoacidosis)     Hypertension     NSTEMI (non-ST elevation myocardial infarction)     NSVT (nonsustained ventricular tachycardia)     Psychiatric disorder     h/o "schizophrena/bipolar"    Schizoaffective disorder     Severe sepsis      Past Surgical History:   Procedure Laterality Date    LIVER SURGERY      abscess drainage; 1970s    TREATMENT OF CARDIAC ARRHYTHMIA  9/12/2019    Procedure: Cardioversion or " Defibrillation;  Surgeon: Jonathan Lopez MD;  Location: [facility] CATH LAB;  Service: Cardiology;;    TREATMENT OF CARDIAC ARRHYTHMIA N/A 2/3/2022    Procedure: Cardioversion or Defibrillation;  Surgeon: Trevor Schwab MD;  Location: [facility] CATH LAB;  Service: Cardiology;  Laterality: N/A;    TREATMENT OF CARDIAC ARRHYTHMIA N/A 3/16/2024    Procedure: Cardioversion or Defibrillation;  Surgeon: Jonathan Lopez MD;  Location: [facility] CATH LAB;  Service: Cardiology;  Laterality: N/A;     No family history on file.  Social History     Tobacco Use    Smoking status: Every Day     Current packs/day: 1.00     Average packs/day: 1 pack/day for 15.0 years (15.0 ttl pk-yrs)     Types: Cigarettes    Smokeless tobacco: Never   Substance Use Topics    Alcohol use: Yes     Comment: daily    Drug use: Never     Review of Systems   Constitutional:  Negative for chills, diaphoresis and fever.   HENT:  Negative for ear pain and sore throat.    Eyes:  Negative for pain.   Respiratory:  Negative for cough and shortness of breath.    Cardiovascular:  Negative for chest pain.   Gastrointestinal:  Negative for abdominal pain, diarrhea, nausea and vomiting.   Genitourinary:  Negative for dysuria.   Musculoskeletal:  Negative for back pain.        (-) Arm or leg trouble.    Skin:  Negative for rash.   Neurological:  Negative for headaches.   Psychiatric/Behavioral:  Negative for confusion.        Physical Exam     Initial Vitals [06/05/24 0558]   BP Pulse Resp Temp SpO2   (!) 180/129 (!) 133 (!) 24 98.5 °F (36.9 °C) (!) 84 %      MAP       --         Physical Exam   The patient was examined specifically for the following:   General:No significant distress, Good color, Warm and dry. Head and neck:Scalp atraumatic, Neck supple. Neurological:Appropriate conversation, Gross motor deficits. Eyes:Conjugate gaze, Clear corneas. ENT: No epistaxis. Cardiac: Regular rate and rhythm, Grossly normal heart tones. Pulmonary: Wheezing, Rales.  Gastrointestinal: Abdominal tenderness, Abdominal distention. Musculoskeletal: Extremity deformity, Apparent pain with range of motion of the joints. Skin: Rash.   The findings on examination were normal except for the following: the patient arrives with a an oxygen saturation of 84%.  The patient's heart rate is 133 on arrival with a blood pressure of 180/29.   The patient is on BiPAP during my physical examination.  He has no clinical evidence of respiratory distress.  The lungs are clear anteriorly.  The abdomen is soft.  The extremities are nontender there is no pain with range of motion any joints.  The patient does have an elevated BMI.  ED Course   Critical Care    Date/Time: 6/5/2024 8:52 AM    Performed by: William Arteaga MD  Authorized by: William Arteaga MD  Direct patient critical care time: 22 minutes  Additional history critical care time: 11 minutes  Ordering / reviewing critical care time: 11 minutes  Documentation critical care time: 11 minutes  Consulting other physicians critical care time: 11 minutes  Total critical care time (exclusive of procedural time) : 66 minutes  Critical care time was exclusive of separately billable procedures and treating other patients and teaching time.  Critical care was necessary to treat or prevent imminent or life-threatening deterioration of the following conditions: respiratory failure and cardiac failure.  Critical care was time spent personally by me on the following activities: development of treatment plan with patient or surrogate, discussions with consultants, discussions with primary provider, evaluation of patient's response to treatment, examination of patient, obtaining history from patient or surrogate, ordering and performing treatments and interventions, ordering and review of laboratory studies, ordering and review of radiographic studies, pulse oximetry, re-evaluation of patient's condition and review of old charts.        Labs Reviewed   CBC  W/ AUTO DIFFERENTIAL - Abnormal; Notable for the following components:       Result Value    RDW 15.9 (*)     All other components within normal limits   COMPREHENSIVE METABOLIC PANEL - Abnormal; Notable for the following components:    CO2 20 (*)     Glucose 134 (*)     BUN 25 (*)     Creatinine 1.7 (*)     eGFR 44 (*)     All other components within normal limits   TROPONIN I - Abnormal; Notable for the following components:    Troponin I 0.167 (*)     All other components within normal limits   B-TYPE NATRIURETIC PEPTIDE - Abnormal; Notable for the following components:     (*)     All other components within normal limits   MAGNESIUM - Abnormal; Notable for the following components:    Magnesium 1.4 (*)     All other components within normal limits   PROTIME-INR - Abnormal; Notable for the following components:    Prothrombin Time 13.0 (*)     All other components within normal limits   APTT   ISTAT PROCEDURE   POCT GLUCOSE MONITORING CONTINUOUS     EKG Readings: (Independently Interpreted)    This patient is in atrial flutter with a heart rate of 122.  The patient was previously in a sinus rhythm on March 16, 2024.  There is poor R-wave progression across the precordium.  There are nonspecific T-wave changes.  There is no definite evidence of acute myocardial infarction or malignant arrhythmia.  This is doctor Arteaga dictating an I independently interpreted this EKG.       Imaging Results              X-Ray Chest AP Portable (In process)                      Medications   nitroGLYCERIN SL tablet 0.8 mg (0.8 mg Sublingual Given 6/5/24 0617)   apixaban tablet 5 mg (5 mg Oral Given 6/5/24 0841)   aspirin chewable tablet 81 mg (has no administration in time range)   metoprolol succinate (TOPROL-XL) 24 hr tablet 100 mg (100 mg Oral Given 6/5/24 0843)   metoprolol injection 5 mg (has no administration in time range)   aspirin tablet 325 mg (325 mg Oral Given 6/5/24 0617)   amLODIPine tablet 10 mg (10 mg Oral  Given 6/5/24 0842)   furosemide injection 80 mg (80 mg Intravenous Given 6/5/24 0843)   amiodarone in dextrose 150 mg/100 mL (1.5 mg/mL) loading dose 150 mg (150 mg Intravenous New Bag 6/5/24 0841)   albuterol-ipratropium 2.5 mg-0.5 mg/3 mL nebulizer solution 3 mL (3 mLs Nebulization Given 6/5/24 0743)     Medical Decision Making  Risk  OTC drugs.  Prescription drug management.     This patient presents emergency room with shortness of breath.  The patient has a history of atrial fibrillation.  He is in flutter today with a 2-1 block.  He was tachycardic.  He was short of breath.  Chest x-ray reveals failure.  I have concern for a left pleural effusion.   The patient has magnesium is low at 1.4.  The BNP is elevated at 546.  The patient is in heart failure.  He has an elevated troponin at 0.167.  The patient has a creatinine of 1.7 with a BUN at 25 this is mild renal insufficiency the white blood count is 8000 hemoglobin and hematocrit are normal.   Consultation was obtained with Cardiology who asked that the patient be started on a heparin drip.  The patient already received his Eliquis.  The patient was treated with metoprolol and amiodarone.  His heart rate ranges between 85 and 106.   Cardiology asked for admission to the ICU and continued amiodarone drip.   He asked for treatment with Lasix.  This has been ordered.  The patient is currently on BiPAP.  Oxygen saturations are 100%.   I will admit the patient to the hospital.        Scribe Attestation:   Scribe #1: I performed the above scribed service and the documentation accurately describes the services I performed. I attest to the accuracy of the note.                   Please note that the documentation on this chart was provided by the scribe above on the date of service noted above, and that the documentation in the chart accurately reflects the work and decisions made by me alone.  Signed, Dr. Arteaga              Clinical Impression:  Final  diagnoses:  [R06.02] Shortness of breath  [I48.92] Atrial flutter with rapid ventricular response (Primary)  [I50.9] Congestive heart failure, unspecified HF chronicity, unspecified heart failure type  [Z91.199] History of noncompliance with medical treatment  [I10] Uncontrolled hypertension  [I21.4] Acute non-ST segment elevation myocardial infarction          ED Disposition Condition    Admit Stable                William Arteaga MD  06/05/24 0896

## 2024-06-05 NOTE — ASSESSMENT & PLAN NOTE
Progressive worsening on BiPAP. Did not respond to increased EPAP. With variable resp efforts and a clear component of severe resp alkalosis, proceeded with intubation. Requiring high PEEP and high FiO2 to maintain oxygenation.   LPVS.  PEEP at 12  PRVC for synchrony  Continue aggressive diuresis

## 2024-06-05 NOTE — SUBJECTIVE & OBJECTIVE
"Past Medical History:   Diagnosis Date    Arrhythmia 2017    aflutter    Atrial flutter     CAD (coronary artery disease)     CHF (congestive heart failure), NYHA class I 2017    Cholelithiasis with chronic cholecystitis     Chronic diastolic heart failure     Cigarette smoker     COPD (chronic obstructive pulmonary disease)     COVID-19 06/01/2021    Diabetes mellitus     DKA (diabetic ketoacidosis)     Hypertension     NSTEMI (non-ST elevation myocardial infarction)     NSVT (nonsustained ventricular tachycardia)     Psychiatric disorder     h/o "schizophrena/bipolar"    Schizoaffective disorder     Severe sepsis        Past Surgical History:   Procedure Laterality Date    LIVER SURGERY      abscess drainage; 1970s    TREATMENT OF CARDIAC ARRHYTHMIA  9/12/2019    Procedure: Cardioversion or Defibrillation;  Surgeon: Jonathan Lopez MD;  Location: [facility] CATH LAB;  Service: Cardiology;;    TREATMENT OF CARDIAC ARRHYTHMIA N/A 2/3/2022    Procedure: Cardioversion or Defibrillation;  Surgeon: Trevor Schwab MD;  Location: [facility] CATH LAB;  Service: Cardiology;  Laterality: N/A;    TREATMENT OF CARDIAC ARRHYTHMIA N/A 3/16/2024    Procedure: Cardioversion or Defibrillation;  Surgeon: Jonathan Lopez MD;  Location: [facility] CATH LAB;  Service: Cardiology;  Laterality: N/A;       Review of patient's allergies indicates:  No Known Allergies    No current facility-administered medications on file prior to encounter.     Current Outpatient Medications on File Prior to Encounter   Medication Sig    amiodarone (PACERONE) 200 MG Tab Take 200 mg by mouth once daily.    amLODIPine (NORVASC) 10 MG tablet Take 1 tablet (10 mg total) by mouth once daily.    apixaban (ELIQUIS) 5 mg Tab Take 5 mg by mouth 2 (two) times daily.    aspirin 81 MG Chew Take 1 tablet (81 mg total) by mouth once daily. (Patient not taking: Reported on 6/5/2024)    atorvastatin (LIPITOR) 80 MG tablet Take 1 tablet by mouth once daily.    blood-glucose meter " kit Use as instructed    budesonide-formoterol 80-4.5 mcg (SYMBICORT) 80-4.5 mcg/actuation HFAA Inhale 2 puffs into the lungs twice daily (Patient not taking: Reported on 6/5/2024)    carvediloL (COREG) 12.5 MG tablet Take 1 tablet by mouth 2 (two) times daily with meals.    dulaglutide (TRULICITY) 1.5 mg/0.5 mL pen injector Inject into the skin every 7 days. Thursdays    furosemide (LASIX) 80 MG tablet Take 1 tablet by mouth once daily.    hydrALAZINE (APRESOLINE) 50 MG tablet Take 50 mg by mouth every 8 (eight) hours. (Patient not taking: Reported on 6/5/2024)    insulin aspart U-100 (NOVOLOG) 100 unit/mL injection Inject 5 Units into the skin 3 (three) times daily before meals. (Patient not taking: Reported on 6/5/2024)    insulin detemir U-100 (LEVEMIR) 100 unit/mL injection Inject 10 Units into the skin every evening. (Patient not taking: Reported on 6/5/2024)    metFORMIN (FORTAMET) 1,000 mg 24hr tablet Take 1,000 mg by mouth daily with breakfast. (Patient not taking: Reported on 6/5/2024)    sacubitriL-valsartan (ENTRESTO) 24-26 mg per tablet Take 1 tablet by mouth 2 (two) times daily.    tamsulosin (FLOMAX) 0.4 mg Cap Take 1 capsule (0.4 mg total) by mouth once daily.    [DISCONTINUED] atorvastatin (LIPITOR) 40 MG tablet Take 40 mg by mouth once daily.    [DISCONTINUED] furosemide (LASIX) 40 MG tablet Take 1 tablet (40 mg total) by mouth 2 (two) times a day.    [DISCONTINUED] metoprolol succinate (TOPROL-XL) 100 MG 24 hr tablet Take 100 mg by mouth once daily.     Family History    None       Tobacco Use    Smoking status: Every Day     Current packs/day: 1.00     Average packs/day: 1 pack/day for 15.0 years (15.0 ttl pk-yrs)     Types: Cigarettes    Smokeless tobacco: Never   Substance and Sexual Activity    Alcohol use: Yes     Comment: daily    Drug use: Never    Sexual activity: Not Currently     Review of Systems   Constitutional:  Negative for chills and fever.   Respiratory:  Positive for shortness of  breath. Negative for cough.    Cardiovascular:  Positive for leg swelling. Negative for chest pain.   Gastrointestinal:  Negative for abdominal pain.   Genitourinary:  Negative for dysuria.     Objective:     Vital Signs (Most Recent):  Temp: 97.6 °F (36.4 °C) (06/05/24 1215)  Pulse: 96 (06/05/24 1200)  Resp: (!) 45 (06/05/24 1200)  BP: (!) 165/105 (06/05/24 1200)  SpO2: 97 % (06/05/24 1200) Vital Signs (24h Range):  Temp:  [97.6 °F (36.4 °C)-98.5 °F (36.9 °C)] 97.6 °F (36.4 °C)  Pulse:  [] 96  Resp:  [18-45] 45  SpO2:  [84 %-100 %] 97 %  BP: (119-202)/() 165/105     Weight: 89.4 kg (197 lb 1.5 oz)  Body mass index is 31.81 kg/m².     Physical Exam  Vitals and nursing note reviewed.   Constitutional:       General: He is in acute distress.      Appearance: He is ill-appearing.   Cardiovascular:      Rate and Rhythm: Regular rhythm. Tachycardia present.      Pulses: Normal pulses.   Pulmonary:      Effort: Respiratory distress present.      Breath sounds: Rales present. No wheezing.   Abdominal:      General: Bowel sounds are normal. There is no distension.   Musculoskeletal:      Comments: Edema to bilateral extremities   Neurological:      General: No focal deficit present.      Mental Status: He is alert and oriented to person, place, and time.   Psychiatric:         Mood and Affect: Mood normal.                Significant Labs: All pertinent labs within the past 24 hours have been reviewed.    Significant Imaging: I have reviewed all pertinent imaging results/findings within the past 24 hours.

## 2024-06-05 NOTE — H&P
[facility] Medicine  History & Physical    Patient Name: Marck Madison  MRN: [id number]  Patient Class: IP- Inpatient  Admission Date: 6/5/2024  Attending Physician: Kulwant Nesbitt MD   Primary Care Provider: [facility] -         Patient information was obtained from patient, past medical records, and ER records.     Subjective:     Principal Problem:Acute hypoxemic respiratory failure    Chief Complaint:   Chief Complaint   Patient presents with    Shortness of Breath     Pt chief complaint is shortness of breath. Pt has been having sob since this morning, pt has a history of Copd        HPI: Mr. Madison is a 64-year-old male with past medical history of hypertension, diabetes mellitus type 2, atrial fibrillation, CHF and nonobstructive coronary artery disease who presents to the emergency department for evaluation of shortness of breath.  He reports this has been ongoing and progressively worsening.  He endorses swelling in his extremities but denies any chest pain.  Denies any fevers or chills.  He recently had a left heart catheterization on 05/06/24 that shows nonobstructive coronary artery disease.  He reports compliance with Lasix at home.  In the emergency department, patient was found to be tachycardic with a rate of 133 and atrial fibrillation, respiratory rate of 24 and blood pressure 180/129 with a O2 saturation of 84%.  He was placed on BiPAP and given IV amiodarone with improvement of rate.  He was also given IV Lasix.  Cardiology consulted and he will be admitted to ICU for further management.    Past Medical History:   Diagnosis Date    Arrhythmia 2017    aflutter    Atrial flutter     CAD (coronary artery disease)     CHF (congestive heart failure), NYHA class I 2017    Cholelithiasis with chronic cholecystitis     Chronic diastolic heart failure     Cigarette smoker     COPD (chronic obstructive pulmonary disease)     COVID-19 06/01/2021    Diabetes mellitus   "   DKA (diabetic ketoacidosis)     Hypertension     NSTEMI (non-ST elevation myocardial infarction)     NSVT (nonsustained ventricular tachycardia)     Psychiatric disorder     h/o "schizophrena/bipolar"    Schizoaffective disorder     Severe sepsis        Past Surgical History:   Procedure Laterality Date    LIVER SURGERY      abscess drainage; 1970s    TREATMENT OF CARDIAC ARRHYTHMIA  9/12/2019    Procedure: Cardioversion or Defibrillation;  Surgeon: Jonathan Lopez MD;  Location: [facility] CATH LAB;  Service: Cardiology;;    TREATMENT OF CARDIAC ARRHYTHMIA N/A 2/3/2022    Procedure: Cardioversion or Defibrillation;  Surgeon: Trevor Schwab MD;  Location: [facility] CATH LAB;  Service: Cardiology;  Laterality: N/A;    TREATMENT OF CARDIAC ARRHYTHMIA N/A 3/16/2024    Procedure: Cardioversion or Defibrillation;  Surgeon: Jonathan Lopez MD;  Location: [facility] CATH LAB;  Service: Cardiology;  Laterality: N/A;       Review of patient's allergies indicates:  No Known Allergies    No current facility-administered medications on file prior to encounter.     Current Outpatient Medications on File Prior to Encounter   Medication Sig    amiodarone (PACERONE) 200 MG Tab Take 200 mg by mouth once daily.    amLODIPine (NORVASC) 10 MG tablet Take 1 tablet (10 mg total) by mouth once daily.    apixaban (ELIQUIS) 5 mg Tab Take 5 mg by mouth 2 (two) times daily.    aspirin 81 MG Chew Take 1 tablet (81 mg total) by mouth once daily. (Patient not taking: Reported on 6/5/2024)    atorvastatin (LIPITOR) 80 MG tablet Take 1 tablet by mouth once daily.    blood-glucose meter kit Use as instructed    budesonide-formoterol 80-4.5 mcg (SYMBICORT) 80-4.5 mcg/actuation HFAA Inhale 2 puffs into the lungs twice daily (Patient not taking: Reported on 6/5/2024)    carvediloL (COREG) 12.5 MG tablet Take 1 tablet by mouth 2 (two) times daily with meals.    dulaglutide (TRULICITY) 1.5 mg/0.5 mL pen injector Inject into the skin every 7 days. Thursdays    " furosemide (LASIX) 80 MG tablet Take 1 tablet by mouth once daily.    hydrALAZINE (APRESOLINE) 50 MG tablet Take 50 mg by mouth every 8 (eight) hours. (Patient not taking: Reported on 6/5/2024)    insulin aspart U-100 (NOVOLOG) 100 unit/mL injection Inject 5 Units into the skin 3 (three) times daily before meals. (Patient not taking: Reported on 6/5/2024)    insulin detemir U-100 (LEVEMIR) 100 unit/mL injection Inject 10 Units into the skin every evening. (Patient not taking: Reported on 6/5/2024)    metFORMIN (FORTAMET) 1,000 mg 24hr tablet Take 1,000 mg by mouth daily with breakfast. (Patient not taking: Reported on 6/5/2024)    sacubitriL-valsartan (ENTRESTO) 24-26 mg per tablet Take 1 tablet by mouth 2 (two) times daily.    tamsulosin (FLOMAX) 0.4 mg Cap Take 1 capsule (0.4 mg total) by mouth once daily.    [DISCONTINUED] atorvastatin (LIPITOR) 40 MG tablet Take 40 mg by mouth once daily.    [DISCONTINUED] furosemide (LASIX) 40 MG tablet Take 1 tablet (40 mg total) by mouth 2 (two) times a day.    [DISCONTINUED] metoprolol succinate (TOPROL-XL) 100 MG 24 hr tablet Take 100 mg by mouth once daily.     Family History    None       Tobacco Use    Smoking status: Every Day     Current packs/day: 1.00     Average packs/day: 1 pack/day for 15.0 years (15.0 ttl pk-yrs)     Types: Cigarettes    Smokeless tobacco: Never   Substance and Sexual Activity    Alcohol use: Yes     Comment: daily    Drug use: Never    Sexual activity: Not Currently     Review of Systems   Constitutional:  Negative for chills and fever.   Respiratory:  Positive for shortness of breath. Negative for cough.    Cardiovascular:  Positive for leg swelling. Negative for chest pain.   Gastrointestinal:  Negative for abdominal pain.   Genitourinary:  Negative for dysuria.     Objective:     Vital Signs (Most Recent):  Temp: 97.6 °F (36.4 °C) (06/05/24 1215)  Pulse: 96 (06/05/24 1200)  Resp: (!) 45 (06/05/24 1200)  BP: (!) 165/105 (06/05/24 1200)  SpO2:  97 % (06/05/24 1200) Vital Signs (24h Range):  Temp:  [97.6 °F (36.4 °C)-98.5 °F (36.9 °C)] 97.6 °F (36.4 °C)  Pulse:  [] 96  Resp:  [18-45] 45  SpO2:  [84 %-100 %] 97 %  BP: (119-202)/() 165/105     Weight: 89.4 kg (197 lb 1.5 oz)  Body mass index is 31.81 kg/m².     Physical Exam  Vitals and nursing note reviewed.   Constitutional:       General: He is in acute distress.      Appearance: He is ill-appearing.   Cardiovascular:      Rate and Rhythm: Regular rhythm. Tachycardia present.      Pulses: Normal pulses.   Pulmonary:      Effort: Respiratory distress present.      Breath sounds: Rales present. No wheezing.   Abdominal:      General: Bowel sounds are normal. There is no distension.   Musculoskeletal:      Comments: Edema to bilateral extremities   Neurological:      General: No focal deficit present.      Mental Status: He is alert and oriented to person, place, and time.   Psychiatric:         Mood and Affect: Mood normal.                Significant Labs: All pertinent labs within the past 24 hours have been reviewed.    Significant Imaging: I have reviewed all pertinent imaging results/findings within the past 24 hours.  Assessment/Plan:     * Acute hypoxemic respiratory failure  Patient with Hypoxic Respiratory failure which is Acute.  he is not on home oxygen. Supplemental oxygen was provided and noted- Oxygen Concentration (%):  [40] 40    .   Signs/symptoms of respiratory failure include- tachypnea, increased work of breathing, and respiratory distress. Contributing diagnoses includes - CHF Labs and images were reviewed. Patient Has recent ABG, which has been reviewed. Will treat underlying causes and adjust management of respiratory failure as follows-   - Bipap for support, wean as tolerated  - continue with IV lasix  - Cardiology following    Elevated troponin  Possible related to demand ischemia, patient had recent LHC with nonobstructive CAD on 5/6/24  - Cardiology consulted  -  continue aspirin/statin    CAD (coronary artery disease)  Patient with known CAD that is nonobstructive (5/6/2024, which is controlled Will continue ASA and Statin and monitor for S/Sx of angina/ACS. Continue to monitor on telemetry.     Permanent atrial fibrillation  Patient with Permanent atrial fibrillation which is controlled currently with Amiodarone. Patient is currently in atrial fibrillation.[id number] Score: 3. . Anticoagulation indicated. Anticoagulation done with Lovenox .    Acute on chronic diastolic congestive heart failure  Patient is identified as having Diastolic (HFpEF) heart failure that is Acute on chronic. CHF is currently uncontrolled due to Hepatic congestion/ascites and Dyspnea not returned to baseline after 1 doses of IV diuretic. Latest ECHO performed and demonstrates- Results for orders placed during the hospital encounter of 03/15/24    Echo    Interpretation Summary    Left Ventricle: The left ventricle is normal in size. There is moderate concentric hypertrophy. There is low normal systolic function with a visually estimated ejection fraction of 50 - 55%.    Right Ventricle: Mild right ventricular enlargement. Systolic function is normal.    Left Atrium: Left atrium is severely dilated.    Right Atrium: Right atrium is moderately dilated.    Aortic Valve: There is mild aortic valve sclerosis.    Mitral Valve: There is moderate regurgitation.    Tricuspid Valve: There is moderate regurgitation.    Pulmonary Artery: The estimated pulmonary artery systolic pressure is 51 mmHg.    IVC/SVC: Elevated venous pressure at 15 mmHg.  . Continue Beta Blocker and Furosemide and monitor clinical status closely. Monitor on telemetry. Patient is on CHF pathway.  Monitor strict Is&Os and daily weights.  Place on fluid restriction of 1.5 L. Cardiology has not been consulted. Continue to stress to patient importance of self efficacy and  on diet for CHF. Last BNP reviewed- and noted below   Recent  Labs   Lab 06/05/24  0618   *       Type 2 diabetes mellitus, with long-term current use of insulin  A1c:   Lab Results   Component Value Date    HGBA1C 7.1 (H) 03/15/2024    HGBA1C 7.1 (H) 03/15/2024     Meds: SSI PRN to maintain goal 140-180  ADA diet, accuchecks ACHS, hypoglycemic protocol         VTE Risk Mitigation (From admission, onward)           Ordered     enoxaparin injection 90 mg  Every 12 hours         06/05/24 1131                  Critical care time spent on the evaluation and treatment of severe organ dysfunction, review of pertinent labs and imaging studies, discussions with consulting providers and discussions with patient/family: 35 minutes.       Addendum:    On arrival to ICU, patient with episodes of apnea and loss of consciousness. Saturations improved. Pulmonology consulted. Decision was made to intubate patient for worsening distress and hypoxia. Now with arterial line and pressor support. Aggressive diuresis. Called emergency contact Aileen Gabriels listed and updated her on events since admission.       AdmissionCare    Guideline: Atrial Fibrillation - INPT, Inpatient    Based on the indications selected for the patient, the bed status of Inpatient was determined to be MET    The following indications were selected as present at the time of evaluation of the patient:        - Need for treatment with antiarrhythmic medication regimen that has significant proarrhythmic potential (eg, monomorphic ventricular tachycardia, torsades de pointes, bradycardia)    - Cardiac monitoring required that extends beyond observation care    AdmissionCare documentation entered by: Krissy Phillips    [facility] [employer], 28th edition, Copyright © 2024 [facility] [employer], [employer] All Rights Reserved.  [id number]:00    Kulwant Nesbitt MD  Department of Hospital Medicine  [facility] - [place], [place] - Intensive Care

## 2024-06-05 NOTE — HPI
Mr. Madison is a 64-year-old male with past medical history of hypertension, diabetes mellitus type 2, atrial fibrillation, CHF and nonobstructive coronary artery disease who presents to the emergency department for evaluation of shortness of breath.  He reports this has been ongoing and progressively worsening.  He endorses swelling in his extremities but denies any chest pain.  Denies any fevers or chills.  He recently had a left heart catheterization on 05/06/24 that shows nonobstructive coronary artery disease.  He reports compliance with Lasix at home.  In the emergency department, patient was found to be tachycardic with a rate of 133 and atrial fibrillation, respiratory rate of 24 and blood pressure 180/129 with a O2 saturation of 84%.  He was placed on BiPAP and given IV amiodarone with improvement of rate.  He was also given IV Lasix.  Cardiology consulted and he will be admitted to ICU for further management.

## 2024-06-05 NOTE — HPI
65 yo male with chart history of dilated CM but recent preserved EF, Afib, EtOH abuse who presented with severe dyspnea and Afib RVR. He was started on Amio with good control of his HR. Placed on BiPAP for WOB. He was initially looking comforatable on BiPAP but in severe distress off. I was called urgently to assess increased WOB. He was noted to have RR up to 60 with MV over 60L, then would pass out and have no resp efforts. He was able to initial speak and felt extremely dyspneic but otherwise denied complaints. Not able to provide full history 2/2 respiratory distress.   Telephone Visit Note  Paxton Morgan is calling about a cough.   She is either an established patient of mine or is a patient of one of my clinic partners who is currently unavailable.  She is in [place] and her identity has been established.   Paxton understands that we are limiting office visits due to the coronavirus pandemic and she consents to a virtual visit with charges submitted to her insurance.   11 - 20 minutes were spent in this encounter.    HISTORY OF PRESENT ILLNESS     Paxton Morgan is a pleasant 46 year old female who had concerns including Md Call (NP call related to Pandemic . Tele visit. ).    Pt has a cough. No fever no headaches. Feels a little phlegm.  Stuffed nose. Dry cough prior now wet.  No exposure to Covid. Able to smell and taste. Asking for a cold medication. Usually doesn't take anything. No history of asthma.  Dealing with this for today and turned wet. No nausea.       Has family members come in to help her. Members stay here.   Nephew comes every Tuesday and Thursday.         ASSESSMENT AND PLAN     1 URI  2 Cough    Plan  1. Pt feels she does not have Covid. Runny nose with wet cough. Has sense of smell, no fever no aches no shortness of breath  2. Will give a trial of Tessalon for cough  3. Educated my concerns as she does have people in and out of her house.  They should mask, wear gloves and wash hands before  4. If any worsening symptoms to call the clinic or head to the ER pt agreed with plan.   The telephone-only visit is being conducted for the purpose of providing treatment advice during a public health emergency  - The treatment advice that was provided was based on what was reported by the patient  - Without the patient being seen and evaluated in person, there would be some risk that the information and/or assessment provided by the [facility] provider might be incomplete or inaccurate  Follow Up: as needed /  Tash Browning NP [facility]            Abdominal Pain, N/V/D

## 2024-06-05 NOTE — ASSESSMENT & PLAN NOTE
Patient with known CAD that is nonobstructive (5/6/2024, which is controlled Will continue ASA and Statin and monitor for S/Sx of angina/ACS. Continue to monitor on telemetry.

## 2024-06-05 NOTE — ASSESSMENT & PLAN NOTE
Patient is identified as having Diastolic (HFpEF) heart failure that is Acute on chronic. CHF is currently uncontrolled due to Hepatic congestion/ascites and Dyspnea not returned to baseline after 1 doses of IV diuretic. Latest ECHO performed and demonstrates- Results for orders placed during the hospital encounter of 03/15/24    Echo    Interpretation Summary    Left Ventricle: The left ventricle is normal in size. There is moderate concentric hypertrophy. There is low normal systolic function with a visually estimated ejection fraction of 50 - 55%.    Right Ventricle: Mild right ventricular enlargement. Systolic function is normal.    Left Atrium: Left atrium is severely dilated.    Right Atrium: Right atrium is moderately dilated.    Aortic Valve: There is mild aortic valve sclerosis.    Mitral Valve: There is moderate regurgitation.    Tricuspid Valve: There is moderate regurgitation.    Pulmonary Artery: The estimated pulmonary artery systolic pressure is 51 mmHg.    IVC/SVC: Elevated venous pressure at 15 mmHg.  . Continue Beta Blocker and Furosemide and monitor clinical status closely. Monitor on telemetry. Patient is on CHF pathway.  Monitor strict Is&Os and daily weights.  Place on fluid restriction of 1.5 L. Cardiology has not been consulted. Continue to stress to patient importance of self efficacy and  on diet for CHF. Last BNP reviewed- and noted below   Recent Labs   Lab 06/05/24  0618   *

## 2024-06-05 NOTE — PLAN OF CARE
[facility] - Emergency Dept  Initial Discharge Assessment       Primary Care Provider: [facility] -  Case Management Assessment     PCP: See above  Pharmacy: .    Patient Arrived From: home wth nibree  Existing Help at Home: Aileen ferrari    Barriers to Discharge: none    Discharge Plan:    A. home   B. home with family      Discharge planning assessment completed with patient's assistance and via chart review.  Patient on Bipap during assessment.  Patient is from  home with niece.  He has home O2 but no OP services.  His family assists him with transportation to [facility].  When medically cleared, patient will discharge to home.        Admission Diagnosis: Atrial flutter with rapid ventricular response [I48.92]    Admission Date: 6/5/2024  Expected Discharge Date:     Transition of Care Barriers: None    Payor: [employer] MEDICARE / Plan: [employer] H[employer] PPO SPECIAL NEEDS / Product Type: Medicare Advantage /     Extended Emergency Contact Information  Primary Emergency Contact: Aileen Mccarthy  Mobile Phone: [phone]  Relation: Relative   needed? No    Discharge Plan A: Home  Discharge Plan B: Home with family      [id number] - [place] - [address] AT [facility] [address]  [address]  [place] [address]  Phone: [phone] Fax: [phone]      Initial Assessment (most recent)       Adult Discharge Assessment - 06/05/24 1053          Discharge Assessment    Assessment Type Discharge Planning Assessment     Confirmed/corrected address, phone number and insurance Yes     Confirmed Demographics Correct on Facesheet     Source of Information patient     Reason For Admission SOB;  Atrial flutter     People in Home other relative(s)   Lives with Aileen ferrari    Facility Arrived From: Home with niece     Do you expect to return to your current living situation? Yes     Do you have help at home or someone to help you manage your  care at home? Yes     Who are your caregiver(s) and their phone number(s)? niece     Prior to hospitilization cognitive status: Alert/Oriented     Current cognitive status: Alert/Oriented     Walking or Climbing Stairs Difficulty no     Dressing/Bathing Difficulty no     Equipment Currently Used at Home oxygen     Readmission within 30 days? No     Patient currently being followed by outpatient case management? No     Do you currently have service(s) that help you manage your care at home? No     Do you take prescription medications? Yes     Do you have prescription coverage? Yes     Coverage Payor: [employer] MANAGED MEDICARE - [employer] [facility] PPO SPECIAL NEEDS     Do you have any problems affording any of your prescribed medications? No     Who is going to help you get home at discharge? TBD     How do you get to doctors appointments? family or friend will provide     Are you on dialysis? No     Do you take coumadin? No     Discharge Plan A Home     Discharge Plan B Home with family     DME Needed Upon Discharge  none     Discharge Plan discussed with: Patient     Transition of Care Barriers None

## 2024-06-05 NOTE — CONSULTS
"[facility] - [place] - Intensive Care  Critical Care Medicine  Consult Note    Patient Name: Marck Madison  MRN: [id number]  Admission Date: 6/5/2024  Hospital Length of Stay: 0 days  Code Status: Full Code  Attending Physician: Kulwant Nesbitt MD   Primary Care Provider: [facility] -   Principal Problem: Acute hypoxemic respiratory failure    Inpatient consult to Pulmonology  Consult performed by: Bj Nicholas MD  Consult ordered by: Kulwant Nesbitt MD        Subjective:     HPI:  63 yo male with chart history of dilated CM but recent preserved EF, Afib, EtOH abuse who presented with severe dyspnea and Afib RVR. He was started on Amio with good control of his HR. Placed on BiPAP for WOB. He was initially looking comforatable on BiPAP but in severe distress off. I was called urgently to assess increased WOB. He was noted to have RR up to 60 with MV over 60L, then would pass out and have no resp efforts. He was able to initial speak and felt extremely dyspneic but otherwise denied complaints. Not able to provide full history 2/2 respiratory distress.    Hospital/ICU Course:  No notes on file    Past Medical History:   Diagnosis Date    Arrhythmia 2017    aflutter    Atrial flutter     CAD (coronary artery disease)     CHF (congestive heart failure), NYHA class I 2017    Cholelithiasis with chronic cholecystitis     Chronic diastolic heart failure     Cigarette smoker     COPD (chronic obstructive pulmonary disease)     COVID-19 06/01/2021    Diabetes mellitus     DKA (diabetic ketoacidosis)     Hypertension     NSTEMI (non-ST elevation myocardial infarction)     NSVT (nonsustained ventricular tachycardia)     Psychiatric disorder     h/o "schizophrena/bipolar"    Schizoaffective disorder     Severe sepsis        Past Surgical History:   Procedure Laterality Date    LIVER SURGERY      abscess drainage; 1970s    TREATMENT OF CARDIAC ARRHYTHMIA  9/12/2019    Procedure: Cardioversion or Defibrillation;  " Surgeon: Jonathan Lopez MD;  Location: [facility] CATH LAB;  Service: Cardiology;;    TREATMENT OF CARDIAC ARRHYTHMIA N/A 2/3/2022    Procedure: Cardioversion or Defibrillation;  Surgeon: Trevor Schwab MD;  Location: [facility] CATH LAB;  Service: Cardiology;  Laterality: N/A;    TREATMENT OF CARDIAC ARRHYTHMIA N/A 3/16/2024    Procedure: Cardioversion or Defibrillation;  Surgeon: Jonathan Lopez MD;  Location: [facility] CATH LAB;  Service: Cardiology;  Laterality: N/A;       Review of patient's allergies indicates:  No Known Allergies    Family History    None       Tobacco Use    Smoking status: Every Day     Current packs/day: 1.00     Average packs/day: 1 pack/day for 15.0 years (15.0 ttl pk-yrs)     Types: Cigarettes    Smokeless tobacco: Never   Substance and Sexual Activity    Alcohol use: Yes     Comment: daily    Drug use: Never    Sexual activity: Not Currently         Review of Systems   Unable to perform ROS: Severe respiratory distress     Objective:     Vital Signs (Most Recent):  Temp: 97.6 °F (36.4 °C) (06/05/24 1215)  Pulse: 70 (06/05/24 1443)  Resp: (!) 28 (06/05/24 1443)  BP: (!) 72/56 (06/05/24 1443)  SpO2: 97 % (06/05/24 1443) Vital Signs (24h Range):  Temp:  [97.6 °F (36.4 °C)-98.5 °F (36.9 °C)] 97.6 °F (36.4 °C)  Pulse:  [] 70  Resp:  [10-45] 28  SpO2:  [84 %-100 %] 97 %  BP: ()/() 72/56     Weight: 89.4 kg (197 lb 1.5 oz)  Body mass index is 31.81 kg/m².      Intake/Output Summary (Last 24 hours) at 6/5/2024 1537  Last data filed at 6/5/2024 1300  Gross per 24 hour   Intake 217.31 ml   Output 300 ml   Net -82.69 ml        Physical Exam  Vitals reviewed.   Constitutional:       General: He is in acute distress.      Appearance: He is ill-appearing.   HENT:      Head: Normocephalic and atraumatic.      Mouth/Throat:      Mouth: Mucous membranes are moist.   Eyes:      Pupils: Pupils are equal, round, and reactive to light.   Neck:      Comments: JVD  Cardiovascular:      Rate and  Rhythm: Normal rate. Rhythm irregular.      Pulses: Normal pulses.      Heart sounds: Normal heart sounds. No murmur heard.  Pulmonary:      Effort: Respiratory distress present.      Breath sounds: No stridor. Rales present. No wheezing.   Abdominal:      General: There is distension.      Palpations: Abdomen is soft.   Musculoskeletal:      Right lower leg: Edema present.      Left lower leg: Edema present.   Skin:     General: Skin is dry.      Capillary Refill: Capillary refill takes more than 3 seconds.      Comments: Cold and clammy   Neurological:      General: No focal deficit present.      Mental Status: He is alert and oriented to person, place, and time.   Psychiatric:         Mood and Affect: Mood normal.         Behavior: Behavior normal.          Vents:  Vent Mode: A/C (06/05/24 1443)  Ventilator Initiated: Yes (06/05/24 1443)  Set Rate: 28 BPM (06/05/24 1443)  Vt Set: 390 mL (06/05/24 1443)  PEEP/CPAP: 12 cmH20 (06/05/24 1443)  Oxygen Concentration (%): 100 (06/05/24 1443)  Peak Airway Pressure: 25.2 cmH20 (06/05/24 1443)  Total Ve: 10.6 L/m (06/05/24 1443)  F/VT Ratio<105 (RSBI): (!) 73.49 (06/05/24 1443)    Lines/Drains/Airways       Drain  Duration                  NG/OG Tube 06/05/24 1419 Center mouth <1 day    Male External Urinary Catheter 06/05/24 <1 day              Airway  Duration                  Airway - Non-Surgical 06/05/24 1420 Endotracheal Tube <1 day              Arterial Line  Duration             Arterial Line 06/05/24 1501 Right Radial <1 day              Peripheral Intravenous Line  Duration                  Peripheral IV - Single Lumen 06/05/24 0632 18 G;1 3/4 in Anterior;Right Upper Arm <1 day         Peripheral IV - Single Lumen 06/05/24 0830 20 G;1 in Left Forearm <1 day                    Significant Labs:    CBC/Anemia Profile:  Recent Labs   Lab 06/05/24  0618   WBC 8.03   HGB 15.0   HCT 44.4      MCV 90   RDW 15.9*        Chemistries:  Recent Labs   Lab  06/05/24  0618      K 4.0      CO2 20*   BUN 25*   CREATININE 1.7*   CALCIUM 8.9   ALBUMIN 3.6   PROT 7.2   BILITOT 1.0   ALKPHOS 82   ALT 17   AST 31   MG 1.4*       ABGs:   Recent Labs   Lab 06/05/24  1520   PH 7.346*   PCO2 37.9   HCO3 20.8*   POCSATURATED 98   BE -4*     Troponin:   Recent Labs   Lab 06/05/24  0618 06/05/24  1035   TROPONINI 0.167* 0.183*     All pertinent labs within the past 24 hours have been reviewed.    Significant Imaging:   I have reviewed all pertinent imaging results/findings within the past 24 hours.  CXR: I have reviewed all pertinent results/findings within the past 24 hours and my personal findings are:  Diffuse pulm edema, improved on repeat CXR after intubation  EKG: I have reviewed all pertinent results/findings within the past 24 hours and my personal findings are: Afib with rate control  Tely shows Afib with highly variable rate  US bedside shows distended IVC, poor windows but grossly severely reduced LV EF    ABG  Recent Labs   Lab 06/05/24  1520   PH 7.346*   PO2 116*   PCO2 37.9   HCO3 20.8*   BE -4*     Assessment/Plan:     Pulmonary  * Acute hypoxemic respiratory failure  Progressive worsening on BiPAP. Did not respond to increased EPAP. With variable resp efforts and a clear component of severe resp alkalosis, proceeded with intubation. Requiring high PEEP and high FiO2 to maintain oxygenation.   LPVS.  PEEP at 12  PRVC for synchrony  Continue aggressive diuresis    Cardiac/Vascular  CAD (coronary artery disease)  Reviewed recent [facility]. Current presentation out of proportion to reported disease burden.    Acute on chronic diastolic congestive heart failure  Formal Echo pending but suspect again has significant depressed EF. His [facility] recently with only distal vessel CAD that was significant.  -Dobutamine given cardiogenic shock. Cards consult in AM.  -Trend troponin.    Cardiogenic shock  Levophed to maintain MAP >60  Dobutamine at 5 and will adjust based on  clinical response  Trend lactate        Critical Care Time: 95 minutes  Critical secondary to Patient has a condition that poses threat to life and bodily function: Acute respiratory failure, cardiogenic shock, acute on chronic CHF.     Critical care was time spent personally by me on the following activities: development of treatment plan with patient or surrogate and bedside caregivers, discussions with consultants, evaluation of patient's response to treatment, examination of patient, ordering and performing treatments and interventions, ordering and review of laboratory studies, ordering and review of radiographic studies, pulse oximetry, re-evaluation of patient's condition. This critical care time did not overlap with that of any other provider or involve time for any procedures.    Thank you for your consult. I will follow-up with patient. Please contact us if you have any additional questions.     Bj Nicholas MD  Critical Care Medicine  [facility] - [place] - Intensive Care

## 2024-06-05 NOTE — SUBJECTIVE & OBJECTIVE
"Past Medical History:   Diagnosis Date    Arrhythmia 2017    aflutter    Atrial flutter     CAD (coronary artery disease)     CHF (congestive heart failure), NYHA class I 2017    Cholelithiasis with chronic cholecystitis     Chronic diastolic heart failure     Cigarette smoker     COPD (chronic obstructive pulmonary disease)     COVID-19 06/01/2021    Diabetes mellitus     DKA (diabetic ketoacidosis)     Hypertension     NSTEMI (non-ST elevation myocardial infarction)     NSVT (nonsustained ventricular tachycardia)     Psychiatric disorder     h/o "schizophrena/bipolar"    Schizoaffective disorder     Severe sepsis        Past Surgical History:   Procedure Laterality Date    LIVER SURGERY      abscess drainage; 1970s    TREATMENT OF CARDIAC ARRHYTHMIA  9/12/2019    Procedure: Cardioversion or Defibrillation;  Surgeon: Jonathan Lopez MD;  Location: [facility] CATH LAB;  Service: Cardiology;;    TREATMENT OF CARDIAC ARRHYTHMIA N/A 2/3/2022    Procedure: Cardioversion or Defibrillation;  Surgeon: Trevor Schwab MD;  Location: [facility] CATH LAB;  Service: Cardiology;  Laterality: N/A;    TREATMENT OF CARDIAC ARRHYTHMIA N/A 3/16/2024    Procedure: Cardioversion or Defibrillation;  Surgeon: Jonathan Lopez MD;  Location: [facility] CATH LAB;  Service: Cardiology;  Laterality: N/A;       Review of patient's allergies indicates:  No Known Allergies    Family History    None       Tobacco Use    Smoking status: Every Day     Current packs/day: 1.00     Average packs/day: 1 pack/day for 15.0 years (15.0 ttl pk-yrs)     Types: Cigarettes    Smokeless tobacco: Never   Substance and Sexual Activity    Alcohol use: Yes     Comment: daily    Drug use: Never    Sexual activity: Not Currently         Review of Systems   Unable to perform ROS: Severe respiratory distress     Objective:     Vital Signs (Most Recent):  Temp: 97.6 °F (36.4 °C) (06/05/24 1215)  Pulse: 70 (06/05/24 1443)  Resp: (!) 28 (06/05/24 1443)  BP: (!) 72/56 (06/05/24 " 1443)  SpO2: 97 % (06/05/24 1443) Vital Signs (24h Range):  Temp:  [97.6 °F (36.4 °C)-98.5 °F (36.9 °C)] 97.6 °F (36.4 °C)  Pulse:  [] 70  Resp:  [10-45] 28  SpO2:  [84 %-100 %] 97 %  BP: ()/() 72/56     Weight: 89.4 kg (197 lb 1.5 oz)  Body mass index is 31.81 kg/m².      Intake/Output Summary (Last 24 hours) at 6/5/2024 1537  Last data filed at 6/5/2024 1300  Gross per 24 hour   Intake 217.31 ml   Output 300 ml   Net -82.69 ml        Physical Exam  Vitals reviewed.   Constitutional:       General: He is in acute distress.      Appearance: He is ill-appearing.   HENT:      Head: Normocephalic and atraumatic.      Mouth/Throat:      Mouth: Mucous membranes are moist.   Eyes:      Pupils: Pupils are equal, round, and reactive to light.   Neck:      Comments: JVD  Cardiovascular:      Rate and Rhythm: Normal rate. Rhythm irregular.      Pulses: Normal pulses.      Heart sounds: Normal heart sounds. No murmur heard.  Pulmonary:      Effort: Respiratory distress present.      Breath sounds: No stridor. Rales present. No wheezing.   Abdominal:      General: There is distension.      Palpations: Abdomen is soft.   Musculoskeletal:      Right lower leg: Edema present.      Left lower leg: Edema present.   Skin:     General: Skin is dry.      Capillary Refill: Capillary refill takes more than 3 seconds.      Comments: Cold and clammy   Neurological:      General: No focal deficit present.      Mental Status: He is alert and oriented to person, place, and time.   Psychiatric:         Mood and Affect: Mood normal.         Behavior: Behavior normal.          Vents:  Vent Mode: A/C (06/05/24 1443)  Ventilator Initiated: Yes (06/05/24 1443)  Set Rate: 28 BPM (06/05/24 1443)  Vt Set: 390 mL (06/05/24 1443)  PEEP/CPAP: 12 cmH20 (06/05/24 1443)  Oxygen Concentration (%): 100 (06/05/24 1443)  Peak Airway Pressure: 25.2 cmH20 (06/05/24 1443)  Total Ve: 10.6 L/m (06/05/24 1443)  F/VT Ratio<105 (RSBI): (!) 73.49  (06/05/24 1443)    Lines/Drains/Airways       Drain  Duration                  NG/OG Tube 06/05/24 1419 Center mouth <1 day    Male External Urinary Catheter 06/05/24 <1 day              Airway  Duration                  Airway - Non-Surgical 06/05/24 1420 Endotracheal Tube <1 day              Arterial Line  Duration             Arterial Line 06/05/24 1501 Right Radial <1 day              Peripheral Intravenous Line  Duration                  Peripheral IV - Single Lumen 06/05/24 0632 18 G;1 3/4 in Anterior;Right Upper Arm <1 day         Peripheral IV - Single Lumen 06/05/24 0830 20 G;1 in Left Forearm <1 day                    Significant Labs:    CBC/Anemia Profile:  Recent Labs   Lab 06/05/24 0618   WBC 8.03   HGB 15.0   HCT 44.4      MCV 90   RDW 15.9*        Chemistries:  Recent Labs   Lab 06/05/24 0618      K 4.0      CO2 20*   BUN 25*   CREATININE 1.7*   CALCIUM 8.9   ALBUMIN 3.6   PROT 7.2   BILITOT 1.0   ALKPHOS 82   ALT 17   AST 31   MG 1.4*       ABGs:   Recent Labs   Lab 06/05/24  1520   PH 7.346*   PCO2 37.9   HCO3 20.8*   POCSATURATED 98   BE -4*     Troponin:   Recent Labs   Lab 06/05/24  0618 06/05/24  1035   TROPONINI 0.167* 0.183*     All pertinent labs within the past 24 hours have been reviewed.    Significant Imaging:   I have reviewed all pertinent imaging results/findings within the past 24 hours.  CXR: I have reviewed all pertinent results/findings within the past 24 hours and my personal findings are:  Diffuse pulm edema, improved on repeat CXR after intubation  EKG: I have reviewed all pertinent results/findings within the past 24 hours and my personal findings are: Afib with rate control  Tely shows Afib with highly variable rate  US bedside shows distended IVC, poor windows but grossly severely reduced LV EF

## 2024-06-05 NOTE — ASSESSMENT & PLAN NOTE
Formal Echo pending but suspect again has significant depressed EF. His [facility] recently with only distal vessel CAD that was significant.  -Dobutamine given cardiogenic shock. Cards consult in AM.  -Trend troponin.

## 2024-06-05 NOTE — ASSESSMENT & PLAN NOTE
Possible related to demand ischemia, patient had recent LHC with nonobstructive CAD on 5/6/24  - Cardiology consulted  - continue aspirin/statin

## 2024-06-06 LAB
ALBUMIN SERPL BCP-MCNC: 3.4 G/DL (ref 3.5–5.2)
ALLENS TEST: ABNORMAL
ALP SERPL-CCNC: 63 U/L (ref 55–135)
ALT SERPL W/O P-5'-P-CCNC: 29 U/L (ref 10–44)
ANION GAP SERPL CALC-SCNC: 12 MMOL/L (ref 8–16)
ANION GAP SERPL CALC-SCNC: 13 MMOL/L (ref 8–16)
ASCENDING AORTA: 3.29 CM
AST SERPL-CCNC: 47 U/L (ref 10–40)
AV INDEX (PROSTH): 0.86
AV MEAN GRADIENT: 3 MMHG
AV PEAK GRADIENT: 4 MMHG
AV VALVE AREA BY VELOCITY RATIO: 2.63 CM²
AV VALVE AREA: 2.78 CM²
AV VELOCITY RATIO: 0.81
BASOPHILS # BLD AUTO: 0.03 K/UL (ref 0–0.2)
BASOPHILS NFR BLD: 0.3 % (ref 0–1.9)
BILIRUB SERPL-MCNC: 0.8 MG/DL (ref 0.1–1)
BSA FOR ECHO PROCEDURE: 2.04 M2
BUN SERPL-MCNC: 30 MG/DL (ref 8–23)
BUN SERPL-MCNC: 31 MG/DL (ref 8–23)
CALCIUM SERPL-MCNC: 8.4 MG/DL (ref 8.7–10.5)
CALCIUM SERPL-MCNC: 8.6 MG/DL (ref 8.7–10.5)
CHLORIDE SERPL-SCNC: 107 MMOL/L (ref 95–110)
CHLORIDE SERPL-SCNC: 108 MMOL/L (ref 95–110)
CO2 SERPL-SCNC: 21 MMOL/L (ref 23–29)
CO2 SERPL-SCNC: 23 MMOL/L (ref 23–29)
CREAT SERPL-MCNC: 1.9 MG/DL (ref 0.5–1.4)
CREAT SERPL-MCNC: 2 MG/DL (ref 0.5–1.4)
CV ECHO LV RWT: 0.65 CM
DELSYS: ABNORMAL
DIFFERENTIAL METHOD BLD: ABNORMAL
DOP CALC AO PEAK VEL: 1.02 M/S
DOP CALC AO VTI: 15.7 CM
DOP CALC LVOT AREA: 3.2 CM2
DOP CALC LVOT DIAMETER: 2.03 CM
DOP CALC LVOT PEAK VEL: 0.83 M/S
DOP CALC LVOT STROKE VOLUME: 43.67 CM3
DOP CALCLVOT PEAK VEL VTI: 13.5 CM
E WAVE DECELERATION TIME: 129.46 MSEC
E/A RATIO: 3
E/E' RATIO: 15 M/S
ECHO LV POSTERIOR WALL: 1.63 CM (ref 0.6–1.1)
EOSINOPHIL # BLD AUTO: 0.1 K/UL (ref 0–0.5)
EOSINOPHIL NFR BLD: 0.9 % (ref 0–8)
ERYTHROCYTE [DISTWIDTH] IN BLOOD BY AUTOMATED COUNT: 16 % (ref 11.5–14.5)
ERYTHROCYTE [SEDIMENTATION RATE] IN BLOOD BY WESTERGREN METHOD: 28 MM/H
EST. GFR  (NO RACE VARIABLE): 36 ML/MIN/1.73 M^2
EST. GFR  (NO RACE VARIABLE): 38 ML/MIN/1.73 M^2
ESTIMATED AVG GLUCOSE: 146 MG/DL (ref 68–131)
FIO2: 80
FRACTIONAL SHORTENING: 21 % (ref 28–44)
GLUCOSE SERPL-MCNC: 121 MG/DL (ref 70–110)
GLUCOSE SERPL-MCNC: 137 MG/DL (ref 70–110)
HBA1C MFR BLD: 6.7 % (ref 4–5.6)
HCO3 UR-SCNC: 25.6 MMOL/L (ref 24–28)
HCT VFR BLD AUTO: 42.3 % (ref 40–54)
HGB BLD-MCNC: 14.3 G/DL (ref 14–18)
IMM GRANULOCYTES # BLD AUTO: 0.02 K/UL (ref 0–0.04)
IMM GRANULOCYTES NFR BLD AUTO: 0.2 % (ref 0–0.5)
INTERVENTRICULAR SEPTUM: 1.55 CM (ref 0.6–1.1)
IVC DIAMETER: 2.03 CM
IVRT: 106.57 MSEC
LA MAJOR: 6.1 CM
LA MINOR: 4.53 CM
LA WIDTH: 4.5 CM
LEFT ATRIUM SIZE: 5.26 CM
LEFT ATRIUM VOLUME INDEX: 52.6 ML/M2
LEFT ATRIUM VOLUME: 104.6 CM3
LEFT INTERNAL DIMENSION IN SYSTOLE: 3.94 CM (ref 2.1–4)
LEFT VENTRICLE DIASTOLIC VOLUME INDEX: 59.65 ML/M2
LEFT VENTRICLE DIASTOLIC VOLUME: 118.7 ML
LEFT VENTRICLE MASS INDEX: 177 G/M2
LEFT VENTRICLE SYSTOLIC VOLUME INDEX: 34 ML/M2
LEFT VENTRICLE SYSTOLIC VOLUME: 67.6 ML
LEFT VENTRICULAR INTERNAL DIMENSION IN DIASTOLE: 5.01 CM (ref 3.5–6)
LEFT VENTRICULAR MASS: 353.04 G
LV LATERAL E/E' RATIO: 10.71 M/S
LV SEPTAL E/E' RATIO: 25 M/S
LVOT MG: 1.5 MMHG
LVOT MV: 0.56 CM/S
LYMPHOCYTES # BLD AUTO: 2.6 K/UL (ref 1–4.8)
LYMPHOCYTES NFR BLD: 29.6 % (ref 18–48)
MCH RBC QN AUTO: 30.1 PG (ref 27–31)
MCHC RBC AUTO-ENTMCNC: 33.8 G/DL (ref 32–36)
MCV RBC AUTO: 89 FL (ref 82–98)
MODE: ABNORMAL
MONOCYTES # BLD AUTO: 0.7 K/UL (ref 0.3–1)
MONOCYTES NFR BLD: 8.4 % (ref 4–15)
MV PEAK A VEL: 0.25 M/S
MV PEAK E VEL: 0.75 M/S
MV STENOSIS PRESSURE HALF TIME: 37.54 MS
MV VALVE AREA P 1/2 METHOD: 5.86 CM2
NEUTROPHILS # BLD AUTO: 5.3 K/UL (ref 1.8–7.7)
NEUTROPHILS NFR BLD: 60.6 % (ref 38–73)
NRBC BLD-RTO: 1 /100 WBC
OHS CV RV/LV RATIO: 0.82 CM
PCO2 BLDA: 38.1 MMHG (ref 35–45)
PEEP: 12
PH SMN: 7.43 [PH] (ref 7.35–7.45)
PISA TR MAX VEL: 2.08 M/S
PLATELET # BLD AUTO: 159 K/UL (ref 150–450)
PMV BLD AUTO: 11.3 FL (ref 9.2–12.9)
PO2 BLDA: 211 MMHG (ref 80–100)
POC BE: 1 MMOL/L
POC SATURATED O2: 100 % (ref 95–100)
POC TCO2: 27 MMOL/L (ref 23–27)
POCT GLUCOSE: 122 MG/DL (ref 70–110)
POCT GLUCOSE: 129 MG/DL (ref 70–110)
POCT GLUCOSE: 134 MG/DL (ref 70–110)
POCT GLUCOSE: 151 MG/DL (ref 70–110)
POTASSIUM SERPL-SCNC: 3.4 MMOL/L (ref 3.5–5.1)
POTASSIUM SERPL-SCNC: 4.4 MMOL/L (ref 3.5–5.1)
PROT SERPL-MCNC: 6.5 G/DL (ref 6–8.4)
PV PEAK GRADIENT: 2 MMHG
PV PEAK VELOCITY: 0.69 M/S
RA MAJOR: 5.63 CM
RA WIDTH: 4.23 CM
RBC # BLD AUTO: 4.75 M/UL (ref 4.6–6.2)
RIGHT VENTRICULAR END-DIASTOLIC DIMENSION: 4.11 CM
SAMPLE: ABNORMAL
SINUS: 2.92 CM
SITE: ABNORMAL
SODIUM SERPL-SCNC: 142 MMOL/L (ref 136–145)
SODIUM SERPL-SCNC: 142 MMOL/L (ref 136–145)
STJ: 2.71 CM
TDI LATERAL: 0.07 M/S
TDI SEPTAL: 0.03 M/S
TDI: 0.05 M/S
TR MAX PG: 17 MMHG
TRICUSPID ANNULAR PLANE SYSTOLIC EXCURSION: 1.37 CM
TV PEAK GRADIENT: 1 MMHG
VT: 390
WBC # BLD AUTO: 8.73 K/UL (ref 3.9–12.7)
Z-SCORE OF LEFT VENTRICULAR DIMENSION IN END DIASTOLE: -1.42
Z-SCORE OF LEFT VENTRICULAR DIMENSION IN END SYSTOLE: 0.84

## 2024-06-06 PROCEDURE — 63600175 PHARM REV CODE 636 W HCPCS: Performed by: INTERNAL MEDICINE

## 2024-06-06 PROCEDURE — 94003 VENT MGMT INPAT SUBQ DAY: CPT | Mod: ER

## 2024-06-06 PROCEDURE — A4216 STERILE WATER/SALINE, 10 ML: HCPCS | Performed by: INTERNAL MEDICINE

## 2024-06-06 PROCEDURE — 99291 CRITICAL CARE FIRST HOUR: CPT | Mod: ,,, | Performed by: INTERNAL MEDICINE

## 2024-06-06 PROCEDURE — 94761 N-INVAS EAR/PLS OXIMETRY MLT: CPT

## 2024-06-06 PROCEDURE — 82803 BLOOD GASES ANY COMBINATION: CPT

## 2024-06-06 PROCEDURE — 99900035 HC TECH TIME PER 15 MIN (STAT): Mod: ER

## 2024-06-06 PROCEDURE — 99900026 HC AIRWAY MAINTENANCE (STAT)

## 2024-06-06 PROCEDURE — 63600175 PHARM REV CODE 636 W HCPCS: Performed by: NURSE PRACTITIONER

## 2024-06-06 PROCEDURE — 80048 BASIC METABOLIC PNL TOTAL CA: CPT | Mod: XB | Performed by: INTERNAL MEDICINE

## 2024-06-06 PROCEDURE — 25000003 PHARM REV CODE 250: Performed by: EMERGENCY MEDICINE

## 2024-06-06 PROCEDURE — 25000003 PHARM REV CODE 250: Performed by: NURSE PRACTITIONER

## 2024-06-06 PROCEDURE — 25000003 PHARM REV CODE 250: Performed by: INTERNAL MEDICINE

## 2024-06-06 PROCEDURE — 27000221 HC OXYGEN, UP TO 24 HOURS

## 2024-06-06 PROCEDURE — 99900031 HC PATIENT EDUCATION (STAT)

## 2024-06-06 PROCEDURE — 25000003 PHARM REV CODE 250: Performed by: STUDENT IN AN ORGANIZED HEALTH CARE EDUCATION/TRAINING PROGRAM

## 2024-06-06 PROCEDURE — 20000000 HC ICU ROOM

## 2024-06-06 PROCEDURE — 37799 UNLISTED PX VASCULAR SURGERY: CPT

## 2024-06-06 PROCEDURE — 63600175 PHARM REV CODE 636 W HCPCS: Performed by: STUDENT IN AN ORGANIZED HEALTH CARE EDUCATION/TRAINING PROGRAM

## 2024-06-06 PROCEDURE — 80053 COMPREHEN METABOLIC PANEL: CPT | Performed by: NURSE PRACTITIONER

## 2024-06-06 PROCEDURE — 85025 COMPLETE CBC W/AUTO DIFF WBC: CPT | Performed by: NURSE PRACTITIONER

## 2024-06-06 PROCEDURE — 99900035 HC TECH TIME PER 15 MIN (STAT)

## 2024-06-06 RX ORDER — POTASSIUM CHLORIDE 14.9 MG/ML
40 INJECTION INTRAVENOUS ONCE
Status: COMPLETED | OUTPATIENT
Start: 2024-06-06 | End: 2024-06-06

## 2024-06-06 RX ORDER — MAGNESIUM SULFATE HEPTAHYDRATE 40 MG/ML
2 INJECTION, SOLUTION INTRAVENOUS ONCE
Status: COMPLETED | OUTPATIENT
Start: 2024-06-06 | End: 2024-06-06

## 2024-06-06 RX ORDER — POTASSIUM CHLORIDE 29.8 MG/ML
40 INJECTION INTRAVENOUS ONCE
Status: COMPLETED | OUTPATIENT
Start: 2024-06-06 | End: 2024-06-06

## 2024-06-06 RX ADMIN — NOREPINEPHRINE BITARTRATE 0.06 MCG/KG/MIN: 4 INJECTION, SOLUTION INTRAVENOUS at 08:06

## 2024-06-06 RX ADMIN — PROPOFOL 45 MCG/KG/MIN: 10 INJECTION, EMULSION INTRAVENOUS at 03:06

## 2024-06-06 RX ADMIN — ATORVASTATIN CALCIUM 80 MG: 40 TABLET, FILM COATED ORAL at 08:06

## 2024-06-06 RX ADMIN — CHLORHEXIDINE GLUCONATE 0.12% ORAL RINSE 15 ML: 1.2 LIQUID ORAL at 08:06

## 2024-06-06 RX ADMIN — POTASSIUM CHLORIDE 40 MEQ: 29.8 INJECTION, SOLUTION INTRAVENOUS at 04:06

## 2024-06-06 RX ADMIN — Medication 10 ML: at 05:06

## 2024-06-06 RX ADMIN — PROPOFOL 50 MCG/KG/MIN: 10 INJECTION, EMULSION INTRAVENOUS at 07:06

## 2024-06-06 RX ADMIN — ENOXAPARIN SODIUM 90 MG: 100 INJECTION SUBCUTANEOUS at 08:06

## 2024-06-06 RX ADMIN — POTASSIUM CHLORIDE 40 MEQ: 14.9 INJECTION, SOLUTION INTRAVENOUS at 02:06

## 2024-06-06 RX ADMIN — FUROSEMIDE 80 MG: 10 INJECTION, SOLUTION INTRAVENOUS at 08:06

## 2024-06-06 RX ADMIN — MUPIROCIN: 20 OINTMENT TOPICAL at 08:06

## 2024-06-06 RX ADMIN — PROPOFOL 40 MCG/KG/MIN: 10 INJECTION, EMULSION INTRAVENOUS at 11:06

## 2024-06-06 RX ADMIN — INSULIN ASPART 2 UNITS: 100 INJECTION, SOLUTION INTRAVENOUS; SUBCUTANEOUS at 06:06

## 2024-06-06 RX ADMIN — FUROSEMIDE 80 MG: 10 INJECTION, SOLUTION INTRAVENOUS at 02:06

## 2024-06-06 RX ADMIN — PROPOFOL 25 MCG/KG/MIN: 10 INJECTION, EMULSION INTRAVENOUS at 01:06

## 2024-06-06 RX ADMIN — PROPOFOL 25 MCG/KG/MIN: 10 INJECTION, EMULSION INTRAVENOUS at 06:06

## 2024-06-06 RX ADMIN — FAMOTIDINE 20 MG: 10 INJECTION, SOLUTION INTRAVENOUS at 08:06

## 2024-06-06 RX ADMIN — Medication 10 ML: at 11:06

## 2024-06-06 RX ADMIN — ASPIRIN 81 MG CHEWABLE TABLET 81 MG: 81 TABLET CHEWABLE at 09:06

## 2024-06-06 RX ADMIN — MAGNESIUM SULFATE HEPTAHYDRATE 2 G: 40 INJECTION, SOLUTION INTRAVENOUS at 04:06

## 2024-06-06 NOTE — PLAN OF CARE
Pt remains in ICU intubated. Propofol @50mcg/kg/min. Levo continues @0.06mcg/kg/min. Dobutamine gtt continues. Renner in place, adequate output noted. Free of falls, injury, and skin breakdown. CHG bath given, linen & gown changed, catheter care performed. Plan of care reviewed.     Problem: Adult Inpatient Plan of Care  Goal: Plan of Care Review  Outcome: Progressing  Goal: Patient-Specific Goal (Individualized)  Outcome: Progressing  Goal: Absence of Hospital-Acquired Illness or Injury  Outcome: Progressing  Goal: Optimal Comfort and Wellbeing  Outcome: Progressing  Goal: Readiness for Transition of Care  Outcome: Progressing     Problem: Diabetes Comorbidity  Goal: Blood Glucose Level Within Targeted Range  Outcome: Progressing     Problem: Infection  Goal: Absence of Infection Signs and Symptoms  Outcome: Progressing     Problem: Fall Injury Risk  Goal: Absence of Fall and Fall-Related Injury  Outcome: Progressing     Problem: Restraint, Nonviolent  Goal: Absence of Harm or Injury  Outcome: Progressing     Problem: Skin Injury Risk Increased  Goal: Skin Health and Integrity  Outcome: Progressing

## 2024-06-06 NOTE — PROCEDURES
"Marck Madison is a 64 y.o. male patient.    Temp: 96.6 °F (35.9 °C) (06/05/24 1615)  Pulse: 71 (06/05/24 1830)  Resp: (!) 31 (06/05/24 1830)  BP: (!) 146/90 (06/05/24 1830)  SpO2: (!) 92 % (06/05/24 1830)  Weight: 89.4 kg (197 lb 1.5 oz) (06/05/24 1215)  Height: 5' 6" (167.6 cm) (06/05/24 1215)    PICC  Date/Time: 6/5/2024 7:12 PM  Performed by: Darrius Watkins RN  Consent Done: Yes  Time out: Immediately prior to procedure a time out was called to verify the correct patient, procedure, equipment, support staff and site/side marked as required  Indications: med administration and vascular access  Anesthesia: local infiltration  Local anesthetic: lidocaine 1% without epinephrine  Anesthetic Total (mL): 5  Preparation: skin prepped with ChloraPrep  Skin prep agent dried: skin prep agent completely dried prior to procedure  Sterile barriers: all five maximum sterile barriers used - cap, mask, sterile gown, sterile gloves, and large sterile sheet  Hand hygiene: hand hygiene performed prior to central venous catheter insertion  Location details: left brachial  Catheter type: triple lumen  Catheter size: 5 Fr  Catheter Length: 50cm    Ultrasound guidance: yes  Vessel Caliber: medium, compressibility normal  Needle advanced into vessel with real time Ultrasound guidance.  Guidewire confirmed in vessel.  Sterile sheath used.  Number of attempts: 1  Post-procedure: blood return through all ports, chlorhexidine patch and sterile dressing applied            Name darrius watkins  6/5/2024    "

## 2024-06-06 NOTE — PROGRESS NOTES
[facility] - [place] Intensive Care  Critical Care Medicine  Progress Note    Patient Name: Marck Madison  MRN: [id number]  Admission Date: 6/5/2024  Hospital Length of Stay: 1 days  Code Status: Full Code  Attending Provider: Kulwant Nesbitt MD  Primary Care Provider: [facility] -   Principal Problem: Acute hypoxemic respiratory failure    Subjective:     HPI:  65 yo male with chart history of dilated CM but recent preserved EF, Afib, EtOH abuse who presented with severe dyspnea and Afib RVR. He was started on Amio with good control of his HR. Placed on BiPAP for WOB. He was initially looking comforatable on BiPAP but in severe distress off. I was called urgently to assess increased WOB. He was noted to have RR up to 60 with MV over 60L, then would pass out and have no resp efforts. He was able to initial speak and felt extremely dyspneic but otherwise denied complaints. Not able to provide full history 2/2 respiratory distress.    Hospital/ICU Course:  No notes on file    Interval History: Intubated and sedated. Good UOP. No longer cold and clammy. EF on dobutamine this AM is low normal. Levophed dose is significantly reduced. Remains rate controlled off amio.    Discussed case in detail with hospitalist.       Objective:     Vital Signs (Most Recent):  Temp: 98 °F (36.7 °C) (06/06/24 1515)  Pulse: 101 (06/06/24 1515)  Resp: (!) 23 (06/06/24 1515)  BP: 107/70 (06/06/24 0315)  SpO2: 99 % (06/06/24 1515) Vital Signs (24h Range):  Temp:  [96.5 °F (35.8 °C)-98.2 °F (36.8 °C)] 98 °F (36.7 °C)  Pulse:  [] 101  Resp:  [17-45] 23  SpO2:  [92 %-100 %] 99 %  BP: (107-146)/(68-91) 107/70  Arterial Line BP: ()/(58-92) 110/70     Weight: 89.4 kg (197 lb 1.5 oz)  Body mass index is 31.81 kg/m².      Intake/Output Summary (Last 24 hours) at 6/6/2024 1551  Last data filed at 6/6/2024 1520  Gross per 24 hour   Intake 1275.4 ml   Output 4725 ml   Net -3449.6 ml        Physical Exam  Vitals reviewed.    Constitutional:       Appearance: He is ill-appearing.   HENT:      Head: Normocephalic and atraumatic.      Mouth/Throat:      Mouth: Mucous membranes are moist.   Eyes:      Pupils: Pupils are equal, round, and reactive to light.   Neck:      Comments: JVD  Cardiovascular:      Rate and Rhythm: Normal rate. Rhythm irregular.      Pulses: Normal pulses.      Heart sounds: Normal heart sounds. No murmur heard.  Pulmonary:      Breath sounds: No stridor. Rales present. No wheezing.      Comments: Synchronous with vent  Abdominal:      General: There is distension.      Palpations: Abdomen is soft.   Musculoskeletal:      Right lower leg: Edema present.      Left lower leg: Edema present.   Skin:     General: Skin is dry.      Capillary Refill: Capillary refill takes less than 2 seconds.      Comments: Cold and clammy   Neurological:      Mental Status: He is alert.      Comments: Sedated, responds to commands           Review of Systems    Vents:  Vent Mode: PRVC (06/06/24 1141)  Ventilator Initiated: Yes (06/05/24 1443)  Set Rate: 22 BPM (06/06/24 1141)  Vt Set: 390 mL (06/06/24 1141)  PEEP/CPAP: 10 cmH20 (06/06/24 1141)  Oxygen Concentration (%): 50 (06/06/24 1515)  Peak Airway Pressure: 21.3 cmH20 (06/06/24 1141)  Total Ve: 9.3 L/m (06/06/24 1141)  F/VT Ratio<105 (RSBI): (!) 75.47 (06/06/24 1141)    Lines/Drains/Airways       Peripherally Inserted Central Catheter Line  Duration             PICC Triple Lumen 06/05/24 1912 left brachial <1 day              Drain  Duration                  NG/OG Tube 06/05/24 1419 Center mouth 1 day         Urethral Catheter 06/05/24 1500 16 Fr. 1 day              Airway  Duration                  Airway - Non-Surgical 06/05/24 1420 Endotracheal Tube 1 day              Arterial Line  Duration             Arterial Line 06/05/24 1501 Right Radial 1 day              Peripheral Intravenous Line  Duration                  Peripheral IV - Single Lumen 06/05/24 0632 18 G;1 3/4 in  Anterior;Right Upper Arm 1 day         Peripheral IV - Single Lumen 06/05/24 1500 22 G Anterior;Right Shoulder 1 day                    Significant Labs:    CBC/Anemia Profile:  Recent Labs   Lab 06/05/24  0618 06/06/24  0129   WBC 8.03 8.73   HGB 15.0 14.3   HCT 44.4 42.3    159   MCV 90 89   RDW 15.9* 16.0*        Chemistries:  Recent Labs   Lab 06/05/24  0618 06/05/24  1604 06/06/24  0129    139 142   K 4.0 4.1 3.4*    109 108   CO2 20* 19* 21*   BUN 25* 30* 31*   CREATININE 1.7* 2.5* 2.0*   CALCIUM 8.9 8.5* 8.6*   ALBUMIN 3.6  --  3.4*   PROT 7.2  --  6.5   BILITOT 1.0  --  0.8   ALKPHOS 82  --  63   ALT 17  --  29   AST 31  --  47*   MG 1.4*  --   --        ABGs:   Recent Labs   Lab 06/06/24  0905   PH 7.434   PCO2 38.1   HCO3 25.6   POCSATURATED 100   BE 1     All pertinent labs within the past 24 hours have been reviewed.    Significant Imaging:  I have reviewed all pertinent imaging results/findings within the past 24 hours.  CXR: I have reviewed all pertinent results/findings within the past 24 hours and my personal findings are:  ETT low, improved pulm edema  Echo: I have reviewed all pertinent results/findings within the past 24 hours and my personal findings are:  EF low normal with AM  Tely shows Afib with rate control    ABG  Recent Labs   Lab 06/06/24  0905   PH 7.434   PO2 211*   PCO2 38.1   HCO3 25.6   BE 1     Assessment/Plan:     Pulmonary  * Acute hypoxemic respiratory failure  Progressive worsening on BiPAP. Did not respond to increased EPAP. With variable resp efforts and a clear component of severe resp alkalosis, proceeded with intubation. Requiring high PEEP and high FiO2 to maintain oxygenation.     Vent dependent. Vent adjusted at bedside  LPVS.  PEEP at 10  PRVC for synchrony  Continue aggressive diuresis    Cardiac/Vascular  CAD (coronary artery disease)  Reviewed recent [facility]. Current presentation out of proportion to reported disease burden.    Acute on chronic  diastolic congestive heart failure  EF improved on Dobutamine. Cardiology note reviewed, possible 2/2 Afib RVR    Cardiogenic shock  Levophed to maintain MAP >60  Dobutamine at 5 with good response and will reduce to 2.5 today      Renal/  FARHAN (acute kidney injury)  UOP improved with dobutamine. Cr improving. Monitor. Avoid nephrotoxins        Critical Care Time: 60 minutes  Critical secondary to Patient has a condition that poses threat to life and bodily function: Cardiogenic shock. Acute hypoxic respiratory failure.      Critical care was time spent personally by me on the following activities: development of treatment plan with patient or surrogate and bedside caregivers, discussions with consultants, evaluation of patient's response to treatment, examination of patient, ordering and performing treatments and interventions, ordering and review of laboratory studies, ordering and review of radiographic studies, pulse oximetry, re-evaluation of patient's condition. This critical care time did not overlap with that of any other provider or involve time for any procedures.     Bj Nicholas MD  Critical Care Medicine  [facility] - Intensive Care

## 2024-06-06 NOTE — ASSESSMENT & PLAN NOTE
Patient with acute kidney injury/acute renal failure likely due to cardiogenic shock. FARHAN is currently stable. Baseline creatinine  1.5  - Labs reviewed- Renal function/electrolytes with Estimated Creatinine Clearance: 38 mL/min (A) (based on SCr of 2 mg/dL (H)). according to latest data. Monitor urine output and serial BMP and adjust therapy as needed. Avoid nephrotoxins and renally dose meds for GFR listed above.

## 2024-06-06 NOTE — CONSULTS
[place] - Intensive Care  Adult Nutrition  Consult Note    SUMMARY     Recommendations  Recommendation:   1.Continue Novasource Renal @10ml/hr, advancing as tolerated to a goal rate of 35ml/hr (with propofol) to provide 1680 kcals, 75g of protein, and 602 ml of fluid. Free water flushes of 200ml Q4H.   2. Monitor weight and labs    Goals: Pt will meet 85% of EEN/EPN  by RD follow up    Nutrition Goal Status: new  Communication of RD Recs: other (comment) (POC)    Assessment and Plan  Nutrition Problem  Inadequate energy intake     Related to (etiology):   Intubation/sedation     Signs and Symptoms (as evidenced by):   NPO status      Interventions:  Collaboration with other provider   Modify TF     Nutrition Diagnosis Status:   New    Malnutrition Assessment  Unable to conduct NFPE at this time     Reason for Assessment  Reason For Assessment: consult (Fluid and salt)  Diagnosis: pulmonary disease  Relevant Medical History: CAD, CHF, psychiatric disorder, COPD, DKA, cholelithiasis, NSTEMI, HTN, DM  Interdisciplinary Rounds: did not attend  General Information Comments: Pt is currently NPO with TF og Novasource Renal @10ml/hr. Mohamud-13/exacerbation left anterior lower arm. Pt admitted with acute hypoxemic failure secondary to CHF exacerbation. Noted pt starting on TF today. Pt appears weight stable for the last 3 months. Unable to conduct NFPE at this time. Pt not appropriate for fluid and salt education at this time.  Nutrition Discharge Planning: d/c diet to be determined    Nutrition Risk Screen  Nutrition Risk Screen: no indicators present    Nutrition/Diet History  Patient Reported Diet/Restrictions/Preferences: diabetic diet, heart healthy, renal  Food Preferences: MARGRET  Spiritual, Cultural Beliefs, [employer] Practices, Values that Affect Care: no  Factors Affecting Nutritional Intake: None identified at this time    Nutrition Related Social Determinants of Health: SDOH: Unable to assess at this time.  "    Anthropometrics  Temp: 97.7 °F (36.5 °C)  Height Method: Stated  Height: 5' 6" (167.6 cm)  Height (inches): 66 in  Weight Method: Bed Scale  Weight: 89.4 kg (197 lb 1.5 oz)  Weight (lb): 197.09 lb  Ideal Body Weight (IBW), Male: 142 lb  % Ideal Body Weight, Male (lb): 138.8 %  BMI (Calculated): 31.8  BMI Grade: 30 - 34.9- obesity - grade I  Usual Body Weight (UBW), k.4 kg (3/15/24)  % Usual Body Weight: 101.34  % Weight Change From Usual Weight: 1.13 %     Lab/Procedures/Meds  Pertinent Labs Reviewed: reviewed  Pertinent Labs Comments: K 3.4, CO2 21, BUn 31, Creatinine 2, Ca 8.6, Glucose 121, GFR 36  Pertinent Medications Reviewed: reviewed  Pertinent Medications Comments: aspirin, atorvastatin, enoxaparin, famotidine, furosemide, sodium chloride    Estimated/Assessed Needs  Weight Used For Calorie Calculations: 89.4 kg (197 lb 1.5 oz)  Energy Calorie Requirements (kcal): 1813 kcals  Energy Need Method: [facility]  Protein Requirements: 89g (1g/kg)  Weight Used For Protein Calculations: 89.4 kg (197 lb 1.5 oz)     Estimated Fluid Requirement Method: RDA Method  RDA Method (mL): 1813  CHO Requirement: 225g    Nutrition Prescription Ordered  Current Diet Order: NPO  Current Nutrition Support Formula Ordered: [facility]  Current Nutrition Support Frequency Ordered: 10ml/hr    Evaluation of Received Nutrient/Fluid Intake  Enteral Calories (kcal): 960  Enteral Protein (gm): 44  Enteral (Free Water) Fluid (mL): 344  Lipid Calories (kcals): 211 kcals  % Kcal Needs: 52%  % Protein Needs: 49  I/O: 139/1225  Energy Calories Required: not meeting needs  Protein Required: not meeting needs  Fluid Required: not meeting needs  Comments: LBM-24  Tolerance: tolerating  % Intake of Estimated Energy Needs: 25 - 50 %  % Meal Intake: 25 - 50 %    Nutrition Risk  Level of Risk/Frequency of Follow-up: moderate (2x/weekly)     Monitor and Evaluation  Food and Nutrient Intake: enteral nutrition intake  Food and Nutrient " Adminstration: enteral and parenteral nutrition administration  Anthropometric Measurements: weight, weight change, body mass index  Biochemical Data, Medical Tests and Procedures: electrolyte and renal panel, gastrointestinal profile, inflammatory profile, lipid profile     Nutrition Follow-Up  RD Follow-up?: Yes

## 2024-06-06 NOTE — PROGRESS NOTES
[facility] Medicine  Progress Note    Patient Name: Marck Madison  MRN: [id number]  Patient Class: IP- Inpatient   Admission Date: 6/5/2024  Length of Stay: 1 days  Attending Physician: Kulwant Nesbitt MD  Primary Care Provider: [facility] -        Subjective:     Principal Problem:Acute hypoxemic respiratory failure        HPI:  Mr. Madison is a 64-year-old male with past medical history of hypertension, diabetes mellitus type 2, atrial fibrillation, CHF and nonobstructive coronary artery disease who presents to the emergency department for evaluation of shortness of breath.  He reports this has been ongoing and progressively worsening.  He endorses swelling in his extremities but denies any chest pain.  Denies any fevers or chills.  He recently had a left heart catheterization on 05/06/24 that shows nonobstructive coronary artery disease.  He reports compliance with Lasix at home.  In the emergency department, patient was found to be tachycardic with a rate of 133 and atrial fibrillation, respiratory rate of 24 and blood pressure 180/129 with a O2 saturation of 84%.  He was placed on BiPAP and given IV amiodarone with improvement of rate.  He was also given IV Lasix.  Cardiology consulted and he will be admitted to ICU for further management.    Overview/Hospital Course:  Mr. Madison is a 63 yo M admitted with acute hypoxemic respiratory failure secondary to CHF exacerbation. Also found to be in atrial fibrillation with RVR and placed on amiodarone drip. Developed worsening respiratory distress and was intubated by Pulmonary/CC. Became hypotensive, started on dobutamine and norepinephrine. Prior to PICC line, norepinpherine extravasated on left forearm, phentolamine injected and Wound Care consulted. Diuresing well.      Interval History: norepinephrine extravasated to left forearm overnight, phentolamine given. Now with blisters. Weaning norepinephrine. Diuresing well. Starting  tube feeds.     Review of Systems  Objective:     Vital Signs (Most Recent):  Temp: 98.2 °F (36.8 °C) (06/06/24 0710)  Pulse: 100 (06/06/24 0945)  Resp: (!) 28 (06/06/24 0945)  BP: 107/70 (06/06/24 0315)  SpO2: 100 % (06/06/24 0945) Vital Signs (24h Range):  Temp:  [96.5 °F (35.8 °C)-98.2 °F (36.8 °C)] 98.2 °F (36.8 °C)  Pulse:  [] 100  Resp:  [10-64] 28  SpO2:  [84 %-100 %] 100 %  BP: ()/() 107/70  Arterial Line BP: ()/(36-92) 119/76     Weight: 89.4 kg (197 lb 1.5 oz)  Body mass index is 31.81 kg/m².    Intake/Output Summary (Last 24 hours) at 6/6/2024 1013  Last data filed at 6/6/2024 0952  Gross per 24 hour   Intake 1254.04 ml   Output 3925 ml   Net -2670.96 ml         Physical Exam  Vitals and nursing note reviewed.   Constitutional:       General: He is not in acute distress.     Appearance: He is ill-appearing.      Interventions: He is sedated and intubated.   Cardiovascular:      Rate and Rhythm: Normal rate. Rhythm irregular.      Pulses: Normal pulses.   Pulmonary:      Effort: Pulmonary effort is normal. He is intubated.      Breath sounds: Rales present. No wheezing.      Comments: Intubated, mechanically ventilated  Abdominal:      General: There is no distension.      Palpations: Abdomen is soft.   Musculoskeletal:      Comments: Edema in lower extremities improving   Skin:     Comments: Left forearm with erythema, swelling and blister from extravasation   Neurological:      Comments: Sedated, cough/gag reflex present. Weaning sedation to assess mental status             Significant Labs: All pertinent labs within the past 24 hours have been reviewed.    Significant Imaging: I have reviewed all pertinent imaging results/findings within the past 24 hours.    Assessment/Plan:      * Acute hypoxemic respiratory failure  Patient with Hypoxic Respiratory failure which is Acute.  he is not on home oxygen. Supplemental oxygen was provided and noted- Vent Mode: PRVC  Oxygen  Concentration (%):  [] 60  Resp Rate Total:  [28 br/min-35 br/min] 28 br/min  Vt Set:  [390 mL] 390 mL  PEEP/CPAP:  [12 cmH20] 12 cmH20  Mean Airway Pressure:  [16 dfW25-15.3 cmH20] 16.3 cmH20    .   Signs/symptoms of respiratory failure include- tachypnea, increased work of breathing, and respiratory distress. Contributing diagnoses includes - CHF Labs and images were reviewed. Patient Has recent ABG, which has been reviewed. Will treat underlying causes and adjust management of respiratory failure as follows-   - Bipap initially --> now intubated for worsening hypoxia and respiratory distress  - continue with IV lasix, dobutamine   - Cardiology following    Elevated troponin  Possible related to demand ischemia, patient had recent LHC with nonobstructive CAD on 5/6/24  - Cardiology consulted  - continue aspirin/statin    CAD (coronary artery disease)  Patient with known CAD that is nonobstructive (5/6/2024, which is controlled Will continue ASA and Statin and monitor for S/Sx of angina/ACS. Continue to monitor on telemetry.     Permanent atrial fibrillation  Patient with Permanent atrial fibrillation which is controlled currently with Amiodarone. Patient is currently in atrial fibrillation.[id number] Score: 3. . Anticoagulation indicated. Anticoagulation done with Lovenox .    - Off amiodarone due to bradycardia  - rate controlled currently    Acute on chronic diastolic congestive heart failure  Patient is identified as having Diastolic (HFpEF) heart failure that is Acute on chronic. CHF is currently . Latest ECHO performed and demonstrates- Results for orders placed during the hospital encounter of 03/15/24    Echo  Results for orders placed during the hospital encounter of 03/15/24    Echo    Interpretation Summary    Left Ventricle: The left ventricle is normal in size. There is moderate concentric hypertrophy. There is low normal systolic function with a visually estimated ejection fraction of 50 -  55%.    Right Ventricle: Mild right ventricular enlargement. Systolic function is normal.    Left Atrium: Left atrium is severely dilated.    Right Atrium: Right atrium is moderately dilated.    Aortic Valve: There is mild aortic valve sclerosis.    Mitral Valve: There is moderate regurgitation.    Tricuspid Valve: There is moderate regurgitation.    Pulmonary Artery: The estimated pulmonary artery systolic pressure is 51 mmHg.    IVC/SVC: Elevated venous pressure at 15 mmHg.      Recent Labs   Lab 06/05/24  0618   *         - Now in cardiogenic shock and increasing oxygen requirements. Intubated, diuresing well on lasix and dobutamine drip.  - continue with aggressive diuresis and wean as tolerated    Cardiogenic shock  See acute on chronic heart failure exacerbation      Type 2 diabetes mellitus, with long-term current use of insulin  A1c:   Lab Results   Component Value Date    HGBA1C 7.1 (H) 03/15/2024    HGBA1C 7.1 (H) 03/15/2024     Meds: SSI PRN to maintain goal 140-180  ADA diet, accuchecks ACHS, hypoglycemic protocol       FARHAN (acute kidney injury)  Patient with acute kidney injury/acute renal failure likely due to cardiogenic shock. FARHAN is currently stable. Baseline creatinine  1.5  - Labs reviewed- Renal function/electrolytes with Estimated Creatinine Clearance: 38 mL/min (A) (based on SCr of 2 mg/dL (H)). according to latest data. Monitor urine output and serial BMP and adjust therapy as needed. Avoid nephrotoxins and renally dose meds for GFR listed above.      VTE Risk Mitigation (From admission, onward)           Ordered     enoxaparin injection 90 mg  Every 12 hours         06/05/24 1131                    Discharge Planning   BALDO:      Code Status: Full Code   Is the patient medically ready for discharge?:     Reason for patient still in hospital (select all that apply): Treatment  Discharge Plan A: Home            Critical care time spent on the evaluation and treatment of severe organ  dysfunction, review of pertinent labs and imaging studies, discussions with consulting providers and discussions with patient/family: 35 minutes.      Kulwant Nesbitt MD  Department of Hospital Medicine   [facility] - [place], [place] - Intensive Care

## 2024-06-06 NOTE — HOSPITAL COURSE
Mr. Madison is a 63 yo M admitted with acute hypoxemic respiratory failure secondary to CHF exacerbation. Also found to be in atrial fibrillation with RVR and placed on amiodarone drip. Developed worsening respiratory distress and was intubated by Pulmonary/CC. Became hypotensive, started on dobutamine and norepinephrine. Prior to PICC line, norepinpherine extravasated on left forearm, phentolamine injected and Wound Care consulted. Diuresing well.  Attempting daily SAT/SBT but have not extubated yet. Developed fever on 6/9, blood cultures, respiratory cultures and u/a ordered. Started on empiric antibiotics. Extubated on 6/9. Doing well, started diet. PT/OT consulted-recommends moderate intensity therapy. Stable for transfer to floor on 06/11/24.  Nurse concern for mental status change in the CT head ordered on 06/11, no acute abnormalities.  Later in the afternoon, patient fell out of the chair and hit his head.  Repeat CT ordered given patient fell and he is on Eliquis.  CT head with no mentions of bleeding.  Patient placed in restraints overnight on 06/11 given attempted from out of bed.  However, on 06/12/2024, patient is calm and cooperative.  Alert and oriented x4.CM/SW aware, working on placement.     Patient declines SNF placement. Had a fever overnight on 06/12/24.  Infectious workup ordered. Patient has refused work up including imaging, labs, and vitals. Also refused IV access. States he oneal not want to be further evaluated and worked up and wants to go home to his niece. He asked to leave [place]. He understands what AMA is and stated that he has left [place] on multiple occasions and is aware of his right.  Nurse Wen present     In spite of multiple attempts by myself and staff to convince the patient to stay for evaluation and treatment, we have unfortunately been unsuccessful. However, the patient has the capacity to give, receive, and withhold information. The patient verbalizes understanding of his  condition and symptoms and that this represents a significant threat. The patient has verbalized to me that he understand the plan of diagnosis and treatment, and unfortunately will not agree and understand that it may cause worsening of their condition or even death. The patient understands that he can come back at any time for further care without prejudice and we will be happy to provide treatment again.

## 2024-06-06 NOTE — CONSULTS
[facility] - Intensive Care  Cardiology  Consult Note    Patient Name: Marck Madison  MRN: [id number]  Admission Date: 6/5/2024  Hospital Length of Stay: 0 days  Code Status: Full Code   Attending Provider: Kulwant Nesbitt MD   Consulting Provider: Ivelisse Logan MD  Primary Care Physician: [facility] -  Principal Problem:Acute hypoxemic respiratory failure    Patient information was obtained from patient and ER records.     Inpatient consult to Cardiology  Consult performed by: Ivelisse Logan MD  Consult ordered by: William Arteaga MD        Subjective:     Chief Complaint:  sob     HPI:    Patient is a pleasant 64-year-old man.  Came in with shortness of breath.  Found to be in atrial flutter with RVR.  Initially needed BiPAP for shortness of breath, but respiratory status continued to worsen and eventually had to be intubated because of work of breathing.  Had a recent angiogram at an outside hospital with revealed nonobstructive CAD.        DATA:     Laboratory:  CBC:  Recent Labs   Lab 03/18/24  0451 03/19/24  0445 05/06/24  0711 06/05/24  0618   WBC 8.14 10.85 7.4 8.03   Hemoglobin 14.2 14.2 14.9 15.0   Hematocrit 43.0 43.9 43.2 44.4   Platelets 153 166  --  184       CHEMISTRIES:  Recent Labs   Lab 03/29/22  0913 06/30/22  0947 07/01/22  0416 09/17/22  2300 09/20/22  0425 09/21/22  0439 05/18/23  0425 05/19/23  0429 05/20/23  0550 03/15/24  1005 03/15/24  1010 03/16/24  0442 03/20/24  0456 06/05/24  0618 06/05/24  1604   Glucose 104 174 H 183 H   < > 115 H   < >  --   --   --    < >  --    < > 103 134 H 152 H   Sodium 138 141 141   < > 137   < > 137 138 138   < >  --    < > 143 141 139   Potassium 4.5 3.6 3.6   < > 3.9   < > 3.3 L 3.5 3.5   < >  --    < > 4.2 4.0 4.1   BUN 20 14 21   < > 23   < > 24.5 H 20.2 16.3   < >  --    < > 27 H 25 H 30 H   Creatinine 1.2 1.1 1.4   < > 1.4   < > 1.75 H 1.58 H 1.31 H   < >  --    < > 1.5 H 1.7 H 2.5 H   eGFR if  >60 >60.0 >60.0  --   --    --   --   --   --   --   --   --   --   --   --    eGFR   --   --   --   --   --    < > 43 L 48 L 60 L  --   --   --   --   --   --    eGFR if non African American >60 >60.0 52.7 A  --   --   --   --   --   --   --   --   --   --   --   --    Calcium 9.2 9.1 8.7   < > 8.8   < > 8.0 L 8.3 9.3   < >  --    < > 9.2 8.9 8.5 L   Magnesium  --   --  1.6   < > 1.8  --   --   --   --   --  1.8  --   --  1.4 L  --     < > = values in this interval not displayed.       CARDIAC BIOMARKERS:  Recent Labs   Lab 06/09/21  1220 06/09/21  1748 07/01/22  0003 09/17/22  2300 05/17/23  1030 03/15/24  1005 03/16/24  0042 06/05/24  0618 06/05/24  1035    H  --  597 H  --   --   --   --   --   --    CK  --   --   --   --  860 H  --   --   --   --    Troponin I 0.235 H   < >  --    < >  --    < > 0.182 H 0.167 H 0.183 H    < > = values in this interval not displayed.       COAGS:  Recent Labs   Lab 12/17/22  2014 03/15/24  1010 06/05/24 0618   INR 1.6 1.2 1.2       LIPIDS/LFTS:  Recent Labs   Lab 05/20/23  0550 03/15/24  1005 06/05/24  0618   AST 25 22 31   ALT 17 14 17       Hemoglobin A1C   Date Value Ref Range Status   03/15/2024 7.1 (H) 4.0 - 5.6 % Final     Comment:     ADA Screening Guidelines:  5.7-6.4%  Consistent with prediabetes  >or=6.5%  Consistent with diabetes    High levels of fetal hemoglobin interfere with the HbA1C  assay. Heterozygous hemoglobin variants (HbS, HgC, etc)do  not significantly interfere with this assay.   However, presence of multiple variants may affect accuracy.     03/15/2024 7.1 (H) 4.0 - 5.6 % Final     Comment:     ADA Screening Guidelines:  5.7-6.4%  Consistent with prediabetes  >or=6.5%  Consistent with diabetes    High levels of fetal hemoglobin interfere with the HbA1C  assay. Heterozygous hemoglobin variants (HbS, HgC, etc)do  not significantly interfere with this assay.   However, presence of multiple variants may affect accuracy.     05/16/2023 6.5 (H) 4.0 - 6.0 % Final    12/02/2022 5.8 (H) 4.7 - 5.6 % Final   07/01/2022 10.4 (H) 4.0 - 5.6 % Final     Comment:     ADA Screening Guidelines:  5.7-6.4%  Consistent with prediabetes  >or=6.5%  Consistent with diabetes    High levels of fetal hemoglobin interfere with the HbA1C  assay. Heterozygous hemoglobin variants (HbS, HgC, etc)do  not significantly interfere with this assay.   However, presence of multiple variants may affect accuracy.         TSH  Recent Labs   Lab 01/26/22  1225 09/17/22  2300 12/01/22  1306   TSH 1.086 0.417 1.14       The ASCVD Risk score (Eugenio MAX, et al., 2019) failed to calculate for the following reasons:    The patient has a prior MI or stroke diagnosis       BNP    Lab Results   Component Value Date/Time     (H) 06/05/2024 06:18 AM     (H) 03/15/2024 10:05 AM     (H) 09/17/2022 11:00 PM    BNP 56 06/30/2022 09:47 AM     (H) 01/26/2022 12:25 PM     (H) 11/21/2021 12:24 AM    BNP 94 07/28/2021 09:05 PM     (H) 07/01/2021 06:08 AM     (H) 06/12/2021 05:38 AM     (H) 06/11/2021 02:00 AM     (H) 06/09/2021 12:28 PM     (H) 01/20/2020 05:31 PM     (H) 09/10/2019 12:32 AM            ECHO    Results for orders placed during the hospital encounter of 03/15/24    Echo    Interpretation Summary    Left Ventricle: The left ventricle is normal in size. There is moderate concentric hypertrophy. There is low normal systolic function with a visually estimated ejection fraction of 50 - 55%.    Right Ventricle: Mild right ventricular enlargement. Systolic function is normal.    Left Atrium: Left atrium is severely dilated.    Right Atrium: Right atrium is moderately dilated.    Aortic Valve: There is mild aortic valve sclerosis.    Mitral Valve: There is moderate regurgitation.    Tricuspid Valve: There is moderate regurgitation.    Pulmonary Artery: The estimated pulmonary artery systolic pressure is 51 mmHg.    IVC/SVC: Elevated venous pressure  at 15 mmHg.      STRESS TEST    Results for orders placed during the hospital encounter of 01/20/20    Nuclear Stress - Cardiology Interpreted    Interpretation Summary    The perfusion scan is free of evidence from myocardial ischemia or injury.    There is a  mild intensity fixed defect in the inferior wall of the left ventricle secondary to diaphragm attenuation.    Visually estimated ejection fraction is mildly depressed at stress.    The EKG portion of this study is negative for ischemia.    The patient reported no chest pain during the stress test.    Arrhythmias during stress: occasional PACs, occasional PVCs.    The blood pressure response to stress was normal.        CATH    Results for orders placed during the hospital encounter of 01/26/22    Intra-Procedure Documentation    Narrative  ESTEBAN performed in the Invasive Lab  - See Procedure Log link below for nursing documentation  - See ESTEBAN order on Card Proc Tab for physician findings                HPI: Mr. Madison is a 64-year-old male with past medical history of hypertension, diabetes mellitus type 2, atrial fibrillation, CHF and nonobstructive coronary artery disease who presents to the emergency department for evaluation of shortness of breath.  He reports this has been ongoing and progressively worsening.  He endorses swelling in his extremities but denies any chest pain.  Denies any fevers or chills.  He recently had a left heart catheterization on 05/06/24 that shows nonobstructive coronary artery disease.  He reports compliance with Lasix at home.  In the emergency department, patient was found to be tachycardic with a rate of 133 and atrial fibrillation, respiratory rate of 24 and blood pressure 180/129 with a O2 saturation of 84%.  He was placed on BiPAP and given IV amiodarone with improvement of rate.  He was also given IV Lasix.  Cardiology consulted and he will be admitted to ICU for further management.    Marck Madison is a 64 y.o. male, with a  "past medical history of COPD, CAD, CHF, DM, NVST, HTN, who presents to the ED with SOB for 3 hours. Patient reports that he uses oxygen at home. Patient denies feeling SOB currently in the ER. Patient is noncompliant on BP medications stating he does not have anymore. No other exacerbating or alleviating factors. Patient denies cough, shortness of breath, chest pain, fever, chills, abdominal pain, nausea, vomiting, diarrhea, dysuria, headaches, congestion, sore throat, arm or leg trouble, eye pain, ear pain, rash, or other associated symptoms.    Past Medical History:   Diagnosis Date    Arrhythmia 2017    aflutter    Atrial flutter     CAD (coronary artery disease)     CHF (congestive heart failure), NYHA class I 2017    Cholelithiasis with chronic cholecystitis     Chronic diastolic heart failure     Cigarette smoker     COPD (chronic obstructive pulmonary disease)     COVID-19 06/01/2021    Diabetes mellitus     DKA (diabetic ketoacidosis)     Hypertension     NSTEMI (non-ST elevation myocardial infarction)     NSVT (nonsustained ventricular tachycardia)     Psychiatric disorder     h/o "schizophrena/bipolar"    Schizoaffective disorder     Severe sepsis        Past Surgical History:   Procedure Laterality Date    LIVER SURGERY      abscess drainage; 1970s    TREATMENT OF CARDIAC ARRHYTHMIA  9/12/2019    Procedure: Cardioversion or Defibrillation;  Surgeon: Jonathan Lopez MD;  Location: [facility] CATH LAB;  Service: Cardiology;;    TREATMENT OF CARDIAC ARRHYTHMIA N/A 2/3/2022    Procedure: Cardioversion or Defibrillation;  Surgeon: Trevor Schwab MD;  Location: [facility] CATH LAB;  Service: Cardiology;  Laterality: N/A;    TREATMENT OF CARDIAC ARRHYTHMIA N/A 3/16/2024    Procedure: Cardioversion or Defibrillation;  Surgeon: Jonathan Lopez MD;  Location: [facility] CATH LAB;  Service: Cardiology;  Laterality: N/A;       Review of patient's allergies indicates:  No Known Allergies    No current facility-administered " medications on file prior to encounter.     Current Outpatient Medications on File Prior to Encounter   Medication Sig    amiodarone (PACERONE) 200 MG Tab Take 200 mg by mouth once daily.    amLODIPine (NORVASC) 10 MG tablet Take 1 tablet (10 mg total) by mouth once daily.    apixaban (ELIQUIS) 5 mg Tab Take 5 mg by mouth 2 (two) times daily.    aspirin 81 MG Chew Take 1 tablet (81 mg total) by mouth once daily. (Patient not taking: Reported on 6/5/2024)    atorvastatin (LIPITOR) 80 MG tablet Take 1 tablet by mouth once daily.    blood-glucose meter kit Use as instructed    budesonide-formoterol 80-4.5 mcg (SYMBICORT) 80-4.5 mcg/actuation HFAA Inhale 2 puffs into the lungs twice daily (Patient not taking: Reported on 6/5/2024)    carvediloL (COREG) 12.5 MG tablet Take 1 tablet by mouth 2 (two) times daily with meals.    dulaglutide (TRULICITY) 1.5 mg/0.5 mL pen injector Inject into the skin every 7 days. Thursdays    furosemide (LASIX) 80 MG tablet Take 1 tablet by mouth once daily.    hydrALAZINE (APRESOLINE) 50 MG tablet Take 50 mg by mouth every 8 (eight) hours. (Patient not taking: Reported on 6/5/2024)    insulin aspart U-100 (NOVOLOG) 100 unit/mL injection Inject 5 Units into the skin 3 (three) times daily before meals. (Patient not taking: Reported on 6/5/2024)    insulin detemir U-100 (LEVEMIR) 100 unit/mL injection Inject 10 Units into the skin every evening. (Patient not taking: Reported on 6/5/2024)    metFORMIN (FORTAMET) 1,000 mg 24hr tablet Take 1,000 mg by mouth daily with breakfast. (Patient not taking: Reported on 6/5/2024)    sacubitriL-valsartan (ENTRESTO) 24-26 mg per tablet Take 1 tablet by mouth 2 (two) times daily.    tamsulosin (FLOMAX) 0.4 mg Cap Take 1 capsule (0.4 mg total) by mouth once daily.    [DISCONTINUED] atorvastatin (LIPITOR) 40 MG tablet Take 40 mg by mouth once daily.    [DISCONTINUED] furosemide (LASIX) 40 MG tablet Take 1 tablet (40 mg total) by mouth 2 (two) times a day.     [DISCONTINUED] metoprolol succinate (TOPROL-XL) 100 MG 24 hr tablet Take 100 mg by mouth once daily.     Family History    None       Tobacco Use    Smoking status: Every Day     Current packs/day: 1.00     Average packs/day: 1 pack/day for 15.0 years (15.0 ttl pk-yrs)     Types: Cigarettes    Smokeless tobacco: Never   Substance and Sexual Activity    Alcohol use: Yes     Comment: daily    Drug use: Never    Sexual activity: Not Currently     Review of Systems   Unable to perform ROS: Acuity of condition     Objective:     Vital Signs (Most Recent):  Temp: 96.6 °F (35.9 °C) (06/05/24 1615)  Pulse: 71 (06/05/24 1830)  Resp: (!) 31 (06/05/24 1830)  BP: (!) 146/90 (06/05/24 1830)  SpO2: (!) 92 % (06/05/24 1830) Vital Signs (24h Range):  Temp:  [96.6 °F (35.9 °C)-98.5 °F (36.9 °C)] 96.6 °F (35.9 °C)  Pulse:  [] 71  Resp:  [10-64] 31  SpO2:  [84 %-100 %] 92 %  BP: ()/() 146/90  Arterial Line BP: ()/(36-78) 122/78     Weight: 89.4 kg (197 lb 1.5 oz)  Body mass index is 31.81 kg/m².    SpO2: (!) 92 %         Intake/Output Summary (Last 24 hours) at 6/5/2024 2009  Last data filed at 6/5/2024 1800  Gross per 24 hour   Intake 390.7 ml   Output 375 ml   Net 15.7 ml       Lines/Drains/Airways       Peripherally Inserted Central Catheter Line  Duration             PICC Triple Lumen 06/05/24 1912 left brachial <1 day              Drain  Duration                  NG/OG Tube 06/05/24 1419 Center mouth <1 day         Urethral Catheter 06/05/24 1500 16 Fr. <1 day              Airway  Duration                  Airway - Non-Surgical 06/05/24 1420 Endotracheal Tube <1 day              Arterial Line  Duration             Arterial Line 06/05/24 1501 Right Radial <1 day              Peripheral Intravenous Line  Duration                  Peripheral IV - Single Lumen 06/05/24 0632 18 G;1 3/4 in Anterior;Right Upper Arm <1 day         Peripheral IV - Single Lumen 06/05/24 1500 22 G Anterior;Right Shoulder <1 day                      Physical Exam  Vitals reviewed.   Constitutional:       Interventions: He is intubated.   HENT:      Head: Normocephalic.   Eyes:      Conjunctiva/sclera: Conjunctivae normal.      Pupils: Pupils are equal, round, and reactive to light.   Cardiovascular:      Rate and Rhythm: Tachycardia present. Rhythm irregular.      Heart sounds: Normal heart sounds.   Pulmonary:      Effort: Pulmonary effort is normal. He is intubated.      Breath sounds: Rales present.   Abdominal:      General: Bowel sounds are normal.      Palpations: Abdomen is soft.   Musculoskeletal:      Cervical back: Normal range of motion and neck supple.   Skin:     General: Skin is warm.   Neurological:      Mental Status: He is oriented to person, place, and time.          Significant Labs:     DATA:     Laboratory:  CBC:  Recent Labs   Lab 03/18/24  0451 03/19/24  0445 05/06/24  0711 06/05/24  0618   WBC 8.14 10.85 7.4 8.03   Hemoglobin 14.2 14.2 14.9 15.0   Hematocrit 43.0 43.9 43.2 44.4   Platelets 153 166  --  184       CHEMISTRIES:  Recent Labs   Lab 03/29/22  0913 06/30/22  0947 07/01/22  0416 09/17/22  2300 09/20/22  0425 09/21/22  0439 05/18/23  0425 05/19/23  0429 05/20/23  0550 03/15/24  1005 03/15/24  1010 03/16/24  0442 03/20/24  0456 06/05/24  0618 06/05/24  1604   Glucose 104 174 H 183 H   < > 115 H   < >  --   --   --    < >  --    < > 103 134 H 152 H   Sodium 138 141 141   < > 137   < > 137 138 138   < >  --    < > 143 141 139   Potassium 4.5 3.6 3.6   < > 3.9   < > 3.3 L 3.5 3.5   < >  --    < > 4.2 4.0 4.1   BUN 20 14 21   < > 23   < > 24.5 H 20.2 16.3   < >  --    < > 27 H 25 H 30 H   Creatinine 1.2 1.1 1.4   < > 1.4   < > 1.75 H 1.58 H 1.31 H   < >  --    < > 1.5 H 1.7 H 2.5 H   eGFR if  >60 >60.0 >60.0  --   --   --   --   --   --   --   --   --   --   --   --    eGFR   --   --   --   --   --    < > 43 L 48 L 60 L  --   --   --   --   --   --    eGFR if non  >60  >60.0 52.7 A  --   --   --   --   --   --   --   --   --   --   --   --    Calcium 9.2 9.1 8.7   < > 8.8   < > 8.0 L 8.3 9.3   < >  --    < > 9.2 8.9 8.5 L   Magnesium  --   --  1.6   < > 1.8  --   --   --   --   --  1.8  --   --  1.4 L  --     < > = values in this interval not displayed.       CARDIAC BIOMARKERS:  Recent Labs   Lab 06/09/21  1220 06/09/21  1748 07/01/22  0003 09/17/22  2300 05/17/23  1030 03/15/24  1005 03/16/24  0042 06/05/24 0618 06/05/24  1035    H  --  597 H  --   --   --   --   --   --    CK  --   --   --   --  860 H  --   --   --   --    Troponin I 0.235 H   < >  --    < >  --    < > 0.182 H 0.167 H 0.183 H    < > = values in this interval not displayed.       COAGS:  Recent Labs   Lab 12/17/22  2014 03/15/24  1010 06/05/24  0618   INR 1.6 1.2 1.2       LIPIDS/LFTS:  Recent Labs   Lab 05/20/23  0550 03/15/24  1005 06/05/24  0618   AST 25 22 31   ALT 17 14 17       Hemoglobin A1C   Date Value Ref Range Status   03/15/2024 7.1 (H) 4.0 - 5.6 % Final     Comment:     ADA Screening Guidelines:  5.7-6.4%  Consistent with prediabetes  >or=6.5%  Consistent with diabetes    High levels of fetal hemoglobin interfere with the HbA1C  assay. Heterozygous hemoglobin variants (HbS, HgC, etc)do  not significantly interfere with this assay.   However, presence of multiple variants may affect accuracy.     03/15/2024 7.1 (H) 4.0 - 5.6 % Final     Comment:     ADA Screening Guidelines:  5.7-6.4%  Consistent with prediabetes  >or=6.5%  Consistent with diabetes    High levels of fetal hemoglobin interfere with the HbA1C  assay. Heterozygous hemoglobin variants (HbS, HgC, etc)do  not significantly interfere with this assay.   However, presence of multiple variants may affect accuracy.     05/16/2023 6.5 (H) 4.0 - 6.0 % Final   12/02/2022 5.8 (H) 4.7 - 5.6 % Final   07/01/2022 10.4 (H) 4.0 - 5.6 % Final     Comment:     ADA Screening Guidelines:  5.7-6.4%  Consistent with prediabetes  >or=6.5%  Consistent  with diabetes    High levels of fetal hemoglobin interfere with the HbA1C  assay. Heterozygous hemoglobin variants (HbS, HgC, etc)do  not significantly interfere with this assay.   However, presence of multiple variants may affect accuracy.         TSH  Recent Labs   Lab 01/26/22  1225 09/17/22  2300 12/01/22  1306   TSH 1.086 0.417 1.14       The ASCVD Risk score (Eugenio MAX, et al., 2019) failed to calculate for the following reasons:    The patient has a prior MI or stroke diagnosis       BNP    Lab Results   Component Value Date/Time     (H) 06/05/2024 06:18 AM     (H) 03/15/2024 10:05 AM     (H) 09/17/2022 11:00 PM    BNP 56 06/30/2022 09:47 AM     (H) 01/26/2022 12:25 PM     (H) 11/21/2021 12:24 AM    BNP 94 07/28/2021 09:05 PM     (H) 07/01/2021 06:08 AM     (H) 06/12/2021 05:38 AM     (H) 06/11/2021 02:00 AM     (H) 06/09/2021 12:28 PM     (H) 01/20/2020 05:31 PM     (H) 09/10/2019 12:32 AM            ECHO    Results for orders placed during the hospital encounter of 03/15/24    Echo    Interpretation Summary    Left Ventricle: The left ventricle is normal in size. There is moderate concentric hypertrophy. There is low normal systolic function with a visually estimated ejection fraction of 50 - 55%.    Right Ventricle: Mild right ventricular enlargement. Systolic function is normal.    Left Atrium: Left atrium is severely dilated.    Right Atrium: Right atrium is moderately dilated.    Aortic Valve: There is mild aortic valve sclerosis.    Mitral Valve: There is moderate regurgitation.    Tricuspid Valve: There is moderate regurgitation.    Pulmonary Artery: The estimated pulmonary artery systolic pressure is 51 mmHg.    IVC/SVC: Elevated venous pressure at 15 mmHg.      STRESS TEST    Results for orders placed during the hospital encounter of 01/20/20    Nuclear Stress - Cardiology Interpreted    Interpretation Summary    The  perfusion scan is free of evidence from myocardial ischemia or injury.    There is a  mild intensity fixed defect in the inferior wall of the left ventricle secondary to diaphragm attenuation.    Visually estimated ejection fraction is mildly depressed at stress.    The EKG portion of this study is negative for ischemia.    The patient reported no chest pain during the stress test.    Arrhythmias during stress: occasional PACs, occasional PVCs.    The blood pressure response to stress was normal.        CATH    Results for orders placed during the hospital encounter of 01/26/22    Intra-Procedure Documentation    Narrative  ESTEBAN performed in the Invasive Lab  - See Procedure Log link below for nursing documentation  - See ESTEBAN order on Card Proc Tab for physician findings      Assessment and Plan:     * Acute hypoxemic respiratory failure        Elevated troponin        Permanent atrial fibrillation    Status post recent cardioversion.  Could be the precipitating cause of his decompensated heart failure.  Will consider cardioversion in a.m. if continues to be in atrial flutter with RVR.  On Eliquis at home.  Transition to Lovenox  during hospital    Acute on chronic diastolic congestive heart failure  Patient is identified as having Diastolic (HFpEF) heart failure that is Acute on chronic. CHF is currently uncontrolled  Dyspnea not returned to baseline after  doses of IV diuretic, and Rales/crackles on pulmonary exam. Latest ECHO performed and demonstrates- Results for orders placed during the hospital encounter of 03/15/24    Echo    Interpretation Summary    Left Ventricle: The left ventricle is normal in size. There is moderate concentric hypertrophy. There is low normal systolic function with a visually estimated ejection fraction of 50 - 55%.    Right Ventricle: Mild right ventricular enlargement. Systolic function is normal.    Left Atrium: Left atrium is severely dilated.    Right Atrium: Right atrium is  moderately dilated.    Aortic Valve: There is mild aortic valve sclerosis.    Mitral Valve: There is moderate regurgitation.    Tricuspid Valve: There is moderate regurgitation.    Pulmonary Artery: The estimated pulmonary artery systolic pressure is 51 mmHg.    IVC/SVC: Elevated venous pressure at 15 mmHg.  . Continue Furosemide and monitor clinical status closely. Monitor on telemetry. Patient is on CHF pathway.  Monitor strict Is&Os and daily weights.  Place on fluid restriction of 1.5 L. Cardiology has been consulted. Continue to stress to patient importance of self efficacy and  on diet for CHF. Last BNP reviewed- and noted below   Recent Labs   Lab 06/05/24  0618   *        Patient also has acute on chronic kidney injury.  Monitor renal function closely.   Continue IV diuresis.    Type 2 diabetes mellitus, with long-term current use of insulin            VTE Risk Mitigation (From admission, onward)           Ordered     enoxaparin injection 90 mg  Every 12 hours         06/05/24 5551                    Thank you for your consult. I will follow-up with patient. Please contact us if you have any additional questions.    Ivelisse Logan MD  Cardiology   [facility] - [place], [place] - Intensive Care    Critical Care Time:  50 minutes     Critical care was time spent personally by me on the following activities: development of treatment plan with patient or surrogate and bedside caregivers, discussions with consultants, evaluation of patient's response to treatment, examination of patient, ordering and performing treatments and interventions, ordering and review of laboratory studies, ordering and review of radiographic studies, pulse oximetry, re-evaluation of patient's condition. This critical care time did not overlap with that of any other provider or involve time for any procedures.

## 2024-06-06 NOTE — ASSESSMENT & PLAN NOTE
Patient with Permanent atrial fibrillation which is controlled currently with Amiodarone. Patient is currently in atrial fibrillation.[id number] Score: 3. . Anticoagulation indicated. Anticoagulation done with Lovenox .    - Off amiodarone due to bradycardia  - rate controlled currently

## 2024-06-06 NOTE — PLAN OF CARE
Problem: Adult Inpatient Plan of Care  Goal: Plan of Care Review  Outcome: Progressing  Goal: Patient-Specific Goal (Individualized)  Outcome: Progressing  Goal: Absence of Hospital-Acquired Illness or Injury  Outcome: Progressing  Goal: Optimal Comfort and Wellbeing  Outcome: Progressing  Goal: Readiness for Transition of Care  Outcome: Progressing     Problem: Diabetes Comorbidity  Goal: Blood Glucose Level Within Targeted Range  Outcome: Progressing     Problem: Infection  Goal: Absence of Infection Signs and Symptoms  Outcome: Progressing     Problem: Fall Injury Risk  Goal: Absence of Fall and Fall-Related Injury  Outcome: Progressing     Problem: Restraint, Nonviolent  Goal: Absence of Harm or Injury  Outcome: Progressing     Problem: Skin Injury Risk Increased  Goal: Skin Health and Integrity  Outcome: Progressing     Problem: Wound  Goal: Optimal Coping  Outcome: Progressing  Goal: Optimal Functional Ability  Outcome: Progressing  Goal: Absence of Infection Signs and Symptoms  Outcome: Progressing  Goal: Improved Oral Intake  Outcome: Progressing  Goal: Optimal Pain Control and Function  Outcome: Progressing  Goal: Skin Health and Integrity  Outcome: Progressing  Goal: Optimal Wound Healing  Outcome: Progressing     Problem: Acute Kidney Injury/Impairment  Goal: Fluid and Electrolyte Balance  Outcome: Progressing  Goal: Improved Oral Intake  Outcome: Progressing  Goal: Effective Renal Function  Outcome: Progressing

## 2024-06-06 NOTE — SUBJECTIVE & OBJECTIVE
Interval History: Intubated and sedated. Good UOP. No longer cold and clammy. EF on dobutamine this AM is low normal. Levophed dose is significantly reduced. Remains rate controlled off amio.    Discussed case in detail with hospitalist.       Objective:     Vital Signs (Most Recent):  Temp: 98 °F (36.7 °C) (06/06/24 1515)  Pulse: 101 (06/06/24 1515)  Resp: (!) 23 (06/06/24 1515)  BP: 107/70 (06/06/24 0315)  SpO2: 99 % (06/06/24 1515) Vital Signs (24h Range):  Temp:  [96.5 °F (35.8 °C)-98.2 °F (36.8 °C)] 98 °F (36.7 °C)  Pulse:  [] 101  Resp:  [17-45] 23  SpO2:  [92 %-100 %] 99 %  BP: (107-146)/(68-91) 107/70  Arterial Line BP: ()/(58-92) 110/70     Weight: 89.4 kg (197 lb 1.5 oz)  Body mass index is 31.81 kg/m².      Intake/Output Summary (Last 24 hours) at 6/6/2024 1551  Last data filed at 6/6/2024 1520  Gross per 24 hour   Intake 1275.4 ml   Output 4725 ml   Net -3449.6 ml        Physical Exam  Vitals reviewed.   Constitutional:       Appearance: He is ill-appearing.   HENT:      Head: Normocephalic and atraumatic.      Mouth/Throat:      Mouth: Mucous membranes are moist.   Eyes:      Pupils: Pupils are equal, round, and reactive to light.   Neck:      Comments: JVD  Cardiovascular:      Rate and Rhythm: Normal rate. Rhythm irregular.      Pulses: Normal pulses.      Heart sounds: Normal heart sounds. No murmur heard.  Pulmonary:      Breath sounds: No stridor. Rales present. No wheezing.      Comments: Synchronous with vent  Abdominal:      General: There is distension.      Palpations: Abdomen is soft.   Musculoskeletal:      Right lower leg: Edema present.      Left lower leg: Edema present.   Skin:     General: Skin is dry.      Capillary Refill: Capillary refill takes less than 2 seconds.      Comments: Cold and clammy   Neurological:      Mental Status: He is alert.      Comments: Sedated, responds to commands           Review of Systems    Vents:  Vent Mode: PRVC (06/06/24 1141)  Ventilator  Initiated: Yes (06/05/24 1443)  Set Rate: 22 BPM (06/06/24 1141)  Vt Set: 390 mL (06/06/24 1141)  PEEP/CPAP: 10 cmH20 (06/06/24 1141)  Oxygen Concentration (%): 50 (06/06/24 1515)  Peak Airway Pressure: 21.3 cmH20 (06/06/24 1141)  Total Ve: 9.3 L/m (06/06/24 1141)  F/VT Ratio<105 (RSBI): (!) 75.47 (06/06/24 1141)    Lines/Drains/Airways       Peripherally Inserted Central Catheter Line  Duration             PICC Triple Lumen 06/05/24 1912 left brachial <1 day              Drain  Duration                  NG/OG Tube 06/05/24 1419 Center mouth 1 day         Urethral Catheter 06/05/24 1500 16 Fr. 1 day              Airway  Duration                  Airway - Non-Surgical 06/05/24 1420 Endotracheal Tube 1 day              Arterial Line  Duration             Arterial Line 06/05/24 1501 Right Radial 1 day              Peripheral Intravenous Line  Duration                  Peripheral IV - Single Lumen 06/05/24 0632 18 G;1 3/4 in Anterior;Right Upper Arm 1 day         Peripheral IV - Single Lumen 06/05/24 1500 22 G Anterior;Right Shoulder 1 day                    Significant Labs:    CBC/Anemia Profile:  Recent Labs   Lab 06/05/24  0618 06/06/24  0129   WBC 8.03 8.73   HGB 15.0 14.3   HCT 44.4 42.3    159   MCV 90 89   RDW 15.9* 16.0*        Chemistries:  Recent Labs   Lab 06/05/24  0618 06/05/24  1604 06/06/24  0129    139 142   K 4.0 4.1 3.4*    109 108   CO2 20* 19* 21*   BUN 25* 30* 31*   CREATININE 1.7* 2.5* 2.0*   CALCIUM 8.9 8.5* 8.6*   ALBUMIN 3.6  --  3.4*   PROT 7.2  --  6.5   BILITOT 1.0  --  0.8   ALKPHOS 82  --  63   ALT 17  --  29   AST 31  --  47*   MG 1.4*  --   --        ABGs:   Recent Labs   Lab 06/06/24  0905   PH 7.434   PCO2 38.1   HCO3 25.6   POCSATURATED 100   BE 1     All pertinent labs within the past 24 hours have been reviewed.    Significant Imaging:  I have reviewed all pertinent imaging results/findings within the past 24 hours.  CXR: I have reviewed all pertinent  results/findings within the past 24 hours and my personal findings are:  ETT low, improved pulm edema  Echo: I have reviewed all pertinent results/findings within the past 24 hours and my personal findings are:  EF low normal with AM  Tely shows Afib with rate control

## 2024-06-06 NOTE — ASSESSMENT & PLAN NOTE
Patient is identified as having Diastolic (HFpEF) heart failure that is Acute on chronic. CHF is currently . Latest ECHO performed and demonstrates- Results for orders placed during the hospital encounter of 03/15/24    Echo  Results for orders placed during the hospital encounter of 03/15/24    Echo    Interpretation Summary    Left Ventricle: The left ventricle is normal in size. There is moderate concentric hypertrophy. There is low normal systolic function with a visually estimated ejection fraction of 50 - 55%.    Right Ventricle: Mild right ventricular enlargement. Systolic function is normal.    Left Atrium: Left atrium is severely dilated.    Right Atrium: Right atrium is moderately dilated.    Aortic Valve: There is mild aortic valve sclerosis.    Mitral Valve: There is moderate regurgitation.    Tricuspid Valve: There is moderate regurgitation.    Pulmonary Artery: The estimated pulmonary artery systolic pressure is 51 mmHg.    IVC/SVC: Elevated venous pressure at 15 mmHg.      Recent Labs   Lab 06/05/24  0618   *         - Now in cardiogenic shock and increasing oxygen requirements. Intubated, diuresing well on lasix and dobutamine drip.  - continue with aggressive diuresis and wean as tolerated

## 2024-06-06 NOTE — ASSESSMENT & PLAN NOTE
Patient is identified as having Diastolic (HFpEF) heart failure that is Acute on chronic. CHF is currently uncontrolled  Dyspnea not returned to baseline after  doses of IV diuretic, and Rales/crackles on pulmonary exam. Latest ECHO performed and demonstrates- Results for orders placed during the hospital encounter of 03/15/24    Echo    Interpretation Summary    Left Ventricle: The left ventricle is normal in size. There is moderate concentric hypertrophy. There is low normal systolic function with a visually estimated ejection fraction of 50 - 55%.    Right Ventricle: Mild right ventricular enlargement. Systolic function is normal.    Left Atrium: Left atrium is severely dilated.    Right Atrium: Right atrium is moderately dilated.    Aortic Valve: There is mild aortic valve sclerosis.    Mitral Valve: There is moderate regurgitation.    Tricuspid Valve: There is moderate regurgitation.    Pulmonary Artery: The estimated pulmonary artery systolic pressure is 51 mmHg.    IVC/SVC: Elevated venous pressure at 15 mmHg.  . Continue Furosemide and monitor clinical status closely. Monitor on telemetry. Patient is on CHF pathway.  Monitor strict Is&Os and daily weights.  Place on fluid restriction of 1.5 L. Cardiology has been consulted. Continue to stress to patient importance of self efficacy and  on diet for CHF. Last BNP reviewed- and noted below   Recent Labs   Lab 06/05/24  0618   *        Patient also has acute on chronic kidney injury.  Monitor renal function closely.   Continue IV diuresis.

## 2024-06-06 NOTE — PLAN OF CARE
Recommendations  Recommendation:   1.Continue Novasource Renal @10ml/hr, advancing as tolerated to a goal rate of 35ml/hr (with propofol) to provide 1680 kcals, 75g of protein, and 602 ml of fluid. Free water flushes of 200ml Q4H.   2. Monitor weight and labs    Goals: Pt will meet 85% of EEN/EPN  by RD follow up    Nutrition Goal Status: new  Communication of RD Recs: other (comment) (POC)

## 2024-06-06 NOTE — ASSESSMENT & PLAN NOTE
Status post recent cardioversion.  Could be the precipitating cause of his decompensated heart failure.  Will consider cardioversion in a.m. if continues to be in atrial flutter with RVR.  On Eliquis at home.  Transition to Lovenox  during hospital

## 2024-06-06 NOTE — HPI
Patient is a pleasant 64-year-old man.  Came in with shortness of breath.  Found to be in atrial flutter with RVR.  Initially needed BiPAP for shortness of breath, but respiratory status continued to worsen and eventually had to be intubated because of work of breathing.  Had a recent angiogram at an outside hospital with revealed nonobstructive CAD.        DATA:     Laboratory:  CBC:  Recent Labs   Lab 03/18/24  0451 03/19/24  0445 05/06/24  0711 06/05/24  0618   WBC 8.14 10.85 7.4 8.03   Hemoglobin 14.2 14.2 14.9 15.0   Hematocrit 43.0 43.9 43.2 44.4   Platelets 153 166  --  184       CHEMISTRIES:  Recent Labs   Lab 03/29/22  0913 06/30/22  0947 07/01/22  0416 09/17/22  2300 09/20/22  0425 09/21/22  0439 05/18/23  0425 05/19/23  0429 05/20/23  0550 03/15/24  1005 03/15/24  1010 03/16/24  0442 03/20/24  0456 06/05/24  0618 06/05/24  1604   Glucose 104 174 H 183 H   < > 115 H   < >  --   --   --    < >  --    < > 103 134 H 152 H   Sodium 138 141 141   < > 137   < > 137 138 138   < >  --    < > 143 141 139   Potassium 4.5 3.6 3.6   < > 3.9   < > 3.3 L 3.5 3.5   < >  --    < > 4.2 4.0 4.1   BUN 20 14 21   < > 23   < > 24.5 H 20.2 16.3   < >  --    < > 27 H 25 H 30 H   Creatinine 1.2 1.1 1.4   < > 1.4   < > 1.75 H 1.58 H 1.31 H   < >  --    < > 1.5 H 1.7 H 2.5 H   eGFR if  >60 >60.0 >60.0  --   --   --   --   --   --   --   --   --   --   --   --    eGFR   --   --   --   --   --    < > 43 L 48 L 60 L  --   --   --   --   --   --    eGFR if non African American >60 >60.0 52.7 A  --   --   --   --   --   --   --   --   --   --   --   --    Calcium 9.2 9.1 8.7   < > 8.8   < > 8.0 L 8.3 9.3   < >  --    < > 9.2 8.9 8.5 L   Magnesium  --   --  1.6   < > 1.8  --   --   --   --   --  1.8  --   --  1.4 L  --     < > = values in this interval not displayed.       CARDIAC BIOMARKERS:  Recent Labs   Lab 06/09/21  1220 06/09/21  1748 07/01/22  0003 09/17/22  2300 05/17/23  1030 03/15/24  1005  03/16/24  0042 06/05/24  0618 06/05/24  1035    H  --  597 H  --   --   --   --   --   --    CK  --   --   --   --  860 H  --   --   --   --    Troponin I 0.235 H   < >  --    < >  --    < > 0.182 H 0.167 H 0.183 H    < > = values in this interval not displayed.       COAGS:  Recent Labs   Lab 12/17/22  2014 03/15/24  1010 06/05/24 0618   INR 1.6 1.2 1.2       LIPIDS/LFTS:  Recent Labs   Lab 05/20/23  0550 03/15/24  1005 06/05/24 0618   AST 25 22 31   ALT 17 14 17       Hemoglobin A1C   Date Value Ref Range Status   03/15/2024 7.1 (H) 4.0 - 5.6 % Final     Comment:     ADA Screening Guidelines:  5.7-6.4%  Consistent with prediabetes  >or=6.5%  Consistent with diabetes    High levels of fetal hemoglobin interfere with the HbA1C  assay. Heterozygous hemoglobin variants (HbS, HgC, etc)do  not significantly interfere with this assay.   However, presence of multiple variants may affect accuracy.     03/15/2024 7.1 (H) 4.0 - 5.6 % Final     Comment:     ADA Screening Guidelines:  5.7-6.4%  Consistent with prediabetes  >or=6.5%  Consistent with diabetes    High levels of fetal hemoglobin interfere with the HbA1C  assay. Heterozygous hemoglobin variants (HbS, HgC, etc)do  not significantly interfere with this assay.   However, presence of multiple variants may affect accuracy.     05/16/2023 6.5 (H) 4.0 - 6.0 % Final   12/02/2022 5.8 (H) 4.7 - 5.6 % Final   07/01/2022 10.4 (H) 4.0 - 5.6 % Final     Comment:     ADA Screening Guidelines:  5.7-6.4%  Consistent with prediabetes  >or=6.5%  Consistent with diabetes    High levels of fetal hemoglobin interfere with the HbA1C  assay. Heterozygous hemoglobin variants (HbS, HgC, etc)do  not significantly interfere with this assay.   However, presence of multiple variants may affect accuracy.         TSH  Recent Labs   Lab 01/26/22  1225 09/17/22  2300 12/01/22  1306   TSH 1.086 0.417 1.14       The ASCVD Risk score (Eugenio MAX, et al., 2019) failed to calculate for the  following reasons:    The patient has a prior MI or stroke diagnosis       BNP    Lab Results   Component Value Date/Time     (H) 06/05/2024 06:18 AM     (H) 03/15/2024 10:05 AM     (H) 09/17/2022 11:00 PM    BNP 56 06/30/2022 09:47 AM     (H) 01/26/2022 12:25 PM     (H) 11/21/2021 12:24 AM    BNP 94 07/28/2021 09:05 PM     (H) 07/01/2021 06:08 AM     (H) 06/12/2021 05:38 AM     (H) 06/11/2021 02:00 AM     (H) 06/09/2021 12:28 PM     (H) 01/20/2020 05:31 PM     (H) 09/10/2019 12:32 AM            ECHO    Results for orders placed during the hospital encounter of 03/15/24    Echo    Interpretation Summary    Left Ventricle: The left ventricle is normal in size. There is moderate concentric hypertrophy. There is low normal systolic function with a visually estimated ejection fraction of 50 - 55%.    Right Ventricle: Mild right ventricular enlargement. Systolic function is normal.    Left Atrium: Left atrium is severely dilated.    Right Atrium: Right atrium is moderately dilated.    Aortic Valve: There is mild aortic valve sclerosis.    Mitral Valve: There is moderate regurgitation.    Tricuspid Valve: There is moderate regurgitation.    Pulmonary Artery: The estimated pulmonary artery systolic pressure is 51 mmHg.    IVC/SVC: Elevated venous pressure at 15 mmHg.      STRESS TEST    Results for orders placed during the hospital encounter of 01/20/20    Nuclear Stress - Cardiology Interpreted    Interpretation Summary    The perfusion scan is free of evidence from myocardial ischemia or injury.    There is a  mild intensity fixed defect in the inferior wall of the left ventricle secondary to diaphragm attenuation.    Visually estimated ejection fraction is mildly depressed at stress.    The EKG portion of this study is negative for ischemia.    The patient reported no chest pain during the stress test.    Arrhythmias during stress: occasional  PACs, occasional PVCs.    The blood pressure response to stress was normal.        CATH    Results for orders placed during the hospital encounter of 01/26/22    Intra-Procedure Documentation    Narrative  ESTEBAN performed in the Invasive Lab  - See Procedure Log link below for nursing documentation  - See ESTEBAN order on Card Proc Tab for physician findings                HPI: Mr. Madison is a 64-year-old male with past medical history of hypertension, diabetes mellitus type 2, atrial fibrillation, CHF and nonobstructive coronary artery disease who presents to the emergency department for evaluation of shortness of breath.  He reports this has been ongoing and progressively worsening.  He endorses swelling in his extremities but denies any chest pain.  Denies any fevers or chills.  He recently had a left heart catheterization on 05/06/24 that shows nonobstructive coronary artery disease.  He reports compliance with Lasix at home.  In the emergency department, patient was found to be tachycardic with a rate of 133 and atrial fibrillation, respiratory rate of 24 and blood pressure 180/129 with a O2 saturation of 84%.  He was placed on BiPAP and given IV amiodarone with improvement of rate.  He was also given IV Lasix.  Cardiology consulted and he will be admitted to ICU for further management.    Marck Madison is a 64 y.o. male, with a past medical history of COPD, CAD, CHF, DM, NVST, HTN, who presents to the ED with SOB for 3 hours. Patient reports that he uses oxygen at home. Patient denies feeling SOB currently in the ER. Patient is noncompliant on BP medications stating he does not have anymore. No other exacerbating or alleviating factors. Patient denies cough, shortness of breath, chest pain, fever, chills, abdominal pain, nausea, vomiting, diarrhea, dysuria, headaches, congestion, sore throat, arm or leg trouble, eye pain, ear pain, rash, or other associated symptoms.

## 2024-06-06 NOTE — SUBJECTIVE & OBJECTIVE
"Past Medical History:   Diagnosis Date    Arrhythmia 2017    aflutter    Atrial flutter     CAD (coronary artery disease)     CHF (congestive heart failure), NYHA class I 2017    Cholelithiasis with chronic cholecystitis     Chronic diastolic heart failure     Cigarette smoker     COPD (chronic obstructive pulmonary disease)     COVID-19 06/01/2021    Diabetes mellitus     DKA (diabetic ketoacidosis)     Hypertension     NSTEMI (non-ST elevation myocardial infarction)     NSVT (nonsustained ventricular tachycardia)     Psychiatric disorder     h/o "schizophrena/bipolar"    Schizoaffective disorder     Severe sepsis        Past Surgical History:   Procedure Laterality Date    LIVER SURGERY      abscess drainage; 1970s    TREATMENT OF CARDIAC ARRHYTHMIA  9/12/2019    Procedure: Cardioversion or Defibrillation;  Surgeon: Jonathan Lopez MD;  Location: [facility] CATH LAB;  Service: Cardiology;;    TREATMENT OF CARDIAC ARRHYTHMIA N/A 2/3/2022    Procedure: Cardioversion or Defibrillation;  Surgeon: Trevor Schwab MD;  Location: [facility] CATH LAB;  Service: Cardiology;  Laterality: N/A;    TREATMENT OF CARDIAC ARRHYTHMIA N/A 3/16/2024    Procedure: Cardioversion or Defibrillation;  Surgeon: Jonathan Lopez MD;  Location: [facility] CATH LAB;  Service: Cardiology;  Laterality: N/A;       Review of patient's allergies indicates:  No Known Allergies    No current facility-administered medications on file prior to encounter.     Current Outpatient Medications on File Prior to Encounter   Medication Sig    amiodarone (PACERONE) 200 MG Tab Take 200 mg by mouth once daily.    amLODIPine (NORVASC) 10 MG tablet Take 1 tablet (10 mg total) by mouth once daily.    apixaban (ELIQUIS) 5 mg Tab Take 5 mg by mouth 2 (two) times daily.    aspirin 81 MG Chew Take 1 tablet (81 mg total) by mouth once daily. (Patient not taking: Reported on 6/5/2024)    atorvastatin (LIPITOR) 80 MG tablet Take 1 tablet by mouth once daily.    blood-glucose meter " kit Use as instructed    budesonide-formoterol 80-4.5 mcg (SYMBICORT) 80-4.5 mcg/actuation HFAA Inhale 2 puffs into the lungs twice daily (Patient not taking: Reported on 6/5/2024)    carvediloL (COREG) 12.5 MG tablet Take 1 tablet by mouth 2 (two) times daily with meals.    dulaglutide (TRULICITY) 1.5 mg/0.5 mL pen injector Inject into the skin every 7 days. Thursdays    furosemide (LASIX) 80 MG tablet Take 1 tablet by mouth once daily.    hydrALAZINE (APRESOLINE) 50 MG tablet Take 50 mg by mouth every 8 (eight) hours. (Patient not taking: Reported on 6/5/2024)    insulin aspart U-100 (NOVOLOG) 100 unit/mL injection Inject 5 Units into the skin 3 (three) times daily before meals. (Patient not taking: Reported on 6/5/2024)    insulin detemir U-100 (LEVEMIR) 100 unit/mL injection Inject 10 Units into the skin every evening. (Patient not taking: Reported on 6/5/2024)    metFORMIN (FORTAMET) 1,000 mg 24hr tablet Take 1,000 mg by mouth daily with breakfast. (Patient not taking: Reported on 6/5/2024)    sacubitriL-valsartan (ENTRESTO) 24-26 mg per tablet Take 1 tablet by mouth 2 (two) times daily.    tamsulosin (FLOMAX) 0.4 mg Cap Take 1 capsule (0.4 mg total) by mouth once daily.    [DISCONTINUED] atorvastatin (LIPITOR) 40 MG tablet Take 40 mg by mouth once daily.    [DISCONTINUED] furosemide (LASIX) 40 MG tablet Take 1 tablet (40 mg total) by mouth 2 (two) times a day.    [DISCONTINUED] metoprolol succinate (TOPROL-XL) 100 MG 24 hr tablet Take 100 mg by mouth once daily.     Family History    None       Tobacco Use    Smoking status: Every Day     Current packs/day: 1.00     Average packs/day: 1 pack/day for 15.0 years (15.0 ttl pk-yrs)     Types: Cigarettes    Smokeless tobacco: Never   Substance and Sexual Activity    Alcohol use: Yes     Comment: daily    Drug use: Never    Sexual activity: Not Currently     Review of Systems   Unable to perform ROS: Acuity of condition     Objective:     Vital Signs (Most  Recent):  Temp: 96.6 °F (35.9 °C) (06/05/24 1615)  Pulse: 71 (06/05/24 1830)  Resp: (!) 31 (06/05/24 1830)  BP: (!) 146/90 (06/05/24 1830)  SpO2: (!) 92 % (06/05/24 1830) Vital Signs (24h Range):  Temp:  [96.6 °F (35.9 °C)-98.5 °F (36.9 °C)] 96.6 °F (35.9 °C)  Pulse:  [] 71  Resp:  [10-64] 31  SpO2:  [84 %-100 %] 92 %  BP: ()/() 146/90  Arterial Line BP: ()/(36-78) 122/78     Weight: 89.4 kg (197 lb 1.5 oz)  Body mass index is 31.81 kg/m².    SpO2: (!) 92 %         Intake/Output Summary (Last 24 hours) at 6/5/2024 2009  Last data filed at 6/5/2024 1800  Gross per 24 hour   Intake 390.7 ml   Output 375 ml   Net 15.7 ml       Lines/Drains/Airways       Peripherally Inserted Central Catheter Line  Duration             PICC Triple Lumen 06/05/24 1912 left brachial <1 day              Drain  Duration                  NG/OG Tube 06/05/24 1419 Center mouth <1 day         Urethral Catheter 06/05/24 1500 16 Fr. <1 day              Airway  Duration                  Airway - Non-Surgical 06/05/24 1420 Endotracheal Tube <1 day              Arterial Line  Duration             Arterial Line 06/05/24 1501 Right Radial <1 day              Peripheral Intravenous Line  Duration                  Peripheral IV - Single Lumen 06/05/24 0632 18 G;1 3/4 in Anterior;Right Upper Arm <1 day         Peripheral IV - Single Lumen 06/05/24 1500 22 G Anterior;Right Shoulder <1 day                     Physical Exam  Vitals reviewed.   Constitutional:       Interventions: He is intubated.   HENT:      Head: Normocephalic.   Eyes:      Conjunctiva/sclera: Conjunctivae normal.      Pupils: Pupils are equal, round, and reactive to light.   Cardiovascular:      Rate and Rhythm: Tachycardia present. Rhythm irregular.      Heart sounds: Normal heart sounds.   Pulmonary:      Effort: Pulmonary effort is normal. He is intubated.      Breath sounds: Rales present.   Abdominal:      General: Bowel sounds are normal.      Palpations:  Abdomen is soft.   Musculoskeletal:      Cervical back: Normal range of motion and neck supple.   Skin:     General: Skin is warm.   Neurological:      Mental Status: He is oriented to person, place, and time.          Significant Labs:     DATA:     Laboratory:  CBC:  Recent Labs   Lab 03/18/24  0451 03/19/24  0445 05/06/24  0711 06/05/24  0618   WBC 8.14 10.85 7.4 8.03   Hemoglobin 14.2 14.2 14.9 15.0   Hematocrit 43.0 43.9 43.2 44.4   Platelets 153 166  --  184       CHEMISTRIES:  Recent Labs   Lab 03/29/22  0913 06/30/22  0947 07/01/22  0416 09/17/22  2300 09/20/22  0425 09/21/22  0439 05/18/23  0425 05/19/23  0429 05/20/23  0550 03/15/24  1005 03/15/24  1010 03/16/24  0442 03/20/24  0456 06/05/24  0618 06/05/24  1604   Glucose 104 174 H 183 H   < > 115 H   < >  --   --   --    < >  --    < > 103 134 H 152 H   Sodium 138 141 141   < > 137   < > 137 138 138   < >  --    < > 143 141 139   Potassium 4.5 3.6 3.6   < > 3.9   < > 3.3 L 3.5 3.5   < >  --    < > 4.2 4.0 4.1   BUN 20 14 21   < > 23   < > 24.5 H 20.2 16.3   < >  --    < > 27 H 25 H 30 H   Creatinine 1.2 1.1 1.4   < > 1.4   < > 1.75 H 1.58 H 1.31 H   < >  --    < > 1.5 H 1.7 H 2.5 H   eGFR if  >60 >60.0 >60.0  --   --   --   --   --   --   --   --   --   --   --   --    eGFR   --   --   --   --   --    < > 43 L 48 L 60 L  --   --   --   --   --   --    eGFR if non African American >60 >60.0 52.7 A  --   --   --   --   --   --   --   --   --   --   --   --    Calcium 9.2 9.1 8.7   < > 8.8   < > 8.0 L 8.3 9.3   < >  --    < > 9.2 8.9 8.5 L   Magnesium  --   --  1.6   < > 1.8  --   --   --   --   --  1.8  --   --  1.4 L  --     < > = values in this interval not displayed.       CARDIAC BIOMARKERS:  Recent Labs   Lab 06/09/21  1220 06/09/21  1748 07/01/22  0003 09/17/22  2300 05/17/23  1030 03/15/24  1005 03/16/24  0042 06/05/24  0618 06/05/24  1035    H  --  597 H  --   --   --   --   --   --    CK  --   --   --   --   860 H  --   --   --   --    Troponin I 0.235 H   < >  --    < >  --    < > 0.182 H 0.167 H 0.183 H    < > = values in this interval not displayed.       COAGS:  Recent Labs   Lab 12/17/22  2014 03/15/24  1010 06/05/24  0618   INR 1.6 1.2 1.2       LIPIDS/LFTS:  Recent Labs   Lab 05/20/23  0550 03/15/24  1005 06/05/24  0618   AST 25 22 31   ALT 17 14 17       Hemoglobin A1C   Date Value Ref Range Status   03/15/2024 7.1 (H) 4.0 - 5.6 % Final     Comment:     ADA Screening Guidelines:  5.7-6.4%  Consistent with prediabetes  >or=6.5%  Consistent with diabetes    High levels of fetal hemoglobin interfere with the HbA1C  assay. Heterozygous hemoglobin variants (HbS, HgC, etc)do  not significantly interfere with this assay.   However, presence of multiple variants may affect accuracy.     03/15/2024 7.1 (H) 4.0 - 5.6 % Final     Comment:     ADA Screening Guidelines:  5.7-6.4%  Consistent with prediabetes  >or=6.5%  Consistent with diabetes    High levels of fetal hemoglobin interfere with the HbA1C  assay. Heterozygous hemoglobin variants (HbS, HgC, etc)do  not significantly interfere with this assay.   However, presence of multiple variants may affect accuracy.     05/16/2023 6.5 (H) 4.0 - 6.0 % Final   12/02/2022 5.8 (H) 4.7 - 5.6 % Final   07/01/2022 10.4 (H) 4.0 - 5.6 % Final     Comment:     ADA Screening Guidelines:  5.7-6.4%  Consistent with prediabetes  >or=6.5%  Consistent with diabetes    High levels of fetal hemoglobin interfere with the HbA1C  assay. Heterozygous hemoglobin variants (HbS, HgC, etc)do  not significantly interfere with this assay.   However, presence of multiple variants may affect accuracy.         TSH  Recent Labs   Lab 01/26/22  1225 09/17/22  2300 12/01/22  1306   TSH 1.086 0.417 1.14       The ASCVD Risk score (Eugenio DK, et al., 2019) failed to calculate for the following reasons:    The patient has a prior MI or stroke diagnosis       BNP    Lab Results   Component Value Date/Time    BNP  546 (H) 06/05/2024 06:18 AM     (H) 03/15/2024 10:05 AM     (H) 09/17/2022 11:00 PM    BNP 56 06/30/2022 09:47 AM     (H) 01/26/2022 12:25 PM     (H) 11/21/2021 12:24 AM    BNP 94 07/28/2021 09:05 PM     (H) 07/01/2021 06:08 AM     (H) 06/12/2021 05:38 AM     (H) 06/11/2021 02:00 AM     (H) 06/09/2021 12:28 PM     (H) 01/20/2020 05:31 PM     (H) 09/10/2019 12:32 AM            ECHO    Results for orders placed during the hospital encounter of 03/15/24    Echo    Interpretation Summary    Left Ventricle: The left ventricle is normal in size. There is moderate concentric hypertrophy. There is low normal systolic function with a visually estimated ejection fraction of 50 - 55%.    Right Ventricle: Mild right ventricular enlargement. Systolic function is normal.    Left Atrium: Left atrium is severely dilated.    Right Atrium: Right atrium is moderately dilated.    Aortic Valve: There is mild aortic valve sclerosis.    Mitral Valve: There is moderate regurgitation.    Tricuspid Valve: There is moderate regurgitation.    Pulmonary Artery: The estimated pulmonary artery systolic pressure is 51 mmHg.    IVC/SVC: Elevated venous pressure at 15 mmHg.      STRESS TEST    Results for orders placed during the hospital encounter of 01/20/20    Nuclear Stress - Cardiology Interpreted    Interpretation Summary    The perfusion scan is free of evidence from myocardial ischemia or injury.    There is a  mild intensity fixed defect in the inferior wall of the left ventricle secondary to diaphragm attenuation.    Visually estimated ejection fraction is mildly depressed at stress.    The EKG portion of this study is negative for ischemia.    The patient reported no chest pain during the stress test.    Arrhythmias during stress: occasional PACs, occasional PVCs.    The blood pressure response to stress was normal.        CATH    Results for orders placed during the  hospital encounter of 01/26/22    Intra-Procedure Documentation    Narrative  ESTEBAN performed in the Invasive Lab  - See Procedure Log link below for nursing documentation  - See ESTEBAN order on Card Proc Tab for physician findings

## 2024-06-06 NOTE — CONSULTS
"[facility] [place] - Intensive Care  Wound Care  WOC JORGE LUIS    Patient Name:  Marck Madison   MRN:  [id number]  Date: 6/6/2024  Diagnosis: Acute hypoxemic respiratory failure    History:     Past Medical History:   Diagnosis Date    Arrhythmia 2017    aflutter    Atrial flutter     CAD (coronary artery disease)     CHF (congestive heart failure), NYHA class I 2017    Cholelithiasis with chronic cholecystitis     Chronic diastolic heart failure     Cigarette smoker     COPD (chronic obstructive pulmonary disease)     COVID-19 06/01/2021    Diabetes mellitus     DKA (diabetic ketoacidosis)     Hypertension     NSTEMI (non-ST elevation myocardial infarction)     NSVT (nonsustained ventricular tachycardia)     Psychiatric disorder     h/o "schizophrena/bipolar"    Schizoaffective disorder     Severe sepsis        Social History     Socioeconomic History    Marital status: Single   Tobacco Use    Smoking status: Every Day     Current packs/day: 1.00     Average packs/day: 1 pack/day for 15.0 years (15.0 ttl pk-yrs)     Types: Cigarettes    Smokeless tobacco: Never   Substance and Sexual Activity    Alcohol use: Yes     Comment: daily    Drug use: Never    Sexual activity: Not Currently   Social History Narrative    ** Merged History Encounter **          Social Determinants of Health     Financial Resource Strain: Unknown (9/19/2022)    Overall Financial Resource Strain (CARDIA)     Difficulty of Paying Living Expenses: Patient declined   Food Insecurity: Unknown (9/19/2022)    Hunger Vital Sign     Ran Out of Food in the Last Year: Patient declined   Transportation Needs: Unknown (9/19/2022)    PRAPARE - Transportation     Lack of Transportation (Medical): Patient declined     Lack of Transportation (Non-Medical): Patient declined   Physical Activity: Unknown (9/19/2022)    Exercise Vital Sign     Days of Exercise per Week: Patient declined     Minutes of Exercise per Session: Patient declined   Stress: Unknown (9/19/2022)    [place] " [employer] - Occupational Stress Questionnaire     Feeling of Stress : Patient declined   Housing Stability: Unknown (9/19/2022)    Housing Stability Vital Sign     Unable to Pay for Housing in the Last Year: Patient refused     Unstable Housing in the Last Year: Patient refused       Precautions:     Allergies as of 06/05/2024    (No Known Allergies)       [facility] Assessment Details/Treatment     Active Problem List with Overview Notes    Diagnosis Date Noted    Elevated troponin 06/05/2024    Positive blood culture 09/21/2022    Class 1 obesity with serious comorbidity and body mass index (BMI) of 30.0 to 30.9 in adult 09/20/2022    Acute on chronic diastolic congestive heart failure 09/18/2022    Cirrhosis 09/18/2022    Permanent atrial fibrillation 09/18/2022    COPD (chronic obstructive pulmonary disease) with exacerbation 09/18/2022    Type 2 diabetes mellitus 09/18/2022    HCV infection 09/18/2022    Thrombocytopenia 09/18/2022    CAD (coronary artery disease) 09/18/2022    Dizzy     Atrial flutter 07/01/2022    Diastolic dysfunction 07/01/2022    Chest pain 06/30/2022    Chronic hepatitis C without hepatic coma 02/04/2022    Thrombocytopenia 01/31/2022    Shock liver 01/31/2022    Encephalopathy, metabolic 01/30/2022    Goals of care, counseling/discussion 01/28/2022    Alcohol withdrawal syndrome, with delirium 01/28/2022    Cardiogenic shock 01/27/2022    Panlobular emphysema 01/26/2022    Type 2 diabetes mellitus, with long-term current use of insulin 01/26/2022    COPD (chronic obstructive pulmonary disease) 11/21/2021    Moderate cigarette smoker 11/21/2021    NSVT (nonsustained ventricular tachycardia) 07/04/2021    Debility 07/03/2021    Cholelithiasis with chronic cholecystitis 07/02/2021    Tachycardia 07/01/2021    Diabetic ketoacidosis without coma associated with type 2 diabetes mellitus 06/30/2021    Lactic acidosis 06/12/2021    Personal history of atrial flutter 06/09/2021     Pneumonia due to COVID-19 virus 06/09/2021    Hypertension 06/09/2021    Tobacco use disorder, severe, dependence 01/21/2020    Cardiac arrest 01/20/2020    Dilated cardiomyopathy 09/13/2019    Acute hypoxemic respiratory failure 09/10/2019    NSTEMI (non-ST elevation myocardial infarction) 09/10/2019    FARHAN (acute kidney injury) 09/10/2019      Consulted by nursing for wounds- left forearm blister  A 64 year old male admitted 6/5/24 from home with complaint of shortness of breath.   6/6 WBC 8.73 Hgb 14.3 Hct 42.3 Alb 3.4  6/5 A1C 6.7   6/5 12:45 pm 4 Eyes Skin Assessment- No Pressure Injury POA  6/5 7:51 pm Nursing documented extravasation left arm- Levophed; Hospital Medicine assessed and treated site  Assessment:  Photodocumentation  Le  Left forearm- Large fluid filled blister. Filled with clear fluid without purulence. Base of blister pink in color- no dark discoloration. No surrounding erythema/induration.   Nursing reports blister has enlarged over the course of the day. Scant serous drainage, but opening not visible.   Fingers left hand warm. Strong palpable pulse.   Recommendations/treatment:  Continue to monitor blister and surrounding skin; circulation.   If blister breaks, apply Mepilex foam dressing. If draining a moderate to large amount of fluid, apply Aquacel hydrofiber and cover with Mepilex 6x6 dressing. Change depending amount of drainage.   Discussed recommendations with nursing.   Reconsult Wound Care as needed.  Recommendations made to primary team.     06/06/2024

## 2024-06-06 NOTE — ASSESSMENT & PLAN NOTE
Progressive worsening on BiPAP. Did not respond to increased EPAP. With variable resp efforts and a clear component of severe resp alkalosis, proceeded with intubation. Requiring high PEEP and high FiO2 to maintain oxygenation.     Vent dependent. Vent adjusted at bedside  LPVS.  PEEP at 10  PRVC for synchrony  Continue aggressive diuresis

## 2024-06-06 NOTE — SUBJECTIVE & OBJECTIVE
Interval History: norepinephrine extravasated to left forearm overnight, phentolamine given. Now with blisters. Weaning norepinephrine. Diuresing well. Starting tube feeds.     Review of Systems  Objective:     Vital Signs (Most Recent):  Temp: 98.2 °F (36.8 °C) (06/06/24 0710)  Pulse: 100 (06/06/24 0945)  Resp: (!) 28 (06/06/24 0945)  BP: 107/70 (06/06/24 0315)  SpO2: 100 % (06/06/24 0945) Vital Signs (24h Range):  Temp:  [96.5 °F (35.8 °C)-98.2 °F (36.8 °C)] 98.2 °F (36.8 °C)  Pulse:  [] 100  Resp:  [10-64] 28  SpO2:  [84 %-100 %] 100 %  BP: ()/() 107/70  Arterial Line BP: ()/(36-92) 119/76     Weight: 89.4 kg (197 lb 1.5 oz)  Body mass index is 31.81 kg/m².    Intake/Output Summary (Last 24 hours) at 6/6/2024 1013  Last data filed at 6/6/2024 0952  Gross per 24 hour   Intake 1254.04 ml   Output 3925 ml   Net -2670.96 ml         Physical Exam  Vitals and nursing note reviewed.   Constitutional:       General: He is not in acute distress.     Appearance: He is ill-appearing.      Interventions: He is sedated and intubated.   Cardiovascular:      Rate and Rhythm: Normal rate. Rhythm irregular.      Pulses: Normal pulses.   Pulmonary:      Effort: Pulmonary effort is normal. He is intubated.      Breath sounds: Rales present. No wheezing.      Comments: Intubated, mechanically ventilated  Abdominal:      General: There is no distension.      Palpations: Abdomen is soft.   Musculoskeletal:      Comments: Edema in lower extremities improving   Skin:     Comments: Left forearm with erythema, swelling and blister from extravasation   Neurological:      Comments: Sedated, cough/gag reflex present. Weaning sedation to assess mental status             Significant Labs: All pertinent labs within the past 24 hours have been reviewed.    Significant Imaging: I have reviewed all pertinent imaging results/findings within the past 24 hours.   ctx x 1 hr.

## 2024-06-06 NOTE — NURSING
[facility] [place] 4 [place]      6/6/2024       Skin assessed on: Q Shift      [x] No Pressure Injuries Present    [x]Prevention Measures Documented    [] Yes LDA  for Pressure Injury Previously documented     [] Yes New Pressure Injury Discovered   [] LDA for New Pressure Injury Added      Attending RN:  Estela Ross RN     Second RN:  DELTA Molina

## 2024-06-06 NOTE — PROGRESS NOTES
HOSPITALIST ON CALL NOTE:    Contacted by the RN regarding left forearm IV infiltration. Levophed was infusion at 0.1mcg/kg/min.  Went to bedside. Pt. Intubated but responds to tactile stimuli. PICC team in the room placing line. Left forearm, warm and slightly swollen near IV site which infiltrated. IV has been removed already.    Strong pulse at the left wrist and equal to the right. NO signs of pallor noted at this time or coolness to the infiltrated area.  Reviewed policy of Phentolamine use for IV infiltration with the ICU team and order placed.   Assessed the left arm after administration of 5mg of Phentolamine diluted in 10ml NS and given in divided doses SC in a [place] around the infiltrated area. The left forearm still warm and strong pulse still noted at the radial artery unchanged from initial assessment. NO pallor noted. Will cont. To monitor for any signs of ischemia/necrosis.

## 2024-06-06 NOTE — ASSESSMENT & PLAN NOTE
Patient with Hypoxic Respiratory failure which is Acute.  he is not on home oxygen. Supplemental oxygen was provided and noted- Vent Mode: PRVC  Oxygen Concentration (%):  [] 60  Resp Rate Total:  [28 br/min-35 br/min] 28 br/min  Vt Set:  [390 mL] 390 mL  PEEP/CPAP:  [12 cmH20] 12 cmH20  Mean Airway Pressure:  [16 wyL38-45.3 cmH20] 16.3 cmH20    .   Signs/symptoms of respiratory failure include- tachypnea, increased work of breathing, and respiratory distress. Contributing diagnoses includes - CHF Labs and images were reviewed. Patient Has recent ABG, which has been reviewed. Will treat underlying causes and adjust management of respiratory failure as follows-   - Bipap initially --> now intubated for worsening hypoxia and respiratory distress  - continue with IV lasix, dobutamine   - Cardiology following

## 2024-06-06 NOTE — NURSING
[facility], [facility]  Nurses Note -- 4 Eyes      6/5/2024       Skin assessed on: Q Shift      [x] No Pressure Injuries Present    [x]Prevention Measures Documented    [] Yes LDA  for Pressure Injury Previously documented     [] Yes New Pressure Injury Discovered   [] LDA for New Pressure Injury Added      Attending RN:  Tracy Ross RN     Second RN:  DELTA Palmer

## 2024-06-06 NOTE — CLINICAL REVIEW
IP Sepsis Screen (most recent)       Sepsis Screen (IP) - 06/06/24 5685       Is the patient's history or complaint suggestive of a possible infection? Yes  -DD    Are there at least two of the following signs and symptoms present? Yes  -DD    Sepsis signs/symptoms - Tachycardia Tachycardia     >90  -DD    Sepsis signs/symptoms - Tachypnea Tachypnea     >20  -DD    Are any of the following organ dysfunction criteria present and not considered to be due to a chronic condition? Yes  -DD    Organ Dysfunction Criteria - Resp Comp Respiratory Compromise: Requiring > 5L NC  -DD    Initiate Sepsis Protocol No  -DD    Reason sepsis not considered Pt. receiving appropriate management  -DD              User Key  (r) = Recorded By, (t) = Taken By, (c) = Cosigned By      Initials Name    Ayo Dillon, RN

## 2024-06-07 LAB
ALBUMIN SERPL BCP-MCNC: 3.3 G/DL (ref 3.5–5.2)
ALLENS TEST: ABNORMAL
ALP SERPL-CCNC: 71 U/L (ref 55–135)
ALT SERPL W/O P-5'-P-CCNC: 31 U/L (ref 10–44)
ANION GAP SERPL CALC-SCNC: 13 MMOL/L (ref 8–16)
AST SERPL-CCNC: 27 U/L (ref 10–40)
BASOPHILS # BLD AUTO: 0.04 K/UL (ref 0–0.2)
BASOPHILS NFR BLD: 0.4 % (ref 0–1.9)
BILIRUB SERPL-MCNC: 0.6 MG/DL (ref 0.1–1)
BUN SERPL-MCNC: 30 MG/DL (ref 8–23)
CALCIUM SERPL-MCNC: 8.3 MG/DL (ref 8.7–10.5)
CHLORIDE SERPL-SCNC: 108 MMOL/L (ref 95–110)
CO2 SERPL-SCNC: 23 MMOL/L (ref 23–29)
CREAT SERPL-MCNC: 1.9 MG/DL (ref 0.5–1.4)
DELSYS: ABNORMAL
DIFFERENTIAL METHOD BLD: ABNORMAL
EOSINOPHIL # BLD AUTO: 0.2 K/UL (ref 0–0.5)
EOSINOPHIL NFR BLD: 2.6 % (ref 0–8)
ERYTHROCYTE [DISTWIDTH] IN BLOOD BY AUTOMATED COUNT: 16.6 % (ref 11.5–14.5)
ERYTHROCYTE [SEDIMENTATION RATE] IN BLOOD BY WESTERGREN METHOD: 18 MM/H
EST. GFR  (NO RACE VARIABLE): 38 ML/MIN/1.73 M^2
FIO2: 40
GLUCOSE SERPL-MCNC: 172 MG/DL (ref 70–110)
HCO3 UR-SCNC: 29.6 MMOL/L (ref 24–28)
HCT VFR BLD AUTO: 44.8 % (ref 40–54)
HGB BLD-MCNC: 14.9 G/DL (ref 14–18)
IMM GRANULOCYTES # BLD AUTO: 0.03 K/UL (ref 0–0.04)
IMM GRANULOCYTES NFR BLD AUTO: 0.3 % (ref 0–0.5)
LYMPHOCYTES # BLD AUTO: 2 K/UL (ref 1–4.8)
LYMPHOCYTES NFR BLD: 21.9 % (ref 18–48)
MAGNESIUM SERPL-MCNC: 1.9 MG/DL (ref 1.6–2.6)
MCH RBC QN AUTO: 30.5 PG (ref 27–31)
MCHC RBC AUTO-ENTMCNC: 33.3 G/DL (ref 32–36)
MCV RBC AUTO: 92 FL (ref 82–98)
MODE: ABNORMAL
MONOCYTES # BLD AUTO: 0.9 K/UL (ref 0.3–1)
MONOCYTES NFR BLD: 9.7 % (ref 4–15)
NEUTROPHILS # BLD AUTO: 6 K/UL (ref 1.8–7.7)
NEUTROPHILS NFR BLD: 65.1 % (ref 38–73)
NRBC BLD-RTO: 0 /100 WBC
PCO2 BLDA: 47.8 MMHG (ref 35–45)
PEEP: 5
PH SMN: 7.4 [PH] (ref 7.35–7.45)
PLATELET # BLD AUTO: 181 K/UL (ref 150–450)
PMV BLD AUTO: 10.9 FL (ref 9.2–12.9)
PO2 BLDA: 114 MMHG (ref 80–100)
POC BE: 4 MMOL/L
POC SATURATED O2: 98 % (ref 95–100)
POC TCO2: 31 MMOL/L (ref 23–27)
POCT GLUCOSE: 138 MG/DL (ref 70–110)
POCT GLUCOSE: 140 MG/DL (ref 70–110)
POCT GLUCOSE: 154 MG/DL (ref 70–110)
POCT GLUCOSE: 157 MG/DL (ref 70–110)
POCT GLUCOSE: 159 MG/DL (ref 70–110)
POCT GLUCOSE: 160 MG/DL (ref 70–110)
POTASSIUM SERPL-SCNC: 3.8 MMOL/L (ref 3.5–5.1)
PROT SERPL-MCNC: 6.6 G/DL (ref 6–8.4)
RBC # BLD AUTO: 4.89 M/UL (ref 4.6–6.2)
SAMPLE: ABNORMAL
SITE: ABNORMAL
SODIUM SERPL-SCNC: 144 MMOL/L (ref 136–145)
SP02: 100
VT: 390
WBC # BLD AUTO: 9.21 K/UL (ref 3.9–12.7)

## 2024-06-07 PROCEDURE — 94003 VENT MGMT INPAT SUBQ DAY: CPT

## 2024-06-07 PROCEDURE — 63600175 PHARM REV CODE 636 W HCPCS: Performed by: STUDENT IN AN ORGANIZED HEALTH CARE EDUCATION/TRAINING PROGRAM

## 2024-06-07 PROCEDURE — 27000221 HC OXYGEN, UP TO 24 HOURS

## 2024-06-07 PROCEDURE — 99900035 HC TECH TIME PER 15 MIN (STAT)

## 2024-06-07 PROCEDURE — 99291 CRITICAL CARE FIRST HOUR: CPT | Mod: ,,, | Performed by: INTERNAL MEDICINE

## 2024-06-07 PROCEDURE — 80053 COMPREHEN METABOLIC PANEL: CPT | Performed by: NURSE PRACTITIONER

## 2024-06-07 PROCEDURE — 94761 N-INVAS EAR/PLS OXIMETRY MLT: CPT | Mod: XB

## 2024-06-07 PROCEDURE — 25000003 PHARM REV CODE 250: Performed by: STUDENT IN AN ORGANIZED HEALTH CARE EDUCATION/TRAINING PROGRAM

## 2024-06-07 PROCEDURE — 85025 COMPLETE CBC W/AUTO DIFF WBC: CPT | Performed by: NURSE PRACTITIONER

## 2024-06-07 PROCEDURE — 63600175 PHARM REV CODE 636 W HCPCS: Performed by: INTERNAL MEDICINE

## 2024-06-07 PROCEDURE — 20000000 HC ICU ROOM

## 2024-06-07 PROCEDURE — 25000003 PHARM REV CODE 250: Performed by: INTERNAL MEDICINE

## 2024-06-07 PROCEDURE — 99233 SBSQ HOSP IP/OBS HIGH 50: CPT | Mod: ,,, | Performed by: INTERNAL MEDICINE

## 2024-06-07 PROCEDURE — 37799 UNLISTED PX VASCULAR SURGERY: CPT

## 2024-06-07 PROCEDURE — 63600175 PHARM REV CODE 636 W HCPCS: Performed by: NURSE PRACTITIONER

## 2024-06-07 PROCEDURE — A4216 STERILE WATER/SALINE, 10 ML: HCPCS | Performed by: INTERNAL MEDICINE

## 2024-06-07 PROCEDURE — 25000003 PHARM REV CODE 250: Performed by: NURSE PRACTITIONER

## 2024-06-07 PROCEDURE — 82803 BLOOD GASES ANY COMBINATION: CPT

## 2024-06-07 PROCEDURE — 99900026 HC AIRWAY MAINTENANCE (STAT)

## 2024-06-07 PROCEDURE — 25000003 PHARM REV CODE 250: Performed by: EMERGENCY MEDICINE

## 2024-06-07 PROCEDURE — 83735 ASSAY OF MAGNESIUM: CPT | Performed by: STUDENT IN AN ORGANIZED HEALTH CARE EDUCATION/TRAINING PROGRAM

## 2024-06-07 RX ORDER — POTASSIUM CHLORIDE 20 MEQ/1
40 TABLET, EXTENDED RELEASE ORAL DAILY
Status: DISCONTINUED | OUTPATIENT
Start: 2024-06-07 | End: 2024-06-07

## 2024-06-07 RX ORDER — POTASSIUM CHLORIDE 20 MEQ/1
20 TABLET, EXTENDED RELEASE ORAL DAILY
Status: DISCONTINUED | OUTPATIENT
Start: 2024-06-07 | End: 2024-06-08

## 2024-06-07 RX ORDER — POTASSIUM CHLORIDE 14.9 MG/ML
20 INJECTION INTRAVENOUS ONCE
Status: COMPLETED | OUTPATIENT
Start: 2024-06-07 | End: 2024-06-07

## 2024-06-07 RX ADMIN — FUROSEMIDE 80 MG: 10 INJECTION, SOLUTION INTRAVENOUS at 08:06

## 2024-06-07 RX ADMIN — INSULIN ASPART 2 UNITS: 100 INJECTION, SOLUTION INTRAVENOUS; SUBCUTANEOUS at 11:06

## 2024-06-07 RX ADMIN — MUPIROCIN: 20 OINTMENT TOPICAL at 08:06

## 2024-06-07 RX ADMIN — ENOXAPARIN SODIUM 90 MG: 100 INJECTION SUBCUTANEOUS at 08:06

## 2024-06-07 RX ADMIN — PROPOFOL 35 MCG/KG/MIN: 10 INJECTION, EMULSION INTRAVENOUS at 03:06

## 2024-06-07 RX ADMIN — Medication 10 ML: at 05:06

## 2024-06-07 RX ADMIN — ATORVASTATIN CALCIUM 80 MG: 40 TABLET, FILM COATED ORAL at 09:06

## 2024-06-07 RX ADMIN — PROPOFOL 35 MCG/KG/MIN: 10 INJECTION, EMULSION INTRAVENOUS at 04:06

## 2024-06-07 RX ADMIN — Medication 10 ML: at 11:06

## 2024-06-07 RX ADMIN — FUROSEMIDE 80 MG: 10 INJECTION, SOLUTION INTRAVENOUS at 09:06

## 2024-06-07 RX ADMIN — PROPOFOL 35 MCG/KG/MIN: 10 INJECTION, EMULSION INTRAVENOUS at 08:06

## 2024-06-07 RX ADMIN — ENOXAPARIN SODIUM 90 MG: 100 INJECTION SUBCUTANEOUS at 09:06

## 2024-06-07 RX ADMIN — NOREPINEPHRINE BITARTRATE 0.04 MCG/KG/MIN: 4 INJECTION, SOLUTION INTRAVENOUS at 04:06

## 2024-06-07 RX ADMIN — CHLORHEXIDINE GLUCONATE 0.12% ORAL RINSE 15 ML: 1.2 LIQUID ORAL at 08:06

## 2024-06-07 RX ADMIN — CHLORHEXIDINE GLUCONATE 0.12% ORAL RINSE 15 ML: 1.2 LIQUID ORAL at 09:06

## 2024-06-07 RX ADMIN — INSULIN ASPART 2 UNITS: 100 INJECTION, SOLUTION INTRAVENOUS; SUBCUTANEOUS at 06:06

## 2024-06-07 RX ADMIN — PROPOFOL 35 MCG/KG/MIN: 10 INJECTION, EMULSION INTRAVENOUS at 11:06

## 2024-06-07 RX ADMIN — ASPIRIN 81 MG CHEWABLE TABLET 81 MG: 81 TABLET CHEWABLE at 09:06

## 2024-06-07 RX ADMIN — FAMOTIDINE 20 MG: 10 INJECTION, SOLUTION INTRAVENOUS at 09:06

## 2024-06-07 RX ADMIN — PROPOFOL 35 MCG/KG/MIN: 10 INJECTION, EMULSION INTRAVENOUS at 10:06

## 2024-06-07 RX ADMIN — NOREPINEPHRINE BITARTRATE 0.06 MCG/KG/MIN: 4 INJECTION, SOLUTION INTRAVENOUS at 01:06

## 2024-06-07 RX ADMIN — MUPIROCIN: 20 OINTMENT TOPICAL at 09:06

## 2024-06-07 RX ADMIN — POTASSIUM CHLORIDE 20 MEQ: 14.9 INJECTION, SOLUTION INTRAVENOUS at 04:06

## 2024-06-07 RX ADMIN — FUROSEMIDE 80 MG: 10 INJECTION, SOLUTION INTRAVENOUS at 03:06

## 2024-06-07 NOTE — SUBJECTIVE & OBJECTIVE
Interval History:     Review of Systems   Unable to perform ROS: Intubated     Objective:     Vital Signs (Most Recent):  Temp: 98 °F (36.7 °C) (06/07/24 0710)  Pulse: 70 (06/07/24 0900)  Resp: (!) 22 (06/07/24 0900)  BP: 101/60 (06/07/24 0301)  SpO2: 100 % (06/07/24 0900) Vital Signs (24h Range):  Temp:  [97.7 °F (36.5 °C)-99.1 °F (37.3 °C)] 98 °F (36.7 °C)  Pulse:  [] 70  Resp:  [17-49] 22  SpO2:  [99 %-100 %] 100 %  BP: ()/(55-69) 101/60  Arterial Line BP: ()/(54-80) 117/59     Weight: 88.7 kg (195 lb 8.8 oz)  Body mass index is 31.56 kg/m².     SpO2: 100 %         Intake/Output Summary (Last 24 hours) at 6/7/2024 0937  Last data filed at 6/7/2024 0900  Gross per 24 hour   Intake 1599.92 ml   Output 2605 ml   Net -1005.08 ml       Lines/Drains/Airways       Peripherally Inserted Central Catheter Line  Duration             PICC Triple Lumen 06/05/24 1912 left brachial 1 day              Drain  Duration                  NG/OG Tube 06/05/24 1419 Center mouth 1 day         Urethral Catheter 06/05/24 1500 16 Fr. 1 day              Airway  Duration                  Airway - Non-Surgical 06/05/24 1420 Endotracheal Tube 1 day              Arterial Line  Duration             Arterial Line 06/05/24 1501 Right Radial 1 day              Peripheral Intravenous Line  Duration                  Peripheral IV - Single Lumen 06/05/24 0632 18 G;1 3/4 in Anterior;Right Upper Arm 2 days         Peripheral IV - Single Lumen 06/05/24 1500 22 G Anterior;Right Shoulder 1 day                       Physical Exam  Constitutional:       Appearance: He is well-developed.   HENT:      Head: Normocephalic and atraumatic.   Eyes:      Conjunctiva/sclera: Conjunctivae normal.      Pupils: Pupils are equal, round, and reactive to light.   Cardiovascular:      Rate and Rhythm: Normal rate.      Pulses: Intact distal pulses.      Heart sounds: Normal heart sounds.   Pulmonary:      Effort: Pulmonary effort is normal.      Breath  sounds: Normal breath sounds.   Abdominal:      General: Bowel sounds are normal.      Palpations: Abdomen is soft.   Musculoskeletal:         General: Normal range of motion.      Cervical back: Normal range of motion and neck supple.   Skin:     General: Skin is warm and dry.            Significant Labs: All pertinent lab results from the last 24 hours have been reviewed.    Significant Imaging: Echocardiogram: 2D echo with color flow doppler: No results found for this or any previous visit.

## 2024-06-07 NOTE — ASSESSMENT & PLAN NOTE
Patient with Permanent atrial fibrillation which is controlled currently with Amiodarone. Patient is currently in atrial fibrillation.[id number] Score: 3. . Anticoagulation indicated. Anticoagulation done with Lovenox .    - Off amiodarone due to bradycardia  - rate controlled currently

## 2024-06-07 NOTE — ASSESSMENT & PLAN NOTE
Levophed to maintain MAP >60  Good response to dobutamine and overall appears improved. Will stop dobutamine and monitor

## 2024-06-07 NOTE — HOSPITAL COURSE
6/7/24 Intubated and sedated - on low dose dobutamine and levophed. 3.4 L diuresis since admission  6/8/24 Intubated. Off dobutamine - low dose levophed. A-flutter 100-110. Diuresed 4.5 L since admission    Echo 6/6/24    Left Ventricle: The left ventricle is normal in size. Normal wall thickness. There is concentric hypertrophy. There is normal systolic function with a visually estimated ejection fraction of 55 %. Unable to assess diastolic function due to atrial fibrillation.    Right Ventricle: Normal right ventricular cavity size. Systolic function is normal.    Left Atrium: Left atrium is severely dilated.    Right Atrium: Right atrium is mildly dilated.    Mitral Valve: There is mild to moderate regurgitation.    Tricuspid Valve: There is mild regurgitation.    IVC/SVC: Patient is ventilated, cannot use IVC diameter to estimate right atrial pressure.    Tds

## 2024-06-07 NOTE — SUBJECTIVE & OBJECTIVE
Interval History: Intubated and sedated. Good UOP. No longer cold and clammy. Tolerates stopping dobutamine well. Levophed dose is stable at low to moderate dose. Remains rate controlled off amio.    Discussed case in detail with hospitalist.       Objective:     Vital Signs (Most Recent):  Temp: 98.2 °F (36.8 °C) (06/07/24 1101)  Pulse: 100 (06/07/24 1400)  Resp: (!) 21 (06/07/24 1400)  BP: 101/60 (06/07/24 0301)  SpO2: 98 % (06/07/24 1400) Vital Signs (24h Range):  Temp:  [98 °F (36.7 °C)-99.1 °F (37.3 °C)] 98.2 °F (36.8 °C)  Pulse:  [] 100  Resp:  [18-49] 21  SpO2:  [98 %-100 %] 98 %  BP: ()/(55-69) 101/60  Arterial Line BP: ()/(52-78) 113/66     Weight: 88.7 kg (195 lb 8.8 oz)  Body mass index is 31.56 kg/m².      Intake/Output Summary (Last 24 hours) at 6/7/2024 1416  Last data filed at 6/7/2024 1403  Gross per 24 hour   Intake 1830.38 ml   Output 2460 ml   Net -629.62 ml        Physical Exam  Vitals reviewed.   Constitutional:       Appearance: He is ill-appearing.   HENT:      Head: Normocephalic and atraumatic.      Mouth/Throat:      Mouth: Mucous membranes are moist.   Eyes:      Pupils: Pupils are equal, round, and reactive to light.   Neck:      Comments: JVD  Cardiovascular:      Rate and Rhythm: Normal rate. Rhythm irregular.      Pulses: Normal pulses.      Heart sounds: Normal heart sounds. No murmur heard.  Pulmonary:      Breath sounds: No stridor. No wheezing.      Comments: Synchronous with vent  Abdominal:      General: There is distension.      Palpations: Abdomen is soft.   Musculoskeletal:      Right lower leg: Edema present.      Left lower leg: Edema present.   Skin:     General: Skin is dry.      Capillary Refill: Capillary refill takes less than 2 seconds.   Neurological:      Mental Status: He is alert.      Comments: Sedated, responds to commands           Review of Systems    Vents:  Vent Mode: PRVC (06/07/24 1210)  Ventilator Initiated: Yes (06/05/24 1443)  Set Rate:  18 BPM (06/07/24 1210)  Vt Set: 390 mL (06/07/24 1210)  PEEP/CPAP: 5 cmH20 (06/07/24 1210)  Oxygen Concentration (%): 40 (06/07/24 1400)  Peak Airway Pressure: 16.3 cmH20 (06/07/24 1210)  Total Ve: 7.9 L/m (06/07/24 1210)  F/VT Ratio<105 (RSBI): (!) 42.6 (06/07/24 1210)    Lines/Drains/Airways       Peripherally Inserted Central Catheter Line  Duration             PICC Triple Lumen 06/05/24 1912 left brachial 1 day              Drain  Duration                  NG/OG Tube 06/05/24 1419 Center mouth 1 day         Urethral Catheter 06/05/24 1500 16 Fr. 1 day              Airway  Duration                  Airway - Non-Surgical 06/05/24 1420 Endotracheal Tube 1 day              Arterial Line  Duration             Arterial Line 06/05/24 1501 Right Radial 1 day              Peripheral Intravenous Line  Duration                  Peripheral IV - Single Lumen 06/05/24 0632 18 G;1 3/4 in Anterior;Right Upper Arm 2 days         Peripheral IV - Single Lumen 06/05/24 1500 22 G Anterior;Right Shoulder 1 day                    Significant Labs:    CBC/Anemia Profile:  Recent Labs   Lab 06/06/24  0129 06/07/24  0228   WBC 8.73 9.21   HGB 14.3 14.9   HCT 42.3 44.8    181   MCV 89 92   RDW 16.0* 16.6*        Chemistries:  Recent Labs   Lab 06/06/24  0129 06/06/24 1959 06/07/24  0228    142 144   K 3.4* 4.4 3.8    107 108   CO2 21* 23 23   BUN 31* 30* 30*   CREATININE 2.0* 1.9* 1.9*   CALCIUM 8.6* 8.4* 8.3*   ALBUMIN 3.4*  --  3.3*   PROT 6.5  --  6.6   BILITOT 0.8  --  0.6   ALKPHOS 63  --  71   ALT 29  --  31   AST 47*  --  27   MG  --   --  1.9       ABGs:   Recent Labs   Lab 06/07/24  1131   PH 7.400   PCO2 47.8*   HCO3 29.6*   POCSATURATED 98   BE 4*     All pertinent labs within the past 24 hours have been reviewed.    Significant Imaging:  I have reviewed all pertinent imaging results/findings within the past 24 hours.  CXR: I have reviewed all pertinent results/findings within the past 24 hours and my  personal findings are:  none new  Echo: I have reviewed all pertinent results/findings within the past 24 hours and my personal findings are:  none new  Tely shows Afib with rate control

## 2024-06-07 NOTE — ASSESSMENT & PLAN NOTE
UOP improved with dobutamine. Cr improving. Monitor. Avoid nephrotoxins  Monitor UOP off dobutamine

## 2024-06-07 NOTE — PROGRESS NOTES
[facility] - Intensive Care  Cardiology  Progress Note    Patient Name: Marck Madison  MRN: [id number]  Admission Date: 6/5/2024  Hospital Length of Stay: 2 days  Code Status: Full Code   Attending Physician: Thang Del Angel III, MD   Primary Care Physician: [facility] -  Expected Discharge Date:   Principal Problem:Acute hypoxemic respiratory failure    Subjective:     Hospital Course:   6/7/24 Intubated and sedated - on low dose dobutamine and levophed. 3.4 L diuresis since admission    Echo 6/6/24    Left Ventricle: The left ventricle is normal in size. Normal wall thickness. There is concentric hypertrophy. There is normal systolic function with a visually estimated ejection fraction of 55 %. Unable to assess diastolic function due to atrial fibrillation.    Right Ventricle: Normal right ventricular cavity size. Systolic function is normal.    Left Atrium: Left atrium is severely dilated.    Right Atrium: Right atrium is mildly dilated.    Mitral Valve: There is mild to moderate regurgitation.    Tricuspid Valve: There is mild regurgitation.    IVC/SVC: Patient is ventilated, cannot use IVC diameter to estimate right atrial pressure.    Tds       Interval History:     Review of Systems   Unable to perform ROS: Intubated     Objective:     Vital Signs (Most Recent):  Temp: 98 °F (36.7 °C) (06/07/24 0710)  Pulse: 70 (06/07/24 0900)  Resp: (!) 22 (06/07/24 0900)  BP: 101/60 (06/07/24 0301)  SpO2: 100 % (06/07/24 0900) Vital Signs (24h Range):  Temp:  [97.7 °F (36.5 °C)-99.1 °F (37.3 °C)] 98 °F (36.7 °C)  Pulse:  [] 70  Resp:  [17-49] 22  SpO2:  [99 %-100 %] 100 %  BP: ()/(55-69) 101/60  Arterial Line BP: ()/(54-80) 117/59     Weight: 88.7 kg (195 lb 8.8 oz)  Body mass index is 31.56 kg/m².     SpO2: 100 %         Intake/Output Summary (Last 24 hours) at 6/7/2024 0937  Last data filed at 6/7/2024 0900  Gross per 24 hour   Intake 1599.92 ml   Output 2605 ml   Net -1005.08 ml        Lines/Drains/Airways       Peripherally Inserted Central Catheter Line  Duration             PICC Triple Lumen 06/05/24 1912 left brachial 1 day              Drain  Duration                  NG/OG Tube 06/05/24 1419 Center mouth 1 day         Urethral Catheter 06/05/24 1500 16 Fr. 1 day              Airway  Duration                  Airway - Non-Surgical 06/05/24 1420 Endotracheal Tube 1 day              Arterial Line  Duration             Arterial Line 06/05/24 1501 Right Radial 1 day              Peripheral Intravenous Line  Duration                  Peripheral IV - Single Lumen 06/05/24 0632 18 G;1 3/4 in Anterior;Right Upper Arm 2 days         Peripheral IV - Single Lumen 06/05/24 1500 22 G Anterior;Right Shoulder 1 day                       Physical Exam  Constitutional:       Appearance: He is well-developed.   HENT:      Head: Normocephalic and atraumatic.   Eyes:      Conjunctiva/sclera: Conjunctivae normal.      Pupils: Pupils are equal, round, and reactive to light.   Cardiovascular:      Rate and Rhythm: Normal rate.      Pulses: Intact distal pulses.      Heart sounds: Normal heart sounds.   Pulmonary:      Effort: Pulmonary effort is normal.      Breath sounds: Normal breath sounds.   Abdominal:      General: Bowel sounds are normal.      Palpations: Abdomen is soft.   Musculoskeletal:         General: Normal range of motion.      Cervical back: Normal range of motion and neck supple.   Skin:     General: Skin is warm and dry.            Significant Labs: All pertinent lab results from the last 24 hours have been reviewed.    Significant Imaging: Echocardiogram: 2D echo with color flow doppler: No results found for this or any previous visit.  Assessment and Plan:     Brief HPI:     * Acute hypoxemic respiratory failure        Elevated troponin        Permanent atrial fibrillation    AFib is rate controlled today.  Continue to monitor.    Acute on chronic diastolic congestive heart  failure  Patient is identified as having Diastolic (HFpEF) heart failure that is Acute on chronic. CHF is currently uncontrolled  Dyspnea not returned to baseline after  doses of IV diuretic, and Rales/crackles on pulmonary exam. Latest ECHO performed and demonstrates- Results for orders placed during the hospital encounter of 03/15/24    Echo    Interpretation Summary    Left Ventricle: The left ventricle is normal in size. There is moderate concentric hypertrophy. There is low normal systolic function with a visually estimated ejection fraction of 50 - 55%.    Right Ventricle: Mild right ventricular enlargement. Systolic function is normal.    Left Atrium: Left atrium is severely dilated.    Right Atrium: Right atrium is moderately dilated.    Aortic Valve: There is mild aortic valve sclerosis.    Mitral Valve: There is moderate regurgitation.    Tricuspid Valve: There is moderate regurgitation.    Pulmonary Artery: The estimated pulmonary artery systolic pressure is 51 mmHg.    IVC/SVC: Elevated venous pressure at 15 mmHg.  . Continue Furosemide and monitor clinical status closely. Monitor on telemetry. Patient is on CHF pathway.  Monitor strict Is&Os and daily weights.  Place on fluid restriction of 1.5 L. Cardiology has been consulted. Continue to stress to patient importance of self efficacy and  on diet for CHF. Last BNP reviewed- and noted below   Recent Labs   Lab 06/05/24  0618   *          Patient also has acute on chronic kidney injury.  Monitor renal function closely.   Continue IV diuresis.    6/6:  continues to be intubated.  Monitor renal function and diuresis.  As needed and tolerated.  6/7/24 Diuresis and afterload recution as tolerated. Wean pressors and dobutamine as tolerated    Type 2 diabetes mellitus, with long-term current use of insulin            VTE Risk Mitigation (From admission, onward)           Ordered     enoxaparin injection 90 mg  Every 12 hours         06/05/24 3871                     Jonathan Lopez MD  Cardiology  [facility] - [place] - Intensive Care

## 2024-06-07 NOTE — PLAN OF CARE
Problem: Adult Inpatient Plan of Care  Goal: Plan of Care Review  Outcome: Progressing  Goal: Patient-Specific Goal (Individualized)  Outcome: Progressing  Goal: Absence of Hospital-Acquired Illness or Injury  Outcome: Progressing  Goal: Optimal Comfort and Wellbeing  Outcome: Progressing  Goal: Readiness for Transition of Care  Outcome: Progressing     Problem: Diabetes Comorbidity  Goal: Blood Glucose Level Within Targeted Range  Outcome: Progressing     Problem: Restraint, Nonviolent  Goal: Absence of Harm or Injury  Outcome: Progressing

## 2024-06-07 NOTE — PROGRESS NOTES
[facility] - [place] Intensive Care  Critical Care Medicine  Progress Note    Patient Name: Marck Madison  MRN: [id number]  Admission Date: 6/5/2024  Hospital Length of Stay: 2 days  Code Status: Full Code  Attending Provider: Thang Del Angel III, MD  Primary Care Provider: [facility] -   Principal Problem: Acute hypoxemic respiratory failure    Subjective:     HPI:  65 yo male with chart history of dilated CM but recent preserved EF, Afib, EtOH abuse who presented with severe dyspnea and Afib RVR. He was started on Amio with good control of his HR. Placed on BiPAP for WOB. He was initially looking comforatable on BiPAP but in severe distress off. I was called urgently to assess increased WOB. He was noted to have RR up to 60 with MV over 60L, then would pass out and have no resp efforts. He was able to initial speak and felt extremely dyspneic but otherwise denied complaints. Not able to provide full history 2/2 respiratory distress.    Hospital/ICU Course:  No notes on file    Interval History: Intubated and sedated. Good UOP. No longer cold and clammy. Tolerates stopping dobutamine well. Levophed dose is stable at low to moderate dose. Remains rate controlled off amio.    Discussed case in detail with hospitalist.       Objective:     Vital Signs (Most Recent):  Temp: 98.2 °F (36.8 °C) (06/07/24 1101)  Pulse: 100 (06/07/24 1400)  Resp: (!) 21 (06/07/24 1400)  BP: 101/60 (06/07/24 0301)  SpO2: 98 % (06/07/24 1400) Vital Signs (24h Range):  Temp:  [98 °F (36.7 °C)-99.1 °F (37.3 °C)] 98.2 °F (36.8 °C)  Pulse:  [] 100  Resp:  [18-49] 21  SpO2:  [98 %-100 %] 98 %  BP: ()/(55-69) 101/60  Arterial Line BP: ()/(52-78) 113/66     Weight: 88.7 kg (195 lb 8.8 oz)  Body mass index is 31.56 kg/m².      Intake/Output Summary (Last 24 hours) at 6/7/2024 1416  Last data filed at 6/7/2024 1403  Gross per 24 hour   Intake 1830.38 ml   Output 2460 ml   Net -629.62 ml        Physical Exam  Vitals reviewed.    Constitutional:       Appearance: He is ill-appearing.   HENT:      Head: Normocephalic and atraumatic.      Mouth/Throat:      Mouth: Mucous membranes are moist.   Eyes:      Pupils: Pupils are equal, round, and reactive to light.   Neck:      Comments: JVD  Cardiovascular:      Rate and Rhythm: Normal rate. Rhythm irregular.      Pulses: Normal pulses.      Heart sounds: Normal heart sounds. No murmur heard.  Pulmonary:      Breath sounds: No stridor. No wheezing.      Comments: Synchronous with vent  Abdominal:      General: There is distension.      Palpations: Abdomen is soft.   Musculoskeletal:      Right lower leg: Edema present.      Left lower leg: Edema present.   Skin:     General: Skin is dry.      Capillary Refill: Capillary refill takes less than 2 seconds.   Neurological:      Mental Status: He is alert.      Comments: Sedated, responds to commands           Review of Systems    Vents:  Vent Mode: PRVC (06/07/24 1210)  Ventilator Initiated: Yes (06/05/24 1443)  Set Rate: 18 BPM (06/07/24 1210)  Vt Set: 390 mL (06/07/24 1210)  PEEP/CPAP: 5 cmH20 (06/07/24 1210)  Oxygen Concentration (%): 40 (06/07/24 1400)  Peak Airway Pressure: 16.3 cmH20 (06/07/24 1210)  Total Ve: 7.9 L/m (06/07/24 1210)  F/VT Ratio<105 (RSBI): (!) 42.6 (06/07/24 1210)    Lines/Drains/Airways       Peripherally Inserted Central Catheter Line  Duration             PICC Triple Lumen 06/05/24 1912 left brachial 1 day              Drain  Duration                  NG/OG Tube 06/05/24 1419 Center mouth 1 day         Urethral Catheter 06/05/24 1500 16 Fr. 1 day              Airway  Duration                  Airway - Non-Surgical 06/05/24 1420 Endotracheal Tube 1 day              Arterial Line  Duration             Arterial Line 06/05/24 1501 Right Radial 1 day              Peripheral Intravenous Line  Duration                  Peripheral IV - Single Lumen 06/05/24 0632 18 G;1 3/4 in Anterior;Right Upper Arm 2 days         Peripheral IV -  Single Lumen 06/05/24 1500 22 G Anterior;Right Shoulder 1 day                    Significant Labs:    CBC/Anemia Profile:  Recent Labs   Lab 06/06/24  0129 06/07/24 0228   WBC 8.73 9.21   HGB 14.3 14.9   HCT 42.3 44.8    181   MCV 89 92   RDW 16.0* 16.6*        Chemistries:  Recent Labs   Lab 06/06/24  0129 06/06/24 1959 06/07/24 0228    142 144   K 3.4* 4.4 3.8    107 108   CO2 21* 23 23   BUN 31* 30* 30*   CREATININE 2.0* 1.9* 1.9*   CALCIUM 8.6* 8.4* 8.3*   ALBUMIN 3.4*  --  3.3*   PROT 6.5  --  6.6   BILITOT 0.8  --  0.6   ALKPHOS 63  --  71   ALT 29  --  31   AST 47*  --  27   MG  --   --  1.9       ABGs:   Recent Labs   Lab 06/07/24  1131   PH 7.400   PCO2 47.8*   HCO3 29.6*   POCSATURATED 98   BE 4*     All pertinent labs within the past 24 hours have been reviewed.    Significant Imaging:  I have reviewed all pertinent imaging results/findings within the past 24 hours.  CXR: I have reviewed all pertinent results/findings within the past 24 hours and my personal findings are:  none new  Echo: I have reviewed all pertinent results/findings within the past 24 hours and my personal findings are:  none new  Tely shows Afib with rate control    ABG  Recent Labs   Lab 06/07/24  1131   PH 7.400   PO2 114*   PCO2 47.8*   HCO3 29.6*   BE 4*     Assessment/Plan:     Pulmonary  * Acute hypoxemic respiratory failure  Progressive worsening on BiPAP. Did not respond to increased EPAP. With variable resp efforts and a clear component of severe resp alkalosis, proceeded with intubation. Requiring high PEEP and high FiO2 to maintain oxygenation.     Vent dependent. Vent adjusted at bedside  LPVS.  PEEP reduced to 5  PRVC for synchrony  Continue aggressive diuresis  Daily SAT/SBT- currently RR up to 40s rapidly    Cardiac/Vascular  CAD (coronary artery disease)  Reviewed recent [facility]. Current presentation out of proportion to reported disease burden.    Acute on chronic diastolic congestive heart  failure  EF improved on Dobutamine. Cardiology note reviewed, possible 2/2 Afib RVR    Cardiogenic shock  Levophed to maintain MAP >60  Good response to dobutamine and overall appears improved. Will stop dobutamine and monitor        Renal/  FARHAN (acute kidney injury)  UOP improved with dobutamine. Cr improving. Monitor. Avoid nephrotoxins  Monitor UOP off dobutamine        Critical Care Time: 45 minutes  Critical secondary to Patient has a condition that poses threat to life and bodily function: Acute hypoxic respiratory failure, cardiogenic shock      Critical care was time spent personally by me on the following activities: development of treatment plan with patient or surrogate and bedside caregivers, discussions with consultants, evaluation of patient's response to treatment, examination of patient, ordering and performing treatments and interventions, ordering and review of laboratory studies, ordering and review of radiographic studies, pulse oximetry, re-evaluation of patient's condition. This critical care time did not overlap with that of any other provider or involve time for any procedures.     Bj Nicholas MD  Critical Care Medicine  [facility] - [place] - Intensive Care

## 2024-06-07 NOTE — PROGRESS NOTES
[facility] - Intensive Care  Cardiology  Progress Note    Patient Name: Marck Madison  MRN: [id number]  Admission Date: 6/5/2024  Hospital Length of Stay: 1 days  Code Status: Full Code   Attending Physician: Kulwant Nesbitt MD   Primary Care Physician: [facility] -  Expected Discharge Date:   Principal Problem:Acute hypoxemic respiratory failure    Subjective:     Interval History:  continues to be intubated    Review of Systems   Unable to perform ROS: Intubated     Objective:     Vital Signs (Most Recent):  Temp: 99.1 °F (37.3 °C) (06/06/24 1901)  Pulse: 101 (06/06/24 1901)  Resp: (!) 27 (06/06/24 1901)  BP: 106/69 (06/06/24 1901)  SpO2: 100 % (06/06/24 1901) Vital Signs (24h Range):  Temp:  [96.7 °F (35.9 °C)-99.1 °F (37.3 °C)] 99.1 °F (37.3 °C)  Pulse:  [] 101  Resp:  [17-49] 27  SpO2:  [98 %-100 %] 100 %  BP: (106-125)/(69-78) 106/69  Arterial Line BP: ()/(61-92) 106/69     Weight: 89.4 kg (197 lb 1.5 oz)  Body mass index is 31.81 kg/m².     SpO2: 100 %         Intake/Output Summary (Last 24 hours) at 6/6/2024 1936  Last data filed at 6/6/2024 1901  Gross per 24 hour   Intake 1412.49 ml   Output 5035 ml   Net -3622.51 ml       Lines/Drains/Airways       Peripherally Inserted Central Catheter Line  Duration             PICC Triple Lumen 06/05/24 1912 left brachial 1 day              Drain  Duration                  NG/OG Tube 06/05/24 1419 Center mouth 1 day         Urethral Catheter 06/05/24 1500 16 Fr. 1 day              Airway  Duration                  Airway - Non-Surgical 06/05/24 1420 Endotracheal Tube 1 day              Arterial Line  Duration             Arterial Line 06/05/24 1501 Right Radial 1 day              Peripheral Intravenous Line  Duration                  Peripheral IV - Single Lumen 06/05/24 0632 18 G;1 3/4 in Anterior;Right Upper Arm 1 day         Peripheral IV - Single Lumen 06/05/24 1500 22 G Anterior;Right Shoulder 1 day                       Physical  Exam  Vitals reviewed.   Constitutional:       Interventions: He is intubated.   HENT:      Head: Normocephalic.   Eyes:      Conjunctiva/sclera: Conjunctivae normal.      Pupils: Pupils are equal, round, and reactive to light.   Cardiovascular:      Rate and Rhythm: Rhythm irregular.      Heart sounds: Normal heart sounds.   Pulmonary:      Effort: Pulmonary effort is normal. He is intubated.      Breath sounds: Rales present.   Abdominal:      General: Bowel sounds are normal.      Palpations: Abdomen is soft.   Musculoskeletal:      Cervical back: Normal range of motion and neck supple.   Skin:     General: Skin is warm.   Neurological:      Mental Status: He is oriented to person, place, and time.            Significant Labs:     DATA:     Laboratory:  CBC:  Recent Labs   Lab 03/19/24  0445 05/06/24  0711 06/05/24  0618 06/06/24  0129   WBC 10.85 7.4 8.03 8.73   Hemoglobin 14.2 14.9 15.0 14.3   Hematocrit 43.9 43.2 44.4 42.3   Platelets 166  --  184 159       CHEMISTRIES:  Recent Labs   Lab 03/29/22  0913 06/30/22  0947 07/01/22  0416 09/17/22  2300 09/20/22  0425 09/21/22  0439 05/18/23  0425 05/19/23  0429 05/20/23  0550 03/15/24  1005 03/15/24  1010 03/16/24  0442 06/05/24  0618 06/05/24  1604 06/06/24  0129   Glucose 104 174 H 183 H   < > 115 H   < >  --   --   --    < >  --    < > 134 H 152 H 121 H   Sodium 138 141 141   < > 137   < > 137 138 138   < >  --    < > 141 139 142   Potassium 4.5 3.6 3.6   < > 3.9   < > 3.3 L 3.5 3.5   < >  --    < > 4.0 4.1 3.4 L   BUN 20 14 21   < > 23   < > 24.5 H 20.2 16.3   < >  --    < > 25 H 30 H 31 H   Creatinine 1.2 1.1 1.4   < > 1.4   < > 1.75 H 1.58 H 1.31 H   < >  --    < > 1.7 H 2.5 H 2.0 H   eGFR if  >60 >60.0 >60.0  --   --   --   --   --   --   --   --   --   --   --   --    eGFR   --   --   --   --   --    < > 43 L 48 L 60 L  --   --   --   --   --   --    eGFR if non African American >60 >60.0 52.7 A  --   --   --   --   --   --    --   --   --   --   --   --    Calcium 9.2 9.1 8.7   < > 8.8   < > 8.0 L 8.3 9.3   < >  --    < > 8.9 8.5 L 8.6 L   Magnesium  --   --  1.6   < > 1.8  --   --   --   --   --  1.8  --  1.4 L  --   --     < > = values in this interval not displayed.       CARDIAC BIOMARKERS:  Recent Labs   Lab 06/09/21  1220 06/09/21  1748 07/01/22  0003 09/17/22  2300 05/17/23  1030 03/15/24  1005 03/16/24  0042 06/05/24  0618 06/05/24  1035    H  --  597 H  --   --   --   --   --   --    CK  --   --   --   --  860 H  --   --   --   --    Troponin I 0.235 H   < >  --    < >  --    < > 0.182 H 0.167 H 0.183 H    < > = values in this interval not displayed.       COAGS:  Recent Labs   Lab 12/17/22  2014 03/15/24  1010 06/05/24  0618   INR 1.6 1.2 1.2       LIPIDS/LFTS:  Recent Labs   Lab 03/15/24  1005 06/05/24  0618 06/06/24  0129   AST 22 31 47 H   ALT 14 17 29       Hemoglobin A1C   Date Value Ref Range Status   06/05/2024 6.7 (H) 4.0 - 5.6 % Final     Comment:     ADA Screening Guidelines:  5.7-6.4%  Consistent with prediabetes  >or=6.5%  Consistent with diabetes    High levels of fetal hemoglobin interfere with the HbA1C  assay. Heterozygous hemoglobin variants (HbS, HgC, etc)do  not significantly interfere with this assay.   However, presence of multiple variants may affect accuracy.     03/15/2024 7.1 (H) 4.0 - 5.6 % Final     Comment:     ADA Screening Guidelines:  5.7-6.4%  Consistent with prediabetes  >or=6.5%  Consistent with diabetes    High levels of fetal hemoglobin interfere with the HbA1C  assay. Heterozygous hemoglobin variants (HbS, HgC, etc)do  not significantly interfere with this assay.   However, presence of multiple variants may affect accuracy.     03/15/2024 7.1 (H) 4.0 - 5.6 % Final     Comment:     ADA Screening Guidelines:  5.7-6.4%  Consistent with prediabetes  >or=6.5%  Consistent with diabetes    High levels of fetal hemoglobin interfere with the HbA1C  assay. Heterozygous hemoglobin variants (HbS,  HgC, etc)do  not significantly interfere with this assay.   However, presence of multiple variants may affect accuracy.         TSH  Recent Labs   Lab 01/26/22  1225 09/17/22  2300 12/01/22  1306   TSH 1.086 0.417 1.14       The ASCVD Risk score (Eugenio MAX, et al., 2019) failed to calculate for the following reasons:    The patient has a prior MI or stroke diagnosis       BNP    Lab Results   Component Value Date/Time     (H) 06/05/2024 06:18 AM     (H) 03/15/2024 10:05 AM     (H) 09/17/2022 11:00 PM    BNP 56 06/30/2022 09:47 AM     (H) 01/26/2022 12:25 PM     (H) 11/21/2021 12:24 AM    BNP 94 07/28/2021 09:05 PM     (H) 07/01/2021 06:08 AM     (H) 06/12/2021 05:38 AM     (H) 06/11/2021 02:00 AM     (H) 06/09/2021 12:28 PM     (H) 01/20/2020 05:31 PM     (H) 09/10/2019 12:32 AM            ECHO    Results for orders placed during the hospital encounter of 06/05/24    Echo    Interpretation Summary    Left Ventricle: The left ventricle is normal in size. Normal wall thickness. There is concentric hypertrophy. There is normal systolic function with a visually estimated ejection fraction of 55 %. Unable to assess diastolic function due to atrial fibrillation.    Right Ventricle: Normal right ventricular cavity size. Systolic function is normal.    Left Atrium: Left atrium is severely dilated.    Right Atrium: Right atrium is mildly dilated.    Mitral Valve: There is mild to moderate regurgitation.    Tricuspid Valve: There is mild regurgitation.    IVC/SVC: Patient is ventilated, cannot use IVC diameter to estimate right atrial pressure.    Tds      STRESS TEST    Results for orders placed during the hospital encounter of 01/20/20    Nuclear Stress - Cardiology Interpreted    Interpretation Summary    The perfusion scan is free of evidence from myocardial ischemia or injury.    There is a  mild intensity fixed defect in the inferior wall of  the left ventricle secondary to diaphragm attenuation.    Visually estimated ejection fraction is mildly depressed at stress.    The EKG portion of this study is negative for ischemia.    The patient reported no chest pain during the stress test.    Arrhythmias during stress: occasional PACs, occasional PVCs.    The blood pressure response to stress was normal.        CATH    Results for orders placed during the hospital encounter of 01/26/22    Intra-Procedure Documentation    Narrative  ESTEBAN performed in the Invasive Lab  - See Procedure Log link below for nursing documentation  - See ESTEBAN order on Card Proc Tab for physician findings      Assessment and Plan:     Brief HPI:     * Acute hypoxemic respiratory failure        Elevated troponin        Permanent atrial fibrillation    AFib is rate controlled today.  Continue to monitor.    Acute on chronic diastolic congestive heart failure  Patient is identified as having Diastolic (HFpEF) heart failure that is Acute on chronic. CHF is currently uncontrolled  Dyspnea not returned to baseline after  doses of IV diuretic, and Rales/crackles on pulmonary exam. Latest ECHO performed and demonstrates- Results for orders placed during the hospital encounter of 03/15/24    Echo    Interpretation Summary    Left Ventricle: The left ventricle is normal in size. There is moderate concentric hypertrophy. There is low normal systolic function with a visually estimated ejection fraction of 50 - 55%.    Right Ventricle: Mild right ventricular enlargement. Systolic function is normal.    Left Atrium: Left atrium is severely dilated.    Right Atrium: Right atrium is moderately dilated.    Aortic Valve: There is mild aortic valve sclerosis.    Mitral Valve: There is moderate regurgitation.    Tricuspid Valve: There is moderate regurgitation.    Pulmonary Artery: The estimated pulmonary artery systolic pressure is 51 mmHg.    IVC/SVC: Elevated venous pressure at 15 mmHg.  . Continue  Furosemide and monitor clinical status closely. Monitor on telemetry. Patient is on CHF pathway.  Monitor strict Is&Os and daily weights.  Place on fluid restriction of 1.5 L. Cardiology has been consulted. Continue to stress to patient importance of self efficacy and  on diet for CHF. Last BNP reviewed- and noted below   Recent Labs   Lab 06/05/24  0618   *          Patient also has acute on chronic kidney injury.  Monitor renal function closely.   Continue IV diuresis.    6/6:  continues to be intubated.  Monitor renal function and diuresis.  As needed and tolerated.    Type 2 diabetes mellitus, with long-term current use of insulin            VTE Risk Mitigation (From admission, onward)           Ordered     enoxaparin injection 90 mg  Every 12 hours         06/05/24 0228                    Ivelisse Logan MD  Cardiology  [facility] [place] - Intensive Care    Critical Care Time:  35 minutes     Critical care was time spent personally by me on the following activities: development of treatment plan with patient or surrogate and bedside caregivers, discussions with consultants, evaluation of patient's response to treatment, examination of patient, ordering and performing treatments and interventions, ordering and review of laboratory studies, ordering and review of radiographic studies, pulse oximetry, re-evaluation of patient's condition. This critical care time did not overlap with that of any other provider or involve time for any procedures.

## 2024-06-07 NOTE — ASSESSMENT & PLAN NOTE
Patient with Hypoxic Respiratory failure which is Acute.  he is not on home oxygen. Supplemental oxygen was provided and noted- Vent Mode: PRVC  Oxygen Concentration (%):  [50-80] 50  Resp Rate Total:  [22 br/min-30 br/min] 30 br/min  Vt Set:  [390 mL] 390 mL  PEEP/CPAP:  [8 lpX54-61 cmH20] 8 cmH20  Mean Airway Pressure:  [10.6 gzL20-20.3 cmH20] 10.6 cmH20    .   Signs/symptoms of respiratory failure include- tachypnea, increased work of breathing, and respiratory distress. Contributing diagnoses includes - CHF Labs and images were reviewed. Patient Has recent ABG, which has been reviewed. Will treat underlying causes and adjust management of respiratory failure as follows-   - Bipap initially --> now intubated for worsening hypoxia and respiratory distress  - continue with IV lasix, dobutamine   - Cardiology following

## 2024-06-07 NOTE — ASSESSMENT & PLAN NOTE
Patient is identified as having Diastolic (HFpEF) heart failure that is Acute on chronic. CHF is currently uncontrolled  Dyspnea not returned to baseline after  doses of IV diuretic, and Rales/crackles on pulmonary exam. Latest ECHO performed and demonstrates- Results for orders placed during the hospital encounter of 03/15/24    Echo    Interpretation Summary    Left Ventricle: The left ventricle is normal in size. There is moderate concentric hypertrophy. There is low normal systolic function with a visually estimated ejection fraction of 50 - 55%.    Right Ventricle: Mild right ventricular enlargement. Systolic function is normal.    Left Atrium: Left atrium is severely dilated.    Right Atrium: Right atrium is moderately dilated.    Aortic Valve: There is mild aortic valve sclerosis.    Mitral Valve: There is moderate regurgitation.    Tricuspid Valve: There is moderate regurgitation.    Pulmonary Artery: The estimated pulmonary artery systolic pressure is 51 mmHg.    IVC/SVC: Elevated venous pressure at 15 mmHg.  . Continue Furosemide and monitor clinical status closely. Monitor on telemetry. Patient is on CHF pathway.  Monitor strict Is&Os and daily weights.  Place on fluid restriction of 1.5 L. Cardiology has been consulted. Continue to stress to patient importance of self efficacy and  on diet for CHF. Last BNP reviewed- and noted below   Recent Labs   Lab 06/05/24  0618   *          Patient also has acute on chronic kidney injury.  Monitor renal function closely.   Continue IV diuresis.    6/6:  continues to be intubated.  Monitor renal function and diuresis.  As needed and tolerated.  6/7/24 Diuresis and afterload recution as tolerated. Wean pressors and dobutamine as tolerated

## 2024-06-07 NOTE — ASSESSMENT & PLAN NOTE
A1c:   Lab Results   Component Value Date    HGBA1C 6.7 (H) 06/05/2024     Meds: SSI PRN to maintain goal 140-180  ADA diet, accuchecks ACHS, hypoglycemic protocol

## 2024-06-07 NOTE — SUBJECTIVE & OBJECTIVE
Interval History:  continues to be intubated    Review of Systems   Unable to perform ROS: Intubated     Objective:     Vital Signs (Most Recent):  Temp: 99.1 °F (37.3 °C) (06/06/24 1901)  Pulse: 101 (06/06/24 1901)  Resp: (!) 27 (06/06/24 1901)  BP: 106/69 (06/06/24 1901)  SpO2: 100 % (06/06/24 1901) Vital Signs (24h Range):  Temp:  [96.7 °F (35.9 °C)-99.1 °F (37.3 °C)] 99.1 °F (37.3 °C)  Pulse:  [] 101  Resp:  [17-49] 27  SpO2:  [98 %-100 %] 100 %  BP: (106-125)/(69-78) 106/69  Arterial Line BP: ()/(61-92) 106/69     Weight: 89.4 kg (197 lb 1.5 oz)  Body mass index is 31.81 kg/m².     SpO2: 100 %         Intake/Output Summary (Last 24 hours) at 6/6/2024 1936  Last data filed at 6/6/2024 1901  Gross per 24 hour   Intake 1412.49 ml   Output 5035 ml   Net -3622.51 ml       Lines/Drains/Airways       Peripherally Inserted Central Catheter Line  Duration             PICC Triple Lumen 06/05/24 1912 left brachial 1 day              Drain  Duration                  NG/OG Tube 06/05/24 1419 Center mouth 1 day         Urethral Catheter 06/05/24 1500 16 Fr. 1 day              Airway  Duration                  Airway - Non-Surgical 06/05/24 1420 Endotracheal Tube 1 day              Arterial Line  Duration             Arterial Line 06/05/24 1501 Right Radial 1 day              Peripheral Intravenous Line  Duration                  Peripheral IV - Single Lumen 06/05/24 0632 18 G;1 3/4 in Anterior;Right Upper Arm 1 day         Peripheral IV - Single Lumen 06/05/24 1500 22 G Anterior;Right Shoulder 1 day                       Physical Exam  Vitals reviewed.   Constitutional:       Interventions: He is intubated.   HENT:      Head: Normocephalic.   Eyes:      Conjunctiva/sclera: Conjunctivae normal.      Pupils: Pupils are equal, round, and reactive to light.   Cardiovascular:      Rate and Rhythm: Rhythm irregular.      Heart sounds: Normal heart sounds.   Pulmonary:      Effort: Pulmonary effort is normal. He is  intubated.      Breath sounds: Rales present.   Abdominal:      General: Bowel sounds are normal.      Palpations: Abdomen is soft.   Musculoskeletal:      Cervical back: Normal range of motion and neck supple.   Skin:     General: Skin is warm.   Neurological:      Mental Status: He is oriented to person, place, and time.            Significant Labs:     DATA:     Laboratory:  CBC:  Recent Labs   Lab 03/19/24  0445 05/06/24  0711 06/05/24  0618 06/06/24  0129   WBC 10.85 7.4 8.03 8.73   Hemoglobin 14.2 14.9 15.0 14.3   Hematocrit 43.9 43.2 44.4 42.3   Platelets 166  --  184 159       CHEMISTRIES:  Recent Labs   Lab 03/29/22  0913 06/30/22  0947 07/01/22  0416 09/17/22  2300 09/20/22  0425 09/21/22  0439 05/18/23  0425 05/19/23  0429 05/20/23  0550 03/15/24  1005 03/15/24  1010 03/16/24  0442 06/05/24  0618 06/05/24  1604 06/06/24  0129   Glucose 104 174 H 183 H   < > 115 H   < >  --   --   --    < >  --    < > 134 H 152 H 121 H   Sodium 138 141 141   < > 137   < > 137 138 138   < >  --    < > 141 139 142   Potassium 4.5 3.6 3.6   < > 3.9   < > 3.3 L 3.5 3.5   < >  --    < > 4.0 4.1 3.4 L   BUN 20 14 21   < > 23   < > 24.5 H 20.2 16.3   < >  --    < > 25 H 30 H 31 H   Creatinine 1.2 1.1 1.4   < > 1.4   < > 1.75 H 1.58 H 1.31 H   < >  --    < > 1.7 H 2.5 H 2.0 H   eGFR if  >60 >60.0 >60.0  --   --   --   --   --   --   --   --   --   --   --   --    eGFR   --   --   --   --   --    < > 43 L 48 L 60 L  --   --   --   --   --   --    eGFR if non African American >60 >60.0 52.7 A  --   --   --   --   --   --   --   --   --   --   --   --    Calcium 9.2 9.1 8.7   < > 8.8   < > 8.0 L 8.3 9.3   < >  --    < > 8.9 8.5 L 8.6 L   Magnesium  --   --  1.6   < > 1.8  --   --   --   --   --  1.8  --  1.4 L  --   --     < > = values in this interval not displayed.       CARDIAC BIOMARKERS:  Recent Labs   Lab 06/09/21  1220 06/09/21  1748 07/01/22  0003 09/17/22  2300 05/17/23  1030 03/15/24  1005  03/16/24  0042 06/05/24 0618 06/05/24  1035    H  --  597 H  --   --   --   --   --   --    CK  --   --   --   --  860 H  --   --   --   --    Troponin I 0.235 H   < >  --    < >  --    < > 0.182 H 0.167 H 0.183 H    < > = values in this interval not displayed.       COAGS:  Recent Labs   Lab 12/17/22  2014 03/15/24  1010 06/05/24 0618   INR 1.6 1.2 1.2       LIPIDS/LFTS:  Recent Labs   Lab 03/15/24  1005 06/05/24 0618 06/06/24  0129   AST 22 31 47 H   ALT 14 17 29       Hemoglobin A1C   Date Value Ref Range Status   06/05/2024 6.7 (H) 4.0 - 5.6 % Final     Comment:     ADA Screening Guidelines:  5.7-6.4%  Consistent with prediabetes  >or=6.5%  Consistent with diabetes    High levels of fetal hemoglobin interfere with the HbA1C  assay. Heterozygous hemoglobin variants (HbS, HgC, etc)do  not significantly interfere with this assay.   However, presence of multiple variants may affect accuracy.     03/15/2024 7.1 (H) 4.0 - 5.6 % Final     Comment:     ADA Screening Guidelines:  5.7-6.4%  Consistent with prediabetes  >or=6.5%  Consistent with diabetes    High levels of fetal hemoglobin interfere with the HbA1C  assay. Heterozygous hemoglobin variants (HbS, HgC, etc)do  not significantly interfere with this assay.   However, presence of multiple variants may affect accuracy.     03/15/2024 7.1 (H) 4.0 - 5.6 % Final     Comment:     ADA Screening Guidelines:  5.7-6.4%  Consistent with prediabetes  >or=6.5%  Consistent with diabetes    High levels of fetal hemoglobin interfere with the HbA1C  assay. Heterozygous hemoglobin variants (HbS, HgC, etc)do  not significantly interfere with this assay.   However, presence of multiple variants may affect accuracy.         TSH  Recent Labs   Lab 01/26/22  1225 09/17/22  2300 12/01/22  1306   TSH 1.086 0.417 1.14       The ASCVD Risk score (Eugenio MAX, et al., 2019) failed to calculate for the following reasons:    The patient has a prior MI or stroke diagnosis        BNP    Lab Results   Component Value Date/Time     (H) 06/05/2024 06:18 AM     (H) 03/15/2024 10:05 AM     (H) 09/17/2022 11:00 PM    BNP 56 06/30/2022 09:47 AM     (H) 01/26/2022 12:25 PM     (H) 11/21/2021 12:24 AM    BNP 94 07/28/2021 09:05 PM     (H) 07/01/2021 06:08 AM     (H) 06/12/2021 05:38 AM     (H) 06/11/2021 02:00 AM     (H) 06/09/2021 12:28 PM     (H) 01/20/2020 05:31 PM     (H) 09/10/2019 12:32 AM            ECHO    Results for orders placed during the hospital encounter of 06/05/24    Echo    Interpretation Summary    Left Ventricle: The left ventricle is normal in size. Normal wall thickness. There is concentric hypertrophy. There is normal systolic function with a visually estimated ejection fraction of 55 %. Unable to assess diastolic function due to atrial fibrillation.    Right Ventricle: Normal right ventricular cavity size. Systolic function is normal.    Left Atrium: Left atrium is severely dilated.    Right Atrium: Right atrium is mildly dilated.    Mitral Valve: There is mild to moderate regurgitation.    Tricuspid Valve: There is mild regurgitation.    IVC/SVC: Patient is ventilated, cannot use IVC diameter to estimate right atrial pressure.    Tds      STRESS TEST    Results for orders placed during the hospital encounter of 01/20/20    Nuclear Stress - Cardiology Interpreted    Interpretation Summary    The perfusion scan is free of evidence from myocardial ischemia or injury.    There is a  mild intensity fixed defect in the inferior wall of the left ventricle secondary to diaphragm attenuation.    Visually estimated ejection fraction is mildly depressed at stress.    The EKG portion of this study is negative for ischemia.    The patient reported no chest pain during the stress test.    Arrhythmias during stress: occasional PACs, occasional PVCs.    The blood pressure response to stress was  normal.        CATH    Results for orders placed during the hospital encounter of 01/26/22    Intra-Procedure Documentation    Narrative  ESTEBAN performed in the Invasive Lab  - See Procedure Log link below for nursing documentation  - See ESTEBAN order on Card Proc Tab for physician findings

## 2024-06-07 NOTE — ASSESSMENT & PLAN NOTE
Progressive worsening on BiPAP. Did not respond to increased EPAP. With variable resp efforts and a clear component of severe resp alkalosis, proceeded with intubation. Requiring high PEEP and high FiO2 to maintain oxygenation.     Vent dependent. Vent adjusted at bedside  LPVS.  PEEP reduced to 5  PRVC for synchrony  Continue aggressive diuresis  Daily SAT/SBT- currently RR up to 40s rapidly

## 2024-06-07 NOTE — PROGRESS NOTES
[facility]  Progress Note    Patient Name: Marck Madison  MRN: [id number]  Patient Class: IP- Inpatient   Admission Date: 6/5/2024  Length of Stay: 2 days  Attending Physician: Thang Del Angel III, MD  Primary Care Provider: [facility] -        Subjective:     Principal Problem:Acute hypoxemic respiratory failure        HPI:  Mr. Madison is a 64-year-old male with past medical history of hypertension, diabetes mellitus type 2, atrial fibrillation, CHF and nonobstructive coronary artery disease who presents to the emergency department for evaluation of shortness of breath.  He reports this has been ongoing and progressively worsening.  He endorses swelling in his extremities but denies any chest pain.  Denies any fevers or chills.  He recently had a left heart catheterization on 05/06/24 that shows nonobstructive coronary artery disease.  He reports compliance with Lasix at home.  In the emergency department, patient was found to be tachycardic with a rate of 133 and atrial fibrillation, respiratory rate of 24 and blood pressure 180/129 with a O2 saturation of 84%.  He was placed on BiPAP and given IV amiodarone with improvement of rate.  He was also given IV Lasix.  Cardiology consulted and he will be admitted to ICU for further management.    Overview/Hospital Course:  Mr. Madison is a 65 yo M admitted with acute hypoxemic respiratory failure secondary to CHF exacerbation. Also found to be in atrial fibrillation with RVR and placed on amiodarone drip. Developed worsening respiratory distress and was intubated by Pulmonary/CC. Became hypotensive, started on dobutamine and norepinephrine. Prior to PICC line, norepinpherine extravasated on left forearm, phentolamine injected and Wound Care consulted. Diuresing well.      Interval History: Pt intubated and sedated     Review of Systems  Objective:     Vital Signs (Most Recent):  Temp: 98 °F (36.7 °C) (06/07/24 0710)  Pulse:  67 (06/07/24 0808)  Resp: (!) 22 (06/07/24 0808)  BP: 101/60 (06/07/24 0301)  SpO2: 100 % (06/07/24 0808) Vital Signs (24h Range):  Temp:  [97.7 °F (36.5 °C)-99.1 °F (37.3 °C)] 98 °F (36.7 °C)  Pulse:  [] 67  Resp:  [17-49] 22  SpO2:  [99 %-100 %] 100 %  BP: ()/(55-69) 101/60  Arterial Line BP: ()/(55-92) 104/55     Weight: 88.7 kg (195 lb 8.8 oz)  Body mass index is 31.56 kg/m².    Intake/Output Summary (Last 24 hours) at 6/7/2024 0847  Last data filed at 6/7/2024 0800  Gross per 24 hour   Intake 1582.3 ml   Output 3135 ml   Net -1552.7 ml         Physical Exam      Constitutional:       General: He is not in acute distress.     Appearance: He is ill-appearing.      Interventions: He is sedated and intubated.   Cardiovascular:      Rate and Rhythm: Normal rate. Rhythm irregular.      Pulses: Normal pulses.   Pulmonary:      Effort: Pulmonary effort is normal. He is intubated.      Comments: Intubated, mechanically ventilated  Abdominal:      General: There is no distension.      Palpations: Abdomen is soft.   Musculoskeletal:      Comments: Edema in lower extremities improving     Significant Labs: All pertinent labs within the past 24 hours have been reviewed.    Significant Imaging: I have reviewed all pertinent imaging results/findings within the past 24 hours.    Assessment/Plan:      * Acute hypoxemic respiratory failure  Patient with Hypoxic Respiratory failure which is Acute.  he is not on home oxygen. Supplemental oxygen was provided and noted- Vent Mode: PRVC  Oxygen Concentration (%):  [50-80] 50  Resp Rate Total:  [22 br/min-30 br/min] 30 br/min  Vt Set:  [390 mL] 390 mL  PEEP/CPAP:  [8 byE20-37 cmH20] 8 cmH20  Mean Airway Pressure:  [10.6 otE36-09.3 cmH20] 10.6 cmH20    .   Signs/symptoms of respiratory failure include- tachypnea, increased work of breathing, and respiratory distress. Contributing diagnoses includes - CHF Labs and images were reviewed. Patient Has recent ABG, which has  been reviewed. Will treat underlying causes and adjust management of respiratory failure as follows-   - Bipap initially --> now intubated for worsening hypoxia and respiratory distress  - continue with IV lasix, dobutamine   - Cardiology following    Elevated troponin  Possible related to demand ischemia, patient had recent LHC with nonobstructive CAD on 5/6/24  - Cardiology consulted  - continue aspirin/statin    CAD (coronary artery disease)  Patient with known CAD that is nonobstructive (5/6/2024, which is controlled Will continue ASA and Statin and monitor for S/Sx of angina/ACS. Continue to monitor on telemetry.     Permanent atrial fibrillation  Patient with Permanent atrial fibrillation which is controlled currently with Amiodarone. Patient is currently in atrial fibrillation.[id number] Score: 3. . Anticoagulation indicated. Anticoagulation done with Lovenox .    - Off amiodarone due to bradycardia  - rate controlled currently    Acute on chronic diastolic congestive heart failure  Patient is identified as having Diastolic (HFpEF) heart failure that is Acute on chronic. CHF is currently . Latest ECHO performed and demonstrates- Results for orders placed during the hospital encounter of 03/15/24    Echo  Results for orders placed during the hospital encounter of 03/15/24    Echo    Interpretation Summary    Left Ventricle: The left ventricle is normal in size. There is moderate concentric hypertrophy. There is low normal systolic function with a visually estimated ejection fraction of 50 - 55%.    Right Ventricle: Mild right ventricular enlargement. Systolic function is normal.    Left Atrium: Left atrium is severely dilated.    Right Atrium: Right atrium is moderately dilated.    Aortic Valve: There is mild aortic valve sclerosis.    Mitral Valve: There is moderate regurgitation.    Tricuspid Valve: There is moderate regurgitation.    Pulmonary Artery: The estimated pulmonary artery systolic pressure is 51  mmHg.    IVC/SVC: Elevated venous pressure at 15 mmHg.      Recent Labs   Lab 06/05/24  0618   *         - Now in cardiogenic shock and increasing oxygen requirements. Intubated, diuresing well on lasix and dobutamine drip.  - continue with aggressive diuresis and wean as tolerated    Cardiogenic shock  See acute on chronic heart failure exacerbation      Type 2 diabetes mellitus, with long-term current use of insulin  A1c:   Lab Results   Component Value Date    HGBA1C 6.7 (H) 06/05/2024     Meds: SSI PRN to maintain goal 140-180  ADA diet, accuchecks ACHS, hypoglycemic protocol       FARHAN (acute kidney injury)  Patient with acute kidney injury/acute renal failure likely due to cardiogenic shock. FARHAN is currently stable. Baseline creatinine  1.5  - Labs reviewed- Renal function/electrolytes with Estimated Creatinine Clearance: 39.9 mL/min (A) (based on SCr of 1.9 mg/dL (H)). according to latest data. Monitor urine output and serial BMP and adjust therapy as needed. Avoid nephrotoxins and renally dose meds for GFR listed above.      VTE Risk Mitigation (From admission, onward)           Ordered     enoxaparin injection 90 mg  Every 12 hours         06/05/24 1131                    Discharge Planning   BALDO:      Code Status: Full Code   Is the patient medically ready for discharge?:     Reason for patient still in hospital (select all that apply): Treatment  Discharge Plan A: Home            Critical care time spent on the evaluation and treatment of severe organ dysfunction, review of pertinent labs and imaging studies, discussions with consulting providers and discussions with patient/family: 33 minutes.      Thang Del Angel III, MD  Department of Hospital Medicine   [facility] - [place] - Intensive Care

## 2024-06-07 NOTE — ASSESSMENT & PLAN NOTE
Patient is identified as having Diastolic (HFpEF) heart failure that is Acute on chronic. CHF is currently . Latest ECHO performed and demonstrates- Results for orders placed during the hospital encounter of 03/15/24    Echo  Results for orders placed during the hospital encounter of 03/15/24    Echo    Interpretation Summary    Left Ventricle: The left ventricle is normal in size. There is moderate concentric hypertrophy. There is low normal systolic function with a visually estimated ejection fraction of 50 - 55%.    Right Ventricle: Mild right ventricular enlargement. Systolic function is normal.    Left Atrium: Left atrium is severely dilated.    Right Atrium: Right atrium is moderately dilated.    Aortic Valve: There is mild aortic valve sclerosis.    Mitral Valve: There is moderate regurgitation.    Tricuspid Valve: There is moderate regurgitation.    Pulmonary Artery: The estimated pulmonary artery systolic pressure is 51 mmHg.    IVC/SVC: Elevated venous pressure at 15 mmHg.      Recent Labs   Lab 06/05/24  0618   *         - Now in cardiogenic shock and increasing oxygen requirements. Intubated, diuresing well on lasix and dobutamine drip.  - continue with aggressive diuresis and wean as tolerated

## 2024-06-07 NOTE — ASSESSMENT & PLAN NOTE
Patient with acute kidney injury/acute renal failure likely due to cardiogenic shock. FARHAN is currently stable. Baseline creatinine  1.5  - Labs reviewed- Renal function/electrolytes with Estimated Creatinine Clearance: 39.9 mL/min (A) (based on SCr of 1.9 mg/dL (H)). according to latest data. Monitor urine output and serial BMP and adjust therapy as needed. Avoid nephrotoxins and renally dose meds for GFR listed above.

## 2024-06-07 NOTE — ASSESSMENT & PLAN NOTE
Patient is identified as having Diastolic (HFpEF) heart failure that is Acute on chronic. CHF is currently uncontrolled  Dyspnea not returned to baseline after  doses of IV diuretic, and Rales/crackles on pulmonary exam. Latest ECHO performed and demonstrates- Results for orders placed during the hospital encounter of 03/15/24    Echo    Interpretation Summary    Left Ventricle: The left ventricle is normal in size. There is moderate concentric hypertrophy. There is low normal systolic function with a visually estimated ejection fraction of 50 - 55%.    Right Ventricle: Mild right ventricular enlargement. Systolic function is normal.    Left Atrium: Left atrium is severely dilated.    Right Atrium: Right atrium is moderately dilated.    Aortic Valve: There is mild aortic valve sclerosis.    Mitral Valve: There is moderate regurgitation.    Tricuspid Valve: There is moderate regurgitation.    Pulmonary Artery: The estimated pulmonary artery systolic pressure is 51 mmHg.    IVC/SVC: Elevated venous pressure at 15 mmHg.  . Continue Furosemide and monitor clinical status closely. Monitor on telemetry. Patient is on CHF pathway.  Monitor strict Is&Os and daily weights.  Place on fluid restriction of 1.5 L. Cardiology has been consulted. Continue to stress to patient importance of self efficacy and  on diet for CHF. Last BNP reviewed- and noted below   Recent Labs   Lab 06/05/24  0618   *          Patient also has acute on chronic kidney injury.  Monitor renal function closely.   Continue IV diuresis.    6/6:  continues to be intubated.  Monitor renal function and diuresis.  As needed and tolerated.

## 2024-06-07 NOTE — SUBJECTIVE & OBJECTIVE
Interval History: Pt intubated and sedated     Review of Systems  Objective:     Vital Signs (Most Recent):  Temp: 98 °F (36.7 °C) (06/07/24 0710)  Pulse: 67 (06/07/24 0808)  Resp: (!) 22 (06/07/24 0808)  BP: 101/60 (06/07/24 0301)  SpO2: 100 % (06/07/24 0808) Vital Signs (24h Range):  Temp:  [97.7 °F (36.5 °C)-99.1 °F (37.3 °C)] 98 °F (36.7 °C)  Pulse:  [] 67  Resp:  [17-49] 22  SpO2:  [99 %-100 %] 100 %  BP: ()/(55-69) 101/60  Arterial Line BP: ()/(55-92) 104/55     Weight: 88.7 kg (195 lb 8.8 oz)  Body mass index is 31.56 kg/m².    Intake/Output Summary (Last 24 hours) at 6/7/2024 0847  Last data filed at 6/7/2024 0800  Gross per 24 hour   Intake 1582.3 ml   Output 3135 ml   Net -1552.7 ml         Physical Exam      Constitutional:       General: He is not in acute distress.     Appearance: He is ill-appearing.      Interventions: He is sedated and intubated.   Cardiovascular:      Rate and Rhythm: Normal rate. Rhythm irregular.      Pulses: Normal pulses.   Pulmonary:      Effort: Pulmonary effort is normal. He is intubated.      Comments: Intubated, mechanically ventilated  Abdominal:      General: There is no distension.      Palpations: Abdomen is soft.   Musculoskeletal:      Comments: Edema in lower extremities improving     Significant Labs: All pertinent labs within the past 24 hours have been reviewed.    Significant Imaging: I have reviewed all pertinent imaging results/findings within the past 24 hours.

## 2024-06-07 NOTE — PROGRESS NOTES
[facility] - [place]  Nurses Note -- 4 Eyes      6/7/2024       Skin assessed on: Q Shift      [x] No Pressure Injuries Present    [x]Prevention Measures Documented    [] Yes LDA  for Pressure Injury Previously documented     [] Yes New Pressure Injury Discovered   [] LDA for New Pressure Injury Added      Attending RN:  Marlene Langston RN     Second RN:  DELTA Romero

## 2024-06-07 NOTE — PLAN OF CARE
ICU DAILY GOALS     Family/Goals of care/Code Status   Code Status: Full Code    24H Vital Sign Range  Temp:  [97.7 °F (36.5 °C)-99.1 °F (37.3 °C)]   Pulse:  []   Resp:  [17-49]   BP: ()/(55-69)   SpO2:  [99 %-100 %]   Arterial Line BP: ()/(55-92)      Shift Events (include procedures and significant events)   No acute events throughout shift    AWAKE RASS: Goal - RASS Goal: -1-->drowsy  Actual - RASS (Theodore Agitation-Sedation Scale): drowsy    Restraint necessity: Removing medical devices   BREATHE SBT: Not attempted    Coordinate A & B, analgesics/sedatives Pain: managed   SAT: Not attempted   Delirium CAM-ICU:     Early(intubated/ Progressive (non-intubated) Mobility MOVE Screen (INTUBATED ONLY): Pass    Activity: Activity Management: Patient unable to perform activities   Feeding/Nutrition Diet order: Diet/Nutrition Received: NPO, tube feeding,     Thrombus DVT prophylaxis: VTE Required Core Measure: (SCDs) Sequential compression device initiated/maintained, Pharmacological prophylaxis initiated/maintained   HOB Elevation Head of Bed (HOB) Positioning: HOB elevated   Ulcer Prophylaxis GI: yes   Glucose control managed Glycemic Management: blood glucose monitored   Skin Skin assessed during: Daily Assessment    Sacrum intact/not altered? Yes  Heels intact/not altered? Yes  Surgical wound? No    CHECK ONE!   (no altered skin or altered skin) and sub boxes:  [] No Altered Skin Integrity Present    []Prevention Measures Documented    [x] Altered Skin Integrity Present or Discovered   [x] LDA present in EPIC, daily doc completed              [] LDA added if not in EPIC (describe wound).                    When describing wound, do not stage, use descriptive words only.    [] Wound Image Taken (required on admit,                   transfer/discharge and every Tuesday)    Wound Care Consulted? Yes    4 EYES:  Attending Nurse (1st set of eyes):     Second RN/Staff Member (2nd set of eyes):    Bowel  Function no issues    Indwelling Catheter Necessity      Urethral Catheter 06/05/24 1500 16 Fr.-Reason for Continuing Urinary Catheterization: Critically ill in ICU and requiring hourly monitoring of intake/output    PICC Triple Lumen 06/05/24 1912 left brachial-Line Necessity Review: Hemodynamic instability, Medication caustic to vasculature     De-escalation Antibiotics Yes        VS and assessment per flow sheet    Problem: Adult Inpatient Plan of Care  Goal: Plan of Care Review  Outcome: Progressing     Problem: Wound  Goal: Absence of Infection Signs and Symptoms  Outcome: Progressing  Goal: Optimal Wound Healing  Outcome: Progressing

## 2024-06-08 LAB
ALBUMIN SERPL BCP-MCNC: 3.2 G/DL (ref 3.5–5.2)
ALP SERPL-CCNC: 77 U/L (ref 55–135)
ALT SERPL W/O P-5'-P-CCNC: 21 U/L (ref 10–44)
ANION GAP SERPL CALC-SCNC: 11 MMOL/L (ref 8–16)
AST SERPL-CCNC: 20 U/L (ref 10–40)
BASOPHILS # BLD AUTO: 0.03 K/UL (ref 0–0.2)
BASOPHILS NFR BLD: 0.3 % (ref 0–1.9)
BILIRUB SERPL-MCNC: 0.4 MG/DL (ref 0.1–1)
BUN SERPL-MCNC: 33 MG/DL (ref 8–23)
CALCIUM SERPL-MCNC: 9.4 MG/DL (ref 8.7–10.5)
CHLORIDE SERPL-SCNC: 106 MMOL/L (ref 95–110)
CO2 SERPL-SCNC: 28 MMOL/L (ref 23–29)
CREAT SERPL-MCNC: 1.9 MG/DL (ref 0.5–1.4)
DIFFERENTIAL METHOD BLD: ABNORMAL
EOSINOPHIL # BLD AUTO: 0.3 K/UL (ref 0–0.5)
EOSINOPHIL NFR BLD: 3.5 % (ref 0–8)
ERYTHROCYTE [DISTWIDTH] IN BLOOD BY AUTOMATED COUNT: 16.6 % (ref 11.5–14.5)
EST. GFR  (NO RACE VARIABLE): 38 ML/MIN/1.73 M^2
GLUCOSE SERPL-MCNC: 171 MG/DL (ref 70–110)
HCT VFR BLD AUTO: 46.7 % (ref 40–54)
HGB BLD-MCNC: 15.3 G/DL (ref 14–18)
IMM GRANULOCYTES # BLD AUTO: 0.02 K/UL (ref 0–0.04)
IMM GRANULOCYTES NFR BLD AUTO: 0.2 % (ref 0–0.5)
LYMPHOCYTES # BLD AUTO: 1.8 K/UL (ref 1–4.8)
LYMPHOCYTES NFR BLD: 19 % (ref 18–48)
MCH RBC QN AUTO: 30.3 PG (ref 27–31)
MCHC RBC AUTO-ENTMCNC: 32.8 G/DL (ref 32–36)
MCV RBC AUTO: 93 FL (ref 82–98)
MONOCYTES # BLD AUTO: 1 K/UL (ref 0.3–1)
MONOCYTES NFR BLD: 10.2 % (ref 4–15)
NEUTROPHILS # BLD AUTO: 6.3 K/UL (ref 1.8–7.7)
NEUTROPHILS NFR BLD: 66.8 % (ref 38–73)
NRBC BLD-RTO: 0 /100 WBC
PLATELET # BLD AUTO: 200 K/UL (ref 150–450)
PMV BLD AUTO: 11.2 FL (ref 9.2–12.9)
POCT GLUCOSE: 126 MG/DL (ref 70–110)
POCT GLUCOSE: 173 MG/DL (ref 70–110)
POCT GLUCOSE: 181 MG/DL (ref 70–110)
POCT GLUCOSE: 192 MG/DL (ref 70–110)
POTASSIUM SERPL-SCNC: 3.7 MMOL/L (ref 3.5–5.1)
PROT SERPL-MCNC: 7.2 G/DL (ref 6–8.4)
RBC # BLD AUTO: 5.05 M/UL (ref 4.6–6.2)
SODIUM SERPL-SCNC: 145 MMOL/L (ref 136–145)
WBC # BLD AUTO: 9.4 K/UL (ref 3.9–12.7)

## 2024-06-08 PROCEDURE — 85025 COMPLETE CBC W/AUTO DIFF WBC: CPT | Performed by: NURSE PRACTITIONER

## 2024-06-08 PROCEDURE — 99900026 HC AIRWAY MAINTENANCE (STAT)

## 2024-06-08 PROCEDURE — A4216 STERILE WATER/SALINE, 10 ML: HCPCS | Performed by: INTERNAL MEDICINE

## 2024-06-08 PROCEDURE — 99233 SBSQ HOSP IP/OBS HIGH 50: CPT | Mod: ,,, | Performed by: INTERNAL MEDICINE

## 2024-06-08 PROCEDURE — 99900035 HC TECH TIME PER 15 MIN (STAT)

## 2024-06-08 PROCEDURE — 25000003 PHARM REV CODE 250: Performed by: NURSE PRACTITIONER

## 2024-06-08 PROCEDURE — 25000003 PHARM REV CODE 250: Performed by: INTERNAL MEDICINE

## 2024-06-08 PROCEDURE — 63600175 PHARM REV CODE 636 W HCPCS: Performed by: STUDENT IN AN ORGANIZED HEALTH CARE EDUCATION/TRAINING PROGRAM

## 2024-06-08 PROCEDURE — 94003 VENT MGMT INPAT SUBQ DAY: CPT

## 2024-06-08 PROCEDURE — 20000000 HC ICU ROOM

## 2024-06-08 PROCEDURE — 25000003 PHARM REV CODE 250: Performed by: STUDENT IN AN ORGANIZED HEALTH CARE EDUCATION/TRAINING PROGRAM

## 2024-06-08 PROCEDURE — 80053 COMPREHEN METABOLIC PANEL: CPT | Performed by: NURSE PRACTITIONER

## 2024-06-08 PROCEDURE — 99291 CRITICAL CARE FIRST HOUR: CPT | Mod: ,,, | Performed by: INTERNAL MEDICINE

## 2024-06-08 PROCEDURE — 63600175 PHARM REV CODE 636 W HCPCS: Performed by: NURSE PRACTITIONER

## 2024-06-08 PROCEDURE — 27000221 HC OXYGEN, UP TO 24 HOURS

## 2024-06-08 PROCEDURE — 25000003 PHARM REV CODE 250: Performed by: EMERGENCY MEDICINE

## 2024-06-08 PROCEDURE — 94761 N-INVAS EAR/PLS OXIMETRY MLT: CPT

## 2024-06-08 RX ORDER — SODIUM,POTASSIUM PHOSPHATES 280-250MG
2 POWDER IN PACKET (EA) ORAL
Status: DISCONTINUED | OUTPATIENT
Start: 2024-06-08 | End: 2024-06-13 | Stop reason: HOSPADM

## 2024-06-08 RX ORDER — FUROSEMIDE 10 MG/ML
80 INJECTION INTRAMUSCULAR; INTRAVENOUS 3 TIMES DAILY
Status: DISCONTINUED | OUTPATIENT
Start: 2024-06-08 | End: 2024-06-08

## 2024-06-08 RX ORDER — LANOLIN ALCOHOL/MO/W.PET/CERES
800 CREAM (GRAM) TOPICAL
Status: DISCONTINUED | OUTPATIENT
Start: 2024-06-08 | End: 2024-06-13 | Stop reason: HOSPADM

## 2024-06-08 RX ORDER — FUROSEMIDE 10 MG/ML
80 INJECTION INTRAMUSCULAR; INTRAVENOUS EVERY 12 HOURS
Status: DISCONTINUED | OUTPATIENT
Start: 2024-06-08 | End: 2024-06-08

## 2024-06-08 RX ORDER — METOPROLOL TARTRATE 1 MG/ML
5 INJECTION, SOLUTION INTRAVENOUS EVERY 6 HOURS
Status: DISCONTINUED | OUTPATIENT
Start: 2024-06-08 | End: 2024-06-10

## 2024-06-08 RX ORDER — FUROSEMIDE 10 MG/ML
80 INJECTION INTRAMUSCULAR; INTRAVENOUS EVERY 12 HOURS
Status: DISCONTINUED | OUTPATIENT
Start: 2024-06-09 | End: 2024-06-10

## 2024-06-08 RX ORDER — METOPROLOL TARTRATE 1 MG/ML
2.5 INJECTION, SOLUTION INTRAVENOUS EVERY 6 HOURS
Status: DISCONTINUED | OUTPATIENT
Start: 2024-06-08 | End: 2024-06-08

## 2024-06-08 RX ORDER — DEXMEDETOMIDINE HYDROCHLORIDE 4 UG/ML
0-1.4 INJECTION, SOLUTION INTRAVENOUS CONTINUOUS
Status: DISCONTINUED | OUTPATIENT
Start: 2024-06-08 | End: 2024-06-10

## 2024-06-08 RX ADMIN — CHLORHEXIDINE GLUCONATE 0.12% ORAL RINSE 15 ML: 1.2 LIQUID ORAL at 08:06

## 2024-06-08 RX ADMIN — PROPOFOL 25 MCG/KG/MIN: 10 INJECTION, EMULSION INTRAVENOUS at 10:06

## 2024-06-08 RX ADMIN — FUROSEMIDE 80 MG: 10 INJECTION, SOLUTION INTRAVENOUS at 02:06

## 2024-06-08 RX ADMIN — NOREPINEPHRINE BITARTRATE 0.06 MCG/KG/MIN: 4 INJECTION, SOLUTION INTRAVENOUS at 07:06

## 2024-06-08 RX ADMIN — FUROSEMIDE 80 MG: 10 INJECTION, SOLUTION INTRAVENOUS at 08:06

## 2024-06-08 RX ADMIN — PROPOFOL 35 MCG/KG/MIN: 10 INJECTION, EMULSION INTRAVENOUS at 05:06

## 2024-06-08 RX ADMIN — NOREPINEPHRINE BITARTRATE 0.04 MCG/KG/MIN: 4 INJECTION, SOLUTION INTRAVENOUS at 05:06

## 2024-06-08 RX ADMIN — DEXMEDETOMIDINE HYDROCHLORIDE 0.6 MCG/KG/HR: 4 INJECTION, SOLUTION INTRAVENOUS at 05:06

## 2024-06-08 RX ADMIN — FAMOTIDINE 20 MG: 10 INJECTION, SOLUTION INTRAVENOUS at 08:06

## 2024-06-08 RX ADMIN — ATORVASTATIN CALCIUM 80 MG: 40 TABLET, FILM COATED ORAL at 08:06

## 2024-06-08 RX ADMIN — MUPIROCIN: 20 OINTMENT TOPICAL at 08:06

## 2024-06-08 RX ADMIN — INSULIN ASPART 2 UNITS: 100 INJECTION, SOLUTION INTRAVENOUS; SUBCUTANEOUS at 11:06

## 2024-06-08 RX ADMIN — Medication 10 ML: at 05:06

## 2024-06-08 RX ADMIN — DEXMEDETOMIDINE HYDROCHLORIDE 0.4 MCG/KG/HR: 4 INJECTION, SOLUTION INTRAVENOUS at 11:06

## 2024-06-08 RX ADMIN — INSULIN ASPART 2 UNITS: 100 INJECTION, SOLUTION INTRAVENOUS; SUBCUTANEOUS at 05:06

## 2024-06-08 RX ADMIN — ENOXAPARIN SODIUM 90 MG: 100 INJECTION SUBCUTANEOUS at 08:06

## 2024-06-08 RX ADMIN — ASPIRIN 81 MG CHEWABLE TABLET 81 MG: 81 TABLET CHEWABLE at 08:06

## 2024-06-08 RX ADMIN — Medication 10 ML: at 11:06

## 2024-06-08 RX ADMIN — Medication 10 ML: at 12:06

## 2024-06-08 NOTE — ASSESSMENT & PLAN NOTE
Patient with Hypoxic Respiratory failure which is Acute.  he is not on home oxygen. Supplemental oxygen was provided and noted- Vent Mode: PRVC  Oxygen Concentration (%):  [40] 40  Resp Rate Total:  [18 br/min-35 br/min] 19 br/min  Vt Set:  [390 mL] 390 mL  PEEP/CPAP:  [5 cmH20] 5 cmH20  Mean Airway Pressure:  [8.7 cmH20-10.5 cmH20] 9.7 cmH20    .   Signs/symptoms of respiratory failure include- tachypnea, increased work of breathing, and respiratory distress. Contributing diagnoses includes - CHF Labs and images were reviewed. Patient Has recent ABG, which has been reviewed. Will treat underlying causes and adjust management of respiratory failure as follows-   - Bipap initially --> now intubated for worsening hypoxia and respiratory distress  - continue with IV lasix, dobutamine weaned off  - Cardiology following  - pulmonology with ventilatory management

## 2024-06-08 NOTE — ASSESSMENT & PLAN NOTE
Patient is identified as having Diastolic (HFpEF) heart failure that is Acute on chronic. CHF is currently uncontrolled  Dyspnea not returned to baseline after  doses of IV diuretic, and Rales/crackles on pulmonary exam. Latest ECHO performed and demonstrates- Results for orders placed during the hospital encounter of 03/15/24    Echo    Interpretation Summary    Left Ventricle: The left ventricle is normal in size. There is moderate concentric hypertrophy. There is low normal systolic function with a visually estimated ejection fraction of 50 - 55%.    Right Ventricle: Mild right ventricular enlargement. Systolic function is normal.    Left Atrium: Left atrium is severely dilated.    Right Atrium: Right atrium is moderately dilated.    Aortic Valve: There is mild aortic valve sclerosis.    Mitral Valve: There is moderate regurgitation.    Tricuspid Valve: There is moderate regurgitation.    Pulmonary Artery: The estimated pulmonary artery systolic pressure is 51 mmHg.    IVC/SVC: Elevated venous pressure at 15 mmHg.  . Continue Furosemide and monitor clinical status closely. Monitor on telemetry. Patient is on CHF pathway.  Monitor strict Is&Os and daily weights.  Place on fluid restriction of 1.5 L. Cardiology has been consulted. Continue to stress to patient importance of self efficacy and  on diet for CHF. Last BNP reviewed- and noted below   Recent Labs   Lab 06/05/24  0618   *          Patient also has acute on chronic kidney injury.  Monitor renal function closely.   Continue IV diuresis.    6/6:  continues to be intubated.  Monitor renal function and diuresis.  As needed and tolerated.  6/7/24 Diuresis and afterload recution as tolerated. Wean pressors and dobutamine as tolerated  6/8/24 Pressors being weaned. Will follow PRN

## 2024-06-08 NOTE — PROGRESS NOTES
Dr Vivas notified Pts Hr 125, Metoprolol not given d/t SBP <100. Ordered to start precedex and wean propofol.

## 2024-06-08 NOTE — ASSESSMENT & PLAN NOTE
Levophed to maintain MAP >60  Good response to dobutamine and overall appears improved. 6/7 stopped dobutamine and monitoring off

## 2024-06-08 NOTE — SUBJECTIVE & OBJECTIVE
Interval History:     Review of Systems   Unable to perform ROS: Intubated     Objective:     Vital Signs (Most Recent):  Temp: 98.6 °F (37 °C) (06/08/24 0701)  Pulse: 108 (06/08/24 0900)  Resp: 19 (06/08/24 0900)  BP: 101/60 (06/07/24 0301)  SpO2: 98 % (06/08/24 0900) Vital Signs (24h Range):  Temp:  [98 °F (36.7 °C)-99.6 °F (37.6 °C)] 98.6 °F (37 °C)  Pulse:  [] 108  Resp:  [17-33] 19  SpO2:  [97 %-100 %] 98 %  Arterial Line BP: ()/(52-87) 113/64     Weight: 88.7 kg (195 lb 8.8 oz)  Body mass index is 31.56 kg/m².     SpO2: 98 %         Intake/Output Summary (Last 24 hours) at 6/8/2024 0949  Last data filed at 6/8/2024 0920  Gross per 24 hour   Intake 1333.37 ml   Output 2510 ml   Net -1176.63 ml       Lines/Drains/Airways       Peripherally Inserted Central Catheter Line  Duration             PICC Triple Lumen 06/05/24 1912 left brachial 2 days              Drain  Duration                  NG/OG Tube 06/05/24 1419 Center mouth 2 days         Urethral Catheter 06/05/24 1500 16 Fr. 2 days              Airway  Duration                  Airway - Non-Surgical 06/05/24 1420 Endotracheal Tube 2 days              Arterial Line  Duration             Arterial Line 06/05/24 1501 Right Radial 2 days              Peripheral Intravenous Line  Duration                  Peripheral IV - Single Lumen 06/05/24 0632 18 G;1 3/4 in Anterior;Right Upper Arm 3 days         Peripheral IV - Single Lumen 06/05/24 1500 22 G Anterior;Right Shoulder 2 days                       Physical Exam  Constitutional:       Appearance: He is well-developed.   HENT:      Head: Normocephalic and atraumatic.   Eyes:      Conjunctiva/sclera: Conjunctivae normal.      Pupils: Pupils are equal, round, and reactive to light.   Cardiovascular:      Rate and Rhythm: Tachycardia present. Rhythm irregular.      Pulses: Intact distal pulses.      Heart sounds: Normal heart sounds.   Pulmonary:      Effort: Pulmonary effort is normal.      Breath  sounds: Normal breath sounds.   Abdominal:      General: Bowel sounds are normal.      Palpations: Abdomen is soft.   Musculoskeletal:         General: Normal range of motion.      Cervical back: Normal range of motion and neck supple.   Skin:     General: Skin is warm and dry.            Significant Labs: All pertinent lab results from the last 24 hours have been reviewed.    Significant Imaging: Echocardiogram: 2D echo with color flow doppler: No results found for this or any previous visit.

## 2024-06-08 NOTE — ASSESSMENT & PLAN NOTE
Patient with Permanent atrial fibrillation which is controlled currently with Amiodarone. Patient is currently in atrial fibrillation.[id number] Score: 3. . Anticoagulation indicated. Anticoagulation done with Lovenox .    - Off amiodarone due to bradycardia  - rate controlled currently

## 2024-06-08 NOTE — SUBJECTIVE & OBJECTIVE
Interval History: Pt remains intubated and sedated    Review of Systems  Objective:     Vital Signs (Most Recent):  Temp: 98.6 °F (37 °C) (06/08/24 0701)  Pulse: 108 (06/08/24 0900)  Resp: 19 (06/08/24 0900)  BP: 101/60 (06/07/24 0301)  SpO2: 98 % (06/08/24 0900) Vital Signs (24h Range):  Temp:  [98 °F (36.7 °C)-99.6 °F (37.6 °C)] 98.6 °F (37 °C)  Pulse:  [] 108  Resp:  [17-33] 19  SpO2:  [97 %-100 %] 98 %  Arterial Line BP: ()/(52-87) 113/64     Weight: 88.7 kg (195 lb 8.8 oz)  Body mass index is 31.56 kg/m².    Intake/Output Summary (Last 24 hours) at 6/8/2024 1000  Last data filed at 6/8/2024 0920  Gross per 24 hour   Intake 1264.56 ml   Output 2510 ml   Net -1245.44 ml         Physical Exam      Constitutional:       General: He is not in acute distress.     Appearance: He is ill-appearing.      Interventions: He is sedated and intubated.   Cardiovascular:      Rate and Rhythm: Normal rate. Rhythm irregular.      Pulses: Normal pulses.   Pulmonary:      Effort: Pulmonary effort is normal. He is intubated.      Comments: Intubated, mechanically ventilated  Abdominal:      General: There is no distension.      Palpations: Abdomen is soft.   Musculoskeletal:      Comments: Edema in lower extremities improving   Significant Labs: All pertinent labs within the past 24 hours have been reviewed.    Significant Imaging: I have reviewed all pertinent imaging results/findings within the past 24 hours.

## 2024-06-08 NOTE — PROGRESS NOTES
[facility] Medicine  Progress Note    Patient Name: Marck Madison  MRN: [id number]  Patient Class: IP- Inpatient   Admission Date: 6/5/2024  Length of Stay: 3 days  Attending Physician: Thang Del Angel III, MD  Primary Care Provider: [facility] -        Subjective:     Principal Problem:Acute hypoxemic respiratory failure        HPI:  Mr. Madison is a 64-year-old male with past medical history of hypertension, diabetes mellitus type 2, atrial fibrillation, CHF and nonobstructive coronary artery disease who presents to the emergency department for evaluation of shortness of breath.  He reports this has been ongoing and progressively worsening.  He endorses swelling in his extremities but denies any chest pain.  Denies any fevers or chills.  He recently had a left heart catheterization on 05/06/24 that shows nonobstructive coronary artery disease.  He reports compliance with Lasix at home.  In the emergency department, patient was found to be tachycardic with a rate of 133 and atrial fibrillation, respiratory rate of 24 and blood pressure 180/129 with a O2 saturation of 84%.  He was placed on BiPAP and given IV amiodarone with improvement of rate.  He was also given IV Lasix.  Cardiology consulted and he will be admitted to ICU for further management.    Overview/Hospital Course:  Mr. Madison is a 63 yo M admitted with acute hypoxemic respiratory failure secondary to CHF exacerbation. Also found to be in atrial fibrillation with RVR and placed on amiodarone drip. Developed worsening respiratory distress and was intubated by Pulmonary/CC. Became hypotensive, started on dobutamine and norepinephrine. Prior to PICC line, norepinpherine extravasated on left forearm, phentolamine injected and Wound Care consulted. Diuresing well.      Interval History: Pt remains intubated and sedated    Review of Systems  Objective:     Vital Signs (Most Recent):  Temp: 98.6 °F (37 °C) (06/08/24  0701)  Pulse: 108 (06/08/24 0900)  Resp: 19 (06/08/24 0900)  BP: 101/60 (06/07/24 0301)  SpO2: 98 % (06/08/24 0900) Vital Signs (24h Range):  Temp:  [98 °F (36.7 °C)-99.6 °F (37.6 °C)] 98.6 °F (37 °C)  Pulse:  [] 108  Resp:  [17-33] 19  SpO2:  [97 %-100 %] 98 %  Arterial Line BP: ()/(52-87) 113/64     Weight: 88.7 kg (195 lb 8.8 oz)  Body mass index is 31.56 kg/m².    Intake/Output Summary (Last 24 hours) at 6/8/2024 1000  Last data filed at 6/8/2024 0920  Gross per 24 hour   Intake 1264.56 ml   Output 2510 ml   Net -1245.44 ml         Physical Exam      Constitutional:       General: He is not in acute distress.     Appearance: He is ill-appearing.      Interventions: He is sedated and intubated.   Cardiovascular:      Rate and Rhythm: Normal rate. Rhythm irregular.      Pulses: Normal pulses.   Pulmonary:      Effort: Pulmonary effort is normal. He is intubated.      Comments: Intubated, mechanically ventilated  Abdominal:      General: There is no distension.      Palpations: Abdomen is soft.   Musculoskeletal:      Comments: Edema in lower extremities improving   Significant Labs: All pertinent labs within the past 24 hours have been reviewed.    Significant Imaging: I have reviewed all pertinent imaging results/findings within the past 24 hours.    Assessment/Plan:      * Acute hypoxemic respiratory failure  Patient with Hypoxic Respiratory failure which is Acute.  he is not on home oxygen. Supplemental oxygen was provided and noted- Vent Mode: PRVC  Oxygen Concentration (%):  [40] 40  Resp Rate Total:  [18 br/min-35 br/min] 19 br/min  Vt Set:  [390 mL] 390 mL  PEEP/CPAP:  [5 cmH20] 5 cmH20  Mean Airway Pressure:  [8.7 cmH20-10.5 cmH20] 9.7 cmH20    .   Signs/symptoms of respiratory failure include- tachypnea, increased work of breathing, and respiratory distress. Contributing diagnoses includes - CHF Labs and images were reviewed. Patient Has recent ABG, which has been reviewed. Will treat  underlying causes and adjust management of respiratory failure as follows-   - Bipap initially --> now intubated for worsening hypoxia and respiratory distress  - continue with IV lasix, dobutamine weaned off  - Cardiology following  - pulmonology with ventilatory management    Elevated troponin  Possible related to demand ischemia, patient had recent LHC with nonobstructive CAD on 5/6/24  - Cardiology consulted  - continue aspirin/statin    Class 1 obesity with serious comorbidity and body mass index (BMI) of 30.0 to 30.9 in adult  Body mass index is 31.56 kg/m². Morbid obesity complicates all aspects of disease management from diagnostic modalities to treatment. Weight loss encouraged and health benefits explained to patient.         CAD (coronary artery disease)  Patient with known CAD that is nonobstructive (5/6/2024, which is controlled Will continue ASA and Statin and monitor for S/Sx of angina/ACS. Continue to monitor on telemetry.     Permanent atrial fibrillation  Patient with Permanent atrial fibrillation which is controlled currently with Amiodarone. Patient is currently in atrial fibrillation.[id number] Score: 3. . Anticoagulation indicated. Anticoagulation done with Lovenox .    - Off amiodarone due to bradycardia  - rate controlled currently    Acute on chronic diastolic congestive heart failure  Patient is identified as having Diastolic (HFpEF) heart failure that is Acute on chronic. CHF is currently . Latest ECHO performed and demonstrates- Results for orders placed during the hospital encounter of 03/15/24    Echo  Results for orders placed during the hospital encounter of 03/15/24    Echo    Interpretation Summary    Left Ventricle: The left ventricle is normal in size. There is moderate concentric hypertrophy. There is low normal systolic function with a visually estimated ejection fraction of 50 - 55%.    Right Ventricle: Mild right ventricular enlargement. Systolic function is normal.    Left  Atrium: Left atrium is severely dilated.    Right Atrium: Right atrium is moderately dilated.    Aortic Valve: There is mild aortic valve sclerosis.    Mitral Valve: There is moderate regurgitation.    Tricuspid Valve: There is moderate regurgitation.    Pulmonary Artery: The estimated pulmonary artery systolic pressure is 51 mmHg.    IVC/SVC: Elevated venous pressure at 15 mmHg.      Recent Labs   Lab 06/05/24  0618   *         - Now in cardiogenic shock and increasing oxygen requirements. Intubated, diuresing well on lasix and dobutamine drip.  - continue with aggressive diuresis and wean as tolerated    Cardiogenic shock  See acute on chronic heart failure exacerbation      Type 2 diabetes mellitus, with long-term current use of insulin  A1c:   Lab Results   Component Value Date    HGBA1C 6.7 (H) 06/05/2024     Meds: SSI PRN to maintain goal 140-180  ADA diet, accuchecks ACHS, hypoglycemic protocol       FARHAN (acute kidney injury)  Patient with acute kidney injury/acute renal failure likely due to cardiogenic shock. FARHAN is currently stable. Baseline creatinine  1.5  - Labs reviewed- Renal function/electrolytes with Estimated Creatinine Clearance: 39.9 mL/min (A) (based on SCr of 1.9 mg/dL (H)). according to latest data. Monitor urine output and serial BMP and adjust therapy as needed. Avoid nephrotoxins and renally dose meds for GFR listed above.      VTE Risk Mitigation (From admission, onward)           Ordered     enoxaparin injection 90 mg  Every 12 hours         06/05/24 1131                    Discharge Planning   BALDO:      Code Status: Full Code   Is the patient medically ready for discharge?:     Reason for patient still in hospital (select all that apply): Treatment and Consult recommendations  Discharge Plan A: Home        Critical care time spent on the evaluation and treatment of severe organ dysfunction, review of pertinent labs and imaging studies, discussions with consulting providers and  discussions with patient/family: 31 minutes.        Thang Del Angel III, MD  Department of Hospital Medicine   [facility] - [place] - Intensive Care

## 2024-06-08 NOTE — SUBJECTIVE & OBJECTIVE
Interval History: Intubated and sedated. Good UOP. No longer cold and clammy. Tolerated stopping dobutamine well x24 hours. Levophed dose is at low dose. HR up slightly late this morning.     Attempted SBT and initially looked well but started having pulmonary edema and increased HR after 20 minutes.    Discussed case in detail with hospitalist.       Objective:     Vital Signs (Most Recent):  Temp: 98 °F (36.7 °C) (06/08/24 1101)  Pulse: 68 (06/08/24 1300)  Resp: (!) 22 (06/08/24 1300)  BP: 101/60 (06/07/24 0301)  SpO2: 99 % (06/08/24 1300) Vital Signs (24h Range):  Temp:  [98 °F (36.7 °C)-99.6 °F (37.6 °C)] 98 °F (36.7 °C)  Pulse:  [] 68  Resp:  [17-45] 22  SpO2:  [97 %-100 %] 99 %  Arterial Line BP: ()/(50-97) 97/54     Weight: 88.7 kg (195 lb 8.8 oz)  Body mass index is 31.56 kg/m².      Intake/Output Summary (Last 24 hours) at 6/8/2024 1342  Last data filed at 6/8/2024 1330  Gross per 24 hour   Intake 1209.89 ml   Output 2125 ml   Net -915.11 ml        Physical Exam  Vitals reviewed.   Constitutional:       Appearance: He is ill-appearing.   HENT:      Head: Normocephalic and atraumatic.      Mouth/Throat:      Mouth: Mucous membranes are moist.   Eyes:      Pupils: Pupils are equal, round, and reactive to light.   Neck:      Comments: JVD  Cardiovascular:      Rate and Rhythm: Normal rate. Rhythm irregular.      Pulses: Normal pulses.      Heart sounds: Normal heart sounds. No murmur heard.  Pulmonary:      Breath sounds: No stridor. No wheezing.      Comments: Synchronous with vent  Abdominal:      General: There is distension.      Palpations: Abdomen is soft.   Musculoskeletal:      Right lower leg: Edema present.      Left lower leg: Edema present.   Skin:     General: Skin is dry.      Capillary Refill: Capillary refill takes less than 2 seconds.   Neurological:      Mental Status: He is alert.      Comments: Sedated, responds to commands           Review of Systems    Vents:  Vent Mode: PRVC  (06/08/24 1202)  Ventilator Initiated: Yes (06/05/24 1443)  Set Rate: 18 BPM (06/08/24 1202)  Vt Set: 390 mL (06/08/24 1202)  PEEP/CPAP: 5 cmH20 (06/08/24 1202)  Oxygen Concentration (%): 40 (06/08/24 1300)  Peak Airway Pressure: 15.9 cmH20 (06/08/24 1202)  Total Ve: 7.6 L/m (06/08/24 1202)  F/VT Ratio<105 (RSBI): (!) 54.05 (06/08/24 1202)    Lines/Drains/Airways       Peripherally Inserted Central Catheter Line  Duration             PICC Triple Lumen 06/05/24 1912 left brachial 2 days              Drain  Duration                  NG/OG Tube 06/05/24 1419 Center mouth 2 days         Urethral Catheter 06/05/24 1500 16 Fr. 2 days              Airway  Duration                  Airway - Non-Surgical 06/05/24 1420 Endotracheal Tube 2 days              Arterial Line  Duration             Arterial Line 06/05/24 1501 Right Radial 2 days              Peripheral Intravenous Line  Duration                  Peripheral IV - Single Lumen 06/05/24 0632 18 G;1 3/4 in Anterior;Right Upper Arm 3 days         Peripheral IV - Single Lumen 06/05/24 1500 22 G Anterior;Right Shoulder 2 days                    Significant Labs:    CBC/Anemia Profile:  Recent Labs   Lab 06/07/24 0228 06/08/24  0301   WBC 9.21 9.40   HGB 14.9 15.3   HCT 44.8 46.7    200   MCV 92 93   RDW 16.6* 16.6*        Chemistries:  Recent Labs   Lab 06/06/24  1959 06/07/24  0228 06/08/24  0301    144 145   K 4.4 3.8 3.7    108 106   CO2 23 23 28   BUN 30* 30* 33*   CREATININE 1.9* 1.9* 1.9*   CALCIUM 8.4* 8.3* 9.4   ALBUMIN  --  3.3* 3.2*   PROT  --  6.6 7.2   BILITOT  --  0.6 0.4   ALKPHOS  --  71 77   ALT  --  31 21   AST  --  27 20   MG  --  1.9  --        ABGs:   Recent Labs   Lab 06/07/24  1131   PH 7.400   PCO2 47.8*   HCO3 29.6*   POCSATURATED 98   BE 4*     All pertinent labs within the past 24 hours have been reviewed.    Significant Imaging:  I have reviewed all pertinent imaging results/findings within the past 24 hours.  CXR: I have  reviewed all pertinent results/findings within the past 24 hours and my personal findings are:  Improving pulm edema, ETT at level of rosmery- adjusted after CXR  Echo: I have reviewed all pertinent results/findings within the past 24 hours and my personal findings are:  none new  Tely shows Afib with rate control/ RVR later in morning

## 2024-06-08 NOTE — PROGRESS NOTES
[facility] - [place]  Nurses Note -- 4 Eyes      6/8/2024       Skin assessed on: Q Shift      [x] No Pressure Injuries Present    [x]Prevention Measures Documented    [] Yes LDA  for Pressure Injury Previously documented     [] Yes New Pressure Injury Discovered   [] LDA for New Pressure Injury Added      Attending RN:  Marlene Langston RN     Second RN:  DELTA Gil

## 2024-06-08 NOTE — NURSING
[facility], [facility]  Nurses Note -- 4 Eyes      6/8/2024       Skin assessed on: Q Shift      [x] No Pressure Injuries Present    [x]Prevention Measures Documented    [] Yes LDA  for Pressure Injury Previously documented     [] Yes New Pressure Injury Discovered   [] LDA for New Pressure Injury Added      Attending RN:  DELTA Gil    Second RN:  DELTA Rosario

## 2024-06-08 NOTE — ASSESSMENT & PLAN NOTE
Body mass index is 31.56 kg/m². Morbid obesity complicates all aspects of disease management from diagnostic modalities to treatment. Weight loss encouraged and health benefits explained to patient.

## 2024-06-08 NOTE — ASSESSMENT & PLAN NOTE
Progressive worsening on BiPAP. Did not respond to increased EPAP. With variable resp efforts and a clear component of severe resp alkalosis, proceeded with intubation. Requiring high PEEP and high FiO2 to maintain oxygenation.     Vent dependent. Vent adjusted at bedside  LPVS.  PEEP at  5  PRVC for synchrony  Continue aggressive diuresis  Daily SAT/SBT- mostly a cardiac failure today- will try to add low dose beta blocker if BP toelrates and use Precedex to avoid HTN during weaning process off propofol

## 2024-06-08 NOTE — PROGRESS NOTES
[facility]  Critical Care Medicine  Progress Note    Patient Name: Marck Madison  MRN: [id number]  Admission Date: 6/5/2024  Hospital Length of Stay: 3 days  Code Status: Full Code  Attending Provider: Thang Del Angel III, MD  Primary Care Provider: [facility] -   Principal Problem: Acute hypoxemic respiratory failure    Subjective:     HPI:  63 yo male with chart history of dilated CM but recent preserved EF, Afib, EtOH abuse who presented with severe dyspnea and Afib RVR. He was started on Amio with good control of his HR. Placed on BiPAP for WOB. He was initially looking comforatable on BiPAP but in severe distress off. I was called urgently to assess increased WOB. He was noted to have RR up to 60 with MV over 60L, then would pass out and have no resp efforts. He was able to initial speak and felt extremely dyspneic but otherwise denied complaints. Not able to provide full history 2/2 respiratory distress.    Hospital/ICU Course:  No notes on file    Interval History: Intubated and sedated. Good UOP. No longer cold and clammy. Tolerated stopping dobutamine well x24 hours. Levophed dose is at low dose. HR up slightly late this morning.     Attempted SBT and initially looked well but started having pulmonary edema and increased HR after 20 minutes.    Discussed case in detail with hospitalist.       Objective:     Vital Signs (Most Recent):  Temp: 98 °F (36.7 °C) (06/08/24 1101)  Pulse: 68 (06/08/24 1300)  Resp: (!) 22 (06/08/24 1300)  BP: 101/60 (06/07/24 0301)  SpO2: 99 % (06/08/24 1300) Vital Signs (24h Range):  Temp:  [98 °F (36.7 °C)-99.6 °F (37.6 °C)] 98 °F (36.7 °C)  Pulse:  [] 68  Resp:  [17-45] 22  SpO2:  [97 %-100 %] 99 %  Arterial Line BP: ()/(50-97) 97/54     Weight: 88.7 kg (195 lb 8.8 oz)  Body mass index is 31.56 kg/m².      Intake/Output Summary (Last 24 hours) at 6/8/2024 1342  Last data filed at 6/8/2024 1330  Gross per 24 hour   Intake 1209.89 ml   Output  2125 ml   Net -915.11 ml        Physical Exam  Vitals reviewed.   Constitutional:       Appearance: He is ill-appearing.   HENT:      Head: Normocephalic and atraumatic.      Mouth/Throat:      Mouth: Mucous membranes are moist.   Eyes:      Pupils: Pupils are equal, round, and reactive to light.   Neck:      Comments: JVD  Cardiovascular:      Rate and Rhythm: Normal rate. Rhythm irregular.      Pulses: Normal pulses.      Heart sounds: Normal heart sounds. No murmur heard.  Pulmonary:      Breath sounds: No stridor. No wheezing.      Comments: Synchronous with vent  Abdominal:      General: There is distension.      Palpations: Abdomen is soft.   Musculoskeletal:      Right lower leg: Edema present.      Left lower leg: Edema present.   Skin:     General: Skin is dry.      Capillary Refill: Capillary refill takes less than 2 seconds.   Neurological:      Mental Status: He is alert.      Comments: Sedated, responds to commands           Review of Systems    Vents:  Vent Mode: PRVC (06/08/24 1202)  Ventilator Initiated: Yes (06/05/24 1443)  Set Rate: 18 BPM (06/08/24 1202)  Vt Set: 390 mL (06/08/24 1202)  PEEP/CPAP: 5 cmH20 (06/08/24 1202)  Oxygen Concentration (%): 40 (06/08/24 1300)  Peak Airway Pressure: 15.9 cmH20 (06/08/24 1202)  Total Ve: 7.6 L/m (06/08/24 1202)  F/VT Ratio<105 (RSBI): (!) 54.05 (06/08/24 1202)    Lines/Drains/Airways       Peripherally Inserted Central Catheter Line  Duration             PICC Triple Lumen 06/05/24 1912 left brachial 2 days              Drain  Duration                  NG/OG Tube 06/05/24 1419 Center mouth 2 days         Urethral Catheter 06/05/24 1500 16 Fr. 2 days              Airway  Duration                  Airway - Non-Surgical 06/05/24 1420 Endotracheal Tube 2 days              Arterial Line  Duration             Arterial Line 06/05/24 1501 Right Radial 2 days              Peripheral Intravenous Line  Duration                  Peripheral IV - Single Lumen 06/05/24 0632  18 G;1 3/4 in Anterior;Right Upper Arm 3 days         Peripheral IV - Single Lumen 06/05/24 1500 22 G Anterior;Right Shoulder 2 days                    Significant Labs:    CBC/Anemia Profile:  Recent Labs   Lab 06/07/24 0228 06/08/24  0301   WBC 9.21 9.40   HGB 14.9 15.3   HCT 44.8 46.7    200   MCV 92 93   RDW 16.6* 16.6*        Chemistries:  Recent Labs   Lab 06/06/24 1959 06/07/24 0228 06/08/24  0301    144 145   K 4.4 3.8 3.7    108 106   CO2 23 23 28   BUN 30* 30* 33*   CREATININE 1.9* 1.9* 1.9*   CALCIUM 8.4* 8.3* 9.4   ALBUMIN  --  3.3* 3.2*   PROT  --  6.6 7.2   BILITOT  --  0.6 0.4   ALKPHOS  --  71 77   ALT  --  31 21   AST  --  27 20   MG  --  1.9  --        ABGs:   Recent Labs   Lab 06/07/24  1131   PH 7.400   PCO2 47.8*   HCO3 29.6*   POCSATURATED 98   BE 4*     All pertinent labs within the past 24 hours have been reviewed.    Significant Imaging:  I have reviewed all pertinent imaging results/findings within the past 24 hours.  CXR: I have reviewed all pertinent results/findings within the past 24 hours and my personal findings are:  Improving pulm edema, ETT at level of rosmery- adjusted after CXR  Echo: I have reviewed all pertinent results/findings within the past 24 hours and my personal findings are:  none new  Tely shows Afib with rate control/ RVR later in morning    ABG  Recent Labs   Lab 06/07/24  1131   PH 7.400   PO2 114*   PCO2 47.8*   HCO3 29.6*   BE 4*     Assessment/Plan:     Pulmonary  * Acute hypoxemic respiratory failure  Progressive worsening on BiPAP. Did not respond to increased EPAP. With variable resp efforts and a clear component of severe resp alkalosis, proceeded with intubation. Requiring high PEEP and high FiO2 to maintain oxygenation.     Vent dependent. Vent adjusted at bedside  LPVS.  PEEP at  5  PRVC for synchrony  Continue aggressive diuresis  Daily SAT/SBT- mostly a cardiac failure today- will try to add low dose beta blocker if BP toelrates and  use Precedex to avoid HTN during weaning process off propofol    Cardiac/Vascular  CAD (coronary artery disease)  Reviewed recent [facility]. Current presentation out of proportion to reported disease burden.    Acute on chronic diastolic congestive heart failure  EF improved on Dobutamine. Cardiology note reviewed, possible 2/2 Afib RVR    Cardiogenic shock  Levophed to maintain MAP >60  Good response to dobutamine and overall appears improved. 6/7 stopped dobutamine and monitoring off        Renal/  FARHAN (acute kidney injury)  UOP improved with dobutamine. Cr improving. Monitor. Avoid nephrotoxins  Monitor UOP off dobutamine        Critical Care Time: 50 minutes  Critical secondary to Patient has a condition that poses threat to life and bodily function: Acute hypoxic resp failure, cardiogenic shock      Critical care was time spent personally by me on the following activities: development of treatment plan with patient or surrogate and bedside caregivers, discussions with consultants, evaluation of patient's response to treatment, examination of patient, ordering and performing treatments and interventions, ordering and review of laboratory studies, ordering and review of radiographic studies, pulse oximetry, re-evaluation of patient's condition. This critical care time did not overlap with that of any other provider or involve time for any procedures.     Bj Nicholas MD  Critical Care Medicine  [facility] [place] - Intensive Care

## 2024-06-08 NOTE — PLAN OF CARE
Problem: Adult Inpatient Plan of Care  Goal: Plan of Care Review  Outcome: Progressing  Goal: Patient-Specific Goal (Individualized)  Outcome: Progressing  Goal: Absence of Hospital-Acquired Illness or Injury  Outcome: Progressing  Goal: Optimal Comfort and Wellbeing  Outcome: Progressing  Goal: Readiness for Transition of Care  Outcome: Progressing     Problem: Restraint, Nonviolent  Goal: Absence of Harm or Injury  Outcome: Progressing

## 2024-06-09 PROBLEM — R50.9 FEVER: Status: ACTIVE | Noted: 2024-06-09

## 2024-06-09 LAB
ALBUMIN SERPL BCP-MCNC: 3 G/DL (ref 3.5–5.2)
ALLENS TEST: ABNORMAL
ALP SERPL-CCNC: 80 U/L (ref 55–135)
ALT SERPL W/O P-5'-P-CCNC: 17 U/L (ref 10–44)
ANION GAP SERPL CALC-SCNC: 12 MMOL/L (ref 8–16)
AST SERPL-CCNC: 18 U/L (ref 10–40)
BACTERIA #/AREA URNS HPF: ABNORMAL /HPF
BASOPHILS # BLD AUTO: 0.04 K/UL (ref 0–0.2)
BASOPHILS NFR BLD: 0.4 % (ref 0–1.9)
BILIRUB SERPL-MCNC: 0.4 MG/DL (ref 0.1–1)
BILIRUB UR QL STRIP: NEGATIVE
BUN SERPL-MCNC: 39 MG/DL (ref 8–23)
CALCIUM SERPL-MCNC: 9.5 MG/DL (ref 8.7–10.5)
CHLORIDE SERPL-SCNC: 107 MMOL/L (ref 95–110)
CLARITY UR: CLEAR
CO2 SERPL-SCNC: 27 MMOL/L (ref 23–29)
COLOR UR: YELLOW
CREAT SERPL-MCNC: 1.7 MG/DL (ref 0.5–1.4)
DELSYS: ABNORMAL
DIFFERENTIAL METHOD BLD: ABNORMAL
EOSINOPHIL # BLD AUTO: 0.2 K/UL (ref 0–0.5)
EOSINOPHIL NFR BLD: 2.3 % (ref 0–8)
ERYTHROCYTE [DISTWIDTH] IN BLOOD BY AUTOMATED COUNT: 16.1 % (ref 11.5–14.5)
EST. GFR  (NO RACE VARIABLE): 44 ML/MIN/1.73 M^2
FIO2: 40
GLUCOSE SERPL-MCNC: 208 MG/DL (ref 70–110)
GLUCOSE UR QL STRIP: NEGATIVE
HCO3 UR-SCNC: 33.2 MMOL/L (ref 24–28)
HCT VFR BLD AUTO: 46.1 % (ref 40–54)
HGB BLD-MCNC: 15.1 G/DL (ref 14–18)
HGB UR QL STRIP: ABNORMAL
IMM GRANULOCYTES # BLD AUTO: 0.04 K/UL (ref 0–0.04)
IMM GRANULOCYTES NFR BLD AUTO: 0.4 % (ref 0–0.5)
KETONES UR QL STRIP: NEGATIVE
LEUKOCYTE ESTERASE UR QL STRIP: ABNORMAL
LYMPHOCYTES # BLD AUTO: 2 K/UL (ref 1–4.8)
LYMPHOCYTES NFR BLD: 21.6 % (ref 18–48)
MCH RBC QN AUTO: 30.4 PG (ref 27–31)
MCHC RBC AUTO-ENTMCNC: 32.8 G/DL (ref 32–36)
MCV RBC AUTO: 93 FL (ref 82–98)
MICROSCOPIC COMMENT: ABNORMAL
MODE: ABNORMAL
MONOCYTES # BLD AUTO: 0.9 K/UL (ref 0.3–1)
MONOCYTES NFR BLD: 10.2 % (ref 4–15)
NEUTROPHILS # BLD AUTO: 5.9 K/UL (ref 1.8–7.7)
NEUTROPHILS NFR BLD: 65.1 % (ref 38–73)
NITRITE UR QL STRIP: NEGATIVE
NRBC BLD-RTO: 0 /100 WBC
PCO2 BLDA: 45.6 MMHG (ref 35–45)
PEEP: 5
PH SMN: 7.47 [PH] (ref 7.35–7.45)
PH UR STRIP: 7 [PH] (ref 5–8)
PLATELET # BLD AUTO: 187 K/UL (ref 150–450)
PMV BLD AUTO: 11.1 FL (ref 9.2–12.9)
PO2 BLDA: 89 MMHG (ref 80–100)
POC BE: 8 MMOL/L
POC SATURATED O2: 97 % (ref 95–100)
POC TCO2: 35 MMOL/L (ref 23–27)
POCT GLUCOSE: 113 MG/DL (ref 70–110)
POCT GLUCOSE: 156 MG/DL (ref 70–110)
POCT GLUCOSE: 179 MG/DL (ref 70–110)
POCT GLUCOSE: 220 MG/DL (ref 70–110)
POTASSIUM SERPL-SCNC: 3.6 MMOL/L (ref 3.5–5.1)
PROT SERPL-MCNC: 7.1 G/DL (ref 6–8.4)
PROT UR QL STRIP: NEGATIVE
PS: 5
RBC # BLD AUTO: 4.97 M/UL (ref 4.6–6.2)
RBC #/AREA URNS HPF: >100 /HPF (ref 0–4)
SAMPLE: ABNORMAL
SITE: ABNORMAL
SODIUM SERPL-SCNC: 146 MMOL/L (ref 136–145)
SP GR UR STRIP: 1.01 (ref 1–1.03)
SPONT RATE: 26
SQUAMOUS #/AREA URNS HPF: 1 /HPF
URN SPEC COLLECT METH UR: ABNORMAL
UROBILINOGEN UR STRIP-ACNC: NEGATIVE EU/DL
WBC # BLD AUTO: 9.09 K/UL (ref 3.9–12.7)
WBC #/AREA URNS HPF: 7 /HPF (ref 0–5)

## 2024-06-09 PROCEDURE — 25000003 PHARM REV CODE 250: Performed by: NURSE PRACTITIONER

## 2024-06-09 PROCEDURE — 63600175 PHARM REV CODE 636 W HCPCS: Performed by: STUDENT IN AN ORGANIZED HEALTH CARE EDUCATION/TRAINING PROGRAM

## 2024-06-09 PROCEDURE — 36415 COLL VENOUS BLD VENIPUNCTURE: CPT | Performed by: STUDENT IN AN ORGANIZED HEALTH CARE EDUCATION/TRAINING PROGRAM

## 2024-06-09 PROCEDURE — 81000 URINALYSIS NONAUTO W/SCOPE: CPT | Performed by: STUDENT IN AN ORGANIZED HEALTH CARE EDUCATION/TRAINING PROGRAM

## 2024-06-09 PROCEDURE — 25000003 PHARM REV CODE 250: Performed by: INTERNAL MEDICINE

## 2024-06-09 PROCEDURE — 87070 CULTURE OTHR SPECIMN AEROBIC: CPT | Performed by: STUDENT IN AN ORGANIZED HEALTH CARE EDUCATION/TRAINING PROGRAM

## 2024-06-09 PROCEDURE — A4216 STERILE WATER/SALINE, 10 ML: HCPCS | Performed by: INTERNAL MEDICINE

## 2024-06-09 PROCEDURE — 99900026 HC AIRWAY MAINTENANCE (STAT)

## 2024-06-09 PROCEDURE — 87040 BLOOD CULTURE FOR BACTERIA: CPT | Performed by: STUDENT IN AN ORGANIZED HEALTH CARE EDUCATION/TRAINING PROGRAM

## 2024-06-09 PROCEDURE — 94761 N-INVAS EAR/PLS OXIMETRY MLT: CPT | Mod: XB

## 2024-06-09 PROCEDURE — 20000000 HC ICU ROOM

## 2024-06-09 PROCEDURE — 25000003 PHARM REV CODE 250: Performed by: STUDENT IN AN ORGANIZED HEALTH CARE EDUCATION/TRAINING PROGRAM

## 2024-06-09 PROCEDURE — 87205 SMEAR GRAM STAIN: CPT | Performed by: STUDENT IN AN ORGANIZED HEALTH CARE EDUCATION/TRAINING PROGRAM

## 2024-06-09 PROCEDURE — 25000003 PHARM REV CODE 250: Performed by: EMERGENCY MEDICINE

## 2024-06-09 PROCEDURE — 27000221 HC OXYGEN, UP TO 24 HOURS

## 2024-06-09 PROCEDURE — 99291 CRITICAL CARE FIRST HOUR: CPT | Mod: ,,, | Performed by: INTERNAL MEDICINE

## 2024-06-09 PROCEDURE — 82803 BLOOD GASES ANY COMBINATION: CPT

## 2024-06-09 PROCEDURE — 80053 COMPREHEN METABOLIC PANEL: CPT | Performed by: STUDENT IN AN ORGANIZED HEALTH CARE EDUCATION/TRAINING PROGRAM

## 2024-06-09 PROCEDURE — 85025 COMPLETE CBC W/AUTO DIFF WBC: CPT | Performed by: NURSE PRACTITIONER

## 2024-06-09 PROCEDURE — 94003 VENT MGMT INPAT SUBQ DAY: CPT

## 2024-06-09 PROCEDURE — 99900035 HC TECH TIME PER 15 MIN (STAT)

## 2024-06-09 PROCEDURE — 37799 UNLISTED PX VASCULAR SURGERY: CPT

## 2024-06-09 RX ORDER — INSULIN GLARGINE 100 [IU]/ML
5 INJECTION, SOLUTION SUBCUTANEOUS DAILY
Status: DISCONTINUED | OUTPATIENT
Start: 2024-06-09 | End: 2024-06-09

## 2024-06-09 RX ORDER — ACETAMINOPHEN 325 MG/1
650 TABLET ORAL EVERY 4 HOURS PRN
Status: DISCONTINUED | OUTPATIENT
Start: 2024-06-09 | End: 2024-06-13 | Stop reason: HOSPADM

## 2024-06-09 RX ADMIN — ASPIRIN 81 MG CHEWABLE TABLET 81 MG: 81 TABLET CHEWABLE at 08:06

## 2024-06-09 RX ADMIN — MUPIROCIN: 20 OINTMENT TOPICAL at 09:06

## 2024-06-09 RX ADMIN — INSULIN ASPART 2 UNITS: 100 INJECTION, SOLUTION INTRAVENOUS; SUBCUTANEOUS at 05:06

## 2024-06-09 RX ADMIN — ACETAMINOPHEN 650 MG: 325 TABLET ORAL at 12:06

## 2024-06-09 RX ADMIN — FAMOTIDINE 20 MG: 10 INJECTION, SOLUTION INTRAVENOUS at 08:06

## 2024-06-09 RX ADMIN — DEXMEDETOMIDINE HYDROCHLORIDE 0.6 MCG/KG/HR: 4 INJECTION, SOLUTION INTRAVENOUS at 12:06

## 2024-06-09 RX ADMIN — PIPERACILLIN AND TAZOBACTAM 4.5 G: 4; .5 INJECTION, POWDER, LYOPHILIZED, FOR SOLUTION INTRAVENOUS; PARENTERAL at 11:06

## 2024-06-09 RX ADMIN — FUROSEMIDE 80 MG: 10 INJECTION, SOLUTION INTRAVENOUS at 09:06

## 2024-06-09 RX ADMIN — METOROPROLOL TARTRATE 5 MG: 5 INJECTION, SOLUTION INTRAVENOUS at 11:06

## 2024-06-09 RX ADMIN — ENOXAPARIN SODIUM 90 MG: 100 INJECTION SUBCUTANEOUS at 08:06

## 2024-06-09 RX ADMIN — ATORVASTATIN CALCIUM 80 MG: 40 TABLET, FILM COATED ORAL at 08:06

## 2024-06-09 RX ADMIN — CHLORHEXIDINE GLUCONATE 0.12% ORAL RINSE 15 ML: 1.2 LIQUID ORAL at 08:06

## 2024-06-09 RX ADMIN — DEXMEDETOMIDINE HYDROCHLORIDE 0.6 MCG/KG/HR: 4 INJECTION, SOLUTION INTRAVENOUS at 06:06

## 2024-06-09 RX ADMIN — CHLORHEXIDINE GLUCONATE 0.12% ORAL RINSE 15 ML: 1.2 LIQUID ORAL at 09:06

## 2024-06-09 RX ADMIN — ACETAMINOPHEN 650 MG: 325 TABLET ORAL at 11:06

## 2024-06-09 RX ADMIN — INSULIN ASPART 4 UNITS: 100 INJECTION, SOLUTION INTRAVENOUS; SUBCUTANEOUS at 11:06

## 2024-06-09 RX ADMIN — VANCOMYCIN HYDROCHLORIDE 1250 MG: 1.25 INJECTION, POWDER, LYOPHILIZED, FOR SOLUTION INTRAVENOUS at 03:06

## 2024-06-09 RX ADMIN — Medication 10 ML: at 11:06

## 2024-06-09 RX ADMIN — POTASSIUM BICARBONATE 50 MEQ: 977.5 TABLET, EFFERVESCENT ORAL at 03:06

## 2024-06-09 RX ADMIN — ENOXAPARIN SODIUM 90 MG: 100 INJECTION SUBCUTANEOUS at 09:06

## 2024-06-09 RX ADMIN — FUROSEMIDE 80 MG: 10 INJECTION, SOLUTION INTRAVENOUS at 08:06

## 2024-06-09 RX ADMIN — METOROPROLOL TARTRATE 5 MG: 5 INJECTION, SOLUTION INTRAVENOUS at 05:06

## 2024-06-09 RX ADMIN — PIPERACILLIN AND TAZOBACTAM 4.5 G: 4; .5 INJECTION, POWDER, LYOPHILIZED, FOR SOLUTION INTRAVENOUS; PARENTERAL at 05:06

## 2024-06-09 RX ADMIN — INSULIN DETEMIR 5 UNITS: 100 INJECTION, SOLUTION SUBCUTANEOUS at 11:06

## 2024-06-09 RX ADMIN — MUPIROCIN: 20 OINTMENT TOPICAL at 08:06

## 2024-06-09 RX ADMIN — ACETAMINOPHEN 650 MG: 325 TABLET ORAL at 03:06

## 2024-06-09 NOTE — ASSESSMENT & PLAN NOTE
EF improved on Dobutamine. Cardiology note reviewed, possible 2/2 Afib RVR  -low dose IV metoprolol ordered with hold parameters  - amiodarone  - coreg, lasix

## 2024-06-09 NOTE — SUBJECTIVE & OBJECTIVE
Interval History: spiked fever overnight, up to 102.6. He is still calm and awake. Cultures taken, antibiotics ordered.    Review of Systems  Objective:     Vital Signs (Most Recent):  Temp: 98.7 °F (37.1 °C) (06/09/24 0701)  Pulse: 83 (06/09/24 1000)  Resp: (!) 23 (06/09/24 1000)  BP: 127/77 (06/09/24 0400)  SpO2: 97 % (06/09/24 1000) Vital Signs (24h Range):  Temp:  [98 °F (36.7 °C)-102.6 °F (39.2 °C)] 98.7 °F (37.1 °C)  Pulse:  [] 83  Resp:  [18-45] 23  SpO2:  [97 %-100 %] 97 %  BP: (127-136)/(73-81) 127/77  Arterial Line BP: ()/(50-97) 138/74     Weight: 88.7 kg (195 lb 8.8 oz)  Body mass index is 31.56 kg/m².    Intake/Output Summary (Last 24 hours) at 6/9/2024 1037  Last data filed at 6/9/2024 1026  Gross per 24 hour   Intake 1300.79 ml   Output 1490 ml   Net -189.21 ml         Physical Exam  Vitals and nursing note reviewed.   Constitutional:       General: He is not in acute distress.     Appearance: He is ill-appearing.      Interventions: He is sedated and intubated.   Cardiovascular:      Rate and Rhythm: Normal rate. Rhythm irregular.      Pulses: Normal pulses.   Pulmonary:      Effort: Pulmonary effort is normal. He is intubated.      Breath sounds: No wheezing.      Comments: Intubated, mechanically ventilated. Coarse breath sounds bilaterally  Abdominal:      General: There is no distension.      Palpations: Abdomen is soft.   Musculoskeletal:      Comments: Edema in lower extremities improving   Skin:     Comments: Left forearm with erythema, swelling and blister from extravasation   Neurological:      Comments: Awake, calm and following commands             Significant Labs: All pertinent labs within the past 24 hours have been reviewed.    Significant Imaging: I have reviewed all pertinent imaging results/findings within the past 24 hours.

## 2024-06-09 NOTE — PROGRESS NOTES
[facility]  Critical Care Medicine  Progress Note    Patient Name: Marck Madison  MRN: [id number]  Admission Date: 6/5/2024  Hospital Length of Stay: 4 days  Code Status: Full Code  Attending Provider: Kulwant Nesbitt MD  Primary Care Provider: [facility] -   Principal Problem: Acute hypoxemic respiratory failure    Subjective:     HPI:  65 yo male with chart history of dilated CM but recent preserved EF, Afib, EtOH abuse who presented with severe dyspnea and Afib RVR. He was started on Amio with good control of his HR. Placed on BiPAP for WOB. He was initially looking comforatable on BiPAP but in severe distress off. I was called urgently to assess increased WOB. He was noted to have RR up to 60 with MV over 60L, then would pass out and have no resp efforts. He was able to initial speak and felt extremely dyspneic but otherwise denied complaints. Not able to provide full history 2/2 respiratory distress.    Hospital/ICU Course:  No notes on file    Interval History: Intubated and sedated. Good UOP. No longer cold and clammy. Tolerated stopping dobutamine well x48 hours. Febrile overnight to 102 but now normothermic. Cx obtained and on Vanc/Zosyn.     Attempting SBT today on Precedex and good efforts so far with no distress.    Discussed case in detail with hospitalist.       Objective:     Vital Signs (Most Recent):  Temp: 99.7 °F (37.6 °C) (06/09/24 1108)  Pulse: 77 (06/09/24 1108)  Resp: (!) 27 (06/09/24 1108)  BP: 127/77 (06/09/24 0400)  SpO2: 98 % (06/09/24 1108) Vital Signs (24h Range):  Temp:  [98.6 °F (37 °C)-102.6 °F (39.2 °C)] 99.7 °F (37.6 °C)  Pulse:  [] 77  Resp:  [18-28] 27  SpO2:  [97 %-100 %] 98 %  BP: (127-136)/(73-81) 127/77  Arterial Line BP: ()/(50-76) 127/67     Weight: 88.7 kg (195 lb 8.8 oz)  Body mass index is 31.56 kg/m².      Intake/Output Summary (Last 24 hours) at 6/9/2024 1153  Last data filed at 6/9/2024 1100  Gross per 24 hour   Intake 1273.42  ml   Output 1525 ml   Net -251.58 ml        Physical Exam  Vitals reviewed.   Constitutional:       Appearance: He is ill-appearing.   HENT:      Head: Normocephalic and atraumatic.      Mouth/Throat:      Mouth: Mucous membranes are moist.   Eyes:      Pupils: Pupils are equal, round, and reactive to light.   Neck:      Comments: JVD  Cardiovascular:      Rate and Rhythm: Normal rate. Rhythm irregular.      Pulses: Normal pulses.      Heart sounds: Normal heart sounds. No murmur heard.  Pulmonary:      Breath sounds: No stridor. No wheezing.      Comments: Synchronous with vent  Abdominal:      General: There is distension.      Palpations: Abdomen is soft.   Musculoskeletal:      Right lower leg: Edema present.      Left lower leg: Edema present.   Skin:     General: Skin is dry.      Capillary Refill: Capillary refill takes less than 2 seconds.   Neurological:      Mental Status: He is alert.      Comments: Sedated, responds to commands           Review of Systems    Vents:  Vent Mode: PRVC (06/09/24 1053)  Ventilator Initiated: Yes (06/05/24 1443)  Set Rate: 18 BPM (06/09/24 1053)  Vt Set: 390 mL (06/09/24 1053)  PEEP/CPAP: 5 cmH20 (06/09/24 1053)  Oxygen Concentration (%): 40 (06/09/24 1108)  Peak Airway Pressure: 16.3 cmH20 (06/09/24 1053)  Total Ve: 8.3 L/m (06/09/24 1053)  F/VT Ratio<105 (RSBI): (!) 64 (06/09/24 1053)    Lines/Drains/Airways       Peripherally Inserted Central Catheter Line  Duration             PICC Triple Lumen 06/05/24 1912 left brachial 3 days              Drain  Duration                  NG/OG Tube 06/05/24 1419 Center mouth 3 days         Urethral Catheter 06/09/24 0930 <1 day              Airway  Duration                  Airway - Non-Surgical 06/05/24 1420 Endotracheal Tube 3 days              Arterial Line  Duration             Arterial Line 06/05/24 1501 Right Radial 3 days                    Significant Labs:    CBC/Anemia Profile:  Recent Labs   Lab 06/08/24  0301 06/09/24  0309  "  WBC 9.40 9.09   HGB 15.3 15.1   HCT 46.7 46.1    187   MCV 93 93   RDW 16.6* 16.1*        Chemistries:  Recent Labs   Lab 06/08/24  0301 06/09/24  0309    146*   K 3.7 3.6    107   CO2 28 27   BUN 33* 39*   CREATININE 1.9* 1.7*   CALCIUM 9.4 9.5   ALBUMIN 3.2* 3.0*   PROT 7.2 7.1   BILITOT 0.4 0.4   ALKPHOS 77 80   ALT 21 17   AST 20 18       ABGs:   No results for input(s): "PH", "PCO2", "HCO3", "POCSATURATED", "BE" in the last 48 hours.    All pertinent labs within the past 24 hours have been reviewed.    Significant Imaging:  I have reviewed all pertinent imaging results/findings within the past 24 hours.  CXR: I have reviewed all pertinent results/findings within the past 24 hours and my personal findings are:  pending  Echo: I have reviewed all pertinent results/findings within the past 24 hours and my personal findings are:  none new  Tely shows Afib with rate control    ABG  Recent Labs   Lab 06/07/24  1131   PH 7.400   PO2 114*   PCO2 47.8*   HCO3 29.6*   BE 4*     Assessment/Plan:     Pulmonary  * Acute hypoxemic respiratory failure  Progressive worsening on BiPAP. Did not respond to increased EPAP. With variable resp efforts and a clear component of severe resp alkalosis, proceeded with intubation. Requiring high PEEP and high FiO2 to maintain oxygenation.     Vent dependent. Vent adjusted at bedside  LPVS.  PEEP at  5  PRVC for synchrony  Continue aggressive diuresis  Daily SAT/SBT- If ABG appropriate, will perform cardiac wean and extubate to NC if passes    Cardiac/Vascular  CAD (coronary artery disease)  Reviewed recent [facility]. Current presentation out of proportion to reported disease burden.    Acute on chronic diastolic congestive heart failure  EF improved on Dobutamine. Cardiology note reviewed, possible 2/2 Afib RVR  -low dose IV metoprolol ordered with hold parameters    Cardiogenic shock  Levophed to maintain MAP >60  Good response to dobutamine and overall appears improved. " 6/7 stopped dobutamine and monitoring off    IV with levophed infiltrated and treated and monitored by wound care. Good pulses and no signs of compartment syndrome.        Renal/  FARHAN (acute kidney injury)  UOP improved with dobutamine. Cr improving. Monitor. Avoid nephrotoxins  Monitor UOP off dobutamine- Cr continues to improve    Other  Fever  F/u cultures, CXR.  Cont Vanc and Zosyn for 48 hours.          Critical Care Time: 45 minutes  Critical secondary to Patient has a condition that poses threat to life and bodily function: Acute respiratory failure, cardiogenic shock      Critical care was time spent personally by me on the following activities: development of treatment plan with patient or surrogate and bedside caregivers, discussions with consultants, evaluation of patient's response to treatment, examination of patient, ordering and performing treatments and interventions, ordering and review of laboratory studies, ordering and review of radiographic studies, pulse oximetry, re-evaluation of patient's condition. This critical care time did not overlap with that of any other provider or involve time for any procedures.     Bj Nicholas MD  Critical Care Medicine  [facility] - Intensive Care

## 2024-06-09 NOTE — ASSESSMENT & PLAN NOTE
Progressive worsening on BiPAP. Did not respond to increased EPAP. With variable resp efforts and a clear component of severe resp alkalosis, proceeded with intubation. Requiring high PEEP and high FiO2 to maintain oxygenation.     Vent dependent. Vent adjusted at bedside  LPVS.  PEEP at  5  PRVC for synchrony  Continue aggressive diuresis  Daily SAT/SBT- If ABG appropriate, will perform cardiac wean and extubate to NC if passes

## 2024-06-09 NOTE — PROGRESS NOTES
Dr Nesbitt notified of pt developing a fever over night and urine output around 35 cc/hr. Orders to be entered by Dr Nesbitt.

## 2024-06-09 NOTE — PROGRESS NOTES
[facility] Medicine  Progress Note    Patient Name: Marck Madison  MRN: [id number]  Patient Class: IP- Inpatient   Admission Date: 6/5/2024  Length of Stay: 4 days  Attending Physician: Kulwant Nesbitt MD  Primary Care Provider: [facility] -        Subjective:     Principal Problem:Acute hypoxemic respiratory failure        HPI:  Mr. Madison is a 64-year-old male with past medical history of hypertension, diabetes mellitus type 2, atrial fibrillation, CHF and nonobstructive coronary artery disease who presents to the emergency department for evaluation of shortness of breath.  He reports this has been ongoing and progressively worsening.  He endorses swelling in his extremities but denies any chest pain.  Denies any fevers or chills.  He recently had a left heart catheterization on 05/06/24 that shows nonobstructive coronary artery disease.  He reports compliance with Lasix at home.  In the emergency department, patient was found to be tachycardic with a rate of 133 and atrial fibrillation, respiratory rate of 24 and blood pressure 180/129 with a O2 saturation of 84%.  He was placed on BiPAP and given IV amiodarone with improvement of rate.  He was also given IV Lasix.  Cardiology consulted and he will be admitted to ICU for further management.    Overview/Hospital Course:  Mr. Madison is a 65 yo M admitted with acute hypoxemic respiratory failure secondary to CHF exacerbation. Also found to be in atrial fibrillation with RVR and placed on amiodarone drip. Developed worsening respiratory distress and was intubated by Pulmonary/CC. Became hypotensive, started on dobutamine and norepinephrine. Prior to PICC line, norepinpherine extravasated on left forearm, phentolamine injected and Wound Care consulted. Diuresing well.  Attempting daily SAT/SBT but have not extubated yet. Developed fever on 6/9, blood cultures, respiratory cultures and u/a ordered. Started on empiric antibiotics.      Interval History: spiked fever overnight, up to 102.6. He is still calm and awake. Cultures taken, antibiotics ordered.    Review of Systems  Objective:     Vital Signs (Most Recent):  Temp: 98.7 °F (37.1 °C) (06/09/24 0701)  Pulse: 83 (06/09/24 1000)  Resp: (!) 23 (06/09/24 1000)  BP: 127/77 (06/09/24 0400)  SpO2: 97 % (06/09/24 1000) Vital Signs (24h Range):  Temp:  [98 °F (36.7 °C)-102.6 °F (39.2 °C)] 98.7 °F (37.1 °C)  Pulse:  [] 83  Resp:  [18-45] 23  SpO2:  [97 %-100 %] 97 %  BP: (127-136)/(73-81) 127/77  Arterial Line BP: ()/(50-97) 138/74     Weight: 88.7 kg (195 lb 8.8 oz)  Body mass index is 31.56 kg/m².    Intake/Output Summary (Last 24 hours) at 6/9/2024 1037  Last data filed at 6/9/2024 1026  Gross per 24 hour   Intake 1300.79 ml   Output 1490 ml   Net -189.21 ml         Physical Exam  Vitals and nursing note reviewed.   Constitutional:       General: He is not in acute distress.     Appearance: He is ill-appearing.      Interventions: He is sedated and intubated.   Cardiovascular:      Rate and Rhythm: Normal rate. Rhythm irregular.      Pulses: Normal pulses.   Pulmonary:      Effort: Pulmonary effort is normal. He is intubated.      Breath sounds: No wheezing.      Comments: Intubated, mechanically ventilated. Coarse breath sounds bilaterally  Abdominal:      General: There is no distension.      Palpations: Abdomen is soft.   Musculoskeletal:      Comments: Edema in lower extremities improving   Skin:     Comments: Left forearm with erythema, swelling and blister from extravasation   Neurological:      Comments: Awake, calm and following commands             Significant Labs: All pertinent labs within the past 24 hours have been reviewed.    Significant Imaging: I have reviewed all pertinent imaging results/findings within the past 24 hours.    Assessment/Plan:      * Acute hypoxemic respiratory failure  Patient with Hypoxic Respiratory failure which is Acute.  he is not on home  oxygen. Supplemental oxygen was provided and noted- Vent Mode: PRVC  Oxygen Concentration (%):  [40] 40  Resp Rate Total:  [19 br/min-32 br/min] 22 br/min  Vt Set:  [390 mL] 390 mL  PEEP/CPAP:  [5 cmH20] 5 cmH20  Mean Airway Pressure:  [8.8 cmH20-10.5 cmH20] 8.9 cmH20    .   Signs/symptoms of respiratory failure include- tachypnea, increased work of breathing, and respiratory distress. Contributing diagnoses includes - CHF Labs and images were reviewed. Patient Has recent ABG, which has been reviewed. Will treat underlying causes and adjust management of respiratory failure as follows-   - Bipap initially --> now intubated for worsening hypoxia and respiratory distress  - continue with IV lasix, dobutamine weaned off  - Cardiology following  - pulmonology with ventilatory management  - daily SBT    Fever  - spiked fever overnight on 6/9  - cultures drawn (blood, respiratory, u/a)  - started on empiric antibiotics  - f/u culture and fever curve      Elevated troponin  Possible related to demand ischemia, patient had recent LHC with nonobstructive CAD on 5/6/24  - Cardiology consulted  - continue aspirin/statin    Class 1 obesity with serious comorbidity and body mass index (BMI) of 30.0 to 30.9 in adult  Body mass index is 31.56 kg/m². Morbid obesity complicates all aspects of disease management from diagnostic modalities to treatment. Weight loss encouraged and health benefits explained to patient.         CAD (coronary artery disease)  Patient with known CAD that is nonobstructive (5/6/2024), which is controlled Will continue ASA and Statin and monitor for S/Sx of angina/ACS. Continue to monitor on telemetry.     Permanent atrial fibrillation  Patient with Permanent atrial fibrillation which is controlled currently with Amiodarone. Patient is currently in atrial fibrillation.[id number] Score: 3. . Anticoagulation indicated. Anticoagulation done with Lovenox .    - Off amiodarone due to bradycardia  - rate  controlled currently    Acute on chronic diastolic congestive heart failure  Patient is identified as having Diastolic (HFpEF) heart failure that is Acute on chronic. CHF is currently . Latest ECHO performed and demonstrates- Results for orders placed during the hospital encounter of 03/15/24    Echo  Results for orders placed during the hospital encounter of 03/15/24    Echo    Interpretation Summary    Left Ventricle: The left ventricle is normal in size. There is moderate concentric hypertrophy. There is low normal systolic function with a visually estimated ejection fraction of 50 - 55%.    Right Ventricle: Mild right ventricular enlargement. Systolic function is normal.    Left Atrium: Left atrium is severely dilated.    Right Atrium: Right atrium is moderately dilated.    Aortic Valve: There is mild aortic valve sclerosis.    Mitral Valve: There is moderate regurgitation.    Tricuspid Valve: There is moderate regurgitation.    Pulmonary Artery: The estimated pulmonary artery systolic pressure is 51 mmHg.    IVC/SVC: Elevated venous pressure at 15 mmHg.      Recent Labs   Lab 06/05/24  0618   *         - Now in cardiogenic shock and increasing oxygen requirements. Intubated, diuresing well on lasix and dobutamine drip.  - continue with aggressive diuresis and wean as tolerated  - titrating to maintenance lasix dosing    Cardiogenic shock  See acute on chronic heart failure exacerbation      Type 2 diabetes mellitus, with long-term current use of insulin  A1c:   Lab Results   Component Value Date    HGBA1C 6.7 (H) 06/05/2024     Meds: SSI PRN to maintain goal 140-180 - add basal with tube feedings  accuchecks ACHS, hypoglycemic protocol       FARHAN (acute kidney injury)  Patient with acute kidney injury/acute renal failure likely due to cardiogenic shock. FARHAN is currently stable. Baseline creatinine  1.5  - Labs reviewed- Renal function/electrolytes with Estimated Creatinine Clearance: 44.6 mL/min (A)  (based on SCr of 1.7 mg/dL (H)). according to latest data. Monitor urine output and serial BMP and adjust therapy as needed. Avoid nephrotoxins and renally dose meds for GFR listed above.      VTE Risk Mitigation (From admission, onward)           Ordered     enoxaparin injection 90 mg  Every 12 hours         06/05/24 1131                    Discharge Planning   BALDO:      Code Status: Full Code   Is the patient medically ready for discharge?:     Reason for patient still in hospital (select all that apply): Treatment  Discharge Plan A: Home            Critical care time spent on the evaluation and treatment of severe organ dysfunction, review of pertinent labs and imaging studies, discussions with consulting providers and discussions with patient/family: 25 minutes.      Kulwant Nesbitt MD  Department of Hospital Medicine   [facility]

## 2024-06-09 NOTE — PLAN OF CARE
Problem: Adult Inpatient Plan of Care  Goal: Plan of Care Review  Outcome: Progressing  Goal: Patient-Specific Goal (Individualized)  Outcome: Progressing  Goal: Absence of Hospital-Acquired Illness or Injury  Outcome: Progressing  Goal: Optimal Comfort and Wellbeing  Outcome: Progressing  Goal: Readiness for Transition of Care  Outcome: Progressing     Problem: Diabetes Comorbidity  Goal: Blood Glucose Level Within Targeted Range  Outcome: Progressing     Problem: Infection  Goal: Absence of Infection Signs and Symptoms  Outcome: Progressing     Problem: Fall Injury Risk  Goal: Absence of Fall and Fall-Related Injury  Outcome: Progressing     Problem: Restraint, Nonviolent  Goal: Absence of Harm or Injury  Outcome: Progressing     Problem: Skin Injury Risk Increased  Goal: Skin Health and Integrity  Outcome: Progressing     Problem: Wound  Goal: Optimal Coping  Outcome: Progressing  Goal: Optimal Functional Ability  Outcome: Progressing  Goal: Absence of Infection Signs and Symptoms  Outcome: Progressing  Goal: Improved Oral Intake  Outcome: Progressing  Goal: Optimal Pain Control and Function  Outcome: Progressing  Goal: Skin Health and Integrity  Outcome: Progressing  Goal: Optimal Wound Healing  Outcome: Progressing     Problem: Acute Kidney Injury/Impairment  Goal: Fluid and Electrolyte Balance  Outcome: Progressing  Goal: Improved Oral Intake  Outcome: Progressing  Goal: Effective Renal Function  Outcome: Progressing     Problem: Mechanical Ventilation Invasive  Goal: Effective Communication  Outcome: Progressing  Goal: Optimal Device Function  Outcome: Progressing  Goal: Mechanical Ventilation Liberation  Outcome: Progressing  Goal: Optimal Nutrition Delivery  Outcome: Progressing  Goal: Absence of Device-Related Skin and Tissue Injury  Outcome: Progressing  Goal: Absence of Ventilator-Induced Lung Injury  Outcome: Progressing     Problem: Artificial Airway  Goal: Effective Communication  Outcome:  Progressing  Goal: Optimal Device Function  Outcome: Progressing  Goal: Absence of Device-Related Skin or Tissue Injury  Outcome: Progressing     Problem: Noninvasive Ventilation Acute  Goal: Effective Unassisted Ventilation and Oxygenation  Outcome: Progressing

## 2024-06-09 NOTE — ASSESSMENT & PLAN NOTE
A1c:   Lab Results   Component Value Date    HGBA1C 6.7 (H) 06/05/2024     Meds: SSI PRN to maintain goal 140-180 - add basal with tube feedings  accuchecks ACHS, hypoglycemic protocol

## 2024-06-09 NOTE — PROGRESS NOTES
[facility]  Nurses Note -- 4 Eyes      6/9/2024       Skin assessed on: Q Shift      [x] No Pressure Injuries Present    [x]Prevention Measures Documented    [] Yes LDA  for Pressure Injury Previously documented     [] Yes New Pressure Injury Discovered   [] LDA for New Pressure Injury Added      Attending RN:  Marlene Langston RN     Second RN:  DELTA Dela Cruz

## 2024-06-09 NOTE — ASSESSMENT & PLAN NOTE
Patient with Hypoxic Respiratory failure which is Acute.  he is not on home oxygen. Supplemental oxygen was provided and noted- Vent Mode: PRVC  Oxygen Concentration (%):  [40] 40  Resp Rate Total:  [19 br/min-32 br/min] 22 br/min  Vt Set:  [390 mL] 390 mL  PEEP/CPAP:  [5 cmH20] 5 cmH20  Mean Airway Pressure:  [8.8 cmH20-10.5 cmH20] 8.9 cmH20    .   Signs/symptoms of respiratory failure include- tachypnea, increased work of breathing, and respiratory distress. Contributing diagnoses includes - CHF Labs and images were reviewed. Patient Has recent ABG, which has been reviewed. Will treat underlying causes and adjust management of respiratory failure as follows-   - Bipap initially --> now intubated for worsening hypoxia and respiratory distress  - continue with IV lasix, dobutamine weaned off  - Cardiology following  - pulmonology with ventilatory management  - daily SBT

## 2024-06-09 NOTE — ASSESSMENT & PLAN NOTE
Patient with known CAD that is nonobstructive (5/6/2024), which is controlled Will continue ASA and Statin and monitor for S/Sx of angina/ACS. Continue to monitor on telemetry.

## 2024-06-09 NOTE — SUBJECTIVE & OBJECTIVE
Interval History: Intubated and sedated. Good UOP. No longer cold and clammy. Tolerated stopping dobutamine well x48 hours. Febrile overnight to 102 but now normothermic. Cx obtained and on Vanc/Zosyn.     Attempting SBT today on Precedex and good efforts so far with no distress.    Discussed case in detail with hospitalist.       Objective:     Vital Signs (Most Recent):  Temp: 99.7 °F (37.6 °C) (06/09/24 1108)  Pulse: 77 (06/09/24 1108)  Resp: (!) 27 (06/09/24 1108)  BP: 127/77 (06/09/24 0400)  SpO2: 98 % (06/09/24 1108) Vital Signs (24h Range):  Temp:  [98.6 °F (37 °C)-102.6 °F (39.2 °C)] 99.7 °F (37.6 °C)  Pulse:  [] 77  Resp:  [18-28] 27  SpO2:  [97 %-100 %] 98 %  BP: (127-136)/(73-81) 127/77  Arterial Line BP: ()/(50-76) 127/67     Weight: 88.7 kg (195 lb 8.8 oz)  Body mass index is 31.56 kg/m².      Intake/Output Summary (Last 24 hours) at 6/9/2024 1153  Last data filed at 6/9/2024 1100  Gross per 24 hour   Intake 1273.42 ml   Output 1525 ml   Net -251.58 ml        Physical Exam  Vitals reviewed.   Constitutional:       Appearance: He is ill-appearing.   HENT:      Head: Normocephalic and atraumatic.      Mouth/Throat:      Mouth: Mucous membranes are moist.   Eyes:      Pupils: Pupils are equal, round, and reactive to light.   Neck:      Comments: JVD  Cardiovascular:      Rate and Rhythm: Normal rate. Rhythm irregular.      Pulses: Normal pulses.      Heart sounds: Normal heart sounds. No murmur heard.  Pulmonary:      Breath sounds: No stridor. No wheezing.      Comments: Synchronous with vent  Abdominal:      General: There is distension.      Palpations: Abdomen is soft.   Musculoskeletal:      Right lower leg: Edema present.      Left lower leg: Edema present.   Skin:     General: Skin is dry.      Capillary Refill: Capillary refill takes less than 2 seconds.   Neurological:      Mental Status: He is alert.      Comments: Sedated, responds to commands           Review of  "Systems    Vents:  Vent Mode: PRVC (06/09/24 1053)  Ventilator Initiated: Yes (06/05/24 1443)  Set Rate: 18 BPM (06/09/24 1053)  Vt Set: 390 mL (06/09/24 1053)  PEEP/CPAP: 5 cmH20 (06/09/24 1053)  Oxygen Concentration (%): 40 (06/09/24 1108)  Peak Airway Pressure: 16.3 cmH20 (06/09/24 1053)  Total Ve: 8.3 L/m (06/09/24 1053)  F/VT Ratio<105 (RSBI): (!) 64 (06/09/24 1053)    Lines/Drains/Airways       Peripherally Inserted Central Catheter Line  Duration             PICC Triple Lumen 06/05/24 1912 left brachial 3 days              Drain  Duration                  NG/OG Tube 06/05/24 1419 Center mouth 3 days         Urethral Catheter 06/09/24 0930 <1 day              Airway  Duration                  Airway - Non-Surgical 06/05/24 1420 Endotracheal Tube 3 days              Arterial Line  Duration             Arterial Line 06/05/24 1501 Right Radial 3 days                    Significant Labs:    CBC/Anemia Profile:  Recent Labs   Lab 06/08/24  0301 06/09/24  0309   WBC 9.40 9.09   HGB 15.3 15.1   HCT 46.7 46.1    187   MCV 93 93   RDW 16.6* 16.1*        Chemistries:  Recent Labs   Lab 06/08/24  0301 06/09/24  0309    146*   K 3.7 3.6    107   CO2 28 27   BUN 33* 39*   CREATININE 1.9* 1.7*   CALCIUM 9.4 9.5   ALBUMIN 3.2* 3.0*   PROT 7.2 7.1   BILITOT 0.4 0.4   ALKPHOS 77 80   ALT 21 17   AST 20 18       ABGs:   No results for input(s): "PH", "PCO2", "HCO3", "POCSATURATED", "BE" in the last 48 hours.    All pertinent labs within the past 24 hours have been reviewed.    Significant Imaging:  I have reviewed all pertinent imaging results/findings within the past 24 hours.  CXR: I have reviewed all pertinent results/findings within the past 24 hours and my personal findings are:  pending  Echo: I have reviewed all pertinent results/findings within the past 24 hours and my personal findings are:  none new  Tely shows Afib with rate control  "

## 2024-06-09 NOTE — NURSING
[facility], [facility]  Nurses Note -- 4 Eyes      6/8/2024       Skin assessed on: Q Shift      [x] No Pressure Injuries Present    [x]Prevention Measures Documented    [] Yes LDA  for Pressure Injury Previously documented     [] Yes New Pressure Injury Discovered   [] LDA for New Pressure Injury Added      Attending RN:  DELTA Dela Cruz     Second RN:  Marlene

## 2024-06-09 NOTE — ASSESSMENT & PLAN NOTE
Patient with acute kidney injury/acute renal failure likely due to cardiogenic shock. FARHAN is currently stable. Baseline creatinine  1.5  - Labs reviewed- Renal function/electrolytes with Estimated Creatinine Clearance: 44.6 mL/min (A) (based on SCr of 1.7 mg/dL (H)). according to latest data. Monitor urine output and serial BMP and adjust therapy as needed. Avoid nephrotoxins and renally dose meds for GFR listed above.

## 2024-06-09 NOTE — ASSESSMENT & PLAN NOTE
- spiked fever overnight on 6/9  - cultures drawn (blood, respiratory, u/a)  - started on empiric antibiotics  - f/u culture and fever curve

## 2024-06-09 NOTE — PROGRESS NOTES
Pt extubated to 3L NC. Pt tolerated well. Restraints removed. Pt tolerated well. Will continue to monitor.

## 2024-06-09 NOTE — PROGRESS NOTES
Pharmacokinetic Initial Assessment: IV Vancomycin    Assessment/Plan:    Initiate intravenous vancomycin with loading dose of 1250 mg once with subsequent doses when random concentrations are less than 20 mcg/mL  Desired empiric serum trough concentration is 10 to 20 mcg/mL  Draw vancomycin random level on 6/10/24 at 0400.  Pharmacy will continue to follow and monitor vancomycin.      Please contact pharmacy at extension [phone] with any questions regarding this assessment.     Thank you for the consult,   Campos Almaguer       Patient brief summary:  Marck Madison is a 64 y.o. male initiated on antimicrobial therapy with IV Vancomycin for treatment of suspected bacteremia    Drug Allergies:   Review of patient's allergies indicates:  No Known Allergies    Actual Body Weight:   88.7 kg    Renal Function:   Estimated Creatinine Clearance: 44.6 mL/min (A) (based on SCr of 1.7 mg/dL (H)).,     Dialysis Method (if applicable):  N/A    CBC (last 72 hours):  Recent Labs   Lab Result Units 06/07/24 0228 06/08/24 0301 06/09/24  0309   WBC K/uL 9.21 9.40 9.09   Hemoglobin g/dL 14.9 15.3 15.1   Hematocrit % 44.8 46.7 46.1   Platelets K/uL 181 200 187   Gran % % 65.1 66.8 65.1   Lymph % % 21.9 19.0 21.6   Mono % % 9.7 10.2 10.2   Eosinophil % % 2.6 3.5 2.3   Basophil % % 0.4 0.3 0.4   Differential Method  Automated Automated Automated       Metabolic Panel (last 72 hours):  Recent Labs   Lab Result Units 06/06/24 1959 06/07/24 0228 06/08/24  0301 06/09/24  0309   Sodium mmol/L 142 144 145 146*   Potassium mmol/L 4.4 3.8 3.7 3.6   Chloride mmol/L 107 108 106 107   CO2 mmol/L 23 23 28 27   Glucose mg/dL 137* 172* 171* 208*   BUN mg/dL 30* 30* 33* 39*   Creatinine mg/dL 1.9* 1.9* 1.9* 1.7*   Albumin g/dL  --  3.3* 3.2* 3.0*   Total Bilirubin mg/dL  --  0.6 0.4 0.4   Alkaline Phosphatase U/L  --  71 77 80   AST U/L  --  27 20 18   ALT U/L  --  31 21 17   Magnesium mg/dL  --  1.9  --   --        Drug levels (last 3 results):  No  "results for input(s): "VANCOMYCINRA", "VANCORANDOM", "VANCOMYCINPE", "VANCOPEAK", "VANCOMYCINTR", "VANCOTROUGH" in the last 72 hours.    Microbiologic Results:  Microbiology Results (last 7 days)       Procedure Component Value Units Date/Time    Blood culture [[id number]]     Order Status: No result Specimen: Blood     Blood culture [[id number]]     Order Status: No result Specimen: Blood     Culture, Respiratory with Gram Stain [[id number]]     Order Status: No result Specimen: Respiratory             "

## 2024-06-09 NOTE — ASSESSMENT & PLAN NOTE
Levophed to maintain MAP >60  Good response to dobutamine and overall appears improved. 6/7 stopped dobutamine and monitoring off    IV with levophed infiltrated and treated and monitored by wound care. Good pulses and no signs of compartment syndrome.

## 2024-06-09 NOTE — ASSESSMENT & PLAN NOTE
UOP improved with dobutamine. Cr improving. Monitor. Avoid nephrotoxins  Monitor UOP off dobutamine- Cr continues to improve

## 2024-06-09 NOTE — ASSESSMENT & PLAN NOTE
Patient is identified as having Diastolic (HFpEF) heart failure that is Acute on chronic. CHF is currently . Latest ECHO performed and demonstrates- Results for orders placed during the hospital encounter of 03/15/24    Echo  Results for orders placed during the hospital encounter of 03/15/24    Echo    Interpretation Summary    Left Ventricle: The left ventricle is normal in size. There is moderate concentric hypertrophy. There is low normal systolic function with a visually estimated ejection fraction of 50 - 55%.    Right Ventricle: Mild right ventricular enlargement. Systolic function is normal.    Left Atrium: Left atrium is severely dilated.    Right Atrium: Right atrium is moderately dilated.    Aortic Valve: There is mild aortic valve sclerosis.    Mitral Valve: There is moderate regurgitation.    Tricuspid Valve: There is moderate regurgitation.    Pulmonary Artery: The estimated pulmonary artery systolic pressure is 51 mmHg.    IVC/SVC: Elevated venous pressure at 15 mmHg.      Recent Labs   Lab 06/05/24  0618   *         - Now in cardiogenic shock and increasing oxygen requirements. Intubated, diuresing well on lasix and dobutamine drip.  - continue with aggressive diuresis and wean as tolerated  - titrating to maintenance lasix dosing

## 2024-06-10 LAB
ALBUMIN SERPL BCP-MCNC: 3.3 G/DL (ref 3.5–5.2)
ALP SERPL-CCNC: 65 U/L (ref 55–135)
ALT SERPL W/O P-5'-P-CCNC: 22 U/L (ref 10–44)
ANION GAP SERPL CALC-SCNC: 11 MMOL/L (ref 8–16)
AST SERPL-CCNC: 31 U/L (ref 10–40)
BASOPHILS # BLD AUTO: 0.05 K/UL (ref 0–0.2)
BASOPHILS NFR BLD: 0.6 % (ref 0–1.9)
BILIRUB SERPL-MCNC: 0.7 MG/DL (ref 0.1–1)
BUN SERPL-MCNC: 39 MG/DL (ref 8–23)
CALCIUM SERPL-MCNC: 9.9 MG/DL (ref 8.7–10.5)
CHLORIDE SERPL-SCNC: 104 MMOL/L (ref 95–110)
CO2 SERPL-SCNC: 35 MMOL/L (ref 23–29)
CREAT SERPL-MCNC: 1.7 MG/DL (ref 0.5–1.4)
DIFFERENTIAL METHOD BLD: ABNORMAL
EOSINOPHIL # BLD AUTO: 0.3 K/UL (ref 0–0.5)
EOSINOPHIL NFR BLD: 3.8 % (ref 0–8)
ERYTHROCYTE [DISTWIDTH] IN BLOOD BY AUTOMATED COUNT: 15.9 % (ref 11.5–14.5)
EST. GFR  (NO RACE VARIABLE): 44 ML/MIN/1.73 M^2
GLUCOSE SERPL-MCNC: 114 MG/DL (ref 70–110)
HCT VFR BLD AUTO: 48.2 % (ref 40–54)
HGB BLD-MCNC: 15.4 G/DL (ref 14–18)
IMM GRANULOCYTES # BLD AUTO: 0.02 K/UL (ref 0–0.04)
IMM GRANULOCYTES NFR BLD AUTO: 0.2 % (ref 0–0.5)
LYMPHOCYTES # BLD AUTO: 1.8 K/UL (ref 1–4.8)
LYMPHOCYTES NFR BLD: 19.6 % (ref 18–48)
MCH RBC QN AUTO: 30.1 PG (ref 27–31)
MCHC RBC AUTO-ENTMCNC: 32 G/DL (ref 32–36)
MCV RBC AUTO: 94 FL (ref 82–98)
MONOCYTES # BLD AUTO: 0.9 K/UL (ref 0.3–1)
MONOCYTES NFR BLD: 9.7 % (ref 4–15)
NEUTROPHILS # BLD AUTO: 6 K/UL (ref 1.8–7.7)
NEUTROPHILS NFR BLD: 66.1 % (ref 38–73)
NRBC BLD-RTO: 0 /100 WBC
PLATELET # BLD AUTO: 176 K/UL (ref 150–450)
PMV BLD AUTO: 11.4 FL (ref 9.2–12.9)
POCT GLUCOSE: 129 MG/DL (ref 70–110)
POCT GLUCOSE: 141 MG/DL (ref 70–110)
POCT GLUCOSE: 177 MG/DL (ref 70–110)
POCT GLUCOSE: 199 MG/DL (ref 70–110)
POTASSIUM SERPL-SCNC: 3.4 MMOL/L (ref 3.5–5.1)
PROT SERPL-MCNC: 7.4 G/DL (ref 6–8.4)
RBC # BLD AUTO: 5.12 M/UL (ref 4.6–6.2)
SODIUM SERPL-SCNC: 150 MMOL/L (ref 136–145)
VANCOMYCIN SERPL-MCNC: 14.9 UG/ML
WBC # BLD AUTO: 8.99 K/UL (ref 3.9–12.7)

## 2024-06-10 PROCEDURE — 25000003 PHARM REV CODE 250: Performed by: NURSE PRACTITIONER

## 2024-06-10 PROCEDURE — 25000003 PHARM REV CODE 250: Performed by: INTERNAL MEDICINE

## 2024-06-10 PROCEDURE — A4216 STERILE WATER/SALINE, 10 ML: HCPCS | Performed by: STUDENT IN AN ORGANIZED HEALTH CARE EDUCATION/TRAINING PROGRAM

## 2024-06-10 PROCEDURE — 25000003 PHARM REV CODE 250: Performed by: STUDENT IN AN ORGANIZED HEALTH CARE EDUCATION/TRAINING PROGRAM

## 2024-06-10 PROCEDURE — A4216 STERILE WATER/SALINE, 10 ML: HCPCS | Performed by: INTERNAL MEDICINE

## 2024-06-10 PROCEDURE — 27000221 HC OXYGEN, UP TO 24 HOURS

## 2024-06-10 PROCEDURE — 80053 COMPREHEN METABOLIC PANEL: CPT | Performed by: STUDENT IN AN ORGANIZED HEALTH CARE EDUCATION/TRAINING PROGRAM

## 2024-06-10 PROCEDURE — 11000001 HC ACUTE MED/SURG PRIVATE ROOM

## 2024-06-10 PROCEDURE — 63600175 PHARM REV CODE 636 W HCPCS: Performed by: STUDENT IN AN ORGANIZED HEALTH CARE EDUCATION/TRAINING PROGRAM

## 2024-06-10 PROCEDURE — 99233 SBSQ HOSP IP/OBS HIGH 50: CPT | Mod: ,,, | Performed by: INTERNAL MEDICINE

## 2024-06-10 PROCEDURE — 85025 COMPLETE CBC W/AUTO DIFF WBC: CPT | Performed by: NURSE PRACTITIONER

## 2024-06-10 PROCEDURE — 80202 ASSAY OF VANCOMYCIN: CPT | Performed by: STUDENT IN AN ORGANIZED HEALTH CARE EDUCATION/TRAINING PROGRAM

## 2024-06-10 PROCEDURE — 97162 PT EVAL MOD COMPLEX 30 MIN: CPT

## 2024-06-10 PROCEDURE — 94761 N-INVAS EAR/PLS OXIMETRY MLT: CPT

## 2024-06-10 PROCEDURE — 25000003 PHARM REV CODE 250: Performed by: EMERGENCY MEDICINE

## 2024-06-10 RX ORDER — FUROSEMIDE 40 MG/1
80 TABLET ORAL DAILY
Status: DISCONTINUED | OUTPATIENT
Start: 2024-06-11 | End: 2024-06-13 | Stop reason: HOSPADM

## 2024-06-10 RX ORDER — AMIODARONE HYDROCHLORIDE 200 MG/1
200 TABLET ORAL DAILY
Status: DISCONTINUED | OUTPATIENT
Start: 2024-06-10 | End: 2024-06-13 | Stop reason: HOSPADM

## 2024-06-10 RX ORDER — LOPERAMIDE HYDROCHLORIDE 2 MG/1
2 CAPSULE ORAL 4 TIMES DAILY PRN
Status: DISCONTINUED | OUTPATIENT
Start: 2024-06-10 | End: 2024-06-13 | Stop reason: HOSPADM

## 2024-06-10 RX ORDER — CARVEDILOL 12.5 MG/1
12.5 TABLET ORAL 2 TIMES DAILY WITH MEALS
Status: DISCONTINUED | OUTPATIENT
Start: 2024-06-10 | End: 2024-06-13 | Stop reason: HOSPADM

## 2024-06-10 RX ADMIN — VANCOMYCIN HYDROCHLORIDE 1000 MG: 1 INJECTION, POWDER, LYOPHILIZED, FOR SOLUTION INTRAVENOUS at 03:06

## 2024-06-10 RX ADMIN — Medication 10 ML: at 05:06

## 2024-06-10 RX ADMIN — AMIODARONE HYDROCHLORIDE 200 MG: 200 TABLET ORAL at 11:06

## 2024-06-10 RX ADMIN — PIPERACILLIN AND TAZOBACTAM 4.5 G: 4; .5 INJECTION, POWDER, LYOPHILIZED, FOR SOLUTION INTRAVENOUS; PARENTERAL at 06:06

## 2024-06-10 RX ADMIN — METOROPROLOL TARTRATE 5 MG: 5 INJECTION, SOLUTION INTRAVENOUS at 05:06

## 2024-06-10 RX ADMIN — POTASSIUM BICARBONATE 35 MEQ: 391 TABLET, EFFERVESCENT ORAL at 03:06

## 2024-06-10 RX ADMIN — CARVEDILOL 12.5 MG: 12.5 TABLET, FILM COATED ORAL at 09:06

## 2024-06-10 RX ADMIN — LOPERAMIDE HYDROCHLORIDE 2 MG: 2 CAPSULE ORAL at 12:06

## 2024-06-10 RX ADMIN — ATORVASTATIN CALCIUM 80 MG: 40 TABLET, FILM COATED ORAL at 08:06

## 2024-06-10 RX ADMIN — PIPERACILLIN AND TAZOBACTAM 4.5 G: 4; .5 INJECTION, POWDER, LYOPHILIZED, FOR SOLUTION INTRAVENOUS; PARENTERAL at 02:06

## 2024-06-10 RX ADMIN — INSULIN ASPART 2 UNITS: 100 INJECTION, SOLUTION INTRAVENOUS; SUBCUTANEOUS at 06:06

## 2024-06-10 RX ADMIN — LOPERAMIDE HYDROCHLORIDE 2 MG: 2 CAPSULE ORAL at 08:06

## 2024-06-10 RX ADMIN — CHLORHEXIDINE GLUCONATE 0.12% ORAL RINSE 15 ML: 1.2 LIQUID ORAL at 08:06

## 2024-06-10 RX ADMIN — FUROSEMIDE 80 MG: 10 INJECTION, SOLUTION INTRAVENOUS at 08:06

## 2024-06-10 RX ADMIN — PIPERACILLIN AND TAZOBACTAM 4.5 G: 4; .5 INJECTION, POWDER, LYOPHILIZED, FOR SOLUTION INTRAVENOUS; PARENTERAL at 09:06

## 2024-06-10 RX ADMIN — ASPIRIN 81 MG CHEWABLE TABLET 81 MG: 81 TABLET CHEWABLE at 08:06

## 2024-06-10 RX ADMIN — INSULIN DETEMIR 5 UNITS: 100 INJECTION, SOLUTION SUBCUTANEOUS at 08:06

## 2024-06-10 RX ADMIN — ENOXAPARIN SODIUM 90 MG: 100 INJECTION SUBCUTANEOUS at 08:06

## 2024-06-10 RX ADMIN — APIXABAN 5 MG: 5 TABLET, FILM COATED ORAL at 08:06

## 2024-06-10 RX ADMIN — Medication 10 ML: at 11:06

## 2024-06-10 RX ADMIN — POTASSIUM BICARBONATE 35 MEQ: 391 TABLET, EFFERVESCENT ORAL at 05:06

## 2024-06-10 RX ADMIN — CARVEDILOL 12.5 MG: 12.5 TABLET, FILM COATED ORAL at 05:06

## 2024-06-10 RX ADMIN — FAMOTIDINE 20 MG: 10 INJECTION, SOLUTION INTRAVENOUS at 08:06

## 2024-06-10 RX ADMIN — INSULIN ASPART 2 UNITS: 100 INJECTION, SOLUTION INTRAVENOUS; SUBCUTANEOUS at 11:06

## 2024-06-10 NOTE — SUBJECTIVE & OBJECTIVE
Interval History: Patient now off pressors and on 2.5L NC.  He is feeding himself breakfast this AM.  Stable for step down.      Objective:     Vital Signs (Most Recent):  Temp: 97.5 °F (36.4 °C) (06/10/24 1101)  Pulse: 95 (06/10/24 1100)  Resp: (!) 23 (06/10/24 1100)  BP: 136/70 (06/10/24 1100)  SpO2: 98 % (06/10/24 1100) Vital Signs (24h Range):  Temp:  [97.5 °F (36.4 °C)-98.6 °F (37 °C)] 97.5 °F (36.4 °C)  Pulse:  [] 95  Resp:  [18-35] 23  SpO2:  [92 %-99 %] 98 %  BP: (132-154)/(59-95) 136/70  Arterial Line BP: (101-165)/(54-93) 142/81     Weight: 88.7 kg (195 lb 8.8 oz)  Body mass index is 31.56 kg/m².      Intake/Output Summary (Last 24 hours) at 6/10/2024 1252  Last data filed at 6/10/2024 1100  Gross per 24 hour   Intake 1138.46 ml   Output 1465 ml   Net -326.54 ml        Physical Exam  Constitutional:       General: He is not in acute distress.     Appearance: Normal appearance. He is not ill-appearing, toxic-appearing or diaphoretic.   HENT:      Head: Normocephalic and atraumatic.      Nose: Nose normal.      Mouth/Throat:      Mouth: Mucous membranes are moist.   Eyes:      Extraocular Movements: Extraocular movements intact.   Cardiovascular:      Rate and Rhythm: Normal rate and regular rhythm.      Heart sounds: No murmur heard.     No friction rub. No gallop.   Pulmonary:      Effort: Pulmonary effort is normal. No respiratory distress.      Breath sounds: Normal breath sounds. No wheezing or rales.   Abdominal:      General: Abdomen is flat. Bowel sounds are normal. There is no distension.      Palpations: Abdomen is soft.   Musculoskeletal:         General: Normal range of motion.      Cervical back: Normal range of motion.   Skin:     General: Skin is warm and dry.      Capillary Refill: Capillary refill takes less than 2 seconds.   Neurological:      General: No focal deficit present.      Mental Status: He is alert and oriented to person, place, and time. Mental status is at baseline.    Psychiatric:         Mood and Affect: Mood normal.         Behavior: Behavior normal.         Thought Content: Thought content normal.         Judgment: Judgment normal.           Review of Systems   Constitutional:  Positive for activity change. Negative for fatigue.   Respiratory:  Negative for cough and shortness of breath.    Cardiovascular:  Negative for chest pain and palpitations.   Gastrointestinal:  Negative for abdominal pain.       Vents:  Vent Mode: PS/CPAP (06/09/24 1240)  Ventilator Initiated: Yes (06/05/24 1443)  Set Rate: 18 BPM (06/09/24 1053)  Vt Set: 390 mL (06/09/24 1053)  Pressure Support: 5 cmH20 (06/09/24 1240)  PEEP/CPAP: 5 cmH20 (06/09/24 1240)  Oxygen Concentration (%): 40 (06/09/24 1240)  Peak Airway Pressure: 11 cmH20 (06/09/24 1240)  Total Ve: 7.5 L/m (06/09/24 1240)  F/VT Ratio<105 (RSBI): (!) 77.13 (06/09/24 1240)    Lines/Drains/Airways       Peripherally Inserted Central Catheter Line  Duration             PICC Triple Lumen 06/05/24 1912 left brachial 4 days                    Significant Labs:    CBC/Anemia Profile:  Recent Labs   Lab 06/09/24  0309 06/10/24  0226   WBC 9.09 8.99   HGB 15.1 15.4   HCT 46.1 48.2    176   MCV 93 94   RDW 16.1* 15.9*        Chemistries:  Recent Labs   Lab 06/09/24  0309 06/10/24  0226   * 150*   K 3.6 3.4*    104   CO2 27 35*   BUN 39* 39*   CREATININE 1.7* 1.7*   CALCIUM 9.5 9.9   ALBUMIN 3.0* 3.3*   PROT 7.1 7.4   BILITOT 0.4 0.7   ALKPHOS 80 65   ALT 17 22   AST 18 31       All pertinent labs within the past 24 hours have been reviewed.    Significant Imaging:  I have reviewed all pertinent imaging results/findings within the past 24 hours.

## 2024-06-10 NOTE — NURSING
Patient arrived to unit via wheelchair. Patient able to transfer from wheelchair to bed.   Patient on 3L NC. Patient oriented to room. Call light given, hospital phone given, bed in lowest position, side rails up x3, vitals entered into flowsheet.   No complaints at this time.

## 2024-06-10 NOTE — PROGRESS NOTES
[facility] [place]  Nurses Note -- 4 Eyes      6/10/2024       Skin assessed on: Q Shift      [x] No Pressure Injuries Present    [x]Prevention Measures Documented    [] Yes LDA  for Pressure Injury Previously documented     [] Yes New Pressure Injury Discovered   [] LDA for New Pressure Injury Added      Attending RN:  Marlene Langston RN     Second RN:  DELTA Dela Cruz

## 2024-06-10 NOTE — PROGRESS NOTES
Pt OOB to chair with minimal assist. Pt tolerated well. Pt sitting up in chair eating breakfast, tolerating well. No distress noted. Will continue to monitor.

## 2024-06-10 NOTE — PLAN OF CARE
Discharge Home with Home Health PT  Upon D/C Pt may require Rolling Walker      Problem: Physical Therapy  Goal: Physical Therapy Goal  Description: Goals to be met by: 2024     Patient will increase functional independence with mobility by performin. Supine to sit with Modified [place]  2. Sit to supine with Modified [place]  3. Sit to stand transfer with Modified [place]  4. Bed to chair transfer with Stand-by Assistance using Rolling Walker  5. Gait  x 150 feet with Stand-by Assistance using Rolling Walker.     Outcome: Progressing

## 2024-06-10 NOTE — PLAN OF CARE
Patient remains in the ICU extubated yesterday and now on NC.  PT/OT to see for DC recommendations.  Patient to transfer to tele/med surg when bed available.       06/10/24 1536   Discharge Reassessment   Assessment Type Discharge Planning Reassessment   Did the patient's condition or plan change since previous assessment? Yes   Discharge Plan discussed with:   (Discussed in MDT rounds this am)   Discharge Plan A Home Health   Discharge Plan B Home with family   DME Needed Upon Discharge    (tbd)   Transition of Care Barriers None   Why the patient remains in the hospital Requires continued medical care

## 2024-06-10 NOTE — SUBJECTIVE & OBJECTIVE
Interval History: no acute issues overnight, he is feeling well. Sitting up in chair. Reports that he wears 2 liters NC at home    Review of Systems  Objective:     Vital Signs (Most Recent):  Temp: 97.8 °F (36.6 °C) (06/10/24 0715)  Pulse: 95 (06/10/24 0850)  Resp: (!) 26 (06/10/24 0850)  BP: (!) 137/94 (06/10/24 0845)  SpO2: 95 % (06/10/24 0850) Vital Signs (24h Range):  Temp:  [97.8 °F (36.6 °C)-99.7 °F (37.6 °C)] 97.8 °F (36.6 °C)  Pulse:  [] 95  Resp:  [18-35] 26  SpO2:  [92 %-99 %] 95 %  BP: (132-154)/(59-95) 137/94  Arterial Line BP: (101-165)/(54-93) 142/81     Weight: 88.7 kg (195 lb 8.8 oz)  Body mass index is 31.56 kg/m².    Intake/Output Summary (Last 24 hours) at 6/10/2024 1012  Last data filed at 6/10/2024 0830  Gross per 24 hour   Intake 609.6 ml   Output 1940 ml   Net -1330.4 ml         Physical Exam  Vitals and nursing note reviewed.   Constitutional:       General: He is not in acute distress.     Appearance: He is ill-appearing.   Cardiovascular:      Rate and Rhythm: Normal rate. Rhythm irregular.      Pulses: Normal pulses.   Pulmonary:      Effort: Pulmonary effort is normal.      Breath sounds: No wheezing.   Abdominal:      General: There is no distension.      Palpations: Abdomen is soft.   Musculoskeletal:         General: No swelling.   Skin:     Comments: Left forearm with erythema, swelling and blister from extravasation   Neurological:      General: No focal deficit present.      Mental Status: He is oriented to person, place, and time. Mental status is at baseline.             Significant Labs: All pertinent labs within the past 24 hours have been reviewed.    Significant Imaging: I have reviewed all pertinent imaging results/findings within the past 24 hours.

## 2024-06-10 NOTE — PT/OT/SLP EVAL
Physical Therapy Evaluation    Patient Name:  Marck Madison   MRN:  [id number]    Recommendations:     Discharge Recommendations: Low Intensity Therapy   Discharge Equipment Recommendations: walker, rolling   Barriers to discharge: None    Assessment:     Marck Madison is a 64 y.o. male admitted with a medical diagnosis of Acute hypoxemic respiratory failure.  He presents with the following impairments/functional limitations: weakness, impaired endurance, impaired self care skills, impaired functional mobility, gait instability, impaired balance, decreased safety awareness, decreased lower extremity function, decreased coordination.    Rehab Prognosis: Good; patient would benefit from acute skilled PT services to address these deficits and reach maximum level of function.    Recent Surgery: * No surgery found *      Plan:     During this hospitalization, patient to be seen 5 x/week to address the identified rehab impairments via gait training, therapeutic activities, therapeutic exercises, neuromuscular re-education and progress toward the following goals:    Plan of Care Expires:  06/17/24    Subjective     Chief Complaint: Pt states that he is waiting to get out of ICU. He also reports that when he is discharged, he needs a refill on his insulin and other meds because he does not have any at home  Patient/Family Comments/goals: Return Home  Pain/Comfort:  Pain Rating 1: 0/10    Patients cultural, spiritual, Roman [employer] conflicts given the current situation: no    Living Environment:  Lives with niece with 3 REAL home with B+ HR  Prior to admission, patients level of function was independent.  Equipment used at home:  .  DME owned (not currently used): none.  Upon discharge, patient will have assistance from family.    Objective:     Communicated with nurse Rosario prior to session.  Patient found HOB elevated with telemetry, pulse ox (continuous), peripheral IV, blood pressure cuff, oxygen (2.5L NC)  upon PT entry to  room.    General Precautions: Standard, fall  Orthopedic Precautions:N/A   Braces: N/A  Respiratory Status: Nasal cannula, flow 2.5 L/min    Exams:  Gross Motor Coordination:  WFL  RLE ROM: WFL  RLE Strength: 3+/5  LLE ROM: WFL  LLE Strength: 3+/5    Functional Mobility:  Bed Mobility:     Scooting: minimum assistance  Supine to Sit: moderate assistance  Sit to Supine: minimum assistance  Transfers:     Sit to Stand:  moderate assistance with no AD  Gait: 5 side steps to HOB , no AD, min to mod A for balance  Balance: Static sit balance Fair ; Static Stand Balance is Fair      AM-PAC 6 CLICK MOBILITY  Total Score:16       Treatment & Education:  No Add'l Tx outside of evaluation     Patient left HOB elevated with all lines intact, call button in reach, bed alarm off, and nursing notified.    GOALS:   Multidisciplinary Problems       Physical Therapy Goals          Problem: Physical Therapy    Goal Priority Disciplines Outcome Goal Variances Interventions   Physical Therapy Goal     PT, PT/OT Progressing     Description: Goals to be met by: 2024     Patient will increase functional independence with mobility by performin. Supine to sit with Modified [place]  2. Sit to supine with Modified [place]  3. Sit to stand transfer with Modified [place]  4. Bed to chair transfer with Stand-by Assistance using Rolling Walker  5. Gait  x 150 feet with Stand-by Assistance using Rolling Walker.                          History:     Past Medical History:   Diagnosis Date    Arrhythmia 2017    aflutter    Atrial flutter     CAD (coronary artery disease)     CHF (congestive heart failure), NYHA class I 2017    Cholelithiasis with chronic cholecystitis     Chronic diastolic heart failure     Cigarette smoker     COPD (chronic obstructive pulmonary disease)     COVID-19 2021    Diabetes mellitus     DKA (diabetic ketoacidosis)     Hypertension     NSTEMI (non-ST elevation myocardial infarction)     NSVT  "(nonsustained ventricular tachycardia)     Psychiatric disorder     h/o "schizophrena/bipolar"    Schizoaffective disorder     Severe sepsis        Past Surgical History:   Procedure Laterality Date    LIVER SURGERY      abscess drainage; 1970s    TREATMENT OF CARDIAC ARRHYTHMIA  9/12/2019    Procedure: Cardioversion or Defibrillation;  Surgeon: Jonathan Lopez MD;  Location: [facility] CATH LAB;  Service: Cardiology;;    TREATMENT OF CARDIAC ARRHYTHMIA N/A 2/3/2022    Procedure: Cardioversion or Defibrillation;  Surgeon: Trevor Schwab MD;  Location: [facility] CATH LAB;  Service: Cardiology;  Laterality: N/A;    TREATMENT OF CARDIAC ARRHYTHMIA N/A 3/16/2024    Procedure: Cardioversion or Defibrillation;  Surgeon: Jonathan Lopez MD;  Location: [facility] CATH LAB;  Service: Cardiology;  Laterality: N/A;       Time Tracking:     PT Received On:    PT Start Time: 1537     PT Stop Time: 1554  PT Total Time (min): 17 min     Billable Minutes: Evaluation 1      06/10/2024  "

## 2024-06-10 NOTE — ASSESSMENT & PLAN NOTE
- spiked fever overnight on 6/9  - cultures drawn (blood, respiratory, u/a)  - started on empiric antibiotics  - f/u culture and fever curve  - if cultures negative no further fever for 48-72 hours, would consider stopping and monitor off antibiotics

## 2024-06-10 NOTE — PROGRESS NOTES
[facility] Medicine  Progress Note    Patient Name: Marck Madison  MRN: [id number]  Patient Class: IP- Inpatient   Admission Date: 6/5/2024  Length of Stay: 5 days  Attending Physician: Kulwant Nesbitt MD  Primary Care Provider: [facility] -        Subjective:     Principal Problem:Acute hypoxemic respiratory failure        HPI:  Mr. Madison is a 64-year-old male with past medical history of hypertension, diabetes mellitus type 2, atrial fibrillation, CHF and nonobstructive coronary artery disease who presents to the emergency department for evaluation of shortness of breath.  He reports this has been ongoing and progressively worsening.  He endorses swelling in his extremities but denies any chest pain.  Denies any fevers or chills.  He recently had a left heart catheterization on 05/06/24 that shows nonobstructive coronary artery disease.  He reports compliance with Lasix at home.  In the emergency department, patient was found to be tachycardic with a rate of 133 and atrial fibrillation, respiratory rate of 24 and blood pressure 180/129 with a O2 saturation of 84%.  He was placed on BiPAP and given IV amiodarone with improvement of rate.  He was also given IV Lasix.  Cardiology consulted and he will be admitted to ICU for further management.    Overview/Hospital Course:  Mr. Madison is a 63 yo M admitted with acute hypoxemic respiratory failure secondary to CHF exacerbation. Also found to be in atrial fibrillation with RVR and placed on amiodarone drip. Developed worsening respiratory distress and was intubated by Pulmonary/CC. Became hypotensive, started on dobutamine and norepinephrine. Prior to PICC line, norepinpherine extravasated on left forearm, phentolamine injected and Wound Care consulted. Diuresing well.  Attempting daily SAT/SBT but have not extubated yet. Developed fever on 6/9, blood cultures, respiratory cultures and u/a ordered. Started on empiric antibiotics.  Extubated on 6/9. Doing well, started diet. PT/OT consulted. Stable for transfer to floor.    Interval History: no acute issues overnight, he is feeling well. Sitting up in chair. Reports that he wears 2 liters NC at home    Review of Systems  Objective:     Vital Signs (Most Recent):  Temp: 97.8 °F (36.6 °C) (06/10/24 0715)  Pulse: 95 (06/10/24 0850)  Resp: (!) 26 (06/10/24 0850)  BP: (!) 137/94 (06/10/24 0845)  SpO2: 95 % (06/10/24 0850) Vital Signs (24h Range):  Temp:  [97.8 °F (36.6 °C)-99.7 °F (37.6 °C)] 97.8 °F (36.6 °C)  Pulse:  [] 95  Resp:  [18-35] 26  SpO2:  [92 %-99 %] 95 %  BP: (132-154)/(59-95) 137/94  Arterial Line BP: (101-165)/(54-93) 142/81     Weight: 88.7 kg (195 lb 8.8 oz)  Body mass index is 31.56 kg/m².    Intake/Output Summary (Last 24 hours) at 6/10/2024 1012  Last data filed at 6/10/2024 0830  Gross per 24 hour   Intake 609.6 ml   Output 1940 ml   Net -1330.4 ml         Physical Exam  Vitals and nursing note reviewed.   Constitutional:       General: He is not in acute distress.     Appearance: He is ill-appearing.   Cardiovascular:      Rate and Rhythm: Normal rate. Rhythm irregular.      Pulses: Normal pulses.   Pulmonary:      Effort: Pulmonary effort is normal.      Breath sounds: No wheezing.   Abdominal:      General: There is no distension.      Palpations: Abdomen is soft.   Musculoskeletal:         General: No swelling.   Skin:     Comments: Left forearm with erythema, swelling and blister from extravasation   Neurological:      General: No focal deficit present.      Mental Status: He is oriented to person, place, and time. Mental status is at baseline.             Significant Labs: All pertinent labs within the past 24 hours have been reviewed.    Significant Imaging: I have reviewed all pertinent imaging results/findings within the past 24 hours.    Assessment/Plan:      * Acute hypoxemic respiratory failure  Patient with Hypoxic Respiratory failure which is Acute.  he is  not on home oxygen. Supplemental oxygen was provided and noted- Vent Mode: PS/CPAP  Oxygen Concentration (%):  [40] 40  Resp Rate Total:  [22 br/min-28 br/min] 28 br/min  Vt Set:  [390 mL] 390 mL  PEEP/CPAP:  [5 cmH20] 5 cmH20  Pressure Support:  [5 cmH20] 5 cmH20  Mean Airway Pressure:  [6.9 cmH20-9 cmH20] 6.9 cmH20    .   Signs/symptoms of respiratory failure include- tachypnea, increased work of breathing, and respiratory distress. Contributing diagnoses includes - CHF Labs and images were reviewed. Patient Has recent ABG, which has been reviewed. Will treat underlying causes and adjust management of respiratory failure as follows-   - Bipap initially --> now intubated for worsening hypoxia and respiratory distress  - continue with IV lasix, dobutamine weaned off  - Cardiology following  - pulmonology with ventilatory management. Extubated 6/9 and now on 2 liters NC   - at baseline    Fever  - spiked fever overnight on 6/9  - cultures drawn (blood, respiratory, u/a)  - started on empiric antibiotics  - f/u culture and fever curve  - if cultures negative no further fever for 48-72 hours, would consider stopping and monitor off antibiotics      Elevated troponin  Possible related to demand ischemia, patient had recent LHC with nonobstructive CAD on 5/6/24  - Cardiology consulted  - continue aspirin/statin    Class 1 obesity with serious comorbidity and body mass index (BMI) of 30.0 to 30.9 in adult  Body mass index is 31.56 kg/m². Morbid obesity complicates all aspects of disease management from diagnostic modalities to treatment. Weight loss encouraged and health benefits explained to patient.         CAD (coronary artery disease)  Patient with known CAD that is nonobstructive (5/6/2024), which is controlled Will continue ASA and Statin and monitor for S/Sx of angina/ACS. Continue to monitor on telemetry.     Permanent atrial fibrillation  Patient with Permanent atrial fibrillation which is controlled currently  with Amiodarone. Patient is currently in atrial fibrillation.[id number] Score: 3. . Anticoagulation indicated. Anticoagulation done with eliquis .  - Restart PO amiodarone    Acute on chronic diastolic congestive heart failure  Patient is identified as having Diastolic (HFpEF) heart failure that is Acute on chronic. CHF is currently . Latest ECHO performed and demonstrates- Results for orders placed during the hospital encounter of 03/15/24    Echo  Results for orders placed during the hospital encounter of 03/15/24    Echo    Interpretation Summary    Left Ventricle: The left ventricle is normal in size. There is moderate concentric hypertrophy. There is low normal systolic function with a visually estimated ejection fraction of 50 - 55%.    Right Ventricle: Mild right ventricular enlargement. Systolic function is normal.    Left Atrium: Left atrium is severely dilated.    Right Atrium: Right atrium is moderately dilated.    Aortic Valve: There is mild aortic valve sclerosis.    Mitral Valve: There is moderate regurgitation.    Tricuspid Valve: There is moderate regurgitation.    Pulmonary Artery: The estimated pulmonary artery systolic pressure is 51 mmHg.    IVC/SVC: Elevated venous pressure at 15 mmHg.      Recent Labs   Lab 06/05/24  0618   *         - initially in cardiogenic shock requiring dobutamine. Now weaned off. Transitioned to PO lasix.    Cardiogenic shock  See acute on chronic heart failure exacerbation  - resolved      Type 2 diabetes mellitus, with long-term current use of insulin  A1c:   Lab Results   Component Value Date    HGBA1C 6.7 (H) 06/05/2024     Meds: SSI PRN to maintain goal 140-180 - add basal with tube feedings  accuchecks ACHS, hypoglycemic protocol       Hypernatremia  Patient has hypernatremia which is uncontrolled. The hypernatremia is due to Dehydration. We will aim to correct the sodium by 8-10mEq in 24 hours. We will correct their hypernatremia by allowing patient to  drink water and reducing lasix. The patient's sodium results have been reviewed and are listed below.  Recent Labs   Lab 06/10/24  0226   *       FARHAN (acute kidney injury)  Patient with acute kidney injury/acute renal failure likely due to cardiogenic shock. FARHAN is currently stable. Baseline creatinine  1.5  - Labs reviewed- Renal function/electrolytes with Estimated Creatinine Clearance: 44.6 mL/min (A) (based on SCr of 1.7 mg/dL (H)). according to latest data. Monitor urine output and serial BMP and adjust therapy as needed. Avoid nephrotoxins and renally dose meds for GFR listed above.      VTE Risk Mitigation (From admission, onward)           Ordered     apixaban tablet 5 mg  2 times daily         06/10/24 0837                    Discharge Planning   BALDO:      Code Status: Full Code   Is the patient medically ready for discharge?:     Reason for patient still in hospital (select all that apply): Treatment  Discharge Plan A: Home            Critical care time spent on the evaluation and treatment of severe organ dysfunction, review of pertinent labs and imaging studies, discussions with consulting providers and discussions with patient/family: 25 minutes.      Kulwant Nesbitt MD  Department of Hospital Medicine   [facility] - Intensive Care

## 2024-06-10 NOTE — PROGRESS NOTES
Pharmacokinetic Assessment Follow Up: IV Vancomycin    Vancomycin serum concentration assessment(s):    The random level was drawn correctly and can be used to guide therapy at this time. The measurement is within the desired definitive target range of 10 to 20 mcg/mL.    Vancomycin Regimen Plan:    Change regimen to Vancomycin 1000 mg IV every 24 hours with next serum trough concentration measured at 0230 prior to 3rd dose on 6/12/24    Drug levels (last 3 results):  Recent Labs   Lab Result Units 06/10/24  0226   Vancomycin, Random ug/mL 14.9       Pharmacy will continue to follow and monitor vancomycin.    Please contact pharmacy at extension [phone] for questions regarding this assessment.    Thank you for the consult,   Jordy Shah       Patient brief summary:  Marck Madison is a 64 y.o. male initiated on antimicrobial therapy with IV Vancomycin for treatment of bacteremia    The patient's current regimen is Vancomycin 1000mg q24h    Drug Allergies:   Review of patient's allergies indicates:  No Known Allergies    Actual Body Weight:   88.7 kg    Renal Function:   Estimated Creatinine Clearance: 44.6 mL/min (A) (based on SCr of 1.7 mg/dL (H)).,     Dialysis Method (if applicable):  N/A    CBC (last 72 hours):  Recent Labs   Lab Result Units 06/08/24  0301 06/09/24  0309 06/10/24  0226   WBC K/uL 9.40 9.09 8.99   Hemoglobin g/dL 15.3 15.1 15.4   Hematocrit % 46.7 46.1 48.2   Platelets K/uL 200 187 176   Gran % % 66.8 65.1 66.1   Lymph % % 19.0 21.6 19.6   Mono % % 10.2 10.2 9.7   Eosinophil % % 3.5 2.3 3.8   Basophil % % 0.3 0.4 0.6   Differential Method  Automated Automated Automated       Metabolic Panel (last 72 hours):  Recent Labs   Lab Result Units 06/08/24  0301 06/09/24  0309 06/09/24  0934 06/10/24  0226   Sodium mmol/L 145 146*  --  150*   Potassium mmol/L 3.7 3.6  --  3.4*   Chloride mmol/L 106 107  --  104   CO2 mmol/L 28 27  --  35*   Glucose mg/dL 171* 208*  --  114*   Glucose, UA   --   --   Negative  --    BUN mg/dL 33* 39*  --  39*   Creatinine mg/dL 1.9* 1.7*  --  1.7*   Albumin g/dL 3.2* 3.0*  --  3.3*   Total Bilirubin mg/dL 0.4 0.4  --  0.7   Alkaline Phosphatase U/L 77 80  --  65   AST U/L 20 18  --  31   ALT U/L 21 17  --  22       Vancomycin Administrations:  vancomycin given in the last 96 hours                     vancomycin (VANCOCIN) 1,000 mg in dextrose 5 % (D5W) 250 mL IVPB (Vial-Mate) (mg) 1,000 mg New Bag 06/10/24 0325    vancomycin 1,250 mg in dextrose 5 % (D5W) 250 mL IVPB (Vial-Mate) (mg) 1,250 mg New Bag 06/09/24 1523                    Microbiologic Results:  Microbiology Results (last 7 days)       Procedure Component Value Units Date/Time    Blood culture [[id number]] Collected: 06/09/24 1100    Order Status: Completed Specimen: Blood from Peripheral, Hand, Right Updated: 06/09/24 1912     Blood Culture, Routine No Growth to date    Narrative:      Collection has been rescheduled by [email or web address] at 06/09/2024 10:31 Reason:   Unable to collect second set of Labs  Collection has been rescheduled by [email or web address] at 06/09/2024 10:31 Reason:   Unable to collect second set of Labs    Blood culture [[id number]] Collected: 06/09/24 1008    Order Status: Completed Specimen: Blood from Peripheral, Hand, Left Updated: 06/09/24 1712     Blood Culture, Routine No Growth to date    Culture, Respiratory with Gram Stain [[id number]] Collected: 06/09/24 0859    Order Status: Completed Specimen: Respiratory from Sputum, Induced Updated: 06/09/24 1121     Gram Stain (Respiratory) <10 epithelial cells per low power field.     Gram Stain (Respiratory) Rare WBC's     Gram Stain (Respiratory) Few Gram positive rods

## 2024-06-10 NOTE — PROGRESS NOTES
Pt sitting up on side of bed working with physical therapy. Pt tolerating well. No distress noted. Will continue to monitor

## 2024-06-10 NOTE — PROGRESS NOTES
[facility] [place] - Intensive Care  Critical Care Medicine  Progress Note    Patient Name: Marck Madison  MRN: [id number]  Admission Date: 6/5/2024  Hospital Length of Stay: 5 days  Code Status: Full Code  Attending Provider: Kulwant Nesbitt MD  Primary Care Provider: [facility] -   Principal Problem: Acute hypoxemic respiratory failure    Subjective:     HPI:  63 yo male with chart history of dilated CM but recent preserved EF, Afib, EtOH abuse who presented with severe dyspnea and Afib RVR. He was started on Amio with good control of his HR. Placed on BiPAP for WOB. He was initially looking comforatable on BiPAP but in severe distress off. I was called urgently to assess increased WOB. He was noted to have RR up to 60 with MV over 60L, then would pass out and have no resp efforts. He was able to initial speak and felt extremely dyspneic but otherwise denied complaints. Not able to provide full history 2/2 respiratory distress.    Hospital/ICU Course:  No notes on file    Interval History: Patient now off pressors and on 2.5L NC.  He is feeding himself breakfast this AM.  Stable for step down.      Objective:     Vital Signs (Most Recent):  Temp: 97.5 °F (36.4 °C) (06/10/24 1101)  Pulse: 95 (06/10/24 1100)  Resp: (!) 23 (06/10/24 1100)  BP: 136/70 (06/10/24 1100)  SpO2: 98 % (06/10/24 1100) Vital Signs (24h Range):  Temp:  [97.5 °F (36.4 °C)-98.6 °F (37 °C)] 97.5 °F (36.4 °C)  Pulse:  [] 95  Resp:  [18-35] 23  SpO2:  [92 %-99 %] 98 %  BP: (132-154)/(59-95) 136/70  Arterial Line BP: (101-165)/(54-93) 142/81     Weight: 88.7 kg (195 lb 8.8 oz)  Body mass index is 31.56 kg/m².      Intake/Output Summary (Last 24 hours) at 6/10/2024 1252  Last data filed at 6/10/2024 1100  Gross per 24 hour   Intake 1138.46 ml   Output 1465 ml   Net -326.54 ml        Physical Exam  Constitutional:       General: He is not in acute distress.     Appearance: Normal appearance. He is not ill-appearing, toxic-appearing or  diaphoretic.   HENT:      Head: Normocephalic and atraumatic.      Nose: Nose normal.      Mouth/Throat:      Mouth: Mucous membranes are moist.   Eyes:      Extraocular Movements: Extraocular movements intact.   Cardiovascular:      Rate and Rhythm: Normal rate and regular rhythm.      Heart sounds: No murmur heard.     No friction rub. No gallop.   Pulmonary:      Effort: Pulmonary effort is normal. No respiratory distress.      Breath sounds: Normal breath sounds. No wheezing or rales.   Abdominal:      General: Abdomen is flat. Bowel sounds are normal. There is no distension.      Palpations: Abdomen is soft.   Musculoskeletal:         General: Normal range of motion.      Cervical back: Normal range of motion.   Skin:     General: Skin is warm and dry.      Capillary Refill: Capillary refill takes less than 2 seconds.   Neurological:      General: No focal deficit present.      Mental Status: He is alert and oriented to person, place, and time. Mental status is at baseline.   Psychiatric:         Mood and Affect: Mood normal.         Behavior: Behavior normal.         Thought Content: Thought content normal.         Judgment: Judgment normal.           Review of Systems   Constitutional:  Positive for activity change. Negative for fatigue.   Respiratory:  Negative for cough and shortness of breath.    Cardiovascular:  Negative for chest pain and palpitations.   Gastrointestinal:  Negative for abdominal pain.       Vents:  Vent Mode: PS/CPAP (06/09/24 1240)  Ventilator Initiated: Yes (06/05/24 1443)  Set Rate: 18 BPM (06/09/24 1053)  Vt Set: 390 mL (06/09/24 1053)  Pressure Support: 5 cmH20 (06/09/24 1240)  PEEP/CPAP: 5 cmH20 (06/09/24 1240)  Oxygen Concentration (%): 40 (06/09/24 1240)  Peak Airway Pressure: 11 cmH20 (06/09/24 1240)  Total Ve: 7.5 L/m (06/09/24 1240)  F/VT Ratio<105 (RSBI): (!) 77.13 (06/09/24 1240)    Lines/Drains/Airways       Peripherally Inserted Central Catheter Line  Duration              PICC Triple Lumen 06/05/24 1912 left brachial 4 days                    Significant Labs:    CBC/Anemia Profile:  Recent Labs   Lab 06/09/24  0309 06/10/24  0226   WBC 9.09 8.99   HGB 15.1 15.4   HCT 46.1 48.2    176   MCV 93 94   RDW 16.1* 15.9*        Chemistries:  Recent Labs   Lab 06/09/24  0309 06/10/24  0226   * 150*   K 3.6 3.4*    104   CO2 27 35*   BUN 39* 39*   CREATININE 1.7* 1.7*   CALCIUM 9.5 9.9   ALBUMIN 3.0* 3.3*   PROT 7.1 7.4   BILITOT 0.4 0.7   ALKPHOS 80 65   ALT 17 22   AST 18 31       All pertinent labs within the past 24 hours have been reviewed.    Significant Imaging:  I have reviewed all pertinent imaging results/findings within the past 24 hours.    ABG  Recent Labs   Lab 06/09/24  1233   PH 7.470*   PO2 89   PCO2 45.6*   HCO3 33.2*   BE 8*     Assessment/Plan:     Pulmonary  * Acute hypoxemic respiratory failure  Progressive worsening on BiPAP. Did not respond to increased EPAP. With variable resp efforts and a clear component of severe resp alkalosis, proceeded with intubation. Requiring high PEEP and high FiO2 to maintain oxygenation.     Now extubated and on 2.5L NC.    Cardiac/Vascular  CAD (coronary artery disease)  Reviewed recent [facility]. Current presentation out of proportion to reported disease burden.  - asa statin    Permanent atrial fibrillation  Amiodarone, eliquis    Acute on chronic diastolic congestive heart failure  EF improved on Dobutamine. Cardiology note reviewed, possible 2/2 Afib RVR  -low dose IV metoprolol ordered with hold parameters  - amiodarone  - coreg, lasix    Cardiogenic shock  Now resolved.  Initially required dobutamine and now appears improved. 6/7 stopped dobutamine and monitoring off    IV with levophed infiltrated and treated and monitored by wound care. Good pulses and no signs of compartment syndrome.        Renal/  Hypernatremia  Encouraging PO intake.      FARHAN (acute kidney injury)  UOP improved with dobutamine. Cr improving.  Monitor. Avoid nephrotoxins  Now off dobutamine and CR stabilizing with adequate UOP    Endocrine  Class 1 obesity with serious comorbidity and body mass index (BMI) of 30.0 to 30.9 in adult  Body mass index is 31.56 kg/m². Morbid obesity complicates all aspects of disease management from diagnostic modalities to treatment. Weight loss encouraged and health benefits explained to patient.         Type 2 diabetes mellitus, with long-term current use of insulin  5 detemir and ISS    Other  Fever  F/u cultures, CXR.  Cont Vanc and Zosyn.  If no bacteria isolated by 72 hours, it is reasonable to discontinue this.          Critical Care Daily Checklist:    A: Awake: RASS Goal/Actual Goal: RASS Goal: 1-->restless  Actual:     B: Spontaneous Breathing Trial Performed? Spon. Breathing Trial Initiated?: Initiated (06/09/24 1240)   C: SAT & SBT Coordinated?  N/a                      D: Delirium: CAM-ICU     E: Early Mobility Performed? Yes   F: Feeding Goal: Goals: Pt will meet 85% of EEN/EPN  by RD follow up  Status: Nutrition Goal Status: new   Current Diet Order   Procedures    Diet Cardiac Fluid - 1500mL; Standard Tray     Order Specific Question:   Fluid restriction:     Answer:   Fluid - 1500mL     Order Specific Question:   Tray type:     Answer:   Standard Tray      AS: Analgesia/Sedation none   T: Thromboembolic Prophylaxis eliquis   H: HOB > 300 Yes   U: Stress Ulcer Prophylaxis (if needed) N/a   G: Glucose Control PRN and scheduled    B: Bowel Function Stool Occurrence: 1   I: Indwelling Catheter (Lines & Renner) Necessity PICC   D: De-escalation of Antimicrobials/Pharmacotherapies As above    Plan for the day/ETD Step down, continue abx    Code Status:  Family/Goals of Care: Full Code  recovery     Pulmonary will sign off at this time.  Please reach out with further needs, should they arise.     Adam Cruz MD  Critical Care Medicine  [facility] - Intensive Care

## 2024-06-10 NOTE — ASSESSMENT & PLAN NOTE
Now resolved.  Initially required dobutamine and now appears improved. 6/7 stopped dobutamine and monitoring off    IV with levophed infiltrated and treated and monitored by wound care. Good pulses and no signs of compartment syndrome.

## 2024-06-10 NOTE — ASSESSMENT & PLAN NOTE
UOP improved with dobutamine. Cr improving. Monitor. Avoid nephrotoxins  Now off dobutamine and CR stabilizing with adequate UOP

## 2024-06-10 NOTE — ASSESSMENT & PLAN NOTE
Reviewed recent C. Current presentation out of proportion to reported disease burden.  - asa statin

## 2024-06-10 NOTE — ASSESSMENT & PLAN NOTE
Progressive worsening on BiPAP. Did not respond to increased EPAP. With variable resp efforts and a clear component of severe resp alkalosis, proceeded with intubation. Requiring high PEEP and high FiO2 to maintain oxygenation.     Now extubated and on 2.5L NC.

## 2024-06-10 NOTE — PROVIDER TRANSFER
Mr. Madison is a 65 yo M admitted with acute hypoxemic respiratory failure secondary to CHF exacerbation. Also found to be in atrial fibrillation with RVR and placed on amiodarone drip. Developed worsening respiratory distress and was intubated by Pulmonary/CC. Became hypotensive, started on dobutamine and norepinephrine. Prior to PICC line, norepinpherine extravasated on left forearm, phentolamine injected and Wound Care consulted. Diuresing well. Attempting daily SAT/SBT but have not extubated yet. Developed fever on 6/9, blood cultures, respiratory cultures and u/a ordered. Started on empiric antibiotics. Extubated on 6/9. Doing well, started diet. PT/OT consulted. Stable for transfer to floor.     - f/u PT/OT recommendations  - F/u cultures/fever curve. May can stop antibiotics in the next 1-2 days if no growth or fevers  - monitor left forearm wound  - monitor Na - encourage PO intake    Kulwant Nesbitt MD  Department of Hospital Medicine  [facility]

## 2024-06-10 NOTE — ASSESSMENT & PLAN NOTE
Patient with Hypoxic Respiratory failure which is Acute.  he is not on home oxygen. Supplemental oxygen was provided and noted- Vent Mode: PS/CPAP  Oxygen Concentration (%):  [40] 40  Resp Rate Total:  [22 br/min-28 br/min] 28 br/min  Vt Set:  [390 mL] 390 mL  PEEP/CPAP:  [5 cmH20] 5 cmH20  Pressure Support:  [5 cmH20] 5 cmH20  Mean Airway Pressure:  [6.9 cmH20-9 cmH20] 6.9 cmH20    .   Signs/symptoms of respiratory failure include- tachypnea, increased work of breathing, and respiratory distress. Contributing diagnoses includes - CHF Labs and images were reviewed. Patient Has recent ABG, which has been reviewed. Will treat underlying causes and adjust management of respiratory failure as follows-   - Bipap initially --> now intubated for worsening hypoxia and respiratory distress  - continue with IV lasix, dobutamine weaned off  - Cardiology following  - pulmonology with ventilatory management. Extubated 6/9 and now on 2 liters NC   - at baseline

## 2024-06-10 NOTE — NURSING
[facility], [facility] [place]  Nurses Note -- 4 Eyes      6/10/2024       Skin assessed on: Transfer      [x] No Pressure Injuries Present    [x]Prevention Measures Documented    [] Yes LDA  for Pressure Injury Previously documented     [] Yes New Pressure Injury Discovered   [] LDA for New Pressure Injury Added      Attending RN:  Leticia Hogue, RN     Second RN:  Alana Fofana, PCT

## 2024-06-10 NOTE — NURSING
[facility], [facility] - [place]  Nurses Note -- 4 Eyes      6/9/2024       Skin assessed on: Q Shift      [x] No Pressure Injuries Present    [x]Prevention Measures Documented    [] Yes LDA  for Pressure Injury Previously documented     [] Yes New Pressure Injury Discovered   [] LDA for New Pressure Injury Added      Attending RN:  DELTA Dela Cruz     Second RN:  Marlene

## 2024-06-10 NOTE — ASSESSMENT & PLAN NOTE
F/u cultures, CXR.  Cont Vanc and Zosyn.  If no bacteria isolated by 72 hours, it is reasonable to discontinue this.

## 2024-06-10 NOTE — ASSESSMENT & PLAN NOTE
Patient has hypernatremia which is uncontrolled. The hypernatremia is due to Dehydration. We will aim to correct the sodium by 8-10mEq in 24 hours. We will correct their hypernatremia by allowing patient to drink water and reducing lasix. The patient's sodium results have been reviewed and are listed below.  Recent Labs   Lab 06/10/24  0226   *

## 2024-06-10 NOTE — PROGRESS NOTES
GOLDEN PICC d/c'd pt instructed to hold breath then PICC removed. PICC tip intact, measuring 50 cm. Pressure held for 10 minutes. Pt instructed to lie flat for an additional 15 min. Pt states understanding. Pt tolerated well. No distress noted. Will continue to monitor.

## 2024-06-10 NOTE — ASSESSMENT & PLAN NOTE
Patient is identified as having Diastolic (HFpEF) heart failure that is Acute on chronic. CHF is currently . Latest ECHO performed and demonstrates- Results for orders placed during the hospital encounter of 03/15/24    Echo  Results for orders placed during the hospital encounter of 03/15/24    Echo    Interpretation Summary    Left Ventricle: The left ventricle is normal in size. There is moderate concentric hypertrophy. There is low normal systolic function with a visually estimated ejection fraction of 50 - 55%.    Right Ventricle: Mild right ventricular enlargement. Systolic function is normal.    Left Atrium: Left atrium is severely dilated.    Right Atrium: Right atrium is moderately dilated.    Aortic Valve: There is mild aortic valve sclerosis.    Mitral Valve: There is moderate regurgitation.    Tricuspid Valve: There is moderate regurgitation.    Pulmonary Artery: The estimated pulmonary artery systolic pressure is 51 mmHg.    IVC/SVC: Elevated venous pressure at 15 mmHg.      Recent Labs   Lab 06/05/24  0618   *         - initially in cardiogenic shock requiring dobutamine. Now weaned off. Transitioned to PO lasix.

## 2024-06-10 NOTE — ASSESSMENT & PLAN NOTE
Patient with Permanent atrial fibrillation which is controlled currently with Amiodarone. Patient is currently in atrial fibrillation.[id number] Score: 3. . Anticoagulation indicated. Anticoagulation done with eliquis .  - Restart PO amiodarone

## 2024-06-10 NOTE — PROGRESS NOTES
Report called to Deborah SORENSON pt being transferred to [facility]. Tele box number [id number] called to yonas agarwal, spoke to Winifred.

## 2024-06-10 NOTE — PROGRESS NOTES
Pt transferred via w/c to [facility]. Pt tolerated well. No distress noted.
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
## 2024-06-11 LAB
ALBUMIN SERPL BCP-MCNC: 3.1 G/DL (ref 3.5–5.2)
ALP SERPL-CCNC: 68 U/L (ref 55–135)
ALT SERPL W/O P-5'-P-CCNC: 21 U/L (ref 10–44)
ANION GAP SERPL CALC-SCNC: 12 MMOL/L (ref 8–16)
AST SERPL-CCNC: 31 U/L (ref 10–40)
BACTERIA SPEC AEROBE CULT: NORMAL
BASOPHILS # BLD AUTO: 0.04 K/UL (ref 0–0.2)
BASOPHILS NFR BLD: 0.5 % (ref 0–1.9)
BILIRUB SERPL-MCNC: 0.6 MG/DL (ref 0.1–1)
BUN SERPL-MCNC: 40 MG/DL (ref 8–23)
CALCIUM SERPL-MCNC: 9.6 MG/DL (ref 8.7–10.5)
CHLORIDE SERPL-SCNC: 103 MMOL/L (ref 95–110)
CO2 SERPL-SCNC: 29 MMOL/L (ref 23–29)
CREAT SERPL-MCNC: 1.7 MG/DL (ref 0.5–1.4)
DIFFERENTIAL METHOD BLD: ABNORMAL
EOSINOPHIL # BLD AUTO: 0.5 K/UL (ref 0–0.5)
EOSINOPHIL NFR BLD: 5.4 % (ref 0–8)
ERYTHROCYTE [DISTWIDTH] IN BLOOD BY AUTOMATED COUNT: 15.3 % (ref 11.5–14.5)
EST. GFR  (NO RACE VARIABLE): 44 ML/MIN/1.73 M^2
GLUCOSE SERPL-MCNC: 121 MG/DL (ref 70–110)
GRAM STN SPEC: NORMAL
HCT VFR BLD AUTO: 46.5 % (ref 40–54)
HGB BLD-MCNC: 14.2 G/DL (ref 14–18)
IMM GRANULOCYTES # BLD AUTO: 0.03 K/UL (ref 0–0.04)
IMM GRANULOCYTES NFR BLD AUTO: 0.3 % (ref 0–0.5)
LYMPHOCYTES # BLD AUTO: 1.7 K/UL (ref 1–4.8)
LYMPHOCYTES NFR BLD: 19.2 % (ref 18–48)
MCH RBC QN AUTO: 29.4 PG (ref 27–31)
MCHC RBC AUTO-ENTMCNC: 30.5 G/DL (ref 32–36)
MCV RBC AUTO: 96 FL (ref 82–98)
MONOCYTES # BLD AUTO: 1.2 K/UL (ref 0.3–1)
MONOCYTES NFR BLD: 13.6 % (ref 4–15)
NEUTROPHILS # BLD AUTO: 5.2 K/UL (ref 1.8–7.7)
NEUTROPHILS NFR BLD: 61 % (ref 38–73)
NRBC BLD-RTO: 0 /100 WBC
PLATELET # BLD AUTO: 197 K/UL (ref 150–450)
PMV BLD AUTO: 12 FL (ref 9.2–12.9)
POCT GLUCOSE: 130 MG/DL (ref 70–110)
POCT GLUCOSE: 164 MG/DL (ref 70–110)
POCT GLUCOSE: 177 MG/DL (ref 70–110)
POCT GLUCOSE: 197 MG/DL (ref 70–110)
POTASSIUM SERPL-SCNC: 3.5 MMOL/L (ref 3.5–5.1)
PROT SERPL-MCNC: 7.1 G/DL (ref 6–8.4)
RBC # BLD AUTO: 4.83 M/UL (ref 4.6–6.2)
SODIUM SERPL-SCNC: 144 MMOL/L (ref 136–145)
WBC # BLD AUTO: 8.58 K/UL (ref 3.9–12.7)

## 2024-06-11 PROCEDURE — 36406 VNPNXR<3YRS PHY/QHP OTHER VN: CPT

## 2024-06-11 PROCEDURE — 94761 N-INVAS EAR/PLS OXIMETRY MLT: CPT

## 2024-06-11 PROCEDURE — 85025 COMPLETE CBC W/AUTO DIFF WBC: CPT | Performed by: STUDENT IN AN ORGANIZED HEALTH CARE EDUCATION/TRAINING PROGRAM

## 2024-06-11 PROCEDURE — 25000003 PHARM REV CODE 250: Performed by: STUDENT IN AN ORGANIZED HEALTH CARE EDUCATION/TRAINING PROGRAM

## 2024-06-11 PROCEDURE — 36415 COLL VENOUS BLD VENIPUNCTURE: CPT | Performed by: STUDENT IN AN ORGANIZED HEALTH CARE EDUCATION/TRAINING PROGRAM

## 2024-06-11 PROCEDURE — A4216 STERILE WATER/SALINE, 10 ML: HCPCS | Performed by: STUDENT IN AN ORGANIZED HEALTH CARE EDUCATION/TRAINING PROGRAM

## 2024-06-11 PROCEDURE — 63600175 PHARM REV CODE 636 W HCPCS: Mod: JZ,JG

## 2024-06-11 PROCEDURE — 63600175 PHARM REV CODE 636 W HCPCS: Performed by: STUDENT IN AN ORGANIZED HEALTH CARE EDUCATION/TRAINING PROGRAM

## 2024-06-11 PROCEDURE — 11000001 HC ACUTE MED/SURG PRIVATE ROOM

## 2024-06-11 PROCEDURE — C1751 CATH, INF, PER/CENT/MIDLINE: HCPCS

## 2024-06-11 PROCEDURE — 97116 GAIT TRAINING THERAPY: CPT

## 2024-06-11 PROCEDURE — 80053 COMPREHEN METABOLIC PANEL: CPT | Performed by: STUDENT IN AN ORGANIZED HEALTH CARE EDUCATION/TRAINING PROGRAM

## 2024-06-11 RX ORDER — SODIUM CHLORIDE 0.9 % (FLUSH) 0.9 %
10 SYRINGE (ML) INJECTION EVERY 6 HOURS
Status: DISCONTINUED | OUTPATIENT
Start: 2024-06-11 | End: 2024-06-13 | Stop reason: HOSPADM

## 2024-06-11 RX ORDER — LORAZEPAM 2 MG/ML
1 INJECTION INTRAMUSCULAR ONCE
Status: COMPLETED | OUTPATIENT
Start: 2024-06-12 | End: 2024-06-12

## 2024-06-11 RX ORDER — OLANZAPINE 10 MG/2ML
2.5 INJECTION, POWDER, FOR SOLUTION INTRAMUSCULAR ONCE
Status: COMPLETED | OUTPATIENT
Start: 2024-06-11 | End: 2024-06-11

## 2024-06-11 RX ORDER — SODIUM CHLORIDE 0.9 % (FLUSH) 0.9 %
10 SYRINGE (ML) INJECTION
Status: DISCONTINUED | OUTPATIENT
Start: 2024-06-11 | End: 2024-06-13 | Stop reason: HOSPADM

## 2024-06-11 RX ORDER — LORAZEPAM 0.5 MG/1
1 TABLET ORAL ONCE
Status: DISCONTINUED | OUTPATIENT
Start: 2024-06-12 | End: 2024-06-11

## 2024-06-11 RX ORDER — OLANZAPINE 10 MG/2ML
INJECTION, POWDER, FOR SOLUTION INTRAMUSCULAR
Status: DISPENSED
Start: 2024-06-11 | End: 2024-06-12

## 2024-06-11 RX ADMIN — PIPERACILLIN AND TAZOBACTAM 4.5 G: 4; .5 INJECTION, POWDER, LYOPHILIZED, FOR SOLUTION INTRAVENOUS; PARENTERAL at 01:06

## 2024-06-11 RX ADMIN — INSULIN DETEMIR 5 UNITS: 100 INJECTION, SOLUTION SUBCUTANEOUS at 08:06

## 2024-06-11 RX ADMIN — CARVEDILOL 12.5 MG: 12.5 TABLET, FILM COATED ORAL at 05:06

## 2024-06-11 RX ADMIN — Medication 10 ML: at 01:06

## 2024-06-11 RX ADMIN — Medication 10 ML: at 05:06

## 2024-06-11 RX ADMIN — INSULIN ASPART 2 UNITS: 100 INJECTION, SOLUTION INTRAVENOUS; SUBCUTANEOUS at 01:06

## 2024-06-11 RX ADMIN — VANCOMYCIN HYDROCHLORIDE 1000 MG: 1 INJECTION, POWDER, LYOPHILIZED, FOR SOLUTION INTRAVENOUS at 05:06

## 2024-06-11 RX ADMIN — CARVEDILOL 12.5 MG: 12.5 TABLET, FILM COATED ORAL at 08:06

## 2024-06-11 RX ADMIN — PIPERACILLIN AND TAZOBACTAM 4.5 G: 4; .5 INJECTION, POWDER, LYOPHILIZED, FOR SOLUTION INTRAVENOUS; PARENTERAL at 10:06

## 2024-06-11 RX ADMIN — APIXABAN 5 MG: 5 TABLET, FILM COATED ORAL at 08:06

## 2024-06-11 RX ADMIN — PIPERACILLIN AND TAZOBACTAM 4.5 G: 4; .5 INJECTION, POWDER, LYOPHILIZED, FOR SOLUTION INTRAVENOUS; PARENTERAL at 05:06

## 2024-06-11 RX ADMIN — INSULIN ASPART 2 UNITS: 100 INJECTION, SOLUTION INTRAVENOUS; SUBCUTANEOUS at 08:06

## 2024-06-11 RX ADMIN — FUROSEMIDE 80 MG: 40 TABLET ORAL at 08:06

## 2024-06-11 RX ADMIN — OLANZAPINE 2.5 MG: 10 INJECTION, POWDER, FOR SOLUTION INTRAMUSCULAR at 11:06

## 2024-06-11 RX ADMIN — ATORVASTATIN CALCIUM 80 MG: 40 TABLET, FILM COATED ORAL at 08:06

## 2024-06-11 RX ADMIN — AMIODARONE HYDROCHLORIDE 200 MG: 200 TABLET ORAL at 08:06

## 2024-06-11 RX ADMIN — ASPIRIN 81 MG CHEWABLE TABLET 81 MG: 81 TABLET CHEWABLE at 08:06

## 2024-06-11 NOTE — NURSING
Called to patient's room due to patient getting up out of chair without assistance, and patient found on the floor.  Patient stated he was trying to use the restroom.  Patient's call light was in reach, room near the nurses station with door open.  Patient hit head, patient did not lose consciousness, and was AAO as staff was getting him back in bed. Patient with no complaints of pain.   Dr. Austin notified. STAT head CT ordered.  Avasys now in use.

## 2024-06-11 NOTE — NURSING
[facility] [place] Nurse Intervention/ Task    Date of Visit: 06/11/2024  Time of Visit: 0235       INTERVENTIONS/ TASK Completed:     RR A-line puncture site bleeding profusely. Surgicel applied with gauze and coban. Will recheck site.      536:  Site clean and dry.  Coban loosened.

## 2024-06-11 NOTE — PROGRESS NOTES
"[facility] - [place] - Telemetry  Adult Nutrition  Progress Note    SUMMARY     Recommendations  Recommendation:   1. Contine Cardiac 1500ml diet   2. Add diabetic diet restriction 2000 kcals   3. Monitor need for ONS   4. Monitor weight and labs    Goals: Pt will consume 50-75% of meals by RD follow up    Nutrition Goal Status: new  Communication of RD Recs: other (comment) (POC)    Assessment and Plan  Nutrition Problem  Inadequate energy intake     Related to (etiology):   Intubation/sedation     Signs and Symptoms (as evidenced by):   NPO status      Interventions:  Collaboration with other providers   Modify TF     Nutrition Diagnosis Status:   Improving, pt extubated and on a cardiac diet, 50% meal intake    Malnutrition Assessment  No recent weight history   Unable to conduct NFPE at this time, pt with RN at time of visit    Reason for Assessment  Reason For Assessment: RD follow-up  Diagnosis: pulmonary disease  Relevant Medical History: CAD, CHF, psychiatric disorder, COPD, DKA, cholelithiasis, NSTEMI, HTN, DM  Interdisciplinary Rounds: did not attend  General Information Comments: Pt is currently on a cardiac 1500ml. Mohamud-14/left anterior lower arm, right anterior lower leg. Noted 50% meal intake. Pt extubated 6/9. No recent weight history. Unable to conduct NFPE at this time, pt with RN at this time.  Nutrition Discharge Planning: d/c on cardiac/diabetic diet    Nutrition Risk Screen  Nutrition Risk Screen: no indicators present    Nutrition/Diet History  Patient Reported Diet/Restrictions/Preferences: diabetic diet, heart healthy  Food Preferences: MARGRET  Spiritual, Cultural Beliefs, Druze Practices, Values that Affect Care: no  Factors Affecting Nutritional Intake: None identified at this time    Nutrition Related Social Determinants of Health: SDOH: Unable to assess at this time.     Anthropometrics  Temp: 98.4 °F (36.9 °C)  Height Method: Stated  Height: 5' 6" (167.6 cm)  Height (inches): 66 in  Weight " Method: Bed Scale  Weight: 88.5 kg (195 lb 1.7 oz)  Weight (lb): 195.11 lb  Ideal Body Weight (IBW), Male: 142 lb  % Ideal Body Weight, Male (lb): 137.4 %  BMI (Calculated): 31.5  BMI Grade: 30 - 34.9- obesity - grade I  Usual Body Weight (UBW), k.4 kg (3/15/24)  % Usual Body Weight: 101.34  % Weight Change From Usual Weight: 1.13 %     Lab/Procedures/Meds  Pertinent Labs Reviewed: reviewed  Pertinent Labs Comments: BUN 40, Creatinine 1.7, GFR 44, Glucose 121  Pertinent Medications Reviewed: reviewed  Pertinent Medications Comments: aspirin, atorvastatin, enoxaparin, famotidine, furosemide, sodium chloride    Estimated/Assessed Needs  Weight Used For Calorie Calculations: 88.5 kg (195 lb 1.7 oz)  Energy Calorie Requirements (kcal): 2212 kcals (25 kcals/kg)  Energy Need Method: Kcal/kg  Protein Requirements: 106g  Weight Used For Protein Calculations: 88.5 kg (195 lb 1.7 oz)     Estimated Fluid Requirement Method: RDA Method  RDA Method (mL): 2212  CHO Requirement: 270g    Nutrition Prescription Ordered  Current Diet Order: Cardiac Diet 1500ml  Current Nutrition Support Formula Ordered: Novasource Renal  Current Nutrition Support Frequency Ordered: 10ml/hr    Evaluation of Received Nutrient/Fluid Intake  Enteral Calories (kcal): 0  Enteral Protein (gm): 0  Enteral (Free Water) Fluid (mL): 0  Lipid Calories (kcals): 0 kcals  % Kcal Needs: -  % Protein Needs: -  I/O: 945/520  Energy Calories Required: not meeting needs  Protein Required: not meeting needs  Fluid Required: not meeting needs  Comments: LBM-6/10/24  Tolerance: tolerating  % Intake of Estimated Energy Needs: 50 - 75 %  % Meal Intake: 50 - 75 %    Nutrition Risk  Level of Risk/Frequency of Follow-up:  (1x/weekly)     Monitor and Evaluation  Food and Nutrient Intake: energy intake, food and beverage intake  Food and Nutrient Adminstration: diet order  Anthropometric Measurements: weight, weight change, body mass index  Biochemical Data, Medical Tests  and Procedures: electrolyte and renal panel, gastrointestinal profile, inflammatory profile, lipid profile     Nutrition Follow-Up  RD Follow-up?: Yes

## 2024-06-11 NOTE — ASSESSMENT & PLAN NOTE
Patient has hypernatremia which is uncontrolled. The hypernatremia is due to Dehydration. We will aim to correct the sodium by 8-10mEq in 24 hours. We will correct their hypernatremia by allowing patient to drink water and reducing lasix. The patient's sodium results have been reviewed and are listed below.  Recent Labs   Lab 06/11/24  0419

## 2024-06-11 NOTE — SUBJECTIVE & OBJECTIVE
Interval History:  No acute overnight events.  Patient remained afebrile.  Nurse concerned that patient was confused this morning the CT head ordered.  No abnormalities.  However, this afternoon patient fell and hit his head.  Order stat CT.      Review of Systems   Constitutional:  Positive for activity change. Negative for chills and fever.   Respiratory:  Positive for shortness of breath (improving). Negative for cough.    Cardiovascular:  Negative for chest pain.   Musculoskeletal:         Left arm swelling     Skin:  Positive for wound.   Neurological:  Positive for weakness. Negative for dizziness, light-headedness and headaches.   Psychiatric/Behavioral:  Positive for decreased concentration. Negative for confusion and hallucinations.      Objective:     Vital Signs (Most Recent):  Temp: 98.5 °F (36.9 °C) (06/11/24 1110)  Pulse: 98 (06/11/24 1205)  Resp: 18 (06/11/24 1205)  BP: 138/81 (06/11/24 1110)  SpO2: (!) 93 % (06/11/24 1205) Vital Signs (24h Range):  Temp:  [97.7 °F (36.5 °C)-98.7 °F (37.1 °C)] 98.5 °F (36.9 °C)  Pulse:  [] 98  Resp:  [16-26] 18  SpO2:  [93 %-99 %] 93 %  BP: (132-168)/() 138/81     Weight: 88.5 kg (195 lb 1.7 oz)  Body mass index is 31.49 kg/m².    Intake/Output Summary (Last 24 hours) at 6/11/2024 1340  Last data filed at 6/11/2024 1009  Gross per 24 hour   Intake 239.86 ml   Output 400 ml   Net -160.14 ml         Physical Exam  Vitals and nursing note reviewed.   Constitutional:       General: He is not in acute distress.     Appearance: He is obese. He is ill-appearing.   Cardiovascular:      Rate and Rhythm: Normal rate. Rhythm irregular.      Pulses: Normal pulses.   Pulmonary:      Effort: Pulmonary effort is normal.      Breath sounds: No wheezing.   Abdominal:      General: There is no distension.      Palpations: Abdomen is soft.   Musculoskeletal:         General: No swelling.   Skin:     Comments: Left forearm with erythema, swelling and blister from  extravasation   Neurological:      General: No focal deficit present.      Mental Status: He is oriented to person, place, and time. Mental status is at baseline.      Comments: Able to tell me his name, date of birth, and that he is at [facility] because he came with shortness a breath.             Significant Labs: All pertinent labs within the past 24 hours have been reviewed.    Significant Imaging: I have reviewed all pertinent imaging results/findings within the past 24 hours.

## 2024-06-11 NOTE — ASSESSMENT & PLAN NOTE
Patient with Permanent atrial fibrillation which is controlled currently with Amiodarone. Patient is currently in atrial fibrillation.[id number] Score: 3. . Anticoagulation indicated. Anticoagulation done with eliquis .  - Restart PO amiodarone

## 2024-06-11 NOTE — PLAN OF CARE
06/11/24 1243   Medicare Message   Important Message from Medicare regarding Discharge Appeal Rights Given to patient/caregiver;Explained to patient/caregiver;Signed/date by patient/caregiver   Date IMM was signed 06/11/24   Time IMM was signed 1248

## 2024-06-11 NOTE — ASSESSMENT & PLAN NOTE
Body mass index is 31.49 kg/m². Morbid obesity complicates all aspects of disease management from diagnostic modalities to treatment. Weight loss encouraged and health benefits explained to patient.

## 2024-06-11 NOTE — PLAN OF CARE
Recommendations  Recommendation:   1. Contine Cardiac 1500ml diet   2. Add diabetic diet restriction 2000 kcals   3. Monitor need for ONS   4. Monitor weight and labs    Goals: Pt will consume 50-75% of meals by RD follow up    Nutrition Goal Status: new  Communication of RD Recs: other (comment) (POC)

## 2024-06-11 NOTE — ASSESSMENT & PLAN NOTE
Patient with Hypoxic Respiratory failure which is Acute.  he is not on home oxygen. Supplemental oxygen was provided and noted-      .   Signs/symptoms of respiratory failure include- tachypnea, increased work of breathing, and respiratory distress. Contributing diagnoses includes - CHF Labs and images were reviewed. Patient Has recent ABG, which has been reviewed. Will treat underlying causes and adjust management of respiratory failure as follows-   - Bipap initially --> now intubated for worsening hypoxia and respiratory distress  - continue with IV lasix, dobutamine weaned off  - Cardiology following  - pulmonology with ventilatory management. Extubated 6/9 and now on 2 liters NC   - at baseline

## 2024-06-11 NOTE — ASSESSMENT & PLAN NOTE
Patient with acute kidney injury/acute renal failure likely due to cardiogenic shock. FARHAN is currently stable. Baseline creatinine  1.5  - Labs reviewed- Renal function/electrolytes with Estimated Creatinine Clearance: 44.6 mL/min (A) (based on SCr of 1.7 mg/dL (H)). according to latest data. Monitor urine output and serial BMP and adjust therapy as needed. Avoid nephrotoxins and renally dose meds for GFR listed above.    -improving

## 2024-06-11 NOTE — NURSING
Nurses Note -- 4 Eyes      6/11/2024   7:34 AM      Skin assessed during: Q Shift Change      [x] No Altered Skin Integrity Present    []Prevention Measures Documented      [] Yes- Altered Skin Integrity Present or Discovered   [] LDA Added if Not in Epic (Describe Wound)   [] New Altered Skin Integrity was Present on Admit and Documented in LDA   [] Wound Image Taken    Wound Care Consulted? No    Attending Nurse:  Leticia /Brandi SORENSON    Second RN/Staff Member:  yes, with Tracy

## 2024-06-11 NOTE — ASSESSMENT & PLAN NOTE
-pt fell out of the chair and hit his head on 06/11/24  -STAT CT head ordered  -on immediate assessment, patient able to tell me his name, date of birth, and that he is at [facility].  Does not seem confused on my assessment after the fall.

## 2024-06-11 NOTE — PLAN OF CARE
Problem: Diabetes Comorbidity  Goal: Blood Glucose Level Within Targeted Range  Outcome: Progressing  Intervention: Monitor and Manage Glycemia  Flowsheets (Taken 6/10/2024 1940)  Glycemic Management:   blood glucose monitored   oral hydration promoted   supplemental insulin given

## 2024-06-11 NOTE — PLAN OF CARE
Problem: Adult Inpatient Plan of Care  Goal: Plan of Care Review  Outcome: Progressing  Goal: Patient-Specific Goal (Individualized)  Outcome: Progressing  Goal: Absence of Hospital-Acquired Illness or Injury  Outcome: Progressing  Goal: Optimal Comfort and Wellbeing  Outcome: Progressing  Goal: Readiness for Transition of Care  Outcome: Progressing     Problem: Diabetes Comorbidity  Goal: Blood Glucose Level Within Targeted Range  Outcome: Progressing     Problem: Infection  Goal: Absence of Infection Signs and Symptoms  Outcome: Progressing     Problem: Fall Injury Risk  Goal: Absence of Fall and Fall-Related Injury  Outcome: Progressing     Problem: Restraint, Nonviolent  Goal: Absence of Harm or Injury  Outcome: Progressing     Problem: Skin Injury Risk Increased  Goal: Skin Health and Integrity  Outcome: Progressing

## 2024-06-11 NOTE — PROGRESS NOTES
[facility]  Progress Note    Patient Name: Marck Madison  MRN: [id number]  Patient Class: IP- Inpatient   Admission Date: 6/5/2024  Length of Stay: 6 days  Attending Physician: Megan Austin-Marilee KATE DO  Primary Care Provider: [facility] -        Subjective:     Principal Problem:Acute hypoxemic respiratory failure        HPI:  Mr. Madison is a 64-year-old male with past medical history of hypertension, diabetes mellitus type 2, atrial fibrillation, CHF and nonobstructive coronary artery disease who presents to the emergency department for evaluation of shortness of breath.  He reports this has been ongoing and progressively worsening.  He endorses swelling in his extremities but denies any chest pain.  Denies any fevers or chills.  He recently had a left heart catheterization on 05/06/24 that shows nonobstructive coronary artery disease.  He reports compliance with Lasix at home.  In the emergency department, patient was found to be tachycardic with a rate of 133 and atrial fibrillation, respiratory rate of 24 and blood pressure 180/129 with a O2 saturation of 84%.  He was placed on BiPAP and given IV amiodarone with improvement of rate.  He was also given IV Lasix.  Cardiology consulted and he will be admitted to ICU for further management.    Overview/Hospital Course:  Mr. Madison is a 65 yo M admitted with acute hypoxemic respiratory failure secondary to CHF exacerbation. Also found to be in atrial fibrillation with RVR and placed on amiodarone drip. Developed worsening respiratory distress and was intubated by Pulmonary/CC. Became hypotensive, started on dobutamine and norepinephrine. Prior to PICC line, norepinpherine extravasated on left forearm, phentolamine injected and Wound Care consulted. Diuresing well.  Attempting daily SAT/SBT but have not extubated yet. Developed fever on 6/9, blood cultures, respiratory cultures and u/a ordered. Started on empiric antibiotics.  Extubated on 6/9. Doing well, started diet. PT/OT consulted-recommends low intensity therapy. Stable for transfer to floor on 06/11/24.  Nurse concern for mental status change in the CT head ordered on 06/11, no acute abnormalities.  Later in the afternoon, patient fell out of the chair and hit his head.  Repeat CT ordered given patient fell and he is on Eliquis    Interval History:  No acute overnight events.  Patient remained afebrile.  Nurse concerned that patient was confused this morning the CT head ordered.  No abnormalities.  However, this afternoon patient fell and hit his head.  Order stat CT.      Review of Systems   Constitutional:  Positive for activity change. Negative for chills and fever.   Respiratory:  Positive for shortness of breath (improving). Negative for cough.    Cardiovascular:  Negative for chest pain.   Musculoskeletal:         Left arm swelling     Skin:  Positive for wound.   Neurological:  Positive for weakness. Negative for dizziness, light-headedness and headaches.   Psychiatric/Behavioral:  Positive for decreased concentration. Negative for confusion and hallucinations.      Objective:     Vital Signs (Most Recent):  Temp: 98.5 °F (36.9 °C) (06/11/24 1110)  Pulse: 98 (06/11/24 1205)  Resp: 18 (06/11/24 1205)  BP: 138/81 (06/11/24 1110)  SpO2: (!) 93 % (06/11/24 1205) Vital Signs (24h Range):  Temp:  [97.7 °F (36.5 °C)-98.7 °F (37.1 °C)] 98.5 °F (36.9 °C)  Pulse:  [] 98  Resp:  [16-26] 18  SpO2:  [93 %-99 %] 93 %  BP: (132-168)/() 138/81     Weight: 88.5 kg (195 lb 1.7 oz)  Body mass index is 31.49 kg/m².    Intake/Output Summary (Last 24 hours) at 6/11/2024 1340  Last data filed at 6/11/2024 1009  Gross per 24 hour   Intake 239.86 ml   Output 400 ml   Net -160.14 ml         Physical Exam  Vitals and nursing note reviewed.   Constitutional:       General: He is not in acute distress.     Appearance: He is obese. He is ill-appearing.   Cardiovascular:      Rate and Rhythm:  Normal rate. Rhythm irregular.      Pulses: Normal pulses.   Pulmonary:      Effort: Pulmonary effort is normal.      Breath sounds: No wheezing.   Abdominal:      General: There is no distension.      Palpations: Abdomen is soft.   Musculoskeletal:         General: No swelling.   Skin:     Comments: Left forearm with erythema, swelling and blister from extravasation   Neurological:      General: No focal deficit present.      Mental Status: He is oriented to person, place, and time. Mental status is at baseline.      Comments: Able to tell me his name, date of birth, and that he is at [facility] because he came with shortness a breath.             Significant Labs: All pertinent labs within the past 24 hours have been reviewed.    Significant Imaging: I have reviewed all pertinent imaging results/findings within the past 24 hours.    Assessment/Plan:      * Acute hypoxemic respiratory failure  Patient with Hypoxic Respiratory failure which is Acute.  he is not on home oxygen. Supplemental oxygen was provided and noted-      .   Signs/symptoms of respiratory failure include- tachypnea, increased work of breathing, and respiratory distress. Contributing diagnoses includes - CHF Labs and images were reviewed. Patient Has recent ABG, which has been reviewed. Will treat underlying causes and adjust management of respiratory failure as follows-   - Bipap initially --> now intubated for worsening hypoxia and respiratory distress  - continue with IV lasix, dobutamine weaned off  - Cardiology following  - pulmonology with ventilatory management. Extubated 6/9 and now on 2 liters NC   - at baseline    FARHAN (acute kidney injury)  Patient with acute kidney injury/acute renal failure likely due to cardiogenic shock. FARHAN is currently stable. Baseline creatinine  1.5  - Labs reviewed- Renal function/electrolytes with Estimated Creatinine Clearance: 44.6 mL/min (A) (based on SCr of 1.7 mg/dL (H)). according to latest data. Monitor  urine output and serial BMP and adjust therapy as needed. Avoid nephrotoxins and renally dose meds for GFR listed above.    -improving    Acute on chronic diastolic congestive heart failure  Patient is identified as having Diastolic (HFpEF) heart failure that is Acute on chronic. CHF is currently . Latest ECHO performed and demonstrates- Results for orders placed during the hospital encounter of 03/15/24    Echo  Results for orders placed during the hospital encounter of 03/15/24    Echo    Interpretation Summary    Left Ventricle: The left ventricle is normal in size. There is moderate concentric hypertrophy. There is low normal systolic function with a visually estimated ejection fraction of 50 - 55%.    Right Ventricle: Mild right ventricular enlargement. Systolic function is normal.    Left Atrium: Left atrium is severely dilated.    Right Atrium: Right atrium is moderately dilated.    Aortic Valve: There is mild aortic valve sclerosis.    Mitral Valve: There is moderate regurgitation.    Tricuspid Valve: There is moderate regurgitation.    Pulmonary Artery: The estimated pulmonary artery systolic pressure is 51 mmHg.    IVC/SVC: Elevated venous pressure at 15 mmHg.      Recent Labs   Lab 06/05/24  0618   *         - initially in cardiogenic shock requiring dobutamine. Now weaned off. Transitioned to PO lasix.    Hypernatremia  Patient has hypernatremia which is uncontrolled. The hypernatremia is due to Dehydration. We will aim to correct the sodium by 8-10mEq in 24 hours. We will correct their hypernatremia by allowing patient to drink water and reducing lasix. The patient's sodium results have been reviewed and are listed below.  Recent Labs   Lab 06/11/24  0419            Cardiogenic shock  See acute on chronic heart failure exacerbation  - resolved      CAD (coronary artery disease)  Patient with known CAD that is nonobstructive (5/6/2024), which is controlled Will continue ASA and Statin and  monitor for S/Sx of angina/ACS. Continue to monitor on telemetry.     Permanent atrial fibrillation  Patient with Permanent atrial fibrillation which is controlled currently with Amiodarone. Patient is currently in atrial fibrillation.[id number] Score: 3. . Anticoagulation indicated. Anticoagulation done with eliquis .  - Restart PO amiodarone    Elevated troponin  Possible related to demand ischemia, patient had recent LHC with nonobstructive CAD on 5/6/24  - Cardiology consulted  - continue aspirin/statin    Fever  - spiked fever overnight on 6/9  - cultures drawn (blood, respiratory, u/a)  - started on empiric antibiotics  - f/u culture and fever curve  - if cultures negative no further fever for 48-72 hours, would consider stopping and monitor off antibiotics      Type 2 diabetes mellitus, with long-term current use of insulin  A1c:   Lab Results   Component Value Date    HGBA1C 6.7 (H) 06/05/2024     Meds: SSI PRN to maintain goal 140-180 - add basal with tube feedings  accuchecks ACHS, hypoglycemic protocol       Fall  -pt fell out of the chair and hit his head on 06/11/24  -STAT CT head ordered  -on immediate assessment, patient able to tell me his name, date of birth, and that he is at [facility].  Does not seem confused on my assessment after the fall.      Class 1 obesity with serious comorbidity and body mass index (BMI) of 30.0 to 30.9 in adult  Body mass index is 31.49 kg/m². Morbid obesity complicates all aspects of disease management from diagnostic modalities to treatment. Weight loss encouraged and health benefits explained to patient.           VTE Risk Mitigation (From admission, onward)           Ordered     apixaban tablet 5 mg  2 times daily         06/10/24 0837                    Discharge Planning   BALDO:      Code Status: Full Code   Is the patient medically ready for discharge?:     Reason for patient still in hospital (select all that apply): Patient trending condition, Treatment, Imaging,  and PT / OT recommendations  Discharge Plan A: Home Health                  Senthil Austin DO  Department of Hospital Medicine   [facility] - Telemetry

## 2024-06-11 NOTE — ASSESSMENT & PLAN NOTE
Patient is identified as having Diastolic (HFpEF) heart failure that is Acute on chronic. CHF is currently . Latest ECHO performed and demonstrates- Results for orders placed during the hospital encounter of 03/15/24    Echo  Results for orders placed during the hospital encounter of 03/15/24    Echo    Interpretation Summary    Left Ventricle: The left ventricle is normal in size. There is moderate concentric hypertrophy. There is low normal systolic function with a visually estimated ejection fraction of 50 - 55%.    Right Ventricle: Mild right ventricular enlargement. Systolic function is normal.    Left Atrium: Left atrium is severely dilated.    Right Atrium: Right atrium is moderately dilated.    Aortic Valve: There is mild aortic valve sclerosis.    Mitral Valve: There is moderate regurgitation.    Tricuspid Valve: There is moderate regurgitation.    Pulmonary Artery: The estimated pulmonary artery systolic pressure is 51 mmHg.    IVC/SVC: Elevated venous pressure at 15 mmHg.      Recent Labs   Lab 06/05/24  0618   *         - initially in cardiogenic shock requiring dobutamine. Now weaned off. Transitioned to PO lasix.

## 2024-06-11 NOTE — PT/OT/SLP PROGRESS
Physical Therapy Treatment    Patient Name:  Marck Madison   MRN:  [id number]    Recommendations:     Discharge Recommendations: Low Intensity Therapy  Discharge Equipment Recommendations: walker, rolling  Barriers to discharge: None    Assessment:     Marck Madison is a 64 y.o. male admitted with a medical diagnosis of Acute hypoxemic respiratory failure.  He presents with the following impairments/functional limitations: weakness, impaired endurance, impaired self care skills, impaired functional mobility, gait instability, impaired balance, decreased safety awareness, decreased lower extremity function, decreased coordination. Pt is with decreased safety awareness regarding stability with ambulation. He asked if he could get up alone and asked me to leave RW, however I stated he is not safe and needs to call nursing     Rehab Prognosis: Good; patient would benefit from acute skilled PT services to address these deficits and reach maximum level of function.    Recent Surgery: * No surgery found *      Plan:     During this hospitalization, patient to be seen 5 x/week to address the identified rehab impairments via gait training, therapeutic activities, therapeutic exercises, neuromuscular re-education and progress toward the following goals:    Plan of Care Expires:  06/17/24    Subjective     Chief Complaint: Pt is without new reports   Patient/Family Comments/goals: Return home  Pain/Comfort:  Pain Rating 1: 0/10      Objective:     Communicated with nursing prior to session.  Patient found up in chair with telemetry, peripheral IV, oxygen (2.5L 02) upon PT entry to room.     General Precautions: Standard, fall  Orthopedic Precautions: N/A  Braces: N/A  Respiratory Status: Nasal cannula, flow 2.5 L/min     Functional Mobility:  Transfers:     Sit to Stand:  minimum assistance with rolling walker  Bed to Chair: minimum assistance with  rolling walker  using  Step Transfer  Gait: 30 ft, RW, min A 2L02  Balance: Static  Stand Balance is Good, dynamic is Fair      AM-PAC 6 CLICK MOBILITY          Treatment & Education:  GT 1:1 x 8 min including 30 ft, RW, min A and max verbal cues to stay inside of RW and not push it too far ahead - performed 2x's with seated RB in between     Patient left up in chair with all lines intact, call button in reach, nursing notified, pharmacy present, and door open ..    GOALS:   Multidisciplinary Problems       Physical Therapy Goals          Problem: Physical Therapy    Goal Priority Disciplines Outcome Goal Variances Interventions   Physical Therapy Goal     PT, PT/OT Progressing     Description: Goals to be met by: 2024     Patient will increase functional independence with mobility by performin. Supine to sit with Modified Snyder  2. Sit to supine with Modified Snyder  3. Sit to stand transfer with Modified Snyder  4. Bed to chair transfer with Stand-by Assistance using Rolling Walker  5. Gait  x 150 feet with Stand-by Assistance using Rolling Walker.                          Time Tracking:     PT Received On:    PT Start Time: 1005     PT Stop Time: 1020  PT Total Time (min): 15 min     Billable Minutes: Gait Training 1    Treatment Type: Treatment  PT/PTA: PT     Number of PTA visits since last PT visit: 0     2024

## 2024-06-11 NOTE — PROGRESS NOTES
Vancomycin consult follow-up:    Patient reviewed, renal function stable, no new levels, continue current therapy; Next levels due: trough due 6/12/2024 at 0430

## 2024-06-11 NOTE — NURSING
Right wrist puncture site bleeding.  Surgicel applied to wrist covered with gauze and coban for pressure.  Will continue to monitor closely.

## 2024-06-12 PROBLEM — J96.21 ACUTE ON CHRONIC HYPOXIC RESPIRATORY FAILURE: Status: ACTIVE | Noted: 2019-09-10

## 2024-06-12 LAB
ALBUMIN SERPL BCP-MCNC: 3.6 G/DL (ref 3.5–5.2)
ALP SERPL-CCNC: 59 U/L (ref 55–135)
ALT SERPL W/O P-5'-P-CCNC: 25 U/L (ref 10–44)
ANION GAP SERPL CALC-SCNC: 18 MMOL/L (ref 8–16)
AST SERPL-CCNC: 40 U/L (ref 10–40)
BASOPHILS # BLD AUTO: 0.04 K/UL (ref 0–0.2)
BASOPHILS NFR BLD: 0.5 % (ref 0–1.9)
BILIRUB SERPL-MCNC: 0.9 MG/DL (ref 0.1–1)
BUN SERPL-MCNC: 32 MG/DL (ref 8–23)
CALCIUM SERPL-MCNC: 10 MG/DL (ref 8.7–10.5)
CHLORIDE SERPL-SCNC: 100 MMOL/L (ref 95–110)
CO2 SERPL-SCNC: 28 MMOL/L (ref 23–29)
CREAT SERPL-MCNC: 1.5 MG/DL (ref 0.5–1.4)
DIFFERENTIAL METHOD BLD: ABNORMAL
EOSINOPHIL # BLD AUTO: 0.4 K/UL (ref 0–0.5)
EOSINOPHIL NFR BLD: 5.1 % (ref 0–8)
ERYTHROCYTE [DISTWIDTH] IN BLOOD BY AUTOMATED COUNT: 14.7 % (ref 11.5–14.5)
EST. GFR  (NO RACE VARIABLE): 51 ML/MIN/1.73 M^2
GLUCOSE SERPL-MCNC: 92 MG/DL (ref 70–110)
HCT VFR BLD AUTO: 51 % (ref 40–54)
HGB BLD-MCNC: 15.9 G/DL (ref 14–18)
IMM GRANULOCYTES # BLD AUTO: 0.03 K/UL (ref 0–0.04)
IMM GRANULOCYTES NFR BLD AUTO: 0.4 % (ref 0–0.5)
LYMPHOCYTES # BLD AUTO: 1.9 K/UL (ref 1–4.8)
LYMPHOCYTES NFR BLD: 24.6 % (ref 18–48)
MCH RBC QN AUTO: 29.4 PG (ref 27–31)
MCHC RBC AUTO-ENTMCNC: 31.2 G/DL (ref 32–36)
MCV RBC AUTO: 94 FL (ref 82–98)
MONOCYTES # BLD AUTO: 1 K/UL (ref 0.3–1)
MONOCYTES NFR BLD: 12.2 % (ref 4–15)
NEUTROPHILS # BLD AUTO: 4.5 K/UL (ref 1.8–7.7)
NEUTROPHILS NFR BLD: 57.2 % (ref 38–73)
NRBC BLD-RTO: 0 /100 WBC
PLATELET # BLD AUTO: 190 K/UL (ref 150–450)
PMV BLD AUTO: 11.5 FL (ref 9.2–12.9)
POCT GLUCOSE: 118 MG/DL (ref 70–110)
POCT GLUCOSE: 136 MG/DL (ref 70–110)
POCT GLUCOSE: 150 MG/DL (ref 70–110)
POCT GLUCOSE: 246 MG/DL (ref 70–110)
POTASSIUM SERPL-SCNC: 4.1 MMOL/L (ref 3.5–5.1)
PROT SERPL-MCNC: 8.2 G/DL (ref 6–8.4)
RBC # BLD AUTO: 5.4 M/UL (ref 4.6–6.2)
SODIUM SERPL-SCNC: 146 MMOL/L (ref 136–145)
VANCOMYCIN TROUGH SERPL-MCNC: 12.2 UG/ML (ref 10–22)
WBC # BLD AUTO: 7.8 K/UL (ref 3.9–12.7)

## 2024-06-12 PROCEDURE — 80202 ASSAY OF VANCOMYCIN: CPT | Performed by: STUDENT IN AN ORGANIZED HEALTH CARE EDUCATION/TRAINING PROGRAM

## 2024-06-12 PROCEDURE — 36415 COLL VENOUS BLD VENIPUNCTURE: CPT | Performed by: STUDENT IN AN ORGANIZED HEALTH CARE EDUCATION/TRAINING PROGRAM

## 2024-06-12 PROCEDURE — 94761 N-INVAS EAR/PLS OXIMETRY MLT: CPT

## 2024-06-12 PROCEDURE — A4216 STERILE WATER/SALINE, 10 ML: HCPCS | Performed by: STUDENT IN AN ORGANIZED HEALTH CARE EDUCATION/TRAINING PROGRAM

## 2024-06-12 PROCEDURE — 11000001 HC ACUTE MED/SURG PRIVATE ROOM

## 2024-06-12 PROCEDURE — 97530 THERAPEUTIC ACTIVITIES: CPT

## 2024-06-12 PROCEDURE — 80053 COMPREHEN METABOLIC PANEL: CPT | Performed by: STUDENT IN AN ORGANIZED HEALTH CARE EDUCATION/TRAINING PROGRAM

## 2024-06-12 PROCEDURE — 27000221 HC OXYGEN, UP TO 24 HOURS

## 2024-06-12 PROCEDURE — 63600175 PHARM REV CODE 636 W HCPCS: Performed by: STUDENT IN AN ORGANIZED HEALTH CARE EDUCATION/TRAINING PROGRAM

## 2024-06-12 PROCEDURE — 25000003 PHARM REV CODE 250: Performed by: STUDENT IN AN ORGANIZED HEALTH CARE EDUCATION/TRAINING PROGRAM

## 2024-06-12 PROCEDURE — 85025 COMPLETE CBC W/AUTO DIFF WBC: CPT | Performed by: STUDENT IN AN ORGANIZED HEALTH CARE EDUCATION/TRAINING PROGRAM

## 2024-06-12 PROCEDURE — 63600175 PHARM REV CODE 636 W HCPCS

## 2024-06-12 PROCEDURE — 97165 OT EVAL LOW COMPLEX 30 MIN: CPT

## 2024-06-12 RX ADMIN — PIPERACILLIN AND TAZOBACTAM 4.5 G: 4; .5 INJECTION, POWDER, LYOPHILIZED, FOR SOLUTION INTRAVENOUS; PARENTERAL at 08:06

## 2024-06-12 RX ADMIN — ACETAMINOPHEN 650 MG: 325 TABLET ORAL at 11:06

## 2024-06-12 RX ADMIN — APIXABAN 5 MG: 5 TABLET, FILM COATED ORAL at 08:06

## 2024-06-12 RX ADMIN — CARVEDILOL 12.5 MG: 12.5 TABLET, FILM COATED ORAL at 08:06

## 2024-06-12 RX ADMIN — ASPIRIN 81 MG CHEWABLE TABLET 81 MG: 81 TABLET CHEWABLE at 08:06

## 2024-06-12 RX ADMIN — INSULIN DETEMIR 5 UNITS: 100 INJECTION, SOLUTION SUBCUTANEOUS at 08:06

## 2024-06-12 RX ADMIN — PIPERACILLIN AND TAZOBACTAM 4.5 G: 4; .5 INJECTION, POWDER, LYOPHILIZED, FOR SOLUTION INTRAVENOUS; PARENTERAL at 02:06

## 2024-06-12 RX ADMIN — ATORVASTATIN CALCIUM 80 MG: 40 TABLET, FILM COATED ORAL at 08:06

## 2024-06-12 RX ADMIN — LORAZEPAM 1 MG: 2 INJECTION INTRAMUSCULAR; INTRAVENOUS at 12:06

## 2024-06-12 RX ADMIN — INSULIN ASPART 4 UNITS: 100 INJECTION, SOLUTION INTRAVENOUS; SUBCUTANEOUS at 12:06

## 2024-06-12 RX ADMIN — Medication 10 ML: at 06:06

## 2024-06-12 RX ADMIN — Medication 10 ML: at 01:06

## 2024-06-12 RX ADMIN — AMIODARONE HYDROCHLORIDE 200 MG: 200 TABLET ORAL at 08:06

## 2024-06-12 RX ADMIN — FUROSEMIDE 80 MG: 40 TABLET ORAL at 08:06

## 2024-06-12 RX ADMIN — Medication 10 ML: at 12:06

## 2024-06-12 NOTE — PROGRESS NOTES
[facility] Medicine  Progress Note    Patient Name: Marck Madison  MRN: [id number]  Patient Class: IP- Inpatient   Admission Date: 6/5/2024  Length of Stay: 7 days  Attending Physician: Megan Austin-Marilee KATE DO  Primary Care Provider: [facility] -        Subjective:     Principal Problem:Acute on chronic hypoxic respiratory failure        HPI:  Mr. Madison is a 64-year-old male with past medical history of hypertension, diabetes mellitus type 2, atrial fibrillation, CHF and nonobstructive coronary artery disease who presents to the emergency department for evaluation of shortness of breath.  He reports this has been ongoing and progressively worsening.  He endorses swelling in his extremities but denies any chest pain.  Denies any fevers or chills.  He recently had a left heart catheterization on 05/06/24 that shows nonobstructive coronary artery disease.  He reports compliance with Lasix at home.  In the emergency department, patient was found to be tachycardic with a rate of 133 and atrial fibrillation, respiratory rate of 24 and blood pressure 180/129 with a O2 saturation of 84%.  He was placed on BiPAP and given IV amiodarone with improvement of rate.  He was also given IV Lasix.  Cardiology consulted and he will be admitted to ICU for further management.    Overview/Hospital Course:  Mr. Madison is a 65 yo M admitted with acute hypoxemic respiratory failure secondary to CHF exacerbation. Also found to be in atrial fibrillation with RVR and placed on amiodarone drip. Developed worsening respiratory distress and was intubated by Pulmonary/CC. Became hypotensive, started on dobutamine and norepinephrine. Prior to PICC line, norepinpherine extravasated on left forearm, phentolamine injected and Wound Care consulted. Diuresing well.  Attempting daily SAT/SBT but have not extubated yet. Developed fever on 6/9, blood cultures, respiratory cultures and u/a ordered. Started on empiric  antibiotics. Extubated on 6/9. Doing well, started diet. PT/OT consulted-recommends moderate intensity therapy. Stable for transfer to floor on 06/11/24.  Nurse concern for mental status change in the CT head ordered on 06/11, no acute abnormalities.  Later in the afternoon, patient fell out of the chair and hit his head.  Repeat CT ordered given patient fell and he is on Eliquis.  CT head with no mentions of bleeding.  Patient placed in restraints overnight on 06/11 given attempted from out of bed.  However, on 06/12/2024, patient is calm and cooperative.  Alert and oriented x4.CM/SW aware, working on placement.     Interval History:  Overnight, nurse reported that patient was attempting to climb out of bed and appeared confused.  Soft restraints were placed.  Patient is seen and examined this morning.  He was alert and oriented x4.  Does not recall trying to, the bed.  Currently in soft restraints.  Ask patient nurse to wean out of restraints      Review of Systems   Constitutional:  Positive for activity change. Negative for chills and fever.   Respiratory:  Positive for shortness of breath (improving, at baseline). Negative for cough.    Cardiovascular:  Negative for chest pain.   Musculoskeletal:         Left arm swelling     Skin:  Positive for wound.   Neurological:  Positive for weakness. Negative for dizziness, light-headedness and headaches.   Psychiatric/Behavioral:  Positive for decreased concentration. Negative for confusion and hallucinations.      Objective:     Vital Signs (Most Recent):  Temp: 97.8 °F (36.6 °C) (06/12/24 1142)  Pulse: 85 (06/12/24 1142)  Resp: 18 (06/12/24 1142)  BP: 131/65 (06/12/24 1142)  SpO2: 95 % (06/12/24 1142) Vital Signs (24h Range):  Temp:  [97.6 °F (36.4 °C)-98.7 °F (37.1 °C)] 97.8 °F (36.6 °C)  Pulse:  [] 85  Resp:  [18-19] 18  SpO2:  [90 %-99 %] 95 %  BP: (126-143)/(65-99) 131/65     Weight: 88.5 kg (195 lb 1.7 oz)  Body mass index is 31.49 kg/m².    Intake/Output  Summary (Last 24 hours) at 6/12/2024 1315  Last data filed at 6/12/2024 1142  Gross per 24 hour   Intake 720 ml   Output 300 ml   Net 420 ml         Physical Exam  Vitals and nursing note reviewed.   Constitutional:       General: He is not in acute distress.     Appearance: He is obese. He is ill-appearing (chronically).   HENT:      Mouth/Throat:      Mouth: Mucous membranes are moist.   Cardiovascular:      Rate and Rhythm: Normal rate. Rhythm irregular.      Pulses: Normal pulses.   Pulmonary:      Effort: Pulmonary effort is normal.      Breath sounds: No wheezing.   Abdominal:      General: There is no distension.      Palpations: Abdomen is soft.   Musculoskeletal:         General: No swelling.      Right lower leg: Edema present.      Left lower leg: Edema present.      Comments: Bilateral lower extremity with trace edema.  Bilateral wrist in soft restraints   Skin:     Comments: Left forearm with erythema, swelling and blister from extravasation.    Neurological:      General: No focal deficit present.      Mental Status: He is oriented to person, place, and time. Mental status is at baseline.      Comments: Able to tell me his name, date of birth, and that he is at [facility] because he came with shortness a breath.  Able to tell me that he lives with his niece.  Mental status appears to be at baseline on my exam   Psychiatric:         Mood and Affect: Mood normal.         Thought Content: Thought content normal.             Significant Labs: All pertinent labs within the past 24 hours have been reviewed.    Significant Imaging: I have reviewed all pertinent imaging results/findings within the past 24 hours.    Assessment/Plan:      * Acute on chronic hypoxic respiratory failure  Patient with Hypoxic Respiratory failure which is Acute on chronic.  he is on home oxygen at 2 LPM. Supplemental oxygen was provided and noted-      .   Signs/symptoms of respiratory failure include- tachypnea, increased work of  breathing, and respiratory distress. Contributing diagnoses includes - CHF Labs and images were reviewed. Patient Has recent ABG, which has been reviewed. Will treat underlying causes and adjust management of respiratory failure as follows-   - Bipap initially --> now intubated for worsening hypoxia and respiratory distress  - continue with IV lasix, dobutamine weaned off  - Cardiology following  - pulmonology with ventilatory management. Extubated 6/9 and now on 2 liters NC   - at baseline    FARHAN (acute kidney injury)  Patient with acute kidney injury/acute renal failure likely due to cardiogenic shock. FARHAN is currently stable. Baseline creatinine  1.5  - Labs reviewed- Renal function/electrolytes with Estimated Creatinine Clearance: 50.5 mL/min (A) (based on SCr of 1.5 mg/dL (H)). according to latest data. Monitor urine output and serial BMP and adjust therapy as needed. Avoid nephrotoxins and renally dose meds for GFR listed above.    -improving    Acute on chronic diastolic congestive heart failure  Patient is identified as having Diastolic (HFpEF) heart failure that is Acute on chronic. CHF is currently . Latest ECHO performed and demonstrates- Results for orders placed during the hospital encounter of 03/15/24    Echo  Results for orders placed during the hospital encounter of 03/15/24    Echo    Interpretation Summary    Left Ventricle: The left ventricle is normal in size. There is moderate concentric hypertrophy. There is low normal systolic function with a visually estimated ejection fraction of 50 - 55%.    Right Ventricle: Mild right ventricular enlargement. Systolic function is normal.    Left Atrium: Left atrium is severely dilated.    Right Atrium: Right atrium is moderately dilated.    Aortic Valve: There is mild aortic valve sclerosis.    Mitral Valve: There is moderate regurgitation.    Tricuspid Valve: There is moderate regurgitation.    Pulmonary Artery: The estimated pulmonary artery systolic  "pressure is 51 mmHg.    IVC/SVC: Elevated venous pressure at 15 mmHg.      No results for input(s): "BNP", "BNPTRIAGEBLO" in the last 168 hours.      - initially in cardiogenic shock requiring dobutamine. Now weaned off.   -Transitioned to PO lasix.    Hypernatremia  Patient has hypernatremia which is uncontrolled. The hypernatremia is due to Dehydration. We will aim to correct the sodium by 8-10mEq in 24 hours. We will correct their hypernatremia by allowing patient to drink water and reducing lasix. The patient's sodium results have been reviewed and are listed below.  Recent Labs   Lab 06/12/24  0453   *         Cardiogenic shock  See acute on chronic heart failure exacerbation  - resolved      CAD (coronary artery disease)  Patient with known CAD that is nonobstructive (5/6/2024), which is controlled Will continue ASA and Statin and monitor for S/Sx of angina/ACS. Continue to monitor on telemetry.     Permanent atrial fibrillation  Patient with Permanent atrial fibrillation which is controlled currently with Amiodarone. Patient is currently in atrial fibrillation.[id number] Score: 3. . Anticoagulation indicated. Anticoagulation done with eliquis .  - Restart PO amiodarone    Elevated troponin  Possible related to demand ischemia, patient had recent LHC with nonobstructive CAD on 5/6/24  - Cardiology consulted  - continue aspirin/statin    Fever  - spiked fever overnight on 6/9  - cultures drawn (blood, respiratory, u/a)  - started on empiric antibiotics  - f/u culture and fever curve  - blood cx NGTD  - will DC abx 06/12 and monitor off abx      Type 2 diabetes mellitus, with long-term current use of insulin  A1c:   Lab Results   Component Value Date    HGBA1C 6.7 (H) 06/05/2024     Meds: SSI PRN to maintain goal 140-180 - add basal   accuchecks ACHS, hypoglycemic protocol       Fall  -pt fell out of the chair and hit his head on 06/11/24  -STAT CT head ordered, no intracranial pathology  -on immediate " assessment, patient able to tell me his name, date of birth, and that he is at [facility].  Does not seem confused on my assessment after the fall.    -PT/OT re-evaluate patient, recommend moderate intensity therapy  -CM/SW consulted and made aware    Class 1 obesity with serious comorbidity and body mass index (BMI) of 30.0 to 30.9 in adult  Body mass index is 31.49 kg/m². Morbid obesity complicates all aspects of disease management from diagnostic modalities to treatment. Weight loss encouraged and health benefits explained to patient.           VTE Risk Mitigation (From admission, onward)           Ordered     apixaban tablet 5 mg  2 times daily         06/10/24 0837                    Discharge Planning   BALDO: 6/13/2024     Code Status: Full Code   Is the patient medically ready for discharge?:     Reason for patient still in hospital (select all that apply): Patient trending condition, Treatment, PT / OT recommendations, and Pending disposition  Discharge Plan A: Home Health                  Megan-Marilee Austin DO  Department of Hospital Medicine   [facility] - [place] - Telemetry

## 2024-06-12 NOTE — ASSESSMENT & PLAN NOTE
A1c:   Lab Results   Component Value Date    HGBA1C 6.7 (H) 06/05/2024     Meds: SSI PRN to maintain goal 140-180 - add basal   accuchecks ACHS, hypoglycemic protocol

## 2024-06-12 NOTE — ASSESSMENT & PLAN NOTE
-pt fell out of the chair and hit his head on 06/11/24  -STAT CT head ordered, no intracranial pathology  -on immediate assessment, patient able to tell me his name, date of birth, and that he is at [facility].  Does not seem confused on my assessment after the fall.    -PT/OT re-evaluate patient, recommend moderate intensity therapy  -CM/SW consulted and made aware

## 2024-06-12 NOTE — NURSING
Received report from DELTA Crane. Patient lying                                                                                                                                                                                                                                                                                                                                                                                                                                                                                                                                                                                                                                                                                                                                                                                       Received report from DELTA Crane. Patient lying in bed resting, NAD noted. Safety Precautions maintained, Will Monitor.

## 2024-06-12 NOTE — PT/OT/SLP EVAL
Occupational Therapy Evaluation     Name: Marck Madison  MRN: [id number]  Admitting Diagnosis: Acute on chronic hypoxic respiratory failure  Recent Surgery: * No surgery found *      Recommendations:     Discharge Recommendations: Moderate Intensity Therapy  Level of Assistance Recommended: Intermittent assistance  Discharge Equipment Recommendations: walker, rolling  Barriers to discharge:  (the patient is not at his PLOF, s/p fall)    Assessment:     Marck Madison is a 64 y.o. male with a medical diagnosis of Acute on chronic hypoxic respiratory failure. He presents with performance deficits affecting function including weakness, impaired endurance, impaired self care skills, impaired functional mobility, decreased coordination, decreased upper extremity function, decreased lower extremity function, decreased safety awareness, impaired cardiopulmonary response to activity.   The patient participated in limited OT eval 2* confusion and bleeding from the central line.    Rehab Prognosis: Good and Fair; patient would benefit from acute OT services to address these deficits and reach maximum level of function.    Plan:     Patient to be seen  (3-5x/week) to address the above listed problems via self-care/home management, therapeutic activities, therapeutic exercises  Plan of Care Expires: 06/26/24  Plan of Care Reviewed with: patient    Subjective     Chief Complaint: people are trying to keep me in the hospital and I want to leave  Patient Comments/Goals: did not state  Pain/Comfort:  Pain Rating 1: 0/10    Patients cultural, spiritual, Mosque conflicts given the current situation: no    Social History:  Living Environment: Patient lives with their niece  in a single story home with number of outside stair(s): 3  Prior Level of Function: Prior to admission, patient was independent  Roles and Routines: Patient was  caring for self  prior to admission.  Equipment Used at Home: none  DME owned (not currently used):  "none  Assistance Upon Discharge:  niece    Objective:     Communicated with nurseWen prior to session. Patient found HOB elevated with telemetry, peripheral IV, oxygen upon OT entry to room.    General Precautions: Standard, fall   Orthopedic Precautions: N/A   Braces: N/A    Respiratory Status: Nasal cannula, flow 2 L/min    Occupational Performance    Gait belt applied - No    Bed Mobility:   Scooting to HOB in supine: maximal assistance, of 2 persons, and with drawsheet to scoot to the top of the bed    Functional Mobility/Transfers:  Functional Mobility: limited to bed level     Activities of Daily Living:  Feeding: modified independence  Grooming: stand by assistance  Upper Body Dressing: minimum assistance to doff soiled gown and down clean gown    Cognitive/Visual Perceptual:  Cognitive/Psychosocial Skills:    -     Oriented to: Person and Place  -     Follows Commands/attention: Follows one-step commands and Follows two-step commands  -     Communication: clear/fluent  -     Memory: Impaired STM  -     Safety awareness/insight to disability: impaired  -     Mood/Affect/Coping skills/emotional control: mostlt appropriate but became agitated when c/o "being forced" to stay in the hospital  Visual/Perceptual:    -     Intact    Physical Exam:  Postural examination/scapula alignment:    -       No postural abnormalities identified  Skin integrity: right upper arm with blood seeping through dressing  Dominant hand: Ambidextrous  Upper Extremity Range of Motion:     -       Right Upper Extremity: WFL  -       Left Upper Extremity: WFL  Upper Extremity Strength:    -       Right Upper Extremity: WFL  -       Left Upper Extremity: WFL   Strength:    -       Right Upper Extremity: WFL  -       Left Upper Extremity: WFL    AMPAC 6 Click ADL:  AMPAC Total Score: 17    Treatment & Education:  Patient educated on role of OT, POC, and goals for therapy  Participated in self care and functional mobility as noted " "above        Patient left HOB elevated with all lines intact, call button in reach, RN notified, and bed alarm on.    GOALS:   Multidisciplinary Problems       Occupational Therapy Goals          Problem: Occupational Therapy    Goal Priority Disciplines Outcome Interventions   Occupational Therapy Goal     OT, PT/OT Progressing    Description: Goals to be met by: 6/19/2024     Patient will increase functional independence with ADLs by performing:    UE Dressing with Modified [place].  LE Dressing with Modified [place].  Grooming while standing at sink with Modified [place] and Assistive Devices as needed.  Toileting from toilet with Modified [place], Supervision, and Assistive Devices as needed for hygiene and clothing management.   Rolling to Bilateral with Modified [place].   Supine to sit:TBA.  Step transfer: TBA  Toilet transfer : TBA  Upper extremity exercise program x15 reps per handout, with assistance as needed.                         History:     Past Medical History:   Diagnosis Date    Arrhythmia 2017    aflutter    Atrial flutter     CAD (coronary artery disease)     CHF (congestive heart failure), NYHA class I 2017    Cholelithiasis with chronic cholecystitis     Chronic diastolic heart failure     Cigarette smoker     COPD (chronic obstructive pulmonary disease)     COVID-19 06/01/2021    Diabetes mellitus     DKA (diabetic ketoacidosis)     Hypertension     NSTEMI (non-ST elevation myocardial infarction)     NSVT (nonsustained ventricular tachycardia)     Psychiatric disorder     h/o "schizophrena/bipolar"    Schizoaffective disorder     Severe sepsis          Past Surgical History:   Procedure Laterality Date    LIVER SURGERY      abscess drainage; 1970s    TREATMENT OF CARDIAC ARRHYTHMIA  9/12/2019    Procedure: Cardioversion or Defibrillation;  Surgeon: Jonathan Lopez MD;  Location: [facility] CATH LAB;  Service: Cardiology;;    TREATMENT OF CARDIAC ARRHYTHMIA N/A 2/3/2022    " Procedure: Cardioversion or Defibrillation;  Surgeon: Trevor Schwab MD;  Location: [facility] CATH LAB;  Service: Cardiology;  Laterality: N/A;    TREATMENT OF CARDIAC ARRHYTHMIA N/A 3/16/2024    Procedure: Cardioversion or Defibrillation;  Surgeon: Jonathan Lopez MD;  Location: [facility] CATH LAB;  Service: Cardiology;  Laterality: N/A;       Time Tracking:     OT Date of Treatment: 06/12/24  OT Start Time: 1159  OT Stop Time: 1216  OT Total Time (min): 17 min    Billable Minutes: Evaluation 17    6/12/2024

## 2024-06-12 NOTE — PLAN OF CARE
Problem: Occupational Therapy  Goal: Occupational Therapy Goal  Description: Goals to be met by: 6/19/2024     Patient will increase functional independence with ADLs by performing:    UE Dressing with Modified [place].  LE Dressing with Modified [place].  Grooming while standing at sink with Modified [place] and Assistive Devices as needed.  Toileting from toilet with Modified [place], Supervision, and Assistive Devices as needed for hygiene and clothing management.   Rolling to Bilateral with Modified [place].   Supine to sit:TBA.  Step transfer: TBA  Toilet transfer : TBA  Upper extremity exercise program x15 reps per handout, with assistance as needed.    Outcome: Progressing   The patient ability to participate in OT was limited by confusion and bleeding to RUE central line.    The patient will benefit from LIZZETH.

## 2024-06-12 NOTE — PT/OT/SLP PROGRESS
Physical Therapy Treatment    Patient Name:  Marck Madison   MRN:  [id number]    Recommendations:     Discharge Recommendations: Moderate Intensity Therapy  Discharge Equipment Recommendations: walker, rolling  Barriers to discharge: None    Assessment:     Marck Madison is a 64 y.o. male admitted with a medical diagnosis of Acute hypoxemic respiratory failure.  He presents with the following impairments/functional limitations: weakness, impaired endurance, impaired self care skills, impaired functional mobility, gait instability, impaired balance, decreased safety awareness, decreased lower extremity function, decreased coordination. Pt is with a significant decline in mental alertness and physical mobility in comparison to the past 2 days that PT treated pt. During today's Tx, pt unable to sit EOB due to inability to follow sequencing of task. He was nodding in and out of alertness for majority of Tx. Towards end of Tx, he was able to follow sequencing to bridge and scoot to HOB with max v/c and mod t/c.     Rehab Prognosis: Good; patient would benefit from acute skilled PT services to address these deficits and reach maximum level of function.    Recent Surgery: * No surgery found *      Plan:     During this hospitalization, patient to be seen 5 x/week to address the identified rehab impairments via gait training, therapeutic activities, therapeutic exercises, neuromuscular re-education and progress toward the following goals:    Plan of Care Expires:  06/17/24    Subjective     Chief Complaint: Pt states that he want to go home and he does not feel up to getting OOB  Patient/Family Comments/goals: Return home with niece.   Pain/Comfort:  Pain Rating 1: 0/10      Objective:     Communicated with nursing prior to session.  Patient found HOB elevated with telemetry, peripheral IV, oxygen (2.5L 02) upon PT entry to room.     General Precautions: Standard, fall  Orthopedic Precautions: N/A  Braces: N/A  Respiratory  Status: Nasal cannula, flow 2.5 L/min     Functional Mobility:  Bed Mobility:     Rolling Left:  moderate assistance  Scooting: minimum assistance and with max vc and mod tc  Bridging: minimum assistance  Transfers:     Sit to Stand:  unable which is a regression from yesterday  with inability to perform sup to sit as well      AM-PAC 6 CLICK MOBILITY  Turning over in bed (including adjusting bedclothes, sheets and blankets)?: 2  Sitting down on and standing up from a chair with arms (e.g., wheelchair, bedside commode, etc.): 2  Moving from lying on back to sitting on the side of the bed?: 2  Moving to and from a bed to a chair (including a wheelchair)?: 2  Need to walk in hospital room?: 2  Climbing 3-5 steps with a railing?: 2  Basic Mobility Total Score: 12       Treatment & Education:  TA 1:1 x 8 min including max vc to get pt to move from supine to L+ SL by rolling and using HR. He still required min A to mod A and max v/c and t/c for rolling due to decreased alertness         Bridging x 5 to scoot to HOB with PT hand placement on R+ knee for a tc to push through legs          PT changed pt gown and pt did not attempt to assist with changing     Patient left  supine with HOB elevated  with all lines intact, call button in reach, bed alarm on, and nursing notified that pt was in bed and actively bleeding as it appeared through his line in R+ UE..    GOALS:   Multidisciplinary Problems       Physical Therapy Goals          Problem: Physical Therapy    Goal Priority Disciplines Outcome Goal Variances Interventions   Physical Therapy Goal     PT, PT/OT Not Progressing     Description: Goals to be met by: 2024     Patient will increase functional independence with mobility by performin. Supine to sit with Modified [place]  2. Sit to supine with Modified [place]  3. Sit to stand transfer with Modified [place]  4. Bed to chair transfer with Stand-by Assistance using Rolling Walker  5. Gait   x 150 feet with Stand-by Assistance using Rolling Walker.                          Time Tracking:     PT Received On:    PT Start Time: 1057     PT Stop Time: 1112  PT Total Time (min): 15 min     Billable Minutes: Therapeutic Activity 1    Treatment Type: Treatment  PT/PTA: PT     Number of PTA visits since last PT visit: 0     06/12/2024

## 2024-06-12 NOTE — ASSESSMENT & PLAN NOTE
Patient with Hypoxic Respiratory failure which is Acute on chronic.  he is on home oxygen at 2 LPM. Supplemental oxygen was provided and noted-      .   Signs/symptoms of respiratory failure include- tachypnea, increased work of breathing, and respiratory distress. Contributing diagnoses includes - CHF Labs and images were reviewed. Patient Has recent ABG, which has been reviewed. Will treat underlying causes and adjust management of respiratory failure as follows-   - Bipap initially --> now intubated for worsening hypoxia and respiratory distress  - continue with IV lasix, dobutamine weaned off  - Cardiology following  - pulmonology with ventilatory management. Extubated 6/9 and now on 2 liters NC   - at baseline

## 2024-06-12 NOTE — ASSESSMENT & PLAN NOTE
Patient with acute kidney injury/acute renal failure likely due to cardiogenic shock. FARHAN is currently stable. Baseline creatinine  1.5  - Labs reviewed- Renal function/electrolytes with Estimated Creatinine Clearance: 50.5 mL/min (A) (based on SCr of 1.5 mg/dL (H)). according to latest data. Monitor urine output and serial BMP and adjust therapy as needed. Avoid nephrotoxins and renally dose meds for GFR listed above.    -improving

## 2024-06-12 NOTE — SUBJECTIVE & OBJECTIVE
Interval History:  Overnight, nurse reported that patient was attempting to climb out of bed and appeared confused.  Soft restraints were placed.  Patient is seen and examined this morning.  He was alert and oriented x4.  Does not recall trying to, the bed.  Currently in soft restraints.  Ask patient nurse to wean out of restraints      Review of Systems   Constitutional:  Positive for activity change. Negative for chills and fever.   Respiratory:  Positive for shortness of breath (improving, at baseline). Negative for cough.    Cardiovascular:  Negative for chest pain.   Musculoskeletal:         Left arm swelling     Skin:  Positive for wound.   Neurological:  Positive for weakness. Negative for dizziness, light-headedness and headaches.   Psychiatric/Behavioral:  Positive for decreased concentration. Negative for confusion and hallucinations.      Objective:     Vital Signs (Most Recent):  Temp: 97.8 °F (36.6 °C) (06/12/24 1142)  Pulse: 85 (06/12/24 1142)  Resp: 18 (06/12/24 1142)  BP: 131/65 (06/12/24 1142)  SpO2: 95 % (06/12/24 1142) Vital Signs (24h Range):  Temp:  [97.6 °F (36.4 °C)-98.7 °F (37.1 °C)] 97.8 °F (36.6 °C)  Pulse:  [] 85  Resp:  [18-19] 18  SpO2:  [90 %-99 %] 95 %  BP: (126-143)/(65-99) 131/65     Weight: 88.5 kg (195 lb 1.7 oz)  Body mass index is 31.49 kg/m².    Intake/Output Summary (Last 24 hours) at 6/12/2024 1315  Last data filed at 6/12/2024 1142  Gross per 24 hour   Intake 720 ml   Output 300 ml   Net 420 ml         Physical Exam  Vitals and nursing note reviewed.   Constitutional:       General: He is not in acute distress.     Appearance: He is obese. He is ill-appearing (chronically).   HENT:      Mouth/Throat:      Mouth: Mucous membranes are moist.   Cardiovascular:      Rate and Rhythm: Normal rate. Rhythm irregular.      Pulses: Normal pulses.   Pulmonary:      Effort: Pulmonary effort is normal.      Breath sounds: No wheezing.   Abdominal:      General: There is no  distension.      Palpations: Abdomen is soft.   Musculoskeletal:         General: No swelling.      Right lower leg: Edema present.      Left lower leg: Edema present.      Comments: Bilateral lower extremity with trace edema.  Bilateral wrist in soft restraints   Skin:     Comments: Left forearm with erythema, swelling and blister from extravasation.    Neurological:      General: No focal deficit present.      Mental Status: He is oriented to person, place, and time. Mental status is at baseline.      Comments: Able to tell me his name, date of birth, and that he is at [facility] because he came with shortness a breath.  Able to tell me that he lives with his niece.  Mental status appears to be at baseline on my exam   Psychiatric:         Mood and Affect: Mood normal.         Thought Content: Thought content normal.             Significant Labs: All pertinent labs within the past 24 hours have been reviewed.    Significant Imaging: I have reviewed all pertinent imaging results/findings within the past 24 hours.

## 2024-06-12 NOTE — ASSESSMENT & PLAN NOTE
Patient with Permanent atrial fibrillation which is controlled currently with Amiodarone. Patient is currently in atrial fibrillation.[id number] Score: 3. . Anticoagulation indicated. Anticoagulation done with eliquis .  - Restart PO amiodarone

## 2024-06-12 NOTE — NURSING
Patient's midline bleeding through dressing. Sterile dressing done and wrapped with gauze and coban.

## 2024-06-12 NOTE — NURSING
[facility], [facility] [place]  Nurses Note -- 4 Eyes      6/11/2024       Skin assessed on: Q Shift      [x] No Pressure Injuries Present    []Prevention Measures Documented    [] Yes LDA  for Pressure Injury Previously documented     [] Yes New Pressure Injury Discovered   [] LDA for New Pressure Injury Added      Attending RN:  Rosa aMria Sims, DELTA     Second RN:  Leticia Hogue RN

## 2024-06-12 NOTE — NURSING
Patient's midline bleeding through dressing. Wrapped with gauze and coban. No further bleeding this shift.

## 2024-06-12 NOTE — NURSING
[facility], [facility] - [place]  Nurses Note -- 4 Eyes      6/12/2024       Skin assessed on: Q Shift      [x] No Pressure Injuries Present    []Prevention Measures Documented    [] Yes LDA  for Pressure Injury Previously documented     [] Yes New Pressure Injury Discovered   [] LDA for New Pressure Injury Added      Attending RN:  Wen Lloyd RN     Second RN:  DELTA Crane

## 2024-06-12 NOTE — ASSESSMENT & PLAN NOTE
- spiked fever overnight on 6/9  - cultures drawn (blood, respiratory, u/a)  - started on empiric antibiotics  - f/u culture and fever curve  - blood cx NGTD  - will DC abx 06/12 and monitor off abx

## 2024-06-12 NOTE — ASSESSMENT & PLAN NOTE
"Patient is identified as having Diastolic (HFpEF) heart failure that is Acute on chronic. CHF is currently . Latest ECHO performed and demonstrates- Results for orders placed during the hospital encounter of 03/15/24    Echo  Results for orders placed during the hospital encounter of 03/15/24    Echo    Interpretation Summary    Left Ventricle: The left ventricle is normal in size. There is moderate concentric hypertrophy. There is low normal systolic function with a visually estimated ejection fraction of 50 - 55%.    Right Ventricle: Mild right ventricular enlargement. Systolic function is normal.    Left Atrium: Left atrium is severely dilated.    Right Atrium: Right atrium is moderately dilated.    Aortic Valve: There is mild aortic valve sclerosis.    Mitral Valve: There is moderate regurgitation.    Tricuspid Valve: There is moderate regurgitation.    Pulmonary Artery: The estimated pulmonary artery systolic pressure is 51 mmHg.    IVC/SVC: Elevated venous pressure at 15 mmHg.      No results for input(s): "BNP", "BNPTRIAGEBLO" in the last 168 hours.      - initially in cardiogenic shock requiring dobutamine. Now weaned off.   -Transitioned to PO lasix.  "

## 2024-06-12 NOTE — PLAN OF CARE
Problem: Adult Inpatient Plan of Care  Goal: Plan of Care Review  Outcome: Progressing  Goal: Optimal Comfort and Wellbeing  Outcome: Progressing  Goal: Readiness for Transition of Care  Outcome: Progressing     Problem: Diabetes Comorbidity  Goal: Blood Glucose Level Within Targeted Range  Outcome: Progressing     Problem: Fall Injury Risk  Goal: Absence of Fall and Fall-Related Injury  Outcome: Progressing     Problem: Restraint, Nonviolent  Goal: Absence of Harm or Injury  Outcome: Progressing

## 2024-06-12 NOTE — PLAN OF CARE
"CM spoke to pt's Aileen ferrari and informed her that PT/OT recommended SNF. Pt's niece stated " No he doesn't need that. I will take him home." CM inquired if they would like home health. Pt's niece stated "yes". Pt's niece requesting a rollator instead of a rolling walker. Message sent to PT. MD notified.    Patient has declined to select a preferred provider and elects placement with the first accepting in network provider that is available to provide services as ordered by the physician.       06/12/24 1510   Discharge Reassessment   Assessment Type Discharge Planning Reassessment   Did the patient's condition or plan change since previous assessment? Yes   Discharge Plan discussed with: Caregiver   Name(s) and Number(s) Aileen Mccarthy (vega)  [phone]   Communicated BALDO with patient/caregiver Yes   Discharge Plan A Home Health   Discharge Plan B Home with family   DME Needed Upon Discharge  walker, rolling   Transition of Care Barriers None   Why the patient remains in the hospital Requires continued medical care   Post-Acute Status   Post-Acute Authorization Home Health   Home Health Status Pending post-acute provider review/more information requested   Coverage [employer] Medicare   Patient choice form signed by patient/caregiver List from System Post-Acute Care   Discharge Delays (!) Post-Acute Set-up       "

## 2024-06-12 NOTE — NURSING
2224: Patient is restless, confused and trying to get out of bed. Lee Mathews informed and ordered xyprexa IM. Medicine given.     2333: Patient is still restless and tried to get out of bed. Lee mathews again informed. Ordered to give Ativan IV and Soft non-violent restraints. Orders carried out.

## 2024-06-12 NOTE — PLAN OF CARE
Problem: Fall Injury Risk  Goal: Absence of Fall and Fall-Related Injury  Outcome: Not Progressing  Intervention: Identify and Manage Contributors  Flowsheets (Taken 6/11/2024 1926)  Self-Care Promotion:   independence encouraged   BADL personal objects within reach   BADL personal routines maintained   meal set-up provided   adaptive equipment use encouraged  Medication Review/Management: medications reviewed  Intervention: Promote Injury-Free Environment  Flowsheets (Taken 6/11/2024 1926)  Safety Promotion/Fall Prevention:   assistive device/personal item within reach   bed alarm set   lighting adjusted   side rails raised x 2   Patient not compliant with calling before getting up, patiently constantly trying to get out of bed by himself. Patient educated on importance. Bed alarm on. Patient room near nurses station.

## 2024-06-12 NOTE — ASSESSMENT & PLAN NOTE
Patient has hypernatremia which is uncontrolled. The hypernatremia is due to Dehydration. We will aim to correct the sodium by 8-10mEq in 24 hours. We will correct their hypernatremia by allowing patient to drink water and reducing lasix. The patient's sodium results have been reviewed and are listed below.  Recent Labs   Lab 06/12/24  0453   *

## 2024-06-12 NOTE — PLAN OF CARE
Change in Discharge Recommendations: Skilled Nursing appropriate for patient considering change in mobility status and confusion today       Problem: Physical Therapy  Goal: Physical Therapy Goal  Description: Goals to be met by: 2024     Patient will increase functional independence with mobility by performin. Supine to sit with Modified [place]  2. Sit to supine with Modified [place]  3. Sit to stand transfer with Modified [place]  4. Bed to chair transfer with Stand-by Assistance using Rolling Walker  5. Gait  x 150 feet with Stand-by Assistance using Rolling Walker.     Outcome: Not Progressing

## 2024-06-13 VITALS
HEIGHT: 66 IN | HEART RATE: 114 BPM | SYSTOLIC BLOOD PRESSURE: 114 MMHG | WEIGHT: 195.13 LBS | RESPIRATION RATE: 18 BRPM | OXYGEN SATURATION: 98 % | DIASTOLIC BLOOD PRESSURE: 69 MMHG | TEMPERATURE: 98 F | BODY MASS INDEX: 31.36 KG/M2

## 2024-06-13 LAB
ALBUMIN SERPL BCP-MCNC: 3.1 G/DL (ref 3.5–5.2)
ALP SERPL-CCNC: 59 U/L (ref 55–135)
ALT SERPL W/O P-5'-P-CCNC: 24 U/L (ref 10–44)
ANION GAP SERPL CALC-SCNC: 14 MMOL/L (ref 8–16)
AST SERPL-CCNC: 31 U/L (ref 10–40)
BACTERIA BLD CULT: NORMAL
BACTERIA BLD CULT: NORMAL
BASOPHILS # BLD AUTO: 0.06 K/UL (ref 0–0.2)
BASOPHILS NFR BLD: 0.6 % (ref 0–1.9)
BILIRUB SERPL-MCNC: 0.7 MG/DL (ref 0.1–1)
BUN SERPL-MCNC: 35 MG/DL (ref 8–23)
CALCIUM SERPL-MCNC: 9.1 MG/DL (ref 8.7–10.5)
CHLORIDE SERPL-SCNC: 99 MMOL/L (ref 95–110)
CO2 SERPL-SCNC: 30 MMOL/L (ref 23–29)
CREAT SERPL-MCNC: 1.6 MG/DL (ref 0.5–1.4)
DIFFERENTIAL METHOD BLD: ABNORMAL
EOSINOPHIL # BLD AUTO: 0.4 K/UL (ref 0–0.5)
EOSINOPHIL NFR BLD: 3.9 % (ref 0–8)
ERYTHROCYTE [DISTWIDTH] IN BLOOD BY AUTOMATED COUNT: 14.4 % (ref 11.5–14.5)
EST. GFR  (NO RACE VARIABLE): 47 ML/MIN/1.73 M^2
GLUCOSE SERPL-MCNC: 117 MG/DL (ref 70–110)
HCT VFR BLD AUTO: 40.6 % (ref 40–54)
HGB BLD-MCNC: 12.6 G/DL (ref 14–18)
IMM GRANULOCYTES # BLD AUTO: 0.06 K/UL (ref 0–0.04)
IMM GRANULOCYTES NFR BLD AUTO: 0.6 % (ref 0–0.5)
LYMPHOCYTES # BLD AUTO: 2.3 K/UL (ref 1–4.8)
LYMPHOCYTES NFR BLD: 21.6 % (ref 18–48)
MCH RBC QN AUTO: 29.3 PG (ref 27–31)
MCHC RBC AUTO-ENTMCNC: 31 G/DL (ref 32–36)
MCV RBC AUTO: 94 FL (ref 82–98)
MONOCYTES # BLD AUTO: 1.4 K/UL (ref 0.3–1)
MONOCYTES NFR BLD: 12.6 % (ref 4–15)
NEUTROPHILS # BLD AUTO: 6.5 K/UL (ref 1.8–7.7)
NEUTROPHILS NFR BLD: 60.7 % (ref 38–73)
NRBC BLD-RTO: 0 /100 WBC
PLATELET # BLD AUTO: 221 K/UL (ref 150–450)
PMV BLD AUTO: 11.9 FL (ref 9.2–12.9)
POTASSIUM SERPL-SCNC: 3.5 MMOL/L (ref 3.5–5.1)
PROT SERPL-MCNC: 6.9 G/DL (ref 6–8.4)
RBC # BLD AUTO: 4.3 M/UL (ref 4.6–6.2)
SODIUM SERPL-SCNC: 143 MMOL/L (ref 136–145)
WBC # BLD AUTO: 10.76 K/UL (ref 3.9–12.7)

## 2024-06-13 PROCEDURE — 36415 COLL VENOUS BLD VENIPUNCTURE: CPT | Performed by: STUDENT IN AN ORGANIZED HEALTH CARE EDUCATION/TRAINING PROGRAM

## 2024-06-13 PROCEDURE — 80053 COMPREHEN METABOLIC PANEL: CPT | Performed by: STUDENT IN AN ORGANIZED HEALTH CARE EDUCATION/TRAINING PROGRAM

## 2024-06-13 PROCEDURE — 85025 COMPLETE CBC W/AUTO DIFF WBC: CPT | Performed by: STUDENT IN AN ORGANIZED HEALTH CARE EDUCATION/TRAINING PROGRAM

## 2024-06-13 PROCEDURE — 25000003 PHARM REV CODE 250: Performed by: STUDENT IN AN ORGANIZED HEALTH CARE EDUCATION/TRAINING PROGRAM

## 2024-06-13 RX ADMIN — ATORVASTATIN CALCIUM 80 MG: 40 TABLET, FILM COATED ORAL at 09:06

## 2024-06-13 RX ADMIN — APIXABAN 5 MG: 5 TABLET, FILM COATED ORAL at 09:06

## 2024-06-13 RX ADMIN — AMIODARONE HYDROCHLORIDE 200 MG: 200 TABLET ORAL at 09:06

## 2024-06-13 RX ADMIN — ASPIRIN 81 MG CHEWABLE TABLET 81 MG: 81 TABLET CHEWABLE at 09:06

## 2024-06-13 NOTE — NURSING
[facility], [facility]  Nurses Note -- 4 Eyes      6/12/2024       Skin assessed on: Q Shift      [x] No Pressure Injuries Present    [x]Prevention Measures Documented    [] Yes LDA  for Pressure Injury Previously documented     [] Yes New Pressure Injury Discovered   [] LDA for New Pressure Injury Added      Attending RN:  Rosa Maria Sims, DELTA     Second RN:  Wen Lloyd RN

## 2024-06-13 NOTE — DISCHARGE SUMMARY
[facility] Medicine  Discharge Summary      Patient Name: Marck aMdison  MRN: [id number]  [facility]: [id number]  Patient Class: IP- Inpatient  Admission Date: 6/5/2024  Hospital Length of Stay: 8 days  Discharge Date and Time:  Left against medical advice on 06/13/24  Attending Physician: Senthil Austin DO   Discharging Provider: Senthil Austin DO  Primary Care Provider: [facility] -    Primary Care Team: Networked reference to record PCT     HPI:   Mr. Madison is a 64-year-old male with past medical history of hypertension, diabetes mellitus type 2, atrial fibrillation, CHF and nonobstructive coronary artery disease who presents to the emergency department for evaluation of shortness of breath.  He reports this has been ongoing and progressively worsening.  He endorses swelling in his extremities but denies any chest pain.  Denies any fevers or chills.  He recently had a left heart catheterization on 05/06/24 that shows nonobstructive coronary artery disease.  He reports compliance with Lasix at home.  In the emergency department, patient was found to be tachycardic with a rate of 133 and atrial fibrillation, respiratory rate of 24 and blood pressure 180/129 with a O2 saturation of 84%.  He was placed on BiPAP and given IV amiodarone with improvement of rate.  He was also given IV Lasix.  Cardiology consulted and he will be admitted to ICU for further management.    * No surgery found *      Hospital Course:   Mr. Madison is a 65 yo M admitted with acute hypoxemic respiratory failure secondary to CHF exacerbation. Also found to be in atrial fibrillation with RVR and placed on amiodarone drip. Developed worsening respiratory distress and was intubated by Pulmonary/CC. Became hypotensive, started on dobutamine and norepinephrine. Prior to PICC line, norepinpherine extravasated on left forearm, phentolamine injected and Wound Care consulted. Diuresing well.  Attempting daily SAT/SBT but  have not extubated yet. Developed fever on 6/9, blood cultures, respiratory cultures and u/a ordered. Started on empiric antibiotics. Extubated on 6/9. Doing well, started diet. PT/OT consulted-recommends moderate intensity therapy. Stable for transfer to floor on 06/11/24.  Nurse concern for mental status change in the CT head ordered on 06/11, no acute abnormalities.  Later in the afternoon, patient fell out of the chair and hit his head.  Repeat CT ordered given patient fell and he is on Eliquis.  CT head with no mentions of bleeding.  Patient placed in restraints overnight on 06/11 given attempted from out of bed.  However, on 06/12/2024, patient is calm and cooperative.  Alert and oriented x4.CM/SW aware, working on placement.     Patient declines SNF placement. Had a fever overnight on 06/12/24.  Infectious workup ordered. Patient has refused work up including imaging, labs, and vitals. Also refused IV access. States he oneal not want to be further evaluated and worked up and wants to go home to his niece. He asked to leave AMA. He understands what AMA is and stated that he has left [place] on multiple occasions and is aware of his right.  Nurse Wen present     In spite of multiple attempts by myself and staff to convince the patient to stay for evaluation and treatment, we have unfortunately been unsuccessful. However, the patient has the capacity to give, receive, and withhold information. The patient verbalizes understanding of his condition and symptoms and that this represents a significant threat. The patient has verbalized to me that he understand the plan of diagnosis and treatment, and unfortunately will not agree and understand that it may cause worsening of their condition or even death. The patient understands that he can come back at any time for further care without prejudice and we will be happy to provide treatment again.            Goals of Care Treatment Preferences:  Code Status: Full  Code      Consults:   Consults (From admission, onward)          Status Ordering Provider     Inpatient consult to Midline team  Once        Provider:  (Not yet assigned)    Acknowledged KEANU NOONAN.     Inpatient consult to Infectious Diseases  Once        Provider:  Florinda Mcnally MD    Acknowledged KEANU NOONAN.     Inpatient consult to Midline team  Once        Provider:  (Not yet assigned)    Acknowledged KEANU NOONAN.     Inpatient consult to Registered Dietitian/Nutritionist  Once        Provider:  (Not yet assigned)    Completed CLAUDETTE VELAZQUEZ     Inpatient consult to PICC team ([facility])  Once        Provider:  (Not yet assigned)    Completed HORTENCIA SANTILLAN     Inpatient consult to Pulmonology  Once        Provider:  Bj Nicholas MD    Completed CLAUDETTE VELAZQUEZ     Inpatient consult to Social Work/Case Management  Once        Provider:  (Not yet assigned)    Completed CLAUDETTE VELAZQUEZ     Inpatient consult to Cardiology  Once        Provider:  Ivelisse Logan MD    Completed VIKRAM DE LA TORRE            No new Assessment & Plan notes have been filed under this hospital service since the last note was generated.  Service: Hospital Medicine    Final Active Diagnoses:    Diagnosis Date Noted POA    PRINCIPAL PROBLEM:  Acute on chronic hypoxic respiratory failure [J96.21] 09/10/2019 Yes    FARHAN (acute kidney injury) [N17.9] 09/10/2019 Yes    Acute on chronic diastolic congestive heart failure [I50.33] 09/18/2022 Yes    Hypernatremia [E87.0] 01/27/2020 Yes    Cardiogenic shock [R57.0] 01/27/2022 Yes    CAD (coronary artery disease) [I25.10] 09/18/2022 Yes    Permanent atrial fibrillation [I48.21] 09/18/2022 Yes    Elevated troponin [R79.89] 06/05/2024 Yes    Fever [R50.9] 06/09/2024 No    Type 2 diabetes mellitus, with long-term current use of insulin [E11.9, Z79.4] 01/26/2022 Not Applicable    Fall [W19.XXXA] 06/11/2024 Yes    Class 1 obesity with serious comorbidity and body mass index (BMI) of  30.0 to 30.9 in adult [E66.9, Z68.30] 09/20/2022 Not Applicable      Problems Resolved During this Admission:       Discharged Condition: against medical advice    Disposition:     Follow Up:    Patient Instructions:   No discharge procedures on file.    Significant Diagnostic Studies:     Pending Diagnostic Studies:       Procedure Component Value Units Date/Time    X-Ray Chest 1 View [[id number]]     Order Status: Sent Lab Status: No result            Medications:      Indwelling Lines/Drains at time of discharge:   Lines/Drains/Airways       None                   Time spent on the discharge of patient: Patient left against  Medical advice. Spent greater than 35 minutes discussing and counseling patient regarding any abnormal labs, differential diagnosis, treatment options, risk-benefit, lifestyle changes, prognosis, current condition, and medications. Patient was interactive and attentive.  Discussed that leaving against medical advice may cause worsening of his condition or even death. Patient verbalized understanding and continues to want to leave against medical advice. AMA form signed. Patient's questions were answered in a respectful and timely manner. His niece was present for discussion.            Senthil Austin DO  Department of Hospital Medicine  [facility] - Telemetry

## 2024-06-13 NOTE — NURSING
Patient's right arm previously bleeding through dressing stayed dried with the compression tape. Reassessed and ask patient if its too tight and feels any tingling. He answered no.

## 2024-06-13 NOTE — PLAN OF CARE
Problem: Adult Inpatient Plan of Care  Goal: Plan of Care Review  Outcome: Progressing     Problem: Diabetes Comorbidity  Goal: Blood Glucose Level Within Targeted Range  Outcome: Progressing  Intervention: Monitor and Manage Glycemia  Flowsheets (Taken 6/13/2024 0442)  Glycemic Management: blood glucose monitored     Problem: Fall Injury Risk  Goal: Absence of Fall and Fall-Related Injury  Outcome: Progressing

## 2024-06-13 NOTE — NURSING
2030: Patient's old midline placement still bleeding. Dressing re-enforced. Dr. Madison informed. Ordered CBC.

## 2024-06-13 NOTE — NURSING
Reported off to oncoming nurse, patient resting in bed, aaox1. Max assist required for adls and transfers, no acute distress noted, safety precautions maintained.        Chart check completed.

## 2024-06-13 NOTE — PROGRESS NOTES
Patient seen and examined this AM. Full note to follow. Patient developed fever overnight. Infectious workup ordered. Patient has refused work up including imaging, labs, and vitals. Also refused IV access. States he oneal not want to be further evaluated and worked up and wants to go home to his niece. He asked to leave [place]. He understands what [place] is and stated that he has left [place] on multiple occasions and is aware of his right.  Nurse Wen present    In spite of multiple attempts by myself and staff to convince the patient to stay for evaluation and treatment, we have unfortunately been unsuccessful. However, the patient has the capacity to give, receive, and withhold information. The patient verbalizes understanding of his condition and symptoms and that this represents a significant threat. The patient has verbalized to me that he understand the plan of diagnosis and treatment, and unfortunately will not agree and understand that it may cause worsening of their condition or even death. The patient understands that he can come back at any time for further care without prejudice and we will be happy to provide treatment again.    RICHARD formed signed

## 2024-06-13 NOTE — NURSING
2300: Vivian RN (Rapid Response Nurse) at bedside re-dressing patient's right arm for bleeding. Placed surgicel and compression tape for better pressure dressing. Will continue to monitor.

## 2024-06-13 NOTE — NURSING
[facility] [place]  Nurses Note -- 4 Eyes      6/13/2024       Skin assessed on: Q Shift      [x] No Pressure Injuries Present    [x]Prevention Measures Documented    [] Yes LDA  for Pressure Injury Previously documented     [] Yes New Pressure Injury Discovered   [] LDA for New Pressure Injury Added      Attending RN:  Wen Lloyd RN     Second RN:  DELTA Crane

## 2024-06-20 NOTE — PLAN OF CARE
Problem: Physical Therapy  Goal: Physical Therapy Goal  Outcome: Adequate for Care Transition   Initial PT evaluation performed today.  Pt is OK for D/C home with family from PT standpoint.  Outpatient PT and RW recommended.  OK for OOB to chair and ambulate with nursing staff.  PT to sign off.   Reviewed pre op instructions with patient:    Please leave jewelry and valuables at home or give them to your escort for safe keeping.  You will be required to have an adult to escort you after the procedure is over. Although we will not be sedating you for your procedure we ask for an escort for safety after the procedure.  Do not take over the counter blood thinning medications beginning 7 days before the procedure (aspirin, Motrin, ibuprofen, Advil, Aleve, naproxen). If you are taking prescribed thinners (Coumadin, warfarin, apixaban, Eliquis, Plavix, clopidogrel, Pletal, etc) we will obtain cardiac clearance from your cardiologist or provider that is monitoring your medications and levels to hold the medications if appropriate.  Cleanse your skin the evening before with an antibacterial soap (Dial or Hibiclens) and then repeat it again the morning of the procedure.  Do not apply lotions, creams, deodorants, perfumes, or colognes the day of the procedure.  You will be contacted the day before the procedure between 12-3p to be given the time to arrive the day of the procedure. If you are not contacted by the afternoon before the procedure you should contact [phone].  Nothing by mouth after midnight before the procedure.    Patient verbalized understanding of the instructions provided to them and clinic contact number was provided for any further questions they may have.

## 2024-09-09 PROBLEM — N17.9 AKI (ACUTE KIDNEY INJURY): Status: RESOLVED | Noted: 2019-09-10 | Resolved: 2024-09-09

## 2024-09-16 PROBLEM — J96.21 ACUTE ON CHRONIC HYPOXIC RESPIRATORY FAILURE: Status: RESOLVED | Noted: 2019-09-10 | Resolved: 2024-09-16

## 2024-10-27 ENCOUNTER — HOSPITAL ENCOUNTER (EMERGENCY)
Facility: HOSPITAL | Age: 64
Discharge: HOME | End: 2024-10-27
Attending: STUDENT IN AN ORGANIZED HEALTH CARE EDUCATION/TRAINING PROGRAM
Payer: MEDICARE

## 2024-10-27 VITALS
HEART RATE: 77 BPM | DIASTOLIC BLOOD PRESSURE: 72 MMHG | TEMPERATURE: 99 F | SYSTOLIC BLOOD PRESSURE: 143 MMHG | OXYGEN SATURATION: 96 % | HEIGHT: 66 IN | RESPIRATION RATE: 20 BRPM | WEIGHT: 176 LBS | BODY MASS INDEX: 28.28 KG/M2

## 2024-10-27 DIAGNOSIS — M79.672 LEFT FOOT PAIN: ICD-10-CM

## 2024-10-27 DIAGNOSIS — S92.902A MULTIPLE CLOSED FRACTURES OF LEFT FOOT, INITIAL ENCOUNTER: Primary | ICD-10-CM

## 2024-10-27 PROCEDURE — 25000003 PHARM REV CODE 250: Performed by: STUDENT IN AN ORGANIZED HEALTH CARE EDUCATION/TRAINING PROGRAM

## 2024-10-27 PROCEDURE — 99284 EMERGENCY DEPT VISIT MOD MDM: CPT | Mod: 25

## 2024-10-27 RX ORDER — OXYCODONE HYDROCHLORIDE 5 MG/1
5 CAPSULE ORAL EVERY 4 HOURS PRN
Qty: 16 CAPSULE | Refills: 0 | Status: SHIPPED | OUTPATIENT
Start: 2024-10-27

## 2024-10-27 RX ORDER — OXYCODONE HYDROCHLORIDE 5 MG/1
5 TABLET ORAL
Status: COMPLETED | OUTPATIENT
Start: 2024-10-27 | End: 2024-10-27

## 2024-10-27 RX ORDER — ACETAMINOPHEN 500 MG
1000 TABLET ORAL
Status: COMPLETED | OUTPATIENT
Start: 2024-10-27 | End: 2024-10-27

## 2024-10-27 RX ADMIN — OXYCODONE 5 MG: 5 TABLET ORAL at 01:10

## 2024-10-27 RX ADMIN — ACETAMINOPHEN 1000 MG: 500 TABLET ORAL at 11:10

## 2025-02-27 ENCOUNTER — HOSPITAL ENCOUNTER (INPATIENT)
Facility: OTHER | Age: 65
LOS: 6 days | Discharge: HOME HEALTH | DRG: 291 | End: 2025-03-05
Attending: EMERGENCY MEDICINE | Admitting: STUDENT IN AN ORGANIZED HEALTH CARE EDUCATION/TRAINING PROGRAM
Payer: MEDICARE

## 2025-02-27 DIAGNOSIS — I50.33 ACUTE ON CHRONIC DIASTOLIC CONGESTIVE HEART FAILURE: ICD-10-CM

## 2025-02-27 DIAGNOSIS — R06.00 DYSPNEA: ICD-10-CM

## 2025-02-27 DIAGNOSIS — R00.0 TACHYCARDIA: ICD-10-CM

## 2025-02-27 DIAGNOSIS — R05.9 COUGH: ICD-10-CM

## 2025-02-27 DIAGNOSIS — I50.9 HEART FAILURE: ICD-10-CM

## 2025-02-27 DIAGNOSIS — I50.9 CHF (CONGESTIVE HEART FAILURE): ICD-10-CM

## 2025-02-27 DIAGNOSIS — I48.91 A-FIB: ICD-10-CM

## 2025-02-27 DIAGNOSIS — J96.21 ACUTE ON CHRONIC HYPOXIC RESPIRATORY FAILURE: Primary | ICD-10-CM

## 2025-02-27 PROBLEM — Z86.19 HX OF HEPATITIS C: Status: ACTIVE | Noted: 2025-02-27

## 2025-02-27 PROBLEM — Z86.711 HISTORY OF PULMONARY EMBOLUS (PE): Status: ACTIVE | Noted: 2025-02-27

## 2025-02-27 LAB
ADENOVIRUS: NOT DETECTED
ALBUMIN SERPL BCP-MCNC: 4 G/DL (ref 3.5–5.2)
ALP SERPL-CCNC: 94 U/L (ref 40–150)
ALT SERPL W/O P-5'-P-CCNC: 17 U/L (ref 10–44)
ANION GAP SERPL CALC-SCNC: 13 MMOL/L (ref 8–16)
AORTIC ROOT ANNULUS: 1.89 CM
ASCENDING AORTA: 2.9 CM
AST SERPL-CCNC: 22 U/L (ref 10–40)
AV INDEX (PROSTH): 0.81
AV MEAN GRADIENT: 2 MMHG
AV PEAK GRADIENT: 4 MMHG
AV VALVE AREA BY VELOCITY RATIO: 2 CM²
AV VALVE AREA: 2.3 CM²
AV VELOCITY RATIO: 0.7
BASOPHILS # BLD AUTO: 0.04 K/UL (ref 0–0.2)
BASOPHILS NFR BLD: 0.5 % (ref 0–1.9)
BILIRUB SERPL-MCNC: 0.8 MG/DL (ref 0.1–1)
BNP SERPL-MCNC: 239 PG/ML (ref 0–99)
BORDETELLA PARAPERTUSSIS (IS1001): NOT DETECTED
BORDETELLA PERTUSSIS (PTXP): NOT DETECTED
BSA FOR ECHO PROCEDURE: 1.93 M2
BUN SERPL-MCNC: 20 MG/DL (ref 8–23)
CALCIUM SERPL-MCNC: 9.3 MG/DL (ref 8.7–10.5)
CHLAMYDIA PNEUMONIAE: NOT DETECTED
CHLORIDE SERPL-SCNC: 110 MMOL/L (ref 95–110)
CO2 SERPL-SCNC: 20 MMOL/L (ref 23–29)
CORONAVIRUS 229E, COMMON COLD VIRUS: NOT DETECTED
CORONAVIRUS HKU1, COMMON COLD VIRUS: NOT DETECTED
CORONAVIRUS NL63, COMMON COLD VIRUS: NOT DETECTED
CORONAVIRUS OC43, COMMON COLD VIRUS: NOT DETECTED
CREAT SERPL-MCNC: 1.3 MG/DL (ref 0.5–1.4)
CTP QC/QA: YES
CTP QC/QA: YES
CV ECHO LV RWT: 0.62 CM
DIFFERENTIAL METHOD BLD: ABNORMAL
DOP CALC AO PEAK VEL: 1 M/S
DOP CALC AO VTI: 11.1 CM
DOP CALC LVOT AREA: 2.8 CM2
DOP CALC LVOT DIAMETER: 1.9 CM
DOP CALC LVOT PEAK VEL: 0.7 M/S
DOP CALC LVOT STROKE VOLUME: 25.5 CM3
DOP CALCLVOT PEAK VEL VTI: 9 CM
ECHO LV POSTERIOR WALL: 1.4 CM (ref 0.6–1.1)
EOSINOPHIL # BLD AUTO: 0.1 K/UL (ref 0–0.5)
EOSINOPHIL NFR BLD: 0.9 % (ref 0–8)
ERYTHROCYTE [DISTWIDTH] IN BLOOD BY AUTOMATED COUNT: 14.6 % (ref 11.5–14.5)
EST. GFR  (NO RACE VARIABLE): >60 ML/MIN/1.73 M^2
FLUBV RNA NPH QL NAA+NON-PROBE: NOT DETECTED
FRACTIONAL SHORTENING: 28.9 % (ref 28–44)
GLUCOSE SERPL-MCNC: 155 MG/DL (ref 70–110)
HCO3 UR-SCNC: 24.5 MMOL/L (ref 24–28)
HCT VFR BLD AUTO: 46.4 % (ref 40–54)
HGB BLD-MCNC: 15.9 G/DL (ref 14–18)
HPIV1 RNA NPH QL NAA+NON-PROBE: NOT DETECTED
HPIV2 RNA NPH QL NAA+NON-PROBE: NOT DETECTED
HPIV3 RNA NPH QL NAA+NON-PROBE: NOT DETECTED
HPIV4 RNA NPH QL NAA+NON-PROBE: NOT DETECTED
HUMAN METAPNEUMOVIRUS: NOT DETECTED
IMM GRANULOCYTES # BLD AUTO: 0.03 K/UL (ref 0–0.04)
IMM GRANULOCYTES NFR BLD AUTO: 0.4 % (ref 0–0.5)
INFLUENZA A: NOT DETECTED
INTERVENTRICULAR SEPTUM: 1.3 CM (ref 0.6–1.1)
IVC DIAMETER: 1.71 CM
LA MAJOR: 6.6 CM
LA MINOR: 6 CM
LA WIDTH: 4 CM
LAAS-AP2: 30 CM2
LAAS-AP4: 28 CM2
LACTATE SERPL-SCNC: 1.5 MMOL/L (ref 0.5–2.2)
LEFT ATRIUM AREA SYSTOLIC (APICAL 4 CHAMBER): 28.34 CM2
LEFT ATRIUM SIZE: 3.6 CM
LEFT ATRIUM VOLUME INDEX: 41 ML/M2
LEFT ATRIUM VOLUME: 77 CM3
LEFT INTERNAL DIMENSION IN SYSTOLE: 3.2 CM (ref 2.1–4)
LEFT VENTRICLE DIASTOLIC VOLUME INDEX: 48.15 ML/M2
LEFT VENTRICLE DIASTOLIC VOLUME: 91 ML
LEFT VENTRICLE END SYSTOLIC VOLUME APICAL 4 CHAMBER: 92.23 ML
LEFT VENTRICLE MASS INDEX: 124.5 G/M2
LEFT VENTRICLE SYSTOLIC VOLUME INDEX: 22.2 ML/M2
LEFT VENTRICLE SYSTOLIC VOLUME: 42 ML
LEFT VENTRICULAR INTERNAL DIMENSION IN DIASTOLE: 4.5 CM (ref 3.5–6)
LEFT VENTRICULAR MASS: 235.3 G
LIPASE SERPL-CCNC: 12 U/L (ref 4–60)
LVED V (TEICH): 90.67 ML
LVES V (TEICH): 42.04 ML
LVOT MG: 0.77 MMHG
LVOT MV: 0.38 CM/S
LYMPHOCYTES # BLD AUTO: 2.4 K/UL (ref 1–4.8)
LYMPHOCYTES NFR BLD: 31.4 % (ref 18–48)
MAGNESIUM SERPL-MCNC: 1.5 MG/DL (ref 1.6–2.6)
MCH RBC QN AUTO: 30.9 PG (ref 27–31)
MCHC RBC AUTO-ENTMCNC: 34.3 G/DL (ref 32–36)
MCV RBC AUTO: 90 FL (ref 82–98)
MONOCYTES # BLD AUTO: 0.7 K/UL (ref 0.3–1)
MONOCYTES NFR BLD: 9.4 % (ref 4–15)
MYCOPLASMA PNEUMONIAE: NOT DETECTED
NEUTROPHILS # BLD AUTO: 4.4 K/UL (ref 1.8–7.7)
NEUTROPHILS NFR BLD: 57.4 % (ref 38–73)
NRBC BLD-RTO: 0 /100 WBC
OHS CV RV/LV RATIO: 0.38 CM
OHS QRS DURATION: 84 MS
OHS QRS DURATION: 94 MS
OHS QRS DURATION: 94 MS
OHS QTC CALCULATION: 506 MS
OHS QTC CALCULATION: 510 MS
OHS QTC CALCULATION: 510 MS
PCO2 BLDA: 40.7 MMHG (ref 35–45)
PH SMN: 7.39 [PH] (ref 7.35–7.45)
PISA MRMAX VEL: 5.24 M/S
PISA TR MAX VEL: 2.2 M/S
PLATELET # BLD AUTO: 211 K/UL (ref 150–450)
PMV BLD AUTO: 10.8 FL (ref 9.2–12.9)
PO2 BLDA: 41 MMHG (ref 40–60)
POC BE: -1 MMOL/L
POC MOLECULAR INFLUENZA A AGN: NEGATIVE
POC MOLECULAR INFLUENZA B AGN: NEGATIVE
POC SATURATED O2: 75 % (ref 95–100)
POC TCO2: 26 MMOL/L (ref 24–29)
POCT GLUCOSE: 111 MG/DL (ref 70–110)
POCT GLUCOSE: 139 MG/DL (ref 70–110)
POTASSIUM SERPL-SCNC: 3.9 MMOL/L (ref 3.5–5.1)
PROT SERPL-MCNC: 7.5 G/DL (ref 6–8.4)
PV MV: 0.58 M/S
PV PEAK GRADIENT: 3 MMHG
PV PEAK VELOCITY: 0.85 M/S
RA MAJOR: 4.86 CM
RA PRESSURE ESTIMATED: 3 MMHG
RA WIDTH: 3.7 CM
RBC # BLD AUTO: 5.15 M/UL (ref 4.6–6.2)
RESPIRATORY INFECTION PANEL SOURCE: NORMAL
RIGHT VENTRICLE DIASTOLIC BASEL DIMENSION: 1.7 CM
RIGHT VENTRICULAR END-DIASTOLIC DIMENSION: 1.65 CM
RSV RNA NPH QL NAA+NON-PROBE: NOT DETECTED
RV TB RVSP: 5 MMHG
RV+EV RNA NPH QL NAA+NON-PROBE: NOT DETECTED
SAMPLE: NORMAL
SARS-COV-2 RDRP RESP QL NAA+PROBE: NEGATIVE
SARS-COV-2 RNA RESP QL NAA+PROBE: NOT DETECTED
SINUS: 2.3 CM
SODIUM SERPL-SCNC: 143 MMOL/L (ref 136–145)
STJ: 2.68 CM
TDI LATERAL: 0.08 M/S
TDI SEPTAL: 0.07 M/S
TDI: 0.08 M/S
TROPONIN I SERPL DL<=0.01 NG/ML-MCNC: 0.39 NG/ML (ref 0–0.03)
TROPONIN I SERPL DL<=0.01 NG/ML-MCNC: 0.39 NG/ML (ref 0–0.03)
TV REST PULMONARY ARTERY PRESSURE: 22 MMHG
WBC # BLD AUTO: 7.65 K/UL (ref 3.9–12.7)
Z-SCORE OF LEFT VENTRICULAR DIMENSION IN END DIASTOLE: -1.62
Z-SCORE OF LEFT VENTRICULAR DIMENSION IN END SYSTOLE: -0.15

## 2025-02-27 PROCEDURE — 25000242 PHARM REV CODE 250 ALT 637 W/ HCPCS

## 2025-02-27 PROCEDURE — 25000003 PHARM REV CODE 250: Performed by: STUDENT IN AN ORGANIZED HEALTH CARE EDUCATION/TRAINING PROGRAM

## 2025-02-27 PROCEDURE — 87635 SARS-COV-2 COVID-19 AMP PRB: CPT | Performed by: EMERGENCY MEDICINE

## 2025-02-27 PROCEDURE — 27000221 HC OXYGEN, UP TO 24 HOURS

## 2025-02-27 PROCEDURE — 83605 ASSAY OF LACTIC ACID: CPT | Performed by: EMERGENCY MEDICINE

## 2025-02-27 PROCEDURE — 25000242 PHARM REV CODE 250 ALT 637 W/ HCPCS: Performed by: STUDENT IN AN ORGANIZED HEALTH CARE EDUCATION/TRAINING PROGRAM

## 2025-02-27 PROCEDURE — 27000190 HC CPAP FULL FACE MASK W/VALVE

## 2025-02-27 PROCEDURE — 63600175 PHARM REV CODE 636 W HCPCS: Performed by: STUDENT IN AN ORGANIZED HEALTH CARE EDUCATION/TRAINING PROGRAM

## 2025-02-27 PROCEDURE — 94799 UNLISTED PULMONARY SVC/PX: CPT

## 2025-02-27 PROCEDURE — 0202U NFCT DS 22 TRGT SARS-COV-2: CPT

## 2025-02-27 PROCEDURE — 99900035 HC TECH TIME PER 15 MIN (STAT)

## 2025-02-27 PROCEDURE — 80053 COMPREHEN METABOLIC PANEL: CPT | Performed by: EMERGENCY MEDICINE

## 2025-02-27 PROCEDURE — 96365 THER/PROPH/DIAG IV INF INIT: CPT

## 2025-02-27 PROCEDURE — 82803 BLOOD GASES ANY COMBINATION: CPT

## 2025-02-27 PROCEDURE — 99291 CRITICAL CARE FIRST HOUR: CPT | Mod: ,,, | Performed by: INTERNAL MEDICINE

## 2025-02-27 PROCEDURE — 99900031 HC PATIENT EDUCATION (STAT)

## 2025-02-27 PROCEDURE — 36415 COLL VENOUS BLD VENIPUNCTURE: CPT | Performed by: STUDENT IN AN ORGANIZED HEALTH CARE EDUCATION/TRAINING PROGRAM

## 2025-02-27 PROCEDURE — 25000242 PHARM REV CODE 250 ALT 637 W/ HCPCS: Performed by: EMERGENCY MEDICINE

## 2025-02-27 PROCEDURE — 63600175 PHARM REV CODE 636 W HCPCS

## 2025-02-27 PROCEDURE — 83690 ASSAY OF LIPASE: CPT | Performed by: EMERGENCY MEDICINE

## 2025-02-27 PROCEDURE — 87040 BLOOD CULTURE FOR BACTERIA: CPT | Mod: 59 | Performed by: STUDENT IN AN ORGANIZED HEALTH CARE EDUCATION/TRAINING PROGRAM

## 2025-02-27 PROCEDURE — 94660 CPAP INITIATION&MGMT: CPT

## 2025-02-27 PROCEDURE — 93005 ELECTROCARDIOGRAM TRACING: CPT

## 2025-02-27 PROCEDURE — 63600175 PHARM REV CODE 636 W HCPCS: Performed by: EMERGENCY MEDICINE

## 2025-02-27 PROCEDURE — 25000003 PHARM REV CODE 250: Performed by: EMERGENCY MEDICINE

## 2025-02-27 PROCEDURE — 93010 ELECTROCARDIOGRAM REPORT: CPT | Mod: 76,,, | Performed by: INTERNAL MEDICINE

## 2025-02-27 PROCEDURE — 99291 CRITICAL CARE FIRST HOUR: CPT

## 2025-02-27 PROCEDURE — 94640 AIRWAY INHALATION TREATMENT: CPT

## 2025-02-27 PROCEDURE — 83735 ASSAY OF MAGNESIUM: CPT | Performed by: EMERGENCY MEDICINE

## 2025-02-27 PROCEDURE — 83880 ASSAY OF NATRIURETIC PEPTIDE: CPT | Performed by: EMERGENCY MEDICINE

## 2025-02-27 PROCEDURE — 85025 COMPLETE CBC W/AUTO DIFF WBC: CPT | Performed by: EMERGENCY MEDICINE

## 2025-02-27 PROCEDURE — 99291 CRITICAL CARE FIRST HOUR: CPT | Mod: GC,,, | Performed by: STUDENT IN AN ORGANIZED HEALTH CARE EDUCATION/TRAINING PROGRAM

## 2025-02-27 PROCEDURE — 20000000 HC ICU ROOM

## 2025-02-27 PROCEDURE — 96375 TX/PRO/DX INJ NEW DRUG ADDON: CPT

## 2025-02-27 PROCEDURE — 94761 N-INVAS EAR/PLS OXIMETRY MLT: CPT | Mod: XB

## 2025-02-27 PROCEDURE — 84484 ASSAY OF TROPONIN QUANT: CPT | Mod: 91 | Performed by: EMERGENCY MEDICINE

## 2025-02-27 PROCEDURE — 5A09457 ASSISTANCE WITH RESPIRATORY VENTILATION, 24-96 CONSECUTIVE HOURS, CONTINUOUS POSITIVE AIRWAY PRESSURE: ICD-10-PCS | Performed by: HOSPITALIST

## 2025-02-27 PROCEDURE — 94640 AIRWAY INHALATION TREATMENT: CPT | Mod: XB

## 2025-02-27 RX ORDER — ASPIRIN 325 MG
325 TABLET ORAL
Status: COMPLETED | OUTPATIENT
Start: 2025-02-27 | End: 2025-02-27

## 2025-02-27 RX ORDER — ENOXAPARIN SODIUM 100 MG/ML
40 INJECTION SUBCUTANEOUS EVERY 24 HOURS
Status: DISCONTINUED | OUTPATIENT
Start: 2025-02-27 | End: 2025-02-28

## 2025-02-27 RX ORDER — CEFTRIAXONE 1 G/1
1 INJECTION, POWDER, FOR SOLUTION INTRAMUSCULAR; INTRAVENOUS
Status: COMPLETED | OUTPATIENT
Start: 2025-02-27 | End: 2025-03-03

## 2025-02-27 RX ORDER — ONDANSETRON HYDROCHLORIDE 2 MG/ML
4 INJECTION, SOLUTION INTRAVENOUS EVERY 6 HOURS PRN
Status: DISCONTINUED | OUTPATIENT
Start: 2025-02-27 | End: 2025-03-05 | Stop reason: HOSPADM

## 2025-02-27 RX ORDER — FUROSEMIDE 10 MG/ML
80 INJECTION INTRAMUSCULAR; INTRAVENOUS EVERY 8 HOURS
Status: DISCONTINUED | OUTPATIENT
Start: 2025-02-27 | End: 2025-02-28

## 2025-02-27 RX ORDER — IPRATROPIUM BROMIDE AND ALBUTEROL SULFATE 2.5; .5 MG/3ML; MG/3ML
3 SOLUTION RESPIRATORY (INHALATION)
Status: DISCONTINUED | OUTPATIENT
Start: 2025-02-27 | End: 2025-03-05 | Stop reason: HOSPADM

## 2025-02-27 RX ORDER — MUPIROCIN 20 MG/G
OINTMENT TOPICAL 2 TIMES DAILY
Status: COMPLETED | OUTPATIENT
Start: 2025-02-27 | End: 2025-03-03

## 2025-02-27 RX ORDER — POLYETHYLENE GLYCOL 3350 17 G/17G
17 POWDER, FOR SOLUTION ORAL DAILY PRN
Status: DISCONTINUED | OUTPATIENT
Start: 2025-02-27 | End: 2025-03-05 | Stop reason: HOSPADM

## 2025-02-27 RX ORDER — FUROSEMIDE 10 MG/ML
60 INJECTION INTRAMUSCULAR; INTRAVENOUS 2 TIMES DAILY WITH MEALS
Status: DISCONTINUED | OUTPATIENT
Start: 2025-02-27 | End: 2025-02-27

## 2025-02-27 RX ORDER — MAGNESIUM SULFATE 1 G/100ML
1 INJECTION INTRAVENOUS ONCE
Status: COMPLETED | OUTPATIENT
Start: 2025-02-27 | End: 2025-02-27

## 2025-02-27 RX ORDER — ALBUTEROL SULFATE 2.5 MG/.5ML
5 SOLUTION RESPIRATORY (INHALATION)
Status: COMPLETED | OUTPATIENT
Start: 2025-02-27 | End: 2025-02-27

## 2025-02-27 RX ORDER — INSULIN ASPART 100 [IU]/ML
0-5 INJECTION, SOLUTION INTRAVENOUS; SUBCUTANEOUS EVERY 6 HOURS PRN
Status: DISCONTINUED | OUTPATIENT
Start: 2025-02-27 | End: 2025-02-28

## 2025-02-27 RX ORDER — DILTIAZEM HYDROCHLORIDE 5 MG/ML
5 INJECTION INTRAVENOUS
Status: COMPLETED | OUTPATIENT
Start: 2025-02-27 | End: 2025-02-27

## 2025-02-27 RX ORDER — ALBUTEROL SULFATE 2.5 MG/.5ML
SOLUTION RESPIRATORY (INHALATION)
Status: COMPLETED
Start: 2025-02-27 | End: 2025-02-27

## 2025-02-27 RX ORDER — MAGNESIUM SULFATE HEPTAHYDRATE 40 MG/ML
2 INJECTION, SOLUTION INTRAVENOUS ONCE
Status: COMPLETED | OUTPATIENT
Start: 2025-02-27 | End: 2025-02-27

## 2025-02-27 RX ORDER — IPRATROPIUM BROMIDE AND ALBUTEROL SULFATE 2.5; .5 MG/3ML; MG/3ML
3 SOLUTION RESPIRATORY (INHALATION)
Status: COMPLETED | OUTPATIENT
Start: 2025-02-27 | End: 2025-02-27

## 2025-02-27 RX ORDER — GLUCAGON 1 MG
1 KIT INJECTION
Status: DISCONTINUED | OUTPATIENT
Start: 2025-02-27 | End: 2025-03-05 | Stop reason: HOSPADM

## 2025-02-27 RX ORDER — ACETAMINOPHEN 325 MG/1
650 TABLET ORAL EVERY 6 HOURS PRN
Status: DISCONTINUED | OUTPATIENT
Start: 2025-02-27 | End: 2025-03-05 | Stop reason: HOSPADM

## 2025-02-27 RX ORDER — FUROSEMIDE 10 MG/ML
40 INJECTION INTRAMUSCULAR; INTRAVENOUS
Status: COMPLETED | OUTPATIENT
Start: 2025-02-27 | End: 2025-02-27

## 2025-02-27 RX ADMIN — DILTIAZEM HYDROCHLORIDE 5 MG: 5 INJECTION INTRAVENOUS at 06:02

## 2025-02-27 RX ADMIN — ALBUTEROL SULFATE 5 MG: 2.5 SOLUTION RESPIRATORY (INHALATION) at 06:02

## 2025-02-27 RX ADMIN — ALBUTEROL SULFATE 2.5 MG: 2.5 SOLUTION RESPIRATORY (INHALATION) at 06:02

## 2025-02-27 RX ADMIN — MAGNESIUM SULFATE HEPTAHYDRATE 2 G: 40 INJECTION, SOLUTION INTRAVENOUS at 01:02

## 2025-02-27 RX ADMIN — ENOXAPARIN SODIUM 40 MG: 40 INJECTION SUBCUTANEOUS at 05:02

## 2025-02-27 RX ADMIN — FUROSEMIDE 60 MG: 10 INJECTION, SOLUTION INTRAMUSCULAR; INTRAVENOUS at 11:02

## 2025-02-27 RX ADMIN — FUROSEMIDE 40 MG: 10 INJECTION, SOLUTION INTRAMUSCULAR; INTRAVENOUS at 06:02

## 2025-02-27 RX ADMIN — MUPIROCIN: 20 OINTMENT TOPICAL at 09:02

## 2025-02-27 RX ADMIN — IPRATROPIUM BROMIDE AND ALBUTEROL SULFATE 3 ML: 2.5; .5 SOLUTION RESPIRATORY (INHALATION) at 04:02

## 2025-02-27 RX ADMIN — MAGNESIUM SULFATE HEPTAHYDRATE 2 G: 40 INJECTION, SOLUTION INTRAVENOUS at 02:02

## 2025-02-27 RX ADMIN — MUPIROCIN: 20 OINTMENT TOPICAL at 11:02

## 2025-02-27 RX ADMIN — FUROSEMIDE 80 MG: 10 INJECTION, SOLUTION INTRAMUSCULAR; INTRAVENOUS at 09:02

## 2025-02-27 RX ADMIN — ASPIRIN 325 MG ORAL TABLET 325 MG: 325 PILL ORAL at 07:02

## 2025-02-27 RX ADMIN — CEFTRIAXONE SODIUM 1 G: 1 INJECTION, POWDER, FOR SOLUTION INTRAMUSCULAR; INTRAVENOUS at 11:02

## 2025-02-27 RX ADMIN — IPRATROPIUM BROMIDE AND ALBUTEROL SULFATE 3 ML: 2.5; .5 SOLUTION RESPIRATORY (INHALATION) at 07:02

## 2025-02-27 RX ADMIN — IPRATROPIUM BROMIDE AND ALBUTEROL SULFATE 3 ML: 2.5; .5 SOLUTION RESPIRATORY (INHALATION) at 08:02

## 2025-02-27 RX ADMIN — MAGNESIUM SULFATE IN DEXTROSE 1 G: 10 INJECTION, SOLUTION INTRAVENOUS at 07:02

## 2025-02-27 RX ADMIN — AZITHROMYCIN MONOHYDRATE 500 MG: 500 INJECTION, POWDER, LYOPHILIZED, FOR SOLUTION INTRAVENOUS at 11:02

## 2025-02-27 RX ADMIN — ONDANSETRON 4 MG: 2 INJECTION INTRAMUSCULAR; INTRAVENOUS at 01:02

## 2025-02-27 NOTE — ED TRIAGE NOTES
Pt presents to ED via EMS w/ c/o SOB and cough. Pt reports SOB started around 3-4 am this morning. Pt has hx of COPD and CHF. AAOx4.

## 2025-02-27 NOTE — ED NOTES
Upon assessment use of accessory muscle and tachypnea noted. Pt was place back on bipap MD at bedside assessing pt. Pt speaking clear sentences. Will continue to monitor for sings of deteriorating.

## 2025-02-27 NOTE — ASSESSMENT & PLAN NOTE
DVT    Switched to Lovenox from eliquis in 2024 due to development of thrombi despite being on eliquis   Will discuss further with patient as no recent dispenses

## 2025-02-27 NOTE — H&P
[facility] [place] - Intensive Care [facility] Medicine  History & Physical    Patient Name: Marck Madison  MRN: [id number]  Patient Class: IP- Inpatient  Admission Date: 2/27/2025  Attending Physician: Florencio Thompson MD   Primary Care Provider: [facility] -         Patient information was obtained from patient, past medical records, and ER records.     Subjective:     Principal Problem:<principal problem not specified>    Chief Complaint:   Chief Complaint   Patient presents with    Shortness of Breath     BIBA with c/o SOB. Pt received breathing tx PTA . Hx of COPD and CHF         HPI: Pt with PMH of atrial fibrilation, PE/DVT (on lovenox due to thrombus despite eliquis), hypertension, diastolic heart failure, T2DM, COPD, Hep C. Pt seen while on BIPAP. Unable to obtain full history. Reports SOB, cough, chills for 3 days. Denies chest pain, constipation or diarrhea. Currently living with niece. Not currently taking medication. Per ED note ' He notes that he has been out of all of his medications since getting released from snf roughly 1 month prior'. Pt unsure which meds he takes - per chart review dispense report 9/2024 - amiodarone, aspirin, carvedilol and Entresto.  However reported to the ED that he might take a fluid pill and a blood thinner.  Stopped smoking 3 years ago. Denies alcohol or drug use.     In ED , RR 26. Bicarb 20, , Mag 1.5, trop 0.387-->0.386, flu and covid neg, lactate wnl, VBG unremarkable. CXR - 'Pulmonary edema, pneumonia, aspiration, or sepsis'.  Given DuoNebs, 325 mg aspirin, magnesium, Lasix 40 mg IV, 5 mg diltiazem.  Patient had increased workup breathing so was placed on BiPAP.  When attempted to wean off BiPAP had recurrence of respiratory distress so we placed on BiPAP with plans to move to ICU.  Admitted to hospital medicine for further management.    Past Medical History:   Diagnosis Date    Arrhythmia 2017    aflutter    Atrial flutter     CAD  "(coronary artery disease)     CHF (congestive heart failure), NYHA class I 2017    Cholelithiasis with chronic cholecystitis     Chronic diastolic heart failure     Cigarette smoker     COPD (chronic obstructive pulmonary disease)     COVID-19 06/01/2021    Diabetes mellitus     DKA (diabetic ketoacidosis)     Hypertension     NSTEMI (non-ST elevation myocardial infarction)     NSVT (nonsustained ventricular tachycardia)     Psychiatric disorder     h/o "schizophrena/bipolar"    Schizoaffective disorder     Severe sepsis        Past Surgical History:   Procedure Laterality Date    LIVER SURGERY      abscess drainage; 1970s    TREATMENT OF CARDIAC ARRHYTHMIA  9/12/2019    Procedure: Cardioversion or Defibrillation;  Surgeon: Jonathan Lopez MD;  Location: [facility] CATH LAB;  Service: Cardiology;;    TREATMENT OF CARDIAC ARRHYTHMIA N/A 2/3/2022    Procedure: Cardioversion or Defibrillation;  Surgeon: Trevor Schwab MD;  Location: [facility] CATH LAB;  Service: Cardiology;  Laterality: N/A;    TREATMENT OF CARDIAC ARRHYTHMIA N/A 3/16/2024    Procedure: Cardioversion or Defibrillation;  Surgeon: Jonathan Lopez MD;  Location: [facility] CATH LAB;  Service: Cardiology;  Laterality: N/A;       Review of patient's allergies indicates:  No Known Allergies    No current facility-administered medications on file prior to encounter.     Current Outpatient Medications on File Prior to Encounter   Medication Sig    amiodarone (PACERONE) 200 MG Tab Take 200 mg by mouth once daily.    apixaban (ELIQUIS) 5 mg Tab Take 5 mg by mouth 2 (two) times daily.    aspirin 81 MG Chew Take 1 tablet (81 mg total) by mouth once daily. (Patient not taking: Reported on 6/5/2024)    atorvastatin (LIPITOR) 80 MG tablet Take 1 tablet by mouth once daily.    blood-glucose meter kit Use as instructed    budesonide-formoterol 80-4.5 mcg (SYMBICORT) 80-4.5 mcg/actuation HFAA Inhale 2 puffs into the lungs twice daily (Patient not taking: Reported on 6/5/2024)    " carvediloL (COREG) 12.5 MG tablet Take 1 tablet by mouth 2 (two) times daily with meals.    dulaglutide (TRULICITY) 1.5 mg/0.5 mL pen injector Inject into the skin every 7 days. Thursdays    furosemide (LASIX) 80 MG tablet Take 1 tablet by mouth once daily.    hydrALAZINE (APRESOLINE) 50 MG tablet Take 50 mg by mouth every 8 (eight) hours. (Patient not taking: Reported on 6/5/2024)    insulin aspart U-100 (NOVOLOG) 100 unit/mL injection Inject 5 Units into the skin 3 (three) times daily before meals. (Patient not taking: Reported on 6/5/2024)    insulin detemir U-100 (LEVEMIR) 100 unit/mL injection Inject 10 Units into the skin every evening. (Patient not taking: Reported on 6/5/2024)    metFORMIN (FORTAMET) 1,000 mg 24hr tablet Take 1,000 mg by mouth daily with breakfast. (Patient not taking: Reported on 6/5/2024)    oxyCODONE (OXY-IR) 5 mg Cap Take 1 capsule (5 mg total) by mouth every 4 (four) hours as needed for Pain.    sacubitriL-valsartan (ENTRESTO) 24-26 mg per tablet Take 1 tablet by mouth 2 (two) times daily.    tamsulosin (FLOMAX) 0.4 mg Cap Take 1 capsule (0.4 mg total) by mouth once daily.     Family History    None       Tobacco Use    Smoking status: Former     Current packs/day: 1.00     Average packs/day: 1 pack/day for 15.0 years (15.0 ttl pk-yrs)     Types: Cigarettes    Smokeless tobacco: Never   Substance and Sexual Activity    Alcohol use: Not Currently     Comment: daily    Drug use: Never    Sexual activity: Not Currently     Review of Systems   Constitutional:  Positive for chills.   Respiratory:  Positive for cough and shortness of breath.    Cardiovascular:  Positive for leg swelling. Negative for chest pain.   Gastrointestinal:  Negative for abdominal pain, constipation and diarrhea.     Objective:     Vital Signs (Most Recent):  Temp: 97.9 °F (36.6 °C) (02/27/25 1502)  Pulse: 92 (02/27/25 1502)  Resp: 20 (02/27/25 1502)  BP: 136/86 (02/27/25 1502)  SpO2: 96 % (02/27/25 1502) Vital Signs  (24h Range):  Temp:  [97.4 °F (36.3 °C)-97.9 °F (36.6 °C)] 97.9 °F (36.6 °C)  Pulse:  [] 92  Resp:  [11-32] 20  SpO2:  [83 %-100 %] 96 %  BP: ()/() 136/86     Weight: 79.8 kg (176 lb)  Body mass index is 28.41 kg/m².     Physical Exam  Constitutional:       General: He is not in acute distress.  Pulmonary:      Breath sounds: No wheezing.      Comments: On BIPAP  Abdominal:      General: There is no distension.      Tenderness: There is no abdominal tenderness.   Musculoskeletal:      Right lower leg: Edema present.      Left lower leg: Edema present.   Neurological:      Mental Status: He is oriented to person, place, and time.                Significant Labs: All pertinent labs within the past 24 hours have been reviewed.    Significant Imaging: I have reviewed all pertinent imaging results/findings within the past 24 hours.  Assessment/Plan:     * Acute on chronic diastolic congestive heart failure  WOB requiring BIPAP  HTN   Atrial Fibrillation     CXR - 'Pulmonary edema, pneumonia, aspiration, or sepsis'.    Outpatient meds from 9/2024 - amiodarone, carvedilol, aspirin, Entresto    Plan  - BIPAP - wean as tolerated  - IV lasix   - Intake and output  - CHADSVASC 5 - not on AC - discuss with cardiology   - Start low dose metop succinate for GDMT in setting of normal BP   - Ceftriaxone and azithromycin, blood cx   - Echo, US legs   - Cardiology consult  - Pulm crit consult       Hx of hepatitis C  Rpt labs and Hep C quant  HIV       History of pulmonary embolus (PE)  DVT    Switched to Lovenox from eliquis in 2024 due to development of thrombi despite being on eliquis   Will discuss further with patient as no recent dispenses     Type 2 diabetes mellitus  8 months ago 6.7%  Repeat HbA1c   SS     COPD (chronic obstructive pulmonary disease)  No PFTs available, reports stopped smoking 3 years ago, not currently wheezing      VTE Risk Mitigation (From admission, onward)           Ordered      enoxaparin injection 40 mg  Daily         02/27/25 1301     IP VTE HIGH RISK PATIENT  Once         02/27/25 1301                                    Florencio Tapia MD  Department of Hospital Medicine  [facility] - Intensive Care ([place])

## 2025-02-27 NOTE — ASSESSMENT & PLAN NOTE
WOB requiring BIPAP  HTN   Atrial Fibrillation     CXR - 'Pulmonary edema, pneumonia, aspiration, or sepsis'.    Outpatient meds from 9/2024 - amiodarone, carvedilol, aspirin, Entresto    Plan  - BIPAP - wean as tolerated  - IV lasix   - Intake and output  - CHADSVASC 5 - not on AC - discuss with cardiology   - Start low dose metop succinate for GDMT in setting of normal BP   - Ceftriaxone and azithromycin, blood cx   - Echo  - Cardiology consult  - Pulm crit consult

## 2025-02-27 NOTE — ASSESSMENT & PLAN NOTE
WOB requiring BIPAP  HTN   Atrial Fibrillation     CXR - 'Pulmonary edema, pneumonia, aspiration, or sepsis'.    Outpatient meds from 9/2024 - amiodarone, carvedilol, aspirin, Entresto    DVT US neg  Echo moderate mitral regurg, EF 55%    Plan  - BIPAP - wean as tolerated  - IV lasix - increase from 80mg to 120mg due to poor urine ouput   - Intake and output  - CHADSVASC 5 - not on AC - discuss with cardiology - start eliquis   - Start low dose metop succinate for GDMT in setting of normal BP   - Ceftriaxone and azithromycin, blood cx   - Cardiology consult  - Pulm crit consult

## 2025-02-27 NOTE — HPI
HPI by Dr. Thompson:  Pt with PMH of atrial fibrillation, PE/DVT (on lovenox due to thrombus despite eliquis), hypertension, diastolic heart failure, T2DM, COPD, Hep C. Pt seen while on BIPAP. Unable to obtain full history. Reports SOB, cough, chills for 3 days. Denies chest pain, constipation or diarrhea. Currently living with niece. Not currently taking medication. Per ED note ' He notes that he has been out of all of his medications since getting released from skilled nursing roughly 1 month prior'. Pt unsure which meds he takes - per chart review dispense report 9/2024 - amiodarone, aspirin, carvedilol and Entresto.  However reported to the ED that he might take a fluid pill and a blood thinner.  Stopped smoking 3 years ago. Denies alcohol or drug use.     In ED , RR 26. Bicarb 20, , Mag 1.5, trop 0.387-->0.386, flu and covid neg, lactate wnl, VBG unremarkable. CXR - 'Pulmonary edema, pneumonia, aspiration, or sepsis'.  Given DuoNebs, 325 mg aspirin, magnesium, Lasix 40 mg IV, 5 mg diltiazem.  Patient had increased workup breathing so was placed on BiPAP.  When attempted to wean off BiPAP had recurrence of respiratory distress so we placed on BiPAP with plans to move to ICU.  Admitted to hospital medicine for further management.

## 2025-02-27 NOTE — ED PROVIDER NOTES
"Encounter Date: 2/27/2025       History     Chief Complaint   Patient presents with    Shortness of Breath     BIBA with c/o SOB. Pt received breathing tx PTA . Hx of COPD and CHF      This is a pleasant 65-year-old man who presents from home for evaluation of worsening shortness of breath.  He notes that he has been out of all of his medications since getting released from senior living roughly 1 month prior and had been doing okay until yesterday at which time he began to feel increasing shortness of breath which worsened this morning prompting him to call for an ambulance.  He says that he takes many medications but is unsure of which ones he is supposed to take though they do include a blood thinner and a fluid pill.  He denies any fevers or chills, says he has felt like this in the past but can not recall specifically what precipitated it.    The history is provided by the patient, medical records and the EMS personnel.     Review of patient's allergies indicates:  No Known Allergies  Past Medical History:   Diagnosis Date    Arrhythmia 2017    aflutter    Atrial flutter     CAD (coronary artery disease)     CHF (congestive heart failure), NYHA class I 2017    Cholelithiasis with chronic cholecystitis     Chronic diastolic heart failure     Cigarette smoker     COPD (chronic obstructive pulmonary disease)     COVID-19 06/01/2021    Diabetes mellitus     DKA (diabetic ketoacidosis)     Hypertension     NSTEMI (non-ST elevation myocardial infarction)     NSVT (nonsustained ventricular tachycardia)     Psychiatric disorder     h/o "schizophrena/bipolar"    Schizoaffective disorder     Severe sepsis      Past Surgical History:   Procedure Laterality Date    LIVER SURGERY      abscess drainage; 1970s    TREATMENT OF CARDIAC ARRHYTHMIA  9/12/2019    Procedure: Cardioversion or Defibrillation;  Surgeon: Jonathan Lopez MD;  Location: [facility] CATH LAB;  Service: Cardiology;;    TREATMENT OF CARDIAC ARRHYTHMIA N/A 2/3/2022    " Procedure: Cardioversion or Defibrillation;  Surgeon: Trevor Schwab MD;  Location: [facility] CATH LAB;  Service: Cardiology;  Laterality: N/A;    TREATMENT OF CARDIAC ARRHYTHMIA N/A 3/16/2024    Procedure: Cardioversion or Defibrillation;  Surgeon: Jonathan Lopez MD;  Location: [facility] CATH LAB;  Service: Cardiology;  Laterality: N/A;     No family history on file.  Social History[1]  Review of Systems  Constitutional-no fever  HEENT-no congestion  Eyes-no redness  Respiratory-positive shortness of breath  Cardio-no chest pain  GI-no abdominal pain  Endocrine-no cold intolerance  -no difficulty urinating  MSK-no myalgias  Skin-no rashes  Allergy-no environmental allergy  Neurologic-, no headache  Hematology-no swollen nodes  Behavioral-no confusion  Physical Exam     Initial Vitals   BP Pulse Resp Temp SpO2   02/27/25 0646 02/27/25 0630 02/27/25 0630 02/27/25 0646 02/27/25 0630   (!) 159/100 (!) 125 (!) 26 97.4 °F (36.3 °C) (!) 94 %      MAP       --                Physical Exam  Constitutional:  Ill-appearing 65-year-old man in mild to moderate distress with a non-rebreather in place  Eyes:  Slightly injected left conjunctiva, extraocular movements intact pupils 3 mm briskly reactive and symmetric  ENT       Head: Normocephalic, atraumatic.       Nose: Normal external appearance        Mouth/Throat: no strigulous respirations   Hematological/Lymphatic/Immunilogical: no visible lymphadenopathy   Cardiovascular:  Rapid rate, regular rhythm  Respiratory:  Increase respiratory effort, diminished volume, wheezes evident in the apices of the lungs bilaterally, crackles prominent in the inferior lung fields  Gastrointestinal:  Rounded, soft, no rebound, no guarding, large well-approximated surgical incision scar in the right upper quadrant  Musculoskeletal: Normal range of motion in all extremities.  +2 edema in the bilateral lower extremities  Neurologic: Alert, oriented. Normal speech and language. No gross focal  neurologic deficits are appreciated.  Skin: Skin is warm, dry. No rash noted.  Psychiatric: Mood and affect are normal.   ED Course   Critical Care    Date/Time: 2/27/2025 6:51 AM    Performed by: Isaac Hodge MD  Authorized by: Isaac Hodge MD  Direct patient critical care time: 25 minutes  Additional history critical care time: 5 minutes  Ordering / reviewing critical care time: 5 minutes  Documentation critical care time: 4 minutes  Consulting other physicians critical care time: 3 minutes  Total critical care time (exclusive of procedural time) : 42 minutes  Critical care time was exclusive of separately billable procedures and treating other patients and teaching time.  Critical care was necessary to treat or prevent imminent or life-threatening deterioration of the following conditions: respiratory failure and cardiac failure.  Critical care was time spent personally by me on the following activities: blood draw for specimens, development of treatment plan with patient or surrogate, discussions with primary provider, interpretation of cardiac output measurements, evaluation of patient's response to treatment, examination of patient, obtaining history from patient or surrogate, ordering and performing treatments and interventions, ordering and review of laboratory studies, ordering and review of radiographic studies, pulse oximetry, re-evaluation of patient's condition, review of old charts, vascular access procedures and discussions with consultants.        Labs Reviewed   CBC W/ AUTO DIFFERENTIAL - Abnormal       Result Value    WBC 7.65      RBC 5.15      Hemoglobin 15.9      Hematocrit 46.4      MCV 90      MCH 30.9      MCHC 34.3      RDW 14.6 (*)     Platelets 211      MPV 10.8      Immature Granulocytes 0.4      Gran # (ANC) 4.4      Immature Grans (Abs) 0.03      Lymph # 2.4      Mono # 0.7      Eos # 0.1      Baso # 0.04      nRBC 0      Gran % 57.4      Lymph % 31.4      Mono % 9.4       Eosinophil % 0.9      Basophil % 0.5      Differential Method Automated     COMPREHENSIVE METABOLIC PANEL - Abnormal    Sodium 143      Potassium 3.9      Chloride 110      CO2 20 (*)     Glucose 155 (*)     BUN 20      Creatinine 1.3      Calcium 9.3      Total Protein 7.5      Albumin 4.0      Total Bilirubin 0.8      Alkaline Phosphatase 94      AST 22      ALT 17      eGFR >60      Anion Gap 13     B-TYPE NATRIURETIC PEPTIDE - Abnormal     (*)    MAGNESIUM - Abnormal    Magnesium 1.5 (*)    TROPONIN I - Abnormal    Troponin I 0.387 (*)    CULTURE, BLOOD   CULTURE, BLOOD   LIPASE    Lipase 12     LACTIC ACID, PLASMA    Lactate (Lactic Acid) 1.5     DRUG SCREEN PANEL, URINE EMERGENCY   SARS-COV-2 RDRP GENE    POC Rapid COVID Negative       Acceptable Yes     POCT INFLUENZA A/B MOLECULAR    POC Molecular Influenza A Ag Negative      POC Molecular Influenza B Ag Negative       Acceptable Yes     ISTAT PROCEDURE    POC PH 7.387      POC PCO2 40.7      POC PO2 41      POC HCO3 24.5      POC BE -1      POC SATURATED O2 75      POC TCO2 26      Sample VENOUS          ECG Results              EKG 12-lead (Preliminary result)  Result time 02/27/25 06:41:17      Wet Read by Isaac Hodge MD (02/27/25 06:41:17, [facility] Emergency Dept, Emergency Medicine)    My EKG interpretation, sinus tachycardia 130 beats per minute, poor R-wave progression, no ST segment changes, when compared to previous EKG June 5, 2024 sinus rhythm has replaced atrial fibrillation with flutter                                  Imaging Results              X-Ray Chest AP Portable (Final result)  Result time 02/27/25 08:00:45      Final result by Wild Buchanan III, MD (02/27/25 08:00:45)                   Impression:      Pulmonary edema, pneumonia, aspiration, or sepsis.      Electronically signed by: Wild Buchanan MD  Date:    02/27/2025  Time:    08:00               Narrative:    EXAMINATION:  XR  CHEST AP PORTABLE    CLINICAL HISTORY:  Cough, unspecified    FINDINGS:  Chest one view portable.    There is cardiomegaly, DJD, and aortic plaque.  There is moderate edema and pleural fluid.                                    X-Rays:   Independently Interpreted Readings:   Other Readings:  Chest x-ray-moderate cardiomegaly, diffuse interstitial edema evident.    Medications   furosemide injection 60 mg (60 mg Intravenous Given 2/27/25 1135)   cefTRIAXone injection 1 g (1 g Intravenous Given 2/27/25 1135)   azithromycin (ZITHROMAX) 500 mg in 0.9% NaCl 250 mL IVPB (admixture device) (500 mg Intravenous New Bag 2/27/25 1135)   mupirocin 2 % ointment ( Nasal Given 2/27/25 1135)   magnesium sulfate 2g in water 50mL IVPB (premix) (has no administration in time range)     Followed by   magnesium sulfate 2g in water 50mL IVPB (premix) (has no administration in time range)   furosemide injection 40 mg (40 mg Intravenous Given 2/27/25 0643)   albuterol sulfate 2.5 mg/0.5 mL nebulizer solution (2.5 mg  Given 2/27/25 0637)   albuterol sulfate nebulizer solution 5 mg (5 mg Nebulization Given 2/27/25 0639)   diltiaZEM injection 5 mg (5 mg Intravenous Given 2/27/25 0646)   aspirin tablet 325 mg (325 mg Oral Given 2/27/25 0752)   magnesium sulfate in dextrose IVPB (premix) 1 g (0 g Intravenous Stopped 2/27/25 0922)   albuterol-ipratropium 2.5 mg-0.5 mg/3 mL nebulizer solution 3 mL (3 mLs Nebulization Given 2/27/25 0841)     Medical Decision Making  Differential diagnosis-acute on chronic hypoxic respiratory failure.  Heart failure, COPD exacerbation, pneumonia, COVID, influenza, hypertensive urgency, hypertensive emergency,    65-year-old man in moderate respiratory distress.    Diminished lung volumes, wheezes and crackles.  Placed on BiPAP, nebs initiated, Lasix given intravenously.    Per chart review patient is chronically hypoxic supposed to be on oxygen at home.  Has been out of all medications for some time.  He has had  multiple complicated hospital courses in the past.    Troponin elevated BNP elevated x-ray demonstrates overt edema exam consistent with the same.    Trialed off BiPAP, patient had worsening respiratory status during this time returned to BiPAP.    Will plan for admission to the intensive care unit, discussed the case with Dr. William pastor, plan for intensive care unit admission and reassessment as needed.    Problems Addressed:  Acute on chronic hypoxic respiratory failure: acute illness or injury  A-fib: chronic illness or injury  CHF (congestive heart failure): chronic illness or injury  Cough: acute illness or injury  Dyspnea: acute illness or injury    Amount and/or Complexity of Data Reviewed  Independent Historian: EMS     Details: Notes saturation in the 80% range on initial evaluation with marked increased work of breathing  External Data Reviewed: labs, radiology, ECG and notes.     Details: History of hypercarbic respiratory failure as well as hypoxic respiratory failure requiring intubation  Labs: ordered. Decision-making details documented in ED Course.  Radiology: ordered and independent interpretation performed. Decision-making details documented in ED Course.  ECG/medicine tests: ordered and independent interpretation performed. Decision-making details documented in ED Course.    Risk  OTC drugs.  Prescription drug management.  Parenteral controlled substances.  Drug therapy requiring intensive monitoring for toxicity.  Decision regarding hospitalization.  Diagnosis or treatment significantly limited by social determinants of health.  Risk Details: Mental health issues complicate this patient's care is a social determinants of health               ED Course as of 02/27/25 1230   Thu Feb 27, 2025   6115 Summary:    1. Technically difficult study.    2. Biatrial enlargement.    3. Left ventricular wall thickness is moderately increased.    4. Low normal left ventricular systolic function.    5.  Estimated left ventricular ejection fraction is 50 to 55%.    6. Right ventricular systolic function is low normal.    7. Mild aortic regurgitation.    8. Moderate tricuspid regurgitation.    9. Mild mitral valve regurgitation.   10. Mildly elevated systolic pulmonary artery pressure.   11. No flow detected by color Doppler across atrial septum.    [TK]   0815 Had transitioned off of BiPAP, continues to have an elevated work of breathing at this time, some desaturation, diminished volumes, will plan to restart BiPAP. [TK]      ED Course User Index  [TK] Isaac Hodge MD                           Clinical Impression:  Final diagnoses:  [R05.9] Cough  [R06.00] Dyspnea  [I48.91] A-fib  [I50.9] CHF (congestive heart failure)  [J96.21] Acute on chronic hypoxic respiratory failure (Primary)          ED Disposition Condition    Admit Stable                    [1]   Social History  Tobacco Use    Smoking status: Former     Current packs/day: 1.00     Average packs/day: 1 pack/day for 15.0 years (15.0 ttl pk-yrs)     Types: Cigarettes    Smokeless tobacco: Never   Substance Use Topics    Alcohol use: Not Currently     Comment: daily    Drug use: Never        Isaac Hodge MD  02/27/25 3147

## 2025-02-27 NOTE — Clinical Note
Diagnosis: CHF (congestive heart failure) [144402]   Reason for IP Medical Treatment  (Clinical interventions that can only be accomplished in the IP setting? ) :: decompensated chf with acute on chronic hypoxic respiratory failure   Plans for Post-Acute care--if anticipated (pick the single best option):: D. Skilled Nursing Placement

## 2025-02-27 NOTE — PLAN OF CARE
Problem: Adult Inpatient Plan of Care  Goal: Absence of Hospital-Acquired Illness or Injury  Outcome: Progressing  Intervention: Identify and Manage Fall Risk  Flowsheets (Taken 2/27/2025 1657)  Safety Promotion/Fall Prevention:   assistive device/personal item within reach   bed alarm set   pulse ox   room near unit station   side rails raised x 3   lighting adjusted   medications reviewed  Intervention: Prevent Skin Injury  Flowsheets (Taken 2/27/2025 1657)  Body Position: position changed independently  Skin Protection:   transparent dressing maintained   incontinence pads utilized  Device Skin Pressure Protection:   positioning supports utilized   pressure points protected   absorbent pad utilized/changed   tubing/devices free from skin contact  Intervention: Prevent and Manage VTE (Venous Thromboembolism) Risk  Flowsheets (Taken 2/27/2025 1657)  VTE Prevention/Management: bleeding risk assessed  Intervention: Prevent Infection  Flowsheets (Taken 2/27/2025 1657)  Infection Prevention:   environmental surveillance performed   equipment surfaces disinfected   hand hygiene promoted   visitors restricted/screened   single patient room provided   rest/sleep promoted   personal protective equipment utilized

## 2025-02-27 NOTE — SUBJECTIVE & OBJECTIVE
"Past Medical History:   Diagnosis Date    Arrhythmia 2017    aflutter    Atrial flutter     CAD (coronary artery disease)     CHF (congestive heart failure), NYHA class I 2017    Cholelithiasis with chronic cholecystitis     Chronic diastolic heart failure     Cigarette smoker     COPD (chronic obstructive pulmonary disease)     COVID-19 06/01/2021    Diabetes mellitus     DKA (diabetic ketoacidosis)     Hypertension     NSTEMI (non-ST elevation myocardial infarction)     NSVT (nonsustained ventricular tachycardia)     Psychiatric disorder     h/o "schizophrena/bipolar"    Schizoaffective disorder     Severe sepsis        Past Surgical History:   Procedure Laterality Date    LIVER SURGERY      abscess drainage; 1970s    TREATMENT OF CARDIAC ARRHYTHMIA  9/12/2019    Procedure: Cardioversion or Defibrillation;  Surgeon: Jonathan Lopez MD;  Location: [facility] CATH LAB;  Service: Cardiology;;    TREATMENT OF CARDIAC ARRHYTHMIA N/A 2/3/2022    Procedure: Cardioversion or Defibrillation;  Surgeon: Trevor Schwab MD;  Location: [facility] CATH LAB;  Service: Cardiology;  Laterality: N/A;    TREATMENT OF CARDIAC ARRHYTHMIA N/A 3/16/2024    Procedure: Cardioversion or Defibrillation;  Surgeon: Jonathan Lopez MD;  Location: [facility] CATH LAB;  Service: Cardiology;  Laterality: N/A;       Review of patient's allergies indicates:  No Known Allergies    No current facility-administered medications on file prior to encounter.     Current Outpatient Medications on File Prior to Encounter   Medication Sig    amiodarone (PACERONE) 200 MG Tab Take 200 mg by mouth once daily.    apixaban (ELIQUIS) 5 mg Tab Take 5 mg by mouth 2 (two) times daily.    aspirin 81 MG Chew Take 1 tablet (81 mg total) by mouth once daily. (Patient not taking: Reported on 6/5/2024)    atorvastatin (LIPITOR) 80 MG tablet Take 1 tablet by mouth once daily.    blood-glucose meter kit Use as instructed    budesonide-formoterol 80-4.5 mcg (SYMBICORT) 80-4.5 " mcg/actuation HFAA Inhale 2 puffs into the lungs twice daily (Patient not taking: Reported on 6/5/2024)    carvediloL (COREG) 12.5 MG tablet Take 1 tablet by mouth 2 (two) times daily with meals.    dulaglutide (TRULICITY) 1.5 mg/0.5 mL pen injector Inject into the skin every 7 days. Thursdays    furosemide (LASIX) 80 MG tablet Take 1 tablet by mouth once daily.    hydrALAZINE (APRESOLINE) 50 MG tablet Take 50 mg by mouth every 8 (eight) hours. (Patient not taking: Reported on 6/5/2024)    insulin aspart U-100 (NOVOLOG) 100 unit/mL injection Inject 5 Units into the skin 3 (three) times daily before meals. (Patient not taking: Reported on 6/5/2024)    insulin detemir U-100 (LEVEMIR) 100 unit/mL injection Inject 10 Units into the skin every evening. (Patient not taking: Reported on 6/5/2024)    metFORMIN (FORTAMET) 1,000 mg 24hr tablet Take 1,000 mg by mouth daily with breakfast. (Patient not taking: Reported on 6/5/2024)    oxyCODONE (OXY-IR) 5 mg Cap Take 1 capsule (5 mg total) by mouth every 4 (four) hours as needed for Pain.    sacubitriL-valsartan (ENTRESTO) 24-26 mg per tablet Take 1 tablet by mouth 2 (two) times daily.    tamsulosin (FLOMAX) 0.4 mg Cap Take 1 capsule (0.4 mg total) by mouth once daily.     Family History    None       Tobacco Use    Smoking status: Former     Current packs/day: 1.00     Average packs/day: 1 pack/day for 15.0 years (15.0 ttl pk-yrs)     Types: Cigarettes    Smokeless tobacco: Never   Substance and Sexual Activity    Alcohol use: Not Currently     Comment: daily    Drug use: Never    Sexual activity: Not Currently     Review of Systems   Constitutional:  Positive for chills.   Respiratory:  Positive for cough and shortness of breath.    Cardiovascular:  Positive for leg swelling. Negative for chest pain.   Gastrointestinal:  Negative for abdominal pain, constipation and diarrhea.     Objective:     Vital Signs (Most Recent):  Temp: 97.9 °F (36.6 °C) (02/27/25 1502)  Pulse: 92  (02/27/25 1502)  Resp: 20 (02/27/25 1502)  BP: 136/86 (02/27/25 1502)  SpO2: 96 % (02/27/25 1502) Vital Signs (24h Range):  Temp:  [97.4 °F (36.3 °C)-97.9 °F (36.6 °C)] 97.9 °F (36.6 °C)  Pulse:  [] 92  Resp:  [11-32] 20  SpO2:  [83 %-100 %] 96 %  BP: ()/() 136/86     Weight: 79.8 kg (176 lb)  Body mass index is 28.41 kg/m².     Physical Exam  Constitutional:       General: He is not in acute distress.  Pulmonary:      Breath sounds: No wheezing.      Comments: On BIPAP  Abdominal:      General: There is no distension.      Tenderness: There is no abdominal tenderness.   Musculoskeletal:      Right lower leg: Edema present.      Left lower leg: Edema present.   Neurological:      Mental Status: He is oriented to person, place, and time.                Significant Labs: All pertinent labs within the past 24 hours have been reviewed.    Significant Imaging: I have reviewed all pertinent imaging results/findings within the past 24 hours.

## 2025-02-27 NOTE — CONSULTS
[facility] Note    Primary Attending Physician: Dr. Willoughby  ICU Attending: Dr. Coleman  ICU Fellow: Dr. Hanson     Reason for Consult:     HF requiring BiPAP    Subjective:      History of Present Illness:  Marck Madison is a 65 y.o.  male who  has a past medical history of Arrhythmia (2017), Atrial flutter, CAD (coronary artery disease), CHF (congestive heart failure), NYHA class I (2017), Cholelithiasis with chronic cholecystitis, Chronic diastolic heart failure, Cigarette smoker, COPD (chronic obstructive pulmonary disease), COVID-19 (06/01/2021), Diabetes mellitus, DKA (diabetic ketoacidosis), Hypertension, NSTEMI (non-ST elevation myocardial infarction), NSVT (nonsustained ventricular tachycardia), Psychiatric disorder, Schizoaffective disorder, and Severe sepsis.. The patient presented to the [facility] on 2/27/2025 with a primary complaint of Shortness of Breath (BIBA with c/o SOB. Pt received breathing tx PTA . Hx of COPD and CHF )    65-year-old male who presented to the ED for evaluation shortness of breath.  He states his worsening shortness of breath started early this morning with and associated dry cough.  He has been on have his medications after being released from [facility] about 1 month ago.  He denies any sick contacts, fever, chills, appetite change, or chest pain.  He does admit to right shoulder pain which is new and is not limiting his range of motion.   Patient was noted to be placed on BiPAP for increased work of breathing in the ED. they attempted to remove this but shortly after he was taken off of it, he had worsening tachypnea, shortness of breath, and accessory muscle usage.   Pulmonary history is significant for previous tobacco use who quit about 3 years ago.  Prior to this he states he smoked up to 2 packs per day for roughly 50 years.  He states he has never seen a pulmonologist or had pulmonary function testing.    Past Medical History:  Past  "Medical History:   Diagnosis Date    Arrhythmia 2017    aflutter    Atrial flutter     CAD (coronary artery disease)     CHF (congestive heart failure), NYHA class I 2017    Cholelithiasis with chronic cholecystitis     Chronic diastolic heart failure     Cigarette smoker     COPD (chronic obstructive pulmonary disease)     COVID-19 06/01/2021    Diabetes mellitus     DKA (diabetic ketoacidosis)     Hypertension     NSTEMI (non-ST elevation myocardial infarction)     NSVT (nonsustained ventricular tachycardia)     Psychiatric disorder     h/o "schizophrena/bipolar"    Schizoaffective disorder     Severe sepsis        Past Surgical History:  Past Surgical History:   Procedure Laterality Date    LIVER SURGERY      abscess drainage; 1970s    TREATMENT OF CARDIAC ARRHYTHMIA  9/12/2019    Procedure: Cardioversion or Defibrillation;  Surgeon: Jonathan Lopez MD;  Location: [facility] CATH LAB;  Service: Cardiology;;    TREATMENT OF CARDIAC ARRHYTHMIA N/A 2/3/2022    Procedure: Cardioversion or Defibrillation;  Surgeon: Trevor Schwab MD;  Location: [facility] CATH LAB;  Service: Cardiology;  Laterality: N/A;    TREATMENT OF CARDIAC ARRHYTHMIA N/A 3/16/2024    Procedure: Cardioversion or Defibrillation;  Surgeon: Jonathan Lopez MD;  Location: [facility] CATH LAB;  Service: Cardiology;  Laterality: N/A;       Allergies:  Review of patient's allergies indicates:  No Known Allergies    Medications:   In-Hospital Scheduled Medications:   azithromycin  500 mg Intravenous Q24H    cefTRIAXone (Rocephin) IV (PEDS and ADULTS)  1 g Intravenous Q24H    furosemide (LASIX) injection  60 mg Intravenous BID WM    magnesium sulfate 2 g IVPB  2 g Intravenous Once    Followed by    magnesium sulfate 2 g IVPB  2 g Intravenous Once    mupirocin   Nasal BID      In-Hospital PRN Medications:     In-Hospital IV Infusion Medications:     Home Medications:  Prior to Admission medications    Medication Sig Start Date End Date Taking? Authorizing Provider "   amiodarone (PACERONE) 200 MG Tab Take 200 mg by mouth once daily.    Provider, Historical   apixaban (ELIQUIS) 5 mg Tab Take 5 mg by mouth 2 (two) times daily.    Provider, Historical   aspirin 81 MG Chew Take 1 tablet (81 mg total) by mouth once daily.  Patient not taking: Reported on 6/5/2024 6/3/20 6/30/22  Maged Garg, GET   atorvastatin (LIPITOR) 80 MG tablet Take 1 tablet by mouth once daily. 11/2/23   Provider, Historical   blood-glucose meter kit Use as instructed 2/5/22 2/5/23  Whit Hannon MD   budesonide-formoterol 80-4.5 mcg (SYMBICORT) 80-4.5 mcg/actuation HFAA Inhale 2 puffs into the lungs twice daily  Patient not taking: Reported on 6/5/2024 9/22/22   Krissy Phillips MD   carvediloL (COREG) 12.5 MG tablet Take 1 tablet by mouth 2 (two) times daily with meals. 3/21/24   Provider, Historical   dulaglutide (TRULICITY) 1.5 mg/0.5 mL pen injector Inject into the skin every 7 days. Thursdays    Provider, Historical   furosemide (LASIX) 80 MG tablet Take 1 tablet by mouth once daily. 11/2/23   Provider, Historical   hydrALAZINE (APRESOLINE) 50 MG tablet Take 50 mg by mouth every 8 (eight) hours.  Patient not taking: Reported on 6/5/2024    Provider, Historical   insulin aspart U-100 (NOVOLOG) 100 unit/mL injection Inject 5 Units into the skin 3 (three) times daily before meals.  Patient not taking: Reported on 6/5/2024    Provider, Historical   insulin detemir U-100 (LEVEMIR) 100 unit/mL injection Inject 10 Units into the skin every evening.  Patient not taking: Reported on 6/5/2024    Provider, Historical   metFORMIN (FORTAMET) 1,000 mg 24hr tablet Take 1,000 mg by mouth daily with breakfast.  Patient not taking: Reported on 6/5/2024    Provider, Historical   oxyCODONE (OXY-IR) 5 mg Cap Take 1 capsule (5 mg total) by mouth every 4 (four) hours as needed for Pain. 10/27/24   Nae Castro MD   sacubitriL-valsartan (ENTRESTO) 24-26 mg per tablet Take 1 tablet by mouth 2 (two)  "times daily. 23   Provider, Historical   tamsulosin (FLOMAX) 0.4 mg Cap Take 1 capsule (0.4 mg total) by mouth once daily. 3/21/24 4/20/24  Jamie Bennett MD       Family History:  No family history on file.    Social History:  Social History[1]    Review of Systems:  See hpi  All other systems are reviewed and are negative.     Objective:   Last 24 Hour Vital Signs:  BP  Min: 96/72  Max: 159/100  Temp  Av.6 °F (36.4 °C)  Min: 97.4 °F (36.3 °C)  Max: 97.8 °F (36.6 °C)  Pulse  Av.3  Min: 66  Max: 131  Resp  Av.2  Min: 11  Max: 32  SpO2  Av.6 %  Min: 83 %  Max: 100 %  Height  Av' 6" (167.6 cm)  Min: 5' 6" (167.6 cm)  Max: 5' 6" (167.6 cm)  Weight  Av.8 kg (176 lb)  Min: 79.8 kg (176 lb)  Max: 79.8 kg (176 lb)  No intake/output data recorded.    Physical Examination:  Physical Exam  Constitutional:       Appearance: He is obese. He is not ill-appearing or toxic-appearing.   HENT:      Head: Normocephalic.      Mouth/Throat:      Mouth: Mucous membranes are dry.      Pharynx: Oropharynx is clear.   Eyes:      General: No scleral icterus.  Cardiovascular:      Rate and Rhythm: Tachycardia present. Rhythm irregular.      Pulses: Normal pulses.      Heart sounds: No murmur heard.  Pulmonary:      Effort: Respiratory distress (tachypnea) present.      Breath sounds: No wheezing, rhonchi or rales.   Abdominal:      General: Abdomen is flat. There is no distension.      Palpations: Abdomen is soft.   Musculoskeletal:      Right lower leg: Edema (trace) present.      Left lower leg: Edema (trace) present.   Skin:     General: Skin is warm and dry.      Coloration: Skin is not jaundiced.   Neurological:      Mental Status: He is alert and oriented to person, place, and time. Mental status is at baseline.      Cranial Nerves: No cranial nerve deficit.           Laboratory:  Trended Lab Data:  Recent Labs     25  0637   WBC 7.65   HGB 15.9   HCT 46.4         K 3.9 "      CO2 20*   BUN 20   CREATININE 1.3   *   BILITOT 0.8   AST 22   ALT 17   ALKPHOS 94   CALCIUM 9.3   ALBUMIN 4.0   PROT 7.5   MG 1.5*       Cardiac:   Recent Labs   Lab 02/27/25  0637 02/27/25  0935   TROPONINI 0.387* 0.386*   *  --        Urinalysis:   Lab Results   Component Value Date    COLORU Light Yellow 09/17/2024    SPECGRAV 1.010 06/09/2024    NITRITE Negative 06/09/2024    KETONESU Negative 06/09/2024    UROBILINOGEN 2.0 mg/dL (A) 09/17/2024       Microbiology:  Microbiology Results (last 7 days)       Procedure Component Value Units Date/Time    Respiratory Infection Panel (PCR), Nasopharyngeal [[id number]] Collected: 02/27/25 1059    Order Status: Completed Specimen: Nasopharyngeal Swab Updated: 02/27/25 1112     Respiratory Infection Panel Source NP Swab    Narrative:      Assay not valid for lower respiratory specimens, alternate  testing required.    Blood culture [[id number]] Collected: 02/27/25 1027    Order Status: Sent Specimen: Blood from Peripheral, Wrist, Left     Blood culture [[id number]] Collected: 02/27/25 1026    Order Status: Sent Specimen: Blood from Peripheral, Hand, Left     Culture, Respiratory with Gram Stain [[id number]]     Order Status: No result Specimen: Respiratory             Radiology:  I have personally reviewed the above labs and imaging.       Assessment:     Marck Madison is a 65 y.o. male who presented to the ED for evaluation shortness of breath.  He states his worsening shortness of breath started early this morning with and associated dry cough.  He has been on have his medications after being released from group home about 1 month ago.     Plan:     Acute on chronic respiratory failure  - Patient states he is on 2 L nasal cannula home   - He does not have any standing inhalers but was prescribed an albuterol inhaler p.r.n.  History of tobacco use, without prior PFTs  Diastolic heart failure  Elevated BNP  Hypomagnesemia    - patient with increased  work of breathing, would recommend BiPAP  - chest x-ray consistent with pulmonary edema, unable to rule out pneumonia  - would recommend treating for cap with ceftriaxone and azithromycin  - respiratory infection panel ordered  - sputum culture ordered  - recommend aggressive diuresis in the setting of fluid overload and elevated BNP  - EKG ordered  - TTE ordered  - will need outpatient pulmonary follow-up and PFTs  - he will need yearly low-dose CT scans for lung cancer screening due to his smoking history  - continue with smoking cessation  - patient noted to have a brief 6 run beat of V-tach with a magnesium 1.5, 4 g of magnesium ordered      Feeding:  Okay for regular diet when he is able to come off of BiPAP  Analgesia: n/a  Sedation: n/a  Thromboembolic ppx: lovenox  HOB > 30:  Yes  Ulcer ppx:  n/a  Glucose control:  Glucose goal 140-180  Bowel function:  MiraLax p.r.n.  Indwelling lines and catheters:  PIV  De-escalation abx:  No, ceftriaxone azithromycin    Dispo:  ICU until no longer needs BiPAP  CODE:  Full      Satya Hanson MD  [employer] [facility] Fellow        [1]   Social History  Tobacco Use    Smoking status: Former     Current packs/day: 1.00     Average packs/day: 1 pack/day for 15.0 years (15.0 ttl pk-yrs)     Types: Cigarettes    Smokeless tobacco: Never   Substance Use Topics    Alcohol use: Not Currently     Comment: daily    Drug use: Never

## 2025-02-27 NOTE — CONSULTS
[facility]    Date of admission:  2/27/2025     Reason for Consult:    Heart failure    HPI:    This 65-year-old male with a history of diastolic heart failure has had progressively worsening shortness of breath for several days.  He reports that he has been out of all of his medications for a while.  Can not say how long.  History somewhat difficult as he is on noninvasive positive pressure ventilation.  Had some chest tightness.  Since admission, feeling better with diuresis.    Medications  Current Medications[1]   Prior to Admission medications    Medication Sig Start Date End Date Taking? Authorizing Provider   amiodarone (PACERONE) 200 MG Tab Take 200 mg by mouth once daily.    Provider, Historical   apixaban (ELIQUIS) 5 mg Tab Take 5 mg by mouth 2 (two) times daily.    Provider, Historical   aspirin 81 MG Chew Take 1 tablet (81 mg total) by mouth once daily.  Patient not taking: Reported on 6/5/2024 6/3/20 6/30/22  Maged Garg PA-C   atorvastatin (LIPITOR) 80 MG tablet Take 1 tablet by mouth once daily. 11/2/23   Provider, Historical   blood-glucose meter kit Use as instructed 2/5/22 2/5/23  Whit Hannon MD   budesonide-formoterol 80-4.5 mcg (SYMBICORT) 80-4.5 mcg/actuation HFAA Inhale 2 puffs into the lungs twice daily  Patient not taking: Reported on 6/5/2024 9/22/22   Krissy Phillips MD   carvediloL (COREG) 12.5 MG tablet Take 1 tablet by mouth 2 (two) times daily with meals. 3/21/24   Provider, Historical   dulaglutide (TRULICITY) 1.5 mg/0.5 mL pen injector Inject into the skin every 7 days. Thursdays    Provider, Historical   furosemide (LASIX) 80 MG tablet Take 1 tablet by mouth once daily. 11/2/23   Provider, Historical   hydrALAZINE (APRESOLINE) 50 MG tablet Take 50 mg by mouth every 8 (eight) hours.  Patient not taking: Reported on 6/5/2024    Provider, Historical   insulin aspart U-100 (NOVOLOG) 100 unit/mL injection Inject 5 Units into the skin 3 (three) times  "daily before meals.  Patient not taking: Reported on 6/5/2024    Provider, Historical   insulin detemir U-100 (LEVEMIR) 100 unit/mL injection Inject 10 Units into the skin every evening.  Patient not taking: Reported on 6/5/2024    Provider, Historical   metFORMIN (FORTAMET) 1,000 mg 24hr tablet Take 1,000 mg by mouth daily with breakfast.  Patient not taking: Reported on 6/5/2024    Provider, Historical   oxyCODONE (OXY-IR) 5 mg Cap Take 1 capsule (5 mg total) by mouth every 4 (four) hours as needed for Pain. 10/27/24   Nae Castro MD   sacubitriL-valsartan (ENTRESTO) 24-26 mg per tablet Take 1 tablet by mouth 2 (two) times daily. 11/2/23   Provider, Historical   tamsulosin (FLOMAX) 0.4 mg Cap Take 1 capsule (0.4 mg total) by mouth once daily. 3/21/24 4/20/24  Jamie Bennett MD       History  Past Medical History:   Diagnosis Date    Arrhythmia 2017    aflutter    Atrial flutter     CAD (coronary artery disease)     CHF (congestive heart failure), NYHA class I 2017    Cholelithiasis with chronic cholecystitis     Chronic diastolic heart failure     Cigarette smoker     COPD (chronic obstructive pulmonary disease)     COVID-19 06/01/2021    Diabetes mellitus     DKA (diabetic ketoacidosis)     Hypertension     NSTEMI (non-ST elevation myocardial infarction)     NSVT (nonsustained ventricular tachycardia)     Psychiatric disorder     h/o "schizophrena/bipolar"    Schizoaffective disorder     Severe sepsis      Past Surgical History:   Procedure Laterality Date    LIVER SURGERY      abscess drainage; 1970s    TREATMENT OF CARDIAC ARRHYTHMIA  9/12/2019    Procedure: Cardioversion or Defibrillation;  Surgeon: Jonathan Lopez MD;  Location: [facility] CATH LAB;  Service: Cardiology;;    TREATMENT OF CARDIAC ARRHYTHMIA N/A 2/3/2022    Procedure: Cardioversion or Defibrillation;  Surgeon: Trevor Schwab MD;  Location: [facility] CATH LAB;  Service: Cardiology;  Laterality: N/A;    TREATMENT OF CARDIAC " ARRHYTHMIA N/A 3/16/2024    Procedure: Cardioversion or Defibrillation;  Surgeon: Jonathan Lopez MD;  Location: [facility] CATH LAB;  Service: Cardiology;  Laterality: N/A;     Social History[2]  No family history on file.     Allergies  Review of patient's allergies indicates:  No Known Allergies    Review of Systems   Review of Systems   Constitutional: Negative for malaise/fatigue, weight gain and weight loss.   Eyes:  Negative for visual disturbance.   Cardiovascular:  Positive for dyspnea on exertion. Negative for chest pain, claudication, cyanosis, irregular heartbeat, leg swelling, near-syncope, orthopnea, palpitations, paroxysmal nocturnal dyspnea and syncope.   Respiratory:  Positive for shortness of breath. Negative for cough, hemoptysis, sleep disturbances due to breathing and wheezing.    Hematologic/Lymphatic: Negative for bleeding problem. Does not bruise/bleed easily.   Skin:  Negative for poor wound healing.   Musculoskeletal:  Negative for muscle cramps and myalgias.   Gastrointestinal:  Negative for abdominal pain, anorexia, diarrhea, heartburn, hematemesis, hematochezia, melena, nausea and vomiting.   Genitourinary:  Negative for hematuria and nocturia.   Neurological:  Negative for excessive daytime sleepiness, dizziness, focal weakness, light-headedness and weakness.       Physical Exam    Temp:  [97.4 °F (36.3 °C)-97.8 °F (36.6 °C)]   Pulse:  []   Resp:  [11-32]   BP: ()/()   SpO2:  [83 %-100 %]    Wt Readings from Last 1 Encounters:   02/27/25 79.8 kg (176 lb)     Physical Exam  Vitals and nursing note reviewed.   Constitutional:       General: He is not in acute distress.     Appearance: He is not toxic-appearing or diaphoretic.      Interventions: Face mask in place.   HENT:      Head: Normocephalic and atraumatic.      Mouth/Throat:      Lips: Pink.      Mouth: Mucous membranes are moist.   Eyes:      General: No scleral icterus.     Conjunctiva/sclera: Conjunctivae normal.    Neck:      Thyroid: No thyromegaly.      Vascular: JVD present. No carotid bruit.      Trachea: Trachea normal.   Cardiovascular:      Rate and Rhythm: Normal rate and regular rhythm. No extrasystoles are present.     Chest Wall: PMI is not displaced.      Pulses:           Carotid pulses are 2+ on the right side and 2+ on the left side.       Radial pulses are 2+ on the right side and 2+ on the left side.      Heart sounds: S1 normal and S2 normal.      No friction rub.   Pulmonary:      Effort: Pulmonary effort is normal. No tachypnea, bradypnea, accessory muscle usage or respiratory distress.      Breath sounds: Normal air entry. Rales present. No decreased breath sounds, wheezing or rhonchi.   Abdominal:      General: Bowel sounds are normal. There is no distension or abdominal bruit.      Palpations: Abdomen is soft. There is no hepatomegaly, splenomegaly or pulsatile mass.      Tenderness: There is no abdominal tenderness.   Musculoskeletal:         General: No tenderness or deformity.      Right lower leg: Edema present.      Left lower leg: Edema present.   Skin:     General: Skin is warm and dry.      Coloration: Skin is not cyanotic or pale.      Nails: There is no clubbing.   Neurological:      General: No focal deficit present.      Mental Status: He is alert and oriented to person, place, and time.   Psychiatric:         Attention and Perception: Attention normal.         Mood and Affect: Mood normal.         Speech: Speech normal.         Behavior: Behavior normal. Behavior is cooperative.         Telemetry  Sinus rhythm    EKG  Initial electrocardiogram showed atrial tachycardia with 2:1 av conduction.  Subsequent electrocardiograms have shown variable AV conduction.    Labs  Recent Results (from the past 72 hours)   CBC auto differential    Collection Time: 02/27/25  6:37 AM   Result Value Ref Range    WBC 7.65 3.90 - 12.70 K/uL    RBC 5.15 4.60 - 6.20 M/uL    Hemoglobin 15.9 14.0 - 18.0 g/dL     Hematocrit 46.4 40.0 - 54.0 %    MCV 90 82 - 98 fL    MCH 30.9 27.0 - 31.0 pg    MCHC 34.3 32.0 - 36.0 g/dL    RDW 14.6 (H) 11.5 - 14.5 %    Platelets 211 150 - 450 K/uL    MPV 10.8 9.2 - 12.9 fL    Immature Granulocytes 0.4 0.0 - 0.5 %    Gran # (ANC) 4.4 1.8 - 7.7 K/uL    Immature Grans (Abs) 0.03 0.00 - 0.04 K/uL    Lymph # 2.4 1.0 - 4.8 K/uL    Mono # 0.7 0.3 - 1.0 K/uL    Eos # 0.1 0.0 - 0.5 K/uL    Baso # 0.04 0.00 - 0.20 K/uL    nRBC 0 0 /100 WBC    Gran % 57.4 38.0 - 73.0 %    Lymph % 31.4 18.0 - 48.0 %    Mono % 9.4 4.0 - 15.0 %    Eosinophil % 0.9 0.0 - 8.0 %    Basophil % 0.5 0.0 - 1.9 %    Differential Method Automated    Comprehensive metabolic panel    Collection Time: 02/27/25  6:37 AM   Result Value Ref Range    Sodium 143 136 - 145 mmol/L    Potassium 3.9 3.5 - 5.1 mmol/L    Chloride 110 95 - 110 mmol/L    CO2 20 (L) 23 - 29 mmol/L    Glucose 155 (H) 70 - 110 mg/dL    BUN 20 8 - 23 mg/dL    Creatinine 1.3 0.5 - 1.4 mg/dL    Calcium 9.3 8.7 - 10.5 mg/dL    Total Protein 7.5 6.0 - 8.4 g/dL    Albumin 4.0 3.5 - 5.2 g/dL    Total Bilirubin 0.8 0.1 - 1.0 mg/dL    Alkaline Phosphatase 94 40 - 150 U/L    AST 22 10 - 40 U/L    ALT 17 10 - 44 U/L    eGFR >60 >60 mL/min/1.73 m^2    Anion Gap 13 8 - 16 mmol/L   Brain natriuretic peptide    Collection Time: 02/27/25  6:37 AM   Result Value Ref Range     (H) 0 - 99 pg/mL   Lipase    Collection Time: 02/27/25  6:37 AM   Result Value Ref Range    Lipase 12 4 - 60 U/L   Magnesium    Collection Time: 02/27/25  6:37 AM   Result Value Ref Range    Magnesium 1.5 (L) 1.6 - 2.6 mg/dL   Troponin I    Collection Time: 02/27/25  6:37 AM   Result Value Ref Range    Troponin I 0.387 (H) 0.000 - 0.026 ng/mL   Lactic acid, plasma    Collection Time: 02/27/25  6:37 AM   Result Value Ref Range    Lactate (Lactic Acid) 1.5 0.5 - 2.2 mmol/L   ISTAT PROCEDURE    Collection Time: 02/27/25  6:39 AM   Result Value Ref Range    POC PH 7.387 7.35 - 7.45    POC PCO2 40.7 35 - 45  mmHg    POC PO2 41 40 - 60 mmHg    POC HCO3 24.5 24 - 28 mmol/L    POC BE -1 -2 to 2 mmol/L    POC SATURATED O2 75 95 - 100 %    POC TCO2 26 24 - 29 mmol/L    Sample VENOUS    POCT COVID-19 Rapid Screening    Collection Time: 02/27/25  7:02 AM   Result Value Ref Range    POC Rapid COVID Negative Negative     Acceptable Yes    POCT Influenza A/B Molecular    Collection Time: 02/27/25  7:02 AM   Result Value Ref Range    POC Molecular Influenza A Ag Negative Negative    POC Molecular Influenza B Ag Negative Negative     Acceptable Yes    Troponin I    Collection Time: 02/27/25  9:35 AM   Result Value Ref Range    Troponin I 0.386 (H) 0.000 - 0.026 ng/mL   Echo    Collection Time: 02/27/25 10:53 AM   Result Value Ref Range    BSA 1.93 m2    LA WIDTH 4.0 cm    Left Atrium Area-systolic Apical Two Chamber 30.0 cm2    Left Atrium Area-systolic Four Chamber 28.0 cm2    LVOT stroke volume 25.5 cm3    LVIDd 4.5 3.5 - 6.0 cm    LV Systolic Volume 42 mL    LV Systolic Volume Index 22.2 mL/m2    LVIDs 3.2 2.1 - 4.0 cm    LV Diastolic Volume 91 mL    LV ESV A4C 92.23 mL    LV Diastolic Volume Index 48.15 mL/m2    Left Ventricular End Systolic Volume by Teichholz Method 42.04 mL    Left Ventricular End Diastolic Volume by Teichholz Method 90.67 mL    IVS 1.3 (A) 0.6 - 1.1 cm    LVOT diameter 1.9 cm    LVOT area 2.8 cm2    FS 28.9 28 - 44 %    Left Ventricle Relative Wall Thickness 0.62 cm    PW 1.4 (A) 0.6 - 1.1 cm    LV mass 235.3 g    LV Mass Index 124.5 g/m2    TDI LATERAL 0.08 m/s    TDI SEPTAL 0.07 m/s    TR Max Chinmay 2.2 m/s    LVOT peak chinmay 0.7 m/s    Left Ventricular Outflow Tract Mean Velocity 0.38 cm/s    Left Ventricular Outflow Tract Mean Gradient 0.77 mmHg    RV- ballesteros basal diam 1.7 cm    RV/LV Ratio 0.38 cm    LA size 3.6 cm    Left Atrium Major Axis 6.6 cm    RA Major Axis 4.86 cm    AV mean gradient 2 mmHg    AV peak gradient 4 mmHg    Ao peak chinmay 1.0 m/s    Ao VTI 11.1 cm    LVOT peak  VTI 9.0 cm    AV valve area 2.3 cm²    AV Velocity Ratio 0.70     AV index (prosthetic) 0.81     ALDO by Velocity Ratio 2.0 cm²    Mr max lita 5.24 m/s    PV PEAK VELOCITY 0.85 m/s    PV peak gradient 3 mmHg    Pulmonary Valve Mean Velocity 0.58 m/s    Ao root annulus 1.89 cm    STJ 2.68 cm    Ascending aorta 2.90 cm    IVC diameter 1.71 cm    Mean e' 0.08 m/s    ZLVIDS -0.15     ZLVIDD -1.62     LA area A4C 28.34 cm2    RVDD 1.65 cm    RA Width 3.7 cm    Sinus 2.3 cm    RASHARD 41 mL/m2    LA Vol 77 cm3    Left Atrium Minor Axis 6.0 cm   Respiratory Infection Panel (PCR), Nasopharyngeal    Collection Time: 02/27/25 10:59 AM    Specimen: Nasopharyngeal Swab   Result Value Ref Range    Respiratory Infection Panel Source NP Swab         Imaging  X-Ray Chest AP Portable  Result Date: 2/27/2025  EXAMINATION: XR CHEST AP PORTABLE CLINICAL HISTORY: Cough, unspecified FINDINGS: Chest one view portable. There is cardiomegaly, DJD, and aortic plaque.  There is moderate edema and pleural fluid.     Pulmonary edema, pneumonia, aspiration, or sepsis. Electronically signed by: Wild Buchanan MD Date:    02/27/2025 Time:    08:00      Assessment    Acute on chronic diastolic heart failure   Exacerbation is related to medication nonadherence.    Atrial tachycardia  Has had this rhythm in the past.  Unclear if this contributed to his decompensation on top of medication nonadherence.    Plan and Discussion    Continue efforts at intravenous diuresis and respiratory support.      Danilo Isaac MD         [1]   Current Facility-Administered Medications   Medication Dose Route Frequency Provider Last Rate Last Admin    azithromycin (ZITHROMAX) 500 mg in 0.9% NaCl 250 mL IVPB (admixture device)  500 mg Intravenous Q24H Florencio Thompson  mL/hr at 02/27/25 1135 500 mg at 02/27/25 1135    cefTRIAXone injection 1 g  1 g Intravenous Q24H Florencio Thompson MD   1 g at 02/27/25 1135    furosemide injection 60 mg  60 mg Intravenous BID WM  Florencio Thompson MD   60 mg at 02/27/25 1135    magnesium sulfate 2g in water 50mL IVPB (premix)  2 g Intravenous Once Satya Hanson MD        Followed by    magnesium sulfate 2g in water 50mL IVPB (premix)  2 g Intravenous Once Satya Hanson MD        mupirocin 2 % ointment   Nasal BID Florencio Thompson MD   Given at 02/27/25 1135   [2]   Social History  Socioeconomic History    Marital status: Single   Tobacco Use    Smoking status: Former     Current packs/day: 1.00     Average packs/day: 1 pack/day for 15.0 years (15.0 ttl pk-yrs)     Types: Cigarettes    Smokeless tobacco: Never   Substance and Sexual Activity    Alcohol use: Not Currently     Comment: daily    Drug use: Never    Sexual activity: Not Currently   Social History Narrative    ** Merged History Encounter **          Social Drivers of Health     Financial Resource Strain: Low Risk  (6/14/2024)    Received from [facility]    Overall Financial Resource Strain (CARDIA)     Difficulty of Paying Living Expenses: Not hard at all   Food Insecurity: No Food Insecurity (6/14/2024)    Received from [facility]    Hunger Vital Sign     Worried About Running Out of Food in the Last Year: Never true     Ran Out of Food in the Last Year: Never true   Transportation Needs: No Transportation Needs (6/14/2024)    Received from [facility]    PRAPARE - Transportation     Lack of Transportation (Medical): No     Lack of Transportation (Non-Medical): No   Physical Activity: Unknown (9/19/2022)    Exercise Vital Sign     Days of Exercise per Week: Patient declined     Minutes of Exercise per Session: Patient declined   Stress: No Stress Concern Present (6/14/2024)    Received from [facility]    [employer] - Occupational Stress Questionnaire     Feeling of Stress : Not at all   Housing Stability: Unknown (6/14/2024)    Received from [facility]    Housing Stability Vital Sign     Unable to Pay for Housing in the Last Year: No      Unstable Housing in the Last Year: No

## 2025-02-28 LAB
ALBUMIN SERPL BCP-MCNC: 3.4 G/DL (ref 3.5–5.2)
ALP SERPL-CCNC: 77 U/L (ref 40–150)
ALT SERPL W/O P-5'-P-CCNC: 16 U/L (ref 10–44)
AMPHET+METHAMPHET UR QL: NEGATIVE
ANION GAP SERPL CALC-SCNC: 12 MMOL/L (ref 8–16)
ANION GAP SERPL CALC-SCNC: 7 MMOL/L (ref 8–16)
AST SERPL-CCNC: 17 U/L (ref 10–40)
BARBITURATES UR QL SCN>200 NG/ML: NEGATIVE
BENZODIAZ UR QL SCN>200 NG/ML: NEGATIVE
BILIRUB SERPL-MCNC: 1 MG/DL (ref 0.1–1)
BUN SERPL-MCNC: 24 MG/DL (ref 8–23)
BUN SERPL-MCNC: 27 MG/DL (ref 8–23)
BZE UR QL SCN: NEGATIVE
CALCIUM SERPL-MCNC: 8.7 MG/DL (ref 8.7–10.5)
CALCIUM SERPL-MCNC: 8.7 MG/DL (ref 8.7–10.5)
CANNABINOIDS UR QL SCN: NEGATIVE
CHLORIDE SERPL-SCNC: 109 MMOL/L (ref 95–110)
CHLORIDE SERPL-SCNC: 111 MMOL/L (ref 95–110)
CO2 SERPL-SCNC: 21 MMOL/L (ref 23–29)
CO2 SERPL-SCNC: 25 MMOL/L (ref 23–29)
CREAT SERPL-MCNC: 1.7 MG/DL (ref 0.5–1.4)
CREAT SERPL-MCNC: 1.8 MG/DL (ref 0.5–1.4)
CREAT UR-MCNC: 104.1 MG/DL (ref 23–375)
ERYTHROCYTE [DISTWIDTH] IN BLOOD BY AUTOMATED COUNT: 14.8 % (ref 11.5–14.5)
EST. GFR  (NO RACE VARIABLE): 41 ML/MIN/1.73 M^2
EST. GFR  (NO RACE VARIABLE): 44 ML/MIN/1.73 M^2
ESTIMATED AVG GLUCOSE: 148 MG/DL (ref 68–131)
GLUCOSE SERPL-MCNC: 122 MG/DL (ref 70–110)
GLUCOSE SERPL-MCNC: 131 MG/DL (ref 70–110)
HBA1C MFR BLD: 6.8 % (ref 4–5.6)
HCT VFR BLD AUTO: 43.5 % (ref 40–54)
HCV AB SERPL QL IA: POSITIVE
HGB BLD-MCNC: 14.4 G/DL (ref 14–18)
HIV 1+2 AB+HIV1 P24 AG SERPL QL IA: NEGATIVE
MAGNESIUM SERPL-MCNC: 2.1 MG/DL (ref 1.6–2.6)
MAGNESIUM SERPL-MCNC: 2.2 MG/DL (ref 1.6–2.6)
MCH RBC QN AUTO: 30.1 PG (ref 27–31)
MCHC RBC AUTO-ENTMCNC: 33.1 G/DL (ref 32–36)
MCV RBC AUTO: 91 FL (ref 82–98)
METHADONE UR QL SCN>300 NG/ML: NEGATIVE
OPIATES UR QL SCN: NEGATIVE
PCP UR QL SCN>25 NG/ML: NEGATIVE
PHOSPHATE SERPL-MCNC: 4.9 MG/DL (ref 2.7–4.5)
PLATELET # BLD AUTO: 171 K/UL (ref 150–450)
PMV BLD AUTO: 10.9 FL (ref 9.2–12.9)
POCT GLUCOSE: 116 MG/DL (ref 70–110)
POCT GLUCOSE: 118 MG/DL (ref 70–110)
POCT GLUCOSE: 135 MG/DL (ref 70–110)
POTASSIUM SERPL-SCNC: 3.9 MMOL/L (ref 3.5–5.1)
POTASSIUM SERPL-SCNC: 4.1 MMOL/L (ref 3.5–5.1)
PROT SERPL-MCNC: 6.6 G/DL (ref 6–8.4)
RBC # BLD AUTO: 4.78 M/UL (ref 4.6–6.2)
SODIUM SERPL-SCNC: 142 MMOL/L (ref 136–145)
SODIUM SERPL-SCNC: 143 MMOL/L (ref 136–145)
TOXICOLOGY INFORMATION: NORMAL
WBC # BLD AUTO: 8.33 K/UL (ref 3.9–12.7)

## 2025-02-28 PROCEDURE — 94761 N-INVAS EAR/PLS OXIMETRY MLT: CPT

## 2025-02-28 PROCEDURE — 63600175 PHARM REV CODE 636 W HCPCS: Performed by: STUDENT IN AN ORGANIZED HEALTH CARE EDUCATION/TRAINING PROGRAM

## 2025-02-28 PROCEDURE — 63600175 PHARM REV CODE 636 W HCPCS

## 2025-02-28 PROCEDURE — 80048 BASIC METABOLIC PNL TOTAL CA: CPT | Performed by: STUDENT IN AN ORGANIZED HEALTH CARE EDUCATION/TRAINING PROGRAM

## 2025-02-28 PROCEDURE — 83735 ASSAY OF MAGNESIUM: CPT | Performed by: NURSE PRACTITIONER

## 2025-02-28 PROCEDURE — 94660 CPAP INITIATION&MGMT: CPT

## 2025-02-28 PROCEDURE — 83735 ASSAY OF MAGNESIUM: CPT | Mod: 91 | Performed by: STUDENT IN AN ORGANIZED HEALTH CARE EDUCATION/TRAINING PROGRAM

## 2025-02-28 PROCEDURE — 25000242 PHARM REV CODE 250 ALT 637 W/ HCPCS: Performed by: STUDENT IN AN ORGANIZED HEALTH CARE EDUCATION/TRAINING PROGRAM

## 2025-02-28 PROCEDURE — 94640 AIRWAY INHALATION TREATMENT: CPT

## 2025-02-28 PROCEDURE — 20000000 HC ICU ROOM

## 2025-02-28 PROCEDURE — 83036 HEMOGLOBIN GLYCOSYLATED A1C: CPT | Performed by: STUDENT IN AN ORGANIZED HEALTH CARE EDUCATION/TRAINING PROGRAM

## 2025-02-28 PROCEDURE — 80307 DRUG TEST PRSMV CHEM ANLYZR: CPT | Performed by: EMERGENCY MEDICINE

## 2025-02-28 PROCEDURE — 25000003 PHARM REV CODE 250: Performed by: STUDENT IN AN ORGANIZED HEALTH CARE EDUCATION/TRAINING PROGRAM

## 2025-02-28 PROCEDURE — 25000003 PHARM REV CODE 250: Performed by: INTERNAL MEDICINE

## 2025-02-28 PROCEDURE — 80053 COMPREHEN METABOLIC PANEL: CPT | Performed by: STUDENT IN AN ORGANIZED HEALTH CARE EDUCATION/TRAINING PROGRAM

## 2025-02-28 PROCEDURE — 85027 COMPLETE CBC AUTOMATED: CPT | Performed by: STUDENT IN AN ORGANIZED HEALTH CARE EDUCATION/TRAINING PROGRAM

## 2025-02-28 PROCEDURE — 84100 ASSAY OF PHOSPHORUS: CPT | Performed by: STUDENT IN AN ORGANIZED HEALTH CARE EDUCATION/TRAINING PROGRAM

## 2025-02-28 PROCEDURE — 36415 COLL VENOUS BLD VENIPUNCTURE: CPT | Performed by: STUDENT IN AN ORGANIZED HEALTH CARE EDUCATION/TRAINING PROGRAM

## 2025-02-28 PROCEDURE — 86803 HEPATITIS C AB TEST: CPT | Performed by: STUDENT IN AN ORGANIZED HEALTH CARE EDUCATION/TRAINING PROGRAM

## 2025-02-28 PROCEDURE — 99291 CRITICAL CARE FIRST HOUR: CPT | Mod: GC,,, | Performed by: STUDENT IN AN ORGANIZED HEALTH CARE EDUCATION/TRAINING PROGRAM

## 2025-02-28 PROCEDURE — 87389 HIV-1 AG W/HIV-1&-2 AB AG IA: CPT | Performed by: STUDENT IN AN ORGANIZED HEALTH CARE EDUCATION/TRAINING PROGRAM

## 2025-02-28 PROCEDURE — 25000003 PHARM REV CODE 250

## 2025-02-28 PROCEDURE — 99233 SBSQ HOSP IP/OBS HIGH 50: CPT | Mod: ,,, | Performed by: INTERNAL MEDICINE

## 2025-02-28 PROCEDURE — 99900035 HC TECH TIME PER 15 MIN (STAT)

## 2025-02-28 PROCEDURE — 87522 HEPATITIS C REVRS TRNSCRPJ: CPT | Performed by: STUDENT IN AN ORGANIZED HEALTH CARE EDUCATION/TRAINING PROGRAM

## 2025-02-28 PROCEDURE — 27000221 HC OXYGEN, UP TO 24 HOURS

## 2025-02-28 RX ORDER — METOLAZONE 5 MG/1
5 TABLET ORAL ONCE
Status: COMPLETED | OUTPATIENT
Start: 2025-02-28 | End: 2025-02-28

## 2025-02-28 RX ORDER — PANTOPRAZOLE SODIUM 40 MG/10ML
40 INJECTION, POWDER, LYOPHILIZED, FOR SOLUTION INTRAVENOUS DAILY
Status: DISCONTINUED | OUTPATIENT
Start: 2025-02-28 | End: 2025-02-28

## 2025-02-28 RX ORDER — IBUPROFEN 200 MG
16 TABLET ORAL
Status: DISCONTINUED | OUTPATIENT
Start: 2025-02-28 | End: 2025-03-05 | Stop reason: HOSPADM

## 2025-02-28 RX ORDER — METOPROLOL TARTRATE 25 MG/1
25 TABLET, FILM COATED ORAL EVERY 6 HOURS
Status: COMPLETED | OUTPATIENT
Start: 2025-02-28 | End: 2025-03-01

## 2025-02-28 RX ORDER — IBUPROFEN 200 MG
24 TABLET ORAL
Status: DISCONTINUED | OUTPATIENT
Start: 2025-02-28 | End: 2025-03-05 | Stop reason: HOSPADM

## 2025-02-28 RX ORDER — PANTOPRAZOLE SODIUM 40 MG/10ML
40 INJECTION, POWDER, LYOPHILIZED, FOR SOLUTION INTRAVENOUS ONCE
Status: COMPLETED | OUTPATIENT
Start: 2025-02-28 | End: 2025-02-28

## 2025-02-28 RX ORDER — FUROSEMIDE 10 MG/ML
120 INJECTION INTRAMUSCULAR; INTRAVENOUS ONCE
Status: COMPLETED | OUTPATIENT
Start: 2025-02-28 | End: 2025-02-28

## 2025-02-28 RX ORDER — INSULIN ASPART 100 [IU]/ML
0-5 INJECTION, SOLUTION INTRAVENOUS; SUBCUTANEOUS
Status: DISCONTINUED | OUTPATIENT
Start: 2025-02-28 | End: 2025-03-05 | Stop reason: HOSPADM

## 2025-02-28 RX ORDER — METOPROLOL TARTRATE 1 MG/ML
5 INJECTION, SOLUTION INTRAVENOUS EVERY 4 HOURS PRN
Status: DISCONTINUED | OUTPATIENT
Start: 2025-02-28 | End: 2025-03-02

## 2025-02-28 RX ADMIN — AZITHROMYCIN MONOHYDRATE 500 MG: 500 INJECTION, POWDER, LYOPHILIZED, FOR SOLUTION INTRAVENOUS at 11:02

## 2025-02-28 RX ADMIN — METOPROLOL TARTRATE 25 MG: 25 TABLET, FILM COATED ORAL at 05:02

## 2025-02-28 RX ADMIN — APIXABAN 5 MG: 2.5 TABLET, FILM COATED ORAL at 08:02

## 2025-02-28 RX ADMIN — METOPROLOL TARTRATE 25 MG: 25 TABLET, FILM COATED ORAL at 11:02

## 2025-02-28 RX ADMIN — METOLAZONE 5 MG: 5 TABLET ORAL at 05:02

## 2025-02-28 RX ADMIN — PANTOPRAZOLE SODIUM 40 MG: 40 INJECTION, POWDER, LYOPHILIZED, FOR SOLUTION INTRAVENOUS at 03:02

## 2025-02-28 RX ADMIN — IPRATROPIUM BROMIDE AND ALBUTEROL SULFATE 3 ML: 2.5; .5 SOLUTION RESPIRATORY (INHALATION) at 11:02

## 2025-02-28 RX ADMIN — IPRATROPIUM BROMIDE AND ALBUTEROL SULFATE 3 ML: 2.5; .5 SOLUTION RESPIRATORY (INHALATION) at 03:02

## 2025-02-28 RX ADMIN — MUPIROCIN: 20 OINTMENT TOPICAL at 08:02

## 2025-02-28 RX ADMIN — FUROSEMIDE 120 MG: 10 INJECTION, SOLUTION INTRAMUSCULAR; INTRAVENOUS at 08:02

## 2025-02-28 RX ADMIN — APIXABAN 5 MG: 2.5 TABLET, FILM COATED ORAL at 11:02

## 2025-02-28 RX ADMIN — IPRATROPIUM BROMIDE AND ALBUTEROL SULFATE 3 ML: 2.5; .5 SOLUTION RESPIRATORY (INHALATION) at 07:02

## 2025-02-28 RX ADMIN — FUROSEMIDE 120 MG: 10 INJECTION, SOLUTION INTRAMUSCULAR; INTRAVENOUS at 05:02

## 2025-02-28 RX ADMIN — FUROSEMIDE 80 MG: 10 INJECTION, SOLUTION INTRAMUSCULAR; INTRAVENOUS at 05:02

## 2025-02-28 RX ADMIN — CEFTRIAXONE SODIUM 1 G: 1 INJECTION, POWDER, FOR SOLUTION INTRAMUSCULAR; INTRAVENOUS at 11:02

## 2025-02-28 NOTE — PROGRESS NOTES
[facility] Progress Note    Subjective:      65-year-old male who presented to the ED for evaluation shortness of breath.  He states his worsening shortness of breath started early this morning with and associated dry cough.  He has been on have his medications after being [facility] about 1 month ago.  He denies any sick contacts, fever, chills, appetite change, or chest pain.  He does admit to right shoulder pain which is new and is not limiting his range of motion.   Patient was noted to be placed on BiPAP for increased work of breathing in the ED. they attempted to remove this but shortly after he was taken off of it, he had worsening tachypnea, shortness of breath, and accessory muscle usage.     Currently, patient is off of BiPAP and is feeling comfortable.  He states he is feeling much better than when he first came in and his shortness of breath and cough have improved.  Patient received 120 mg of IV Lasix today and has had good urine output.  Would recommend BiPAP as needed and with sleep.     Objective:     Last 24 Hour Vital Signs:  BP  Min: 91/66  Max: 136/86  Temp  Av.9 °F (36.6 °C)  Min: 97.6 °F (36.4 °C)  Max: 98.3 °F (36.8 °C)  Pulse  Av.6  Min: 87  Max: 122  Resp  Av.4  Min: 10  Max: 27  SpO2  Av.8 %  Min: 93 %  Max: 99 %  I/O last 3 completed shifts:  In: 220 [P.O.:220]  Out: 610 [Urine:610]    Physical Examination:  Physical Exam  Constitutional:       General: He is not in acute distress.     Appearance: He is not ill-appearing or toxic-appearing.   HENT:      Head: Normocephalic.   Eyes:      General: No scleral icterus.     Extraocular Movements: Extraocular movements intact.      Conjunctiva/sclera: Conjunctivae normal.   Cardiovascular:      Rate and Rhythm: Tachycardia present. Rhythm irregular.      Pulses: Normal pulses.      Heart sounds: No murmur heard.  Pulmonary:      Effort: Pulmonary effort is normal. No respiratory  distress.      Breath sounds: No wheezing, rhonchi or rales.   Abdominal:      General: Abdomen is flat. There is no distension.      Palpations: Abdomen is soft.      Tenderness: There is no abdominal tenderness.   Musculoskeletal:      Right lower leg: Edema (Trace) present.      Left lower leg: Edema (Trace) present.   Skin:     General: Skin is dry.   Neurological:      General: No focal deficit present.      Mental Status: Mental status is at baseline.   Psychiatric:         Mood and Affect: Mood normal.         Behavior: Behavior normal.          Laboratory:  Trended Lab Data:  Recent Labs     02/27/25  0637 02/28/25  0026 02/28/25  0738   WBC 7.65  --  8.33   HGB 15.9  --  14.4   HCT 46.4  --  43.5     --  171    143 142   K 3.9 4.1 3.9    111* 109   CO2 20* 25 21*   BUN 20 24* 27*   CREATININE 1.3 1.7* 1.8*   * 131* 122*   BILITOT 0.8  --  1.0   AST 22  --  17   ALT 17  --  16   ALKPHOS 94  --  77   CALCIUM 9.3 8.7 8.7   ALBUMIN 4.0  --  3.4*   PROT 7.5  --  6.6   MG 1.5* 2.2 2.1   PHOS  --   --  4.9*       Cardiac:   Recent Labs   Lab 02/27/25  0637 02/27/25  0935   TROPONINI 0.387* 0.386*   *  --        Urinalysis:   Lab Results   Component Value Date    COLORU Light Yellow 09/17/2024    SPECGRAV 1.010 06/09/2024    NITRITE Negative 06/09/2024    KETONESU Negative 06/09/2024    UROBILINOGEN 2.0 mg/dL (A) 09/17/2024       Microbiology:  Microbiology Results (last 7 days)       Procedure Component Value Units Date/Time    Blood culture [[id number]] Collected: 02/27/25 1026    Order Status: Completed Specimen: Blood from Peripheral, Hand, Left Updated: 02/28/25 0115     Blood Culture, Routine No Growth to date    Blood culture [[id number]] Collected: 02/27/25 1027    Order Status: Completed Specimen: Blood from Peripheral, Wrist, Left Updated: 02/28/25 0115     Blood Culture, Routine No Growth to date    Respiratory Infection Panel (PCR), Nasopharyngeal [[id number]] Collected:  02/27/25 1059    Order Status: Completed Specimen: Nasopharyngeal Swab Updated: 02/27/25 6291     Respiratory Infection Panel Source NP Swab     Adenovirus Not Detected     Coronavirus 229E, Common Cold Virus Not Detected     Coronavirus HKU1, Common Cold Virus Not Detected     Coronavirus NL63, Common Cold Virus Not Detected     Coronavirus OC43, Common Cold Virus Not Detected     Comment: The Coronavirus strains detected in this test cause the common cold.  These strains are not the COVID-19 (novel Coronavirus)strain   associated with the respiratory disease outbreak.          SARS-CoV2 (COVID-19) Qualitative PCR Not Detected     Human Metapneumovirus Not Detected     Human Rhinovirus/Enterovirus Not Detected     Influenza A Not Detected     Influenza B Not Detected     Parainfluenza Virus 1 Not Detected     Parainfluenza Virus 2 Not Detected     Parainfluenza Virus 3 Not Detected     Parainfluenza Virus 4 Not Detected     Respiratory Syncytial Virus Not Detected     Bordetella Parapertussis ([id number]) Not Detected     Bordetella pertussis (ptxP) Not Detected     Chlamydia pneumoniae Not Detected     Mycoplasma pneumoniae Not Detected    Narrative:      Assay not valid for lower respiratory specimens, alternate  testing required.    Culture, Respiratory with Gram Stain [[id number]]     Order Status: No result Specimen: Respiratory             Radiology:  I have personally reviewed the above labs and imaging.    Assessment:     Marck Madison is a 65 y.o.male who presented to the ED for evaluation shortness of breath.  He states his worsening shortness of breath started early this morning with and associated dry cough.  He has been on have his medications after being released from shelter about 1 month ago.      Plan:     Chronic respiratory failure  - Patient states he is on 2 L nasal cannula home   - He does not have any standing inhalers but was prescribed an albuterol inhaler p.r.n.  History of tobacco use, without  prior PFTs  Diastolic heart failure, in acute exacerbation   Elevated BNP     - patient with increased work of breathing, would recommend BiPAP as needed  - He should also use bipap at this time with sleep  - chest x-ray consistent with pulmonary edema, unable to rule out pneumonia  - would recommend treating for cap with ceftriaxone and azithromycin  - respiratory infection panel negative  - sputum culture ordered  - recommend aggressive diuresis in the setting of fluid overload and elevated BNP, if insufficient urine output with Lasix would recommend giving Lasix with metolazone  - TTE with moderately dilated LA and RA with moderate MR and mild TR  - will need outpatient pulmonary follow-up and PFTs  - he will need yearly low-dose CT scans for lung cancer screening due to his smoking history  - continue with smoking cessation       Feeding:  Regular diet  Analgesia:  Tylenol.  Sedation: n/a  Thromboembolic ppx: lovenox  HOB > 30:  Yes  Ulcer ppx:  n/a  Glucose control:  Glucose goal 140-180  Bowel function:  MiraLax p.r.n.  Indwelling lines and catheters:  PIV  De-escalation abx:  No, ceftriaxone azithromycin     Dispo:  Stable for downgrade from the ICU  CODE:  Full    Thank you for allowing us to participate in the care of this patient, will sign off at this time.. Please let us know if there are questions or concerns.      Satya Hanson DO  Pulmonary & Critical Care Medicine Fellow

## 2025-02-28 NOTE — ASSESSMENT & PLAN NOTE
DVT    Switched to Lovenox from eliquis by hematology in 2024 due to development of DVT/PE despite being on eliquis for Afib. Pt reports he did not have financial issues getting lovenox but has not been taking it either. To me pt reports that he did have poor compliance with eliquis use prior. As such will continue with eliquis (instead of lovenox as injections render itself to even less compliance).

## 2025-02-28 NOTE — PLAN OF CARE
Medicare Message     Important Message from Medicare regarding Discharge Appeal Rights Explained to patient/caregiver; Signed/date by patient/caregiver   Date IMM was signed 2/27/2025   Time IMM was signed 2225

## 2025-02-28 NOTE — PLAN OF CARE
Problem: Adult Inpatient Plan of Care  Goal: Plan of Care Review  Outcome: Progressing  Goal: Absence of Hospital-Acquired Illness or Injury  Outcome: Progressing  Goal: Optimal Comfort and Wellbeing  Outcome: Progressing     Problem: Diabetes Comorbidity  Goal: Blood Glucose Level Within Targeted Range  Outcome: Progressing     Problem: Skin Injury Risk Increased  Goal: Skin Health and Integrity  Outcome: Progressing

## 2025-02-28 NOTE — NURSING
Lab called stating BMP was hemolyzed and needs to be recollected , Phlebotomist  came to retry blood draw, but unsuccessful, stated another phlebotomist will have to come try.

## 2025-02-28 NOTE — PROGRESS NOTES
"[facility] ([place])  Cardiology  Progress Note    Patient Name: Marck Madison  MRN: [id number]  Admission Date: 2/27/2025  Hospital Length of Stay: 1 days  Code Status: Full Code   Attending Physician: Florencio Thompson MD   Primary Care Physician: [facility] -  Expected Discharge Date:   Principal Problem:Acute on chronic diastolic congestive heart failure    Subjective:     Brief HPI:    "This 65-year-old male with a history of diastolic heart failure has had progressively worsening shortness of breath for several days. He reports that he has been out of all of his medications for a while. Can not say how long. History somewhat difficult as he is on noninvasive positive pressure ventilation. Had some chest tightness. Since admission, feeling better with diuresis".    Hospital Course:     2/27/2025: Echo: Normal left ventricular size and systolic function. EF 55-60%. LVH. Moderately dilated LA. Moderate MR.    Diuresis.    Interval History:     In atypical atrial flutter with fast VRR.    Review of Systems   Constitutional: Negative for chills, fever and malaise/fatigue.   HENT:  Negative for nosebleeds.    Eyes:  Negative for vision loss in left eye and vision loss in right eye.   Cardiovascular:  Positive for orthopnea and paroxysmal nocturnal dyspnea. Negative for chest pain, leg swelling and palpitations.   Respiratory:  Positive for shortness of breath. Negative for cough, hemoptysis, sputum production and wheezing.    Hematologic/Lymphatic: Negative for bleeding problem. Does not bruise/bleed easily.   Skin:  Negative for color change and rash.   Musculoskeletal:  Negative for muscle weakness and myalgias.   Gastrointestinal:  Negative for abdominal pain, heartburn, hematemesis, hematochezia, melena, nausea and vomiting.   Genitourinary:  Negative for hematuria.   Neurological:  Negative for dizziness, focal weakness, headaches, light-headedness, vertigo and weakness. "   Psychiatric/Behavioral:  Negative for altered mental status. The patient is not nervous/anxious.    Allergic/Immunologic: Negative for persistent infections.     Objective:     Vital Signs (Most Recent):  Temp: 98.3 °F (36.8 °C) (02/28/25 0411)  Pulse: 87 (02/28/25 0736)  Resp: (!) 27 (02/28/25 0736)  BP: 110/83 (02/28/25 0600)  SpO2: 98 % (02/28/25 0736) Vital Signs (24h Range):  Temp:  [97.6 °F (36.4 °C)-98.3 °F (36.8 °C)] 98.3 °F (36.8 °C)  Pulse:  [] 87  Resp:  [12-27] 27  SpO2:  [87 %-99 %] 98 %  BP: ()/(66-98) 110/83     Weight: 79.8 kg (176 lb)  Body mass index is 28.41 kg/m².    SpO2: 98 %         Intake/Output Summary (Last 24 hours) at 2/28/2025 1004  Last data filed at 2/28/2025 0243  Gross per 24 hour   Intake 220 ml   Output 610 ml   Net -390 ml       Lines/Drains/Airways       Drain  Duration             Male External Urinary Catheter 02/27/25 1100 Medium <1 day              Peripheral Intravenous Line  Duration                  Peripheral IV - Single Lumen 02/27/25 0630 20 G Right Upper Arm 1 day                    Physical Exam  Constitutional:       General: He is not in acute distress.     Appearance: Normal appearance. He is well-developed. He is not ill-appearing.   Eyes:      Conjunctiva/sclera:      Right eye: Right conjunctiva is not injected. No hemorrhage.     Left eye: Left conjunctiva is not injected. No hemorrhage.     Pupils:      Right eye: Pupil is round.      Left eye: Pupil is round.   Neck:      Vascular: No JVD.   Cardiovascular:      Rate and Rhythm: Tachycardia present. Rhythm irregularly irregular.      Heart sounds: S1 normal and S2 normal.   Pulmonary:      Effort: Pulmonary effort is normal.      Breath sounds: Examination of the right-lower field reveals rales. Examination of the left-lower field reveals rales. Rales present.   Chest:      Chest wall: No swelling or tenderness.   Abdominal:      General: There is no distension.      Palpations: Abdomen is soft.       Tenderness: There is no abdominal tenderness.   Musculoskeletal:      Cervical back: Neck supple.      Right ankle: No swelling.      Left ankle: No swelling.   Skin:     General: Skin is warm and dry.      Findings: No rash.   Neurological:      Mental Status: He is alert and oriented to person, place, and time. He is not disoriented.   Psychiatric:         Attention and Perception: Attention normal.         Mood and Affect: Mood normal.         Speech: Speech normal.         Behavior: Behavior normal.         Thought Content: Thought content normal.         Cognition and Memory: Cognition and memory normal.         Judgment: Judgment normal.       Current Medications:     albuterol-ipratropium  3 mL Nebulization Q4H WAKE    azithromycin  500 mg Intravenous Q24H    cefTRIAXone (Rocephin) IV (PEDS and ADULTS)  1 g Intravenous Q24H    enoxparin  40 mg Subcutaneous Daily    mupirocin   Nasal BID     Current Laboratory Results:    Recent Results (from the past 24 hours)   Blood culture    Collection Time: 02/27/25 10:26 AM    Specimen: Peripheral, Hand, Left; Blood   Result Value Ref Range    Blood Culture, Routine No Growth to date    Blood culture    Collection Time: 02/27/25 10:27 AM    Specimen: Peripheral, Wrist, Left; Blood   Result Value Ref Range    Blood Culture, Routine No Growth to date    Echo    Collection Time: 02/27/25 10:53 AM   Result Value Ref Range    BSA 1.93 m2    LA WIDTH 4.0 cm    Left Atrium Area-systolic Apical Two Chamber 30.0 cm2    Left Atrium Area-systolic Four Chamber 28.0 cm2    LVOT stroke volume 25.5 cm3    LVIDd 4.5 3.5 - 6.0 cm    LV Systolic Volume 42 mL    LV Systolic Volume Index 22.2 mL/m2    LVIDs 3.2 2.1 - 4.0 cm    LV Diastolic Volume 91 mL    LV ESV A4C 92.23 mL    LV Diastolic Volume Index 48.15 mL/m2    Left Ventricular End Systolic Volume by Teichholz Method 42.04 mL    Left Ventricular End Diastolic Volume by Teichholz Method 90.67 mL    IVS 1.3 (A) 0.6 - 1.1 cm     LVOT diameter 1.9 cm    LVOT area 2.8 cm2    FS 28.9 28 - 44 %    Left Ventricle Relative Wall Thickness 0.62 cm    PW 1.4 (A) 0.6 - 1.1 cm    LV mass 235.3 g    LV Mass Index 124.5 g/m2    TDI LATERAL 0.08 m/s    TDI SEPTAL 0.07 m/s    TR Max Chinmay 2.2 m/s    LVOT peak chinmay 0.7 m/s    Left Ventricular Outflow Tract Mean Velocity 0.38 cm/s    Left Ventricular Outflow Tract Mean Gradient 0.77 mmHg    RV- ballesteros basal diam 1.7 cm    RV/LV Ratio 0.38 cm    LA size 3.6 cm    Left Atrium Major Axis 6.6 cm    RA Major Axis 4.86 cm    AV mean gradient 2 mmHg    AV peak gradient 4 mmHg    Ao peak chinmay 1.0 m/s    Ao VTI 11.1 cm    LVOT peak VTI 9.0 cm    AV valve area 2.3 cm²    AV Velocity Ratio 0.70     AV index (prosthetic) 0.81     ALDO by Velocity Ratio 2.0 cm²    Mr max chinmay 5.24 m/s    PV PEAK VELOCITY 0.85 m/s    PV peak gradient 3 mmHg    Pulmonary Valve Mean Velocity 0.58 m/s    Ao root annulus 1.89 cm    STJ 2.68 cm    Ascending aorta 2.90 cm    IVC diameter 1.71 cm    Mean e' 0.08 m/s    ZLVIDS -0.15     ZLVIDD -1.62     LA area A4C 28.34 cm2    RVDD 1.65 cm    RA Width 3.7 cm    Sinus 2.3 cm    RASHARD 41 mL/m2    LA Vol 77 cm3    Left Atrium Minor Axis 6.0 cm    TV resting pulmonary artery pressure 22 mmHg    RV TB RVSP 5 mmHg    Est. RA pres 3 mmHg   Respiratory Infection Panel (PCR), Nasopharyngeal    Collection Time: 02/27/25 10:59 AM    Specimen: Nasopharyngeal Swab   Result Value Ref Range    Respiratory Infection Panel Source NP Swab     Adenovirus Not Detected Not Detected    Coronavirus 229E, Common Cold Virus Not Detected Not Detected    Coronavirus HKU1, Common Cold Virus Not Detected Not Detected    Coronavirus NL63, Common Cold Virus Not Detected Not Detected    Coronavirus OC43, Common Cold Virus Not Detected Not Detected    SARS-CoV2 (COVID-19) Qualitative PCR Not Detected Not Detected    Human Metapneumovirus Not Detected Not Detected    Human Rhinovirus/Enterovirus Not Detected Not Detected    Influenza A  Not Detected Not Detected    Influenza B Not Detected Not Detected    Parainfluenza Virus 1 Not Detected Not Detected    Parainfluenza Virus 2 Not Detected Not Detected    Parainfluenza Virus 3 Not Detected Not Detected    Parainfluenza Virus 4 Not Detected Not Detected    Respiratory Syncytial Virus Not Detected Not Detected    Bordetella Parapertussis (TD0936) Not Detected Not Detected    Bordetella pertussis (ptxP) Not Detected Not Detected    Chlamydia pneumoniae Not Detected Not Detected    Mycoplasma pneumoniae Not Detected Not Detected   EKG 12-lead    Collection Time: 02/27/25 11:33 AM   Result Value Ref Range    QRS Duration 94 ms    OHS QTC Calculation 510 ms   POCT glucose    Collection Time: 02/27/25  6:03 PM   Result Value Ref Range    POCT Glucose 111 (H) 70 - 110 mg/dL   POCT glucose    Collection Time: 02/27/25 11:46 PM   Result Value Ref Range    POCT Glucose 139 (H) 70 - 110 mg/dL   Basic metabolic panel    Collection Time: 02/28/25 12:26 AM   Result Value Ref Range    Sodium 143 136 - 145 mmol/L    Potassium 4.1 3.5 - 5.1 mmol/L    Chloride 111 (H) 95 - 110 mmol/L    CO2 25 23 - 29 mmol/L    Glucose 131 (H) 70 - 110 mg/dL    BUN 24 (H) 8 - 23 mg/dL    Creatinine 1.7 (H) 0.5 - 1.4 mg/dL    Calcium 8.7 8.7 - 10.5 mg/dL    Anion Gap 7 (L) 8 - 16 mmol/L    eGFR 44 (A) >60 mL/min/1.73 m^2   Magnesium    Collection Time: 02/28/25 12:26 AM   Result Value Ref Range    Magnesium 2.2 1.6 - 2.6 mg/dL   Drug screen panel, emergency    Collection Time: 02/28/25  2:04 AM   Result Value Ref Range    Benzodiazepines Negative Negatiive    Methadone metabolites Negative Negative    Cocaine (Metab.) Negative Negative    Opiate Scrn, Ur Negative Negative    Barbiturate Screen, Ur Negative Negative    Amphetamine Screen, Ur Negative Negative    THC Negative Negative    Phencyclidine Negative Negative    Creatinine, Urine 104.1 23.0 - 375.0 mg/dL    Toxicology Information SEE COMMENT    POCT glucose    Collection Time:  02/28/25  6:26 AM   Result Value Ref Range    POCT Glucose 116 (H) 70 - 110 mg/dL   HIV 1/2 Ag/Ab (4th Gen)    Collection Time: 02/28/25  7:38 AM   Result Value Ref Range    HIV 1/2 Ag/Ab Negative Negative   Hepatitis C antibody    Collection Time: 02/28/25  7:38 AM   Result Value Ref Range    Hepatitis C Ab Positive (A) Negative   CBC Without Differential    Collection Time: 02/28/25  7:38 AM   Result Value Ref Range    WBC 8.33 3.90 - 12.70 K/uL    RBC 4.78 4.60 - 6.20 M/uL    Hemoglobin 14.4 14.0 - 18.0 g/dL    Hematocrit 43.5 40.0 - 54.0 %    MCV 91 82 - 98 fL    MCH 30.1 27.0 - 31.0 pg    MCHC 33.1 32.0 - 36.0 g/dL    RDW 14.8 (H) 11.5 - 14.5 %    Platelets 171 150 - 450 K/uL    MPV 10.9 9.2 - 12.9 fL   Comprehensive Metabolic Panel    Collection Time: 02/28/25  7:38 AM   Result Value Ref Range    Sodium 142 136 - 145 mmol/L    Potassium 3.9 3.5 - 5.1 mmol/L    Chloride 109 95 - 110 mmol/L    CO2 21 (L) 23 - 29 mmol/L    Glucose 122 (H) 70 - 110 mg/dL    BUN 27 (H) 8 - 23 mg/dL    Creatinine 1.8 (H) 0.5 - 1.4 mg/dL    Calcium 8.7 8.7 - 10.5 mg/dL    Total Protein 6.6 6.0 - 8.4 g/dL    Albumin 3.4 (L) 3.5 - 5.2 g/dL    Total Bilirubin 1.0 0.1 - 1.0 mg/dL    Alkaline Phosphatase 77 40 - 150 U/L    AST 17 10 - 40 U/L    ALT 16 10 - 44 U/L    eGFR 41 (A) >60 mL/min/1.73 m^2    Anion Gap 12 8 - 16 mmol/L   Magnesium    Collection Time: 02/28/25  7:38 AM   Result Value Ref Range    Magnesium 2.1 1.6 - 2.6 mg/dL   Phosphorus    Collection Time: 02/28/25  7:38 AM   Result Value Ref Range    Phosphorus 4.9 (H) 2.7 - 4.5 mg/dL     Current Imaging Results:    US Lower Extremity Veins Bilateral   Final Result      No DVT seen.         Electronically signed by: Wild Buchanan MD   Date:    02/28/2025   Time:    08:00      X-Ray Chest AP Portable   Final Result      Pulmonary edema, pneumonia, aspiration, or sepsis.         Electronically signed by: Wild Buchanan MD   Date:    02/27/2025   Time:    08:00      US  Retroperitoneal Complete    (Results Pending)       2/27/2025: Echo:  Left Ventricle: The left ventricle is normal in size. Increased wall thickness. There is concentric hypertrophy. There is normal systolic function with a visually estimated ejection fraction of 55 - 60%.  Right Ventricle: The right ventricle is normal in size. Wall thickness is normal. Systolic function is normal.  Left Atrium: moderately dilated  Right Atrium: Right atrium is dilated.  Mitral Valve: There is moderate regurgitation.  Tricuspid Valve: There is mild regurgitation.      Assessment and Plan:     Problem List:    Active Diagnoses:    Diagnosis Date Noted POA    PRINCIPAL PROBLEM:  Acute on chronic diastolic congestive heart failure [I50.33] 09/18/2022 Yes    History of pulmonary embolus (PE) [Z86.711] 02/27/2025 Yes    Hx of hepatitis C [Z86.19] 02/27/2025 Yes    Type 2 diabetes mellitus [E11.9] 09/18/2022 Yes    COPD (chronic obstructive pulmonary disease) [J44.9] 11/21/2021 Yes    Hypertension [I10] 06/09/2021 Yes      Problems Resolved During this Admission:     Assessment and Plan:    Heart Failure, Diastolic, Acute on Chronic   2/27/2025: Echo: Normal left ventricular size and systolic function. EF 55-60%. LVH. Moderately dilated LA. Moderate MR.   On furosemide iv.    2. Atrial Flutter   In atypical atrial flutter.   's.   Add metoprolol 25 mg Q6.   Needs anticoagulation.      VTE Risk Mitigation (From admission, onward)           Ordered     enoxaparin injection 40 mg  Daily         02/27/25 1301     IP VTE HIGH RISK PATIENT  Once         02/27/25 1301                    Hermila Littlejohn MD  Cardiology  [facility] ([place])

## 2025-02-28 NOTE — HOSPITAL COURSE
Patient admitted on IV diuresis and BiPAP, which was intermittently required for increased work of breathing.  Ultrasound of the BLE was negative for DVT.  He was seen by cardiology and started on metoprolol and apixaban due to atrial flutter.  Electrolytes were monitored and replaced as needed.  Losartan and amlodipine added for blood pressure.    Record review indicated he had been switched from apixaban to Lovenox last year by hematology as he had developed DVT/PE despite being on apixaban for atrial fibrillation.  Patient did report that he had not been compliant with apixaban or Lovenox (and was not taking any of his other medications as well).  As such he was continued on apixaban instead of Lovenox as injections make compliance even less likely.     Patient was diuresed until he was feeling better.  He was seen and examined on the day of discharge and was anxious to go home.  All of his medications were prescribed for him as he had been unable to obtain anything outpatient.  Home health was ordered with PT and OT.

## 2025-02-28 NOTE — SUBJECTIVE & OBJECTIVE
Interval History: Trial off BIPAP. Poor urine output. Increase lasix dose.     Review of Systems   Constitutional:  Positive for chills.   Respiratory:  Positive for cough and shortness of breath.    Cardiovascular:  Positive for leg swelling. Negative for chest pain.   Gastrointestinal:  Negative for abdominal pain, constipation and diarrhea.     Objective:     Vital Signs (Most Recent):  Temp: 97.6 °F (36.4 °C) (02/28/25 0702)  Pulse: 80 (02/28/25 1507)  Resp: (!) 31 (02/28/25 1507)  BP: 100/72 (02/28/25 1146)  SpO2: (!) 90 % (02/28/25 1507) Vital Signs (24h Range):  Temp:  [97.6 °F (36.4 °C)-98.3 °F (36.8 °C)] 97.6 °F (36.4 °C)  Pulse:  [] 80  Resp:  [10-31] 31  SpO2:  [90 %-99 %] 90 %  BP: ()/(72-87) 100/72     Weight: 79.8 kg (176 lb)  Body mass index is 28.41 kg/m².    Intake/Output Summary (Last 24 hours) at 2/28/2025 1511  Last data filed at 2/28/2025 1151  Gross per 24 hour   Intake 220 ml   Output 1210 ml   Net -990 ml         Physical Exam  Constitutional:       General: He is not in acute distress.  Pulmonary:      Breath sounds: No wheezing.      Comments: On BIPAP  Abdominal:      General: There is no distension.      Tenderness: There is no abdominal tenderness.   Musculoskeletal:      Right lower leg: Edema present.      Left lower leg: Edema present.   Neurological:      Mental Status: He is oriented to person, place, and time.             Significant Labs: All pertinent labs within the past 24 hours have been reviewed.    Significant Imaging: I have reviewed all pertinent imaging results/findings within the past 24 hours.

## 2025-02-28 NOTE — PROGRESS NOTES
[facility] - Intensive Care ([facility] Medicine  Progress Note    Patient Name: Marck Madison  MRN: [id number]  Patient Class: IP- Inpatient   Admission Date: 2/27/2025  Length of Stay: 1 days  Attending Physician: Florencio Thompson MD  Primary Care Provider: [facility] -          Principal Problem:Acute on chronic diastolic congestive heart failure        HPI:  Pt with PMH of atrial fibrilation, PE/DVT (on lovenox due to thrombus despite eliquis), hypertension, diastolic heart failure, T2DM, COPD, Hep C. Pt seen while on BIPAP. Unable to obtain full history. Reports SOB, cough, chills for 3 days. Denies chest pain, constipation or diarrhea. Currently living with niece. Not currently taking medication. Per ED note ' He notes that he has been out of all of his medications since getting released from [facility] roughly 1 month prior'. Pt unsure which meds he takes - per chart review dispense report 9/2024 - amiodarone, aspirin, carvedilol and Entresto.  However reported to the ED that he might take a fluid pill and a blood thinner.  Stopped smoking 3 years ago. Denies alcohol or drug use.     In ED , RR 26. Bicarb 20, , Mag 1.5, trop 0.387-->0.386, flu and covid neg, lactate wnl, VBG unremarkable. CXR - 'Pulmonary edema, pneumonia, aspiration, or sepsis'.  Given DuoNebs, 325 mg aspirin, magnesium, Lasix 40 mg IV, 5 mg diltiazem.  Patient had increased workup breathing so was placed on BiPAP.  When attempted to wean off BiPAP had recurrence of respiratory distress so we placed on BiPAP with plans to move to ICU.  Admitted to hospital medicine for further management.    Overview/Hospital Course:  Poor urine output on 80mg lasix TID. Trial 120mg lasix. US kidneys unremarkable.  Trial off BIPAP. Seems to be tolerating well. US DVT neg. Start eliquis per cardiology for aflutter.     Switched to Lovenox from eliquis by hematology in 2024 due to development of DVT/PE despite being on eliquis for  Afib. Pt reports he did not have financial issues getting lovenox but has not been taking it either. To me pt reports that he did have poor compliance with eliquis use prior. As such will continue with eliquis (instead of lovenox as injections render itself to even less compliance).        Interval History: Trial off BIPAP. Poor urine output. Increase lasix dose.     Review of Systems   Constitutional:  Positive for chills.   Respiratory:  Positive for cough and shortness of breath.    Cardiovascular:  Positive for leg swelling. Negative for chest pain.   Gastrointestinal:  Negative for abdominal pain, constipation and diarrhea.     Objective:     Vital Signs (Most Recent):  Temp: 97.6 °F (36.4 °C) (02/28/25 0702)  Pulse: 80 (02/28/25 1507)  Resp: (!) 31 (02/28/25 1507)  BP: 100/72 (02/28/25 1146)  SpO2: (!) 90 % (02/28/25 1507) Vital Signs (24h Range):  Temp:  [97.6 °F (36.4 °C)-98.3 °F (36.8 °C)] 97.6 °F (36.4 °C)  Pulse:  [] 80  Resp:  [10-31] 31  SpO2:  [90 %-99 %] 90 %  BP: ()/(72-87) 100/72     Weight: 79.8 kg (176 lb)  Body mass index is 28.41 kg/m².    Intake/Output Summary (Last 24 hours) at 2/28/2025 1511  Last data filed at 2/28/2025 1151  Gross per 24 hour   Intake 220 ml   Output 1210 ml   Net -990 ml         Physical Exam  Constitutional:       General: He is not in acute distress.  Pulmonary:      Breath sounds: No wheezing.      Comments: On BIPAP  Abdominal:      General: There is no distension.      Tenderness: There is no abdominal tenderness.   Musculoskeletal:      Right lower leg: Edema present.      Left lower leg: Edema present.   Neurological:      Mental Status: He is oriented to person, place, and time.             Significant Labs: All pertinent labs within the past 24 hours have been reviewed.    Significant Imaging: I have reviewed all pertinent imaging results/findings within the past 24 hours.    Assessment and Plan     * Acute on chronic diastolic congestive heart  failure  WOB requiring BIPAP  HTN   Atrial Fibrillation     CXR - 'Pulmonary edema, pneumonia, aspiration, or sepsis'.    Outpatient meds from 9/2024 - amiodarone, carvedilol, aspirin, Entresto    DVT US neg  Echo moderate mitral regurg, EF 55%    Plan  - BIPAP - wean as tolerated  - IV lasix - increase from 80mg to 120mg due to poor urine ouput   - Intake and output  - CHADSVASC 5 - not on AC - discuss with cardiology - start eliquis   - Start low dose metop succinate for GDMT in setting of normal BP   - Ceftriaxone and azithromycin, blood cx   - Cardiology consult  - Pulm crit consult       Hx of hepatitis C  Rpt labs and Hep C quant  HIV neg       History of pulmonary embolus (PE)  DVT    Switched to Lovenox from eliquis by hematology in 2024 due to development of DVT/PE despite being on eliquis for Afib. Pt reports he did not have financial issues getting lovenox but has not been taking it either. To me pt reports that he did have poor compliance with eliquis use prior. As such will continue with eliquis (instead of lovenox as injections render itself to even less compliance).      Type 2 diabetes mellitus  8 months ago 6.7%  Repeat HbA1c 6.8%   SS     COPD (chronic obstructive pulmonary disease)  No PFTs available, reports stopped smoking 3 years ago, not currently wheezing      VTE Risk Mitigation (From admission, onward)           Ordered     apixaban tablet 5 mg  2 times daily         02/28/25 1015     IP VTE HIGH RISK PATIENT  Once         02/27/25 1301                    Discharge Planning   BALDO:      Code Status: Full Code   Medical Readiness for Discharge Date:                            Florencio Tapia MD  Department of Hospital Medicine   [facility] ([facility]

## 2025-03-01 LAB
ALBUMIN SERPL BCP-MCNC: 3.4 G/DL (ref 3.5–5.2)
ALP SERPL-CCNC: 74 U/L (ref 40–150)
ALT SERPL W/O P-5'-P-CCNC: 16 U/L (ref 10–44)
ANION GAP SERPL CALC-SCNC: 11 MMOL/L (ref 8–16)
AST SERPL-CCNC: 19 U/L (ref 10–40)
BILIRUB SERPL-MCNC: 0.4 MG/DL (ref 0.1–1)
BUN SERPL-MCNC: 35 MG/DL (ref 8–23)
CALCIUM SERPL-MCNC: 8.5 MG/DL (ref 8.7–10.5)
CHLORIDE SERPL-SCNC: 105 MMOL/L (ref 95–110)
CO2 SERPL-SCNC: 22 MMOL/L (ref 23–29)
CREAT SERPL-MCNC: 2.2 MG/DL (ref 0.5–1.4)
ERYTHROCYTE [DISTWIDTH] IN BLOOD BY AUTOMATED COUNT: 14.7 % (ref 11.5–14.5)
EST. GFR  (NO RACE VARIABLE): 32 ML/MIN/1.73 M^2
GLUCOSE SERPL-MCNC: 166 MG/DL (ref 70–110)
HCT VFR BLD AUTO: 43.9 % (ref 40–54)
HGB BLD-MCNC: 14.5 G/DL (ref 14–18)
MAGNESIUM SERPL-MCNC: 1.9 MG/DL (ref 1.6–2.6)
MCH RBC QN AUTO: 30 PG (ref 27–31)
MCHC RBC AUTO-ENTMCNC: 33 G/DL (ref 32–36)
MCV RBC AUTO: 91 FL (ref 82–98)
PHOSPHATE SERPL-MCNC: 4.1 MG/DL (ref 2.7–4.5)
PLATELET # BLD AUTO: 177 K/UL (ref 150–450)
PMV BLD AUTO: 11.3 FL (ref 9.2–12.9)
POCT GLUCOSE: 151 MG/DL (ref 70–110)
POCT GLUCOSE: 169 MG/DL (ref 70–110)
POCT GLUCOSE: 176 MG/DL (ref 70–110)
POTASSIUM SERPL-SCNC: 3.5 MMOL/L (ref 3.5–5.1)
PROT SERPL-MCNC: 6.6 G/DL (ref 6–8.4)
RBC # BLD AUTO: 4.83 M/UL (ref 4.6–6.2)
SODIUM SERPL-SCNC: 138 MMOL/L (ref 136–145)
WBC # BLD AUTO: 6.97 K/UL (ref 3.9–12.7)

## 2025-03-01 PROCEDURE — 27000221 HC OXYGEN, UP TO 24 HOURS

## 2025-03-01 PROCEDURE — 97162 PT EVAL MOD COMPLEX 30 MIN: CPT

## 2025-03-01 PROCEDURE — 25000003 PHARM REV CODE 250: Performed by: INTERNAL MEDICINE

## 2025-03-01 PROCEDURE — 94660 CPAP INITIATION&MGMT: CPT

## 2025-03-01 PROCEDURE — 36415 COLL VENOUS BLD VENIPUNCTURE: CPT | Performed by: STUDENT IN AN ORGANIZED HEALTH CARE EDUCATION/TRAINING PROGRAM

## 2025-03-01 PROCEDURE — 20000000 HC ICU ROOM

## 2025-03-01 PROCEDURE — 25000242 PHARM REV CODE 250 ALT 637 W/ HCPCS: Performed by: STUDENT IN AN ORGANIZED HEALTH CARE EDUCATION/TRAINING PROGRAM

## 2025-03-01 PROCEDURE — 99900035 HC TECH TIME PER 15 MIN (STAT)

## 2025-03-01 PROCEDURE — 25000003 PHARM REV CODE 250: Performed by: STUDENT IN AN ORGANIZED HEALTH CARE EDUCATION/TRAINING PROGRAM

## 2025-03-01 PROCEDURE — 99233 SBSQ HOSP IP/OBS HIGH 50: CPT | Mod: ,,, | Performed by: INTERNAL MEDICINE

## 2025-03-01 PROCEDURE — 83735 ASSAY OF MAGNESIUM: CPT | Performed by: STUDENT IN AN ORGANIZED HEALTH CARE EDUCATION/TRAINING PROGRAM

## 2025-03-01 PROCEDURE — 80053 COMPREHEN METABOLIC PANEL: CPT | Performed by: STUDENT IN AN ORGANIZED HEALTH CARE EDUCATION/TRAINING PROGRAM

## 2025-03-01 PROCEDURE — 94640 AIRWAY INHALATION TREATMENT: CPT

## 2025-03-01 PROCEDURE — 63600175 PHARM REV CODE 636 W HCPCS: Performed by: STUDENT IN AN ORGANIZED HEALTH CARE EDUCATION/TRAINING PROGRAM

## 2025-03-01 PROCEDURE — 84100 ASSAY OF PHOSPHORUS: CPT | Performed by: STUDENT IN AN ORGANIZED HEALTH CARE EDUCATION/TRAINING PROGRAM

## 2025-03-01 PROCEDURE — 94761 N-INVAS EAR/PLS OXIMETRY MLT: CPT

## 2025-03-01 PROCEDURE — 85027 COMPLETE CBC AUTOMATED: CPT | Performed by: STUDENT IN AN ORGANIZED HEALTH CARE EDUCATION/TRAINING PROGRAM

## 2025-03-01 RX ORDER — METOPROLOL SUCCINATE 50 MG/1
50 TABLET, EXTENDED RELEASE ORAL 2 TIMES DAILY
Status: DISCONTINUED | OUTPATIENT
Start: 2025-03-01 | End: 2025-03-04

## 2025-03-01 RX ADMIN — METOPROLOL SUCCINATE 50 MG: 50 TABLET, EXTENDED RELEASE ORAL at 03:03

## 2025-03-01 RX ADMIN — MUPIROCIN: 20 OINTMENT TOPICAL at 08:03

## 2025-03-01 RX ADMIN — MUPIROCIN: 20 OINTMENT TOPICAL at 09:03

## 2025-03-01 RX ADMIN — IPRATROPIUM BROMIDE AND ALBUTEROL SULFATE 3 ML: 2.5; .5 SOLUTION RESPIRATORY (INHALATION) at 07:03

## 2025-03-01 RX ADMIN — CEFTRIAXONE SODIUM 1 G: 1 INJECTION, POWDER, FOR SOLUTION INTRAMUSCULAR; INTRAVENOUS at 12:03

## 2025-03-01 RX ADMIN — IPRATROPIUM BROMIDE AND ALBUTEROL SULFATE 3 ML: 2.5; .5 SOLUTION RESPIRATORY (INHALATION) at 04:03

## 2025-03-01 RX ADMIN — IPRATROPIUM BROMIDE AND ALBUTEROL SULFATE 3 ML: 2.5; .5 SOLUTION RESPIRATORY (INHALATION) at 11:03

## 2025-03-01 RX ADMIN — AZITHROMYCIN MONOHYDRATE 500 MG: 500 INJECTION, POWDER, LYOPHILIZED, FOR SOLUTION INTRAVENOUS at 12:03

## 2025-03-01 RX ADMIN — METOPROLOL TARTRATE 25 MG: 25 TABLET, FILM COATED ORAL at 01:03

## 2025-03-01 RX ADMIN — APIXABAN 5 MG: 2.5 TABLET, FILM COATED ORAL at 08:03

## 2025-03-01 RX ADMIN — METOPROLOL TARTRATE 25 MG: 25 TABLET, FILM COATED ORAL at 05:03

## 2025-03-01 RX ADMIN — APIXABAN 5 MG: 2.5 TABLET, FILM COATED ORAL at 09:03

## 2025-03-01 NOTE — PROGRESS NOTES
[facility] - Intensive Care ([facility] Medicine  Progress Note    Patient Name: Marck Madison  MRN: [id number]  Patient Class: IP- Inpatient   Admission Date: 2/27/2025  Length of Stay: 2 days  Attending Physician: Florencio Thompson MD  Primary Care Provider: [facility] -      Principal Problem:Acute on chronic diastolic congestive heart failure        HPI:  Pt with PMH of atrial fibrilation, PE/DVT (on lovenox due to thrombus despite eliquis), hypertension, diastolic heart failure, T2DM, COPD, Hep C. Pt seen while on BIPAP. Unable to obtain full history. Reports SOB, cough, chills for 3 days. Denies chest pain, constipation or diarrhea. Currently living with niece. Not currently taking medication. Per ED note ' He notes that he has been out of all of his medications since getting released from long term roughly 1 month prior'. Pt unsure which meds he takes - per chart review dispense report 9/2024 - amiodarone, aspirin, carvedilol and Entresto.  However reported to the ED that he might take a fluid pill and a blood thinner.  Stopped smoking 3 years ago. Denies alcohol or drug use.     In ED , RR 26. Bicarb 20, , Mag 1.5, trop 0.387-->0.386, flu and covid neg, lactate wnl, VBG unremarkable. CXR - 'Pulmonary edema, pneumonia, aspiration, or sepsis'.  Given DuoNebs, 325 mg aspirin, magnesium, Lasix 40 mg IV, 5 mg diltiazem.  Patient had increased workup breathing so was placed on BiPAP.  When attempted to wean off BiPAP had recurrence of respiratory distress so we placed on BiPAP with plans to move to ICU.  Admitted to hospital medicine for further management.    Overview/Hospital Course:  Poor urine output on 80mg lasix TID. Trial 120mg lasix - better output. US kidneys unremarkable.  Trial off BIPAP. Seems to be tolerating well but intermittently requiring it for WOB. US DVT neg. Start eliquis per cardiology for aflutter - CHADSVASC 5. Hold further lasix with rise in creat.     Switched  to Lovenox from eliquis by hematology in 2024 due to development of DVT/PE despite being on eliquis for Afib. Pt reports he did not have financial issues getting lovenox but has not been taking it either. To me pt reports that he did have poor compliance with eliquis use prior. As such will continue with eliquis (instead of lovenox as injections render itself to even less compliance).        Interval History:  Better urine output with 120 mg Lasix.  Hold on further Lasix due to rise creatinine.  Sitting in bed eating breakfast with some mild WOB but pt denies any distress. Continue to monitor for need for BiPAP.      Review of Systems   Constitutional:  Positive for chills.   Respiratory:  Positive for cough and shortness of breath.    Cardiovascular:  Positive for leg swelling. Negative for chest pain.   Gastrointestinal:  Negative for abdominal pain, constipation and diarrhea.     Objective:     Vital Signs (Most Recent):  Temp: 97.9 °F (36.6 °C) (03/01/25 0420)  Pulse: 80 (03/01/25 0720)  Resp: 17 (03/01/25 0720)  BP: 111/71 (03/01/25 0600)  SpO2: 98 % (03/01/25 0720) Vital Signs (24h Range):  Temp:  [97.5 °F (36.4 °C)-98.9 °F (37.2 °C)] 97.9 °F (36.6 °C)  Pulse:  [] 80  Resp:  [5-31] 17  SpO2:  [90 %-100 %] 98 %  BP: ()/(65-81) 111/71     Weight: 79.8 kg (176 lb)  Body mass index is 28.41 kg/m².    Intake/Output Summary (Last 24 hours) at 3/1/2025 0938  Last data filed at 3/1/2025 0533  Gross per 24 hour   Intake 860.32 ml   Output 1685 ml   Net -824.68 ml         Physical Exam  Constitutional:       General: He is not in acute distress.  Pulmonary:      Breath sounds: No wheezing.      Comments: On NC  Abdominal:      General: There is no distension.      Tenderness: There is no abdominal tenderness.   Musculoskeletal:      Right lower leg: Edema (improving) present.      Left lower leg: Edema (improving) present.   Neurological:      Mental Status: He is oriented to person, place, and time.              Significant Labs: All pertinent labs within the past 24 hours have been reviewed.    Significant Imaging: I have reviewed all pertinent imaging results/findings within the past 24 hours.    Assessment and Plan     * Acute on chronic diastolic congestive heart failure  WOB requiring BIPAP  HTN   Atrial Fibrillation     CXR - 'Pulmonary edema, pneumonia, aspiration, or sepsis'.    Outpatient meds from 9/2024 - amiodarone, carvedilol, aspirin, Entresto    DVT US neg  Echo moderate mitral regurg, EF 55%    Plan  - BIPAP - wean as tolerated  - IV lasix - increase from 80mg to 120mg due to poor urine ouput - better urine output - hold further lasix with rise in creat   - Intake and output  - CHADSVASC 5 - not on AC - discuss with cardiology - start eliquis   - Start low dose metop succinate for GDMT in setting of normal BP   - Ceftriaxone and azithromycin, blood cx   - Cardiology consult  - Pulm crit consult       Hx of hepatitis C  Rpt labs and Hep C quant  HIV neg       History of pulmonary embolus (PE)  DVT    Switched to Lovenox from eliquis by hematology in 2024 due to development of DVT/PE despite being on eliquis for Afib. Pt reports he did not have financial issues getting lovenox but has not been taking it either. To me pt reports that he did have poor compliance with eliquis use prior. As such will continue with eliquis (instead of lovenox as injections render itself to even less compliance).      Type 2 diabetes mellitus  8 months ago 6.7%  Repeat HbA1c 6.8%   SS     COPD (chronic obstructive pulmonary disease)  No PFTs available, reports stopped smoking 3 years ago, not currently wheezing      VTE Risk Mitigation (From admission, onward)           Ordered     apixaban tablet 5 mg  2 times daily         02/28/25 1015     IP VTE HIGH RISK PATIENT  Once         02/27/25 1301                    Discharge Planning   BALDO:      Code Status: Full Code   Medical Readiness for Discharge Date:                     Please place Justification for DME        Florencio Tapia MD  Department of Hospital Medicine   [facility] - Intensive Care ([place])

## 2025-03-01 NOTE — PT/OT/SLP EVAL
Physical Therapy Evaluation    Patient Name:  Marck Madison   MRN:  [id number]    Recommendations:     Discharge Recommendations: Low Intensity Therapy   Discharge Equipment Recommendations:  (other DME per OT)   Barriers to discharge: Inaccessible home    Assessment:     Marck Madison is a 65 y.o. male admitted with a medical diagnosis of Acute on chronic diastolic congestive heart failure.  He presents with the following impairments/functional limitations: impaired endurance, impaired self care skills, impaired functional mobility, gait instability, impaired balance, decreased safety awareness, impaired cardiopulmonary response to activity, edema.    Patient evaluated by PT and goals established. Patient with generally intact strength, but increased HR and increased WOB limiting gait tolerance. Ambulatory with CGA and no AD without overt LOB. PT will continue to follow and progress as tolerated. Rec for dc to Low Intensity Therapy.    Rehab Prognosis: Good; patient would benefit from acute skilled PT services to address these deficits and reach maximum level of function.    Recent Surgery: * No surgery found *      Plan:     During this hospitalization, patient to be seen 3 x/week to address the identified rehab impairments via gait training, therapeutic activities, therapeutic exercises, neuromuscular re-education and progress toward the following goals:    Plan of Care Expires:  03/15/25    Subjective     Chief Complaint: Fatigue with standing activity, reports breathing improved  Patient/Family Comments/goals: Goal to continue feeling better; Patient agreeable to evaluation.  Pain/Comfort:  Pain Rating 1: 0/10  Pain Rating Post-Intervention 1: 0/10    Patients cultural, spiritual, [employer] conflicts given the current situation: no    Living Environment:  Will Dc to his niece's home - [facility] with a few REAL.  Prior to admission, patients level of function was indep - has DME but not currently using.  Equipment used at  Pt with sore throat, runny nose, and body aches X1 day. Tylenol before bed, woke up with more severe sore throat   home: walker, rolling, cane, straight (DME available but not currently used).  Upon discharge, patient will have assistance from his niece, but unclear level of assistance available.    Objective:     Communicated with RN prior to session.  Patient found HOB elevated with peripheral IV, telemetry, blood pressure cuff, pulse ox (continuous), oxygen  upon PT entry to room.    General Precautions: Standard, fall, respiratory  Orthopedic Precautions:N/A   Braces: N/A  Respiratory Status: Nasal cannula, flow 4 L/min    Patient donned non slip socks\ and gait belt for OOB mobility.    Exams:  Vitals:  Pre (supine): /79 HR varied from  SpO2 91%  During (ambulation): HR max 130 SpO2 89-94%  Post (sitting bedside chair):  SpO2 93%    Cognitive Exam:  Patient is oriented to Person, Place, and Situation  Gross Motor Coordination:  WFL  Postural Exam:  Patient presented with the following abnormalities:    -       Forward head  -       Anterior pelvic tilt  Skin Integrity/Edema:      -       Edema: Mild BLE  RLE ROM: WFL  RLE Strength: WFL  LLE ROM: WFL  LLE Strength: WFL    Functional Mobility:  Bed Mobility:     Supine to Sit: minimum assistance  Transfers:     Sit to Stand:  stand by assistance with no AD  Gait: x40 ft with no Ad and SBA. Slow gait with decreased young. No overt LOB, able to complete 4 180 deg turns without difficulty. Gait limited by increased WOB, elevated HR, and fatigue.      AM-PAC 6 CLICK MOBILITY  Total Score:19       Treatment & Education:  PT educated patient re:   PT plan of care/role of PT  Safety with OOB mobility  Use of DME for energy conservation  Discharge disposition    Pt verbalized understanding       Patient left up in chair with all lines intact, call button in reach, and RN present.    GOALS:   Multidisciplinary Problems       Physical Therapy Goals          Problem: Physical Therapy    Goal Priority Disciplines Outcome Interventions   Physical Therapy Goal     PT,  "PT/OT Progressing    Description: Goals to be met by: 3/15/25    Patient will increase functional independence with mobility by performin. Supine<>sit with supervision without use of HB features.   2. Sit<>stand with supervision with LRAD or no AD.  3. Gait x 150 feet with supervision with LRAD or no AD.  4. Ascend/descend 4 step(s) with least restrictive assistive device and uni HR with supervision.                        DME Justifications:  No DME recommended requiring DME justifications    History:     Past Medical History:   Diagnosis Date    Arrhythmia 2017    aflutter    Atrial flutter     CAD (coronary artery disease)     CHF (congestive heart failure), NYHA class I 2017    Cholelithiasis with chronic cholecystitis     Chronic diastolic heart failure     Cigarette smoker     COPD (chronic obstructive pulmonary disease)     COVID-19 2021    Diabetes mellitus     DKA (diabetic ketoacidosis)     Hypertension     NSTEMI (non-ST elevation myocardial infarction)     NSVT (nonsustained ventricular tachycardia)     Psychiatric disorder     h/o "schizophrena/bipolar"    Schizoaffective disorder     Severe sepsis        Past Surgical History:   Procedure Laterality Date    LIVER SURGERY      abscess drainage; 1970s    TREATMENT OF CARDIAC ARRHYTHMIA  2019    Procedure: Cardioversion or Defibrillation;  Surgeon: Jonathan Lopez MD;  Location: [facility] CATH LAB;  Service: Cardiology;;    TREATMENT OF CARDIAC ARRHYTHMIA N/A 2/3/2022    Procedure: Cardioversion or Defibrillation;  Surgeon: Trevor Schwab MD;  Location: [facility] CATH LAB;  Service: Cardiology;  Laterality: N/A;    TREATMENT OF CARDIAC ARRHYTHMIA N/A 3/16/2024    Procedure: Cardioversion or Defibrillation;  Surgeon: Jonathan Lopez MD;  Location: [facility] CATH LAB;  Service: Cardiology;  Laterality: N/A;       Time Tracking:     PT Received On: 25  PT Start Time: 1345     PT Stop Time: 1400  PT Total Time (min): 15 min     Billable " Minutes: Evaluation 15      03/01/2025

## 2025-03-01 NOTE — SUBJECTIVE & OBJECTIVE
Interval History:  Better urine output with 120 mg Lasix.  Hold on further Lasix due to rise creatinine.  Sitting in bed eating breakfast with some mild WOB but pt denies any distress. Continue to monitor for need for BiPAP.      Review of Systems   Constitutional:  Positive for chills.   Respiratory:  Positive for cough and shortness of breath.    Cardiovascular:  Positive for leg swelling. Negative for chest pain.   Gastrointestinal:  Negative for abdominal pain, constipation and diarrhea.     Objective:     Vital Signs (Most Recent):  Temp: 97.9 °F (36.6 °C) (03/01/25 0420)  Pulse: 80 (03/01/25 0720)  Resp: 17 (03/01/25 0720)  BP: 111/71 (03/01/25 0600)  SpO2: 98 % (03/01/25 0720) Vital Signs (24h Range):  Temp:  [97.5 °F (36.4 °C)-98.9 °F (37.2 °C)] 97.9 °F (36.6 °C)  Pulse:  [] 80  Resp:  [5-31] 17  SpO2:  [90 %-100 %] 98 %  BP: ()/(65-81) 111/71     Weight: 79.8 kg (176 lb)  Body mass index is 28.41 kg/m².    Intake/Output Summary (Last 24 hours) at 3/1/2025 0924  Last data filed at 3/1/2025 0533  Gross per 24 hour   Intake 860.32 ml   Output 1685 ml   Net -824.68 ml         Physical Exam  Constitutional:       General: He is not in acute distress.  Pulmonary:      Breath sounds: No wheezing.      Comments: On NC  Abdominal:      General: There is no distension.      Tenderness: There is no abdominal tenderness.   Musculoskeletal:      Right lower leg: Edema (improving) present.      Left lower leg: Edema (improving) present.   Neurological:      Mental Status: He is oriented to person, place, and time.             Significant Labs: All pertinent labs within the past 24 hours have been reviewed.    Significant Imaging: I have reviewed all pertinent imaging results/findings within the past 24 hours.

## 2025-03-01 NOTE — PLAN OF CARE
Problem: Physical Therapy  Goal: Physical Therapy Goal  Description: Goals to be met by: 3/15/25    Patient will increase functional independence with mobility by performin. Supine<>sit with supervision without use of HB features.   2. Sit<>stand with supervision with LRAD or no AD.  3. Gait x 150 feet with supervision with LRAD or no AD.  4. Ascend/descend 4 step(s) with least restrictive assistive device and uni HR with supervision.   Outcome: Progressing

## 2025-03-01 NOTE — PLAN OF CARE
Case Management Assessment     PCP: [facility]  Pharmacy: [facility] on [address]    Patient Arrived From: Home  Existing Help at Home: None    Barriers to Discharge: None    Discharge Plan:    A. Home with family   B. Home    Sw met with patient at bedside. Patient is alert and oriented with no communication barriers. Patient lives at home with his niece. Patient is not on coumadin or dialysis. Patient uses a rolling walker and a cane for dme use. Patient's family will provide transportation home at discharge. Sw will continue to follow for any additional needs.   03/01/25 1123   Discharge Assessment   Assessment Type Discharge Planning Assessment   Confirmed/corrected address, phone number and insurance No   Source of Information patient   Reason For Admission CHF   People in Home other relative(s)   Facility Arrived From: Home   Do you expect to return to your current living situation? Yes   Do you have help at home or someone to help you manage your care at home? No   Prior to hospitilization cognitive status: Alert/Oriented   Current cognitive status: Alert/Oriented   Walking or Climbing Stairs Difficulty no   Dressing/Bathing Difficulty no   Equipment Currently Used at Home walker, rolling;cane, straight   Readmission within 30 days? No   Patient currently being followed by outpatient case management? No   Do you currently have service(s) that help you manage your care at home? No   Do you take prescription medications? Yes   Do you have prescription coverage? Yes   Coverage [employer]   Do you have any problems affording any of your prescribed medications? No   Is the patient taking medications as prescribed? yes   Who is going to help you get home at discharge? Family   How do you get to doctors appointments? family or friend will provide;health plan transportation   Are you on dialysis? No   Do you take coumadin? No   Discharge Plan A Home with family   Discharge Plan B Home   DME Needed Upon Discharge   none   Transition of Care Barriers None   Physical Activity   On average, how many days per week do you engage in moderate to strenuous exercise (like a brisk walk)? 0 days   On average, how many minutes do you engage in exercise at this level? 0 min   Financial Resource Strain   How hard is it for you to pay for the very basics like food, housing, medical care, and heating? Not hard   Housing Stability   In the last 12 months, was there a time when you were not able to pay the mortgage or rent on time? N   At any time in the past 12 months, were you homeless or living in a shelter (including now)? N   Transportation Needs   Has the lack of transportation kept you from medical appointments, meetings, work or from getting things needed for daily living? No   Food Insecurity   Within the past 12 months, you worried that your food would run out before you got the money to buy more. Never true   Within the past 12 months, the food you bought just didn't last and you didn't have money to get more. Never true   Stress   Do you feel stress - tense, restless, nervous, or anxious, or unable to sleep at night because your mind is troubled all the time - these days? Not at all   Social Isolation   How often do you feel lonely or isolated from those around you?  Never   Alcohol Use   Q1: How often do you have a drink containing alcohol? Never   Q2: How many drinks containing alcohol do you have on a typical day when you are drinking? None   Q3: How often do you have six or more drinks on one occasion? Never   Utilities   In the past 12 months has the electric, gas, oil, or water company threatened to shut off services in your home? No   Health Literacy   How often do you need to have someone help you when you read instructions, pamphlets, or other written material from your doctor or pharmacy? Never

## 2025-03-01 NOTE — PROGRESS NOTES
"[facility] ([place])  Cardiology  Progress Note    Patient Name: Marck Madison  MRN: [id number]  Admission Date: 2/27/2025  Hospital Length of Stay: 2 days  Code Status: Full Code   Attending Physician: Florencio Thompson MD   Primary Care Physician: [facility] -  Expected Discharge Date:   Principal Problem:Acute on chronic diastolic congestive heart failure    Subjective:     Brief HPI:    "This 65-year-old male with a history of diastolic heart failure has had progressively worsening shortness of breath for several days. He reports that he has been out of all of his medications for a while. Can not say how long. History somewhat difficult as he is on noninvasive positive pressure ventilation. Had some chest tightness. Since admission, feeling better with diuresis".    Hospital Course:     2/27/2025: Echo: Normal left ventricular size and systolic function. EF 55-60%. LVH. Moderately dilated LA. Moderate MR.    2/28/2025: Metoprolol for better control of VRR.    2/28/2025: Apixiban was begun.    Diuresis.    Interval History:     In atypical atrial flutter with well controlled VRR.    Breathing easier.    Review of Systems   Constitutional: Negative for chills, fever and malaise/fatigue.   HENT:  Negative for nosebleeds.    Eyes:  Negative for vision loss in left eye and vision loss in right eye.   Cardiovascular:  Positive for orthopnea and paroxysmal nocturnal dyspnea. Negative for chest pain, leg swelling and palpitations.   Respiratory:  Positive for shortness of breath. Negative for cough, hemoptysis, sputum production and wheezing.    Hematologic/Lymphatic: Negative for bleeding problem. Does not bruise/bleed easily.   Skin:  Negative for color change and rash.   Musculoskeletal:  Negative for muscle weakness and myalgias.   Gastrointestinal:  Negative for abdominal pain, heartburn, hematemesis, hematochezia, melena, nausea and vomiting.   Genitourinary:  Negative for hematuria. "   Neurological:  Negative for dizziness, focal weakness, headaches, light-headedness, vertigo and weakness.   Psychiatric/Behavioral:  Negative for altered mental status. The patient is not nervous/anxious.    Allergic/Immunologic: Negative for persistent infections.     Objective:     Vital Signs (Most Recent):  Temp: 98 °F (36.7 °C) (03/01/25 0705)  Pulse: 97 (03/01/25 1104)  Resp: 20 (03/01/25 1104)  BP: 113/74 (03/01/25 1100)  SpO2: (!) 91 % (03/01/25 1100) Vital Signs (24h Range):  Temp:  [97.5 °F (36.4 °C)-98.9 °F (37.2 °C)] 98 °F (36.7 °C)  Pulse:  [] 97  Resp:  [5-34] 20  SpO2:  [90 %-100 %] 91 %  BP: ()/(65-81) 113/74     Weight: 79.8 kg (176 lb)  Body mass index is 28.41 kg/m².    SpO2: (!) 91 %         Intake/Output Summary (Last 24 hours) at 3/1/2025 1131  Last data filed at 3/1/2025 0533  Gross per 24 hour   Intake 860.32 ml   Output 1285 ml   Net -424.68 ml       Lines/Drains/Airways       Peripheral Intravenous Line  Duration                  Peripheral IV - Single Lumen 02/27/25 0630 20 G Right Upper Arm 2 days         Peripheral IV - Single Lumen 02/28/25 2240 20 G Anterior;Right Forearm <1 day                    Physical Exam  Constitutional:       General: He is not in acute distress.     Appearance: Normal appearance. He is well-developed. He is not ill-appearing.   Eyes:      Conjunctiva/sclera:      Right eye: Right conjunctiva is not injected. No hemorrhage.     Left eye: Left conjunctiva is not injected. No hemorrhage.     Pupils:      Right eye: Pupil is round.      Left eye: Pupil is round.   Neck:      Vascular: No JVD.   Cardiovascular:      Rate and Rhythm: Normal rate. Rhythm irregularly irregular.      Heart sounds: S1 normal and S2 normal.   Pulmonary:      Effort: Pulmonary effort is normal.      Breath sounds: Examination of the right-lower field reveals rales. Examination of the left-lower field reveals rales. Rales present.   Chest:      Chest wall: No swelling or  tenderness.   Abdominal:      General: There is no distension.      Palpations: Abdomen is soft.      Tenderness: There is no abdominal tenderness.   Musculoskeletal:      Cervical back: Neck supple.      Right ankle: No swelling.      Left ankle: No swelling.   Skin:     General: Skin is warm and dry.      Findings: No rash.   Neurological:      Mental Status: He is alert and oriented to person, place, and time. He is not disoriented.   Psychiatric:         Attention and Perception: Attention normal.         Mood and Affect: Mood normal.         Speech: Speech normal.         Behavior: Behavior normal.         Thought Content: Thought content normal.         Cognition and Memory: Cognition and memory normal.         Judgment: Judgment normal.       Current Medications:     albuterol-ipratropium  3 mL Nebulization Q4H WAKE    apixaban  5 mg Oral BID    azithromycin  500 mg Intravenous Q24H    cefTRIAXone (Rocephin) IV (PEDS and ADULTS)  1 g Intravenous Q24H    metoprolol tartrate  25 mg Oral Q6H    mupirocin   Nasal BID     Current Laboratory Results:    Recent Results (from the past 24 hours)   POCT glucose    Collection Time: 02/28/25  5:44 PM   Result Value Ref Range    POCT Glucose 118 (H) 70 - 110 mg/dL   POCT glucose    Collection Time: 02/28/25  8:01 PM   Result Value Ref Range    POCT Glucose 135 (H) 70 - 110 mg/dL   CBC Without Differential    Collection Time: 03/01/25  5:25 AM   Result Value Ref Range    WBC 6.97 3.90 - 12.70 K/uL    RBC 4.83 4.60 - 6.20 M/uL    Hemoglobin 14.5 14.0 - 18.0 g/dL    Hematocrit 43.9 40.0 - 54.0 %    MCV 91 82 - 98 fL    MCH 30.0 27.0 - 31.0 pg    MCHC 33.0 32.0 - 36.0 g/dL    RDW 14.7 (H) 11.5 - 14.5 %    Platelets 177 150 - 450 K/uL    MPV 11.3 9.2 - 12.9 fL   Comprehensive Metabolic Panel    Collection Time: 03/01/25  5:25 AM   Result Value Ref Range    Sodium 138 136 - 145 mmol/L    Potassium 3.5 3.5 - 5.1 mmol/L    Chloride 105 95 - 110 mmol/L    CO2 22 (L) 23 - 29 mmol/L     Glucose 166 (H) 70 - 110 mg/dL    BUN 35 (H) 8 - 23 mg/dL    Creatinine 2.2 (H) 0.5 - 1.4 mg/dL    Calcium 8.5 (L) 8.7 - 10.5 mg/dL    Total Protein 6.6 6.0 - 8.4 g/dL    Albumin 3.4 (L) 3.5 - 5.2 g/dL    Total Bilirubin 0.4 0.1 - 1.0 mg/dL    Alkaline Phosphatase 74 40 - 150 U/L    AST 19 10 - 40 U/L    ALT 16 10 - 44 U/L    eGFR 32 (A) >60 mL/min/1.73 m^2    Anion Gap 11 8 - 16 mmol/L   Magnesium    Collection Time: 03/01/25  5:25 AM   Result Value Ref Range    Magnesium 1.9 1.6 - 2.6 mg/dL   Phosphorus    Collection Time: 03/01/25  5:25 AM   Result Value Ref Range    Phosphorus 4.1 2.7 - 4.5 mg/dL     Current Imaging Results:    US Retroperitoneal Complete   Final Result      Normal appearance of the kidneys.      Enlarged prostate gland.         Electronically signed by: Wild Buchanan MD   Date:    02/28/2025   Time:    12:12      US Lower Extremity Veins Bilateral   Final Result      No DVT seen.         Electronically signed by: Wild Buchanan MD   Date:    02/28/2025   Time:    08:00      X-Ray Chest AP Portable   Final Result      Pulmonary edema, pneumonia, aspiration, or sepsis.         Electronically signed by: Wild Buchanan MD   Date:    02/27/2025   Time:    08:00          2/27/2025: Echo:  Left Ventricle: The left ventricle is normal in size. Increased wall thickness. There is concentric hypertrophy. There is normal systolic function with a visually estimated ejection fraction of 55 - 60%.  Right Ventricle: The right ventricle is normal in size. Wall thickness is normal. Systolic function is normal.  Left Atrium: moderately dilated  Right Atrium: Right atrium is dilated.  Mitral Valve: There is moderate regurgitation.  Tricuspid Valve: There is mild regurgitation.      Assessment and Plan:     Problem List:    Active Diagnoses:    Diagnosis Date Noted POA    PRINCIPAL PROBLEM:  Acute on chronic diastolic congestive heart failure [I50.33] 09/18/2022 Yes    History of pulmonary embolus (PE)  [Z86.711] 02/27/2025 Yes    Hx of hepatitis C [Z86.19] 02/27/2025 Yes    Type 2 diabetes mellitus [E11.9] 09/18/2022 Yes    COPD (chronic obstructive pulmonary disease) [J44.9] 11/21/2021 Yes    Hypertension [I10] 06/09/2021 Yes      Problems Resolved During this Admission:     Assessment and Plan:    Heart Failure, Diastolic, Acute on Chronic   2/27/2025: Echo: Normal left ventricular size and systolic function. EF 55-60%. LVH. Moderately dilated LA. Moderate MR.   Received furosemide iv.   Currently on hold due to rise in BUN/crea.    2. Atrial Flutter   In atypical atrial flutter.   2/28/2025: 's. Metoprolol 25 mg Q6 was begun.   3/1/2025: Metoprolol 25 mg Q6 to be changed to metoprolol 50 mg q12.     3. Chronic Anticoagulation   On apizxiban 5 mg Q12.      VTE Risk Mitigation (From admission, onward)           Ordered     apixaban tablet 5 mg  2 times daily         02/28/25 1015     IP VTE HIGH RISK PATIENT  Once         02/27/25 1301                    Hermila Littlejohn MD  Cardiology  [facility] ([place])

## 2025-03-01 NOTE — PLAN OF CARE
Problem: Adult Inpatient Plan of Care  Goal: Absence of Hospital-Acquired Illness or Injury  Outcome: Progressing  Intervention: Identify and Manage Fall Risk  Flowsheets (Taken 2/28/2025 1808)  Safety Promotion/Fall Prevention:   assistive device/personal item within reach   bed alarm set   instructed to call staff for mobility   lighting adjusted   medications reviewed   pulse ox   side rails raised x 3  Intervention: Prevent Skin Injury  Flowsheets (Taken 2/28/2025 1808)  Body Position: position changed independently  Skin Protection:   transparent dressing maintained   incontinence pads utilized  Device Skin Pressure Protection:   pressure points protected   positioning supports utilized   absorbent pad utilized/changed   tubing/devices free from skin contact  Intervention: Prevent and Manage VTE (Venous Thromboembolism) Risk  Flowsheets (Taken 2/28/2025 1808)  VTE Prevention/Management: bleeding risk assessed  Intervention: Prevent Infection  Flowsheets (Taken 2/28/2025 1808)  Infection Prevention:   environmental surveillance performed   equipment surfaces disinfected   hand hygiene promoted   visitors restricted/screened   single patient room provided   rest/sleep promoted   personal protective equipment utilized

## 2025-03-01 NOTE — ASSESSMENT & PLAN NOTE
WOB requiring BIPAP  HTN   Atrial Fibrillation     CXR - 'Pulmonary edema, pneumonia, aspiration, or sepsis'.    Outpatient meds from 9/2024 - amiodarone, carvedilol, aspirin, Entresto    DVT US neg  Echo moderate mitral regurg, EF 55%    Plan  - BIPAP - wean as tolerated  - IV lasix - increase from 80mg to 120mg due to poor urine ouput - better urine output - hold further lasix with rise in creat   - Intake and output  - CHADSVASC 5 - not on AC - discuss with cardiology - start eliquis   - Start low dose metop succinate for GDMT in setting of normal BP   - Ceftriaxone and azithromycin, blood cx   - Cardiology consult  - Pulm crit consult

## 2025-03-02 LAB
ANION GAP SERPL CALC-SCNC: 12 MMOL/L (ref 8–16)
ANION GAP SERPL CALC-SCNC: 12 MMOL/L (ref 8–16)
BUN SERPL-MCNC: 33 MG/DL (ref 8–23)
BUN SERPL-MCNC: 35 MG/DL (ref 8–23)
CALCIUM SERPL-MCNC: 9 MG/DL (ref 8.7–10.5)
CALCIUM SERPL-MCNC: 9.9 MG/DL (ref 8.7–10.5)
CHLORIDE SERPL-SCNC: 104 MMOL/L (ref 95–110)
CHLORIDE SERPL-SCNC: 99 MMOL/L (ref 95–110)
CO2 SERPL-SCNC: 23 MMOL/L (ref 23–29)
CO2 SERPL-SCNC: 30 MMOL/L (ref 23–29)
CREAT SERPL-MCNC: 1.8 MG/DL (ref 0.5–1.4)
CREAT SERPL-MCNC: 1.9 MG/DL (ref 0.5–1.4)
EST. GFR  (NO RACE VARIABLE): 38 ML/MIN/1.73 M^2
EST. GFR  (NO RACE VARIABLE): 41 ML/MIN/1.73 M^2
GLUCOSE SERPL-MCNC: 134 MG/DL (ref 70–110)
GLUCOSE SERPL-MCNC: 151 MG/DL (ref 70–110)
MAGNESIUM SERPL-MCNC: 1.9 MG/DL (ref 1.6–2.6)
MAGNESIUM SERPL-MCNC: 1.9 MG/DL (ref 1.6–2.6)
POCT GLUCOSE: 155 MG/DL (ref 70–110)
POCT GLUCOSE: 171 MG/DL (ref 70–110)
POTASSIUM SERPL-SCNC: 3.4 MMOL/L (ref 3.5–5.1)
POTASSIUM SERPL-SCNC: 4.4 MMOL/L (ref 3.5–5.1)
SODIUM SERPL-SCNC: 139 MMOL/L (ref 136–145)
SODIUM SERPL-SCNC: 141 MMOL/L (ref 136–145)

## 2025-03-02 PROCEDURE — 99233 SBSQ HOSP IP/OBS HIGH 50: CPT | Mod: ,,, | Performed by: INTERNAL MEDICINE

## 2025-03-02 PROCEDURE — 83735 ASSAY OF MAGNESIUM: CPT | Mod: 91 | Performed by: STUDENT IN AN ORGANIZED HEALTH CARE EDUCATION/TRAINING PROGRAM

## 2025-03-02 PROCEDURE — 25000003 PHARM REV CODE 250: Performed by: INTERNAL MEDICINE

## 2025-03-02 PROCEDURE — 25000003 PHARM REV CODE 250: Performed by: NURSE PRACTITIONER

## 2025-03-02 PROCEDURE — 94640 AIRWAY INHALATION TREATMENT: CPT

## 2025-03-02 PROCEDURE — 27000221 HC OXYGEN, UP TO 24 HOURS

## 2025-03-02 PROCEDURE — 80048 BASIC METABOLIC PNL TOTAL CA: CPT | Performed by: STUDENT IN AN ORGANIZED HEALTH CARE EDUCATION/TRAINING PROGRAM

## 2025-03-02 PROCEDURE — 20000000 HC ICU ROOM

## 2025-03-02 PROCEDURE — 94660 CPAP INITIATION&MGMT: CPT

## 2025-03-02 PROCEDURE — 63600175 PHARM REV CODE 636 W HCPCS: Performed by: PHYSICIAN ASSISTANT

## 2025-03-02 PROCEDURE — 36415 COLL VENOUS BLD VENIPUNCTURE: CPT | Performed by: STUDENT IN AN ORGANIZED HEALTH CARE EDUCATION/TRAINING PROGRAM

## 2025-03-02 PROCEDURE — 99900035 HC TECH TIME PER 15 MIN (STAT)

## 2025-03-02 PROCEDURE — 25000003 PHARM REV CODE 250: Performed by: STUDENT IN AN ORGANIZED HEALTH CARE EDUCATION/TRAINING PROGRAM

## 2025-03-02 PROCEDURE — 94761 N-INVAS EAR/PLS OXIMETRY MLT: CPT

## 2025-03-02 PROCEDURE — 63600175 PHARM REV CODE 636 W HCPCS: Performed by: STUDENT IN AN ORGANIZED HEALTH CARE EDUCATION/TRAINING PROGRAM

## 2025-03-02 PROCEDURE — 25000242 PHARM REV CODE 250 ALT 637 W/ HCPCS: Performed by: STUDENT IN AN ORGANIZED HEALTH CARE EDUCATION/TRAINING PROGRAM

## 2025-03-02 PROCEDURE — 94799 UNLISTED PULMONARY SVC/PX: CPT

## 2025-03-02 RX ORDER — MAGNESIUM SULFATE HEPTAHYDRATE 40 MG/ML
2 INJECTION, SOLUTION INTRAVENOUS ONCE
Status: COMPLETED | OUTPATIENT
Start: 2025-03-02 | End: 2025-03-02

## 2025-03-02 RX ORDER — POTASSIUM CHLORIDE 20 MEQ/1
40 TABLET, EXTENDED RELEASE ORAL ONCE
Status: COMPLETED | OUTPATIENT
Start: 2025-03-02 | End: 2025-03-02

## 2025-03-02 RX ORDER — FUROSEMIDE 10 MG/ML
80 INJECTION INTRAMUSCULAR; INTRAVENOUS 2 TIMES DAILY WITH MEALS
Status: DISCONTINUED | OUTPATIENT
Start: 2025-03-02 | End: 2025-03-02

## 2025-03-02 RX ORDER — FUROSEMIDE 10 MG/ML
100 INJECTION INTRAMUSCULAR; INTRAVENOUS ONCE
Status: COMPLETED | OUTPATIENT
Start: 2025-03-02 | End: 2025-03-02

## 2025-03-02 RX ORDER — AMLODIPINE BESYLATE 5 MG/1
5 TABLET ORAL DAILY
Status: DISCONTINUED | OUTPATIENT
Start: 2025-03-02 | End: 2025-03-05 | Stop reason: HOSPADM

## 2025-03-02 RX ORDER — FUROSEMIDE 10 MG/ML
100 INJECTION INTRAMUSCULAR; INTRAVENOUS 2 TIMES DAILY WITH MEALS
Status: DISCONTINUED | OUTPATIENT
Start: 2025-03-02 | End: 2025-03-02

## 2025-03-02 RX ORDER — LOSARTAN POTASSIUM 25 MG/1
25 TABLET ORAL DAILY
Status: DISCONTINUED | OUTPATIENT
Start: 2025-03-02 | End: 2025-03-05 | Stop reason: HOSPADM

## 2025-03-02 RX ORDER — HYDRALAZINE HYDROCHLORIDE 20 MG/ML
5 INJECTION INTRAMUSCULAR; INTRAVENOUS EVERY 6 HOURS PRN
Status: DISCONTINUED | OUTPATIENT
Start: 2025-03-02 | End: 2025-03-05 | Stop reason: HOSPADM

## 2025-03-02 RX ORDER — METOPROLOL TARTRATE 1 MG/ML
5 INJECTION, SOLUTION INTRAVENOUS EVERY 5 MIN PRN
Status: DISCONTINUED | OUTPATIENT
Start: 2025-03-02 | End: 2025-03-05 | Stop reason: HOSPADM

## 2025-03-02 RX ORDER — FUROSEMIDE 10 MG/ML
60 INJECTION INTRAMUSCULAR; INTRAVENOUS 2 TIMES DAILY WITH MEALS
Status: DISCONTINUED | OUTPATIENT
Start: 2025-03-02 | End: 2025-03-03

## 2025-03-02 RX ORDER — HALOPERIDOL LACTATE 5 MG/ML
5 INJECTION, SOLUTION INTRAMUSCULAR ONCE
Status: COMPLETED | OUTPATIENT
Start: 2025-03-02 | End: 2025-03-02

## 2025-03-02 RX ADMIN — HALOPERIDOL LACTATE 5 MG: 5 INJECTION, SOLUTION INTRAMUSCULAR at 10:03

## 2025-03-02 RX ADMIN — METOPROLOL SUCCINATE 50 MG: 50 TABLET, EXTENDED RELEASE ORAL at 08:03

## 2025-03-02 RX ADMIN — MUPIROCIN: 20 OINTMENT TOPICAL at 08:03

## 2025-03-02 RX ADMIN — FUROSEMIDE 60 MG: 10 INJECTION, SOLUTION INTRAMUSCULAR; INTRAVENOUS at 04:03

## 2025-03-02 RX ADMIN — AMLODIPINE BESYLATE 5 MG: 5 TABLET ORAL at 01:03

## 2025-03-02 RX ADMIN — LOSARTAN POTASSIUM 25 MG: 25 TABLET, FILM COATED ORAL at 12:03

## 2025-03-02 RX ADMIN — MAGNESIUM SULFATE HEPTAHYDRATE 2 G: 40 INJECTION, SOLUTION INTRAVENOUS at 06:03

## 2025-03-02 RX ADMIN — CEFTRIAXONE SODIUM 1 G: 1 INJECTION, POWDER, FOR SOLUTION INTRAMUSCULAR; INTRAVENOUS at 11:03

## 2025-03-02 RX ADMIN — POTASSIUM BICARBONATE 25 MEQ: 978 TABLET, EFFERVESCENT ORAL at 05:03

## 2025-03-02 RX ADMIN — APIXABAN 5 MG: 2.5 TABLET, FILM COATED ORAL at 08:03

## 2025-03-02 RX ADMIN — METOROPROLOL TARTRATE 5 MG: 5 INJECTION, SOLUTION INTRAVENOUS at 11:03

## 2025-03-02 RX ADMIN — IPRATROPIUM BROMIDE AND ALBUTEROL SULFATE 3 ML: 2.5; .5 SOLUTION RESPIRATORY (INHALATION) at 08:03

## 2025-03-02 RX ADMIN — IPRATROPIUM BROMIDE AND ALBUTEROL SULFATE 3 ML: 2.5; .5 SOLUTION RESPIRATORY (INHALATION) at 12:03

## 2025-03-02 RX ADMIN — MAGNESIUM SULFATE HEPTAHYDRATE 2 G: 40 INJECTION, SOLUTION INTRAVENOUS at 07:03

## 2025-03-02 RX ADMIN — POTASSIUM CHLORIDE 40 MEQ: 1500 TABLET, EXTENDED RELEASE ORAL at 07:03

## 2025-03-02 RX ADMIN — IPRATROPIUM BROMIDE AND ALBUTEROL SULFATE 3 ML: 2.5; .5 SOLUTION RESPIRATORY (INHALATION) at 03:03

## 2025-03-02 RX ADMIN — FUROSEMIDE 100 MG: 10 INJECTION, SOLUTION INTRAMUSCULAR; INTRAVENOUS at 12:03

## 2025-03-02 NOTE — SUBJECTIVE & OBJECTIVE
Interval History:  Cont IV diuresis. Add meds for BP. Intermittent WOB requiring BIPAP.      Review of Systems   Constitutional:  Positive for chills.   Respiratory:  Positive for cough and shortness of breath.    Cardiovascular:  Positive for leg swelling. Negative for chest pain.   Gastrointestinal:  Negative for abdominal pain, constipation and diarrhea.     Objective:     Vital Signs (Most Recent):  Temp: 98 °F (36.7 °C) (03/02/25 0715)  Pulse: 98 (03/02/25 1345)  Resp: (!) 28 (03/02/25 1345)  BP: (!) 159/104 (03/02/25 1345)  SpO2: 96 % (03/02/25 1345) Vital Signs (24h Range):  Temp:  [98 °F (36.7 °C)-98.6 °F (37 °C)] 98 °F (36.7 °C)  Pulse:  [] 98  Resp:  [9-35] 28  SpO2:  [91 %-100 %] 96 %  BP: ()/() 159/104     Weight: 79.8 kg (176 lb)  Body mass index is 28.41 kg/m².    Intake/Output Summary (Last 24 hours) at 3/2/2025 1409  Last data filed at 3/2/2025 1401  Gross per 24 hour   Intake 1404.92 ml   Output 1250 ml   Net 154.92 ml         Physical Exam  Constitutional:       General: He is not in acute distress.  Pulmonary:      Breath sounds: No wheezing.      Comments: On NC  Abdominal:      General: There is no distension.      Tenderness: There is no abdominal tenderness.   Musculoskeletal:      Right lower leg: Edema (improving) present.      Left lower leg: Edema (improving) present.   Neurological:      Mental Status: He is oriented to person, place, and time.             Significant Labs: All pertinent labs within the past 24 hours have been reviewed.    Significant Imaging: I have reviewed all pertinent imaging results/findings within the past 24 hours.

## 2025-03-02 NOTE — PROGRESS NOTES
"[facility] ([place])  Cardiology  Progress Note    Patient Name: Marck Madison  MRN: [id number]  Admission Date: 2/27/2025  Hospital Length of Stay: 3 days  Code Status: Full Code   Attending Physician: Florencio Thompson MD   Primary Care Physician: [facility] -  Expected Discharge Date:   Principal Problem:Acute on chronic diastolic congestive heart failure    Subjective:     Brief HPI:    "This 65-year-old male with a history of diastolic heart failure has had progressively worsening shortness of breath for several days. He reports that he has been out of all of his medications for a while. Can not say how long. History somewhat difficult as he is on noninvasive positive pressure ventilation. Had some chest tightness. Since admission, feeling better with diuresis".    Hospital Course:     2/27/2025: Echo: Normal left ventricular size and systolic function. EF 55-60%. LVH. Moderately dilated LA. Moderate MR.    2/28/2025: Metoprolol for better control of VRR.    2/28/2025: Apixiban was begun.    Diuresis.    Interval History:     In atypical atrial flutter with well controlled VRR.    Runs of VT.      Review of Systems   Constitutional: Negative for chills, fever and malaise/fatigue.   HENT:  Negative for nosebleeds.    Eyes:  Negative for vision loss in left eye and vision loss in right eye.   Cardiovascular:  Positive for orthopnea and paroxysmal nocturnal dyspnea. Negative for chest pain, leg swelling and palpitations.   Respiratory:  Positive for shortness of breath. Negative for cough, hemoptysis, sputum production and wheezing.    Hematologic/Lymphatic: Negative for bleeding problem. Does not bruise/bleed easily.   Skin:  Negative for color change and rash.   Musculoskeletal:  Negative for muscle weakness and myalgias.   Gastrointestinal:  Negative for abdominal pain, heartburn, hematemesis, hematochezia, melena, nausea and vomiting.   Genitourinary:  Negative for hematuria. "   Neurological:  Negative for dizziness, focal weakness, headaches, light-headedness, vertigo and weakness.   Psychiatric/Behavioral:  Negative for altered mental status. The patient is not nervous/anxious.    Allergic/Immunologic: Negative for persistent infections.     Objective:     Vital Signs (Most Recent):  Temp: 98 °F (36.7 °C) (03/02/25 0715)  Pulse: (!) 116 (03/02/25 1315)  Resp: (!) 25 (03/02/25 1315)  BP: (!) 137/107 (03/02/25 1315)  SpO2: 98 % (03/02/25 1315) Vital Signs (24h Range):  Temp:  [98 °F (36.7 °C)-98.6 °F (37 °C)] 98 °F (36.7 °C)  Pulse:  [] 116  Resp:  [9-35] 25  SpO2:  [91 %-100 %] 98 %  BP: ()/() 137/107     Weight: 79.8 kg (176 lb)  Body mass index is 28.41 kg/m².    SpO2: 98 %         Intake/Output Summary (Last 24 hours) at 3/2/2025 1324  Last data filed at 3/2/2025 1318  Gross per 24 hour   Intake 935.7 ml   Output 1000 ml   Net -64.3 ml       Lines/Drains/Airways       Peripheral Intravenous Line  Duration                  Peripheral IV - Single Lumen 02/28/25 2240 20 G Anterior;Right Forearm 1 day                    Physical Exam  Constitutional:       General: He is not in acute distress.     Appearance: Normal appearance. He is well-developed. He is not ill-appearing.   Eyes:      Conjunctiva/sclera:      Right eye: Right conjunctiva is not injected. No hemorrhage.     Left eye: Left conjunctiva is not injected. No hemorrhage.     Pupils:      Right eye: Pupil is round.      Left eye: Pupil is round.   Neck:      Vascular: No JVD.   Cardiovascular:      Rate and Rhythm: Normal rate. Rhythm irregularly irregular.      Heart sounds: S1 normal and S2 normal.   Pulmonary:      Effort: Pulmonary effort is normal.      Breath sounds: Examination of the right-lower field reveals rales. Examination of the left-lower field reveals rales. Rales present.   Chest:      Chest wall: No swelling or tenderness.   Abdominal:      General: There is no distension.      Palpations:  Abdomen is soft.      Tenderness: There is no abdominal tenderness.   Musculoskeletal:      Cervical back: Neck supple.      Right ankle: No swelling.      Left ankle: No swelling.   Skin:     General: Skin is warm and dry.      Findings: No rash.   Neurological:      Mental Status: He is alert and oriented to person, place, and time. He is not disoriented.   Psychiatric:         Attention and Perception: Attention normal.         Mood and Affect: Mood normal.         Speech: Speech normal.         Behavior: Behavior normal.         Thought Content: Thought content normal.         Cognition and Memory: Cognition and memory normal.         Judgment: Judgment normal.       Current Medications:     albuterol-ipratropium  3 mL Nebulization Q4H WAKE    apixaban  5 mg Oral BID    cefTRIAXone (Rocephin) IV (PEDS and ADULTS)  1 g Intravenous Q24H    empagliflozin  25 mg Oral Daily    losartan  25 mg Oral Daily    metoprolol succinate  50 mg Oral BID    mupirocin   Nasal BID     Current Laboratory Results:    Recent Results (from the past 24 hours)   POCT glucose    Collection Time: 03/01/25  4:45 PM   Result Value Ref Range    POCT Glucose 176 (H) 70 - 110 mg/dL   POCT glucose    Collection Time: 03/01/25  8:46 PM   Result Value Ref Range    POCT Glucose 169 (H) 70 - 110 mg/dL   Basic Metabolic Panel    Collection Time: 03/02/25  2:56 AM   Result Value Ref Range    Sodium 139 136 - 145 mmol/L    Potassium 3.4 (L) 3.5 - 5.1 mmol/L    Chloride 104 95 - 110 mmol/L    CO2 23 23 - 29 mmol/L    Glucose 151 (H) 70 - 110 mg/dL    BUN 35 (H) 8 - 23 mg/dL    Creatinine 1.9 (H) 0.5 - 1.4 mg/dL    Calcium 9.0 8.7 - 10.5 mg/dL    Anion Gap 12 8 - 16 mmol/L    eGFR 38 (A) >60 mL/min/1.73 m^2   Magnesium    Collection Time: 03/02/25  2:56 AM   Result Value Ref Range    Magnesium 1.9 1.6 - 2.6 mg/dL   POCT glucose    Collection Time: 03/02/25 11:07 AM   Result Value Ref Range    POCT Glucose 171 (H) 70 - 110 mg/dL     Current Imaging  Results:    US Retroperitoneal Complete   Final Result      Normal appearance of the kidneys.      Enlarged prostate gland.         Electronically signed by: Wild Buchanan MD   Date:    02/28/2025   Time:    12:12      US Lower Extremity Veins Bilateral   Final Result      No DVT seen.         Electronically signed by: Wild Buchanan MD   Date:    02/28/2025   Time:    08:00      X-Ray Chest AP Portable   Final Result      Pulmonary edema, pneumonia, aspiration, or sepsis.         Electronically signed by: Wild Buchanan MD   Date:    02/27/2025   Time:    08:00          2/27/2025: Echo:  Left Ventricle: The left ventricle is normal in size. Increased wall thickness. There is concentric hypertrophy. There is normal systolic function with a visually estimated ejection fraction of 55 - 60%.  Right Ventricle: The right ventricle is normal in size. Wall thickness is normal. Systolic function is normal.  Left Atrium: moderately dilated  Right Atrium: Right atrium is dilated.  Mitral Valve: There is moderate regurgitation.  Tricuspid Valve: There is mild regurgitation.      Assessment and Plan:     Problem List:    Active Diagnoses:    Diagnosis Date Noted POA    PRINCIPAL PROBLEM:  Acute on chronic diastolic congestive heart failure [I50.33] 09/18/2022 Yes    History of pulmonary embolus (PE) [Z86.711] 02/27/2025 Yes    Hx of hepatitis C [Z86.19] 02/27/2025 Yes    Type 2 diabetes mellitus [E11.9] 09/18/2022 Yes    COPD (chronic obstructive pulmonary disease) [J44.9] 11/21/2021 Yes    Hypertension [I10] 06/09/2021 Yes      Problems Resolved During this Admission:       Assessment and Plan:    Heart Failure, Diastolic, Acute on Chronic   2/27/2025: Echo: Normal left ventricular size and systolic function. EF 55-60%. LVH. Moderately dilated LA. Moderate MR.   Received furosemide iv.   Was on hold due to rise in BUN/crea.   Needs further diuresis and replacement of K.   Furosemide is being ordered by hospital medicine  team.    2. Ventricular Tachycardia   3/2/2025: Runs on nonsustained VT.    3. Atrial Flutter   In atypical atrial flutter.   2/28/2025: 's. Metoprolol 25 mg Q6 was begun.   3/1/2025: Metoprolol 25 mg Q6 was changed to metoprolol 50 mg Q12.    On metoprolol 50 mg Q12.   VRR reasonably well controlled.    4. Chronic Anticoagulation   On apixiban 5 mg Q12.    5. Chronic Kidney Disease, Stage 3   10/24/2025: BUN/crea 33/2.1. eGFR 34. K 4.1.   3/2/2025: BUN/crea 35/1.9. eGFR 38.        VTE Risk Mitigation (From admission, onward)           Ordered     apixaban tablet 5 mg  2 times daily         02/28/25 1015     IP VTE HIGH RISK PATIENT  Once         02/27/25 1301                    Hermila Littlejohn MD  Cardiology  [facility] ([place])

## 2025-03-02 NOTE — ASSESSMENT & PLAN NOTE
WOB requiring BIPAP  HTN   Atrial Fibrillation     CXR - 'Pulmonary edema, pneumonia, aspiration, or sepsis'.    Outpatient meds from 9/2024 - amiodarone, carvedilol, aspirin, Entresto    DVT US neg  Echo moderate mitral regurg, EF 55%    Plan  - BIPAP PRN  - IV lasix, monitor and replace electrolytes   - Amlodipine and losartan   - Intake and output  - CHADSVASC 5 - not currently on AC - discuss with cardiology - start eliquis   - Metop succinate for rate control   - Ceftriaxone and azithromycin for CAP   - Cardiology consult  - Pulm crit consult

## 2025-03-02 NOTE — ASSESSMENT & PLAN NOTE
WOB requiring BIPAP  HTN   Atrial Fibrillation     CXR - 'Pulmonary edema, pneumonia, aspiration, or sepsis'.    Outpatient meds from 9/2024 - amiodarone, carvedilol, aspirin, Entresto    DVT US neg  Echo moderate mitral regurg, EF 55%    Plan  - BIPAP PRN  - IV lasix, monitor and replace electrolytes   - Amlodipine and losartan   - Intake and output  - CHADSVASC 5 - not on AC - discuss with cardiology - start eliquis   - Start low dose metop succinate for GDMT in setting of normal BP   - Ceftriaxone and azithromycin for CAP   - Cardiology consult  - Pulm crit consult

## 2025-03-02 NOTE — PT/OT/SLP PROGRESS
Occupational Therapy      Patient Name:  Marck Madison   MRN:  [id number]    1335:  Patient not seen today secondary to nursing request to defer at this time as Pt. Recently with elevated BP and HR and decreased O2 sats. Will follow-up as appropriate.    3/2/2025

## 2025-03-02 NOTE — PROGRESS NOTES
[facility] - Intensive Care ([facility] Medicine  Progress Note    Patient Name: Marck Madison  MRN: [id number]  Patient Class: IP- Inpatient   Admission Date: 2/27/2025  Length of Stay: 3 days  Attending Physician: Florencio Thompson MD  Primary Care Provider: [facility] -        Subjective     Principal Problem:Acute on chronic diastolic congestive heart failure        HPI:  Pt with PMH of atrial fibrilation, PE/DVT (on lovenox due to thrombus despite eliquis), hypertension, diastolic heart failure, T2DM, COPD, Hep C. Pt seen while on BIPAP. Unable to obtain full history. Reports SOB, cough, chills for 3 days. Denies chest pain, constipation or diarrhea. Currently living with niece. Not currently taking medication. Per ED note ' He notes that he has been out of all of his medications since getting released from assisted roughly 1 month prior'. Pt unsure which meds he takes - per chart review dispense report 9/2024 - amiodarone, aspirin, carvedilol and Entresto.  However reported to the ED that he might take a fluid pill and a blood thinner.  Stopped smoking 3 years ago. Denies alcohol or drug use.     In ED , RR 26. Bicarb 20, , Mag 1.5, trop 0.387-->0.386, flu and covid neg, lactate wnl, VBG unremarkable. CXR - 'Pulmonary edema, pneumonia, aspiration, or sepsis'.  Given DuoNebs, 325 mg aspirin, magnesium, Lasix 40 mg IV, 5 mg diltiazem.  Patient had increased workup breathing so was placed on BiPAP.  When attempted to wean off BiPAP had recurrence of respiratory distress so we placed on BiPAP with plans to move to ICU.  Admitted to hospital medicine for further management.    Overview/Hospital Course:  Poor urine output on 80mg lasix TID. Trial 120mg lasix - better output. US kidneys unremarkable.  Trial off BIPAP. Seems to be tolerating well but intermittently requiring it for WOB. US DVT neg. Start eliquis per cardiology for aflutter - CHADSVASC 5. Cont diuresis. Monitor and replace  electrolytes. Losartan and amlodipine added for BP.     Switched to Lovenox from eliquis by hematology in 2024 due to development of DVT/PE despite being on eliquis for Afib. Pt reports he did not have financial issues getting lovenox but has not been taking it either. To me pt reports that he did have poor compliance with eliquis use prior. As such will continue with eliquis (instead of lovenox as injections render itself to even less compliance).        Interval History:  Cont IV diuresis. Add meds for BP. Intermittent WOB requiring BIPAP.  34 beat run of vtach.     Review of Systems   Constitutional:  Positive for chills.   Respiratory:  Positive for cough and shortness of breath.    Cardiovascular:  Positive for leg swelling. Negative for chest pain.   Gastrointestinal:  Negative for abdominal pain, constipation and diarrhea.     Objective:     Vital Signs (Most Recent):  Temp: 98 °F (36.7 °C) (03/02/25 0715)  Pulse: 98 (03/02/25 1345)  Resp: (!) 28 (03/02/25 1345)  BP: (!) 159/104 (03/02/25 1345)  SpO2: 96 % (03/02/25 1345) Vital Signs (24h Range):  Temp:  [98 °F (36.7 °C)-98.6 °F (37 °C)] 98 °F (36.7 °C)  Pulse:  [] 98  Resp:  [9-35] 28  SpO2:  [91 %-100 %] 96 %  BP: ()/() 159/104     Weight: 79.8 kg (176 lb)  Body mass index is 28.41 kg/m².    Intake/Output Summary (Last 24 hours) at 3/2/2025 1409  Last data filed at 3/2/2025 1401  Gross per 24 hour   Intake 1404.92 ml   Output 1250 ml   Net 154.92 ml         Physical Exam  Constitutional:       General: He is not in acute distress.  Pulmonary:      Breath sounds: No wheezing.      Comments: On NC  Abdominal:      General: There is no distension.      Tenderness: There is no abdominal tenderness.   Musculoskeletal:      Right lower leg: Edema (improving) present.      Left lower leg: Edema (improving) present.   Neurological:      Mental Status: He is oriented to person, place, and time.             Significant Labs: All pertinent labs  within the past 24 hours have been reviewed.    Significant Imaging: I have reviewed all pertinent imaging results/findings within the past 24 hours.    Assessment and Plan     * Acute on chronic diastolic congestive heart failure  WOB requiring BIPAP  HTN   Atrial Fibrillation     CXR - 'Pulmonary edema, pneumonia, aspiration, or sepsis'.    Outpatient meds from 9/2024 - amiodarone, carvedilol, aspirin, Entresto    DVT US neg  Echo moderate mitral regurg, EF 55%    Plan  - BIPAP PRN  - IV lasix, monitor and replace electrolytes   - Amlodipine and losartan   - Intake and output  - CHADSVASC 5 - not on AC - discuss with cardiology - start eliquis   - Start low dose metop succinate for GDMT in setting of normal BP   - Ceftriaxone and azithromycin for CAP   - Cardiology consult  - Pulm crit consult       Hx of hepatitis C  Rpt labs and Hep C quant  HIV neg       History of pulmonary embolus (PE)  DVT    Switched to Lovenox from eliquis by hematology in 2024 due to development of DVT/PE despite being on eliquis for Afib. Pt reports he did not have financial issues getting lovenox but has not been taking it either. To me pt reports that he did have poor compliance with eliquis use prior. As such will continue with eliquis (instead of lovenox as injections render itself to even less compliance).      Type 2 diabetes mellitus  8 months ago 6.7%  Repeat HbA1c 6.8%   SS     COPD (chronic obstructive pulmonary disease)  No PFTs available, reports stopped smoking 3 years ago, not currently wheezing      VTE Risk Mitigation (From admission, onward)           Ordered     apixaban tablet 5 mg  2 times daily         02/28/25 1015     IP VTE HIGH RISK PATIENT  Once         02/27/25 1301                    Discharge Planning   BALDO:      Code Status: Full Code   Medical Readiness for Discharge Date:   Discharge Plan A: Home with family                Please place Justification for DME        Florencio Tapia MD  Department of  [facility] - Intensive Care ([place])

## 2025-03-02 NOTE — PLAN OF CARE
Problem: Adult Inpatient Plan of Care  Goal: Plan of Care Review  Outcome: Progressing  Flowsheets (Taken 3/2/2025 0618)  Plan of Care Reviewed With: patient     Problem: Diabetes Comorbidity  Goal: Blood Glucose Level Within Targeted Range  Outcome: Progressing  Intervention: Monitor and Manage Glycemia  Flowsheets (Taken 3/2/2025 0618)  Glycemic Management: blood glucose monitored     Problem: Fall Injury Risk  Goal: Absence of Fall and Fall-Related Injury  Outcome: Progressing  Intervention: Identify and Manage Contributors  Flowsheets (Taken 3/2/2025 0618)  Self-Care Promotion: BADL personal objects within reach  Medication Review/Management: medications reviewed

## 2025-03-03 LAB
ANION GAP SERPL CALC-SCNC: 12 MMOL/L (ref 8–16)
ANION GAP SERPL CALC-SCNC: 14 MMOL/L (ref 8–16)
BASOPHILS # BLD AUTO: 0.05 K/UL (ref 0–0.2)
BASOPHILS NFR BLD: 0.5 % (ref 0–1.9)
BUN SERPL-MCNC: 38 MG/DL (ref 8–23)
BUN SERPL-MCNC: 41 MG/DL (ref 8–23)
CALCIUM SERPL-MCNC: 9.4 MG/DL (ref 8.7–10.5)
CALCIUM SERPL-MCNC: 9.8 MG/DL (ref 8.7–10.5)
CHLORIDE SERPL-SCNC: 100 MMOL/L (ref 95–110)
CHLORIDE SERPL-SCNC: 94 MMOL/L (ref 95–110)
CO2 SERPL-SCNC: 26 MMOL/L (ref 23–29)
CO2 SERPL-SCNC: 32 MMOL/L (ref 23–29)
CREAT SERPL-MCNC: 1.8 MG/DL (ref 0.5–1.4)
CREAT SERPL-MCNC: 1.9 MG/DL (ref 0.5–1.4)
DIFFERENTIAL METHOD BLD: ABNORMAL
EOSINOPHIL # BLD AUTO: 0.1 K/UL (ref 0–0.5)
EOSINOPHIL NFR BLD: 1.3 % (ref 0–8)
ERYTHROCYTE [DISTWIDTH] IN BLOOD BY AUTOMATED COUNT: 13.7 % (ref 11.5–14.5)
EST. GFR  (NO RACE VARIABLE): 38 ML/MIN/1.73 M^2
EST. GFR  (NO RACE VARIABLE): 41 ML/MIN/1.73 M^2
GLUCOSE SERPL-MCNC: 110 MG/DL (ref 70–110)
GLUCOSE SERPL-MCNC: 146 MG/DL (ref 70–110)
HCT VFR BLD AUTO: 45.7 % (ref 40–54)
HCV RNA SERPL QL NAA+PROBE: NOT DETECTED
HCV RNA SPEC NAA+PROBE-ACNC: NOT DETECTED IU/ML
HGB BLD-MCNC: 15 G/DL (ref 14–18)
IMM GRANULOCYTES # BLD AUTO: 0.03 K/UL (ref 0–0.04)
IMM GRANULOCYTES NFR BLD AUTO: 0.3 % (ref 0–0.5)
LYMPHOCYTES # BLD AUTO: 2.3 K/UL (ref 1–4.8)
LYMPHOCYTES NFR BLD: 24.5 % (ref 18–48)
MAGNESIUM SERPL-MCNC: 1.9 MG/DL (ref 1.6–2.6)
MAGNESIUM SERPL-MCNC: 2.2 MG/DL (ref 1.6–2.6)
MCH RBC QN AUTO: 29.5 PG (ref 27–31)
MCHC RBC AUTO-ENTMCNC: 32.8 G/DL (ref 32–36)
MCV RBC AUTO: 90 FL (ref 82–98)
MONOCYTES # BLD AUTO: 1.3 K/UL (ref 0.3–1)
MONOCYTES NFR BLD: 13.6 % (ref 4–15)
NEUTROPHILS # BLD AUTO: 5.6 K/UL (ref 1.8–7.7)
NEUTROPHILS NFR BLD: 59.8 % (ref 38–73)
NRBC BLD-RTO: 0 /100 WBC
OHS QRS DURATION: 82 MS
OHS QTC CALCULATION: 497 MS
PLATELET # BLD AUTO: 174 K/UL (ref 150–450)
PMV BLD AUTO: 11 FL (ref 9.2–12.9)
POCT GLUCOSE: 147 MG/DL (ref 70–110)
POCT GLUCOSE: 148 MG/DL (ref 70–110)
POCT GLUCOSE: 204 MG/DL (ref 70–110)
POCT GLUCOSE: 213 MG/DL (ref 70–110)
POTASSIUM SERPL-SCNC: 3.2 MMOL/L (ref 3.5–5.1)
POTASSIUM SERPL-SCNC: 3.6 MMOL/L (ref 3.5–5.1)
RBC # BLD AUTO: 5.08 M/UL (ref 4.6–6.2)
SODIUM SERPL-SCNC: 138 MMOL/L (ref 136–145)
SODIUM SERPL-SCNC: 140 MMOL/L (ref 136–145)
WBC # BLD AUTO: 9.33 K/UL (ref 3.9–12.7)

## 2025-03-03 PROCEDURE — 94640 AIRWAY INHALATION TREATMENT: CPT

## 2025-03-03 PROCEDURE — 99233 SBSQ HOSP IP/OBS HIGH 50: CPT | Mod: ,,, | Performed by: INTERNAL MEDICINE

## 2025-03-03 PROCEDURE — 85025 COMPLETE CBC W/AUTO DIFF WBC: CPT | Performed by: STUDENT IN AN ORGANIZED HEALTH CARE EDUCATION/TRAINING PROGRAM

## 2025-03-03 PROCEDURE — 97530 THERAPEUTIC ACTIVITIES: CPT

## 2025-03-03 PROCEDURE — 99900035 HC TECH TIME PER 15 MIN (STAT)

## 2025-03-03 PROCEDURE — 97165 OT EVAL LOW COMPLEX 30 MIN: CPT

## 2025-03-03 PROCEDURE — 27000221 HC OXYGEN, UP TO 24 HOURS

## 2025-03-03 PROCEDURE — 36415 COLL VENOUS BLD VENIPUNCTURE: CPT | Performed by: STUDENT IN AN ORGANIZED HEALTH CARE EDUCATION/TRAINING PROGRAM

## 2025-03-03 PROCEDURE — 83735 ASSAY OF MAGNESIUM: CPT | Performed by: STUDENT IN AN ORGANIZED HEALTH CARE EDUCATION/TRAINING PROGRAM

## 2025-03-03 PROCEDURE — 63600175 PHARM REV CODE 636 W HCPCS: Performed by: INTERNAL MEDICINE

## 2025-03-03 PROCEDURE — 25000003 PHARM REV CODE 250: Performed by: INTERNAL MEDICINE

## 2025-03-03 PROCEDURE — 94761 N-INVAS EAR/PLS OXIMETRY MLT: CPT

## 2025-03-03 PROCEDURE — 25000242 PHARM REV CODE 250 ALT 637 W/ HCPCS: Performed by: STUDENT IN AN ORGANIZED HEALTH CARE EDUCATION/TRAINING PROGRAM

## 2025-03-03 PROCEDURE — 25000003 PHARM REV CODE 250: Performed by: STUDENT IN AN ORGANIZED HEALTH CARE EDUCATION/TRAINING PROGRAM

## 2025-03-03 PROCEDURE — 20000000 HC ICU ROOM

## 2025-03-03 PROCEDURE — 80048 BASIC METABOLIC PNL TOTAL CA: CPT | Performed by: STUDENT IN AN ORGANIZED HEALTH CARE EDUCATION/TRAINING PROGRAM

## 2025-03-03 PROCEDURE — 63600175 PHARM REV CODE 636 W HCPCS: Performed by: STUDENT IN AN ORGANIZED HEALTH CARE EDUCATION/TRAINING PROGRAM

## 2025-03-03 RX ORDER — MAGNESIUM SULFATE HEPTAHYDRATE 40 MG/ML
2 INJECTION, SOLUTION INTRAVENOUS ONCE
Status: COMPLETED | OUTPATIENT
Start: 2025-03-03 | End: 2025-03-03

## 2025-03-03 RX ORDER — POTASSIUM CHLORIDE 7.45 MG/ML
10 INJECTION INTRAVENOUS
Status: DISCONTINUED | OUTPATIENT
Start: 2025-03-03 | End: 2025-03-03

## 2025-03-03 RX ORDER — POTASSIUM CHLORIDE 20 MEQ/1
40 TABLET, EXTENDED RELEASE ORAL ONCE
Status: COMPLETED | OUTPATIENT
Start: 2025-03-03 | End: 2025-03-03

## 2025-03-03 RX ORDER — FUROSEMIDE 10 MG/ML
60 INJECTION INTRAMUSCULAR; INTRAVENOUS 2 TIMES DAILY
Status: DISCONTINUED | OUTPATIENT
Start: 2025-03-03 | End: 2025-03-05 | Stop reason: HOSPADM

## 2025-03-03 RX ADMIN — APIXABAN 5 MG: 2.5 TABLET, FILM COATED ORAL at 08:03

## 2025-03-03 RX ADMIN — IPRATROPIUM BROMIDE AND ALBUTEROL SULFATE 3 ML: 2.5; .5 SOLUTION RESPIRATORY (INHALATION) at 10:03

## 2025-03-03 RX ADMIN — FUROSEMIDE 60 MG: 10 INJECTION, SOLUTION INTRAMUSCULAR; INTRAVENOUS at 07:03

## 2025-03-03 RX ADMIN — LOSARTAN POTASSIUM 25 MG: 25 TABLET, FILM COATED ORAL at 08:03

## 2025-03-03 RX ADMIN — POTASSIUM CHLORIDE 10 MEQ: 7.46 INJECTION, SOLUTION INTRAVENOUS at 07:03

## 2025-03-03 RX ADMIN — INSULIN ASPART 2 UNITS: 100 INJECTION, SOLUTION INTRAVENOUS; SUBCUTANEOUS at 11:03

## 2025-03-03 RX ADMIN — IPRATROPIUM BROMIDE AND ALBUTEROL SULFATE 3 ML: 2.5; .5 SOLUTION RESPIRATORY (INHALATION) at 07:03

## 2025-03-03 RX ADMIN — CEFTRIAXONE SODIUM 1 G: 1 INJECTION, POWDER, FOR SOLUTION INTRAMUSCULAR; INTRAVENOUS at 11:03

## 2025-03-03 RX ADMIN — FUROSEMIDE 60 MG: 10 INJECTION, SOLUTION INTRAVENOUS at 05:03

## 2025-03-03 RX ADMIN — MUPIROCIN: 20 OINTMENT TOPICAL at 08:03

## 2025-03-03 RX ADMIN — IPRATROPIUM BROMIDE AND ALBUTEROL SULFATE 3 ML: 2.5; .5 SOLUTION RESPIRATORY (INHALATION) at 03:03

## 2025-03-03 RX ADMIN — MAGNESIUM SULFATE HEPTAHYDRATE 2 G: 40 INJECTION, SOLUTION INTRAVENOUS at 08:03

## 2025-03-03 RX ADMIN — POTASSIUM CHLORIDE 40 MEQ: 1500 TABLET, EXTENDED RELEASE ORAL at 08:03

## 2025-03-03 RX ADMIN — AMLODIPINE BESYLATE 5 MG: 5 TABLET ORAL at 08:03

## 2025-03-03 RX ADMIN — INSULIN ASPART 1 UNITS: 100 INJECTION, SOLUTION INTRAVENOUS; SUBCUTANEOUS at 08:03

## 2025-03-03 RX ADMIN — METOPROLOL SUCCINATE 50 MG: 50 TABLET, EXTENDED RELEASE ORAL at 08:03

## 2025-03-03 NOTE — NURSING
After ambulating to the bedside commode, the patient became increasingly agitated, refusing to get back into the bed. He denied that he was in the hospital after several attempts at reorientation, and attempted to leave the room and remove medical devices. He became frustrated when looking for his wallet, and refused to get back into the bed. I sat with the patient and went through his belongings with him to ensure that he had everything, and he verbalized his satisfaction with the contents of his personal belongings bag. Haldol was administered as ordered, and the patient was helped back to the bed and placed on the bipap. The patient continues to need frequent reorientation to the situation, place, and time, however he remains oriented to self. The bed remains in the lowest position with the wheels locked, the bed alarm is on and audible, and the call bell is within reach.

## 2025-03-03 NOTE — PT/OT/SLP EVAL
Occupational Therapy   Evaluation    Name: Marck Madison  MRN: [id number]  Admitting Diagnosis: Acute on chronic diastolic congestive heart failure  Recent Surgery: * No surgery found *      Recommendations:     Discharge Recommendations: Low Intensity Therapy  Discharge Equipment Recommendations:  none  Barriers to discharge:  None    Assessment:     Marck Madison is a 65 y.o. male with a medical diagnosis of Acute on chronic diastolic congestive heart failure.  He presents in ICU with the following performance deficits affecting function: impaired endurance, impaired self care skills, impaired functional mobility, gait instability, impaired balance, decreased coordination, decreased safety awareness, decreased lower extremity function, impaired cardiopulmonary response to activity.      Rehab Prognosis: Good; patient would benefit from acute skilled OT services to address these deficits and reach maximum level of function.       Plan:     Patient to be seen 3 x/week to address the above listed problems via self-care/home management, therapeutic activities  Plan of Care Expires: 04/02/25  Plan of Care Reviewed with:  Patient    Subjective     Chief Complaint: None  Patient/Family Comments/goals: Pt reporting that he is never alone at home.    Occupational Profile:  Living Environment: Lives with niece and her four children, few steps to enter  Previous level of function: Indep/Mod I, reporting no falls and that he doesn't use his SPC or RW  Roles and Routines: Uncle  Equipment Used at Home: cane, straight, walker, rolling, shower chair  Assistance upon Discharge:  Niece, great niece and great nephews    Pain/Comfort:  Pain Rating 1: 0/10    Patients cultural, spiritual, [employer] conflicts given the current situation: no    Objective:     Communicated with: nurse and PT prior to session.  Patient found up in chair with blood pressure cuff, Condom Catheter, oxygen, peripheral IV, pulse ox (continuous), telemetry (Pt in  ICU) upon OT entry to room.    General Precautions: Standard, fall, respiratory  Orthopedic Precautions: N/A  Braces: N/A  Respiratory Status: Nasal cannula, flow 3 L/min    Occupational Performance:    Functional Mobility/Transfers:  Sit to stand: SBA  Transfers: CGA   Functional Mobility: CGA    Activities of Daily Living:  LB Dressing: Min A, difficulty donning/doffing socks, extended time needed.  UB Dressing: Set up  Toileting: Condom catheter    Cognitive/Visual Perceptual:  Intact, pt receptive to education on safety to reduce fall risk    Physical Exam:  UE Function: AROM, strength and coordination are WFL for self care tasks  Sitting Balance: Good   Standing Balance: Fair  Edema: Mild bilateral ankles    AMPAC 6 Click ADL:  AMPAC Total Score: 19    Treatment & Education:  Role of OT, POC, ADL and ADL mobility safety, discharge recs     Patient left up in chair with all lines intact, call button in reach, and nurse notified    GOALS:   Multidisciplinary Problems       Occupational Therapy Goals          Problem: Occupational Therapy    Goal Priority Disciplines Outcome Interventions   Occupational Therapy Goal     OT, PT/OT Progressing    Description: Goals to be met by: 4/20/25     Patient will increase functional independence with ADLs by performing:    UB dressing at Mod I level, including clothing retrieval.   LB dressing at Mod I level, including clothing retrieval.  Toileting at Mod I level.   Toilet transfers at Mod I level.   Grooming (3 tasks) standing at sink at Mod I level.                          DME Justifications:  No DME recommended requiring DME justifications    History:     Past Medical History:   Diagnosis Date    Arrhythmia 2017    aflutter    Atrial flutter     CAD (coronary artery disease)     CHF (congestive heart failure), NYHA class I 2017    Cholelithiasis with chronic cholecystitis     Chronic diastolic heart failure     Cigarette smoker     COPD (chronic obstructive pulmonary  "disease)     COVID-19 06/01/2021    Diabetes mellitus     DKA (diabetic ketoacidosis)     Hypertension     NSTEMI (non-ST elevation myocardial infarction)     NSVT (nonsustained ventricular tachycardia)     Psychiatric disorder     h/o "schizophrena/bipolar"    Schizoaffective disorder     Severe sepsis          Past Surgical History:   Procedure Laterality Date    LIVER SURGERY      abscess drainage; 1970s    TREATMENT OF CARDIAC ARRHYTHMIA  9/12/2019    Procedure: Cardioversion or Defibrillation;  Surgeon: Jonathan Lopez MD;  Location: [facility] CATH LAB;  Service: Cardiology;;    TREATMENT OF CARDIAC ARRHYTHMIA N/A 2/3/2022    Procedure: Cardioversion or Defibrillation;  Surgeon: Trevor Schwab MD;  Location: [facility] CATH LAB;  Service: Cardiology;  Laterality: N/A;    TREATMENT OF CARDIAC ARRHYTHMIA N/A 3/16/2024    Procedure: Cardioversion or Defibrillation;  Surgeon: Jonathan Lopez MD;  Location: [facility] CATH LAB;  Service: Cardiology;  Laterality: N/A;       Time Tracking:     OT Date of Treatment: 03/03/25  OT Start Time: 1428  OT Stop Time: 1440  OT Total Time (min): 12 min    Billable Minutes:Evaluation 12    3/3/2025  "

## 2025-03-03 NOTE — PROGRESS NOTES
[facility] - Intensive Care ([facility] Medicine  Progress Note    Patient Name: Marck Madison  MRN: [id number]  Patient Class: IP- Inpatient   Admission Date: 2/27/2025  Length of Stay: 4 days  Attending Physician: Florencio Thompson MD  Primary Care Provider: [facility] -        Principal Problem:Acute on chronic diastolic congestive heart failure        HPI:  Pt with PMH of atrial fibrilation, PE/DVT (on lovenox due to thrombus despite eliquis), hypertension, diastolic heart failure, T2DM, COPD, Hep C. Pt seen while on BIPAP. Unable to obtain full history. Reports SOB, cough, chills for 3 days. Denies chest pain, constipation or diarrhea. Currently living with niece. Not currently taking medication. Per ED note ' He notes that he has been out of all of his medications since getting released from [facility] roughly 1 month prior'. Pt unsure which meds he takes - per chart review dispense report 9/2024 - amiodarone, aspirin, carvedilol and Entresto.  However reported to the ED that he might take a fluid pill and a blood thinner.  Stopped smoking 3 years ago. Denies alcohol or drug use.     In ED , RR 26. Bicarb 20, , Mag 1.5, trop 0.387-->0.386, flu and covid neg, lactate wnl, VBG unremarkable. CXR - 'Pulmonary edema, pneumonia, aspiration, or sepsis'.  Given DuoNebs, 325 mg aspirin, magnesium, Lasix 40 mg IV, 5 mg diltiazem.  Patient had increased workup breathing so was placed on BiPAP.  When attempted to wean off BiPAP had recurrence of respiratory distress so we placed on BiPAP with plans to move to ICU.  Admitted to hospital medicine for further management.    Overview/Hospital Course:  Diuresis. US kidneys unremarkable.  Trial off BIPAP. Seems to be tolerating well but intermittently requiring it for WOB. US DVT neg. Start metop and eliquis per cardiology for aflutter - CHADSVASC 5. Cont diuresis. Monitor and replace electrolytes. Losartan and amlodipine added for BP.     Switched to  Lovenox from eliquis by hematology in 2024 due to development of DVT/PE despite being on eliquis for Afib. Pt reports he did not have financial issues getting lovenox but has not been taking it either. To me pt reports that he did have poor compliance with eliquis use prior. As such will continue with eliquis (instead of lovenox as injections render itself to even less compliance).        Interval History:  Pt seen with BIPAP on. No overnight issues. Understands plan of care. Cont IV diuresis. BP better controlled. Monitor and replace electrolytes.     Review of Systems   Constitutional:  Positive for chills.   Respiratory:  Positive for cough and shortness of breath.    Cardiovascular:  Positive for leg swelling. Negative for chest pain.   Gastrointestinal:  Negative for abdominal pain, constipation and diarrhea.     Objective:     Vital Signs (Most Recent):  Temp: 98.4 °F (36.9 °C) (03/03/25 0301)  Pulse: 81 (03/03/25 0828)  Resp: (!) 21 (03/03/25 0722)  BP: 115/71 (03/03/25 0828)  SpO2: (!) 93 % (03/03/25 0722) Vital Signs (24h Range):  Temp:  [98.2 °F (36.8 °C)-98.7 °F (37.1 °C)] 98.4 °F (36.9 °C)  Pulse:  [] 81  Resp:  [14-54] 21  SpO2:  [84 %-100 %] 93 %  BP: ()/() 115/71     Weight: 79.8 kg (176 lb)  Body mass index is 28.41 kg/m².    Intake/Output Summary (Last 24 hours) at 3/3/2025 0955  Last data filed at 3/3/2025 0309  Gross per 24 hour   Intake 869.22 ml   Output 3480 ml   Net -2610.78 ml         Physical Exam  Constitutional:       General: He is not in acute distress.  Pulmonary:      Breath sounds: No wheezing.      Comments: On BIPAP   Abdominal:      General: There is no distension.      Tenderness: There is no abdominal tenderness.   Musculoskeletal:      Right lower leg: Edema (improving) present.      Left lower leg: Edema (improving) present.   Neurological:      Mental Status: He is oriented to person, place, and time.             Significant Labs: All pertinent labs within  the past 24 hours have been reviewed.    Significant Imaging: I have reviewed all pertinent imaging results/findings within the past 24 hours.    Assessment and Plan     * Acute on chronic diastolic congestive heart failure  WOB requiring BIPAP  HTN   Atrial Fibrillation     CXR - 'Pulmonary edema, pneumonia, aspiration, or sepsis'.    Outpatient meds from 9/2024 - amiodarone, carvedilol, aspirin, Entresto    DVT US neg  Echo moderate mitral regurg, EF 55%    Plan  - BIPAP PRN  - IV lasix, monitor and replace electrolytes   - Amlodipine and losartan   - Intake and output  - CHADSVASC 5 - not currently on AC - discuss with cardiology - start eliquis   - Metop succinate for rate control   - Ceftriaxone and azithromycin for CAP   - Cardiology consult  - Pulm crit consult       Hx of hepatitis C  Rpt labs and Hep C quant  HIV neg       History of pulmonary embolus (PE)  DVT    Switched to Lovenox from eliquis by hematology in 2024 due to development of DVT/PE despite being on eliquis for Afib. Pt reports he did not have financial issues getting lovenox but has not been taking it either. To me pt reports that he did have poor compliance with eliquis use prior. As such will continue with eliquis (instead of lovenox as injections render itself to even less compliance).      Type 2 diabetes mellitus  8 months ago 6.7%  Repeat HbA1c 6.8%   SS     COPD (chronic obstructive pulmonary disease)  No PFTs available, reports stopped smoking 3 years ago, not currently wheezing      VTE Risk Mitigation (From admission, onward)           Ordered     apixaban tablet 5 mg  2 times daily         02/28/25 1015     IP VTE HIGH RISK PATIENT  Once         02/27/25 1301                    Discharge Planning   BALDO:      Code Status: Full Code   Medical Readiness for Discharge Date:   Discharge Plan A: Home with family                Please place Justification for DME        Florencio Tapia MD  Department of Hospital Medicine   [facility]  Intensive Care ([place])

## 2025-03-03 NOTE — SUBJECTIVE & OBJECTIVE
Interval History:  Pt seen with BIPAP on. No overnight issues. Understands plan of care. Cont IV diuresis. BP better controlled. Monitor and replace electrolytes.     Review of Systems   Constitutional:  Positive for chills.   Respiratory:  Positive for cough and shortness of breath.    Cardiovascular:  Positive for leg swelling. Negative for chest pain.   Gastrointestinal:  Negative for abdominal pain, constipation and diarrhea.     Objective:     Vital Signs (Most Recent):  Temp: 98.4 °F (36.9 °C) (03/03/25 0301)  Pulse: 81 (03/03/25 0828)  Resp: (!) 21 (03/03/25 0722)  BP: 115/71 (03/03/25 0828)  SpO2: (!) 93 % (03/03/25 0722) Vital Signs (24h Range):  Temp:  [98.2 °F (36.8 °C)-98.7 °F (37.1 °C)] 98.4 °F (36.9 °C)  Pulse:  [] 81  Resp:  [14-54] 21  SpO2:  [84 %-100 %] 93 %  BP: ()/() 115/71     Weight: 79.8 kg (176 lb)  Body mass index is 28.41 kg/m².    Intake/Output Summary (Last 24 hours) at 3/3/2025 0951  Last data filed at 3/3/2025 0309  Gross per 24 hour   Intake 869.22 ml   Output 3480 ml   Net -2610.78 ml         Physical Exam  Constitutional:       General: He is not in acute distress.  Pulmonary:      Breath sounds: No wheezing.      Comments: On BIPAP   Abdominal:      General: There is no distension.      Tenderness: There is no abdominal tenderness.   Musculoskeletal:      Right lower leg: Edema (improving) present.      Left lower leg: Edema (improving) present.   Neurological:      Mental Status: He is oriented to person, place, and time.             Significant Labs: All pertinent labs within the past 24 hours have been reviewed.    Significant Imaging: I have reviewed all pertinent imaging results/findings within the past 24 hours.

## 2025-03-03 NOTE — PT/OT/SLP PROGRESS
Physical Therapy Treatment    Patient Name:  Marck Madison   MRN:  [id number]    Recommendations:     Discharge Recommendations: Low Intensity Therapy  Discharge Equipment Recommendations: none  Barriers to discharge: Inaccessible home    Assessment:     Marck Madison is a 65 y.o. male admitted with a medical diagnosis of Acute on chronic diastolic congestive heart failure.  He presents with the following impairments/functional limitations: impaired endurance, impaired self care skills, impaired functional mobility, gait instability, impaired balance, decreased coordination, decreased safety awareness, impaired cardiopulmonary response to activity, edema    Patient with progress towards goals, decreased WOB with ambulation this date but increased gait instability for initial gait. Focus of session on self pacing activities and maintaining breathing technique during ambulation bouts. Rec for dc to LIT, rec use of cane until more steady on feet.    Rehab Prognosis: Good; patient would benefit from acute skilled PT services to address these deficits and reach maximum level of function.    Recent Surgery: * No surgery found *      Plan:     During this hospitalization, patient to be seen 3 x/week to address the identified rehab impairments via gait training, therapeutic activities, therapeutic exercises, neuromuscular re-education and progress toward the following goals:    Plan of Care Expires:  03/15/25    Subjective     Chief Complaint: No complaints of pain, decreased SOB  Patient/Family Comments/goals: Patient agreeable to PT treatment.  Pain/Comfort:  Pain Rating 1: 0/10  Pain Rating Post-Intervention 1: 0/10      Objective:     Communicated with RN prior to session.  Patient found HOB elevated with peripheral IV, telemetry, blood pressure cuff, pulse ox (continuous), oxygen, bed alarm, Condom Catheter upon PT entry to room.     General Precautions: Standard, fall, respiratory  Orthopedic Precautions: N/A  Braces:  N/A  Respiratory Status: Nasal cannula, flow 3 L/min     Patient donned non slip socks and gait belt for OOB mobility.    Functional Mobility:  Bed Mobility:     Supine to Sit: minimum assistance  Transfers:     Sit to Stand:  stand by assistance and contact guard assistance with no AD  Gait: 2x40 ft, 1x with Lisandro and HHA and 1x with no UE support and CGA-SBA. With initial gait, increased lateral sway and general gait instability compared to Saturday. After seated rest break, able to perform second bout with improved instability and no UE support. Cueing for self pacing, maintaining breathing techniques, and initiating seated rest break before SOB.      AM-PAC 6 CLICK MOBILITY  Turning over in bed (including adjusting bedclothes, sheets and blankets)?: 4  Sitting down on and standing up from a chair with arms (e.g., wheelchair, bedside commode, etc.): 3  Moving from lying on back to sitting on the side of the bed?: 3  Moving to and from a bed to a chair (including a wheelchair)?: 3  Need to walk in hospital room?: 3  Climbing 3-5 steps with a railing?: 3  Basic Mobility Total Score: 19       Treatment & Education:  Discussion of anatomy/physiology, breathing technique, and some energy conservation techniques including rest breaks before SOB  Encouraged use of cane upon DC home until more steady on feet    Patient left up in chair with all lines intact, call button in reach, and Rn and OT notified..    GOALS:   Multidisciplinary Problems       Physical Therapy Goals          Problem: Physical Therapy    Goal Priority Disciplines Outcome Interventions   Physical Therapy Goal     PT, PT/OT Progressing    Description: Goals to be met by: 3/15/25    Patient will increase functional independence with mobility by performin. Supine<>sit with supervision without use of HB features.   2. Sit<>stand with supervision with LRAD or no AD.  3. Gait x 150 feet with supervision with LRAD or no AD.  4. Ascend/descend 4 step(s)  with least restrictive assistive device and uni HR with supervision.                        DME Justifications:  No DME recommended requiring DME justifications    Time Tracking:     PT Received On: 03/03/25  PT Start Time: 1402     PT Stop Time: 1422  PT Total Time (min): 20 min     Billable Minutes: Therapeutic Activity 20    Treatment Type: Treatment  PT/PTA: PT     Number of PTA visits since last PT visit: 0     03/03/2025   Home

## 2025-03-03 NOTE — PLAN OF CARE
Problem: Occupational Therapy  Goal: Occupational Therapy Goal  Description: Goals to be met by: 4/20/25     Patient will increase functional independence with ADLs by performing:    UB dressing at Mod I level, including clothing retrieval.   LB dressing at Mod I level, including clothing retrieval.  Toileting at Mod I level.   Toilet transfers at Mod I level.   Grooming (3 tasks) standing at sink at Mod I level.     Outcome: Progressing  Recommend Low Intensity Therapy.

## 2025-03-03 NOTE — PROGRESS NOTES
[facility]    Admission date:  2/27/2025     Assessment    Acute on chronic diastolic heart failure   Improving     Atypical atrial flutter   On apixaban.  Back in sinus rhythm.    Plan and Discussion    Continues to diurese    Subjective    No complaints    Medications  Current Medications[1]     Physical Exam    Temp:  [97.5 °F (36.4 °C)-98.7 °F (37.1 °C)]   Pulse:  []   Resp:  [14-54]   BP: ()/()   SpO2:  [84 %-100 %]    Wt Readings from Last 3 Encounters:   02/27/25 79.8 kg (176 lb)   10/27/24 79.8 kg (176 lb)   06/11/24 88.5 kg (195 lb 1.7 oz)     Physical Exam  Constitutional:       General: He is not in acute distress.     Appearance: He is obese.   Neck:      Vascular: No hepatojugular reflux or JVD.   Cardiovascular:      Rate and Rhythm: Normal rate and regular rhythm. Occasional Extrasystoles are present.     Heart sounds: S1 normal and S2 normal.      No S3 or S4 sounds.   Pulmonary:      Effort: Pulmonary effort is normal.      Breath sounds: Normal air entry. Rhonchi and rales present.   Abdominal:      General: Bowel sounds are normal.      Palpations: Abdomen is soft. There is no hepatomegaly.      Tenderness: There is no abdominal tenderness.   Musculoskeletal:      Right lower leg: No edema.      Left lower leg: No edema.   Skin:     Coloration: Skin is not pale.   Neurological:      Mental Status: He is alert.   Psychiatric:         Behavior: Behavior is cooperative.         Telemetry  Sinus rhythm with PVCs    Labs  Recent Results (from the past 24 hours)   Basic metabolic panel    Collection Time: 03/02/25  4:14 PM   Result Value Ref Range    Sodium 141 136 - 145 mmol/L    Potassium 4.4 3.5 - 5.1 mmol/L    Chloride 99 95 - 110 mmol/L    CO2 30 (H) 23 - 29 mmol/L    Glucose 134 (H) 70 - 110 mg/dL    BUN 33 (H) 8 - 23 mg/dL    Creatinine 1.8 (H) 0.5 - 1.4 mg/dL    Calcium 9.9 8.7 - 10.5 mg/dL    Anion Gap 12 8 - 16 mmol/L    eGFR 41 (A) >60 mL/min/1.73 m^2    Magnesium    Collection Time: 03/02/25  4:14 PM   Result Value Ref Range    Magnesium 1.9 1.6 - 2.6 mg/dL   POCT glucose    Collection Time: 03/02/25  8:32 PM   Result Value Ref Range    POCT Glucose 155 (H) 70 - 110 mg/dL   Basic Metabolic Panel    Collection Time: 03/03/25  2:47 AM   Result Value Ref Range    Sodium 138 136 - 145 mmol/L    Potassium 3.2 (L) 3.5 - 5.1 mmol/L    Chloride 100 95 - 110 mmol/L    CO2 26 23 - 29 mmol/L    Glucose 146 (H) 70 - 110 mg/dL    BUN 41 (H) 8 - 23 mg/dL    Creatinine 1.8 (H) 0.5 - 1.4 mg/dL    Calcium 9.4 8.7 - 10.5 mg/dL    Anion Gap 12 8 - 16 mmol/L    eGFR 41 (A) >60 mL/min/1.73 m^2   Magnesium    Collection Time: 03/03/25  2:47 AM   Result Value Ref Range    Magnesium 2.2 1.6 - 2.6 mg/dL   CBC auto differential    Collection Time: 03/03/25  2:47 AM   Result Value Ref Range    WBC 9.33 3.90 - 12.70 K/uL    RBC 5.08 4.60 - 6.20 M/uL    Hemoglobin 15.0 14.0 - 18.0 g/dL    Hematocrit 45.7 40.0 - 54.0 %    MCV 90 82 - 98 fL    MCH 29.5 27.0 - 31.0 pg    MCHC 32.8 32.0 - 36.0 g/dL    RDW 13.7 11.5 - 14.5 %    Platelets 174 150 - 450 K/uL    MPV 11.0 9.2 - 12.9 fL    Immature Granulocytes 0.3 0.0 - 0.5 %    Gran # (ANC) 5.6 1.8 - 7.7 K/uL    Immature Grans (Abs) 0.03 0.00 - 0.04 K/uL    Lymph # 2.3 1.0 - 4.8 K/uL    Mono # 1.3 (H) 0.3 - 1.0 K/uL    Eos # 0.1 0.0 - 0.5 K/uL    Baso # 0.05 0.00 - 0.20 K/uL    nRBC 0 0 /100 WBC    Gran % 59.8 38.0 - 73.0 %    Lymph % 24.5 18.0 - 48.0 %    Mono % 13.6 4.0 - 15.0 %    Eosinophil % 1.3 0.0 - 8.0 %    Basophil % 0.5 0.0 - 1.9 %    Differential Method Automated    POCT glucose    Collection Time: 03/03/25  7:56 AM   Result Value Ref Range    POCT Glucose 147 (H) 70 - 110 mg/dL             Danilo Isaac MD       [1]   Current Facility-Administered Medications   Medication Dose Route Frequency Provider Last Rate Last Admin    acetaminophen tablet 650 mg  650 mg Oral Q6H PRN Satya Hanson MD        albuterol-ipratropium 2.5 mg-0.5 mg/3  mL nebulizer solution 3 mL  3 mL Nebulization Q4H WAKE Florencio Thompson MD   3 mL at 03/03/25 1057    amLODIPine tablet 5 mg  5 mg Oral Daily Florencio Thompson MD   5 mg at 03/03/25 0828    apixaban tablet 5 mg  5 mg Oral BID Hermila Littlejohn MD   5 mg at 03/03/25 0828    dextrose 50% injection 12.5 g  12.5 g Intravenous PRN Florencio Thompson MD        furosemide injection 60 mg  60 mg Intravenous BID WM Florencio Thompson MD   60 mg at 03/03/25 0759    glucagon (human recombinant) injection 1 mg  1 mg Intramuscular PRN Florencio Thompson MD        glucose chewable tablet 16 g  16 g Oral PRN Florencio Thompson MD        glucose chewable tablet 24 g  24 g Oral PRN Florencio Thompson MD        hydrALAZINE injection 5 mg  5 mg Intravenous Q6H PRN Florencio Thompson MD        insulin aspart U-100 pen 0-5 Units  0-5 Units Subcutaneous QID (AC + HS) PRN Florencio Thompson MD   2 Units at 03/03/25 1129    losartan tablet 25 mg  25 mg Oral Daily Florencio Thompson MD   25 mg at 03/03/25 0828    metoprolol injection 5 mg  5 mg Intravenous Q5 Min PRN Hermila Littlejohn MD        metoprolol succinate (TOPROL-XL) 24 hr tablet 50 mg  50 mg Oral BID Hermila Littlejohn MD   50 mg at 03/03/25 0828    mupirocin 2 % ointment   Nasal BID Florencio Thompson MD   Given at 03/03/25 0828    ondansetron injection 4 mg  4 mg Intravenous Q6H PRN Satya Hanson MD   4 mg at 02/27/25 1310    polyethylene glycol packet 17 g  17 g Oral Daily PRN Satya Hanson MD

## 2025-03-04 LAB
ANION GAP SERPL CALC-SCNC: 11 MMOL/L (ref 8–16)
BACTERIA BLD CULT: NORMAL
BACTERIA BLD CULT: NORMAL
BUN SERPL-MCNC: 44 MG/DL (ref 8–23)
CALCIUM SERPL-MCNC: 9.5 MG/DL (ref 8.7–10.5)
CHLORIDE SERPL-SCNC: 97 MMOL/L (ref 95–110)
CO2 SERPL-SCNC: 31 MMOL/L (ref 23–29)
CREAT SERPL-MCNC: 1.8 MG/DL (ref 0.5–1.4)
EST. GFR  (NO RACE VARIABLE): 41 ML/MIN/1.73 M^2
GLUCOSE SERPL-MCNC: 156 MG/DL (ref 70–110)
MAGNESIUM SERPL-MCNC: 2.3 MG/DL (ref 1.6–2.6)
POCT GLUCOSE: 118 MG/DL (ref 70–110)
POCT GLUCOSE: 153 MG/DL (ref 70–110)
POCT GLUCOSE: 203 MG/DL (ref 70–110)
POCT GLUCOSE: 229 MG/DL (ref 70–110)
POTASSIUM SERPL-SCNC: 3.4 MMOL/L (ref 3.5–5.1)
SODIUM SERPL-SCNC: 139 MMOL/L (ref 136–145)

## 2025-03-04 PROCEDURE — 36415 COLL VENOUS BLD VENIPUNCTURE: CPT | Performed by: STUDENT IN AN ORGANIZED HEALTH CARE EDUCATION/TRAINING PROGRAM

## 2025-03-04 PROCEDURE — 94761 N-INVAS EAR/PLS OXIMETRY MLT: CPT

## 2025-03-04 PROCEDURE — 99900035 HC TECH TIME PER 15 MIN (STAT)

## 2025-03-04 PROCEDURE — 20000000 HC ICU ROOM

## 2025-03-04 PROCEDURE — 94640 AIRWAY INHALATION TREATMENT: CPT

## 2025-03-04 PROCEDURE — 27000221 HC OXYGEN, UP TO 24 HOURS

## 2025-03-04 PROCEDURE — 25000003 PHARM REV CODE 250: Performed by: STUDENT IN AN ORGANIZED HEALTH CARE EDUCATION/TRAINING PROGRAM

## 2025-03-04 PROCEDURE — 63600175 PHARM REV CODE 636 W HCPCS: Performed by: INTERNAL MEDICINE

## 2025-03-04 PROCEDURE — 99233 SBSQ HOSP IP/OBS HIGH 50: CPT | Mod: ,,, | Performed by: INTERNAL MEDICINE

## 2025-03-04 PROCEDURE — 25000003 PHARM REV CODE 250: Performed by: INTERNAL MEDICINE

## 2025-03-04 PROCEDURE — 80048 BASIC METABOLIC PNL TOTAL CA: CPT | Performed by: STUDENT IN AN ORGANIZED HEALTH CARE EDUCATION/TRAINING PROGRAM

## 2025-03-04 PROCEDURE — 94660 CPAP INITIATION&MGMT: CPT

## 2025-03-04 PROCEDURE — 83735 ASSAY OF MAGNESIUM: CPT | Performed by: STUDENT IN AN ORGANIZED HEALTH CARE EDUCATION/TRAINING PROGRAM

## 2025-03-04 PROCEDURE — 25000242 PHARM REV CODE 250 ALT 637 W/ HCPCS: Performed by: STUDENT IN AN ORGANIZED HEALTH CARE EDUCATION/TRAINING PROGRAM

## 2025-03-04 RX ORDER — METOPROLOL SUCCINATE 50 MG/1
100 TABLET, EXTENDED RELEASE ORAL 2 TIMES DAILY
Status: DISCONTINUED | OUTPATIENT
Start: 2025-03-04 | End: 2025-03-05 | Stop reason: HOSPADM

## 2025-03-04 RX ADMIN — APIXABAN 5 MG: 2.5 TABLET, FILM COATED ORAL at 08:03

## 2025-03-04 RX ADMIN — IPRATROPIUM BROMIDE AND ALBUTEROL SULFATE 3 ML: 2.5; .5 SOLUTION RESPIRATORY (INHALATION) at 12:03

## 2025-03-04 RX ADMIN — IPRATROPIUM BROMIDE AND ALBUTEROL SULFATE 3 ML: 2.5; .5 SOLUTION RESPIRATORY (INHALATION) at 07:03

## 2025-03-04 RX ADMIN — INSULIN ASPART 2 UNITS: 100 INJECTION, SOLUTION INTRAVENOUS; SUBCUTANEOUS at 11:03

## 2025-03-04 RX ADMIN — METOPROLOL SUCCINATE 50 MG: 50 TABLET, EXTENDED RELEASE ORAL at 08:03

## 2025-03-04 RX ADMIN — FUROSEMIDE 60 MG: 10 INJECTION, SOLUTION INTRAVENOUS at 05:03

## 2025-03-04 RX ADMIN — LOSARTAN POTASSIUM 25 MG: 25 TABLET, FILM COATED ORAL at 08:03

## 2025-03-04 RX ADMIN — FUROSEMIDE 60 MG: 10 INJECTION, SOLUTION INTRAVENOUS at 08:03

## 2025-03-04 RX ADMIN — IPRATROPIUM BROMIDE AND ALBUTEROL SULFATE 3 ML: 2.5; .5 SOLUTION RESPIRATORY (INHALATION) at 08:03

## 2025-03-04 RX ADMIN — IPRATROPIUM BROMIDE AND ALBUTEROL SULFATE 3 ML: 2.5; .5 SOLUTION RESPIRATORY (INHALATION) at 03:03

## 2025-03-04 RX ADMIN — APIXABAN 5 MG: 2.5 TABLET, FILM COATED ORAL at 10:03

## 2025-03-04 RX ADMIN — AMLODIPINE BESYLATE 5 MG: 5 TABLET ORAL at 08:03

## 2025-03-04 RX ADMIN — INSULIN ASPART 1 UNITS: 100 INJECTION, SOLUTION INTRAVENOUS; SUBCUTANEOUS at 09:03

## 2025-03-04 RX ADMIN — METOPROLOL SUCCINATE 100 MG: 50 TABLET, EXTENDED RELEASE ORAL at 10:03

## 2025-03-04 NOTE — ASSESSMENT & PLAN NOTE
- 8 months ago HbA1c 6.7%  - Repeat HbA1c 6.8%   - Patient prescribed basal insulin 10 units daily and Trulicity weekly - apparently not compliant with any of these  - Continue low dose SSI, diabetic diet. Will send new prescription on discharge - says he wants to take his medications

## 2025-03-04 NOTE — PLAN OF CARE
Problem: Adult Inpatient Plan of Care  Goal: Absence of Hospital-Acquired Illness or Injury  Outcome: Progressing  Intervention: Identify and Manage Fall Risk  Flowsheets (Taken 3/4/2025 1643)  Safety Promotion/Fall Prevention:   assistive device/personal item within reach   bed alarm set   pulse ox   lighting adjusted   instructed to call staff for mobility   medications reviewed  Intervention: Prevent Skin Injury  Flowsheets (Taken 3/4/2025 1643)  Body Position: position changed independently  Skin Protection:   incontinence pads utilized   transparent dressing maintained  Device Skin Pressure Protection:   pressure points protected   absorbent pad utilized/changed  Intervention: Prevent and Manage VTE (Venous Thromboembolism) Risk  Flowsheets (Taken 3/4/2025 1643)  VTE Prevention/Management: bleeding risk assessed  Intervention: Prevent Infection  Flowsheets (Taken 3/4/2025 1643)  Infection Prevention:   environmental surveillance performed   equipment surfaces disinfected   hand hygiene promoted   visitors restricted/screened   single patient room provided   rest/sleep promoted   personal protective equipment utilized

## 2025-03-04 NOTE — SUBJECTIVE & OBJECTIVE
Interval History: Assumed care of patient.  Says he feels well, breathing comfortably and no palpitations.  Monitor showing frequent PACs.  Says he has had trouble getting his physician at [place] to send his prescriptions to the pharmacy.    Review of Systems   Constitutional:  Negative for chills and fever.   Respiratory:  Negative for cough and shortness of breath.    Cardiovascular:  Negative for chest pain and palpitations.     Objective:     Vital Signs (Most Recent):  Temp: 97.8 °F (36.6 °C) (03/04/25 0305)  Pulse: 76 (03/04/25 0822)  Resp: (!) 21 (03/04/25 0700)  BP: 124/82 (03/04/25 0822)  SpO2: 99 % (03/04/25 0700) Vital Signs (24h Range):  Temp:  [97.4 °F (36.3 °C)-98.4 °F (36.9 °C)] 97.8 °F (36.6 °C)  Pulse:  [69-96] 76  Resp:  [15-37] 21  SpO2:  [91 %-100 %] 99 %  BP: (101-157)/(62-87) 124/82     Weight: 79.8 kg (176 lb)  Body mass index is 28.41 kg/m².    Intake/Output Summary (Last 24 hours) at 3/4/2025 0822  Last data filed at 3/4/2025 0600  Gross per 24 hour   Intake 480 ml   Output 2775 ml   Net -2295 ml         Physical Exam  Cardiovascular:      Rate and Rhythm: Normal rate. Rhythm irregular.      Pulses: Normal pulses.      Heart sounds: Normal heart sounds. No murmur heard.     No gallop.      Comments: S1 S2 with ectopy (PACs on monitor)  Pulmonary:      Effort: Pulmonary effort is normal.      Breath sounds: Normal breath sounds.   Abdominal:      General: Bowel sounds are normal.      Palpations: Abdomen is soft.   Skin:     General: Skin is warm and dry.   Neurological:      General: No focal deficit present.      Mental Status: He is alert and oriented to person, place, and time.             Significant Labs: All pertinent labs within the past 24 hours have been reviewed.    Significant Imaging: I have reviewed all pertinent imaging results/findings within the past 24 hours.

## 2025-03-04 NOTE — PROGRESS NOTES
"[facility] Intensive [facility] ([place])  Cardiology  Progress Note    Patient Name: Marck Madison  MRN: [id number]  Admission Date: 2/27/2025  Hospital Length of Stay: 5 days  Code Status: Full Code   Attending Physician: Sudha Rolle MD   Primary Care Physician: [facility] -  Expected Discharge Date:   Principal Problem:Acute on chronic diastolic congestive heart failure    Subjective:     Brief HPI:    "This 65-year-old male with a history of diastolic heart failure has had progressively worsening shortness of breath for several days. He reports that he has been out of all of his medications for a while. Can not say how long. History somewhat difficult as he is on noninvasive positive pressure ventilation. Had some chest tightness. Since admission, feeling better with diuresis".    Hospital Course:     2/27/2025: Echo: Normal left ventricular size and systolic function. EF 55-60%. LVH. Moderately dilated LA. Moderate MR.    2/28/2025: Metoprolol for better control of VRR.    2/28/2025: Apixiban was begun.    3/2/2025: SR.    3/1/2025: Runs of VT.    Diuresis.    Interval History:     Remains in sinus with very frequent APCs.    No VT.    Breathing much easier and looks a lot better than a few days ago.      Review of Systems   Constitutional: Negative for chills, fever and malaise/fatigue.   HENT:  Negative for nosebleeds.    Eyes:  Negative for vision loss in left eye and vision loss in right eye.   Cardiovascular:  Positive for orthopnea and paroxysmal nocturnal dyspnea. Negative for chest pain, leg swelling and palpitations.   Respiratory:  Positive for shortness of breath. Negative for cough, hemoptysis, sputum production and wheezing.    Hematologic/Lymphatic: Negative for bleeding problem. Does not bruise/bleed easily.   Skin:  Negative for color change and rash.   Musculoskeletal:  Negative for muscle weakness and myalgias.   Gastrointestinal:  Negative for abdominal pain, heartburn, " hematemesis, hematochezia, melena, nausea and vomiting.   Genitourinary:  Negative for hematuria.   Neurological:  Negative for dizziness, focal weakness, headaches, light-headedness, vertigo and weakness.   Psychiatric/Behavioral:  Negative for altered mental status. The patient is not nervous/anxious.    Allergic/Immunologic: Negative for persistent infections.     Objective:     Vital Signs (Most Recent):  Temp: 97.9 °F (36.6 °C) (03/04/25 1101)  Pulse: 76 (03/04/25 1315)  Resp: (P) 20 (03/04/25 1315)  BP: (!) 159/93 (03/04/25 1315)  SpO2: 96 % (03/04/25 1315) Vital Signs (24h Range):  Temp:  [97.4 °F (36.3 °C)-98.4 °F (36.9 °C)] 97.9 °F (36.6 °C)  Pulse:  [69-91] 76  Resp:  [14-37] (P) 20  SpO2:  [91 %-100 %] 96 %  BP: (101-159)/(62-93) 159/93     Weight: 79.8 kg (176 lb)  Body mass index is 28.41 kg/m².    SpO2: 96 %         Intake/Output Summary (Last 24 hours) at 3/4/2025 1415  Last data filed at 3/4/2025 1016  Gross per 24 hour   Intake 720 ml   Output 2625 ml   Net -1905 ml       Lines/Drains/Airways       Drain  Duration             Male External Urinary Catheter 03/03/25 1900 <1 day              Peripheral Intravenous Line  Duration                  Peripheral IV - Single Lumen 02/28/25 2240 20 G Anterior;Right Forearm 3 days                    Physical Exam  Constitutional:       General: He is not in acute distress.     Appearance: Normal appearance. He is well-developed. He is not ill-appearing.   Eyes:      Conjunctiva/sclera:      Right eye: Right conjunctiva is not injected. No hemorrhage.     Left eye: Left conjunctiva is not injected. No hemorrhage.     Pupils:      Right eye: Pupil is round.      Left eye: Pupil is round.   Neck:      Vascular: No JVD.   Cardiovascular:      Rate and Rhythm: Normal rate and regular rhythm.      Heart sounds: S1 normal and S2 normal.      Gallop present. S4 sounds present.   Pulmonary:      Effort: Pulmonary effort is normal.      Breath sounds: Examination of the  right-middle field reveals rhonchi. Examination of the left-middle field reveals rhonchi. Examination of the right-lower field reveals rales. Examination of the left-lower field reveals rales. Rhonchi and rales present.   Chest:      Chest wall: No swelling or tenderness.   Abdominal:      General: There is no distension.      Palpations: Abdomen is soft.      Tenderness: There is no abdominal tenderness.   Musculoskeletal:      Cervical back: Neck supple.      Right ankle: No swelling.      Left ankle: No swelling.   Skin:     General: Skin is warm and dry.      Findings: No rash.   Neurological:      Mental Status: He is alert and oriented to person, place, and time. He is not disoriented.   Psychiatric:         Attention and Perception: Attention normal.         Mood and Affect: Mood normal.         Speech: Speech normal.         Behavior: Behavior normal.         Thought Content: Thought content normal.         Cognition and Memory: Cognition and memory normal.         Judgment: Judgment normal.       Current Medications:     albuterol-ipratropium  3 mL Nebulization Q4H WAKE    amLODIPine  5 mg Oral Daily    apixaban  5 mg Oral BID    furosemide (LASIX) injection  60 mg Intravenous BID    losartan  25 mg Oral Daily    metoprolol succinate  50 mg Oral BID     Current Laboratory Results:    Recent Results (from the past 24 hours)   Basic metabolic panel    Collection Time: 03/03/25  3:05 PM   Result Value Ref Range    Sodium 140 136 - 145 mmol/L    Potassium 3.6 3.5 - 5.1 mmol/L    Chloride 94 (L) 95 - 110 mmol/L    CO2 32 (H) 23 - 29 mmol/L    Glucose 110 70 - 110 mg/dL    BUN 38 (H) 8 - 23 mg/dL    Creatinine 1.9 (H) 0.5 - 1.4 mg/dL    Calcium 9.8 8.7 - 10.5 mg/dL    Anion Gap 14 8 - 16 mmol/L    eGFR 38 (A) >60 mL/min/1.73 m^2   Magnesium    Collection Time: 03/03/25  3:05 PM   Result Value Ref Range    Magnesium 1.9 1.6 - 2.6 mg/dL   POCT glucose    Collection Time: 03/03/25  4:52 PM   Result Value Ref Range     POCT Glucose 148 (H) 70 - 110 mg/dL   POCT glucose    Collection Time: 03/03/25  8:14 PM   Result Value Ref Range    POCT Glucose 204 (H) 70 - 110 mg/dL   Basic Metabolic Panel    Collection Time: 03/04/25  2:52 AM   Result Value Ref Range    Sodium 139 136 - 145 mmol/L    Potassium 3.4 (L) 3.5 - 5.1 mmol/L    Chloride 97 95 - 110 mmol/L    CO2 31 (H) 23 - 29 mmol/L    Glucose 156 (H) 70 - 110 mg/dL    BUN 44 (H) 8 - 23 mg/dL    Creatinine 1.8 (H) 0.5 - 1.4 mg/dL    Calcium 9.5 8.7 - 10.5 mg/dL    Anion Gap 11 8 - 16 mmol/L    eGFR 41 (A) >60 mL/min/1.73 m^2   Magnesium    Collection Time: 03/04/25  2:52 AM   Result Value Ref Range    Magnesium 2.3 1.6 - 2.6 mg/dL   POCT glucose    Collection Time: 03/04/25  7:31 AM   Result Value Ref Range    POCT Glucose 153 (H) 70 - 110 mg/dL   POCT glucose    Collection Time: 03/04/25 11:04 AM   Result Value Ref Range    POCT Glucose 229 (H) 70 - 110 mg/dL     Current Imaging Results:    US Retroperitoneal Complete   Final Result      Normal appearance of the kidneys.      Enlarged prostate gland.         Electronically signed by: Wild Buchanan MD   Date:    02/28/2025   Time:    12:12      US Lower Extremity Veins Bilateral   Final Result      No DVT seen.         Electronically signed by: Wild Buchanan MD   Date:    02/28/2025   Time:    08:00      X-Ray Chest AP Portable   Final Result      Pulmonary edema, pneumonia, aspiration, or sepsis.         Electronically signed by: Wild Buchanan MD   Date:    02/27/2025   Time:    08:00          2/27/2025: Echo:  Left Ventricle: The left ventricle is normal in size. Increased wall thickness. There is concentric hypertrophy. There is normal systolic function with a visually estimated ejection fraction of 55 - 60%.  Right Ventricle: The right ventricle is normal in size. Wall thickness is normal. Systolic function is normal.  Left Atrium: moderately dilated  Right Atrium: Right atrium is dilated.  Mitral Valve: There is moderate  regurgitation.  Tricuspid Valve: There is mild regurgitation.      Assessment and Plan:     Problem List:    Active Diagnoses:    Diagnosis Date Noted POA    PRINCIPAL PROBLEM:  Acute on chronic diastolic congestive heart failure [I50.33] 09/18/2022 Yes    History of pulmonary embolus (PE) [Z86.711] 02/27/2025 Yes    Hx of hepatitis C [Z86.19] 02/27/2025 Yes    Type 2 diabetes mellitus without complication, without long-term current use of insulin [E11.9] 09/18/2022 Yes    COPD (chronic obstructive pulmonary disease) [J44.9] 11/21/2021 Yes    Hypertension [I10] 06/09/2021 Yes      Problems Resolved During this Admission:       Assessment and Plan:    Heart Failure, Diastolic, Acute on Chronic   2/27/2025: Echo: Normal left ventricular size and systolic function. EF 55-60%. LVH. Moderately dilated LA. Moderate MR.   On furosemide 60 mg iv Q12.   Needs further diuresis.  Replace K.     2. Ventricular Tachycardia   3/2/2025: Runs on nonsustained VT.    3. Atrial Flutter   In atypical atrial flutter.   2/28/2025: 's. Metoprolol 25 mg Q6 was begun.   3/2/2025: Converted to sinus rhythm   3/1/2025: Metoprolol 25 mg Q6 was changed to metoprolol 50 mg Q12.    On metoprolol 50 mg Q12.   3/4/2025: Will increase to metoprolol 100 mg Q12.    4. Chronic Anticoagulation   On apixiban 5 mg Q12.    5. Chronic Kidney Disease, Stage 3   10/24/2025: BUN/crea 33/2.1. eGFR 34. K 4.1.   3/2/2025: BUN/crea 35/1.9. eGFR 38.   3/4/2025: BUN/crea 44/1.8. eGFR 41. K 3.4.        VTE Risk Mitigation (From admission, onward)           Ordered     apixaban tablet 5 mg  2 times daily         02/28/25 1015     IP VTE HIGH RISK PATIENT  Once         02/27/25 1301                    Hermila Littlejohn MD  Cardiology  [facility] - Intensive Care ([place])

## 2025-03-04 NOTE — ASSESSMENT & PLAN NOTE
Increased WOB requiring BIPAP  HTN   Atrial Fibrillation     CXR - 'Pulmonary edema, pneumonia, aspiration, or sepsis'.    Outpatient meds from 9/2024 - amiodarone, carvedilol, aspirin, Entresto    DVT US neg  Echo moderate mitral regurg, EF 55%    Plan  - BIPAP PRN  - IV lasix, monitor and replace electrolytes   - Amlodipine and losartan added for hypertension  - Intake and output  - CHADSVASC 5 - not currently on AC - discussed with cardiology - start eliquis   - Metop succinate for rate control   - Ceftriaxone and azithromycin for CAP   - Cardiology and critical care following.  - OK to transfer to floor

## 2025-03-04 NOTE — PROGRESS NOTES
[facility] - Intensive Care ([facility] Medicine  Progress Note    Patient Name: Marck Madison  MRN: [id number]  Patient Class: IP- Inpatient   Admission Date: 2/27/2025  Length of Stay: 5 days  Attending Physician: Sudha Rolle MD  Primary Care Provider: [facility] -        Subjective     Principal Problem:Acute on chronic diastolic congestive heart failure        HPI:  HPI by Dr. Thompson:  Pt with PMH of atrial fibrillation, PE/DVT (on lovenox due to thrombus despite eliquis), hypertension, diastolic heart failure, T2DM, COPD, Hep C. Pt seen while on BIPAP. Unable to obtain full history. Reports SOB, cough, chills for 3 days. Denies chest pain, constipation or diarrhea. Currently living with niece. Not currently taking medication. Per ED note ' He notes that he has been out of all of his medications since getting released from long term roughly 1 month prior'. Pt unsure which meds he takes - per chart review dispense report 9/2024 - amiodarone, aspirin, carvedilol and Entresto.  However reported to the ED that he might take a fluid pill and a blood thinner.  Stopped smoking 3 years ago. Denies alcohol or drug use.     In ED , RR 26. Bicarb 20, , Mag 1.5, trop 0.387-->0.386, flu and covid neg, lactate wnl, VBG unremarkable. CXR - 'Pulmonary edema, pneumonia, aspiration, or sepsis'.  Given DuoNebs, 325 mg aspirin, magnesium, Lasix 40 mg IV, 5 mg diltiazem.  Patient had increased workup breathing so was placed on BiPAP.  When attempted to wean off BiPAP had recurrence of respiratory distress so we placed on BiPAP with plans to move to ICU.  Admitted to hospital medicine for further management.    Overview/Hospital Course:  Patient admitted on IV diuresis and BiPAP, which was intermittently required for increased work of breathing.  Ultrasound of the BLE was negative for DVT.  He was seen by cardiology and started on metoprolol and apixaban due to atrial flutter.  Electrolytes were  monitored and replaced as needed.  Losartan and amlodipine added for blood pressure.    Record review indicated he had been switched from apixaban to Lovenox last year by hematology as he had developed DVT/PE despite being on apixaban for atrial fibrillation.  Patient did report that he had not been compliant with apixaban or Lovenox.  As such he was continued on apixaban instead of Lovenox as injections make compliance even less likely.       Interval History: Assumed care of patient.  Says he feels well, breathing comfortably and no palpitations.  Monitor showing frequent PACs.  Says he has had trouble getting his physician at [facility] to send his prescriptions to the pharmacy.    Review of Systems   Constitutional:  Negative for chills and fever.   Respiratory:  Negative for cough and shortness of breath.    Cardiovascular:  Negative for chest pain and palpitations.     Objective:     Vital Signs (Most Recent):  Temp: 97.8 °F (36.6 °C) (03/04/25 0305)  Pulse: 76 (03/04/25 0822)  Resp: (!) 21 (03/04/25 0700)  BP: 124/82 (03/04/25 0822)  SpO2: 99 % (03/04/25 0700) Vital Signs (24h Range):  Temp:  [97.4 °F (36.3 °C)-98.4 °F (36.9 °C)] 97.8 °F (36.6 °C)  Pulse:  [69-96] 76  Resp:  [15-37] 21  SpO2:  [91 %-100 %] 99 %  BP: (101-157)/(62-87) 124/82     Weight: 79.8 kg (176 lb)  Body mass index is 28.41 kg/m².    Intake/Output Summary (Last 24 hours) at 3/4/2025 0822  Last data filed at 3/4/2025 0600  Gross per 24 hour   Intake 480 ml   Output 2775 ml   Net -2295 ml         Physical Exam  Cardiovascular:      Rate and Rhythm: Normal rate. Rhythm irregular.      Pulses: Normal pulses.      Heart sounds: Normal heart sounds. No murmur heard.     No gallop.      Comments: S1 S2 with ectopy (PACs on monitor)  Pulmonary:      Effort: Pulmonary effort is normal.      Breath sounds: Normal breath sounds.   Abdominal:      General: Bowel sounds are normal.      Palpations: Abdomen is soft.   Skin:     General: Skin is warm  and dry.   Neurological:      General: No focal deficit present.      Mental Status: He is alert and oriented to person, place, and time.             Significant Labs: All pertinent labs within the past 24 hours have been reviewed.    Significant Imaging: I have reviewed all pertinent imaging results/findings within the past 24 hours.    Assessment and Plan     * Acute on chronic diastolic congestive heart failure  Increased WOB requiring BIPAP  HTN   Atrial Fibrillation     CXR - 'Pulmonary edema, pneumonia, aspiration, or sepsis'.    Outpatient meds from 9/2024 - amiodarone, carvedilol, aspirin, Entresto    DVT US neg  Echo moderate mitral regurg, EF 55%    Plan  - BIPAP PRN  - IV lasix, monitor and replace electrolytes   - Amlodipine and losartan added for hypertension  - Intake and output  - CHADSVASC 5 - not currently on AC - discussed with cardiology - start eliquis   - Metop succinate for rate control   - Ceftriaxone and azithromycin for CAP   - Cardiology and critical care following.  - OK to transfer to floor      Hx of hepatitis C  Rpt labs and Hep C quant  HIV neg       History of pulmonary embolus (PE)  DVT    Switched to Lovenox from eliquis by hematology in 2024 due to development of DVT/PE despite being on eliquis for Afib. Pt reports he did not have financial issues getting lovenox but has not been taking it either. To me pt reports that he did have poor compliance with eliquis use prior. As such will continue with eliquis (instead of lovenox as injections render itself to even less compliance).      Type 2 diabetes mellitus without complication, without long-term current use of insulin  - 8 months ago HbA1c 6.7%  - Repeat HbA1c 6.8%   - Patient prescribed basal insulin 10 units daily and Trulicity weekly - apparently not compliant with any of these  - Continue low dose SSI, diabetic diet. Will send new prescription on discharge - says he wants to take his medications    COPD (chronic obstructive  pulmonary disease)  No PFTs available, reports stopped smoking 3 years ago, not currently wheezing      VTE Risk Mitigation (From admission, onward)           Ordered     apixaban tablet 5 mg  2 times daily         02/28/25 1015     IP VTE HIGH RISK PATIENT  Once         02/27/25 1301                    Discharge Planning   BALDO:      Code Status: Full Code   Medical Readiness for Discharge Date:   Discharge Plan A: Home with family                Please place Justification for DME        Sudha Lambert MD  Department of Hospital Medicine   [facility] - Intensive Care ([place])

## 2025-03-05 VITALS
BODY MASS INDEX: 28.28 KG/M2 | OXYGEN SATURATION: 91 % | SYSTOLIC BLOOD PRESSURE: 153 MMHG | DIASTOLIC BLOOD PRESSURE: 81 MMHG | RESPIRATION RATE: 20 BRPM | HEART RATE: 77 BPM | HEIGHT: 66 IN | TEMPERATURE: 98 F | WEIGHT: 176 LBS

## 2025-03-05 LAB — POCT GLUCOSE: 217 MG/DL (ref 70–110)

## 2025-03-05 PROCEDURE — 25000003 PHARM REV CODE 250: Performed by: INTERNAL MEDICINE

## 2025-03-05 PROCEDURE — 27000221 HC OXYGEN, UP TO 24 HOURS

## 2025-03-05 PROCEDURE — 94640 AIRWAY INHALATION TREATMENT: CPT

## 2025-03-05 PROCEDURE — 25000003 PHARM REV CODE 250: Performed by: STUDENT IN AN ORGANIZED HEALTH CARE EDUCATION/TRAINING PROGRAM

## 2025-03-05 PROCEDURE — 94660 CPAP INITIATION&MGMT: CPT

## 2025-03-05 PROCEDURE — 25000242 PHARM REV CODE 250 ALT 637 W/ HCPCS: Performed by: STUDENT IN AN ORGANIZED HEALTH CARE EDUCATION/TRAINING PROGRAM

## 2025-03-05 PROCEDURE — 94761 N-INVAS EAR/PLS OXIMETRY MLT: CPT

## 2025-03-05 PROCEDURE — 99233 SBSQ HOSP IP/OBS HIGH 50: CPT | Mod: ,,, | Performed by: INTERNAL MEDICINE

## 2025-03-05 PROCEDURE — 99900035 HC TECH TIME PER 15 MIN (STAT)

## 2025-03-05 PROCEDURE — 63600175 PHARM REV CODE 636 W HCPCS: Performed by: INTERNAL MEDICINE

## 2025-03-05 RX ORDER — METOPROLOL SUCCINATE 100 MG/1
100 TABLET, EXTENDED RELEASE ORAL 2 TIMES DAILY
Qty: 180 TABLET | Refills: 0 | Status: SHIPPED | OUTPATIENT
Start: 2025-03-05

## 2025-03-05 RX ORDER — PEN NEEDLE, DIABETIC 30 GX3/16"
1 NEEDLE, DISPOSABLE MISCELLANEOUS NIGHTLY
Qty: 100 EACH | Refills: 0 | Status: SHIPPED | OUTPATIENT
Start: 2025-03-05

## 2025-03-05 RX ORDER — FUROSEMIDE 40 MG/1
40 TABLET ORAL 2 TIMES DAILY
Qty: 180 TABLET | Refills: 0 | Status: SHIPPED | OUTPATIENT
Start: 2025-03-05

## 2025-03-05 RX ORDER — INSULIN GLARGINE 100 [IU]/ML
15 INJECTION, SOLUTION SUBCUTANEOUS NIGHTLY
Qty: 3 EACH | Refills: 0 | Status: SHIPPED | OUTPATIENT
Start: 2025-03-05

## 2025-03-05 RX ORDER — LOSARTAN POTASSIUM 25 MG/1
25 TABLET ORAL DAILY
Qty: 90 TABLET | Refills: 0 | Status: SHIPPED | OUTPATIENT
Start: 2025-03-06

## 2025-03-05 RX ADMIN — METOPROLOL SUCCINATE 100 MG: 50 TABLET, EXTENDED RELEASE ORAL at 08:03

## 2025-03-05 RX ADMIN — APIXABAN 5 MG: 2.5 TABLET, FILM COATED ORAL at 08:03

## 2025-03-05 RX ADMIN — AMLODIPINE BESYLATE 5 MG: 5 TABLET ORAL at 08:03

## 2025-03-05 RX ADMIN — LOSARTAN POTASSIUM 25 MG: 25 TABLET, FILM COATED ORAL at 08:03

## 2025-03-05 RX ADMIN — FUROSEMIDE 60 MG: 10 INJECTION, SOLUTION INTRAVENOUS at 08:03

## 2025-03-05 RX ADMIN — IPRATROPIUM BROMIDE AND ALBUTEROL SULFATE 3 ML: 2.5; .5 SOLUTION RESPIRATORY (INHALATION) at 07:03

## 2025-03-05 NOTE — PLAN OF CARE
03/05/25 0944   Post-Acute Status   Post-Acute Authorization Home Health   Home Health Status Referrals Sent   Patient choice form signed by patient/caregiver List with quality metrics by geographic area provided;List from CMS Compare;List from System Post-Acute Care   Discharge Delays None known at this time   Discharge Plan   Discharge Plan A Home with family;Home Health   Discharge Plan B Home with family       Patient has declined to select a preferred provider and elects placement with the first accepting in network provider that is available to provide services as ordered by the physician.

## 2025-03-05 NOTE — PLAN OF CARE
Case Management Final Discharge Note    Discharge Disposition: home with family and home health (No HH agency at the time of d/c)    New DME ordered / company name: none    Relevant SDOH / Transition of Care Barriers:  none    Person available to provide assistance at home when needed and their contact information: Aileen Gabriel [phone]    Scheduled followup appointment: Patient can walk in PCP office    Referrals placed: none    Transportation: Patient niece will transport patient home.         Patient and family educated on discharge services and updated on DC plan. Bedside RN notified, patient clear to discharge from Case Management Perspective.       [facility] - Intensive [facility] ([place])  Discharge Final Note    [facility]: [facility]    Expected Discharge Date: 3/5/2025    Final Discharge Note (most recent)       Final Note - 03/05/25 0944          Final Note    Assessment Type Final Discharge Note     Anticipated Discharge Disposition [place] [facility]     Hospital Resources/Appts/Education Provided Provided patient/caregiver with written discharge plan information;Appointments scheduled and added to AVS        Post-Acute Status    Post-Acute Authorization Home Health     Home Health Status Referrals Sent     Patient choice form signed by patient/caregiver List with quality metrics by geographic area provided;List from CMS Compare;List from System Post-Acute Care     Discharge Delays None known at this time                     Important Message from Medicare  Important Message from Medicare regarding Discharge Appeal Rights: Explained to patient/caregiver, Signed/date by patient/caregiver     Date IMM was signed: 02/27/25  Time IMM was signed: 2225    Contact Info       [facility]   Relationship: PCP - General    [address] [facility] [address]   Phone: [phone]       Next Steps: Follow up    Instructions: Follow up in 1-2 weeks

## 2025-03-05 NOTE — PLAN OF CARE
"[facility] - Intensive Care ([employer])      HOME HEALTH ORDERS  FACE TO FACE ENCOUNTER    Patient Name: Marck Madison  YOB: 1960    PCP: [facility] -   PCP Address: [address] / [place] [address]  PCP Phone Number: [phone]  PCP Fax: [phone]    Encounter Date: 2/27/25    Admit to Home Health    Diagnoses:  Active Hospital Problems    Diagnosis  POA    *Acute on chronic diastolic congestive heart failure [I50.33]  Yes    History of pulmonary embolus (PE) [Z86.711]  Yes    Hx of hepatitis C [Z86.19]  Yes    Type 2 diabetes mellitus without complication, without long-term current use of insulin [E11.9]  Yes    COPD (chronic obstructive pulmonary disease) [J44.9]  Yes    Hypertension [I10]  Yes      Resolved Hospital Problems   No resolved problems to display.       Follow Up Appointments:  No future appointments.    Allergies:Review of patient's allergies indicates:  No Known Allergies    Medications: Review discharge medications with patient and family and provide education.    Current Medications[1]     Medication List        START taking these medications      apixaban 5 mg Tab  Commonly known as: ELIQUIS  Take 1 tablet (5 mg total) by mouth 2 (two) times daily.     furosemide 40 MG tablet  Commonly known as: LASIX  Take 1 tablet (40 mg total) by mouth 2 (two) times daily.     insulin glargine U-100 (Lantus) 100 unit/mL (3 mL) Inpn pen  Inject 15 Units into the skin every evening.     losartan 25 MG tablet  Commonly known as: COZAAR  Take 1 tablet (25 mg total) by mouth once daily.  Start taking on: March 6, 2025     metoprolol succinate 100 MG 24 hr tablet  Commonly known as: TOPROL-XL  Take 1 tablet (100 mg total) by mouth 2 (two) times daily.     pen needle, diabetic 32 gauge x 5/32" Ndle  1 each by Misc.(Non-Drug; Combo Route) route every evening.                I have seen and examined this patient within the last 30 days. My clinical findings that support the need for " the home health skilled services and home bound status are the following:no   Weakness/numbness causing balance and gait disturbance due to Weakness/Debility making it taxing to leave home.     Diet:   2 gram sodium diet Diabetic 2000 kcal    Referrals/ Consults  Physical Therapy to evaluate and treat. Evaluate for home safety and equipment needs; Establish/upgrade home exercise program. Perform / instruct on therapeutic exercises, gait training, transfer training, and Range of Motion.  Occupational Therapy to evaluate and treat. Evaluate home environment for safety and equipment needs. Perform/Instruct on transfers, ADL training, ROM, and therapeutic exercises.    Activities:   activity as tolerated    Nursing:   Agency to admit patient within 24 hours of hospital discharge unless specified on physician order or at patient request    SN to complete comprehensive assessment including routine vital signs. Instruct on disease process and s/s of complications to report to MD. Review/verify medication list sent home with the patient at time of discharge  and instruct patient/caregiver as needed. Frequency may be adjusted depending on start of care date.     Skilled nurse to perform up to 3 visits PRN for symptoms related to diagnosis    Notify MD if SBP > 160 or < 90; DBP > 90 or < 50; HR > 120 or < 50; Temp > 101; O2 < 88%    Ok to schedule additional visits based on staff availability and patient request on consecutive days within the home health episode.    When multiple disciplines ordered:    Start of Care occurs on Sunday - Wednesday schedule remaining discipline evaluations as ordered on separate consecutive days following the start of care.    Thursday SOC -schedule subsequent evaluations Friday and Monday the following week.     Friday - Saturday SOC - schedule subsequent discipline evaluations on consecutive days starting Monday of the following week.    For all post-discharge communication and subsequent  orders please contact patient's primary care physician.      I certify that this patient is confined to his home and needs physical therapy and occupational therapy.              [1]   Current Facility-Administered Medications   Medication Dose Route Frequency Provider Last Rate Last Admin    acetaminophen tablet 650 mg  650 mg Oral Q6H PRN Satya Hanson MD        albuterol-ipratropium 2.5 mg-0.5 mg/3 mL nebulizer solution 3 mL  3 mL Nebulization Q4H WAKE Florencio Thompson MD   3 mL at 03/05/25 0711    amLODIPine tablet 5 mg  5 mg Oral Daily Florencio Thompson MD   5 mg at 03/04/25 0822    apixaban tablet 5 mg  5 mg Oral BID Hermila Littlejohn MD   5 mg at 03/04/25 2200    dextrose 50% injection 12.5 g  12.5 g Intravenous PRN Florencio Thompson MD        furosemide injection 60 mg  60 mg Intravenous BID Danilo Isaac MD   60 mg at 03/04/25 1711    glucagon (human recombinant) injection 1 mg  1 mg Intramuscular PRN Florencio Thompson MD        glucose chewable tablet 16 g  16 g Oral PRN Florencio Thompson MD        glucose chewable tablet 24 g  24 g Oral PRN Florencio Thompson MD        hydrALAZINE injection 5 mg  5 mg Intravenous Q6H PRN Florencio Thompson MD        insulin aspart U-100 pen 0-5 Units  0-5 Units Subcutaneous QID (AC + HS) PRN Florencio Thompson MD   1 Units at 03/04/25 2159    losartan tablet 25 mg  25 mg Oral Daily Florencio Thompson MD   25 mg at 03/04/25 0822    metoprolol injection 5 mg  5 mg Intravenous Q5 Min PRN Hermila Littlejohn MD        metoprolol succinate (TOPROL-XL) 24 hr tablet 100 mg  100 mg Oral BID Hermila Littlejohn MD   100 mg at 03/04/25 2200    ondansetron injection 4 mg  4 mg Intravenous Q6H PRN Satya Hanson MD   4 mg at 02/27/25 1310    polyethylene glycol packet 17 g  17 g Oral Daily PRN Satya Hanson MD

## 2025-03-05 NOTE — PLAN OF CARE
SW received call that [facility] can accept patient and they will make contact with patient and family.

## 2025-03-05 NOTE — DISCHARGE SUMMARY
[facility] Intensive Care [facility] Medicine  Discharge Summary      Patient Name: Marck Madison  MRN: [id number]  CAROLA: [id number]  Patient Class: IP- Inpatient  Admission Date: 2/27/2025  Hospital Length of Stay: 6 days  Discharge Date and Time: 3/5/2025 10:34 AM  Attending Physician: No att. providers found   Discharging Provider: Sudha Lambert MD  [facility]: [facility]    Primary Care Team: Networked reference to record PCT     HPI:   HPI by Dr. Thompson:  Pt with PMH of atrial fibrillation, PE/DVT (on lovenox due to thrombus despite eliquis), hypertension, diastolic heart failure, T2DM, COPD, Hep C. Pt seen while on BIPAP. Unable to obtain full history. Reports SOB, cough, chills for 3 days. Denies chest pain, constipation or diarrhea. Currently living with niece. Not currently taking medication. Per ED note ' He notes that he has been out of all of his medications since getting released from [facility] roughly 1 month prior'. Pt unsure which meds he takes - per chart review dispense report 9/2024 - amiodarone, aspirin, carvedilol and Entresto.  However reported to the ED that he might take a fluid pill and a blood thinner.  Stopped smoking 3 years ago. Denies alcohol or drug use.     In ED , RR 26. Bicarb 20, , Mag 1.5, trop 0.387-->0.386, flu and covid neg, lactate wnl, VBG unremarkable. CXR - 'Pulmonary edema, pneumonia, aspiration, or sepsis'.  Given DuoNebs, 325 mg aspirin, magnesium, Lasix 40 mg IV, 5 mg diltiazem.  Patient had increased workup breathing so was placed on BiPAP.  When attempted to wean off BiPAP had recurrence of respiratory distress so we placed on BiPAP with plans to move to ICU.  Admitted to hospital medicine for further management.          Hospital Course:   Patient admitted on IV diuresis and BiPAP, which was intermittently required for increased work of breathing.  Ultrasound of the BLE was negative for DVT.  He was seen by  cardiology and started on metoprolol and apixaban due to atrial flutter.  Electrolytes were monitored and replaced as needed.  Losartan and amlodipine added for blood pressure.    Record review indicated he had been switched from apixaban to Lovenox last year by hematology as he had developed DVT/PE despite being on apixaban for atrial fibrillation.  Patient did report that he had not been compliant with apixaban or Lovenox (and was not taking any of his other medications as well).  As such he was continued on apixaban instead of Lovenox as injections make compliance even less likely.     Patient was diuresed until he was feeling better.  He was seen and examined on the day of discharge and was anxious to go home.  All of his medications were prescribed for him as he had been unable to obtain anything outpatient.  Home health was ordered with PT and OT.       Goals of Care Treatment Preferences:  Code Status: Full Code      Consults:   Consults (From admission, onward)          Status Ordering Provider     Inpatient consult to Pulmonary Critical Care  Once        Provider:  Satya Hanson MD    Completed JUAN REZA     Inpatient consult to Cardiology  Once        Provider:  Danilo Isaac MD    Completed ISAAC PERALTA            Final Active Diagnoses:    Diagnosis Date Noted POA    PRINCIPAL PROBLEM:  Acute on chronic diastolic congestive heart failure [I50.33] 09/18/2022 Yes    History of pulmonary embolus (PE) [Z86.711] 02/27/2025 Yes    Hx of hepatitis C [Z86.19] 02/27/2025 Yes    Type 2 diabetes mellitus without complication, without long-term current use of insulin [E11.9] 09/18/2022 Yes    COPD (chronic obstructive pulmonary disease) [J44.9] 11/21/2021 Yes    Hypertension [I10] 06/09/2021 Yes      Problems Resolved During this Admission:       Discharged Condition: stable    [place] [facility]    Follow Up:   Follow-up Information       [facility]  "Follow up.    Why: Follow up in 1-2 weeks  Contact information:  [address] [facility] [address]  [phone]                           Patient Instructions:      ACCEPT - Ambulatory referral/consult to Cardiology   Standing Status: Future   Referral Priority: Routine Referral Type: Consultation   Referral Reason: Specialty Services Required   Requested Specialty: Cardiology   Number of Visits Requested: 1     Diet Cardiac   Order Comments: Diabetic diet     Activity as tolerated       Medications:  Reconciled Home Medications:      Medication List        START taking these medications      apixaban 5 mg Tab  Commonly known as: ELIQUIS  Take 1 tablet (5 mg total) by mouth 2 (two) times daily.     furosemide 40 MG tablet  Commonly known as: LASIX  Take 1 tablet (40 mg total) by mouth 2 (two) times daily.     insulin glargine U-100 (Lantus) 100 unit/mL (3 mL) Inpn pen  Inject 15 Units into the skin every evening.     losartan 25 MG tablet  Commonly known as: COZAAR  Take 1 tablet (25 mg total) by mouth once daily.  Start taking on: March 6, 2025     metoprolol succinate 100 MG 24 hr tablet  Commonly known as: TOPROL-XL  Take 1 tablet (100 mg total) by mouth 2 (two) times daily.     pen needle, diabetic 32 gauge x 5/32" Ndle  1 each by Misc.(Non-Drug; Combo Route) route every evening.                Time spent on the discharge of patient: >30 minutes         Sudha Lambert MD  Department of Hospital Medicine  [facility] - Intensive Care ([place])  "

## 2025-03-05 NOTE — NURSING
1000: Patient ambulated to wheelchair for discharge. Discharge instructions reviewed with pt; understanding verbalized. Personal belongings and instructions given to pt. Family aware of discharge.

## 2025-03-05 NOTE — PROGRESS NOTES
[facility]    Admission date:  2/27/2025     Assessment    Acute on chronic diastolic heart failure   Still has evidence of volume overload     Atypical atrial flutter   On apixaban.  Back in sinus rhythm.    Plan and Discussion    Continue with efforts at diuresis long as his blood pressure and renal function tolerate.    Subjective    Denies dyspnea.    Medications  Current Medications[1]     Physical Exam    Temp:  [97.5 °F (36.4 °C)-98.1 °F (36.7 °C)]   Pulse:  [66-91]   Resp:  [14-34]   BP: (110-159)/(69-95)   SpO2:  [96 %-100 %]    Wt Readings from Last 3 Encounters:   02/27/25 79.8 kg (176 lb)   10/27/24 79.8 kg (176 lb)   06/11/24 88.5 kg (195 lb 1.7 oz)     Physical Exam  Constitutional:       General: He is not in acute distress.     Appearance: He is obese.   Neck:      Vascular: No hepatojugular reflux or JVD.   Cardiovascular:      Rate and Rhythm: Normal rate and regular rhythm. Occasional Extrasystoles are present.     Heart sounds: S1 normal and S2 normal.      No S3 or S4 sounds.   Pulmonary:      Effort: Pulmonary effort is normal.      Breath sounds: Normal air entry. Rhonchi and rales present.   Abdominal:      General: Bowel sounds are normal.      Palpations: Abdomen is soft. There is no hepatomegaly.      Tenderness: There is no abdominal tenderness.   Musculoskeletal:      Right lower leg: No edema.      Left lower leg: No edema.   Skin:     Coloration: Skin is not pale.   Neurological:      Mental Status: He is alert.   Psychiatric:         Behavior: Behavior is cooperative.         Telemetry  Sinus rhythm with ventricular ectopy    Labs  Recent Results (from the past 24 hours)   POCT glucose    Collection Time: 03/04/25 11:04 AM   Result Value Ref Range    POCT Glucose 229 (H) 70 - 110 mg/dL   POCT glucose    Collection Time: 03/04/25  4:52 PM   Result Value Ref Range    POCT Glucose 118 (H) 70 - 110 mg/dL   POCT glucose    Collection Time: 03/04/25  9:58 PM   Result Value  Ref Range    POCT Glucose 203 (H) 70 - 110 mg/dL   POCT glucose    Collection Time: 03/05/25  7:56 AM   Result Value Ref Range    POCT Glucose 217 (H) 70 - 110 mg/dL             Danilo Isaac MD       [1]   Current Facility-Administered Medications   Medication Dose Route Frequency Provider Last Rate Last Admin    acetaminophen tablet 650 mg  650 mg Oral Q6H PRN Satya Hanson MD        albuterol-ipratropium 2.5 mg-0.5 mg/3 mL nebulizer solution 3 mL  3 mL Nebulization Q4H WAKE Florencio Thompson MD   3 mL at 03/05/25 0711    amLODIPine tablet 5 mg  5 mg Oral Daily Florencio Thompson MD   5 mg at 03/04/25 0822    apixaban tablet 5 mg  5 mg Oral BID Hermila Littlejohn MD   5 mg at 03/04/25 2200    dextrose 50% injection 12.5 g  12.5 g Intravenous PRN Florencio Thompson MD        furosemide injection 60 mg  60 mg Intravenous BID Danilo Isaac MD   60 mg at 03/04/25 1711    glucagon (human recombinant) injection 1 mg  1 mg Intramuscular PRN Florencio Thompson MD        glucose chewable tablet 16 g  16 g Oral PRN Florencio Thompson MD        glucose chewable tablet 24 g  24 g Oral PRN Florencio Thompson MD        hydrALAZINE injection 5 mg  5 mg Intravenous Q6H PRN Florencio Thompson MD        insulin aspart U-100 pen 0-5 Units  0-5 Units Subcutaneous QID (AC + HS) PRN Florencio Thompson MD   1 Units at 03/04/25 2159    losartan tablet 25 mg  25 mg Oral Daily Florencio Thompson MD   25 mg at 03/04/25 0822    metoprolol injection 5 mg  5 mg Intravenous Q5 Min PRN Hermila Littlejohn MD        metoprolol succinate (TOPROL-XL) 24 hr tablet 100 mg  100 mg Oral BID Hermila Littlejohn MD   100 mg at 03/04/25 2200    ondansetron injection 4 mg  4 mg Intravenous Q6H PRN Satya Hanson MD   4 mg at 02/27/25 1310    polyethylene glycol packet 17 g  17 g Oral Daily PRN Satya Hanson MD

## 2025-03-17 ENCOUNTER — TELEPHONE (OUTPATIENT)
Dept: ADMINISTRATIVE | Facility: OTHER | Age: 65
End: 2025-03-17
Payer: MEDICARE

## 2025-04-11 NOTE — ASSESSMENT & PLAN NOTE
2nd attempt to contact patient - lmom   Patient's FSGs are controlled on current medication regimen.  Last A1c reviewed-   Lab Results   Component Value Date    HGBA1C 7.1 (H) 03/15/2024    HGBA1C 7.1 (H) 03/15/2024     Most recent fingerstick glucose reviewed-   Recent Labs   Lab 03/18/24  1638 03/18/24  1942 03/19/24  0739 03/19/24  1129   POCTGLUCOSE 141* 143* 106 111*       Current correctional scale  Low  Maintain anti-hyperglycemic dose as follows-   Antihyperglycemics (From admission, onward)    Start     Stop Route Frequency Ordered    03/15/24 1315  insulin detemir U-100 (Levemir) pen 10 Units         -- SubQ Daily 03/15/24 1204    03/15/24 1304  insulin aspart U-100 pen 0-5 Units         -- SubQ Before meals & nightly PRN 03/15/24 1204        Hold Oral hypoglycemics while patient is in the hospital.

## 2025-04-28 ENCOUNTER — HOSPITAL ENCOUNTER (INPATIENT)
Facility: OTHER | Age: 65
LOS: 2 days | Discharge: HOME | DRG: 291 | End: 2025-04-30
Attending: EMERGENCY MEDICINE | Admitting: HOSPITALIST
Payer: MEDICARE

## 2025-04-28 DIAGNOSIS — R06.02 SHORTNESS OF BREATH: ICD-10-CM

## 2025-04-28 DIAGNOSIS — I50.9 ACUTE CONGESTIVE HEART FAILURE, UNSPECIFIED HEART FAILURE TYPE: Primary | ICD-10-CM

## 2025-04-28 DIAGNOSIS — I21.4 NSTEMI (NON-ST ELEVATED MYOCARDIAL INFARCTION): ICD-10-CM

## 2025-04-28 DIAGNOSIS — R06.02 SOB (SHORTNESS OF BREATH): ICD-10-CM

## 2025-04-28 DIAGNOSIS — R07.9 CHEST PAIN: ICD-10-CM

## 2025-04-28 DIAGNOSIS — R79.89 TROPONIN LEVEL ELEVATED: ICD-10-CM

## 2025-04-28 LAB
ABSOLUTE EOSINOPHIL (OHS): 0.08 K/UL
ABSOLUTE MONOCYTE (OHS): 0.71 K/UL (ref 0.3–1)
ABSOLUTE NEUTROPHIL COUNT (OHS): 3.6 K/UL (ref 1.8–7.7)
ALBUMIN SERPL BCP-MCNC: 3.5 G/DL (ref 3.5–5.2)
ALP SERPL-CCNC: 71 UNIT/L (ref 40–150)
ALT SERPL W/O P-5'-P-CCNC: 21 UNIT/L (ref 10–44)
ANION GAP (OHS): 11 MMOL/L (ref 8–16)
AST SERPL-CCNC: 31 UNIT/L (ref 11–45)
BASOPHILS # BLD AUTO: 0.04 K/UL
BASOPHILS NFR BLD AUTO: 0.6 %
BILIRUB SERPL-MCNC: 1.2 MG/DL (ref 0.1–1)
BIPAP: 0
BNP SERPL-MCNC: 720 PG/ML (ref 0–99)
BUN SERPL-MCNC: 27 MG/DL (ref 8–23)
CALCIUM SERPL-MCNC: 9.2 MG/DL (ref 8.7–10.5)
CHLORIDE SERPL-SCNC: 107 MMOL/L (ref 95–110)
CO2 SERPL-SCNC: 24 MMOL/L (ref 23–29)
CORRECTED TEMPERATURE (PCO2): 41.2 MMHG
CORRECTED TEMPERATURE (PH): 7.41
CORRECTED TEMPERATURE (PO2): 26.8 MMHG
CREAT SERPL-MCNC: 1.8 MG/DL (ref 0.5–1.4)
ERYTHROCYTE [DISTWIDTH] IN BLOOD BY AUTOMATED COUNT: 14.6 % (ref 11.5–14.5)
FIO2: 21 %
GFR SERPLBLD CREATININE-BSD FMLA CKD-EPI: 41 ML/MIN/1.73/M2
GLUCOSE SERPL-MCNC: 103 MG/DL (ref 70–110)
HCT VFR BLD AUTO: 46.7 % (ref 40–54)
HGB BLD-MCNC: 15.3 GM/DL (ref 14–18)
IMM GRANULOCYTES # BLD AUTO: 0.02 K/UL (ref 0–0.04)
IMM GRANULOCYTES NFR BLD AUTO: 0.3 % (ref 0–0.5)
INFLUENZA A MOLECULAR (OHS): NEGATIVE
INFLUENZA B MOLECULAR (OHS): NEGATIVE
LYMPHOCYTES # BLD AUTO: 2.55 K/UL (ref 1–4.8)
MAGNESIUM SERPL-MCNC: 1.6 MG/DL (ref 1.6–2.6)
MCH RBC QN AUTO: 29.8 PG (ref 27–31)
MCHC RBC AUTO-ENTMCNC: 32.8 G/DL (ref 32–36)
MCV RBC AUTO: 91 FL (ref 82–98)
NUCLEATED RBC (/100WBC) (OHS): 0 /100 WBC
PCO2 BLDA: 41.2 MMHG
PH SMN: 7.41 [PH]
PLATELET # BLD AUTO: 224 K/UL (ref 150–450)
PMV BLD AUTO: 11.2 FL (ref 9.2–12.9)
PO2 BLDA: 26.8 MMHG
POC BASE DEFICIT: 1 MMOL/L
POC HCO3: 24 MMOL/L
POC PERFORMED BY: NORMAL
POC TEMPERATURE: 37 C
POTASSIUM SERPL-SCNC: 4.2 MMOL/L (ref 3.5–5.1)
PROT SERPL-MCNC: 6.9 GM/DL (ref 6–8.4)
RBC # BLD AUTO: 5.14 M/UL (ref 4.6–6.2)
RELATIVE EOSINOPHIL (OHS): 1.1 %
RELATIVE LYMPHOCYTE (OHS): 36.4 % (ref 18–48)
RELATIVE MONOCYTE (OHS): 10.1 % (ref 4–15)
RELATIVE NEUTROPHIL (OHS): 51.5 % (ref 38–73)
SARS-COV-2 RDRP RESP QL NAA+PROBE: NEGATIVE
SODIUM SERPL-SCNC: 142 MMOL/L (ref 136–145)
SPECIMEN SOURCE: NORMAL
TROPONIN I SERPL HS-MCNC: 90 NG/L
TROPONIN I SERPL HS-MCNC: 90 NG/L
TROPONIN I SERPL HS-MCNC: 91 NG/L
WBC # BLD AUTO: 7 K/UL (ref 3.9–12.7)

## 2025-04-28 PROCEDURE — 99285 EMERGENCY DEPT VISIT HI MDM: CPT | Mod: 25

## 2025-04-28 PROCEDURE — 83735 ASSAY OF MAGNESIUM: CPT

## 2025-04-28 PROCEDURE — U0002 COVID-19 LAB TEST NON-CDC: HCPCS | Performed by: EMERGENCY MEDICINE

## 2025-04-28 PROCEDURE — 84484 ASSAY OF TROPONIN QUANT: CPT

## 2025-04-28 PROCEDURE — 84484 ASSAY OF TROPONIN QUANT: CPT | Performed by: EMERGENCY MEDICINE

## 2025-04-28 PROCEDURE — 93010 ELECTROCARDIOGRAM REPORT: CPT | Mod: ,,, | Performed by: INTERNAL MEDICINE

## 2025-04-28 PROCEDURE — 87502 INFLUENZA DNA AMP PROBE: CPT | Performed by: EMERGENCY MEDICINE

## 2025-04-28 PROCEDURE — 94761 N-INVAS EAR/PLS OXIMETRY MLT: CPT | Mod: XB

## 2025-04-28 PROCEDURE — 99900035 HC TECH TIME PER 15 MIN (STAT)

## 2025-04-28 PROCEDURE — 93005 ELECTROCARDIOGRAM TRACING: CPT

## 2025-04-28 PROCEDURE — 82803 BLOOD GASES ANY COMBINATION: CPT

## 2025-04-28 PROCEDURE — 80053 COMPREHEN METABOLIC PANEL: CPT | Performed by: EMERGENCY MEDICINE

## 2025-04-28 PROCEDURE — 96374 THER/PROPH/DIAG INJ IV PUSH: CPT

## 2025-04-28 PROCEDURE — 83880 ASSAY OF NATRIURETIC PEPTIDE: CPT | Performed by: EMERGENCY MEDICINE

## 2025-04-28 PROCEDURE — 63600175 PHARM REV CODE 636 W HCPCS: Performed by: EMERGENCY MEDICINE

## 2025-04-28 PROCEDURE — 85025 COMPLETE CBC W/AUTO DIFF WBC: CPT | Performed by: EMERGENCY MEDICINE

## 2025-04-28 PROCEDURE — 12000002 HC ACUTE/MED SURGE SEMI-PRIVATE ROOM

## 2025-04-28 PROCEDURE — 27000221 HC OXYGEN, UP TO 24 HOURS

## 2025-04-28 RX ORDER — IPRATROPIUM BROMIDE AND ALBUTEROL SULFATE 2.5; .5 MG/3ML; MG/3ML
3 SOLUTION RESPIRATORY (INHALATION) EVERY 4 HOURS PRN
Status: DISCONTINUED | OUTPATIENT
Start: 2025-04-28 | End: 2025-04-30 | Stop reason: HOSPADM

## 2025-04-28 RX ORDER — ASPIRIN 81 MG/1
1 TABLET ORAL DAILY
Status: ON HOLD | COMMUNITY
Start: 2024-06-18 | End: 2025-04-30

## 2025-04-28 RX ORDER — ONDANSETRON HYDROCHLORIDE 2 MG/ML
4 INJECTION, SOLUTION INTRAVENOUS EVERY 8 HOURS PRN
Status: DISCONTINUED | OUTPATIENT
Start: 2025-04-28 | End: 2025-04-28

## 2025-04-28 RX ORDER — TALC
6 POWDER (GRAM) TOPICAL NIGHTLY PRN
Status: DISCONTINUED | OUTPATIENT
Start: 2025-04-28 | End: 2025-04-30 | Stop reason: HOSPADM

## 2025-04-28 RX ORDER — BISACODYL 10 MG/1
10 SUPPOSITORY RECTAL DAILY PRN
Status: DISCONTINUED | OUTPATIENT
Start: 2025-04-28 | End: 2025-04-30 | Stop reason: HOSPADM

## 2025-04-28 RX ORDER — INSULIN GLARGINE 100 [IU]/ML
10 INJECTION, SOLUTION SUBCUTANEOUS NIGHTLY
Status: DISCONTINUED | OUTPATIENT
Start: 2025-04-29 | End: 2025-04-29

## 2025-04-28 RX ORDER — FUROSEMIDE 10 MG/ML
40 INJECTION INTRAMUSCULAR; INTRAVENOUS EVERY 12 HOURS
Status: DISCONTINUED | OUTPATIENT
Start: 2025-04-29 | End: 2025-04-30 | Stop reason: HOSPADM

## 2025-04-28 RX ORDER — ACETAMINOPHEN 500 MG
1000 TABLET ORAL EVERY 8 HOURS PRN
Status: DISCONTINUED | OUTPATIENT
Start: 2025-04-28 | End: 2025-04-28

## 2025-04-28 RX ORDER — FUROSEMIDE 10 MG/ML
40 INJECTION INTRAMUSCULAR; INTRAVENOUS
Status: COMPLETED | OUTPATIENT
Start: 2025-04-28 | End: 2025-04-28

## 2025-04-28 RX ORDER — AMIODARONE HYDROCHLORIDE 200 MG/1
200 TABLET ORAL DAILY
Status: ON HOLD | COMMUNITY
End: 2025-04-30

## 2025-04-28 RX ORDER — FLUTICASONE PROPIONATE AND SALMETEROL XINAFOATE 230; 21 UG/1; UG/1
2 AEROSOL, METERED RESPIRATORY (INHALATION) 2 TIMES DAILY
Status: ON HOLD | COMMUNITY
Start: 2025-03-13 | End: 2025-04-30

## 2025-04-28 RX ORDER — NALOXONE HCL 0.4 MG/ML
0.02 VIAL (ML) INJECTION
Status: DISCONTINUED | OUTPATIENT
Start: 2025-04-28 | End: 2025-04-30 | Stop reason: HOSPADM

## 2025-04-28 RX ORDER — GLUCAGON 1 MG
1 KIT INJECTION
Status: DISCONTINUED | OUTPATIENT
Start: 2025-04-28 | End: 2025-04-30 | Stop reason: HOSPADM

## 2025-04-28 RX ORDER — IBUPROFEN 200 MG
24 TABLET ORAL
Status: DISCONTINUED | OUTPATIENT
Start: 2025-04-28 | End: 2025-04-30 | Stop reason: HOSPADM

## 2025-04-28 RX ORDER — SODIUM CHLORIDE 0.9 % (FLUSH) 0.9 %
10 SYRINGE (ML) INJECTION
Status: DISCONTINUED | OUTPATIENT
Start: 2025-04-28 | End: 2025-04-30 | Stop reason: HOSPADM

## 2025-04-28 RX ORDER — ACETAMINOPHEN 325 MG/1
650 TABLET ORAL EVERY 4 HOURS PRN
Status: DISCONTINUED | OUTPATIENT
Start: 2025-04-28 | End: 2025-04-30 | Stop reason: HOSPADM

## 2025-04-28 RX ORDER — METOPROLOL SUCCINATE 50 MG/1
100 TABLET, EXTENDED RELEASE ORAL 2 TIMES DAILY
Status: DISCONTINUED | OUTPATIENT
Start: 2025-04-29 | End: 2025-04-30 | Stop reason: HOSPADM

## 2025-04-28 RX ORDER — ALBUTEROL SULFATE 90 UG/1
1 INHALANT RESPIRATORY (INHALATION) EVERY 4 HOURS PRN
Status: ON HOLD | COMMUNITY
End: 2025-04-30

## 2025-04-28 RX ORDER — SODIUM CHLORIDE 0.9 % (FLUSH) 0.9 %
5 SYRINGE (ML) INJECTION
Status: DISCONTINUED | OUTPATIENT
Start: 2025-04-28 | End: 2025-04-30 | Stop reason: HOSPADM

## 2025-04-28 RX ORDER — LOSARTAN POTASSIUM 25 MG/1
25 TABLET ORAL DAILY
Status: DISCONTINUED | OUTPATIENT
Start: 2025-04-29 | End: 2025-04-30 | Stop reason: HOSPADM

## 2025-04-28 RX ORDER — INSULIN ASPART 100 [IU]/ML
0-5 INJECTION, SOLUTION INTRAVENOUS; SUBCUTANEOUS
Status: DISCONTINUED | OUTPATIENT
Start: 2025-04-28 | End: 2025-04-30 | Stop reason: HOSPADM

## 2025-04-28 RX ORDER — MAGNESIUM SULFATE HEPTAHYDRATE 40 MG/ML
2 INJECTION, SOLUTION INTRAVENOUS ONCE
Status: COMPLETED | OUTPATIENT
Start: 2025-04-29 | End: 2025-04-29

## 2025-04-28 RX ORDER — IBUPROFEN 200 MG
16 TABLET ORAL
Status: DISCONTINUED | OUTPATIENT
Start: 2025-04-28 | End: 2025-04-30 | Stop reason: HOSPADM

## 2025-04-28 RX ORDER — POLYETHYLENE GLYCOL 3350 17 G/17G
17 POWDER, FOR SOLUTION ORAL 2 TIMES DAILY PRN
Status: DISCONTINUED | OUTPATIENT
Start: 2025-04-28 | End: 2025-04-30 | Stop reason: HOSPADM

## 2025-04-28 RX ORDER — FUROSEMIDE 10 MG/ML
40 INJECTION INTRAMUSCULAR; INTRAVENOUS DAILY
Status: DISCONTINUED | OUTPATIENT
Start: 2025-04-29 | End: 2025-04-28

## 2025-04-28 RX ADMIN — MAGNESIUM SULFATE HEPTAHYDRATE 2 G: 40 INJECTION, SOLUTION INTRAVENOUS at 11:04

## 2025-04-28 RX ADMIN — FUROSEMIDE 40 MG: 10 INJECTION, SOLUTION INTRAMUSCULAR; INTRAVENOUS at 08:04

## 2025-04-28 NOTE — Clinical Note
Diagnosis: Acute congestive heart failure, unspecified heart failure type [3304552]   Reason for IP Medical Treatment  (Clinical interventions that can only be accomplished in the IP setting? ) :: diuresis

## 2025-04-29 LAB
ABSOLUTE EOSINOPHIL (OHS): 0.08 K/UL
ABSOLUTE MONOCYTE (OHS): 0.65 K/UL (ref 0.3–1)
ABSOLUTE NEUTROPHIL COUNT (OHS): 2.97 K/UL (ref 1.8–7.7)
ALBUMIN SERPL BCP-MCNC: 3.4 G/DL (ref 3.5–5.2)
ALP SERPL-CCNC: 68 UNIT/L (ref 40–150)
ALT SERPL W/O P-5'-P-CCNC: 17 UNIT/L (ref 10–44)
ANION GAP (OHS): 12 MMOL/L (ref 8–16)
AST SERPL-CCNC: 26 UNIT/L (ref 11–45)
BASOPHILS # BLD AUTO: 0.05 K/UL
BASOPHILS NFR BLD AUTO: 0.8 %
BILIRUB SERPL-MCNC: 1.1 MG/DL (ref 0.1–1)
BUN SERPL-MCNC: 27 MG/DL (ref 8–23)
CALCIUM SERPL-MCNC: 9.2 MG/DL (ref 8.7–10.5)
CHLORIDE SERPL-SCNC: 107 MMOL/L (ref 95–110)
CO2 SERPL-SCNC: 21 MMOL/L (ref 23–29)
CREAT SERPL-MCNC: 1.7 MG/DL (ref 0.5–1.4)
ERYTHROCYTE [DISTWIDTH] IN BLOOD BY AUTOMATED COUNT: 15 % (ref 11.5–14.5)
GFR SERPLBLD CREATININE-BSD FMLA CKD-EPI: 44 ML/MIN/1.73/M2
GLUCOSE SERPL-MCNC: 91 MG/DL (ref 70–110)
GLUCOSE SERPL-MCNC: 99 MG/DL (ref 70–110)
HCT VFR BLD AUTO: 47.3 % (ref 40–54)
HGB BLD-MCNC: 15.4 GM/DL (ref 14–18)
IMM GRANULOCYTES # BLD AUTO: 0.02 K/UL (ref 0–0.04)
IMM GRANULOCYTES NFR BLD AUTO: 0.3 % (ref 0–0.5)
LYMPHOCYTES # BLD AUTO: 2.44 K/UL (ref 1–4.8)
MCH RBC QN AUTO: 29.7 PG (ref 27–31)
MCHC RBC AUTO-ENTMCNC: 32.6 G/DL (ref 32–36)
MCV RBC AUTO: 91 FL (ref 82–98)
NUCLEATED RBC (/100WBC) (OHS): 0 /100 WBC
OHS QRS DURATION: 102 MS
OHS QRS DURATION: 82 MS
OHS QTC CALCULATION: 495 MS
OHS QTC CALCULATION: 525 MS
PHOSPHATE SERPL-MCNC: 4 MG/DL (ref 2.7–4.5)
PLATELET # BLD AUTO: 209 K/UL (ref 150–450)
PMV BLD AUTO: 11.1 FL (ref 9.2–12.9)
POCT GLUCOSE: 120 MG/DL (ref 70–110)
POCT GLUCOSE: 91 MG/DL (ref 70–110)
POTASSIUM SERPL-SCNC: 3.9 MMOL/L (ref 3.5–5.1)
PROT SERPL-MCNC: 6.6 GM/DL (ref 6–8.4)
RBC # BLD AUTO: 5.18 M/UL (ref 4.6–6.2)
RELATIVE EOSINOPHIL (OHS): 1.3 %
RELATIVE LYMPHOCYTE (OHS): 39.3 % (ref 18–48)
RELATIVE MONOCYTE (OHS): 10.5 % (ref 4–15)
RELATIVE NEUTROPHIL (OHS): 47.8 % (ref 38–73)
SODIUM SERPL-SCNC: 140 MMOL/L (ref 136–145)
WBC # BLD AUTO: 6.21 K/UL (ref 3.9–12.7)

## 2025-04-29 PROCEDURE — 80053 COMPREHEN METABOLIC PANEL: CPT

## 2025-04-29 PROCEDURE — 63600175 PHARM REV CODE 636 W HCPCS

## 2025-04-29 PROCEDURE — 84100 ASSAY OF PHOSPHORUS: CPT

## 2025-04-29 PROCEDURE — 25000003 PHARM REV CODE 250: Performed by: NURSE PRACTITIONER

## 2025-04-29 PROCEDURE — 21400001 HC TELEMETRY ROOM

## 2025-04-29 PROCEDURE — 25000003 PHARM REV CODE 250

## 2025-04-29 PROCEDURE — 85025 COMPLETE CBC W/AUTO DIFF WBC: CPT

## 2025-04-29 PROCEDURE — 11000001 HC ACUTE MED/SURG PRIVATE ROOM

## 2025-04-29 RX ORDER — GUAIFENESIN 100 MG/5ML
200 LIQUID ORAL EVERY 4 HOURS PRN
Status: DISCONTINUED | OUTPATIENT
Start: 2025-04-29 | End: 2025-04-30 | Stop reason: HOSPADM

## 2025-04-29 RX ADMIN — METOPROLOL SUCCINATE 100 MG: 50 TABLET, EXTENDED RELEASE ORAL at 08:04

## 2025-04-29 RX ADMIN — FUROSEMIDE 40 MG: 10 INJECTION, SOLUTION INTRAVENOUS at 08:04

## 2025-04-29 RX ADMIN — LOSARTAN POTASSIUM 25 MG: 25 TABLET, FILM COATED ORAL at 08:04

## 2025-04-29 RX ADMIN — APIXABAN 5 MG: 5 TABLET, FILM COATED ORAL at 08:04

## 2025-04-29 RX ADMIN — GUAIFENESIN 200 MG: 100 SOLUTION ORAL at 08:04

## 2025-04-29 NOTE — ASSESSMENT & PLAN NOTE
Patient's FSGs are controlled on current medication regimen.  Last A1c reviewed-   Lab Results   Component Value Date    HGBA1C 6.8 (H) 02/28/2025     Most recent fingerstick glucose reviewed-   Recent Labs   Lab 04/29/25  0753   POCTGLUCOSE 91     Current correctional scale  Low  Maintain anti-hyperglycemic dose as follows-   Antihyperglycemics (From admission, onward)      Start     Stop Route Frequency Ordered    04/28/25 2331  insulin aspart U-100 pen 0-5 Units         -- SubQ Before meals & nightly PRN 04/28/25 2233          Hold Oral hypoglycemics while patient is in the hospital.   He is prescribed  15 units nightly lantus but he has not been taking it since he was discharged  Monitor BG on LDSSI, plan to add lantus in evening if needed and may need supplies for discharge

## 2025-04-29 NOTE — ASSESSMENT & PLAN NOTE
Trop elevation ( 90>>91) likely 2/2 demand ischemia in setting of CHF exacerbation.   No chest pain. No STEMI on EKG.  Monitor telemetry  Check TTE  STAT EKG for chest pain

## 2025-04-29 NOTE — PROGRESS NOTES
[facility] - Emergency Dept  [facility]  Progress Note    Patient Name: Marck Madison  MRN: [id number]  Patient Class: IP- Inpatient   Admission Date: 4/28/2025  Length of Stay: 1 days  Attending Physician: Maged Evans MD  Primary Care Provider: [facility]        Patient scheduled for transfer to [facility] due to capacity      Subjective     Principal Problem:Acute on chronic diastolic congestive heart failure        HPI:  Marck Madison is a 65 y.o. male with atrial fibrillation, PE/DVT (on eliquis), hypertension, diastolic heart failure, T2DM, COPD, Hep C, prior tobacco use but quit 3 years ago. He presented to [facility] Emergency Department on 4/28/25 with acute onset SOB since awaking in the morning. He normally requires 2L supplemental oxygen at baseline. He states he oneal snot use oxygen while he sleeps. When he awoke, he was short of breath but did not utilize his supplemental oxygen, he is not sure why. He ran out of his inhalers a few days ago. SOB is worse with exertion. He also reports productive cough for the past two days. He denies fever, chills, wheezing, chest pain, palpitations, fatigue, weakness, dizziness, lightheadedness or other acute complaints. He reports compliance with eliquis 5 mg BID. He takes lasix 40 mg daily (not twice daily). He has chronic right > left lower extremity swelling that he attributes to a prior ankle injury. He does not weigh himself. Attempts to eat healthy diet.    In the ED he was hypertensive, tachypneic with increased work of breathing and hypoxic requiring 3L to maintain O2 sats >88%. Labs significant for . HS trop 90 > 91.Cr 1.8 (baseline).  TB 1.2. Covid-19 negative. Flu negative.  EKG NSR, no acute STEMI. VBG without any hypercapnic respiratory failure. CXR with pulmonary edema. Given 40 mg IV lasix.  Admitted to  for acute CHF exacerbation.     Overview/Hospital Course:  Patient with net  negative of just over a liter.  Improving from oxygenation standpoint as he was able to sustain adequate oxygen saturation on room air while upright (desaturation while lying down) despite having been on 5L prior.  Likely will continue to require 2L oxygen until further diuresis. Notably on 2L at home.  Renal function stable.    Interval History: Improvement from oxygenation standpoint from admission upon diuresis.  Denies chest pain but still with some tachypnea and discomfort.    Review of Systems   Constitutional:  Negative for chills and fever.   Respiratory:  Positive for shortness of breath. Negative for cough.    Cardiovascular:  Negative for chest pain and palpitations.   Musculoskeletal:  Negative for arthralgias and back pain.   Neurological:  Negative for light-headedness and headaches.   Psychiatric/Behavioral:  Negative for agitation and behavioral problems.      Objective:     Vital Signs (Most Recent):  Temp: 98 °F (36.7 °C) (04/29/25 1007)  Pulse: 88 (04/29/25 1007)  Resp: 18 (04/29/25 1007)  BP: (!) 144/97 (04/29/25 1007)  SpO2: 98 % (04/29/25 1007) Vital Signs (24h Range):  Temp:  [97.6 °F (36.4 °C)-98.2 °F (36.8 °C)] 98 °F (36.7 °C)  Pulse:  [74-99] 88  Resp:  [15-28] 18  SpO2:  [81 %-98 %] 98 %  BP: (122-190)/() 144/97     Weight: 79.8 kg (176 lb)  Body mass index is 30.21 kg/m².    Intake/Output Summary (Last 24 hours) at 4/29/2025 1439  Last data filed at 4/29/2025 1003  Gross per 24 hour   Intake --   Output 1325 ml   Net -1325 ml         Physical Exam  Constitutional:       General: He is not in acute distress.  HENT:      Head: Normocephalic and atraumatic.   Eyes:      Extraocular Movements: Extraocular movements intact.   Cardiovascular:      Rate and Rhythm: Normal rate. Rhythm irregular.   Pulmonary:      Breath sounds: No rhonchi.      Comments: 5L nasal cannula with adequate O2 sats initially-->2L  Musculoskeletal:      Right lower leg: Edema present.      Left lower leg: Edema  present.   Neurological:      General: No focal deficit present.      Mental Status: He is alert and oriented to person, place, and time.   Psychiatric:         Mood and Affect: Mood normal.         Behavior: Behavior normal.               Significant Labs: All pertinent labs within the past 24 hours have been reviewed.  CBC:   Recent Labs   Lab 04/28/25 1953 04/29/25 0257   WBC 7.00 6.21   HGB 15.3 15.4   HCT 46.7 47.3    209     CMP:   Recent Labs   Lab 04/28/25 1953 04/29/25 0257    140   K 4.2 3.9    107   CO2 24 21*    99   BUN 27* 27*   CREATININE 1.8* 1.7*   CALCIUM 9.2 9.2   PROT 6.9 6.6   ALBUMIN 3.5 3.4*   BILITOT 1.2* 1.1*   ALKPHOS 71 68   AST 31 26   ALT 21 17   ANIONGAP 11 12       Significant Imaging: I have reviewed all pertinent imaging results/findings within the past 24 hours.      Assessment & Plan  Acute on chronic diastolic congestive heart failure  Patient has Diastolic (HFpEF) heart failure that is Acute on chronic. On presentation their CHF was decompensated. Evidence of decompensated CHF on presentation includes: edema, crackles on lung auscultation, dyspnea on exertion (DONNELLY), and shortness of breath. The etiology of their decompensation is likely dietary indiscretion. Most recent BNP and echo results are listed below.  Recent Labs     04/28/25 1953   *     Latest ECHO  Results for orders placed during the hospital encounter of 02/27/25    Echo    Interpretation Summary    Left Ventricle: The left ventricle is normal in size. Increased wall thickness. There is concentric hypertrophy. There is normal systolic function with a visually estimated ejection fraction of 55 - 60%.    Right Ventricle: The right ventricle is normal in size. Wall thickness is normal. Systolic function is normal.    Left Atrium: moderately dilated    Right Atrium: Right atrium is dilated.    Mitral Valve: There is moderate regurgitation.    Tricuspid Valve: There is mild  regurgitation.    Current Heart Failure Medications  losartan tablet 25 mg, Daily, Oral  metoprolol succinate (TOPROL-XL) 24 hr tablet 100 mg, 2 times daily, Oral  furosemide injection 40 mg, Every 12 hours, Intravenous    Plan  - Monitor strict I&Os and daily weights.    - Place on telemetry  - Low sodium diet  - Place on fluid restriction of 1.5 L.   - Cardiology has not been consulted  - The patient's volume status is  improving   - lasix 40 mg BID prescribed but he takes it once a day, does not monitor weights, fluid or sodium intake   - checking TTE in setting of nstemi (though suspect demand iso CHF exacerbation)   Acute on chronic hypoxic respiratory failure  Patient with Hypoxic Respiratory failure which is Acute on chronic.  he is on home oxygen at 2 LPM. Supplemental oxygen was provided and noted-      Signs/symptoms of respiratory failure include- tachypnea and increased work of breathing. Contributing diagnoses includes - CHF and COPD Labs and images were reviewed. Patient has had recent VBG.. Will treat underlying causes and adjust management of respiratory failure as follows-     - IV diuresis for CHF  - PRN duonebs for copd; consider steroids/abx if not improving with diuresis   -Supplemental oxygen ; wean as tolerated to home 2 L  - h/o PE/DVT. He does report compliance with eliquis. Check DVT US give L > R LE swelling; consider PE studies if not improving with diuresis: unnecessary at this juncture given improvement  NSTEMI (non-ST elevation myocardial infarction)  Trop elevation ( 90>>91) likely 2/2 demand ischemia in setting of CHF exacerbation.   No chest pain. No STEMI on EKG.  Monitor telemetry  Check TTE  STAT EKG for chest pain  Personal history of atrial flutter  - continue home eliquis and metoprolol   - monitor telemetry   - well-controlled  Hypertension  Patient's blood pressure range in the last 24 hours was: BP  Min: 122/68  Max: 190/106.The patient's inpatient anti-hypertensive regimen  is listed below:  Current Antihypertensives  losartan tablet 25 mg, Daily, Oral  metoprolol succinate (TOPROL-XL) 24 hr tablet 100 mg, 2 times daily, Oral  furosemide injection 40 mg, Every 12 hours, Intravenous    Plan  - BP with fluctuations, will likely require titration  COPD (chronic obstructive pulmonary disease)  Patient's COPD is controlled currently.  Patient is currently off COPD Pathway. Continue scheduled inhalers Supplemental oxygen and monitor respiratory status closely.   Type 2 diabetes mellitus, with long-term current use of insulin  Patient's FSGs are controlled on current medication regimen.  Last A1c reviewed-   Lab Results   Component Value Date    HGBA1C 6.8 (H) 02/28/2025     Most recent fingerstick glucose reviewed-   Recent Labs   Lab 04/29/25  0753   POCTGLUCOSE 91     Current correctional scale  Low  Maintain anti-hyperglycemic dose as follows-   Antihyperglycemics (From admission, onward)      Start     Stop Route Frequency Ordered    04/28/25 2331  insulin aspart U-100 pen 0-5 Units         -- SubQ Before meals & nightly PRN 04/28/25 2233          Hold Oral hypoglycemics while patient is in the hospital.   He is prescribed  15 units nightly lantus but he has not been taking it since he was discharged  Monitor BG on LDSSI, plan to add lantus in evening if needed and may need supplies for discharge   CAD (coronary artery disease)  Patient with known CAD which is controlled Will continue  current management  and monitor for S/Sx of angina/ACS. Continue to monitor on telemetry.   Class 1 obesity with serious comorbidity and body mass index (BMI) of 30.0 to 30.9 in adult  Body mass index is 30.21 kg/m². Morbid obesity complicates all aspects of disease management from diagnostic modalities to treatment. Weight loss encouraged and health benefits explained to patient.  History of pulmonary embolus (PE)  - Noted. He reports compliance with eliquis 5 mg BID  VTE Risk Mitigation (From admission,  onward)           Ordered     apixaban tablet 5 mg  2 times daily         04/28/25 2251     IP VTE HIGH RISK PATIENT  Once         04/28/25 2233     Place sequential compression device  Until discontinued         04/28/25 2233     Reason for No Pharmacological VTE Prophylaxis  Once        Question:  Reasons:  Answer:  Already adequately anticoagulated on oral Anticoagulants    04/28/25 2233                    Discharge Planning   BALDO:  Transfer to [facility] 4/29/2025    Code Status: Full Code   Medical Readiness for Discharge Date:                  Maged Evans MD  Department of Hospital Medicine   [facility] - Emergency Dept

## 2025-04-29 NOTE — ED TRIAGE NOTES
"Marck Madison, a 65 y.o. male presents to the ED w/ complaint of SOB since this morning and productive cough. Hx of COPD on 2L NC at baseline. Pt denies CP, abdominal pain, NVD, fever/chills.    Triage note:  Chief Complaint   Patient presents with    Shortness of Breath     Hx cardiac arrest , suppose to be on 2 l nc but didn't travel with it , placed on 2 l nc, states has no port oxygen at home     Review of patient's allergies indicates:  No Known Allergies  Past Medical History:   Diagnosis Date    Arrhythmia 2017    aflutter    Atrial flutter     CAD (coronary artery disease)     CHF (congestive heart failure), NYHA class I 2017    Cholelithiasis with chronic cholecystitis     Chronic diastolic heart failure     Cigarette smoker     COPD (chronic obstructive pulmonary disease)     COVID-19 06/01/2021    Diabetes mellitus     DKA (diabetic ketoacidosis)     Hypertension     NSTEMI (non-ST elevation myocardial infarction)     NSVT (nonsustained ventricular tachycardia)     Psychiatric disorder     h/o "schizophrena/bipolar"    Schizoaffective disorder     Severe sepsis      "

## 2025-04-29 NOTE — ASSESSMENT & PLAN NOTE
Patient with known CAD which is controlled Will continue current management and monitor for S/Sx of angina/ACS. Continue to monitor on telemetry.

## 2025-04-29 NOTE — ED PROVIDER NOTES
"Encounter Date: 4/28/2025       History     Chief Complaint   Patient presents with    Shortness of Breath     Hx cardiac arrest , suppose to be on 2 l nc but didn't travel with it , placed on 2 l nc, states has no port oxygen at home     64 yo M with pmhx afib/flutter on apixaban, HFpEF, DVT/PE on apixaban, HTN, IDDM, COPD, CAD with a non STEMI, schizoaffective disorder, previous tobacco use, 2 L home O2 presents with a chief complaint shortness of breath.  Patient was last admitted from February 27th to March 5th for IV diuresis on BiPAP and was diuresed with improvement.  Prior to that admission, he was prescribed Lovenox for his PE given history of a breakthrough PE on apixaban but he admitted to noncompliance.  He was discharged on apixaban.  He currently reports compliance with the apixaban.  He notes 1 day history of shortness of breath.  Progressively worsening.  Associated with a cough productive of phlegm.  No fever or chest pain.  He notes this is similar to previous CHF or COPD but not sure what.  Of note, patient arrived here off of home O2.      The history is provided by the patient.     Review of patient's allergies indicates:  No Known Allergies  Past Medical History:   Diagnosis Date    Arrhythmia 2017    aflutter    Atrial flutter     CAD (coronary artery disease)     CHF (congestive heart failure), NYHA class I 2017    Cholelithiasis with chronic cholecystitis     Chronic diastolic heart failure     Cigarette smoker     COPD (chronic obstructive pulmonary disease)     COVID-19 06/01/2021    Diabetes mellitus     DKA (diabetic ketoacidosis)     Hypertension     NSTEMI (non-ST elevation myocardial infarction)     NSVT (nonsustained ventricular tachycardia)     Psychiatric disorder     h/o "schizophrena/bipolar"    Schizoaffective disorder     Severe sepsis      Past Surgical History:   Procedure Laterality Date    LIVER SURGERY      abscess drainage; 1970s    TREATMENT OF CARDIAC ARRHYTHMIA  " 9/12/2019    Procedure: Cardioversion or Defibrillation;  Surgeon: Jonathan Lopez MD;  Location: [facility] CATH LAB;  Service: Cardiology;;    TREATMENT OF CARDIAC ARRHYTHMIA N/A 2/3/2022    Procedure: Cardioversion or Defibrillation;  Surgeon: Trevor Schwab MD;  Location: [facility] CATH LAB;  Service: Cardiology;  Laterality: N/A;    TREATMENT OF CARDIAC ARRHYTHMIA N/A 3/16/2024    Procedure: Cardioversion or Defibrillation;  Surgeon: Jonathan Lopez MD;  Location: [facility] CATH LAB;  Service: Cardiology;  Laterality: N/A;     No family history on file.  Social History[1]  Review of Systems    Physical Exam     Initial Vitals [04/28/25 1838]   BP Pulse Resp Temp SpO2   (!) 166/106 74 (!) 28 98.2 °F (36.8 °C) (!) 94 %      MAP       --         Physical Exam    Nursing note and vitals reviewed.  Constitutional: He appears well-developed and well-nourished. He is not diaphoretic. No distress.   HENT:   Head: Normocephalic and atraumatic.   Eyes: Right eye exhibits no discharge. Left eye exhibits no discharge. No scleral icterus.   Neck: Neck supple. No JVD present.   Normal range of motion.  Cardiovascular:  Regular rhythm and normal heart sounds.   Tachycardia present.   Exam reveals no gallop and no friction rub.       No murmur heard.  Pulmonary/Chest: He has no wheezes. He has rhonchi. He has no rales.   Increased work of breathing on 3 L nasal cannula with good air movement but bibasilar rhonchi but speaking complete sentences   Abdominal: Abdomen is soft. He exhibits no distension and no mass. There is no abdominal tenderness. There is no rebound and no guarding.   Musculoskeletal:         General: No tenderness or edema. Normal range of motion.      Cervical back: Normal range of motion and neck supple.     Neurological: He is alert and oriented to person, place, and time. He has normal strength. No sensory deficit.   Skin: Skin is warm and dry. Capillary refill takes less than 2 seconds.   Psychiatric: Thought  content normal.         ED Course   Procedures  Labs Reviewed   COMPREHENSIVE METABOLIC PANEL - Abnormal       Result Value    Sodium 142      Potassium 4.2      Chloride 107      CO2 24      Glucose 103      BUN 27 (*)     Creatinine 1.8 (*)     Calcium 9.2      Protein Total 6.9      Albumin 3.5      Bilirubin Total 1.2 (*)     ALP 71      AST 31      ALT 21      Anion Gap 11      eGFR 41 (*)    TROPONIN I HIGH SENSITIVITY - Abnormal    Troponin High Sensitive 90 (*)    B-TYPE NATRIURETIC PEPTIDE - Abnormal     (*)    CBC WITH DIFFERENTIAL - Abnormal    WBC 7.00      RBC 5.14      HGB 15.3      HCT 46.7      MCV 91      MCH 29.8      MCHC 32.8      RDW 14.6 (*)     Platelet Count 224      MPV 11.2      Nucleated RBC 0      Neut % 51.5      Lymph % 36.4      Mono % 10.1      Eos % 1.1      Basophil % 0.6      Imm Grans % 0.3      Neut # 3.60      Lymph # 2.55      Mono # 0.71      Eos # 0.08      Baso # 0.04      Imm Grans # 0.02      Narrative:     This is an appended report.  These results have been appended to a previously verified report.   INFLUENZA A & B BY MOLECULAR - Normal    INFLUENZA A MOLECULAR Negative      INFLUENZA B MOLECULAR  Negative     SARS-COV-2 RNA AMPLIFICATION, QUAL - Normal    SARS COV-2 Molecular Negative     CBC W/ AUTO DIFFERENTIAL    Narrative:     The following orders were created for panel order CBC auto differential.  Procedure                               Abnormality         Status                     ---------                               -----------         ------                     CBC with Differential[[id number]]       Abnormal            Final result                 Please view results for these tests on the individual orders.   TROPONIN I HIGH SENSITIVITY     EKG Readings: (Independently Interpreted)   Initial Reading: No STEMI. Rhythm: Normal Sinus Rhythm. Heart Rate: 99. ST Segments: Normal ST Segments. T Waves Flipped: I and AVL. Axis: Left Axis Deviation.        Imaging Results              X-Ray Chest AP Portable (Final result)  Result time 04/28/25 22:01:36      Final result by Julio Navarrete MD (04/28/25 22:01:36)                   Impression:      Stable findings of pulmonary edema secondary to CHF.      Electronically signed by: Julio Navarrete MD  Date:    04/28/2025  Time:    22:01               Narrative:    EXAMINATION:  XR CHEST AP PORTABLE    CLINICAL HISTORY:  dyspnea;    TECHNIQUE:  Single frontal view of the chest was performed.    COMPARISON:  02/27/2025.    FINDINGS:  Monitoring EKG leads are present.  The trachea is unremarkable.  There are calcifications of the aortic knob.  The cardiomediastinal silhouette is enlarged.  There are small bilateral pleural effusions.  There is no evidence of a pneumothorax.  There is no evidence of pneumomediastinum.  There is pulmonary vascular congestion.  There is no focal consolidation.    There is no evidence of free air beneath the hemidiaphragms.  There are degenerative changes in the osseous structures.                                       Medications   furosemide injection 40 mg (40 mg Intravenous Given 4/28/25 2023)     Medical Decision Making  64 yo M with pmhx afib/flutter on apixaban, HFpEF, DVT/PE on apixaban, HTN, IDDM, COPD, CAD with a non STEMI, schizoaffective disorder, previous tobacco use, 2 L home O2 presents with a chief complaint shortness of breath.     Differential includes, but not limited to:  Acute CHF exacerbation, ACS, anemia, COPD, respiratory failure, pneumonia, viral URI, COVID, influenza    Lower suspicion for PE given compliance with apixaban but if previous evaluation unrevealing, will reassess.  Clinically, patient's presentation is most consistent with CHF.  Will attempt diuresis with 40 of IV Lasix.  Will obtain labs and chest x-ray.    Reassessment:  CMP with a creatinine of 1.8 which is consistent with previous.  Electrolytes are normal.  BNP is elevated at 720 which is higher than  previous.  Troponin is elevated at 90.  I suspect this is troponin leak secondary to acute decompensated CHF, will trend.  CBC without leukocytosis or anemia.  VBG without any hypercapnic respiratory failure.  COVID and influenza are negative.  Chest x-ray with pulmonary edema.  Will diurese with 40 of IV Lasix.  Will admit to inpatient given increased O2 requirements.    Risk  Prescription drug management.  Decision regarding hospitalization.                                      Clinical Impression:  Final diagnoses:  [R06.02] SOB (shortness of breath)  [R06.02] Shortness of breath  [I50.9] Acute congestive heart failure, unspecified heart failure type (Primary)  [R79.89] Troponin level elevated          ED Disposition Condition    Admit                     [1]   Social History  Tobacco Use    Smoking status: Former     Current packs/day: 1.00     Average packs/day: 1 pack/day for 15.0 years (15.0 ttl pk-yrs)     Types: Cigarettes    Smokeless tobacco: Never   Substance Use Topics    Alcohol use: Not Currently     Comment: daily    Drug use: Never        Maco Shannon MD  04/28/25 [id number]

## 2025-04-29 NOTE — ASSESSMENT & PLAN NOTE
Patient's blood pressure range in the last 24 hours was: BP  Min: 122/68  Max: 190/106.The patient's inpatient anti-hypertensive regimen is listed below:  Current Antihypertensives  losartan tablet 25 mg, Daily, Oral  metoprolol succinate (TOPROL-XL) 24 hr tablet 100 mg, 2 times daily, Oral  furosemide injection 40 mg, Every 12 hours, Intravenous    Plan  - BP with fluctuations, will likely require titration

## 2025-04-29 NOTE — FIRST PROVIDER EVALUATION
"Medical screening examination initiated.  I have conducted a focused provider triage encounter, findings are as follows:    Brief history of present illness:  Shortness of breath    Vitals:    04/28/25 1838   BP: (!) 166/106   Pulse: 74   Resp: (!) 28   Temp: 98.2 °F (36.8 °C)   TempSrc: Oral   SpO2: (!) 94%   Weight: 79.8 kg (176 lb)   Height: 5' 4" (1.626 m)           Brief workup plan:  labs ordered    Preliminary workup initiated; this workup will be continued and followed by the physician or advanced practice provider that is assigned to the patient when roomed.  "

## 2025-04-29 NOTE — ASSESSMENT & PLAN NOTE
Patient's blood pressure range in the last 24 hours was: BP  Min: 122/68  Max: 190/106.The patient's inpatient anti-hypertensive regimen is listed below:  Current Antihypertensives  losartan tablet 25 mg, Daily, Oral  metoprolol succinate (TOPROL-XL) 24 hr tablet 100 mg, 2 times daily, Oral  furosemide injection 40 mg, Every 12 hours, Intravenous    Plan  - BP is controlled, no changes needed to their regimen

## 2025-04-29 NOTE — H&P
[facility] - Emergency Dept  [facility] Medicine  History & Physical    Patient Name: Marck Madison  MRN: [id number]  Patient Class: IP- Inpatient  Admission Date: 4/28/2025  Attending Physician: No att. providers found   Primary Care Provider: [facility]         Patient information was obtained from patient, past medical records, and ER records.     Subjective:     Principal Problem:Acute on chronic diastolic congestive heart failure    Chief Complaint:   Chief Complaint   Patient presents with    Shortness of Breath     Hx cardiac arrest , suppose to be on 2 l nc but didn't travel with it , placed on 2 l nc, states has no port oxygen at home        HPI: Marck Madison is a 65 y.o. male with atrial fibrillation, PE/DVT (on eliquis), hypertension, diastolic heart failure, T2DM, COPD, Hep C, prior tobacco use but quit 3 years ago. He presented to [facility] Emergency Department on 4/28/25 with acute onset SOB since awaking in the morning. He normally requires 2L supplemental oxygen at baseline. He states he oneal snot use oxygen while he sleeps. When he awoke, he was short of breath but did not utilize his supplemental oxygen, he is not sure why. He ran out of his inhalers a few days ago. SOB is worse with exertion. He also reports productive cough for the past two days. He denies fever, chills, wheezing, chest pain, palpitations, fatigue, weakness, dizziness, lightheadedness or other acute complaints. He reports compliance with eliquis 5 mg BID. He takes lasix 40 mg daily (not twice daily). He has chronic right > left lower extremity swelling that he attributes to a prior ankle injury. He does not weigh himself. Attempts to eat healthy diet.    In the ED he was hypertensive, tachypneic with increased work of breathing and hypoxic requiring 3L to maintain O2 sats >88%. Labs significant for . HS trop 90 > 91.Cr 1.8 (baseline).  TB 1.2. Covid-19 negative. Flu negative.   "EKG NSR, no acute STEMI. VBG without any hypercapnic respiratory failure. CXR with pulmonary edema. Given 40 mg IV lasix.  Admitted to  for acute CHF exacerbation.     Past Medical History:   Diagnosis Date    Arrhythmia 2017    aflutter    Atrial flutter     CAD (coronary artery disease)     CHF (congestive heart failure), NYHA class I 2017    Cholelithiasis with chronic cholecystitis     Chronic diastolic heart failure     Cigarette smoker     COPD (chronic obstructive pulmonary disease)     COVID-19 06/01/2021    Diabetes mellitus     DKA (diabetic ketoacidosis)     Hypertension     NSTEMI (non-ST elevation myocardial infarction)     NSVT (nonsustained ventricular tachycardia)     Psychiatric disorder     h/o "schizophrena/bipolar"    Schizoaffective disorder     Severe sepsis        Past Surgical History:   Procedure Laterality Date    LIVER SURGERY      abscess drainage; 1970s    TREATMENT OF CARDIAC ARRHYTHMIA  9/12/2019    Procedure: Cardioversion or Defibrillation;  Surgeon: Jonathan Lopez MD;  Location: [facility] CATH LAB;  Service: Cardiology;;    TREATMENT OF CARDIAC ARRHYTHMIA N/A 2/3/2022    Procedure: Cardioversion or Defibrillation;  Surgeon: Trevor Schwab MD;  Location: [facility] CATH LAB;  Service: Cardiology;  Laterality: N/A;    TREATMENT OF CARDIAC ARRHYTHMIA N/A 3/16/2024    Procedure: Cardioversion or Defibrillation;  Surgeon: Jonathan Lopez MD;  Location: [facility] CATH LAB;  Service: Cardiology;  Laterality: N/A;       Review of patient's allergies indicates:  No Known Allergies    No current facility-administered medications on file prior to encounter.     Current Outpatient Medications on File Prior to Encounter   Medication Sig    ADVAIR -21 mcg/actuation HFAA inhaler Inhale 2 puffs into the lungs 2 (two) times a day.    aspirin (ECOTRIN) 81 MG EC tablet Take 1 tablet by mouth once daily.    albuterol (PROVENTIL/VENTOLIN HFA) 90 mcg/actuation inhaler Inhale 1 puff into the lungs every " "4 (four) hours as needed for Wheezing or Shortness of Breath.    amiodarone (PACERONE) 200 MG Tab Take 200 mg by mouth once daily.    apixaban (ELIQUIS) 5 mg Tab Take 1 tablet (5 mg total) by mouth 2 (two) times daily.    furosemide (LASIX) 40 MG tablet Take 1 tablet (40 mg total) by mouth 2 (two) times daily.    insulin glargine U-100, Lantus, 100 unit/mL (3 mL) SubQ InPn pen Inject 15 Units into the skin every evening.    losartan (COZAAR) 25 MG tablet Take 1 tablet (25 mg total) by mouth once daily.    metoprolol succinate (TOPROL-XL) 100 MG 24 hr tablet Take 1 tablet (100 mg total) by mouth 2 (two) times daily.    pen needle, diabetic 32 gauge x 5/32" Ndle 1 each by Misc.(Non-Drug; Combo Route) route every evening.     Family History    None       Tobacco Use    Smoking status: Former     Current packs/day: 1.00     Average packs/day: 1 pack/day for 15.0 years (15.0 ttl pk-yrs)     Types: Cigarettes    Smokeless tobacco: Never   Substance and Sexual Activity    Alcohol use: Not Currently     Comment: daily    Drug use: Never    Sexual activity: Not Currently     Review of Systems   Constitutional:  Negative for chills and fever.   HENT:  Negative for trouble swallowing.    Eyes:  Negative for visual disturbance.   Respiratory:  Positive for cough and shortness of breath. Negative for chest tightness and wheezing.    Cardiovascular:  Negative for chest pain, palpitations and leg swelling.   Gastrointestinal:  Negative for abdominal pain, constipation, diarrhea, nausea and vomiting.   Genitourinary:  Negative for dysuria and frequency.   Musculoskeletal:  Negative for arthralgias and gait problem.   Skin:  Negative for wound.   Neurological:  Negative for syncope, light-headedness and headaches.   Psychiatric/Behavioral:  Negative for confusion. The patient is not nervous/anxious.      Objective:     Vital Signs (Most Recent):  Temp: 98.2 °F (36.8 °C) (04/28/25 1838)  Pulse: 88 (04/28/25 2239)  Resp: 18 (04/28/25 " 2239)  BP: (!) 175/97 (04/28/25 2200)  SpO2: (!) 92 % (04/28/25 2239) Vital Signs (24h Range):  Temp:  [98.2 °F (36.8 °C)] 98.2 °F (36.8 °C)  Pulse:  [74-99] 88  Resp:  [15-28] 18  SpO2:  [90 %-94 %] 92 %  BP: (144-175)/() 175/97     Weight: 79.8 kg (176 lb)  Body mass index is 30.21 kg/m².     Physical Exam  Vitals and nursing note reviewed.   Constitutional:       General: He is not in acute distress.  HENT:      Head: Normocephalic and atraumatic.      Nose: Nose normal.      Mouth/Throat:      Pharynx: No oropharyngeal exudate.   Eyes:      Extraocular Movements: Extraocular movements intact.      Conjunctiva/sclera: Conjunctivae normal.   Cardiovascular:      Rate and Rhythm: Normal rate and regular rhythm.      Heart sounds: Normal heart sounds.   Pulmonary:      Effort: No respiratory distress.      Breath sounds: Wheezing and rhonchi present.      Comments: Coughing frequently.   Abdominal:      General: Bowel sounds are normal. There is no distension.      Palpations: Abdomen is soft.      Tenderness: There is no abdominal tenderness.   Musculoskeletal:         General: No tenderness. Normal range of motion.      Cervical back: Normal range of motion.      Right lower leg: No edema.      Left lower leg: Edema present.   Skin:     General: Skin is warm and dry.   Neurological:      General: No focal deficit present.      Mental Status: He is alert and oriented to person, place, and time.   Psychiatric:         Mood and Affect: Mood normal.         Behavior: Behavior normal.                Significant Labs: All pertinent labs within the past 24 hours have been reviewed.  A1C:   Recent Labs   Lab 02/28/25  0739   HGBA1C 6.8*     ABGs:   Recent Labs   Lab 04/28/25  2030   PH 7.407   PCO2 41.2   HCO3 24.0   PO2 26.8     CBC:   Recent Labs   Lab 04/28/25 1953   WBC 7.00   HGB 15.3   HCT 46.7        CMP:   Recent Labs   Lab 04/28/25 1953      K 4.2      CO2 24   BUN 27*   CREATININE 1.8*    CALCIUM 9.2   ALBUMIN 3.5   BILITOT 1.2*   ALKPHOS 71   AST 31   ALT 21   ANIONGAP 11     Cardiac Markers:   Recent Labs   Lab 04/28/25 1953   *     Magnesium:   Recent Labs   Lab 04/28/25  2212   MG 1.6     Troponin:   Recent Labs   Lab 04/28/25 1953 04/28/25  2212   TROPONINIHS 90* 90*     Significant Imaging: I have reviewed all pertinent imaging results/findings within the past 24 hours.  X-Ray Chest AP Portable  Narrative: EXAMINATION:  XR CHEST AP PORTABLE    CLINICAL HISTORY:  dyspnea;    TECHNIQUE:  Single frontal view of the chest was performed.    COMPARISON:  02/27/2025.    FINDINGS:  Monitoring EKG leads are present.  The trachea is unremarkable.  There are calcifications of the aortic knob.  The cardiomediastinal silhouette is enlarged.  There are small bilateral pleural effusions.  There is no evidence of a pneumothorax.  There is no evidence of pneumomediastinum.  There is pulmonary vascular congestion.  There is no focal consolidation.    There is no evidence of free air beneath the hemidiaphragms.  There are degenerative changes in the osseous structures.  Impression: Stable findings of pulmonary edema secondary to CHF.    Electronically signed by: Julio Navarrete MD  Date:    04/28/2025  Time:    22:01     Assessment/Plan:     Assessment & Plan  Acute on chronic diastolic congestive heart failure  Patient has Diastolic (HFpEF) heart failure that is Acute on chronic. On presentation their CHF was decompensated. Evidence of decompensated CHF on presentation includes: edema, crackles on lung auscultation, dyspnea on exertion (DONNELLY), and shortness of breath. The etiology of their decompensation is likely dietary indiscretion. Most recent BNP and echo results are listed below.  Recent Labs     04/28/25 1953   *     Latest ECHO  Results for orders placed during the hospital encounter of 02/27/25    Echo    Interpretation Summary    Left Ventricle: The left ventricle is normal in size.  Increased wall thickness. There is concentric hypertrophy. There is normal systolic function with a visually estimated ejection fraction of 55 - 60%.    Right Ventricle: The right ventricle is normal in size. Wall thickness is normal. Systolic function is normal.    Left Atrium: moderately dilated    Right Atrium: Right atrium is dilated.    Mitral Valve: There is moderate regurgitation.    Tricuspid Valve: There is mild regurgitation.    Current Heart Failure Medications  losartan tablet 25 mg, Daily, Oral  metoprolol succinate (TOPROL-XL) 24 hr tablet 100 mg, 2 times daily, Oral  furosemide injection 40 mg, Every 12 hours, Intravenous    Plan  - Monitor strict I&Os and daily weights.    - Place on telemetry  - Low sodium diet  - Place on fluid restriction of 1.5 L.   - Cardiology has not been consulted  - The patient's volume status is  improving   - lasix 40 mg BID prescribed but he takes it once a day, does not monitor weights, fluid or sodium intake   - checking TTE in setting of nstemi (though suspect demand iso CHF exacerbation)   Acute on chronic hypoxic respiratory failure  Patient with Hypoxic Respiratory failure which is Acute on chronic.  he is on home oxygen at 2 LPM. Supplemental oxygen was provided and noted-      Signs/symptoms of respiratory failure include- tachypnea and increased work of breathing. Contributing diagnoses includes - CHF and COPD Labs and images were reviewed. Patient has had recent VBG.. Will treat underlying causes and adjust management of respiratory failure as follows-     - IV diuresis for CHF  - PRN duonebs for copd; consider steroids/abx if not improving with diuresis   -Supplemental oxygen ; wean as tolerated to home 2 L  - h/o PE/DVT. He does report compliance with eliquis. Check DVT US give L > R LE swelling; consider PE studies if not improving with diuresis   NSTEMI (non-ST elevation myocardial infarction)  Trop elevation ( 90>>91) likely 2/2 demand ischemia in setting  "of CHF exacerbation.   No chest pain. No STEMI on EKG.  Monitor telemetry  Check TTE  STAT EKG for chest pain  Personal history of atrial flutter  - continue home eliquis and metoprolol   - monitor telemetry   Hypertension  Patient's blood pressure range in the last 24 hours was: BP  Min: 122/68  Max: 190/106.The patient's inpatient anti-hypertensive regimen is listed below:  Current Antihypertensives  losartan tablet 25 mg, Daily, Oral  metoprolol succinate (TOPROL-XL) 24 hr tablet 100 mg, 2 times daily, Oral  furosemide injection 40 mg, Every 12 hours, Intravenous    Plan  - BP is controlled, no changes needed to their regimen  COPD (chronic obstructive pulmonary disease)  Patient's COPD is controlled currently.  Patient is currently off COPD Pathway. Continue scheduled inhalers Supplemental oxygen and monitor respiratory status closely.   Type 2 diabetes mellitus, with long-term current use of insulin  Patient's FSGs are controlled on current medication regimen.  Last A1c reviewed-   Lab Results   Component Value Date    HGBA1C 6.8 (H) 02/28/2025     Most recent fingerstick glucose reviewed- No results for input(s): "POCTGLUCOSE" in the last 24 hours.  Current correctional scale  Low  Maintain anti-hyperglycemic dose as follows-   Antihyperglycemics (From admission, onward)      Start     Stop Route Frequency Ordered    04/28/25 2331  insulin aspart U-100 pen 0-5 Units         -- SubQ Before meals & nightly PRN 04/28/25 2233          Hold Oral hypoglycemics while patient is in the hospital.   He is prescribed  15 units nightly lantus but he has not been taking it since he was discharged  Monitor BG on LDSSI, plan to add lantus in evening if needed and may need supplies for discharge   CAD (coronary artery disease)  Patient with known CAD which is controlled Will continue  current management  and monitor for S/Sx of angina/ACS. Continue to monitor on telemetry.   Class 1 obesity with serious comorbidity and body " mass index (BMI) of 30.0 to 30.9 in adult  Body mass index is 30.21 kg/m². Morbid obesity complicates all aspects of disease management from diagnostic modalities to treatment. Weight loss encouraged and health benefits explained to patient.  History of pulmonary embolus (PE)  - Noted. He reports compliance with eliquis 5 mg BID  VTE Risk Mitigation (From admission, onward)           Ordered     apixaban tablet 5 mg  2 times daily         04/28/25 2251     IP VTE HIGH RISK PATIENT  Once         04/28/25 2233     Place sequential compression device  Until discontinued         04/28/25 2233     Reason for No Pharmacological VTE Prophylaxis  Once        Question:  Reasons:  Answer:  Already adequately anticoagulated on oral Anticoagulants    04/28/25 2233                                    Brandie Pina PA-C  Department of Hospital Medicine  [facility] - Emergency Dept

## 2025-04-29 NOTE — ASSESSMENT & PLAN NOTE
Patient with Hypoxic Respiratory failure which is Acute on chronic.  he is on home oxygen at 2 LPM. Supplemental oxygen was provided and noted-      Signs/symptoms of respiratory failure include- tachypnea and increased work of breathing. Contributing diagnoses includes - CHF and COPD Labs and images were reviewed. Patient has had recent VBG.. Will treat underlying causes and adjust management of respiratory failure as follows-     - IV diuresis for CHF  - PRN duonebs for copd; consider steroids/abx if not improving with diuresis   -Supplemental oxygen ; wean as tolerated to home 2 L  - h/o PE/DVT. He does report compliance with eliquis. Check DVT US give L > R LE swelling; consider PE studies if not improving with diuresis: unnecessary at this juncture given improvement

## 2025-04-29 NOTE — ED TRIAGE NOTES
Report received from DELTA Peters. Assume care of pt. Pt resting comfortably and independently repositioned in stretcher with bed locked in lowest position for safety. NAD noted at this time. Respirations even and unlabored and visible chest rise noted.  Patient offered bathroom assistance and denies need at this time. Pt instructed to call if assistance is needed. Pt on continuous cardiac, BP, and O2 monitoring. Call light within reach. No needs at this time. Will continue to monitor.

## 2025-04-29 NOTE — ASSESSMENT & PLAN NOTE
Patient has Diastolic (HFpEF) heart failure that is Acute on chronic. On presentation their CHF was decompensated. Evidence of decompensated CHF on presentation includes: edema, crackles on lung auscultation, dyspnea on exertion (DONNELLY), and shortness of breath. The etiology of their decompensation is likely dietary indiscretion. Most recent BNP and echo results are listed below.  Recent Labs     04/28/25 1953   *     Latest ECHO  Results for orders placed during the hospital encounter of 02/27/25    Echo    Interpretation Summary    Left Ventricle: The left ventricle is normal in size. Increased wall thickness. There is concentric hypertrophy. There is normal systolic function with a visually estimated ejection fraction of 55 - 60%.    Right Ventricle: The right ventricle is normal in size. Wall thickness is normal. Systolic function is normal.    Left Atrium: moderately dilated    Right Atrium: Right atrium is dilated.    Mitral Valve: There is moderate regurgitation.    Tricuspid Valve: There is mild regurgitation.    Current Heart Failure Medications  losartan tablet 25 mg, Daily, Oral  metoprolol succinate (TOPROL-XL) 24 hr tablet 100 mg, 2 times daily, Oral  furosemide injection 40 mg, Every 12 hours, Intravenous    Plan  - Monitor strict I&Os and daily weights.    - Place on telemetry  - Low sodium diet  - Place on fluid restriction of 1.5 L.   - Cardiology has not been consulted  - The patient's volume status is improving   - lasix 40 mg BID prescribed but he takes it once a day, does not monitor weights, fluid or sodium intake   - checking TTE in setting of nstemi (though suspect demand iso CHF exacerbation)

## 2025-04-29 NOTE — HOSPITAL COURSE
Mr. Madison presented with shortness of breath.  Admitted with acute on chronic respiratory failure due to CHF exacerbation due to noncompliance with his medication regimen.  Treatment initiated with diuresis with IV Lasix, monitored strict I&Os, daily weights and repeat laboratory testing.  Repeat Echo was unchanged from prior with noted EF of 55-60%, diastolic dysfunction not able to be evaluated due to AFib.  Patient was negative greater than 3.5 L with return of respiratory status back to his baseline.  All medications were reviewed with the patient in detail, and prescription sent to pharmacy to fill all medications which he was reportedly missing.  Patient was euvolemic with a respiratory status back to his baseline with O2 requirements of approximately 2 L with activity.  He was excessively counseled on need for compliance with medication regimen. Patient to follow up with his PCP.   States \"Dr. Chew had given me Amlodipine and then switched to Losartan because the Lisinopril was giving me the side effect of the cough.  I cannot take the Losartan either\".    C/O fatigue, feeling very sleepy, and dizzy with Losartan just like she did with Amlodipine.    Patient states \"I stopped taking the Losartan yesterday and restarted the Lisinopril today.  My symptoms have improved and are subsiding but I still feel a little unsteady\".    Patient is requesting refill on Lisinopril to [facility] in [place].    Patient informed if any changes, symptoms get worse, or cannot walk or perform daily activities, go to ER.  Patient states understanding.    Patient can be reached at [phone].    Lisinopril pended.    Forward to Dr. Chew.  Please review and advise.

## 2025-04-29 NOTE — PROVIDER TRANSFER
(Physician in Lead of Transfers)  Outside Transfer Acceptance Note / Regional Referral Center      Upon patient arrival, please contact Hospital Medicine on call.    Referring facility: [facility]   Referring provider: IRON GILMORE  Accepting facility: [facility]  Accepting provider: JASON ENCARNACION  Admitting provider: FRANCESCO JORDAN  Reason for transfer:  continued treatment/capacity  Transfer diagnosis: CHF exacerbation  Transfer specialty requested: Hospital Medicine  Transfer specialty notified: Yes  Transfer level: NUMBER 1-5: 2  Bed type requested: med-tele  Isolation status: No active isolations   Admission class or status: IP- Inpatient      Narrative     65-year-old male with a history of atrial fibrillation, venous thromboembolism, hypertension, diastolic heart failure, HCV, type 2 diabetes, and COPD previously admitted to [facility] February 27-March 5 with shortness of breath.  He had pulmonary edema and was treated with diuretics and BiPAP.  He was placed on apixaban for the atrial flutter.  He continued diuresis and gradually improved.    He presented to [facility] emergency department on April 28 with worsening dyspnea that began on the day of presentation.  He is normally on 2 L home oxygen, but he said he does not use it when he sleeps.  He ran out of inhalers a few days prior.  He reported cough but no fever/chills and no chest pain.  He has chronic right greater than left lower extremity edema.  He is taking his Lasix daily.  In the emergency department he was initially hypertensive and tachypneic with increased work of breathing requiring 3 L supplemental oxygen.  Troponin was mildly elevated, but the trend in his troponin was flat.  He was admitted with acute on chronic diastolic heart failure, acute on chronic hypoxic respiratory failure, and NSTEMI.  He continued his home Eliquis and metoprolol for atrial flutter.  By April 29, respiratory  status was improving.  Echocardiogram was ordered but has not been completed.  Due to bed capacity issues, they are requesting transfer to Hospital Medicine at [facility] for continued treatment.  Referring provider felt patient needed a couple of days of treatment with his compromised respiratory/volume status.    April 29: Sodium 140, potassium 3.9, chloride 107, CO2 21, BUN 27, creatinine 1.7, albumin 3.4, glucose 99, AST 26, ALT 17, phosphorus 4, white blood cells 6.21, hemoglobin 15.4, hematocrit 47.3, platelets 209  -bilateral lower extremity Doppler venous ultrasound had no evidence of DVT    April 28:  , COVID negative, influenza negative, high sensitivity troponin 90 with repeats of 90 and 91  -venous pH 7.407 with pCO2 41.2  -chest x-ray showed stable findings of pulmonary edema secondary to CHF  -EKG showed atrial flutter with a ventricular rate 98.  Inferior and anterior lateral infarct pattern, age undetermined    February 2025: Echocardiogram with EF 55-60%.      VS:  Temperature 97.9°, pulse 88, respirations 20, blood pressure 139/100, O2 sats 95% on 3 L    Objective     Vitals: Temp: 97.8 °F (36.6 °C) (04/29/25 1640)  Pulse: 88 (04/29/25 1640)  Resp: 20 (04/29/25 1640)  BP: (!) 139/100 (04/29/25 1640)  SpO2: 95 % (04/29/25 1640)  Recent Labs: CBC:   Recent Labs   Lab 04/28/25 1953 04/29/25  0257   WBC 7.00 6.21   HGB 15.3 15.4   HCT 46.7 47.3    209     CMP:   Recent Labs   Lab 04/28/25 1953 04/29/25  0257    140   K 4.2 3.9    107   CO2 24 21*    99   BUN 27* 27*   CREATININE 1.8* 1.7*   CALCIUM 9.2 9.2   PROT 6.9 6.6   ALBUMIN 3.5 3.4*   BILITOT 1.2* 1.1*   ALKPHOS 71 68   AST 31 26   ALT 21 17   ANIONGAP 11 12         Instructions    Admit to Hospital Medicine      BEA Melgar MD  Hospital Medicine Staff  Cell: [phone]

## 2025-04-29 NOTE — ED NOTES
Entire bed and Pt. Changed due to leaking pure wick.  External catheter taken off and Condom Cath applied.

## 2025-04-29 NOTE — SUBJECTIVE & OBJECTIVE
Interval History: Improvement from oxygenation standpoint from admission upon diuresis.  Denies chest pain but still with some tachypnea and discomfort.    Review of Systems   Constitutional:  Negative for chills and fever.   Respiratory:  Positive for shortness of breath. Negative for cough.    Cardiovascular:  Negative for chest pain and palpitations.   Musculoskeletal:  Negative for arthralgias and back pain.   Neurological:  Negative for light-headedness and headaches.   Psychiatric/Behavioral:  Negative for agitation and behavioral problems.      Objective:     Vital Signs (Most Recent):  Temp: 98 °F (36.7 °C) (04/29/25 1007)  Pulse: 88 (04/29/25 1007)  Resp: 18 (04/29/25 1007)  BP: (!) 144/97 (04/29/25 1007)  SpO2: 98 % (04/29/25 1007) Vital Signs (24h Range):  Temp:  [97.6 °F (36.4 °C)-98.2 °F (36.8 °C)] 98 °F (36.7 °C)  Pulse:  [74-99] 88  Resp:  [15-28] 18  SpO2:  [81 %-98 %] 98 %  BP: (122-190)/() 144/97     Weight: 79.8 kg (176 lb)  Body mass index is 30.21 kg/m².    Intake/Output Summary (Last 24 hours) at 4/29/2025 1439  Last data filed at 4/29/2025 1003  Gross per 24 hour   Intake --   Output 1325 ml   Net -1325 ml         Physical Exam  Constitutional:       General: He is not in acute distress.  HENT:      Head: Normocephalic and atraumatic.   Eyes:      Extraocular Movements: Extraocular movements intact.   Cardiovascular:      Rate and Rhythm: Normal rate. Rhythm irregular.   Pulmonary:      Breath sounds: No rhonchi.      Comments: 5L nasal cannula with adequate O2 sats initially-->2L  Musculoskeletal:      Right lower leg: Edema present.      Left lower leg: Edema present.   Neurological:      General: No focal deficit present.      Mental Status: He is alert and oriented to person, place, and time.   Psychiatric:         Mood and Affect: Mood normal.         Behavior: Behavior normal.               Significant Labs: All pertinent labs within the past 24 hours have been reviewed.  CBC:    Recent Labs   Lab 04/28/25 1953 04/29/25  0257   WBC 7.00 6.21   HGB 15.3 15.4   HCT 46.7 47.3    209     CMP:   Recent Labs   Lab 04/28/25 1953 04/29/25  0257    140   K 4.2 3.9    107   CO2 24 21*    99   BUN 27* 27*   CREATININE 1.8* 1.7*   CALCIUM 9.2 9.2   PROT 6.9 6.6   ALBUMIN 3.5 3.4*   BILITOT 1.2* 1.1*   ALKPHOS 71 68   AST 31 26   ALT 21 17   ANIONGAP 11 12       Significant Imaging: I have reviewed all pertinent imaging results/findings within the past 24 hours.

## 2025-04-29 NOTE — ASSESSMENT & PLAN NOTE
Patient with Hypoxic Respiratory failure which is Acute on chronic.  he is on home oxygen at 2 LPM. Supplemental oxygen was provided and noted-      Signs/symptoms of respiratory failure include- tachypnea and increased work of breathing. Contributing diagnoses includes - CHF and COPD Labs and images were reviewed. Patient has had recent VBG.. Will treat underlying causes and adjust management of respiratory failure as follows-     - IV diuresis for CHF  - PRN duonebs for copd; consider steroids/abx if not improving with diuresis   -Supplemental oxygen ; wean as tolerated to home 2 L  - h/o PE/DVT. He does report compliance with eliquis. Check DVT US give L > R LE swelling; consider PE studies if not improving with diuresis

## 2025-04-29 NOTE — ASSESSMENT & PLAN NOTE
Body mass index is 30.21 kg/m². Morbid obesity complicates all aspects of disease management from diagnostic modalities to treatment. Weight loss encouraged and health benefits explained to patient.

## 2025-04-29 NOTE — SUBJECTIVE & OBJECTIVE
"Past Medical History:   Diagnosis Date    Arrhythmia 2017    aflutter    Atrial flutter     CAD (coronary artery disease)     CHF (congestive heart failure), NYHA class I 2017    Cholelithiasis with chronic cholecystitis     Chronic diastolic heart failure     Cigarette smoker     COPD (chronic obstructive pulmonary disease)     COVID-19 06/01/2021    Diabetes mellitus     DKA (diabetic ketoacidosis)     Hypertension     NSTEMI (non-ST elevation myocardial infarction)     NSVT (nonsustained ventricular tachycardia)     Psychiatric disorder     h/o "schizophrena/bipolar"    Schizoaffective disorder     Severe sepsis        Past Surgical History:   Procedure Laterality Date    LIVER SURGERY      abscess drainage; 1970s    TREATMENT OF CARDIAC ARRHYTHMIA  9/12/2019    Procedure: Cardioversion or Defibrillation;  Surgeon: Jonathan Lopez MD;  Location: [facility] CATH LAB;  Service: Cardiology;;    TREATMENT OF CARDIAC ARRHYTHMIA N/A 2/3/2022    Procedure: Cardioversion or Defibrillation;  Surgeon: Trevor Schwab MD;  Location: [facility] CATH LAB;  Service: Cardiology;  Laterality: N/A;    TREATMENT OF CARDIAC ARRHYTHMIA N/A 3/16/2024    Procedure: Cardioversion or Defibrillation;  Surgeon: Jonathan Lopez MD;  Location: [facility] CATH LAB;  Service: Cardiology;  Laterality: N/A;       Review of patient's allergies indicates:  No Known Allergies    No current facility-administered medications on file prior to encounter.     Current Outpatient Medications on File Prior to Encounter   Medication Sig    ADVAIR -21 mcg/actuation HFAA inhaler Inhale 2 puffs into the lungs 2 (two) times a day.    aspirin (ECOTRIN) 81 MG EC tablet Take 1 tablet by mouth once daily.    albuterol (PROVENTIL/VENTOLIN HFA) 90 mcg/actuation inhaler Inhale 1 puff into the lungs every 4 (four) hours as needed for Wheezing or Shortness of Breath.    amiodarone (PACERONE) 200 MG Tab Take 200 mg by mouth once daily.    apixaban (ELIQUIS) 5 mg Tab Take 1 " "tablet (5 mg total) by mouth 2 (two) times daily.    furosemide (LASIX) 40 MG tablet Take 1 tablet (40 mg total) by mouth 2 (two) times daily.    insulin glargine U-100, Lantus, 100 unit/mL (3 mL) SubQ InPn pen Inject 15 Units into the skin every evening.    losartan (COZAAR) 25 MG tablet Take 1 tablet (25 mg total) by mouth once daily.    metoprolol succinate (TOPROL-XL) 100 MG 24 hr tablet Take 1 tablet (100 mg total) by mouth 2 (two) times daily.    pen needle, diabetic 32 gauge x 5/32" Ndle 1 each by Misc.(Non-Drug; Combo Route) route every evening.     Family History    None       Tobacco Use    Smoking status: Former     Current packs/day: 1.00     Average packs/day: 1 pack/day for 15.0 years (15.0 ttl pk-yrs)     Types: Cigarettes    Smokeless tobacco: Never   Substance and Sexual Activity    Alcohol use: Not Currently     Comment: daily    Drug use: Never    Sexual activity: Not Currently     Review of Systems   Constitutional:  Negative for chills and fever.   HENT:  Negative for trouble swallowing.    Eyes:  Negative for visual disturbance.   Respiratory:  Positive for cough and shortness of breath. Negative for chest tightness and wheezing.    Cardiovascular:  Negative for chest pain, palpitations and leg swelling.   Gastrointestinal:  Negative for abdominal pain, constipation, diarrhea, nausea and vomiting.   Genitourinary:  Negative for dysuria and frequency.   Musculoskeletal:  Negative for arthralgias and gait problem.   Skin:  Negative for wound.   Neurological:  Negative for syncope, light-headedness and headaches.   Psychiatric/Behavioral:  Negative for confusion. The patient is not nervous/anxious.      Objective:     Vital Signs (Most Recent):  Temp: 98.2 °F (36.8 °C) (04/28/25 1838)  Pulse: 88 (04/28/25 2239)  Resp: 18 (04/28/25 2239)  BP: (!) 175/97 (04/28/25 2200)  SpO2: (!) 92 % (04/28/25 2239) Vital Signs (24h Range):  Temp:  [98.2 °F (36.8 °C)] 98.2 °F (36.8 °C)  Pulse:  [74-99] 88  Resp:  " [15-28] 18  SpO2:  [90 %-94 %] 92 %  BP: (144-175)/() 175/97     Weight: 79.8 kg (176 lb)  Body mass index is 30.21 kg/m².     Physical Exam  Vitals and nursing note reviewed.   Constitutional:       General: He is not in acute distress.  HENT:      Head: Normocephalic and atraumatic.      Nose: Nose normal.      Mouth/Throat:      Pharynx: No oropharyngeal exudate.   Eyes:      Extraocular Movements: Extraocular movements intact.      Conjunctiva/sclera: Conjunctivae normal.   Cardiovascular:      Rate and Rhythm: Normal rate and regular rhythm.      Heart sounds: Normal heart sounds.   Pulmonary:      Effort: No respiratory distress.      Breath sounds: Wheezing and rhonchi present.      Comments: Coughing frequently.   Abdominal:      General: Bowel sounds are normal. There is no distension.      Palpations: Abdomen is soft.      Tenderness: There is no abdominal tenderness.   Musculoskeletal:         General: No tenderness. Normal range of motion.      Cervical back: Normal range of motion.      Right lower leg: No edema.      Left lower leg: Edema present.   Skin:     General: Skin is warm and dry.   Neurological:      General: No focal deficit present.      Mental Status: He is alert and oriented to person, place, and time.   Psychiatric:         Mood and Affect: Mood normal.         Behavior: Behavior normal.                Significant Labs: All pertinent labs within the past 24 hours have been reviewed.  A1C:   Recent Labs   Lab 02/28/25  0739   HGBA1C 6.8*     ABGs:   Recent Labs   Lab 04/28/25 2030   PH 7.407   PCO2 41.2   HCO3 24.0   PO2 26.8     CBC:   Recent Labs   Lab 04/28/25 1953   WBC 7.00   HGB 15.3   HCT 46.7        CMP:   Recent Labs   Lab 04/28/25 1953      K 4.2      CO2 24   BUN 27*   CREATININE 1.8*   CALCIUM 9.2   ALBUMIN 3.5   BILITOT 1.2*   ALKPHOS 71   AST 31   ALT 21   ANIONGAP 11     Cardiac Markers:   Recent Labs   Lab 04/28/25 1953   *      Magnesium:   Recent Labs   Lab 04/28/25  2212   MG 1.6     Troponin:   Recent Labs   Lab 04/28/25 1953 04/28/25  2212   TROPONINIHS 90* 90*     Significant Imaging: I have reviewed all pertinent imaging results/findings within the past 24 hours.  X-Ray Chest AP Portable  Narrative: EXAMINATION:  XR CHEST AP PORTABLE    CLINICAL HISTORY:  dyspnea;    TECHNIQUE:  Single frontal view of the chest was performed.    COMPARISON:  02/27/2025.    FINDINGS:  Monitoring EKG leads are present.  The trachea is unremarkable.  There are calcifications of the aortic knob.  The cardiomediastinal silhouette is enlarged.  There are small bilateral pleural effusions.  There is no evidence of a pneumothorax.  There is no evidence of pneumomediastinum.  There is pulmonary vascular congestion.  There is no focal consolidation.    There is no evidence of free air beneath the hemidiaphragms.  There are degenerative changes in the osseous structures.  Impression: Stable findings of pulmonary edema secondary to CHF.    Electronically signed by: Julio Navarrete MD  Date:    04/28/2025  Time:    22:01

## 2025-04-29 NOTE — HPI
Marck Madison is a 65 y.o. male with atrial fibrillation, PE/DVT (on eliquis), hypertension, diastolic heart failure, T2DM, COPD, Hep C, prior tobacco use but quit 3 years ago. He presented to [facility] Emergency Department on 4/28/25 with acute onset SOB since awaking in the morning. He normally requires 2L supplemental oxygen at baseline. He states he oneal snot use oxygen while he sleeps. When he awoke, he was short of breath but did not utilize his supplemental oxygen, he is not sure why. He ran out of his inhalers a few days ago. SOB is worse with exertion. He also reports productive cough for the past two days. He denies fever, chills, wheezing, chest pain, palpitations, fatigue, weakness, dizziness, lightheadedness or other acute complaints. He reports compliance with eliquis 5 mg BID. He takes lasix 40 mg daily (not twice daily). He has chronic right > left lower extremity swelling that he attributes to a prior ankle injury. He does not weigh himself. Attempts to eat healthy diet.    In the ED he was hypertensive, tachypneic with increased work of breathing and hypoxic requiring 3L to maintain O2 sats >88%. Labs significant for . HS trop 90 > 91.Cr 1.8 (baseline).  TB 1.2. Covid-19 negative. Flu negative.  EKG NSR, no acute STEMI. VBG without any hypercapnic respiratory failure. CXR with pulmonary edema. Given 40 mg IV lasix.  Admitted to  for acute CHF exacerbation.

## 2025-04-29 NOTE — ASSESSMENT & PLAN NOTE
"Patient's FSGs are controlled on current medication regimen.  Last A1c reviewed-   Lab Results   Component Value Date    HGBA1C 6.8 (H) 02/28/2025     Most recent fingerstick glucose reviewed- No results for input(s): "POCTGLUCOSE" in the last 24 hours.  Current correctional scale  Low  Maintain anti-hyperglycemic dose as follows-   Antihyperglycemics (From admission, onward)      Start     Stop Route Frequency Ordered    04/28/25 2331  insulin aspart U-100 pen 0-5 Units         -- SubQ Before meals & nightly PRN 04/28/25 2233          Hold Oral hypoglycemics while patient is in the hospital.   He is prescribed  15 units nightly lantus but he has not been taking it since he was discharged  Monitor BG on LDSSI, plan to add lantus in evening if needed and may need supplies for discharge   "

## 2025-04-30 VITALS
WEIGHT: 194 LBS | BODY MASS INDEX: 33.12 KG/M2 | OXYGEN SATURATION: 99 % | HEART RATE: 87 BPM | TEMPERATURE: 98 F | DIASTOLIC BLOOD PRESSURE: 83 MMHG | RESPIRATION RATE: 16 BRPM | HEIGHT: 64 IN | SYSTOLIC BLOOD PRESSURE: 157 MMHG

## 2025-04-30 PROBLEM — Z91.148 NON COMPLIANCE W MEDICATION REGIMEN: Status: ACTIVE | Noted: 2025-04-30

## 2025-04-30 LAB
ABSOLUTE EOSINOPHIL (OHS): 0.09 K/UL
ABSOLUTE MONOCYTE (OHS): 0.73 K/UL (ref 0.3–1)
ABSOLUTE NEUTROPHIL COUNT (OHS): 3.3 K/UL (ref 1.8–7.7)
ALBUMIN SERPL BCP-MCNC: 3.5 G/DL (ref 3.5–5.2)
ALP SERPL-CCNC: 72 UNIT/L (ref 40–150)
ALT SERPL W/O P-5'-P-CCNC: 22 UNIT/L (ref 10–44)
ANION GAP (OHS): 13 MMOL/L (ref 8–16)
ASCENDING AORTA: 1.4 CM
AST SERPL-CCNC: 26 UNIT/L (ref 11–45)
AV INDEX (PROSTH): 1.06
AV MEAN GRADIENT: 1 MMHG
AV PEAK GRADIENT: 3 MMHG
AV VALVE AREA BY VELOCITY RATIO: 2.5 CM²
AV VALVE AREA: 3 CM²
AV VELOCITY RATIO: 0.88
BASOPHILS # BLD AUTO: 0.07 K/UL
BASOPHILS NFR BLD AUTO: 0.9 %
BILIRUB SERPL-MCNC: 1 MG/DL (ref 0.1–1)
BSA FOR ECHO PROCEDURE: 1.99 M2
BUN SERPL-MCNC: 30 MG/DL (ref 8–23)
CALCIUM SERPL-MCNC: 9.1 MG/DL (ref 8.7–10.5)
CHLORIDE SERPL-SCNC: 106 MMOL/L (ref 95–110)
CO2 SERPL-SCNC: 20 MMOL/L (ref 23–29)
CREAT SERPL-MCNC: 1.7 MG/DL (ref 0.5–1.4)
CV ECHO LV RWT: 0.58 CM
DOP CALC AO PEAK VEL: 0.8 M/S
DOP CALC AO VTI: 12.6 CM
DOP CALC LVOT AREA: 2.8 CM2
DOP CALC LVOT DIAMETER: 1.9 CM
DOP CALC LVOT PEAK VEL: 0.7 M/S
DOP CALC LVOT STROKE VOLUME: 38 CM3
DOP CALCLVOT PEAK VEL VTI: 13.4 CM
E WAVE DECELERATION TIME: 155 MSEC
ECHO LV POSTERIOR WALL: 1.3 CM (ref 0.6–1.1)
ERYTHROCYTE [DISTWIDTH] IN BLOOD BY AUTOMATED COUNT: 14.7 % (ref 11.5–14.5)
FRACTIONAL SHORTENING: 33.3 % (ref 28–44)
GFR SERPLBLD CREATININE-BSD FMLA CKD-EPI: 44 ML/MIN/1.73/M2
GLUCOSE SERPL-MCNC: 112 MG/DL (ref 70–110)
HCT VFR BLD AUTO: 53.2 % (ref 40–54)
HGB BLD-MCNC: 16.9 GM/DL (ref 14–18)
IMM GRANULOCYTES # BLD AUTO: 0.01 K/UL (ref 0–0.04)
IMM GRANULOCYTES NFR BLD AUTO: 0.1 % (ref 0–0.5)
INTERVENTRICULAR SEPTUM: 1.5 CM (ref 0.6–1.1)
IVC DIAMETER: 1.69 CM
IVRT: 103 MSEC
LA MAJOR: 5.6 CM
LA MINOR: 6.7 CM
LA WIDTH: 4.6 CM
LEFT ATRIUM SIZE: 4.5 CM
LEFT ATRIUM VOLUME INDEX: 56 ML/M2
LEFT ATRIUM VOLUME: 107 CM3
LEFT INTERNAL DIMENSION IN SYSTOLE: 3 CM (ref 2.1–4)
LEFT VENTRICLE DIASTOLIC VOLUME INDEX: 47.15 ML/M2
LEFT VENTRICLE DIASTOLIC VOLUME: 91 ML
LEFT VENTRICLE MASS INDEX: 128.7 G/M2
LEFT VENTRICLE SYSTOLIC VOLUME INDEX: 17.6 ML/M2
LEFT VENTRICLE SYSTOLIC VOLUME: 34 ML
LEFT VENTRICULAR INTERNAL DIMENSION IN DIASTOLE: 4.5 CM (ref 3.5–6)
LEFT VENTRICULAR MASS: 248.4 G
LVED V (TEICH): 91.39 ML
LVES V (TEICH): 34.34 ML
LVOT MG: 1.05 MMHG
LVOT MV: 0.48 CM/S
LYMPHOCYTES # BLD AUTO: 3.23 K/UL (ref 1–4.8)
MAGNESIUM SERPL-MCNC: 1.7 MG/DL (ref 1.6–2.6)
MCH RBC QN AUTO: 29.6 PG (ref 27–31)
MCHC RBC AUTO-ENTMCNC: 31.8 G/DL (ref 32–36)
MCV RBC AUTO: 93 FL (ref 82–98)
MV PEAK E VEL: 0.86 M/S
MV STENOSIS PRESSURE HALF TIME: 45.08 MS
MV VALVE AREA P 1/2 METHOD: 4.88 CM2
NUCLEATED RBC (/100WBC) (OHS): 0 /100 WBC
PHOSPHATE SERPL-MCNC: 4 MG/DL (ref 2.7–4.5)
PISA MRMAX VEL: 4.61 M/S
PISA TR MAX VEL: 2.7 M/S
PLATELET # BLD AUTO: 171 K/UL (ref 150–450)
PMV BLD AUTO: 10.8 FL (ref 9.2–12.9)
POCT GLUCOSE: 116 MG/DL (ref 70–110)
POCT GLUCOSE: 83 MG/DL (ref 70–110)
POCT GLUCOSE: 96 MG/DL (ref 70–110)
POTASSIUM SERPL-SCNC: 4.1 MMOL/L (ref 3.5–5.1)
PROT SERPL-MCNC: 7.1 GM/DL (ref 6–8.4)
PV PEAK GRADIENT: 1 MMHG
PV PEAK S VEL: 0.5 M/S
PV PEAK VELOCITY: 0.48 M/S
RA MAJOR: 6 CM
RA PRESSURE ESTIMATED: 3 MMHG
RA WIDTH: 5.5 CM
RBC # BLD AUTO: 5.7 M/UL (ref 4.6–6.2)
RELATIVE EOSINOPHIL (OHS): 1.2 %
RELATIVE LYMPHOCYTE (OHS): 43.5 % (ref 18–48)
RELATIVE MONOCYTE (OHS): 9.8 % (ref 4–15)
RELATIVE NEUTROPHIL (OHS): 44.5 % (ref 38–73)
RV TB RVSP: 6 MMHG
SODIUM SERPL-SCNC: 139 MMOL/L (ref 136–145)
STJ: 1.4 CM
TR MAX PG: 28 MMHG
TV REST PULMONARY ARTERY PRESSURE: 32 MMHG
WBC # BLD AUTO: 7.43 K/UL (ref 3.9–12.7)
Z-SCORE OF LEFT VENTRICULAR DIMENSION IN END DIASTOLE: -1.94
Z-SCORE OF LEFT VENTRICULAR DIMENSION IN END SYSTOLE: -0.9

## 2025-04-30 PROCEDURE — 36415 COLL VENOUS BLD VENIPUNCTURE: CPT

## 2025-04-30 PROCEDURE — 84100 ASSAY OF PHOSPHORUS: CPT

## 2025-04-30 PROCEDURE — 25000003 PHARM REV CODE 250

## 2025-04-30 PROCEDURE — 85025 COMPLETE CBC W/AUTO DIFF WBC: CPT

## 2025-04-30 PROCEDURE — 25000003 PHARM REV CODE 250: Performed by: HOSPITALIST

## 2025-04-30 PROCEDURE — 84132 ASSAY OF SERUM POTASSIUM: CPT

## 2025-04-30 PROCEDURE — 63600175 PHARM REV CODE 636 W HCPCS

## 2025-04-30 PROCEDURE — 83735 ASSAY OF MAGNESIUM: CPT

## 2025-04-30 PROCEDURE — 25000003 PHARM REV CODE 250: Performed by: NURSE PRACTITIONER

## 2025-04-30 RX ORDER — ALBUTEROL SULFATE 90 UG/1
1 INHALANT RESPIRATORY (INHALATION) EVERY 4 HOURS PRN
Qty: 18 G | Refills: 0 | Status: SHIPPED | OUTPATIENT
Start: 2025-04-30

## 2025-04-30 RX ORDER — FUROSEMIDE 40 MG/1
40 TABLET ORAL 2 TIMES DAILY
Qty: 60 TABLET | Refills: 0 | Status: SHIPPED | OUTPATIENT
Start: 2025-04-30 | End: 2026-04-30

## 2025-04-30 RX ORDER — PEN NEEDLE, DIABETIC 30 GX3/16"
1 NEEDLE, DISPOSABLE MISCELLANEOUS NIGHTLY
Qty: 100 EACH | Refills: 0 | Status: SHIPPED | OUTPATIENT
Start: 2025-04-30 | End: 2026-04-30

## 2025-04-30 RX ORDER — AMIODARONE HYDROCHLORIDE 100 MG/1
200 TABLET ORAL DAILY
Status: DISCONTINUED | OUTPATIENT
Start: 2025-04-30 | End: 2025-04-30 | Stop reason: HOSPADM

## 2025-04-30 RX ORDER — AMIODARONE HYDROCHLORIDE 200 MG/1
200 TABLET ORAL DAILY
Qty: 30 TABLET | Refills: 0 | Status: SHIPPED | OUTPATIENT
Start: 2025-04-30 | End: 2026-04-30

## 2025-04-30 RX ORDER — ASPIRIN 81 MG/1
81 TABLET ORAL DAILY
Qty: 30 TABLET | Refills: 0 | Status: SHIPPED | OUTPATIENT
Start: 2025-04-30 | End: 2026-04-30

## 2025-04-30 RX ORDER — LOSARTAN POTASSIUM 25 MG/1
25 TABLET ORAL DAILY
Qty: 30 TABLET | Refills: 0 | Status: SHIPPED | OUTPATIENT
Start: 2025-04-30 | End: 2026-04-30

## 2025-04-30 RX ORDER — ASPIRIN 81 MG/1
81 TABLET ORAL DAILY
Status: DISCONTINUED | OUTPATIENT
Start: 2025-04-30 | End: 2025-04-30 | Stop reason: HOSPADM

## 2025-04-30 RX ORDER — FLUTICASONE PROPIONATE AND SALMETEROL XINAFOATE 230; 21 UG/1; UG/1
2 AEROSOL, METERED RESPIRATORY (INHALATION) 2 TIMES DAILY
Qty: 12 G | Refills: 0 | Status: SHIPPED | OUTPATIENT
Start: 2025-04-30

## 2025-04-30 RX ORDER — METOPROLOL SUCCINATE 100 MG/1
100 TABLET, EXTENDED RELEASE ORAL 2 TIMES DAILY
Qty: 60 TABLET | Refills: 0 | Status: SHIPPED | OUTPATIENT
Start: 2025-04-30 | End: 2026-04-30

## 2025-04-30 RX ORDER — MUPIROCIN 20 MG/G
OINTMENT TOPICAL 2 TIMES DAILY
Status: CANCELLED | OUTPATIENT
Start: 2025-04-30 | End: 2025-05-05

## 2025-04-30 RX ORDER — INSULIN GLARGINE 100 [IU]/ML
15 INJECTION, SOLUTION SUBCUTANEOUS NIGHTLY
Qty: 15 ML | Refills: 0 | Status: SHIPPED | OUTPATIENT
Start: 2025-04-30 | End: 2026-04-30

## 2025-04-30 RX ADMIN — METOPROLOL SUCCINATE 100 MG: 50 TABLET, EXTENDED RELEASE ORAL at 09:04

## 2025-04-30 RX ADMIN — FUROSEMIDE 40 MG: 10 INJECTION, SOLUTION INTRAVENOUS at 09:04

## 2025-04-30 RX ADMIN — APIXABAN 5 MG: 5 TABLET, FILM COATED ORAL at 09:04

## 2025-04-30 RX ADMIN — LOSARTAN POTASSIUM 25 MG: 25 TABLET, FILM COATED ORAL at 09:04

## 2025-04-30 RX ADMIN — AMIODARONE HYDROCHLORIDE 200 MG: 100 TABLET ORAL at 11:04

## 2025-04-30 RX ADMIN — ASPIRIN 81 MG: 81 TABLET, COATED ORAL at 11:04

## 2025-04-30 RX ADMIN — GUAIFENESIN 200 MG: 100 SOLUTION ORAL at 09:04

## 2025-04-30 NOTE — PLAN OF CARE
Problem: Adult Inpatient Plan of Care  Goal: Plan of Care Review  Outcome: Progressing  Goal: Patient-Specific Goal (Individualized)  Outcome: Progressing  Goal: Absence of Hospital-Acquired Illness or Injury  Outcome: Progressing  Goal: Optimal Comfort and Wellbeing  Outcome: Progressing     Problem: Infection  Goal: Absence of Infection Signs and Symptoms  Outcome: Progressing     Problem: Diabetes Comorbidity  Goal: Blood Glucose Level Within Targeted Range  Outcome: Progressing     POC reviewed with patient. All questions and concerns addressed. Fall/safety precautions implemented and maintained. Urinal provided and within reach. Blood glucose monitored. 1.5L FR continued. PRN cough syrup administered. No acute events noted this shift. Please see flowsheet for full assessment and vitals. Bed locked in lowest position. Side rails up x2. Call bell within reach.

## 2025-04-30 NOTE — DISCHARGE SUMMARY
[facility] Surg [address] Medicine  Discharge Summary      Patient Name: Marck Madison  MRN: [id number]  [facility]: [id number]  Patient Class: IP- Inpatient  Admission Date: 4/28/2025  Hospital Length of Stay: 2 days  Discharge Date and Time: 04/30/2025 4:13 PM  Attending Physician: Leydi Murphy MD   Discharging Provider: Leydi Murphy MD  Primary Care Provider: [facility]    Primary Care Team: [facility] MED [facility]    HPI:   Marck Madison is a 65 y.o. male with atrial fibrillation, PE/DVT (on eliquis), hypertension, diastolic heart failure, T2DM, COPD, Hep C, prior tobacco use but quit 3 years ago. He presented to [facility] Emergency Department on 4/28/25 with acute onset SOB since awaking in the morning. He normally requires 2L supplemental oxygen at baseline. He states he oneal snot use oxygen while he sleeps. When he awoke, he was short of breath but did not utilize his supplemental oxygen, he is not sure why. He ran out of his inhalers a few days ago. SOB is worse with exertion. He also reports productive cough for the past two days. He denies fever, chills, wheezing, chest pain, palpitations, fatigue, weakness, dizziness, lightheadedness or other acute complaints. He reports compliance with eliquis 5 mg BID. He takes lasix 40 mg daily (not twice daily). He has chronic right > left lower extremity swelling that he attributes to a prior ankle injury. He does not weigh himself. Attempts to eat healthy diet.    In the ED he was hypertensive, tachypneic with increased work of breathing and hypoxic requiring 3L to maintain O2 sats >88%. Labs significant for . HS trop 90 > 91.Cr 1.8 (baseline).  TB 1.2. Covid-19 negative. Flu negative.  EKG NSR, no acute STEMI. VBG without any hypercapnic respiratory failure. CXR with pulmonary edema. Given 40 mg IV lasix.  Admitted to  for acute CHF exacerbation.     * No surgery found *      Hospital Course:   Mr. Madison  presented with shortness of breath.  Admitted with acute on chronic respiratory failure due to CHF exacerbation due to noncompliance with his medication regimen.  Treatment initiated with diuresis with IV Lasix, monitored strict I&Os, daily weights and repeat laboratory testing.  Repeat Echo was unchanged from prior with noted EF of 55-60%, diastolic dysfunction not able to be evaluated due to AFib.  Patient was negative greater than 3.5 L with return of respiratory status back to his baseline.  All medications were reviewed with the patient in detail, and prescription sent to pharmacy to fill all medications which he was reportedly missing.  Patient was euvolemic with a respiratory status back to his baseline with O2 requirements of approximately 2 L with activity.  He was excessively counseled on need for compliance with medication regimen. Patient to follow up with his PCP.     Goals of Care Treatment Preferences:  Code Status: Full Code      Consults:   Consults (From admission, onward)          Status Ordering Provider     Inpatient consult to Social Work/Case Management  Once        Provider:  (Not yet assigned)    Completed GINO VELASQUEZ     Inpatient consult to Registered Dietitian/Nutritionist  Once        Provider:  (Not yet assigned)    Completed GINO VELASQUEZ            Final Active Diagnoses:    Diagnosis Date Noted POA    PRINCIPAL PROBLEM:  Acute on chronic diastolic congestive heart failure [I50.33] 09/18/2022 Yes    Acute on chronic hypoxic respiratory failure [J96.21] 09/10/2019 Yes    Non compliance w medication regimen [Z91.148] 04/30/2025 Not Applicable    History of pulmonary embolus (PE) [Z86.711] 02/27/2025 Yes    Class 1 obesity with serious comorbidity and body mass index (BMI) of 30.0 to 30.9 in adult [E66.811, Z68.30] 09/20/2022 Not Applicable    CAD (coronary artery disease) [I25.10] 09/18/2022 Yes    Type 2 diabetes mellitus, with long-term current use of insulin [E11.9,  "Z79.4] 01/26/2022 Not Applicable    COPD (chronic obstructive pulmonary disease) [J44.9] 11/21/2021 Yes    Hypertension [I10] 06/09/2021 Yes    Personal history of atrial flutter [Z86.79] 06/09/2021 Not Applicable    NSTEMI (non-ST elevation myocardial infarction) [I21.4] 09/10/2019 Yes      Problems Resolved During this Admission:       Discharged Condition: good    Disposition: Home or Self Care    Follow Up:   Follow-up Information       [facility] in 1 week(s).    Contact information:  [address] [facility] [address] [phone]                           Patient Instructions:      Diet diabetic     Diet Cardiac     Activity as tolerated       Significant Diagnostic Studies: N/A    Pending Diagnostic Studies:       None           Medications:  Reconciled Home Medications:      Medication List        Change how you take these medications      aspirin 81 MG EC tablet  Commonly known as: ECOTRIN  Take 1 tablet (81 mg total) by mouth once daily.  What changed: when to take this            Continue taking these medications      albuterol 90 mcg/actuation inhaler  Commonly known as: PROVENTIL/VENTOLIN HFA  Inhale 1 puff into the lungs every 4 (four) hours as needed for Wheezing or Shortness of Breath.     amiodarone 200 MG Tab  Commonly known as: PACERONE  Take 1 tablet (200 mg total) by mouth once daily.     apixaban 5 mg Tab  Commonly known as: ELIQUIS  Take 1 tablet (5 mg total) by mouth 2 (two) times daily.     BD ULTRA-FINE MAO PEN NEEDLE 32 gauge x 5/32" Ndle  Generic drug: pen needle, diabetic  1 each by Misc.(Non-Drug; Combo Route) route every evening.     fluticasone-salmeterol 230-21 mcg/dose 230-21 mcg/actuation Hfaa inhaler  Commonly known as: ADVAIR HFA  Inhale 2 puffs into the lungs 2 (two) times a day.     furosemide 40 MG tablet  Commonly known as: LASIX  Take 1 tablet (40 mg total) by mouth 2 (two) times daily.     LANTUS SOLOSTAR U-100 INSULIN 100 unit/mL " (3 mL) Inpn pen  Generic drug: insulin glargine U-100 (Lantus)  Inject 15 Units into the skin every evening.     losartan 25 MG tablet  Commonly known as: COZAAR  Take 1 tablet (25 mg total) by mouth once daily.     metoprolol succinate 100 MG 24 hr tablet  Commonly known as: TOPROL-XL  Take 1 tablet (100 mg total) by mouth 2 (two) times daily.              Indwelling Lines/Drains at time of discharge:   Lines/Drains/Airways       None                   Time spent on the discharge of patient: 35 minutes         Leydi Murphy MD  Department of Hospital Medicine  [facility] - Med Surg ([address])

## 2025-04-30 NOTE — CONSULTS
Food & Nutrition  Education      Diet Education: fluid and sodium restrictions  Time Spent: 10 min  Learners: patient    Social Drivers of Health     Food Insecurity: Food Insecurity Present (4/30/2025)    Hunger Vital Sign     Worried About Running Out of Food in the Last Year: Sometimes true     Ran Out of Food in the Last Year: Sometimes true     Nutrition Education provided with handouts:   Clinical Reference attachments to d/c documents      Comments:  Pt admitted for acute on chronic diastolic congestive heart failure pending discharge at this time. Currently tolerating low sodium diet with 1500 ml fluid restrictions. Lunch at bedside with excellent intake. Discussed low sodium diet post discharge. Encouraged pt to limit the use of salt and limiting the amount of times pt eats out. Encouraged home cooked meals. Discussed fluid restrictions r/t heart failure. All questions and concerns answered. Informed pt RD will add clinical references to paperwork prior to discharge. Pt voiced understanding.       Follow-Up: yes    Please consult as needed    Thanks!  Jeana Landers, ELEANORN, LDN

## 2025-04-30 NOTE — CARE UPDATE
Patient transferred to [facility] from [facility] for [facility] and seen upon arrival. Patient reports SOB and remains on oxygen. Patient reports productive cough with clear sputum. No fevers or chest pain reported    Physical Exam  Constitutional:       Appearance: Normal appearance. He is obese.   HENT:      Head: Normocephalic.      Right Ear: External ear normal.      Left Ear: External ear normal.      Nose: Nose normal.      Mouth/Throat:      Mouth: Mucous membranes are moist.      Pharynx: Oropharynx is clear.   Cardiovascular:      Rate and Rhythm: Normal rate and regular rhythm.   Pulmonary:      Effort: Pulmonary effort is normal.      Breath sounds: Wheezing (faint) present.      Comments: Now on 3L O2- 2L @ home baseline  Musculoskeletal:      Right lower le+ Edema present.      Left lower le+ Edema present.   Neurological:      Mental Status: He is alert.

## 2025-04-30 NOTE — PLAN OF CARE
Case Management Final Discharge Note    Discharge Disposition: Home    New DME ordered / company name: None    Relevant SDOH / Transition of Care Barriers:  None    Person available to provide assistance at home when needed and their contact information: Self    Scheduled followup appointment: Advised to see PCP at walk in clinic for seven day hospital follow up appt    Referrals placed: None    Transportation: Family        Patient educated on discharge services and updated on DC plan. Bedside RN notified, patient clear to discharge from Case Management Perspective.      04/30/25 0946   Final Note   Assessment Type Final Discharge Note   Anticipated Discharge Disposition Home   Hospital Resources/Appts/Education Provided Provided patient/caregiver with written discharge plan information   Post-Acute Status   Discharge Delays None known at this time     [facility] ([address])  Discharge Final Note    [facility] Provider: [facility]    Expected Discharge Date: 4/30/2025    Final Discharge Note (most recent)       Final Note - 04/30/25 0946          Final Note    Assessment Type Final Discharge Note (P)      Anticipated Discharge Disposition Home or Self Care (P)      Hospital Resources/Appts/Education Provided Provided patient/caregiver with written discharge plan information (P)         Post-Acute Status    Discharge Delays None known at this time (P)                      Important Message from Medicare             Contact Info       [facility]   Relationship: PCP - General    [address] [facility] [address]   Phone: [phone]       Next Steps: Follow up in 1 week(s)

## 2025-04-30 NOTE — PLAN OF CARE
Medicare Message     Important Message from Medicare regarding Discharge Appeal Rights Explained to patient/caregiver; Signed/date by patient/caregiver   Date IMM was signed 4/30/2025   Time IMM was signed 1045

## 2025-05-01 ENCOUNTER — TELEPHONE (OUTPATIENT)
Dept: CARDIOLOGY | Facility: CLINIC | Age: 65
End: 2025-05-01
Payer: MEDICARE

## 2025-05-01 ENCOUNTER — DOCUMENTATION ONLY (OUTPATIENT)
Dept: CARDIOLOGY | Facility: CLINIC | Age: 65
End: 2025-05-01
Payer: MEDICARE

## 2025-05-01 DIAGNOSIS — R06.02 SOB (SHORTNESS OF BREATH): Primary | ICD-10-CM

## 2025-05-01 NOTE — TELEPHONE ENCOUNTER
[facility]    Attempted to call pt to complete Phone enrollment to program. Unable to reach pt at listed phone numbers.  Was able to leave message on voicemail encouraging pt to return call with [facility] phone number. This was our first attempt to contact the pt.     Will continue to try to reach patient.

## 2025-05-01 NOTE — PROGRESS NOTES
[facility] Phone Enrollment Complete.    Phone enrollment completed due to : pt convenience.     Called and spoke to Mr. Marck Madison via phone. The pt called back after hearing the message on his voicemail. Introduced self to pt as [facility] nurse navigator.      Patient education was given briefly over the phone and will continue at the pt's first appt.     Reviewed the following key points of [facility] program with pt and family:    Take your medications as directed. Call our provider if any Health Care Professional changes your heart/fluid medications.   Weight yourself daily. Daily dry weights. Upon waking, empty your bladder, weigh with as little clothes as possible before eating or drinking. Record weight in Symptom Tracker and compare these weights for fluid gain. Weight gain overnight of 3 - 4lbs is fluid, also gain of 5lbs in 3 days is fluid as well. Call us!  Follow low salt and limit fluid diet. Salt/sodium restrictions 2000 - 3000mg. Sodium makes you hold onto fluid. High sodium foods: deli meat, deli cheese, sausages, hot dogs, fast food, restaurant food, anything in a box, bottle, or can. Cook with fresh or frozen ingredients and use seasonings that are labeled NO SALT/SODIUM. Check portions sizes, salt is reported for one portion. A can may contain 2 - 3 portions. Fluid restriction 1.5 - 2 liters per day, measure all your fluid, the milk in your cereal, broth in your soup, yogurt, applesauce, Jello, and ice cream because anything that melts at room temperature is a liquid.   Stop smoking and start exercising. Brisk walking is good, don't walk like you're going to the hangman and DON'T WALK IN THE HEAT! Start slow and increase as tolerated. Remember if you don't use it, you lose it.   Go to all your appointments and call your team. Have your weight, blood pressure and pulse ready when we call to do the phone check-ins and call us if you have fluid gain or questions.        Reviewed  plan for follow up once discharged to include phone calls, in person and virtual visits to assist pt optimizing their heart failure medication regimen and encouraging healthy lifestyle modifications.  Reminded pt that program will assist them over the next 4-6 weeks and then patient will be transferred to long term care provider .  Reminded pt how to contact [facility] navigator via phone and or via [employer].      Pt given appointment today via phone. 5/12/2025 for 9:30a with labs for 9:00a. A message will be sent to our MA for scheduling.      Pt also reminded Nurse will call 48-72 hours after discharge to check on them.      PT verbalize read back of information given.  Encouraged pt and family to read over information often and contact team with any questions or concerns.

## 2025-05-13 ENCOUNTER — HOSPITAL ENCOUNTER (EMERGENCY)
Facility: HOSPITAL | Age: 65
Discharge: HOME | End: 2025-05-13
Attending: STUDENT IN AN ORGANIZED HEALTH CARE EDUCATION/TRAINING PROGRAM
Payer: MEDICARE

## 2025-05-13 ENCOUNTER — PATIENT OUTREACH (OUTPATIENT)
Dept: ADMINISTRATIVE | Facility: OTHER | Age: 65
End: 2025-05-13
Payer: MEDICARE

## 2025-05-13 VITALS
RESPIRATION RATE: 19 BRPM | BODY MASS INDEX: 29.18 KG/M2 | WEIGHT: 170 LBS | SYSTOLIC BLOOD PRESSURE: 155 MMHG | TEMPERATURE: 98 F | OXYGEN SATURATION: 90 % | DIASTOLIC BLOOD PRESSURE: 93 MMHG | HEART RATE: 70 BPM

## 2025-05-13 DIAGNOSIS — I50.9 ACUTE ON CHRONIC CONGESTIVE HEART FAILURE, UNSPECIFIED HEART FAILURE TYPE: Primary | ICD-10-CM

## 2025-05-13 DIAGNOSIS — R06.02 SHORTNESS OF BREATH: ICD-10-CM

## 2025-05-13 LAB
ABSOLUTE EOSINOPHIL (OHS): 0.12 K/UL
ABSOLUTE MONOCYTE (OHS): 1 K/UL (ref 0.3–1)
ABSOLUTE NEUTROPHIL COUNT (OHS): 6.06 K/UL (ref 1.8–7.7)
ALBUMIN SERPL BCP-MCNC: 3.6 G/DL (ref 3.5–5.2)
ALP SERPL-CCNC: 96 UNIT/L (ref 40–150)
ALT SERPL W/O P-5'-P-CCNC: 25 UNIT/L (ref 10–44)
ANION GAP (OHS): 11 MMOL/L (ref 8–16)
AST SERPL-CCNC: 26 UNIT/L (ref 11–45)
BASOPHILS # BLD AUTO: 0.04 K/UL
BASOPHILS NFR BLD AUTO: 0.4 %
BILIRUB SERPL-MCNC: 1.6 MG/DL (ref 0.1–1)
BNP SERPL-MCNC: 1599 PG/ML (ref 0–99)
BUN SERPL-MCNC: 34 MG/DL (ref 8–23)
CALCIUM SERPL-MCNC: 9.2 MG/DL (ref 8.7–10.5)
CHLORIDE SERPL-SCNC: 107 MMOL/L (ref 95–110)
CO2 SERPL-SCNC: 25 MMOL/L (ref 23–29)
CREAT SERPL-MCNC: 1.7 MG/DL (ref 0.5–1.4)
ERYTHROCYTE [DISTWIDTH] IN BLOOD BY AUTOMATED COUNT: 14.8 % (ref 11.5–14.5)
GFR SERPLBLD CREATININE-BSD FMLA CKD-EPI: 44 ML/MIN/1.73/M2
GLUCOSE SERPL-MCNC: 137 MG/DL (ref 70–110)
HCT VFR BLD AUTO: 46.7 % (ref 40–54)
HGB BLD-MCNC: 14.9 GM/DL (ref 14–18)
IMM GRANULOCYTES # BLD AUTO: 0.05 K/UL (ref 0–0.04)
IMM GRANULOCYTES NFR BLD AUTO: 0.5 % (ref 0–0.5)
LYMPHOCYTES # BLD AUTO: 1.89 K/UL (ref 1–4.8)
MCH RBC QN AUTO: 29.5 PG (ref 27–31)
MCHC RBC AUTO-ENTMCNC: 31.9 G/DL (ref 32–36)
MCV RBC AUTO: 93 FL (ref 82–98)
NUCLEATED RBC (/100WBC) (OHS): 0 /100 WBC
PLATELET # BLD AUTO: 204 K/UL (ref 150–450)
PMV BLD AUTO: 11.6 FL (ref 9.2–12.9)
POTASSIUM SERPL-SCNC: 4.3 MMOL/L (ref 3.5–5.1)
PROT SERPL-MCNC: 7.2 GM/DL (ref 6–8.4)
RBC # BLD AUTO: 5.05 M/UL (ref 4.6–6.2)
RELATIVE EOSINOPHIL (OHS): 1.3 %
RELATIVE LYMPHOCYTE (OHS): 20.6 % (ref 18–48)
RELATIVE MONOCYTE (OHS): 10.9 % (ref 4–15)
RELATIVE NEUTROPHIL (OHS): 66.3 % (ref 38–73)
SODIUM SERPL-SCNC: 143 MMOL/L (ref 136–145)
TROPONIN I SERPL DL<=0.01 NG/ML-MCNC: 0.13 NG/ML
WBC # BLD AUTO: 9.16 K/UL (ref 3.9–12.7)

## 2025-05-13 PROCEDURE — 85025 COMPLETE CBC W/AUTO DIFF WBC: CPT | Performed by: STUDENT IN AN ORGANIZED HEALTH CARE EDUCATION/TRAINING PROGRAM

## 2025-05-13 PROCEDURE — 84484 ASSAY OF TROPONIN QUANT: CPT | Performed by: STUDENT IN AN ORGANIZED HEALTH CARE EDUCATION/TRAINING PROGRAM

## 2025-05-13 PROCEDURE — 99291 CRITICAL CARE FIRST HOUR: CPT

## 2025-05-13 PROCEDURE — 93005 ELECTROCARDIOGRAM TRACING: CPT

## 2025-05-13 PROCEDURE — 82040 ASSAY OF SERUM ALBUMIN: CPT | Performed by: STUDENT IN AN ORGANIZED HEALTH CARE EDUCATION/TRAINING PROGRAM

## 2025-05-13 PROCEDURE — 63600175 PHARM REV CODE 636 W HCPCS: Performed by: STUDENT IN AN ORGANIZED HEALTH CARE EDUCATION/TRAINING PROGRAM

## 2025-05-13 PROCEDURE — 93010 ELECTROCARDIOGRAM REPORT: CPT | Mod: ,,, | Performed by: INTERNAL MEDICINE

## 2025-05-13 PROCEDURE — 96374 THER/PROPH/DIAG INJ IV PUSH: CPT

## 2025-05-13 PROCEDURE — 83880 ASSAY OF NATRIURETIC PEPTIDE: CPT | Performed by: STUDENT IN AN ORGANIZED HEALTH CARE EDUCATION/TRAINING PROGRAM

## 2025-05-13 RX ORDER — FUROSEMIDE 10 MG/ML
80 INJECTION INTRAMUSCULAR; INTRAVENOUS
Status: COMPLETED | OUTPATIENT
Start: 2025-05-13 | End: 2025-05-13

## 2025-05-13 RX ADMIN — FUROSEMIDE 80 MG: 10 INJECTION, SOLUTION INTRAVENOUS at 11:05

## 2025-05-13 NOTE — ED NOTES
Spoke with vega at the number provided [phone]. Vega states that she will be available to transport patient home in 20 mins. Vega updated that O2 tank will be delivered to home which she stated that she is aware and that she will be available for pickup.

## 2025-05-13 NOTE — ED PROVIDER NOTES
"Encounter Date: 5/13/2025       History     Chief Complaint   Patient presents with    Shortness of Breath     Pt with hx of CHF c/o SOB starting yesterday. Able to speak complete sentences. Normally on 2lpm nasal cannula. Pt's niece states "He is out of his oxygen" Sats 85% on RA. Placed on 2lpm in triage      HPI    65-year-old male with multiple comorbidities including CAD, atrial flutter, diabetes, hypertension, schizophrenia, CHF (EF 50-55% from echo done April 2025 with severe dilated left atrium, moderate mitral valve regurgitation) who is presenting with intermittent shortness of breath since yesterday.  Patient reports that he is on chronic home O2, 2 L and has since needed 3 L.  He is also on Lasix 40 mg which he reports he has been compliant with the at home.  Denies other associated symptoms.  Denies alcohol tobacco or drug use.      Review of patient's allergies indicates:  No Known Allergies  Past Medical History:   Diagnosis Date    Arrhythmia 2017    aflutter    Atrial flutter     CAD (coronary artery disease)     CHF (congestive heart failure), NYHA class I 2017    Cholelithiasis with chronic cholecystitis     Chronic diastolic heart failure     Cigarette smoker     COPD (chronic obstructive pulmonary disease)     COVID-19 06/01/2021    Diabetes mellitus     DKA (diabetic ketoacidosis)     Hypertension     NSTEMI (non-ST elevation myocardial infarction)     NSVT (nonsustained ventricular tachycardia)     Psychiatric disorder     h/o "schizophrena/bipolar"    Schizoaffective disorder     Severe sepsis      Past Surgical History:   Procedure Laterality Date    LIVER SURGERY      abscess drainage; 1970s    TREATMENT OF CARDIAC ARRHYTHMIA  9/12/2019    Procedure: Cardioversion or Defibrillation;  Surgeon: Jonathan Lopez MD;  Location: [facility] CATH LAB;  Service: Cardiology;;    TREATMENT OF CARDIAC ARRHYTHMIA N/A 2/3/2022    Procedure: Cardioversion or Defibrillation;  Surgeon: Trevor Schwab MD;  " Location: [facility] CATH LAB;  Service: Cardiology;  Laterality: N/A;    TREATMENT OF CARDIAC ARRHYTHMIA N/A 3/16/2024    Procedure: Cardioversion or Defibrillation;  Surgeon: Jonathan Lopez MD;  Location: [facility] CATH LAB;  Service: Cardiology;  Laterality: N/A;     No family history on file.  Social History[1]  Review of Systems   Constitutional:  Negative for chills and fever.   HENT:  Negative for congestion and drooling.    Respiratory:  Positive for shortness of breath.    Cardiovascular:  Negative for chest pain and leg swelling.   Gastrointestinal:  Negative for abdominal pain, nausea and vomiting.   Genitourinary:  Negative for dysuria.   Musculoskeletal:  Negative for back pain, neck pain and neck stiffness.   Skin:  Negative for rash and wound.   Neurological:  Negative for syncope, weakness, light-headedness and headaches.   Psychiatric/Behavioral:  Negative for confusion.        Physical Exam     Initial Vitals [05/13/25 0943]   BP Pulse Resp Temp SpO2   (!) 173/106 64 18 98 °F (36.7 °C) (!) 85 %      MAP       --         Physical Exam    Nursing note and vitals reviewed.  Constitutional: He appears well-developed and well-nourished. He is not diaphoretic.   Cardiovascular:  Normal rate, regular rhythm, normal heart sounds and intact distal pulses.           Pulmonary/Chest: No respiratory distress. He has wheezes.           ED Course   Critical Care    Date/Time: 5/13/2025 9:51 PM    Performed by: Wen Tolbert DO  Authorized by: Wen Tolbert DO  Direct patient critical care time: 30 minutes  Additional history critical care time: 10 minutes  Ordering / reviewing critical care time: 10 minutes  Documentation critical care time: 10 minutes  Total critical care time (exclusive of procedural time) : 60 minutes  Critical care time was exclusive of separately billable procedures and treating other patients and teaching time.  Critical care was necessary to treat or prevent imminent or  life-threatening deterioration of the following conditions: respiratory failure.  Critical care was time spent personally by me on the following activities: development of treatment plan with patient or surrogate, evaluation of patient's response to treatment, examination of patient, obtaining history from patient or surrogate, ordering and performing treatments and interventions, ordering and review of laboratory studies, ordering and review of radiographic studies, pulse oximetry, re-evaluation of patient's condition and review of old charts.        Labs Reviewed   COMPREHENSIVE METABOLIC PANEL - Abnormal       Result Value    Sodium 143      Potassium 4.3      Chloride 107      CO2 25      Glucose 137 (*)     BUN 34 (*)     Creatinine 1.7 (*)     Calcium 9.2      Protein Total 7.2      Albumin 3.6      Bilirubin Total 1.6 (*)     ALP 96      AST 26      ALT 25      Anion Gap 11      eGFR 44 (*)    TROPONIN I - Abnormal    Troponin-I 0.133 (*)    B-TYPE NATRIURETIC PEPTIDE - Abnormal    BNP 1,599 (*)    CBC WITH DIFFERENTIAL - Abnormal    WBC 9.16      RBC 5.05      HGB 14.9      HCT 46.7      MCV 93      MCH 29.5      MCHC 31.9 (*)     RDW 14.8 (*)     Platelet Count 204      MPV 11.6      Nucleated RBC 0      Neut % 66.3      Lymph % 20.6      Mono % 10.9      Eos % 1.3      Basophil % 0.4      Imm Grans % 0.5      Neut # 6.06      Lymph # 1.89      Mono # 1.00      Eos # 0.12      Baso # 0.04      Imm Grans # 0.05 (*)    CBC W/ AUTO DIFFERENTIAL    Narrative:     The following orders were created for panel order CBC auto differential.  Procedure                               Abnormality         Status                     ---------                               -----------         ------                     CBC with Differential[[id number]]       Abnormal            Final result                 Please view results for these tests on the individual orders.     EKG Readings: (Independently Interpreted)   EKG  obtained at 9:48 a.m. shows normal sinus rhythm with a heart rate of 64 beats per minute, left atrial enlargement, left axis deviation, nonspecific T-wave inversions in aVL, V1 through V2.  No reciprocal changes.  No acute ST segment changes. A flutter improved when compared to previous EKG from April 2025.      ECG Results              EKG 12-lead (Final result)        Collection Time Result Time QRS Duration OHS QTC Calculation    05/13/25 09:48:40 05/14/25 12:05:57 96 489                     Final result by Interface, Lab In [facility] (05/14/25 12:06:01)                   Narrative:    Test Reason : R06.02,    Vent. Rate :  64 BPM     Atrial Rate :  64 BPM     P-R Int : 162 ms          QRS Dur :  96 ms      QT Int : 474 ms       P-R-T Axes :  66 -36  93 degrees    QTcB Int : 489 ms    Normal sinus rhythm with sinus arrhythmia  Possible Left atrial enlargement  Left axis deviation  Inferior infarct ,age undetermined  Anterolateral infarct QTcB >= 480 msec  Abnormal ECG  When compared with ECG of 28-Apr-2025 20:01,  Significant changes have occurred  Confirmed by Jonathan Lopez ([id number]) on 5/14/2025 12:05:52 PM    Referred By: AAAREFERRAL SELF           Confirmed By: Jonathan Lopez                                     EKG 12-lead (Final result)  Result time 05/15/25 08:21:35      Final result by Unknown User (05/15/25 08:21:35)                                      Imaging Results              X-Ray Chest AP Portable (Final result)  Result time 05/13/25 13:08:40      Final result by Hola Castrejon MD (05/13/25 13:08:40)                   Impression:      As above.      Electronically signed by: Hola Castrejon MD  Date:    05/13/2025  Time:    13:08               Narrative:    EXAMINATION:  XR CHEST AP PORTABLE    CLINICAL HISTORY:  CHF;    TECHNIQUE:  Single frontal view of the chest was performed.    FINDINGS:  There are bilateral perihilar opacities with interstitial prominence concerning for edema versus infection.  There  is no pneumothorax or pleural fluid.  The cardiac silhouette is enlarged.  There is calcification of the aorta.  The osseous structures are unremarkable.                                       Medications   furosemide injection 80 mg (80 mg Intravenous Given 5/13/25 1158)     Medical Decision Making   MDM  This is an emergent evaluation of a 65-year-old male with multiple comorbidities including CAD, atrial flutter, diabetes, hypertension, schizophrenia, CHF (EF 50-55% from echo done April 2025 with severe dilated left atrium, moderate mitral valve regurgitation) who is presenting with intermittent shortness of breath since yesterday. Initial vitals in the ED  [05/13/25 0943]  BP: (!) 173/106  Pulse: 64  Resp: 18  Temp: 98 °F (36.7 °C)  SpO2: (!) 85 % .     Physical exam noted above. DDx includes but is not limited to CHF exacerbation, ACS, COPD, pleural effusion, pneumonia, cardiac tamponade. Also considered but clinically less likely to be PE, sepsis, dissection. Will obtain labs and imaging including CBC, CMP, troponin, BNP, chest x-ray and EKG. Will also provide supplemental oxygen. Will continue to monitor and frequently reassess pending results of labs, treatments and final disposition.    Patient is aware of plan and is amenable.     Wen Tolbert D.O  EMERGENCY MEDICINE  11:29 AM 05/13/2025    UPDATE  Labs reveal stable elevated creatinine.  Downward trending troponin, which is 0.133 today, decreased from 0.386 from February.  BNP elevated at 15 99, this is increased when compared to labs from April 2025.  Remainder of labs unremarkable.  Chest x-ray reveals bilateral perihilar opacities with interstitial prominence concerning for edema.  EKG with no obvious acute ST segment changes.  On reassessment, his symptoms were improved.  Pulse ox within normal limits on home O2.  Remainder vitals also reassuring.  Via shared decision-making, patient would like to be discharged at this time.  However prior to  discharge notified by nursing staff that patient states that he is currently out of his home oxygen.  Will consult social work to arrange for patient to be provided home oxygen.    Given history, presentation and workup here today, patient treated with IV Lasix.     Wen Tolbert D.O  EMERGENCY MEDICINE   2:54 PM 05/13/2025     UPDATE  Patient consulted to social work and evaluated by Julien, who was able to arrange for patient to have oxygen supplied to him at his home.  His niece will be present at the patient's home to receive the oxygen.  She also will pick him up from the ED after this delivery is provided.     I discussed with the patient/family the diagnosis, treatment plan, indications for return to the emergency department, and for expected follow-up. The patient/family verbalized an understanding. The patient/family is asked if there are any questions or concerns. We discuss the case, until all issues are addressed to the patient/family's satisfaction. Patient/family understands and is agreeable to the plan.     Wen Tolbert D.O  EMERGENCY MEDICINE   6:57 PM 05/13/2025       This chart was completed using dictation software, as a result there may be some transcription errors     Amount and/or Complexity of Data Reviewed  External Data Reviewed: labs, radiology, ECG and notes.  Labs: ordered. Decision-making details documented in ED Course.  Radiology: ordered and independent interpretation performed. Decision-making details documented in ED Course.  ECG/medicine tests: ordered and independent interpretation performed. Decision-making details documented in ED Course.    Risk  Prescription drug management.                                      Clinical Impression:  Final diagnoses:  [R06.02] Shortness of breath  [I50.9] Acute on chronic congestive heart failure, unspecified heart failure type (Primary)          ED Disposition Condition    Discharge Stable          ED Prescriptions     None       Follow-up Information       Follow up With Specialties Details Why Contact Info Additional Information    [facility]  Schedule an appointment as soon as possible for a visit  Emergency Room Follow-up [address] [facility] [address]  [phone]       [facility] - Emergency Dept Emergency Medicine Go to  If symptoms worsen [address]  [facility] [place] [address] [phone]     [id number] Cardiology Schedule an appointment as soon as possible for a visit in 1 day Emergency Room Follow-up, please call for close follow-up appointment [address] [facility] [address] [phone] Cardiology Services Clinics - Main Building, Atrium 3rd Floor  Please park in [facility] and use Atrium elevator                 [1]   Social History  Tobacco Use    Smoking status: Former     Current packs/day: 1.00     Average packs/day: 1 pack/day for 15.0 years (15.0 ttl pk-yrs)     Types: Cigarettes    Smokeless tobacco: Never   Substance Use Topics    Alcohol use: Not Currently     Comment: daily    Drug use: Never        Wen Tolbert, DO  05/26/25 215

## 2025-05-13 NOTE — PLAN OF CARE
Consult addressed. SW was consulted due to patient run out of Oxygen at home. SW met with patient at his bedside to inquire about company that supply his oxygen. Patient stated that he does not remember company's name. SW contacted his niece Aileen she stated that she does not remember company's name.    According to chart review [facility]. SW contacted [facility] and was told that someone will be at his house in about 2 hours to deliver full tanks. KENYATTA notified Dr. Brennan.

## 2025-05-13 NOTE — ED TRIAGE NOTES
Patient arrived to ED due to SOB that started on yesterday. Niece accompanied patient and giving medical information due to patient not able to speak full sentences includes CHF and COPD. Reports home O2 usage at 2L via [place].

## 2025-05-13 NOTE — DISCHARGE INSTRUCTIONS
Thank you for coming to our Emergency Department today. It is important to remember that some problems or medical conditions are difficult to diagnose and may not be found during your Emergency Department visit.     Be sure to follow up with your primary care doctor and review all labs/imaging/tests that were performed during your ER visit with them. Some labs/tests may be outside of the normal range and require non-emergent follow-up and further investigation to help diagnose/exclude/prevent complications or other potentially serious medical conditions that were not addressed during your ER visit.    If you do not have a primary care doctor, you may contact the one listed on your discharge paperwork or you may also call the [facility] Appointment Desk at [phone] to schedule an appointment and establish care with one. It is important to your health that you have a primary care doctor.    Please take all medications as directed. All medications may potentially have side-effects and it is impossible to predict which medications may give you side-effects or what side-effects (if any) they will give you.. If you feel that you are having a negative effect or side-effect of any medication you should immediately stop taking them and seek medical attention. If you feel that you are having a life-threatening reaction call 911.    Return to the ER with any questions/concerns, new/concerning symptoms, worsening or failure to improve.     Do not drive, swim, climb to height, take a bath, operate heavy machinery, drink alcohol or take potentially sedating medications, sign any legal documents or make any important decisions for 24 hours if you have received any pain medications, sedatives or mood altering drugs during your ER visit or within 24 hours of taking them if they have been prescribed to you.     You can find additional resources for Dentists, hearing aids, durable medical equipment, low cost pharmacies and  other resources at https://[facility].org    BELOW THIS LINE ONLY APPLIES IF YOU HAVE A COVID TEST PENDING OR IF YOU HAVE BEEN DIAGNOSED WITH COVID:  Please access [facility] to review the results of your test. Until the results of your COVID test return, you should isolate yourself so as not to potentially spread illness to others.   If your COVID test returns positive, you should isolate yourself so as not to spread illness to others. After five full days, if you are feeling better and you have not had fever for 24 hours, you can return to your typical daily activities, but you must wear a mask around others for an additional 5 days.   If your COVID test returns negative and you are either unvaccinated or more than six months out from your two-dose vaccine and are not yet boosted, you should still quarantine for 5 full days followed by strict mask use for an additional 5 full days.   If your COVID test returns negative and you have received your 2-dose initial vaccine as well as a booster, you should continue strict mask use for 10 full days after the exposure.  For all those exposed, best practice includes a test at day 5 after the exposure. This can be a home test or a test through one of the many testing centers throughout our community.   Masking is always advised to limit the spread of COVID. Cdc.gov is an excellent site to obtain the latest up to date recommendations regarding COVID and COVID testing.     CDC Testing and Quarantine Guidelines for patients with exposure to a known-positive COVID-19 person:  A close exposure is defined as anyone who has had an exposure (masked or unmasked) to a known COVID -19 positive person within 6 feet of someone for a cumulative total of 15 minutes or more over a 24-hour period.   Vaccinated and/or if you recently had a positive covid test within 90 days do NOT need to quarantine after contact with someone who had COVID-19 unless you develop symptoms.   Fully vaccinated  people who have not had a positive test within 90 days, should get tested 3-5 days after their exposure, even if they don't have symptoms and wear a mask indoors in public for 14 days following exposure or until their test result is negative.      Unvaccinated and/or NOT had a positive test within 90 days and meet close exposure  You are required by CDC guidelines to quarantine for at least 5 days from time of exposure followed by 5 days of strict masking. It is recommended, but not required to test after 5 days, unless you develop symptoms, in which case you should test at that time.  If you get tested after 5 days and your test is positive, your 5 day period of isolation starts the day of the positive test.    If your exposure does not meet the above definition, you can return to your normal daily activities to include social distancing, wearing a mask and frequent handwashing.      Here is a link to guidance from the CDC:  https://www.cdc.gov/media/releases/2021/s1227-isolation-quarantine-guidance.html      [place] Dept Of Health Testing Sites:  https://ldh.la.gov/page/3934      [facility] website with testing locations and guidance:  [email or web address]

## 2025-05-13 NOTE — ED NOTES
Patient states that he utilizes home O2.  Family here for discharge. Nurse states that she is bringing patient to pickup O2 from the delivery address. AT time of discharge O2 at 96% on RA

## 2025-05-13 NOTE — PROGRESS NOTES
CHW - Initial Contact    This Community Health Worker completed the Social Determinant of Health questionnaire with patient at bedside today.    Pt identified barriers of most importance are: has issues with  utility payments.   Referrals to community agencies completed with patient/caregiver consent outside of [employer] include: yes: [facility]org  Referrals were put through [facility] - no: none at this time.  Support and Services: has no support at this time.  Other information discussed the patient needs / wants help with: Completed SDOH with patient at bedside today, Pt has issues with utility payments. Will follow up in one week to check status of referrals and pt future needs.    Follow up required: yes.  Follow-up Outreach - Due: 5/19/2025

## 2025-05-14 LAB
OHS QRS DURATION: 96 MS
OHS QTC CALCULATION: 489 MS

## 2025-07-05 NOTE — PROGRESS NOTES
Was brought in from [facility], where he has been for the past month  Educate on cessation when appropriate  On phenobarbital for agitation, no signs of EtOH withdrawl  Home Oxycodone to avoid withdrawals   [place] - Intensive Care  Cardiology  Progress Note    Patient Name: Marck Madison  MRN: [id number]  Admission Date: 6/5/2024  Hospital Length of Stay: 3 days  Code Status: Full Code   Attending Physician: Thang Del Angel III, MD   Primary Care Physician: [facility] -  Expected Discharge Date:   Principal Problem:Acute hypoxemic respiratory failure    Subjective:     Hospital Course:   6/7/24 Intubated and sedated - on low dose dobutamine and levophed. 3.4 L diuresis since admission  6/8/24 Intubated. Off dobutamine - low dose levophed. A-flutter 100-110. Diuresed 4.5 L since admission    Echo 6/6/24    Left Ventricle: The left ventricle is normal in size. Normal wall thickness. There is concentric hypertrophy. There is normal systolic function with a visually estimated ejection fraction of 55 %. Unable to assess diastolic function due to atrial fibrillation.    Right Ventricle: Normal right ventricular cavity size. Systolic function is normal.    Left Atrium: Left atrium is severely dilated.    Right Atrium: Right atrium is mildly dilated.    Mitral Valve: There is mild to moderate regurgitation.    Tricuspid Valve: There is mild regurgitation.    IVC/SVC: Patient is ventilated, cannot use IVC diameter to estimate right atrial pressure.    Tds       Interval History:     Review of Systems   Unable to perform ROS: Intubated     Objective:     Vital Signs (Most Recent):  Temp: 98.6 °F (37 °C) (06/08/24 0701)  Pulse: 108 (06/08/24 0900)  Resp: 19 (06/08/24 0900)  BP: 101/60 (06/07/24 0301)  SpO2: 98 % (06/08/24 0900) Vital Signs (24h Range):  Temp:  [98 °F (36.7 °C)-99.6 °F (37.6 °C)] 98.6 °F (37 °C)  Pulse:  [] 108  Resp:  [17-33] 19  SpO2:  [97 %-100 %] 98 %  Arterial Line BP: ()/(52-87) 113/64     Weight: 88.7 kg (195 lb 8.8 oz)  Body mass index is 31.56 kg/m².     SpO2: 98 %         Intake/Output Summary (Last 24 hours) at 6/8/2024 0949  Last data filed at 6/8/2024 0920  Gross per 24  hour   Intake 1333.37 ml   Output 2510 ml   Net -1176.63 ml       Lines/Drains/Airways       Peripherally Inserted Central Catheter Line  Duration             PICC Triple Lumen 06/05/24 1912 left brachial 2 days              Drain  Duration                  NG/OG Tube 06/05/24 1419 Center mouth 2 days         Urethral Catheter 06/05/24 1500 16 Fr. 2 days              Airway  Duration                  Airway - Non-Surgical 06/05/24 1420 Endotracheal Tube 2 days              Arterial Line  Duration             Arterial Line 06/05/24 1501 Right Radial 2 days              Peripheral Intravenous Line  Duration                  Peripheral IV - Single Lumen 06/05/24 0632 18 G;1 3/4 in Anterior;Right Upper Arm 3 days         Peripheral IV - Single Lumen 06/05/24 1500 22 G Anterior;Right Shoulder 2 days                       Physical Exam  Constitutional:       Appearance: He is well-developed.   HENT:      Head: Normocephalic and atraumatic.   Eyes:      Conjunctiva/sclera: Conjunctivae normal.      Pupils: Pupils are equal, round, and reactive to light.   Cardiovascular:      Rate and Rhythm: Tachycardia present. Rhythm irregular.      Pulses: Intact distal pulses.      Heart sounds: Normal heart sounds.   Pulmonary:      Effort: Pulmonary effort is normal.      Breath sounds: Normal breath sounds.   Abdominal:      General: Bowel sounds are normal.      Palpations: Abdomen is soft.   Musculoskeletal:         General: Normal range of motion.      Cervical back: Normal range of motion and neck supple.   Skin:     General: Skin is warm and dry.            Significant Labs: All pertinent lab results from the last 24 hours have been reviewed.    Significant Imaging: Echocardiogram: 2D echo with color flow doppler: No results found for this or any previous visit.  Assessment and Plan:     Brief HPI:     * Acute hypoxemic respiratory failure        Elevated troponin        Permanent atrial fibrillation    AFib is rate controlled  today.  Continue to monitor.    6/8/24 Rate controlled A-flutter    Acute on chronic diastolic congestive heart failure  Patient is identified as having Diastolic (HFpEF) heart failure that is Acute on chronic. CHF is currently uncontrolled  Dyspnea not returned to baseline after  doses of IV diuretic, and Rales/crackles on pulmonary exam. Latest ECHO performed and demonstrates- Results for orders placed during the hospital encounter of 03/15/24    Echo    Interpretation Summary    Left Ventricle: The left ventricle is normal in size. There is moderate concentric hypertrophy. There is low normal systolic function with a visually estimated ejection fraction of 50 - 55%.    Right Ventricle: Mild right ventricular enlargement. Systolic function is normal.    Left Atrium: Left atrium is severely dilated.    Right Atrium: Right atrium is moderately dilated.    Aortic Valve: There is mild aortic valve sclerosis.    Mitral Valve: There is moderate regurgitation.    Tricuspid Valve: There is moderate regurgitation.    Pulmonary Artery: The estimated pulmonary artery systolic pressure is 51 mmHg.    IVC/SVC: Elevated venous pressure at 15 mmHg.  . Continue Furosemide and monitor clinical status closely. Monitor on telemetry. Patient is on CHF pathway.  Monitor strict Is&Os and daily weights.  Place on fluid restriction of 1.5 L. Cardiology has been consulted. Continue to stress to patient importance of self efficacy and  on diet for CHF. Last BNP reviewed- and noted below   Recent Labs   Lab 06/05/24  0618   *          Patient also has acute on chronic kidney injury.  Monitor renal function closely.   Continue IV diuresis.    6/6:  continues to be intubated.  Monitor renal function and diuresis.  As needed and tolerated.  6/7/24 Diuresis and afterload recution as tolerated. Wean pressors and dobutamine as tolerated  6/8/24 Pressors being weaned. Will follow PRN    Type 2 diabetes mellitus, with long-term  current use of insulin            VTE Risk Mitigation (From admission, onward)           Ordered     enoxaparin injection 90 mg  Every 12 hours         06/05/24 1131                    Jonathan Lopez MD  Cardiology  [facility] [place] - Intensive Care

## 2025-07-07 ENCOUNTER — PATIENT OUTREACH (OUTPATIENT)
Dept: ADMINISTRATIVE | Facility: OTHER | Age: 65
End: 2025-07-07
Payer: MEDICARE

## (undated) DEVICE — PAD DEFIB CADENCE ADULT R2

## (undated) DEVICE — ELECTRODE PAD DEFIB STERILE

## (undated) DEVICE — ELECTRODE RESUSCITATION 1 STEP